# Patient Record
Sex: MALE | Race: WHITE | Employment: OTHER | URBAN - METROPOLITAN AREA
[De-identification: names, ages, dates, MRNs, and addresses within clinical notes are randomized per-mention and may not be internally consistent; named-entity substitution may affect disease eponyms.]

---

## 2017-01-04 ENCOUNTER — OFFICE VISIT (OUTPATIENT)
Dept: FAMILY MEDICINE CLINIC | Age: 67
End: 2017-01-04

## 2017-01-04 VITALS
HEIGHT: 69 IN | HEART RATE: 62 BPM | SYSTOLIC BLOOD PRESSURE: 112 MMHG | RESPIRATION RATE: 16 BRPM | DIASTOLIC BLOOD PRESSURE: 68 MMHG | BODY MASS INDEX: 40.14 KG/M2 | TEMPERATURE: 97.8 F | OXYGEN SATURATION: 97 % | WEIGHT: 271 LBS

## 2017-01-04 DIAGNOSIS — J06.9 UPPER RESPIRATORY TRACT INFECTION, UNSPECIFIED TYPE: ICD-10-CM

## 2017-01-04 DIAGNOSIS — J40 BRONCHITIS: Primary | ICD-10-CM

## 2017-01-04 PROCEDURE — 99213 OFFICE O/P EST LOW 20 MIN: CPT | Performed by: NURSE PRACTITIONER

## 2017-01-04 RX ORDER — BENZONATATE 100 MG/1
100 CAPSULE ORAL 3 TIMES DAILY PRN
Qty: 15 CAPSULE | Refills: 0 | Status: SHIPPED | OUTPATIENT
Start: 2017-01-04 | End: 2017-01-09

## 2017-01-04 RX ORDER — AMOXICILLIN 500 MG/1
500 CAPSULE ORAL 2 TIMES DAILY
Qty: 20 CAPSULE | Refills: 0 | Status: SHIPPED | OUTPATIENT
Start: 2017-01-04 | End: 2019-01-28 | Stop reason: SDUPTHER

## 2017-01-04 ASSESSMENT — ENCOUNTER SYMPTOMS
SHORTNESS OF BREATH: 0
SINUS PRESSURE: 1
SORE THROAT: 1
SWOLLEN GLANDS: 0
COUGH: 1
HOARSE VOICE: 0

## 2017-03-16 ENCOUNTER — HOSPITAL ENCOUNTER (OUTPATIENT)
Dept: SURGERY | Age: 67
Discharge: HOME | End: 2017-03-16
Attending: INTERNAL MEDICINE | Admitting: INTERNAL MEDICINE

## 2017-03-16 VITALS
SYSTOLIC BLOOD PRESSURE: 138 MMHG | BODY MASS INDEX: 37.22 KG/M2 | RESPIRATION RATE: 20 BRPM | HEART RATE: 50 BPM | OXYGEN SATURATION: 95 % | HEIGHT: 70 IN | TEMPERATURE: 97 F | WEIGHT: 260 LBS | DIASTOLIC BLOOD PRESSURE: 92 MMHG

## 2017-03-16 LAB
GLUCOSE BLD-MCNC: 150 MG/DL (ref 70–99)
GLUCOSE BLD-MCNC: 163 MG/DL (ref 70–99)
PERFORMED ON: ABNORMAL
PERFORMED ON: ABNORMAL

## 2017-03-16 RX ORDER — LIDOCAINE HYDROCHLORIDE 10 MG/ML
1 INJECTION, SOLUTION EPIDURAL; INFILTRATION; INTRACAUDAL; PERINEURAL
Status: ACTIVE | OUTPATIENT
Start: 2017-03-16 | End: 2017-03-16

## 2017-03-16 RX ORDER — LIDOCAINE HYDROCHLORIDE 10 MG/ML
0.1 INJECTION, SOLUTION EPIDURAL; INFILTRATION; INTRACAUDAL; PERINEURAL ONCE
Status: DISCONTINUED | OUTPATIENT
Start: 2017-03-16 | End: 2017-03-17 | Stop reason: HOSPADM

## 2017-03-16 RX ORDER — SODIUM CHLORIDE, SODIUM LACTATE, POTASSIUM CHLORIDE, CALCIUM CHLORIDE 600; 310; 30; 20 MG/100ML; MG/100ML; MG/100ML; MG/100ML
INJECTION, SOLUTION INTRAVENOUS CONTINUOUS
Status: DISCONTINUED | OUTPATIENT
Start: 2017-03-16 | End: 2017-03-17 | Stop reason: HOSPADM

## 2017-03-16 RX ADMIN — SODIUM CHLORIDE, SODIUM LACTATE, POTASSIUM CHLORIDE, CALCIUM CHLORIDE: 600; 310; 30; 20 INJECTION, SOLUTION INTRAVENOUS at 10:30

## 2017-03-16 ASSESSMENT — PAIN SCALES - GENERAL
PAINLEVEL_OUTOF10: 0

## 2017-03-16 ASSESSMENT — PAIN - FUNCTIONAL ASSESSMENT: PAIN_FUNCTIONAL_ASSESSMENT: 0-10

## 2017-06-26 ENCOUNTER — HOSPITAL ENCOUNTER (OUTPATIENT)
Dept: SURGERY | Age: 67
Discharge: HOME | End: 2017-06-26
Attending: INTERNAL MEDICINE | Admitting: INTERNAL MEDICINE

## 2017-06-26 VITALS
OXYGEN SATURATION: 97 % | BODY MASS INDEX: 37.22 KG/M2 | DIASTOLIC BLOOD PRESSURE: 74 MMHG | WEIGHT: 260 LBS | RESPIRATION RATE: 16 BRPM | TEMPERATURE: 97 F | SYSTOLIC BLOOD PRESSURE: 150 MMHG | HEIGHT: 70 IN | HEART RATE: 56 BPM

## 2017-06-26 LAB
GLUCOSE BLD-MCNC: 168 MG/DL (ref 70–99)
GLUCOSE BLD-MCNC: 175 MG/DL (ref 70–99)
PERFORMED ON: ABNORMAL
PERFORMED ON: ABNORMAL

## 2017-06-26 PROCEDURE — 93010 ELECTROCARDIOGRAM REPORT: CPT | Performed by: INTERNAL MEDICINE

## 2017-06-26 RX ORDER — LIDOCAINE HYDROCHLORIDE 10 MG/ML
0.1 INJECTION, SOLUTION EPIDURAL; INFILTRATION; INTRACAUDAL; PERINEURAL
Status: DISCONTINUED | OUTPATIENT
Start: 2017-06-26 | End: 2017-06-26 | Stop reason: SDUPTHER

## 2017-06-26 RX ORDER — OXYCODONE HCL 5 MG/5 ML
5 SOLUTION, ORAL ORAL EVERY 4 HOURS PRN
Qty: 120 ML | Refills: 0 | Status: SHIPPED | OUTPATIENT
Start: 2017-06-26 | End: 2017-07-06

## 2017-06-26 RX ORDER — SODIUM CHLORIDE, SODIUM LACTATE, POTASSIUM CHLORIDE, CALCIUM CHLORIDE 600; 310; 30; 20 MG/100ML; MG/100ML; MG/100ML; MG/100ML
INJECTION, SOLUTION INTRAVENOUS CONTINUOUS
Status: DISCONTINUED | OUTPATIENT
Start: 2017-06-26 | End: 2017-06-27 | Stop reason: HOSPADM

## 2017-06-26 RX ORDER — SODIUM CHLORIDE, SODIUM LACTATE, POTASSIUM CHLORIDE, CALCIUM CHLORIDE 600; 310; 30; 20 MG/100ML; MG/100ML; MG/100ML; MG/100ML
INJECTION, SOLUTION INTRAVENOUS ONCE
Status: DISCONTINUED | OUTPATIENT
Start: 2017-06-26 | End: 2017-06-26 | Stop reason: SDUPTHER

## 2017-06-26 RX ORDER — PROMETHAZINE HYDROCHLORIDE 25 MG/1
25 SUPPOSITORY RECTAL EVERY 6 HOURS PRN
Qty: 10 SUPPOSITORY | Refills: 0 | Status: SHIPPED | OUTPATIENT
Start: 2017-06-26 | End: 2017-07-03

## 2017-06-26 RX ORDER — SUCRALFATE ORAL 1 G/10ML
1 SUSPENSION ORAL 4 TIMES DAILY
Qty: 300 ML | Refills: 0 | Status: SHIPPED | OUTPATIENT
Start: 2017-06-26 | End: 2017-09-06

## 2017-06-26 RX ORDER — LIDOCAINE HYDROCHLORIDE 10 MG/ML
1 INJECTION, SOLUTION EPIDURAL; INFILTRATION; INTRACAUDAL; PERINEURAL
Status: ACTIVE | OUTPATIENT
Start: 2017-06-26 | End: 2017-06-26

## 2017-06-26 RX ADMIN — SODIUM CHLORIDE, SODIUM LACTATE, POTASSIUM CHLORIDE, CALCIUM CHLORIDE: 600; 310; 30; 20 INJECTION, SOLUTION INTRAVENOUS at 10:35

## 2017-06-26 ASSESSMENT — PAIN SCALES - GENERAL
PAINLEVEL_OUTOF10: 0

## 2017-06-26 ASSESSMENT — PAIN - FUNCTIONAL ASSESSMENT: PAIN_FUNCTIONAL_ASSESSMENT: 0-10

## 2017-06-27 LAB
EKG ATRIAL RATE: 58 BPM
EKG DIAGNOSIS: NORMAL
EKG Q-T INTERVAL: 416 MS
EKG QRS DURATION: 70 MS
EKG QTC CALCULATION (BAZETT): 408 MS
EKG R AXIS: 47 DEGREES
EKG T AXIS: 32 DEGREES
EKG VENTRICULAR RATE: 58 BPM

## 2017-08-21 ENCOUNTER — OFFICE VISIT (OUTPATIENT)
Dept: FAMILY MEDICINE CLINIC | Age: 67
End: 2017-08-21

## 2017-08-21 VITALS
DIASTOLIC BLOOD PRESSURE: 70 MMHG | WEIGHT: 272 LBS | HEIGHT: 70 IN | SYSTOLIC BLOOD PRESSURE: 118 MMHG | BODY MASS INDEX: 38.94 KG/M2 | OXYGEN SATURATION: 94 % | HEART RATE: 58 BPM

## 2017-08-21 DIAGNOSIS — I10 ESSENTIAL HYPERTENSION: Primary | ICD-10-CM

## 2017-08-21 DIAGNOSIS — E78.5 HYPERLIPIDEMIA, UNSPECIFIED HYPERLIPIDEMIA TYPE: ICD-10-CM

## 2017-08-21 DIAGNOSIS — R91.1 LUNG NODULE: ICD-10-CM

## 2017-08-21 DIAGNOSIS — E11.9 CONTROLLED TYPE 2 DIABETES MELLITUS WITHOUT COMPLICATION, WITH LONG-TERM CURRENT USE OF INSULIN (HCC): ICD-10-CM

## 2017-08-21 DIAGNOSIS — Z87.898 HISTORY OF SNORING: ICD-10-CM

## 2017-08-21 DIAGNOSIS — Z79.4 CONTROLLED TYPE 2 DIABETES MELLITUS WITHOUT COMPLICATION, WITH LONG-TERM CURRENT USE OF INSULIN (HCC): ICD-10-CM

## 2017-08-21 LAB
HBA1C MFR BLD: 6.9 %
PROSTATE SPECIFIC ANTIGEN: 0.65 NG/ML (ref 0–4)

## 2017-08-21 PROCEDURE — G8427 DOCREV CUR MEDS BY ELIG CLIN: HCPCS | Performed by: FAMILY MEDICINE

## 2017-08-21 PROCEDURE — 3046F HEMOGLOBIN A1C LEVEL >9.0%: CPT | Performed by: FAMILY MEDICINE

## 2017-08-21 PROCEDURE — G8417 CALC BMI ABV UP PARAM F/U: HCPCS | Performed by: FAMILY MEDICINE

## 2017-08-21 PROCEDURE — 99214 OFFICE O/P EST MOD 30 MIN: CPT | Performed by: FAMILY MEDICINE

## 2017-08-21 PROCEDURE — 1123F ACP DISCUSS/DSCN MKR DOCD: CPT | Performed by: FAMILY MEDICINE

## 2017-08-21 PROCEDURE — 83036 HEMOGLOBIN GLYCOSYLATED A1C: CPT | Performed by: FAMILY MEDICINE

## 2017-08-21 PROCEDURE — 4040F PNEUMOC VAC/ADMIN/RCVD: CPT | Performed by: FAMILY MEDICINE

## 2017-08-21 PROCEDURE — 3017F COLORECTAL CA SCREEN DOC REV: CPT | Performed by: FAMILY MEDICINE

## 2017-08-21 PROCEDURE — 1036F TOBACCO NON-USER: CPT | Performed by: FAMILY MEDICINE

## 2017-08-21 ASSESSMENT — ENCOUNTER SYMPTOMS
COUGH: 0
STRIDOR: 0
CHEST TIGHTNESS: 0
CHOKING: 0
SHORTNESS OF BREATH: 0
WHEEZING: 0

## 2017-09-06 ENCOUNTER — HOSPITAL ENCOUNTER (OUTPATIENT)
Dept: SURGERY | Age: 67
Discharge: HOME | End: 2017-09-06
Attending: INTERNAL MEDICINE | Admitting: INTERNAL MEDICINE

## 2017-09-06 VITALS
WEIGHT: 272 LBS | OXYGEN SATURATION: 96 % | SYSTOLIC BLOOD PRESSURE: 127 MMHG | RESPIRATION RATE: 20 BRPM | HEIGHT: 70 IN | TEMPERATURE: 97 F | DIASTOLIC BLOOD PRESSURE: 76 MMHG | BODY MASS INDEX: 38.94 KG/M2 | HEART RATE: 58 BPM

## 2017-09-06 LAB
GLUCOSE BLD-MCNC: 180 MG/DL (ref 70–99)
GLUCOSE BLD-MCNC: 186 MG/DL (ref 70–99)
PERFORMED ON: ABNORMAL
PERFORMED ON: ABNORMAL

## 2017-09-06 RX ORDER — METOCLOPRAMIDE HYDROCHLORIDE 5 MG/ML
10 INJECTION INTRAMUSCULAR; INTRAVENOUS
Status: ACTIVE | OUTPATIENT
Start: 2017-09-06 | End: 2017-09-06

## 2017-09-06 RX ORDER — SODIUM CHLORIDE, SODIUM LACTATE, POTASSIUM CHLORIDE, CALCIUM CHLORIDE 600; 310; 30; 20 MG/100ML; MG/100ML; MG/100ML; MG/100ML
1000 INJECTION, SOLUTION INTRAVENOUS ONCE
Status: DISCONTINUED | OUTPATIENT
Start: 2017-09-06 | End: 2017-09-07 | Stop reason: HOSPADM

## 2017-09-06 RX ORDER — LIDOCAINE HYDROCHLORIDE 10 MG/ML
1 INJECTION, SOLUTION EPIDURAL; INFILTRATION; INTRACAUDAL; PERINEURAL ONCE
Status: DISCONTINUED | OUTPATIENT
Start: 2017-09-06 | End: 2017-09-07 | Stop reason: HOSPADM

## 2017-09-06 RX ORDER — HYDRALAZINE HYDROCHLORIDE 20 MG/ML
5 INJECTION INTRAMUSCULAR; INTRAVENOUS EVERY 10 MIN PRN
Status: DISCONTINUED | OUTPATIENT
Start: 2017-09-06 | End: 2017-09-07 | Stop reason: HOSPADM

## 2017-09-06 RX ORDER — OXYCODONE HYDROCHLORIDE 5 MG/1
5 TABLET ORAL PRN
Status: ACTIVE | OUTPATIENT
Start: 2017-09-06 | End: 2017-09-06

## 2017-09-06 RX ORDER — SODIUM CHLORIDE, SODIUM LACTATE, POTASSIUM CHLORIDE, CALCIUM CHLORIDE 600; 310; 30; 20 MG/100ML; MG/100ML; MG/100ML; MG/100ML
INJECTION, SOLUTION INTRAVENOUS CONTINUOUS
Status: DISCONTINUED | OUTPATIENT
Start: 2017-09-06 | End: 2017-09-07 | Stop reason: HOSPADM

## 2017-09-06 RX ORDER — PROMETHAZINE HYDROCHLORIDE 25 MG/ML
6.25 INJECTION, SOLUTION INTRAMUSCULAR; INTRAVENOUS
Status: ACTIVE | OUTPATIENT
Start: 2017-09-06 | End: 2017-09-06

## 2017-09-06 RX ORDER — MORPHINE SULFATE 2 MG/ML
1 INJECTION, SOLUTION INTRAMUSCULAR; INTRAVENOUS EVERY 5 MIN PRN
Status: DISCONTINUED | OUTPATIENT
Start: 2017-09-06 | End: 2017-09-07 | Stop reason: HOSPADM

## 2017-09-06 RX ORDER — MEPERIDINE HYDROCHLORIDE 50 MG/ML
12.5 INJECTION INTRAMUSCULAR; INTRAVENOUS; SUBCUTANEOUS EVERY 5 MIN PRN
Status: DISCONTINUED | OUTPATIENT
Start: 2017-09-06 | End: 2017-09-07 | Stop reason: HOSPADM

## 2017-09-06 RX ORDER — SODIUM CHLORIDE 0.9 % (FLUSH) 0.9 %
10 SYRINGE (ML) INJECTION PRN
Status: DISCONTINUED | OUTPATIENT
Start: 2017-09-06 | End: 2017-09-07 | Stop reason: HOSPADM

## 2017-09-06 RX ORDER — OXYCODONE HYDROCHLORIDE 5 MG/1
10 TABLET ORAL PRN
Status: ACTIVE | OUTPATIENT
Start: 2017-09-06 | End: 2017-09-06

## 2017-09-06 RX ORDER — DIPHENHYDRAMINE HYDROCHLORIDE 50 MG/ML
12.5 INJECTION INTRAMUSCULAR; INTRAVENOUS
Status: ACTIVE | OUTPATIENT
Start: 2017-09-06 | End: 2017-09-06

## 2017-09-06 RX ORDER — SODIUM CHLORIDE 0.9 % (FLUSH) 0.9 %
10 SYRINGE (ML) INJECTION EVERY 12 HOURS SCHEDULED
Status: DISCONTINUED | OUTPATIENT
Start: 2017-09-06 | End: 2017-09-07 | Stop reason: HOSPADM

## 2017-09-06 RX ORDER — LABETALOL HYDROCHLORIDE 5 MG/ML
5 INJECTION, SOLUTION INTRAVENOUS EVERY 10 MIN PRN
Status: DISCONTINUED | OUTPATIENT
Start: 2017-09-06 | End: 2017-09-07 | Stop reason: HOSPADM

## 2017-09-06 RX ORDER — LIDOCAINE HYDROCHLORIDE 10 MG/ML
1 INJECTION, SOLUTION EPIDURAL; INFILTRATION; INTRACAUDAL; PERINEURAL
Status: ACTIVE | OUTPATIENT
Start: 2017-09-06 | End: 2017-09-06

## 2017-09-06 RX ADMIN — SODIUM CHLORIDE, SODIUM LACTATE, POTASSIUM CHLORIDE, CALCIUM CHLORIDE: 600; 310; 30; 20 INJECTION, SOLUTION INTRAVENOUS at 10:20

## 2017-09-06 ASSESSMENT — PAIN SCALES - GENERAL
PAINLEVEL_OUTOF10: 0

## 2017-09-06 ASSESSMENT — PAIN - FUNCTIONAL ASSESSMENT: PAIN_FUNCTIONAL_ASSESSMENT: 0-10

## 2017-09-08 ENCOUNTER — HOSPITAL ENCOUNTER (OUTPATIENT)
Dept: CT IMAGING | Age: 67
Discharge: HOME | End: 2017-09-08
Attending: FAMILY MEDICINE | Admitting: FAMILY MEDICINE

## 2017-09-08 ENCOUNTER — TELEPHONE (OUTPATIENT)
Dept: PULMONOLOGY | Age: 67
End: 2017-09-08

## 2017-09-08 DIAGNOSIS — R91.1 SOLITARY PULMONARY NODULE: ICD-10-CM

## 2017-09-08 DIAGNOSIS — R91.1 LUNG NODULE: ICD-10-CM

## 2017-09-11 ENCOUNTER — TELEPHONE (OUTPATIENT)
Dept: FAMILY MEDICINE CLINIC | Age: 67
End: 2017-09-11

## 2017-09-20 ENCOUNTER — OFFICE VISIT (OUTPATIENT)
Dept: PULMONOLOGY | Age: 67
End: 2017-09-20

## 2017-09-20 VITALS
WEIGHT: 272 LBS | RESPIRATION RATE: 18 BRPM | DIASTOLIC BLOOD PRESSURE: 72 MMHG | HEIGHT: 70 IN | SYSTOLIC BLOOD PRESSURE: 118 MMHG | BODY MASS INDEX: 38.94 KG/M2 | OXYGEN SATURATION: 95 % | HEART RATE: 64 BPM

## 2017-09-20 DIAGNOSIS — G47.19 EXCESSIVE DAYTIME SLEEPINESS: ICD-10-CM

## 2017-09-20 DIAGNOSIS — R91.1 LUNG NODULE: Primary | ICD-10-CM

## 2017-09-20 PROCEDURE — 1036F TOBACCO NON-USER: CPT | Performed by: INTERNAL MEDICINE

## 2017-09-20 PROCEDURE — 3017F COLORECTAL CA SCREEN DOC REV: CPT | Performed by: INTERNAL MEDICINE

## 2017-09-20 PROCEDURE — 99204 OFFICE O/P NEW MOD 45 MIN: CPT | Performed by: INTERNAL MEDICINE

## 2017-09-20 PROCEDURE — 1123F ACP DISCUSS/DSCN MKR DOCD: CPT | Performed by: INTERNAL MEDICINE

## 2017-09-20 PROCEDURE — 4040F PNEUMOC VAC/ADMIN/RCVD: CPT | Performed by: INTERNAL MEDICINE

## 2017-09-20 PROCEDURE — G8417 CALC BMI ABV UP PARAM F/U: HCPCS | Performed by: INTERNAL MEDICINE

## 2017-09-20 PROCEDURE — G8428 CUR MEDS NOT DOCUMENT: HCPCS | Performed by: INTERNAL MEDICINE

## 2017-09-20 ASSESSMENT — SLEEP AND FATIGUE QUESTIONNAIRES
ESS TOTAL SCORE: 12
HOW LIKELY ARE YOU TO NOD OFF OR FALL ASLEEP WHILE WATCHING TV: 2
NECK CIRCUMFERENCE (INCHES): 18.5
HOW LIKELY ARE YOU TO NOD OFF OR FALL ASLEEP WHILE SITTING INACTIVE IN A PUBLIC PLACE: 2
HOW LIKELY ARE YOU TO NOD OFF OR FALL ASLEEP WHEN YOU ARE A PASSENGER IN A CAR FOR AN HOUR WITHOUT A BREAK: 3
HOW LIKELY ARE YOU TO NOD OFF OR FALL ASLEEP WHILE SITTING AND TALKING TO SOMEONE: 0
HOW LIKELY ARE YOU TO NOD OFF OR FALL ASLEEP WHILE SITTING AND READING: 2
HOW LIKELY ARE YOU TO NOD OFF OR FALL ASLEEP IN A CAR, WHILE STOPPED FOR A FEW MINUTES IN TRAFFIC: 0
HOW LIKELY ARE YOU TO NOD OFF OR FALL ASLEEP WHILE SITTING QUIETLY AFTER LUNCH WITHOUT ALCOHOL: 0
HOW LIKELY ARE YOU TO NOD OFF OR FALL ASLEEP WHILE LYING DOWN TO REST IN THE AFTERNOON WHEN CIRCUMSTANCES PERMIT: 3

## 2017-09-24 ENCOUNTER — HOSPITAL ENCOUNTER (OUTPATIENT)
Dept: SLEEP MEDICINE | Age: 67
Discharge: HOME | End: 2017-09-26
Attending: INTERNAL MEDICINE | Admitting: INTERNAL MEDICINE

## 2017-09-24 DIAGNOSIS — G47.19 EXCESSIVE DAYTIME SLEEPINESS: ICD-10-CM

## 2017-09-26 ENCOUNTER — HOSPITAL ENCOUNTER (OUTPATIENT)
Dept: SLEEP MEDICINE | Age: 67
Discharge: HOME | End: 2017-09-28
Attending: INTERNAL MEDICINE | Admitting: INTERNAL MEDICINE

## 2017-09-26 ENCOUNTER — TELEPHONE (OUTPATIENT)
Dept: PULMONOLOGY | Age: 67
End: 2017-09-26

## 2017-09-26 DIAGNOSIS — G47.33 OSA (OBSTRUCTIVE SLEEP APNEA): Primary | ICD-10-CM

## 2017-09-26 DIAGNOSIS — G47.33 OSA (OBSTRUCTIVE SLEEP APNEA): ICD-10-CM

## 2017-09-29 DIAGNOSIS — G47.33 OSA (OBSTRUCTIVE SLEEP APNEA): Primary | ICD-10-CM

## 2017-10-03 ENCOUNTER — HOSPITAL ENCOUNTER (OUTPATIENT)
Dept: ULTRASOUND IMAGING | Age: 67
Discharge: HOME | End: 2017-10-03
Attending: UROLOGY | Admitting: UROLOGY

## 2017-10-03 DIAGNOSIS — N50.9 DISORDER OF MALE GENITAL ORGANS: ICD-10-CM

## 2017-10-03 DIAGNOSIS — N50.9 UNSPECIFIED DISORDER OF MALE GENITAL ORGANS: ICD-10-CM

## 2017-10-20 ENCOUNTER — NURSE ONLY (OUTPATIENT)
Dept: FAMILY MEDICINE CLINIC | Age: 67
End: 2017-10-20

## 2017-10-20 DIAGNOSIS — Z23 IMMUNIZATION DUE: Primary | ICD-10-CM

## 2017-10-20 PROCEDURE — 90662 IIV NO PRSV INCREASED AG IM: CPT | Performed by: FAMILY MEDICINE

## 2017-10-20 PROCEDURE — G0008 ADMIN INFLUENZA VIRUS VAC: HCPCS | Performed by: FAMILY MEDICINE

## 2017-10-23 ENCOUNTER — OFFICE VISIT (OUTPATIENT)
Dept: FAMILY MEDICINE CLINIC | Age: 67
End: 2017-10-23

## 2017-10-23 VITALS
WEIGHT: 260.2 LBS | OXYGEN SATURATION: 95 % | HEART RATE: 63 BPM | DIASTOLIC BLOOD PRESSURE: 70 MMHG | BODY MASS INDEX: 37.25 KG/M2 | HEIGHT: 70 IN | SYSTOLIC BLOOD PRESSURE: 127 MMHG | RESPIRATION RATE: 16 BRPM | TEMPERATURE: 97.8 F

## 2017-10-23 DIAGNOSIS — N44.2 TESTICULAR CYST: Primary | ICD-10-CM

## 2017-10-23 DIAGNOSIS — Z01.818 PREOP EXAMINATION: ICD-10-CM

## 2017-10-23 PROCEDURE — G8417 CALC BMI ABV UP PARAM F/U: HCPCS | Performed by: NURSE PRACTITIONER

## 2017-10-23 PROCEDURE — G8484 FLU IMMUNIZE NO ADMIN: HCPCS | Performed by: NURSE PRACTITIONER

## 2017-10-23 PROCEDURE — 99214 OFFICE O/P EST MOD 30 MIN: CPT | Performed by: NURSE PRACTITIONER

## 2017-10-23 PROCEDURE — G8427 DOCREV CUR MEDS BY ELIG CLIN: HCPCS | Performed by: NURSE PRACTITIONER

## 2017-10-23 PROCEDURE — 4040F PNEUMOC VAC/ADMIN/RCVD: CPT | Performed by: NURSE PRACTITIONER

## 2017-10-23 PROCEDURE — 3017F COLORECTAL CA SCREEN DOC REV: CPT | Performed by: NURSE PRACTITIONER

## 2017-10-23 ASSESSMENT — ENCOUNTER SYMPTOMS
NAUSEA: 0
SINUS PRESSURE: 0
COLOR CHANGE: 0
EYES NEGATIVE: 1
EYE REDNESS: 0
WHEEZING: 0
SORE THROAT: 0
TROUBLE SWALLOWING: 0
EYE ITCHING: 0
ALLERGIC/IMMUNOLOGIC NEGATIVE: 1
DIARRHEA: 0
RESPIRATORY NEGATIVE: 1
CONSTIPATION: 0
ABDOMINAL PAIN: 0
SHORTNESS OF BREATH: 0
CHEST TIGHTNESS: 0
GASTROINTESTINAL NEGATIVE: 1
COUGH: 0

## 2017-10-23 ASSESSMENT — PATIENT HEALTH QUESTIONNAIRE - PHQ9
SUM OF ALL RESPONSES TO PHQ9 QUESTIONS 1 & 2: 0
SUM OF ALL RESPONSES TO PHQ QUESTIONS 1-9: 0
2. FEELING DOWN, DEPRESSED OR HOPELESS: 0
1. LITTLE INTEREST OR PLEASURE IN DOING THINGS: 0

## 2017-10-23 NOTE — PROGRESS NOTES
The medications were discussed with the patient and the physician. Prescription and over the counter medicines reviewed. Pt is taking medication as prescribed  any side effects or barriers such as difficulty in taking the medication reveiwed. The purpose, side effects, dose of medication of new medications were explained to the patient and questions answered by the physician. The patients understands the information concerning medication. Individual treatment plan developed:Self-management plan and goals developed and discussed. Resources given. See patient instructions. Medication treatment plan developed by the physician. See orders. Healthy behavior discussed: Preventative behaviors discussed regarding healthy diet, exercise, and not smoking. Pt already has tool to record self care of BS or high blood pressure results, regular paper ok. Self management confidence of patient being able to put goal into action assessed: medium confidence. Barriers to treatment evaluated:none unless otherwise noted in the HPI.
- 5.1 K/uL 1.2   Monocytes # Latest Ref Range: 0.0 - 1.3 K/uL 0.4   Eosinophils # Latest Ref Range: 0.0 - 0.6 K/uL 0.3          Assessment:       79 y.o. patient with planned surgery as above. Known risk factors for perioperative complications: Diabetes mellitus, Hypertension, Obstructive sleep apnea  Current medications which may produce withdrawal symptoms if withheld perioperatively: none      Plan:     1. Preoperative workup as follows: none  2. Change in medication regimen before surgery: Hold all medications on morning of surgery  3. Prophylaxis for cardiac events with perioperative beta-blockers: Not indicated  ACC/AHA indications for pre-operative beta-blocker use:    · Vascular surgery with history of postitive stress test  · Intermediate or high risk surgery with history of CAD   · Intermediate or high risk surgery with multiple clinical predictors of CAD- 2 of the following: history of compensated or prior heart failure, history of cerebrovascular disease, DM, or renal insufficiency    Routine administration of higher-dose, long-acting metoprolol in beta-blockernaïve patients on the day of surgery, and in the absence of dose titration is associated with an overall increase in mortality. Beta-blockers should be started days to weeks prior to surgery and titrated to pulse < 70.  4. Deep vein thrombosis prophylaxis: regimen to be chosen by surgical team  5.  No contraindications to planned surgery    Mitchell Murillo NP

## 2017-10-31 ENCOUNTER — TELEPHONE (OUTPATIENT)
Dept: PULMONOLOGY | Age: 67
End: 2017-10-31

## 2017-11-28 PROBLEM — R00.1 BRADYCARDIA: Status: ACTIVE | Noted: 2017-11-28

## 2017-11-28 PROBLEM — M79.601 RIGHT ARM PAIN: Status: ACTIVE | Noted: 2017-11-28

## 2017-11-28 PROBLEM — R01.1 HEART MURMUR: Status: ACTIVE | Noted: 2017-11-28

## 2017-11-29 ENCOUNTER — TELEPHONE (OUTPATIENT)
Dept: FAMILY MEDICINE CLINIC | Age: 67
End: 2017-11-29

## 2017-11-30 ENCOUNTER — HOSPITAL ENCOUNTER (OUTPATIENT)
Dept: GENERAL RADIOLOGY | Age: 67
Discharge: HOME | End: 2017-11-30

## 2017-11-30 ENCOUNTER — OFFICE VISIT (OUTPATIENT)
Dept: FAMILY MEDICINE CLINIC | Age: 67
End: 2017-11-30

## 2017-11-30 VITALS
DIASTOLIC BLOOD PRESSURE: 86 MMHG | BODY MASS INDEX: 38.65 KG/M2 | HEART RATE: 57 BPM | SYSTOLIC BLOOD PRESSURE: 144 MMHG | HEIGHT: 70 IN | OXYGEN SATURATION: 95 % | WEIGHT: 270 LBS

## 2017-11-30 DIAGNOSIS — Z91.81 AT HIGH RISK FOR FALLS: ICD-10-CM

## 2017-11-30 DIAGNOSIS — M25.511 ACUTE PAIN OF RIGHT SHOULDER: ICD-10-CM

## 2017-11-30 DIAGNOSIS — M25.511 ACUTE PAIN OF RIGHT SHOULDER: Primary | ICD-10-CM

## 2017-11-30 PROCEDURE — 1123F ACP DISCUSS/DSCN MKR DOCD: CPT | Performed by: NURSE PRACTITIONER

## 2017-11-30 PROCEDURE — G8417 CALC BMI ABV UP PARAM F/U: HCPCS | Performed by: NURSE PRACTITIONER

## 2017-11-30 PROCEDURE — 99213 OFFICE O/P EST LOW 20 MIN: CPT | Performed by: NURSE PRACTITIONER

## 2017-11-30 PROCEDURE — G8484 FLU IMMUNIZE NO ADMIN: HCPCS | Performed by: NURSE PRACTITIONER

## 2017-11-30 PROCEDURE — 3017F COLORECTAL CA SCREEN DOC REV: CPT | Performed by: NURSE PRACTITIONER

## 2017-11-30 PROCEDURE — 4040F PNEUMOC VAC/ADMIN/RCVD: CPT | Performed by: NURSE PRACTITIONER

## 2017-11-30 PROCEDURE — 1036F TOBACCO NON-USER: CPT | Performed by: NURSE PRACTITIONER

## 2017-11-30 PROCEDURE — G8427 DOCREV CUR MEDS BY ELIG CLIN: HCPCS | Performed by: NURSE PRACTITIONER

## 2017-11-30 PROCEDURE — 96372 THER/PROPH/DIAG INJ SC/IM: CPT | Performed by: NURSE PRACTITIONER

## 2017-11-30 RX ORDER — KETOROLAC TROMETHAMINE 10 MG/1
10 TABLET, FILM COATED ORAL
Qty: 15 TABLET | Refills: 0 | Status: SHIPPED | OUTPATIENT
Start: 2017-11-30 | End: 2017-12-19

## 2017-11-30 RX ORDER — KETOROLAC TROMETHAMINE 30 MG/ML
30 INJECTION, SOLUTION INTRAMUSCULAR; INTRAVENOUS ONCE
Status: COMPLETED | OUTPATIENT
Start: 2017-11-30 | End: 2017-11-30

## 2017-11-30 RX ORDER — METHOCARBAMOL 750 MG/1
750 TABLET, FILM COATED ORAL 4 TIMES DAILY
Qty: 40 TABLET | Refills: 0 | Status: SHIPPED | OUTPATIENT
Start: 2017-11-30 | End: 2017-12-10

## 2017-11-30 RX ADMIN — KETOROLAC TROMETHAMINE 30 MG: 30 INJECTION, SOLUTION INTRAMUSCULAR; INTRAVENOUS at 10:44

## 2017-11-30 ASSESSMENT — ENCOUNTER SYMPTOMS
STRIDOR: 0
BACK PAIN: 0
COUGH: 0
SHORTNESS OF BREATH: 0
CHOKING: 0
CHEST TIGHTNESS: 0

## 2017-11-30 NOTE — PROGRESS NOTES
Subjective:      Patient ID: Justin Fox is a 79 y.o. male. Kevon Tolbert is here for ED follow up for chest pain that started 2 days ago. Interpreted as musculoskeletal by hospitalist. Cardiac work up was negative. Chest x ray done in ED \"showed no acute findings\". CT of chest showed \"no acute abnormality detected. Chronic scarring seen bilaterally\". He was prescribed Robaxin, Naprosyn, and Vicodin which have not helped his pain. Pain is now more localized to right shoulder and radiates to the right arm. Denies injury and neck pain. Denies shortness of breath, diaphoresis, numbness and tingling. Shoulder Pain    The pain is present in the right shoulder. This is a new problem. The problem occurs constantly. The problem has been gradually worsening. The quality of the pain is described as sharp (shooting down left arm). The pain is severe. Pertinent negatives include no fever, itching, joint locking, joint swelling, numbness or tingling. The symptoms are aggravated by activity. He has tried NSAIDS and oral narcotics (muscle relaxant) for the symptoms. The treatment provided no relief. His past medical history is significant for diabetes. Review of Systems   Constitutional: Negative for fever. Respiratory: Negative for cough, choking, chest tightness, shortness of breath and stridor. Cardiovascular: Negative for chest pain (right side chest pain resolved, now pain localized to right shoulder), palpitations and leg swelling. Musculoskeletal: Positive for arthralgias. Negative for back pain, gait problem, joint swelling, myalgias, neck pain and neck stiffness. Skin: Negative for itching. Neurological: Negative for tingling and numbness. Vitals:    11/30/17 0949   BP: (!) 144/86   Site: Left Arm   Position: Sitting   Pulse: 57   SpO2: 95%   Weight: 270 lb (122.5 kg)   Height: 5' 10\" (1.778 m)     Objective:   Physical Exam   Constitutional: He is oriented to person, place, and time.  He appears  ketorolac (TORADOL) 10 MG tablet Take 1 tablet by mouth 3 times daily (with meals) for 5 days 15 tablet 0    naproxen (NAPROSYN) 500 MG tablet Take 1 tablet by mouth 2 times daily 30 tablet 0    methocarbamol (ROBAXIN) 500 MG tablet Take 1 tablet by mouth 3 times daily 15 tablet 0    HYDROcodone-acetaminophen (NORCO) 5-325 MG per tablet Take 1 tablet by mouth every 8 hours as needed for Pain . 15 tablet 0    glucose blood VI test strips (ASCENSIA AUTODISC VI;ONE TOUCH ULTRA TEST VI) strip Check glucose 3 times daily      Insulin Syringe-Needle U-100 30G X 1/2\" 0.5 ML MISC Inject insulin 3 times daily      pantoprazole (PROTONIX) 40 MG tablet       insulin regular (HUMULIN R;NOVOLIN R) 100 UNIT/ML injection Inject 10 Units into the skin 3 times daily (before meals)       insulin NPH (HUMULIN N;NOVOLIN N) 100 UNIT/ML injection vial Inject 10 Units into the skin 2 times daily       lisinopril (PRINIVIL;ZESTRIL) 10 MG tablet Take 10 mg by mouth daily      atorvastatin (LIPITOR) 20 MG tablet Take 20 mg by mouth daily.  [] ketorolac (TORADOL) injection 30 mg        No facility-administered encounter medications on file as of 2017. On the basis of positive falls risk screening, assessment and plan is as follows: home safety tips provided.

## 2017-11-30 NOTE — PATIENT INSTRUCTIONS
Xray   Robaxin- Muscle relaxant  Stop taking Naprosyn  Toradol injection  Toradol tablets for 5 days  Referral to Orthopedic

## 2017-12-01 ENCOUNTER — TELEPHONE (OUTPATIENT)
Dept: FAMILY MEDICINE CLINIC | Age: 67
End: 2017-12-01

## 2017-12-01 NOTE — TELEPHONE ENCOUNTER
I am sorry he is in pain. He is already on a number of different medications for his pain. Im not sure what else I could give him to help at this point. If his pain is severe he may need to go to back ER.

## 2017-12-01 NOTE — TELEPHONE ENCOUNTER
Patients wife would like to know what she can do for the patient because he is in a lot of pain?  Please advise

## 2017-12-04 ENCOUNTER — OFFICE VISIT (OUTPATIENT)
Dept: ORTHOPEDIC SURGERY | Age: 67
End: 2017-12-04

## 2017-12-04 VITALS
SYSTOLIC BLOOD PRESSURE: 123 MMHG | WEIGHT: 270.06 LBS | DIASTOLIC BLOOD PRESSURE: 78 MMHG | HEART RATE: 67 BPM | BODY MASS INDEX: 38.66 KG/M2 | HEIGHT: 70 IN

## 2017-12-04 DIAGNOSIS — M75.41 IMPINGEMENT SYNDROME OF RIGHT SHOULDER: ICD-10-CM

## 2017-12-04 DIAGNOSIS — M25.511 ACUTE PAIN OF RIGHT SHOULDER: Primary | ICD-10-CM

## 2017-12-04 PROCEDURE — 1036F TOBACCO NON-USER: CPT | Performed by: ORTHOPAEDIC SURGERY

## 2017-12-04 PROCEDURE — 3017F COLORECTAL CA SCREEN DOC REV: CPT | Performed by: ORTHOPAEDIC SURGERY

## 2017-12-04 PROCEDURE — 73030 X-RAY EXAM OF SHOULDER: CPT | Performed by: ORTHOPAEDIC SURGERY

## 2017-12-04 PROCEDURE — 99202 OFFICE O/P NEW SF 15 MIN: CPT | Performed by: ORTHOPAEDIC SURGERY

## 2017-12-04 PROCEDURE — G8484 FLU IMMUNIZE NO ADMIN: HCPCS | Performed by: ORTHOPAEDIC SURGERY

## 2017-12-04 PROCEDURE — G8427 DOCREV CUR MEDS BY ELIG CLIN: HCPCS | Performed by: ORTHOPAEDIC SURGERY

## 2017-12-04 PROCEDURE — 20610 DRAIN/INJ JOINT/BURSA W/O US: CPT | Performed by: ORTHOPAEDIC SURGERY

## 2017-12-04 PROCEDURE — 4040F PNEUMOC VAC/ADMIN/RCVD: CPT | Performed by: ORTHOPAEDIC SURGERY

## 2017-12-04 PROCEDURE — G8417 CALC BMI ABV UP PARAM F/U: HCPCS | Performed by: ORTHOPAEDIC SURGERY

## 2017-12-04 PROCEDURE — 1123F ACP DISCUSS/DSCN MKR DOCD: CPT | Performed by: ORTHOPAEDIC SURGERY

## 2017-12-04 NOTE — PATIENT INSTRUCTIONS
avoid injury. Put a warm, wet towel on your shoulder before exercising. Stop any exercise that increases pain. ¨ Range-of-motion exercises. If it is not too painful, stretch your arm in four directions: across the body, up the back, to the side, and overhead. ¨ Pendulum exercise. Lean forward and hold onto a table or the back of a chair with your good arm. Bend at the waist, letting the arm with the sore shoulder hang straight down. Swing your arm back and forth like a pendulum, then in circles that start small and slowly grow larger. This exercise does not use the arm muscles. Instead, use your legs and your hips to create movement that makes your arm swing freely. Try this for about 5 minutes, several times a day. ¨ Wall climbing (to the side). Stand with your side to a wall so that your fingers can just touch it. Then turn so your body is turned slightly toward the wall. Walk the fingers of your injured arm up the wall as high as pain permits. Try not to shrug your shoulder up toward your ear as you move your arm up. Hold that position for a count of 15 to 30 seconds. Walk your fingers down to the starting position. Repeat 2 to 4 times, trying to reach higher each time. ¨ Wall climbing (to the front). Face a wall, standing so your fingers can just touch it. Walk the fingers of your affected arm up the wall as high as pain permits. Try not to shrug your shoulder up toward your ear as you move your arm up. Hold that position for a count of 15 to 30 seconds. Slowly walk your fingers to the starting position. Repeat 2 to 4 times, trying to reach higher each time. · Rest your shoulder when you are not doing stretches and other exercises. Your doctor may tell you to wait for the pain to go away before doing exercises. Do not lift heavy bags of groceries, play sports, or do anything else that makes you twist or stress your shoulder. Avoid activities where you move your affected arm above your head.   When should you skin.  · Take anti-inflammatory medicines to reduce pain, swelling, or inflammation. These include ibuprofen (Advil, Motrin) and naproxen (Aleve). Read and follow all instructions on the label. · Avoid strenuous activities for several days, especially those that put stress on the area where you got the shot. · If you have dressings over the area, keep them clean and dry. You may remove them when your doctor tells you to. When should you call for help? Call your doctor now or seek immediate medical care if:  · You have signs of infection, such as:  ¨ Increased pain, swelling, warmth, or redness. ¨ Red streaks leading from the site. ¨ Pus draining from the site. ¨ A fever. Watch closely for changes in your health, and be sure to contact your doctor if you have any problems. Where can you learn more? Go to https://[employer]. org and sign in to your [email or web address] account. Enter N616 in the [employer] box to learn more about \"Joint Injections: Care Instructions. \"     If you do not have an account, please click on the \"Sign Up Now\" link. Current as of: March 21, 2017  Content Version: 11.3  © 0034-9903 [employer]. Care instructions adapted under license by [facility] ([facility]). If you have questions about a medical condition or this instruction, always ask your healthcare professional. [place] [address] any warranty or liability for your use of this information.

## 2017-12-04 NOTE — PROGRESS NOTES
Name of Injection:  Celestone  Lot #: [id number]  Expiration date: 3/2019  Site: the right shoulder(s)   NDC: 6148-4539-34  Date given: 4:15 PM      Administered by: Dr Donny Peña

## 2017-12-18 ENCOUNTER — OFFICE VISIT (OUTPATIENT)
Dept: ORTHOPEDIC SURGERY | Age: 67
End: 2017-12-18

## 2017-12-18 VITALS
HEIGHT: 70 IN | SYSTOLIC BLOOD PRESSURE: 131 MMHG | DIASTOLIC BLOOD PRESSURE: 78 MMHG | HEART RATE: 67 BPM | BODY MASS INDEX: 38.66 KG/M2 | WEIGHT: 270.06 LBS

## 2017-12-18 DIAGNOSIS — M75.41 IMPINGEMENT SYNDROME OF RIGHT SHOULDER: Primary | ICD-10-CM

## 2017-12-18 DIAGNOSIS — M25.511 ACUTE PAIN OF RIGHT SHOULDER: ICD-10-CM

## 2017-12-18 PROCEDURE — 4040F PNEUMOC VAC/ADMIN/RCVD: CPT | Performed by: ORTHOPAEDIC SURGERY

## 2017-12-18 PROCEDURE — G8427 DOCREV CUR MEDS BY ELIG CLIN: HCPCS | Performed by: ORTHOPAEDIC SURGERY

## 2017-12-18 PROCEDURE — 99213 OFFICE O/P EST LOW 20 MIN: CPT | Performed by: ORTHOPAEDIC SURGERY

## 2017-12-18 PROCEDURE — G8417 CALC BMI ABV UP PARAM F/U: HCPCS | Performed by: ORTHOPAEDIC SURGERY

## 2017-12-18 PROCEDURE — 3017F COLORECTAL CA SCREEN DOC REV: CPT | Performed by: ORTHOPAEDIC SURGERY

## 2017-12-18 PROCEDURE — 1123F ACP DISCUSS/DSCN MKR DOCD: CPT | Performed by: ORTHOPAEDIC SURGERY

## 2017-12-18 PROCEDURE — G8484 FLU IMMUNIZE NO ADMIN: HCPCS | Performed by: ORTHOPAEDIC SURGERY

## 2017-12-18 PROCEDURE — 1036F TOBACCO NON-USER: CPT | Performed by: ORTHOPAEDIC SURGERY

## 2017-12-18 NOTE — PROGRESS NOTES
History  Nomi Rooney is a 79 y.o. male who returns for follow-up of a right shoulder impingement. Patient has a type III acromium which has compromised his ability to actively abduct his arm. He's been treated with a subacromial corticosteroid injection which she reports provided 2 weeks of relief and then his had basically recurrence of his pain and weakness. .   Symptoms are has worsened. Pain level today is 6/10. Treatment to date includes subacromial crepitus steroid injection with home exercises using Thera-Band. Patient's medications, allergies, past medical, surgical, social and family histories were reviewed and updated as appropriate. Review of Systems  Relevant review of systems reviewed and available in the patient's chart    Primary Area of CC: Patient demonstrates a very positive impingement sign with a painful arc of movement between 90 and 120°. There is weakness in abduction due to pain and weakness with external rotation but not with internal rotation. Examination proximal and distal to the injured area show good overall ROM, no bony prominence or soft tissue tenderness, no instability or excessive stiffness. Radiology:       Impression  1. Impingement syndrome of right shoulder  MRI Shoulder Right WO Contrast   2. Acute pain of right shoulder  MRI Shoulder Right WO Contrast            Plan  He Has  persistent symptomatology we'll plan to proceed with an MRI of the right shoulder and then have the patient follow-up with us once that diagnostic study has been completed to determine appropriate intervention. Finn Lopes Sensing

## 2017-12-20 ENCOUNTER — HOSPITAL ENCOUNTER (OUTPATIENT)
Dept: MRI IMAGING | Age: 67
Discharge: HOME | End: 2017-12-20
Attending: ORTHOPAEDIC SURGERY | Admitting: ORTHOPAEDIC SURGERY

## 2017-12-20 DIAGNOSIS — M25.511 ACUTE PAIN OF RIGHT SHOULDER: ICD-10-CM

## 2017-12-20 DIAGNOSIS — M75.41 IMPINGEMENT SYNDROME OF RIGHT SHOULDER: ICD-10-CM

## 2017-12-20 RX ORDER — ALPRAZOLAM 1 MG/1
TABLET ORAL
Qty: 2 TABLET | Refills: 0 | Status: SHIPPED | OUTPATIENT
Start: 2017-12-20 | End: 2018-11-28

## 2017-12-21 ENCOUNTER — HOSPITAL ENCOUNTER (OUTPATIENT)
Dept: MRI IMAGING | Age: 67
Discharge: HOME | End: 2017-12-21
Attending: ORTHOPAEDIC SURGERY | Admitting: ORTHOPAEDIC SURGERY

## 2017-12-21 DIAGNOSIS — M25.511 ACUTE PAIN OF RIGHT SHOULDER: ICD-10-CM

## 2017-12-21 DIAGNOSIS — M75.41 IMPINGEMENT SYNDROME OF RIGHT SHOULDER: ICD-10-CM

## 2017-12-22 ENCOUNTER — OFFICE VISIT (OUTPATIENT)
Dept: ORTHOPEDIC SURGERY | Age: 67
End: 2017-12-22

## 2017-12-22 ENCOUNTER — HOSPITAL ENCOUNTER (OUTPATIENT)
Dept: SURGERY | Age: 67
Discharge: HOME | End: 2017-12-22
Attending: INTERNAL MEDICINE | Admitting: INTERNAL MEDICINE

## 2017-12-22 VITALS
HEART RATE: 67 BPM | WEIGHT: 259.92 LBS | BODY MASS INDEX: 38.5 KG/M2 | DIASTOLIC BLOOD PRESSURE: 78 MMHG | SYSTOLIC BLOOD PRESSURE: 131 MMHG | HEIGHT: 69 IN

## 2017-12-22 VITALS
BODY MASS INDEX: 38.51 KG/M2 | HEIGHT: 69 IN | WEIGHT: 260 LBS | OXYGEN SATURATION: 92 % | HEART RATE: 60 BPM | RESPIRATION RATE: 16 BRPM | SYSTOLIC BLOOD PRESSURE: 146 MMHG | DIASTOLIC BLOOD PRESSURE: 97 MMHG | TEMPERATURE: 97.6 F

## 2017-12-22 DIAGNOSIS — M79.601 PAIN OF RIGHT UPPER EXTREMITY: Primary | ICD-10-CM

## 2017-12-22 LAB
GLUCOSE BLD-MCNC: 170 MG/DL (ref 70–99)
PERFORMED ON: ABNORMAL

## 2017-12-22 PROCEDURE — 1036F TOBACCO NON-USER: CPT | Performed by: ORTHOPAEDIC SURGERY

## 2017-12-22 PROCEDURE — G8417 CALC BMI ABV UP PARAM F/U: HCPCS | Performed by: ORTHOPAEDIC SURGERY

## 2017-12-22 PROCEDURE — G8427 DOCREV CUR MEDS BY ELIG CLIN: HCPCS | Performed by: ORTHOPAEDIC SURGERY

## 2017-12-22 PROCEDURE — G8484 FLU IMMUNIZE NO ADMIN: HCPCS | Performed by: ORTHOPAEDIC SURGERY

## 2017-12-22 PROCEDURE — 4040F PNEUMOC VAC/ADMIN/RCVD: CPT | Performed by: ORTHOPAEDIC SURGERY

## 2017-12-22 PROCEDURE — 3017F COLORECTAL CA SCREEN DOC REV: CPT | Performed by: ORTHOPAEDIC SURGERY

## 2017-12-22 PROCEDURE — 1123F ACP DISCUSS/DSCN MKR DOCD: CPT | Performed by: ORTHOPAEDIC SURGERY

## 2017-12-22 PROCEDURE — 99213 OFFICE O/P EST LOW 20 MIN: CPT | Performed by: ORTHOPAEDIC SURGERY

## 2017-12-22 RX ORDER — SODIUM CHLORIDE, SODIUM LACTATE, POTASSIUM CHLORIDE, CALCIUM CHLORIDE 600; 310; 30; 20 MG/100ML; MG/100ML; MG/100ML; MG/100ML
INJECTION, SOLUTION INTRAVENOUS CONTINUOUS
Status: DISCONTINUED | OUTPATIENT
Start: 2017-12-22 | End: 2017-12-23 | Stop reason: HOSPADM

## 2017-12-22 RX ORDER — LIDOCAINE HYDROCHLORIDE 10 MG/ML
1 INJECTION, SOLUTION EPIDURAL; INFILTRATION; INTRACAUDAL; PERINEURAL
Status: ACTIVE | OUTPATIENT
Start: 2017-12-22 | End: 2017-12-22

## 2017-12-22 RX ADMIN — SODIUM CHLORIDE, SODIUM LACTATE, POTASSIUM CHLORIDE, CALCIUM CHLORIDE: 600; 310; 30; 20 INJECTION, SOLUTION INTRAVENOUS at 09:27

## 2017-12-22 ASSESSMENT — PAIN SCALES - GENERAL
PAINLEVEL_OUTOF10: 0
PAINLEVEL_OUTOF10: 0

## 2017-12-22 ASSESSMENT — PAIN DESCRIPTION - DESCRIPTORS: DESCRIPTORS: SHARP;SHOOTING

## 2017-12-22 ASSESSMENT — PAIN - FUNCTIONAL ASSESSMENT: PAIN_FUNCTIONAL_ASSESSMENT: 0-10

## 2017-12-22 NOTE — ANESTHESIA PRE-OP
Department of Anesthesiology  Preprocedure Note       Name:  Jerry Flores   Age:  79 y.o.  :  1950                                          MRN:  [id number]         Date:  2017      Surgeon:    Procedure:    Medications prior to admission:   Prior to Admission medications    Medication Sig Start Date End Date Taking? Authorizing Provider   ALPRAZolam Valarie Doll) 1 MG tablet Take one tablet one hour before MRI and take 2nd tablet as MRI begins. . 17  Yes Rica Molina MD   pantoprazole (PROTONIX) 40 MG tablet Take 40 mg by mouth 2 times daily  16  Yes Historical Provider, MD   insulin regular (HUMULIN R;NOVOLIN R) 100 UNIT/ML injection Inject 10 Units into the skin 3 times daily (before meals)    Yes Historical Provider, MD   insulin NPH (HUMULIN N;NOVOLIN N) 100 UNIT/ML injection vial Inject 10 Units into the skin 2 times daily    Yes Historical Provider, MD   lisinopril (PRINIVIL;ZESTRIL) 10 MG tablet Take 10 mg by mouth daily   Yes Historical Provider, MD   atorvastatin (LIPITOR) 20 MG tablet Take 20 mg by mouth daily. Yes Historical Provider, MD   glucose blood VI test strips (ASCENSIA AUTODISC VI;ONE TOUCH ULTRA TEST VI) strip Check glucose 3 times daily 16   Historical Provider, MD   Insulin Syringe-Needle U-100 30G X 1/2\" 0.5 ML MISC Inject insulin 3 times daily 17   Historical Provider, MD       Current medications:    Current Outpatient Prescriptions   Medication Sig Dispense Refill    ALPRAZolam (XANAX) 1 MG tablet Take one tablet one hour before MRI and take 2nd tablet as MRI begins. . 2 tablet 0    pantoprazole (PROTONIX) 40 MG tablet Take 40 mg by mouth 2 times daily       insulin regular (HUMULIN R;NOVOLIN R) 100 UNIT/ML injection Inject 10 Units into the skin 3 times daily (before meals)       insulin NPH (HUMULIN N;NOVOLIN N) 100 UNIT/ML injection vial Inject 10 Units into the skin 2 times daily       lisinopril (PRINIVIL;ZESTRIL) 10 MG tablet Take 10 mg by ENDOSCOPY  10/04/2016    with biopsy    UPPER GASTROINTESTINAL ENDOSCOPY  03/16/2017    Halo ablation    UPPER GASTROINTESTINAL ENDOSCOPY  06/26/2017    Halo ablation    UPPER GASTROINTESTINAL ENDOSCOPY  09/06/2017    bx distal sophagus    WISDOM TOOTH EXTRACTION         Social History:    Social History   Substance Use Topics    Smoking status: Never Smoker    Smokeless tobacco: Never Used    Alcohol use No                                Counseling given: Not Answered      Vital Signs (Current):   Vitals:    12/22/17 0918   BP: 124/64   Pulse: 59   Resp: 16   Temp: 97.6 °F (36.4 °C)   TempSrc: Temporal   SpO2: 92%   Weight: 260 lb (117.9 kg)   Height: 5' 9\" (1.753 m)                                              BP Readings from Last 3 Encounters:   12/22/17 124/64   12/18/17 131/78   12/04/17 123/78       NPO Status: Time of last liquid consumption: 2330                        Time of last solid consumption: 1800                        Date of last liquid consumption: 12/21/17                        Date of last solid food consumption: 12/21/17    BMI:   Wt Readings from Last 3 Encounters:   12/22/17 260 lb (117.9 kg)   12/19/17 260 lb (117.9 kg)   12/18/17 270 lb 1 oz (122.5 kg)     Body mass index is 38.4 kg/m². Anesthesia Evaluation  Patient summary reviewed and Nursing notes reviewed no history of anesthetic complications:   Airway: Mallampati: III     Neck ROM: full   Dental:          Pulmonary:   (+) sleep apnea:                             Cardiovascular:    (+) hypertension:,                   Neuro/Psych:               GI/Hepatic/Renal:   (+) GERD:,          ROS comment: obesity. Endo/Other:    (+) Type II DM, , .                 Abdominal:           Vascular:                                        Anesthesia Plan      general     ASA 3     (Medications & allergies reviewed  All available lab & EKG data reviewed)  Induction: intravenous.       Anesthetic plan and risks discussed with patient. Plan discussed with CRNA.                   Nura Sims MD   12/22/2017

## 2017-12-22 NOTE — H&P
History and Physical / Pre-Sedation Assessment    Patient:  Ally Chambers   :   1950     Intended Procedure: EGD possible RFA     HPI: Yan's high grade dysplasia    Nurses notes reviewed and agreed. Medications reviewed  Allergies: Allergies   Allergen Reactions    Codeine Nausea And Vomiting       Physical Exam:  Vital Signs: There were no vitals taken for this visit. There is no height or weight on file to calculate BMI. Airway:Normal  Pulmonary:Normal  Cardiac:Normal  Abdomen:Normal    Pre-Procedure Assessment / Plan:  ASA 2 - Patient with mild systemic disease with no functional limitations  Level of Sedation Plan: Moderate  sedation  Post Procedure plan: Return to same level of care    I assessed the patient and find that the patient is in satisfactory condition to proceed with the planned procedure and sedation plan. I have explained the risk, benefits, and alternatives to the procedure. The patient understands and agrees to proceed.   Kelly Valiente  9:09 AM 2017

## 2017-12-22 NOTE — OP NOTE
[facility]                   Elayne Henrandez                                 OPERATIVE REPORT    PATIENT NAME: Loi Zuniga                    :        1950  MED REC NO:   [id number]                          ROOM:  ACCOUNT NO:   [de-identified]                          ADMIT DATE: 2017  PROVIDER:     Rica Cano MD    DATE OF PROCEDURE:  2017    PREPROCEDURE DIAGNOSES:  1. History of Yan's. 2.  High-grade dysplasia. POSTPROCEDURE DIAGNOSES:  Short-segment Yan's status post  radiofrequency ablation. INDICATIONS:  The patient is a 80-year-old male who had Yan's with  high-grade dysplasia. Followup last time, no RFA was done and biopsies did  not show any evidence of Yan's. Repeat is being performed. OPERATIVE PROCEDURE:  Consent was obtained. The patient was sedated per  Anesthesia. The Olympus video endoscope was passed into the esophagus. The esophagus was normal to 39 cm. Again, it appeared that he had too  little short less than a centimeter tongues of Yan's. Stomach was  visualized both on antegrade and retrograde views, was normal.  Pylorus was  patent. Duodenum was normal to the second portion. The scope was  withdrawn back to the level of the Yan's and radiofrequency ablation  was performed initially with 12 treatments at 12 joules. Eschar was then  scraped and six additional treatments were done with 12 joules. He  tolerated the procedure well. 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 We will do followup in approximately ten weeks. He will stay on proton  pump inhibitor.         Radha Adam MD    D: 2017 10:13:45       T: 2017 13:06:37     [email or web address]  Job#: [id number]     Doc#: [id number]    CC:  DO Rica Caldera MD

## 2017-12-22 NOTE — PROGRESS NOTES
History  Cayden Torres is a 79 y.o. male who returns for follow-up for a right shoulder MRI to evaluate for right shoulder and scapular pain. Patient reports that pain did get better with a subacromial corticosteroid injection and he also had relief of radicular type pain extending into the small finger  and ring finger of the right hand. After approximately week his symptoms recurred a reports that his most severe pain now is at the elbow following the course of the ulnar nerve. Symptoms are is unchanged. Pain level today is 6/10. Treatment to date includes diagnostic MRI of the shoulder. Patient's medications, allergies, past medical, surgical, social and family histories were reviewed and updated as appropriate. Review of Systems  Relevant review of systems reviewed and available in the patient's chart    Primary Area of CC: Patient reports that the majority of shoulder pain today's in the scapula with less in the humeral head and glenohumeral joint. His worst pain is along the medial aspect of the elbow in the area of the cubital tunnel with radiation into the ring and small fingers. Examination proximal and distal to the injured area show good overall ROM, no bony prominence or soft tissue tenderness, no instability or excessive stiffness. Radiology: An MRI of the right shoulder demonstrates mild supraspinatus infraspinatus and subscapularis tendinopathy with no evidence of partial or full-thickness rotator cuff tear. Mild diffuse labral degeneration is seen with a pair labral cyst at the 4:00 to 6:00 positions on the anterior inferior glenoid most likely representing adjacent labral tearing. Mild degenerative changes are seen in the right acromioclavicular joint as well as the glenohumeral joint. Impression  1.  Pain of right upper extremity  IN EMG, NEEDLE, ONE LIMB            Plan  Today it appears the patient's main pain as a result of cubital tunnel syndrome or possibly radicular symptoms. We'll proceed with an EMG to see if we can establish a diagnosis and appropriate treatment based upon the results of the EMG study. Patient will follow-up with us once his EMG has been completed.     Nuno Castellanos

## 2017-12-22 NOTE — PROGRESS NOTES
dangld   Tolerated oral fluidsDischarge instructions reviewed with patient/responsible adult and understanding verbalized. Discharge instructions signed and copies given. Patient discharged home with belongings.

## 2018-01-11 ENCOUNTER — OFFICE VISIT (OUTPATIENT)
Dept: ORTHOPEDIC SURGERY | Age: 68
End: 2018-01-11

## 2018-01-11 VITALS — HEIGHT: 69 IN | WEIGHT: 259.92 LBS | BODY MASS INDEX: 38.5 KG/M2

## 2018-01-11 DIAGNOSIS — G56.01 CARPAL TUNNEL SYNDROME OF RIGHT WRIST: Primary | ICD-10-CM

## 2018-01-11 PROCEDURE — 95886 MUSC TEST DONE W/N TEST COMP: CPT | Performed by: PHYSICAL MEDICINE & REHABILITATION

## 2018-01-11 PROCEDURE — 95908 NRV CNDJ TST 3-4 STUDIES: CPT | Performed by: PHYSICAL MEDICINE & REHABILITATION

## 2018-01-15 ENCOUNTER — OFFICE VISIT (OUTPATIENT)
Dept: ORTHOPEDIC SURGERY | Age: 68
End: 2018-01-15

## 2018-01-15 VITALS
HEART RATE: 70 BPM | BODY MASS INDEX: 38.5 KG/M2 | WEIGHT: 259.92 LBS | SYSTOLIC BLOOD PRESSURE: 124 MMHG | DIASTOLIC BLOOD PRESSURE: 76 MMHG | HEIGHT: 69 IN

## 2018-01-15 DIAGNOSIS — M54.50 ACUTE LOW BACK PAIN WITHOUT SCIATICA, UNSPECIFIED BACK PAIN LATERALITY: Primary | ICD-10-CM

## 2018-01-15 DIAGNOSIS — M54.16 LUMBAR RADICULOPATHY: ICD-10-CM

## 2018-01-15 PROCEDURE — 72100 X-RAY EXAM L-S SPINE 2/3 VWS: CPT | Performed by: ORTHOPAEDIC SURGERY

## 2018-01-15 PROCEDURE — G8417 CALC BMI ABV UP PARAM F/U: HCPCS | Performed by: ORTHOPAEDIC SURGERY

## 2018-01-15 PROCEDURE — 1036F TOBACCO NON-USER: CPT | Performed by: ORTHOPAEDIC SURGERY

## 2018-01-15 PROCEDURE — 3017F COLORECTAL CA SCREEN DOC REV: CPT | Performed by: ORTHOPAEDIC SURGERY

## 2018-01-15 PROCEDURE — G8427 DOCREV CUR MEDS BY ELIG CLIN: HCPCS | Performed by: ORTHOPAEDIC SURGERY

## 2018-01-15 PROCEDURE — 1123F ACP DISCUSS/DSCN MKR DOCD: CPT | Performed by: ORTHOPAEDIC SURGERY

## 2018-01-15 PROCEDURE — 4040F PNEUMOC VAC/ADMIN/RCVD: CPT | Performed by: ORTHOPAEDIC SURGERY

## 2018-01-15 PROCEDURE — 99213 OFFICE O/P EST LOW 20 MIN: CPT | Performed by: ORTHOPAEDIC SURGERY

## 2018-01-15 PROCEDURE — G8484 FLU IMMUNIZE NO ADMIN: HCPCS | Performed by: ORTHOPAEDIC SURGERY

## 2018-01-15 NOTE — PATIENT INSTRUCTIONS
hours. Put a thin cloth between the ice pack and your skin. · Take pain medicines exactly as directed. ¨ If the doctor gave you a prescription medicine for pain, take it as prescribed. ¨ If you are not taking a prescription pain medicine, ask your doctor if you can take an over-the-counter medicine. · Take short walks several times a day. You can start with 5 to 10 minutes, 3 or 4 times a day, and work up to longer walks. Walk on level surfaces and avoid hills and stairs until your back is better. · Return to work and other activities as soon as you can. Continued rest without activity is usually not good for your back. · To prevent future back pain, do exercises to stretch and strengthen your back and stomach. Learn how to use good posture, safe lifting techniques, and proper body mechanics. When should you call for help? Call your doctor now or seek immediate medical care if:  ? · You have new or worsening numbness in your legs. ? · You have new or worsening weakness in your legs. (This could make it hard to stand up.)   ? · You lose control of your bladder or bowels. ? Watch closely for changes in your health, and be sure to contact your doctor if:  ? · Your pain gets worse. ? · You are not getting better after 2 weeks. Where can you learn more? Go to https://[employer]. org and sign in to your [employer] account. Enter C768 in the [facility] box to learn more about \"Back Pain: Care Instructions. \"     If you do not have an account, please click on the \"Sign Up Now\" link. Current as of: March 21, 2017  Content Version: 11.5  © 9161-7511 [employer]. Care instructions adapted under license by [address] If you have questions about a medical condition or this instruction, always ask your healthcare professional. [place] [address] any warranty or liability for your use of this information.

## 2018-01-16 NOTE — PROGRESS NOTES
History  Ramin Sahni is a 79 y.o. male who returns for follow-up of an right upper extremity EMG. He has had paresthesias in the right upper extremity and EMG was obtained in order to look for evidence of neurologic pathology. Here to review the results of that EMG. He reports today that he also has experienced paresthesias initially that were present in the right lower extremity but now become symptomatic in the left lower extremity with pain in the lateral aspect of the posterior thigh radiating into the lateral aspect of the calf. .   Symptoms are has worsened in the left lower extremity. Pain level today is 6/10 in the left lower extremity. Treatment to date includes diagnostic EMGs of the right upper extremity. Patient's medications, allergies, past medical, surgical, social and family histories were reviewed and updated as appropriate. Review of Systems  Relevant review of systems reviewed and available in the patient's chart    Primary Area of CC: Patient reports that his left lower extremity now experiences pain with sciatic stretch testing localized to the lateral aspect of the posterior thigh and the lateral aspect of the left calf. He continues to have symptoms in his right upper extremity in the form of radiating pain into the wrist and hand. Straight leg raising test is positive on the left negative on the right. Patient has a history of previous right-sided radiculopathy with significant degenerative changes of the lumbar spine. Examination proximal and distal to the injured area show good overall ROM, no bony prominence or soft tissue tenderness, no instability or excessive stiffness. EMG:     EMG of the right upper extremity demonstrates moderate chronic right median mononeuropathy or carpal tunnel syndrome around the wrist.  Mild right sensory mononeuropathy without definitive localization in the wrist or the elbow.   When cannot completely exclude mild sensory

## 2018-01-26 ENCOUNTER — OFFICE VISIT (OUTPATIENT)
Dept: PULMONOLOGY | Age: 68
End: 2018-01-26

## 2018-01-26 VITALS
DIASTOLIC BLOOD PRESSURE: 82 MMHG | HEIGHT: 70 IN | OXYGEN SATURATION: 96 % | SYSTOLIC BLOOD PRESSURE: 118 MMHG | WEIGHT: 272 LBS | TEMPERATURE: 98.1 F | BODY MASS INDEX: 38.94 KG/M2 | RESPIRATION RATE: 20 BRPM | HEART RATE: 61 BPM

## 2018-01-26 DIAGNOSIS — G47.33 OSA TREATED WITH BIPAP: Primary | ICD-10-CM

## 2018-01-26 DIAGNOSIS — G25.81 RLS (RESTLESS LEGS SYNDROME): ICD-10-CM

## 2018-01-26 DIAGNOSIS — R09.81 NASAL CONGESTION: ICD-10-CM

## 2018-01-26 PROCEDURE — G8417 CALC BMI ABV UP PARAM F/U: HCPCS | Performed by: NURSE PRACTITIONER

## 2018-01-26 PROCEDURE — G8427 DOCREV CUR MEDS BY ELIG CLIN: HCPCS | Performed by: NURSE PRACTITIONER

## 2018-01-26 PROCEDURE — G8484 FLU IMMUNIZE NO ADMIN: HCPCS | Performed by: NURSE PRACTITIONER

## 2018-01-26 PROCEDURE — 4040F PNEUMOC VAC/ADMIN/RCVD: CPT | Performed by: NURSE PRACTITIONER

## 2018-01-26 PROCEDURE — 99214 OFFICE O/P EST MOD 30 MIN: CPT | Performed by: NURSE PRACTITIONER

## 2018-01-26 PROCEDURE — 1036F TOBACCO NON-USER: CPT | Performed by: NURSE PRACTITIONER

## 2018-01-26 PROCEDURE — 3017F COLORECTAL CA SCREEN DOC REV: CPT | Performed by: NURSE PRACTITIONER

## 2018-01-26 PROCEDURE — 1123F ACP DISCUSS/DSCN MKR DOCD: CPT | Performed by: NURSE PRACTITIONER

## 2018-01-26 RX ORDER — FLUTICASONE PROPIONATE 50 MCG
2 SPRAY, SUSPENSION (ML) NASAL DAILY
Qty: 1 BOTTLE | Refills: 5 | Status: SHIPPED | OUTPATIENT
Start: 2018-01-26 | End: 2021-09-16

## 2018-01-26 RX ORDER — ROPINIROLE 1 MG/1
1 TABLET, FILM COATED ORAL 3 TIMES DAILY
Qty: 60 TABLET | Refills: 0 | Status: SHIPPED | OUTPATIENT
Start: 2018-01-26 | End: 2018-03-26 | Stop reason: SDUPTHER

## 2018-01-26 ASSESSMENT — SLEEP AND FATIGUE QUESTIONNAIRES
ESS TOTAL SCORE: 11
HOW LIKELY ARE YOU TO NOD OFF OR FALL ASLEEP IN A CAR, WHILE STOPPED FOR A FEW MINUTES IN TRAFFIC: 1
HOW LIKELY ARE YOU TO NOD OFF OR FALL ASLEEP WHILE SITTING AND TALKING TO SOMEONE: 1
HOW LIKELY ARE YOU TO NOD OFF OR FALL ASLEEP WHILE SITTING QUIETLY AFTER LUNCH WITHOUT ALCOHOL: 1
HOW LIKELY ARE YOU TO NOD OFF OR FALL ASLEEP WHILE LYING DOWN TO REST IN THE AFTERNOON WHEN CIRCUMSTANCES PERMIT: 2
HOW LIKELY ARE YOU TO NOD OFF OR FALL ASLEEP WHILE SITTING AND READING: 1
HOW LIKELY ARE YOU TO NOD OFF OR FALL ASLEEP WHEN YOU ARE A PASSENGER IN A CAR FOR AN HOUR WITHOUT A BREAK: 2
HOW LIKELY ARE YOU TO NOD OFF OR FALL ASLEEP WHILE SITTING INACTIVE IN A PUBLIC PLACE: 2
NECK CIRCUMFERENCE (INCHES): 18.5
HOW LIKELY ARE YOU TO NOD OFF OR FALL ASLEEP WHILE WATCHING TV: 1

## 2018-01-26 NOTE — PROGRESS NOTES
 Pancreatitis 1996    Sleep apnea     uses CPAP       Past Surgical History:        Procedure Laterality Date    CATARACT REMOVAL Bilateral 2013    CHOLECYSTECTOMY      COLONOSCOPY  10/26/2011    ENDOSCOPY, COLON, DIAGNOSTIC      EGD halo    HYDROCELE EXCISION  11/15/2016    PANCREAS SURGERY      cyst opened up and drining into small bowel    TONSILLECTOMY      UPPER GASTROINTESTINAL ENDOSCOPY  10/04/2016    with biopsy    UPPER GASTROINTESTINAL ENDOSCOPY  03/16/2017    Halo ablation    UPPER GASTROINTESTINAL ENDOSCOPY  06/26/2017    Halo ablation    UPPER GASTROINTESTINAL ENDOSCOPY  09/06/2017    bx distal sophagus    UPPER GASTROINTESTINAL ENDOSCOPY  12/22/2017    with HALO  procedure    WISDOM TOOTH EXTRACTION         Allergies:  is allergic to codeine. Social History:    TOBACCO:   reports that he has never smoked. He has never used smokeless tobacco.  ETOH:   reports that he does not drink alcohol. Family History:       Problem Relation Age of Onset    Kidney Disease Mother     Cancer Father 76     pancreas    Cancer Brother     Cancer Paternal Grandfather        Current Medications:    Current Outpatient Prescriptions:     ALPRAZolam (XANAX) 1 MG tablet, Take one tablet one hour before MRI and take 2nd tablet as MRI begins. ., Disp: 2 tablet, Rfl: 0    glucose blood VI test strips (ASCENSIA AUTODISC VI;ONE TOUCH ULTRA TEST VI) strip, Check glucose 3 times daily, Disp: , Rfl:     Insulin Syringe-Needle U-100 30G X 1/2\" 0.5 ML MISC, Inject insulin 3 times daily, Disp: , Rfl:     pantoprazole (PROTONIX) 40 MG tablet, Take 40 mg by mouth 2 times daily , Disp: , Rfl:     insulin regular (HUMULIN R;NOVOLIN R) 100 UNIT/ML injection, Inject 10 Units into the skin 3 times daily (before meals) , Disp: , Rfl:     insulin NPH (HUMULIN N;NOVOLIN N) 100 UNIT/ML injection vial, Inject 10 Units into the skin 2 times daily , Disp: , Rfl:     lisinopril (PRINIVIL;ZESTRIL) 10 MG tablet, Take 10 mg level independently, he returned demonstration and we left the left set to 5 to assist with nasal congestion. · Patient instructed to contact DME company for any mask, tubing or machine trouble shooting if problems arise. · Sleep hygiene discussed along with written education in AVS  · Avoid sedatives, alcohol and caffeinated drinks at bed time. · Patient counseled to never drive or operate heavy machinery while fatigue, drowsy or sleepy. · Weight loss is also recommended as a long-term intervention. · Complications of ESTHER if not treated were discussed with the patient to including: systemic hypertension, pulmonary hypertension, cardiovascular morbidities, car accidents and all cause mortality. · Trial Flonase 1-2 sprays each nostril daily   · Trial Requip. Start with 0.5 mg (1/2 tablet) 30 minutes before bedtime for the first 3 nights; then increase to 1 mg (1 tablet). The side effects of ropinirole have been discussed with the patient and or family, including but not limited to compulsive behaviors, mental status changes, sleep attacks and cardiovascular side effects. As with all medications, patient was instructed to read package insert & to call with any concerning side effects.

## 2018-01-26 NOTE — PATIENT INSTRUCTIONS
Uncomfortable bedding can prevent good sleep. Ensure your bedroom is quiet and comfortable. A cooler room along with enough blankets to stay warm is recommended. If your room is too noisy, try a white noise machine. If too bright, try black out shades or an eye mask. Dont take worries to bed. Leave worries about work, school etc. behind you when you go to bed. Some people find it helpful to assign a worry period in the evening or late afternoon to write down your worries and get them out of your system. CPAP Equipment Cleaning and Disinfecting Schedule  Equipment Cleaning Frequency Instructions  Disinfecting Frequency   Non-Disposable Filters  Weekly Mild soapy water, Rinse, Air Dry Not Required   Disposable Filters Change as needed  2-4 weeks Do Not Wash Not Required   Hose/tubing Daily Mild soapy water, Rinse, Air Dry Once a week   Mask / Nasal Pillows Daily Mild soapy water, Rinse, Air Dry Once a week   Headgear Weekly Hand wash, Mild soapy water, Rinse, Dry  Not Required   Humidifier Daily Empty water daily  Mild soapy water, Rinse well, Air Dry  Once a week   CPAP Unit As Needed Dust with damp cloth,  No detergents or sprays Not Required         Disinfect (per schedule) with 1 part white vinegar and 3 parts water- soak mask and water chamber for 30 minutes every 1-2 weeks, more often if sick. Allow water/vinegar mixture to run through tubing. Allow all equipment to air dry. Drying Hints:   Always hang tubing away from direct sunlight, as this will cause the tubing to become yellow, brittle and crack over a period of time. DO NOT attach the wet tubing to your CPAP unit to blow-dry it. The moisture from the tubing can drain back into your machine. Moisture in your unit can cause sudden pressure increases or short circuits  DO's and DON'Ts:  - Don't use alcohol-based products to clean your mask, because it can cause the materials to become hard and brittle.    - Don't put headgear in the washer or

## 2018-01-30 ENCOUNTER — OFFICE VISIT (OUTPATIENT)
Dept: ORTHOPEDIC SURGERY | Age: 68
End: 2018-01-30

## 2018-01-30 DIAGNOSIS — M54.16 LUMBAR RADICULOPATHY: Primary | ICD-10-CM

## 2018-01-30 DIAGNOSIS — G60.9 IDIOPATHIC PERIPHERAL NEUROPATHY: ICD-10-CM

## 2018-01-30 PROCEDURE — 95908 NRV CNDJ TST 3-4 STUDIES: CPT | Performed by: PHYSICAL MEDICINE & REHABILITATION

## 2018-01-30 PROCEDURE — 95886 MUSC TEST DONE W/N TEST COMP: CPT | Performed by: PHYSICAL MEDICINE & REHABILITATION

## 2018-02-02 ENCOUNTER — OFFICE VISIT (OUTPATIENT)
Dept: ORTHOPEDIC SURGERY | Age: 68
End: 2018-02-02

## 2018-02-02 VITALS
DIASTOLIC BLOOD PRESSURE: 64 MMHG | WEIGHT: 272.05 LBS | HEART RATE: 82 BPM | HEIGHT: 69 IN | SYSTOLIC BLOOD PRESSURE: 118 MMHG | BODY MASS INDEX: 40.29 KG/M2

## 2018-02-02 DIAGNOSIS — G60.9 IDIOPATHIC PERIPHERAL NEUROPATHY: ICD-10-CM

## 2018-02-02 DIAGNOSIS — M54.16 LUMBAR RADICULOPATHY: Primary | ICD-10-CM

## 2018-02-02 PROCEDURE — G8427 DOCREV CUR MEDS BY ELIG CLIN: HCPCS | Performed by: ORTHOPAEDIC SURGERY

## 2018-02-02 PROCEDURE — 1036F TOBACCO NON-USER: CPT | Performed by: ORTHOPAEDIC SURGERY

## 2018-02-02 PROCEDURE — 4040F PNEUMOC VAC/ADMIN/RCVD: CPT | Performed by: ORTHOPAEDIC SURGERY

## 2018-02-02 PROCEDURE — G8417 CALC BMI ABV UP PARAM F/U: HCPCS | Performed by: ORTHOPAEDIC SURGERY

## 2018-02-02 PROCEDURE — 1123F ACP DISCUSS/DSCN MKR DOCD: CPT | Performed by: ORTHOPAEDIC SURGERY

## 2018-02-02 PROCEDURE — G8484 FLU IMMUNIZE NO ADMIN: HCPCS | Performed by: ORTHOPAEDIC SURGERY

## 2018-02-02 PROCEDURE — 3017F COLORECTAL CA SCREEN DOC REV: CPT | Performed by: ORTHOPAEDIC SURGERY

## 2018-02-02 PROCEDURE — 99213 OFFICE O/P EST LOW 20 MIN: CPT | Performed by: ORTHOPAEDIC SURGERY

## 2018-02-26 ENCOUNTER — TELEPHONE (OUTPATIENT)
Dept: PULMONOLOGY | Age: 68
End: 2018-02-26

## 2018-03-26 ENCOUNTER — OFFICE VISIT (OUTPATIENT)
Dept: PULMONOLOGY | Age: 68
End: 2018-03-26

## 2018-03-26 VITALS
WEIGHT: 271 LBS | OXYGEN SATURATION: 95 % | RESPIRATION RATE: 18 BRPM | HEART RATE: 53 BPM | SYSTOLIC BLOOD PRESSURE: 133 MMHG | DIASTOLIC BLOOD PRESSURE: 76 MMHG | BODY MASS INDEX: 40.14 KG/M2 | HEIGHT: 69 IN

## 2018-03-26 DIAGNOSIS — G47.33 OSA (OBSTRUCTIVE SLEEP APNEA): Primary | ICD-10-CM

## 2018-03-26 DIAGNOSIS — R53.83 FATIGUE, UNSPECIFIED TYPE: ICD-10-CM

## 2018-03-26 DIAGNOSIS — R06.83 SNORING: ICD-10-CM

## 2018-03-26 DIAGNOSIS — G47.31 CSA (CENTRAL SLEEP APNEA): ICD-10-CM

## 2018-03-26 DIAGNOSIS — R09.81 NASAL CONGESTION: ICD-10-CM

## 2018-03-26 DIAGNOSIS — G25.81 RLS (RESTLESS LEGS SYNDROME): ICD-10-CM

## 2018-03-26 PROCEDURE — 3017F COLORECTAL CA SCREEN DOC REV: CPT | Performed by: NURSE PRACTITIONER

## 2018-03-26 PROCEDURE — G8417 CALC BMI ABV UP PARAM F/U: HCPCS | Performed by: NURSE PRACTITIONER

## 2018-03-26 PROCEDURE — 1123F ACP DISCUSS/DSCN MKR DOCD: CPT | Performed by: NURSE PRACTITIONER

## 2018-03-26 PROCEDURE — G8427 DOCREV CUR MEDS BY ELIG CLIN: HCPCS | Performed by: NURSE PRACTITIONER

## 2018-03-26 PROCEDURE — 1036F TOBACCO NON-USER: CPT | Performed by: NURSE PRACTITIONER

## 2018-03-26 PROCEDURE — 99214 OFFICE O/P EST MOD 30 MIN: CPT | Performed by: NURSE PRACTITIONER

## 2018-03-26 PROCEDURE — G8482 FLU IMMUNIZE ORDER/ADMIN: HCPCS | Performed by: NURSE PRACTITIONER

## 2018-03-26 PROCEDURE — 4040F PNEUMOC VAC/ADMIN/RCVD: CPT | Performed by: NURSE PRACTITIONER

## 2018-03-26 RX ORDER — ROPINIROLE 1 MG/1
1 TABLET, FILM COATED ORAL NIGHTLY
Qty: 30 TABLET | Refills: 2 | Status: SHIPPED | OUTPATIENT
Start: 2018-03-26 | End: 2018-04-02

## 2018-03-26 ASSESSMENT — SLEEP AND FATIGUE QUESTIONNAIRES
HOW LIKELY ARE YOU TO NOD OFF OR FALL ASLEEP WHILE SITTING AND TALKING TO SOMEONE: 0
HOW LIKELY ARE YOU TO NOD OFF OR FALL ASLEEP WHILE WATCHING TV: 3
HOW LIKELY ARE YOU TO NOD OFF OR FALL ASLEEP WHILE SITTING QUIETLY AFTER LUNCH WITHOUT ALCOHOL: 0
HOW LIKELY ARE YOU TO NOD OFF OR FALL ASLEEP WHILE SITTING INACTIVE IN A PUBLIC PLACE: 3
NECK CIRCUMFERENCE (INCHES): 19.75
HOW LIKELY ARE YOU TO NOD OFF OR FALL ASLEEP WHEN YOU ARE A PASSENGER IN A CAR FOR AN HOUR WITHOUT A BREAK: 0
HOW LIKELY ARE YOU TO NOD OFF OR FALL ASLEEP WHILE LYING DOWN TO REST IN THE AFTERNOON WHEN CIRCUMSTANCES PERMIT: 3
HOW LIKELY ARE YOU TO NOD OFF OR FALL ASLEEP IN A CAR, WHILE STOPPED FOR A FEW MINUTES IN TRAFFIC: 0
ESS TOTAL SCORE: 12
HOW LIKELY ARE YOU TO NOD OFF OR FALL ASLEEP WHILE SITTING AND READING: 3

## 2018-03-26 NOTE — PROGRESS NOTES
pantoprazole (PROTONIX) 40 MG tablet, Take 40 mg by mouth 2 times daily , Disp: , Rfl:     insulin regular (HUMULIN R;NOVOLIN R) 100 UNIT/ML injection, Inject 10 Units into the skin 3 times daily (before meals) , Disp: , Rfl:     insulin NPH (HUMULIN N;NOVOLIN N) 100 UNIT/ML injection vial, Inject 10 Units into the skin 2 times daily , Disp: , Rfl:     lisinopril (PRINIVIL;ZESTRIL) 10 MG tablet, Take 10 mg by mouth daily, Disp: , Rfl:     atorvastatin (LIPITOR) 20 MG tablet, Take 20 mg by mouth daily. , Disp: , Rfl:         Objective:   PHYSICAL EXAM:    /76   Pulse 53   Resp 18   Ht 5' 9\" (1.753 m)   Wt 271 lb (122.9 kg)   SpO2 95% Comment: RA  BMI 40.02 kg/m²     Physical exam:  Gen: No distress. Obese. BMI of 39.59  Eyes: PERRL. No sclera icterus. No conjunctival injection. ENT: No discharge. Pharynx clear. Mallampati class IV. Neck: Trachea midline. No obvious mass. Neck circumference 18.5\"  Resp: No accessory muscle use. No crackles. No wheezes. No rhonchi. Good air entry. CV: Regular rate. Regular rhythm. No murmur or rub. No edema. GI: Round  Skin: Warm and dry. No nodule on exposed extremities. M/S: No cyanosis. No joint deformity. No clubbing. Neuro: Awake. Alert. Moves all four extremities. Psych: Oriented x 3. No anxiety. DATA reviewed by me:   PSG 9/24/17: AHI 95.6, low SaO2 69%, treatment emergent CSA    BiPAP titration 9/26/17: improved sleep related breathing with BiPAP, PLMS. Started on BiPAP 22/16 cm H2O    Compliance download 12/27/17-1/25/18 reviewed by me and showed average use 2hrs 22mins 29/30 nights with residual AHI of 4.7. Percent of days with usage > 4 hours is 10%. BiPAP 22/16. Assessment:      Severe ESTHER - BiPAP 22/16. Full fall mask - [facility]. Required to return BIPAP machine to [facility] as he was not using it enough hours as required.    CSA  Snoring, witnessed apnea, and fatigue worse off BiPAP  PLMS/RLS - improved with Requip  Nasal congestion

## 2018-03-27 ENCOUNTER — TELEPHONE (OUTPATIENT)
Dept: PULMONOLOGY | Age: 68
End: 2018-03-27

## 2018-03-28 ENCOUNTER — OFFICE VISIT (OUTPATIENT)
Dept: FAMILY MEDICINE CLINIC | Age: 68
End: 2018-03-28

## 2018-03-28 VITALS
HEIGHT: 69 IN | OXYGEN SATURATION: 95 % | HEART RATE: 69 BPM | DIASTOLIC BLOOD PRESSURE: 76 MMHG | BODY MASS INDEX: 39.84 KG/M2 | SYSTOLIC BLOOD PRESSURE: 134 MMHG | WEIGHT: 269 LBS

## 2018-03-28 DIAGNOSIS — Z79.4 CONTROLLED TYPE 2 DIABETES MELLITUS WITHOUT COMPLICATION, WITH LONG-TERM CURRENT USE OF INSULIN (HCC): ICD-10-CM

## 2018-03-28 DIAGNOSIS — E11.9 CONTROLLED TYPE 2 DIABETES MELLITUS WITHOUT COMPLICATION, WITH LONG-TERM CURRENT USE OF INSULIN (HCC): ICD-10-CM

## 2018-03-28 DIAGNOSIS — R09.81 NASAL CONGESTION: Primary | ICD-10-CM

## 2018-03-28 LAB — HBA1C MFR BLD: 7.9 %

## 2018-03-28 PROCEDURE — 4040F PNEUMOC VAC/ADMIN/RCVD: CPT | Performed by: NURSE PRACTITIONER

## 2018-03-28 PROCEDURE — 3017F COLORECTAL CA SCREEN DOC REV: CPT | Performed by: NURSE PRACTITIONER

## 2018-03-28 PROCEDURE — G8482 FLU IMMUNIZE ORDER/ADMIN: HCPCS | Performed by: NURSE PRACTITIONER

## 2018-03-28 PROCEDURE — 3045F PR MOST RECENT HEMOGLOBIN A1C LEVEL 7.0-9.0%: CPT | Performed by: NURSE PRACTITIONER

## 2018-03-28 PROCEDURE — 83036 HEMOGLOBIN GLYCOSYLATED A1C: CPT | Performed by: NURSE PRACTITIONER

## 2018-03-28 PROCEDURE — G8417 CALC BMI ABV UP PARAM F/U: HCPCS | Performed by: NURSE PRACTITIONER

## 2018-03-28 PROCEDURE — 1123F ACP DISCUSS/DSCN MKR DOCD: CPT | Performed by: NURSE PRACTITIONER

## 2018-03-28 PROCEDURE — G8427 DOCREV CUR MEDS BY ELIG CLIN: HCPCS | Performed by: NURSE PRACTITIONER

## 2018-03-28 PROCEDURE — 99213 OFFICE O/P EST LOW 20 MIN: CPT | Performed by: NURSE PRACTITIONER

## 2018-03-28 PROCEDURE — 1036F TOBACCO NON-USER: CPT | Performed by: NURSE PRACTITIONER

## 2018-03-28 ASSESSMENT — ENCOUNTER SYMPTOMS
WHEEZING: 0
RHINORRHEA: 0
SINUS PAIN: 0
ABDOMINAL PAIN: 0
COUGH: 0
SORE THROAT: 0
VOMITING: 0
NAUSEA: 0
DIARRHEA: 0

## 2018-03-28 NOTE — PATIENT INSTRUCTIONS
Ideally your blood pressure goal should be below 130/80. I usually add or adjust medication if the blood pressure is consistently elevated above 140/90. You can learn more about blood pressure and ways to incorporate a healthy lifestlye to help treat it from the American Heart Association website: www.heart. org under the  Getting Healthy link, and the [address] website: www.nhlbi.nih.gov/hbp    A for Activity  It helps your blood pressure when you exercise regularly. You should try to exercise at least 3 times a week for 20 minutes at a pace that you can comfortably carry on a conversation without being out of breath. B for BMI  The Body Mass Index should be below 30. This is your weight and height measurement. A BMI below 30 is preferred to help lower the risk of Type 2 diabetes, high cholesterol, heart disease, high blood pressure, sleep apnea, gallbladder disease and strokes. C for Control your Salt Intake  Avoid adding salt to your food. Avoid processed food, fast food, and junk food which all has high levels of sodium added. Read labels and get no more than 1500-2300mg of sodium a day. There are 5 goals to be considered under ideal   control as a diabetic:  1 Hemoglobin A1C under 7  2 LDL bad cholesterol under 70 or on a statin  3 Blood Pressure under 130/80  4 Take a baby aspirin a day  5 NOT smoking. Notes for you because of your diabetes: Your goal for your hemoglobin A1C should be below 7. Because you have diabetes you should have a yearly eye exam.  You should check your feet monthly for new sores. Your cholesterol LDL should be less than 100, ideally below 70. To achieve this level, make sure you take your medicine everyday. Try to cut down on fried foods by half before your next visit. Your blood pressure should be below 130/80, and at least below 140/90  To achieve this level, make sure you take your medicine everyday.   Cut down on using salt in your diet

## 2018-04-02 RX ORDER — ROPINIROLE 1 MG/1
1 TABLET, FILM COATED ORAL NIGHTLY
Qty: 30 TABLET | Refills: 5 | Status: SHIPPED | OUTPATIENT
Start: 2018-04-02 | End: 2018-05-23 | Stop reason: SDUPTHER

## 2018-04-03 ENCOUNTER — HOSPITAL ENCOUNTER (OUTPATIENT)
Dept: SLEEP MEDICINE | Age: 68
Discharge: HOME | End: 2018-04-05
Attending: NURSE PRACTITIONER | Admitting: NURSE PRACTITIONER

## 2018-04-03 ENCOUNTER — TELEPHONE (OUTPATIENT)
Dept: ENT CLINIC | Age: 68
End: 2018-04-03

## 2018-04-03 DIAGNOSIS — G47.33 OSA (OBSTRUCTIVE SLEEP APNEA): ICD-10-CM

## 2018-04-03 DIAGNOSIS — G47.31 CSA (CENTRAL SLEEP APNEA): ICD-10-CM

## 2018-04-06 DIAGNOSIS — G47.33 OSA (OBSTRUCTIVE SLEEP APNEA): Primary | ICD-10-CM

## 2018-05-23 ENCOUNTER — OFFICE VISIT (OUTPATIENT)
Dept: PULMONOLOGY | Age: 68
End: 2018-05-23

## 2018-05-23 VITALS
SYSTOLIC BLOOD PRESSURE: 131 MMHG | DIASTOLIC BLOOD PRESSURE: 73 MMHG | BODY MASS INDEX: 38.95 KG/M2 | TEMPERATURE: 97.9 F | OXYGEN SATURATION: 96 % | RESPIRATION RATE: 16 BRPM | HEIGHT: 69 IN | HEART RATE: 56 BPM | WEIGHT: 263 LBS

## 2018-05-23 DIAGNOSIS — G47.31 CSA (CENTRAL SLEEP APNEA): ICD-10-CM

## 2018-05-23 DIAGNOSIS — G47.33 OSA TREATED WITH BIPAP: ICD-10-CM

## 2018-05-23 DIAGNOSIS — G25.81 RLS (RESTLESS LEGS SYNDROME): Primary | ICD-10-CM

## 2018-05-23 PROCEDURE — G8417 CALC BMI ABV UP PARAM F/U: HCPCS | Performed by: NURSE PRACTITIONER

## 2018-05-23 PROCEDURE — G8427 DOCREV CUR MEDS BY ELIG CLIN: HCPCS | Performed by: NURSE PRACTITIONER

## 2018-05-23 PROCEDURE — 99214 OFFICE O/P EST MOD 30 MIN: CPT | Performed by: NURSE PRACTITIONER

## 2018-05-23 PROCEDURE — 3017F COLORECTAL CA SCREEN DOC REV: CPT | Performed by: NURSE PRACTITIONER

## 2018-05-23 PROCEDURE — 1036F TOBACCO NON-USER: CPT | Performed by: NURSE PRACTITIONER

## 2018-05-23 PROCEDURE — 4040F PNEUMOC VAC/ADMIN/RCVD: CPT | Performed by: NURSE PRACTITIONER

## 2018-05-23 PROCEDURE — 1123F ACP DISCUSS/DSCN MKR DOCD: CPT | Performed by: NURSE PRACTITIONER

## 2018-05-23 RX ORDER — ROPINIROLE 1 MG/1
1.5 TABLET, FILM COATED ORAL NIGHTLY
Qty: 60 TABLET | Refills: 3 | Status: SHIPPED | OUTPATIENT
Start: 2018-05-23 | End: 2018-10-17 | Stop reason: SDUPTHER

## 2018-05-23 ASSESSMENT — SLEEP AND FATIGUE QUESTIONNAIRES
NECK CIRCUMFERENCE (INCHES): 19.5
HOW LIKELY ARE YOU TO NOD OFF OR FALL ASLEEP WHILE SITTING INACTIVE IN A PUBLIC PLACE: 0
HOW LIKELY ARE YOU TO NOD OFF OR FALL ASLEEP WHILE WATCHING TV: 0
HOW LIKELY ARE YOU TO NOD OFF OR FALL ASLEEP WHILE SITTING QUIETLY AFTER LUNCH WITHOUT ALCOHOL: 0
HOW LIKELY ARE YOU TO NOD OFF OR FALL ASLEEP WHILE SITTING AND TALKING TO SOMEONE: 0
ESS TOTAL SCORE: 0
HOW LIKELY ARE YOU TO NOD OFF OR FALL ASLEEP WHILE LYING DOWN TO REST IN THE AFTERNOON WHEN CIRCUMSTANCES PERMIT: 0
HOW LIKELY ARE YOU TO NOD OFF OR FALL ASLEEP WHEN YOU ARE A PASSENGER IN A CAR FOR AN HOUR WITHOUT A BREAK: 0
HOW LIKELY ARE YOU TO NOD OFF OR FALL ASLEEP WHILE SITTING AND READING: 0
HOW LIKELY ARE YOU TO NOD OFF OR FALL ASLEEP IN A CAR, WHILE STOPPED FOR A FEW MINUTES IN TRAFFIC: 0

## 2018-10-17 ENCOUNTER — OFFICE VISIT (OUTPATIENT)
Dept: PULMONOLOGY | Age: 68
End: 2018-10-17
Payer: MEDICARE

## 2018-10-17 VITALS
SYSTOLIC BLOOD PRESSURE: 130 MMHG | RESPIRATION RATE: 18 BRPM | HEIGHT: 69 IN | OXYGEN SATURATION: 96 % | WEIGHT: 286 LBS | DIASTOLIC BLOOD PRESSURE: 70 MMHG | HEART RATE: 59 BPM | BODY MASS INDEX: 42.36 KG/M2

## 2018-10-17 DIAGNOSIS — G47.31 CSA (CENTRAL SLEEP APNEA): ICD-10-CM

## 2018-10-17 DIAGNOSIS — R09.81 NASAL CONGESTION: ICD-10-CM

## 2018-10-17 DIAGNOSIS — G47.33 OSA (OBSTRUCTIVE SLEEP APNEA): Primary | ICD-10-CM

## 2018-10-17 DIAGNOSIS — G25.81 RLS (RESTLESS LEGS SYNDROME): ICD-10-CM

## 2018-10-17 PROCEDURE — G8482 FLU IMMUNIZE ORDER/ADMIN: HCPCS | Performed by: NURSE PRACTITIONER

## 2018-10-17 PROCEDURE — 4040F PNEUMOC VAC/ADMIN/RCVD: CPT | Performed by: NURSE PRACTITIONER

## 2018-10-17 PROCEDURE — G8427 DOCREV CUR MEDS BY ELIG CLIN: HCPCS | Performed by: NURSE PRACTITIONER

## 2018-10-17 PROCEDURE — 3017F COLORECTAL CA SCREEN DOC REV: CPT | Performed by: NURSE PRACTITIONER

## 2018-10-17 PROCEDURE — 1036F TOBACCO NON-USER: CPT | Performed by: NURSE PRACTITIONER

## 2018-10-17 PROCEDURE — 1123F ACP DISCUSS/DSCN MKR DOCD: CPT | Performed by: NURSE PRACTITIONER

## 2018-10-17 PROCEDURE — 99214 OFFICE O/P EST MOD 30 MIN: CPT | Performed by: NURSE PRACTITIONER

## 2018-10-17 PROCEDURE — 1101F PT FALLS ASSESS-DOCD LE1/YR: CPT | Performed by: NURSE PRACTITIONER

## 2018-10-17 PROCEDURE — G8417 CALC BMI ABV UP PARAM F/U: HCPCS | Performed by: NURSE PRACTITIONER

## 2018-10-17 RX ORDER — ROPINIROLE 1 MG/1
2 TABLET, FILM COATED ORAL NIGHTLY
Qty: 30 TABLET | Refills: 2 | Status: SHIPPED | OUTPATIENT
Start: 2018-10-17 | End: 2019-05-30 | Stop reason: SDUPTHER

## 2018-10-17 ASSESSMENT — SLEEP AND FATIGUE QUESTIONNAIRES
HOW LIKELY ARE YOU TO NOD OFF OR FALL ASLEEP WHILE LYING DOWN TO REST IN THE AFTERNOON WHEN CIRCUMSTANCES PERMIT: 0
ESS TOTAL SCORE: 3
HOW LIKELY ARE YOU TO NOD OFF OR FALL ASLEEP WHEN YOU ARE A PASSENGER IN A CAR FOR AN HOUR WITHOUT A BREAK: 0
HOW LIKELY ARE YOU TO NOD OFF OR FALL ASLEEP WHILE SITTING INACTIVE IN A PUBLIC PLACE: 0
HOW LIKELY ARE YOU TO NOD OFF OR FALL ASLEEP WHILE SITTING AND TALKING TO SOMEONE: 0
HOW LIKELY ARE YOU TO NOD OFF OR FALL ASLEEP WHILE SITTING QUIETLY AFTER LUNCH WITHOUT ALCOHOL: 0
HOW LIKELY ARE YOU TO NOD OFF OR FALL ASLEEP WHILE SITTING AND READING: 1
NECK CIRCUMFERENCE (INCHES): 20.5
HOW LIKELY ARE YOU TO NOD OFF OR FALL ASLEEP IN A CAR, WHILE STOPPED FOR A FEW MINUTES IN TRAFFIC: 0
HOW LIKELY ARE YOU TO NOD OFF OR FALL ASLEEP WHILE WATCHING TV: 2

## 2018-10-17 NOTE — PROGRESS NOTES
Download compliance 9/17/18-10/16/18 reviewed by me and showed average use 2hrs 41mins 27/30 nights with residual AHI of 5.3. Percent of days with usage > 4 hours is 20%. BiPPA [id number]. Assessment:      Severe ESTHER - BiPAP 24/18. Full face mask. .  Suboptimal compliance with optimal efficacy upon review. Patient having difficulty staying asleep at night due to large mask leak waking him up, needs new mask but having difficulty getting in contact with DME to order. BiPAP mask leak   CSA  Snoring, witnessed apnea, and fatigue worse off BiPAP  PLMS/RLS - improved with Requip at 2 mg QHS  Nasal congestion - resolved when changing to new BiPAP mask   Pulmonary nodules -lung micronodules have been stable for greater than 2 years per radiology. 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 · Send order to DME for new equipment and mask   · Replacement of mask, tubing, head straps every 3-6 months or sooner if damaged. · Trial CPAP pillow to assist with mask leak in the middle of the night. · Patient instructed to contact DME company for any mask, tubing or machine trouble shooting if problems arise. · Sleep hygiene discussed along with written education in AVS  · Avoid sedatives, alcohol and caffeinated drinks at bed time. · Patient counseled to never drive or operate heavy machinery while fatigue, drowsy or sleepy. · Weight loss is also recommended as a long-term intervention. · Complications of ESTHER if not treated were discussed with the patient to including: systemic hypertension, pulmonary hypertension, cardiovascular morbidities, car accidents and all cause mortality. · Continue Flonase 1-2 sprays each nostril daily. · Continue Requip 2 mg QHS.  The side effects of ropinirole have been discussed with the patient and or family, including but not limited to compulsive behaviors,

## 2018-12-03 ENCOUNTER — ANESTHESIA EVENT (OUTPATIENT)
Dept: ENDOSCOPY | Age: 68
End: 2018-12-03
Payer: MEDICARE

## 2018-12-03 NOTE — ANESTHESIA PRE PROCEDURE
[facility]     Hyperlipidemia     Hypertension     Pancreatitis 1996    Sleep apnea     uses CPAP     Past Surgical History:     CATARACT REMOVAL Bilateral 2013    CHOLECYSTECTOMY      COLONOSCOPY  10/26/2011    ENDOSCOPY, COLON, DIAGNOSTIC      EGD halo    HYDROCELE EXCISION  11/15/2016    PANCREAS SURGERY      cyst opened up and drining into small bowel    TONSILLECTOMY      UPPER GASTROINTESTINAL ENDOSCOPY  10/04/2016    with biopsy    UPPER GASTROINTESTINAL ENDOSCOPY  03/16/2017    Halo ablation    UPPER GASTROINTESTINAL ENDOSCOPY  06/26/2017    Halo ablation    UPPER GASTROINTESTINAL ENDOSCOPY  09/06/2017    bx distal sophagus    UPPER GASTROINTESTINAL ENDOSCOPY  12/22/2017    with HALO  procedure    WISDOM TOOTH EXTRACTION       Social History:     Smoking status: Never Smoker    Smokeless tobacco: Never Used    Alcohol use No     Vital Signs (Current):         BP: 134/69 Pulse: 54   Resp: 16 SpO2: 93   Temp: 97.1 °F (36.2 °C)   Height: 5' 10\" (1.778 m)  (12/04/18) Weight: 270 lb (122.5 kg)  (12/04/18)   BMI: 38.8                BP Readings from Last 3 Encounters:   10/17/18 130/70   05/23/18 131/73   03/28/18 134/76     NPO Status: > 8 hrs    CBC:   Lab Results   Component Value Date    WBC 5.8 11/28/2017    RBC 4.46 11/28/2017    HGB 12.8 11/28/2017    HCT 38.7 11/28/2017    MCV 86.7 11/28/2017    RDW 14.0 11/28/2017     11/28/2017     CMP:   Lab Results   Component Value Date     11/28/2017    K 4.1 11/28/2017     11/28/2017    CO2 26 11/28/2017    BUN 19 11/28/2017    CREATININE 1.0 11/28/2017    GFRAA >60 11/28/2017    GFRAA >60 12/25/2010    AGRATIO 1.8 11/28/2017    LABGLOM >60 11/28/2017    GLUCOSE 227 11/28/2017    PROT 6.5 11/28/2017    PROT 6.6 12/25/2010    CALCIUM 9.1 11/28/2017    BILITOT 0.5 11/28/2017    ALKPHOS 95 11/28/2017    AST 16 11/28/2017    ALT 15 11/28/2017     Coags:   Lab Results   Component Value Date    PROTIME 12.0 11/28/2017    INR 1.06

## 2018-12-04 ENCOUNTER — ANESTHESIA (OUTPATIENT)
Dept: ENDOSCOPY | Age: 68
End: 2018-12-04
Payer: MEDICARE

## 2018-12-04 ENCOUNTER — HOSPITAL ENCOUNTER (OUTPATIENT)
Age: 68
Setting detail: OUTPATIENT SURGERY
Discharge: HOME | End: 2018-12-04
Attending: INTERNAL MEDICINE | Admitting: INTERNAL MEDICINE
Payer: MEDICARE

## 2018-12-04 VITALS — OXYGEN SATURATION: 99 % | DIASTOLIC BLOOD PRESSURE: 73 MMHG | SYSTOLIC BLOOD PRESSURE: 132 MMHG

## 2018-12-04 VITALS
WEIGHT: 270 LBS | OXYGEN SATURATION: 94 % | RESPIRATION RATE: 16 BRPM | DIASTOLIC BLOOD PRESSURE: 76 MMHG | HEIGHT: 70 IN | BODY MASS INDEX: 38.65 KG/M2 | TEMPERATURE: 97.1 F | HEART RATE: 60 BPM | SYSTOLIC BLOOD PRESSURE: 140 MMHG

## 2018-12-04 DIAGNOSIS — R07.2 PRECORDIAL PAIN: Primary | ICD-10-CM

## 2018-12-04 LAB
GLUCOSE BLD-MCNC: 175 MG/DL (ref 70–99)
GLUCOSE BLD-MCNC: 177 MG/DL (ref 70–99)
PERFORMED ON: ABNORMAL
PERFORMED ON: ABNORMAL

## 2018-12-04 PROCEDURE — 7100000010 HC PHASE II RECOVERY - FIRST 15 MIN: Performed by: INTERNAL MEDICINE

## 2018-12-04 PROCEDURE — 3609013200 HC EGD W/ ABLATION: Performed by: INTERNAL MEDICINE

## 2018-12-04 PROCEDURE — C1888 ENDOVAS NON-CARDIAC ABL CATH: HCPCS | Performed by: INTERNAL MEDICINE

## 2018-12-04 PROCEDURE — 6360000002 HC RX W HCPCS: Performed by: NURSE ANESTHETIST, CERTIFIED REGISTERED

## 2018-12-04 PROCEDURE — 3700000001 HC ADD 15 MINUTES (ANESTHESIA): Performed by: INTERNAL MEDICINE

## 2018-12-04 PROCEDURE — 2580000003 HC RX 258: Performed by: INTERNAL MEDICINE

## 2018-12-04 PROCEDURE — C1769 GUIDE WIRE: HCPCS | Performed by: INTERNAL MEDICINE

## 2018-12-04 PROCEDURE — 2500000003 HC RX 250 WO HCPCS: Performed by: NURSE ANESTHETIST, CERTIFIED REGISTERED

## 2018-12-04 PROCEDURE — 2709999900 HC NON-CHARGEABLE SUPPLY: Performed by: INTERNAL MEDICINE

## 2018-12-04 PROCEDURE — 3700000000 HC ANESTHESIA ATTENDED CARE: Performed by: INTERNAL MEDICINE

## 2018-12-04 PROCEDURE — 7100000011 HC PHASE II RECOVERY - ADDTL 15 MIN: Performed by: INTERNAL MEDICINE

## 2018-12-04 RX ORDER — FENTANYL CITRATE 50 UG/ML
INJECTION, SOLUTION INTRAMUSCULAR; INTRAVENOUS
Status: DISCONTINUED
Start: 2018-12-04 | End: 2018-12-04 | Stop reason: HOSPADM

## 2018-12-04 RX ORDER — MIDAZOLAM HYDROCHLORIDE 1 MG/ML
INJECTION INTRAMUSCULAR; INTRAVENOUS PRN
Status: DISCONTINUED | OUTPATIENT
Start: 2018-12-04 | End: 2018-12-04 | Stop reason: SDUPTHER

## 2018-12-04 RX ORDER — PROPOFOL 10 MG/ML
INJECTION, EMULSION INTRAVENOUS PRN
Status: DISCONTINUED | OUTPATIENT
Start: 2018-12-04 | End: 2018-12-04 | Stop reason: SDUPTHER

## 2018-12-04 RX ORDER — LIDOCAINE HYDROCHLORIDE 10 MG/ML
INJECTION, SOLUTION INFILTRATION; PERINEURAL PRN
Status: DISCONTINUED | OUTPATIENT
Start: 2018-12-04 | End: 2018-12-04 | Stop reason: SDUPTHER

## 2018-12-04 RX ORDER — PROMETHAZINE HYDROCHLORIDE 6.25 MG/5ML
12.5 SYRUP ORAL 4 TIMES DAILY PRN
Qty: 360 ML | Refills: 0 | Status: SHIPPED | OUTPATIENT
Start: 2018-12-04 | End: 2018-12-11

## 2018-12-04 RX ORDER — LIDOCAINE HYDROCHLORIDE 10 MG/ML
0.1 INJECTION, SOLUTION INFILTRATION; PERINEURAL ONCE
Status: DISCONTINUED | OUTPATIENT
Start: 2018-12-04 | End: 2018-12-04 | Stop reason: HOSPADM

## 2018-12-04 RX ORDER — OXYCODONE HYDROCHLORIDE AND ACETAMINOPHEN 5; 325 MG/1; MG/1
1 TABLET ORAL EVERY 6 HOURS PRN
Qty: 28 TABLET | Refills: 0 | Status: SHIPPED | OUTPATIENT
Start: 2018-12-04 | End: 2018-12-11

## 2018-12-04 RX ORDER — SODIUM CHLORIDE, SODIUM LACTATE, POTASSIUM CHLORIDE, CALCIUM CHLORIDE 600; 310; 30; 20 MG/100ML; MG/100ML; MG/100ML; MG/100ML
INJECTION, SOLUTION INTRAVENOUS CONTINUOUS
Status: DISCONTINUED | OUTPATIENT
Start: 2018-12-04 | End: 2018-12-04 | Stop reason: HOSPADM

## 2018-12-04 RX ORDER — MIDAZOLAM HYDROCHLORIDE 1 MG/ML
INJECTION INTRAMUSCULAR; INTRAVENOUS
Status: DISCONTINUED
Start: 2018-12-04 | End: 2018-12-04 | Stop reason: HOSPADM

## 2018-12-04 RX ADMIN — LIDOCAINE HYDROCHLORIDE 40 MG: 10 INJECTION, SOLUTION INFILTRATION; PERINEURAL at 13:43

## 2018-12-04 RX ADMIN — PROPOFOL 180 MG: 10 INJECTION, EMULSION INTRAVENOUS at 13:43

## 2018-12-04 RX ADMIN — SODIUM CHLORIDE, POTASSIUM CHLORIDE, SODIUM LACTATE AND CALCIUM CHLORIDE: 600; 310; 30; 20 INJECTION, SOLUTION INTRAVENOUS at 12:26

## 2018-12-04 RX ADMIN — MIDAZOLAM HYDROCHLORIDE 2 MG: 2 INJECTION, SOLUTION INTRAMUSCULAR; INTRAVENOUS at 13:40

## 2018-12-04 ASSESSMENT — PAIN SCALES - GENERAL
PAINLEVEL_OUTOF10: 0
PAINLEVEL_OUTOF10: 0

## 2018-12-04 ASSESSMENT — PAIN DESCRIPTION - PAIN TYPE: TYPE: ACUTE PAIN

## 2018-12-04 ASSESSMENT — PAIN - FUNCTIONAL ASSESSMENT: PAIN_FUNCTIONAL_ASSESSMENT: 0-10

## 2018-12-04 NOTE — H&P
History and Physical / Pre-Sedation Assessment    Patient:  Major Yuan   :   1950     Intended Procedure: EGD    HPI: Patient has documented Yan's Esophagus with dysplasia, with chronic pyrosis,   without dysphagia,  without unintentional weight loss,  and returns for surveillance endoscopy. Nurses notes reviewed and agreed. Medications reviewed  Allergies: Allergies   Allergen Reactions    Codeine Nausea And Vomiting       Physical Exam:  Vital Signs: /69   Pulse 54   Temp 97.1 °F (36.2 °C) (Temporal)   Resp 16   Ht 5' 10\" (1.778 m)   Wt 270 lb (122.5 kg)   SpO2 93%   BMI 38.74 kg/m²  Body mass index is 38.74 kg/m². Airway:Normal  Pulmonary:Normal  Cardiac:Normal  Abdomen:Normal    Pre-Procedure Assessment / Plan:  ASA 2 - Patient with mild systemic disease with no functional limitations   Mallampati 2  Level of Sedation Plan: Moderate  sedation  Post Procedure plan: Return to same level of care    I assessed the patient and find that the patient is in satisfactory condition to proceed with the planned procedure and sedation plan. I have explained the risk, benefits, and alternatives to the procedure. The patient understands and agrees to proceed.   Yes    Clarice Forte  1:34 PM 2018

## 2019-01-28 ENCOUNTER — OFFICE VISIT (OUTPATIENT)
Dept: FAMILY MEDICINE CLINIC | Age: 69
End: 2019-01-28
Payer: MEDICARE

## 2019-01-28 VITALS
DIASTOLIC BLOOD PRESSURE: 70 MMHG | WEIGHT: 276 LBS | OXYGEN SATURATION: 93 % | TEMPERATURE: 98.1 F | SYSTOLIC BLOOD PRESSURE: 130 MMHG | BODY MASS INDEX: 39.51 KG/M2 | HEART RATE: 63 BPM | HEIGHT: 70 IN

## 2019-01-28 DIAGNOSIS — E11.9 CONTROLLED TYPE 2 DIABETES MELLITUS WITHOUT COMPLICATION, WITH LONG-TERM CURRENT USE OF INSULIN (HCC): Primary | ICD-10-CM

## 2019-01-28 DIAGNOSIS — J01.10 ACUTE FRONTAL SINUSITIS, RECURRENCE NOT SPECIFIED: ICD-10-CM

## 2019-01-28 DIAGNOSIS — Z79.4 CONTROLLED TYPE 2 DIABETES MELLITUS WITHOUT COMPLICATION, WITH LONG-TERM CURRENT USE OF INSULIN (HCC): Primary | ICD-10-CM

## 2019-01-28 LAB — HBA1C MFR BLD: 8.1 %

## 2019-01-28 PROCEDURE — G8427 DOCREV CUR MEDS BY ELIG CLIN: HCPCS | Performed by: NURSE PRACTITIONER

## 2019-01-28 PROCEDURE — 3045F PR MOST RECENT HEMOGLOBIN A1C LEVEL 7.0-9.0%: CPT | Performed by: NURSE PRACTITIONER

## 2019-01-28 PROCEDURE — G8482 FLU IMMUNIZE ORDER/ADMIN: HCPCS | Performed by: NURSE PRACTITIONER

## 2019-01-28 PROCEDURE — 4040F PNEUMOC VAC/ADMIN/RCVD: CPT | Performed by: NURSE PRACTITIONER

## 2019-01-28 PROCEDURE — 3017F COLORECTAL CA SCREEN DOC REV: CPT | Performed by: NURSE PRACTITIONER

## 2019-01-28 PROCEDURE — 83036 HEMOGLOBIN GLYCOSYLATED A1C: CPT | Performed by: NURSE PRACTITIONER

## 2019-01-28 PROCEDURE — 1101F PT FALLS ASSESS-DOCD LE1/YR: CPT | Performed by: NURSE PRACTITIONER

## 2019-01-28 PROCEDURE — 99213 OFFICE O/P EST LOW 20 MIN: CPT | Performed by: NURSE PRACTITIONER

## 2019-01-28 PROCEDURE — 2022F DILAT RTA XM EVC RTNOPTHY: CPT | Performed by: NURSE PRACTITIONER

## 2019-01-28 PROCEDURE — G8417 CALC BMI ABV UP PARAM F/U: HCPCS | Performed by: NURSE PRACTITIONER

## 2019-01-28 PROCEDURE — 1036F TOBACCO NON-USER: CPT | Performed by: NURSE PRACTITIONER

## 2019-01-28 PROCEDURE — 1123F ACP DISCUSS/DSCN MKR DOCD: CPT | Performed by: NURSE PRACTITIONER

## 2019-01-28 RX ORDER — AMOXICILLIN 500 MG/1
500 CAPSULE ORAL 2 TIMES DAILY
Qty: 20 CAPSULE | Refills: 0 | Status: SHIPPED | OUTPATIENT
Start: 2019-01-28 | End: 2019-02-07

## 2019-01-28 ASSESSMENT — ENCOUNTER SYMPTOMS
SINUS PRESSURE: 1
RESPIRATORY NEGATIVE: 1
EYES NEGATIVE: 1
GASTROINTESTINAL NEGATIVE: 1
ALLERGIC/IMMUNOLOGIC NEGATIVE: 1
SORE THROAT: 1
RHINORRHEA: 1
SINUS PAIN: 1

## 2019-01-28 ASSESSMENT — PATIENT HEALTH QUESTIONNAIRE - PHQ9
1. LITTLE INTEREST OR PLEASURE IN DOING THINGS: 0
SUM OF ALL RESPONSES TO PHQ QUESTIONS 1-9: 0
SUM OF ALL RESPONSES TO PHQ QUESTIONS 1-9: 0
SUM OF ALL RESPONSES TO PHQ9 QUESTIONS 1 & 2: 0
2. FEELING DOWN, DEPRESSED OR HOPELESS: 0

## 2019-02-01 ENCOUNTER — OFFICE VISIT (OUTPATIENT)
Dept: PULMONOLOGY | Age: 69
End: 2019-02-01
Payer: MEDICARE

## 2019-02-01 VITALS
WEIGHT: 275 LBS | OXYGEN SATURATION: 97 % | RESPIRATION RATE: 16 BRPM | SYSTOLIC BLOOD PRESSURE: 132 MMHG | HEART RATE: 56 BPM | DIASTOLIC BLOOD PRESSURE: 78 MMHG | BODY MASS INDEX: 39.37 KG/M2 | HEIGHT: 70 IN

## 2019-02-01 DIAGNOSIS — G47.33 OSA TREATED WITH BIPAP: Primary | ICD-10-CM

## 2019-02-01 DIAGNOSIS — G47.31 CSA (CENTRAL SLEEP APNEA): ICD-10-CM

## 2019-02-01 DIAGNOSIS — G25.81 RLS (RESTLESS LEGS SYNDROME): ICD-10-CM

## 2019-02-01 PROCEDURE — 1123F ACP DISCUSS/DSCN MKR DOCD: CPT | Performed by: NURSE PRACTITIONER

## 2019-02-01 PROCEDURE — G8417 CALC BMI ABV UP PARAM F/U: HCPCS | Performed by: NURSE PRACTITIONER

## 2019-02-01 PROCEDURE — 3017F COLORECTAL CA SCREEN DOC REV: CPT | Performed by: NURSE PRACTITIONER

## 2019-02-01 PROCEDURE — 99214 OFFICE O/P EST MOD 30 MIN: CPT | Performed by: NURSE PRACTITIONER

## 2019-02-01 PROCEDURE — 4040F PNEUMOC VAC/ADMIN/RCVD: CPT | Performed by: NURSE PRACTITIONER

## 2019-02-01 PROCEDURE — 1036F TOBACCO NON-USER: CPT | Performed by: NURSE PRACTITIONER

## 2019-02-01 PROCEDURE — G8427 DOCREV CUR MEDS BY ELIG CLIN: HCPCS | Performed by: NURSE PRACTITIONER

## 2019-02-01 PROCEDURE — G8482 FLU IMMUNIZE ORDER/ADMIN: HCPCS | Performed by: NURSE PRACTITIONER

## 2019-02-01 PROCEDURE — 1101F PT FALLS ASSESS-DOCD LE1/YR: CPT | Performed by: NURSE PRACTITIONER

## 2019-02-01 ASSESSMENT — SLEEP AND FATIGUE QUESTIONNAIRES
NECK CIRCUMFERENCE (INCHES): 19.5
HOW LIKELY ARE YOU TO NOD OFF OR FALL ASLEEP WHILE SITTING INACTIVE IN A PUBLIC PLACE: 1
HOW LIKELY ARE YOU TO NOD OFF OR FALL ASLEEP WHILE LYING DOWN TO REST IN THE AFTERNOON WHEN CIRCUMSTANCES PERMIT: 1
HOW LIKELY ARE YOU TO NOD OFF OR FALL ASLEEP IN A CAR, WHILE STOPPED FOR A FEW MINUTES IN TRAFFIC: 0
HOW LIKELY ARE YOU TO NOD OFF OR FALL ASLEEP WHILE SITTING AND READING: 2
HOW LIKELY ARE YOU TO NOD OFF OR FALL ASLEEP WHILE SITTING QUIETLY AFTER LUNCH WITHOUT ALCOHOL: 2
ESS TOTAL SCORE: 14
HOW LIKELY ARE YOU TO NOD OFF OR FALL ASLEEP WHILE WATCHING TV: 3
HOW LIKELY ARE YOU TO NOD OFF OR FALL ASLEEP WHEN YOU ARE A PASSENGER IN A CAR FOR AN HOUR WITHOUT A BREAK: 2
HOW LIKELY ARE YOU TO NOD OFF OR FALL ASLEEP WHILE SITTING AND TALKING TO SOMEONE: 3

## 2019-02-19 ENCOUNTER — ANESTHESIA (OUTPATIENT)
Dept: ENDOSCOPY | Age: 69
End: 2019-02-19
Payer: MEDICARE

## 2019-02-19 ENCOUNTER — HOSPITAL ENCOUNTER (OUTPATIENT)
Age: 69
Setting detail: OUTPATIENT SURGERY
Discharge: HOME | End: 2019-02-19
Attending: INTERNAL MEDICINE | Admitting: INTERNAL MEDICINE
Payer: MEDICARE

## 2019-02-19 ENCOUNTER — ANESTHESIA EVENT (OUTPATIENT)
Dept: ENDOSCOPY | Age: 69
End: 2019-02-19
Payer: MEDICARE

## 2019-02-19 VITALS — DIASTOLIC BLOOD PRESSURE: 78 MMHG | SYSTOLIC BLOOD PRESSURE: 129 MMHG | OXYGEN SATURATION: 98 %

## 2019-02-19 VITALS
TEMPERATURE: 98.2 F | WEIGHT: 275 LBS | HEART RATE: 53 BPM | DIASTOLIC BLOOD PRESSURE: 79 MMHG | OXYGEN SATURATION: 95 % | HEIGHT: 70 IN | RESPIRATION RATE: 18 BRPM | SYSTOLIC BLOOD PRESSURE: 129 MMHG | BODY MASS INDEX: 39.37 KG/M2

## 2019-02-19 LAB
GLUCOSE BLD-MCNC: 152 MG/DL (ref 70–99)
PERFORMED ON: ABNORMAL

## 2019-02-19 PROCEDURE — 3700000001 HC ADD 15 MINUTES (ANESTHESIA): Performed by: INTERNAL MEDICINE

## 2019-02-19 PROCEDURE — 2500000003 HC RX 250 WO HCPCS: Performed by: NURSE ANESTHETIST, CERTIFIED REGISTERED

## 2019-02-19 PROCEDURE — 3609012400 HC EGD TRANSORAL BIOPSY SINGLE/MULTIPLE: Performed by: INTERNAL MEDICINE

## 2019-02-19 PROCEDURE — 3700000000 HC ANESTHESIA ATTENDED CARE: Performed by: INTERNAL MEDICINE

## 2019-02-19 PROCEDURE — 7100000011 HC PHASE II RECOVERY - ADDTL 15 MIN: Performed by: INTERNAL MEDICINE

## 2019-02-19 PROCEDURE — 7100000010 HC PHASE II RECOVERY - FIRST 15 MIN: Performed by: INTERNAL MEDICINE

## 2019-02-19 PROCEDURE — 2580000003 HC RX 258: Performed by: NURSE ANESTHETIST, CERTIFIED REGISTERED

## 2019-02-19 PROCEDURE — 2580000003 HC RX 258: Performed by: INTERNAL MEDICINE

## 2019-02-19 PROCEDURE — 6360000002 HC RX W HCPCS: Performed by: NURSE ANESTHETIST, CERTIFIED REGISTERED

## 2019-02-19 PROCEDURE — 2709999900 HC NON-CHARGEABLE SUPPLY: Performed by: INTERNAL MEDICINE

## 2019-02-19 RX ORDER — LIDOCAINE HYDROCHLORIDE 20 MG/ML
INJECTION, SOLUTION EPIDURAL; INFILTRATION; INTRACAUDAL; PERINEURAL PRN
Status: DISCONTINUED | OUTPATIENT
Start: 2019-02-19 | End: 2019-02-19 | Stop reason: SDUPTHER

## 2019-02-19 RX ORDER — PROPOFOL 10 MG/ML
INJECTION, EMULSION INTRAVENOUS PRN
Status: DISCONTINUED | OUTPATIENT
Start: 2019-02-19 | End: 2019-02-19 | Stop reason: SDUPTHER

## 2019-02-19 RX ORDER — SODIUM CHLORIDE, SODIUM LACTATE, POTASSIUM CHLORIDE, CALCIUM CHLORIDE 600; 310; 30; 20 MG/100ML; MG/100ML; MG/100ML; MG/100ML
INJECTION, SOLUTION INTRAVENOUS CONTINUOUS PRN
Status: DISCONTINUED | OUTPATIENT
Start: 2019-02-19 | End: 2019-02-19 | Stop reason: SDUPTHER

## 2019-02-19 RX ORDER — LIDOCAINE HYDROCHLORIDE 10 MG/ML
0.1 INJECTION, SOLUTION EPIDURAL; INFILTRATION; INTRACAUDAL; PERINEURAL
Status: DISCONTINUED | OUTPATIENT
Start: 2019-02-19 | End: 2019-02-19 | Stop reason: HOSPADM

## 2019-02-19 RX ORDER — SODIUM CHLORIDE, SODIUM LACTATE, POTASSIUM CHLORIDE, CALCIUM CHLORIDE 600; 310; 30; 20 MG/100ML; MG/100ML; MG/100ML; MG/100ML
INJECTION, SOLUTION INTRAVENOUS ONCE
Status: COMPLETED | OUTPATIENT
Start: 2019-02-19 | End: 2019-02-19

## 2019-02-19 RX ADMIN — SODIUM CHLORIDE, POTASSIUM CHLORIDE, SODIUM LACTATE AND CALCIUM CHLORIDE: 600; 310; 30; 20 INJECTION, SOLUTION INTRAVENOUS at 13:06

## 2019-02-19 RX ADMIN — PROPOFOL 50 MG: 10 INJECTION, EMULSION INTRAVENOUS at 14:06

## 2019-02-19 RX ADMIN — SODIUM CHLORIDE, POTASSIUM CHLORIDE, SODIUM LACTATE AND CALCIUM CHLORIDE: 600; 310; 30; 20 INJECTION, SOLUTION INTRAVENOUS at 13:59

## 2019-02-19 RX ADMIN — LIDOCAINE HYDROCHLORIDE 50 MG: 20 INJECTION, SOLUTION EPIDURAL; INFILTRATION; INTRACAUDAL; PERINEURAL at 14:01

## 2019-02-19 RX ADMIN — PROPOFOL 100 MG: 10 INJECTION, EMULSION INTRAVENOUS at 14:01

## 2019-02-19 ASSESSMENT — PAIN SCALES - GENERAL: PAINLEVEL_OUTOF10: 0

## 2019-02-19 ASSESSMENT — PAIN - FUNCTIONAL ASSESSMENT: PAIN_FUNCTIONAL_ASSESSMENT: 0-10

## 2019-02-20 PROCEDURE — 88305 TISSUE EXAM BY PATHOLOGIST: CPT

## 2019-03-21 ENCOUNTER — APPOINTMENT (OUTPATIENT)
Dept: CT IMAGING | Age: 69
DRG: 392 | End: 2019-03-21
Payer: MEDICARE

## 2019-03-21 ENCOUNTER — APPOINTMENT (OUTPATIENT)
Dept: GENERAL RADIOLOGY | Age: 69
DRG: 392 | End: 2019-03-21
Payer: MEDICARE

## 2019-03-21 ENCOUNTER — HOSPITAL ENCOUNTER (INPATIENT)
Age: 69
LOS: 4 days | Discharge: HOME | DRG: 392 | End: 2019-03-25
Attending: EMERGENCY MEDICINE | Admitting: INTERNAL MEDICINE
Payer: MEDICARE

## 2019-03-21 DIAGNOSIS — R07.9 CHEST PAIN, UNSPECIFIED TYPE: ICD-10-CM

## 2019-03-21 DIAGNOSIS — R06.00 DYSPNEA, UNSPECIFIED TYPE: Primary | ICD-10-CM

## 2019-03-21 PROBLEM — E66.9 OBESITY: Status: ACTIVE | Noted: 2019-03-21

## 2019-03-21 LAB
A/G RATIO: 1.5 (ref 1.1–2.2)
ALBUMIN SERPL-MCNC: 4 G/DL (ref 3.4–5)
ALP BLD-CCNC: 88 U/L (ref 40–129)
ALT SERPL-CCNC: 17 U/L (ref 10–40)
ANION GAP SERPL CALCULATED.3IONS-SCNC: 10 MMOL/L (ref 3–16)
AST SERPL-CCNC: 15 U/L (ref 15–37)
BASOPHILS ABSOLUTE: 0 K/UL (ref 0–0.2)
BASOPHILS RELATIVE PERCENT: 0.4 %
BILIRUB SERPL-MCNC: 0.7 MG/DL (ref 0–1)
BUN BLDV-MCNC: 16 MG/DL (ref 7–20)
CALCIUM SERPL-MCNC: 9.1 MG/DL (ref 8.3–10.6)
CHLORIDE BLD-SCNC: 103 MMOL/L (ref 99–110)
CO2: 28 MMOL/L (ref 21–32)
CREAT SERPL-MCNC: 0.9 MG/DL (ref 0.8–1.3)
D DIMER: 260 NG/ML DDU (ref 0–229)
EKG ATRIAL RATE: 61 BPM
EKG ATRIAL RATE: 64 BPM
EKG DIAGNOSIS: NORMAL
EKG DIAGNOSIS: NORMAL
EKG P AXIS: 34 DEGREES
EKG P AXIS: 60 DEGREES
EKG P-R INTERVAL: 184 MS
EKG P-R INTERVAL: 194 MS
EKG Q-T INTERVAL: 380 MS
EKG Q-T INTERVAL: 416 MS
EKG QRS DURATION: 70 MS
EKG QRS DURATION: 90 MS
EKG QTC CALCULATION (BAZETT): 392 MS
EKG QTC CALCULATION (BAZETT): 418 MS
EKG R AXIS: 63 DEGREES
EKG R AXIS: 66 DEGREES
EKG T AXIS: 36 DEGREES
EKG T AXIS: 38 DEGREES
EKG VENTRICULAR RATE: 61 BPM
EKG VENTRICULAR RATE: 64 BPM
EOSINOPHILS ABSOLUTE: 0.2 K/UL (ref 0–0.6)
EOSINOPHILS RELATIVE PERCENT: 2.5 %
GFR AFRICAN AMERICAN: >60
GFR NON-AFRICAN AMERICAN: >60
GLOBULIN: 2.7 G/DL
GLUCOSE BLD-MCNC: 136 MG/DL (ref 70–99)
GLUCOSE BLD-MCNC: 141 MG/DL (ref 70–99)
HCT VFR BLD CALC: 40 % (ref 40.5–52.5)
HEMOGLOBIN: 13.4 G/DL (ref 13.5–17.5)
LYMPHOCYTES ABSOLUTE: 1 K/UL (ref 1–5.1)
LYMPHOCYTES RELATIVE PERCENT: 12.8 %
MCH RBC QN AUTO: 29.4 PG (ref 26–34)
MCHC RBC AUTO-ENTMCNC: 33.6 G/DL (ref 31–36)
MCV RBC AUTO: 87.5 FL (ref 80–100)
MONOCYTES ABSOLUTE: 0.7 K/UL (ref 0–1.3)
MONOCYTES RELATIVE PERCENT: 8.5 %
NEUTROPHILS ABSOLUTE: 5.9 K/UL (ref 1.7–7.7)
NEUTROPHILS RELATIVE PERCENT: 75.8 %
PDW BLD-RTO: 13.8 % (ref 12.4–15.4)
PERFORMED ON: ABNORMAL
PLATELET # BLD: 142 K/UL (ref 135–450)
PMV BLD AUTO: 9.2 FL (ref 5–10.5)
POTASSIUM SERPL-SCNC: 4.1 MMOL/L (ref 3.5–5.1)
RBC # BLD: 4.57 M/UL (ref 4.2–5.9)
SODIUM BLD-SCNC: 141 MMOL/L (ref 136–145)
TOTAL PROTEIN: 6.7 G/DL (ref 6.4–8.2)
TROPONIN: <0.01 NG/ML
TROPONIN: <0.01 NG/ML
WBC # BLD: 7.8 K/UL (ref 4–11)

## 2019-03-21 PROCEDURE — 84484 ASSAY OF TROPONIN QUANT: CPT

## 2019-03-21 PROCEDURE — 6360000004 HC RX CONTRAST MEDICATION: Performed by: EMERGENCY MEDICINE

## 2019-03-21 PROCEDURE — 6370000000 HC RX 637 (ALT 250 FOR IP): Performed by: EMERGENCY MEDICINE

## 2019-03-21 PROCEDURE — 71260 CT THORAX DX C+: CPT

## 2019-03-21 PROCEDURE — 85025 COMPLETE CBC W/AUTO DIFF WBC: CPT

## 2019-03-21 PROCEDURE — 93010 ELECTROCARDIOGRAM REPORT: CPT | Performed by: INTERNAL MEDICINE

## 2019-03-21 PROCEDURE — 83036 HEMOGLOBIN GLYCOSYLATED A1C: CPT

## 2019-03-21 PROCEDURE — 99285 EMERGENCY DEPT VISIT HI MDM: CPT

## 2019-03-21 PROCEDURE — 1200000000 HC SEMI PRIVATE

## 2019-03-21 PROCEDURE — 6370000000 HC RX 637 (ALT 250 FOR IP): Performed by: INTERNAL MEDICINE

## 2019-03-21 PROCEDURE — 93005 ELECTROCARDIOGRAM TRACING: CPT | Performed by: EMERGENCY MEDICINE

## 2019-03-21 PROCEDURE — 36415 COLL VENOUS BLD VENIPUNCTURE: CPT

## 2019-03-21 PROCEDURE — 85379 FIBRIN DEGRADATION QUANT: CPT

## 2019-03-21 PROCEDURE — 2580000003 HC RX 258: Performed by: INTERNAL MEDICINE

## 2019-03-21 PROCEDURE — 71046 X-RAY EXAM CHEST 2 VIEWS: CPT

## 2019-03-21 PROCEDURE — 80053 COMPREHEN METABOLIC PANEL: CPT

## 2019-03-21 RX ORDER — CIPROFLOXACIN 500 MG/1
500 TABLET, FILM COATED ORAL 2 TIMES DAILY
COMMUNITY
End: 2019-05-30 | Stop reason: CLARIF

## 2019-03-21 RX ORDER — NITROGLYCERIN 0.4 MG/1
0.4 TABLET SUBLINGUAL EVERY 5 MIN PRN
Status: DISCONTINUED | OUTPATIENT
Start: 2019-03-21 | End: 2019-03-25 | Stop reason: HOSPADM

## 2019-03-21 RX ORDER — DEXTROSE MONOHYDRATE 25 G/50ML
12.5 INJECTION, SOLUTION INTRAVENOUS PRN
Status: DISCONTINUED | OUTPATIENT
Start: 2019-03-21 | End: 2019-03-25 | Stop reason: HOSPADM

## 2019-03-21 RX ORDER — PHENAZOPYRIDINE HYDROCHLORIDE 100 MG/1
100 TABLET, FILM COATED ORAL
Status: DISCONTINUED | OUTPATIENT
Start: 2019-03-22 | End: 2019-03-25 | Stop reason: HOSPADM

## 2019-03-21 RX ORDER — ATORVASTATIN CALCIUM 40 MG/1
40 TABLET, FILM COATED ORAL NIGHTLY
Status: DISCONTINUED | OUTPATIENT
Start: 2019-03-21 | End: 2019-03-21 | Stop reason: SDUPTHER

## 2019-03-21 RX ORDER — PHENAZOPYRIDINE HYDROCHLORIDE 100 MG/1
100 TABLET, FILM COATED ORAL 3 TIMES DAILY PRN
COMMUNITY
End: 2019-05-30 | Stop reason: CLARIF

## 2019-03-21 RX ORDER — ASPIRIN 81 MG/1
81 TABLET, CHEWABLE ORAL DAILY
Status: DISCONTINUED | OUTPATIENT
Start: 2019-03-22 | End: 2019-03-25 | Stop reason: HOSPADM

## 2019-03-21 RX ORDER — ASPIRIN 81 MG/1
324 TABLET, CHEWABLE ORAL ONCE
Status: COMPLETED | OUTPATIENT
Start: 2019-03-21 | End: 2019-03-21

## 2019-03-21 RX ORDER — FAMOTIDINE 20 MG/1
20 TABLET, FILM COATED ORAL 2 TIMES DAILY
Status: DISCONTINUED | OUTPATIENT
Start: 2019-03-21 | End: 2019-03-25 | Stop reason: HOSPADM

## 2019-03-21 RX ORDER — CIPROFLOXACIN 500 MG/1
500 TABLET, FILM COATED ORAL 2 TIMES DAILY
Status: DISCONTINUED | OUTPATIENT
Start: 2019-03-21 | End: 2019-03-25 | Stop reason: HOSPADM

## 2019-03-21 RX ORDER — SODIUM CHLORIDE 0.9 % (FLUSH) 0.9 %
10 SYRINGE (ML) INJECTION PRN
Status: DISCONTINUED | OUTPATIENT
Start: 2019-03-21 | End: 2019-03-25 | Stop reason: HOSPADM

## 2019-03-21 RX ORDER — DEXTROSE MONOHYDRATE 50 MG/ML
100 INJECTION, SOLUTION INTRAVENOUS PRN
Status: DISCONTINUED | OUTPATIENT
Start: 2019-03-21 | End: 2019-03-25 | Stop reason: HOSPADM

## 2019-03-21 RX ORDER — ATORVASTATIN CALCIUM 10 MG/1
20 TABLET, FILM COATED ORAL DAILY
Status: DISCONTINUED | OUTPATIENT
Start: 2019-03-22 | End: 2019-03-25 | Stop reason: HOSPADM

## 2019-03-21 RX ORDER — ROPINIROLE 2 MG/1
2 TABLET, FILM COATED ORAL NIGHTLY
Status: DISCONTINUED | OUTPATIENT
Start: 2019-03-21 | End: 2019-03-25 | Stop reason: HOSPADM

## 2019-03-21 RX ORDER — SODIUM CHLORIDE 0.9 % (FLUSH) 0.9 %
10 SYRINGE (ML) INJECTION EVERY 12 HOURS SCHEDULED
Status: DISCONTINUED | OUTPATIENT
Start: 2019-03-21 | End: 2019-03-25 | Stop reason: HOSPADM

## 2019-03-21 RX ORDER — FLUTICASONE PROPIONATE 50 MCG
2 SPRAY, SUSPENSION (ML) NASAL DAILY
Status: DISCONTINUED | OUTPATIENT
Start: 2019-03-21 | End: 2019-03-25 | Stop reason: HOSPADM

## 2019-03-21 RX ORDER — LISINOPRIL 10 MG/1
10 TABLET ORAL DAILY
Status: DISCONTINUED | OUTPATIENT
Start: 2019-03-22 | End: 2019-03-25 | Stop reason: HOSPADM

## 2019-03-21 RX ORDER — NICOTINE POLACRILEX 4 MG
15 LOZENGE BUCCAL PRN
Status: DISCONTINUED | OUTPATIENT
Start: 2019-03-21 | End: 2019-03-25 | Stop reason: HOSPADM

## 2019-03-21 RX ORDER — ONDANSETRON 2 MG/ML
4 INJECTION INTRAMUSCULAR; INTRAVENOUS EVERY 6 HOURS PRN
Status: DISCONTINUED | OUTPATIENT
Start: 2019-03-21 | End: 2019-03-25 | Stop reason: HOSPADM

## 2019-03-21 RX ORDER — PANTOPRAZOLE SODIUM 40 MG/1
40 TABLET, DELAYED RELEASE ORAL 2 TIMES DAILY
Status: DISCONTINUED | OUTPATIENT
Start: 2019-03-21 | End: 2019-03-25 | Stop reason: HOSPADM

## 2019-03-21 RX ADMIN — SODIUM CHLORIDE, PRESERVATIVE FREE 10 ML: 5 INJECTION INTRAVENOUS at 20:57

## 2019-03-21 RX ADMIN — IOPAMIDOL 75 ML: 755 INJECTION, SOLUTION INTRAVENOUS at 14:46

## 2019-03-21 RX ADMIN — PANTOPRAZOLE SODIUM 40 MG: 40 TABLET, DELAYED RELEASE ORAL at 20:57

## 2019-03-21 RX ADMIN — ASPIRIN 81 MG 324 MG: 81 TABLET ORAL at 17:47

## 2019-03-21 RX ADMIN — INSULIN LISPRO 1 UNITS: 100 INJECTION, SOLUTION INTRAVENOUS; SUBCUTANEOUS at 20:57

## 2019-03-21 RX ADMIN — NITROGLYCERIN 1 INCH: 20 OINTMENT TOPICAL at 17:47

## 2019-03-21 RX ADMIN — FAMOTIDINE 20 MG: 20 TABLET ORAL at 20:57

## 2019-03-21 RX ADMIN — CIPROFLOXACIN HYDROCHLORIDE 500 MG: 500 TABLET, FILM COATED ORAL at 20:57

## 2019-03-21 RX ADMIN — ROPINIROLE HYDROCHLORIDE 2 MG: 2 TABLET, FILM COATED ORAL at 20:57

## 2019-03-21 RX ADMIN — FLUTICASONE PROPIONATE 2 SPRAY: 50 SPRAY, METERED NASAL at 20:57

## 2019-03-21 ASSESSMENT — PAIN SCALES - GENERAL
PAINLEVEL_OUTOF10: 5
PAINLEVEL_OUTOF10: 0
PAINLEVEL_OUTOF10: 0

## 2019-03-21 ASSESSMENT — PAIN DESCRIPTION - LOCATION: LOCATION: CHEST

## 2019-03-21 ASSESSMENT — PAIN DESCRIPTION - PAIN TYPE: TYPE: ACUTE PAIN

## 2019-03-22 ENCOUNTER — APPOINTMENT (OUTPATIENT)
Dept: NUCLEAR MEDICINE | Age: 69
DRG: 392 | End: 2019-03-22
Payer: MEDICARE

## 2019-03-22 LAB
CHOLESTEROL, TOTAL: 148 MG/DL (ref 0–199)
EKG ATRIAL RATE: 56 BPM
EKG DIAGNOSIS: NORMAL
EKG P AXIS: 45 DEGREES
EKG P-R INTERVAL: 170 MS
EKG Q-T INTERVAL: 430 MS
EKG QRS DURATION: 92 MS
EKG QTC CALCULATION (BAZETT): 414 MS
EKG R AXIS: 66 DEGREES
EKG T AXIS: 47 DEGREES
EKG VENTRICULAR RATE: 56 BPM
ESTIMATED AVERAGE GLUCOSE: 185.8 MG/DL
GLUCOSE BLD-MCNC: 185 MG/DL (ref 70–99)
GLUCOSE BLD-MCNC: 190 MG/DL (ref 70–99)
GLUCOSE BLD-MCNC: 232 MG/DL (ref 70–99)
GLUCOSE BLD-MCNC: 253 MG/DL (ref 70–99)
HBA1C MFR BLD: 8.1 %
HCT VFR BLD CALC: 38.7 % (ref 40.5–52.5)
HDLC SERPL-MCNC: 56 MG/DL (ref 40–60)
HEMOGLOBIN: 12.9 G/DL (ref 13.5–17.5)
LDL CHOLESTEROL CALCULATED: 76 MG/DL
LV EF: 55 %
LVEF MODALITY: NORMAL
MCH RBC QN AUTO: 29.3 PG (ref 26–34)
MCHC RBC AUTO-ENTMCNC: 33.4 G/DL (ref 31–36)
MCV RBC AUTO: 87.6 FL (ref 80–100)
PDW BLD-RTO: 13.7 % (ref 12.4–15.4)
PERFORMED ON: ABNORMAL
PLATELET # BLD: 132 K/UL (ref 135–450)
PMV BLD AUTO: 9.6 FL (ref 5–10.5)
RBC # BLD: 4.42 M/UL (ref 4.2–5.9)
TRIGL SERPL-MCNC: 80 MG/DL (ref 0–150)
TROPONIN: <0.01 NG/ML
VLDLC SERPL CALC-MCNC: 16 MG/DL
WBC # BLD: 5.9 K/UL (ref 4–11)

## 2019-03-22 PROCEDURE — 6370000000 HC RX 637 (ALT 250 FOR IP): Performed by: INTERNAL MEDICINE

## 2019-03-22 PROCEDURE — 93005 ELECTROCARDIOGRAM TRACING: CPT | Performed by: INTERNAL MEDICINE

## 2019-03-22 PROCEDURE — 3430000000 HC RX DIAGNOSTIC RADIOPHARMACEUTICAL: Performed by: INTERNAL MEDICINE

## 2019-03-22 PROCEDURE — 1200000000 HC SEMI PRIVATE

## 2019-03-22 PROCEDURE — 6370000000 HC RX 637 (ALT 250 FOR IP)

## 2019-03-22 PROCEDURE — 80061 LIPID PANEL: CPT

## 2019-03-22 PROCEDURE — 6360000002 HC RX W HCPCS: Performed by: INTERNAL MEDICINE

## 2019-03-22 PROCEDURE — A9502 TC99M TETROFOSMIN: HCPCS | Performed by: INTERNAL MEDICINE

## 2019-03-22 PROCEDURE — 78452 HT MUSCLE IMAGE SPECT MULT: CPT

## 2019-03-22 PROCEDURE — 85027 COMPLETE CBC AUTOMATED: CPT

## 2019-03-22 PROCEDURE — 93010 ELECTROCARDIOGRAM REPORT: CPT | Performed by: INTERNAL MEDICINE

## 2019-03-22 PROCEDURE — 93017 CV STRESS TEST TRACING ONLY: CPT

## 2019-03-22 PROCEDURE — 2580000003 HC RX 258: Performed by: INTERNAL MEDICINE

## 2019-03-22 PROCEDURE — 84484 ASSAY OF TROPONIN QUANT: CPT

## 2019-03-22 PROCEDURE — 36415 COLL VENOUS BLD VENIPUNCTURE: CPT

## 2019-03-22 RX ORDER — ACETAMINOPHEN 325 MG/1
650 TABLET ORAL EVERY 4 HOURS PRN
Status: DISCONTINUED | OUTPATIENT
Start: 2019-03-22 | End: 2019-03-25 | Stop reason: HOSPADM

## 2019-03-22 RX ORDER — NITROGLYCERIN 0.4 MG/1
TABLET SUBLINGUAL
Status: COMPLETED
Start: 2019-03-22 | End: 2019-03-22

## 2019-03-22 RX ORDER — ACETAMINOPHEN 325 MG/1
TABLET ORAL
Status: COMPLETED
Start: 2019-03-22 | End: 2019-03-22

## 2019-03-22 RX ADMIN — ACETAMINOPHEN 650 MG: 325 TABLET ORAL at 21:13

## 2019-03-22 RX ADMIN — PANTOPRAZOLE SODIUM 40 MG: 40 TABLET, DELAYED RELEASE ORAL at 12:10

## 2019-03-22 RX ADMIN — SODIUM CHLORIDE, PRESERVATIVE FREE 10 ML: 5 INJECTION INTRAVENOUS at 20:59

## 2019-03-22 RX ADMIN — PANTOPRAZOLE SODIUM 40 MG: 40 TABLET, DELAYED RELEASE ORAL at 20:58

## 2019-03-22 RX ADMIN — CIPROFLOXACIN HYDROCHLORIDE 500 MG: 500 TABLET, FILM COATED ORAL at 20:58

## 2019-03-22 RX ADMIN — TETROFOSMIN 10.3 MILLICURIE: 0.23 INJECTION, POWDER, LYOPHILIZED, FOR SOLUTION INTRAVENOUS at 09:00

## 2019-03-22 RX ADMIN — CIPROFLOXACIN HYDROCHLORIDE 500 MG: 500 TABLET, FILM COATED ORAL at 12:10

## 2019-03-22 RX ADMIN — PHENAZOPYRIDINE HYDROCHLORIDE 100 MG: 100 TABLET ORAL at 16:53

## 2019-03-22 RX ADMIN — PHENAZOPYRIDINE HYDROCHLORIDE 100 MG: 100 TABLET ORAL at 12:11

## 2019-03-22 RX ADMIN — INSULIN HUMAN 10 UNITS: 100 INJECTION, SUSPENSION SUBCUTANEOUS at 17:48

## 2019-03-22 RX ADMIN — ASPIRIN 81 MG 81 MG: 81 TABLET ORAL at 07:52

## 2019-03-22 RX ADMIN — NITROGLYCERIN 0.4 MG: 0.4 TABLET, ORALLY DISINTEGRATING SUBLINGUAL at 21:00

## 2019-03-22 RX ADMIN — FAMOTIDINE 20 MG: 20 TABLET ORAL at 20:59

## 2019-03-22 RX ADMIN — ROPINIROLE HYDROCHLORIDE 2 MG: 2 TABLET, FILM COATED ORAL at 20:58

## 2019-03-22 RX ADMIN — NITROGLYCERIN 0.4 MG: 0.4 TABLET SUBLINGUAL at 21:00

## 2019-03-22 RX ADMIN — INSULIN LISPRO 5 UNITS: 100 INJECTION, SOLUTION INTRAVENOUS; SUBCUTANEOUS at 17:49

## 2019-03-22 RX ADMIN — FLUTICASONE PROPIONATE 2 SPRAY: 50 SPRAY, METERED NASAL at 12:12

## 2019-03-22 RX ADMIN — INSULIN LISPRO 3 UNITS: 100 INJECTION, SOLUTION INTRAVENOUS; SUBCUTANEOUS at 21:00

## 2019-03-22 RX ADMIN — LISINOPRIL 10 MG: 10 TABLET ORAL at 12:10

## 2019-03-22 RX ADMIN — TETROFOSMIN 30.5 MILLICURIE: 0.23 INJECTION, POWDER, LYOPHILIZED, FOR SOLUTION INTRAVENOUS at 10:33

## 2019-03-22 RX ADMIN — SODIUM CHLORIDE, PRESERVATIVE FREE 10 ML: 5 INJECTION INTRAVENOUS at 07:51

## 2019-03-22 RX ADMIN — ATORVASTATIN CALCIUM 20 MG: 10 TABLET, FILM COATED ORAL at 12:10

## 2019-03-22 RX ADMIN — INSULIN LISPRO 2 UNITS: 100 INJECTION, SOLUTION INTRAVENOUS; SUBCUTANEOUS at 13:44

## 2019-03-22 RX ADMIN — REGADENOSON 0.4 MG: 0.08 INJECTION, SOLUTION INTRAVENOUS at 10:33

## 2019-03-22 RX ADMIN — INSULIN LISPRO 4 UNITS: 100 INJECTION, SOLUTION INTRAVENOUS; SUBCUTANEOUS at 17:48

## 2019-03-22 ASSESSMENT — PAIN SCALES - GENERAL
PAINLEVEL_OUTOF10: 4
PAINLEVEL_OUTOF10: 4
PAINLEVEL_OUTOF10: 0
PAINLEVEL_OUTOF10: 0

## 2019-03-22 ASSESSMENT — PAIN DESCRIPTION - ONSET: ONSET: SUDDEN

## 2019-03-22 ASSESSMENT — PAIN DESCRIPTION - DESCRIPTORS: DESCRIPTORS: ACHING;SHARP

## 2019-03-22 ASSESSMENT — PAIN DESCRIPTION - PAIN TYPE: TYPE: ACUTE PAIN

## 2019-03-22 ASSESSMENT — PAIN DESCRIPTION - ORIENTATION: ORIENTATION: UPPER;MID

## 2019-03-22 ASSESSMENT — PAIN DESCRIPTION - LOCATION: LOCATION: CHEST

## 2019-03-23 LAB
EKG ATRIAL RATE: 59 BPM
EKG DIAGNOSIS: NORMAL
EKG P AXIS: 15 DEGREES
EKG P-R INTERVAL: 170 MS
EKG Q-T INTERVAL: 408 MS
EKG QRS DURATION: 88 MS
EKG QTC CALCULATION (BAZETT): 403 MS
EKG R AXIS: 65 DEGREES
EKG T AXIS: 40 DEGREES
EKG VENTRICULAR RATE: 59 BPM
GLUCOSE BLD-MCNC: 151 MG/DL (ref 70–99)
GLUCOSE BLD-MCNC: 184 MG/DL (ref 70–99)
GLUCOSE BLD-MCNC: 195 MG/DL (ref 70–99)
GLUCOSE BLD-MCNC: 243 MG/DL (ref 70–99)
LV EF: 55 %
LVEF MODALITY: NORMAL
PERFORMED ON: ABNORMAL

## 2019-03-23 PROCEDURE — 6370000000 HC RX 637 (ALT 250 FOR IP): Performed by: INTERNAL MEDICINE

## 2019-03-23 PROCEDURE — 1200000000 HC SEMI PRIVATE

## 2019-03-23 PROCEDURE — 93010 ELECTROCARDIOGRAM REPORT: CPT | Performed by: INTERNAL MEDICINE

## 2019-03-23 PROCEDURE — 6360000002 HC RX W HCPCS: Performed by: INTERNAL MEDICINE

## 2019-03-23 PROCEDURE — 93306 TTE W/DOPPLER COMPLETE: CPT

## 2019-03-23 PROCEDURE — 2580000003 HC RX 258: Performed by: INTERNAL MEDICINE

## 2019-03-23 RX ADMIN — INSULIN LISPRO 5 UNITS: 100 INJECTION, SOLUTION INTRAVENOUS; SUBCUTANEOUS at 09:05

## 2019-03-23 RX ADMIN — FAMOTIDINE 20 MG: 20 TABLET ORAL at 09:03

## 2019-03-23 RX ADMIN — INSULIN LISPRO 4 UNITS: 100 INJECTION, SOLUTION INTRAVENOUS; SUBCUTANEOUS at 12:30

## 2019-03-23 RX ADMIN — PHENAZOPYRIDINE HYDROCHLORIDE 100 MG: 100 TABLET ORAL at 12:49

## 2019-03-23 RX ADMIN — PANTOPRAZOLE SODIUM 40 MG: 40 TABLET, DELAYED RELEASE ORAL at 09:02

## 2019-03-23 RX ADMIN — ROPINIROLE HYDROCHLORIDE 2 MG: 2 TABLET, FILM COATED ORAL at 20:07

## 2019-03-23 RX ADMIN — ATORVASTATIN CALCIUM 20 MG: 10 TABLET, FILM COATED ORAL at 09:02

## 2019-03-23 RX ADMIN — INSULIN LISPRO 5 UNITS: 100 INJECTION, SOLUTION INTRAVENOUS; SUBCUTANEOUS at 12:30

## 2019-03-23 RX ADMIN — FAMOTIDINE 20 MG: 20 TABLET ORAL at 20:07

## 2019-03-23 RX ADMIN — FLUTICASONE PROPIONATE 2 SPRAY: 50 SPRAY, METERED NASAL at 09:04

## 2019-03-23 RX ADMIN — SODIUM CHLORIDE, PRESERVATIVE FREE 10 ML: 5 INJECTION INTRAVENOUS at 20:08

## 2019-03-23 RX ADMIN — LISINOPRIL 10 MG: 10 TABLET ORAL at 09:02

## 2019-03-23 RX ADMIN — INSULIN HUMAN 10 UNITS: 100 INJECTION, SUSPENSION SUBCUTANEOUS at 09:05

## 2019-03-23 RX ADMIN — INSULIN LISPRO 5 UNITS: 100 INJECTION, SOLUTION INTRAVENOUS; SUBCUTANEOUS at 17:54

## 2019-03-23 RX ADMIN — PHENAZOPYRIDINE HYDROCHLORIDE 100 MG: 100 TABLET ORAL at 09:02

## 2019-03-23 RX ADMIN — CIPROFLOXACIN HYDROCHLORIDE 500 MG: 500 TABLET, FILM COATED ORAL at 09:03

## 2019-03-23 RX ADMIN — CIPROFLOXACIN HYDROCHLORIDE 500 MG: 500 TABLET, FILM COATED ORAL at 20:07

## 2019-03-23 RX ADMIN — INSULIN LISPRO 2 UNITS: 100 INJECTION, SOLUTION INTRAVENOUS; SUBCUTANEOUS at 17:54

## 2019-03-23 RX ADMIN — INSULIN LISPRO 1 UNITS: 100 INJECTION, SOLUTION INTRAVENOUS; SUBCUTANEOUS at 21:37

## 2019-03-23 RX ADMIN — INSULIN HUMAN 10 UNITS: 100 INJECTION, SUSPENSION SUBCUTANEOUS at 17:53

## 2019-03-23 RX ADMIN — ENOXAPARIN SODIUM 40 MG: 40 INJECTION SUBCUTANEOUS at 09:03

## 2019-03-23 RX ADMIN — SODIUM CHLORIDE, PRESERVATIVE FREE 10 ML: 5 INJECTION INTRAVENOUS at 09:03

## 2019-03-23 RX ADMIN — PANTOPRAZOLE SODIUM 40 MG: 40 TABLET, DELAYED RELEASE ORAL at 20:07

## 2019-03-23 RX ADMIN — INSULIN LISPRO 2 UNITS: 100 INJECTION, SOLUTION INTRAVENOUS; SUBCUTANEOUS at 09:05

## 2019-03-23 RX ADMIN — ASPIRIN 81 MG 81 MG: 81 TABLET ORAL at 09:03

## 2019-03-23 RX ADMIN — PHENAZOPYRIDINE HYDROCHLORIDE 100 MG: 100 TABLET ORAL at 17:53

## 2019-03-23 ASSESSMENT — PAIN SCALES - GENERAL
PAINLEVEL_OUTOF10: 0

## 2019-03-24 LAB
GLUCOSE BLD-MCNC: 152 MG/DL (ref 70–99)
GLUCOSE BLD-MCNC: 182 MG/DL (ref 70–99)
GLUCOSE BLD-MCNC: 190 MG/DL (ref 70–99)
GLUCOSE BLD-MCNC: 285 MG/DL (ref 70–99)
PERFORMED ON: ABNORMAL

## 2019-03-24 PROCEDURE — 1200000000 HC SEMI PRIVATE

## 2019-03-24 PROCEDURE — 2580000003 HC RX 258: Performed by: INTERNAL MEDICINE

## 2019-03-24 PROCEDURE — 6370000000 HC RX 637 (ALT 250 FOR IP): Performed by: INTERNAL MEDICINE

## 2019-03-24 PROCEDURE — 6370000000 HC RX 637 (ALT 250 FOR IP): Performed by: NURSE PRACTITIONER

## 2019-03-24 PROCEDURE — 99223 1ST HOSP IP/OBS HIGH 75: CPT | Performed by: INTERNAL MEDICINE

## 2019-03-24 PROCEDURE — 6360000002 HC RX W HCPCS: Performed by: INTERNAL MEDICINE

## 2019-03-24 PROCEDURE — 94150 VITAL CAPACITY TEST: CPT

## 2019-03-24 RX ORDER — IPRATROPIUM BROMIDE AND ALBUTEROL SULFATE 2.5; .5 MG/3ML; MG/3ML
1 SOLUTION RESPIRATORY (INHALATION) EVERY 4 HOURS PRN
Status: DISCONTINUED | OUTPATIENT
Start: 2019-03-24 | End: 2019-03-25 | Stop reason: HOSPADM

## 2019-03-24 RX ADMIN — PHENAZOPYRIDINE HYDROCHLORIDE 100 MG: 100 TABLET ORAL at 17:43

## 2019-03-24 RX ADMIN — CIPROFLOXACIN HYDROCHLORIDE 500 MG: 500 TABLET, FILM COATED ORAL at 20:44

## 2019-03-24 RX ADMIN — FLUTICASONE PROPIONATE 2 SPRAY: 50 SPRAY, METERED NASAL at 09:02

## 2019-03-24 RX ADMIN — INSULIN HUMAN 10 UNITS: 100 INJECTION, SUSPENSION SUBCUTANEOUS at 09:03

## 2019-03-24 RX ADMIN — INSULIN LISPRO 6 UNITS: 100 INJECTION, SOLUTION INTRAVENOUS; SUBCUTANEOUS at 11:45

## 2019-03-24 RX ADMIN — FAMOTIDINE 20 MG: 20 TABLET ORAL at 20:44

## 2019-03-24 RX ADMIN — ACETAMINOPHEN 650 MG: 325 TABLET ORAL at 18:00

## 2019-03-24 RX ADMIN — ROPINIROLE HYDROCHLORIDE 2 MG: 2 TABLET, FILM COATED ORAL at 20:44

## 2019-03-24 RX ADMIN — PHENAZOPYRIDINE HYDROCHLORIDE 100 MG: 100 TABLET ORAL at 09:02

## 2019-03-24 RX ADMIN — PANTOPRAZOLE SODIUM 40 MG: 40 TABLET, DELAYED RELEASE ORAL at 09:02

## 2019-03-24 RX ADMIN — ASPIRIN 81 MG 81 MG: 81 TABLET ORAL at 09:02

## 2019-03-24 RX ADMIN — INSULIN LISPRO 5 UNITS: 100 INJECTION, SOLUTION INTRAVENOUS; SUBCUTANEOUS at 09:03

## 2019-03-24 RX ADMIN — PHENAZOPYRIDINE HYDROCHLORIDE 100 MG: 100 TABLET ORAL at 11:44

## 2019-03-24 RX ADMIN — INSULIN LISPRO 2 UNITS: 100 INJECTION, SOLUTION INTRAVENOUS; SUBCUTANEOUS at 09:03

## 2019-03-24 RX ADMIN — FAMOTIDINE 20 MG: 20 TABLET ORAL at 09:02

## 2019-03-24 RX ADMIN — SODIUM CHLORIDE, PRESERVATIVE FREE 10 ML: 5 INJECTION INTRAVENOUS at 20:44

## 2019-03-24 RX ADMIN — INSULIN LISPRO 5 UNITS: 100 INJECTION, SOLUTION INTRAVENOUS; SUBCUTANEOUS at 17:44

## 2019-03-24 RX ADMIN — PANTOPRAZOLE SODIUM 40 MG: 40 TABLET, DELAYED RELEASE ORAL at 20:44

## 2019-03-24 RX ADMIN — INSULIN LISPRO 2 UNITS: 100 INJECTION, SOLUTION INTRAVENOUS; SUBCUTANEOUS at 17:44

## 2019-03-24 RX ADMIN — SODIUM CHLORIDE, PRESERVATIVE FREE 10 ML: 5 INJECTION INTRAVENOUS at 09:01

## 2019-03-24 RX ADMIN — INSULIN HUMAN 10 UNITS: 100 INJECTION, SUSPENSION SUBCUTANEOUS at 17:44

## 2019-03-24 RX ADMIN — INSULIN LISPRO 1 UNITS: 100 INJECTION, SOLUTION INTRAVENOUS; SUBCUTANEOUS at 20:45

## 2019-03-24 RX ADMIN — ATORVASTATIN CALCIUM 20 MG: 10 TABLET, FILM COATED ORAL at 09:02

## 2019-03-24 RX ADMIN — INSULIN LISPRO 5 UNITS: 100 INJECTION, SOLUTION INTRAVENOUS; SUBCUTANEOUS at 11:45

## 2019-03-24 RX ADMIN — ENOXAPARIN SODIUM 40 MG: 40 INJECTION SUBCUTANEOUS at 09:02

## 2019-03-24 RX ADMIN — CIPROFLOXACIN HYDROCHLORIDE 500 MG: 500 TABLET, FILM COATED ORAL at 09:02

## 2019-03-24 RX ADMIN — LISINOPRIL 10 MG: 10 TABLET ORAL at 09:02

## 2019-03-24 ASSESSMENT — PAIN SCALES - GENERAL
PAINLEVEL_OUTOF10: 0
PAINLEVEL_OUTOF10: 3
PAINLEVEL_OUTOF10: 0

## 2019-03-24 ASSESSMENT — ENCOUNTER SYMPTOMS
VOICE CHANGE: 0
SHORTNESS OF BREATH: 1
ABDOMINAL PAIN: 0
COUGH: 0
EYE ITCHING: 0
CONSTIPATION: 0
CHOKING: 0
EYE PAIN: 0
DIARRHEA: 0
SORE THROAT: 0
EYE DISCHARGE: 0

## 2019-03-25 ENCOUNTER — APPOINTMENT (OUTPATIENT)
Dept: CARDIAC CATH/INVASIVE PROCEDURES | Age: 69
DRG: 392 | End: 2019-03-25
Payer: MEDICARE

## 2019-03-25 ENCOUNTER — APPOINTMENT (OUTPATIENT)
Dept: GENERAL RADIOLOGY | Age: 69
DRG: 392 | End: 2019-03-25
Payer: MEDICARE

## 2019-03-25 VITALS
RESPIRATION RATE: 18 BRPM | BODY MASS INDEX: 37.48 KG/M2 | OXYGEN SATURATION: 93 % | WEIGHT: 267.7 LBS | HEIGHT: 71 IN | SYSTOLIC BLOOD PRESSURE: 113 MMHG | DIASTOLIC BLOOD PRESSURE: 64 MMHG | TEMPERATURE: 98 F | HEART RATE: 59 BPM

## 2019-03-25 LAB
BASE EXCESS ARTERIAL: 3.1 MMOL/L (ref -3–3)
CARBOXYHEMOGLOBIN ARTERIAL: 1.2 % (ref 0–1.5)
GLUCOSE BLD-MCNC: 159 MG/DL (ref 70–99)
GLUCOSE BLD-MCNC: 205 MG/DL (ref 70–99)
GLUCOSE BLD-MCNC: 242 MG/DL (ref 70–99)
HCO3 ARTERIAL: 28.8 MMOL/L (ref 21–29)
HEMOGLOBIN, ART, EXTENDED: 13.4 G/DL (ref 13.5–17.5)
LV EF: 65 %
LVEF MODALITY: NORMAL
METHEMOGLOBIN ARTERIAL: 0.3 %
O2 CONTENT ARTERIAL: 17 ML/DL
O2 SAT, ARTERIAL: 92.6 %
O2 THERAPY: ABNORMAL
PCO2 ARTERIAL: 48.2 MMHG (ref 35–45)
PERFORMED ON: ABNORMAL
PH ARTERIAL: 7.39 (ref 7.35–7.45)
PO2 ARTERIAL: 68.4 MMHG (ref 75–108)
TCO2 ARTERIAL: 30.3 MMOL/L

## 2019-03-25 PROCEDURE — 99223 1ST HOSP IP/OBS HIGH 75: CPT | Performed by: INTERNAL MEDICINE

## 2019-03-25 PROCEDURE — 82803 BLOOD GASES ANY COMBINATION: CPT

## 2019-03-25 PROCEDURE — C1769 GUIDE WIRE: HCPCS

## 2019-03-25 PROCEDURE — 93458 L HRT ARTERY/VENTRICLE ANGIO: CPT

## 2019-03-25 PROCEDURE — C1887 CATHETER, GUIDING: HCPCS

## 2019-03-25 PROCEDURE — 2500000003 HC RX 250 WO HCPCS

## 2019-03-25 PROCEDURE — 99152 MOD SED SAME PHYS/QHP 5/>YRS: CPT | Performed by: INTERNAL MEDICINE

## 2019-03-25 PROCEDURE — 99152 MOD SED SAME PHYS/QHP 5/>YRS: CPT

## 2019-03-25 PROCEDURE — 6370000000 HC RX 637 (ALT 250 FOR IP): Performed by: INTERNAL MEDICINE

## 2019-03-25 PROCEDURE — 4A023N7 MEASUREMENT OF CARDIAC SAMPLING AND PRESSURE, LEFT HEART, PERCUTANEOUS APPROACH: ICD-10-PCS | Performed by: INTERNAL MEDICINE

## 2019-03-25 PROCEDURE — B2111ZZ FLUOROSCOPY OF MULTIPLE CORONARY ARTERIES USING LOW OSMOLAR CONTRAST: ICD-10-PCS | Performed by: INTERNAL MEDICINE

## 2019-03-25 PROCEDURE — 85347 COAGULATION TIME ACTIVATED: CPT

## 2019-03-25 PROCEDURE — 93458 L HRT ARTERY/VENTRICLE ANGIO: CPT | Performed by: INTERNAL MEDICINE

## 2019-03-25 PROCEDURE — 2580000003 HC RX 258

## 2019-03-25 PROCEDURE — 74220 X-RAY XM ESOPHAGUS 1CNTRST: CPT

## 2019-03-25 PROCEDURE — 99232 SBSQ HOSP IP/OBS MODERATE 35: CPT | Performed by: INTERNAL MEDICINE

## 2019-03-25 PROCEDURE — B2151ZZ FLUOROSCOPY OF LEFT HEART USING LOW OSMOLAR CONTRAST: ICD-10-PCS | Performed by: INTERNAL MEDICINE

## 2019-03-25 PROCEDURE — 2709999900 HC NON-CHARGEABLE SUPPLY

## 2019-03-25 PROCEDURE — 6360000002 HC RX W HCPCS

## 2019-03-25 PROCEDURE — C1894 INTRO/SHEATH, NON-LASER: HCPCS

## 2019-03-25 PROCEDURE — 94640 AIRWAY INHALATION TREATMENT: CPT

## 2019-03-25 PROCEDURE — 36600 WITHDRAWAL OF ARTERIAL BLOOD: CPT

## 2019-03-25 PROCEDURE — 2580000003 HC RX 258: Performed by: INTERNAL MEDICINE

## 2019-03-25 RX ORDER — SODIUM CHLORIDE 0.9 % (FLUSH) 0.9 %
10 SYRINGE (ML) INJECTION PRN
Status: CANCELLED | OUTPATIENT
Start: 2019-03-25

## 2019-03-25 RX ORDER — ISOSORBIDE MONONITRATE 30 MG/1
30 TABLET, EXTENDED RELEASE ORAL DAILY
Status: DISCONTINUED | OUTPATIENT
Start: 2019-03-25 | End: 2019-03-25 | Stop reason: HOSPADM

## 2019-03-25 RX ORDER — ACETAMINOPHEN 325 MG/1
650 TABLET ORAL EVERY 4 HOURS PRN
Status: CANCELLED | OUTPATIENT
Start: 2019-03-25

## 2019-03-25 RX ORDER — NITROGLYCERIN 0.4 MG/1
TABLET SUBLINGUAL
Qty: 25 TABLET | Refills: 3 | Status: SHIPPED | OUTPATIENT
Start: 2019-03-25 | End: 2022-01-26

## 2019-03-25 RX ORDER — SODIUM CHLORIDE 0.9 % (FLUSH) 0.9 %
10 SYRINGE (ML) INJECTION EVERY 12 HOURS SCHEDULED
Status: DISCONTINUED | OUTPATIENT
Start: 2019-03-25 | End: 2019-03-25 | Stop reason: HOSPADM

## 2019-03-25 RX ORDER — ISOSORBIDE MONONITRATE 30 MG/1
30 TABLET, EXTENDED RELEASE ORAL DAILY
Qty: 30 TABLET | Refills: 3 | Status: SHIPPED | OUTPATIENT
Start: 2019-03-26 | End: 2019-06-25 | Stop reason: SDUPTHER

## 2019-03-25 RX ORDER — ASPIRIN 81 MG/1
81 TABLET, CHEWABLE ORAL DAILY
Qty: 30 TABLET | Refills: 3 | Status: SHIPPED | OUTPATIENT
Start: 2019-03-26

## 2019-03-25 RX ORDER — SODIUM CHLORIDE 0.9 % (FLUSH) 0.9 %
10 SYRINGE (ML) INJECTION PRN
Status: DISCONTINUED | OUTPATIENT
Start: 2019-03-25 | End: 2019-03-25 | Stop reason: HOSPADM

## 2019-03-25 RX ORDER — FENTANYL CITRATE 50 UG/ML
INJECTION, SOLUTION INTRAMUSCULAR; INTRAVENOUS
Status: COMPLETED | OUTPATIENT
Start: 2019-03-25 | End: 2019-03-25

## 2019-03-25 RX ORDER — SODIUM CHLORIDE 9 MG/ML
INJECTION, SOLUTION INTRAVENOUS CONTINUOUS
Status: DISCONTINUED | OUTPATIENT
Start: 2019-03-25 | End: 2019-03-25 | Stop reason: HOSPADM

## 2019-03-25 RX ORDER — SODIUM CHLORIDE 0.9 % (FLUSH) 0.9 %
10 SYRINGE (ML) INJECTION EVERY 12 HOURS SCHEDULED
Status: CANCELLED | OUTPATIENT
Start: 2019-03-25

## 2019-03-25 RX ORDER — MIDAZOLAM HYDROCHLORIDE 5 MG/ML
INJECTION INTRAMUSCULAR; INTRAVENOUS
Status: COMPLETED | OUTPATIENT
Start: 2019-03-25 | End: 2019-03-25

## 2019-03-25 RX ADMIN — METOPROLOL TARTRATE 25 MG: 25 TABLET ORAL at 12:51

## 2019-03-25 RX ADMIN — ISOSORBIDE MONONITRATE 30 MG: 30 TABLET, EXTENDED RELEASE ORAL at 12:51

## 2019-03-25 RX ADMIN — GLYCOPYRROLATE AND FORMOTEROL FUMARATE 2 PUFF: 9; 4.8 AEROSOL, METERED RESPIRATORY (INHALATION) at 07:35

## 2019-03-25 RX ADMIN — INSULIN LISPRO 4 UNITS: 100 INJECTION, SOLUTION INTRAVENOUS; SUBCUTANEOUS at 16:45

## 2019-03-25 RX ADMIN — SODIUM CHLORIDE, PRESERVATIVE FREE 10 ML: 5 INJECTION INTRAVENOUS at 10:26

## 2019-03-25 RX ADMIN — MIDAZOLAM HYDROCHLORIDE 1 MG: 5 INJECTION INTRAMUSCULAR; INTRAVENOUS at 11:53

## 2019-03-25 RX ADMIN — INSULIN HUMAN 10 UNITS: 100 INJECTION, SUSPENSION SUBCUTANEOUS at 16:46

## 2019-03-25 RX ADMIN — PANTOPRAZOLE SODIUM 40 MG: 40 TABLET, DELAYED RELEASE ORAL at 12:51

## 2019-03-25 RX ADMIN — LISINOPRIL 10 MG: 10 TABLET ORAL at 12:51

## 2019-03-25 RX ADMIN — ATORVASTATIN CALCIUM 20 MG: 10 TABLET, FILM COATED ORAL at 12:51

## 2019-03-25 RX ADMIN — INSULIN LISPRO 5 UNITS: 100 INJECTION, SOLUTION INTRAVENOUS; SUBCUTANEOUS at 16:45

## 2019-03-25 RX ADMIN — FAMOTIDINE 20 MG: 20 TABLET ORAL at 12:50

## 2019-03-25 RX ADMIN — INSULIN LISPRO 4 UNITS: 100 INJECTION, SOLUTION INTRAVENOUS; SUBCUTANEOUS at 12:47

## 2019-03-25 RX ADMIN — INSULIN LISPRO 5 UNITS: 100 INJECTION, SOLUTION INTRAVENOUS; SUBCUTANEOUS at 12:48

## 2019-03-25 RX ADMIN — ASPIRIN 81 MG 81 MG: 81 TABLET ORAL at 11:04

## 2019-03-25 RX ADMIN — CIPROFLOXACIN HYDROCHLORIDE 500 MG: 500 TABLET, FILM COATED ORAL at 12:51

## 2019-03-25 RX ADMIN — PHENAZOPYRIDINE HYDROCHLORIDE 100 MG: 100 TABLET ORAL at 12:50

## 2019-03-25 RX ADMIN — PHENAZOPYRIDINE HYDROCHLORIDE 100 MG: 100 TABLET ORAL at 16:46

## 2019-03-25 RX ADMIN — FENTANYL CITRATE 50 MCG: 50 INJECTION, SOLUTION INTRAMUSCULAR; INTRAVENOUS at 11:52

## 2019-03-26 LAB — POC ACT LR: 206 SEC

## 2019-03-29 ENCOUNTER — TELEPHONE (OUTPATIENT)
Dept: FAMILY MEDICINE CLINIC | Age: 69
End: 2019-03-29

## 2019-04-04 ENCOUNTER — OFFICE VISIT (OUTPATIENT)
Dept: CARDIOLOGY CLINIC | Age: 69
End: 2019-04-04
Payer: MEDICARE

## 2019-04-04 VITALS
BODY MASS INDEX: 37.83 KG/M2 | OXYGEN SATURATION: 96 % | DIASTOLIC BLOOD PRESSURE: 68 MMHG | HEIGHT: 71 IN | HEART RATE: 55 BPM | WEIGHT: 270.2 LBS | SYSTOLIC BLOOD PRESSURE: 110 MMHG

## 2019-04-04 DIAGNOSIS — I25.10 CORONARY ARTERY DISEASE INVOLVING NATIVE CORONARY ARTERY OF NATIVE HEART WITHOUT ANGINA PECTORIS: Primary | ICD-10-CM

## 2019-04-04 DIAGNOSIS — I10 ESSENTIAL HYPERTENSION: ICD-10-CM

## 2019-04-04 DIAGNOSIS — E78.5 DYSLIPIDEMIA: ICD-10-CM

## 2019-04-04 PROCEDURE — G8598 ASA/ANTIPLAT THER USED: HCPCS | Performed by: NURSE PRACTITIONER

## 2019-04-04 PROCEDURE — 1123F ACP DISCUSS/DSCN MKR DOCD: CPT | Performed by: NURSE PRACTITIONER

## 2019-04-04 PROCEDURE — 1111F DSCHRG MED/CURRENT MED MERGE: CPT | Performed by: NURSE PRACTITIONER

## 2019-04-04 PROCEDURE — 3017F COLORECTAL CA SCREEN DOC REV: CPT | Performed by: NURSE PRACTITIONER

## 2019-04-04 PROCEDURE — G8427 DOCREV CUR MEDS BY ELIG CLIN: HCPCS | Performed by: NURSE PRACTITIONER

## 2019-04-04 PROCEDURE — 1036F TOBACCO NON-USER: CPT | Performed by: NURSE PRACTITIONER

## 2019-04-04 PROCEDURE — G8417 CALC BMI ABV UP PARAM F/U: HCPCS | Performed by: NURSE PRACTITIONER

## 2019-04-04 PROCEDURE — 4040F PNEUMOC VAC/ADMIN/RCVD: CPT | Performed by: NURSE PRACTITIONER

## 2019-04-04 PROCEDURE — 99214 OFFICE O/P EST MOD 30 MIN: CPT | Performed by: NURSE PRACTITIONER

## 2019-04-04 ASSESSMENT — ENCOUNTER SYMPTOMS
RESPIRATORY NEGATIVE: 1
GASTROINTESTINAL NEGATIVE: 1

## 2019-04-04 NOTE — PROGRESS NOTES
max of 3 total doses. If no relief after 1 dose, call 911. 3/25/19  Yes Wilfred Monreal MD   metoprolol tartrate (LOPRESSOR) 25 MG tablet Take 1 tablet by mouth 2 times daily 3/25/19  Yes Wilfred Monreal MD   ciprofloxacin (CIPRO) 500 MG tablet Take 500 mg by mouth 2 times daily   Yes Historical Provider, MD   rOPINIRole (REQUIP) 1 MG tablet Take 2 tablets by mouth nightly 10/17/18  Yes SANDY Mittal CNP   fluticasone (FLONASE) 50 MCG/ACT nasal spray 2 sprays by Nasal route daily 1/26/18  Yes SANDY Mittal CNP   pantoprazole (PROTONIX) 40 MG tablet Take 40 mg by mouth 2 times daily  12/29/16  Yes Historical Provider, MD   insulin regular (HUMULIN R;NOVOLIN R) 100 UNIT/ML injection Inject 10 Units into the skin 3 times daily (before meals)    Yes Historical Provider, MD   insulin NPH (HUMULIN N;NOVOLIN N) 100 UNIT/ML injection vial Inject 10 Units into the skin 2 times daily    Yes Historical Provider, MD   lisinopril (PRINIVIL;ZESTRIL) 10 MG tablet Take 10 mg by mouth daily   Yes Historical Provider, MD   atorvastatin (LIPITOR) 20 MG tablet Take 20 mg by mouth daily. Yes Historical Provider, MD   glycopyrrolate-formoterol (BEVESPI) 9-4.8 MCG/ACT AERO Inhale 2 puffs into the lungs 2 times daily 3/25/19   Wilfred Monreal MD   phenazopyridine (PYRIDIUM) 100 MG tablet Take 100 mg by mouth 3 times daily as needed for Pain    Historical Provider, MD   glucose blood VI test strips (ASCENSIA AUTODISC VI;ONE TOUCH ULTRA TEST VI) strip Check glucose 3 times daily 11/22/16   Historical Provider, MD   Insulin Syringe-Needle U-100 30G X 1/2\" 0.5 ML MISC Inject insulin 3 times daily 9/12/17   Historical Provider, MD     Allergies:  Codeine    Social History:   reports that he has never smoked. He has never used smokeless tobacco. He reports that he does not drink alcohol or use drugs.      Family History: family history includes Cancer in his brother and paternal grandfather; Cancer (age of onset: 76) in his father; Kidney Disease in his mother. Review of Systems   Review of Systems   Constitutional: Negative. Respiratory: Negative. Cardiovascular: Negative. Gastrointestinal: Negative. Neurological: Negative. OBJECTIVE:    Vital signs:    /68   Pulse 55   Ht 5' 11\" (1.803 m)   Wt 270 lb 3.2 oz (122.6 kg)   SpO2 96%   BMI 37.69 kg/m²      Physical Exam:  Constitutional:  Comfortable and alert, NAD, appears stated age  Eyes: PERRL, sclera nonicteric  Neck:  Supple, no masses, no thyroidmegaly, no JVD  Skin:  Warm and dry; no rash or lesions  Heart: Regular, normal apex, S1 and S2 normal, no M/G/R  Lungs:  Normal respiratory effort; clear; no wheezing/rhonchi/rales  Abdomen: soft, non tender, + bowel sounds  Extremities:  No edema or cyanosis; no clubbing  Neuro: alert and oriented, moves legs and arms equally, normal mood and affect  Right radial site soft, no hematoma, 2+ pulse    Data Reviewed:      Stress test 3/22/2019: There is normal isotope uptake at stress and rest. There is no evidence of    myocardial ischemia or scar. LV function is normal with uniform wall motion    and ejection fraction of 55%. Low risk study. Echo 3/22/2019:  Normal left ventricle size, wall thickness and systolic function with an   estimated ejection fraction of 55%.   No regional wall motion abnormalities are seen.   Normal diastolic filling pattern for age.  Abril Junior tricuspid regurgitation to estimate systolic pulmonary artery   pressure. Coronary angiogram 3/25/2019:  Unstable angina  PROCEDURES PERFORMED    Left heart catheterization  LVgram  Coronary angiogam  Coronary cath  PROCEDURE DESCRIPTION   Risks/benefits/alternatives/outcomes were discussed with patient and/or family and informed consent was obtained. Using the [facility] test, the patient's right radial artery was found to be acceptable for cannulation. Patient was prepped draped in the usual sterile fashion.   Local anaesthetic was applied over puncture site. Using a back wall technique, a 6 Romansh Terumo sheath was inserted into right radial artery. Verapamil, nitroglycerin were administered through the sheath. Heparin was administered. Diagnostic 5fr pigtail, TIG catheters were used for diagnostic angiograms. At the conclusion of the procedure, a TR band was placed over the puncture site and hemostasis was obtained. There were no immediate complications. I supervised sedation with versed 1mg/fentanyl 50mcg during the procedure. 70cc contrast was utilized. <20cc EBL. LVEDP 4   GRADIENT ACROSS AORTIC VALVE No gradient   LV FUNCTION EF 65%   WALL MOTION Normal   MITRAL REGURGITATION Mild      LM <10% stenosis.       LAD Prox <10% stenosis. Mid 30-40% stenosis. Distal 30-40% stenosis. LAD wraps around apex. D1 has ostial 80% stenosis.       LCX 10% prox-mid stenosis. OM1 bifurcates in prox segment and there is prox-mid OM1 40-50% stenosis.       RI Mid 60% stenosis.       RCA Dominant. Slight anterior takeoff, Prox-mid 20-30% stenoses. Distal 20% stenosis. Tortuous vessel. Moderate cad/ashd in major epicardial vessels  Would treat medically  Add imdur 30mg daily, and metoprolol tartrate 25mg bid  Ok for dc later today    Cardiology Labs Reviewed:   CBC: No results for input(s): WBC, HGB, HCT, PLT in the last 72 hours. BMP:No results for input(s): NA, K, CO2, BUN, CREATININE, LABGLOM, GLUCOSE in the last 72 hours. PT/INR: No results for input(s): PROTIME, INR in the last 72 hours. APTT:No results for input(s): APTT in the last 72 hours. FASTING LIPID PANEL:  Lab Results   Component Value Date    HDL 56 03/22/2019    LDLCALC 76 03/22/2019    TRIG 80 03/22/2019     LIVER PROFILE:No results for input(s): AST, ALT, ALB in the last 72 hours.   BNP:   Lab Results   Component Value Date    PROBNP 81 11/28/2017     Reviewed all labs and imaging today    Assessment:   Unstable angina: resolved  Shortness of breath: improved  CAD: stable, moderate on angiogram 3/25/19  HTN: well controlled  HLD: stable, on statin  DM: hgb a1c 8.1  ESTHER    Plan:   1. Continue aspirin, lipitor, lopressor, imdur  2. Diet, exercise and DM management discussed extensively  3.  Follow up with Dr. Moose Rodriguez in 6 months or sooner if symptoms develop    Mattie Roman, SANDY-CNP  [address]  [phone]

## 2019-04-04 NOTE — PATIENT INSTRUCTIONS
Everything looks great today, good job!   Continue aspirin, lipitor, metoprolol, imdur for heart health  Continue diet and exercise  Follow up with Dr. Princess Pope in 6 months

## 2019-04-04 NOTE — LETTER
nitroGLYCERIN (NITROSTAT) 0.4 MG SL tablet up to max of 3 total doses. If no relief after 1 dose, call 911. 3/25/19  Yes Aleksandra Lanier MD   metoprolol tartrate (LOPRESSOR) 25 MG tablet Take 1 tablet by mouth 2 times daily 3/25/19  Yes Aleksandra Lanier MD   ciprofloxacin (CIPRO) 500 MG tablet Take 500 mg by mouth 2 times daily   Yes Historical Provider, MD   rOPINIRole (REQUIP) 1 MG tablet Take 2 tablets by mouth nightly 10/17/18  Yes SANDY Stroud CNP   fluticasone (FLONASE) 50 MCG/ACT nasal spray 2 sprays by Nasal route daily 1/26/18  Yes SANDY Stroud CNP   pantoprazole (PROTONIX) 40 MG tablet Take 40 mg by mouth 2 times daily  12/29/16  Yes Historical Provider, MD   insulin regular (HUMULIN R;NOVOLIN R) 100 UNIT/ML injection Inject 10 Units into the skin 3 times daily (before meals)    Yes Historical Provider, MD   insulin NPH (HUMULIN N;NOVOLIN N) 100 UNIT/ML injection vial Inject 10 Units into the skin 2 times daily    Yes Historical Provider, MD   lisinopril (PRINIVIL;ZESTRIL) 10 MG tablet Take 10 mg by mouth daily   Yes Historical Provider, MD   atorvastatin (LIPITOR) 20 MG tablet Take 20 mg by mouth daily. Yes Historical Provider, MD   glycopyrrolate-formoterol (BEVESPI) 9-4.8 MCG/ACT AERO Inhale 2 puffs into the lungs 2 times daily 3/25/19   Aleksandra Lanier MD   phenazopyridine (PYRIDIUM) 100 MG tablet Take 100 mg by mouth 3 times daily as needed for Pain    Historical Provider, MD   glucose blood VI test strips (ASCENSIA AUTODISC VI;ONE TOUCH ULTRA TEST VI) strip Check glucose 3 times daily 11/22/16   Historical Provider, MD   Insulin Syringe-Needle U-100 30G X 1/2\" 0.5 ML MISC Inject insulin 3 times daily 9/12/17   Historical Provider, MD     Allergies:  Codeine    Social History:   reports that he has never smoked. He has never used smokeless tobacco. He reports that he does not drink alcohol or use drugs. Family History: family history includes Cancer in his brother and paternal grandfather; Cancer (age of onset: 76) in his father; Kidney Disease in his mother. Review of Systems   Review of Systems   Constitutional: Negative. Respiratory: Negative. Cardiovascular: Negative. Gastrointestinal: Negative. Neurological: Negative. OBJECTIVE:    Vital signs:    /68   Pulse 55   Ht 5' 11\" (1.803 m)   Wt 270 lb 3.2 oz (122.6 kg)   SpO2 96%   BMI 37.69 kg/m²       Physical Exam:  Constitutional:  Comfortable and alert, NAD, appears stated age  Eyes: PERRL, sclera nonicteric  Neck:  Supple, no masses, no thyroidmegaly, no JVD  Skin:  Warm and dry; no rash or lesions  Heart: Regular, normal apex, S1 and S2 normal, no M/G/R  Lungs:  Normal respiratory effort; clear; no wheezing/rhonchi/rales  Abdomen: soft, non tender, + bowel sounds  Extremities:  No edema or cyanosis; no clubbing  Neuro: alert and oriented, moves legs and arms equally, normal mood and affect  Right radial site soft, no hematoma, 2+ pulse    Data Reviewed:      Stress test 3/22/2019: There is normal isotope uptake at stress and rest. There is no evidence of    myocardial ischemia or scar. LV function is normal with uniform wall motion    and ejection fraction of 55%. Low risk study. Echo 3/22/2019:  Normal left ventricle size, wall thickness and systolic function with an   estimated ejection fraction of 55%.   No regional wall motion abnormalities are seen.   Normal diastolic filling pattern for age.  Pearley Melena tricuspid regurgitation to estimate systolic pulmonary artery   pressure. Coronary angiogram 3/25/2019:  Unstable angina  PROCEDURES PERFORMED    Left heart catheterization  LVgram  Coronary angiogam  Coronary cath  PROCEDURE DESCRIPTION   Risks/benefits/alternatives/outcomes were discussed with patient and/or family and informed consent was obtained.   Using the [facility] test, the patient's right radial artery was found to be acceptable for cannulation. Patient was prepped draped in the usual sterile fashion. Local anaesthetic was applied over puncture site. Using a back wall technique, a 6 Serbian Terumo sheath was inserted into right radial artery. Verapamil, nitroglycerin were administered through the sheath. Heparin was administered. Diagnostic 5fr pigtail, TIG catheters were used for diagnostic angiograms. At the conclusion of the procedure, a TR band was placed over the puncture site and hemostasis was obtained. There were no immediate complications. I supervised sedation with versed 1mg/fentanyl 50mcg during the procedure. 70cc contrast was utilized. <20cc EBL. LVEDP 4   GRADIENT ACROSS AORTIC VALVE No gradient   LV FUNCTION EF 65%   WALL MOTION Normal   MITRAL REGURGITATION Mild      LM <10% stenosis.       LAD Prox <10% stenosis. Mid 30-40% stenosis. Distal 30-40% stenosis. LAD wraps around apex. D1 has ostial 80% stenosis.       LCX 10% prox-mid stenosis. OM1 bifurcates in prox segment and there is prox-mid OM1 40-50% stenosis.       RI Mid 60% stenosis.       RCA Dominant. Slight anterior takeoff, Prox-mid 20-30% stenoses. Distal 20% stenosis. Tortuous vessel. Moderate cad/ashd in major epicardial vessels  Would treat medically  Add imdur 30mg daily, and metoprolol tartrate 25mg bid  Ok for dc later today    Cardiology Labs Reviewed:   CBC: No results for input(s): WBC, HGB, HCT, PLT in the last 72 hours. BMP:No results for input(s): NA, K, CO2, BUN, CREATININE, LABGLOM, GLUCOSE in the last 72 hours. PT/INR: No results for input(s): PROTIME, INR in the last 72 hours. APTT:No results for input(s): APTT in the last 72 hours. FASTING LIPID PANEL:  Lab Results   Component Value Date    HDL 56 03/22/2019    LDLCALC 76 03/22/2019    TRIG 80 03/22/2019     LIVER PROFILE:No results for input(s): AST, ALT, ALB in the last 72 hours.   BNP:   Lab Results

## 2019-05-30 ENCOUNTER — OFFICE VISIT (OUTPATIENT)
Dept: FAMILY MEDICINE CLINIC | Age: 69
End: 2019-05-30
Payer: MEDICARE

## 2019-05-30 VITALS
BODY MASS INDEX: 37.24 KG/M2 | OXYGEN SATURATION: 93 % | WEIGHT: 266 LBS | HEART RATE: 55 BPM | DIASTOLIC BLOOD PRESSURE: 80 MMHG | HEIGHT: 71 IN | SYSTOLIC BLOOD PRESSURE: 148 MMHG

## 2019-05-30 DIAGNOSIS — E66.3 OVERWEIGHT: ICD-10-CM

## 2019-05-30 DIAGNOSIS — I25.118 CORONARY ARTERY DISEASE OF NATIVE HEART WITH STABLE ANGINA PECTORIS, UNSPECIFIED VESSEL OR LESION TYPE (HCC): ICD-10-CM

## 2019-05-30 DIAGNOSIS — E11.9 TYPE 2 DIABETES MELLITUS WITHOUT COMPLICATION, WITH LONG-TERM CURRENT USE OF INSULIN (HCC): ICD-10-CM

## 2019-05-30 DIAGNOSIS — I20.8 STABLE ANGINA PECTORIS (HCC): Primary | ICD-10-CM

## 2019-05-30 DIAGNOSIS — Z79.4 TYPE 2 DIABETES MELLITUS WITHOUT COMPLICATION, WITH LONG-TERM CURRENT USE OF INSULIN (HCC): ICD-10-CM

## 2019-05-30 DIAGNOSIS — Z86.59: ICD-10-CM

## 2019-05-30 PROCEDURE — 2022F DILAT RTA XM EVC RTNOPTHY: CPT | Performed by: FAMILY MEDICINE

## 2019-05-30 PROCEDURE — 4040F PNEUMOC VAC/ADMIN/RCVD: CPT | Performed by: FAMILY MEDICINE

## 2019-05-30 PROCEDURE — 1036F TOBACCO NON-USER: CPT | Performed by: FAMILY MEDICINE

## 2019-05-30 PROCEDURE — 3045F PR MOST RECENT HEMOGLOBIN A1C LEVEL 7.0-9.0%: CPT | Performed by: FAMILY MEDICINE

## 2019-05-30 PROCEDURE — 1123F ACP DISCUSS/DSCN MKR DOCD: CPT | Performed by: FAMILY MEDICINE

## 2019-05-30 PROCEDURE — 3017F COLORECTAL CA SCREEN DOC REV: CPT | Performed by: FAMILY MEDICINE

## 2019-05-30 PROCEDURE — 1111F DSCHRG MED/CURRENT MED MERGE: CPT | Performed by: FAMILY MEDICINE

## 2019-05-30 PROCEDURE — 99214 OFFICE O/P EST MOD 30 MIN: CPT | Performed by: FAMILY MEDICINE

## 2019-05-30 PROCEDURE — G8417 CALC BMI ABV UP PARAM F/U: HCPCS | Performed by: FAMILY MEDICINE

## 2019-05-30 PROCEDURE — G8427 DOCREV CUR MEDS BY ELIG CLIN: HCPCS | Performed by: FAMILY MEDICINE

## 2019-05-30 PROCEDURE — G8598 ASA/ANTIPLAT THER USED: HCPCS | Performed by: FAMILY MEDICINE

## 2019-05-30 RX ORDER — ROPINIROLE 1 MG/1
2 TABLET, FILM COATED ORAL NIGHTLY
Qty: 30 TABLET | Refills: 2 | Status: SHIPPED | OUTPATIENT
Start: 2019-05-30 | End: 2019-08-12 | Stop reason: SDUPTHER

## 2019-05-30 ASSESSMENT — ENCOUNTER SYMPTOMS
SHORTNESS OF BREATH: 0
CHEST TIGHTNESS: 0
COUGH: 0
STRIDOR: 0
CHOKING: 0
WHEEZING: 0
ABDOMINAL PAIN: 0
BACK PAIN: 0

## 2019-05-30 NOTE — PROGRESS NOTES
distension and no mass. There is no tenderness. There is no rebound and no guarding. Musculoskeletal: Normal range of motion. He exhibits no edema or tenderness. Lymphadenopathy:     He has no cervical adenopathy. Neurological: He is alert and oriented to person, place, and time. He has normal reflexes. He displays normal reflexes. No cranial nerve deficit. He exhibits normal muscle tone. Coordination normal.   Skin: Skin is warm and dry. No rash noted. No erythema. No pallor. Psychiatric: He has a normal mood and affect. Nursing note and vitals reviewed. Assessment and plan      1. Stable angina pectoris (HCC)-continue current meds and follow up with cardiology    - PA DISCHARGE MEDS RECONCILED W/ CURRENT OUTPATIENT MED LIST    2. Overweight-crease exercise and decrease calories      3. Coronary artery disease of native heart with stable angina pectoris, unspecified vessel or lesion type ([facility])-continue with cardiology    4. Type 2 diabetes mellitus without complication, with long-term current use of insulin ([facility])-tinny with endocrinology-    5.  History of phobia-for patient to psychologyHunter DO

## 2019-06-03 ENCOUNTER — OFFICE VISIT (OUTPATIENT)
Dept: PULMONOLOGY | Age: 69
End: 2019-06-03
Payer: MEDICARE

## 2019-06-03 VITALS
BODY MASS INDEX: 37.24 KG/M2 | HEART RATE: 52 BPM | TEMPERATURE: 98.3 F | RESPIRATION RATE: 16 BRPM | HEIGHT: 71 IN | WEIGHT: 266 LBS | DIASTOLIC BLOOD PRESSURE: 62 MMHG | OXYGEN SATURATION: 96 % | SYSTOLIC BLOOD PRESSURE: 105 MMHG

## 2019-06-03 DIAGNOSIS — Z78.9 DIFFICULTY WITH BIPAP USE: ICD-10-CM

## 2019-06-03 DIAGNOSIS — G25.81 RLS (RESTLESS LEGS SYNDROME): ICD-10-CM

## 2019-06-03 DIAGNOSIS — G47.33 SEVERE OBSTRUCTIVE SLEEP APNEA: Primary | ICD-10-CM

## 2019-06-03 DIAGNOSIS — Z71.89 ENCOUNTER FOR BIPAP USE COUNSELING: ICD-10-CM

## 2019-06-03 PROCEDURE — 99214 OFFICE O/P EST MOD 30 MIN: CPT | Performed by: NURSE PRACTITIONER

## 2019-06-03 PROCEDURE — G8427 DOCREV CUR MEDS BY ELIG CLIN: HCPCS | Performed by: NURSE PRACTITIONER

## 2019-06-03 PROCEDURE — G8598 ASA/ANTIPLAT THER USED: HCPCS | Performed by: NURSE PRACTITIONER

## 2019-06-03 PROCEDURE — 1123F ACP DISCUSS/DSCN MKR DOCD: CPT | Performed by: NURSE PRACTITIONER

## 2019-06-03 PROCEDURE — 1036F TOBACCO NON-USER: CPT | Performed by: NURSE PRACTITIONER

## 2019-06-03 PROCEDURE — 4040F PNEUMOC VAC/ADMIN/RCVD: CPT | Performed by: NURSE PRACTITIONER

## 2019-06-03 PROCEDURE — 3017F COLORECTAL CA SCREEN DOC REV: CPT | Performed by: NURSE PRACTITIONER

## 2019-06-03 PROCEDURE — G8417 CALC BMI ABV UP PARAM F/U: HCPCS | Performed by: NURSE PRACTITIONER

## 2019-06-03 ASSESSMENT — SLEEP AND FATIGUE QUESTIONNAIRES
HOW LIKELY ARE YOU TO NOD OFF OR FALL ASLEEP WHILE WATCHING TV: 1
HOW LIKELY ARE YOU TO NOD OFF OR FALL ASLEEP WHILE SITTING INACTIVE IN A PUBLIC PLACE: 1
HOW LIKELY ARE YOU TO NOD OFF OR FALL ASLEEP WHILE SITTING QUIETLY AFTER LUNCH WITHOUT ALCOHOL: 0
HOW LIKELY ARE YOU TO NOD OFF OR FALL ASLEEP WHILE LYING DOWN TO REST IN THE AFTERNOON WHEN CIRCUMSTANCES PERMIT: 0
HOW LIKELY ARE YOU TO NOD OFF OR FALL ASLEEP WHILE SITTING AND READING: 0
ESS TOTAL SCORE: 2
HOW LIKELY ARE YOU TO NOD OFF OR FALL ASLEEP WHILE SITTING AND TALKING TO SOMEONE: 0
HOW LIKELY ARE YOU TO NOD OFF OR FALL ASLEEP IN A CAR, WHILE STOPPED FOR A FEW MINUTES IN TRAFFIC: 0
HOW LIKELY ARE YOU TO NOD OFF OR FALL ASLEEP WHEN YOU ARE A PASSENGER IN A CAR FOR AN HOUR WITHOUT A BREAK: 0
NECK CIRCUMFERENCE (INCHES): 19.5

## 2019-06-03 NOTE — PROGRESS NOTES
angina pectoris ([facility] 5/30/2019    Diabetes mellitus ([facility]     Hyperlipidemia     Hypertension     Pancreatitis 1996    Sleep apnea     uses CPAP       Past Surgical History:        Procedure Laterality Date    CATARACT REMOVAL Bilateral 2013    CHOLECYSTECTOMY      COLONOSCOPY  10/26/2011    ENDOSCOPY, COLON, DIAGNOSTIC      EGD halo    HYDROCELE EXCISION  11/15/2016    PANCREAS SURGERY      cyst opened up and drining into small bowel    TONSILLECTOMY      UPPER GASTROINTESTINAL ENDOSCOPY  10/04/2016    with biopsy    UPPER GASTROINTESTINAL ENDOSCOPY  03/16/2017    Halo ablation    UPPER GASTROINTESTINAL ENDOSCOPY  06/26/2017    Halo ablation    UPPER GASTROINTESTINAL ENDOSCOPY  09/06/2017    bx distal sophagus    UPPER GASTROINTESTINAL ENDOSCOPY  12/22/2017    with HALO  procedure    UPPER GASTROINTESTINAL ENDOSCOPY N/A 12/4/2018    EGD WITH ABLATION AND ANESTHESIA - HALO---SLEEP APNEA---  performed by Gamal Mcdowell MD at [address] [place] [address]  2/19/2019    EGD BIOPSY performed by Gamal Mcdowell MD at [address] [place] [address] Drive EXTRACTION         Allergies:  is allergic to codeine. Social History:    TOBACCO:   reports that he has never smoked. He has never used smokeless tobacco.  ETOH:   reports that he does not drink alcohol. Family History:       Problem Relation Age of Onset    Kidney Disease Mother     Cancer Father 76        pancreas    Cancer Brother     Cancer Paternal Grandfather        Current Medications:    Current Outpatient Medications:     rOPINIRole (REQUIP) 1 MG tablet, Take 2 tablets by mouth nightly, Disp: 30 tablet, Rfl: 2    aspirin 81 MG chewable tablet, Take 1 tablet by mouth daily, Disp: 30 tablet, Rfl: 3    isosorbide mononitrate (IMDUR) 30 MG extended release tablet, Take 1 tablet by mouth daily, Disp: 30 tablet, Rfl: 3    nitroGLYCERIN (NITROSTAT) 0.4 MG SL tablet, up to max of 3 total doses.  If no relief after 1 dose, call 911., Disp: 25 tablet, Rfl: 3    metoprolol tartrate (LOPRESSOR) 25 MG tablet, Take 1 tablet by mouth 2 times daily, Disp: 60 tablet, Rfl: 3    fluticasone (FLONASE) 50 MCG/ACT nasal spray, 2 sprays by Nasal route daily, Disp: 1 Bottle, Rfl: 5    glucose blood VI test strips (ASCENSIA AUTODISC VI;ONE TOUCH ULTRA TEST VI) strip, Check glucose 3 times daily, Disp: , Rfl:     Insulin Syringe-Needle U-100 30G X 1/2\" 0.5 ML MISC, Inject insulin 3 times daily, Disp: , Rfl:     pantoprazole (PROTONIX) 40 MG tablet, Take 40 mg by mouth 2 times daily , Disp: , Rfl:     insulin regular (HUMULIN R;NOVOLIN R) 100 UNIT/ML injection, Inject 10 Units into the skin 3 times daily (before meals) , Disp: , Rfl:     insulin NPH (HUMULIN N;NOVOLIN N) 100 UNIT/ML injection vial, Inject 10 Units into the skin 2 times daily , Disp: , Rfl:     lisinopril (PRINIVIL;ZESTRIL) 10 MG tablet, Take 10 mg by mouth daily, Disp: , Rfl:     atorvastatin (LIPITOR) 20 MG tablet, Take 20 mg by mouth daily. , Disp: , Rfl:       Objective:   PHYSICAL EXAM:    /62 (Site: Left Lower Arm, Position: Sitting)   Pulse 52   Temp 98.3 °F (36.8 °C) (Oral)   Resp 16   Ht 5' 11\" (1.803 m)   Wt 266 lb (120.7 kg)   SpO2 96%   BMI 37.10 kg/m²     Physical exam:  Gen: No acute distress. Obese. BMI of 37.10  Eyes: PERRL. No sclera icterus. No conjunctival injection. ENT: No discharge. Pharynx clear. Mallampati class IV. Neck: Trachea midline. No obvious mass. Neck circumference 19.5\". Large beard  Resp: No accessory muscle use. No crackles. No wheezes. No rhonchi. CV: Regular rate. Regular rhythm. No murmur or rub. Skin: Warm and dry. M/S: No cyanosis. No obvious joint deformity. Neuro: Awake. Alert. Moves all four extremities. Psych: Oriented x 3. No anxiety.        DATA:   9/25/17 PSG AHI 95.6, low SPO2 69%, inadequate titration time, ineffective CPAP, treatment emergent central sleep apnea  9/26/17 titration-improved sleep-related breathing with BiPAP, PLMS 76.5, recommendations BiPAP 22/16 cm H2O  4/3/18 split-night sleep study .9, low SPO2 76%, PLMS 98.5, recommendations trial BiPAP 24/18 cm H2O    BiPAP compliance data:  Compliance download 12/27/17-1/25/18 showed average use 2hrs 22mins 29/30 nights with residual AHI of 4.7. Percent of days with usage > 4 hours is 10%. BiPAP 22/16.     Download compliance 4/23/18-5/2218 showed average use 4hrs 52mins 30/30 nights with residual AHI of 6.1. Percent of days with usage > 4 hours is 80%. Mask leak 16 mins 24 sec. BiPAP 24/[id number].      Download compliance 9/17/18-10/16/1 showed average use 2hrs 41mins 27/30 nights with residual AHI of 5.3. Percent of days with usage > 4 hours is 20%. BiPPA 24/[id number]. Compliance download report from 4/291/9 to 5/28/19 reviewed today by me and showed patient is using machine 1:17 hrs/night with 100% compliance and AHI 4.1 within this time frame. 0/30days with greater than 4 hours of machine use. 14/30 days with any BIPAP use. BIPAP 24/18 cm H20        Assessment:      · Severe ESTHER. BIPAP 24/18 cm H2O. Poor compliance with BIPAP due to mask leak  ·  Snoring, witnessed apnea, fatigue-improved when using BiPAP  · PLMS/RLS-improved taking Requip 2 mg nightly (PCP is now refilling)  · Obesity  Plan:     - Continue BiPAP 24/18 cm H2O  -Discussed severity of sleep apnea and importance of BiPAP use  -Recommend shave beard-patient declines  -[place] [facility] [place]-ResMed air touch memory foam mask may be good option  -Can trial REMZZZS CPAP mask liners  -Can trial CPAP pillow  - Advised to use CPAP 6-8 hrs at night and during naps. - Replacement of mask, tubing, head straps every 3-6 months or sooner if damaged.    - Patient instructed to contact DME company for any mask, tubing or machine trouble shooting if problems arise.  - Sleep hygiene  -- D/W patient non pharmacological therapy for

## 2019-06-03 NOTE — PATIENT INSTRUCTIONS
bedtime: These can affect your ability to stay asleep. 4- Have a light snack before bed: Having an empty stomach can interfere with your sleep. Dairy products and turkey contain tryptophan, which acts as a natural sleep inducer. 5- Stay away from caffeine, nicotine and alcohol at least 4-6 hours before bed: Caffeine and nicotine are stimulants that interfere with your ability to fall asleep. While alcohol has an immediate sleep-inducing effect, a few hours later, as alcohol levels in your blood start to fall, there is a stimulant effect and you will experience fragmented sleep. 6- Take a hot bath 90 minutes before bedtime:  A hot bath will raise your body temperature, but it is the drop in body temperature that may leave you feeling sleepy  7- Develop sleep rituals: it is important to give your body cues that it is time to slow down and sleep. Listen to relaxing music, read something soothing for 15 minutes, have a cup of caffeine free tea, or do relaxation exercises such as yoga or deep breathing help relieve anxiety and reduce muscle tension. 8- Fix a bedtime and an awakening time: Even on weekends! When your sleep cycle has a regular rhythm, you will feel better. 9- Sleep only when sleepy: This reduces the time you are awake in bed. 10- Get into your favorite sleeping position: If you can't fall asleep within 15-30 minutes, get up and do something boring until you feel sleepy. Sit quietly in the dark or read the warranty on your refrigerator. Don't expose yourself to bright light while you are up, it gives cues to your brain that it is time to wake up. 11- Only use your bed for sleeping: Dont use the bed as an office, workroom or recreation room. Let your body \"know\" that the bed is associated with sleeping  12- Use comfortable bedding. Uncomfortable bedding can prevent good sleep. Evaluate whether or not this is a source of your problem, and make appropriate changes.   13- Make sure your bed and bedroom are quiet and comfortable: A hot room can be uncomfortable. A cooler room, along with enough blankets to stay warm is recommended. Get a blackout shade or wear a slumber mask and wear earplugs or get a \"white noise\" machine for light and noise distractions. 14- Use sunlight to set your biological clock: When you get up in the morning, go outside and turn your face to the sun for 15 minutes. 13- Dont take your worries to bed: Leave worries about job, school, daily life, etc., behind when you go to bed. Some people find it useful to assign a \"worry period\" during the evening or afternoon for these issues. CPAP Equipment Cleaning and Disinfecting Schedule  Equipment Cleaning Frequency Instructions  Disinfecting Frequency   Non-Disposable Filters  Weekly Mild soapy water, Rinse, Air Dry Not Required   Disposable Filters Change as needed  2-4 weeks Do Not Wash Not Required   Hose/tubing Daily Mild soapy water, Rinse, Air Dry Once a week   Mask / Nasal Pillows Daily Mild soapy water, Rinse, Air Dry Once a week   Headgear Weekly Hand wash, Mild soapy water, Rinse, Dry  Not Required   Humidifier Daily Empty water daily  Mild soapy water, Rinse well, Air Dry  Once a week   CPAP Unit As Needed Dust with damp cloth,  No detergents or sprays Not Required         Disinfect (per schedule) with 1 part white vinegar and 3 parts water- soak mask and water chamber for 30 minutes every 1-2 weeks, more often if sick. Allow water/vinegar mixture to run through tubing. Allow all equipment to air dry. Drying Hints:   Always hang tubing away from direct sunlight, as this will cause the tubing to become yellow, brittle and crack over a period of time. DO NOT attach the wet tubing to your CPAP unit to blow-dry it. The moisture from the tubing can drain back into your machine.  Moisture in your unit can cause sudden pressure increases or short circuits  DO's and DON'Ts:  - Don't use alcohol-based products to clean your mask, because it can cause the materials to become hard and brittle. - Don't put headgear in the washer or dryer  - Don't use any caustic or household cleaning solutions such as bleach on your CPAP   equipment.  - Do follow the recommended cleaning schedule. - Do change your disposable filter frequently. Adapted From: [employer]/cleaning. shtm.   These are general suggestions for all models please follow specific s recommendations and specific instructions

## 2019-06-11 ENCOUNTER — HOSPITAL ENCOUNTER (OUTPATIENT)
Age: 69
Setting detail: OUTPATIENT SURGERY
Discharge: HOME | End: 2019-06-11
Attending: INTERNAL MEDICINE | Admitting: INTERNAL MEDICINE
Payer: MEDICARE

## 2019-06-11 ENCOUNTER — ANESTHESIA EVENT (OUTPATIENT)
Dept: ENDOSCOPY | Age: 69
End: 2019-06-11
Payer: MEDICARE

## 2019-06-11 ENCOUNTER — ANESTHESIA (OUTPATIENT)
Dept: ENDOSCOPY | Age: 69
End: 2019-06-11
Payer: MEDICARE

## 2019-06-11 VITALS — DIASTOLIC BLOOD PRESSURE: 78 MMHG | SYSTOLIC BLOOD PRESSURE: 140 MMHG | OXYGEN SATURATION: 98 %

## 2019-06-11 VITALS
WEIGHT: 260 LBS | DIASTOLIC BLOOD PRESSURE: 71 MMHG | HEIGHT: 70 IN | TEMPERATURE: 97.5 F | RESPIRATION RATE: 16 BRPM | OXYGEN SATURATION: 98 % | HEART RATE: 53 BPM | BODY MASS INDEX: 37.22 KG/M2 | SYSTOLIC BLOOD PRESSURE: 136 MMHG

## 2019-06-11 LAB
GLUCOSE BLD-MCNC: 149 MG/DL (ref 70–99)
PERFORMED ON: ABNORMAL

## 2019-06-11 PROCEDURE — 2580000003 HC RX 258: Performed by: INTERNAL MEDICINE

## 2019-06-11 PROCEDURE — 2709999900 HC NON-CHARGEABLE SUPPLY: Performed by: INTERNAL MEDICINE

## 2019-06-11 PROCEDURE — C1769 GUIDE WIRE: HCPCS | Performed by: INTERNAL MEDICINE

## 2019-06-11 PROCEDURE — 3700000001 HC ADD 15 MINUTES (ANESTHESIA): Performed by: INTERNAL MEDICINE

## 2019-06-11 PROCEDURE — 6360000002 HC RX W HCPCS: Performed by: NURSE ANESTHETIST, CERTIFIED REGISTERED

## 2019-06-11 PROCEDURE — 7100000011 HC PHASE II RECOVERY - ADDTL 15 MIN: Performed by: INTERNAL MEDICINE

## 2019-06-11 PROCEDURE — 2500000003 HC RX 250 WO HCPCS: Performed by: NURSE ANESTHETIST, CERTIFIED REGISTERED

## 2019-06-11 PROCEDURE — 3609013200 HC EGD W/ ABLATION: Performed by: INTERNAL MEDICINE

## 2019-06-11 PROCEDURE — 6370000000 HC RX 637 (ALT 250 FOR IP): Performed by: ANESTHESIOLOGY

## 2019-06-11 PROCEDURE — 3700000000 HC ANESTHESIA ATTENDED CARE: Performed by: INTERNAL MEDICINE

## 2019-06-11 PROCEDURE — 7100000010 HC PHASE II RECOVERY - FIRST 15 MIN: Performed by: INTERNAL MEDICINE

## 2019-06-11 PROCEDURE — C1888 ENDOVAS NON-CARDIAC ABL CATH: HCPCS | Performed by: INTERNAL MEDICINE

## 2019-06-11 RX ORDER — MORPHINE SULFATE 2 MG/ML
2 INJECTION, SOLUTION INTRAMUSCULAR; INTRAVENOUS EVERY 5 MIN PRN
Status: DISCONTINUED | OUTPATIENT
Start: 2019-06-11 | End: 2019-06-11 | Stop reason: HOSPADM

## 2019-06-11 RX ORDER — OXYCODONE HYDROCHLORIDE AND ACETAMINOPHEN 5; 325 MG/1; MG/1
2 TABLET ORAL PRN
Status: DISCONTINUED | OUTPATIENT
Start: 2019-06-11 | End: 2019-06-11 | Stop reason: HOSPADM

## 2019-06-11 RX ORDER — LIDOCAINE HYDROCHLORIDE 10 MG/ML
0.1 INJECTION, SOLUTION EPIDURAL; INFILTRATION; INTRACAUDAL; PERINEURAL ONCE
Status: DISCONTINUED | OUTPATIENT
Start: 2019-06-11 | End: 2019-06-11 | Stop reason: HOSPADM

## 2019-06-11 RX ORDER — PROPOFOL 10 MG/ML
INJECTION, EMULSION INTRAVENOUS PRN
Status: DISCONTINUED | OUTPATIENT
Start: 2019-06-11 | End: 2019-06-11 | Stop reason: SDUPTHER

## 2019-06-11 RX ORDER — LIDOCAINE HYDROCHLORIDE 20 MG/ML
INJECTION, SOLUTION INFILTRATION; PERINEURAL PRN
Status: DISCONTINUED | OUTPATIENT
Start: 2019-06-11 | End: 2019-06-11 | Stop reason: SDUPTHER

## 2019-06-11 RX ORDER — OXYCODONE HYDROCHLORIDE AND ACETAMINOPHEN 5; 325 MG/1; MG/1
1 TABLET ORAL PRN
Status: DISCONTINUED | OUTPATIENT
Start: 2019-06-11 | End: 2019-06-11 | Stop reason: HOSPADM

## 2019-06-11 RX ORDER — METOPROLOL SUCCINATE 25 MG/1
25 TABLET, EXTENDED RELEASE ORAL ONCE
Status: COMPLETED | OUTPATIENT
Start: 2019-06-11 | End: 2019-06-11

## 2019-06-11 RX ORDER — MEPERIDINE HYDROCHLORIDE 50 MG/ML
12.5 INJECTION INTRAMUSCULAR; INTRAVENOUS; SUBCUTANEOUS EVERY 5 MIN PRN
Status: DISCONTINUED | OUTPATIENT
Start: 2019-06-11 | End: 2019-06-11 | Stop reason: HOSPADM

## 2019-06-11 RX ORDER — MORPHINE SULFATE 2 MG/ML
1 INJECTION, SOLUTION INTRAMUSCULAR; INTRAVENOUS EVERY 5 MIN PRN
Status: DISCONTINUED | OUTPATIENT
Start: 2019-06-11 | End: 2019-06-11 | Stop reason: HOSPADM

## 2019-06-11 RX ORDER — SODIUM CHLORIDE, SODIUM LACTATE, POTASSIUM CHLORIDE, CALCIUM CHLORIDE 600; 310; 30; 20 MG/100ML; MG/100ML; MG/100ML; MG/100ML
INJECTION, SOLUTION INTRAVENOUS CONTINUOUS
Status: DISCONTINUED | OUTPATIENT
Start: 2019-06-11 | End: 2019-06-11 | Stop reason: HOSPADM

## 2019-06-11 RX ORDER — METOPROLOL SUCCINATE 25 MG/1
TABLET, EXTENDED RELEASE ORAL
Status: DISCONTINUED
Start: 2019-06-11 | End: 2019-06-11 | Stop reason: HOSPADM

## 2019-06-11 RX ORDER — ONDANSETRON 2 MG/ML
4 INJECTION INTRAMUSCULAR; INTRAVENOUS
Status: DISCONTINUED | OUTPATIENT
Start: 2019-06-11 | End: 2019-06-11 | Stop reason: HOSPADM

## 2019-06-11 RX ORDER — FENTANYL CITRATE 50 UG/ML
25 INJECTION, SOLUTION INTRAMUSCULAR; INTRAVENOUS EVERY 5 MIN PRN
Status: DISCONTINUED | OUTPATIENT
Start: 2019-06-11 | End: 2019-06-11 | Stop reason: HOSPADM

## 2019-06-11 RX ORDER — FENTANYL CITRATE 50 UG/ML
50 INJECTION, SOLUTION INTRAMUSCULAR; INTRAVENOUS EVERY 5 MIN PRN
Status: DISCONTINUED | OUTPATIENT
Start: 2019-06-11 | End: 2019-06-11 | Stop reason: HOSPADM

## 2019-06-11 RX ADMIN — PROPOFOL 30 MG: 10 INJECTION, EMULSION INTRAVENOUS at 14:17

## 2019-06-11 RX ADMIN — PROPOFOL 50 MG: 10 INJECTION, EMULSION INTRAVENOUS at 14:09

## 2019-06-11 RX ADMIN — PROPOFOL 50 MG: 10 INJECTION, EMULSION INTRAVENOUS at 14:11

## 2019-06-11 RX ADMIN — METOPROLOL SUCCINATE 25 MG: 25 TABLET, EXTENDED RELEASE ORAL at 13:08

## 2019-06-11 RX ADMIN — PROPOFOL 30 MG: 10 INJECTION, EMULSION INTRAVENOUS at 14:13

## 2019-06-11 RX ADMIN — PROPOFOL 50 MG: 10 INJECTION, EMULSION INTRAVENOUS at 14:07

## 2019-06-11 RX ADMIN — LIDOCAINE HYDROCHLORIDE 40 MG: 20 INJECTION, SOLUTION INFILTRATION; PERINEURAL at 14:07

## 2019-06-11 RX ADMIN — PROPOFOL 20 MG: 10 INJECTION, EMULSION INTRAVENOUS at 14:19

## 2019-06-11 RX ADMIN — SODIUM CHLORIDE, POTASSIUM CHLORIDE, SODIUM LACTATE AND CALCIUM CHLORIDE: 600; 310; 30; 20 INJECTION, SOLUTION INTRAVENOUS at 13:10

## 2019-06-11 RX ADMIN — PROPOFOL 30 MG: 10 INJECTION, EMULSION INTRAVENOUS at 14:15

## 2019-06-11 ASSESSMENT — LIFESTYLE VARIABLES: SMOKING_STATUS: 0

## 2019-06-11 ASSESSMENT — PAIN SCALES - GENERAL
PAINLEVEL_OUTOF10: 0

## 2019-06-11 ASSESSMENT — PAIN - FUNCTIONAL ASSESSMENT: PAIN_FUNCTIONAL_ASSESSMENT: 0-10

## 2019-06-11 ASSESSMENT — ENCOUNTER SYMPTOMS: SHORTNESS OF BREATH: 1

## 2019-06-11 NOTE — H&P
History and Physical / Pre-Sedation Assessment    Patient:  Narciso Dill   :   1950     Intended Procedure: EGD RFA     HPI: Yan's high grade dysplasia    Nurses notes reviewed and agreed. Current Facility-Administered Medications   Medication Dose Route Frequency Provider Last Rate Last Dose    lactated ringers infusion   Intravenous Continuous Bill Thomas MD 30 mL/hr at 19 1310      lidocaine PF 1 % injection 0.1 mL  0.1 mL Intradermal Once Bill Thomas MD        fentaNYL (SUBLIMAZE) injection 25 mcg  25 mcg Intravenous Q5 Min PRN Lance Dickens MD        fentaNYL (SUBLIMAZE) injection 50 mcg  50 mcg Intravenous Q5 Min PRN Lance Dickens MD        morphine (PF) injection 1 mg  1 mg Intravenous Q5 Min PRN Lance Dickens MD        morphine (PF) injection 2 mg  2 mg Intravenous Q5 Min PRN Lance Dickens MD        oxyCODONE-acetaminophen (PERCOCET) 5-325 MG per tablet 1 tablet  1 tablet Oral PRN Lance Dickens MD        Or    oxyCODONE-acetaminophen (PERCOCET) 5-325 MG per tablet 2 tablet  2 tablet Oral PRN Lance Dickens MD        ondansetron [facility]) injection 4 mg  4 mg Intravenous Once PRN Lance Dickens MD        meperidine (DEMEROL) injection 12.5 mg  12.5 mg Intravenous Q5 Min PRN Lance Dickens MD        metoprolol succinate (TOPROL XL) 25 MG extended release tablet              No current facility-administered medications on file prior to encounter.       Current Outpatient Medications on File Prior to Encounter   Medication Sig Dispense Refill    rOPINIRole (REQUIP) 1 MG tablet Take 2 tablets by mouth nightly 30 tablet 2    aspirin 81 MG chewable tablet Take 1 tablet by mouth daily 30 tablet 3    isosorbide mononitrate (IMDUR) 30 MG extended release tablet Take 1 tablet by mouth daily 30 tablet 3    metoprolol tartrate (LOPRESSOR) 25 MG tablet Take 1 tablet by mouth 2 times daily 60 performed by Walter Wen MD at [address]  2/19/2019    EGD BIOPSY performed by Walter Wen MD at [address] EXTRACTION         Allergies: Allergies   Allergen Reactions    Codeine Nausea And Vomiting       Physical Exam:  Vital Signs: BP (!) 138/58   Pulse 66   Temp 97.5 °F (36.4 °C) (Temporal)   Resp 16   Ht 5' 10\" (1.778 m)   Wt 260 lb (117.9 kg)   SpO2 94%   BMI 37.31 kg/m²  Body mass index is 37.31 kg/m². Airway:Normal  Pulmonary:Normal  Cardiac:Normal  Abdomen:Normal    Pre-Procedure Assessment / Plan:  ASA 2 - Patient with mild systemic disease with no functional limitations  Level of Sedation Plan: Moderate  sedation   Mallamapti 2  Post Procedure plan: Return to same level of care    I assessed the patient and find that the patient is in satisfactory condition to proceed with the planned procedure and sedation plan. I have explained the risk, benefits, and alternatives to the procedure. The patient understands and agrees to proceed.   Yes    Walter Wen  1:58 PM 6/11/2019

## 2019-06-11 NOTE — ANESTHESIA PRE PROCEDURE
Department of Anesthesiology  Preprocedure Note       Name:  Gay Grigsby   Age:  76 y.o.  :  1950                                          MRN:  [id number]         Date:  2019      Surgeon: Waylon Amanda):  Ree West MD    Procedure: EGD WITH HALO AND ANESTHESIA (N/A )    Medications prior to admission:   Prior to Admission medications    Medication Sig Start Date End Date Taking? Authorizing Provider   rOPINIRole (REQUIP) 1 MG tablet Take 2 tablets by mouth nightly 19  Yes Aime Milo Fernandez, DO   aspirin 81 MG chewable tablet Take 1 tablet by mouth daily 3/26/19  Yes Keyana Jesus MD   isosorbide mononitrate (IMDUR) 30 MG extended release tablet Take 1 tablet by mouth daily 3/26/19  Yes Keyana Jesus MD   metoprolol tartrate (LOPRESSOR) 25 MG tablet Take 1 tablet by mouth 2 times daily 3/25/19  Yes Keyana Jesus MD   pantoprazole (PROTONIX) 40 MG tablet Take 40 mg by mouth 2 times daily  16  Yes Historical Provider, MD   insulin regular (HUMULIN R;NOVOLIN R) 100 UNIT/ML injection Inject 10 Units into the skin 3 times daily (before meals)    Yes Historical Provider, MD   insulin NPH (HUMULIN N;NOVOLIN N) 100 UNIT/ML injection vial Inject 10 Units into the skin 2 times daily    Yes Historical Provider, MD   lisinopril (PRINIVIL;ZESTRIL) 10 MG tablet Take 10 mg by mouth daily   Yes Historical Provider, MD   atorvastatin (LIPITOR) 20 MG tablet Take 20 mg by mouth daily. Yes Historical Provider, MD   nitroGLYCERIN (NITROSTAT) 0.4 MG SL tablet up to max of 3 total doses.  If no relief after 1 dose, call 911. 3/25/19   Keyana Jesus MD   fluticasone (FLONASE) 50 MCG/ACT nasal spray 2 sprays by Nasal route daily 18   SANDY Hernandez - CNP   glucose blood VI test strips (ASCENSIA AUTODISC VI;ONE TOUCH ULTRA TEST VI) strip Check glucose 3 times daily 16   Historical Provider, MD   Insulin Syringe-Needle U-100 30G X 1/2\" 0.5 ML MISC Inject insulin 3 times daily 17 Historical Provider, MD       Current medications:    Current Facility-Administered Medications   Medication Dose Route Frequency Provider Last Rate Last Dose    lactated ringers infusion   Intravenous Continuous Walter Wen MD        lidocaine PF 1 % injection 0.1 mL  0.1 mL Intradermal Once Walter Wen MD           Allergies:     Allergies   Allergen Reactions    Codeine Nausea And Vomiting       Problem List:    Patient Active Problem List   Diagnosis Code    Cataract H26.9    Mass of skin of hand R22.30    Diabetes mellitus ([facility] [address] E11.9    Pulmonary nodules R91.8    Chest pain R07.9    Epigastric pain R10.13    Need for vaccination Z23    Hydrocele in adult N43.3    Lung nodule R91.1    History of snoring Z87.898    Essential hypertension I10    Hyperlipidemia E78.5    Controlled type 2 diabetes mellitus without complication, with long-term current use of insulin (HCC) E11.9, Z79.4    Excessive daytime sleepiness G47.19    Right arm pain M79.601    Bradycardia R00.1    Heart murmur R01.1    Impingement syndrome of right shoulder M75.41    Lumbar radiculopathy M54.16    Idiopathic peripheral neuropathy G60.9    Severe obstructive sleep apnea G47.33    Obesity E66.9    Unstable angina ([facility]) I20.0    Dyspnea R06.00    Coronary artery disease of native heart with stable angina pectoris ([facility]) I25.118    Overweight E66.3    History of phobia Z86.59       Past Medical History:        Diagnosis Date    Acid reflux     Yan's esophagus     Coronary artery disease of native heart with stable angina pectoris ([facility] [address] 5/30/2019    Diabetes mellitus ([facility] [address]     Hyperlipidemia     Hypertension     Pancreatitis 1996    Sleep apnea     uses CPAP       Past Surgical History:        Procedure Laterality Date    CATARACT REMOVAL Bilateral 2013    CHOLECYSTECTOMY      COLONOSCOPY  10/26/2011    ENDOSCOPY, COLON, DIAGNOSTIC      EGD halo    HYDROCELE EXCISION  11/15/2016    PANCREAS SURGERY      cyst opened up and drining into small bowel    TONSILLECTOMY      UPPER GASTROINTESTINAL ENDOSCOPY  10/04/2016    with biopsy    UPPER GASTROINTESTINAL ENDOSCOPY  03/16/2017    Halo ablation    UPPER GASTROINTESTINAL ENDOSCOPY  06/26/2017    Halo ablation    UPPER GASTROINTESTINAL ENDOSCOPY  09/06/2017    bx distal sophagus    UPPER GASTROINTESTINAL ENDOSCOPY  12/22/2017    with HALO  procedure    UPPER GASTROINTESTINAL ENDOSCOPY N/A 12/4/2018    EGD WITH ABLATION AND ANESTHESIA - HALO---SLEEP APNEA---  performed by Renzo Patel MD at [address]  2/19/2019    EGD BIOPSY performed by Renzo Patel MD at [address] EXTRACTION         Social History:    Social History     Tobacco Use    Smoking status: Never Smoker    Smokeless tobacco: Never Used   Substance Use Topics    Alcohol use: No                                Counseling given: Not Answered      Vital Signs (Current):   Vitals:    06/06/19 0759   Weight: 260 lb (117.9 kg)   Height: 5' 10\" (1.778 m)                                              BP Readings from Last 3 Encounters:   06/03/19 105/62   05/30/19 (!) 148/80   04/04/19 110/68       NPO Status:  mn+, given metop here                                                                               BMI:   Wt Readings from Last 3 Encounters:   06/06/19 260 lb (117.9 kg)   06/03/19 266 lb (120.7 kg)   05/30/19 266 lb (120.7 kg)     Body mass index is 37.31 kg/m².     CBC:   Lab Results   Component Value Date    WBC 5.9 03/22/2019    RBC 4.42 03/22/2019    HGB 12.9 03/22/2019    HCT 38.7 03/22/2019    MCV 87.6 03/22/2019    RDW 13.7 03/22/2019     03/22/2019       CMP:   Lab Results   Component Value Date     03/21/2019    K 4.1 03/21/2019     03/21/2019    CO2 28 03/21/2019    BUN 16 03/21/2019    CREATININE 0.9 03/21/2019    GFRAA >60 03/21/2019    GFRAA >60 12/25/2010    AGRATIO 1.5 03/21/2019    LABGLOM >60 03/21/2019    GLUCOSE 136 03/21/2019    PROT 6.7 03/21/2019    PROT 6.6 12/25/2010    CALCIUM 9.1 03/21/2019    BILITOT 0.7 03/21/2019    ALKPHOS 88 03/21/2019    AST 15 03/21/2019    ALT 17 03/21/2019       POC Tests: No results for input(s): POCGLU, POCNA, POCK, POCCL, POCBUN, POCHEMO, POCHCT in the last 72 hours. Coags:   Lab Results   Component Value Date    PROTIME 12.0 11/28/2017    INR 1.06 11/28/2017       HCG (If Applicable): No results found for: PREGTESTUR, PREGSERUM, HCG, HCGQUANT     ABGs:   Lab Results   Component Value Date    PHART 7.394 03/25/2019    PO2ART 68.4 03/25/2019    DMB5JVV 48.2 03/25/2019    VQE1VOW 28.8 03/25/2019    BEART 3.1 03/25/2019    P3BIMEEI 92.6 03/25/2019        Type & Screen (If Applicable):  No results found for: LABABO, [address]    Anesthesia Evaluation  Patient summary reviewed no history of anesthetic complications:   Airway: Mallampati: II  TM distance: >3 FB   Neck ROM: full  Mouth opening: > = 3 FB Dental:          Pulmonary: breath sounds clear to auscultation  (+) shortness of breath:  sleep apnea: on CPAP,      (-) COPD, asthma and not a current smoker          Patient did not smoke on day of surgery. Cardiovascular:    (+) hypertension:, CAD: non-obstructive, FREEMAN:, hyperlipidemia    (-) pacemaker and past MI    ECG reviewed  Rhythm: regular  Rate: normal    Stress test reviewed       Beta Blocker:  Not on Beta Blocker         Neuro/Psych:   (+) neuromuscular disease (lbp / neuropathy):,    (-) seizures, TIA and CVA           GI/Hepatic/Renal:   (+) GERD (w/ barretts):,      (-) liver disease and no renal disease       Endo/Other:    (+) DiabetesType II DM, using insulin, no malignancy/cancer. (-) hypothyroidism, hyperthyroidism, blood dyscrasia, no malignancy/cancer               Abdominal:   (+) obese,     Abdomen: soft. Vascular: negative vascular ROS.                                        Anesthesia Plan      TIVA ASA 3       Induction: intravenous. Anesthetic plan and risks discussed with patient. Plan discussed with CRNA. This pre-anesthesia assessment may be used as a history and physical.    DOS STAFF ADDENDUM:    Pt seen and examined, chart reviewed (including anesthesia, drug and allergy history). No interval changes to history and physical examination. Anesthetic plan, risks, benefits, alternatives, and personnel involved discussed with patient. Patient verbalized an understanding and agrees to proceed.       Anika Martinez MD  June 11, 2019  12:57 PM        Anika Martinez MD   6/11/2019

## 2019-06-11 NOTE — PROGRESS NOTES
; tolerfating oral fluids  Discharge instructions reviewed with patient/responsible adult and understanding verbalized. Discharge instructions signed and copies given.

## 2019-06-11 NOTE — ANESTHESIA POSTPROCEDURE EVALUATION
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    Vitals:    06/06/19 0759 06/11/19 1252 06/11/19 1427   BP:  (!) 138/58 119/69   Pulse:  66 63   Resp:  16 16   Temp:  97.5 °F (36.4 °C)    TempSrc:  Temporal    SpO2:  94% 97%   Weight: 260 lb (117.9 kg) 260 lb (117.9 kg)    Height: 5' 10\" (1.778 m) 5' 10\" (1.778 m)        Anesthesia Post Evaluation    Patient location during evaluation: bedside  Patient participation: complete - patient participated  Level of consciousness: awake and alert  Airway patency: patent  Nausea & Vomiting: no nausea  Complications: no  Cardiovascular status: hemodynamically stable  Respiratory status: acceptable  Hydration status: euvolemic

## 2019-06-12 NOTE — OP NOTE
[facility]                 Elayne Hernandez                                OPERATIVE REPORT    PATIENT NAME: Cata Nicolas                    :        1950  MED REC NO:   [id number]                          ROOM:  ACCOUNT NO:   [de-identified]                           ADMIT DATE: 2019  PROVIDER:     Oliver Tyler MD    DATE OF PROCEDURE:  2019    PROCEDURE:  EGD with radiofrequency ablation. INDICATIONS:  The patient is a 70-year-old male who has a history of  Yan's high grade dysplasia. Underwent already radiofrequency  ablation. Followup biopsy showed persistent Yan's but no high grade  dysplasia. Followup EGD and RFA are being performed. Consent was  obtained. OPERATIVE PROCEDURE:  The patient was sedated per Anesthesia. The  Olympus video endoscope was passed under direct vision into the  esophagus. Esophagus was normal to again right up to 39 cm. He had a  very short segment of Yan's, not more than a centimeter. There was  no ulceration or stricture. Stomach was visualized both on antegrade  and retrograde views, showed a small-sliding hiatal hernia. Pylorus was  patent. Duodenum was normal to the second portion. Guidewire was then  threaded. The endoscope withdrawn. The ablation balloon was then  threaded over the guidewire and placed at 36 cm. The endoscope was then  re-introduced just proximal to the balloon. Balloon was inflated and 12  joules were delivered. The balloon deflated out spontaneously. It was  advanced to 39 cm. Scope advanced just proximal to this and balloon was  inflated and ablation again delivered. Balloon was then advanced into  the stomach and the eschar was scraped. Balloon was then retrieved back  to 36 cm and balloon inflated and radiofrequency ablation occurred. Again, balloon was advanced to 39 cm and ablation occurred here. All  equipment was removed. He tolerated the procedure well without any  immediate complications. He will follow up in 10 weeks.         Rebekah Garvey MD    D: 06/11/2019 14:34:39       T: 06/11/2019 20:02:46     [email or web address]  Job#: [id number]     Doc#: [id number]    CC:  DO Leslie Sahu MD

## 2019-06-25 RX ORDER — ISOSORBIDE MONONITRATE 30 MG/1
30 TABLET, EXTENDED RELEASE ORAL DAILY
Qty: 30 TABLET | Refills: 3 | Status: SHIPPED
Start: 2019-06-25 | End: 2020-03-02 | Stop reason: ALTCHOICE

## 2019-06-25 NOTE — TELEPHONE ENCOUNTER
LAST APPT - 5/30/2019    Future Appointments   Date Time Provider Magdalena Murguia   8/29/2019  8:30 AM DO MONSTER Jimenez [facility]   9/6/2019  2:00 PM [facility]   10/4/2019 12:00 PM Charles Smallwood MD [facility]

## 2019-07-28 ENCOUNTER — HOSPITAL ENCOUNTER (EMERGENCY)
Age: 69
Discharge: HOME | End: 2019-07-28
Payer: MEDICARE

## 2019-07-28 VITALS
HEART RATE: 70 BPM | WEIGHT: 270 LBS | OXYGEN SATURATION: 92 % | SYSTOLIC BLOOD PRESSURE: 102 MMHG | RESPIRATION RATE: 16 BRPM | DIASTOLIC BLOOD PRESSURE: 58 MMHG | HEIGHT: 70 IN | TEMPERATURE: 98.4 F | BODY MASS INDEX: 38.65 KG/M2

## 2019-07-28 DIAGNOSIS — L03.116 CELLULITIS OF BOTH LOWER EXTREMITIES: Primary | ICD-10-CM

## 2019-07-28 DIAGNOSIS — L03.115 CELLULITIS OF BOTH LOWER EXTREMITIES: Primary | ICD-10-CM

## 2019-07-28 PROCEDURE — 6370000000 HC RX 637 (ALT 250 FOR IP): Performed by: NURSE PRACTITIONER

## 2019-07-28 PROCEDURE — 99282 EMERGENCY DEPT VISIT SF MDM: CPT

## 2019-07-28 RX ORDER — CEPHALEXIN 250 MG/1
500 CAPSULE ORAL ONCE
Status: COMPLETED | OUTPATIENT
Start: 2019-07-28 | End: 2019-07-28

## 2019-07-28 RX ORDER — CEPHALEXIN 500 MG/1
500 CAPSULE ORAL 4 TIMES DAILY
Qty: 28 CAPSULE | Refills: 0 | Status: SHIPPED | OUTPATIENT
Start: 2019-07-28 | End: 2019-08-04

## 2019-07-28 RX ADMIN — CEPHALEXIN 500 MG: 250 CAPSULE ORAL at 17:19

## 2019-07-28 ASSESSMENT — ENCOUNTER SYMPTOMS
SHORTNESS OF BREATH: 0
VOMITING: 0
NAUSEA: 0
ABDOMINAL PAIN: 0
ABDOMINAL DISTENTION: 0
COUGH: 0
WHEEZING: 0
BACK PAIN: 0

## 2019-07-28 NOTE — ED PROVIDER NOTES
change, chills, diaphoresis, fatigue and fever. Respiratory: Negative for cough, shortness of breath and wheezing. Cardiovascular: Negative for chest pain, palpitations and leg swelling. Gastrointestinal: Negative for abdominal distention, abdominal pain, nausea and vomiting. Genitourinary: Negative. Musculoskeletal: Negative for back pain, myalgias, neck pain and neck stiffness. Skin: Positive for rash. Red, warm rash to bilateral lower extremities   Neurological: Negative for dizziness, weakness, numbness and headaches. Positives and Pertinent negatives as per HPI. Except as noted above in the ROS, problem specific ROS was completed and is negative. Physical Exam:  Physical Exam   Constitutional: He is oriented to person, place, and time. He appears well-developed and well-nourished. No distress. HENT:   Head: Normocephalic and atraumatic. Mouth/Throat: Oropharynx is clear and moist. No oropharyngeal exudate. Eyes: Pupils are equal, round, and reactive to light. Conjunctivae and EOM are normal.   Neck: Normal range of motion. Cardiovascular: Normal rate, regular rhythm, normal heart sounds and intact distal pulses. Exam reveals no gallop and no friction rub. No murmur heard. Pulmonary/Chest: Effort normal and breath sounds normal.   Abdominal: There is no tenderness. Musculoskeletal: Normal range of motion. He exhibits no edema, tenderness or deformity. Lymphadenopathy:     He has no cervical adenopathy. Neurological: He is alert and oriented to person, place, and time. No sensory deficit. Skin: Capillary refill takes less than 2 seconds. He is not diaphoretic. There is erythema. Psychiatric: He has a normal mood and affect.  His behavior is normal. Judgment and thought content normal.       MEDICAL DECISION MAKING    Vitals:    Vitals:    07/28/19 1628   BP: (!) 102/58   Pulse: 70   Resp: 16   Temp: 98.4 °F (36.9 °C)   TempSrc: Oral   SpO2: 92%   Weight: 270

## 2019-07-30 ENCOUNTER — OFFICE VISIT (OUTPATIENT)
Dept: FAMILY MEDICINE CLINIC | Age: 69
End: 2019-07-30
Payer: MEDICARE

## 2019-07-30 VITALS
BODY MASS INDEX: 40.37 KG/M2 | SYSTOLIC BLOOD PRESSURE: 138 MMHG | OXYGEN SATURATION: 90 % | HEART RATE: 67 BPM | DIASTOLIC BLOOD PRESSURE: 80 MMHG | WEIGHT: 282 LBS | HEIGHT: 70 IN

## 2019-07-30 DIAGNOSIS — E66.01 MORBID OBESITY WITH BMI OF 40.0-44.9, ADULT (HCC): ICD-10-CM

## 2019-07-30 DIAGNOSIS — E66.3 OVERWEIGHT: ICD-10-CM

## 2019-07-30 DIAGNOSIS — Z79.4 TYPE 2 DIABETES MELLITUS WITHOUT COMPLICATION, WITH LONG-TERM CURRENT USE OF INSULIN (HCC): ICD-10-CM

## 2019-07-30 DIAGNOSIS — L03.115 CELLULITIS OF RIGHT LOWER EXTREMITY: Primary | ICD-10-CM

## 2019-07-30 DIAGNOSIS — E11.9 TYPE 2 DIABETES MELLITUS WITHOUT COMPLICATION, WITH LONG-TERM CURRENT USE OF INSULIN (HCC): ICD-10-CM

## 2019-07-30 PROCEDURE — 3017F COLORECTAL CA SCREEN DOC REV: CPT | Performed by: FAMILY MEDICINE

## 2019-07-30 PROCEDURE — 1123F ACP DISCUSS/DSCN MKR DOCD: CPT | Performed by: FAMILY MEDICINE

## 2019-07-30 PROCEDURE — 99214 OFFICE O/P EST MOD 30 MIN: CPT | Performed by: FAMILY MEDICINE

## 2019-07-30 PROCEDURE — G8427 DOCREV CUR MEDS BY ELIG CLIN: HCPCS | Performed by: FAMILY MEDICINE

## 2019-07-30 PROCEDURE — 1036F TOBACCO NON-USER: CPT | Performed by: FAMILY MEDICINE

## 2019-07-30 PROCEDURE — 3045F PR MOST RECENT HEMOGLOBIN A1C LEVEL 7.0-9.0%: CPT | Performed by: FAMILY MEDICINE

## 2019-07-30 PROCEDURE — 4040F PNEUMOC VAC/ADMIN/RCVD: CPT | Performed by: FAMILY MEDICINE

## 2019-07-30 PROCEDURE — G8598 ASA/ANTIPLAT THER USED: HCPCS | Performed by: FAMILY MEDICINE

## 2019-07-30 PROCEDURE — 2022F DILAT RTA XM EVC RTNOPTHY: CPT | Performed by: FAMILY MEDICINE

## 2019-07-30 PROCEDURE — G8417 CALC BMI ABV UP PARAM F/U: HCPCS | Performed by: FAMILY MEDICINE

## 2019-07-30 ASSESSMENT — ENCOUNTER SYMPTOMS
CHEST TIGHTNESS: 0
BACK PAIN: 0
WHEEZING: 0
SHORTNESS OF BREATH: 0
CHOKING: 0
ABDOMINAL PAIN: 0
COUGH: 0
STRIDOR: 0

## 2019-08-12 RX ORDER — ROPINIROLE 1 MG/1
2 TABLET, FILM COATED ORAL NIGHTLY
Qty: 60 TABLET | Refills: 2 | Status: SHIPPED | OUTPATIENT
Start: 2019-08-12 | End: 2019-09-17

## 2019-08-13 ENCOUNTER — ANESTHESIA EVENT (OUTPATIENT)
Dept: ENDOSCOPY | Age: 69
End: 2019-08-13
Payer: MEDICARE

## 2019-08-13 ENCOUNTER — HOSPITAL ENCOUNTER (OUTPATIENT)
Age: 69
Setting detail: OUTPATIENT SURGERY
Discharge: HOME | End: 2019-08-13
Attending: INTERNAL MEDICINE | Admitting: INTERNAL MEDICINE
Payer: MEDICARE

## 2019-08-13 ENCOUNTER — ANESTHESIA (OUTPATIENT)
Dept: ENDOSCOPY | Age: 69
End: 2019-08-13
Payer: MEDICARE

## 2019-08-13 VITALS
HEIGHT: 70 IN | TEMPERATURE: 97.4 F | DIASTOLIC BLOOD PRESSURE: 79 MMHG | SYSTOLIC BLOOD PRESSURE: 137 MMHG | OXYGEN SATURATION: 95 % | HEART RATE: 68 BPM | BODY MASS INDEX: 38.65 KG/M2 | WEIGHT: 270 LBS | RESPIRATION RATE: 18 BRPM

## 2019-08-13 VITALS — SYSTOLIC BLOOD PRESSURE: 145 MMHG | OXYGEN SATURATION: 90 % | DIASTOLIC BLOOD PRESSURE: 76 MMHG

## 2019-08-13 LAB
GLUCOSE BLD-MCNC: 148 MG/DL (ref 70–99)
PERFORMED ON: ABNORMAL

## 2019-08-13 PROCEDURE — 2580000003 HC RX 258: Performed by: INTERNAL MEDICINE

## 2019-08-13 PROCEDURE — 7100000010 HC PHASE II RECOVERY - FIRST 15 MIN: Performed by: INTERNAL MEDICINE

## 2019-08-13 PROCEDURE — C1888 ENDOVAS NON-CARDIAC ABL CATH: HCPCS | Performed by: INTERNAL MEDICINE

## 2019-08-13 PROCEDURE — 7100000011 HC PHASE II RECOVERY - ADDTL 15 MIN: Performed by: INTERNAL MEDICINE

## 2019-08-13 PROCEDURE — 3700000001 HC ADD 15 MINUTES (ANESTHESIA): Performed by: INTERNAL MEDICINE

## 2019-08-13 PROCEDURE — C1769 GUIDE WIRE: HCPCS | Performed by: INTERNAL MEDICINE

## 2019-08-13 PROCEDURE — 3700000000 HC ANESTHESIA ATTENDED CARE: Performed by: INTERNAL MEDICINE

## 2019-08-13 PROCEDURE — 2500000003 HC RX 250 WO HCPCS: Performed by: NURSE ANESTHETIST, CERTIFIED REGISTERED

## 2019-08-13 PROCEDURE — 6360000002 HC RX W HCPCS: Performed by: NURSE ANESTHETIST, CERTIFIED REGISTERED

## 2019-08-13 PROCEDURE — 3609017100 HC EGD: Performed by: INTERNAL MEDICINE

## 2019-08-13 PROCEDURE — 2580000003 HC RX 258: Performed by: NURSE ANESTHETIST, CERTIFIED REGISTERED

## 2019-08-13 PROCEDURE — 2709999900 HC NON-CHARGEABLE SUPPLY: Performed by: INTERNAL MEDICINE

## 2019-08-13 RX ORDER — SODIUM CHLORIDE, SODIUM LACTATE, POTASSIUM CHLORIDE, CALCIUM CHLORIDE 600; 310; 30; 20 MG/100ML; MG/100ML; MG/100ML; MG/100ML
INJECTION, SOLUTION INTRAVENOUS CONTINUOUS PRN
Status: DISCONTINUED | OUTPATIENT
Start: 2019-08-13 | End: 2019-08-13 | Stop reason: SDUPTHER

## 2019-08-13 RX ORDER — PROMETHAZINE HYDROCHLORIDE 25 MG/ML
6.25 INJECTION, SOLUTION INTRAMUSCULAR; INTRAVENOUS
Status: DISCONTINUED | OUTPATIENT
Start: 2019-08-13 | End: 2019-08-13 | Stop reason: HOSPADM

## 2019-08-13 RX ORDER — OXYCODONE HYDROCHLORIDE AND ACETAMINOPHEN 5; 325 MG/1; MG/1
2 TABLET ORAL PRN
Status: DISCONTINUED | OUTPATIENT
Start: 2019-08-13 | End: 2019-08-13 | Stop reason: HOSPADM

## 2019-08-13 RX ORDER — SODIUM CHLORIDE, SODIUM LACTATE, POTASSIUM CHLORIDE, CALCIUM CHLORIDE 600; 310; 30; 20 MG/100ML; MG/100ML; MG/100ML; MG/100ML
INJECTION, SOLUTION INTRAVENOUS ONCE
Status: COMPLETED | OUTPATIENT
Start: 2019-08-13 | End: 2019-08-13

## 2019-08-13 RX ORDER — LIDOCAINE HYDROCHLORIDE 10 MG/ML
0.1 INJECTION, SOLUTION EPIDURAL; INFILTRATION; INTRACAUDAL; PERINEURAL
Status: DISCONTINUED | OUTPATIENT
Start: 2019-08-13 | End: 2019-08-13 | Stop reason: HOSPADM

## 2019-08-13 RX ORDER — ONDANSETRON 2 MG/ML
4 INJECTION INTRAMUSCULAR; INTRAVENOUS
Status: DISCONTINUED | OUTPATIENT
Start: 2019-08-13 | End: 2019-08-13 | Stop reason: HOSPADM

## 2019-08-13 RX ORDER — MEPERIDINE HYDROCHLORIDE 50 MG/ML
12.5 INJECTION INTRAMUSCULAR; INTRAVENOUS; SUBCUTANEOUS EVERY 5 MIN PRN
Status: DISCONTINUED | OUTPATIENT
Start: 2019-08-13 | End: 2019-08-13 | Stop reason: HOSPADM

## 2019-08-13 RX ORDER — MORPHINE SULFATE 2 MG/ML
2 INJECTION, SOLUTION INTRAMUSCULAR; INTRAVENOUS EVERY 5 MIN PRN
Status: DISCONTINUED | OUTPATIENT
Start: 2019-08-13 | End: 2019-08-13 | Stop reason: HOSPADM

## 2019-08-13 RX ORDER — LABETALOL HYDROCHLORIDE 5 MG/ML
5 INJECTION, SOLUTION INTRAVENOUS EVERY 10 MIN PRN
Status: DISCONTINUED | OUTPATIENT
Start: 2019-08-13 | End: 2019-08-13 | Stop reason: HOSPADM

## 2019-08-13 RX ORDER — PROPOFOL 10 MG/ML
INJECTION, EMULSION INTRAVENOUS PRN
Status: DISCONTINUED | OUTPATIENT
Start: 2019-08-13 | End: 2019-08-13 | Stop reason: SDUPTHER

## 2019-08-13 RX ORDER — MORPHINE SULFATE 2 MG/ML
1 INJECTION, SOLUTION INTRAMUSCULAR; INTRAVENOUS EVERY 5 MIN PRN
Status: DISCONTINUED | OUTPATIENT
Start: 2019-08-13 | End: 2019-08-13 | Stop reason: HOSPADM

## 2019-08-13 RX ORDER — DIPHENHYDRAMINE HYDROCHLORIDE 50 MG/ML
12.5 INJECTION INTRAMUSCULAR; INTRAVENOUS
Status: DISCONTINUED | OUTPATIENT
Start: 2019-08-13 | End: 2019-08-13 | Stop reason: HOSPADM

## 2019-08-13 RX ORDER — OXYCODONE HYDROCHLORIDE AND ACETAMINOPHEN 5; 325 MG/1; MG/1
1 TABLET ORAL PRN
Status: DISCONTINUED | OUTPATIENT
Start: 2019-08-13 | End: 2019-08-13 | Stop reason: HOSPADM

## 2019-08-13 RX ORDER — HYDRALAZINE HYDROCHLORIDE 20 MG/ML
5 INJECTION INTRAMUSCULAR; INTRAVENOUS EVERY 10 MIN PRN
Status: DISCONTINUED | OUTPATIENT
Start: 2019-08-13 | End: 2019-08-13 | Stop reason: HOSPADM

## 2019-08-13 RX ADMIN — PROPOFOL 50 MG: 10 INJECTION, EMULSION INTRAVENOUS at 14:43

## 2019-08-13 RX ADMIN — PROPOFOL 50 MG: 10 INJECTION, EMULSION INTRAVENOUS at 14:36

## 2019-08-13 RX ADMIN — PROPOFOL 50 MG: 10 INJECTION, EMULSION INTRAVENOUS at 14:28

## 2019-08-13 RX ADMIN — PROPOFOL 50 MG: 10 INJECTION, EMULSION INTRAVENOUS at 14:31

## 2019-08-13 RX ADMIN — SODIUM CHLORIDE, POTASSIUM CHLORIDE, SODIUM LACTATE AND CALCIUM CHLORIDE: 600; 310; 30; 20 INJECTION, SOLUTION INTRAVENOUS at 14:18

## 2019-08-13 RX ADMIN — PROPOFOL 50 MG: 10 INJECTION, EMULSION INTRAVENOUS at 14:34

## 2019-08-13 RX ADMIN — PROPOFOL 80 MG: 10 INJECTION, EMULSION INTRAVENOUS at 14:25

## 2019-08-13 RX ADMIN — LIDOCAINE HYDROCHLORIDE 40 MG: 10 INJECTION, SOLUTION INFILTRATION; PERINEURAL at 14:25

## 2019-08-13 RX ADMIN — SODIUM CHLORIDE, POTASSIUM CHLORIDE, SODIUM LACTATE AND CALCIUM CHLORIDE: 600; 310; 30; 20 INJECTION, SOLUTION INTRAVENOUS at 13:13

## 2019-08-13 RX ADMIN — PROPOFOL 30 MG: 10 INJECTION, EMULSION INTRAVENOUS at 14:39

## 2019-08-13 ASSESSMENT — PAIN SCALES - GENERAL
PAINLEVEL_OUTOF10: 0

## 2019-08-13 ASSESSMENT — PAIN - FUNCTIONAL ASSESSMENT: PAIN_FUNCTIONAL_ASSESSMENT: 0-10

## 2019-08-13 ASSESSMENT — ENCOUNTER SYMPTOMS: SHORTNESS OF BREATH: 1

## 2019-08-13 NOTE — ANESTHESIA PRE PROCEDURE
insulin 3 times daily 9/12/17   Historical Provider, MD       Current medications:    Current Facility-Administered Medications   Medication Dose Route Frequency Provider Last Rate Last Dose    lidocaine PF 1 % injection 0.1 mL  0.1 mL Intradermal Once PRN Thomas Moeller MD           Allergies:     Allergies   Allergen Reactions    Codeine Nausea And Vomiting       Problem List:    Patient Active Problem List   Diagnosis Code    Cataract H26.9    Mass of skin of hand R22.30    Diabetes mellitus ([facility] [address] E11.9    Pulmonary nodules R91.8    Chest pain R07.9    Epigastric pain R10.13    Need for vaccination Z23    Hydrocele in adult N43.3    Lung nodule R91.1    History of snoring Z87.898    Essential hypertension I10    Hyperlipidemia E78.5    Controlled type 2 diabetes mellitus without complication, with long-term current use of insulin (HCC) E11.9, Z79.4    Excessive daytime sleepiness G47.19    Right arm pain M79.601    Bradycardia R00.1    Heart murmur R01.1    Impingement syndrome of right shoulder M75.41    Lumbar radiculopathy M54.16    Idiopathic peripheral neuropathy G60.9    Severe obstructive sleep apnea G47.33    Obesity E66.9    Unstable angina ([facility]) I20.0    Dyspnea R06.00    Coronary artery disease of native heart with stable angina pectoris ([facility]) I25.118    Overweight E66.3    History of phobia Z86.59    Cellulitis of right lower extremity L03.115       Past Medical History:        Diagnosis Date    Acid reflux     Yan's esophagus     Coronary artery disease of native heart with stable angina pectoris ([facility] [address] 5/30/2019    Diabetes mellitus ([facility] [address]     Hyperlipidemia     Hypertension     Pancreatitis 1996    Sleep apnea     uses CPAP       Past Surgical History:        Procedure Laterality Date    CATARACT REMOVAL Bilateral 2013    CHOLECYSTECTOMY      COLONOSCOPY  10/26/2011    ENDOSCOPY, COLON, DIAGNOSTIC      EGD halo    HYDROCELE EXCISION  11/15/2016    5359 08/13/19 1259   BP:  (!) 145/78   Pulse:  53   Resp:  18   Temp:  97.4 °F (36.3 °C)   TempSrc:  Temporal   SpO2:  96%   Weight: 282 lb (127.9 kg) 270 lb (122.5 kg)   Height: 5' 10\" (1.778 m) 5' 10\" (1.778 m)       Allergies   Allergen Reactions    Codeine Nausea And Vomiting       Social History     Tobacco Use    Smoking status: Never Smoker    Smokeless tobacco: Never Used   Substance Use Topics    Alcohol use: No       LABS:    CBC  Lab Results   Component Value Date/Time    WBC 5.9 03/22/2019 07:40 AM    HGB 12.9 (L) 03/22/2019 07:40 AM    HCT 38.7 (L) 03/22/2019 07:40 AM     (L) 03/22/2019 07:40 AM     RENAL  Lab Results   Component Value Date/Time     03/21/2019 12:43 PM    K 4.1 03/21/2019 12:43 PM     03/21/2019 12:43 PM    CO2 28 03/21/2019 12:43 PM    BUN 16 03/21/2019 12:43 PM    CREATININE 0.9 03/21/2019 12:43 PM    GLUCOSE 136 (H) 03/21/2019 12:43 PM     COAGS  Lab Results   Component Value Date/Time    PROTIME 12.0 11/28/2017 07:08 PM    INR 1.06 11/28/2017 07:08 PM        Anesthesia Plan      general     ASA 3     (Risks, benefits and alternatives of GA discussed with pt. Questions answered. Willing to proceed.)  Induction: intravenous. Anesthetic plan and risks discussed with patient and spouse.                       Violet Pérez MD   8/13/2019

## 2019-08-13 NOTE — ANESTHESIA POSTPROCEDURE EVALUATION
Department of Anesthesiology  Postprocedure Note    Patient: Rancho Bowen  MRN: [id number]  YOB: 1950  Date of evaluation: 8/13/2019  Time:  3:21 PM     Procedure Summary     Date:  08/13/19 Room / Location:  [address] ENDOSCOPY    Anesthesia Start:  6306 Anesthesia Stop:  8385    Procedure:  ESOPHAGOGASTRODUODENOSCOPY WITH HALO AND ANESTHESIA -SLEEP APNEA- (N/A ) Diagnosis:       Yan's esophagus with high grade dysplasia      (Yan's esophagus with high grade dysplasia [K22.711])    Surgeon:  Briana Odell MD Responsible Provider:  Gerardo Snow MD    Anesthesia Type:  general ASA Status:  3          Anesthesia Type: general    Filiberto Phase I: Filiberto Score: 10    Filiberto Phase II: Filiberto Score: 10    Last vitals: Reviewed and per EMR flowsheets. Anesthesia Post Evaluation    Patient location during evaluation: PACU  Patient participation: complete - patient participated  Level of consciousness: awake and alert  Airway patency: patent  Nausea & Vomiting: no nausea and no vomiting  Complications: no  Cardiovascular status: blood pressure returned to baseline  Respiratory status: acceptable  Hydration status: euvolemic  Comments: VSS on transfer to phase 2 recovery. No anesthetic complications.

## 2019-08-13 NOTE — H&P
2 times daily 60 tablet 3    fluticasone (FLONASE) 50 MCG/ACT nasal spray 2 sprays by Nasal route daily 1 Bottle 5    pantoprazole (PROTONIX) 40 MG tablet Take 40 mg by mouth 2 times daily       insulin regular (HUMULIN R;NOVOLIN R) 100 UNIT/ML injection Inject 10 Units into the skin 3 times daily (before meals)       insulin NPH (HUMULIN N;NOVOLIN N) 100 UNIT/ML injection vial Inject 10 Units into the skin 2 times daily       lisinopril (PRINIVIL;ZESTRIL) 10 MG tablet Take 10 mg by mouth daily      atorvastatin (LIPITOR) 20 MG tablet Take 20 mg by mouth daily.  aspirin 81 MG chewable tablet Take 1 tablet by mouth daily 30 tablet 3    nitroGLYCERIN (NITROSTAT) 0.4 MG SL tablet up to max of 3 total doses.  If no relief after 1 dose, call 911. 25 tablet 3    glucose blood VI test strips (ASCENSIA AUTODISC VI;ONE TOUCH ULTRA TEST VI) strip Check glucose 3 times daily      Insulin Syringe-Needle U-100 30G X 1/2\" 0.5 ML MISC Inject insulin 3 times daily       Past Medical History:   Diagnosis Date    Acid reflux     Yan's esophagus     Coronary artery disease of native heart with stable angina pectoris ([facility] [address] 5/30/2019    Diabetes mellitus ([facility] [address]     Hyperlipidemia     Hypertension     Pancreatitis 1996    Sleep apnea     uses CPAP     Past Surgical History:   Procedure Laterality Date    CATARACT REMOVAL Bilateral 2013    CHOLECYSTECTOMY      COLONOSCOPY  10/26/2011    ENDOSCOPY, COLON, DIAGNOSTIC      EGD halo    HYDROCELE EXCISION  11/15/2016    PANCREAS SURGERY      cyst opened up and drining into small bowel    TONSILLECTOMY      UPPER GASTROINTESTINAL ENDOSCOPY  10/04/2016    with biopsy    UPPER GASTROINTESTINAL ENDOSCOPY  03/16/2017    Halo ablation    UPPER GASTROINTESTINAL ENDOSCOPY  06/26/2017    Halo ablation    UPPER GASTROINTESTINAL ENDOSCOPY  09/06/2017    bx distal sophagus    UPPER GASTROINTESTINAL ENDOSCOPY  12/22/2017    with HALO  procedure    UPPER GASTROINTESTINAL

## 2019-08-19 ENCOUNTER — OFFICE VISIT (OUTPATIENT)
Dept: PULMONOLOGY | Age: 69
End: 2019-08-19
Payer: MEDICARE

## 2019-08-19 VITALS
DIASTOLIC BLOOD PRESSURE: 60 MMHG | HEIGHT: 70 IN | RESPIRATION RATE: 16 BRPM | TEMPERATURE: 98 F | OXYGEN SATURATION: 95 % | HEART RATE: 58 BPM | WEIGHT: 284 LBS | BODY MASS INDEX: 40.66 KG/M2 | SYSTOLIC BLOOD PRESSURE: 112 MMHG

## 2019-08-19 DIAGNOSIS — E66.01 OBESITY, MORBID, BMI 40.0-49.9 (HCC): ICD-10-CM

## 2019-08-19 DIAGNOSIS — Z78.9 DIFFICULTY WITH BIPAP USE: ICD-10-CM

## 2019-08-19 DIAGNOSIS — G47.33 SEVERE OBSTRUCTIVE SLEEP APNEA: Primary | ICD-10-CM

## 2019-08-19 DIAGNOSIS — Z71.89 ENCOUNTER FOR BIPAP USE COUNSELING: ICD-10-CM

## 2019-08-19 PROCEDURE — 99214 OFFICE O/P EST MOD 30 MIN: CPT | Performed by: NURSE PRACTITIONER

## 2019-08-19 PROCEDURE — G8598 ASA/ANTIPLAT THER USED: HCPCS | Performed by: NURSE PRACTITIONER

## 2019-08-19 PROCEDURE — 1123F ACP DISCUSS/DSCN MKR DOCD: CPT | Performed by: NURSE PRACTITIONER

## 2019-08-19 PROCEDURE — 3017F COLORECTAL CA SCREEN DOC REV: CPT | Performed by: NURSE PRACTITIONER

## 2019-08-19 PROCEDURE — G8427 DOCREV CUR MEDS BY ELIG CLIN: HCPCS | Performed by: NURSE PRACTITIONER

## 2019-08-19 PROCEDURE — G8417 CALC BMI ABV UP PARAM F/U: HCPCS | Performed by: NURSE PRACTITIONER

## 2019-08-19 PROCEDURE — 1036F TOBACCO NON-USER: CPT | Performed by: NURSE PRACTITIONER

## 2019-08-19 PROCEDURE — 4040F PNEUMOC VAC/ADMIN/RCVD: CPT | Performed by: NURSE PRACTITIONER

## 2019-08-19 ASSESSMENT — SLEEP AND FATIGUE QUESTIONNAIRES
HOW LIKELY ARE YOU TO NOD OFF OR FALL ASLEEP WHILE SITTING AND READING: 3
HOW LIKELY ARE YOU TO NOD OFF OR FALL ASLEEP WHILE WATCHING TV: 3
ESS TOTAL SCORE: 14
HOW LIKELY ARE YOU TO NOD OFF OR FALL ASLEEP WHEN YOU ARE A PASSENGER IN A CAR FOR AN HOUR WITHOUT A BREAK: 0
NECK CIRCUMFERENCE (INCHES): 19.25
HOW LIKELY ARE YOU TO NOD OFF OR FALL ASLEEP WHILE SITTING AND TALKING TO SOMEONE: 3
HOW LIKELY ARE YOU TO NOD OFF OR FALL ASLEEP WHILE SITTING QUIETLY AFTER LUNCH WITHOUT ALCOHOL: 2
HOW LIKELY ARE YOU TO NOD OFF OR FALL ASLEEP WHILE LYING DOWN TO REST IN THE AFTERNOON WHEN CIRCUMSTANCES PERMIT: 0
HOW LIKELY ARE YOU TO NOD OFF OR FALL ASLEEP IN A CAR, WHILE STOPPED FOR A FEW MINUTES IN TRAFFIC: 0
HOW LIKELY ARE YOU TO NOD OFF OR FALL ASLEEP WHILE SITTING INACTIVE IN A PUBLIC PLACE: 3

## 2019-08-19 NOTE — PATIENT INSTRUCTIONS
Remember to bring all pulmonary medications to your next appointment with the office. Please keep all of your future appointments scheduled by [facility]monary office. Out of respect for other patients and providers, you may be asked to reschedule your appointment if you arrive later than your scheduled appointment time. Appointments cancelled less than 24hrs in advance will be considered a no show. Patients with three missed appointments within 1 year or four missed appointments within 2 years can be dismissed from the practice. Sleep Hygiene. .. Tips for better sleep. .. Avoid naps. This will ensure you are sleepy at bedtime. If you have to take a nap, sleep less than 1 hour, before 3 pm.  Sleep only when sleepy; this reduces the time you are awake in bed. Regular exercise is recommended to help you deepen your sleep, but not within 4-6 hours of your bedtime. Timing of exercise is important, aim to exercise early in the morning or early afternoon. A light snack may help you fall asleep. Warm milk and foods high in the amino acid tryptophan, such as bananas, may help you to sleep  Be sure to avoid heavy, spicy or sugary foods 4-6 hours before bedtime and avoid at snack time. Stay away from stimulants such as caffeine and nicotine for at least 4-6 hours before bed. Stimulants can interfere with your ability to fall asleep. Caffeine is found in tea, cola, coffee, cocoa and chocolate and is best avoided at bedtime. Nicotine is found in tobacco products. Avoid alcohol 4-6 hours before bedtime. Alcohol has an immediate sleep-inducing effect, after a few hours when alcohol levels fall there is a stimulant or wake-up effect and will cause fragmented sleep. Sleep rituals are important. Give your body clues it is time to slow down and sleep. Examples include; yoga, deep breathing, listen to relaxing music, a hot bath or a few minutes of reading.   Have a fixed bedtime and

## 2019-08-24 NOTE — OP NOTE
[address], [place] [address]                                OPERATIVE REPORT    PATIENT NAME: Caroline De Leon                    :        1950  MED REC NO:   [id number]                          ROOM:  ACCOUNT NO:   [de-identified]                           ADMIT DATE: 2019  PROVIDER:     Patel Mauro MD    DATE OF PROCEDURE:  2019    PREPROCEDURE DIAGNOSES:  Yan's with high grade dysplasia,  radiofrequency ablation. POSTOPERATIVE DIAGNOSES:  Yan's with high grade dysplasia,  radiofrequency ablation. INDICATIONS:  The patient is a 60-year-old male who has history of  Yan's with high grade dysplasia. He has undergone RFA in the past.   Most recent biopsies did not show high grade dysplasia, but had some  persistent short-segment Yan's. Repeat RFA is being performed. Consent was performed. OPERATIVE PROCEDURE:  The patient was sedated per Anesthesia. The  [employer] video endoscope was passed into the esophagus. Again right at  approximately 39 cm, it was just a half little centimeter segment of  what appears to be Yan's _____ some small tongues of Yan's. Stomach was visualized both on antegrade and retrograde views. It was  normal.  Pylorus was patent. Duodenal bulb was normal.  Guidewire was  then threaded. Scope was withdrawn. The balloon RFA was passed  initially to 37 cm, inflated with 100% efficacy. Balloon was then  passed to 40 cm and inflated again. The eschar was scrapped and balloon  insufflation and radiofrequency ablation once again took place at 37 cm  and 47 cm. Balloon was removed. It was re-endoscoped. There still  appeared to be a couple of areas that could represent some short segment  Yan's. Subsequently, I used the same _____ with radiofrequency  ablation, nine different areas x2. He tolerated the procedure well.     IMPRESSION:  Short segment

## 2019-09-17 ENCOUNTER — OFFICE VISIT (OUTPATIENT)
Dept: FAMILY MEDICINE CLINIC | Age: 69
End: 2019-09-17
Payer: MEDICARE

## 2019-09-17 VITALS
BODY MASS INDEX: 41.66 KG/M2 | DIASTOLIC BLOOD PRESSURE: 62 MMHG | HEART RATE: 56 BPM | OXYGEN SATURATION: 94 % | SYSTOLIC BLOOD PRESSURE: 118 MMHG | WEIGHT: 291 LBS | HEIGHT: 70 IN

## 2019-09-17 DIAGNOSIS — L98.9 SKIN LESION: Primary | ICD-10-CM

## 2019-09-17 DIAGNOSIS — G25.81 RESTLESS LEG SYNDROME: ICD-10-CM

## 2019-09-17 DIAGNOSIS — Z23 IMMUNIZATION DUE: ICD-10-CM

## 2019-09-17 PROCEDURE — G8598 ASA/ANTIPLAT THER USED: HCPCS | Performed by: NURSE PRACTITIONER

## 2019-09-17 PROCEDURE — G8417 CALC BMI ABV UP PARAM F/U: HCPCS | Performed by: NURSE PRACTITIONER

## 2019-09-17 PROCEDURE — 1036F TOBACCO NON-USER: CPT | Performed by: NURSE PRACTITIONER

## 2019-09-17 PROCEDURE — G8427 DOCREV CUR MEDS BY ELIG CLIN: HCPCS | Performed by: NURSE PRACTITIONER

## 2019-09-17 PROCEDURE — 99213 OFFICE O/P EST LOW 20 MIN: CPT | Performed by: NURSE PRACTITIONER

## 2019-09-17 PROCEDURE — 90670 PCV13 VACCINE IM: CPT | Performed by: NURSE PRACTITIONER

## 2019-09-17 PROCEDURE — G0009 ADMIN PNEUMOCOCCAL VACCINE: HCPCS | Performed by: NURSE PRACTITIONER

## 2019-09-17 PROCEDURE — 3017F COLORECTAL CA SCREEN DOC REV: CPT | Performed by: NURSE PRACTITIONER

## 2019-09-17 PROCEDURE — 90653 IIV ADJUVANT VACCINE IM: CPT | Performed by: NURSE PRACTITIONER

## 2019-09-17 PROCEDURE — 4040F PNEUMOC VAC/ADMIN/RCVD: CPT | Performed by: NURSE PRACTITIONER

## 2019-09-17 PROCEDURE — G0008 ADMIN INFLUENZA VIRUS VAC: HCPCS | Performed by: NURSE PRACTITIONER

## 2019-09-17 PROCEDURE — 1123F ACP DISCUSS/DSCN MKR DOCD: CPT | Performed by: NURSE PRACTITIONER

## 2019-09-17 RX ORDER — PRAMIPEXOLE DIHYDROCHLORIDE 0.12 MG/1
0.12 TABLET ORAL NIGHTLY
Qty: 90 TABLET | Refills: 3 | Status: SHIPPED | OUTPATIENT
Start: 2019-09-17 | End: 2022-01-27

## 2019-09-17 ASSESSMENT — ENCOUNTER SYMPTOMS
SHORTNESS OF BREATH: 0
COLOR CHANGE: 1
COUGH: 0

## 2019-09-17 NOTE — PATIENT INSTRUCTIONS
Stop requip  Start Mirapex 0.125 mg nightly  Call in one week with update on restless legs  Dermatology referral for skin lesion  Follow-up in 1 week to update on new medication

## 2019-10-04 ENCOUNTER — OFFICE VISIT (OUTPATIENT)
Dept: CARDIOLOGY CLINIC | Age: 69
End: 2019-10-04
Payer: MEDICARE

## 2019-10-04 VITALS
DIASTOLIC BLOOD PRESSURE: 54 MMHG | OXYGEN SATURATION: 93 % | WEIGHT: 292.4 LBS | BODY MASS INDEX: 41.86 KG/M2 | SYSTOLIC BLOOD PRESSURE: 98 MMHG | HEIGHT: 70 IN | HEART RATE: 60 BPM

## 2019-10-04 DIAGNOSIS — I25.118 CORONARY ARTERY DISEASE OF NATIVE HEART WITH STABLE ANGINA PECTORIS, UNSPECIFIED VESSEL OR LESION TYPE (HCC): Primary | ICD-10-CM

## 2019-10-04 DIAGNOSIS — E78.2 MIXED HYPERLIPIDEMIA: ICD-10-CM

## 2019-10-04 DIAGNOSIS — I10 ESSENTIAL HYPERTENSION: ICD-10-CM

## 2019-10-04 PROCEDURE — 4040F PNEUMOC VAC/ADMIN/RCVD: CPT | Performed by: INTERNAL MEDICINE

## 2019-10-04 PROCEDURE — 1123F ACP DISCUSS/DSCN MKR DOCD: CPT | Performed by: INTERNAL MEDICINE

## 2019-10-04 PROCEDURE — G8417 CALC BMI ABV UP PARAM F/U: HCPCS | Performed by: INTERNAL MEDICINE

## 2019-10-04 PROCEDURE — G8427 DOCREV CUR MEDS BY ELIG CLIN: HCPCS | Performed by: INTERNAL MEDICINE

## 2019-10-04 PROCEDURE — G8482 FLU IMMUNIZE ORDER/ADMIN: HCPCS | Performed by: INTERNAL MEDICINE

## 2019-10-04 PROCEDURE — 3017F COLORECTAL CA SCREEN DOC REV: CPT | Performed by: INTERNAL MEDICINE

## 2019-10-04 PROCEDURE — G8598 ASA/ANTIPLAT THER USED: HCPCS | Performed by: INTERNAL MEDICINE

## 2019-10-04 PROCEDURE — 99213 OFFICE O/P EST LOW 20 MIN: CPT | Performed by: INTERNAL MEDICINE

## 2019-10-04 PROCEDURE — 1036F TOBACCO NON-USER: CPT | Performed by: INTERNAL MEDICINE

## 2019-11-11 ENCOUNTER — OFFICE VISIT (OUTPATIENT)
Dept: PULMONOLOGY | Age: 69
End: 2019-11-11
Payer: MEDICARE

## 2019-11-11 ENCOUNTER — TELEPHONE (OUTPATIENT)
Dept: PULMONOLOGY | Age: 69
End: 2019-11-11

## 2019-11-11 VITALS
RESPIRATION RATE: 18 BRPM | HEIGHT: 70 IN | BODY MASS INDEX: 40.8 KG/M2 | OXYGEN SATURATION: 95 % | HEART RATE: 53 BPM | WEIGHT: 285 LBS | DIASTOLIC BLOOD PRESSURE: 60 MMHG | SYSTOLIC BLOOD PRESSURE: 110 MMHG

## 2019-11-11 DIAGNOSIS — G47.33 SEVERE OBSTRUCTIVE SLEEP APNEA: Primary | ICD-10-CM

## 2019-11-11 DIAGNOSIS — Z78.9 DIFFICULTY WITH BIPAP USE: ICD-10-CM

## 2019-11-11 DIAGNOSIS — E66.01 OBESITY, MORBID, BMI 40.0-49.9 (HCC): ICD-10-CM

## 2019-11-11 DIAGNOSIS — G25.81 RLS (RESTLESS LEGS SYNDROME): ICD-10-CM

## 2019-11-11 DIAGNOSIS — Z71.89 ENCOUNTER FOR BIPAP USE COUNSELING: ICD-10-CM

## 2019-11-11 PROCEDURE — 4040F PNEUMOC VAC/ADMIN/RCVD: CPT | Performed by: NURSE PRACTITIONER

## 2019-11-11 PROCEDURE — 99214 OFFICE O/P EST MOD 30 MIN: CPT | Performed by: NURSE PRACTITIONER

## 2019-11-11 PROCEDURE — G8482 FLU IMMUNIZE ORDER/ADMIN: HCPCS | Performed by: NURSE PRACTITIONER

## 2019-11-11 PROCEDURE — G8417 CALC BMI ABV UP PARAM F/U: HCPCS | Performed by: NURSE PRACTITIONER

## 2019-11-11 PROCEDURE — G8598 ASA/ANTIPLAT THER USED: HCPCS | Performed by: NURSE PRACTITIONER

## 2019-11-11 PROCEDURE — 1123F ACP DISCUSS/DSCN MKR DOCD: CPT | Performed by: NURSE PRACTITIONER

## 2019-11-11 PROCEDURE — G8427 DOCREV CUR MEDS BY ELIG CLIN: HCPCS | Performed by: NURSE PRACTITIONER

## 2019-11-11 PROCEDURE — 1036F TOBACCO NON-USER: CPT | Performed by: NURSE PRACTITIONER

## 2019-11-11 PROCEDURE — 3017F COLORECTAL CA SCREEN DOC REV: CPT | Performed by: NURSE PRACTITIONER

## 2019-11-11 ASSESSMENT — SLEEP AND FATIGUE QUESTIONNAIRES
HOW LIKELY ARE YOU TO NOD OFF OR FALL ASLEEP WHILE SITTING AND READING: 3
ESS TOTAL SCORE: 10
HOW LIKELY ARE YOU TO NOD OFF OR FALL ASLEEP WHILE SITTING INACTIVE IN A PUBLIC PLACE: 3
HOW LIKELY ARE YOU TO NOD OFF OR FALL ASLEEP WHILE SITTING AND TALKING TO SOMEONE: 1
NECK CIRCUMFERENCE (INCHES): 19.5
HOW LIKELY ARE YOU TO NOD OFF OR FALL ASLEEP WHILE WATCHING TV: 3
HOW LIKELY ARE YOU TO NOD OFF OR FALL ASLEEP WHEN YOU ARE A PASSENGER IN A CAR FOR AN HOUR WITHOUT A BREAK: 0
HOW LIKELY ARE YOU TO NOD OFF OR FALL ASLEEP WHILE LYING DOWN TO REST IN THE AFTERNOON WHEN CIRCUMSTANCES PERMIT: 0
HOW LIKELY ARE YOU TO NOD OFF OR FALL ASLEEP WHILE SITTING QUIETLY AFTER LUNCH WITHOUT ALCOHOL: 0
HOW LIKELY ARE YOU TO NOD OFF OR FALL ASLEEP IN A CAR, WHILE STOPPED FOR A FEW MINUTES IN TRAFFIC: 0

## 2019-11-13 ENCOUNTER — OFFICE VISIT (OUTPATIENT)
Dept: FAMILY MEDICINE CLINIC | Age: 69
End: 2019-11-13
Payer: MEDICARE

## 2019-11-13 VITALS
DIASTOLIC BLOOD PRESSURE: 66 MMHG | WEIGHT: 286 LBS | SYSTOLIC BLOOD PRESSURE: 122 MMHG | HEIGHT: 70 IN | HEART RATE: 60 BPM | BODY MASS INDEX: 40.94 KG/M2 | OXYGEN SATURATION: 96 %

## 2019-11-13 DIAGNOSIS — I10 ESSENTIAL HYPERTENSION: ICD-10-CM

## 2019-11-13 DIAGNOSIS — Z79.4 TYPE 2 DIABETES MELLITUS WITHOUT COMPLICATION, WITH LONG-TERM CURRENT USE OF INSULIN (HCC): Primary | ICD-10-CM

## 2019-11-13 DIAGNOSIS — E11.9 TYPE 2 DIABETES MELLITUS WITHOUT COMPLICATION, WITH LONG-TERM CURRENT USE OF INSULIN (HCC): Primary | ICD-10-CM

## 2019-11-13 DIAGNOSIS — G62.9 PERIPHERAL POLYNEUROPATHY: ICD-10-CM

## 2019-11-13 DIAGNOSIS — E66.01 OBESITY, MORBID, BMI 40.0-49.9 (HCC): ICD-10-CM

## 2019-11-13 LAB — HBA1C MFR BLD: 6.3 %

## 2019-11-13 PROCEDURE — 1036F TOBACCO NON-USER: CPT | Performed by: NURSE PRACTITIONER

## 2019-11-13 PROCEDURE — G8482 FLU IMMUNIZE ORDER/ADMIN: HCPCS | Performed by: NURSE PRACTITIONER

## 2019-11-13 PROCEDURE — 83036 HEMOGLOBIN GLYCOSYLATED A1C: CPT | Performed by: NURSE PRACTITIONER

## 2019-11-13 PROCEDURE — G8598 ASA/ANTIPLAT THER USED: HCPCS | Performed by: NURSE PRACTITIONER

## 2019-11-13 PROCEDURE — G8417 CALC BMI ABV UP PARAM F/U: HCPCS | Performed by: NURSE PRACTITIONER

## 2019-11-13 PROCEDURE — 99214 OFFICE O/P EST MOD 30 MIN: CPT | Performed by: NURSE PRACTITIONER

## 2019-11-13 PROCEDURE — 1123F ACP DISCUSS/DSCN MKR DOCD: CPT | Performed by: NURSE PRACTITIONER

## 2019-11-13 PROCEDURE — 3017F COLORECTAL CA SCREEN DOC REV: CPT | Performed by: NURSE PRACTITIONER

## 2019-11-13 PROCEDURE — 2022F DILAT RTA XM EVC RTNOPTHY: CPT | Performed by: NURSE PRACTITIONER

## 2019-11-13 PROCEDURE — G8427 DOCREV CUR MEDS BY ELIG CLIN: HCPCS | Performed by: NURSE PRACTITIONER

## 2019-11-13 PROCEDURE — 3044F HG A1C LEVEL LT 7.0%: CPT | Performed by: NURSE PRACTITIONER

## 2019-11-13 PROCEDURE — 4040F PNEUMOC VAC/ADMIN/RCVD: CPT | Performed by: NURSE PRACTITIONER

## 2019-11-13 RX ORDER — GABAPENTIN 100 MG/1
200 CAPSULE ORAL NIGHTLY
Qty: 60 CAPSULE | Refills: 1 | Status: SHIPPED | OUTPATIENT
Start: 2019-11-13 | End: 2021-09-16

## 2019-11-13 ASSESSMENT — ENCOUNTER SYMPTOMS
SHORTNESS OF BREATH: 0
BACK PAIN: 0
STRIDOR: 0
RESPIRATORY NEGATIVE: 1
WHEEZING: 0

## 2019-12-06 ENCOUNTER — HOSPITAL ENCOUNTER (EMERGENCY)
Age: 69
Discharge: HOME | End: 2019-12-06
Payer: COMMERCIAL

## 2019-12-06 ENCOUNTER — APPOINTMENT (OUTPATIENT)
Dept: GENERAL RADIOLOGY | Age: 69
End: 2019-12-06
Payer: COMMERCIAL

## 2019-12-06 VITALS
WEIGHT: 270 LBS | BODY MASS INDEX: 38.74 KG/M2 | HEART RATE: 61 BPM | OXYGEN SATURATION: 96 % | RESPIRATION RATE: 16 BRPM | SYSTOLIC BLOOD PRESSURE: 141 MMHG | TEMPERATURE: 98.5 F | DIASTOLIC BLOOD PRESSURE: 89 MMHG

## 2019-12-06 DIAGNOSIS — S41.112A ARM LACERATION, LEFT, INITIAL ENCOUNTER: Primary | ICD-10-CM

## 2019-12-06 PROCEDURE — 99283 EMERGENCY DEPT VISIT LOW MDM: CPT

## 2019-12-06 PROCEDURE — 73060 X-RAY EXAM OF HUMERUS: CPT

## 2019-12-06 PROCEDURE — 4500000023 HC ED LEVEL 3 PROCEDURE

## 2019-12-06 RX ORDER — CEPHALEXIN 500 MG/1
500 CAPSULE ORAL 4 TIMES DAILY
Qty: 28 CAPSULE | Refills: 0 | Status: SHIPPED | OUTPATIENT
Start: 2019-12-06 | End: 2019-12-17 | Stop reason: ALTCHOICE

## 2019-12-06 ASSESSMENT — ENCOUNTER SYMPTOMS
COLOR CHANGE: 0
ABDOMINAL PAIN: 0
NAUSEA: 0
COUGH: 0
VOMITING: 0
DIARRHEA: 0
BACK PAIN: 0
SHORTNESS OF BREATH: 0

## 2019-12-06 ASSESSMENT — PAIN SCALES - GENERAL: PAINLEVEL_OUTOF10: 7

## 2019-12-10 ENCOUNTER — TELEPHONE (OUTPATIENT)
Dept: FAMILY MEDICINE CLINIC | Age: 69
End: 2019-12-10

## 2019-12-10 ENCOUNTER — HOSPITAL ENCOUNTER (EMERGENCY)
Age: 69
Discharge: HOME | End: 2019-12-10
Payer: COMMERCIAL

## 2019-12-10 VITALS
DIASTOLIC BLOOD PRESSURE: 72 MMHG | RESPIRATION RATE: 16 BRPM | BODY MASS INDEX: 40.09 KG/M2 | TEMPERATURE: 98.2 F | WEIGHT: 280 LBS | OXYGEN SATURATION: 94 % | HEIGHT: 70 IN | SYSTOLIC BLOOD PRESSURE: 122 MMHG | HEART RATE: 58 BPM

## 2019-12-10 DIAGNOSIS — T81.30XA WOUND DEHISCENCE: Primary | ICD-10-CM

## 2019-12-10 PROCEDURE — 99282 EMERGENCY DEPT VISIT SF MDM: CPT

## 2019-12-10 PROCEDURE — 6370000000 HC RX 637 (ALT 250 FOR IP): Performed by: PHYSICIAN ASSISTANT

## 2019-12-10 RX ORDER — SULFAMETHOXAZOLE AND TRIMETHOPRIM 800; 160 MG/1; MG/1
1 TABLET ORAL 2 TIMES DAILY
Qty: 20 TABLET | Refills: 0 | Status: SHIPPED | OUTPATIENT
Start: 2019-12-10 | End: 2019-12-20

## 2019-12-10 RX ORDER — SULFAMETHOXAZOLE AND TRIMETHOPRIM 800; 160 MG/1; MG/1
1 TABLET ORAL ONCE
Status: COMPLETED | OUTPATIENT
Start: 2019-12-10 | End: 2019-12-10

## 2019-12-10 RX ADMIN — SULFAMETHOXAZOLE AND TRIMETHOPRIM 1 TABLET: 800; 160 TABLET ORAL at 08:37

## 2019-12-17 ENCOUNTER — HOSPITAL ENCOUNTER (OUTPATIENT)
Dept: WOUND CARE | Age: 69
Discharge: HOME | End: 2019-12-17
Payer: COMMERCIAL

## 2019-12-17 ENCOUNTER — OFFICE VISIT (OUTPATIENT)
Dept: ENDOCRINOLOGY | Age: 69
End: 2019-12-17
Payer: MEDICARE

## 2019-12-17 VITALS
RESPIRATION RATE: 18 BRPM | SYSTOLIC BLOOD PRESSURE: 134 MMHG | HEART RATE: 62 BPM | WEIGHT: 288.8 LBS | BODY MASS INDEX: 41.35 KG/M2 | DIASTOLIC BLOOD PRESSURE: 66 MMHG | OXYGEN SATURATION: 93 % | HEIGHT: 70 IN

## 2019-12-17 VITALS
SYSTOLIC BLOOD PRESSURE: 133 MMHG | TEMPERATURE: 98.3 F | HEIGHT: 70 IN | DIASTOLIC BLOOD PRESSURE: 69 MMHG | BODY MASS INDEX: 40.54 KG/M2 | WEIGHT: 283.2 LBS | RESPIRATION RATE: 16 BRPM | HEART RATE: 59 BPM

## 2019-12-17 DIAGNOSIS — Z79.4 CONTROLLED TYPE 2 DIABETES MELLITUS WITHOUT COMPLICATION, WITH LONG-TERM CURRENT USE OF INSULIN (HCC): Primary | ICD-10-CM

## 2019-12-17 DIAGNOSIS — E66.01 OBESITY, MORBID, BMI 40.0-49.9 (HCC): ICD-10-CM

## 2019-12-17 DIAGNOSIS — Z79.4 CONTROLLED TYPE 2 DIABETES MELLITUS WITHOUT COMPLICATION, WITH LONG-TERM CURRENT USE OF INSULIN (HCC): ICD-10-CM

## 2019-12-17 DIAGNOSIS — T14.8XXA TRAUMATIC INJURY TO SKIN OR SUBCUTANEOUS TISSUE: ICD-10-CM

## 2019-12-17 DIAGNOSIS — E11.9 CONTROLLED TYPE 2 DIABETES MELLITUS WITHOUT COMPLICATION, WITH LONG-TERM CURRENT USE OF INSULIN (HCC): ICD-10-CM

## 2019-12-17 DIAGNOSIS — E78.2 MIXED HYPERLIPIDEMIA: ICD-10-CM

## 2019-12-17 DIAGNOSIS — E11.9 CONTROLLED TYPE 2 DIABETES MELLITUS WITHOUT COMPLICATION, WITH LONG-TERM CURRENT USE OF INSULIN (HCC): Primary | ICD-10-CM

## 2019-12-17 DIAGNOSIS — I10 ESSENTIAL HYPERTENSION: ICD-10-CM

## 2019-12-17 DIAGNOSIS — E55.9 VITAMIN D DEFICIENCY: ICD-10-CM

## 2019-12-17 DIAGNOSIS — G62.9 PERIPHERAL POLYNEUROPATHY: ICD-10-CM

## 2019-12-17 LAB
AMYLASE: 30 U/L (ref 25–115)
LIPASE: 11 U/L (ref 13–60)
TSH REFLEX FT4: 1.21 UIU/ML (ref 0.27–4.2)

## 2019-12-17 PROCEDURE — 99203 OFFICE O/P NEW LOW 30 MIN: CPT

## 2019-12-17 PROCEDURE — 3044F HG A1C LEVEL LT 7.0%: CPT | Performed by: NURSE PRACTITIONER

## 2019-12-17 PROCEDURE — G8427 DOCREV CUR MEDS BY ELIG CLIN: HCPCS | Performed by: NURSE PRACTITIONER

## 2019-12-17 PROCEDURE — 99214 OFFICE O/P EST MOD 30 MIN: CPT | Performed by: NURSE PRACTITIONER

## 2019-12-17 PROCEDURE — G8417 CALC BMI ABV UP PARAM F/U: HCPCS | Performed by: NURSE PRACTITIONER

## 2019-12-17 PROCEDURE — 4040F PNEUMOC VAC/ADMIN/RCVD: CPT | Performed by: NURSE PRACTITIONER

## 2019-12-17 PROCEDURE — 1036F TOBACCO NON-USER: CPT | Performed by: NURSE PRACTITIONER

## 2019-12-17 PROCEDURE — G8482 FLU IMMUNIZE ORDER/ADMIN: HCPCS | Performed by: NURSE PRACTITIONER

## 2019-12-17 PROCEDURE — 3017F COLORECTAL CA SCREEN DOC REV: CPT | Performed by: NURSE PRACTITIONER

## 2019-12-17 PROCEDURE — 97597 DBRDMT OPN WND 1ST 20 CM/<: CPT | Performed by: NURSE PRACTITIONER

## 2019-12-17 PROCEDURE — 2022F DILAT RTA XM EVC RTNOPTHY: CPT | Performed by: NURSE PRACTITIONER

## 2019-12-17 PROCEDURE — 97597 DBRDMT OPN WND 1ST 20 CM/<: CPT

## 2019-12-17 PROCEDURE — 99212 OFFICE O/P EST SF 10 MIN: CPT | Performed by: NURSE PRACTITIONER

## 2019-12-17 PROCEDURE — 1123F ACP DISCUSS/DSCN MKR DOCD: CPT | Performed by: NURSE PRACTITIONER

## 2019-12-17 PROCEDURE — G8598 ASA/ANTIPLAT THER USED: HCPCS | Performed by: NURSE PRACTITIONER

## 2019-12-17 RX ORDER — LIDOCAINE 40 MG/G
CREAM TOPICAL ONCE
Status: DISCONTINUED | OUTPATIENT
Start: 2019-12-17 | End: 2019-12-18 | Stop reason: HOSPADM

## 2019-12-17 SDOH — ECONOMIC STABILITY: FOOD INSECURITY: WITHIN THE PAST 12 MONTHS, YOU WORRIED THAT YOUR FOOD WOULD RUN OUT BEFORE YOU GOT MONEY TO BUY MORE.: NEVER TRUE

## 2019-12-17 SDOH — ECONOMIC STABILITY: FOOD INSECURITY: WITHIN THE PAST 12 MONTHS, THE FOOD YOU BOUGHT JUST DIDN'T LAST AND YOU DIDN'T HAVE MONEY TO GET MORE.: NEVER TRUE

## 2019-12-17 SDOH — SOCIAL STABILITY: SOCIAL NETWORK
DO YOU BELONG TO ANY CLUBS OR ORGANIZATIONS SUCH AS CHURCH GROUPS UNIONS, FRATERNAL OR ATHLETIC GROUPS, OR SCHOOL GROUPS?: YES

## 2019-12-17 SDOH — HEALTH STABILITY: PHYSICAL HEALTH: ON AVERAGE, HOW MANY DAYS PER WEEK DO YOU ENGAGE IN MODERATE TO STRENUOUS EXERCISE (LIKE A BRISK WALK)?: 0 DAYS

## 2019-12-17 SDOH — SOCIAL STABILITY: SOCIAL INSECURITY
WITHIN THE LAST YEAR, HAVE YOU BEEN KICKED, HIT, SLAPPED, OR OTHERWISE PHYSICALLY HURT BY YOUR PARTNER OR EX-PARTNER?: NO

## 2019-12-17 SDOH — HEALTH STABILITY: PHYSICAL HEALTH: ON AVERAGE, HOW MANY MINUTES DO YOU ENGAGE IN EXERCISE AT THIS LEVEL?: 0 MIN

## 2019-12-17 SDOH — SOCIAL STABILITY: SOCIAL INSECURITY: WITHIN THE LAST YEAR, HAVE YOU BEEN AFRAID OF YOUR PARTNER OR EX-PARTNER?: NO

## 2019-12-17 SDOH — SOCIAL STABILITY: SOCIAL NETWORK: ARE YOU MARRIED, WIDOWED, DIVORCED, SEPARATED, NEVER MARRIED, OR LIVING WITH A PARTNER?: MARRIED

## 2019-12-17 SDOH — SOCIAL STABILITY: SOCIAL NETWORK: HOW OFTEN DO YOU ATTENT MEETINGS OF THE CLUB OR ORGANIZATION YOU BELONG TO?: MORE THAN 4 TIMES PER YEAR

## 2019-12-17 SDOH — SOCIAL STABILITY: SOCIAL INSECURITY: WITHIN THE LAST YEAR, HAVE YOU BEEN HUMILIATED OR EMOTIONALLY ABUSED IN OTHER WAYS BY YOUR PARTNER OR EX-PARTNER?: NO

## 2019-12-17 SDOH — ECONOMIC STABILITY: TRANSPORTATION INSECURITY
IN THE PAST 12 MONTHS, HAS THE LACK OF TRANSPORTATION KEPT YOU FROM MEDICAL APPOINTMENTS OR FROM GETTING MEDICATIONS?: NO

## 2019-12-17 SDOH — ECONOMIC STABILITY: TRANSPORTATION INSECURITY
IN THE PAST 12 MONTHS, HAS LACK OF TRANSPORTATION KEPT YOU FROM MEETINGS, WORK, OR FROM GETTING THINGS NEEDED FOR DAILY LIVING?: NO

## 2019-12-17 SDOH — SOCIAL STABILITY: SOCIAL NETWORK
IN A TYPICAL WEEK, HOW MANY TIMES DO YOU TALK ON THE PHONE WITH FAMILY, FRIENDS, OR NEIGHBORS?: MORE THAN THREE TIMES A WEEK

## 2019-12-17 SDOH — SOCIAL STABILITY: SOCIAL NETWORK: HOW OFTEN DO YOU GET TOGETHER WITH FRIENDS OR RELATIVES?: TWICE A WEEK

## 2019-12-17 SDOH — SOCIAL STABILITY: SOCIAL NETWORK: HOW OFTEN DO YOU ATTEND CHURCH OR RELIGIOUS SERVICES?: NEVER

## 2019-12-17 SDOH — HEALTH STABILITY: MENTAL HEALTH
STRESS IS WHEN SOMEONE FEELS TENSE, NERVOUS, ANXIOUS, OR CAN'T SLEEP AT NIGHT BECAUSE THEIR MIND IS TROUBLED. HOW STRESSED ARE YOU?: NOT AT ALL

## 2019-12-17 SDOH — SOCIAL STABILITY: SOCIAL INSECURITY
WITHIN THE LAST YEAR, HAVE TO BEEN RAPED OR FORCED TO HAVE ANY KIND OF SEXUAL ACTIVITY BY YOUR PARTNER OR EX-PARTNER?: NO

## 2019-12-17 SDOH — ECONOMIC STABILITY: INCOME INSECURITY: HOW HARD IS IT FOR YOU TO PAY FOR THE VERY BASICS LIKE FOOD, HOUSING, MEDICAL CARE, AND HEATING?: NOT HARD AT ALL

## 2019-12-17 ASSESSMENT — ENCOUNTER SYMPTOMS
DIARRHEA: 0
COLOR CHANGE: 0
CONSTIPATION: 0
EYE PAIN: 0
NAUSEA: 0
SHORTNESS OF BREATH: 0

## 2019-12-17 ASSESSMENT — PAIN SCALES - GENERAL: PAINLEVEL_OUTOF10: 0

## 2019-12-19 LAB
C-PEPTIDE: 2.9 NG/ML (ref 1.1–4.4)
GLUTAMIC ACID DECARB AB: <5 IU/ML (ref 0–5)

## 2019-12-20 LAB — INSULIN A: <0.4 U/ML (ref 0–0.4)

## 2019-12-31 ENCOUNTER — OFFICE VISIT (OUTPATIENT)
Dept: ENDOCRINOLOGY | Age: 69
End: 2019-12-31
Payer: MEDICARE

## 2019-12-31 ENCOUNTER — TELEPHONE (OUTPATIENT)
Dept: ENDOCRINOLOGY | Age: 69
End: 2019-12-31

## 2019-12-31 VITALS
SYSTOLIC BLOOD PRESSURE: 134 MMHG | DIASTOLIC BLOOD PRESSURE: 72 MMHG | WEIGHT: 288.6 LBS | HEART RATE: 52 BPM | RESPIRATION RATE: 18 BRPM | OXYGEN SATURATION: 94 % | HEIGHT: 70 IN | BODY MASS INDEX: 41.32 KG/M2

## 2019-12-31 DIAGNOSIS — E55.9 VITAMIN D DEFICIENCY: ICD-10-CM

## 2019-12-31 PROCEDURE — G8417 CALC BMI ABV UP PARAM F/U: HCPCS | Performed by: NURSE PRACTITIONER

## 2019-12-31 PROCEDURE — G8427 DOCREV CUR MEDS BY ELIG CLIN: HCPCS | Performed by: NURSE PRACTITIONER

## 2019-12-31 PROCEDURE — 3017F COLORECTAL CA SCREEN DOC REV: CPT | Performed by: NURSE PRACTITIONER

## 2019-12-31 PROCEDURE — 3044F HG A1C LEVEL LT 7.0%: CPT | Performed by: NURSE PRACTITIONER

## 2019-12-31 PROCEDURE — G8598 ASA/ANTIPLAT THER USED: HCPCS | Performed by: NURSE PRACTITIONER

## 2019-12-31 PROCEDURE — 2022F DILAT RTA XM EVC RTNOPTHY: CPT | Performed by: NURSE PRACTITIONER

## 2019-12-31 PROCEDURE — 1036F TOBACCO NON-USER: CPT | Performed by: NURSE PRACTITIONER

## 2019-12-31 PROCEDURE — 99213 OFFICE O/P EST LOW 20 MIN: CPT | Performed by: NURSE PRACTITIONER

## 2019-12-31 PROCEDURE — 95251 CONT GLUC MNTR ANALYSIS I&R: CPT | Performed by: NURSE PRACTITIONER

## 2019-12-31 PROCEDURE — 1123F ACP DISCUSS/DSCN MKR DOCD: CPT | Performed by: NURSE PRACTITIONER

## 2019-12-31 PROCEDURE — 4040F PNEUMOC VAC/ADMIN/RCVD: CPT | Performed by: NURSE PRACTITIONER

## 2019-12-31 PROCEDURE — G8482 FLU IMMUNIZE ORDER/ADMIN: HCPCS | Performed by: NURSE PRACTITIONER

## 2019-12-31 RX ORDER — FLASH GLUCOSE SENSOR
1 KIT MISCELLANEOUS
Qty: 2 EACH | Refills: 11 | Status: SHIPPED | OUTPATIENT
Start: 2019-12-31 | End: 2020-07-13 | Stop reason: SDUPTHER

## 2019-12-31 RX ORDER — FLASH GLUCOSE SCANNING READER
1 EACH MISCELLANEOUS PRN
Qty: 1 DEVICE | Refills: 0 | Status: SHIPPED | OUTPATIENT
Start: 2019-12-31 | End: 2022-06-23 | Stop reason: SDUPTHER

## 2019-12-31 NOTE — PATIENT INSTRUCTIONS
Last A1c  = 6.3%    Sliding scale applies to Insulin R  Fixed dose: 7 units before a meal    151- 200 Add  2 units  201- 250 Add 4 units  251- 300 Add 6 units   301- 350 Add 8 units  351- 400 Add 10 units    Call the office if your sugars are less than 60 or more than 400.     Insulin N will remain at 10 units twice a day    Sensors prescription has been sent    Start Victoza at 0.6 mg, daily shot

## 2019-12-31 NOTE — PROGRESS NOTES
80  Continue current regimen  Managed by PCP           Controlled type 2 diabetes mellitus without complication, with long-term current use of insulin ([facility]) - Primary     Last A1c  = 6.3%    Start Victoza at 0.6 mg,QD  Insulin R: 7 units before a meal with 2: 50 > 150 for BG correction. Insulin N will remain at 10 units twice a day  Sensors prescription has been sent  Reviewed Emily Tovar download: BG have sharply increased from previous due to diet non compliance             Relevant Medications    Continuous Blood Gluc  (FREESTYLE JUSTIN 14 DAY READER) OLI    Continuous Blood Gluc Sensor (FREESTYLE JUSTIN 14 DAY SENSOR) MISC    Liraglutide (VICTOZA) 18 MG/3ML SOPN SC injection    Other Relevant Orders    DE CONTINUOUS GLUCOSE MONITORING ANALYSIS I&R (Completed)    Obesity, morbid, BMI 40.0-49.9 ([facility])    Relevant Medications    Liraglutide (VICTOZA) 18 MG/3ML SOPN SC injection    Vitamin D deficiency     Check levels  Has significant fatigue           Greater than 40 minutes spent directly counseling patient about topics listed above (such as lifestyle modifications, preventative screenings and/or disease related processes. Return in about 3 months (around 3/31/2020).

## 2019-12-31 NOTE — TELEPHONE ENCOUNTER
Patient called and said the medication that was called into [facility] is to expensive and he can afford it.  He wants to know what his other options are

## 2020-01-01 LAB — VITAMIN D 25-HYDROXY: 21.5 NG/ML

## 2020-01-01 RX ORDER — ERGOCALCIFEROL (VITAMIN D2) 1250 MCG
50000 CAPSULE ORAL WEEKLY
Qty: 12 CAPSULE | Refills: 5 | Status: SHIPPED | OUTPATIENT
Start: 2020-01-01 | End: 2021-09-16

## 2020-01-01 NOTE — ASSESSMENT & PLAN NOTE
Last A1c  = 6.3%    Start Victoza at 0.6 mg,QD  Insulin R: 7 units before a meal with 2: 50 > 150 for BG correction.   Insulin N will remain at 10 units twice a day  Sensors prescription has been sent  Reviewed Sarah Griffith download: BG have sharply increased from previous due to diet non compliance

## 2020-01-02 ENCOUNTER — HOSPITAL ENCOUNTER (OUTPATIENT)
Dept: WOUND CARE | Age: 70
Discharge: HOME | End: 2020-01-02
Payer: MEDICARE

## 2020-01-02 VITALS
HEIGHT: 70 IN | RESPIRATION RATE: 16 BRPM | TEMPERATURE: 97.7 F | DIASTOLIC BLOOD PRESSURE: 63 MMHG | SYSTOLIC BLOOD PRESSURE: 129 MMHG | BODY MASS INDEX: 41.57 KG/M2 | HEART RATE: 66 BPM | WEIGHT: 290.4 LBS

## 2020-01-02 PROCEDURE — 97597 DBRDMT OPN WND 1ST 20 CM/<: CPT

## 2020-01-02 PROCEDURE — 97597 DBRDMT OPN WND 1ST 20 CM/<: CPT | Performed by: CLINICAL NURSE SPECIALIST

## 2020-01-02 PROCEDURE — 99202 OFFICE O/P NEW SF 15 MIN: CPT | Performed by: CLINICAL NURSE SPECIALIST

## 2020-01-02 RX ORDER — LIDOCAINE 40 MG/G
CREAM TOPICAL ONCE
Status: DISCONTINUED | OUTPATIENT
Start: 2020-01-02 | End: 2020-01-03 | Stop reason: HOSPADM

## 2020-01-02 ASSESSMENT — PAIN SCALES - GENERAL: PAINLEVEL_OUTOF10: 0

## 2020-01-02 NOTE — DISCHARGE INSTR - COC
Continuity of Care Form    Patient Name: Charley Goddard   :  1950  MRN:  [id number]    Admit date:  2020  Discharge date:  ***    Code Status Order: Prior   Advance Directives:   [facility] Documentation     Date/Time Healthcare Directive Type of Healthcare Directive Copy in [address] Agent's Name Healthcare Agent's Phone Number    20 1511  No, patient does not have an advance directive for healthcare treatment -- -- -- -- --          Admitting Physician:  No admitting provider for patient encounter. PCP: Sue Montoya DO    Discharging Nurse: [facility] Unit/Room#: No information available for this encounter.   Discharging Unit Phone Number: ***    Emergency Contact:   Extended Emergency Contact Information  Primary Emergency Contact: Aroldo Galdamez  Address: [address] [place] [address] Phone: [phone]  Work Phone: [phone]  Mobile Phone: [phone]  Relation: Spouse    Past Surgical History:  Past Surgical History:   Procedure Laterality Date    CATARACT REMOVAL Bilateral 2013    CHOLECYSTECTOMY      COLONOSCOPY  10/26/2011    ENDOSCOPY, COLON, DIAGNOSTIC      EGD halo    HYDROCELE EXCISION  11/15/2016    PANCREAS SURGERY      cyst opened up and drining into small bowel    TONSILLECTOMY      UPPER GASTROINTESTINAL ENDOSCOPY  10/04/2016    with biopsy    UPPER GASTROINTESTINAL ENDOSCOPY  2017    Halo ablation    UPPER GASTROINTESTINAL ENDOSCOPY  2017    Halo ablation    UPPER GASTROINTESTINAL ENDOSCOPY  2017    bx distal sophagus    UPPER GASTROINTESTINAL ENDOSCOPY  2017    with HALO  procedure    UPPER GASTROINTESTINAL ENDOSCOPY N/A 2018    EGD WITH ABLATION AND ANESTHESIA - HALO---SLEEP APNEA---  performed by Briana Araujo MD at [facility]  2019    EGD BIOPSY performed by Jesus Max Sherice Mckinney MD at [address] [place] [address] N/A 6/11/2019    EGD WITH HALO AND ANESTHESIA performed by Roberto Carlos Hollins MD at [address] [place] [address] N/A 8/13/2019    ESOPHAGOGASTRODUODENOSCOPY WITH HALO AND ANESTHESIA -SLEEP APNEA- performed by Roberto Carlos Hollins MD at [address] [place] [address] Drive EXTRACTION         Immunization History:   Immunization History   Administered Date(s) Administered    Influenza Vaccine, unspecified formulation 12/01/2010, 11/02/2011, 10/09/2012, 10/15/2014, 11/07/2016    Influenza Virus Vaccine 10/15/2015    Influenza, High Dose (Fluzone 65 yrs and older) 10/15/2015, 11/07/2016, 10/20/2017, 10/10/2018    Influenza, Triv, inactivated, subunit, adjuvanted, IM (Fluad 65 yrs and older) 09/17/2019    Pneumococcal Conjugate 13-valent (Ucwogkj15) 09/17/2019    Pneumococcal Polysaccharide (Eyxnorgrk04) 10/15/2015    Td, unspecified formulation 03/07/2008    Tdap (Boostrix, Adacel) 12/25/2010       Active Problems:  Patient Active Problem List   Diagnosis Code    Cataract H26.9    Mass of skin of hand R22.30    Diabetes mellitus ([facility] E11.9    Pulmonary nodules R91.8    Chest pain R07.9    Epigastric pain R10.13    Need for vaccination Z23    Hydrocele in adult N43.3    Lung nodule R91.1    History of snoring Z87.898    Essential hypertension I10    Hyperlipidemia E78.5    Controlled type 2 diabetes mellitus without complication, with long-term current use of insulin ([facility]) E11.9, Z79.4    Excessive daytime sleepiness G47.19    Right arm pain M79.601    Bradycardia R00.1    Heart murmur R01.1    Impingement syndrome of right shoulder M75.41    Lumbar radiculopathy M54.16    Idiopathic peripheral neuropathy G60.9    Severe obstructive sleep apnea G47.33    Obesity, morbid, BMI 40.0-49.9 ([facility]) E66.01    Unstable angina ([facility]) I20.0    Dyspnea R06.00    Coronary artery disease of native heart with stable angina pectoris ([facility] [address] I25.118    Overweight E66.3    History of phobia Z86.59    Cellulitis of right lower extremity L03.115    Peripheral polyneuropathy G62.9    Traumatic injury to skin or subcutaneous tissue T14. 8XXA    Vitamin D deficiency E55.9       Isolation/Infection:   Isolation          No Isolation        Patient Infection Status     None to display          Nurse Assessment:  Last Vital Signs: /63   Pulse 66   Temp 97.7 °F (36.5 °C) (Oral)   Resp 16   Ht 5' 10\" (1.778 m)   Wt 290 lb 6.4 oz (131.7 kg)   BMI 41.67 kg/m²     Last documented pain score (0-10 scale): Pain Level: 0  Last Weight:   Wt Readings from Last 1 Encounters:   01/02/20 290 lb 6.4 oz (131.7 kg)     Mental Status:  {IP PT MENTAL STATUS:20030}    IV Access:  { PORSCHE IV ACCESS:793212533}    Nursing Mobility/ADLs:  Walking   {CHP DME EUCH:476640078}  Transfer  {CHP DME ZZBI:417644500}  Bathing  {CHP DME SCPW:493755940}  Dressing  {CHP DME FKHV:614888995}  Toileting  {CHP DME GTFS:104539104}  Feeding  {CHP DME ARYX:580060478}  Med Admin  {CHP DME ZXNY:752619293}  Med Delivery   { PORSCHE MED Delivery:130320895}    Wound Care Documentation and Therapy:  Wound 12/17/19 #1, Left upper arm, dehisced wound, full thickness, onset 12/9/19 (Active)   Wound Image   12/17/2019  8:21 AM   Wound Other 1/2/2020  3:12 PM   Dressing Status Clean;Dry; Intact 1/2/2020  3:28 PM   Dressing Changed Changed/New 1/2/2020  3:28 PM   Dressing/Treatment Other (comment) 1/2/2020  3:28 PM   Wound Cleansed Rinsed/Irrigated with saline; Wound cleanser 1/2/2020  3:12 PM   Wound Length (cm) 1.5 cm 1/2/2020  3:12 PM   Wound Width (cm) 0.4 cm 1/2/2020  3:12 PM   Wound Depth (cm) 0.1 cm 1/2/2020  3:12 PM   Wound Surface Area (cm^2) 0.6 cm^2 1/2/2020  3:12 PM   Change in Wound Size % (l*w) 40 1/2/2020  3:12 PM   Wound Volume (cm^3) 0.06 cm^3 1/2/2020  3:12 PM   Wound Healing % 70 1/2/2020  3:12 PM   Post-Procedure Length (cm) 1.5 cm 1/2/2020  3:16 PM Post-Procedure Width (cm) 0.4 cm 2020  3:16 PM   Post-Procedure Depth (cm) 0.1 cm 2020  3:16 PM   Post-Procedure Surface Area (cm^2) 0.6 cm^2 2020  3:16 PM   Post-Procedure Volume (cm^3) 0.06 cm^3 2020  3:16 PM   Distance Tunneling (cm) 0 cm 2020  3:12 PM   Tunneling Position ___ O'Clock 0 2020  3:12 PM   Undermining Starts ___ O'Clock 0 2020  3:12 PM   Undermining Ends___ O'Clock 0 2020  3:12 PM   Undermining Maxium Distance (cm) 0 2020  3:12 PM   Wound Assessment Granulation tissue; Red 2020  3:12 PM   Drainage Amount Scant 2020  3:12 PM   Drainage Description Serosanguinous 2020  3:12 PM   Odor None 2020  3:12 PM   Margins Undefined edges 2019  8:21 AM   Robyn-wound Assessment Induration;Pink 2020  3:12 PM   Number of days: 16        Elimination:  Continence:   · Bowel: {YES / QI:96376}  · Bladder: {YES / FP:17039}  Urinary Catheter: {Urinary Catheter:897251421}   Colostomy/Ileostomy/Ileal Conduit: {YES / TV:49591}       Date of Last BM: ***  No intake or output data in the 24 hours ending 20 1742  No intake/output data recorded.     Safety Concerns:     [address] Safety Concerns:877360888}    Impairments/Disabilities:      [address] Impairments/Disabilities:928475719}    Nutrition Therapy:  Current Nutrition Therapy:   [address] Diet List:436387101}    Routes of Feeding: {CHP DME Other Feedings:121136519}  Liquids: {Slp liquid thickness:57403}  Daily Fluid Restriction: {CHP DME Yes amt example:133095311}  Last Modified Barium Swallow with Video (Video Swallowing Test): {Done Not Done SXVS:293433570}    Treatments at the Time of Hospital Discharge:   Respiratory Treatments: ***  Oxygen Therapy:  {Therapy; copd oxygen:02066}  Ventilator:    { JULIO Vent MBVD:005623259}    Rehab Therapies: {THERAPEUTIC INTERVENTION:0532525141}  Weight Bearing Status/Restrictions: [address] KIM Weight Bearin}  Other Medical Equipment (for information only, NOT a DME order):  {EQUIPMENT:748998269}  Other Treatments: ***    Patient's personal belongings (please select all that are sent with patient):  {CHP DME Belongings:629679151}    RN SIGNATURE:  {Esignature:192648871}    CASE MANAGEMENT/SOCIAL WORK SECTION    Inpatient Status Date: ***    Readmission Risk Assessment Score:  Readmission Risk              Risk of Unplanned Readmission:        0           Discharging to Facility/ Agency   · Name:   · Address:  · Phone:  · Fax:    Dialysis Facility (if applicable)   · Name:  · Address:  · Dialysis Schedule:  · Phone:  · Fax:    / signature: {Esignature:560724027}    PHYSICIAN SECTION    Prognosis: {Prognosis:6374764373}    Condition at Discharge: [address] [place] [address] Patient Condition:746467489}    Rehab Potential (if transferring to Rehab): {Prognosis:1467355302}    Recommended Labs or Other Treatments After Discharge: ***    Physician Certification: I certify the above information and transfer of Anmol Prado  is necessary for the continuing treatment of the diagnosis listed and that he requires {Admit to Appropriate Level of Care:28716} for {GREATER/LESS:789079532} 30 days.      Update Admission H&P: {CHP DME Changes in WALWB:719405673}    PHYSICIAN SIGNATURE:  {Esignature:532816246}

## 2020-01-02 NOTE — TELEPHONE ENCOUNTER
Left VM that it is ok for patient to not take the Victoza if not affordable. Continue with current insulin protocol and improve on low CHO diet.  Please follow up with his sensor and reader request per Medicare

## 2020-01-02 NOTE — PLAN OF CARE
No wound debridement today, continue treatment to left upper arm wound of Opticell Ag weekly, control blood sugar, f/u x 2 weeks, he may call to cancel this appointment if the wound is healed, MD orders/D/C instructions reviewed with patient, all questions answered; copy of instructions given to patient

## 2020-01-04 NOTE — PROGRESS NOTES
Procedure note:     Consent obtained. Time out performed per [facility] policy. Anesthetic  Anesthetic: 4% Lidocaine Cream     Using a curette, I sharply debrided the left arm ulcer(s) down through and including the removal of epidermis and dermis. The type(s) of tissue debrided included biofilm and exudate. Total Surface Area Debrided: 0.6 sq cm. The ulcers were then irrigated with normal saline solution. The procedure was completed with a small amount of bleeding, and hemostasis was with pressure. The patient tolerated the procedure well, with no significant complications. The patient's level of pain during and after the procedure was monitored. Post-debridement measurements, if different from pre-debridement, are in the flowsheet as well. Discharge plan: Follow DM diet    Treatment in the wound care center today, per RN documentation: Wound 12/17/19 #1, Left upper arm, dehisced wound, full thickness, onset 12/9/19-Dressing/Treatment: Other (comment)(Puracol Ag, mepilex border, single E spandagrip). Home treatment: good handwashing before and after any dressing changes. Cleanse ulcer with saline or soap & water before dressing change. May use Vaseline (petrolatum), Aquaphor, Aveeno, CeraVe, Cetaphil, Eucerin, Lubriderm, etc for dry skin. Dressing type for home: Collagen and foam dressing weekly with medium spandagrip. Written discharge instructions given to patient. Follow up in 2 weeks.     Electronically signed by SANDY Marshall CNP on 1/4/2020 at 10:36 AM.

## 2020-01-16 ENCOUNTER — HOSPITAL ENCOUNTER (OUTPATIENT)
Dept: WOUND CARE | Age: 70
Discharge: HOME | End: 2020-01-16
Payer: COMMERCIAL

## 2020-01-16 VITALS
RESPIRATION RATE: 20 BRPM | DIASTOLIC BLOOD PRESSURE: 76 MMHG | TEMPERATURE: 97.5 F | SYSTOLIC BLOOD PRESSURE: 142 MMHG | WEIGHT: 298.8 LBS | HEART RATE: 58 BPM | HEIGHT: 70 IN | BODY MASS INDEX: 42.78 KG/M2

## 2020-01-16 PROCEDURE — 99211 OFF/OP EST MAY X REQ PHY/QHP: CPT | Performed by: CLINICAL NURSE SPECIALIST

## 2020-01-16 PROCEDURE — 99211 OFF/OP EST MAY X REQ PHY/QHP: CPT

## 2020-01-16 ASSESSMENT — PAIN SCALES - GENERAL: PAINLEVEL_OUTOF10: 0

## 2020-01-16 NOTE — LETTER
[address] [facility] [address]  Phone: [phone]             January 16, 2020    Patient: Fabio Olivera   YOB: 1950   Date of Visit: 1/16/2020       To Whom It May Concern:    Sangeeta Mcdowell was seen and treated in our facility  beginning 12/17/19 until 1/16/2020. He may return to work on 1/17/2020 with no restrictions.       Sincerely,       SANDY Camara        Signature:__________________________________

## 2020-01-17 NOTE — PLAN OF CARE
Patient wound healed today. Patient instructed to use betadine on newly healed area until scab falls off and cover with bandaid. Patient instructed to follow up if needed in future.

## 2020-01-18 NOTE — PROGRESS NOTES
gabapentin, insulin NPH, insulin regular, isosorbide mononitrate, lisinopril, metoprolol tartrate, nitroGLYCERIN, pantoprazole, and pramipexole. Allergies: Codeine    Pertinent items from the review of systems are discussed in the HPI; the remainder of the ROS was reviewed and is negative. Objective:     Vitals:    01/16/20 1421   BP: (!) 142/76   Pulse: 58   Resp: 20   Temp: 97.5 °F (36.4 °C)   TempSrc: Oral   Weight: 298 lb 12.8 oz (135.5 kg)   Height: 5' 10\" (1.778 m)     General Appearance: alert and oriented to person, place and time, obese, well developed and well-nourished, in no acute distress  Psychiatric:  Mood and affect appropriate for situation  Skin: warm and dry, no rash  Head: normocephalic and atraumatic  Eyes: pupils equal, round, sclerae anicteric, conjunctivae normal  ENT: no thrush or oral ulcers  Neck:No complaints, normal appearance  Pulmonary/Chest: Respirations easy at rest, no cough or respiratory distress  Cardiovascular: No chest pain, normal rate, toes warm, cap refill normal, brachial and radial +3  Abdomen: No nausea or vomiting  Extremities: no cyanosis, edema or cellulitis  Musculoskeletal: Ambulatory, moves all extremities, no deformities  Neurologic: distal sensation to light touch intact, no allodynia. Robyn-ulcer skin: intact  Ulcer(s): Wound resolved with dry, fully adherent, intact scab covering. Surrounding tissue without signs and symptoms of infection. No purulence, malodor, erythema, increased temperature, or increased pain. Photos also saved in electronic chart.     Today's Wound Measurements, per RN documentation:  Wound 12/17/19 #1, Left upper arm, dehisced wound, full thickness, onset 12/9/19-Wound Length (cm): 0 cm    Wound 12/17/19 #1, Left upper arm, dehisced wound, full thickness, onset 12/9/19-Wound Width (cm): 0 cm    Wound 12/17/19 #1, Left upper arm, dehisced wound, full thickness, onset 12/9/19-Wound Depth (cm): 0

## 2020-01-21 ENCOUNTER — NURSE ONLY (OUTPATIENT)
Dept: ENDOCRINOLOGY | Age: 70
End: 2020-01-21

## 2020-01-23 ENCOUNTER — TELEPHONE (OUTPATIENT)
Dept: PULMONOLOGY | Age: 70
End: 2020-01-23

## 2020-02-04 ENCOUNTER — OFFICE VISIT (OUTPATIENT)
Dept: ENDOCRINOLOGY | Age: 70
End: 2020-02-04
Payer: MEDICARE

## 2020-02-04 VITALS
HEIGHT: 70 IN | SYSTOLIC BLOOD PRESSURE: 110 MMHG | RESPIRATION RATE: 18 BRPM | HEART RATE: 84 BPM | BODY MASS INDEX: 39.63 KG/M2 | DIASTOLIC BLOOD PRESSURE: 64 MMHG | WEIGHT: 276.8 LBS | OXYGEN SATURATION: 93 %

## 2020-02-04 PROCEDURE — G8427 DOCREV CUR MEDS BY ELIG CLIN: HCPCS | Performed by: NURSE PRACTITIONER

## 2020-02-04 PROCEDURE — 83036 HEMOGLOBIN GLYCOSYLATED A1C: CPT | Performed by: NURSE PRACTITIONER

## 2020-02-04 PROCEDURE — G8417 CALC BMI ABV UP PARAM F/U: HCPCS | Performed by: NURSE PRACTITIONER

## 2020-02-04 PROCEDURE — 1036F TOBACCO NON-USER: CPT | Performed by: NURSE PRACTITIONER

## 2020-02-04 PROCEDURE — 4040F PNEUMOC VAC/ADMIN/RCVD: CPT | Performed by: NURSE PRACTITIONER

## 2020-02-04 PROCEDURE — G8482 FLU IMMUNIZE ORDER/ADMIN: HCPCS | Performed by: NURSE PRACTITIONER

## 2020-02-04 PROCEDURE — 3046F HEMOGLOBIN A1C LEVEL >9.0%: CPT | Performed by: NURSE PRACTITIONER

## 2020-02-04 PROCEDURE — 99213 OFFICE O/P EST LOW 20 MIN: CPT | Performed by: NURSE PRACTITIONER

## 2020-02-04 PROCEDURE — 2022F DILAT RTA XM EVC RTNOPTHY: CPT | Performed by: NURSE PRACTITIONER

## 2020-02-04 PROCEDURE — 1123F ACP DISCUSS/DSCN MKR DOCD: CPT | Performed by: NURSE PRACTITIONER

## 2020-02-04 PROCEDURE — 3017F COLORECTAL CA SCREEN DOC REV: CPT | Performed by: NURSE PRACTITIONER

## 2020-02-04 RX ORDER — AZITHROMYCIN 250 MG/1
TABLET, FILM COATED ORAL
COMMUNITY
Start: 2020-02-03 | End: 2020-02-05

## 2020-02-04 ASSESSMENT — ENCOUNTER SYMPTOMS
CONSTIPATION: 0
EYE PAIN: 0
SHORTNESS OF BREATH: 0
DIARRHEA: 0
COLOR CHANGE: 0
NAUSEA: 0

## 2020-02-04 NOTE — PROGRESS NOTES
Endocrin[facility]  [facility]  Phone: [phone]   FAX: [phone]    Dakota Nair is a 71 y.o. male who is following up for management of Diabetes Mellitus Type 2. Last A1C: 6.9 at visit on 2/4/2020  Lab Results   Component Value Date    LABA1C 6.3 11/13/2019    LABA1C 8.1 03/21/2019    LABA1C 8.1 01/28/2019     Last BP Readings:  BP Readings from Last 3 Encounters:   02/04/20 110/64   01/16/20 (!) 142/76   01/02/20 129/63     Last LDL:   Lab Results   Component Value Date    LDLCALC 76 03/22/2019     Aspirin Use: 81 mg    Tobacco/Alcohol History:    Smoking status: Never Smoker    Smokeless tobacco: Never Used    Alcohol use: No     Diabetes:  Daina Arce  was diagnosed with diabetes type 2  For 25 years   On insulin: 10 years   Patient reports that diabetes is generally not well controlled.  Disease course has been variable    Current microvascular complications include peripheral neuropathy, retinopathy   Current Macrovascular complications include CAD, PVD   Patient reports compliance for about 100% of the time and adheres to medication, but usually not to diet and exercise instructions.  There have been no recent hospital or ED admissions for hypoglycemia, hyperglycemia or DKA.   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 [address] 5/30/2019    Diabetes mellitus ([address]     Hyperlipidemia     Hypertension     Pancreatitis 1996    Restless legs     Sleep apnea     uses CPAP    Tremor     of bilateral hands     Blood glucose trends:  Patient brought log glucometer for download  Test 4 times a day  BG Range 117 fasting AM; and in [id number] pre meal.  Hypoglycemia awareness and symptoms: frequent night time hypoglycemia as low as 35. Typically about 3 times a week. On apersonal sensor    Current Medication regimen:   Injects 5 times a day  Humulin N 10 U BID  Humulin R 10U AC TID    Other meds tried in past:   Biguanides: metformin  ROUSE: glipizide  GLP1: none  GFR> 74    Current Dietary regimen:   BF: oatmeal with milk with fruit  Lunch: sw with fruit  Dinner: meatloaf, sphagetti etc  Snacks: candies, cookies, doughnuts, ice cream  Beverages: water, no soda. 1/2 n 1/2 iced tea, potato chips and fries  Average carbs per day: approx > 250 per day    Microvascular Complications:  · Neuropathy: tingling and pain above knees  · Retinopathy: retinopathy BE  · Nephropathy: no microalbuminuria    Diabetic Health Maintenance   · Last Eye Exam: 11/21  · Last Foot exam: with Dr Mike Camp  · Has patient seen a dietitian? Yes  · Current Exercise: No structured exercise  · On ACEI or ARB: lisinopril 10 mg  · On statin: Lipitor 20 mg    Hyperlipidemia: Current complaints include occasional myalgias but otherwise tolerates well. Lab Results   Component Value Date    CHOL 148 03/22/2019     Lab Results   Component Value Date    TRIG 80 03/22/2019     Lab Results   Component Value Date    HDL 56 03/22/2019     Lab Results   Component Value Date    LDLCALC 76 03/22/2019     No results found for: LDLDIRECT  No results found for: CHOLHDLRATIO    Vitamin D deficiency: Currently is on no supplementation. Current complaints include fatigue on daily basis. Hypertension  Controlled , denies symptoms of dizziness, light headedness. Occasional dependent edema. Tries to follow a salt restricted diet.    Lab Results   Component Value Date     03/21/2019    K 4.1 03/21/2019     03/21/2019    CO2 28 03/21/2019    BUN 16 03/21/2019    CREATININE 0.9 03/21/2019    GLUCOSE 136 (H) 03/21/2019    CALCIUM 9.1 03/21/2019    PROT 6.7 03/21/2019    LABALBU 4.0 03/21/2019    BILITOT 0.7 03/21/2019    ALKPHOS 88 03/21/2019    AST 15 03/21/2019    ALT 17 03/21/2019    LABGLOM >60 03/21/2019    GFRAA >60 03/21/2019    AGRATIO 1.5 03/21/2019    GLOB 2.7 03/21/2019     The 10-year ASCVD risk score (Mary Reich, et al., 2013) is: 22.4%    Values used to calculate the score:      Age: 71 years      Sex: Male      Is Non- : No      Diabetic: Yes      Tobacco smoker: No      Systolic Blood Pressure: 626 mmHg      Is BP treated: Yes      HDL Cholesterol: 56 mg/dL      Total Cholesterol: 148 mg/dL    Family History   Problem Relation Age of Onset    Kidney Disease Mother     Cancer Father 76        pancreas    Cancer Brother         lung    Cancer Paternal Grandfather         colon     Review of Systems   Constitutional: Negative for activity change, appetite change, diaphoresis, fever and unexpected weight change. HENT: Negative for dental problem. Eyes: Negative for pain and visual disturbance. Respiratory: Negative for shortness of breath. Cardiovascular: Negative for chest pain, palpitations and leg swelling. Gastrointestinal: Negative for constipation, diarrhea and nausea. Endocrine: Negative for cold intolerance, heat intolerance, polydipsia, polyphagia and polyuria. Genitourinary: Negative for frequency and urgency. Musculoskeletal: Negative for arthralgias, joint swelling and myalgias. Skin: Negative for color change and pallor. Neurological: Negative for weakness, numbness and headaches. Psychiatric/Behavioral: Negative for dysphoric mood and sleep disturbance. The patient is not nervous/anxious. Vitals:    02/04/20 1011   BP: 110/64   Site: Right Upper Arm   Position: Sitting   Cuff Size: Medium Adult   Pulse: 84   Resp: 18   SpO2: 93%   Weight: 276 lb 12.8 oz (125.6 kg)   Height: 5' 10\" (1.778 m)     Physical Exam  Vitals signs reviewed. Constitutional:       Appearance: He is well-developed. Eyes:      Pupils: Pupils are equal, round, and reactive to light.    Neck: Musculoskeletal: Normal range of motion. Thyroid: No thyromegaly. Cardiovascular:      Rate and Rhythm: Normal rate and regular rhythm. Heart sounds: Normal heart sounds. Pulmonary:      Effort: Pulmonary effort is normal.      Breath sounds: Normal breath sounds. Abdominal:      General: There is no distension. Musculoskeletal: Normal range of motion. Skin:     General: Skin is warm and dry. Comments: No skin changes or evidence of trauma. Neurological:      Mental Status: He is alert and oriented to person, place, and time. Sensory: No sensory deficit. Psychiatric:         Behavior: Behavior normal.         Thought Content: Thought content normal.     Skeletal foot exam: deferred        Diabetes Continuous Glucose Monitoring Report   Freestyle Avelino PRO     Reason for Study:   - Poorly controlled DM without success with standard interventions   - Glucose variability      Current Therapy:   Injects 5 times a day  Humulin N 10 U BID  Humulin R 10U AC TID     CGMS Report   CGMS Device: Freestyle Avelino Pro  Data collection from 12/11/19 to 12/24/19  Range set @   Average reading 351 mg/dL   Above target range 100%  In target range 0%  Below target range 0%      Impression:   Postprandial hyperglycemia   Persistent overnight hyperglycemia  Reports significant dietary non compliance in this period due to holiday season    Recommendation:   Current A1c   Lab Results   Component Value Date    LABA1C 6.3 11/13/2019     Goal A1c < 6.5%  Medication Change: Victoza as below  Insulin Change: see plan  Dietary Recommendations : see plan    Assessment  Boyd Pop is a 71 y.o. male with uncontrolled Diabetes Mellitus type 2 complicated by peripheral neuropathy, retinopathy and associated with HTN, HLD, morbid obesity.     Plan  Problem List Items Addressed This Visit     Controlled type 2 diabetes mellitus without complication, with long-term current use of insulin ([address] - Primary

## 2020-02-04 NOTE — ASSESSMENT & PLAN NOTE
A1c today was 6.9%  Goal is 6.5%    Switch to [place] in place of Novolin N  Start a 15 units once at bedtime  Check morning fasting sugars  Increase by 1 unit for every three days in a row that fasting blood sugars are > 150  Decrease by 1-2 unit for any given day that fasting blood sugars are < 90    Continue with Novolin R but use the following sliding scale with meal times  Sliding scale:  < 150 : take 5 units  151- 200 Add  2 units  201- 250 Add 4 units  251- 300 Add 6 units

## 2020-02-05 ENCOUNTER — APPOINTMENT (OUTPATIENT)
Dept: CT IMAGING | Age: 70
DRG: 193 | End: 2020-02-05
Payer: MEDICARE

## 2020-02-05 ENCOUNTER — APPOINTMENT (OUTPATIENT)
Dept: GENERAL RADIOLOGY | Age: 70
DRG: 193 | End: 2020-02-05
Payer: MEDICARE

## 2020-02-05 ENCOUNTER — HOSPITAL ENCOUNTER (INPATIENT)
Age: 70
LOS: 3 days | Discharge: HOME HEALTH | DRG: 193 | End: 2020-02-08
Attending: EMERGENCY MEDICINE | Admitting: INTERNAL MEDICINE
Payer: MEDICARE

## 2020-02-05 PROBLEM — J11.1 INFLUENZA: Status: ACTIVE | Noted: 2020-02-05

## 2020-02-05 LAB
A/G RATIO: 1.6 (ref 1.1–2.2)
ALBUMIN SERPL-MCNC: 4.3 G/DL (ref 3.4–5)
ALP BLD-CCNC: 86 U/L (ref 40–129)
ALT SERPL-CCNC: 17 U/L (ref 10–40)
ANION GAP SERPL CALCULATED.3IONS-SCNC: 13 MMOL/L (ref 3–16)
AST SERPL-CCNC: 21 U/L (ref 15–37)
BASOPHILS ABSOLUTE: 0 K/UL (ref 0–0.2)
BASOPHILS RELATIVE PERCENT: 0.5 %
BILIRUB SERPL-MCNC: 0.7 MG/DL (ref 0–1)
BUN BLDV-MCNC: 22 MG/DL (ref 7–20)
C DIFF TOXIN/ANTIGEN: NORMAL
CALCIUM SERPL-MCNC: 9 MG/DL (ref 8.3–10.6)
CHLORIDE BLD-SCNC: 97 MMOL/L (ref 99–110)
CO2: 26 MMOL/L (ref 21–32)
CREAT SERPL-MCNC: 1.2 MG/DL (ref 0.8–1.3)
EKG ATRIAL RATE: 39 BPM
EKG DIAGNOSIS: NORMAL
EKG P AXIS: 66 DEGREES
EKG P-R INTERVAL: 206 MS
EKG Q-T INTERVAL: 500 MS
EKG QRS DURATION: 94 MS
EKG QTC CALCULATION (BAZETT): 402 MS
EKG R AXIS: 38 DEGREES
EKG T AXIS: 31 DEGREES
EKG VENTRICULAR RATE: 39 BPM
EOSINOPHILS ABSOLUTE: 0.2 K/UL (ref 0–0.6)
EOSINOPHILS RELATIVE PERCENT: 2.4 %
GFR AFRICAN AMERICAN: >60
GFR NON-AFRICAN AMERICAN: 60
GLOBULIN: 2.7 G/DL
GLUCOSE BLD-MCNC: 127 MG/DL (ref 70–99)
GLUCOSE BLD-MCNC: 144 MG/DL (ref 70–99)
GLUCOSE BLD-MCNC: 201 MG/DL (ref 70–99)
HBA1C MFR BLD: 6.9 %
HCT VFR BLD CALC: 42.6 % (ref 40.5–52.5)
HEMOGLOBIN: 14.1 G/DL (ref 13.5–17.5)
LACTIC ACID: 1.4 MMOL/L (ref 0.4–2)
LIPASE: 12 U/L (ref 13–60)
LYMPHOCYTES ABSOLUTE: 1.8 K/UL (ref 1–5.1)
LYMPHOCYTES RELATIVE PERCENT: 23.3 %
MAGNESIUM: 1.6 MG/DL (ref 1.8–2.4)
MCH RBC QN AUTO: 29.2 PG (ref 26–34)
MCHC RBC AUTO-ENTMCNC: 33.1 G/DL (ref 31–36)
MCV RBC AUTO: 88.1 FL (ref 80–100)
MONOCYTES ABSOLUTE: 1.1 K/UL (ref 0–1.3)
MONOCYTES RELATIVE PERCENT: 14.3 %
NEUTROPHILS ABSOLUTE: 4.6 K/UL (ref 1.7–7.7)
NEUTROPHILS RELATIVE PERCENT: 59.5 %
PDW BLD-RTO: 13.7 % (ref 12.4–15.4)
PERFORMED ON: ABNORMAL
PERFORMED ON: ABNORMAL
PLATELET # BLD: 122 K/UL (ref 135–450)
PMV BLD AUTO: 10.2 FL (ref 5–10.5)
POTASSIUM SERPL-SCNC: 3.7 MMOL/L (ref 3.5–5.1)
PROCALCITONIN: 0.28 NG/ML (ref 0–0.15)
RAPID INFLUENZA  B AGN: NEGATIVE
RAPID INFLUENZA A AGN: POSITIVE
RBC # BLD: 4.84 M/UL (ref 4.2–5.9)
SODIUM BLD-SCNC: 136 MMOL/L (ref 136–145)
SPECIMEN STATUS: NORMAL
TOTAL PROTEIN: 7 G/DL (ref 6.4–8.2)
TROPONIN: <0.01 NG/ML
TROPONIN: <0.01 NG/ML
WBC # BLD: 7.7 K/UL (ref 4–11)

## 2020-02-05 PROCEDURE — 87449 NOS EACH ORGANISM AG IA: CPT

## 2020-02-05 PROCEDURE — 83735 ASSAY OF MAGNESIUM: CPT

## 2020-02-05 PROCEDURE — 74177 CT ABD & PELVIS W/CONTRAST: CPT

## 2020-02-05 PROCEDURE — 2700000000 HC OXYGEN THERAPY PER DAY

## 2020-02-05 PROCEDURE — 80053 COMPREHEN METABOLIC PANEL: CPT

## 2020-02-05 PROCEDURE — 96365 THER/PROPH/DIAG IV INF INIT: CPT

## 2020-02-05 PROCEDURE — 71045 X-RAY EXAM CHEST 1 VIEW: CPT

## 2020-02-05 PROCEDURE — 87324 CLOSTRIDIUM AG IA: CPT

## 2020-02-05 PROCEDURE — 94150 VITAL CAPACITY TEST: CPT

## 2020-02-05 PROCEDURE — 71260 CT THORAX DX C+: CPT

## 2020-02-05 PROCEDURE — 6360000002 HC RX W HCPCS: Performed by: INTERNAL MEDICINE

## 2020-02-05 PROCEDURE — 87329 GIARDIA AG IA: CPT

## 2020-02-05 PROCEDURE — 99223 1ST HOSP IP/OBS HIGH 75: CPT | Performed by: INTERNAL MEDICINE

## 2020-02-05 PROCEDURE — 6360000002 HC RX W HCPCS: Performed by: EMERGENCY MEDICINE

## 2020-02-05 PROCEDURE — 6360000002 HC RX W HCPCS: Performed by: NURSE PRACTITIONER

## 2020-02-05 PROCEDURE — 2580000003 HC RX 258: Performed by: INTERNAL MEDICINE

## 2020-02-05 PROCEDURE — 96375 TX/PRO/DX INJ NEW DRUG ADDON: CPT

## 2020-02-05 PROCEDURE — 6370000000 HC RX 637 (ALT 250 FOR IP): Performed by: EMERGENCY MEDICINE

## 2020-02-05 PROCEDURE — 6370000000 HC RX 637 (ALT 250 FOR IP): Performed by: INTERNAL MEDICINE

## 2020-02-05 PROCEDURE — 87336 ENTAMOEB HIST DISPR AG IA: CPT

## 2020-02-05 PROCEDURE — 87804 INFLUENZA ASSAY W/OPTIC: CPT

## 2020-02-05 PROCEDURE — 2580000003 HC RX 258: Performed by: NURSE PRACTITIONER

## 2020-02-05 PROCEDURE — 93005 ELECTROCARDIOGRAM TRACING: CPT | Performed by: EMERGENCY MEDICINE

## 2020-02-05 PROCEDURE — 6360000004 HC RX CONTRAST MEDICATION: Performed by: EMERGENCY MEDICINE

## 2020-02-05 PROCEDURE — 83605 ASSAY OF LACTIC ACID: CPT

## 2020-02-05 PROCEDURE — 36415 COLL VENOUS BLD VENIPUNCTURE: CPT

## 2020-02-05 PROCEDURE — 85025 COMPLETE CBC W/AUTO DIFF WBC: CPT

## 2020-02-05 PROCEDURE — 87425 ROTAVIRUS AG IA: CPT

## 2020-02-05 PROCEDURE — 94660 CPAP INITIATION&MGMT: CPT

## 2020-02-05 PROCEDURE — 99285 EMERGENCY DEPT VISIT HI MDM: CPT

## 2020-02-05 PROCEDURE — 87040 BLOOD CULTURE FOR BACTERIA: CPT

## 2020-02-05 PROCEDURE — 84484 ASSAY OF TROPONIN QUANT: CPT

## 2020-02-05 PROCEDURE — 2000000000 HC ICU R&B

## 2020-02-05 PROCEDURE — 94761 N-INVAS EAR/PLS OXIMETRY MLT: CPT

## 2020-02-05 PROCEDURE — 87328 CRYPTOSPORIDIUM AG IA: CPT

## 2020-02-05 PROCEDURE — 83690 ASSAY OF LIPASE: CPT

## 2020-02-05 PROCEDURE — 84145 PROCALCITONIN (PCT): CPT

## 2020-02-05 RX ORDER — POTASSIUM CHLORIDE 20 MEQ/1
40 TABLET, EXTENDED RELEASE ORAL PRN
Status: DISCONTINUED | OUTPATIENT
Start: 2020-02-05 | End: 2020-02-08 | Stop reason: HOSPADM

## 2020-02-05 RX ORDER — ATROPINE SULFATE 0.1 MG/ML
0.5 INJECTION INTRAVENOUS ONCE
Status: COMPLETED | OUTPATIENT
Start: 2020-02-05 | End: 2020-02-05

## 2020-02-05 RX ORDER — ALBUTEROL SULFATE 2.5 MG/3ML
2.5 SOLUTION RESPIRATORY (INHALATION) EVERY 6 HOURS PRN
Status: DISCONTINUED | OUTPATIENT
Start: 2020-02-05 | End: 2020-02-08 | Stop reason: HOSPADM

## 2020-02-05 RX ORDER — MAGNESIUM SULFATE 1 G/100ML
1 INJECTION INTRAVENOUS PRN
Status: DISCONTINUED | OUTPATIENT
Start: 2020-02-05 | End: 2020-02-08 | Stop reason: HOSPADM

## 2020-02-05 RX ORDER — FLUTICASONE PROPIONATE 50 MCG
2 SPRAY, SUSPENSION (ML) NASAL DAILY
Status: DISCONTINUED | OUTPATIENT
Start: 2020-02-05 | End: 2020-02-08 | Stop reason: HOSPADM

## 2020-02-05 RX ORDER — ATORVASTATIN CALCIUM 10 MG/1
20 TABLET, FILM COATED ORAL NIGHTLY
Status: DISCONTINUED | OUTPATIENT
Start: 2020-02-05 | End: 2020-02-08 | Stop reason: HOSPADM

## 2020-02-05 RX ORDER — DEXTROSE MONOHYDRATE 25 G/50ML
12.5 INJECTION, SOLUTION INTRAVENOUS PRN
Status: DISCONTINUED | OUTPATIENT
Start: 2020-02-05 | End: 2020-02-08 | Stop reason: HOSPADM

## 2020-02-05 RX ORDER — 0.9 % SODIUM CHLORIDE 0.9 %
1000 INTRAVENOUS SOLUTION INTRAVENOUS ONCE
Status: COMPLETED | OUTPATIENT
Start: 2020-02-05 | End: 2020-02-05

## 2020-02-05 RX ORDER — OSELTAMIVIR PHOSPHATE 75 MG/1
75 CAPSULE ORAL 2 TIMES DAILY
Status: DISCONTINUED | OUTPATIENT
Start: 2020-02-05 | End: 2020-02-08 | Stop reason: HOSPADM

## 2020-02-05 RX ORDER — PRAMIPEXOLE DIHYDROCHLORIDE 0.25 MG/1
0.12 TABLET ORAL NIGHTLY
Status: DISCONTINUED | OUTPATIENT
Start: 2020-02-05 | End: 2020-02-08 | Stop reason: HOSPADM

## 2020-02-05 RX ORDER — SODIUM CHLORIDE, SODIUM LACTATE, POTASSIUM CHLORIDE, CALCIUM CHLORIDE 600; 310; 30; 20 MG/100ML; MG/100ML; MG/100ML; MG/100ML
INJECTION, SOLUTION INTRAVENOUS CONTINUOUS
Status: DISCONTINUED | OUTPATIENT
Start: 2020-02-05 | End: 2020-02-06

## 2020-02-05 RX ORDER — DEXTROSE MONOHYDRATE 50 MG/ML
100 INJECTION, SOLUTION INTRAVENOUS PRN
Status: DISCONTINUED | OUTPATIENT
Start: 2020-02-05 | End: 2020-02-08 | Stop reason: HOSPADM

## 2020-02-05 RX ORDER — SODIUM CHLORIDE 0.9 % (FLUSH) 0.9 %
10 SYRINGE (ML) INJECTION PRN
Status: DISCONTINUED | OUTPATIENT
Start: 2020-02-05 | End: 2020-02-08 | Stop reason: HOSPADM

## 2020-02-05 RX ORDER — OSELTAMIVIR PHOSPHATE 75 MG/1
75 CAPSULE ORAL ONCE
Status: COMPLETED | OUTPATIENT
Start: 2020-02-05 | End: 2020-02-05

## 2020-02-05 RX ORDER — ACETAMINOPHEN 325 MG/1
650 TABLET ORAL EVERY 4 HOURS PRN
Status: DISCONTINUED | OUTPATIENT
Start: 2020-02-05 | End: 2020-02-06

## 2020-02-05 RX ORDER — ONDANSETRON 2 MG/ML
4 INJECTION INTRAMUSCULAR; INTRAVENOUS ONCE
Status: COMPLETED | OUTPATIENT
Start: 2020-02-05 | End: 2020-02-05

## 2020-02-05 RX ORDER — INSULIN GLARGINE 100 [IU]/ML
15 INJECTION, SOLUTION SUBCUTANEOUS NIGHTLY
Status: DISCONTINUED | OUTPATIENT
Start: 2020-02-05 | End: 2020-02-08 | Stop reason: HOSPADM

## 2020-02-05 RX ORDER — PANTOPRAZOLE SODIUM 40 MG/1
40 TABLET, DELAYED RELEASE ORAL 2 TIMES DAILY
Status: DISCONTINUED | OUTPATIENT
Start: 2020-02-05 | End: 2020-02-08 | Stop reason: HOSPADM

## 2020-02-05 RX ORDER — GABAPENTIN 100 MG/1
200 CAPSULE ORAL NIGHTLY
Status: DISCONTINUED | OUTPATIENT
Start: 2020-02-05 | End: 2020-02-08 | Stop reason: HOSPADM

## 2020-02-05 RX ORDER — SODIUM CHLORIDE 0.9 % (FLUSH) 0.9 %
10 SYRINGE (ML) INJECTION EVERY 12 HOURS SCHEDULED
Status: DISCONTINUED | OUTPATIENT
Start: 2020-02-05 | End: 2020-02-08 | Stop reason: HOSPADM

## 2020-02-05 RX ORDER — PROCHLORPERAZINE EDISYLATE 5 MG/ML
10 INJECTION INTRAMUSCULAR; INTRAVENOUS EVERY 6 HOURS PRN
Status: DISCONTINUED | OUTPATIENT
Start: 2020-02-05 | End: 2020-02-08 | Stop reason: HOSPADM

## 2020-02-05 RX ORDER — ALBUTEROL SULFATE 2.5 MG/3ML
2.5 SOLUTION RESPIRATORY (INHALATION) ONCE
Status: DISCONTINUED | OUTPATIENT
Start: 2020-02-05 | End: 2020-02-06

## 2020-02-05 RX ORDER — POTASSIUM CHLORIDE 7.45 MG/ML
10 INJECTION INTRAVENOUS PRN
Status: DISCONTINUED | OUTPATIENT
Start: 2020-02-05 | End: 2020-02-08 | Stop reason: HOSPADM

## 2020-02-05 RX ORDER — ASPIRIN 81 MG/1
81 TABLET, CHEWABLE ORAL DAILY
Status: DISCONTINUED | OUTPATIENT
Start: 2020-02-05 | End: 2020-02-08 | Stop reason: HOSPADM

## 2020-02-05 RX ORDER — NICOTINE POLACRILEX 4 MG
15 LOZENGE BUCCAL PRN
Status: DISCONTINUED | OUTPATIENT
Start: 2020-02-05 | End: 2020-02-08 | Stop reason: HOSPADM

## 2020-02-05 RX ADMIN — PRAMIPEXOLE DIHYDROCHLORIDE 0.12 MG: 0.25 TABLET ORAL at 21:00

## 2020-02-05 RX ADMIN — ATORVASTATIN CALCIUM 20 MG: 10 TABLET, FILM COATED ORAL at 20:29

## 2020-02-05 RX ADMIN — GABAPENTIN 200 MG: 100 CAPSULE ORAL at 20:29

## 2020-02-05 RX ADMIN — SODIUM CHLORIDE, POTASSIUM CHLORIDE, SODIUM LACTATE AND CALCIUM CHLORIDE: 600; 310; 30; 20 INJECTION, SOLUTION INTRAVENOUS at 23:48

## 2020-02-05 RX ADMIN — SODIUM CHLORIDE, POTASSIUM CHLORIDE, SODIUM LACTATE AND CALCIUM CHLORIDE: 600; 310; 30; 20 INJECTION, SOLUTION INTRAVENOUS at 12:53

## 2020-02-05 RX ADMIN — PANTOPRAZOLE SODIUM 40 MG: 40 TABLET, DELAYED RELEASE ORAL at 20:29

## 2020-02-05 RX ADMIN — MUPIROCIN: 20 OINTMENT TOPICAL at 15:49

## 2020-02-05 RX ADMIN — PANTOPRAZOLE SODIUM 40 MG: 40 TABLET, DELAYED RELEASE ORAL at 15:49

## 2020-02-05 RX ADMIN — INSULIN LISPRO 2 UNITS: 100 INJECTION, SOLUTION INTRAVENOUS; SUBCUTANEOUS at 17:16

## 2020-02-05 RX ADMIN — OSELTAMIVIR PHOSPHATE 75 MG: 75 CAPSULE ORAL at 10:12

## 2020-02-05 RX ADMIN — MAGNESIUM SULFATE HEPTAHYDRATE 1 G: 1 INJECTION, SOLUTION INTRAVENOUS at 17:17

## 2020-02-05 RX ADMIN — INSULIN GLARGINE 15 UNITS: 100 INJECTION, SOLUTION SUBCUTANEOUS at 20:40

## 2020-02-05 RX ADMIN — INSULIN LISPRO 2 UNITS: 100 INJECTION, SOLUTION INTRAVENOUS; SUBCUTANEOUS at 20:37

## 2020-02-05 RX ADMIN — FLUTICASONE PROPIONATE 2 SPRAY: 50 SPRAY, METERED NASAL at 15:49

## 2020-02-05 RX ADMIN — IOPAMIDOL 75 ML: 755 INJECTION, SOLUTION INTRAVENOUS at 10:04

## 2020-02-05 RX ADMIN — MUPIROCIN: 20 OINTMENT TOPICAL at 20:43

## 2020-02-05 RX ADMIN — ONDANSETRON 4 MG: 2 INJECTION INTRAMUSCULAR; INTRAVENOUS at 09:28

## 2020-02-05 RX ADMIN — Medication 10 ML: at 20:52

## 2020-02-05 RX ADMIN — CEFTRIAXONE SODIUM 1 G: 1 INJECTION, POWDER, FOR SOLUTION INTRAMUSCULAR; INTRAVENOUS at 11:18

## 2020-02-05 RX ADMIN — OSELTAMIVIR PHOSPHATE 75 MG: 75 CAPSULE ORAL at 21:02

## 2020-02-05 RX ADMIN — ATROPINE SULFATE 0.5 MG: 0.1 INJECTION PARENTERAL at 09:28

## 2020-02-05 RX ADMIN — MAGNESIUM SULFATE HEPTAHYDRATE 1 G: 1 INJECTION, SOLUTION INTRAVENOUS at 15:50

## 2020-02-05 RX ADMIN — SODIUM CHLORIDE 1000 ML: 9 INJECTION, SOLUTION INTRAVENOUS at 09:15

## 2020-02-05 ASSESSMENT — PAIN SCALES - GENERAL
PAINLEVEL_OUTOF10: 0

## 2020-02-05 NOTE — ED PROVIDER NOTES
I independently performed a history and physical on Yong Cha. All diagnostic, treatment, and disposition decisions were made by myself in conjunction with the mid-level provider. Briefly the patient's history and exam was the following: For further details of [facility] emergency department encounter, please see Tosha Akhtar NP's documentation. Patient presents to the ED with complaints of not feeling well for last few days. Went to urgent care yesterday with flu like symptoms and test was negative. Today started having vomiting and diarrhea and became very lightheaded and weak. No chest pain. No shortness of breath. No melena or hematochezia. Found to be bradycardic on arrival with borderline blood pressures. EXAM:  Diaphoretic and pale. Ill appearing  Dry mucus membranes. Bradycardic regular rhythm. No murmurs. Lungs clear. No rales. No wheezing  Abdomen nontender. No guarding or rebound. No edema  ED Triage Vitals   Enc Vitals Group      BP 02/05/20 0846 (!) 98/48      Pulse 02/05/20 0849 55      Resp 02/05/20 0846 24      Temp 02/05/20 0846 98.8 °F (37.1 °C)      Temp Source 02/05/20 0846 Oral      SpO2 02/05/20 0849 93 %      Weight 02/05/20 0846 276 lb (125.2 kg)      Height 02/05/20 0846 5' 10\" (1.778 m)      Head Circumference --       Peak Flow --       Pain Score --       Pain Loc --       Pain Edu? --       Excl. in [address]? --        EKG as interpreted by myself:  sinus bradycardia, rate=39   Axis is   Normal  QTc is  normal  Intervals and Durations are unremarkable. No specific ST-T wave changes appreciated. No evidence of acute ischemia. Compared to prior EKG dated 3/22/19, rate decreased by 20bpm    XR CHEST PORTABLE   Final Result   No acute disease. CT CHEST PULMONARY EMBOLISM W CONTRAST   Final Result   1. Partial collapse of the lingula. CT ABDOMEN PELVIS W IV CONTRAST Additional Contrast? None   Final Result   1.  Partial collapse of the Albumin/Globulin Ratio 1.6 1.1 - 2.2    Total Bilirubin 0.7 0.0 - 1.0 mg/dL    Alkaline Phosphatase 86 40 - 129 U/L    ALT 17 10 - 40 U/L    AST 21 15 - 37 U/L    Globulin 2.7 g/dL   Lactic acid, plasma   Result Value Ref Range    Lactic Acid 1.4 0.4 - 2.0 mmol/L   Rotavirus antigen, stool   Result Value Ref Range    Rotavirus Negative Negative   Sample possible blood bank testing   Result Value Ref Range    Specimen Status LAST    Procalcitonin   Result Value Ref Range    Procalcitonin 0.28 (H) 0.00 - 0.15 ng/mL   Troponin   Result Value Ref Range    Troponin <0.01 <0.01 ng/mL     Medical decision making:  Patient preseents with flu like illness now with bradycardia and borderline hypotension and hypoxia. Sinus carlos on EKG, no block. Given assocated hypotension with bradycardia, given atropine 0.5mg IV and zofran in case having vagal induced bradycardia. No ischemic changes on EKG and not having chest pain. Troponin negative. CXR with no pneumonia but CT with lingular collapse, no PE. No leukocytosis. No WESLEY. Influenza positive. sgtsarted on tamiflu and IVF hydration. Plan for admission given bradycardia and hypoxia. 1. Diarrhea, unspecified type    2. Hypoxia    3. Influenza    4.  Sinus bradycardia            Nael Sheldon MD  02/07/20 [id number]

## 2020-02-05 NOTE — H&P
Hospital Medicine History & Physical      PCP: Deny Martinez DO    Date of Admission: 2/5/2020    Date of Service: Pt seen/examined on 02/05/20 and Admitted to Inpatient with expected LOS greater than two midnights due to medical therapy. Chief Complaint:  Malaise, dyspnea, syncope     History Of Present Illness: The patient is a 71 Y M with a h/o HTN, HLD, DM2, and CAD. He began to feel ill about two days ago. Cough, dyspnea, congestion, malaise, fevers and chills. He went to an urgent care, rapid flu swab was negative, and he was discharged with azithromycin. He continued to feel badly. Also has significant diarrhea, which was unusual for him. He was at his wife's oncology appointment today when he suddenly felt faint and diaphoretic, decided he needed to get some fresh air. He stood up from his chair, took a few steps, and felt like he was going to pass out. People came to his assistance and lowered him into a chair, where he slumped over and lost consciousness for a few seconds. Clinic staff took his blood pressure and heart rate and they were reportedly normal.  They sent him to the ED were he was found to be hypoxic in the 60's, slightly hypotensive at 98/48, and sinus bradycardic in the mid 40's. This time rapid influenza swab was positive. Patient is stoic and currently says he feels OK. Denies any chest pain, though it does feel a little tight when he is coughing. Unrelated to exertion. Denies ongoing dyspnea. Isn't in respiratory distress. BP has improved. HR still mid 40's. No ongoing lightheadedness. Wide awake.         Past Medical History:          Diagnosis Date    Acid reflux     Yan's esophagus     Coronary artery disease of native heart with stable angina pectoris ([facility] [address] 5/30/2019    Diabetes mellitus ([facility] [address]     Hyperlipidemia     Hypertension     Influenza A 02/05/2020    Pancreatitis 1996    Restless legs     Sleep apnea     uses CPAP    Tremor route every 14 days 12/31/19   La AlphaSANDY CNP   metoprolol tartrate (LOPRESSOR) 25 MG tablet Take 1 tablet by mouth 2 times daily 11/27/19   [facility], DO   gabapentin (NEURONTIN) 100 MG capsule Take 2 capsules by mouth nightly for 60 days. 11/13/19 1/16/20  SANDY Cisneros CNP   pramipexole (MIRAPEX) 0.125 MG tablet Take 1 tablet by mouth nightly 9/17/19   SANDY Cisneros CNP   isosorbide mononitrate (IMDUR) 30 MG extended release tablet Take 1 tablet by mouth daily 6/25/19   Hafsa [facility], DO   aspirin 81 MG chewable tablet Take 1 tablet by mouth daily 3/26/19   Flex Varner MD   nitroGLYCERIN (NITROSTAT) 0.4 MG SL tablet up to max of 3 total doses. If no relief after 1 dose, call 911. 3/25/19   Flex Varner MD   fluticasone (FLONASE) 50 MCG/ACT nasal spray 2 sprays by Nasal route daily 1/26/18   SANDY Wiley CNP   glucose blood VI test strips (ASCENSIA AUTODISC VI;ONE TOUCH ULTRA TEST VI) strip Check glucose 3 times daily 11/22/16   Historical Provider, MD   Insulin Syringe-Needle U-100 30G X 1/2\" 0.5 ML MISC Inject insulin 3 times daily 9/12/17   Historical Provider, MD   pantoprazole (PROTONIX) 40 MG tablet Take 40 mg by mouth 2 times daily  12/29/16   Historical Provider, MD   insulin regular (HUMULIN R;NOVOLIN R) 100 UNIT/ML injection Inject 10 Units into the skin 3 times daily (before meals)     Historical Provider, MD   insulin NPH (HUMULIN N;NOVOLIN N) 100 UNIT/ML injection vial Inject 10 Units into the skin 2 times daily     Historical Provider, MD   lisinopril (PRINIVIL;ZESTRIL) 10 MG tablet Take 10 mg by mouth daily    Historical Provider, MD   atorvastatin (LIPITOR) 20 MG tablet Take 20 mg by mouth daily. Historical Provider, MD       Allergies:  Codeine    Social History:      The patient currently lives at home    TOBACCO:   reports that he has never smoked.  He has never used smokeless tobacco.  ETOH:   reports no history of alcohol 42.6   *     Recent Labs     02/05/20  0859      K 3.7   CL 97*   CO2 26   BUN 22*   CREATININE 1.2   CALCIUM 9.0     Recent Labs     02/05/20  0859   AST 21   ALT 17   BILITOT 0.7   ALKPHOS 86     No results for input(s): INR in the last 72 hours. Recent Labs     02/05/20  0859   TROPONINI <0.01       Urinalysis:      Lab Results   Component Value Date    NITRU POSITIVE 04/02/2015    WBCUA >100 04/02/2015    BACTERIA 2+ 04/02/2015    RBCUA >100 04/02/2015    BLOODU TRACE-LYSED 04/02/2015    SPECGRAV 1.015 04/02/2015    GLUCOSEU Negative 04/02/2015       Radiology:     CXR: I have reviewed the CXR with the following interpretation: clear lungs  EKG:  I have reviewed the EKG with the following interpretation: sinus carlos    CT CHEST PULMONARY EMBOLISM W CONTRAST   Final Result   1. Partial collapse of the lingula. CT ABDOMEN PELVIS W IV CONTRAST Additional Contrast? None   Final Result   1. Partial collapse of the lingula. XR CHEST PORTABLE   Final Result   No acute process. ASSESSMENT:    Active Hospital Problems    Diagnosis Date Noted    Influenza [J11.1] 02/05/2020         PLAN:    The patient is a 71 Y M with a h/o HTN, HLD, DM2, and CAD. He began to feel ill about two days ago. Cough, dyspnea, congestion, malaise, fevers and chills. He went to an urgent care, rapid flu swab was negative, and he was discharged with azithromycin. He continued to feel badly. Also has significant diarrhea, which was unusual for him. He was at his wife's oncology appointment today when he suddenly felt faint and diaphoretic, decided he needed to get some fresh air. He stood up from his chair, took a few steps, and felt like he was going to pass out. People came to his assistance and lowered him into a chair, where he slumped over and lost consciousness for a few seconds.   Clinic staff took his blood pressure and heart rate and they were reportedly normal.  They sent him to the ED were he was found to be hypoxic in the 60's, slightly hypotensive at 98/48, and sinus bradycardic in the mid 40's. This time rapid influenza swab was positive. Influenza, with acute hypoxic respiratory failure  - oseltamivir through 2/10  - of note, his procalcitonin was mildly elevated. Continue to reassess for evidence of bacterial infection. - wean to RA as tolerated. The patient has no supplemental O2 requirement at baseline. Continue CPAP for ESTHER. CTPA negative for PE. There was some evidence of lingular collapse on CT, defer further management to pulmonary. Hypotension, with h/o HTN  - more likely due to diarrhea-related hypovolemia than either septic or cardiogenic shock. Responded to IVFs. - stopped metoprolol. Initially also held lisinopril and ISMN. His BP is usually normal as outpatient, sometimes even a little high. Bradycardia  - probably related to acute illness and metoprolol. No heart block on telemetry or EKG. HR is often in the mid 50's as outpatient, DC'd metoprolol. It looks like his HR never really sustained lower than 40 in the ED, he was given atropine.    - TSH recently normal  - f/u TTE    Diarrhea  - perhaps due to flu. He had been on a couple days of azithromycin, so f/u c.diff. Syncope  - due to the above issues, treat accordingly    Chest tightness, which was mild and only present when coughing  - likely related to influenza. Trop and EKG not suggestive of ischemia. TTE as above. - continue aspirin and statin. - I do not suspect PE or acute aortic pathology     DM2  - recent A1c 6.9. He follows with endocrinology. Adjusted insulin regimen while here. DVT Prophylaxis: enoxaparin  Diet: No diet orders on file  Code Status: Prior    PT/OT Eval Status: order when able to safely participate    Dispo - when vitals stable, ideally when on RA. Perhaps 2/8. He lives at home with his wife.         Kelly Gomez MD    Thank you Bridger Larson, DO for the opportunity to be involved in this patient's care. If you have any questions or concerns please feel free to contact me at [phone].

## 2020-02-05 NOTE — PROGRESS NOTES
RESPIRATORY THERAPY ASSESSMENT    Name:  Carolina Avila 89 Record Number:  [id number]  Age: 71 y.o. Gender: male  : 1950  Today's Date:  2020  Room:  0229/0229-01    Assessment     Is the patient being admitted for a COPD or Asthma exacerbation? No   (If yes the patient will be seen every 4 hours for the first 24 hours and then reassessed)    Patient Admission Diagnosis      Allergies  Allergies   Allergen Reactions    Codeine Nausea And Vomiting       Minimum Predicted Vital Capacity:    1095       Actual Vital Capacity:      3000              Pulmonary History:ESTHER  Home Oxygen Therapy:  room air  Home Respiratory Therapy:None   Current Respiratory Therapy:  Albuterol q6prn  Treatment Type: IS       Respiratory Severity Index(RSI)   Patients with orders for inhalation medications, oxygen, or any therapeutic treatment modality will be placed on Respiratory Protocol. They will be assessed with the first treatment and at least every 72 hours thereafter. The following severity scale will be used to determine frequency of treatment intervention.     Smoking History: No Smoking History = 0    Social History  Social History     Tobacco Use    Smoking status: Never Smoker    Smokeless tobacco: Never Used   Substance Use Topics    Alcohol use: No    Drug use: No       Recent Surgical History: None = 0  Past Surgical History  Past Surgical History:   Procedure Laterality Date    CATARACT REMOVAL Bilateral 2013    CHOLECYSTECTOMY      COLONOSCOPY  10/26/2011    ENDOSCOPY, COLON, DIAGNOSTIC      EGD halo    HYDROCELE EXCISION  11/15/2016    PANCREAS SURGERY      cyst opened up and drining into small bowel    TONSILLECTOMY      UPPER GASTROINTESTINAL ENDOSCOPY  10/04/2016    with biopsy    UPPER GASTROINTESTINAL ENDOSCOPY  2017    Halo ablation    UPPER GASTROINTESTINAL ENDOSCOPY  2017    Halo ablation    UPPER GASTROINTESTINAL ENDOSCOPY  2017    bx distal roderickagus    UPPER GASTROINTESTINAL ENDOSCOPY  12/22/2017    with HALO  procedure    UPPER GASTROINTESTINAL ENDOSCOPY N/A 12/4/2018    EGD WITH ABLATION AND ANESTHESIA - HALO---SLEEP APNEA---  performed by Dov Blancas MD at [facility] 5  2/19/2019    EGD BIOPSY performed by Dov Blancas MD at [place] [address] 6/11/2019    EGD WITH HALO AND ANESTHESIA performed by Dov Blancas MD at [place] [address] N/A 8/13/2019    ESOPHAGOGASTRODUODENOSCOPY WITH HALO AND ANESTHESIA -SLEEP APNEA- performed by Dov Blancas MD at [facility] EXTRACTION         Level of Consciousness: Alert, Oriented, and Cooperative = 0    Level of Activity: Walking unassisted = 0    Respiratory Pattern: Regular Pattern; RR 8-20 = 0    Breath Sounds: Clear = 0    Sputum   ,  ,    Cough: Strong, spontaneous, non-productive = 0    Vital Signs   /64   Pulse 56   Temp 97.9 °F (36.6 °C) (Oral)   Resp 14   Ht 5' 10\" (1.778 m)   Wt 256 lb 6.3 oz (116.3 kg)   SpO2 99%   BMI 36.79 kg/m²   SPO2 (COPD values may differ): 88-89% on room air or greater than 92% on FiO2 28- 35% = 2    Peak Flow (asthma only): not applicable = 0    RSI: 0-4 = See once and convert to home regimen or discontinue        Plan       Goals: medication delivery    Patient/caregiver was educated on the proper method of use for Respiratory Care Devices:  Yes      Level of patient/caregiver understanding able to:   ? Verbalize understanding   ? Demonstrate understanding       ? Teach back        ? Needs reinforcement       ? No available caregiver               ? Other:     Response to education:  Very Good     Is patient being placed on Home Treatment Regimen? Yes     Does the patient have everything they need prior to discharge?   NA     Comments: Patient and chart assessed    Plan of Care: Home regimen    Electronically signed by Vladimir Goodwin RCP on 2/5/2020 at 6:40 PM    Respiratory Protocol Guidelines     1. Assessment and treatment by Respiratory Therapy will be initiated for medication and therapeutic interventions upon initiation of aerosolized medication. 2. Physician will be contacted for respiratory rate (RR) greater than 35 breaths per minute. Therapy will be held for heart rate (HR) greater than 140 beats per minute, pending direction from physician. 3. Bronchodilators will be administered via Metered Dose Inhaler (MDI) with spacer when the following criteria are met:  a. Alert and cooperative     b. HR < 140 bpm  c. RR < 30 bpm                d. Can demonstrate a 2-3 second inspiratory hold  4. Bronchodilators will be administered via Hand Held Nebulizer [facility]) to patients when ANY of the following criteria are met  a. Incognizant or uncooperative          b. Patients treated with HHN at Home        c. Unable to demonstrate proper use of MDI with spacer     d. RR > 30 bpm   5. Bronchodilators will be delivered via Metered Dose Inhaler (MDI), HHN, Aerogen to intubated patients on mechanical ventilation. 6. Inhalation medication orders will be delivered and/or substituted as outlined below. Aerosolized Medications Ordering and Administration Guidelines:    1. All Medications will be ordered by a physician, and their frequency and/or modality will be adjusted as defined by the patients Respiratory Severity Index (RSI) score. 2. If the patient does not have documented COPD, consider discontinuing anticholinergics when RSI is less than 9.  3. If the bronchospasm worsens (increased RSI), then the bronchodilator frequency can be increased to a maximum of every 4 hours. If greater than every 4 hours is required, the physician will be contacted.   4. If the bronchospasm improves, the frequency of the bronchodilator can be decreased, based on the patient's RSI, but not less than home treatment regimen

## 2020-02-05 NOTE — ED NOTES
Ps hosp @ 1131  Re: hypoxia, flu  Per: David Jimenez CNP  Dr. Susy Bah returned call @ [address] [facility]  02/05/20 [id number]

## 2020-02-05 NOTE — CONSULTS
PULMONARY AND CRITICAL CARE INPATIENT CONSULTATION      2/5/2020    Patient Name:  Jose M Bush       1950       Evaluation was requested by Jerrell Olmos. AYDEE Dent regarding influenza A with hypotension and severe bradycardia. HPI: Inna Spence. Jesus Ricks is a 71 y.o. male who was admitted with influenza A, acute hypoxic respiratory failure, hypotension, bradycardia, syncope. Manuelito Oliver is a very pleasant obese 70-year-old male who lives with his wife. He works outdoors in construction. The patient is a non-smoker, drank a little bit of alcohol in the distant past.  He developed pancreatitis followed by diabetes mellitus, quit smoking at that point. It appears that he had a pancreatic pseudocyst for which he had gastric drainage. He mentions this was done at [facility].  The patient carries a history of obesity, ESTHER, hypertension, hyperlipidemia, restless legs, tremor, GERD and acid reflux. The patient was in his usual state of health, but recently started having some diarrhea. He was seen by Dr. Codie Bunch, says there was an abnormality in the stool test, presumably a stool occult blood. He just completed colonoscopy a few days ago, was not told about a significant abnormality. The patient said he began to feel poorly on Monday with more of upper respiratory complaints. He went to the urgent care center, was told he did not have the flu. He began to feel poorly, had been discharged on azithromycin. He had a large diarrheal movement this morning, had 3 episodes of vomiting of clear fluid. Unfortunately, his wife was diagnosed with lymphoma, went in for her first chemotherapy at [facility], under the guidance of Dr. Caleb De La Fuente, her oncologist.  The patient was at the oncologist office, his wife was due to get chemotherapy. The patient stood up as he felt faint and diaphoretic. He took a few steps, felt he was going to pass out, and then possibly had a syncopal episode.   He slumped over and lost consciousness for a few seconds, does not recollect the episode. His heart rate and blood pressure were normal.  He was hypoxemic and bradycardic to the mid 40s. The patient was brought to the ER, as per ER staff his heart rate was in the 30s, saturation in the 60s. The patient was awake and oriented at that time. His blood pressure was low normal, he was placed on IV fluids. Flu swab was positive for influenza A.  CT chest was obtained and showed groundglass opacities and patchy lingular \"collapse\". I was called due to the above findings, recommended the patient be observed overnight in the ICU due to his severe bradycardia, hypotension, hypoxemia, early opacities on CT chest.  Patient says he that he is feeling better since he came in, has received IV fluids. Based on our discussion, ER staff did send a procalcitonin level and placed him on ceftriaxone.     Invasive Lines: None        IV:   dextrose      lactated ringers 100 mL/hr at 02/05/20 1253       Patient Active Problem List    Diagnosis Date Noted    Influenza 02/05/2020    Traumatic injury to skin or subcutaneous tissue 12/17/2019    Vitamin D deficiency 12/17/2019    Peripheral polyneuropathy 11/13/2019    Cellulitis of right lower extremity 07/30/2019    Coronary artery disease of native heart with stable angina pectoris ([address] 05/30/2019    Overweight 05/30/2019    History of phobia 05/30/2019    Unstable angina (HCC)     Dyspnea     Obesity, morbid, BMI 40.0-49.9 ([facility] 03/21/2019    Severe obstructive sleep apnea 05/23/2018    Idiopathic peripheral neuropathy 02/02/2018    Lumbar radiculopathy 01/15/2018    Impingement syndrome of right shoulder 12/04/2017    Right arm pain 11/28/2017    Bradycardia 11/28/2017    Heart murmur 11/28/2017    Excessive daytime sleepiness 09/20/2017    Lung nodule 08/21/2017    History of snoring 08/21/2017    Essential hypertension 08/21/2017    Hyperlipidemia 08/21/2017    Controlled type 2 diabetes mellitus without complication, with long-term current use of insulin ([address] 08/21/2017    Need for vaccination 11/07/2016    Hydrocele in adult 11/07/2016    Pulmonary nodules 04/09/2015    Chest pain 04/09/2015    Epigastric pain 04/09/2015    Cataract 02/13/2013    Mass of skin of hand 02/13/2013    Diabetes mellitus ([address] 02/13/2013       Past Medical History:   Diagnosis Date    Acid reflux     Yan's esophagus     Coronary artery disease of native heart with stable angina pectoris ([address] 5/30/2019    Diabetes mellitus ([address]     Hyperlipidemia     Hypertension     Influenza A 02/05/2020    Pancreatitis 1996    Restless legs     Sleep apnea     uses CPAP    Tremor     of bilateral hands        Past Surgical History:   Procedure Laterality Date    CATARACT REMOVAL Bilateral 2013    CHOLECYSTECTOMY      COLONOSCOPY  10/26/2011    ENDOSCOPY, COLON, DIAGNOSTIC      EGD halo    HYDROCELE EXCISION  11/15/2016    PANCREAS SURGERY      cyst opened up and drining into small bowel    TONSILLECTOMY      UPPER GASTROINTESTINAL ENDOSCOPY  10/04/2016    with biopsy    UPPER GASTROINTESTINAL ENDOSCOPY  03/16/2017    Halo ablation    UPPER GASTROINTESTINAL ENDOSCOPY  06/26/2017    Halo ablation    UPPER GASTROINTESTINAL ENDOSCOPY  09/06/2017    bx distal sophagus    UPPER GASTROINTESTINAL ENDOSCOPY  12/22/2017    with HALO  procedure    UPPER GASTROINTESTINAL ENDOSCOPY N/A 12/4/2018    EGD WITH ABLATION AND ANESTHESIA - HALO---SLEEP APNEA---  performed by Jeremy Christianson MD at [place] [address]  2/19/2019    EGD BIOPSY performed by Jeremy Christianson MD at [address] 6/11/2019    EGD WITH HALO AND ANESTHESIA performed by Jeremy Christianson MD at [address] [facility] ENDOSCOPY N/A 8/13/2019    ESOPHAGOGASTRODUODENOSCOPY WITH HALO AND ANESTHESIA -SLEEP APNEA- performed by Camille Almodovar MD at [address] EXTRACTION          Family History   Problem Relation Age of Onset    Kidney Disease Mother     Cancer Father 76        pancreas    Cancer Brother         lung    Cancer Paternal Grandfather         colon        Social History     Tobacco Use    Smoking status: Never Smoker    Smokeless tobacco: Never Used   Substance Use Topics    Alcohol use: No        Allergies   Allergen Reactions    Codeine Nausea And Vomiting         Vital Signs:  Blood pressure 112/64, pulse 56, temperature 97.9 °F (36.6 °C), temperature source Oral, resp. rate 14, height 5' 10\" (1.778 m), weight 256 lb 6.3 oz (116.3 kg), SpO2 99 %.' on RA  Body mass index is 36.79 kg/m². CVP:      Intake/Output Summary (Last 24 hours) at 2/5/2020 1709  Last data filed at 2/5/2020 1625  Gross per 24 hour   Intake --   Output 175 ml   Net -175 ml         PHYSICAL EXAM:  General: Pleasant, mildly ill-appearing with sniffles and nasal congestion. Obese, well developed, in no respiratory distress. Eyes:  No scleral icterus or periorbital edema. PERRL, arcus senilis  Head: Atraumatic, normocephalic. ENMT: Missing teeth, class III airway. No bleeding of lips or gums. Mucosa pink and moist. No ulceration. No oral candidiasis. Neck: Short and large neck, no JVD. No palpable goiter, no lymphadenopathy, no JVD. No tracheal deviation. No S/Q emphysema. No stiff neck. Lymphadenopathy: No cervical, supraclavicular adenopathy. CV: Heart sounds distant. S1, S2, no murmurs, gallops, clicks or rubs. Pulses palpable and symmetrical, strong. Capillary refills in nail beds are brisk. Respiratory: Clear to auscultation  Chest: Equal rise and fall of chest.   GI: Abdomen soft, protuberant with fatty abdominal wall. No organomegaly or tenderness t. : Deferred  Skin: No rashes, lesions, bruises, induration, discharge.  Color, texture, turgor normal except for distal pigmentation of legs due to chronic venous changes. Musculoskeletal: Supple, no contractures or muscle atrophy. No clubbing or cyanosis of nailbeds. No joint swelling, redness. Edema: 1+ distal legs bilaterally  Neuro: Detailed exam not performed, moves all extremities. No obvious focal deficit, detailed exam not performed. Results:  CBC:   Recent Labs     02/05/20  0859   WBC 7.7   HGB 14.1   HCT 42.6   MCV 88.1   *     BMP:   Recent Labs     02/05/20  0859      K 3.7   CL 97*   CO2 26   BUN 22*   CREATININE 1.2       Imaging:  I have reviewed radiology images personally. Ct Abdomen Pelvis W Iv Contrast Additional Contrast? None    Result Date: 2/5/2020  1. Partial collapse of the lingula. Xr Chest Portable    Result Date: 2/5/2020  No acute process. Ct Chest Pulmonary Embolism W Contrast    Result Date: 2/5/2020  1. Partial collapse of the lingula. ASSESSMENT AND PLAN:  Presyncope, hypotension. Possibly dehydration in the face of influenza A and beta-blocker usage. The patient did take his medications this morning. Continue hydration. Observe for bradycardia. Echocardiogram ordered. Severe bradycardia. Possibly due to beta-blocker, possibility of vagal episode due to nausea. Observe carefully for further severe bradycardic episodes, cardiology consultation if needed. Influenza A. On Tamiflu  Abnormal CT chest.  Has minimal lingular atelectasis, minimal patchy bilateral groundglass opacities. Observe for development of further respiratory problems due to influenza A or bacteria. Thrombocytopenia. In July last year, he had mildly low platelet count at 015, he has had platelet count of 545 on 8/25/2011. Acute kidney injury. Baseline creatinine was less than 1 mg/dL on prior labs. Likely due to hypovolemia. Diarrhea. Possibly part of influenza symptom complex, further investigations if symptoms much worse  DM 2. Per IM. ESTHER. Reportedly is compliant with CPAP.  RLS. On Mirapex. HTN, HLD.

## 2020-02-05 NOTE — ED PROVIDER NOTES
Needs    Financial resource strain: Not hard at all   [employer] insecurity:     Worry: Never true     Inability: Never true    Transportation needs:     Medical: No     Non-medical: No   Tobacco Use    Smoking status: Never Smoker    Smokeless tobacco: Never Used   Substance and Sexual Activity    Alcohol use: No    Drug use: No    Sexual activity: Not Currently   Lifestyle    Physical activity:     Days per week: 0 days     Minutes per session: 0 min    Stress: Not at all   Relationships    Social connections:     Talks on phone: More than three times a week     Gets together: Twice a week     Attends [employer] service: Never     Active member of club or organization: Yes     Attends meetings of clubs or organizations: More than 4 times per year     Relationship status:     Intimate partner violence:     Fear of current or ex partner: No     Emotionally abused: No     Physically abused: No     Forced sexual activity: No   Other Topics Concern    None   Social History Narrative    None       SCREENINGS:            PHYSICAL EXAM:       ED Triage Vitals   BP Temp Temp Source Pulse Resp SpO2 Height Weight   02/05/20 0846 02/05/20 0846 02/05/20 0846 02/05/20 0849 02/05/20 0846 02/05/20 0849 02/05/20 0846 02/05/20 0846   (!) 98/48 98.8 °F (37.1 °C) Oral 55 24 93 % 5' 10\" (1.778 m) 276 lb (125.2 kg)       Physical Exam    CONSTITUTIONAL: Awake and alert. Ill-appearing diaphoretic male resting in the bed comfortably. Vitals:    02/05/20 0846 02/05/20 0849   BP: (!) 98/48    Pulse:  55   Resp: 24    Temp: 98.8 °F (37.1 °C)    TempSrc: Oral    SpO2:  93%   Weight: 276 lb (125.2 kg)    Height: 5' 10\" (1.778 m)      HENT: Normocephalic. Atraumatic. External ears normal, without discharge. TMs clear bilaterally. No nasal discharge. Oropharynx clear, no erythema. Mucous membranes moist.  EYES: Conjunctiva non-injected, no lid abnormalities noted. No scleral icterus. PERRL. EOM's grossly intact.  Anterior chambers clear. NECK: Supple. Normal ROM. No meningismus. No thyroid tenderness or swelling noted. CARDIOVASCULAR: Bradycardic. No Murmer. PULMONARY/CHEST WALL: Effort normal.  Slight tachypnea. Lung sounds diminished bilaterally  ABDOMEN: Normal BS. Soft. Nondistended. No tenderness to palpate. No guarding. No hernias noted. No splenomegaly. Back: Spine is midline. No ecchymosis. No crepitus on palpation. No obvious subluxation of vertebral column. No saddle anesthesia or evidence of cauda equina. /ANORECTAL: Not assessed  MUSKULOSKELETAL: Normal ROM. No acute deformities. No edema. No tenderness to palpate. SKIN: Warm and diaphoretic. NEUROLOGICAL:  GCS 15. CN II-XII grossly intact. Strength is 5/5 in allextremities and sensation is intact. PSYCHIATRIC: Normal affect, normal insight and judgement. Alert andoriented x 3. DIAGNOSTIC RESULTS:     LABS:    Results for orders placed or performed during the hospital encounter of 02/05/20   EKG 12 Lead   Result Value Ref Range    Ventricular Rate 39 BPM    Atrial Rate 39 BPM    P-R Interval 206 ms    QRS Duration 94 ms    Q-T Interval 500 ms    QTc Calculation (Bazett) 402 ms    P Axis 66 degrees    R Axis 38 degrees    T Axis 31 degrees    Diagnosis       Marked sinus bradycardiaAbnormal ECGWhen compared with ECG of 22-MAR-2019 21:03,Premature supraventricular complexes are no longer PresentVent. rate has decreased BY  20 BPM         RADIOLOGY:  All x-ray studies are viewed/reviewedby me. Formal interpretations per the radiologist are as follows:      CT CHEST PULMONARY EMBOLISM W CONTRAST   Final Result   1. Partial collapse of the lingula. CT ABDOMEN PELVIS W IV CONTRAST Additional Contrast? None   Final Result   1. Partial collapse of the lingula. XR CHEST PORTABLE   Final Result   No acute process.          XR CHEST PORTABLE    (Results Pending)           EKG:  See EKG interpretation by an attending phsyician      PROCEDURES: N/A    CRITICAL CARE TIME:   Total critical care time today provided was at least 38 minutes. This excludes seperately billable procedure. Critical care time provided for severe bradycardia requiring atropine and hypoxia that required close evaluation and/or intervention with concern for patient decompensation. CONSULTS:  IP CONSULT TO HOSPITALIST  IP CONSULT TO SPIRITUAL SERVICES  IP CONSULT TO PULMONOLOGY      EMERGENCYDEPARTMENT COURSE and DIFFERENTIAL DIAGNOSIS/MDM:   Vitals:    Vitals:    02/05/20 1433 02/05/20 1502 02/05/20 1603 02/05/20 1842   BP:  119/60 112/64    Pulse:  (!) 47 56    Resp:   14    Temp:   97.9 °F (36.6 °C)    TempSrc:   Oral    SpO2:  95% 99%    Weight: 256 lb 6.3 oz (116.3 kg)      Height:    5' 10\" (1.778 m)       Patient was given the following medications:  Medications   0.9 % sodium chloride bolus (has no administration in time range)         Patient was evaluated by both myself and Haley Causey MD.    Presented to the emergency room today with complaints of diarrhea, he had upper respiratory type infectious process over the last 4 to 5 days. When patient presented he appeared significantly ill, he was diaphoretic, bradycardic, short of breath. Patient work-up today showed a positive influenza A test.  Patient had no leukocytosis, no significant electrolyte abnormality, lactate was negative. I did CT patient's chest given the hypoxia which did show a partial collapse of the lingula. Patient was bradycardic, required a dose of atropine. Patient heart rate did improve although he did remain somewhat bradycardic. I did consult the intensivist and discussed CT findings as well as the patient presentation, he felt that patient required admission to the ICU due to potential for rapid deterioration.   Patient was started on antibiotics per the intensivist.  Patient was evaluated by the attending physician who agrees with this plan    Patient laboratory studies, radiographic imaging, andassessment were all discussed with the patient and/or patient family. There was shared decision-making between myself, the attending physician, as well as the patient and/or their surrogate and we are all in agreement with admission. There was an opportunity for questions and all questions were answered to the best of my ability and to the satisfaction of the patient and/or patient family. FINAL IMPRESSION:      1. Diarrhea, unspecified type    2. Hypoxia    3. Influenza          DISPOSITION/PLAN:   DISPOSITION      PATIENT REFERRED TO:  No follow-up provider specified.     DISCHARGE MEDICATIONS:  New Prescriptions    No medications on file                  (Please note that portions of this note were completed with a voice recognition program.  Efforts were made to edit the dictations, but occasionally words are mis-transcribed.)    SANDY Rg CNP-C (electronically signed)        SANDY Rg CNP  02/05/20 0242

## 2020-02-06 ENCOUNTER — APPOINTMENT (OUTPATIENT)
Dept: GENERAL RADIOLOGY | Age: 70
DRG: 193 | End: 2020-02-06
Payer: MEDICARE

## 2020-02-06 LAB
ALBUMIN SERPL-MCNC: 3.3 G/DL (ref 3.4–5)
ALP BLD-CCNC: 66 U/L (ref 40–129)
ALT SERPL-CCNC: 11 U/L (ref 10–40)
ANION GAP SERPL CALCULATED.3IONS-SCNC: 10 MMOL/L (ref 3–16)
AST SERPL-CCNC: 15 U/L (ref 15–37)
BILIRUB SERPL-MCNC: 0.4 MG/DL (ref 0–1)
BILIRUBIN DIRECT: <0.2 MG/DL (ref 0–0.3)
BILIRUBIN, INDIRECT: ABNORMAL MG/DL (ref 0–1)
BUN BLDV-MCNC: 19 MG/DL (ref 7–20)
CALCIUM SERPL-MCNC: 8.2 MG/DL (ref 8.3–10.6)
CHLORIDE BLD-SCNC: 102 MMOL/L (ref 99–110)
CO2: 27 MMOL/L (ref 21–32)
CREAT SERPL-MCNC: 0.9 MG/DL (ref 0.8–1.3)
CRYPTOSPORIDIUM ANTIGEN STOOL: NORMAL
E HISTOLYTICA ANTIGEN STOOL: NORMAL
EKG ATRIAL RATE: 56 BPM
EKG DIAGNOSIS: NORMAL
EKG P AXIS: 37 DEGREES
EKG P-R INTERVAL: 184 MS
EKG Q-T INTERVAL: 434 MS
EKG QRS DURATION: 94 MS
EKG QTC CALCULATION (BAZETT): 418 MS
EKG R AXIS: 47 DEGREES
EKG T AXIS: 29 DEGREES
EKG VENTRICULAR RATE: 56 BPM
GFR AFRICAN AMERICAN: >60
GFR NON-AFRICAN AMERICAN: >60
GIARDIA ANTIGEN STOOL: NORMAL
GLUCOSE BLD-MCNC: 162 MG/DL (ref 70–99)
GLUCOSE BLD-MCNC: 163 MG/DL (ref 70–99)
GLUCOSE BLD-MCNC: 190 MG/DL (ref 70–99)
GLUCOSE BLD-MCNC: 198 MG/DL (ref 70–99)
GLUCOSE BLD-MCNC: 214 MG/DL (ref 70–99)
HCT VFR BLD CALC: 36.2 % (ref 40.5–52.5)
HEMOGLOBIN: 11.9 G/DL (ref 13.5–17.5)
LV EF: 55 %
LVEF MODALITY: NORMAL
MAGNESIUM: 1.8 MG/DL (ref 1.8–2.4)
MCH RBC QN AUTO: 29.1 PG (ref 26–34)
MCHC RBC AUTO-ENTMCNC: 32.9 G/DL (ref 31–36)
MCV RBC AUTO: 88.6 FL (ref 80–100)
PDW BLD-RTO: 14 % (ref 12.4–15.4)
PERFORMED ON: ABNORMAL
PLATELET # BLD: 91 K/UL (ref 135–450)
PLATELET SLIDE REVIEW: ABNORMAL
PMV BLD AUTO: 10.2 FL (ref 5–10.5)
POTASSIUM SERPL-SCNC: 4.3 MMOL/L (ref 3.5–5.1)
RBC # BLD: 4.09 M/UL (ref 4.2–5.9)
ROTAVIRUS ANTIGEN: NEGATIVE
SLIDE REVIEW: ABNORMAL
SODIUM BLD-SCNC: 139 MMOL/L (ref 136–145)
TOTAL PROTEIN: 5.6 G/DL (ref 6.4–8.2)
TROPONIN: <0.01 NG/ML
WBC # BLD: 2.6 K/UL (ref 4–11)

## 2020-02-06 PROCEDURE — 2580000003 HC RX 258: Performed by: INTERNAL MEDICINE

## 2020-02-06 PROCEDURE — 97165 OT EVAL LOW COMPLEX 30 MIN: CPT

## 2020-02-06 PROCEDURE — 94660 CPAP INITIATION&MGMT: CPT

## 2020-02-06 PROCEDURE — 84484 ASSAY OF TROPONIN QUANT: CPT

## 2020-02-06 PROCEDURE — 2700000000 HC OXYGEN THERAPY PER DAY

## 2020-02-06 PROCEDURE — 6360000002 HC RX W HCPCS: Performed by: INTERNAL MEDICINE

## 2020-02-06 PROCEDURE — 1200000000 HC SEMI PRIVATE

## 2020-02-06 PROCEDURE — 93306 TTE W/DOPPLER COMPLETE: CPT

## 2020-02-06 PROCEDURE — 94761 N-INVAS EAR/PLS OXIMETRY MLT: CPT

## 2020-02-06 PROCEDURE — 6370000000 HC RX 637 (ALT 250 FOR IP): Performed by: INTERNAL MEDICINE

## 2020-02-06 PROCEDURE — 71045 X-RAY EXAM CHEST 1 VIEW: CPT

## 2020-02-06 PROCEDURE — 80076 HEPATIC FUNCTION PANEL: CPT

## 2020-02-06 PROCEDURE — 36415 COLL VENOUS BLD VENIPUNCTURE: CPT

## 2020-02-06 PROCEDURE — 93005 ELECTROCARDIOGRAM TRACING: CPT | Performed by: INTERNAL MEDICINE

## 2020-02-06 PROCEDURE — 83735 ASSAY OF MAGNESIUM: CPT

## 2020-02-06 PROCEDURE — 99232 SBSQ HOSP IP/OBS MODERATE 35: CPT | Performed by: INTERNAL MEDICINE

## 2020-02-06 PROCEDURE — 85027 COMPLETE CBC AUTOMATED: CPT

## 2020-02-06 PROCEDURE — 80048 BASIC METABOLIC PNL TOTAL CA: CPT

## 2020-02-06 PROCEDURE — 97535 SELF CARE MNGMENT TRAINING: CPT

## 2020-02-06 PROCEDURE — 97161 PT EVAL LOW COMPLEX 20 MIN: CPT

## 2020-02-06 RX ORDER — ACETAMINOPHEN 325 MG/1
650 TABLET ORAL EVERY 4 HOURS PRN
Status: DISCONTINUED | OUTPATIENT
Start: 2020-02-06 | End: 2020-02-08 | Stop reason: HOSPADM

## 2020-02-06 RX ORDER — MAGNESIUM SULFATE 1 G/100ML
1 INJECTION INTRAVENOUS ONCE
Status: DISCONTINUED | OUTPATIENT
Start: 2020-02-06 | End: 2020-02-06

## 2020-02-06 RX ORDER — MAGNESIUM SULFATE IN WATER 40 MG/ML
2 INJECTION, SOLUTION INTRAVENOUS ONCE
Status: COMPLETED | OUTPATIENT
Start: 2020-02-06 | End: 2020-02-06

## 2020-02-06 RX ADMIN — GABAPENTIN 200 MG: 100 CAPSULE ORAL at 20:49

## 2020-02-06 RX ADMIN — PANTOPRAZOLE SODIUM 40 MG: 40 TABLET, DELAYED RELEASE ORAL at 20:49

## 2020-02-06 RX ADMIN — INSULIN LISPRO 2 UNITS: 100 INJECTION, SOLUTION INTRAVENOUS; SUBCUTANEOUS at 21:45

## 2020-02-06 RX ADMIN — PANTOPRAZOLE SODIUM 40 MG: 40 TABLET, DELAYED RELEASE ORAL at 08:43

## 2020-02-06 RX ADMIN — OSELTAMIVIR PHOSPHATE 75 MG: 75 CAPSULE ORAL at 08:43

## 2020-02-06 RX ADMIN — MUPIROCIN: 20 OINTMENT TOPICAL at 08:43

## 2020-02-06 RX ADMIN — Medication 10 ML: at 08:43

## 2020-02-06 RX ADMIN — ASPIRIN 81 MG 81 MG: 81 TABLET ORAL at 08:43

## 2020-02-06 RX ADMIN — Medication 10 ML: at 20:49

## 2020-02-06 RX ADMIN — ATORVASTATIN CALCIUM 20 MG: 10 TABLET, FILM COATED ORAL at 20:49

## 2020-02-06 RX ADMIN — FLUTICASONE PROPIONATE 2 SPRAY: 50 SPRAY, METERED NASAL at 08:43

## 2020-02-06 RX ADMIN — INSULIN LISPRO 2 UNITS: 100 INJECTION, SOLUTION INTRAVENOUS; SUBCUTANEOUS at 08:10

## 2020-02-06 RX ADMIN — MAGNESIUM SULFATE HEPTAHYDRATE 2 G: 40 INJECTION, SOLUTION INTRAVENOUS at 10:34

## 2020-02-06 RX ADMIN — INSULIN LISPRO 2 UNITS: 100 INJECTION, SOLUTION INTRAVENOUS; SUBCUTANEOUS at 17:18

## 2020-02-06 RX ADMIN — OSELTAMIVIR PHOSPHATE 75 MG: 75 CAPSULE ORAL at 20:49

## 2020-02-06 RX ADMIN — PRAMIPEXOLE DIHYDROCHLORIDE 0.12 MG: 0.25 TABLET ORAL at 20:49

## 2020-02-06 RX ADMIN — INSULIN LISPRO 2 UNITS: 100 INJECTION, SOLUTION INTRAVENOUS; SUBCUTANEOUS at 12:12

## 2020-02-06 RX ADMIN — ENOXAPARIN SODIUM 40 MG: 40 INJECTION SUBCUTANEOUS at 08:43

## 2020-02-06 RX ADMIN — INSULIN GLARGINE 15 UNITS: 100 INJECTION, SOLUTION SUBCUTANEOUS at 21:45

## 2020-02-06 ASSESSMENT — PAIN SCALES - GENERAL
PAINLEVEL_OUTOF10: 0

## 2020-02-06 NOTE — PROGRESS NOTES
Occupational Therapy   Occupational Therapy Initial Assessment and Treatment Note 1x  Date: 2020   Patient Name: Vera Zepeda  MRN: [id number]     : 1950    Date of Service: 2020    Discharge Recommendations:  Home with assist PRN     Assessment   Assessment: Pt admitted for influenza and syncopal episode. During OT eval, pt demonstrated independent level of function for ADLs and mobility. O2 sats did decrease when pt is on RA to low 80's but rebounded when 1LO2 was reapplied. RN notified. No further OT. Decision Making: Low Complexity  OT Education: OT Role;Energy Conservation  REQUIRES OT FOLLOW UP: No  Activity Tolerance  Activity Tolerance: Treatment limited secondary to medical complications (free text)  Activity Tolerance: O2 sats decreased from 95% to 81% on RA; O2 sats rebounded with 1LO2 to 91%  Safety Devices  Safety Devices in place: Yes  Type of devices: Nurse notified;Gait belt;Call light within reach; Left in bed       Patient Diagnosis(es): The primary encounter diagnosis was Diarrhea, unspecified type. Diagnoses of Hypoxia and Influenza were also pertinent to this visit. 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 has a past surgical history that includes Pancreas surgery; Cholecystectomy; Tonsillectomy; Premium tooth extraction; Colonoscopy (10/26/2011); Cataract removal (Bilateral, ); Upper gastrointestinal endoscopy (10/04/2016); Hydrocele surgery (11/15/2016); Endoscopy, colon, diagnostic; Upper gastrointestinal endoscopy (2017); Upper gastrointestinal endoscopy (2017); Upper gastrointestinal endoscopy (2017); Upper gastrointestinal endoscopy (2017); Upper gastrointestinal endoscopy (N/A, 2018);  Upper gastrointestinal endoscopy (2/19/2019); Upper gastrointestinal endoscopy (N/A, 6/11/2019); and Upper gastrointestinal endoscopy (N/A, 8/13/2019). Restrictions  Restrictions/Precautions  Restrictions/Precautions: General Precautions, Fall Risk, Isolation  Position Activity Restriction  Other position/activity restrictions: Up with assistance. Droplet precautions    Subjective   General  Chart Reviewed: Yes  Patient assessed for rehabilitation services?: Yes  Additional Pertinent Hx: episode of syncope prior to admission  Family / Caregiver Present: No  Referring Practitioner: DENEEN Garnett  Diagnosis: influenza  Subjective  Subjective: Pt agreeable to OT  General Comment  Comments: RN approved therapy  Patient Currently in Pain: Denies  Vital Signs  Patient Currently in Pain: Denies  Social/Functional History  Social/Functional History  Lives With: Spouse(retired. can assist if needed)  Type of Home: House  Home Layout: One level  Home Access: Level entry  Bathroom Shower/Tub: Walk-in shower  Bathroom Toilet: Standard  Home Equipment: (No DME)  ADL Assistance: Independent  Homemaking Assistance: Independent  Homemaking Responsibilities: Yes  Ambulation Assistance: Independent  Transfer Assistance: Independent  Active : Yes  Occupation: Part time employment  Type of occupation: [employer]. Objective   Vision: Impaired  Vision Exceptions: Wears glasses for reading  Hearing: Within functional limits    Orientation  Overall Orientation Status: Within Normal Limits  Observation/Palpation  Posture: Good  Balance  Sitting Balance: Independent  Standing Balance: Independent  Functional Mobility  Functional - Mobility Device: No device  Activity: To/from bathroom  Assist Level:  Independent  Toilet Transfers  Toilet - Technique: Ambulating  Equipment Used: Standard toilet  Toilet Transfer: Independent  ADL  Feeding: Independent  Grooming: Independent  LE Dressing: Independent  Toileting: Independent  Tone RUE  RUE Tone: Normotonic  Tone LUE  LUE Tone: Normotonic  Coordination  Movements Are Fluid And Coordinated: Yes     Bed mobility  Supine to Sit: Independent  Sit to Supine: Independent  Transfers  Stand Pivot Transfers: Independent  Sit to stand: Independent  Stand to sit: Independent     Cognition  Overall Cognitive Status: WNL     Sensation  Overall Sensation Status: WFL      LUE AROM (degrees)  LUE AROM : WNL  RUE AROM (degrees)  RUE AROM : WNL  LUE Strength  Gross LUE Strength: WNL  RUE Strength  Gross RUE Strength: WNL     AM-PAC Score   AM-[facility] Inpatient Daily Activity Raw Score: 24 (02/06/20 1604)  AM-PAC Inpatient ADL T-Scale Score : 57.54 (02/06/20 1604)  ADL Inpatient CMS 0-100% Score: 0 (02/06/20 1604)  ADL Inpatient CMS G-Code Modifier : [address] (02/06/20 1604)  Goals  Short term goals  Time Frame for Short term goals: 1x  Short term goal 1: Perform basic self care tasks independently. Goal met  Short term goal 2: Perform functional mobility and bathroom mobility independently.  Goal met      Therapy Time   Individual Concurrent Group Co-treatment   Time In 1500         Time Out 1522         Minutes 22         Timed Code Treatment Minutes: 12 Minutes(10 min eval )     Karrie CARO/L

## 2020-02-06 NOTE — PROGRESS NOTES
02/06/20 0030   NIV Type   $NIV $Daily Charge   NIV Started/Stopped On   Equipment Type Vision   Mode Bilevel   Mask Type Full face mask   Mask Size Large   Settings/Measurements   IPAP 14 cmH20   CPAP/EPAP 8 cmH2O   Rate Ordered 16   Resp 17   FiO2  50 %   Vt Exhaled 567 mL   Comfort Level Good   Using Accessory Muscles No   SpO2 94   Breath Sounds   Right Upper Lobe Diminished   Right Middle Lobe Diminished   Right Lower Lobe Diminished   Left Upper Lobe Diminished   Left Lower Lobe Diminished   Alarm Settings   Alarms On Y   Press Low Alarm 6 cmH2O   High Pressure Alarm 25 cmH2O   Delay Alarm 20 sec(s)   Apnea (secs) 40 secs   Resp Rate Low Alarm 6   High Respiratory Rate 50 br/min DISPLAY PLAN FREE TEXT

## 2020-02-06 NOTE — PROGRESS NOTES
This note also relates to the following rows which could not be included:  Resp - Cannot attach notes to unvalidated device data       02/05/20 1500   Settings/Measurements   IPAP 14 cmH20   CPAP/EPAP 8 cmH2O   FiO2  50 %

## 2020-02-06 NOTE — CARE COORDINATION
[address] [facility],[address]     2020    Patient: Mabel Ramos Patient : 1950   MRN: [id number]  Reason for Admission: Influenza  RARS: Readmission Risk Score: 21    Met with patient to discuss care transition. Role of CTN and care transition program explained to patient. Patient lives in a 1 story home with his wife Yamilka Sapp and her son, Beth [place] and Matt's wife April. Patient reports that he is totally independent with ADL's/IADL's and is a self . He does use a CPAP at night but does not utilize any other DME. His wife was recently dx with Lymphoma and just had her first chemo tx but  states so far she is still doing well and able to care for self. VM left for , Antonio Kulkarni, RN at [phone] relaying above information and encouraged her to call CTN with any questions. If meets criteria at discharge, patient agreeable to participate in care transition program post discharge. Encouraged patient to ask to speak with  on the unit should any needs arise. Patient informed CTN that he only wants CTN's to speak with him or his wife, Yamilka Sapp during post discharge f/u calls.      Readmission Risk  Patient Active Problem List   Diagnosis    Cataract    Mass of skin of hand    Diabetes mellitus ([address]    Pulmonary nodules    Chest pain    Epigastric pain    Need for vaccination    Hydrocele in adult    Lung nodule    History of snoring    Essential hypertension    Hyperlipidemia    Controlled type 2 diabetes mellitus without complication, with long-term current use of insulin (HCC)    Excessive daytime sleepiness    Right arm pain    Bradycardia    Heart murmur    Impingement syndrome of right shoulder    Lumbar radiculopathy    Idiopathic peripheral neuropathy    Severe obstructive sleep apnea    Obesity, morbid, BMI 40.0-49.9 (HCC)    Unstable angina (HCC)    Dyspnea    Coronary artery disease of native heart with stable

## 2020-02-06 NOTE — FLOWSHEET NOTE
02/06/20 1139   Encounter Summary   Services provided to: Patient   Referral/Consult From: Other ;Rounding   Support System Spouse   Continue Visiting   (Patient does not want to complete the AD & LW at this time)   Complexity of Encounter Moderate   Length of Encounter 15 minutes   Advance Directives (For Healthcare)   Advance Directives Unable to complete

## 2020-02-06 NOTE — PROGRESS NOTES
includes Pancreas surgery; Cholecystectomy; Tonsillectomy; Stella tooth extraction; Colonoscopy (10/26/2011); Cataract removal (Bilateral, 2013); Upper gastrointestinal endoscopy (10/04/2016); Hydrocele surgery (11/15/2016); Endoscopy, colon, diagnostic; Upper gastrointestinal endoscopy (03/16/2017); Upper gastrointestinal endoscopy (06/26/2017); Upper gastrointestinal endoscopy (09/06/2017); Upper gastrointestinal endoscopy (12/22/2017); Upper gastrointestinal endoscopy (N/A, 12/4/2018); Upper gastrointestinal endoscopy (2/19/2019); Upper gastrointestinal endoscopy (N/A, 6/11/2019); and Upper gastrointestinal endoscopy (N/A, 8/13/2019). Restrictions  Restrictions/Precautions  Restrictions/Precautions: Cardiac, General Precautions, Fall Risk  Position Activity Restriction  Other position/activity restrictions: Up with assistance. Vision/Hearing  Vision: Impaired  Vision Exceptions: Wears glasses for reading  Hearing: Within functional limits     Subjective  General  Chart Reviewed: Yes  Patient assessed for rehabilitation services?: Yes  Response To Previous Treatment: Not applicable  Family / Caregiver Present: No  Referring Practitioner: Delanna Brunner, MD  Referral Date : 02/06/20  Diagnosis: Influenza, syncope. Follows Commands: Within Functional Limits  General Comment  Comments: Patient in supine position in the bed upon entry of therapy staff. Subjective  Subjective: Patient agreed to participate. Pain Screening  Patient Currently in Pain: No  Vital Signs  Patient Currently in Pain: No  Pre Treatment Pain Screening  Intervention List: Patient able to continue with treatment    Orientation  Orientation  Overall Orientation Status: Within Functional Limits  Social/Functional History  Social/Functional History  Lives With: Spouse(retired.  can assist if needed)  Type of Home: House  Home Layout: One level  Home Access: Level entry  Bathroom Shower/Tub: Walk-in shower  Bathroom Toilet: [place] [id number]

## 2020-02-06 NOTE — CARE COORDINATION
CASE MANAGEMENT INITIAL ASSESSMENT      Reviewed chart and met with patient today, re: 71year old male. Explained Case Management role/services. Family present: none  Primary contact information: Dennie Cables [phone]    Admit date/status: 2/5/20  Diagnosis: influenza  Is this a Readmission?:  No    Insurance: medicare primary and health plan secondary   Precert required for SNF -no  3 night stay required -yes    Living arrangements, Adls, care needs, prior to admission: patient lives in a single story house with his wife. Independent in all ADL's     Transportation: private    [facility] [employer] at home: Walker__Cane__RTS__ BSC__Shower Chair__  02__ HHN__ CPAP_X_  BiPap__  Hospital Bed__ W/C___ Other__________    Services in the home and/or outpatient, prior to admission: none    PT/OT recs: none    Hospital Exemption Notification (HEN): not intiated    Barriers to discharge: none    Plan/comments:   Met with patient at bedside. Patient states he is normally independent prior to admission working part time for [employer] Denies any current needs.  Randy Pantoja RN

## 2020-02-06 NOTE — PROGRESS NOTES
Received pt. From ICU. Pt. Sitting up in bed awake. A&Ox4. Lungs diminished throughout. Denies pain. SR on the monitor. No distress noted.

## 2020-02-06 NOTE — PROGRESS NOTES
Hospitalist Progress Note      PCP: Ridge Nair DO    Date of Admission: 2/5/2020    Chief Complaint:  Malaise, dyspnea, syncope      History Of Present Illness: The patient is a 71 Y M with a h/o HTN, HLD, DM2, and CAD. He began to feel ill about two days ago. Cough, dyspnea, congestion, malaise, fevers and chills. He went to an urgent care, rapid flu swab was negative, and he was discharged with azithromycin. He continued to feel badly. Also has significant diarrhea, which was unusual for him. He was at his wife's oncology appointment today when he suddenly felt faint and diaphoretic, decided he needed to get some fresh air. He stood up from his chair, took a few steps, and felt like he was going to pass out. People came to his assistance and lowered him into a chair, where he slumped over and lost consciousness for a few seconds. Clinic staff took his blood pressure and heart rate and they were reportedly normal.  They sent him to the ED were he was found to be hypoxic in the 60's, slightly hypotensive at 98/48, and sinus bradycardic in the mid 40's. This time rapid influenza swab was positive. Subjective:  Doing well. He feels better in general.  Less cough and dyspnea. Vital improved.         Medications:  Reviewed    Infusion Medications    dextrose       Scheduled Medications    magnesium sulfate  2 g Intravenous Once    oseltamivir  75 mg Oral BID    sodium chloride flush  10 mL Intravenous 2 times per day    aspirin  81 mg Oral Daily    atorvastatin  20 mg Oral Nightly    fluticasone  2 spray Nasal Daily    gabapentin  200 mg Oral Nightly    insulin glargine  15 Units Subcutaneous Nightly    insulin lispro  0-12 Units Subcutaneous TID     insulin lispro  0-6 Units Subcutaneous Nightly    pramipexole  0.125 mg Oral Nightly    pantoprazole  40 mg Oral BID    mupirocin   Nasal BID     PRN Meds: acetaminophen, perflutren lipid microspheres, sodium phosphate IVPB

## 2020-02-06 NOTE — PROGRESS NOTES
This note also relates to the following rows which could not be included:  Resp - Cannot attach notes to unvalidated device data       02/05/20 2401   NIV Type   $NIV $Daily Charge   Skin Protection for O2 Device Yes   NIV Started/Stopped On   Equipment Type vision   Mode Bilevel   Mask Type Full face mask   Mask Size Large   Settings/Measurements   IPAP 12 cmH20   CPAP/EPAP 6 cmH2O   FiO2  30 %   I Time/ I Time % 0.9 s   Vt Exhaled 610 mL   Minute Volume 11 Liters   Comfort Level Good   Using Accessory Muscles No   SpO2 97   Patient on Bipap.  Tolerating well

## 2020-02-07 LAB
ANION GAP SERPL CALCULATED.3IONS-SCNC: 8 MMOL/L (ref 3–16)
BASOPHILS ABSOLUTE: 0 K/UL (ref 0–0.2)
BASOPHILS RELATIVE PERCENT: 0.8 %
BUN BLDV-MCNC: 16 MG/DL (ref 7–20)
CALCIUM SERPL-MCNC: 8.5 MG/DL (ref 8.3–10.6)
CHLORIDE BLD-SCNC: 102 MMOL/L (ref 99–110)
CO2: 30 MMOL/L (ref 21–32)
CREAT SERPL-MCNC: 0.8 MG/DL (ref 0.8–1.3)
EOSINOPHILS ABSOLUTE: 0.4 K/UL (ref 0–0.6)
EOSINOPHILS RELATIVE PERCENT: 12 %
GFR AFRICAN AMERICAN: >60
GFR NON-AFRICAN AMERICAN: >60
GLUCOSE BLD-MCNC: 163 MG/DL (ref 70–99)
GLUCOSE BLD-MCNC: 168 MG/DL (ref 70–99)
GLUCOSE BLD-MCNC: 189 MG/DL (ref 70–99)
GLUCOSE BLD-MCNC: 213 MG/DL (ref 70–99)
GLUCOSE BLD-MCNC: 279 MG/DL (ref 70–99)
HCT VFR BLD CALC: 37.3 % (ref 40.5–52.5)
HEMOGLOBIN: 12.5 G/DL (ref 13.5–17.5)
LYMPHOCYTES ABSOLUTE: 0.7 K/UL (ref 1–5.1)
LYMPHOCYTES RELATIVE PERCENT: 24.5 %
MCH RBC QN AUTO: 29.4 PG (ref 26–34)
MCHC RBC AUTO-ENTMCNC: 33.5 G/DL (ref 31–36)
MCV RBC AUTO: 87.8 FL (ref 80–100)
MONOCYTES ABSOLUTE: 0.3 K/UL (ref 0–1.3)
MONOCYTES RELATIVE PERCENT: 9.5 %
NEUTROPHILS ABSOLUTE: 1.6 K/UL (ref 1.7–7.7)
NEUTROPHILS RELATIVE PERCENT: 53.2 %
PDW BLD-RTO: 13.6 % (ref 12.4–15.4)
PERFORMED ON: ABNORMAL
PLATELET # BLD: 102 K/UL (ref 135–450)
PMV BLD AUTO: 9.9 FL (ref 5–10.5)
POTASSIUM REFLEX MAGNESIUM: 4.5 MMOL/L (ref 3.5–5.1)
RBC # BLD: 4.25 M/UL (ref 4.2–5.9)
SODIUM BLD-SCNC: 140 MMOL/L (ref 136–145)
WBC # BLD: 3 K/UL (ref 4–11)

## 2020-02-07 PROCEDURE — 94660 CPAP INITIATION&MGMT: CPT

## 2020-02-07 PROCEDURE — 2580000003 HC RX 258: Performed by: INTERNAL MEDICINE

## 2020-02-07 PROCEDURE — 36415 COLL VENOUS BLD VENIPUNCTURE: CPT

## 2020-02-07 PROCEDURE — 94761 N-INVAS EAR/PLS OXIMETRY MLT: CPT

## 2020-02-07 PROCEDURE — 80048 BASIC METABOLIC PNL TOTAL CA: CPT

## 2020-02-07 PROCEDURE — 2700000000 HC OXYGEN THERAPY PER DAY

## 2020-02-07 PROCEDURE — 6370000000 HC RX 637 (ALT 250 FOR IP): Performed by: INTERNAL MEDICINE

## 2020-02-07 PROCEDURE — 85025 COMPLETE CBC W/AUTO DIFF WBC: CPT

## 2020-02-07 PROCEDURE — 1200000000 HC SEMI PRIVATE

## 2020-02-07 RX ADMIN — PRAMIPEXOLE DIHYDROCHLORIDE 0.12 MG: 0.25 TABLET ORAL at 21:22

## 2020-02-07 RX ADMIN — ASPIRIN 81 MG 81 MG: 81 TABLET ORAL at 09:12

## 2020-02-07 RX ADMIN — OSELTAMIVIR PHOSPHATE 75 MG: 75 CAPSULE ORAL at 09:12

## 2020-02-07 RX ADMIN — PANTOPRAZOLE SODIUM 40 MG: 40 TABLET, DELAYED RELEASE ORAL at 21:22

## 2020-02-07 RX ADMIN — Medication 10 ML: at 21:23

## 2020-02-07 RX ADMIN — Medication 10 ML: at 09:12

## 2020-02-07 RX ADMIN — FLUTICASONE PROPIONATE 2 SPRAY: 50 SPRAY, METERED NASAL at 09:13

## 2020-02-07 RX ADMIN — INSULIN LISPRO 4 UNITS: 100 INJECTION, SOLUTION INTRAVENOUS; SUBCUTANEOUS at 08:11

## 2020-02-07 RX ADMIN — INSULIN GLARGINE 15 UNITS: 100 INJECTION, SOLUTION SUBCUTANEOUS at 21:22

## 2020-02-07 RX ADMIN — PANTOPRAZOLE SODIUM 40 MG: 40 TABLET, DELAYED RELEASE ORAL at 09:12

## 2020-02-07 RX ADMIN — INSULIN LISPRO 2 UNITS: 100 INJECTION, SOLUTION INTRAVENOUS; SUBCUTANEOUS at 21:22

## 2020-02-07 RX ADMIN — OSELTAMIVIR PHOSPHATE 75 MG: 75 CAPSULE ORAL at 21:22

## 2020-02-07 RX ADMIN — ATORVASTATIN CALCIUM 20 MG: 10 TABLET, FILM COATED ORAL at 21:22

## 2020-02-07 RX ADMIN — INSULIN LISPRO 2 UNITS: 100 INJECTION, SOLUTION INTRAVENOUS; SUBCUTANEOUS at 12:14

## 2020-02-07 RX ADMIN — INSULIN LISPRO 6 UNITS: 100 INJECTION, SOLUTION INTRAVENOUS; SUBCUTANEOUS at 17:12

## 2020-02-07 RX ADMIN — GABAPENTIN 200 MG: 100 CAPSULE ORAL at 21:22

## 2020-02-07 NOTE — PROGRESS NOTES
02/06/20 2232   NIV Type   Skin Assessment Clean, dry, & intact   Skin Protection for O2 Device Yes   Equipment Type vision   Mode Bilevel   Mask Type Full face mask   Mask Size Large   Settings/Measurements   IPAP 14 cmH20   CPAP/EPAP 8 cmH2O   Rate Ordered 16   FiO2  50 %   I Time/ I Time % 0.9 s   Vt Exhaled 584 mL   Minute Volume 10 Liters   Comfort Level Good   Using Accessory Muscles No   SpO2 95   Breath Sounds   Right Upper Lobe Diminished   Right Middle Lobe Diminished   Right Lower Lobe Diminished   Left Upper Lobe Diminished   Left Lower Lobe Diminished   Alarm Settings   Alarms On Y   Press Low Alarm 3 cmH2O   High Pressure Alarm 25 cmH2O   Delay Alarm 20 sec(s)   Apnea (secs) 40 secs   Resp Rate Low Alarm 6   High Respiratory Rate 50 br/min        02/06/20 2232   NIV Type   Skin Assessment Clean, dry, & intact   Skin Protection for O2 Device Yes   Equipment Type vision   Mode Bilevel   Mask Type Full face mask   Mask Size Large   Settings/Measurements   IPAP 14 cmH20   CPAP/EPAP 8 cmH2O   Rate Ordered 16   FiO2  50 %   I Time/ I Time % 0.9 s   Vt Exhaled 584 mL   Minute Volume 10 Liters   Comfort Level Good   Using Accessory Muscles No   SpO2 95   Breath Sounds   Right Upper Lobe Diminished   Right Middle Lobe Diminished   Right Lower Lobe Diminished   Left Upper Lobe Diminished   Left Lower Lobe Diminished   Alarm Settings   Alarms On Y   Press Low Alarm 3 cmH2O   High Pressure Alarm 25 cmH2O   Delay Alarm 20 sec(s)   Apnea (secs) 40 secs   Resp Rate Low Alarm 6   High Respiratory Rate 50 br/min

## 2020-02-07 NOTE — PROGRESS NOTES
Hospitalist Progress Note      PCP: Yue Turner DO    Date of Admission: 2/5/2020    Chief Complaint:  Malaise, dyspnea, syncope      History Of Present Illness: The patient is a 71 Y M with a h/o HTN, HLD, DM2, and CAD. He began to feel ill about two days ago. Cough, dyspnea, congestion, malaise, fevers and chills. He went to an urgent care, rapid flu swab was negative, and he was discharged with azithromycin. He continued to feel badly. Also has significant diarrhea, which was unusual for him. He was at his wife's oncology appointment today when he suddenly felt faint and diaphoretic, decided he needed to get some fresh air. He stood up from his chair, took a few steps, and felt like he was going to pass out. People came to his assistance and lowered him into a chair, where he slumped over and lost consciousness for a few seconds. Clinic staff took his blood pressure and heart rate and they were reportedly normal.  They sent him to the ED were he was found to be hypoxic in the 60's, slightly hypotensive at 98/48, and sinus bradycardic in the mid 40's. This time rapid influenza swab was positive. Subjective:  Doing well. He feels better in general.  Less cough and dyspnea. No further chest pain. No diarrhea. Lingering oxygen requirement.        Medications:  Reviewed    Infusion Medications    dextrose       Scheduled Medications    oseltamivir  75 mg Oral BID    sodium chloride flush  10 mL Intravenous 2 times per day    aspirin  81 mg Oral Daily    atorvastatin  20 mg Oral Nightly    fluticasone  2 spray Nasal Daily    gabapentin  200 mg Oral Nightly    insulin glargine  15 Units Subcutaneous Nightly    insulin lispro  0-12 Units Subcutaneous TID     insulin lispro  0-6 Units Subcutaneous Nightly    pramipexole  0.125 mg Oral Nightly    pantoprazole  40 mg Oral BID    mupirocin   Nasal BID     PRN Meds: acetaminophen, perflutren lipid microspheres, sodium phosphate CONTROL;  Code Status: Full Code    PT/OT Eval Status: rec'd assistance PRN    Dispo - perhaps 2/8, ideally when weaned to RA. He lives at home.        Tona Lu MD

## 2020-02-07 NOTE — FLOWSHEET NOTE
02/07/20 0854   Assessment   Charting Type Shift assessment   Neurological   Neuro (WDL) WDL   Level of Consciousness 0   Orientation Level Oriented X4   Cognition Appropriate judgement; Appropriate safety awareness; Appropriate attention/concentration; Appropriate for developmental age   Language Clear   Demetrius Coma Scale   Eye Opening 4   Best Verbal Response 5   Best Motor Response 6   Demetrius Coma Scale Score 15   Sensory   Sensation Generalized Normal   HEENT   HEENT (WDL) X   Teeth Missing teeth   Respiratory   Respiratory (WDL) X   Respiratory Pattern Regular   Respiratory Depth Normal   Respiratory Quality/Effort Unlabored   Chest Assessment Chest expansion symmetrical;Trachea midline   L Breath Sounds Diminished   R Breath Sounds Diminished   Breath Sounds   Right Upper Lobe Diminished   Right Middle Lobe Diminished   Right Lower Lobe Diminished   Left Upper Lobe Diminished   Left Lower Lobe Diminished   Cardiac   Cardiac (WDL) X   Cardiac Regularity Regular   Cardiac Rhythm SB   Cardiac Monitor   Telemetry Monitor On Yes   Telemetry Audible Yes   Telemetry Alarms Set Yes   Gastrointestinal   Abdominal (WDL) WDL   Tenderness Nontender   Passing Flatus Y   Abdomen Inspection Rotund   RUQ Bowel Sounds Active   LUQ Bowel Sounds Active   RLQ Bowel Sounds Active   LLQ Bowel Sounds Active   Peripheral Vascular   Peripheral Vascular (WDL) WDL   Edema None   LUE Neurovascular Assessment   Capillary Refill Less than/equal to 3 seconds   Color Appropriate for ethnicity   Temperature Warm   L Brachial Pulse +2   L Radial Pulse +2   Skin Color/Condition   Skin Color/Condition (WDL) WDL   Skin Integrity   Skin Integrity (WDL) X   Musculoskeletal   Musculoskeletal (WDL) WDL   Genitourinary   Genitourinary (WDL) WDL   Flank Tenderness No   Suprapubic Tenderness No   Dysuria No   Urine Assessment   Urine Color Yellow/straw   Urine Appearance Clear   Urine Odor No odor   Anus/Rectum   Anus/Rectum (WDL) WDL   Psychosocial Psychosocial (WDL) WDL

## 2020-02-07 NOTE — PROGRESS NOTES
INPATIENT PULMONARY CRITICAL CARE PROGRESS NOTE      SUBJECTIVE: Afebrile and hemodynamically stable, on oxygen at 3 LPM.  He has minimal cough, bringing out small amounts of phlegm. He denies any chest pain. He has had no further episodes of lightheadedness. He has had no diarrhea or vomiting, no nausea. He is standing up at the side of the bed with no discomfort. Physical Exam:  Blood pressure 114/70, pulse 65, temperature 98.1 °F (36.7 °C), temperature source Oral, resp. rate 16, height 5' 10\" (1.778 m), weight 277 lb 12.5 oz (126 kg), SpO2 94 %.'   General: Pleasant, obese, well developed, in no respiratory distress. Eyes:  No scleral icterus or periorbital edema. Head: Atraumatic, normocephalic. ENMT: Missing teeth, class III airway. No bleeding of lips or gums. Mucosa pink and moist. No ulceration. No oral candidiasis. Neck: Short and large neck, no JVD. Lymphadenopathy:  Deferred  CV: Heart sounds distant. S1, S2, no murmurs, gallops, clicks or rubs. Respiratory: Clear to auscultation  Chest: Equal rise and fall of chest.   GI: Deferred   : Deferred  Skin: No rashes, lesions, bruises, induration, discharge. Color, texture, turgor normal except for distal pigmentation of legs due to chronic venous changes. Musculoskeletal: Supple, no contractures or muscle atrophy. No clubbing or cyanosis of nailbeds. No joint swelling, redness. Edema: 1+ distal legs bilaterally  Neuro: Detailed exam not performed, moves all extremities. No obvious focal deficit, detailed exam not performed.       Results:  CBC:   Recent Labs     02/05/20  0859 02/06/20 0427 02/07/20 0522   WBC 7.7 2.6* 3.0*   HGB 14.1 11.9* 12.5*   HCT 42.6 36.2* 37.3*   MCV 88.1 88.6 87.8   * 91* 102*     BMP:   Recent Labs     02/05/20  0859 02/06/20 0427 02/07/20 0522    139 140   K 3.7 4.3 4.5   CL 97* 102 102   CO2 26 27 30   BUN 22* 19 16   CREATININE 1.2 0.9 0.8     LIVER PROFILE:   Recent Labs Medical/Surgical History: gb, pancreas cyst sx FINDINGS: Chest: Pulmonary Arteries: There is no acute pulmonary thromboembolus. Mediastinum: Coronary artery calcifications are a marker of atherosclerosis. There are no enlarged thoracic lymph nodes. Lungs/pleura: The airway is patent. There is no pneumothorax or pleural effusion. There is bibasilar scarring and/or atelectasis with partial collapse of the superior segment of the lingula. Soft Tissues/Bones: Degenerative changes involve the thoracic spine and bilateral glenohumeral joints. Abdomen/Pelvis: Organs: The liver, spleen, pancreas, adrenal glands and kidneys are unchanged. Status post cholecystectomy with mild intrahepatic ductal dilatation. No change in the Bosniak type 1 and type 2 bilateral renal cysts including the large Bosniak type 2 right renal cyst causing mass effect on the ascending colon. No change in the atrophy of the pancreatic tail, partially calcified low-attenuation area with minimal adjacent spiculation likely sequela of pancreatitis. GI/Bowel: Small hiatal hernia is noted. There is no bowel obstruction. The appendix is within normal limits. Pelvis: The pelvic viscera are within normal limits. Peritoneum/Retroperitoneum: There is no free fluid or adenopathy. Bones/Soft Tissues: Degenerative changes involve the lumbar spine and bilateral hips. 1. Partial collapse of the lingula. Assessment and plan:  Presyncope, hypotension. Possibly dehydration in the face of influenza A and beta-blocker usage. The patient did take his medications on the morning of his symptoms including beta-blockers. Continue gentle hydration. Observe for bradycardia. Severe bradycardia. No recurrence. Influenza A. On Tamiflu  Abnormal CT chest.  Has minimal lingular atelectasis, minimal patchy bilateral groundglass opacities. Observe for development of further respiratory problems due to influenza A or bacteria.   Presently doing well, oxygen can be weaned. Thrombocytopenia. In July last year, he had mildly low platelet count at 142, he has had platelet count of 463 on 8/25/2011. Continue to monitor. Acute kidney injury. Baseline creatinine was less than 1 mg/dL on prior labs. Likely due to hypovolemia. Improved. Diarrhea. Possibly part of influenza symptom complex, further investigations if symptoms much worse. Presently no diarrhea. DM 2. Per IM. ESTHER. Reportedly is compliant with CPAP.  RLS. On Mirapex. HTN, HLD. Per IM. GERD, Yan's esophagus. PPI  DVT prophylaxis. Lovenox    Multidisciplinary rounds were completed at the bedside with participation from the intensivist, pharmacy, nursing, nutrition. All labs and imaging studies reviewed, discussed with patient. No family at bedside. The patient is stable from the cardiopulmonary standpoint, still requiring oxygen. He can be transferred out to the floor. We will sign off, please call with questions and thank you for the consultation.       Electronically signed by:  Anurag Palacios MD    2/7/2020    6:39 PM.

## 2020-02-07 NOTE — FLOWSHEET NOTE
02/06/20 2110   Assessment   Charting Type Shift assessment   Neurological   Neuro (WDL) WDL   Level of Consciousness 0   Orientation Level Oriented X4   Cognition Appropriate judgement; Appropriate safety awareness; Appropriate attention/concentration; Appropriate for developmental age   Language Clear   Size R Pupil (mm) 3   R Pupil Shape Round   R Pupil Reaction Brisk   Size L Pupil (mm) 3   L Pupil Shape Round   L Pupil Reaction Brisk   R Hand  Moderate   L Hand  Moderate   R Foot Dorsiflexion Moderate   L Foot Dorsiflexion Moderate   R Foot Plantar Flexion Moderate   L Foot Plantar Flexion Moderate   RUE Motor Response Responds to command   Sensation RUE Full sensation   LUE Motor Response Responds to command   Sensation LUE Full sensation   RLE Motor Response Responds to command   Sensation RLE Full sensation   LLE Motor Response Responds to command   Sensation LLE Full sensation   Gag Present   Demetrius Coma Scale   Eye Opening 4   Best Verbal Response 5   Best Motor Response 6   Robert Lee Coma Scale Score 15   Sensory   Sensation Generalized Normal   NIH/MNHISS Stroke Scale   Interval Baseline   Level of Consciousness (1a. ) 0   LOC Questions (1b. ) 0   LOC Commands (1c. ) 0   Best Gaze (2. ) 0   Visual (3. ) 0   Facial Palsy (4. ) 0   Motor Arm, Left (5a. ) 0   Motor Arm, Right (5b. ) 0   Motor Leg, Left (6a. ) 0   Motor Leg, Right (6b. ) 0   Limb Ataxia (7. ) 0   Sensory (8. ) 0   Best Language (9. ) 0   Dysarthria (10. ) 0   Extinction and Inattention (11) 0   Modified Total 0   Total 0   HEENT   HEENT (WDL) X   Teeth Missing teeth   Respiratory   Respiratory (WDL) X   Respiratory Pattern Regular   Respiratory Depth Normal   Respiratory Quality/Effort Unlabored   Chest Assessment Chest expansion symmetrical;Trachea midline   L Breath Sounds Diminished   R Breath Sounds Diminished   Breath Sounds   Right Upper Lobe Diminished   Right Middle Lobe Diminished   Right Lower Lobe Diminished   Left Upper Lobe Diminished   Left Lower Lobe Diminished   Cardiac   Cardiac (WDL) X   Cardiac Regularity Regular   Cardiac Rhythm SB   Cardiac Monitor   Telemetry Monitor On Yes   Telemetry Audible Yes   Telemetry Alarms Set Yes   Gastrointestinal   Abdominal (WDL) WDL   Tenderness Nontender   Passing Flatus Y   Abdomen Inspection Rotund   RUQ Bowel Sounds Active   LUQ Bowel Sounds Active   RLQ Bowel Sounds Active   LLQ Bowel Sounds Active   Peripheral Vascular   Peripheral Vascular (WDL) WDL   Edema None   LUE Neurovascular Assessment   Capillary Refill Less than/equal to 3 seconds   Color Appropriate for ethnicity   Temperature Warm   L Brachial Pulse +2   L Radial Pulse +2   Skin Color/Condition   Skin Color/Condition (WDL) WDL   Skin Integrity   Skin Integrity (WDL) X   Musculoskeletal   Musculoskeletal (WDL) WDL   Genitourinary   Genitourinary (WDL) WDL   Flank Tenderness No   Suprapubic Tenderness No   Dysuria No   Urine Assessment   Urine Color Yellow/straw   Urine Appearance Clear   Urine Odor No odor   Anus/Rectum   Anus/Rectum (WDL) WDL   Psychosocial   Psychosocial (WDL) WDL

## 2020-02-07 NOTE — DISCHARGE INSTR - COC
Continuity of Care Form    Patient Name: Boyd Pop   :  1950  MRN:  [id number]    Admit date:  2020  Discharge date:  ***    Code Status Order: Full Code   Advance Directives:   Advance Care Flowsheet Documentation     Date/Time Healthcare Directive Type of Healthcare Directive Copy in [address] Agent's Name Healthcare Agent's Phone Number    20 1430  No, patient does not have an advance directive for healthcare treatment -- -- -- -- --          Admitting Physician:  Brenda Chance MD  PCP: Michelle Murphy DO    Discharging Nurse: [facility] Unit/Room#: 2300/3166-87  Discharging Unit Phone Number: ***    Emergency Contact:   Extended Emergency Contact Information  Primary Emergency Contact: Rosy Swenson  Address: [address] [place] [address] Phone: [phone]  Work Phone: [phone]  Mobile Phone: [phone]  Relation: Spouse    Past Surgical History:  Past Surgical History:   Procedure Laterality Date    CATARACT REMOVAL Bilateral 2013    CHOLECYSTECTOMY      COLONOSCOPY  10/26/2011    ENDOSCOPY, COLON, DIAGNOSTIC      EGD halo    HYDROCELE EXCISION  11/15/2016    PANCREAS SURGERY      cyst opened up and drining into small bowel    TONSILLECTOMY      UPPER GASTROINTESTINAL ENDOSCOPY  10/04/2016    with biopsy    UPPER GASTROINTESTINAL ENDOSCOPY  2017    Halo ablation    UPPER GASTROINTESTINAL ENDOSCOPY  2017    Halo ablation    UPPER GASTROINTESTINAL ENDOSCOPY  2017    bx distal sophagus    UPPER GASTROINTESTINAL ENDOSCOPY  2017    with HALO  procedure    UPPER GASTROINTESTINAL ENDOSCOPY N/A 2018    EGD WITH ABLATION AND ANESTHESIA - HALO---SLEEP APNEA---  performed by Snehal Bueno MD at [facility]  2019    EGD BIOPSY performed by Snehal Bueno MD at [facility] ml   Net 100 ml     I/O last 3 completed shifts: In: 440 [P.O.:240; I.V.:200]  Out: 1500 [Urine:1500]    Safety Concerns:     [address] [facility] Safety Concerns:629915368}    Impairments/Disabilities:      {[facility] Impairments/Disabilities:097929517}    Nutrition Therapy:  Current Nutrition Therapy:   [facility] Diet List:028391744}    Routes of Feeding: {[facility] DME Other Feedings:552481589}  Liquids: {Slp liquid thickness:57969}  Daily Fluid Restriction: {[facility] DME Yes amt example:365338116}  Last Modified Barium Swallow with Video (Video Swallowing Test): {Done Not Done XVAX:019111361}    Treatments at the Time of Hospital Discharge:   Respiratory Treatments: ***  Oxygen Therapy:  {Therapy; copd oxygen:91310}  Ventilator:    { CC Vent LUPB:732356870}    Rehab Therapies: {THERAPEUTIC INTERVENTION:0850539942}  Weight Bearing Status/Restrictions: [address] [place] [address] Weight Bearin}  Other Medical Equipment (for information only, NOT a DME order):  {EQUIPMENT:204558947}  Other Treatments: ***    Patient's personal belongings (please select all that are sent with patient):  {[facility] DME Belongings:498272486}    RN SIGNATURE:  {Esignature:582749392}    CASE MANAGEMENT/SOCIAL WORK SECTION    Inpatient Status Date: 2020  Readmission Risk Assessment Score:  Readmission Risk              Risk of Unplanned Readmission:        17           Discharging to Facility/ Agency   · Name: [address] [place] [address]  · Address:  · Phone:[phone]  · Fax:[phone]    Dialysis Facility (if applicable)   · Name:  · Address:  · Dialysis Schedule:  · Phone:  · Fax:    / signature: Electronically signed by Tashi Gonzalez RN on 20 at 9:30 AM    PHYSICIAN SECTION    Prognosis: Good    Condition at Discharge: Stable    Rehab Potential (if transferring to Rehab): Good    Recommended Labs or Other Treatments After Discharge: Follow up with PCP within 1-2 weeks.      Recommended Follow-up, Labs or Other Treatments After Discharge: Follow up with PCP within 1-2 weeks. Physician Certification: I certify the above information and transfer of Marilee Dean  is necessary for the continuing treatment of the diagnosis listed and that he requires [address] for less 30 days.      Update Admission H&P: No change in H&P    PHYSICIAN SIGNATURE:  Electronically signed by Alejo Elizalde MD on 2/8/20 at 9:12 AM

## 2020-02-07 NOTE — PLAN OF CARE
Problem:  Activity:  Goal: Fatigue will decrease  Description  Fatigue will decrease  Outcome: Ongoing     Problem: Cardiac:  Goal: Hemodynamic stability will improve  Description  Hemodynamic stability will improve  Outcome: Ongoing     Problem: Respiratory:  Goal: Ability to maintain a clear airway will improve  Description  Ability to maintain a clear airway will improve  Outcome: Ongoing

## 2020-02-07 NOTE — PLAN OF CARE
Problem:  Activity:  Goal: Fatigue will decrease  Description  Fatigue will decrease  Outcome: Ongoing

## 2020-02-07 NOTE — CARE COORDINATION
Received call from [facility] with Care Transitions and she states her NHPredict tool is recommending home care and to please inquire as to whether or not patient would like home care. Spoke with patient at bedside. Discussed the above and he agrees that he would benefit from some nursing care at discharge. He has no preference in home care agencies or any experience with any. Placed call to [facility] with [facility] home care and notified of referral. He states they will follow.

## 2020-02-07 NOTE — CARE COORDINATION
nHpredict outcome report received and reviewed with , Butch Zuniga RN. Report forwarded to Manager of Case Management, TIANNA Garrett RN and Case Management , FLORI Taylor Se. Report uploaded into patient's record and can be viewed under Media Tab.        Supriya GUERINN, RN, [facility]  Care Transition Nurse   [phone]

## 2020-02-08 VITALS
SYSTOLIC BLOOD PRESSURE: 128 MMHG | HEART RATE: 54 BPM | WEIGHT: 273.9 LBS | HEIGHT: 70 IN | BODY MASS INDEX: 39.21 KG/M2 | RESPIRATION RATE: 16 BRPM | TEMPERATURE: 97.7 F | OXYGEN SATURATION: 93 % | DIASTOLIC BLOOD PRESSURE: 69 MMHG

## 2020-02-08 LAB
GLUCOSE BLD-MCNC: 151 MG/DL (ref 70–99)
PERFORMED ON: ABNORMAL

## 2020-02-08 PROCEDURE — 2580000003 HC RX 258: Performed by: INTERNAL MEDICINE

## 2020-02-08 PROCEDURE — 6370000000 HC RX 637 (ALT 250 FOR IP): Performed by: INTERNAL MEDICINE

## 2020-02-08 RX ORDER — OSELTAMIVIR PHOSPHATE 75 MG/1
75 CAPSULE ORAL 2 TIMES DAILY
Qty: 4 CAPSULE | Refills: 0 | Status: SHIPPED | OUTPATIENT
Start: 2020-02-08 | End: 2020-02-10

## 2020-02-08 RX ADMIN — FLUTICASONE PROPIONATE 2 SPRAY: 50 SPRAY, METERED NASAL at 09:41

## 2020-02-08 RX ADMIN — OSELTAMIVIR PHOSPHATE 75 MG: 75 CAPSULE ORAL at 09:39

## 2020-02-08 RX ADMIN — Medication 10 ML: at 09:39

## 2020-02-08 RX ADMIN — INSULIN LISPRO 3 UNITS: 100 INJECTION, SOLUTION INTRAVENOUS; SUBCUTANEOUS at 09:40

## 2020-02-08 RX ADMIN — ASPIRIN 81 MG 81 MG: 81 TABLET ORAL at 09:39

## 2020-02-08 RX ADMIN — INSULIN LISPRO 2 UNITS: 100 INJECTION, SOLUTION INTRAVENOUS; SUBCUTANEOUS at 09:40

## 2020-02-08 RX ADMIN — PANTOPRAZOLE SODIUM 40 MG: 40 TABLET, DELAYED RELEASE ORAL at 09:39

## 2020-02-08 NOTE — PLAN OF CARE
Problem: Respiratory:  Goal: Ability to maintain a clear airway will improve  Description  Ability to maintain a clear airway will improve  Outcome: Ongoing    Problem: Falls - Risk of:  Goal: Will remain free from falls  Description  Will remain free from falls  Outcome: Ongoing     Problem: Cardiac:  Goal: Hemodynamic stability will improve  Description  Hemodynamic stability will improve  Outcome: Ongoing

## 2020-02-08 NOTE — PROGRESS NOTES
02/07/20 2344   NIV Type   Skin Protection for O2 Device Yes   Equipment Type Vision   Mode Bilevel   Mask Type Full face mask   Mask Size Large   Settings/Measurements   IPAP 12 cmH20   CPAP/EPAP 6 cmH2O   Resp 16   FiO2  50 %  (Decreased to 40%)   Vt Exhaled 467 mL   Minute Volume 42 Liters   Comfort Level Good   Using Accessory Muscles No   SpO2 99   Oxygen Therapy/Pulse Ox   SpO2 100 %

## 2020-02-08 NOTE — PLAN OF CARE
Problem:  Activity:  Goal: Fatigue will decrease  Description  Fatigue will decrease  Outcome: Ongoing     Problem: Cardiac:  Goal: Hemodynamic stability will improve  Description  Hemodynamic stability will improve  Outcome: Ongoing     Problem: Respiratory:  Goal: Ability to maintain a clear airway will improve  Description  Ability to maintain a clear airway will improve  2/8/2020 0731 by Pratibha Willis RN  Outcome: Ongoing

## 2020-02-08 NOTE — DISCHARGE SUMMARY
Called pt and spoke with his wife and informed her that per TABATHA Naik, Jeremiah should see an ENT surgeon. Gave pt's wife a list of options and they wanted to go somewhere closer to [place] so I also informed them of Dr. Marc Poe in [place] and gave the number to the clinic so they can call and schedule. Pt's wife understood and stated she will call and schedule appt.    for 2 days  Qty: 4 capsule, Refills: 0              Details   Insulin Degludec (TRESIBA) 100 UNIT/ML SOLN Inject 20 Units into the skin nightly  Qty: 2 vial, Refills: 1    Associated Diagnoses: Controlled type 2 diabetes mellitus without complication, with long-term current use of insulin ([facility])      ergocalciferol (DRISDOL) 1.25 MG (91021 UT) capsule Take 1 capsule by mouth once a week  Qty: 12 capsule, Refills: 5      Continuous Blood Gluc  (FREESTYLE JUSTIN 14 DAY READER) OLI 1 Units by Does not apply route as needed (as needed)  Qty: 1 Device, Refills: 0    Associated Diagnoses: Controlled type 2 diabetes mellitus without complication, with long-term current use of insulin ([facility])      Continuous Blood Gluc Sensor (FREESTYLE JUSTIN 14 DAY SENSOR) MISC 1 Units by Does not apply route every 14 days  Qty: 2 each, Refills: 11    Associated Diagnoses: Controlled type 2 diabetes mellitus without complication, with long-term current use of insulin ([facility])      gabapentin (NEURONTIN) 100 MG capsule Take 2 capsules by mouth nightly for 60 days. Qty: 60 capsule, Refills: 1    Associated Diagnoses: Type 2 diabetes mellitus without complication, with long-term current use of insulin ([facility] [address] Peripheral polyneuropathy      pramipexole (MIRAPEX) 0.125 MG tablet Take 1 tablet by mouth nightly  Qty: 90 tablet, Refills: 3    Associated Diagnoses: Restless leg syndrome      isosorbide mononitrate (IMDUR) 30 MG extended release tablet Take 1 tablet by mouth daily  Qty: 30 tablet, Refills: 3      aspirin 81 MG chewable tablet Take 1 tablet by mouth daily  Qty: 30 tablet, Refills: 3      nitroGLYCERIN (NITROSTAT) 0.4 MG SL tablet up to max of 3 total doses. If no relief after 1 dose, call 911.   Qty: 25 tablet, Refills: 3      fluticasone (FLONASE) 50 MCG/ACT nasal spray 2 sprays by Nasal route daily  Qty: 1 Bottle, Refills: 5      glucose blood VI test strips (ASCENSIA AUTODISC VI;ONE TOUCH ULTRA TEST VI) strip Check glucose 3 times daily      Insulin Syringe-Needle U-100 30G X 1/2\" 0.5 ML MISC Inject insulin 3 times daily      pantoprazole (PROTONIX) 40 MG tablet Take 40 mg by mouth 2 times daily       insulin regular (HUMULIN R;NOVOLIN R) 100 UNIT/ML injection Inject 10 Units into the skin 3 times daily (before meals)       insulin NPH (HUMULIN N;NOVOLIN N) 100 UNIT/ML injection vial Inject 10 Units into the skin 2 times daily       lisinopril (PRINIVIL;ZESTRIL) 10 MG tablet Take 10 mg by mouth daily      atorvastatin (LIPITOR) 20 MG tablet Take 20 mg by mouth daily. Time Spent on discharge is more than 30 minutes in the examination, evaluation, counseling and review of medications and discharge plan. Signed:    Ludmila Bryant MD   2/8/2020      Thank you Pooja Zhou DO for the opportunity to be involved in this patient's care. If you have any questions or concerns please feel free to contact me at [phone].

## 2020-02-08 NOTE — CARE COORDINATION
CASE MANAGEMENT DISCHARGE SUMMARY      Discharge to: home with Sarah Marie Durable Medical Equipment ordered/agency: na    Transportation:    Family/car: private    Notified:    Facility/Agency: [facility] [id number]    Note: Discharging nurse to complete [facility], reconcile [facility]S, and place final copy with patient's discharge packet. RN to ensure that written prescriptions for  Level II medications are sent with patient to the facility as per protocol.

## 2020-02-09 LAB
BLOOD CULTURE, ROUTINE: NORMAL
CULTURE, BLOOD 2: NORMAL

## 2020-02-10 ENCOUNTER — CARE COORDINATION (OUTPATIENT)
Dept: CASE MANAGEMENT | Age: 70
End: 2020-02-10

## 2020-02-10 NOTE — CARE COORDINATION
Jordi 45 Transitions Initial Follow Up Call    Call within 2 business days of discharge: Yes    Patient: Mabel Ramos Patient : 1950   MRN: [id number]  Reason for Admission: Influenza  Discharge Date: 20 RARS: Readmission Risk Score: 18      Last Discharge [facility]       Complaint Diagnosis Description Type Department Provider    20 Emesis; Diarrhea Diarrhea, unspecified type . .. ED to Hosp-Admission (Discharged) (ADMITTED) Nyla Fierro MD; [facility]. Spoke with: patient Jesús Cagle 46: [employer] provided:  Obtained and reviewed discharge summary and/or continuity of care documents  Communication with home health agencies or other community services the patient is currently [address] with Briseyda Smith at [facility], she stated patient was seen yesterday by nurse for [facility]. Patient reports that he's feeling ok. States breathing is good. Denies fever, chills, body aches. Says appetite is good, no n/v/d. No chest congestion or sore throat. Has new script for Tamiflu and has been taking. Stopped taking Zithromax and Metoprolol as ordered. He declined full med review stating the home care nurse reviewed everything yesterday when she was there. He has scheduled f/u appt with NP at PCP office on . Denies needs at present time. Agreeable to f/u calls. Educated on the use of urgent care or physicians 24 hr access line if assistance is needed after hours & that they can always contact their home care provider to request a nurse visit even if it isn't their regularly scheduled day for their nurse to visit.      Care Transitions 24 Hour Call    Do you have any ongoing symptoms?:  No  Do you have a copy of your discharge instructions?:  Yes  Do you have all of your prescriptions and are they filled?:  Yes  Have you been contacted by a [address]?:  No  Have you scheduled your follow up appointment?:  Yes  Were you discharged with any Home Care or Post Acute Services:  Yes  Post Acute Services:  Home Health (Comment: [facility])  Patient Home Equipment:  CPAP  Do you have support at home?:  Partner/Spouse/SO  Do you feel like you have everything you need to keep you well at home?:  Yes  Are you an active caregiver in your home?:  No  Care Transitions Interventions                                 Follow Up  Future Appointments   Date Time Provider Magdalena Murguia   2/17/2020 11:00 AM SANDY Nesbitt - CNP [facility]   4/30/2020 11:30 AM MD MOSES Dozier [address]   5/5/2020  9:00 AM SANDY Laird CNP [facility]   6/1/2020  9:00 AM SANDY Coombs - [address] [place] [address]       Manjinder Luke RN

## 2020-02-13 ENCOUNTER — CARE COORDINATION (OUTPATIENT)
Dept: CASE MANAGEMENT | Age: 70
End: 2020-02-13

## 2020-02-13 NOTE — CARE COORDINATION
Jordi 45 Transitions Follow Up Call    2020    Patient: Dakota Nair  Patient : 1950   MRN: [id number]  Reason for Admission: Influenza  Discharge Date: 20 RARS: Readmission Risk Score: 18    Spoke with: patient Irasema Ruiz    Patient very short with CTN-answered all questions with 1-2 word responses. States breathing is good, no congestion, sore throat or fever. Denies n/v/d and says appetite has been good. Confirmed appt with Ely Bravo for / at [address] any needs and any further CTN calls. Reminded patient that if they have any questions/concerns at anytime to call PCP/specialist as they have an MD on call . No further CTN outreach will be made per patient request.        Care Transitions Subsequent and Final Call    Subsequent and Final Calls  Do you have any ongoing symptoms?:  No  Have your medications changed?:  No  Do you have any questions related to your medications?:  No  Do you currently have any active services?:  Yes  Are you currently active with any services?:  Home Health  Do you have any needs or concerns that I can assist you with?:  No  Identified Barriers:  None  Care Transitions Interventions                          Other Interventions:             Follow Up  Future Appointments   Date Time Provider Magdalena Murguia   2020 11:00 AM SANDY Lara CNP [facility]   2020 11:30 AM MD MALIKA Jackson [facility]   2020  9:00 AM SANDY José CNP [facility] END[facility]   2020  9:00 AM SANDY Blake CNP [facility]       Jann Egan RN

## 2020-02-14 ENCOUNTER — TELEPHONE (OUTPATIENT)
Dept: FAMILY MEDICINE CLINIC | Age: 70
End: 2020-02-14

## 2020-02-14 NOTE — TELEPHONE ENCOUNTER
The number that was left for the nurse was a fax number so I called the number that she called from which was [phone] and they put me through to her voicemail and I left Dr. Linda Patel approval on the voicemail.

## 2020-02-14 NOTE — TELEPHONE ENCOUNTER
Mayra with [address] stated that Catie Jefferson contacted them about this pt and doing home [address] would like to know if you will over see and follow this pt for any orders needed

## 2020-02-17 ENCOUNTER — OFFICE VISIT (OUTPATIENT)
Dept: FAMILY MEDICINE CLINIC | Age: 70
End: 2020-02-17
Payer: MEDICARE

## 2020-02-17 VITALS
DIASTOLIC BLOOD PRESSURE: 60 MMHG | HEART RATE: 63 BPM | HEIGHT: 70 IN | WEIGHT: 272 LBS | BODY MASS INDEX: 38.94 KG/M2 | OXYGEN SATURATION: 93 % | SYSTOLIC BLOOD PRESSURE: 112 MMHG

## 2020-02-17 PROCEDURE — 1123F ACP DISCUSS/DSCN MKR DOCD: CPT | Performed by: NURSE PRACTITIONER

## 2020-02-17 PROCEDURE — G8417 CALC BMI ABV UP PARAM F/U: HCPCS | Performed by: NURSE PRACTITIONER

## 2020-02-17 PROCEDURE — 4040F PNEUMOC VAC/ADMIN/RCVD: CPT | Performed by: NURSE PRACTITIONER

## 2020-02-17 PROCEDURE — 1036F TOBACCO NON-USER: CPT | Performed by: NURSE PRACTITIONER

## 2020-02-17 PROCEDURE — G8482 FLU IMMUNIZE ORDER/ADMIN: HCPCS | Performed by: NURSE PRACTITIONER

## 2020-02-17 PROCEDURE — G8427 DOCREV CUR MEDS BY ELIG CLIN: HCPCS | Performed by: NURSE PRACTITIONER

## 2020-02-17 PROCEDURE — 3017F COLORECTAL CA SCREEN DOC REV: CPT | Performed by: NURSE PRACTITIONER

## 2020-02-17 PROCEDURE — 1111F DSCHRG MED/CURRENT MED MERGE: CPT | Performed by: NURSE PRACTITIONER

## 2020-02-17 PROCEDURE — 99213 OFFICE O/P EST LOW 20 MIN: CPT | Performed by: NURSE PRACTITIONER

## 2020-02-17 ASSESSMENT — ENCOUNTER SYMPTOMS
NAUSEA: 0
SHORTNESS OF BREATH: 0
COUGH: 0
VOMITING: 0
DIARRHEA: 1

## 2020-02-17 ASSESSMENT — PATIENT HEALTH QUESTIONNAIRE - PHQ9
1. LITTLE INTEREST OR PLEASURE IN DOING THINGS: 0
SUM OF ALL RESPONSES TO PHQ QUESTIONS 1-9: 0
SUM OF ALL RESPONSES TO PHQ9 QUESTIONS 1 & 2: 0
2. FEELING DOWN, DEPRESSED OR HOPELESS: 0
SUM OF ALL RESPONSES TO PHQ QUESTIONS 1-9: 0

## 2020-02-17 NOTE — PROGRESS NOTES
Post-Discharge Transitional Care Management Services or Hospital Follow Up      Qing Becerril   YOB: 1950    Date of Office Visit:  2/17/2020  Date of Hospital Admission: 2/5/20  Date of Hospital Discharge: 2/8/20  Readmission Risk Score(high >=14%.  Medium >=10%):Readmission Risk Score: 18      Care management risk score Rising risk (score 2-5) and Complex Care (Scores >=6): 10     Non face to face  following discharge, date last encounter closed (first attempt may have been earlier): 2/10/2020 11:46 AM 2/10/2020 11:46 AM    Call initiated 2 business days of discharge: Yes     Patient Active Problem List   Diagnosis    Cataract    Mass of skin of hand    Diabetes mellitus ([address]    Pulmonary nodules    Chest pain    Epigastric pain    Need for vaccination    Hydrocele in adult    Lung nodule    History of snoring    Essential hypertension    Hyperlipidemia    Controlled type 2 diabetes mellitus without complication, with long-term current use of insulin (HCC)    Excessive daytime sleepiness    Right arm pain    Bradycardia    Heart murmur    Impingement syndrome of right shoulder    Lumbar radiculopathy    Idiopathic peripheral neuropathy    Severe obstructive sleep apnea    Obesity, morbid, BMI 40.0-49.9 (HCC)    Unstable angina (HCC)    Dyspnea    Coronary artery disease of native heart with stable angina pectoris ([address]    Overweight    History of phobia    Cellulitis of right lower extremity    Peripheral polyneuropathy    Traumatic injury to skin or subcutaneous tissue    Vitamin D deficiency    Influenza       Allergies   Allergen Reactions    Codeine Nausea And Vomiting       Medications listed as ordered at the time of discharge from hospital   [facility]    Printed on:02/17/20 1205   Medication Information                      aspirin 81 MG chewable tablet  Take 1 tablet by mouth daily             atorvastatin (LIPITOR) 20 MG tablet  Take 20 mg by mouth daily. Continuous Blood Gluc  (FREESTYLE JUSTIN 14 DAY READER) OLI  1 Units by Does not apply route as needed (as needed)             Continuous Blood Gluc Sensor (FREESTYLE JUSTIN 14 DAY SENSOR) MISC  1 Units by Does not apply route every 14 days             ergocalciferol (DRISDOL) 1.25 MG (27062 UT) capsule  Take 1 capsule by mouth once a week             fluticasone (FLONASE) 50 MCG/ACT nasal spray  2 sprays by Nasal route daily             gabapentin (NEURONTIN) 100 MG capsule  Take 2 capsules by mouth nightly for 60 days. glucose blood VI test strips (ASCENSIA AUTODISC VI;ONE TOUCH ULTRA TEST VI) strip  Check glucose 3 times daily             Insulin Degludec (TRESIBA) 100 UNIT/ML SOLN  Inject 20 Units into the skin nightly             insulin NPH (HUMULIN N;NOVOLIN N) 100 UNIT/ML injection vial  Inject 10 Units into the skin 2 times daily              insulin regular (HUMULIN R;NOVOLIN R) 100 UNIT/ML injection  Inject 10 Units into the skin 3 times daily (before meals)              Insulin Syringe-Needle U-100 30G X 1/2\" 0.5 ML MISC  Inject insulin 3 times daily             isosorbide mononitrate (IMDUR) 30 MG extended release tablet  Take 1 tablet by mouth daily             lisinopril (PRINIVIL;ZESTRIL) 10 MG tablet  Take 10 mg by mouth daily             nitroGLYCERIN (NITROSTAT) 0.4 MG SL tablet  up to max of 3 total doses. If no relief after 1 dose, call 911.              pantoprazole (PROTONIX) 40 MG tablet  Take 40 mg by mouth 2 times daily              pramipexole (MIRAPEX) 0.125 MG tablet  Take 1 tablet by mouth nightly                   Medications marked \"taking\" at this time  Outpatient Medications Marked as Taking for the 2/17/20 encounter (Office Visit) with Richard Delgado, APRN - CNP   Medication Sig Dispense Refill    Insulin Degludec (TRESIBA) 100 UNIT/ML SOLN Inject 20 Units into the skin nightly 2 vial 1    Continuous Blood Gluc  (FREESTYLE JUSTIN 14 DAY READER) OLI 1 Units by Does not apply route as needed (as needed) 1 Device 0    Continuous Blood Gluc Sensor (FREESTYLE JUSTIN 14 DAY SENSOR) MISC 1 Units by Does not apply route every 14 days 2 each 11    pramipexole (MIRAPEX) 0.125 MG tablet Take 1 tablet by mouth nightly 90 tablet 3    isosorbide mononitrate (IMDUR) 30 MG extended release tablet Take 1 tablet by mouth daily 30 tablet 3    aspirin 81 MG chewable tablet Take 1 tablet by mouth daily 30 tablet 3    nitroGLYCERIN (NITROSTAT) 0.4 MG SL tablet up to max of 3 total doses. If no relief after 1 dose, call 911. 25 tablet 3    fluticasone (FLONASE) 50 MCG/ACT nasal spray 2 sprays by Nasal route daily 1 Bottle 5    glucose blood VI test strips (ASCENSIA AUTODISC VI;ONE TOUCH ULTRA TEST VI) strip Check glucose 3 times daily      Insulin Syringe-Needle U-100 30G X 1/2\" 0.5 ML MISC Inject insulin 3 times daily      pantoprazole (PROTONIX) 40 MG tablet Take 40 mg by mouth 2 times daily       insulin regular (HUMULIN R;NOVOLIN R) 100 UNIT/ML injection Inject 10 Units into the skin 3 times daily (before meals)       insulin NPH (HUMULIN N;NOVOLIN N) 100 UNIT/ML injection vial Inject 10 Units into the skin 2 times daily       lisinopril (PRINIVIL;ZESTRIL) 10 MG tablet Take 10 mg by mouth daily      atorvastatin (LIPITOR) 20 MG tablet Take 20 mg by mouth daily. Medications patient taking as of now reconciled against medications ordered at time of hospital discharge: Yes    Chief Complaint   Patient presents with    Follow-Up from [facility] on 02/08/2020 had the Flu and passed out       HPI    Inpatient course: Discharge summary reviewed- see chart. Interval history/Current status: Patient reports he is doing well, denies cough/dyspnea/vomiting or diarrhea. Blood pressure is well controlled with metoprolol being stopped. He is eating and drinking well.  Reports blood glucose levels have been well controlled at home. He has a complaint of a tremor in both hands, occurs with the use of his hands. Does not interfere with activities such as eating, drinking, fine motor movement. This has been on going for years but seems to be worsening. He states his father has similar issue and wants to see neurology. Review of Systems   Constitutional: Negative for fatigue and fever. Respiratory: Negative for cough and shortness of breath. Cardiovascular: Negative for chest pain and leg swelling. Gastrointestinal: Positive for diarrhea (resolved). Negative for nausea and vomiting. Neurological: Positive for tremors. Negative for dizziness and headaches. Vitals:    02/17/20 0950   BP: 112/60   Site: Left Upper Arm   Position: Sitting   Pulse: 63   SpO2: 93%   Weight: 272 lb (123.4 kg)   Height: 5' 10\" (1.778 m)     Body mass index is 39.03 kg/m². Wt Readings from Last 3 Encounters:   02/17/20 272 lb (123.4 kg)   02/08/20 273 lb 14.4 oz (124.2 kg)   02/04/20 276 lb 12.8 oz (125.6 kg)     BP Readings from Last 3 Encounters:   02/17/20 112/60   02/08/20 128/69   02/04/20 110/64       Physical Exam  Vitals signs and nursing note reviewed. Constitutional:       General: He is not in acute distress. Appearance: Normal appearance. He is well-developed. He is obese. He is not ill-appearing, toxic-appearing or diaphoretic. HENT:      Head: Normocephalic and atraumatic. Right Ear: Tympanic membrane, ear canal and external ear normal. There is no impacted cerumen. Left Ear: Tympanic membrane, ear canal and external ear normal. There is no impacted cerumen. Nose: No rhinorrhea. Mouth/Throat:      Mouth: Mucous membranes are moist.      Pharynx: Oropharynx is clear. No oropharyngeal exudate or posterior oropharyngeal erythema. Eyes:      General: No scleral icterus. Right eye: No discharge. Left eye: No discharge.       Conjunctiva/sclera: Conjunctivae

## 2020-03-02 ENCOUNTER — OFFICE VISIT (OUTPATIENT)
Dept: FAMILY MEDICINE CLINIC | Age: 70
End: 2020-03-02
Payer: MEDICARE

## 2020-03-02 ENCOUNTER — TELEPHONE (OUTPATIENT)
Dept: FAMILY MEDICINE CLINIC | Age: 70
End: 2020-03-02

## 2020-03-02 VITALS
WEIGHT: 281 LBS | SYSTOLIC BLOOD PRESSURE: 128 MMHG | TEMPERATURE: 98.1 F | OXYGEN SATURATION: 94 % | BODY MASS INDEX: 40.32 KG/M2 | DIASTOLIC BLOOD PRESSURE: 64 MMHG | HEART RATE: 63 BPM | RESPIRATION RATE: 16 BRPM

## 2020-03-02 PROCEDURE — G8417 CALC BMI ABV UP PARAM F/U: HCPCS | Performed by: NURSE PRACTITIONER

## 2020-03-02 PROCEDURE — 1111F DSCHRG MED/CURRENT MED MERGE: CPT | Performed by: NURSE PRACTITIONER

## 2020-03-02 PROCEDURE — 1036F TOBACCO NON-USER: CPT | Performed by: NURSE PRACTITIONER

## 2020-03-02 PROCEDURE — 99214 OFFICE O/P EST MOD 30 MIN: CPT | Performed by: NURSE PRACTITIONER

## 2020-03-02 PROCEDURE — 1123F ACP DISCUSS/DSCN MKR DOCD: CPT | Performed by: NURSE PRACTITIONER

## 2020-03-02 PROCEDURE — G8482 FLU IMMUNIZE ORDER/ADMIN: HCPCS | Performed by: NURSE PRACTITIONER

## 2020-03-02 PROCEDURE — G8427 DOCREV CUR MEDS BY ELIG CLIN: HCPCS | Performed by: NURSE PRACTITIONER

## 2020-03-02 PROCEDURE — 3017F COLORECTAL CA SCREEN DOC REV: CPT | Performed by: NURSE PRACTITIONER

## 2020-03-02 PROCEDURE — 4040F PNEUMOC VAC/ADMIN/RCVD: CPT | Performed by: NURSE PRACTITIONER

## 2020-03-02 RX ORDER — FLUTICASONE PROPIONATE 50 MCG
2 SPRAY, SUSPENSION (ML) NASAL DAILY
Qty: 1 BOTTLE | Refills: 1 | Status: SHIPPED | OUTPATIENT
Start: 2020-03-02 | End: 2020-08-18 | Stop reason: ALTCHOICE

## 2020-03-02 RX ORDER — AZITHROMYCIN 250 MG/1
250 TABLET, FILM COATED ORAL SEE ADMIN INSTRUCTIONS
Qty: 6 TABLET | Refills: 0 | Status: SHIPPED | OUTPATIENT
Start: 2020-03-02 | End: 2020-03-07

## 2020-03-02 ASSESSMENT — ENCOUNTER SYMPTOMS
WHEEZING: 0
SWOLLEN GLANDS: 1
VOMITING: 0
SINUS PAIN: 1
DIARRHEA: 0
NAUSEA: 0
COUGH: 1
RHINORRHEA: 1
SINUS PRESSURE: 1
SORE THROAT: 1
CHEST TIGHTNESS: 0
SHORTNESS OF BREATH: 0

## 2020-03-02 NOTE — PROGRESS NOTES
Subjective:      Chief Complaint   Patient presents with    Pharyngitis    Sinusitis    Cough    Other     all started yesterday       Patient ID: Jordan Steen is a 71 y.o. male who presents for sore throat, runny nose, nasal congestion, headache and head congestion, cough but unsure what color sputum was. Onset yesterday. Was hospitalized 2 weeks ago with the flu. Pt concerned he does not have the flu again. Tried theraflu with no relief. Sinusitis   This is a new problem. The current episode started yesterday. The problem has been gradually worsening since onset. There has been no fever. Associated symptoms include congestion, coughing, headaches, sinus pressure, a sore throat and swollen glands. Pertinent negatives include no chills or shortness of breath. Treatments tried: theraflu. The treatment provided no relief.        Family History   Problem Relation Age of Onset    Kidney Disease Mother     Cancer Father 76        pancreas    Cancer Brother         lung    Cancer Paternal Grandfather         colon       Social History     Socioeconomic History    Marital status:      Spouse name: Not on file    Number of children: Not on file    Years of education: Not on file    Highest education level: Not on file   Occupational History    Occupation:      Comment: maintenance   Social Needs    Financial resource strain: Not hard at all   Miranda-Willi insecurity:     Worry: Never true     Inability: Never true    Transportation needs:     Medical: No     Non-medical: No   Tobacco Use    Smoking status: Never Smoker    Smokeless tobacco: Never Used   Substance and Sexual Activity    Alcohol use: No    Drug use: No    Sexual activity: Not Currently   Lifestyle    Physical activity:     Days per week: 0 days     Minutes per session: 0 min    Stress: Not at all   Relationships    Social connections:     Talks on phone: More than three times a week     Gets together: Twice a week Attends Shinto service: Never     Active member of club or organization: Yes     Attends meetings of clubs or organizations: More than 4 times per year     Relationship status:     Intimate partner violence:     Fear of current or ex partner: No     Emotionally abused: No     Physically abused: No     Forced sexual activity: No   Other Topics Concern    Not on file   Social History Narrative    Not on file       Current Outpatient Medications on File Prior to Visit   Medication Sig Dispense Refill    ergocalciferol (DRISDOL) 1.25 MG (48666 UT) capsule Take 1 capsule by mouth once a week 12 capsule 5    Continuous Blood Gluc  (FREESTYLE JUSTIN 14 DAY READER) OLI 1 Units by Does not apply route as needed (as needed) 1 Device 0    Continuous Blood Gluc Sensor (FREESTYLE JUSTIN 14 DAY SENSOR) MISC 1 Units by Does not apply route every 14 days 2 each 11    gabapentin (NEURONTIN) 100 MG capsule Take 2 capsules by mouth nightly for 60 days. 60 capsule 1    aspirin 81 MG chewable tablet Take 1 tablet by mouth daily 30 tablet 3    nitroGLYCERIN (NITROSTAT) 0.4 MG SL tablet up to max of 3 total doses. If no relief after 1 dose, call 911. 25 tablet 3    fluticasone (FLONASE) 50 MCG/ACT nasal spray 2 sprays by Nasal route daily 1 Bottle 5    glucose blood VI test strips (ASCENSIA AUTODISC VI;ONE TOUCH ULTRA TEST VI) strip Check glucose 3 times daily      Insulin Syringe-Needle U-100 30G X 1/2\" 0.5 ML MISC Inject insulin 3 times daily      pantoprazole (PROTONIX) 40 MG tablet Take 40 mg by mouth 2 times daily       insulin regular (HUMULIN R;NOVOLIN R) 100 UNIT/ML injection Inject 10 Units into the skin 3 times daily (before meals)       insulin NPH (HUMULIN N;NOVOLIN N) 100 UNIT/ML injection vial Inject 10 Units into the skin 2 times daily       lisinopril (PRINIVIL;ZESTRIL) 10 MG tablet Take 10 mg by mouth daily      atorvastatin (LIPITOR) 20 MG tablet Take 20 mg by mouth daily.       Pharynx: Posterior oropharyngeal erythema present. No oropharyngeal exudate. Eyes:      General:         Right eye: No discharge. Left eye: No discharge. Conjunctiva/sclera: Conjunctivae normal.   Neck:      Musculoskeletal: Neck supple. Cardiovascular:      Rate and Rhythm: Normal rate and regular rhythm. Heart sounds: Normal heart sounds. No murmur. No friction rub. No gallop. Pulmonary:      Effort: Pulmonary effort is normal. No respiratory distress. Breath sounds: Normal breath sounds. No stridor. No wheezing, rhonchi or rales. Musculoskeletal: Normal range of motion. Skin:     General: Skin is warm and dry. Capillary Refill: Capillary refill takes less than 2 seconds. Coloration: Skin is not jaundiced or pale. Findings: No bruising, erythema, lesion or rash. Neurological:      General: No focal deficit present. Mental Status: He is alert and oriented to person, place, and time. Mental status is at baseline. Motor: No weakness. Gait: Gait normal.   Psychiatric:         Mood and Affect: Mood normal.         Behavior: Behavior normal.         Thought Content: Thought content normal.         Judgment: Judgment normal.         Assessment:       ICD-10-CM    1. Acute bacterial sinusitis J01.90     B96.89    2. Nasal congestion R09.81          Plan:     1. Acute bacterial sinusitis  Zithromax  Warm salt water gargles  Humidifier    2. Nasal congestion  Flonase    Call the office if not better by Friday.   May Follow-up with patient's primary care Dr. Chele Soriano as needed

## 2020-03-03 PROBLEM — E11.43 DIABETIC AUTONOMIC NEUROPATHY ASSOCIATED WITH TYPE 2 DIABETES MELLITUS (HCC): Status: ACTIVE | Noted: 2020-03-03

## 2020-03-03 PROBLEM — E66.3 OVERWEIGHT: Status: RESOLVED | Noted: 2019-05-30 | Resolved: 2020-03-03

## 2020-03-11 ENCOUNTER — HOSPITAL ENCOUNTER (OUTPATIENT)
Dept: CT IMAGING | Age: 70
Discharge: HOME | End: 2020-03-11
Payer: MEDICARE

## 2020-03-11 PROCEDURE — 70450 CT HEAD/BRAIN W/O DYE: CPT

## 2020-04-23 ENCOUNTER — TELEPHONE (OUTPATIENT)
Dept: PULMONOLOGY | Age: 70
End: 2020-04-23

## 2020-04-29 ENCOUNTER — TELEPHONE (OUTPATIENT)
Dept: PULMONOLOGY | Age: 70
End: 2020-04-29

## 2020-04-29 ENCOUNTER — VIRTUAL VISIT (OUTPATIENT)
Dept: PULMONOLOGY | Age: 70
End: 2020-04-29
Payer: MEDICARE

## 2020-04-29 VITALS — WEIGHT: 270 LBS | BODY MASS INDEX: 38.65 KG/M2 | HEIGHT: 70 IN

## 2020-04-29 PROCEDURE — 99443 PR PHYS/QHP TELEPHONE EVALUATION 21-30 MIN: CPT | Performed by: INTERNAL MEDICINE

## 2020-04-29 ASSESSMENT — SLEEP AND FATIGUE QUESTIONNAIRES
ESS TOTAL SCORE: 0
HOW LIKELY ARE YOU TO NOD OFF OR FALL ASLEEP WHILE LYING DOWN TO REST IN THE AFTERNOON WHEN CIRCUMSTANCES PERMIT: 0
HOW LIKELY ARE YOU TO NOD OFF OR FALL ASLEEP WHILE WATCHING TV: 0
HOW LIKELY ARE YOU TO NOD OFF OR FALL ASLEEP WHILE SITTING AND TALKING TO SOMEONE: 0
HOW LIKELY ARE YOU TO NOD OFF OR FALL ASLEEP IN A CAR, WHILE STOPPED FOR A FEW MINUTES IN TRAFFIC: 0
HOW LIKELY ARE YOU TO NOD OFF OR FALL ASLEEP WHILE SITTING INACTIVE IN A PUBLIC PLACE: 0
HOW LIKELY ARE YOU TO NOD OFF OR FALL ASLEEP WHEN YOU ARE A PASSENGER IN A CAR FOR AN HOUR WITHOUT A BREAK: 0
HOW LIKELY ARE YOU TO NOD OFF OR FALL ASLEEP WHILE SITTING AND READING: 0
HOW LIKELY ARE YOU TO NOD OFF OR FALL ASLEEP WHILE SITTING QUIETLY AFTER LUNCH WITHOUT ALCOHOL: 0

## 2020-04-29 NOTE — PROGRESS NOTES
hygiene  · Avoid sedatives, alcohol and caffeinated drinks at bed time. · No driving motorized vehicles or operating heavy machinery while fatigue, drowsy or sleepy. · Weight loss is also recommended as a long-term intervention. · Complications of ESTHER if not treated were discussed with patient patient to include systemic hypertension, pulmonary hypertension, cardiovascular morbidities, car accidents and all cause mortality. Karla Hsu is a 71 y.o. male evaluated via telephone on 4/29/2020. Consent:  He and/or health care decision maker is aware that that he may receive a bill for this telephone service, depending on his insurance coverage, and has provided verbal consent to proceed: Yes       Documentation:  I communicated with the patient and/or health care decision maker about: See above   Details of this discussion including any medical advice provided: See above       I Affirm this is a Patient Initiated Episode with an Established Patient who has not had a related appointment within my department in the past 7 days or scheduled within the next 24 hours.     Total Time: 21-30 minutes     Note: not billable if this call serves to triage the patient into an appointment for the relevant concern      Fritz Hines

## 2020-05-05 ENCOUNTER — VIRTUAL VISIT (OUTPATIENT)
Dept: ENDOCRINOLOGY | Age: 70
End: 2020-05-05
Payer: MEDICARE

## 2020-05-05 PROCEDURE — 99442 PR PHYS/QHP TELEPHONE EVALUATION 11-20 MIN: CPT | Performed by: NURSE PRACTITIONER

## 2020-05-05 ASSESSMENT — ENCOUNTER SYMPTOMS
EYE PAIN: 0
SHORTNESS OF BREATH: 0
CONSTIPATION: 0
NAUSEA: 0
COLOR CHANGE: 0
DIARRHEA: 0

## 2020-05-05 NOTE — PROGRESS NOTES
microalbuminuria    Diabetic Health Maintenance   · Last Eye Exam: 11/21  · Last Foot exam: with Dr Ellan Nageotte  · Has patient seen a dietitian? Yes  · Current Exercise: No structured exercise  · On ACEI or ARB: lisinopril 10 mg  · On statin: Lipitor 20 mg    Hyperlipidemia: Current complaints include occasional myalgias but otherwise tolerates well. Lab Results   Component Value Date    CHOL 148 03/22/2019     Lab Results   Component Value Date    TRIG 80 03/22/2019     Lab Results   Component Value Date    HDL 56 03/22/2019     Lab Results   Component Value Date    LDLCALC 76 03/22/2019     No results found for: LDLDIRECT  No results found for: CHOLHDLRATIO    Vitamin D deficiency: Currently is on no supplementation. Current complaints include fatigue on daily basis. Hypertension  Controlled , denies symptoms of dizziness, light headedness. Occasional dependent edema. Tries to follow a salt restricted diet.    Lab Results   Component Value Date     02/07/2020    K 4.5 02/07/2020     02/07/2020    CO2 30 02/07/2020    BUN 16 02/07/2020    CREATININE 0.8 02/07/2020    GLUCOSE 163 (H) 02/07/2020    CALCIUM 8.5 02/07/2020    PROT 5.6 (L) 02/06/2020    LABALBU 3.3 (L) 02/06/2020    BILITOT 0.4 02/06/2020    ALKPHOS 66 02/06/2020    AST 15 02/06/2020    ALT 11 02/06/2020    LABGLOM >60 02/07/2020    GFRAA >60 02/07/2020    AGRATIO 1.6 02/05/2020    GLOB 2.7 02/05/2020     The 10-year ASCVD risk score (Becky Tee et al., 2013) is: 28.4%    Values used to calculate the score:      Age: 71 years      Sex: Male      Is Non- : No      Diabetic: Yes      Tobacco smoker: No      Systolic Blood Pressure: 523 mmHg      Is BP treated: Yes      HDL Cholesterol: 56 mg/dL      Total Cholesterol: 148 mg/dL    Family History   Problem Relation Age of Onset    Kidney Disease Mother     Cancer Father 76        pancreas    Cancer Brother         lung    Cancer Paternal Grandfather         colon Review of Systems   Constitutional: Negative for activity change, appetite change, diaphoresis, fever and unexpected weight change. HENT: Negative for dental problem. Eyes: Negative for pain and visual disturbance. Respiratory: Negative for shortness of breath. Cardiovascular: Negative for chest pain, palpitations and leg swelling. Gastrointestinal: Negative for constipation, diarrhea and nausea. Endocrine: Negative for cold intolerance, heat intolerance, polydipsia, polyphagia and polyuria. Genitourinary: Negative for frequency and urgency. Musculoskeletal: Negative for arthralgias, joint swelling and myalgias. Skin: Negative for color change and pallor. Neurological: Negative for weakness, numbness and headaches. Psychiatric/Behavioral: Negative for dysphoric mood and sleep disturbance. The patient is not nervous/anxious. There were no vitals filed for this visit.  televisit    Physical Exam televisit    Skeletal foot exam: deferred        Diabetes Continuous Glucose Monitoring Report   Freestyle Avelino PRO     Reason for Study:   - Poorly controlled DM without success with standard interventions   - Glucose variability      Current Therapy:   Injects 5 times a day  Humulin N 10 U BID  Humulin R 10U AC TID     CGMS Report   CGMS Device: Freestyle Avelino Pro  Data collection from 12/11/19 to 12/24/19  Range set @   Average reading 351 mg/dL   Above target range 100%  In target range 0%  Below target range 0%      Impression:   Postprandial hyperglycemia   Persistent overnight hyperglycemia  Reports significant dietary non compliance in this period due to holiday season    Recommendation:   Current A1c   Lab Results   Component Value Date    LABA1C 6.9 02/05/2020     Goal A1c < 6.5%  Medication Change: Victoza as below  Insulin Change: see plan  Dietary Recommendations : see plan    Assessment  Maryam Orozco is a 71 y.o. male with uncontrolled Diabetes Mellitus type 2 25 Hydroxy      Greater than 40 minutes spent directly counseling patient about topics listed above (such as lifestyle modifications, preventative screenings and/or disease related processes. Return in about 6 months (around 11/5/2020).

## 2020-05-20 DIAGNOSIS — Z79.4 CONTROLLED TYPE 2 DIABETES MELLITUS WITHOUT COMPLICATION, WITH LONG-TERM CURRENT USE OF INSULIN (HCC): ICD-10-CM

## 2020-05-20 DIAGNOSIS — E11.9 CONTROLLED TYPE 2 DIABETES MELLITUS WITHOUT COMPLICATION, WITH LONG-TERM CURRENT USE OF INSULIN (HCC): ICD-10-CM

## 2020-05-20 DIAGNOSIS — E55.9 VITAMIN D DEFICIENCY: ICD-10-CM

## 2020-05-20 LAB
A/G RATIO: 2.2 (ref 1.1–2.2)
ALBUMIN SERPL-MCNC: 4.3 G/DL (ref 3.4–5)
ALP BLD-CCNC: 89 U/L (ref 40–129)
ALT SERPL-CCNC: 20 U/L (ref 10–40)
ANION GAP SERPL CALCULATED.3IONS-SCNC: 9 MMOL/L (ref 3–16)
AST SERPL-CCNC: 19 U/L (ref 15–37)
BILIRUB SERPL-MCNC: 0.5 MG/DL (ref 0–1)
BUN BLDV-MCNC: 20 MG/DL (ref 7–20)
CALCIUM SERPL-MCNC: 8.2 MG/DL (ref 8.3–10.6)
CHLORIDE BLD-SCNC: 105 MMOL/L (ref 99–110)
CHOLESTEROL, TOTAL: 147 MG/DL (ref 0–199)
CO2: 30 MMOL/L (ref 21–32)
CREAT SERPL-MCNC: 0.9 MG/DL (ref 0.8–1.3)
GFR AFRICAN AMERICAN: >60
GFR NON-AFRICAN AMERICAN: >60
GLOBULIN: 2 G/DL
GLUCOSE BLD-MCNC: 94 MG/DL (ref 70–99)
HDLC SERPL-MCNC: 51 MG/DL (ref 40–60)
LDL CHOLESTEROL CALCULATED: 73 MG/DL
POTASSIUM SERPL-SCNC: 4.5 MMOL/L (ref 3.5–5.1)
SODIUM BLD-SCNC: 144 MMOL/L (ref 136–145)
TOTAL PROTEIN: 6.3 G/DL (ref 6.4–8.2)
TRIGL SERPL-MCNC: 114 MG/DL (ref 0–150)
VITAMIN D 25-HYDROXY: 33.7 NG/ML
VLDLC SERPL CALC-MCNC: 23 MG/DL

## 2020-05-21 LAB
ESTIMATED AVERAGE GLUCOSE: 145.6 MG/DL
HBA1C MFR BLD: 6.7 %

## 2020-07-13 ENCOUNTER — TELEPHONE (OUTPATIENT)
Dept: ENDOCRINOLOGY | Age: 70
End: 2020-07-13

## 2020-07-13 NOTE — TELEPHONE ENCOUNTER
Patient needs a refill on his 14 day sensors     [facility] [address] - F [phone]

## 2020-07-13 NOTE — TELEPHONE ENCOUNTER
Medication:   Requested Prescriptions     Pending Prescriptions Disp Refills    Continuous Blood Gluc Sensor (FREESTYLE JUSTIN 14 DAY SENSOR) MISC 2 each 11     Si Units by Does not apply route every 14 days         Last appt: 2020  Next appt: 2020    Last OARRS: No flowsheet data found.

## 2020-07-14 RX ORDER — FLASH GLUCOSE SENSOR
1 KIT MISCELLANEOUS
Qty: 2 EACH | Refills: 11 | Status: SHIPPED | OUTPATIENT
Start: 2020-07-14 | End: 2022-01-10 | Stop reason: SDUPTHER

## 2020-08-18 ENCOUNTER — HOSPITAL ENCOUNTER (OUTPATIENT)
Age: 70
Setting detail: OBSERVATION
Discharge: HOME | End: 2020-08-18
Attending: EMERGENCY MEDICINE | Admitting: INTERNAL MEDICINE
Payer: MEDICARE

## 2020-08-18 ENCOUNTER — APPOINTMENT (OUTPATIENT)
Dept: CT IMAGING | Age: 70
End: 2020-08-18
Payer: MEDICARE

## 2020-08-18 ENCOUNTER — APPOINTMENT (OUTPATIENT)
Dept: GENERAL RADIOLOGY | Age: 70
End: 2020-08-18
Payer: MEDICARE

## 2020-08-18 ENCOUNTER — APPOINTMENT (OUTPATIENT)
Dept: NUCLEAR MEDICINE | Age: 70
End: 2020-08-18
Payer: MEDICARE

## 2020-08-18 VITALS
WEIGHT: 253.5 LBS | HEART RATE: 71 BPM | OXYGEN SATURATION: 93 % | RESPIRATION RATE: 18 BRPM | SYSTOLIC BLOOD PRESSURE: 129 MMHG | DIASTOLIC BLOOD PRESSURE: 78 MMHG | TEMPERATURE: 97.6 F | BODY MASS INDEX: 36.29 KG/M2 | HEIGHT: 70 IN

## 2020-08-18 PROBLEM — E11.9 CONTROLLED TYPE 2 DIABETES MELLITUS WITHOUT COMPLICATION, WITH LONG-TERM CURRENT USE OF INSULIN (HCC): Chronic | Status: ACTIVE | Noted: 2017-08-21

## 2020-08-18 PROBLEM — I25.110 CORONARY ARTERY DISEASE INVOLVING NATIVE CORONARY ARTERY OF NATIVE HEART WITH UNSTABLE ANGINA PECTORIS (HCC): Status: ACTIVE | Noted: 2019-05-30

## 2020-08-18 PROBLEM — R07.9 CHEST PAIN IN ADULT: Status: ACTIVE | Noted: 2020-08-18

## 2020-08-18 PROBLEM — Z79.4 CONTROLLED TYPE 2 DIABETES MELLITUS WITHOUT COMPLICATION, WITH LONG-TERM CURRENT USE OF INSULIN (HCC): Chronic | Status: ACTIVE | Noted: 2017-08-21

## 2020-08-18 PROBLEM — I25.119 CORONARY ARTERY DISEASE INVOLVING NATIVE CORONARY ARTERY OF NATIVE HEART WITH ANGINA PECTORIS (HCC): Status: ACTIVE | Noted: 2019-05-30

## 2020-08-18 LAB
A/G RATIO: 1.8 (ref 1.1–2.2)
ALBUMIN SERPL-MCNC: 4.2 G/DL (ref 3.4–5)
ALP BLD-CCNC: 90 U/L (ref 40–129)
ALT SERPL-CCNC: 17 U/L (ref 10–40)
ANION GAP SERPL CALCULATED.3IONS-SCNC: 13 MMOL/L (ref 3–16)
AST SERPL-CCNC: 19 U/L (ref 15–37)
BASOPHILS ABSOLUTE: 0 K/UL (ref 0–0.2)
BASOPHILS RELATIVE PERCENT: 0.7 %
BILIRUB SERPL-MCNC: 0.6 MG/DL (ref 0–1)
BILIRUBIN URINE: NEGATIVE
BLOOD, URINE: NEGATIVE
BUN BLDV-MCNC: 23 MG/DL (ref 7–20)
CALCIUM SERPL-MCNC: 9.3 MG/DL (ref 8.3–10.6)
CHLORIDE BLD-SCNC: 101 MMOL/L (ref 99–110)
CLARITY: CLEAR
CO2: 25 MMOL/L (ref 21–32)
COLOR: YELLOW
CREAT SERPL-MCNC: 0.9 MG/DL (ref 0.8–1.3)
EKG ATRIAL RATE: 56 BPM
EKG DIAGNOSIS: NORMAL
EKG P AXIS: -4 DEGREES
EKG P-R INTERVAL: 184 MS
EKG Q-T INTERVAL: 434 MS
EKG QRS DURATION: 88 MS
EKG QTC CALCULATION (BAZETT): 418 MS
EKG R AXIS: 60 DEGREES
EKG T AXIS: 40 DEGREES
EKG VENTRICULAR RATE: 56 BPM
EOSINOPHILS ABSOLUTE: 0.3 K/UL (ref 0–0.6)
EOSINOPHILS RELATIVE PERCENT: 4.3 %
GFR AFRICAN AMERICAN: >60
GFR NON-AFRICAN AMERICAN: >60
GLOBULIN: 2.3 G/DL
GLUCOSE BLD-MCNC: 152 MG/DL (ref 70–99)
GLUCOSE BLD-MCNC: 155 MG/DL (ref 70–99)
GLUCOSE BLD-MCNC: 164 MG/DL (ref 70–99)
GLUCOSE BLD-MCNC: 205 MG/DL (ref 70–99)
GLUCOSE URINE: NEGATIVE MG/DL
HCT VFR BLD CALC: 41.1 % (ref 40.5–52.5)
HEMOGLOBIN: 13.7 G/DL (ref 13.5–17.5)
KETONES, URINE: ABNORMAL MG/DL
LACTIC ACID: 0.8 MMOL/L (ref 0.4–2)
LEUKOCYTE ESTERASE, URINE: NEGATIVE
LIPASE: 11 U/L (ref 13–60)
LV EF: 64 %
LVEF MODALITY: NORMAL
LYMPHOCYTES ABSOLUTE: 1.9 K/UL (ref 1–5.1)
LYMPHOCYTES RELATIVE PERCENT: 31.6 %
MAGNESIUM: 1.8 MG/DL (ref 1.8–2.4)
MCH RBC QN AUTO: 29.4 PG (ref 26–34)
MCHC RBC AUTO-ENTMCNC: 33.3 G/DL (ref 31–36)
MCV RBC AUTO: 88.3 FL (ref 80–100)
MICROSCOPIC EXAMINATION: ABNORMAL
MONOCYTES ABSOLUTE: 0.5 K/UL (ref 0–1.3)
MONOCYTES RELATIVE PERCENT: 7.7 %
NEUTROPHILS ABSOLUTE: 3.3 K/UL (ref 1.7–7.7)
NEUTROPHILS RELATIVE PERCENT: 55.7 %
NITRITE, URINE: NEGATIVE
PDW BLD-RTO: 13.5 % (ref 12.4–15.4)
PERFORMED ON: ABNORMAL
PH UA: 5.5 (ref 5–8)
PLATELET # BLD: 140 K/UL (ref 135–450)
PMV BLD AUTO: 9.8 FL (ref 5–10.5)
POTASSIUM SERPL-SCNC: 5 MMOL/L (ref 3.5–5.1)
PRO-BNP: 44 PG/ML (ref 0–124)
PROTEIN UA: NEGATIVE MG/DL
RBC # BLD: 4.65 M/UL (ref 4.2–5.9)
SODIUM BLD-SCNC: 139 MMOL/L (ref 136–145)
SPECIFIC GRAVITY UA: 1.01 (ref 1–1.03)
TOTAL PROTEIN: 6.5 G/DL (ref 6.4–8.2)
TROPONIN: <0.01 NG/ML
URINE TYPE: ABNORMAL
UROBILINOGEN, URINE: 0.2 E.U./DL
WBC # BLD: 6 K/UL (ref 4–11)

## 2020-08-18 PROCEDURE — 6370000000 HC RX 637 (ALT 250 FOR IP): Performed by: INTERNAL MEDICINE

## 2020-08-18 PROCEDURE — 93010 ELECTROCARDIOGRAM REPORT: CPT | Performed by: INTERNAL MEDICINE

## 2020-08-18 PROCEDURE — 71045 X-RAY EXAM CHEST 1 VIEW: CPT

## 2020-08-18 PROCEDURE — 80053 COMPREHEN METABOLIC PANEL: CPT

## 2020-08-18 PROCEDURE — 93005 ELECTROCARDIOGRAM TRACING: CPT | Performed by: EMERGENCY MEDICINE

## 2020-08-18 PROCEDURE — 36415 COLL VENOUS BLD VENIPUNCTURE: CPT

## 2020-08-18 PROCEDURE — 83605 ASSAY OF LACTIC ACID: CPT

## 2020-08-18 PROCEDURE — G0378 HOSPITAL OBSERVATION PER HR: HCPCS

## 2020-08-18 PROCEDURE — 2580000003 HC RX 258: Performed by: EMERGENCY MEDICINE

## 2020-08-18 PROCEDURE — 83690 ASSAY OF LIPASE: CPT

## 2020-08-18 PROCEDURE — 96374 THER/PROPH/DIAG INJ IV PUSH: CPT

## 2020-08-18 PROCEDURE — 93017 CV STRESS TEST TRACING ONLY: CPT

## 2020-08-18 PROCEDURE — A9502 TC99M TETROFOSMIN: HCPCS | Performed by: INTERNAL MEDICINE

## 2020-08-18 PROCEDURE — 2580000003 HC RX 258: Performed by: INTERNAL MEDICINE

## 2020-08-18 PROCEDURE — 74176 CT ABD & PELVIS W/O CONTRAST: CPT

## 2020-08-18 PROCEDURE — 96375 TX/PRO/DX INJ NEW DRUG ADDON: CPT

## 2020-08-18 PROCEDURE — 85025 COMPLETE CBC W/AUTO DIFF WBC: CPT

## 2020-08-18 PROCEDURE — 94761 N-INVAS EAR/PLS OXIMETRY MLT: CPT

## 2020-08-18 PROCEDURE — 6360000002 HC RX W HCPCS: Performed by: INTERNAL MEDICINE

## 2020-08-18 PROCEDURE — 96361 HYDRATE IV INFUSION ADD-ON: CPT

## 2020-08-18 PROCEDURE — 83735 ASSAY OF MAGNESIUM: CPT

## 2020-08-18 PROCEDURE — 83880 ASSAY OF NATRIURETIC PEPTIDE: CPT

## 2020-08-18 PROCEDURE — 6360000002 HC RX W HCPCS: Performed by: EMERGENCY MEDICINE

## 2020-08-18 PROCEDURE — 99285 EMERGENCY DEPT VISIT HI MDM: CPT

## 2020-08-18 PROCEDURE — 78452 HT MUSCLE IMAGE SPECT MULT: CPT

## 2020-08-18 PROCEDURE — 2700000000 HC OXYGEN THERAPY PER DAY

## 2020-08-18 PROCEDURE — 81003 URINALYSIS AUTO W/O SCOPE: CPT

## 2020-08-18 PROCEDURE — 3430000000 HC RX DIAGNOSTIC RADIOPHARMACEUTICAL: Performed by: INTERNAL MEDICINE

## 2020-08-18 PROCEDURE — 99215 OFFICE O/P EST HI 40 MIN: CPT | Performed by: INTERNAL MEDICINE

## 2020-08-18 PROCEDURE — 84484 ASSAY OF TROPONIN QUANT: CPT

## 2020-08-18 RX ORDER — NICOTINE POLACRILEX 4 MG
15 LOZENGE BUCCAL PRN
Status: DISCONTINUED | OUTPATIENT
Start: 2020-08-18 | End: 2020-08-18 | Stop reason: HOSPADM

## 2020-08-18 RX ORDER — POLYETHYLENE GLYCOL 3350 17 G/17G
17 POWDER, FOR SOLUTION ORAL DAILY PRN
Status: DISCONTINUED | OUTPATIENT
Start: 2020-08-18 | End: 2020-08-18 | Stop reason: HOSPADM

## 2020-08-18 RX ORDER — PROMETHAZINE HYDROCHLORIDE 25 MG/1
12.5 TABLET ORAL EVERY 6 HOURS PRN
Status: DISCONTINUED | OUTPATIENT
Start: 2020-08-18 | End: 2020-08-18 | Stop reason: HOSPADM

## 2020-08-18 RX ORDER — GABAPENTIN 100 MG/1
200 CAPSULE ORAL NIGHTLY
Status: DISCONTINUED | OUTPATIENT
Start: 2020-08-18 | End: 2020-08-18 | Stop reason: HOSPADM

## 2020-08-18 RX ORDER — ACETAMINOPHEN 325 MG/1
650 TABLET ORAL EVERY 6 HOURS PRN
Status: DISCONTINUED | OUTPATIENT
Start: 2020-08-18 | End: 2020-08-18 | Stop reason: HOSPADM

## 2020-08-18 RX ORDER — ISOSORBIDE MONONITRATE 30 MG/1
30 TABLET, EXTENDED RELEASE ORAL ONCE
Qty: 30 TABLET | Refills: 3 | Status: SHIPPED | OUTPATIENT
Start: 2020-08-18 | End: 2020-09-01 | Stop reason: SINTOL

## 2020-08-18 RX ORDER — NITROGLYCERIN 0.4 MG/1
0.4 TABLET SUBLINGUAL EVERY 5 MIN PRN
Status: DISCONTINUED | OUTPATIENT
Start: 2020-08-18 | End: 2020-08-18 | Stop reason: HOSPADM

## 2020-08-18 RX ORDER — ONDANSETRON 2 MG/ML
4 INJECTION INTRAMUSCULAR; INTRAVENOUS EVERY 6 HOURS PRN
Status: DISCONTINUED | OUTPATIENT
Start: 2020-08-18 | End: 2020-08-18 | Stop reason: HOSPADM

## 2020-08-18 RX ORDER — PANTOPRAZOLE SODIUM 40 MG/1
40 TABLET, DELAYED RELEASE ORAL 2 TIMES DAILY
Status: DISCONTINUED | OUTPATIENT
Start: 2020-08-18 | End: 2020-08-18 | Stop reason: HOSPADM

## 2020-08-18 RX ORDER — ISOSORBIDE MONONITRATE 30 MG/1
30 TABLET, EXTENDED RELEASE ORAL DAILY
Status: DISCONTINUED | OUTPATIENT
Start: 2020-08-18 | End: 2020-08-18 | Stop reason: HOSPADM

## 2020-08-18 RX ORDER — 0.9 % SODIUM CHLORIDE 0.9 %
500 INTRAVENOUS SOLUTION INTRAVENOUS ONCE
Status: COMPLETED | OUTPATIENT
Start: 2020-08-18 | End: 2020-08-18

## 2020-08-18 RX ORDER — SODIUM CHLORIDE 0.9 % (FLUSH) 0.9 %
10 SYRINGE (ML) INJECTION EVERY 12 HOURS SCHEDULED
Status: DISCONTINUED | OUTPATIENT
Start: 2020-08-18 | End: 2020-08-18 | Stop reason: HOSPADM

## 2020-08-18 RX ORDER — INSULIN GLARGINE 100 [IU]/ML
0.25 INJECTION, SOLUTION SUBCUTANEOUS NIGHTLY
Status: DISCONTINUED | OUTPATIENT
Start: 2020-08-18 | End: 2020-08-18 | Stop reason: HOSPADM

## 2020-08-18 RX ORDER — DEXTROSE MONOHYDRATE 25 G/50ML
12.5 INJECTION, SOLUTION INTRAVENOUS PRN
Status: DISCONTINUED | OUTPATIENT
Start: 2020-08-18 | End: 2020-08-18 | Stop reason: HOSPADM

## 2020-08-18 RX ORDER — ASPIRIN 81 MG/1
324 TABLET, CHEWABLE ORAL ONCE
Status: DISCONTINUED | OUTPATIENT
Start: 2020-08-18 | End: 2020-08-18

## 2020-08-18 RX ORDER — ACETAMINOPHEN 650 MG/1
650 SUPPOSITORY RECTAL EVERY 6 HOURS PRN
Status: DISCONTINUED | OUTPATIENT
Start: 2020-08-18 | End: 2020-08-18 | Stop reason: HOSPADM

## 2020-08-18 RX ORDER — ATORVASTATIN CALCIUM 10 MG/1
20 TABLET, FILM COATED ORAL NIGHTLY
Status: DISCONTINUED | OUTPATIENT
Start: 2020-08-18 | End: 2020-08-18 | Stop reason: HOSPADM

## 2020-08-18 RX ORDER — DEXTROSE MONOHYDRATE 50 MG/ML
100 INJECTION, SOLUTION INTRAVENOUS PRN
Status: DISCONTINUED | OUTPATIENT
Start: 2020-08-18 | End: 2020-08-18 | Stop reason: HOSPADM

## 2020-08-18 RX ORDER — ASPIRIN 81 MG/1
81 TABLET, CHEWABLE ORAL DAILY
Status: DISCONTINUED | OUTPATIENT
Start: 2020-08-18 | End: 2020-08-18 | Stop reason: HOSPADM

## 2020-08-18 RX ORDER — ISOSORBIDE MONONITRATE 30 MG/1
30 TABLET, EXTENDED RELEASE ORAL ONCE
Status: DISCONTINUED | OUTPATIENT
Start: 2020-08-18 | End: 2020-08-18 | Stop reason: HOSPADM

## 2020-08-18 RX ORDER — SODIUM CHLORIDE 0.9 % (FLUSH) 0.9 %
10 SYRINGE (ML) INJECTION PRN
Status: DISCONTINUED | OUTPATIENT
Start: 2020-08-18 | End: 2020-08-18 | Stop reason: HOSPADM

## 2020-08-18 RX ORDER — ONDANSETRON 2 MG/ML
4 INJECTION INTRAMUSCULAR; INTRAVENOUS ONCE
Status: COMPLETED | OUTPATIENT
Start: 2020-08-18 | End: 2020-08-18

## 2020-08-18 RX ORDER — PRAMIPEXOLE DIHYDROCHLORIDE 0.25 MG/1
0.12 TABLET ORAL NIGHTLY
Status: DISCONTINUED | OUTPATIENT
Start: 2020-08-18 | End: 2020-08-18 | Stop reason: HOSPADM

## 2020-08-18 RX ORDER — MORPHINE SULFATE 2 MG/ML
2 INJECTION, SOLUTION INTRAMUSCULAR; INTRAVENOUS ONCE
Status: COMPLETED | OUTPATIENT
Start: 2020-08-18 | End: 2020-08-18

## 2020-08-18 RX ADMIN — ASPIRIN 81 MG: 81 TABLET, CHEWABLE ORAL at 08:21

## 2020-08-18 RX ADMIN — PANTOPRAZOLE SODIUM 40 MG: 40 TABLET, DELAYED RELEASE ORAL at 08:21

## 2020-08-18 RX ADMIN — REGADENOSON 0.4 MG: 0.08 INJECTION, SOLUTION INTRAVENOUS at 10:35

## 2020-08-18 RX ADMIN — MORPHINE SULFATE 2 MG: 2 INJECTION, SOLUTION INTRAMUSCULAR; INTRAVENOUS at 05:08

## 2020-08-18 RX ADMIN — ISOSORBIDE MONONITRATE 30 MG: 30 TABLET, EXTENDED RELEASE ORAL at 14:04

## 2020-08-18 RX ADMIN — TETROFOSMIN 11.9 MILLICURIE: 1.38 INJECTION, POWDER, LYOPHILIZED, FOR SOLUTION INTRAVENOUS at 09:22

## 2020-08-18 RX ADMIN — TETROFOSMIN 32.8 MILLICURIE: 1.38 INJECTION, POWDER, LYOPHILIZED, FOR SOLUTION INTRAVENOUS at 10:36

## 2020-08-18 RX ADMIN — SODIUM CHLORIDE 500 ML: 9 INJECTION, SOLUTION INTRAVENOUS at 05:08

## 2020-08-18 RX ADMIN — ONDANSETRON 4 MG: 2 INJECTION INTRAMUSCULAR; INTRAVENOUS at 05:08

## 2020-08-18 RX ADMIN — Medication 10 ML: at 08:22

## 2020-08-18 ASSESSMENT — ENCOUNTER SYMPTOMS
SHORTNESS OF BREATH: 1
ABDOMINAL PAIN: 1
COLOR CHANGE: 0
NAUSEA: 1
CHEST TIGHTNESS: 1
BACK PAIN: 0
DIARRHEA: 0
VOMITING: 0

## 2020-08-18 ASSESSMENT — PAIN DESCRIPTION - DESCRIPTORS
DESCRIPTORS: PRESSURE

## 2020-08-18 ASSESSMENT — PAIN SCALES - GENERAL
PAINLEVEL_OUTOF10: 8
PAINLEVEL_OUTOF10: 8
PAINLEVEL_OUTOF10: 5
PAINLEVEL_OUTOF10: 5
PAINLEVEL_OUTOF10: 0
PAINLEVEL_OUTOF10: 5

## 2020-08-18 ASSESSMENT — PAIN DESCRIPTION - ORIENTATION
ORIENTATION: LEFT

## 2020-08-18 ASSESSMENT — PAIN DESCRIPTION - LOCATION
LOCATION: CHEST

## 2020-08-18 ASSESSMENT — PAIN DESCRIPTION - PAIN TYPE
TYPE: ACUTE PAIN

## 2020-08-18 ASSESSMENT — HEART SCORE: ECG: 1

## 2020-08-18 NOTE — PLAN OF CARE
Problem: Falls - Risk of:  Goal: Will remain free from falls  Description: Will remain free from falls  Outcome: Ongoing  Note: Pt will remain free from falls throughout hospital stay. Fall precautions in place, bed alarm on, bed in lowest position with wheels locked and side rails 2/4 up. Room door open and hourly rounding completed. Will continue to monitor throughout shift. Problem: Pain:  Goal: Pain level will decrease  Description: Pain level will decrease  Outcome: Ongoing  Note: Pt will be satisfied with pain control. Pt uses numeric pain rating scale with reassessments after pain med administration. Will continue to monitor progression throughout shift.

## 2020-08-18 NOTE — ED NOTES
[facility] @ [phone]  RE: chest pain per Dr. Chaudhari Aid  Dr. Lon London called back @ [phone]       [facility] Dario  08/18/20 [id number]

## 2020-08-18 NOTE — ED PROVIDER NOTES
[facility]  ED  EMERGENCY DEPARTMENT ENCOUNTER        Pt Name: Magi Kelly  MRN: [id number]  [place] 1950  Date of evaluation: 8/18/2020  Provider: Ada Payne MD  PCP: Esperanza Oneal DO      CHIEF COMPLAINT       Chief Complaint   Patient presents with    Chest Pain     PT states he developed chest pain yesterday and it woke him up this AM.        HISTORY OFPRESENT ILLNESS   (Location/Symptom, Timing/Onset, Context/Setting, Quality, Duration, Modifying Factors,Severity)  Note limiting factors. Magi Kelly is a 79 y.o. male presenting today due to concern for having chest pressure since yesterday morning but worsened this morning around 3 AM when it awoke him associated with pain into the left side of his neck and down his left arm. He is short of breath and does complain of being very nauseated right now. He does have some upper abdominal discomfort. He does feel lightheaded but no syncope. He denies any lower abdominal pain. No radiation to the back. He did have a cardiac catheterization in March 2019 that showed moderate CAD but at that time he did not receive any stents. He is on aspirin but no other blood thinners. He denies any smoking or alcohol use. Due to worsening chest discomfort, he came by EMS for further evaluation. Nothing was helping at home. He states he occasionally gets numbness and weakness in the arms or legs although denies any unilateral symptoms currently. No fever. He denies any leg swelling or history of blood clots. REVIEW OF SYSTEMS    (2-9 systems for level 4, 10 or more for level 5)     Review of Systems   Constitutional: Positive for chills, diaphoresis (feels clammy) and fatigue. Negative for fever. HENT: Negative for congestion. Respiratory: Positive for chest tightness and shortness of breath. Cardiovascular: Positive for chest pain. Negative for leg swelling.    Gastrointestinal: Positive for abdominal pain and nausea. Negative for diarrhea and vomiting. Genitourinary: Negative for flank pain. Musculoskeletal: Positive for arthralgias (left shoulder) and neck pain. Negative for back pain. Skin: Negative for color change. Neurological: Positive for weakness (generalized) and light-headedness. Negative for syncope, numbness and headaches. Psychiatric/Behavioral: Negative for confusion. The patient is nervous/anxious. Positives and Pertinent negatives as per HPI.       PASTMEDICAL HISTORY     Past Medical History:   Diagnosis Date    Acid reflux     Yan's esophagus     Coronary artery disease of native heart with stable angina pectoris ([facility] [address] 5/30/2019    Diabetes mellitus ([facility] [address]     Diabetic autonomic neuropathy associated with type 2 diabetes mellitus ([facility] [address] 3/3/2020    Hyperlipidemia     Hypertension     Influenza A 02/05/2020    Pancreatitis 1996    Restless legs     Sleep apnea     uses CPAP    Tremor     of bilateral hands         SURGICAL HISTORY       Past Surgical History:   Procedure Laterality Date    CATARACT REMOVAL Bilateral 2013    CHOLECYSTECTOMY      COLONOSCOPY  10/26/2011    ENDOSCOPY, COLON, DIAGNOSTIC      EGD halo    HYDROCELE EXCISION  11/15/2016    PANCREAS SURGERY      cyst opened up and drining into small bowel    TONSILLECTOMY      UPPER GASTROINTESTINAL ENDOSCOPY  10/04/2016    with biopsy    UPPER GASTROINTESTINAL ENDOSCOPY  03/16/2017    Halo ablation    UPPER GASTROINTESTINAL ENDOSCOPY  06/26/2017    Halo ablation    UPPER GASTROINTESTINAL ENDOSCOPY  09/06/2017    bx distal sophagus    UPPER GASTROINTESTINAL ENDOSCOPY  12/22/2017    with HALO  procedure    UPPER GASTROINTESTINAL ENDOSCOPY N/A 12/4/2018    EGD WITH ABLATION AND ANESTHESIA - HALO---SLEEP APNEA---  performed by Justina Viveros MD at [facility]  2/19/2019    EGD BIOPSY performed by Justina Viveros MD at [facility] GASTROINTESTINAL ENDOSCOPY N/A 6/11/2019    EGD WITH HALO AND ANESTHESIA performed by Radha Sanz MD at [facility] N/A 8/13/2019    ESOPHAGOGASTRODUODENOSCOPY WITH HALO AND ANESTHESIA -SLEEP APNEA- performed by Radha Sanz MD at [address]       Previous Medications    ASPIRIN 81 MG CHEWABLE TABLET    Take 1 tablet by mouth daily    ATORVASTATIN (LIPITOR) 20 MG TABLET    Take 20 mg by mouth daily. CONTINUOUS BLOOD GLUC  (FREESTYLE JUSTIN 14 DAY READER) OLI    1 Units by Does not apply route as needed (as needed)    CONTINUOUS BLOOD GLUC SENSOR (FREESTYLE JUSTIN 14 DAY SENSOR) MISC    1 Units by Does not apply route every 14 days    ERGOCALCIFEROL (DRISDOL) 1.25 MG (58295 UT) CAPSULE    Take 1 capsule by mouth once a week    FLUTICASONE (FLONASE) 50 MCG/ACT NASAL SPRAY    2 sprays by Nasal route daily    GABAPENTIN (NEURONTIN) 100 MG CAPSULE    Take 2 capsules by mouth nightly for 60 days. GLUCOSE BLOOD VI TEST STRIPS (ASCENSIA AUTODISC VI;ONE TOUCH ULTRA TEST VI) STRIP    Check glucose 3 times daily    INSULIN REGULAR (HUMULIN R;NOVOLIN R) 100 UNIT/ML INJECTION    Inject 10 Units into the skin 3 times daily (before meals)     INSULIN SYRINGE-NEEDLE U-100 30G X 1/2\" 0.5 ML MISC    Inject insulin 3 times daily    LISINOPRIL (PRINIVIL;ZESTRIL) 10 MG TABLET    Take 10 mg by mouth daily    NITROGLYCERIN (NITROSTAT) 0.4 MG SL TABLET    up to max of 3 total doses. If no relief after 1 dose, call 911.     PANTOPRAZOLE (PROTONIX) 40 MG TABLET    Take 40 mg by mouth 2 times daily     PRAMIPEXOLE (MIRAPEX) 0.125 MG TABLET    Take 1 tablet by mouth nightly       ALLERGIES     Codeine    FAMILY HISTORY       Family History   Problem Relation Age of Onset    Kidney Disease Mother     Cancer Father 76        pancreas    Cancer Brother         lung    Cancer Paternal Grandfather         colon SOCIAL HISTORY       Social History     Socioeconomic History    Marital status:      Spouse name: None    Number of children: None    Years of education: None    Highest education level: None   Occupational History    Occupation:      Comment: maintenance   Social Needs    Financial resource strain: Not hard at all   Miranda-Willi insecurity     Worry: Never true     Inability: Never true    Transportation needs     Medical: No     Non-medical: No   Tobacco Use    Smoking status: Never Smoker    Smokeless tobacco: Never Used   Substance and Sexual Activity    Alcohol use: No    Drug use: No    Sexual activity: Not Currently   Lifestyle    Physical activity     Days per week: 0 days     Minutes per session: 0 min    Stress: Not at all   Relationships    Social connections     Talks on phone: More than three times a week     Gets together: Twice a week     Attends [employer] service: Never     Active member of club or organization: Yes     Attends meetings of clubs or organizations: More than 4 times per year     Relationship status:     Intimate partner violence     Fear of current or ex partner: No     Emotionally abused: No     Physically abused: No     Forced sexual activity: No   Other Topics Concern    None   Social History Narrative    None       SCREENINGS      Heart Score for chest pain patients  History: Moderately Suspicious  ECG: Non-Specifc repolarization disturbance/LBTB/PM  Patient Age: > 65 years  Risk Factors: > 3 Risk factors or history of atherosclerotic disease*  Troponin: < 1X normal limit  Heart Score Total: 6    History: 1  EC  Patient Age: 2  Risk Factors: 2  Troponin: 0  Heart Score Total: 6      PHYSICAL EXAM    (up to 7 for level 4, 8 or more for level 5)     ED Triage Vitals   BP Temp Temp src Pulse Resp SpO2 Height Weight   -- -- -- -- -- -- -- --       Physical Exam  Vitals signs and nursing note reviewed.    Constitutional:       General: He is in acute distress (mild). Appearance: He is well-developed. He is obese. He is ill-appearing. He is not toxic-appearing or diaphoretic. Interventions: He is not intubated. HENT:      Head: Normocephalic and atraumatic. Right Ear: External ear normal.      Left Ear: External ear normal.   Eyes:      General:         Right eye: No discharge. Left eye: No discharge. Neck:      Musculoskeletal: Full passive range of motion without pain, normal range of motion and neck supple. Normal range of motion. No edema, erythema or neck rigidity. Trachea: Trachea and phonation normal. No tracheal deviation. Cardiovascular:      Rate and Rhythm: Normal rate and regular rhythm. Pulses: Normal pulses. Radial pulses are 2+ on the right side and 2+ on the left side. Heart sounds: No friction rub. No gallop. Pulmonary:      Effort: Pulmonary effort is normal. No tachypnea, bradypnea, accessory muscle usage, prolonged expiration, respiratory distress or retractions. He is not intubated. Breath sounds: Normal breath sounds and air entry. No stridor, decreased air movement or transmitted upper airway sounds. No decreased breath sounds, wheezing, rhonchi or rales. Chest:      Chest wall: No tenderness. Abdominal:      General: Bowel sounds are normal. There is distension (mild). Palpations: Abdomen is soft. Tenderness: There is abdominal tenderness (moderate) in the epigastric area. There is no guarding or rebound. Negative signs include Chandra's sign and McBurney's sign. Musculoskeletal: Normal range of motion. General: No swelling, tenderness, deformity or signs of injury. Right lower leg: No edema. Left lower leg: No edema. Skin:     General: Skin is warm and dry. Coloration: Skin is not jaundiced or pale. Findings: No bruising, erythema, lesion or rash. Neurological:      General: No focal deficit present.       Mental Status: He is alert and oriented to person, place, and time. Mental status is at baseline. GCS: GCS eye subscore is 4. GCS verbal subscore is 5. GCS motor subscore is 6. Sensory: Sensation is intact. No sensory deficit. Motor: Motor function is intact. No weakness, tremor, atrophy, abnormal muscle tone or seizure activity. Psychiatric:         Attention and Perception: Attention normal.         Mood and Affect: Affect normal. Mood is anxious. Speech: Speech normal.         Behavior: Behavior normal. Behavior is cooperative.              DIAGNOSTIC RESULTS   :    Labs Reviewed   COMPREHENSIVE METABOLIC PANEL - Abnormal; Notable for the following components:       Result Value    Glucose 164 (*)     BUN 23 (*)     All other components within normal limits    Narrative:     Performed at:  [address],  [address] [place] [address], [address] [employer]   Phone [phone]   LIPASE - Abnormal; Notable for the following components:    Lipase 11.0 (*)     All other components within normal limits    Narrative:     Performed at:  [address],  [address] [place] [address], [address] [employer]   Phone [phone]   POCT GLUCOSE - Abnormal; Notable for the following components:    POC Glucose 152 (*)     All other components within normal limits    Narrative:     Performed at:  [address],  [address], [address] [employer]   Phone [phone]   CBC WITH AUTO DIFFERENTIAL    Narrative:     Performed at:  [address],  [address] [place] [address] Drive, [address] [employer]   Phone [phone]   TROPONIN    Narrative:     Performed at:  [address],  [facility] [address], [address] [employer]   Phone [phone] PEPTIDE    Narrative:     Performed at:  [address],  [address], [address] [employer]   Phone [phone] MAGNESIUM    Narrative:     Performed at:  [facility]) - [address], [facility]   Phone [phone]   LACTIC ACID, PLASMA    Narrative:     Performed at:  [facility]) [address], [facility]   Phone [phone]   URINALYSIS   POCT GLUCOSE       All other labs were within normal range or not returned asof this dictation. EKG: All EKG's are interpreted by the Emergency Department Physician who either signs or Co-signs this chart in the absence of a cardiologist.    The Ekg interpreted by me shows  sinus bradycardia, rate=56   Axis is   Normal  QTc is  normal  Intervals and Durations are unremarkable. ST Segments: no acute change and nonspecific changes  No significant change from prior EKG dated - 2/6/20  No STEMI           RADIOLOGY:   Non-plain film images such as CT, Ultrasound and MRI are read by the radiologist. Carmelo Able images are visualized and preliminarily interpreted by the  ED Provider with the belowfindings:        Interpretation per the Radiologist below, if available at the time of this note:    XR CHEST PORTABLE   Final Result   Stable mild cardiomegaly without evidence of heart failure. CT ABDOMEN PELVIS WO CONTRAST    (Results Pending)         PROCEDURES   Unless otherwise noted below, none     Procedures    CRITICAL CARE TIME   N/A    CONSULTS: Spoke with Dr. Niyah Desouza for admission at 51 [id number] 18 31.     IP CONSULT TO HOSPITALIST    EMERGENCY DEPARTMENT COURSE and DIFFERENTIAL DIAGNOSIS/MDM:   Vitals:    Vitals:    08/18/20 0456 08/18/20 0527   BP: (!) 102/53 121/68   Pulse: 58 52   Resp: 15 12   Temp: 97.9 °F (36.6 °C)    TempSrc: Oral    SpO2: 97% 95%   Weight: 260 lb (117.9 kg)    Height: 5' 10\" (1.778 m)        Patient was given the following medications:  Medications   0.9 % sodium chloride bolus (500 mLs Intravenous New Bag 8/18/20 0508)   aspirin chewable tablet 324 mg (324 mg Oral Not Given 8/18/20 0512)   ondansetron [facility]) injection 4 mg (4 mg Intravenous Given 8/18/20 0508)   morphine (PF) injection 2 mg (2 mg Intravenous Given 8/18/20 0508)     Patient was evaluated for worsening chest pressure since yesterday with shortness of breath and feeling clammy. He had a concerning coronary catheterization done 1 year ago but at that time did not require any stents. At this time, I am concerned with acute coronary syndrome, pancreatitis, bowel obstruction, pneumonia, amongst other pathology. He denies any fever. He was mildly hypotensive and therefore did receive IV fluids. He will need further evaluation in the hospital with cardiology consultation. I have low suspicion for COVID at this time. Story is not suggestive of pulmonary embolism. Troponin was negative. He does a large right renal cyst that appears stable compared to old imaging per radiologist.  No concern for bowel obstruction. Upon repeat evaluation, he was feeling better and no longer appeared in acute distress. He will need further evaluation in the hospital with cardiology consultation. He is stable for the floor with telemetry. The patient tolerated their visit well. The patient and / or the family were informed of the results of any tests, a time was given to answer questions. FINAL IMPRESSION      1. Coronary artery disease involving native coronary artery of native heart with unstable angina pectoris ([facility] [place] [facility] [address]    2. Acquired renal cyst of right kidney          DISPOSITION/PLAN   DISPOSITION  -decision to admit      PATIENT REFERRED TO:  No follow-up provider specified.     DISCHARGEMEDICATIONS:  New Prescriptions    No medications on file       DISCONTINUED MEDICATIONS:  Discontinued Medications    FLUTICASONE (FLONASE) 50 MCG/ACT NASAL SPRAY    2 sprays by Nasal route daily    INSULIN NPH (HUMULIN N;NOVOLIN N) 100 UNIT/ML INJECTION VIAL    Inject 10 Units into the skin 2 times daily

## 2020-08-18 NOTE — ED NOTES
Report given to [facility], floor nurse. Patient left unit with all belongings including but not limited to; clothing and shoes.        Steven Nelson RN  08/18/20 7224

## 2020-08-18 NOTE — DISCHARGE SUMMARY
Hospital Medicine Discharge Summary    Sadaf Nance  :  1950  MRN:  [id number]    Admit date:  2020  Discharge date:  2020    Admitting Physician:  Daysi Willis MD  Primary Care Physician:  Esperanza Oneal, DO      Discharge Diagnoses: Active Problems:    Essential hypertension    Controlled type 2 diabetes mellitus without complication, with long-term current use of insulin (HCC)    Severe obstructive sleep apnea    Coronary artery disease involving native coronary artery of native heart with unstable angina pectoris ([address]    Chest pain in adult  Resolved Problems:    * No resolved hospital problems. *      Hospital Course:   Sadaf Nance is a 79 y.o. male that was admitted and treated at [facility] for the following medical issues  : Patient with history of morbid obesity, hypertension, high cholesterol, diabetes CAD admitted with chest pain had a nuclear stress test done which was negative for ischemia patient seen in consultation by cardiology and recommend medical treatment with Imdur and outpatient cardiology follow-up    Active Problems:    Essential hypertension    Controlled type 2 diabetes mellitus without complication, with long-term current use of insulin ([address]    Severe obstructive sleep apnea    Coronary artery disease involving native coronary artery of native heart with unstable angina pectoris ([address]    Chest pain in adult  Resolved Problems:    * No resolved hospital problems.  *      Patient was seen by the following consultants while admitted to [facility]:   Consults:  IP CONSULT TO HOSPITALIST  IP CONSULT TO SPIRITUAL SERVICES  IP CONSULT TO CARDIOLOGY    Significant Diagnostic Studies:    Ct Abdomen Pelvis Wo Contrast    Result Date: 2020  EXAMINATION: CT OF THE ABDOMEN AND PELVIS WITHOUT CONTRAST 2020 5:32 am TECHNIQUE: CT of the abdomen and pelvis was performed without the administration of intravenous contrast. acute abdominopelvic findings. No bowel obstruction. Unchanged 23 cm right renal cyst with mass-effect on the intra-abdominal contents. Xr Chest Portable    Result Date: 8/18/2020  EXAMINATION: ONE XRAY VIEW OF THE CHEST 8/18/2020 4:58 am COMPARISON: Chest radiograph 02/06/2020; CT chest 02/05/2020 HISTORY: ORDERING SYSTEM PROVIDED HISTORY: cp TECHNOLOGIST PROVIDED HISTORY: Reason for exam:->cp Reason for Exam: woke up with chest pain FINDINGS: No pneumothorax, pleural effusion or consolidative airspace disease. Stable mild cardiomegaly. No pulmonary edema. Normal bones. Stable mild cardiomegaly without evidence of heart failure. Nm Cardiac Stress Test Nuclear Imaging    Result Date: 8/18/2020  Cardiac Perfusion Imaging  Demographics   Patient Name        Flako Jasso   Date of Study       08/18/2020     Gender               Male   Patient Number      [id number]     Date of Birth        1950   Visit Number        [id number]      Age                  79 year(s)   Accession Number    [id number]     Room Number          0688   Corporate ID        [id number]         NM Technician        Shaw Elizalde, [facility]   Nurse               [address] [place] [address], [address] [place] [address]            Willy Kayser, MD, [facility]   Ordering Physician   The procedure was explained in detail to the patient. Risks,  complications and alternative treatments were reviewed. Written consent  was obtained. Procedure Procedure Type:   Nuclear Stress Test:Pharmacological, NM MYOCARDIAL SPECT REST EXERCISE OR  RX   Study location: [facility] - Nuclear Medicine   Indications: Chest pain. Hospital Status: Outpatient.   Height: 70 inches Weight: 253 pounds  Risk Factors   The patient risk factors include:obesity, physical activity, treated  hypercholesterolemia, treated hypertension and insulin treated diabetes  mellitus. Conclusions   Summary  There is no definite evidence of stress induced ischemia. LV function is  normal with uniform wall motion and ejection fraction of 64%. Low risk  study. Stress Protocols   Resting ECG  Sinus bradycardia; low voltage; 1st degree AV block   Resting HR:51 bpm       Resting BP:126/62 mmHg  Stress Protocol:Pharmacologic - Lexiscan's  Peak HR:83 bpm                   HR/BP product:9711  Peak BP:117/47 mmHg  Predicted HR: 150 bpm  % of predicted HR: 55  Test duration: 4 min  Reason for termination:Completed   ECG Findings  <0.5mm ST segment depression. Arrhythmias  No significant arrhythmia noted during study. Symptoms  No symptoms with lexiscan. Complications  Procedure complication was none. Stress Interpretation  Nondiagnostic secondary to inadequate THR. Procedure Medications   - Lexiscan I.V. 0.4 mg.  Imaging Protocols   - One Day   Rest                          Stress   Isotope:Myoview/Tetrofosmin   Isotope: Myoview/Tetrofosmin  Isotope dose:11.9 mCi         Isotope dose:32.8 mCi  Administration Route:I.V. Administration Route:I.V.  Date:08/18/2020 09:20         Date:08/18/2020 10:30                                 Technique:      Gated  Imaging Results    Stress ejection    Ejection fraction:64 %    EDV :89 ml    ESV :32 ml    Stroke volume :57 ml    LV mass :114 gr  Medical History  Signatures   ------------------------------------------------------------------  Electronically signed by Sylvia Pulido MD, [facility]  (Interpreting physician) on 08/18/2020 at 12:12  ------------------------------------------------------------------        Discharge Medications:       Betsy Deleon   Home Medication Instructions SKZ:[id number]    Printed on:08/18/20 3401   Medication Information                      aspirin 81 MG chewable tablet  Take 1 tablet by mouth daily             atorvastatin (LIPITOR) 20 MG tablet  Take 20 mg by mouth daily. Continuous Blood Gluc  (FREESTYLE JUSTIN 14 DAY READER) OLI  1 Units by Does not apply route as needed (as needed)             Continuous Blood Gluc Sensor (FREESTYLE JUSTIN 14 DAY SENSOR) MISC  1 Units by Does not apply route every 14 days             ergocalciferol (DRISDOL) 1.25 MG (74007 UT) capsule  Take 1 capsule by mouth once a week             fluticasone (FLONASE) 50 MCG/ACT nasal spray  2 sprays by Nasal route daily             gabapentin (NEURONTIN) 100 MG capsule  Take 2 capsules by mouth nightly for 60 days. glucose blood VI test strips (ASCENSIA AUTODISC VI;ONE TOUCH ULTRA TEST VI) strip  Check glucose 3 times daily             insulin regular (HUMULIN R;NOVOLIN R) 100 UNIT/ML injection  Inject 10 Units into the skin 3 times daily (before meals)              Insulin Syringe-Needle U-100 30G X 1/2\" 0.5 ML MISC  Inject insulin 3 times daily             isosorbide mononitrate (IMDUR) 30 MG extended release tablet  Take 1 tablet by mouth once for 1 dose             lisinopril (PRINIVIL;ZESTRIL) 10 MG tablet  Take 10 mg by mouth daily             nitroGLYCERIN (NITROSTAT) 0.4 MG SL tablet  up to max of 3 total doses. If no relief after 1 dose, call 911. pantoprazole (PROTONIX) 40 MG tablet  Take 40 mg by mouth 2 times daily              pramipexole (MIRAPEX) 0.125 MG tablet  Take 1 tablet by mouth nightly                 Disposition:   Discharged to Home. Any [facility] needs that were indicated and/or required as been addressed and set up by Social Work. Condition at discharge: Pt was medically stable at the time of discharge. Activity: activity as tolerated    Total time taken for discharging this patient: 40 minutes. Greater than 70% of time was spent focused exclusively on this patient. Time was taken to review chart, discuss plans with consultants, reconciling medications, discussing plan answering questions with patient.      Signed:  Dalia Whitmore  8/18/2020, 2:03 PM  ----------------------------------------------------------------------------------------------------------------------    Jean Henry,     Please return to ER or call 911 if you develop any significant signs or symptoms.     I may not have addressed all of your medical illnesses or the abnormal blood work or imaging therefore please ask your PCP, Nan Palomino DO ,  to obtain [facility] record to follow up on all of the abnormal labs, imaging and findings that I have and have not addressed during your hospitalization.      Discharging you from the hospital does not mean that your medical care ends here and now. You may still need additional work up, investigation, monitoring, and treatment to be handled from this point on by outside providers including your PCP, Nan Palomino DO , Specialists and other healthcare providers.      Please review your list of discharge medications prior to resuming medications you might still have at home, as the medications you need to be taking, dosages or how often you must take them may have changed. For medication questions, contact your retail pharmacy and your PCP, Nan Palomino DO .     ** I STRONGLY RECOMMEND that you follow up with Nan Palomino DO within 3 to 5 days for a post hospitalization evaluation. This specific office visit is covered by your insurance, and is not the same as your annual doctor visit/ check up. This office visit is important, as it may prevent need for repeat and/or future hospitalizations. **    Your medical team at [facility] appreciates the opportunity to work with you to get well! Sincerely,  [facility] Medicine Discharge Summary    Jean Henry  :  1950  MRN:  [id number]    Admit date:  2020  Discharge date:  2020    Admitting Physician:  Lolis Brandt MD  Primary Care Physician:  Nan Palomino DO      Discharge Diagnoses:     Active Problems:    Essential hypertension    Controlled type 2 diabetes mellitus without complication, with long-term current use of insulin (HCC)    Severe obstructive sleep apnea    Coronary artery disease involving native coronary artery of native heart with unstable angina pectoris ([address]    Chest pain in adult  Resolved Problems:    * No resolved hospital problems. *      Hospital Course:   Emil Aguila is a 79 y.o. male that was admitted and treated at [facility] for the following medical issues: Active Problems:    Essential hypertension    Controlled type 2 diabetes mellitus without complication, with long-term current use of insulin (HCC)    Severe obstructive sleep apnea    Coronary artery disease involving native coronary artery of native heart with unstable angina pectoris ([facility]    Chest pain in adult  Resolved Problems:    * No resolved hospital problems. *      Patient was seen by the following consultants while admitted to [facility]:   Consults:  Jodie Morales HOSPITALIST  IP CONSULT TO SPIRITUAL SERVICES  IP CONSULT TO CARDIOLOGY    Significant Diagnostic Studies:    Ct Abdomen Pelvis Wo Contrast    Result Date: 8/18/2020  EXAMINATION: CT OF THE ABDOMEN AND PELVIS WITHOUT CONTRAST 8/18/2020 5:32 am TECHNIQUE: CT of the abdomen and pelvis was performed without the administration of intravenous contrast. Multiplanar reformatted images are provided for review. Dose modulation, iterative reconstruction, and/or weight based adjustment of the mA/kV was utilized to reduce the radiation dose to as low as reasonably achievable. COMPARISON: CT abdomen pelvis 02/05/2020 HISTORY: ORDERING SYSTEM PROVIDED HISTORY: diffuse abdominal pain, concern for obstruction TECHNOLOGIST PROVIDED HISTORY: If patient is on cardiac monitor and/or pulse ox, they may be taken off cardiac monitor and pulse ox, left on O2 if currently on. All monitors reattached when patient returns to room.  Reason for exam:->diffuse abdominal pain, concern for evidence of heart failure. Nm Cardiac Stress Test Nuclear Imaging    Result Date: 8/18/2020  Cardiac Perfusion Imaging  Demographics   Patient Name        Bronwyn Joseph   Date of Study       08/18/2020     Gender               Male   Patient Number      [id number]     Date of Birth        1950   Visit Number        [id number]      Age                  79 year(s)   Accession Number    [id number]     Room Number          8124   Corporate ID        [id number]         NM Technician        Braulio Mendoza, [address] [facility]   Nurse               [address] [facility], [address] Sydnie Garcia MD, [facility]   Ordering Physician   The procedure was explained in detail to the patient. Risks,  complications and alternative treatments were reviewed. Written consent  was obtained. Procedure Procedure Type:   Nuclear Stress Test:Pharmacological, NM MYOCARDIAL SPECT REST EXERCISE OR  RX   Study location: [facility] - Nuclear Medicine   Indications: Chest pain. Hospital Status: Outpatient. Height: 70 inches Weight: 253 pounds  Risk Factors   The patient risk factors include:obesity, physical activity, treated  hypercholesterolemia, treated hypertension and insulin treated diabetes  mellitus. Conclusions   Summary  There is no definite evidence of stress induced ischemia. LV function is  normal with uniform wall motion and ejection fraction of 64%. Low risk  study. Stress Protocols   Resting ECG  Sinus bradycardia; low voltage; 1st degree AV block   Resting HR:51 bpm       Resting BP:126/62 mmHg  Stress Protocol:Pharmacologic - Lexiscan's  Peak HR:83 bpm                   HR/BP product:9711  Peak BP:117/47 mmHg  Predicted HR: 150 bpm  % of predicted HR: 55  Test duration: 4 min  Reason for termination:Completed   ECG Findings  <0.5mm ST segment depression.    Arrhythmias  No significant arrhythmia noted during study. Symptoms  No symptoms with lexiscan. Complications  Procedure complication was none. Stress Interpretation  Nondiagnostic secondary to inadequate THR. Procedure Medications   - Lexiscan I.V. 0.4 mg.  Imaging Protocols   - One Day   Rest                          Stress   Isotope:Myoview/Tetrofosmin   Isotope: Myoview/Tetrofosmin  Isotope dose:11.9 mCi         Isotope dose:32.8 mCi  Administration Route:I.V. Administration Route:I.V.  Date:2020 09:20         Date:2020 10:30                                 Technique:      Gated  Imaging Results    Stress ejection    Ejection fraction:64 %    EDV :89 ml    ESV :32 ml    Stroke volume :57 ml    LV mass :114 gr  Medical History  Signatures   ------------------------------------------------------------------  Electronically signed by Willy Kayser, MD, [facility]  (Interpreting physician) on 2020 at 12:12  ------------------------------------------------------------------        Discharge Medications:       Emily Money   Home Medication Instructions MS    Printed on:20 7405   Medication Information                      aspirin 81 MG chewable tablet  Take 1 tablet by mouth daily             atorvastatin (LIPITOR) 20 MG tablet  Take 20 mg by mouth daily. Continuous Blood Gluc  (FREESTYLE JUSTIN 14 DAY READER) OLI  1 Units by Does not apply route as needed (as needed)             Continuous Blood Gluc Sensor (FREESTYLE JUSTIN 14 DAY SENSOR) MISC  1 Units by Does not apply route every 14 days             ergocalciferol (DRISDOL) 1.25 MG (23076 UT) capsule  Take 1 capsule by mouth once a week             fluticasone (FLONASE) 50 MCG/ACT nasal spray  2 sprays by Nasal route daily             gabapentin (NEURONTIN) 100 MG capsule  Take 2 capsules by mouth nightly for 60 days.              glucose blood VI test strips (ASCENSIA AUTODISC VI;ONE TOUCH ULTRA TEST VI) strip  Check glucose 3 times daily             insulin regular (HUMULIN R;NOVOLIN R) 100 UNIT/ML injection  Inject 10 Units into the skin 3 times daily (before meals)              Insulin Syringe-Needle U-100 30G X 1/2\" 0.5 ML MISC  Inject insulin 3 times daily             isosorbide mononitrate (IMDUR) 30 MG extended release tablet  Take 1 tablet by mouth once for 1 dose             lisinopril (PRINIVIL;ZESTRIL) 10 MG tablet  Take 10 mg by mouth daily             nitroGLYCERIN (NITROSTAT) 0.4 MG SL tablet  up to max of 3 total doses. If no relief after 1 dose, call 911. pantoprazole (PROTONIX) 40 MG tablet  Take 40 mg by mouth 2 times daily              pramipexole (MIRAPEX) 0.125 MG tablet  Take 1 tablet by mouth nightly                 Disposition:   Discharged to Home. Any [facility] needs that were indicated and/or required as been addressed and set up by Social Work. Condition at discharge: Pt was medically stable at the time of discharge. Activity: activity as tolerated    Total time taken for discharging this patient: 40 minutes. Greater than 70% of time was spent focused exclusively on this patient. Time was taken to review chart, discuss plans with consultants, reconciling medications, discussing plan answering questions with patient. Signed:  Gary Rollins  8/18/2020, 2:03 PM  ----------------------------------------------------------------------------------------------------------------------    Gris Mcdonald,     Please return to ER or call 911 if you develop any significant signs or symptoms.     I may not have addressed all of your medical illnesses or the abnormal blood work or imaging therefore please ask your PCP, Khalida Reyes DO ,  to obtain [facility] record to follow up on all of the abnormal labs, imaging and findings that I have and have not addressed during your hospitalization.      Discharging you from the hospital does not mean that your medical care ends here and now. You may still need additional work up, investigation, monitoring, and treatment to be handled from this point on by outside providers including your PCP, Carola Child DO , Specialists and other healthcare providers.      Please review your list of discharge medications prior to resuming medications you might still have at home, as the medications you need to be taking, dosages or how often you must take them may have changed. For medication questions, contact your retail pharmacy and your PCP, Carola Child DO .     ** I STRONGLY RECOMMEND that you follow up with Carola Child DO within 3 to 5 days for a post hospitalization evaluation. This specific office visit is covered by your insurance, and is not the same as your annual doctor visit/ check up. This office visit is important, as it may prevent need for repeat and/or future hospitalizations. **    Your medical team at [facility] appreciates the opportunity to work with you to get well!     Sincerely,  Britta Galdamez

## 2020-08-18 NOTE — CONSULTS
[address] [facility]  [phone]      Reason for Consultation/Chief Complaint: \"I have been having chest pain . \"    Consulted for CP    History of Present Illness:  Margarita Lindsey is a 79 y.o. patient who presented to [facility] 8/17/20 with c/o CP. He has PMH of moderate CAD (no PCI prior), DM, HLD, HTN, ESTHER on CPAP, GERD/Yan's esophagus, and hand tremor. Note lexiscan myoview March 2019 negative for ischemia. Most recent ECHO 2/6/20 showed EF=55%; mild LVH; grade II DD with elevated LV filling pressure; RV mild enlarged; LENCHO. Most recent [facility] 3/25/19 showed moderate CAD (30-40% stenoses mid and distal LAD; ostial D1 80% stnenosis; OM1 40-50% stenoses; mid-60% RCA stenosis. Med mgt recommended with imdur. Note he did NOT take imdur. Now presents with c/o CP left-sided; occurred while in bed and awakened from sleep; lasted 30 mins; nothing made better or worse; assoc with nausea; sharp in character. No recent issues with CP and normally active without exertional CP. Admit EKG SB 56bpm; 1st degree AV block (no change from 2/20 EKG). CXR stable CM nothing acute; CT imaging abd/pelvis nothing acute. Note 3 negative Rodolfo enzymes; BNP=44. Most recent lexiscan myoview 8/17/20 negative for ischemia; EF=64%. Patient with no complaints of chest pain, SOB, palpitations, dizziness, edema, or orthopnea/PND. I have been asked to provide consultation regarding further management and testing. Past Medical History:   has a past medical history of Acid reflux, Yan's esophagus, Coronary artery disease of native heart with stable angina pectoris ([address] Diabetes mellitus ([address] Diabetic autonomic neuropathy associated with type 2 diabetes mellitus ([address] Hyperlipidemia, Hypertension, Influenza A, Pancreatitis, Restless legs, Sleep apnea, and Tremor. Surgical History:   has a past surgical history that includes Pancreas surgery; Cholecystectomy;  Tonsillectomy; [place] tooth extraction; Colonoscopy (10/26/2011); Cataract removal (Bilateral, 2013); Upper gastrointestinal endoscopy (10/04/2016); Hydrocele surgery (11/15/2016); Endoscopy, colon, diagnostic; Upper gastrointestinal endoscopy (03/16/2017); Upper gastrointestinal endoscopy (06/26/2017); Upper gastrointestinal endoscopy (09/06/2017); Upper gastrointestinal endoscopy (12/22/2017); Upper gastrointestinal endoscopy (N/A, 12/4/2018); Upper gastrointestinal endoscopy (2/19/2019); Upper gastrointestinal endoscopy (N/A, 6/11/2019); and Upper gastrointestinal endoscopy (N/A, 8/13/2019). Social History:   reports that he has never smoked. He has never used smokeless tobacco. He reports that he does not drink alcohol or use drugs. Family History:  family history includes Cancer in his brother and paternal grandfather; Cancer (age of onset: 76) in his father; Kidney Disease in his mother. Home Medications:  Were reviewed and are listed in nursing record. and/or listed below  Prior to Admission medications    Medication Sig Start Date End Date Taking? Authorizing Provider   Continuous Blood Gluc Sensor (FREESTYLE JUSTIN 14 DAY SENSOR) MISC 1 Units by Does not apply route every 14 days 7/14/20  Yes SANDY Pedroza CNP   ergocalciferol (DRISDOL) 1.25 MG (63230 UT) capsule Take 1 capsule by mouth once a week 1/1/20 8/18/20 Yes SANDY Pedroza CNP   Continuous Blood Gluc  (FREESTYLE JUSTIN 14 DAY READER) OLI 1 Units by Does not apply route as needed (as needed) 12/31/19  Yes SANDY Pedroza CNP   gabapentin (NEURONTIN) 100 MG capsule Take 2 capsules by mouth nightly for 60 days.  11/13/19 8/18/20 Yes SANDY Burkett CNP   pramipexole (MIRAPEX) 0.125 MG tablet Take 1 tablet by mouth nightly 9/17/19  Yes SANDY Burkett CNP   aspirin 81 MG chewable tablet Take 1 tablet by mouth daily 3/26/19  Yes Zachariah Tsang MD   fluticasone (FLONASE) 50 MCG/ACT nasal spray 2 sprays by Nasal route daily 1/26/18  Yes Roberto Carlos Peralta non-tender, bowel sounds active all four quadrants,  no masses, no organomegaly           Extremities: Extremities normal, atraumatic, no cyanosis or edema   Pulses: 2+ and symmetric   Skin: Skin color, texture, turgor normal, no rashes or lesions   Pysch: Normal mood and affect   Neurologic: Normal gross motor and sensory exam.         Labs  CBC:   Lab Results   Component Value Date    WBC 6.0 08/18/2020    RBC 4.65 08/18/2020    HGB 13.7 08/18/2020    HCT 41.1 08/18/2020    MCV 88.3 08/18/2020    RDW 13.5 08/18/2020     08/18/2020     CMP:    Lab Results   Component Value Date     08/18/2020    K 5.0 08/18/2020    K 4.5 02/07/2020     08/18/2020    CO2 25 08/18/2020    BUN 23 08/18/2020    CREATININE 0.9 08/18/2020    GFRAA >60 08/18/2020    GFRAA >60 12/25/2010    AGRATIO 1.8 08/18/2020    LABGLOM >60 08/18/2020    GLUCOSE 164 08/18/2020    PROT 6.5 08/18/2020    PROT 6.6 12/25/2010    CALCIUM 9.3 08/18/2020    BILITOT 0.6 08/18/2020    ALKPHOS 90 08/18/2020    AST 19 08/18/2020    ALT 17 08/18/2020     PT/INR:  No results found for: PTINR  Lab Results   Component Value Date    TROPONINI <0.01 08/18/2020       EKG:  I have reviewed EKG with the following interpretation:  Impression:  See HPI      Coronary angiogram 3/25/2019:  Unstable angina  PROCEDURES PERFORMED    Left heart catheterization  LVgram  Coronary angiogam  Coronary cath    PROCEDURE DESCRIPTION   Risks/benefits/alternatives/outcomes were discussed with patient and/or family and informed consent was obtained.  Using the yara test, the patient's right radial artery was found to be acceptable for cannulation.  Patient was prepped draped in the usual sterile fashion.  Local anaesthetic was applied over puncture site.  Using a back wall technique, a 6 Vietnamese Terumo sheath was inserted into right radial artery.  Verapamil, nitroglycerin were administered through the sheath.  Heparin was administered.  Diagnostic 5fr pigtail, TIG catheters were used for diagnostic angiograms.  At the conclusion of the procedure, a TR band was placed over the puncture site and hemostasis was obtained. Lovina Kia were no immediate complications. I supervised sedation with versed 1mg/fentanyl 50mcg during the procedure. 70cc contrast was utilized. <20cc EBL. LVEDP 4   GRADIENT ACROSS AORTIC VALVE No gradient   LV FUNCTION EF 65%   WALL MOTION Normal   MITRAL REGURGITATION Mild      LM <10% stenosis.            LAD Prox <10% stenosis.  Mid 30-40% stenosis.  Distal 30-40% stenosis.  LAD wraps around apex.  D1 has ostial 80% stenosis.       LCX 10% prox-mid stenosis. OM1 bifurcates in prox segment and there is prox-mid OM1 40-50% stenosis.           RI Mid 60% stenosis.           RCA Dominant.  Slight anterior takeoff, Prox-mid 20-30% stenoses. Distal 20% stenosis. Tortuous vessel. Moderate cad/ashd in major epicardial vessels  Would treat medically  Add imdur 30mg daily, and metoprolol tartrate 25mg bid      ECHO 2/6/20 Summary   Technically difficult examination. Left ventricular systolic function is normal with EF estimated at 55%. No regional wall motion abnormalities. There is mild concentric left ventricular hypertrophy. Grade II diastolic dysfunction with elevated left ventricular filling pressure. The right ventricle is mildly enlarged. Mild bi-atrial enlargement. Bladimir Lyons 8/18/20         Summary     There is no definite evidence of stress induced ischemia. LV function is     normal with uniform wall motion and ejection fraction of 64%. Low risk     study. Assessment:  Yony Bryson is a 79 y.o. patient who presented to [facility] 8/17/20 with c/o CP. He has PMH of moderate CAD (no PCI prior), DM, HLD, HTN, ESTHER on CPAP, GERD/Yan's esophagus, and hand tremor. Note lexiscan myoview March 2019 negative for ischemia.  Most recent ECHO 2/6/20 showed EF=55%; mild LVH; grade II DD with elevated LV filling pressure; RV mild enlarged; LENCHO. Most recent [facility] 3/25/19 showed moderate CAD (30-40% stenoses mid and distal LAD; ostial D1 80% stnenosis; OM1 40-50% stenoses; mid-60% RCA stenosis. Med mgt recommended with imdur. Note he did NOT take imdur. Now presents with c/o CP left-sided; occurred while in bed and awakened from sleep; lasted 30 mins; nothing made better or worse; assoc with nausea; sharp in character. No recent issues with CP and normally active without exertional CP. Admit EKG SB 56bpm; 1st degree AV block (no change from 2/20 EKG). CXR stable CM nothing acute; CT imaging abd/pelvis nothing acute. Note 3 negative Rodolfo enzymes; BNP=44. Most recent lexiscan myoview 8/17/20 negative for ischemia; EF=64%. Diagnosis of unspecified CP in older male with known moderate CAD on cath 3/19. He ruled out for MI, EKG unremarkable, and yari nuc study today negative for ischemia. Recs:  1. I would try to more aggressively treat with anti-anginal meds and see how he does. 2. Add imdur 30mg daily and use SL PRN NTG. He was NOT taking at home and not certain why? 3. Continue baby asa and lipitor 20mg daily. 4. Needs regular f/u with cardiology and will arrange. OK for d/c from cardiac standpoint today. Signing off.      Patient Active Problem List   Diagnosis    Cataract    Mass of skin of hand    Pulmonary nodules    Chest pain    Epigastric pain    Need for vaccination    Hydrocele in adult    Lung nodule    History of snoring    Essential hypertension    Hyperlipidemia    Controlled type 2 diabetes mellitus without complication, with long-term current use of insulin (HCC)    Excessive daytime sleepiness    Right arm pain    Bradycardia    Heart murmur    Impingement syndrome of right shoulder    Lumbar radiculopathy    Idiopathic peripheral neuropathy    Severe obstructive sleep apnea    Obesity, morbid, BMI 40.0-49.9 ([address]    Unstable angina (HCC)    Dyspnea    Coronary artery disease involving native

## 2020-08-18 NOTE — FLOWSHEET NOTE
Met with Pt and wife for Advance Directives consult. Pt wanting to read over on his own first. Left copy of AD forms with Pt and informed him of 's availability should he wish to complete them at a later time. No other needs at this time. [address]       08/18/20 9134   Encounter Summary   Services provided to: Patient and family together   Referral/Consult From: Patient   Support System Spouse   Continue Visiting   (8/18 initial, left ADs)   Complexity of Encounter Low   Length of Encounter 15 minutes   Advance Care Planning Yes   Routine   Type Initial   Assessment Calm; Approachable   Intervention Active listening   Outcome Expressed gratitude   Advance Directives (For Healthcare)   Healthcare Directive No, patient does not have an advance directive for healthcare treatment   Advance Directives Documents given

## 2020-08-18 NOTE — ED NOTES
Bed: 15  Expected date:   Expected time:   Means of arrival:   Comments:  Shonda, [address] [facility]  08/18/20 6159

## 2020-08-18 NOTE — PROGRESS NOTES
Pt d/c'd home with family in personal car with all belongings. Removed peripheral IV and stopped bleeding. Catheter intact. Pt tolerated well. No redness noted at site. Notified [facility] and removed tele box. Reviewed d/c instructions, home meds, and  f/u information utilizing teach-back method. Scripts for Imdur sent to pharmacy. Patient verbalized understanding. Patient refused a wheelchair and ambulated to main entrance with family.

## 2020-08-18 NOTE — ED TRIAGE NOTES
Pt presents to ED with complaints of left sided chest pressure going down left arm. PT states the pain woke him up this morning.

## 2020-08-18 NOTE — PROGRESS NOTES
4 Eyes Skin Assessment     The patient is being assess for  Admission    I agree that 2 RN's have performed a thorough Head to Toe Skin Assessment on the patient. ALL assessment sites listed below have been assessed. Areas assessed by both nurses: Hope and  [x]   Head, Face, and Ears   [x]   Shoulders, Back, and Chest  [x]   Arms, Elbows, and Hands   [x]   Coccyx, Sacrum, and Ischum  [x]   Legs, Feet, and Heels        Does the Patient have Skin Breakdown?   No         Dustin Prevention initiated:  NA   Wound Care Orders initiated:  NA      WO nurse consulted for Pressure Injury (Stage 3,4, Unstageable, DTI, NWPT, and Complex wounds):  NA      Nurse 1 eSignature: Electronically signed by Chichi Brown RN on 8/18/20 at 6:38 AM EDT    **SHARE this note so that the co-signing nurse is able to place an eSignature**    Nurse 2 eSignature: Electronically signed by Ching Avila RN on 8/18/20 at 2:32 PM EDT

## 2020-08-18 NOTE — H&P
Hospitalist History and Physical       Artur Mitchell is a 79 y.o. male   1950         Family Physician:    Chief Complaint:  Chest pain    History of Present Illness:   Patient with history of morbid obesity, hypertension, high cholesterol, diabetes, obstructive sleep apnea moderate CAD patient admitted with chest pain radiating to the neck and left arm with some shortness of breath chest pain is now resolved troponin is negative proBNP 44 just came back from nuclear stress test and awaiting results and cardiology follow-up evaluation    Past Medical History:   Diagnosis Date    Acid reflux     Yan's esophagus     Coronary artery disease of native heart with stable angina pectoris ([facility] 5/30/2019    [facility]     Diabetic autonomic neuropathy associated with type 2 diabetes mellitus ([facility] 3/3/2020    Hyperlipidemia     Hypertension     Influenza A 02/05/2020    Pancreatitis 1996    Restless legs     Sleep apnea     uses CPAP    Tremor     of bilateral hands        Family History   Problem Relation Age of Onset    Kidney Disease Mother     Cancer Father 76        pancreas    Cancer Brother         lung    Cancer Paternal Grandfather         colon        Social History     Socioeconomic History    Marital status:      Spouse name: Not on file    Number of children: Not on file    Years of education: Not on file    Highest education level: Not on file   Occupational History    Occupation:      Comment: maintenance   Social Needs    Financial resource strain: Not hard at all   Miranda-Willi insecurity     Worry: Never true     Inability: Never true    Transportation needs     Medical: No     Non-medical: No   Tobacco Use    Smoking status: Never Smoker    Smokeless tobacco: Never Used   Substance and Sexual Activity    Alcohol use: No    Drug use: No    Sexual activity: Not Currently   Lifestyle    Physical activity     Days per week: 0 days     Minutes per session: 0 min    Stress: Not at all   Relationships    Social connections     Talks on phone: More than three times a week     Gets together: Twice a week     Attends Taoist service: Never     Active member of club or organization: Yes     Attends meetings of clubs or organizations: More than 4 times per year     Relationship status:     Intimate partner violence     Fear of current or ex partner: No     Emotionally abused: No     Physically abused: No     Forced sexual activity: No   Other Topics Concern    Not on file   Social History Narrative    Not on file        Allergies   Allergen Reactions    Codeine Nausea And Vomiting        Current Facility-Administered Medications   Medication Dose Route Frequency Provider Last Rate Last Dose    aspirin chewable tablet 324 mg  324 mg Oral Once Luis Ellsworth MD        aspirin chewable tablet 81 mg  81 mg Oral Daily Luis Ellsworth MD   81 mg at 08/18/20 5957    atorvastatin (LIPITOR) tablet 20 mg  20 mg Oral Nightly Luis Ellsworth MD        gabapentin (NEURONTIN) capsule 200 mg  200 mg Oral Nightly Luis Ellsworth MD        pramipexole (MIRAPEX) tablet 0.125 mg  0.125 mg Oral Nightly Luis Ellsworth MD        pantoprazole (PROTONIX) tablet 40 mg  40 mg Oral BID Luis Ellsworth MD   40 mg at 08/18/20 7796    sodium chloride flush 0.9 % injection 10 mL  10 mL Intravenous 2 times per day Luis Ellsworth MD        sodium chloride flush 0.9 % injection 10 mL  10 mL Intravenous PRN Luis Ellsworth MD   10 mL at 08/18/20 4911    acetaminophen (TYLENOL) tablet 650 mg  650 mg Oral Q6H PRN Luis Ellsworth MD        Or    acetaminophen (TYLENOL) suppository 650 mg  650 mg Rectal Q6H PRN Luis Ellsworth MD        polyethylene glycol (GLYCOLAX) packet 17 g  17 g Oral Daily PRN Luis Ellsworth MD        promethazine (PHENERGAN) tablet 12.5 mg  12.5 mg Oral Q6H PRN Luis Ellsworth MD        Or    ondansetron [facility]) injection 4 mg  4 mg Intravenous Q6H PRN Sean Angelucci, MD        glucose (GLUTOSE) 40 % oral gel 15 g  15 g Oral PRN Sean Angelucci, MD        dextrose 50 % IV solution  12.5 g Intravenous PRN Sean Angelucci, MD        glucagon (rDNA) injection 1 mg  1 mg Intramuscular PRN Sean Angelucci, MD        dextrose 5 % solution  100 mL/hr Intravenous PRN Sean Angelucci, MD        enoxaparin (LOVENOX) injection 40 mg  40 mg Subcutaneous Daily Sean Angelucci, MD   Stopped at 08/18/20 0815    nitroGLYCERIN (NITROSTAT) SL tablet 0.4 mg  0.4 mg Sublingual Q5 Min PRN Sean Angelucci, MD        insulin glargine (LANTUS) injection vial 29 Units  0.25 Units/kg Subcutaneous Nightly Sean Angelucci, MD        insulin lispro (HUMALOG) injection vial 9 Units  0.08 Units/kg Subcutaneous TID  Sean Angelucci, MD        insulin lispro (HUMALOG) injection vial 0-6 Units  0-6 Units Subcutaneous TID  Sean Angelucci, MD        insulin lispro (HUMALOG) injection vial 0-3 Units  0-3 Units Subcutaneous Nightly Sean Angelucci, MD        insulin lispro (HUMALOG) injection vial 0-6 Units  0-6 Units Subcutaneous Q4H Sean Angelucci, MD            Review of Systems:  General appearance: alert, appears stated age and cooperative  Skin: Skin color, texture, normal. No rashes or lesions  HEENT: No nose bleed, headache, vision problems  CV: C/O chest pain, tightness, pressure,   Respiratory: C/o no SOB, FREEMAN, Orthopnea, PND  GI: No abdominal pain, black stool, bloating  Limbs: No c/o edema, pain, swelling, intermittent claudication, joint pains  Neuro: No dizziness, lightheadedness, syncope, gait problems, memory problems  Psych: grossly normal. No SI/depression. Vitals:   Blood pressure 115/67, pulse 53, temperature 97.5 °F (36.4 °C), temperature source Oral, resp. rate 16, height 5' 10\" (1.778 m), weight 253 lb 8 oz (115 kg), SpO2 94 %.     Physical Exam:    HEENT: AT, NC, PERRLA  Neck: No JVD, supple, Thyromegaly , Corotid Bruit  Heart: S1 S2 audible, no murmur   Lungs: CTA   Abdomen: Soft,  Nontender , Bowel Sound: positive  Limbs: No edema   CNS: no focal deficit      Labs:  CBC with Differential:    Lab Results   Component Value Date    WBC 6.0 08/18/2020    RBC 4.65 08/18/2020    HGB 13.7 08/18/2020    HCT 41.1 08/18/2020     08/18/2020    MCV 88.3 08/18/2020    MCH 29.4 08/18/2020    MCHC 33.3 08/18/2020    RDW 13.5 08/18/2020    SEGSPCT 83.0 12/25/2010    LYMPHOPCT 31.6 08/18/2020    MONOPCT 7.7 08/18/2020    EOSPCT 2.2 12/25/2010    BASOPCT 0.7 08/18/2020    MONOSABS 0.5 08/18/2020    LYMPHSABS 1.9 08/18/2020    EOSABS 0.3 08/18/2020    BASOSABS 0.0 08/18/2020    DIFFTYPE Auto 12/25/2010     CMP:    Lab Results   Component Value Date     08/18/2020    K 5.0 08/18/2020    K 4.5 02/07/2020     08/18/2020    CO2 25 08/18/2020    BUN 23 08/18/2020    CREATININE 0.9 08/18/2020    GFRAA >60 08/18/2020    GFRAA >60 12/25/2010    AGRATIO 1.8 08/18/2020    LABGLOM >60 08/18/2020    GLUCOSE 164 08/18/2020    PROT 6.5 08/18/2020    PROT 6.6 12/25/2010    LABALBU 4.2 08/18/2020    CALCIUM 9.3 08/18/2020    BILITOT 0.6 08/18/2020    ALKPHOS 90 08/18/2020    AST 19 08/18/2020    ALT 17 08/18/2020     Hepatic Function Panel:    Lab Results   Component Value Date    ALKPHOS 90 08/18/2020    ALT 17 08/18/2020    AST 19 08/18/2020    PROT 6.5 08/18/2020    PROT 6.6 12/25/2010    BILITOT 0.6 08/18/2020    BILIDIR <0.2 02/06/2020    IBILI see below 02/06/2020    LABALBU 4.2 08/18/2020     Magnesium:    Lab Results   Component Value Date    MG 1.80 08/18/2020     PT/INR:    Lab Results   Component Value Date    PROTIME 12.0 11/28/2017    INR 1.06 11/28/2017     Last 3 Troponin:    Lab Results   Component Value Date    TROPONINI <0.01 08/18/2020    TROPONINI <0.01 08/18/2020    TROPONINI <0.01 02/06/2020     U/A:    Lab Results   Component Value Date    COLORU Yellow 04/02/2015    PHUR 5.5 04/02/2015    WBCUA >100 04/02/2015 RBCUA >100 04/02/2015    MUCUS 2+ 04/02/2015    BACTERIA 2+ 04/02/2015    CLARITYU Clear 04/02/2015    SPECGRAV 1.015 04/02/2015    LEUKOCYTESUR TRACE 04/02/2015    UROBILINOGEN 2.0 04/02/2015    BILIRUBINUR Negative 04/02/2015    BLOODU TRACE-LYSED 04/02/2015    GLUCOSEU Negative 04/02/2015     ABG:    Lab Results   Component Value Date    PHART 7.394 03/25/2019    ATK5PEL 48.2 03/25/2019    PO2ART 68.4 03/25/2019    HSM2CHY 28.8 03/25/2019    BEART 3.1 03/25/2019    QZH7LLT 30.3 03/25/2019    G0MPOFUZ 92.6 03/25/2019     FLP:    Lab Results   Component Value Date    TRIG 114 05/20/2020    HDL 51 05/20/2020    LDLCALC 73 05/20/2020    LABVLDL 23 05/20/2020     TSH:  No results found for: TSH       DATA:   ECG: Sinus Rhythm       ASSESSMENT:   1 chest pain patient ruled out for myocardial infarction  Just returned from nuclear stress test awaiting results and cardiology follow-up evaluation  2 hypertension  Currently well controlled  3 diabetes continue on sliding scale  4 morbid obesity  Chronic due to excess caloric intake  5 high cholesterol  Plan:  Await nuclear stress test result cardiology follow-up evaluation    Britta Hurley  8/18/2020  12:09 PM

## 2020-08-18 NOTE — ED NOTES

## 2020-09-01 ENCOUNTER — OFFICE VISIT (OUTPATIENT)
Dept: CARDIOLOGY CLINIC | Age: 70
End: 2020-09-01
Payer: MEDICARE

## 2020-09-01 ENCOUNTER — TELEPHONE (OUTPATIENT)
Dept: CARDIOLOGY CLINIC | Age: 70
End: 2020-09-01

## 2020-09-01 VITALS
HEIGHT: 70 IN | HEART RATE: 59 BPM | WEIGHT: 256.5 LBS | DIASTOLIC BLOOD PRESSURE: 74 MMHG | OXYGEN SATURATION: 95 % | BODY MASS INDEX: 36.72 KG/M2 | SYSTOLIC BLOOD PRESSURE: 112 MMHG

## 2020-09-01 PROCEDURE — 1036F TOBACCO NON-USER: CPT | Performed by: NURSE PRACTITIONER

## 2020-09-01 PROCEDURE — G8417 CALC BMI ABV UP PARAM F/U: HCPCS | Performed by: NURSE PRACTITIONER

## 2020-09-01 PROCEDURE — 0296T PR EXT ECG > 48HR TO 21 DAY RCRD W/CONECT INTL RCRD: CPT | Performed by: INTERNAL MEDICINE

## 2020-09-01 PROCEDURE — G8427 DOCREV CUR MEDS BY ELIG CLIN: HCPCS | Performed by: NURSE PRACTITIONER

## 2020-09-01 PROCEDURE — 4040F PNEUMOC VAC/ADMIN/RCVD: CPT | Performed by: NURSE PRACTITIONER

## 2020-09-01 PROCEDURE — 99214 OFFICE O/P EST MOD 30 MIN: CPT | Performed by: NURSE PRACTITIONER

## 2020-09-01 PROCEDURE — 1123F ACP DISCUSS/DSCN MKR DOCD: CPT | Performed by: NURSE PRACTITIONER

## 2020-09-01 PROCEDURE — 3017F COLORECTAL CA SCREEN DOC REV: CPT | Performed by: NURSE PRACTITIONER

## 2020-09-01 RX ORDER — METOPROLOL SUCCINATE 25 MG/1
12.5 TABLET, EXTENDED RELEASE ORAL DAILY
Qty: 30 TABLET | Refills: 3 | Status: ON HOLD | OUTPATIENT
Start: 2020-09-01 | End: 2021-08-17 | Stop reason: HOSPADM

## 2020-09-01 RX ORDER — RANOLAZINE 500 MG/1
500 TABLET, EXTENDED RELEASE ORAL 2 TIMES DAILY
Qty: 60 TABLET | Refills: 3 | Status: SHIPPED | OUTPATIENT
Start: 2020-09-01 | End: 2020-09-03

## 2020-09-01 ASSESSMENT — ENCOUNTER SYMPTOMS
RESPIRATORY NEGATIVE: 1
GASTROINTESTINAL NEGATIVE: 1

## 2020-09-01 NOTE — PROGRESS NOTES
[facility]   Cardiology Note              Date:  September 1, 2020  Patientname: Jean Henry  YOB: 1950    Primary Care physician: Nan Palomino DO    HISTORY OF PRESENT ILLNESS: Jean Henry is a 79 y.o. male with a history of CAD, HTN, HLD, DM, ESTHER. He was admitted 3/21/2019 for chest pain. Troponin and stress test negative but continued to have chest pain relieved with nitro. Coronary angiography on 3/25/2019 showed moderate CAD, medical management. Echo showed EF 55%. He was admitted 8/2020 for chest pain. Stress test negative for ischemia. Today he presents for hospital follow up for CAD, HTN, HLD. He is a difficult historian and has limited insight into medical care. He states he is lightheaded, dizzy, and short of breath often. He thinks this has been going on awhile. He has intermittent chest pain, with or without exertion, unsure how long it lasts. He does not check BP or HR at home. He has ESTHER, noncompliant with CPAP. Weight today 9/1/2020: 256 lbs  Office weight 10/4/2019: 292 lbs    Past Medical History:   has a past medical history of Acid reflux, Yan's esophagus, Coronary artery disease of native heart with stable angina pectoris ([address] Diabetes mellitus ([address] Diabetic autonomic neuropathy associated with type 2 diabetes mellitus ([address] Hyperlipidemia, Hypertension, Influenza A, Pancreatitis, Restless legs, Sleep apnea, and Tremor. Past Surgical History:   has a past surgical history that includes Pancreas surgery; Cholecystectomy; Tonsillectomy; [place] tooth extraction; Colonoscopy (10/26/2011); Cataract removal (Bilateral, 2013); Upper gastrointestinal endoscopy (10/04/2016); Hydrocele surgery (11/15/2016); Endoscopy, colon, diagnostic; Upper gastrointestinal endoscopy (03/16/2017); Upper gastrointestinal endoscopy (06/26/2017); Upper gastrointestinal endoscopy (09/06/2017); Upper gastrointestinal endoscopy (12/22/2017);  Upper gastrointestinal endoscopy (N/A, 12/4/2018); Upper gastrointestinal endoscopy (2/19/2019); Upper gastrointestinal endoscopy (N/A, 6/11/2019); and Upper gastrointestinal endoscopy (N/A, 8/13/2019). Home Medications:    Prior to Admission medications    Medication Sig Start Date End Date Taking? Authorizing Provider   isosorbide mononitrate (IMDUR) 30 MG extended release tablet Take 1 tablet by mouth once for 1 dose 8/18/20 8/18/20  Britta Lombardi MD   Continuous Blood Gluc Sensor (FREESTYLE JUSTIN 14 DAY SENSOR) MISC 1 Units by Does not apply route every 14 days 7/14/20   SANDY Malin CNP   ergocalciferol (DRISDOL) 1.25 MG (36951 UT) capsule Take 1 capsule by mouth once a week 1/1/20 8/18/20  SANDY Malin CNP   Continuous Blood Gluc  (FREESTYLE JUSTIN 14 DAY READER) OLI 1 Units by Does not apply route as needed (as needed) 12/31/19   SANDY Malin CNP   gabapentin (NEURONTIN) 100 MG capsule Take 2 capsules by mouth nightly for 60 days. 11/13/19 8/18/20  SANDY Phoenix CNP   pramipexole (MIRAPEX) 0.125 MG tablet Take 1 tablet by mouth nightly 9/17/19   SANDY Phoenix CNP   aspirin 81 MG chewable tablet Take 1 tablet by mouth daily 3/26/19   Rj Tapia MD   nitroGLYCERIN (NITROSTAT) 0.4 MG SL tablet up to max of 3 total doses.  If no relief after 1 dose, call 911. 3/25/19   Rj Tapia MD   fluticasone (FLONASE) 50 MCG/ACT nasal spray 2 sprays by Nasal route daily 1/26/18   SANDY Pollock CNP   glucose blood VI test strips (ASCENSIA AUTODISC VI;ONE TOUCH ULTRA TEST VI) strip Check glucose 3 times daily 11/22/16   Historical Provider, MD   Insulin Syringe-Needle U-100 30G X 1/2\" 0.5 ML MISC Inject insulin 3 times daily 9/12/17   Historical Provider, MD   pantoprazole (PROTONIX) 40 MG tablet Take 40 mg by mouth 2 times daily  12/29/16   Historical Provider, MD   insulin regular (HUMULIN R;NOVOLIN R) 100 UNIT/ML injection Inject 10 Units into the skin 3 times daily (before meals)     Historical Provider, MD   lisinopril (PRINIVIL;ZESTRIL) 10 MG tablet Take 10 mg by mouth daily    Historical Provider, MD   atorvastatin (LIPITOR) 20 MG tablet Take 20 mg by mouth daily. Historical Provider, MD     Allergies:  Codeine    Social History:   reports that he has never smoked. He has never used smokeless tobacco. He reports that he does not drink alcohol or use drugs. Family History: family history includes Cancer in his brother and paternal grandfather; Cancer (age of onset: 76) in his father; Kidney Disease in his mother. Review of Systems   Review of Systems   Constitutional: Negative. Respiratory: Negative. Cardiovascular: Negative. Gastrointestinal: Negative. Neurological: Negative. OBJECTIVE:    Vital signs:    /74   Pulse 59   Ht 5' 10\" (1.778 m)   Wt 256 lb 8 oz (116.3 kg)   SpO2 95%   BMI 36.80 kg/m²      Physical Exam:  Constitutional:  Comfortable and alert, NAD, appears older than stated age, obese  Eyes: PERRL, sclera nonicteric  Neck:  Supple, no masses, no thyroidmegaly, no JVD  Skin:  Warm and dry; no rash or lesions  Heart: Regular, normal apex, S1 and S2 normal, no M/G/R  Lungs:  Normal respiratory effort; clear; no wheezing/rhonchi/rales  Abdomen: soft, non tender, + bowel sounds  Extremities:  No edema or cyanosis; no clubbing  Neuro: alert and oriented, moves legs and arms equally, flat mood and affect    Data Reviewed:      Stress test 8/18/2020: There is no definite evidence of stress induced ischemia. LV function is     normal with uniform wall motion and ejection fraction of 64%. Low risk     study. Echo 2/6/2020:   Technically difficult examination. Left ventricular systolic function is normal with ejection fraction   estimated at 55%. No regional wall motion abnormalities. There is mild concentric left ventricular hypertrophy.    Grade II diastolic dysfunction with elevated left ventricular filling   pressure. The right ventricle is mildly enlarged. Mild bi-atrial enlargement. Coronary angiogram 3/25/2019:  Unstable angina  PROCEDURES PERFORMED    Left heart catheterization  LVgram  Coronary angiogam  Coronary cath  PROCEDURE DESCRIPTION   Risks/benefits/alternatives/outcomes were discussed with patient and/or family and informed consent was obtained. Using the [facility] test, the patient's right radial artery was found to be acceptable for cannulation. Patient was prepped draped in the usual sterile fashion. Local anaesthetic was applied over puncture site. Using a back wall technique, a 6 [place] Terumo sheath was inserted into right radial artery. Verapamil, nitroglycerin were administered through the sheath. Heparin was administered. Diagnostic 5fr pigtail, TIG catheters were used for diagnostic angiograms. At the conclusion of the procedure, a TR band was placed over the puncture site and hemostasis was obtained. There were no immediate complications. I supervised sedation with versed 1mg/fentanyl 50mcg during the procedure. 70cc contrast was utilized. <20cc EBL. LVEDP 4   GRADIENT ACROSS AORTIC VALVE No gradient   LV FUNCTION EF 65%   WALL MOTION Normal   MITRAL REGURGITATION Mild      LM <10% stenosis.       LAD Prox <10% stenosis. Mid 30-40% stenosis. Distal 30-40% stenosis. LAD wraps around apex. D1 has ostial 80% stenosis.       LCX 10% prox-mid stenosis. OM1 bifurcates in prox segment and there is prox-mid OM1 40-50% stenosis.       RI Mid 60% stenosis.       RCA Dominant. Slight anterior takeoff, Prox-mid 20-30% stenoses. Distal 20% stenosis. Tortuous vessel. Moderate cad/ashd in major epicardial vessels  Would treat medically  Add imdur 30mg daily, and metoprolol tartrate 25mg bid  Ok for dc later today    Cardiology Labs Reviewed:   CBC: No results for input(s): WBC, HGB, HCT, PLT in the last 72 hours.   BMP:No results for input(s): NA, K, CO2, BUN, CREATININE, LABGLOM, GLUCOSE in the last 72 hours. PT/INR: No results for input(s): PROTIME, INR in the last 72 hours. APTT:No results for input(s): APTT in the last 72 hours. FASTING LIPID PANEL:  Lab Results   Component Value Date    HDL 51 05/20/2020    LDLCALC 73 05/20/2020    TRIG 114 05/20/2020     LIVER PROFILE:No results for input(s): AST, ALT, ALB in the last 72 hours. BNP:   Lab Results   Component Value Date    PROBNP 44 08/18/2020    PROBNP 81 11/28/2017     Reviewed all labs and imaging today    Assessment:   Dizziness: ongoing  Headache: worse since starting imdur  Shortness of breath: ongoing, no CHF on exam and has lost weight  Chest pain: ongoing, atypical features, possible angina  CAD: negative stress test 8/18/2020; moderate on angiogram 3/25/19  Bradycardia: noted 2/2020 while hospitalized and metoprolol stopped  HTN: controlled  HLD: stable, LDL 73, continue statin  DM: hgb a1c 8.1  ESTHER: noncompliant with CPAP    Plan:   1. Stop imdur due to headache  2. Start ranexa   3. Start toprol 12.5 mg daily  4. Cardiac monitor due to dizziness  5. Continue aspirin, statin, lisinopril  6. Check BP/HR at home and call the office if consistently out of goal range  7.  Follow up in 1 month    SANDY Marroquin-CNP  [facility]  [phone]

## 2020-09-01 NOTE — PATIENT INSTRUCTIONS
Stop imdur  Start ranexa and toprol for symptoms  Check BP at home  Continue other medications  Follow up in 1 month

## 2020-09-01 NOTE — LETTER
[address]   Cardiology Note              Date:  September 1, 2020  Patientname: Rod Verdugo  YOB: 1950    Primary Care physician: Lidya Pak DO    HISTORY OF PRESENT ILLNESS: Rod Verdugo is a 79 y.o. male with a history of CAD, HTN, HLD, DM, ESTHER. He was admitted 3/21/2019 for chest pain. Troponin and stress test negative but continued to have chest pain relieved with nitro. Coronary angiography on 3/25/2019 showed moderate CAD, medical management. Echo showed EF 55%. He was admitted 8/2020 for chest pain. Stress test negative for ischemia. Today he presents for hospital follow up for CAD, HTN, HLD. He is a difficult historian and has limited insight into medical care. He states he is lightheaded, dizzy, and short of breath often. He thinks this has been going on awhile. He has intermittent chest pain, with or without exertion, unsure how long it lasts. He does not check BP or HR at home. He has ESTHER, noncompliant with CPAP. Weight today 9/1/2020: 256 lbs  Office weight 10/4/2019: 292 lbs    Past Medical History:   has a past medical history of Acid reflux, Ayn's esophagus, Coronary artery disease of native heart with stable angina pectoris ([address] Diabetes mellitus ([facility] [address] Diabetic autonomic neuropathy associated with type 2 diabetes mellitus ([address] Hyperlipidemia, Hypertension, Influenza A, Pancreatitis, Restless legs, Sleep apnea, and Tremor. Past Surgical History:   has a past surgical history that includes Pancreas surgery; Cholecystectomy; Tonsillectomy; [place] tooth extraction; Colonoscopy (10/26/2011); Cataract removal (Bilateral, 2013); Upper gastrointestinal endoscopy (10/04/2016); Hydrocele surgery (11/15/2016); Endoscopy, colon, diagnostic; Upper gastrointestinal endoscopy (03/16/2017); Upper gastrointestinal endoscopy (06/26/2017); Upper gastrointestinal endoscopy (09/06/2017);  Upper gastrointestinal endoscopy (12/22/2017); Upper gastrointestinal endoscopy (N/A, 12/4/2018); Upper gastrointestinal endoscopy (2/19/2019); Upper gastrointestinal endoscopy (N/A, 6/11/2019); and Upper gastrointestinal endoscopy (N/A, 8/13/2019). Home Medications:    Prior to Admission medications    Medication Sig Start Date End Date Taking? Authorizing Provider   isosorbide mononitrate (IMDUR) 30 MG extended release tablet Take 1 tablet by mouth once for 1 dose 8/18/20 8/18/20  Britta Lombardi MD   Continuous Blood Gluc Sensor (FREESTYLE JUSTIN 14 DAY SENSOR) MISC 1 Units by Does not apply route every 14 days 7/14/20   SANDY Malin CNP   ergocalciferol (DRISDOL) 1.25 MG (33714 UT) capsule Take 1 capsule by mouth once a week 1/1/20 8/18/20  SANDY Malin CNP   Continuous Blood Gluc  (FREESTYLE JUSTIN 14 DAY READER) OLI 1 Units by Does not apply route as needed (as needed) 12/31/19   SANDY Malin CNP   gabapentin (NEURONTIN) 100 MG capsule Take 2 capsules by mouth nightly for 60 days. 11/13/19 8/18/20  SANDY Phoenix CNP   pramipexole (MIRAPEX) 0.125 MG tablet Take 1 tablet by mouth nightly 9/17/19   SANDY Phoenix CNP   aspirin 81 MG chewable tablet Take 1 tablet by mouth daily 3/26/19   Rj Tapia MD   nitroGLYCERIN (NITROSTAT) 0.4 MG SL tablet up to max of 3 total doses.  If no relief after 1 dose, call 911. 3/25/19   Rj Tapia MD   fluticasone (FLONASE) 50 MCG/ACT nasal spray 2 sprays by Nasal route daily 1/26/18   SANDY Pollock CNP   glucose blood VI test strips (ASCENSIA AUTODISC VI;ONE TOUCH ULTRA TEST VI) strip Check glucose 3 times daily 11/22/16   Historical Provider, MD   Insulin Syringe-Needle U-100 30G X 1/2\" 0.5 ML MISC Inject insulin 3 times daily 9/12/17   Historical Provider, MD   pantoprazole (PROTONIX) 40 MG tablet Take 40 mg by mouth 2 times daily  12/29/16   Historical Provider, MD insulin regular (HUMULIN R;NOVOLIN R) 100 UNIT/ML injection Inject 10 Units into the skin 3 times daily (before meals)     Historical Provider, MD   lisinopril (PRINIVIL;ZESTRIL) 10 MG tablet Take 10 mg by mouth daily    Historical Provider, MD   atorvastatin (LIPITOR) 20 MG tablet Take 20 mg by mouth daily. Historical Provider, MD     Allergies:  Codeine    Social History:   reports that he has never smoked. He has never used smokeless tobacco. He reports that he does not drink alcohol or use drugs. Family History: family history includes Cancer in his brother and paternal grandfather; Cancer (age of onset: 76) in his father; Kidney Disease in his mother. Review of Systems   Review of Systems   Constitutional: Negative. Respiratory: Negative. Cardiovascular: Negative. Gastrointestinal: Negative. Neurological: Negative. OBJECTIVE:    Vital signs:    /74   Pulse 59   Ht 5' 10\" (1.778 m)   Wt 256 lb 8 oz (116.3 kg)   SpO2 95%   BMI 36.80 kg/m²      Physical Exam:  Constitutional:  Comfortable and alert, NAD, appears older than stated age, obese  Eyes: PERRL, sclera nonicteric  Neck:  Supple, no masses, no thyroidmegaly, no JVD  Skin:  Warm and dry; no rash or lesions  Heart: Regular, normal apex, S1 and S2 normal, no M/G/R  Lungs:  Normal respiratory effort; clear; no wheezing/rhonchi/rales  Abdomen: soft, non tender, + bowel sounds  Extremities:  No edema or cyanosis; no clubbing  Neuro: alert and oriented, moves legs and arms equally, flat mood and affect    Data Reviewed:      Stress test 8/18/2020: There is no definite evidence of stress induced ischemia. LV function is     normal with uniform wall motion and ejection fraction of 64%. Low risk     study. Echo 2/6/2020:   Technically difficult examination. Left ventricular systolic function is normal with ejection fraction   estimated at 55%. No regional wall motion abnormalities. There is mild concentric left ventricular hypertrophy. Grade II diastolic dysfunction with elevated left ventricular filling   pressure. The right ventricle is mildly enlarged. Mild bi-atrial enlargement. Coronary angiogram 3/25/2019:  Unstable angina  PROCEDURES PERFORMED    Left heart catheterization  LVgram  Coronary angiogam  Coronary cath  PROCEDURE DESCRIPTION   Risks/benefits/alternatives/outcomes were discussed with patient and/or family and informed consent was obtained. Using the [facility] test, the patient's right radial artery was found to be acceptable for cannulation. Patient was prepped draped in the usual sterile fashion. Local anaesthetic was applied over puncture site. Using a back wall technique, a 6 [place] Terumo sheath was inserted into right radial artery. Verapamil, nitroglycerin were administered through the sheath. Heparin was administered. Diagnostic 5fr pigtail, TIG catheters were used for diagnostic angiograms. At the conclusion of the procedure, a TR band was placed over the puncture site and hemostasis was obtained. There were no immediate complications. I supervised sedation with versed 1mg/fentanyl 50mcg during the procedure. 70cc contrast was utilized. <20cc EBL. LVEDP 4   GRADIENT ACROSS AORTIC VALVE No gradient   LV FUNCTION EF 65%   WALL MOTION Normal   MITRAL REGURGITATION Mild      LM <10% stenosis.       LAD Prox <10% stenosis. Mid 30-40% stenosis. Distal 30-40% stenosis. LAD wraps around apex. D1 has ostial 80% stenosis.       LCX 10% prox-mid stenosis. OM1 bifurcates in prox segment and there is prox-mid OM1 40-50% stenosis.       RI Mid 60% stenosis.       RCA Dominant. Slight anterior takeoff, Prox-mid 20-30% stenoses. Distal 20% stenosis. Tortuous vessel.     Moderate cad/ashd in major epicardial vessels  Would treat medically  Add imdur 30mg daily, and metoprolol tartrate 25mg bid  Ok for [employer] later today    Cardiology Labs Reviewed: CBC: No results for input(s): WBC, HGB, HCT, PLT in the last 72 hours. BMP:No results for input(s): NA, K, CO2, BUN, CREATININE, LABGLOM, GLUCOSE in the last 72 hours. PT/INR: No results for input(s): PROTIME, INR in the last 72 hours. APTT:No results for input(s): APTT in the last 72 hours. FASTING LIPID PANEL:  Lab Results   Component Value Date    HDL 51 05/20/2020    LDLCALC 73 05/20/2020    TRIG 114 05/20/2020     LIVER PROFILE:No results for input(s): AST, ALT, ALB in the last 72 hours. BNP:   Lab Results   Component Value Date    PROBNP 44 08/18/2020    PROBNP 81 11/28/2017     Reviewed all labs and imaging today    Assessment:   Dizziness: ongoing  Headache: worse since starting imdur  Shortness of breath: ongoing, no CHF on exam and has lost weight  Chest pain: ongoing, atypical features, possible angina  CAD: negative stress test 8/18/2020; moderate on angiogram 3/25/19  Bradycardia: noted 2/2020 while hospitalized and metoprolol stopped  HTN: controlled  HLD: stable, LDL 73, continue statin  DM: hgb a1c 8.1  ESTHER: noncompliant with CPAP    Plan:   1. Stop imdur due to headache  2. Start ranexa   3. Start toprol 12.5 mg daily  4. Cardiac monitor due to dizziness  5. Continue aspirin, statin, lisinopril  6. Check BP/HR at home and call the office if consistently out of goal range  7.  Follow up in 1 month    SANDY Ho-KHALIF BROTHERS[facility] [address]  [phone]

## 2020-09-02 ENCOUNTER — TELEPHONE (OUTPATIENT)
Dept: CARDIOLOGY CLINIC | Age: 70
End: 2020-09-02

## 2020-09-02 NOTE — TELEPHONE ENCOUNTER
There is no other medicine in that class. Can try different anti-anginal.  Amlodipine 2.5 mg once daily. Keep upcoming appointment with SANDY Valdez - CNP

## 2020-09-03 RX ORDER — AMLODIPINE BESYLATE 2.5 MG/1
2.5 TABLET ORAL DAILY
Qty: 30 TABLET | Refills: 1 | Status: SHIPPED | OUTPATIENT
Start: 2020-09-03 | End: 2021-09-17 | Stop reason: SINTOL

## 2020-09-21 ENCOUNTER — TELEPHONE (OUTPATIENT)
Dept: PULMONOLOGY | Age: 70
End: 2020-09-21

## 2020-09-21 ENCOUNTER — VIRTUAL VISIT (OUTPATIENT)
Dept: PULMONOLOGY | Age: 70
End: 2020-09-21
Payer: MEDICARE

## 2020-09-21 PROCEDURE — 0298T PR EXT ECG > 48HR TO 21 DAY REVIEW AND INTERPRETATN: CPT | Performed by: INTERNAL MEDICINE

## 2020-09-21 PROCEDURE — 99441 PR PHYS/QHP TELEPHONE EVALUATION 5-10 MIN: CPT | Performed by: INTERNAL MEDICINE

## 2020-09-21 ASSESSMENT — SLEEP AND FATIGUE QUESTIONNAIRES
HOW LIKELY ARE YOU TO NOD OFF OR FALL ASLEEP WHILE LYING DOWN TO REST IN THE AFTERNOON WHEN CIRCUMSTANCES PERMIT: 1
HOW LIKELY ARE YOU TO NOD OFF OR FALL ASLEEP WHILE SITTING AND TALKING TO SOMEONE: 0
ESS TOTAL SCORE: 2
HOW LIKELY ARE YOU TO NOD OFF OR FALL ASLEEP WHILE SITTING QUIETLY AFTER LUNCH WITHOUT ALCOHOL: 0
HOW LIKELY ARE YOU TO NOD OFF OR FALL ASLEEP IN A CAR, WHILE STOPPED FOR A FEW MINUTES IN TRAFFIC: 0
HOW LIKELY ARE YOU TO NOD OFF OR FALL ASLEEP WHILE SITTING AND READING: 0
HOW LIKELY ARE YOU TO NOD OFF OR FALL ASLEEP WHEN YOU ARE A PASSENGER IN A CAR FOR AN HOUR WITHOUT A BREAK: 0
HOW LIKELY ARE YOU TO NOD OFF OR FALL ASLEEP WHILE WATCHING TV: 1
HOW LIKELY ARE YOU TO NOD OFF OR FALL ASLEEP WHILE SITTING INACTIVE IN A PUBLIC PLACE: 0

## 2020-09-21 NOTE — PROGRESS NOTES
[facility]                                                            TELEHEALTH EVALUATION: Service performed was Audio (During [facility] public health emergency) and not a face-to-face visit         CHIEF COMPLAINT: Follow-up ESTHER        HPI:   Doing good with his machine. Just got a new mask, used it for 3-4 nights. Feels better when he uses it.  + mask leak well. Has not trimmed his beard yet. Pressure is fine. Uses it 4-5 hrs. Uses humidification. Goes to bed 10 pm and wakes up 8 am. Sleep onset is few min. ESS 2.          Past Medical History:   Diagnosis Date    Acid reflux     Yan's esophagus     Coronary artery disease of native heart with stable angina pectoris ([facility] [address] 5/30/2019    Diabetes mellitus ([facility] [address]     Diabetic autonomic neuropathy associated with type 2 diabetes mellitus ([facility] [address] 3/3/2020    Hyperlipidemia     Hypertension     Influenza A 02/05/2020    Pancreatitis 1996    Restless legs     Sleep apnea     uses CPAP    Tremor     of bilateral hands       Past Surgical History:        Procedure Laterality Date    CATARACT REMOVAL Bilateral 2013    CHOLECYSTECTOMY      COLONOSCOPY  10/26/2011    ENDOSCOPY, COLON, DIAGNOSTIC      EGD halo    HYDROCELE EXCISION  11/15/2016    PANCREAS SURGERY      cyst opened up and drining into small bowel    TONSILLECTOMY      UPPER GASTROINTESTINAL ENDOSCOPY  10/04/2016    with biopsy    UPPER GASTROINTESTINAL ENDOSCOPY  03/16/2017    Halo ablation    UPPER GASTROINTESTINAL ENDOSCOPY  06/26/2017    Halo ablation    UPPER GASTROINTESTINAL ENDOSCOPY  09/06/2017    bx distal sophagus    UPPER GASTROINTESTINAL ENDOSCOPY  12/22/2017    with HALO  procedure    UPPER GASTROINTESTINAL ENDOSCOPY N/A 12/4/2018    EGD WITH ABLATION AND ANESTHESIA - HALO---SLEEP APNEA---  performed by Chico Bergeron MD at [address] [place] [address]  2/19/2019    EGD BIOPSY performed by Liliana Merino MD at [address] 6/11/2019    EGD WITH HALO AND ANESTHESIA performed by Liliana Merino MD at [address] [facility] ENDOSCOPY N/A 8/13/2019    ESOPHAGOGASTRODUODENOSCOPY WITH HALO AND ANESTHESIA -SLEEP APNEA- performed [facility] EXTRACTION         Allergies:  is allergic to codeine. Social History:    TOBACCO:   reports that he has never smoked. He has never used smokeless tobacco.  ETOH:   reports no history of alcohol use. Family History:       Problem Relation Age of Onset    Kidney Disease Mother     Cancer Father 76        pancreas    Cancer Brother         lung    Cancer Paternal Grandfather         colon       Current Medications:    Current Outpatient Medications:     amLODIPine (NORVASC) 2.5 MG tablet, Take 1 tablet by mouth daily, Disp: 30 tablet, Rfl: 1    metoprolol succinate (TOPROL XL) 25 MG extended release tablet, Take 0.5 tablets by mouth daily, Disp: 30 tablet, Rfl: 3    Continuous Blood Gluc Sensor (FREESTYLE JUSTIN 14 DAY SENSOR) MISC, 1 Units by Does not apply route every 14 days, Disp: 2 each, Rfl: 11    ergocalciferol (DRISDOL) 1.25 MG (50960 UT) capsule, Take 1 capsule by mouth once a week, Disp: 12 capsule, Rfl: 5    Continuous Blood Gluc  (FREESTYLE JUSTIN 14 DAY READER) OLI, 1 Units by Does not apply route as needed (as needed), Disp: 1 Device, Rfl: 0    gabapentin (NEURONTIN) 100 MG capsule, Take 2 capsules by mouth nightly for 60 days. , Disp: 60 capsule, Rfl: 1    pramipexole (MIRAPEX) 0.125 MG tablet, Take 1 tablet by mouth nightly, Disp: 90 tablet, Rfl: 3    aspirin 81 MG chewable tablet, Take 1 tablet by mouth daily, Disp: 30 tablet, Rfl: 3    nitroGLYCERIN (NITROSTAT) 0.4 MG SL tablet, up to max of 3 total doses.  If no relief after 1 dose, call 911., Disp: 25 tablet, Rfl: 3    fluticasone (FLONASE) 50 MCG/ACT nasal spray, 2 sprays by Nasal route daily, Disp: 1 Bottle, Rfl: 5    glucose blood VI test strips (ASCENSIA AUTODISC VI;ONE TOUCH ULTRA TEST VI) strip, Check glucose 3 times daily, Disp: , Rfl:     Insulin Syringe-Needle U-100 30G X 1/2\" 0.5 ML MISC, Inject insulin 3 times daily, Disp: , Rfl:     pantoprazole (PROTONIX) 40 MG tablet, Take 40 mg by mouth 2 times daily , Disp: , Rfl:     insulin regular (HUMULIN R;NOVOLIN R) 100 UNIT/ML injection, Inject 10 Units into the skin 3 times daily (before meals) , Disp: , Rfl:     lisinopril (PRINIVIL;ZESTRIL) 10 MG tablet, Take 10 mg by mouth daily, Disp: , Rfl:     atorvastatin (LIPITOR) 20 MG tablet, Take 20 mg by mouth daily. , Disp: , Rfl:           Objective:   PHYSICAL EXAM:    Telephone visit not able to obtain physical exam           DATA reviewed by me:   9/25/17 PSG AHI 95.6, low SPO2 69%, inadequate titration time, ineffective CPAP, treatment emergent central sleep apnea  9/26/17 titration-improved sleep-related breathing with BiPAP, PLMS 76.5, recommendations BiPAP 22/16 cm H2O  4/3/18 split-night sleep study .9, low SPO2 76%, PLMS 98.5, recommendations trial BiPAP 24/18 cm H2O      BiPAP data 03/26-04/24 2020 reviewed by me. Uses  1-2 hrs/night with 0% compliance and AHI of  3.4. BiPAP 24/19     Assessment:       · Severe ESTHER. Full face mask. BiPAP 24/19 cmH2O. improving compliance per his report. · Mask leak - declined to trim his beard   · RLS/PLMS on Mirapex per PCP   · Morbid obesity      Plan:    · Advised to trim his beard - willing now   · Obtain BiPAP compliance  · Advised to use BiPAP 6-8 hrs at night and during naps. · Replacement of mask, tubing, head straps every 3-6 months or sooner if damaged. · Follow up BIPAP compliance and pressure adjustment if needed  · Sleep hygiene  · Avoid sedatives, alcohol and caffeinated drinks at bed time.   · No driving motorized vehicles or operating heavy machinery while fatigue, drowsy or sleepy. · Weight loss is also recommended as a long-term intervention. · Complications of ESTHER if not treated were discussed with patient patient to include systemic hypertension, pulmonary hypertension, cardiovascular morbidities, car accidents and all cause mortality. Tao Chaparro is a 79 y.o. male evaluated via telephone on 9/21/2020. Consent:  He and/or health care decision maker is aware that that he may receive a bill for this telephone service, depending on his insurance coverage, and has provided verbal consent to proceed: Yes       Documentation:  I communicated with the patient and/or health care decision maker about: See above   Details of this discussion including any medical advice provided: See above       I Affirm this is a Patient Initiated Episode with an Established Patient who has not had a related appointment within my department in the past 7 days or scheduled within the next 24 hours.     Total Time: 5-10 minutes     Note: not billable if this call serves to triage the patient into an appointment for the relevant concern      Jerry Seymour

## 2020-09-21 NOTE — TELEPHONE ENCOUNTER
Pt needs 1 year appt if he wants [facility] schedule with Sadie Guerrero. Patient called with message left for patient to call back to office. Pt will also need notified to take BIPAP to [facility] if willing to get download. Patient called with message left for patient to call back to office.

## 2020-09-22 ENCOUNTER — TELEPHONE (OUTPATIENT)
Dept: CARDIOLOGY | Age: 70
End: 2020-09-22

## 2020-09-25 NOTE — TELEPHONE ENCOUNTER
Called pt, male answered but would not respond to me announcing myself. Will try back at another time.

## 2020-10-27 ENCOUNTER — OFFICE VISIT (OUTPATIENT)
Dept: CARDIOLOGY CLINIC | Age: 70
End: 2020-10-27
Payer: MEDICARE

## 2020-10-27 VITALS
OXYGEN SATURATION: 97 % | BODY MASS INDEX: 37.01 KG/M2 | HEART RATE: 58 BPM | WEIGHT: 258.5 LBS | SYSTOLIC BLOOD PRESSURE: 120 MMHG | DIASTOLIC BLOOD PRESSURE: 60 MMHG | HEIGHT: 70 IN

## 2020-10-27 PROBLEM — I47.19 ATRIAL TACHYCARDIA: Status: ACTIVE | Noted: 2020-10-27

## 2020-10-27 PROBLEM — I47.1 ATRIAL TACHYCARDIA (HCC): Status: ACTIVE | Noted: 2020-10-27

## 2020-10-27 PROCEDURE — G8417 CALC BMI ABV UP PARAM F/U: HCPCS | Performed by: INTERNAL MEDICINE

## 2020-10-27 PROCEDURE — G8427 DOCREV CUR MEDS BY ELIG CLIN: HCPCS | Performed by: INTERNAL MEDICINE

## 2020-10-27 PROCEDURE — G8482 FLU IMMUNIZE ORDER/ADMIN: HCPCS | Performed by: INTERNAL MEDICINE

## 2020-10-27 PROCEDURE — 99204 OFFICE O/P NEW MOD 45 MIN: CPT | Performed by: INTERNAL MEDICINE

## 2020-10-27 PROCEDURE — 93000 ELECTROCARDIOGRAM COMPLETE: CPT | Performed by: INTERNAL MEDICINE

## 2020-10-27 NOTE — PATIENT INSTRUCTIONS
RECOMMENDATIONS:  1. Continue current medications as prescribed  2. Continue to see Interventional Cardiology  3.  Follow up with me as needed if dizziness or palpitations reoccur

## 2020-10-27 NOTE — LETTER
[facility]   Electrophysiology Consult Note              Date:  October 27, 2020  Patient name: Corrine Lomeli  YOB: 1950    Chief Complaint:  New Patient and Tachycardia (atrial tachycardia )    Primary Care Physician:Jad Fernandez DO    HISTORY OF PRESENT ILLNESS: Corrine Lomeli is a 79 y.o. male who presents for evaluation as a new patient for abnormal cardiac event monitor and atrial tachycardia. He was admitted on 3/21/2019 for chest pain. He underwent a stress test on 3/22/2019 that was negative. He underwent a left heart cath on 3/25/2019 that demonstrated moderate CAD and medical management was recommended. He underwent an echocardiogram on 2/6/2020 that demonstrated an EF of 55% with no regional wall motion abnormalities with grade II DD, mild bi-atrial enlargement. He was admitted on 8/18/2020 for chest pain. He underwent a stress test on 8/18/2020 that demonstrated no definite evidence of stress induced ischemia, LV function normal, uniform wall motion and EF of 64%. At his office visit on 9/1/2020 he reported he was lightheaded and dizzy and was experiencing SOB. He has ESTHER and is non-compliant with his CPAP. He was started on Ranexa and Toprol 12.5 mg QD on 9/1/2020. He wore a cardiac event monitor from 9/1-9/13/2020 that demonstrated avg HR 65 () with 10 episodes of AT. His EKG today 10/27/20 demonstrates SB with HR 56 BPM. Today 10/27/20 he reports he is unsure why he is here to be evaluated today. He reports he is feeling well from a cardiac standpoint. He reports while wearing the cardiac event monitor he did not experience any dizziness. He reports he has not had any recurrent dizziness since wearing the monitor. He reports he is taking his medications as prescribed and tolerating them well. Patient denies current edema, chest pain, sob, palpitations, dizziness or syncope.       Past Medical History:   has a past medical history of Acid reflux, Yan's esophagus, Coronary artery disease of native heart with stable angina pectoris ([facility] [address] Diabetes mellitus ([facility] [address] Diabetic autonomic neuropathy associated with type 2 diabetes mellitus ([facility] [address] Hyperlipidemia, Hypertension, Influenza A, Pancreatitis, Restless legs, Sleep apnea, and Tremor. Past Surgical History:   has a past surgical history that includes Pancreas surgery; Cholecystectomy; Tonsillectomy; Gilman tooth extraction; Colonoscopy (10/26/2011); Cataract removal (Bilateral, 2013); Upper gastrointestinal endoscopy (10/04/2016); Hydrocele surgery (11/15/2016); Endoscopy, colon, diagnostic; Upper gastrointestinal endoscopy (03/16/2017); Upper gastrointestinal endoscopy (06/26/2017); Upper gastrointestinal endoscopy (09/06/2017); Upper gastrointestinal endoscopy (12/22/2017); Upper gastrointestinal endoscopy (N/A, 12/4/2018); Upper gastrointestinal endoscopy (2/19/2019); Upper gastrointestinal endoscopy (N/A, 6/11/2019); and Upper gastrointestinal endoscopy (N/A, 8/13/2019). Allergies:  Codeine    Social History:   reports that he has never smoked. He has never used smokeless tobacco. He reports that he does not drink alcohol or use drugs. Family History: family history includes Cancer in his brother and paternal grandfather; Cancer (age of onset: 76) in his father; Kidney Disease in his mother. Home Medications:    Prior to Admission medications    Medication Sig Start Date End Date Taking?  Authorizing Provider   amLODIPine (NORVASC) 2.5 MG tablet Take 1 tablet by mouth daily 9/3/20  Yes SANDY Muhammad CNP   metoprolol succinate (TOPROL XL) 25 MG extended release tablet Take 0.5 tablets by mouth daily 9/1/20  Yes SANDY Muhammad CNP   Continuous Blood Gluc Sensor (FREESTYLE JUSTIN 14 DAY SENSOR) MISC 1 Units by Does not apply route every 14 days 7/14/20  Yes SANDY Rivera CNP   Continuous Blood Gluc  (FREESTYLE JUSTIN 14 DAY READER) OLI 1 Units by Does not apply route as needed (as needed) 12/31/19  Yes SANDY Bell CNP   pramipexole (MIRAPEX) 0.125 MG tablet Take 1 tablet by mouth nightly 9/17/19  Yes SANDY Maxwell CNP   aspirin 81 MG chewable tablet Take 1 tablet by mouth daily 3/26/19  Yes Rula Cisse MD   nitroGLYCERIN (NITROSTAT) 0.4 MG SL tablet up to max of 3 total doses. If no relief after 1 dose, call 911. 3/25/19  Yes Rula Cisse MD   fluticasone (FLONASE) 50 MCG/ACT nasal spray 2 sprays by Nasal route daily 1/26/18  Yes SANDY Draper Ma, CNP   glucose blood VI test strips (ASCENSIA AUTODISC VI;ONE TOUCH ULTRA TEST VI) strip Check glucose 3 times daily 11/22/16  Yes Historical Provider, MD   Insulin Syringe-Needle U-100 30G X 1/2\" 0.5 ML MISC Inject insulin 3 times daily 9/12/17  Yes Historical Provider, MD   pantoprazole (PROTONIX) 40 MG tablet Take 40 mg by mouth 2 times daily  12/29/16  Yes Historical Provider, MD   insulin regular (HUMULIN R;NOVOLIN R) 100 UNIT/ML injection Inject 10 Units into the skin 3 times daily (before meals)    Yes Historical Provider, MD   lisinopril (PRINIVIL;ZESTRIL) 10 MG tablet Take 10 mg by mouth daily   Yes Historical Provider, MD   atorvastatin (LIPITOR) 20 MG tablet Take 20 mg by mouth daily. Yes Historical Provider, MD   ergocalciferol (DRISDOL) 1.25 MG (17067 UT) capsule Take 1 capsule by mouth once a week 1/1/20 8/18/20  SANDY Bell CNP   gabapentin (NEURONTIN) 100 MG capsule Take 2 capsules by mouth nightly for 60 days. 11/13/19 8/18/20  SANDY Maxwell CNP       REVIEW OF SYSTEMS:    All 14-point review of systems are completed and  pertinent positives are mentioned in the history of present illness. Other  systems are reviewed and are negative.     Physical Examination:    /60   Pulse 58   Ht 5' 10\" (1.778 m)   Wt 258 lb 8 oz (117.3 kg)   SpO2 97%   BMI 37.09 kg/m²      Constitutional and General Appearance: alert, cooperative, no distress and appears stated age  [de-identified]:    PERRLA, no cervical lymphadenopathy. No masses palpable. Normal oral  mucosa  Respiratory:  · Normal excursion and expansion without use of accessory muscles  · Resp Auscultation: Normal breath sounds without dullness or wheezing  Cardiovascular:  · The apical impulse is not displaced  · RRR S1S2 w/o M/G/R  Abdomen:  · No masses or tenderness  · Bowel sounds present  Extremities:  ·  No Cyanosis or Clubbing  ·  Lower extremity edema: No  · Skin: Warm and dry  Neurological:  · Alert and oriented. · Moves all extremities well  · No abnormalities of mood, affect, memory, mentation, or behavior are noted    DATA:    ECG: 10/27/20:  SB with HR 56 BPM    EC20: SB with HR 56 BPM    STRESS TEST: 20:  Summary    There is no definite evidence of stress induced ischemia. LV function is normal with uniform wall motion and ejection fraction of 64%. Low risk  study. ECHO: 20: Summary   Technically difficult examination. Left ventricular systolic function is normal with ejection fraction   estimated at 55%. No regional wall motion abnormalities. There is mild concentric left ventricular hypertrophy. Grade II diastolic dysfunction with elevated left ventricular filling   pressure. The right ventricle is mildly enlarged. Mild bi-atrial enlargement. IMPRESSION:    1. PSVT (likely AT 2020). 66-year-old male with a medical history significant for ischemic cardiomyopathy, hypertension, diabetes mellitus type 2, obesity, obstructive sleep apnea, and hyperlipidemia who presents from home for evaluation for dizziness and PSVT. According to patient he no longer has any dizziness. He was asymptomatic during his salvos of PSVT. His PSVT will appear to be short durations of atrial tachycardia. As patient is asymptomatic I see no reason for intervention at this point.   We discussed

## 2020-10-27 NOTE — PROGRESS NOTES
[facility]   Electrophysiology Consult Note              Date:  October 27, 2020  Patient name: Glory Fountain  YOB: 1950    Chief Complaint:  New Patient and Tachycardia (atrial tachycardia )    Primary Care Physician:Jad Fernandez DO    HISTORY OF PRESENT ILLNESS: Glory Fountain is a 79 y.o. male who presents for evaluation as a new patient for abnormal cardiac event monitor and atrial tachycardia. He was admitted on 3/21/2019 for chest pain. He underwent a stress test on 3/22/2019 that was negative. He underwent a left heart cath on 3/25/2019 that demonstrated moderate CAD and medical management was recommended. He underwent an echocardiogram on 2/6/2020 that demonstrated an EF of 55% with no regional wall motion abnormalities with grade II DD, mild bi-atrial enlargement. He was admitted on 8/18/2020 for chest pain. He underwent a stress test on 8/18/2020 that demonstrated no definite evidence of stress induced ischemia, LV function normal, uniform wall motion and EF of 64%. At his office visit on 9/1/2020 he reported he was lightheaded and dizzy and was experiencing SOB. He has ESTHER and is non-compliant with his CPAP. He was started on Ranexa and Toprol 12.5 mg QD on 9/1/2020. He wore a cardiac event monitor from 9/1-9/13/2020 that demonstrated avg HR 65 () with 10 episodes of AT. His EKG today 10/27/20 demonstrates SB with HR 56 BPM. Today 10/27/20 he reports he is unsure why he is here to be evaluated today. He reports he is feeling well from a cardiac standpoint. He reports while wearing the cardiac event monitor he did not experience any dizziness. He reports he has not had any recurrent dizziness since wearing the monitor. He reports he is taking his medications as prescribed and tolerating them well. Patient denies current edema, chest pain, sob, palpitations, dizziness or syncope.       Past Medical History:   has a past medical history of Acid reflux, Yan's esophagus, Coronary artery disease of native heart with stable angina pectoris ([facility] [address] Diabetes mellitus ([facility] [address] Diabetic autonomic neuropathy associated with type 2 diabetes mellitus ([facility] [address] Hyperlipidemia, Hypertension, Influenza A, Pancreatitis, Restless legs, Sleep apnea, and Tremor. Past Surgical History:   has a past surgical history that includes Pancreas surgery; Cholecystectomy; Tonsillectomy; [place] tooth extraction; Colonoscopy (10/26/2011); Cataract removal (Bilateral, 2013); Upper gastrointestinal endoscopy (10/04/2016); Hydrocele surgery (11/15/2016); Endoscopy, colon, diagnostic; Upper gastrointestinal endoscopy (03/16/2017); Upper gastrointestinal endoscopy (06/26/2017); Upper gastrointestinal endoscopy (09/06/2017); Upper gastrointestinal endoscopy (12/22/2017); Upper gastrointestinal endoscopy (N/A, 12/4/2018); Upper gastrointestinal endoscopy (2/19/2019); Upper gastrointestinal endoscopy (N/A, 6/11/2019); and Upper gastrointestinal endoscopy (N/A, 8/13/2019). Allergies:  Codeine    Social History:   reports that he has never smoked. He has never used smokeless tobacco. He reports that he does not drink alcohol or use drugs. Family History: family history includes Cancer in his brother and paternal grandfather; Cancer (age of onset: 76) in his father; Kidney Disease in his mother. Home Medications:    Prior to Admission medications    Medication Sig Start Date End Date Taking?  Authorizing Provider   amLODIPine (NORVASC) 2.5 MG tablet Take 1 tablet by mouth daily 9/3/20  Yes SANDY Caballero CNP   metoprolol succinate (TOPROL XL) 25 MG extended release tablet Take 0.5 tablets by mouth daily 9/1/20  Yes SANDY Caballero CNP   Continuous Blood Gluc Sensor (FREESTYLE JUSTIN 14 DAY SENSOR) MISC 1 Units by Does not apply route every 14 days 7/14/20  Yes ASNDY Ramirez CNP   Continuous Blood Gluc  (FREESTYLE JUSTIN 14 DAY READER) OLI 1 Units by Does not apply route as needed (as needed) 12/31/19  Yes SANDY Luis CNP   pramipexole (MIRAPEX) 0.125 MG tablet Take 1 tablet by mouth nightly 9/17/19  Yes SANDY Walsh CNP   aspirin 81 MG chewable tablet Take 1 tablet by mouth daily 3/26/19  Yes Tanya Michelle MD   nitroGLYCERIN (NITROSTAT) 0.4 MG SL tablet up to max of 3 total doses. If no relief after 1 dose, call 911. 3/25/19  Yes Tanya Michelle MD   fluticasone (FLONASE) 50 MCG/ACT nasal spray 2 sprays by Nasal route daily 1/26/18  Yes SANDY Elise CNP   glucose blood VI test strips (ASCENSIA AUTODISC VI;ONE TOUCH ULTRA TEST VI) strip Check glucose 3 times daily 11/22/16  Yes Historical Provider, MD   Insulin Syringe-Needle U-100 30G X 1/2\" 0.5 ML MISC Inject insulin 3 times daily 9/12/17  Yes Historical Provider, MD   pantoprazole (PROTONIX) 40 MG tablet Take 40 mg by mouth 2 times daily  12/29/16  Yes Historical Provider, MD   insulin regular (HUMULIN R;NOVOLIN R) 100 UNIT/ML injection Inject 10 Units into the skin 3 times daily (before meals)    Yes Historical Provider, MD   lisinopril (PRINIVIL;ZESTRIL) 10 MG tablet Take 10 mg by mouth daily   Yes Historical Provider, MD   atorvastatin (LIPITOR) 20 MG tablet Take 20 mg by mouth daily. Yes Historical Provider, MD   ergocalciferol (DRISDOL) 1.25 MG (12712 UT) capsule Take 1 capsule by mouth once a week 1/1/20 8/18/20  SANDY Luis CNP   gabapentin (NEURONTIN) 100 MG capsule Take 2 capsules by mouth nightly for 60 days. 11/13/19 8/18/20  SANDY Walsh CNP       REVIEW OF SYSTEMS:    All 14-point review of systems are completed and  pertinent positives are mentioned in the history of present illness. Other  systems are reviewed and are negative.     Physical Examination:    /60   Pulse 58   Ht 5' 10\" (1.778 m)   Wt 258 lb 8 oz (117.3 kg)   SpO2 97%   BMI 37.09 kg/m²      Constitutional and General Appearance:    alert, cooperative, no distress and appears stated age  [de-identified]:    PERRLA, no cervical lymphadenopathy. No masses palpable. Normal oral  mucosa  Respiratory:  · Normal excursion and expansion without use of accessory muscles  · Resp Auscultation: Normal breath sounds without dullness or wheezing  Cardiovascular:  · The apical impulse is not displaced  · RRR S1S2 w/o M/G/R  Abdomen:  · No masses or tenderness  · Bowel sounds present  Extremities:  ·  No Cyanosis or Clubbing  ·  Lower extremity edema: No  · Skin: Warm and dry  Neurological:  · Alert and oriented. · Moves all extremities well  · No abnormalities of mood, affect, memory, mentation, or behavior are noted    DATA:    ECG: 10/27/20:  SB with HR 56 BPM    EC20: SB with HR 56 BPM    STRESS TEST: 20:  Summary    There is no definite evidence of stress induced ischemia. LV function is normal with uniform wall motion and ejection fraction of 64%. Low risk  study. ECHO: 20: Summary   Technically difficult examination. Left ventricular systolic function is normal with ejection fraction   estimated at 55%. No regional wall motion abnormalities. There is mild concentric left ventricular hypertrophy. Grade II diastolic dysfunction with elevated left ventricular filling   pressure. The right ventricle is mildly enlarged. Mild bi-atrial enlargement. IMPRESSION:    1. PSVT (likely AT 2020). 71-year-old male with a medical history significant for ischemic cardiomyopathy, hypertension, diabetes mellitus type 2, obesity, obstructive sleep apnea, and hyperlipidemia who presents from home for evaluation for dizziness and PSVT. According to patient he no longer has any dizziness. He was asymptomatic during his salvos of PSVT. His PSVT will appear to be short durations of atrial tachycardia. As patient is asymptomatic I see no reason for intervention at this point.   We discussed that atrial tachycardia runs in the same circles atrial fibrillation however does not have the same consequences such as stroke or heart failure. From an electrophysiology standpoint patient can follow-up as needed. No need for scheduled/routine evaluation.  - Continue GDMT. - Continue to follow up with IC.    RECOMMENDATIONS:  1. Continue current medications as prescribed  2. Continue to see Interventional Cardiology  3. Follow up with me as needed if dizziness or palpitations reoccur       QUALITY MEASURES  1. Tobacco Cessation Counseling: NA  2. Retake of BP if >140/90:   NA  3. Documentation to PCP/referring for new patient:  Sent to PCP at close of office visit  4. CAD patient on anti-platelet: Yes  5. CAD patient on STATIN therapy:  Yes  6. Patient with CHF and aFib on anticoagulation:  NA     All questions and concerns were addressed to the patient/family. Alternatives to my treatment were discussed. This note was scribed in the presence of Sara Bonner MD by Sebastian Beckwith. Luis Ledesma RN. Dr. Stephanie Swift MD  Electrophysiology  [facility]. [address] [place] [address]. [address]. [facility] [address]  Phone: [phone]  Fax: [phone]     NOTE: This report was transcribed using voice recognition software. Every effort was made to ensure accuracy, however, inadvertent computerized transcription errors may be present. The scribe's documentation has been prepared under my direction and personally reviewed by me in its entirety. I confirm that the note above accurately reflects all work, physical examination, the discussion of treatments and procedures, and medical decision making performed by me. April Ac MD personally performed the services described in this documentation as scribed by nurse in my presence, and is both accurate and complete.     Electronically signed by Rosalee Rutledge MD on 10/28/2020 at 7:49 AM

## 2021-03-11 ENCOUNTER — IMMUNIZATION (OUTPATIENT)
Dept: PRIMARY CARE CLINIC | Age: 71
End: 2021-03-11
Payer: MEDICARE

## 2021-03-11 PROCEDURE — 0001A COVID-19, PFIZER VACCINE 30MCG/0.3ML DOSE: CPT | Performed by: FAMILY MEDICINE

## 2021-03-11 PROCEDURE — 91300 COVID-19, PFIZER VACCINE 30MCG/0.3ML DOSE: CPT | Performed by: FAMILY MEDICINE

## 2021-04-01 ENCOUNTER — IMMUNIZATION (OUTPATIENT)
Dept: PRIMARY CARE CLINIC | Age: 71
End: 2021-04-01
Payer: MEDICARE

## 2021-04-01 PROCEDURE — 91300 COVID-19, PFIZER VACCINE 30MCG/0.3ML DOSE: CPT | Performed by: FAMILY MEDICINE

## 2021-04-01 PROCEDURE — 0002A COVID-19, PFIZER VACCINE 30MCG/0.3ML DOSE: CPT | Performed by: FAMILY MEDICINE

## 2021-08-16 ENCOUNTER — APPOINTMENT (OUTPATIENT)
Dept: GENERAL RADIOLOGY | Age: 71
DRG: 313 | End: 2021-08-16
Payer: MEDICARE

## 2021-08-16 ENCOUNTER — HOSPITAL ENCOUNTER (INPATIENT)
Age: 71
LOS: 1 days | Discharge: HOME | DRG: 313 | End: 2021-08-17
Attending: EMERGENCY MEDICINE | Admitting: HOSPITALIST
Payer: MEDICARE

## 2021-08-16 DIAGNOSIS — Z86.79 HISTORY OF CORONARY ARTERY DISEASE: ICD-10-CM

## 2021-08-16 DIAGNOSIS — R07.9 ACUTE CHEST PAIN: Primary | ICD-10-CM

## 2021-08-16 DIAGNOSIS — Z86.79 HISTORY OF ESSENTIAL HYPERTENSION: ICD-10-CM

## 2021-08-16 DIAGNOSIS — Z86.39 HISTORY OF HIGH CHOLESTEROL: ICD-10-CM

## 2021-08-16 PROBLEM — E66.812 CLASS 2 SEVERE OBESITY WITH SERIOUS COMORBIDITY IN ADULT: Status: ACTIVE | Noted: 2019-03-21

## 2021-08-16 PROBLEM — E11.65 TYPE 2 DIABETES MELLITUS WITH HYPERGLYCEMIA (HCC): Status: ACTIVE | Noted: 2021-08-16

## 2021-08-16 LAB
A/G RATIO: 1.5 (ref 1.1–2.2)
ALBUMIN SERPL-MCNC: 3.9 G/DL (ref 3.4–5)
ALP BLD-CCNC: 80 U/L (ref 40–129)
ALT SERPL-CCNC: 19 U/L (ref 10–40)
ANION GAP SERPL CALCULATED.3IONS-SCNC: 9 MMOL/L (ref 3–16)
APTT: 28.9 SEC (ref 26.2–38.6)
APTT: 34.2 SEC (ref 26.2–38.6)
AST SERPL-CCNC: 17 U/L (ref 15–37)
BASOPHILS ABSOLUTE: 0 K/UL (ref 0–0.2)
BASOPHILS RELATIVE PERCENT: 0.7 %
BILIRUB SERPL-MCNC: 0.4 MG/DL (ref 0–1)
BUN BLDV-MCNC: 16 MG/DL (ref 7–20)
CALCIUM SERPL-MCNC: 8.6 MG/DL (ref 8.3–10.6)
CHLORIDE BLD-SCNC: 102 MMOL/L (ref 99–110)
CO2: 27 MMOL/L (ref 21–32)
CREAT SERPL-MCNC: 0.8 MG/DL (ref 0.8–1.3)
D DIMER: <200 NG/ML DDU (ref 0–229)
EKG ATRIAL RATE: 62 BPM
EKG DIAGNOSIS: NORMAL
EKG P AXIS: 65 DEGREES
EKG P-R INTERVAL: 184 MS
EKG Q-T INTERVAL: 392 MS
EKG QRS DURATION: 84 MS
EKG QTC CALCULATION (BAZETT): 397 MS
EKG R AXIS: 48 DEGREES
EKG T AXIS: 31 DEGREES
EKG VENTRICULAR RATE: 62 BPM
EOSINOPHILS ABSOLUTE: 0.2 K/UL (ref 0–0.6)
EOSINOPHILS RELATIVE PERCENT: 3.6 %
GFR AFRICAN AMERICAN: >60
GFR NON-AFRICAN AMERICAN: >60
GLOBULIN: 2.6 G/DL
GLUCOSE BLD-MCNC: 142 MG/DL (ref 70–99)
GLUCOSE BLD-MCNC: 180 MG/DL (ref 70–99)
HCT VFR BLD CALC: 40.5 % (ref 40.5–52.5)
HCT VFR BLD CALC: 42.7 % (ref 40.5–52.5)
HEMOGLOBIN: 13.7 G/DL (ref 13.5–17.5)
HEMOGLOBIN: 14.3 G/DL (ref 13.5–17.5)
LYMPHOCYTES ABSOLUTE: 0.9 K/UL (ref 1–5.1)
LYMPHOCYTES RELATIVE PERCENT: 15.1 %
MCH RBC QN AUTO: 29.4 PG (ref 26–34)
MCH RBC QN AUTO: 30.1 PG (ref 26–34)
MCHC RBC AUTO-ENTMCNC: 33.4 G/DL (ref 31–36)
MCHC RBC AUTO-ENTMCNC: 34 G/DL (ref 31–36)
MCV RBC AUTO: 88.1 FL (ref 80–100)
MCV RBC AUTO: 88.7 FL (ref 80–100)
MONOCYTES ABSOLUTE: 0.4 K/UL (ref 0–1.3)
MONOCYTES RELATIVE PERCENT: 6.9 %
NEUTROPHILS ABSOLUTE: 4.5 K/UL (ref 1.7–7.7)
NEUTROPHILS RELATIVE PERCENT: 73.7 %
PDW BLD-RTO: 13.6 % (ref 12.4–15.4)
PDW BLD-RTO: 13.8 % (ref 12.4–15.4)
PERFORMED ON: ABNORMAL
PLATELET # BLD: 129 K/UL (ref 135–450)
PLATELET # BLD: 135 K/UL (ref 135–450)
PMV BLD AUTO: 9 FL (ref 5–10.5)
PMV BLD AUTO: 9.5 FL (ref 5–10.5)
POTASSIUM SERPL-SCNC: 4.3 MMOL/L (ref 3.5–5.1)
PRO-BNP: 56 PG/ML (ref 0–124)
RBC # BLD: 4.56 M/UL (ref 4.2–5.9)
RBC # BLD: 4.85 M/UL (ref 4.2–5.9)
SODIUM BLD-SCNC: 138 MMOL/L (ref 136–145)
TOTAL PROTEIN: 6.5 G/DL (ref 6.4–8.2)
TROPONIN: <0.01 NG/ML
TROPONIN: <0.01 NG/ML
WBC # BLD: 5.5 K/UL (ref 4–11)
WBC # BLD: 6.1 K/UL (ref 4–11)

## 2021-08-16 PROCEDURE — 85025 COMPLETE CBC W/AUTO DIFF WBC: CPT

## 2021-08-16 PROCEDURE — 1200000000 HC SEMI PRIVATE

## 2021-08-16 PROCEDURE — 6370000000 HC RX 637 (ALT 250 FOR IP): Performed by: NURSE PRACTITIONER

## 2021-08-16 PROCEDURE — 6370000000 HC RX 637 (ALT 250 FOR IP): Performed by: HOSPITALIST

## 2021-08-16 PROCEDURE — 85730 THROMBOPLASTIN TIME PARTIAL: CPT

## 2021-08-16 PROCEDURE — 83880 ASSAY OF NATRIURETIC PEPTIDE: CPT

## 2021-08-16 PROCEDURE — 96376 TX/PRO/DX INJ SAME DRUG ADON: CPT

## 2021-08-16 PROCEDURE — 93010 ELECTROCARDIOGRAM REPORT: CPT | Performed by: INTERNAL MEDICINE

## 2021-08-16 PROCEDURE — 85027 COMPLETE CBC AUTOMATED: CPT

## 2021-08-16 PROCEDURE — 6360000002 HC RX W HCPCS: Performed by: NURSE PRACTITIONER

## 2021-08-16 PROCEDURE — 6360000002 HC RX W HCPCS: Performed by: HOSPITALIST

## 2021-08-16 PROCEDURE — 85379 FIBRIN DEGRADATION QUANT: CPT

## 2021-08-16 PROCEDURE — 36415 COLL VENOUS BLD VENIPUNCTURE: CPT

## 2021-08-16 PROCEDURE — 71046 X-RAY EXAM CHEST 2 VIEWS: CPT

## 2021-08-16 PROCEDURE — 96374 THER/PROPH/DIAG INJ IV PUSH: CPT

## 2021-08-16 PROCEDURE — 96375 TX/PRO/DX INJ NEW DRUG ADDON: CPT

## 2021-08-16 PROCEDURE — 80053 COMPREHEN METABOLIC PANEL: CPT

## 2021-08-16 PROCEDURE — 6370000000 HC RX 637 (ALT 250 FOR IP): Performed by: EMERGENCY MEDICINE

## 2021-08-16 PROCEDURE — 93005 ELECTROCARDIOGRAM TRACING: CPT | Performed by: EMERGENCY MEDICINE

## 2021-08-16 PROCEDURE — 84484 ASSAY OF TROPONIN QUANT: CPT

## 2021-08-16 PROCEDURE — 99285 EMERGENCY DEPT VISIT HI MDM: CPT

## 2021-08-16 RX ORDER — ONDANSETRON 4 MG/1
4 TABLET, ORALLY DISINTEGRATING ORAL EVERY 8 HOURS PRN
Status: DISCONTINUED | OUTPATIENT
Start: 2021-08-16 | End: 2021-08-17 | Stop reason: HOSPADM

## 2021-08-16 RX ORDER — ASPIRIN 81 MG/1
81 TABLET ORAL DAILY
Status: DISCONTINUED | OUTPATIENT
Start: 2021-08-17 | End: 2021-08-17 | Stop reason: HOSPADM

## 2021-08-16 RX ORDER — POTASSIUM CHLORIDE 20 MEQ/1
40 TABLET, EXTENDED RELEASE ORAL PRN
Status: DISCONTINUED | OUTPATIENT
Start: 2021-08-16 | End: 2021-08-17 | Stop reason: HOSPADM

## 2021-08-16 RX ORDER — DEXTROSE MONOHYDRATE 25 G/50ML
12.5 INJECTION, SOLUTION INTRAVENOUS PRN
Status: DISCONTINUED | OUTPATIENT
Start: 2021-08-16 | End: 2021-08-17 | Stop reason: HOSPADM

## 2021-08-16 RX ORDER — ACETAMINOPHEN 325 MG/1
650 TABLET ORAL EVERY 6 HOURS PRN
Status: DISCONTINUED | OUTPATIENT
Start: 2021-08-16 | End: 2021-08-17 | Stop reason: HOSPADM

## 2021-08-16 RX ORDER — GABAPENTIN 100 MG/1
200 CAPSULE ORAL NIGHTLY
Status: DISCONTINUED | OUTPATIENT
Start: 2021-08-16 | End: 2021-08-17 | Stop reason: HOSPADM

## 2021-08-16 RX ORDER — ONDANSETRON 2 MG/ML
4 INJECTION INTRAMUSCULAR; INTRAVENOUS EVERY 6 HOURS PRN
Status: DISCONTINUED | OUTPATIENT
Start: 2021-08-16 | End: 2021-08-17 | Stop reason: HOSPADM

## 2021-08-16 RX ORDER — SODIUM CHLORIDE 9 MG/ML
INJECTION, SOLUTION INTRAVENOUS CONTINUOUS
Status: DISCONTINUED | OUTPATIENT
Start: 2021-08-17 | End: 2021-08-17

## 2021-08-16 RX ORDER — ONDANSETRON 2 MG/ML
4 INJECTION INTRAMUSCULAR; INTRAVENOUS ONCE
Status: COMPLETED | OUTPATIENT
Start: 2021-08-16 | End: 2021-08-16

## 2021-08-16 RX ORDER — HEPARIN SODIUM 10000 [USP'U]/100ML
1000 INJECTION, SOLUTION INTRAVENOUS CONTINUOUS
Status: DISCONTINUED | OUTPATIENT
Start: 2021-08-16 | End: 2021-08-17

## 2021-08-16 RX ORDER — LISINOPRIL 10 MG/1
10 TABLET ORAL DAILY
Status: DISCONTINUED | OUTPATIENT
Start: 2021-08-17 | End: 2021-08-17 | Stop reason: HOSPADM

## 2021-08-16 RX ORDER — ASPIRIN 81 MG/1
324 TABLET, CHEWABLE ORAL ONCE
Status: COMPLETED | OUTPATIENT
Start: 2021-08-16 | End: 2021-08-16

## 2021-08-16 RX ORDER — HEPARIN SODIUM 1000 [USP'U]/ML
4000 INJECTION, SOLUTION INTRAVENOUS; SUBCUTANEOUS PRN
Status: DISCONTINUED | OUTPATIENT
Start: 2021-08-16 | End: 2021-08-17

## 2021-08-16 RX ORDER — METOPROLOL SUCCINATE 25 MG/1
12.5 TABLET, EXTENDED RELEASE ORAL DAILY
Status: DISCONTINUED | OUTPATIENT
Start: 2021-08-17 | End: 2021-08-17

## 2021-08-16 RX ORDER — ATORVASTATIN CALCIUM 40 MG/1
40 TABLET, FILM COATED ORAL NIGHTLY
Status: DISCONTINUED | OUTPATIENT
Start: 2021-08-16 | End: 2021-08-17 | Stop reason: HOSPADM

## 2021-08-16 RX ORDER — SODIUM CHLORIDE 0.9 % (FLUSH) 0.9 %
10 SYRINGE (ML) INJECTION EVERY 12 HOURS SCHEDULED
Status: DISCONTINUED | OUTPATIENT
Start: 2021-08-16 | End: 2021-08-17 | Stop reason: HOSPADM

## 2021-08-16 RX ORDER — MAGNESIUM SULFATE IN WATER 40 MG/ML
2000 INJECTION, SOLUTION INTRAVENOUS PRN
Status: DISCONTINUED | OUTPATIENT
Start: 2021-08-16 | End: 2021-08-17 | Stop reason: HOSPADM

## 2021-08-16 RX ORDER — POLYETHYLENE GLYCOL 3350 17 G/17G
17 POWDER, FOR SOLUTION ORAL DAILY PRN
Status: DISCONTINUED | OUTPATIENT
Start: 2021-08-16 | End: 2021-08-17 | Stop reason: HOSPADM

## 2021-08-16 RX ORDER — NITROGLYCERIN 0.4 MG/1
0.4 TABLET SUBLINGUAL EVERY 5 MIN PRN
Status: DISCONTINUED | OUTPATIENT
Start: 2021-08-16 | End: 2021-08-17 | Stop reason: HOSPADM

## 2021-08-16 RX ORDER — INSULIN GLARGINE 100 [IU]/ML
0.15 INJECTION, SOLUTION SUBCUTANEOUS NIGHTLY
Status: DISCONTINUED | OUTPATIENT
Start: 2021-08-16 | End: 2021-08-17 | Stop reason: HOSPADM

## 2021-08-16 RX ORDER — PANTOPRAZOLE SODIUM 40 MG/1
40 TABLET, DELAYED RELEASE ORAL 2 TIMES DAILY
Status: DISCONTINUED | OUTPATIENT
Start: 2021-08-16 | End: 2021-08-17 | Stop reason: HOSPADM

## 2021-08-16 RX ORDER — SENNA PLUS 8.6 MG/1
1 TABLET ORAL DAILY PRN
Status: DISCONTINUED | OUTPATIENT
Start: 2021-08-16 | End: 2021-08-17 | Stop reason: HOSPADM

## 2021-08-16 RX ORDER — POTASSIUM CHLORIDE 7.45 MG/ML
10 INJECTION INTRAVENOUS PRN
Status: DISCONTINUED | OUTPATIENT
Start: 2021-08-16 | End: 2021-08-17 | Stop reason: HOSPADM

## 2021-08-16 RX ORDER — SODIUM CHLORIDE 0.9 % (FLUSH) 0.9 %
10 SYRINGE (ML) INJECTION PRN
Status: DISCONTINUED | OUTPATIENT
Start: 2021-08-16 | End: 2021-08-17 | Stop reason: HOSPADM

## 2021-08-16 RX ORDER — HEPARIN SODIUM 1000 [USP'U]/ML
4000 INJECTION, SOLUTION INTRAVENOUS; SUBCUTANEOUS ONCE
Status: COMPLETED | OUTPATIENT
Start: 2021-08-16 | End: 2021-08-16

## 2021-08-16 RX ORDER — SODIUM CHLORIDE 9 MG/ML
25 INJECTION, SOLUTION INTRAVENOUS PRN
Status: DISCONTINUED | OUTPATIENT
Start: 2021-08-16 | End: 2021-08-17 | Stop reason: HOSPADM

## 2021-08-16 RX ORDER — DEXTROSE MONOHYDRATE 50 MG/ML
100 INJECTION, SOLUTION INTRAVENOUS PRN
Status: DISCONTINUED | OUTPATIENT
Start: 2021-08-16 | End: 2021-08-17 | Stop reason: HOSPADM

## 2021-08-16 RX ORDER — ACETAMINOPHEN 650 MG/1
650 SUPPOSITORY RECTAL EVERY 6 HOURS PRN
Status: DISCONTINUED | OUTPATIENT
Start: 2021-08-16 | End: 2021-08-17 | Stop reason: HOSPADM

## 2021-08-16 RX ORDER — PRAMIPEXOLE DIHYDROCHLORIDE 0.25 MG/1
0.12 TABLET ORAL NIGHTLY
Status: DISCONTINUED | OUTPATIENT
Start: 2021-08-16 | End: 2021-08-17 | Stop reason: HOSPADM

## 2021-08-16 RX ORDER — NICOTINE POLACRILEX 4 MG
15 LOZENGE BUCCAL PRN
Status: DISCONTINUED | OUTPATIENT
Start: 2021-08-16 | End: 2021-08-17 | Stop reason: HOSPADM

## 2021-08-16 RX ORDER — MORPHINE SULFATE 4 MG/ML
4 INJECTION, SOLUTION INTRAMUSCULAR; INTRAVENOUS ONCE
Status: COMPLETED | OUTPATIENT
Start: 2021-08-16 | End: 2021-08-16

## 2021-08-16 RX ORDER — AMLODIPINE BESYLATE 2.5 MG/1
2.5 TABLET ORAL DAILY
Status: DISCONTINUED | OUTPATIENT
Start: 2021-08-17 | End: 2021-08-17 | Stop reason: HOSPADM

## 2021-08-16 RX ORDER — HEPARIN SODIUM 1000 [USP'U]/ML
2000 INJECTION, SOLUTION INTRAVENOUS; SUBCUTANEOUS PRN
Status: DISCONTINUED | OUTPATIENT
Start: 2021-08-16 | End: 2021-08-17

## 2021-08-16 RX ORDER — ASPIRIN 81 MG/1
81 TABLET ORAL DAILY
Status: DISCONTINUED | OUTPATIENT
Start: 2021-08-17 | End: 2021-08-17

## 2021-08-16 RX ORDER — FLUTICASONE PROPIONATE 50 MCG
2 SPRAY, SUSPENSION (ML) NASAL DAILY
Status: DISCONTINUED | OUTPATIENT
Start: 2021-08-16 | End: 2021-08-17 | Stop reason: HOSPADM

## 2021-08-16 RX ADMIN — INSULIN GLARGINE 18 UNITS: 100 INJECTION, SOLUTION SUBCUTANEOUS at 22:18

## 2021-08-16 RX ADMIN — ONDANSETRON 4 MG: 2 INJECTION INTRAMUSCULAR; INTRAVENOUS at 17:07

## 2021-08-16 RX ADMIN — MORPHINE SULFATE 2 MG: 4 INJECTION, SOLUTION INTRAMUSCULAR; INTRAVENOUS at 17:07

## 2021-08-16 RX ADMIN — NITROGLYCERIN 1 INCH: 20 OINTMENT TOPICAL at 22:18

## 2021-08-16 RX ADMIN — HEPARIN SODIUM 1000 UNITS/HR: 10000 INJECTION, SOLUTION INTRAVENOUS at 22:03

## 2021-08-16 RX ADMIN — ASPIRIN 324 MG: 81 TABLET, CHEWABLE ORAL at 15:25

## 2021-08-16 RX ADMIN — PANTOPRAZOLE SODIUM 40 MG: 40 TABLET, DELAYED RELEASE ORAL at 22:19

## 2021-08-16 RX ADMIN — ATORVASTATIN CALCIUM 40 MG: 40 TABLET, FILM COATED ORAL at 22:00

## 2021-08-16 RX ADMIN — HEPARIN SODIUM 4000 UNITS: 1000 INJECTION INTRAVENOUS; SUBCUTANEOUS at 22:15

## 2021-08-16 RX ADMIN — INSULIN LISPRO 1 UNITS: 100 INJECTION, SOLUTION INTRAVENOUS; SUBCUTANEOUS at 22:16

## 2021-08-16 RX ADMIN — PRAMIPEXOLE DIHYDROCHLORIDE 0.12 MG: 0.25 TABLET ORAL at 22:00

## 2021-08-16 RX ADMIN — ONDANSETRON 4 MG: 2 INJECTION INTRAMUSCULAR; INTRAVENOUS at 15:26

## 2021-08-16 RX ADMIN — MORPHINE SULFATE 4 MG: 4 INJECTION, SOLUTION INTRAMUSCULAR; INTRAVENOUS at 15:26

## 2021-08-16 RX ADMIN — GABAPENTIN 200 MG: 100 CAPSULE ORAL at 22:00

## 2021-08-16 ASSESSMENT — ENCOUNTER SYMPTOMS
COLOR CHANGE: 0
COUGH: 0
BACK PAIN: 0
VOMITING: 0
WHEEZING: 0
SHORTNESS OF BREATH: 0
NAUSEA: 0
DIARRHEA: 0
ABDOMINAL PAIN: 0

## 2021-08-16 ASSESSMENT — HEART SCORE: ECG: 0

## 2021-08-16 ASSESSMENT — PAIN SCALES - GENERAL
PAINLEVEL_OUTOF10: 6
PAINLEVEL_OUTOF10: 4
PAINLEVEL_OUTOF10: 4

## 2021-08-16 NOTE — H&P
HOSPITALISTS HISTORY AND PHYSICAL    8/16/2021 5:43 PM    Patient Information:  Yissel Mckenzie is a 70 y.o. male [id number]  PCP:  Hector Goddard DO (Tel: [phone] )    Chief complaint:    Chief Complaint   Patient presents with    Chest Pain     Pt arrived via EMS c/o chest pain, pt reports pain improving at this time following EMS asa/nitro admin. Pt reports 4/10 at this time. Pt denies additional symptoms at triage at this time. History of Present Illness:  Ramin Adams is a 70 y.o. male who presented to the ED to be evaluated for sudden onset substernal chest pain awakening him from sleep earlier this morning. He denies N/V, diaphoresis, and radiation. Patient reports history of HTN, HLD, IDDM, and tobacco abuse. Although he denies underlying history of CAD, results from cardiac catheterization obtained in 2019 was notable for multivessel disease; recommendations were for medical management. Upon arrival to the ED, EKG was obtained notable for NSR with poor R wave progression; nonspecific ST abnormalities. Labs were obtained and notable for hyperglycemia 205. CXR obtained with no active cardiopulmonary disease noted. Patient received EC ASA in ED. He also received 8 mg of IV morphine due to continuous chest pain despite SL NTG. Patient was noted to become profoundly bradycardic and slightly hypotensive following narcotic administration. Due to concerns for unstable angina, admitting hospitalist initiated low-dose weight-based heparin GTT for suspected ACS. History obtained from patient and review of [facility] chart    Old medical records show patient's most recent echo was obtained 2/6/2020 with the following results:  Summary   Technically difficult examination. Left ventricular systolic function is normal with ejection fraction   estimated at 55%.    No regional wall motion abnormalities. There is mild concentric left ventricular hypertrophy. Grade II diastolic dysfunction with elevated left ventricular filling   pressure. The right ventricle is mildly enlarged. Mild bi-atrial enlargement. Patient's most recent cardiac cath was performed 3/25/2019 with the following results:  CORONARY ARTERIES     LM <10% stenosis.       LAD Prox <10% stenosis. Mid 30-40% stenosis. Distal 30-40% stenosis. LAD wraps around apex. D1 has ostial 80% stenosis.       LCX 10% prox-mid stenosis. OM1 bifurcates in prox segment and there is prox-mid OM1 40-50% stenosis.       RI Mid 60% stenosis.       RCA Dominant. Slight anterior takeoff, Prox-mid 20-30% stenoses. Distal 20% stenosis. Tortuous vessel.         REVIEW OF SYSTEMS:   Constitutional: Negative for fever,chills, and generalized weakness  ENT: Negative for headache, rhinorrhea, and sore throat.   Respiratory: Negative for shortness of breath, wheezing, and cough  Cardiovascular: Negative for chest pain, palpitations, peripheral edema, orthopnea or PND  Gastrointestinal: Negative for N/V/D and abdominal pain; no hematemesis, hematochezia, or melena; no anorexia  Genitourinary: Negative for dysuria, frequency, retention; no incontinence  Hematologic/Lymphatic: Negative for bleeding tendency/excessive bruising  Musculoskeletal: Negative for myalgias and arthalgias; able to ambulate without difficulty  Neurologic: Negative for LOC, seizure activity, paresthesias, dysarthria, vertigo, and gait disturbance  Skin: Negative for itching,rash, decubitus  Psychiatric: Negative for depression,anxiety, and agitation; no hallucinations;   denies SI/HI  Endocrine: Negative for polyuria/polydipsia/polyphagia; no heat/cold intolerance    Past Medical History:   has a past medical history of Acid reflux, Yan's esophagus, Coronary artery disease of native heart with stable angina pectoris ([facility] [address] Diabetes mellitus ([facility] [address] Diabetic doses. If no relief after 1 dose, call 911. 25 tablet 3    fluticasone (FLONASE) 50 MCG/ACT nasal spray 2 sprays by Nasal route daily 1 Bottle 5    glucose blood VI test strips (ASCENSIA AUTODISC VI;ONE TOUCH ULTRA TEST VI) strip Check glucose 3 times daily      Insulin Syringe-Needle U-100 30G X 1/2\" 0.5 ML MISC Inject insulin 3 times daily      pantoprazole (PROTONIX) 40 MG tablet Take 40 mg by mouth 2 times daily       insulin regular (HUMULIN R;NOVOLIN R) 100 UNIT/ML injection Inject 10 Units into the skin 3 times daily (before meals)       lisinopril (PRINIVIL;ZESTRIL) 10 MG tablet Take 10 mg by mouth daily      atorvastatin (LIPITOR) 20 MG tablet Take 20 mg by mouth daily. Allergies: Allergies   Allergen Reactions    Clonidine     Codeine Nausea And Vomiting        Social History:   reports that he has never smoked. He has never used smokeless tobacco. He reports that he does not drink alcohol and does not use drugs. Family History:  family history includes Cancer in his brother and paternal grandfather; Cancer (age of onset: 76) in his father; Kidney Disease in his mother.      Physical Exam:  /81   Pulse (!) 48   Temp 98.1 °F (36.7 °C) (Oral)   Resp 16   Ht 5' 10\" (1.778 m)   Wt 270 lb (122.5 kg)   SpO2 97%   BMI 38.74 kg/m²     General appearance: Obese male resting in bed, NAD; continues to complain of 4/10 chest pain  Eyes: Sclera clear without conjunctival injection; PERRLA; EOMI  ENT: Mucous membranes moist without thrush; normal dentition  Neck: Supple without meningismus; no goiter; no carotid bruit bilaterally  Cardiovascular: Regular rhythm without ectopy; normal S1-S2 with no murmurs; no peripheral edema; no JVD  Respiratory: No tachypnea; CTAB with diminished air exchange, faint expiratory wheeze; no rhonchi or rales; I:E intact  Gastrointestinal: Abdomen obese, soft, non-tender, not distended; bowel sounds normal; no masses/organomegaly appreciated  Musculoskeletal: FROM spine and extremities x4; no gross deformity  Neurology: A&O x3; cranial nerves 2-12 grossly intact; motor 5/5  BUE/BLE; no seizure activity; finger-to-nose/heel-to-shin intact; no pronator drift  Psychiatry: Well-groomed with good eye contact; appropriate affect; no visual/auditory hallucination  Skin: Warm, dry, normal turgor, no rash  PV: 1/4 radial and dorsalis pedis bilaterally; brisk capillary refill    Labs:  CBC:   Lab Results   Component Value Date    WBC 6.1 08/16/2021    RBC 4.56 08/16/2021    HGB 13.7 08/16/2021    HCT 40.5 08/16/2021    MCV 88.7 08/16/2021    MCH 30.1 08/16/2021    MCHC 34.0 08/16/2021    RDW 13.6 08/16/2021     08/16/2021    MPV 9.0 08/16/2021     BMP:    Lab Results   Component Value Date     08/16/2021    K 4.3 08/16/2021    K 4.5 02/07/2020     08/16/2021    CO2 27 08/16/2021    BUN 16 08/16/2021    CREATININE 0.8 08/16/2021    CALCIUM 8.6 08/16/2021    GFRAA >60 08/16/2021    GFRAA >60 12/25/2010    LABGLOM >60 08/16/2021    GLUCOSE 180 08/16/2021     XR CHEST (2 VW)   Final Result   No active cardiopulmonary disease               EKG:     Ventricular Rate 62 BPM QTc Calculation (Bazett) 397 ms   Atrial Rate 62 BPM P Axis 65 degrees   P-R Interval 184 ms R Axis 48 degrees   QRS Duration 84 ms T Axis 31 degrees   Q-T Interval 392 ms Diagnosis Normal sinus rhythmLow voltage QRSNonspecific ST abnormalityWhen        I visualized CXR images and EKG strips personally and agree with documented interpretation    Discussed case  with ED provider    Problem List  Principal Problem:    Unstable angina (HCC)  Active Problems:    Coronary artery disease involving native coronary artery of native heart with angina pectoris (HCC)    Essential hypertension    Hyperlipidemia    Severe obstructive sleep apnea    Class 2 severe obesity with serious comorbidity in [facility])    Type 2 diabetes mellitus with hyperglycemia (HCC)    Bradycardia with 41-50 beats per minute  Resolved Problems:    * No resolved hospital problems. *        Assessment/Plan:     Unstable angina with CAD  -Admit to telemetry floor with serial cardiac enzymes to be obtained overnight  -ASA to be continued at 81 mg PO QAM  -High dose statin therapy initiated overnight; obtain FLP in a.m.  -SL NTG as needed CP  -ECHO scheduled for a.m.  -Nuclear stress test NOT ordered due to concerns for ACS/unstable angina; anticipate possible LHC  -Patient initiated on low-dose weight-based heparin GTT overnight  -Consultation placed to cardiologist for further recommendations; n.p.o. after midnight for possible Cath Lab tomorrow    Type II IDDM with acute hyperglycemia  -A1c ordered with results pending at time of dictation  -Home hypoglycemic medications placed on temporary hold  -Weight-based basal insulin initiated QHS  -Humalog scheduled AC/HS in addition to PRN SSI coverage  -Carbohydrate restriction placed on diet    Tobacco abuse with suspected underlying COPD  -Continuous pulse oximetry monitoring initiated with PRN supplemental O2   -Encourage aggressive pulmonary toilet including incentive spirometry every 4H while awake  -DuoNebs scheduled 4 times daily  -Patient counseled on necessity of smoking cessation; nicotine replacement patch provided    Severe ESTHER with BMI 38.74  -Continuous pulse oximetry monitoring initiated with PRN supplemental O2   -Encourage aggressive pulmonary toilet including incentive spirometry every 4H while awake  -Consult placed to RT to arrange for CPAP per home measurements    DVT prophylaxis-patient initiated on low-dose weight-based heparin GTT overnight  Code status-full code  Diet-cardiac with carb restrictions now then n.p.o. after midnight  IV access-PIV established in ED    Admit as inpatient. I anticipate hospitalization spanning more than two midnights for investigation and treatment of the above medically necessary diagnoses.     Please note that some part of this chart was generated using Dragon dictation software. Although every effort was made to ensure the accuracy of this automated transcription, some errors in transcription may have occurred inadvertently. If you may need any clarification, please do not hesitate to contact me through [facility].        Tyler Gunn MD    8/16/2021 5:43 PM

## 2021-08-16 NOTE — ED TRIAGE NOTES
Rockney Meigs is a 70 y.o. male who presents to the ED via EMS for 6 out of 10 intermittent left sided chest pain that radiates down the left arm. Pt reports symptoms began at approximately 0800 today. Pt also reports sharp pain. Pt has Hx of CAD, DM, HTN, high cholesterol. Pt denies shortness of breath, other cardiac Hx, recent medication changes, recent falls/injuries. Pt resting in bed with call light within reach and updated on plan of care; pending provider to assess. Pt denies any needs at this time.

## 2021-08-16 NOTE — CONSULTS
Low dose heparin SLSC  per Dr. Riojas Born     - Dx: CP  - Therapeutic aPTT range : 60-90 sec  - PLT= 135  - Wt: 122.5kg  will use adjusted BW of 92.8 kg for dosing  -  4000 unit initial bolus, start infusion at  1,000 units/hr = 10ml/hr per protocl  - aPTT at 6 hrs ~ 1am.  Juliann Andrews/Hellen. 8/16/21 6:31 PM EDT    8/17/2021  Recent Labs     08/17/21  0200   APTT 81.2*     Continue heparin gtt at 1000 units/hr. Recheck aPTT in 6 hours.   Elen Rich, PharmD  8/17/2021 2:50 AM

## 2021-08-16 NOTE — ED PROVIDER NOTES
I independently performed a history and physical on Khanh Mcleod. All diagnostic, treatment, and disposition decisions were made by myself in conjunction with the advanced practice provider. For further details of [facility] emergency department encounter, please see Greg Randall NP's documentation. Patient is a 70-year-old male presenting today due to concern for chest pain that did improve with aspirin along with nitro but currently is at 4 out of 10. He does have history of diabetes along with coronary disease. He last had a stress test 1 year ago that showed low risk based on cardiology review. The pain did go into his left arm and it was associate with some shortness of breath. He denies any syncope. No headache. No numbness or weakness in the arms or legs. Last cardiac catheterization from 2019 did show moderate atherosclerosis. Denies any cough or fever and no known exposure to Covid. He is vaccinated. Physical:   Gen: No acute distress. AOx3. Psych: Normal mood and affect  HEENT: NCAT  Neck: supple, normal range of motion  Cardiac: RRR, pulses 2+ in all 4 extremities, no calf tenderness bilaterally  Lungs: C2AB, no R/R/W  Abdomen: soft and nontender with no R/D/G  Neuro: no focal neuro deficits with strength and sensation 5/5 in all 4 extremities    The Ekg interpreted by me shows  normal sinus rhythm with a rate of 62  Axis is   Normal  QTc is  normal  Intervals and Durations are unremarkable. ST Segments: no acute change and nonspecific changes  No significant change from prior EKG dated - 10/27/20  No STEMI       MDM: Patient was evaluated a concern for significant chest pain associate with some shortness of breath rating towards her left arm. He does have a significant history of coronary disease and last stress test was 1 year ago. He is still having persistent chest pain although improved from earlier today.   He will need further evaluation in the hospital to evaluate for acute coronary syndrome. He was stable for the floor with telemetry. Initial troponin was negative. He denies any pain with breathing and story not suggestive of pulmonary embolism. No concern for Covid at this time. D-dimer was negative.      Bianka Villafana MD  08/17/21 [id number]

## 2021-08-16 NOTE — ED PROVIDER NOTES
**ADVANCED PRACTICE PROVIDER, I HAVE EVALUATED THIS [facility]  ED  EMERGENCY DEPARTMENT ENCOUNTER      Pt Name: Ariel Wooten  AWJ:[id number]  [place] 1950  Date of evaluation: 8/16/2021  Provider: SANDY Reid CNP      Chief Complaint:    Chief Complaint   Patient presents with    Chest Pain     Pt arrived via EMS c/o chest pain, pt reports pain improving at this time following EMS asa/nitro admin. Pt reports 4/10 at this time. Pt denies additional symptoms at triage at this time. Nursing Notes, Past Medical Hx, Past Surgical Hx, Social Hx, Allergies, and Family Hx were all reviewed and agreed with or any disagreements were addressed in the HPI.    HPI: (Location, Duration, Timing, Severity, Quality, Assoc Sx, Context, Modifying factors)    Chief Complaint of chest pain that started this morning    This is a  70 y.o. male who presents with chest pain in the left side of his chest that goes into his arm, he states the pain woke him up from sleep that started around 8am. He states that he is still having pain. He has history of CAD, high blood pressure, high cholesterol. On exam he states the pressure sensation, rates the pain a 5 out of 10. He denies any recent cough, congestion, fever or chills. No abdominal pain, no nausea vomiting or diarrhea. He said it has been a while since he seen a cardiologist. Last stress test was in 3/2019 and cardiac cath in 3/2019. He denies any additional symptoms or complaints. No additional aggravating relieving factors. Patient presents awake, alert and in no acute respiratory stress or toxic appearance.     PastMedical/Surgical History:      Diagnosis Date    Acid reflux     Yan's esophagus     Coronary artery disease of native heart with stable angina pectoris ([address] 5/30/2019    Diabetes mellitus ([address]     Diabetic autonomic neuropathy associated with type 2 diabetes mellitus ([address] 3/3/2020    Hyperlipidemia     Hypertension     Influenza A 02/05/2020    Pancreatitis 1996    Restless legs     Sleep apnea     uses CPAP    Tremor     of bilateral hands         Procedure Laterality Date    CATARACT REMOVAL Bilateral 2013    CHOLECYSTECTOMY      COLONOSCOPY  10/26/2011    ENDOSCOPY, COLON, DIAGNOSTIC      EGD halo    HYDROCELE EXCISION  11/15/2016    PANCREAS SURGERY      cyst opened up and drining into small bowel    TONSILLECTOMY      UPPER GASTROINTESTINAL ENDOSCOPY  10/04/2016    with biopsy    UPPER GASTROINTESTINAL ENDOSCOPY  03/16/2017    Halo ablation    UPPER GASTROINTESTINAL ENDOSCOPY  06/26/2017    Halo ablation    UPPER GASTROINTESTINAL ENDOSCOPY  09/06/2017    bx distal sophagus    UPPER GASTROINTESTINAL ENDOSCOPY  12/22/2017    with HALO  procedure    UPPER GASTROINTESTINAL ENDOSCOPY N/A 12/4/2018    EGD WITH ABLATION AND ANESTHESIA - HALO---SLEEP APNEA---  performed by Michael Peralta MD at [address] [place] [address]  2/19/2019    EGD BIOPSY performed by Michael Peralta MD at [address] [place] [address] 6/11/2019    EGD WITH HALO AND ANESTHESIA performed by Michael Peralta MD at [address] [place] [address] N/A 8/13/2019    ESOPHAGOGASTRODUODENOSCOPY WITH HALO AND ANESTHESIA -SLEEP APNEA- performed by Michael Peralta MD at [address] [place] [address] Drive EXTRACTION         Medications:  Previous Medications    AMLODIPINE (NORVASC) 2.5 MG TABLET    Take 1 tablet by mouth daily    ASPIRIN 81 MG CHEWABLE TABLET    Take 1 tablet by mouth daily    ATORVASTATIN (LIPITOR) 20 MG TABLET    Take 20 mg by mouth daily.     CONTINUOUS BLOOD GLUC  (FREESTYLE JUSTIN 14 DAY READER) OLI    1 Units by Does not apply route as needed (as needed)    CONTINUOUS BLOOD GLUC SENSOR (FREESTYLE JUSTIN 14 DAY SENSOR) MISC    1 Units by Does not apply route every 14 days    ERGOCALCIFEROL (DRISDOL) 1.25 MG (60111 UT) CAPSULE    Take 1 capsule by mouth once a week    FLUTICASONE (FLONASE) 50 MCG/ACT NASAL SPRAY    2 sprays by Nasal route daily    GABAPENTIN (NEURONTIN) 100 MG CAPSULE    Take 2 capsules by mouth nightly for 60 days. GLUCOSE BLOOD VI TEST STRIPS (ASCENSIA AUTODISC VI;ONE TOUCH ULTRA TEST VI) STRIP    Check glucose 3 times daily    INSULIN REGULAR (HUMULIN R;NOVOLIN R) 100 UNIT/ML INJECTION    Inject 10 Units into the skin 3 times daily (before meals)     INSULIN SYRINGE-NEEDLE U-100 30G X 1/2\" 0.5 ML MISC    Inject insulin 3 times daily    LISINOPRIL (PRINIVIL;ZESTRIL) 10 MG TABLET    Take 10 mg by mouth daily    METOPROLOL SUCCINATE (TOPROL XL) 25 MG EXTENDED RELEASE TABLET    Take 0.5 tablets by mouth daily    NITROGLYCERIN (NITROSTAT) 0.4 MG SL TABLET    up to max of 3 total doses. If no relief after 1 dose, call 911. PANTOPRAZOLE (PROTONIX) 40 MG TABLET    Take 40 mg by mouth 2 times daily     PRAMIPEXOLE (MIRAPEX) 0.125 MG TABLET    Take 1 tablet by mouth nightly         Review of Systems:  (2-9 systems needed)  Review of Systems   Constitutional: Negative for chills and fever. HENT: Negative for congestion. Respiratory: Negative for cough, shortness of breath and wheezing. Cardiovascular: Positive for chest pain. Patient complains of chest pain in the left side of his chest that goes into his arm, he states the pain woke him up from sleep that started around 8am. He states that he is still having pain. He has history of CAD, high blood pressure, high cholesterol. On exam he states the pressure sensation, rates the pain a 5 out of 10. Gastrointestinal: Negative for abdominal pain, diarrhea, nausea and vomiting. Genitourinary: Negative for dysuria, frequency, hematuria and urgency. Musculoskeletal: Negative for back pain. Skin: Negative for color change. Neurological: Negative for weakness, numbness and headaches.        \"Positives and Pertinent negatives as per HPI\"    Physical Exam:  Physical Exam  Vitals and nursing note reviewed. Constitutional:       Appearance: He is well-developed. He is not diaphoretic. HENT:      Head: Normocephalic. Right Ear: External ear normal.      Left Ear: External ear normal.   Eyes:      General: No scleral icterus. Right eye: No discharge. Left eye: No discharge. Cardiovascular:      Rate and Rhythm: Normal rate. Comments: Normal S1 and 2, sinus on the monitor at 61 bpm during bedside exam.  Peripheral pulses are 2+, nonpitting swelling of bilateral feet and ankles. Pulmonary:      Effort: Pulmonary effort is normal. No respiratory distress. Breath sounds: Normal breath sounds. Comments: Lungs are clear anteriorly and posteriorly, patient is not tachypneic or dyspneic, saturations 97% on room air  Abdominal:      Palpations: Abdomen is soft. Comments: Abdomen is grossly protuberant. Bowel sounds are hypoactive however he has no tenderness guarding rebound tenderness. No ascites or rigidity. Musculoskeletal:         General: Normal range of motion. Cervical back: Normal range of motion and neck supple. Skin:     General: Skin is warm. Capillary Refill: Capillary refill takes less than 2 seconds. Coloration: Skin is not pale. Neurological:      General: No focal deficit present. Mental Status: He is alert and oriented to person, place, and time. GCS: GCS eye subscore is 4. GCS verbal subscore is 5. GCS motor subscore is 6.    Psychiatric:         Behavior: Behavior normal.         MEDICAL DECISION MAKING    Vitals:    Vitals:    08/16/21 1451 08/16/21 1520 08/16/21 1550 08/16/21 1620   BP:  (!) 142/93 132/78 111/66   Pulse: (S) 54 (!) 49 (!) 47 (!) 45   Resp:  11 13 8   Temp:       TempSrc:       SpO2:  95% 97% 96%   Weight:       Height:           LABS:  Labs Reviewed   CBC WITH AUTO DIFFERENTIAL - Abnormal; Notable for the following components:       Result Value    Lymphocytes Absolute 0.9 (*)     All other components within normal limits    Narrative:     Performed at:  [address], [place] [place]   Phone [phone]   COMPREHENSIVE METABOLIC PANEL - Abnormal; Notable for the following components:    Glucose 180 (*)     All other components within normal limits    Narrative:     Performed at:  [address], [facility]   Phone [phone]   TROPONIN    Narrative:     Performed at:  [address], [facility]   Phone [phone]   D-DIMER, QUANTITATIVE    Narrative:     Performed at:  [address], [facility]   Phone [phone] PEPTIDE    Narrative:     Performed at:  [address], [facility]   Phone  of labs reviewed and were negative at this time or not returned at the time of this note. RADIOLOGY:   Non-plain film images such as CT, Ultrasound and MRI are read by the radiologist. Kiah GERONIMO APRN - CNP have directly visualized the radiologic plain film image(s) with the below findings:      Interpretation per the Radiologist below, if available at the time of this note:    XR CHEST (2 VW)   Final Result   No active cardiopulmonary disease                MEDICAL DECISION MAKING / ED COURSE:    Because of high probability of sudden clinical deterioration of the patient's condition and risk of further deterioration, critical care time required my full attention to the patient's condition; which included chart data review, documentation, medication ordering, reviewing the patient's old records, reevaluation patient's cardiac, pulmonary and neurological status. Reevaluation of vital signs.  Consultations with ED attending and admitting physician. Ordering, interpreting reviewing diagnostic testing. Therefore a critical care time was 22 minutes of direct attention to the patient's condition did not include time spent on procedures. PROCEDURES:   Procedures    None    Patient was given:  Medications   morphine (PF) injection 4 mg (has no administration in time range)   ondansetron (ZOFRAN) injection 4 mg (has no administration in time range)   aspirin chewable tablet 324 mg (324 mg Oral Given 8/16/21 1525)   morphine (PF) injection 4 mg (4 mg Intravenous Given 8/16/21 1526)   ondansetron (ZOFRAN) injection 4 mg (4 mg Intravenous Given 8/16/21 1526)     Patient complains of chest pain in the left side of his chest that goes into his arm, he states the pain woke him up from sleep that started around 8am. He states that he is still having pain. He has history of CAD, high blood pressure, high cholesterol. On exam he states the pressure sensation, rates the pain a 5 out of 10. After evaluation and examination the patient I did evaluate the patient's chart it appears that he did have evaluation back in 2019.  3/21/2019 CATH:   LVEDP 4   GRADIENT ACROSS AORTIC VALVE No gradient   LV FUNCTION EF 65%   WALL MOTION Normal   MITRAL REGURGITATION Mild     CORONARY ARTERIES     LM <10% stenosis.       LAD Prox <10% stenosis. Mid 30-40% stenosis. Distal 30-40% stenosis. LAD wraps around apex. D1 has ostial 80% stenosis.       LCX 10% prox-mid stenosis. OM1 bifurcates in prox segment and there is prox-mid OM1 40-50% stenosis.       RI Mid 60% stenosis.       RCA Dominant. Slight anterior takeoff, Prox-mid 20-30% stenoses. Distal 20% stenosis. Tortuous vessel.       I then ordered IV access, blood work, chest x-ray and EKG. Patient was given morphine and antiemetics. CBC shows no sepsis or anemia. Metabolic panel shows no electrolyte disturbances or renal insufficiency.   Liver functions are normal.  Troponin is negative. BNP is negative. Chest x-ray shows no acute cardiopulmonary findings. EKG sinus rhythm rate of 62 bpm, no acute ST elevation, please see Dr. Fernandez Huntley EKG interpretation note. However, patient was given morphine and does improve some of his pain however, because of his risk factors, he is a heart score of 6. I then spoke with the patient at length about being admitted to the hospital. Due to his risk factors. Patient agrees with this plan. Hospitalist paged for admission. The patient tolerated their visit well. I evaluated the patient. The physician was available for consultation as needed. The patient and / or the family were informed of the results of any tests, a time was given to answer questions, a plan was proposed and they agreed with plan. Patient will be admitted to the hospital via hospital services, hospitalist was paged via [employer]. Patient will be admitted for further outpatient management of care. CLINICAL IMPRESSION:  1. Acute chest pain    2. History of essential hypertension    3. History of coronary artery disease    4. History of high cholesterol        DISPOSITION Decision To Admit 08/16/2021 03:07:50 PM      PATIENT REFERRED TO:  No follow-up provider specified.     DISCHARGE MEDICATIONS:  New Prescriptions    No medications on file       DISCONTINUED MEDICATIONS:  Discontinued Medications    No medications on file              (Please note the MDM and HPI sections of this note were completed with a voice recognition program.  Efforts were made to edit the dictations but occasionally words are mis-transcribed.)    Electronically signed, SANDY Conde CNP,           SANDY Conde CNP  08/16/21 [facility], APRN - CNP  08/16/21 [id number]

## 2021-08-17 ENCOUNTER — APPOINTMENT (OUTPATIENT)
Dept: NUCLEAR MEDICINE | Age: 71
DRG: 313 | End: 2021-08-17
Payer: MEDICARE

## 2021-08-17 VITALS
HEART RATE: 50 BPM | BODY MASS INDEX: 38.01 KG/M2 | WEIGHT: 265.5 LBS | DIASTOLIC BLOOD PRESSURE: 61 MMHG | OXYGEN SATURATION: 91 % | RESPIRATION RATE: 18 BRPM | TEMPERATURE: 97.9 F | SYSTOLIC BLOOD PRESSURE: 101 MMHG | HEIGHT: 70 IN

## 2021-08-17 LAB
A/G RATIO: 1.3 (ref 1.1–2.2)
ALBUMIN SERPL-MCNC: 3.5 G/DL (ref 3.4–5)
ALP BLD-CCNC: 74 U/L (ref 40–129)
ALT SERPL-CCNC: 20 U/L (ref 10–40)
ANION GAP SERPL CALCULATED.3IONS-SCNC: 5 MMOL/L (ref 3–16)
APTT: 61.4 SEC (ref 26.2–38.6)
APTT: 81.2 SEC (ref 26.2–38.6)
AST SERPL-CCNC: 22 U/L (ref 15–37)
BILIRUB SERPL-MCNC: 0.5 MG/DL (ref 0–1)
BUN BLDV-MCNC: 18 MG/DL (ref 7–20)
CALCIUM SERPL-MCNC: 8.4 MG/DL (ref 8.3–10.6)
CHLORIDE BLD-SCNC: 102 MMOL/L (ref 99–110)
CHOLESTEROL, TOTAL: 208 MG/DL (ref 0–199)
CO2: 31 MMOL/L (ref 21–32)
CREAT SERPL-MCNC: 0.9 MG/DL (ref 0.8–1.3)
EKG ATRIAL RATE: 44 BPM
EKG DIAGNOSIS: NORMAL
EKG P AXIS: 54 DEGREES
EKG P-R INTERVAL: 194 MS
EKG Q-T INTERVAL: 458 MS
EKG QRS DURATION: 88 MS
EKG QTC CALCULATION (BAZETT): 391 MS
EKG R AXIS: 66 DEGREES
EKG T AXIS: 49 DEGREES
EKG VENTRICULAR RATE: 44 BPM
ESTIMATED AVERAGE GLUCOSE: 182.9 MG/DL
GFR AFRICAN AMERICAN: >60
GFR NON-AFRICAN AMERICAN: >60
GLOBULIN: 2.7 G/DL
GLUCOSE BLD-MCNC: 133 MG/DL (ref 70–99)
GLUCOSE BLD-MCNC: 140 MG/DL (ref 70–99)
GLUCOSE BLD-MCNC: 164 MG/DL (ref 70–99)
GLUCOSE BLD-MCNC: 170 MG/DL (ref 70–99)
GLUCOSE BLD-MCNC: 213 MG/DL (ref 70–99)
HBA1C MFR BLD: 8 %
HCT VFR BLD CALC: 39.7 % (ref 40.5–52.5)
HDLC SERPL-MCNC: 47 MG/DL (ref 40–60)
HEMOGLOBIN: 13.4 G/DL (ref 13.5–17.5)
INR BLD: 1 (ref 0.88–1.12)
LDL CHOLESTEROL CALCULATED: 127 MG/DL
LV EF: 58 %
LV EF: 58 %
LVEF MODALITY: NORMAL
LVEF MODALITY: NORMAL
MCH RBC QN AUTO: 30 PG (ref 26–34)
MCHC RBC AUTO-ENTMCNC: 33.7 G/DL (ref 31–36)
MCV RBC AUTO: 89.1 FL (ref 80–100)
PDW BLD-RTO: 13.9 % (ref 12.4–15.4)
PERFORMED ON: ABNORMAL
PLATELET # BLD: 127 K/UL (ref 135–450)
PMV BLD AUTO: 9.2 FL (ref 5–10.5)
POTASSIUM REFLEX MAGNESIUM: 4.7 MMOL/L (ref 3.5–5.1)
PROTHROMBIN TIME: 11.3 SEC (ref 9.9–12.7)
RBC # BLD: 4.46 M/UL (ref 4.2–5.9)
SODIUM BLD-SCNC: 138 MMOL/L (ref 136–145)
TOTAL PROTEIN: 6.2 G/DL (ref 6.4–8.2)
TRIGL SERPL-MCNC: 169 MG/DL (ref 0–150)
TROPONIN: <0.01 NG/ML
VLDLC SERPL CALC-MCNC: 34 MG/DL
WBC # BLD: 4.9 K/UL (ref 4–11)

## 2021-08-17 PROCEDURE — 85027 COMPLETE CBC AUTOMATED: CPT

## 2021-08-17 PROCEDURE — A9502 TC99M TETROFOSMIN: HCPCS | Performed by: INTERNAL MEDICINE

## 2021-08-17 PROCEDURE — 2580000003 HC RX 258: Performed by: HOSPITALIST

## 2021-08-17 PROCEDURE — 93010 ELECTROCARDIOGRAM REPORT: CPT | Performed by: INTERNAL MEDICINE

## 2021-08-17 PROCEDURE — 3430000000 HC RX DIAGNOSTIC RADIOPHARMACEUTICAL: Performed by: INTERNAL MEDICINE

## 2021-08-17 PROCEDURE — 78452 HT MUSCLE IMAGE SPECT MULT: CPT

## 2021-08-17 PROCEDURE — 6360000002 HC RX W HCPCS: Performed by: INTERNAL MEDICINE

## 2021-08-17 PROCEDURE — C8929 TTE W OR WO FOL WCON,DOPPLER: HCPCS

## 2021-08-17 PROCEDURE — 93017 CV STRESS TEST TRACING ONLY: CPT

## 2021-08-17 PROCEDURE — 80053 COMPREHEN METABOLIC PANEL: CPT

## 2021-08-17 PROCEDURE — 93005 ELECTROCARDIOGRAM TRACING: CPT | Performed by: HOSPITALIST

## 2021-08-17 PROCEDURE — 80061 LIPID PANEL: CPT

## 2021-08-17 PROCEDURE — 36415 COLL VENOUS BLD VENIPUNCTURE: CPT

## 2021-08-17 PROCEDURE — 99222 1ST HOSP IP/OBS MODERATE 55: CPT | Performed by: INTERNAL MEDICINE

## 2021-08-17 PROCEDURE — 84484 ASSAY OF TROPONIN QUANT: CPT

## 2021-08-17 PROCEDURE — 6370000000 HC RX 637 (ALT 250 FOR IP): Performed by: HOSPITALIST

## 2021-08-17 PROCEDURE — 85610 PROTHROMBIN TIME: CPT

## 2021-08-17 PROCEDURE — 83036 HEMOGLOBIN GLYCOSYLATED A1C: CPT

## 2021-08-17 PROCEDURE — 85730 THROMBOPLASTIN TIME PARTIAL: CPT

## 2021-08-17 RX ORDER — IPRATROPIUM BROMIDE AND ALBUTEROL SULFATE 2.5; .5 MG/3ML; MG/3ML
1 SOLUTION RESPIRATORY (INHALATION) EVERY 4 HOURS PRN
Status: DISCONTINUED | OUTPATIENT
Start: 2021-08-17 | End: 2021-08-17 | Stop reason: HOSPADM

## 2021-08-17 RX ORDER — IPRATROPIUM BROMIDE AND ALBUTEROL SULFATE 2.5; .5 MG/3ML; MG/3ML
1 SOLUTION RESPIRATORY (INHALATION) 4 TIMES DAILY
Status: DISCONTINUED | OUTPATIENT
Start: 2021-08-17 | End: 2021-08-17

## 2021-08-17 RX ADMIN — FLUTICASONE PROPIONATE 2 SPRAY: 50 SPRAY, METERED NASAL at 09:34

## 2021-08-17 RX ADMIN — LISINOPRIL 10 MG: 10 TABLET ORAL at 09:30

## 2021-08-17 RX ADMIN — TETROFOSMIN 10.5 MILLICURIE: 1.38 INJECTION, POWDER, LYOPHILIZED, FOR SOLUTION INTRAVENOUS at 11:55

## 2021-08-17 RX ADMIN — AMLODIPINE BESYLATE 2.5 MG: 2.5 TABLET ORAL at 09:30

## 2021-08-17 RX ADMIN — TETROFOSMIN 31.6 MILLICURIE: 1.38 INJECTION, POWDER, LYOPHILIZED, FOR SOLUTION INTRAVENOUS at 13:20

## 2021-08-17 RX ADMIN — INSULIN LISPRO 2 UNITS: 100 INJECTION, SOLUTION INTRAVENOUS; SUBCUTANEOUS at 17:40

## 2021-08-17 RX ADMIN — SODIUM CHLORIDE: 9 INJECTION, SOLUTION INTRAVENOUS at 00:44

## 2021-08-17 RX ADMIN — INSULIN LISPRO 6 UNITS: 100 INJECTION, SOLUTION INTRAVENOUS; SUBCUTANEOUS at 18:16

## 2021-08-17 RX ADMIN — ASPIRIN 81 MG: 81 TABLET, COATED ORAL at 09:30

## 2021-08-17 RX ADMIN — INSULIN LISPRO 4 UNITS: 100 INJECTION, SOLUTION INTRAVENOUS; SUBCUTANEOUS at 14:57

## 2021-08-17 RX ADMIN — REGADENOSON 0.4 MG: 0.08 INJECTION, SOLUTION INTRAVENOUS at 13:20

## 2021-08-17 RX ADMIN — PANTOPRAZOLE SODIUM 40 MG: 40 TABLET, DELAYED RELEASE ORAL at 09:30

## 2021-08-17 ASSESSMENT — ENCOUNTER SYMPTOMS
PHOTOPHOBIA: 0
EYE PAIN: 0
ABDOMINAL DISTENTION: 0
RHINORRHEA: 0
SHORTNESS OF BREATH: 0
COUGH: 0
CONSTIPATION: 0
VOMITING: 0
ABDOMINAL PAIN: 0
BLOOD IN STOOL: 0
DIARRHEA: 0
NAUSEA: 0
SORE THROAT: 0

## 2021-08-17 ASSESSMENT — PAIN SCALES - GENERAL
PAINLEVEL_OUTOF10: 0
PAINLEVEL_OUTOF10: 3
PAINLEVEL_OUTOF10: 0

## 2021-08-17 NOTE — RT PROTOCOL NOTE

## 2021-08-17 NOTE — PROGRESS NOTES
4 Eyes Skin Assessment     The patient is being assess for  Admission    I agree that 2 RN's have performed a thorough Head to Toe Skin Assessment on the patient. ALL assessment sites listed below have been assessed. Areas assessed by both nurses: Joseph Ku RN  [x]   Head, Face, and Ears   [x]   Shoulders, Back, and Chest  [x]   Arms, Elbows, and Hands   [x]   Coccyx, Sacrum, and Ischum  [x]   Legs, Feet, and Heels        Does the Patient have Skin Breakdown?   No         Dustin Prevention initiated:  No   Wound Care Orders initiated:  No      [facility] nurse consulted for Pressure Injury (Stage 3,4, Unstageable, DTI, NWPT, and Complex wounds):  No      Nurse 1 eSignature: Electronically signed by Archana Penny RN on 8/17/21 at 1:47 AM EDT    **SHARE this note so that the co-signing nurse is able to place an eSignature**    Nurse 2 eSignature: Electronically signed by Wenceslao Valles RN on 8/17/21 at 2:28 AM EDT

## 2021-08-17 NOTE — DISCHARGE SUMMARY
Pt d/c'd to home with family member. Removed peripheral IV and stopped bleeding. Catheter intact. Pt tolerated well. No redness noted at site. Notified [facility] and removed tele box. Reviewed d/c instructions, home meds, and  f/u information utilizing teach-back method. Instructions for home medications given to patient. Patient verbalized understanding.    Electronically signed by Alexis Preston RN on 8/17/2021 at 6:07 PM

## 2021-08-17 NOTE — PROGRESS NOTES
Patient admitted to room 214 from   ED. Patient oriented to room, call light, bed rails, phone, lights and bathroom. Patient instructed about the schedule of the day including: vital sign frequency, lab draws, possible tests, frequency of MD and staff rounds, including RN/MD rounding together at bedside, daily weights, and I &O's. Patient instructed about prescribed diet, how to use 8MENU, and television. Bed alarm in place, patient aware of placement and reason. Telemetry box in place, patient aware of placement and reason. Bed locked, in lowest position, side rails up 2/4, call light within reach. Will continue to monitor.

## 2021-08-17 NOTE — PROGRESS NOTES
08/17/21 0856   RT Protocol   Smoking Status 0   Xray 0   Respiratory pattern 0   Mental Status 0   Breath sounds 0   Cough 0   Activity level 0   Oxygen Requirement 0   Bronchodilator Assessment Score 0   Bronchial Hygiene Assessment Score 0

## 2021-08-17 NOTE — CONSULTS
[facility]  [phone]      Attending Physician: Pio Grayson MD  Reason for Consultation/Chief Complaint: Chest pain    Subjective   History of Present Illness:  Ramin Adams is a 70 y.o. male with a history of non-obstructive coronary artery disease, diastolic dysfunction, paroxysmal atrial tachycardia, essential hypertension, hyperlipidemia, type II DM, ESTHER, GERD, Yan's esophagus, and morbid obesity who presented with chest pain. The patient reports that yesterday shortly after he woke up, he developed sharp, left-sided chest pain that radiated to his left shoulder. The pain started while he was at rest and does not seem to get worse with exertion. He says the pain has essentially been constant since it started, bu waxes and wanes in intensity. He cannot identify anything that seems to make the pain better or worse. He denies any associated shortness of breath, diaphoresis, or nausea. He further denies any lightheadedness, dizziness, syncope, palpitations, lower extremity edema, orthopnea, or paroxysmal nocturnal dyspnea. Since admission, his troponins have been negative x2. ECG showed normal sinus rhythm with no significant ischemic changes. Per review of the patient's chart, it appears that he underwent invasive coronary angiography in 3/2019 which showed <10% left main stenosis, 30-40% mid and 30-40% distal LAD stenoses, 40-50% proximal to mid-LCx stenosis, and 20-30% proximal to mid and 20% distal RCA stenoses. There was an 80% ostial stenosis in a relatively small diagonal artery that was not a good target for percutaneous coronary intervention. He then had a pharmacologic nuclear SPECT stress test on 8/18/20 which did not show any definitive evidence of ischemia.   His most recent TTE from 2/5/20 showed an LVEF of 55% with normal wall motion, mild concentric LVH, grade 2 diastolic dysfunction, mildly enlarged RV with normal systolic function, and mild biatrial enlargement. Past Medical History:   has a past medical history of Acid reflux, Yan's esophagus, Coronary artery disease of native heart with stable angina pectoris ([facility] [address] Diabetes mellitus ([facility] [address] Diabetic autonomic neuropathy associated with type 2 diabetes mellitus ([facility] [address] Hyperlipidemia, Hypertension, Influenza A, Pancreatitis, Restless legs, Sleep apnea, and Tremor. Surgical History:   has a past surgical history that includes Pancreas surgery; Cholecystectomy; Tonsillectomy; [place] tooth extraction; Colonoscopy (10/26/2011); Cataract removal (Bilateral, 2013); Upper gastrointestinal endoscopy (10/04/2016); Hydrocele surgery (11/15/2016); Endoscopy, colon, diagnostic; Upper gastrointestinal endoscopy (03/16/2017); Upper gastrointestinal endoscopy (06/26/2017); Upper gastrointestinal endoscopy (09/06/2017); Upper gastrointestinal endoscopy (12/22/2017); Upper gastrointestinal endoscopy (N/A, 12/4/2018); Upper gastrointestinal endoscopy (2/19/2019); Upper gastrointestinal endoscopy (N/A, 6/11/2019); Upper gastrointestinal endoscopy (N/A, 8/13/2019); and Abdomen surgery. Social History:   reports that he has never smoked. He has never used smokeless tobacco. He reports that he does not drink alcohol and does not use drugs. Family History:  family history includes Cancer in his brother and paternal grandfather; Cancer (age of onset: 76) in his father; Kidney Disease in his mother. Home Medications:  Were reviewed and are listed in nursing record and/or below  Prior to Admission medications    Medication Sig Start Date End Date Taking? Authorizing Provider   metoprolol succinate (TOPROL XL) 25 MG extended release tablet Take 0.5 tablets by mouth daily 9/1/20  Yes SANDY Mohamud - CNP   aspirin 81 MG chewable tablet Take 1 tablet by mouth daily 3/26/19  Yes Zena Hinojosa MD   nitroGLYCERIN (NITROSTAT) 0.4 MG SL tablet up to max of 3 total doses.  If no relief after 1 (PROTONIX) tablet 40 mg, BID  pramipexole (MIRAPEX) tablet 0.125 mg, Nightly  amLODIPine (NORVASC) tablet 2.5 mg, Daily  insulin glargine (LANTUS) injection vial 18 Units, Nightly  insulin lispro (HUMALOG) injection vial 0-12 Units, TID WC  insulin lispro (HUMALOG) injection vial 0-6 Units, Nightly  glucose (GLUTOSE) 40 % oral gel 15 g, PRN  dextrose 50 % IV solution, PRN  glucagon (rDNA) injection 1 mg, PRN  dextrose 5 % solution, PRN  perflutren lipid microspheres (DEFINITY) injection 1.65 mg, ONCE PRN  0.9 % sodium chloride infusion, Continuous  sodium chloride flush 0.9 % injection 10 mL, 2 times per day  sodium chloride flush 0.9 % injection 10 mL, PRN  0.9 % sodium chloride infusion, PRN  potassium chloride (KLOR-CON M) extended release tablet 40 mEq, PRN   Or  potassium bicarb-citric acid (EFFER-K) effervescent tablet 40 mEq, PRN   Or  potassium chloride 10 mEq/100 mL IVPB (Peripheral Line), PRN  magnesium sulfate 2000 mg in 50 mL IVPB premix, PRN  acetaminophen (TYLENOL) tablet 650 mg, Q6H PRN   Or  acetaminophen (TYLENOL) suppository 650 mg, Q6H PRN  senna (SENOKOT) tablet 8.6 mg, Daily PRN  polyethylene glycol (GLYCOLAX) packet 17 g, Daily PRN  ondansetron (ZOFRAN-ODT) disintegrating tablet 4 mg, Q8H PRN   Or  ondansetron (ZOFRAN) injection 4 mg, Q6H PRN  atorvastatin (LIPITOR) tablet 40 mg, Nightly  insulin lispro (HUMALOG) injection vial 6 Units, TID WC  nitroGLYCERIN (NITROSTAT) SL tablet 0.4 mg, Q5 Min PRN  aspirin EC tablet 81 mg, Daily        Allergies:  Clonidine and Codeine     Review of Systems:   Review of Systems   Constitutional: Negative for chills and fever. HENT: Negative for congestion, rhinorrhea and sore throat. Eyes: Negative for photophobia, pain and visual disturbance. Respiratory: Negative for cough and shortness of breath. Cardiovascular: Positive for chest pain. Negative for palpitations and leg swelling.    Gastrointestinal: Negative for abdominal distention, abdominal pain, blood in stool, constipation, diarrhea, nausea and vomiting. Endocrine: Negative for cold intolerance and heat intolerance. Genitourinary: Negative for difficulty urinating, dysuria and hematuria. Musculoskeletal: Negative for arthralgias, joint swelling and myalgias. Skin: Negative for rash and wound. Allergic/Immunologic: Negative for environmental allergies and food allergies. Neurological: Negative for dizziness, syncope and light-headedness. Hematological: Negative for adenopathy. Does not bruise/bleed easily. Psychiatric/Behavioral: Negative for dysphoric mood. The patient is not nervous/anxious. Objective   PHYSICAL EXAM:    Vitals:    08/17/21 0824   BP: 122/66   Pulse: 50   Resp: 20   Temp: 97.6 °F (36.4 °C)   SpO2: 96%    Weight: 265 lb 8 oz (120.4 kg)       General: Morbidly obese adult male lying in bed in no acute distress. Interactive on exam.  HEENT: Normocephalic, atraumatic, non-icteric, hearing intact, nares normal, mucous membranes moist.  Neck: Supple, trachea midline. No adenopathy. No thyromegaly. No JVD. Heart: Bradycardic with regular rhythm. Normal S1 and S2. No murmurs, gallops or rubs. Lungs: Normal respiratory effort. Clear to auscultation bilaterally. No wheezes, rales, or rhonchi. Abdomen: Soft, non-tender. Normoactive bowel sounds. No masses or organomegaly. Skin: No rashes, wounds, or lesions. Pulses: 2+ and symmetric. Extremities: No clubbing, cyanosis, or edema. Musculoskeletal: 5/5 strength in upper and lower extremities. Psych: Normal mood and affect. Neuro: Alert and oriented to person, place, and time. No focal deficits noted.       Labs   CBC:   Lab Results   Component Value Date    WBC 4.9 08/17/2021    RBC 4.46 08/17/2021    HGB 13.4 08/17/2021    HCT 39.7 08/17/2021    MCV 89.1 08/17/2021    RDW 13.9 08/17/2021     08/17/2021     CMP:  Lab Results   Component Value Date     08/16/2021    K 4.3 08/16/2021    K 4.5 02/07/2020 40-50s since admission. He seems to be asymptomatic during these periods and there have been no significant pauses or evidence of AV block on telemetry.  -Will discontinue beta-blocker and continue to monitor for now  -Encourage compliance with CPAP    4. Paroxysmal atrial tachycardia - Per review of chart, arrhythmia burden appears to be low and he has not had any atrial tachycardia or SVT on observed telemetry since admission.  -Discontinuing low-dose beta-blocker in setting of bradycardia as above    5. Diastolic dysfunction - Currently appears clinically euvolemic.  -No indication for diuretic at this time    6. Essential hypertension - BP currently controlled. -Continue lisinopril 10 mg daily and amlodipine 2.5 mg daily    7. Hyperlipidemia  -On atorvastatin 40 mg daily    8. Type II DM  -Last HbA1c 6.7% on 5/20/20  -Further management per primary team    9. GERD/Yan's esophagus  -Continue pantoprazole 40 mg BID    10. ESTHER  -Encourage compliance with CPAP    11. Morbid obesity  -Will benefit from weight loss through dietary modifications and regular physical exercise      Thank you for allowing us to participate in the care of Jordan Dockery. Please call me with any questions [phone]. Brenna Garcia DO  [facility]  [phone] [facility]  [phone] [address] [place] [address]  8/17/2021 8:31 AM      I will address the patient's cardiac risk factors and adjusted pharmacologic treatment as needed. In addition, I have reinforced the need for patient directed risk factor modification. All questions and concerns were addressed to the patient/family. Alternatives to my treatment were discussed. The note was completed using EMR. Every effort was made to ensure accuracy; however, inadvertent computerized transcription errors may be present.

## 2021-08-17 NOTE — DISCHARGE SUMMARY
Hospital Medicine Discharge Summary    Patient ID: Tiana Love      Patient's PCP: London Olivas DO    Admit Date: 8/16/2021     Discharge Date:   08/17/21     Admitting Physician: Cristy Turner MD     Discharge Physician: Vicente Whitlock MD     Discharge Diagnoses: Active Hospital Problems    Diagnosis     Type 2 diabetes mellitus with hyperglycemia (HCC) [E11.65]     Coronary artery disease involving native coronary artery of native heart with angina pectoris ([facility] [address] [I25.119]     Unstable angina (HCC) [I20.0]     Class 2 severe obesity with serious comorbidity in adult ([facility] [address] [E66.01]     Severe obstructive sleep apnea [G47.33]     Bradycardia with 41-50 beats per minute [R00.1]     Hyperlipidemia [E78.5]     Essential hypertension [I10]        The patient was seen and examined on day of discharge and this discharge summary is in conjunction with any daily progress note from day of discharge. Hospital Course:  Yue Hanson is a 70 y.o. male who presented to the ED to be evaluated for sudden onset substernal chest pain awakening him from sleep earlier this morning. He denies N/V, diaphoresis, and radiation. Patient reports history of HTN, HLD, IDDM, and tobacco abuse. Although he denies underlying history of CAD, results from cardiac catheterization obtained in 2019 was notable for multivessel disease; recommendations were for medical management. \"      Chest pain. Troponin, EKG, TTE, and nuclear stress test reassuring. I do not suspect PE or acute aortic pathology. Patient feels well, discharging home. Continue same medications for HTN, HLD, and DM2. Discouraged smoking. Physical Exam Performed:     /66   Pulse 50   Temp 97.6 °F (36.4 °C) (Oral)   Resp 20   Ht 5' 10\" (1.778 m)   Wt 265 lb 8 oz (120.4 kg)   SpO2 96%   BMI 38.10 kg/m²       General appearance:  No apparent distress, appears stated age and cooperative.   HEENT:  Normal cephalic, atraumatic without obvious deformity. Pupils equal, round, and reactive to light. Extra ocular muscles intact. Conjunctivae/corneas clear. Neck: Supple, with full range of motion. No jugular venous distention. Trachea midline. Respiratory:  Normal respiratory effort. Clear to auscultation, bilaterally without Rales/Wheezes/Rhonchi. Cardiovascular:  Regular rate and rhythm with normal S1/S2 without murmurs, rubs or gallops. Abdomen: Soft, non-tender, non-distended with normal bowel sounds. Musculoskeletal:  No clubbing, cyanosis or edema bilaterally. Full range of motion without deformity. Skin: Skin color, texture, turgor normal.  No rashes or lesions. Neurologic:  Neurovascularly intact without any focal sensory/motor deficits. Cranial nerves: II-XII intact, grossly non-focal.  Psychiatric:  Alert and oriented, thought content appropriate, normal insight  Capillary Refill: Brisk,< 3 seconds   Peripheral Pulses: +2 palpable, equal bilaterally       Labs: For convenience and continuity at follow-up the following most recent labs are provided:      CBC:    Lab Results   Component Value Date    WBC 4.9 08/17/2021    HGB 13.4 08/17/2021    HCT 39.7 08/17/2021     08/17/2021       Renal:    Lab Results   Component Value Date     08/17/2021    K 4.7 08/17/2021     08/17/2021    CO2 31 08/17/2021    BUN 18 08/17/2021    CREATININE 0.9 08/17/2021    CALCIUM 8.4 08/17/2021         Significant Diagnostic Studies    Radiology:   NM Cardiac Stress Test Nuclear Imaging   Final Result      XR CHEST (2 VW)   Final Result   No active cardiopulmonary disease                Consults:     IP CONSULT TO CARDIOLOGY    Disposition:  home     Condition at Discharge: Stable    Discharge Instructions/Follow-up:  Follow up with PCP within 1-2 weeks.        Code Status:  Full Code     Activity: activity as tolerated    Diet: diabetic diet      Discharge Medications:     Current Discharge Medication List Details   aspirin 81 MG chewable tablet Take 1 tablet by mouth daily  Qty: 30 tablet, Refills: 3      nitroGLYCERIN (NITROSTAT) 0.4 MG SL tablet up to max of 3 total doses. If no relief after 1 dose, call 911. Qty: 25 tablet, Refills: 3      fluticasone (FLONASE) 50 MCG/ACT nasal spray 2 sprays by Nasal route daily  Qty: 1 Bottle, Refills: 5      pantoprazole (PROTONIX) 40 MG tablet Take 40 mg by mouth 2 times daily       insulin regular (HUMULIN R;NOVOLIN R) 100 UNIT/ML injection Inject 10 Units into the skin 3 times daily (before meals)       lisinopril (PRINIVIL;ZESTRIL) 10 MG tablet Take 10 mg by mouth daily      atorvastatin (LIPITOR) 20 MG tablet Take 20 mg by mouth daily. amLODIPine (NORVASC) 2.5 MG tablet Take 1 tablet by mouth daily  Qty: 30 tablet, Refills: 1      Continuous Blood Gluc Sensor (FREESTYLE JUSTIN 14 DAY SENSOR) MISC 1 Units by Does not apply route every 14 days  Qty: 2 each, Refills: 11    Associated Diagnoses: Controlled type 2 diabetes mellitus without complication, with long-term current use of insulin ([facility])      ergocalciferol (DRISDOL) 1.25 MG (02667 UT) capsule Take 1 capsule by mouth once a week  Qty: 12 capsule, Refills: 5      Continuous Blood Gluc  (FREESTYLE JUSTIN 14 DAY READER) OLI 1 Units by Does not apply route as needed (as needed)  Qty: 1 Device, Refills: 0    Associated Diagnoses: Controlled type 2 diabetes mellitus without complication, with long-term current use of insulin ([facility])      gabapentin (NEURONTIN) 100 MG capsule Take 2 capsules by mouth nightly for 60 days. Qty: 60 capsule, Refills: 1    Associated Diagnoses: Type 2 diabetes mellitus without complication, with long-term current use of insulin ([facility] [address]  Peripheral polyneuropathy      pramipexole (MIRAPEX) 0.125 MG tablet Take 1 tablet by mouth nightly  Qty: 90 tablet, Refills: 3    Associated Diagnoses: Restless leg syndrome      glucose blood VI test strips (ASCENSIA AUTODISC VI;ONE TOUCH ULTRA TEST VI) strip Check glucose 3 times daily      Insulin Syringe-Needle U-100 30G X 1/2\" 0.5 ML MISC Inject insulin 3 times daily               Time Spent on discharge is more than 30 minutes in the examination, evaluation, counseling and review of medications and discharge plan. Signed:    Kala Keenan MD   8/17/2021      Thank you Roberto Carlos Cardenas DO for the opportunity to be involved in this patient's care. If you have any questions or concerns please feel free to contact me at [phone].

## 2021-08-17 NOTE — DISCHARGE INSTR - COC
Continuity of Care Form    Patient Name: Deb Harrell   :  1950  MRN:  [id number]    Admit date:  2021  Discharge date:  ***    Code Status Order: Full Code   Advance Directives:     Admitting Physician:  Tyler Gunn MD  PCP: Esperanza Oneal DO    Discharging Nurse: [facility] Unit/Room#: 0294/8996-65  Discharging Unit Phone Number: ***    Emergency Contact:   Extended Emergency Contact Information  Primary Emergency Contact: Elaine Amaral  Address: [address] [place] [address] Phone: [phone]  Work Phone: [phone]  Mobile Phone: [phone]  Relation: Spouse    Past Surgical History:  Past Surgical History:   Procedure Laterality Date    ABDOMEN SURGERY      CATARACT REMOVAL Bilateral     CHOLECYSTECTOMY      COLONOSCOPY  10/26/2011    ENDOSCOPY, COLON, DIAGNOSTIC      EGD halo    HYDROCELE EXCISION  11/15/2016    PANCREAS SURGERY      cyst opened up and drining into small bowel    TONSILLECTOMY      UPPER GASTROINTESTINAL ENDOSCOPY  10/04/2016    with biopsy    UPPER GASTROINTESTINAL ENDOSCOPY  2017    Halo ablation    UPPER GASTROINTESTINAL ENDOSCOPY  2017    Halo ablation    UPPER GASTROINTESTINAL ENDOSCOPY  2017    bx distal sophagus    UPPER GASTROINTESTINAL ENDOSCOPY  2017    with HALO  procedure    UPPER GASTROINTESTINAL ENDOSCOPY N/A 2018    EGD WITH ABLATION AND ANESTHESIA - HALO---SLEEP APNEA---  performed by Ozzy Nation MD at [address] [place] [address]  2019    EGD BIOPSY performed by Ozzy Nation MD at [address] [place] [address] 2019    EGD WITH HALO AND ANESTHESIA performed by Ozzy Nation MD at [address] [place] [address] N/A 2019    ESOPHAGOGASTRODUODENOSCOPY WITH HALO AND ANESTHESIA -SLEEP APNEA- performed by Ozzy Nation MD at [address] [place] [address] ENDOSCOPY    WISDOM TOOTH EXTRACTION         Immunization History:   Immunization History   Administered Date(s) Administered    COVID-19, Coreas Peter, PF, 30mcg/0.3mL 03/11/2021, 04/01/2021    Influenza Vaccine, unspecified formulation 12/01/2010, 11/02/2011, 10/09/2012, 10/15/2014, 11/07/2016    Influenza Virus Vaccine 10/07/2014, 10/15/2015    Influenza, High Dose (Fluzone 65 yrs and older) 10/15/2015, 11/07/2016, 10/20/2017, 10/10/2018, 09/01/2020    Influenza, Triv, inactivated, subunit, adjuvanted, IM (Fluad 65 yrs and older) 09/17/2019    Pneumococcal Conjugate 13-valent ([id number]) 09/17/2019    Pneumococcal Polysaccharide (Luetjrwjl77) 10/15/2015    Td, unspecified formulation 03/07/2008    Tdap (Boostrix, Adacel) 12/25/2010       Active Problems:  Patient Active Problem List   Diagnosis Code    Cataract H26.9    Mass of skin of hand R22.30    Pulmonary nodules R91.8    Epigastric pain R10.13    Need for vaccination Z23    Hydrocele in adult N43.3    Lung nodule R91.1    History of snoring Z87.898    Essential hypertension I10    Hyperlipidemia E78.5    Controlled type 2 diabetes mellitus without complication, with long-term current use of insulin (HCC) E11.9, Z79.4    Excessive daytime sleepiness G47.19    Right arm pain M79.601    Bradycardia with 41-50 beats per minute R00.1    Heart murmur R01.1    Impingement syndrome of right shoulder M75.41    Lumbar radiculopathy M54.16    Idiopathic peripheral neuropathy G60.9    Severe obstructive sleep apnea G47.33    Class 2 severe obesity with serious comorbidity in adult ([facility] [address] E66.01    Unstable angina (HCC) I20.0    Dyspnea R06.00    Coronary artery disease involving native coronary artery of native heart with angina pectoris (HCC) I25.119    History of phobia Z86.59    Cellulitis of right lower extremity L03.115    Peripheral polyneuropathy G62.9    Traumatic injury to skin or subcutaneous tissue T14. 8XXA    Vitamin D deficiency E55.9    Influenza J11.1    Diabetic autonomic neuropathy associated with type 2 diabetes mellitus (HCC) E11.43    Chest pain in adult R07.9    Atrial tachycardia (HCC) I47.1    Type 2 diabetes mellitus with hyperglycemia (HCC) E11.65       Isolation/Infection:   Isolation          No Isolation        Patient Infection Status     Infection Onset Added Last Indicated Last Indicated By Review Planned Expiration Resolved Resolved By    None active    Resolved    INFLUENZA 20 Rapid influenza A/B antigens   20           Nurse Assessment:  Last Vital Signs: /61   Pulse 50   Temp 97.9 °F (36.6 °C) (Oral)   Resp 18   Ht 5' 10\" (1.778 m)   Wt 265 lb 8 oz (120.4 kg)   SpO2 91%   BMI 38.10 kg/m²     Last documented pain score (0-10 scale): Pain Level: 0  Last Weight:   Wt Readings from Last 1 Encounters:   21 265 lb 8 oz (120.4 kg)     Mental Status:  {IP PT MENTAL STATUS:43111}    IV Access:  { PORSCHE IV ACCESS:945681830}    Nursing Mobility/ADLs:  Walking   {CHP DME SRZN:066729039}  Transfer  {P DME YDAE:980607689}  Bathing  {CHP DME EPRY:751513639}  Dressing  {CHP DME EHHL:266629590}  Toileting  {CHP DME QDDB:043812285}  Feeding  {P DME EWBV:666181300}  Med Admin  {P DME AJYR:645878249}  Med Delivery   {[facility] MED Delivery:548890635}    Wound Care Documentation and Therapy:        Elimination:  Continence:   · Bowel: {YES / NP:12388}  · Bladder: {YES / QD:40119}  Urinary Catheter: {Urinary Catheter:345292881}   Colostomy/Ileostomy/Ileal Conduit: {YES / GN:44375}       Date of Last BM: ***    Intake/Output Summary (Last 24 hours) at 2021 1736  Last data filed at 2021 0656  Gross per 24 hour   Intake 815.59 ml   Output 0 ml   Net 815.59 ml     I/O last 3 completed shifts: In: 815.6 [P.O.:277;  I.V.:538.6]  Out: 0     Safety Concerns:     [address] Safety Concerns:521578898}    Impairments/Disabilities:      [address] Impairments/Disabilities:932335869}    Nutrition Therapy:  Current Nutrition Therapy:   [address] PORSCHE Diet List:577373806}    Routes of Feeding: {CHP DME Other Feedings:787327329}  Liquids: {Slp liquid thickness:32147}  Daily Fluid Restriction: {CHP DME Yes amt example:501111970}  Last Modified Barium Swallow with Video (Video Swallowing Test): {Done Not Done SWIN:783717064}    Treatments at the Time of Hospital Discharge:   Respiratory Treatments: ***  Oxygen Therapy:  {Therapy; copd oxygen:17836}  Ventilator:    {[facility] Vent IHGE:964314223}    Rehab Therapies: {THERAPEUTIC INTERVENTION:7674516773}  Weight Bearing Status/Restrictions: {[facility] Weight Bearin}  Other Medical Equipment (for information only, NOT a DME order):  {EQUIPMENT:316265289}  Other Treatments: ***    Patient's personal belongings (please select all that are sent with patient):  {[facility] DME Belongings:581036053}    RN SIGNATURE:  {Esignature:856720820}    CASE MANAGEMENT/SOCIAL WORK SECTION    Inpatient Status Date: ***    Readmission Risk Assessment Score:  Readmission Risk              Risk of Unplanned Readmission:  11           Discharging to Facility/ Agency   · Name:   · Address:  · Phone:  · Fax:    Dialysis Facility (if applicable)   · Name:  · Address:  · Dialysis Schedule:  · Phone:  · Fax:    / signature: {Esignature:315012682}    PHYSICIAN SECTION    Prognosis: {Prognosis:9701949310}    Condition at Discharge: [address] Patient Condition:887108484}    Rehab Potential (if transferring to Rehab): {Prognosis:9967425448}    Recommended Labs or Other Treatments After Discharge: ***    Physician Certification: I certify the above information and transfer of Sam Recio  is necessary for the continuing treatment of the diagnosis listed and that he requires {Admit to Appropriate Level of Care:94724} for {GREATER/LESS:911360395} 30 days.      Update Admission H&P: {CHP DME Changes in FVPKA:632233109}    PHYSICIAN

## 2021-08-17 NOTE — PLAN OF CARE
Managed patients pain level is being monitored per patient report. He reports he is not experiencing any pain and that it is controlled. The patient is in bed with the bed alarm on, call light within reach and bed in the lowest position with two side rails up. He is a standby assist x1 and can turn self.  Electronically signed by Dayami Hollis RN on 8/17/2021 at 11:34 AM

## 2021-08-17 NOTE — CARE COORDINATION
CASE MANAGEMENT INITIAL ASSESSMENT      Reviewed chart and completed assessment bedside with patient  Explained Case Management role/services. yes    Primary contact information:wife, [address] :           Can this person be reached and be able to respond quickly, such as within a few minutes or hours? Yes      Admit date/status:8/16/21, inpatient  Diagnosis:chest pain   Is this a Readmission?:  No      Insurance:medicare   Precert required for SNF: No       3 night stay required: Yes    Living arrangements, Adls, care needs, prior to admission:with wife,     Transportation:wife     Durable Medical Equipment at home:  Walker__Cane__RTS__ BSC__Shower Chair__  02__ HHN__ CPAP__  BiPap__  Hospital Bed__ W/C___ Other__________    Services in the home and/or outpatient, prior to admission:none      Barriers to discharge:medical complications    Plan/comments:referred to patient for d/c planning. Spoke to patient. Patient is a 70year old male admitted for chest pain. Patient usually lives at home with wife. Patient reports he is independent in ADLs. Patient denies d/c needs at this time.        ECOC on chart for MD signature    Electronically signed by TOI Lamb, MAL on 8/17/2021 at 11:43 AM

## 2021-08-18 ENCOUNTER — CARE COORDINATION (OUTPATIENT)
Dept: CASE MANAGEMENT | Age: 71
End: 2021-08-18

## 2021-08-18 NOTE — CARE COORDINATION
[facility] Transitions Initial Follow Up Call    Call within 2 business days of discharge: Yes    Patient: Bushra Thompson Patient : 1950   MRN: [id number]  Reason for Admission: CP   Discharge Date: 21 RARS: Readmission Risk Score: 11      Last Discharge [facility]       Complaint Diagnosis Description Type Department Provider    21 Chest Pain Acute chest pain . .. ED to Hosp-Admission (Discharged) (ADMITTED) Gianfranco Trejo MD; Deborah Turner Pr. .. Attempted to reach patient via phone for initial post hospital transition call. VM left stating purpose of call along with my contact information requesting a return call.           Care Transitions 24 Hour Call    Do you have all of your prescriptions and are they filled?: Yes  Post Acute Services: Home Health (Comment: [facility])  Patient Home Equipment: CPAP  Do you have support at home?: Partner/Spouse/SO  Are you an active caregiver in your home?: No  Care Transitions Interventions         Follow Up  Future Appointments   Date Time Provider Magdalena Murguia   10/4/2021 11:15 AM Jeremy Lyman MD [facility]       Linda ATKINS, RN, [facility]  Care Transition Nurse  [phone] mobile

## 2021-08-19 ENCOUNTER — CARE COORDINATION (OUTPATIENT)
Dept: CASE MANAGEMENT | Age: 71
End: 2021-08-19

## 2021-08-19 NOTE — CARE COORDINATION
[address] Transitions Initial Follow Up Call    Call within 2 business days of discharge: Yes    Patient: Celestina Hargrove Patient : 1950   MRN: [id number]  Reason for Admission: CP  Discharge Date: 21 RARS: Readmission Risk Score: 11      Last Discharge [facility]       Complaint Diagnosis Description Type Department Provider    21 Chest Pain Acute chest pain . .. ED to Hosp-Admission (Discharged) (ADMITTED) Los Chavis MD; Chiki Pathak Pr. .. Spoke with: na    Second and final attempt made to reach patient for initial post hospital transition call. VM left stating purpose of call along with my contact information requesting a return call.  Episode closed due to unsuccessful contact  Saleem Sheppard RN   Care Transition Nurse  [phone]    Care Transitions 24 Hour Call    Do you have all of your prescriptions and are they filled?: Yes  Patient Home Equipment: CPAP  Do you have support at home?: Partner/Spouse/SO  Are you an active caregiver in your home?: No  Care Transitions Interventions         Follow Up  Future Appointments   Date Time Provider Magdalena Murguia   10/4/2021 11:15 AM MD MALIKA Del Rosario, RN

## 2021-09-16 ENCOUNTER — OFFICE VISIT (OUTPATIENT)
Dept: FAMILY MEDICINE CLINIC | Age: 71
End: 2021-09-16
Payer: MEDICARE

## 2021-09-16 ENCOUNTER — TELEPHONE (OUTPATIENT)
Dept: FAMILY MEDICINE CLINIC | Age: 71
End: 2021-09-16

## 2021-09-16 VITALS
SYSTOLIC BLOOD PRESSURE: 110 MMHG | WEIGHT: 255 LBS | OXYGEN SATURATION: 98 % | DIASTOLIC BLOOD PRESSURE: 62 MMHG | BODY MASS INDEX: 36.51 KG/M2 | HEIGHT: 70 IN | HEART RATE: 78 BPM

## 2021-09-16 DIAGNOSIS — G47.33 SEVERE OBSTRUCTIVE SLEEP APNEA: ICD-10-CM

## 2021-09-16 DIAGNOSIS — Z79.4 TYPE 2 DIABETES MELLITUS WITH HYPERGLYCEMIA, WITH LONG-TERM CURRENT USE OF INSULIN (HCC): ICD-10-CM

## 2021-09-16 DIAGNOSIS — I51.89 GRADE II DIASTOLIC DYSFUNCTION: ICD-10-CM

## 2021-09-16 DIAGNOSIS — I10 ESSENTIAL HYPERTENSION: ICD-10-CM

## 2021-09-16 DIAGNOSIS — E66.01 MORBID OBESITY (HCC): ICD-10-CM

## 2021-09-16 DIAGNOSIS — R25.1 TREMOR: ICD-10-CM

## 2021-09-16 DIAGNOSIS — E11.43 DIABETIC AUTONOMIC NEUROPATHY ASSOCIATED WITH TYPE 2 DIABETES MELLITUS (HCC): ICD-10-CM

## 2021-09-16 DIAGNOSIS — F03.90 DEMENTIA WITHOUT BEHAVIORAL DISTURBANCE, UNSPECIFIED DEMENTIA TYPE: ICD-10-CM

## 2021-09-16 DIAGNOSIS — E78.2 MIXED HYPERLIPIDEMIA: ICD-10-CM

## 2021-09-16 DIAGNOSIS — Z76.89 ENCOUNTER TO ESTABLISH CARE: Primary | ICD-10-CM

## 2021-09-16 DIAGNOSIS — E11.65 TYPE 2 DIABETES MELLITUS WITH HYPERGLYCEMIA, WITH LONG-TERM CURRENT USE OF INSULIN (HCC): ICD-10-CM

## 2021-09-16 PROBLEM — L98.492 NON-PRESSURE CHRONIC ULCER OF SKIN OF OTHER SITES WITH FAT LAYER EXPOSED (HCC): Status: RESOLVED | Noted: 2021-09-16 | Resolved: 2021-09-16

## 2021-09-16 PROBLEM — R91.1 LUNG NODULE: Status: RESOLVED | Noted: 2017-08-21 | Resolved: 2021-09-16

## 2021-09-16 PROBLEM — L03.115 CELLULITIS OF RIGHT LOWER EXTREMITY: Status: RESOLVED | Noted: 2019-07-30 | Resolved: 2021-09-16

## 2021-09-16 PROBLEM — J11.1 INFLUENZA: Status: RESOLVED | Noted: 2020-02-05 | Resolved: 2021-09-16

## 2021-09-16 PROBLEM — L98.492 NON-PRESSURE CHRONIC ULCER OF SKIN OF OTHER SITES WITH FAT LAYER EXPOSED (HCC): Status: ACTIVE | Noted: 2021-09-16

## 2021-09-16 PROBLEM — M75.41 IMPINGEMENT SYNDROME OF RIGHT SHOULDER: Status: RESOLVED | Noted: 2017-12-04 | Resolved: 2021-09-16

## 2021-09-16 PROBLEM — R07.9 CHEST PAIN IN ADULT: Status: RESOLVED | Noted: 2020-08-18 | Resolved: 2021-09-16

## 2021-09-16 PROBLEM — G60.9 IDIOPATHIC PERIPHERAL NEUROPATHY: Status: RESOLVED | Noted: 2018-02-02 | Resolved: 2021-09-16

## 2021-09-16 PROBLEM — E66.812 CLASS 2 SEVERE OBESITY WITH SERIOUS COMORBIDITY IN ADULT: Status: RESOLVED | Noted: 2019-03-21 | Resolved: 2021-09-16

## 2021-09-16 PROBLEM — E11.9 CONTROLLED TYPE 2 DIABETES MELLITUS WITHOUT COMPLICATION, WITH LONG-TERM CURRENT USE OF INSULIN (HCC): Chronic | Status: RESOLVED | Noted: 2017-08-21 | Resolved: 2021-09-16

## 2021-09-16 PROBLEM — G47.19 EXCESSIVE DAYTIME SLEEPINESS: Status: RESOLVED | Noted: 2017-09-20 | Resolved: 2021-09-16

## 2021-09-16 PROBLEM — Z87.898 HISTORY OF SNORING: Status: RESOLVED | Noted: 2017-08-21 | Resolved: 2021-09-16

## 2021-09-16 PROBLEM — M79.601 RIGHT ARM PAIN: Status: RESOLVED | Noted: 2017-11-28 | Resolved: 2021-09-16

## 2021-09-16 PROBLEM — Z86.59 HISTORY OF PHOBIA: Status: RESOLVED | Noted: 2019-05-30 | Resolved: 2021-09-16

## 2021-09-16 PROBLEM — T14.8XXA TRAUMATIC INJURY TO SKIN OR SUBCUTANEOUS TISSUE: Status: RESOLVED | Noted: 2019-12-17 | Resolved: 2021-09-16

## 2021-09-16 PROBLEM — M54.16 LUMBAR RADICULOPATHY: Status: RESOLVED | Noted: 2018-01-15 | Resolved: 2021-09-16

## 2021-09-16 PROBLEM — R00.1 BRADYCARDIA WITH 41-50 BEATS PER MINUTE: Status: RESOLVED | Noted: 2017-11-28 | Resolved: 2021-09-16

## 2021-09-16 PROCEDURE — 2022F DILAT RTA XM EVC RTNOPTHY: CPT | Performed by: PHYSICIAN ASSISTANT

## 2021-09-16 PROCEDURE — 99204 OFFICE O/P NEW MOD 45 MIN: CPT | Performed by: PHYSICIAN ASSISTANT

## 2021-09-16 PROCEDURE — G8427 DOCREV CUR MEDS BY ELIG CLIN: HCPCS | Performed by: PHYSICIAN ASSISTANT

## 2021-09-16 PROCEDURE — 1123F ACP DISCUSS/DSCN MKR DOCD: CPT | Performed by: PHYSICIAN ASSISTANT

## 2021-09-16 PROCEDURE — 1111F DSCHRG MED/CURRENT MED MERGE: CPT | Performed by: PHYSICIAN ASSISTANT

## 2021-09-16 PROCEDURE — 1036F TOBACCO NON-USER: CPT | Performed by: PHYSICIAN ASSISTANT

## 2021-09-16 PROCEDURE — 3017F COLORECTAL CA SCREEN DOC REV: CPT | Performed by: PHYSICIAN ASSISTANT

## 2021-09-16 PROCEDURE — 3052F HG A1C>EQUAL 8.0%<EQUAL 9.0%: CPT | Performed by: PHYSICIAN ASSISTANT

## 2021-09-16 PROCEDURE — 4040F PNEUMOC VAC/ADMIN/RCVD: CPT | Performed by: PHYSICIAN ASSISTANT

## 2021-09-16 PROCEDURE — G8417 CALC BMI ABV UP PARAM F/U: HCPCS | Performed by: PHYSICIAN ASSISTANT

## 2021-09-16 RX ORDER — GLIPIZIDE 2.5 MG/1
2.5 TABLET, EXTENDED RELEASE ORAL DAILY
Qty: 90 TABLET | Refills: 0 | Status: SHIPPED | OUTPATIENT
Start: 2021-09-16 | End: 2021-11-17 | Stop reason: SDUPTHER

## 2021-09-16 RX ORDER — EMPAGLIFLOZIN 10 MG/1
10 TABLET, FILM COATED ORAL DAILY
Qty: 30 TABLET | Refills: 3 | Status: SHIPPED | OUTPATIENT
Start: 2021-09-16 | End: 2021-09-16

## 2021-09-16 RX ORDER — DONEPEZIL HYDROCHLORIDE 5 MG/1
5 TABLET, FILM COATED ORAL NIGHTLY
Qty: 30 TABLET | Refills: 3 | Status: SHIPPED | OUTPATIENT
Start: 2021-09-16 | End: 2021-11-17 | Stop reason: SDUPTHER

## 2021-09-16 SDOH — ECONOMIC STABILITY: FOOD INSECURITY: WITHIN THE PAST 12 MONTHS, YOU WORRIED THAT YOUR FOOD WOULD RUN OUT BEFORE YOU GOT MONEY TO BUY MORE.: NEVER TRUE

## 2021-09-16 SDOH — ECONOMIC STABILITY: FOOD INSECURITY: WITHIN THE PAST 12 MONTHS, THE FOOD YOU BOUGHT JUST DIDN'T LAST AND YOU DIDN'T HAVE MONEY TO GET MORE.: NEVER TRUE

## 2021-09-16 ASSESSMENT — ENCOUNTER SYMPTOMS
ABDOMINAL PAIN: 0
SHORTNESS OF BREATH: 0
COUGH: 0
VOICE CHANGE: 0
TROUBLE SWALLOWING: 0
BLOOD IN STOOL: 0
WHEEZING: 0

## 2021-09-16 ASSESSMENT — PATIENT HEALTH QUESTIONNAIRE - PHQ9
1. LITTLE INTEREST OR PLEASURE IN DOING THINGS: 0
SUM OF ALL RESPONSES TO PHQ QUESTIONS 1-9: 0
2. FEELING DOWN, DEPRESSED OR HOPELESS: 0
SUM OF ALL RESPONSES TO PHQ9 QUESTIONS 1 & 2: 0

## 2021-09-16 ASSESSMENT — SOCIAL DETERMINANTS OF HEALTH (SDOH): HOW HARD IS IT FOR YOU TO PAY FOR THE VERY BASICS LIKE FOOD, HOUSING, MEDICAL CARE, AND HEATING?: NOT HARD AT ALL

## 2021-09-16 NOTE — PROGRESS NOTES
Tiana Love (:  1950) is a 70 y.o. male,New patient, here for evaluation of the following chief complaint(s):  New Patient, Diabetes, and Other (possible parkinson's, would like to discuss )         ASSESSMENT/PLAN:  1. Encounter to establish care  2. Tremor  -     AFL - Sana Romero MD, Neurology, [facility]  -     I suspect that this is an essential tremor and not parkinson's, will send to neurology for further characterization  3. Dementia without behavioral disturbance, unspecified dementia type (HCC)  -     Cherrie Wills MD, Neurology, [facility]  -     donepezil (ARICEPT) 5 MG tablet; Take 1 tablet by mouth nightly, Disp-30 tablet, R-3Normal  -      Medication risks, benefits and side effects discussed. Abnormal test for neurology, will send to neuro  4. Type 2 diabetes mellitus with hyperglycemia, with long-term current use of insulin ([facility])        -    Start glipizide due to financial issues. Lower insulin to 7 Units twice per day. Work on eating more regular and uniform meals using the plate method of eating. 5. Diabetic autonomic neuropathy associated with type 2 diabetes mellitus ([facility] [address]        -    Stable, diabetic foot exam at next appoitnment  6. Severe obstructive sleep apnea        -    Pt has appointment scheduled for next month  7. Essential hypertension       -     Well controlled, pt is due for follow up with cardiology and will schedule  8. Mixed hyperlipidemia       -     Lab work at next appointment, adjust medication as needed  9. Morbid obesity ([address]       -     Increase physical activity as tolerated  10. Grade II diastolic dysfunction       -    Follow up with cardiology    Return in about 2 months (around 2021) for DM.          Subjective   SUBJECTIVE/OBJECTIVE:  HPI  DM:  Fasting glucose range:   Diet: eats three meals a day, no snacking  Exercise: nothing lately  Current medication: novolin 10 U BID    HLD/CAD:  Current medication: atorvastatin  Denies any side effects  Stress Test 08/16/21:  Conclusions        Summary    There is normal isotope uptake at stress and rest. There is no evidence of    myocardial ischemia or scar. LV function is normal with ejection fraction of    58%. Low risk study.           ESTHER: Severe. unable to tolerate mask. Pt see's Dr. Johanne Hughes and has upcoming appointment with him in October. HTN:  Seeing Dr. Lowe and Dr. Maria Elena Richardson. Has been a year since he has seen either. Pt did not follow up with appointments at that time. Parkinson's symptoms:  Current symptoms: tremor with activity only, typically notices just in his hands when writing; Having slow walking  Memory: forgetfulness, short term memory, gets lost driving  Denies changes to gait, mood disturbance  + family hx of parkinson's in his father  Has never seen a neurologits, currently taking mirapex as needed for RLS not parkinson's    Renal cyst:  Noted on 08/20/21, 23 cm with mass effect  Has been there since 02/21    Review of Systems   Constitutional: Negative for activity change, appetite change, fatigue and unexpected weight change. HENT: Negative for trouble swallowing and voice change. Eyes: Negative for visual disturbance. Respiratory: Negative for cough, shortness of breath and wheezing. Cardiovascular: Negative for chest pain, palpitations and leg swelling. Gastrointestinal: Negative for abdominal pain and blood in stool. Endocrine: Negative for polydipsia, polyphagia and polyuria. Musculoskeletal: Negative for gait problem. Skin: Negative for pallor. Neurological: Positive for tremors. Negative for dizziness, seizures, facial asymmetry, speech difficulty, light-headedness and numbness. Psychiatric/Behavioral: Positive for confusion. Negative for agitation, behavioral problems, dysphoric mood and sleep disturbance. The patient is not nervous/anxious. Objective   Physical Exam  Vitals reviewed.    Constitutional:       Appearance: Normal appearance. HENT:      Head: Normocephalic and atraumatic. Eyes:      Conjunctiva/sclera: Conjunctivae normal.   Cardiovascular:      Rate and Rhythm: Normal rate and regular rhythm. Heart sounds: Normal heart sounds. Pulmonary:      Effort: Pulmonary effort is normal.      Breath sounds: Normal breath sounds. Neurological:      Mental Status: He is alert and oriented to person, place, and time. Cranial Nerves: No cranial nerve deficit. Motor: Tremor present. Comments: Very mild tremor with writing   Psychiatric:         Attention and Perception: Perception normal. He is inattentive. Mood and Affect: Mood and affect normal.         Speech: Speech normal.         Behavior: Behavior normal. Behavior is cooperative. Thought Content: Thought content normal.         Cognition and Memory: Cognition normal. He exhibits impaired recent memory. Judgment: Judgment normal.                  An electronic signature was used to authenticate this note.     --JENNIFER Mckeon

## 2021-09-16 NOTE — TELEPHONE ENCOUNTER
----- Message from Sylvester Bernal sent at 9/16/2021  2:12 PM EDT -----  Subject: Medication Problem    QUESTIONS  Name of Medication? empagliflozin (JARDIANCE) 10 MG tablet  Patient-reported dosage and instructions? did not   What question or problem do you have with the medication? Pt's spouse   states the pharmacy asked for $400 for this one prescription. Can the   physician call in another more affordable option as a substitute for this   med. Thank you  Preferred Pharmacy? [facility] [address] [phone]  Pharmacy phone number (if available)? [phone]  Additional Information for Provider?   ---------------------------------------------------------------------------  --------------  CALL BACK INFO  What is the best way for the office to contact you? OK to leave message on   voicemail  Preferred Call Back Phone Number? [phone]  ---------------------------------------------------------------------------  --------------  SCRIPT ANSWERS  Relationship to Patient?  Self

## 2021-09-16 NOTE — TELEPHONE ENCOUNTER
Was it covered at all? It looks like once weekly Trulicity is covered. He will pick a day and inject one time per week. Side effects include decreased appetite, constipation and nausea. Please let me know if this is not covered.  Thank you

## 2021-09-16 NOTE — TELEPHONE ENCOUNTER
----- Message from Porter Marion sent at 9/16/2021  2:14 PM EDT -----  Subject: Medication Problem    QUESTIONS  Name of Medication? Dulaglutide 0.75 MG/0.5ML SOPN  Patient-reported dosage and instructions? did not  -too expensive   What question or problem do you have with the medication? Pt's spouse   states the pharmacy asked for $490 for this one prescription. Can the   physician call in another more affordable option as a substitute for this   med. Thank you  Preferred Pharmacy? [facility] [address] [phone]  Pharmacy phone number (if available)? [phone]  Additional Information for Provider?   ---------------------------------------------------------------------------  --------------  CALL BACK INFO  What is the best way for the office to contact you? OK to leave message on   voicemail  Preferred Call Back Phone Number? [phone]  ---------------------------------------------------------------------------  --------------  SCRIPT ANSWERS  Relationship to Patient? Other  Representative Name? spouse  Is the Representative on the appropriate HIPAA document in Epic?  Yes

## 2021-09-16 NOTE — PROGRESS NOTES
[address]   Electrophysiology Outpatient Note              Date:  September 17, 2021  Patient name: Khanh Mcleod  YOB: 1950    Primary Care physician: JENNIFER Mckeon    HISTORY OF PRESENT ILLNESS: The patient is a 70 y.o.  male with a history of atrial tachycardia, sinus bradycardia, HTN, nonobstructive CAD, HLD, ESTHER, Yan's esophagus, possible dementia, and DM. He is a poor historian. LHC in 3/2019 showed moderate CAD to be treated medically. Event monitor 9/2020 showed episodes of AT and continuing Toprol was recommended. He was admitted in 8/2021 with chest pain and workup was negative. Beta blocker was stopped due to sinus bradycardia. Stress test in 8/2021 was negative. Most recent echo in 8/2021 showed an EF of 55-60%. Today he is being seen for atrial tachycardia and bradycardia. EKG shows sinus carlos with a HR of 51. Patient states:    'alright I guess' when asked how he is feeling    'not that I know of' when asked if having chest pain and SOB    'sometimes I guess' dizziness   -he reports fatigue and LE swelling    Denies syncope and palpitations. Past Medical History:   has a past medical history of Acid reflux, Yan's esophagus, Coronary artery disease of native heart with stable angina pectoris ([address] Diabetes mellitus ([facility] [address] Diabetic autonomic neuropathy associated with type 2 diabetes mellitus ([facility] [address] Hyperlipidemia, Hypertension, Influenza A, Pancreatitis, Restless legs, Sleep apnea, and Tremor. Past Surgical History:   has a past surgical history that includes Pancreas surgery; Cholecystectomy; Tonsillectomy; [place] tooth extraction; Colonoscopy (10/26/2011); Cataract removal (Bilateral, 2013); Upper gastrointestinal endoscopy (10/04/2016); Hydrocele surgery (11/15/2016); Endoscopy, colon, diagnostic; Upper gastrointestinal endoscopy (03/16/2017); Upper gastrointestinal endoscopy (06/26/2017);  Upper gastrointestinal endoscopy (09/06/2017); Upper gastrointestinal endoscopy (12/22/2017); Upper gastrointestinal endoscopy (N/A, 12/4/2018); Upper gastrointestinal endoscopy (2/19/2019); Upper gastrointestinal endoscopy (N/A, 6/11/2019); Upper gastrointestinal endoscopy (N/A, 8/13/2019); and Abdomen surgery. Home Medications:    Prior to Admission medications    Medication Sig Start Date End Date Taking? Authorizing Provider   donepezil (ARICEPT) 5 MG tablet Take 1 tablet by mouth nightly 9/16/21  Yes JENNIFER Oneal   glipiZIDE (GLUCOTROL XL) 2.5 MG extended release tablet Take 1 tablet by mouth daily 9/16/21  Yes JENNIFER Oneal   amLODIPine (NORVASC) 2.5 MG tablet Take 1 tablet by mouth daily 9/3/20  Yes SANDY Ferrer CNP   Continuous Blood Gluc Sensor (FREESTYLE JUSTIN 14 DAY SENSOR) MISC 1 Units by Does not apply route every 14 days 7/14/20  Yes SANDY Montalvo CNP   Continuous Blood Gluc  (FREESTYLE JUSTIN 14 DAY READER) OLI 1 Units by Does not apply route as needed (as needed) 12/31/19  Yes SANDY Montalvo CNP   pramipexole (MIRAPEX) 0.125 MG tablet Take 1 tablet by mouth nightly 9/17/19  Yes SANDY Lee CNP   aspirin 81 MG chewable tablet Take 1 tablet by mouth daily 3/26/19  Yes Sultana Diaz MD   nitroGLYCERIN (NITROSTAT) 0.4 MG SL tablet up to max of 3 total doses. If no relief after 1 dose, call 911. 3/25/19  Yes Sultana Diaz MD   Insulin Syringe-Needle U-100 30G X 1/2\" 0.5 ML MISC Inject insulin 3 times daily 9/12/17  Yes Historical Provider, MD   insulin regular (HUMULIN R;NOVOLIN R) 100 UNIT/ML injection Inject 10 Units into the skin 3 times daily (before meals)    Yes Historical Provider, MD   lisinopril (PRINIVIL;ZESTRIL) 10 MG tablet Take 10 mg by mouth daily   Yes Historical Provider, MD   atorvastatin (LIPITOR) 20 MG tablet Take 20 mg by mouth daily.    Yes Historical Provider, MD       Allergies:  Clonidine and Codeine    Social History:   reports that he has never smoked. He has never used smokeless tobacco. He reports that he does not drink alcohol and does not use drugs. Family History: family history includes Cancer in his brother and paternal grandfather; Cancer (age of onset: 76) in his father; Kidney Disease in his mother. Review of Systems   All 14-point review of systems are completed and pertinent positives are mentioned in the history of present illness. Other systems are reviewed and are negative. PHYSICAL EXAM:    Vital signs:    /60   Pulse 53   Ht 5' 10\" (1.778 m)   Wt 259 lb 8 oz (117.7 kg)   SpO2 98%   BMI 37.23 kg/m²      Constitutional and general appearance: alert, cooperative, no distress, slowed mentation and appears stated age  HEENT: PERRL, no cervical lymphadenopathy. No masses palpable. Normal oral mucosa  Respiratory:  · Normal excursion and expansion without use of accessory muscles  · Resp auscultation: Normal breath sounds without wheezing, rhonchi, and rales  Cardiovascular:  · The apical impulse is not displaced  · Heart tones are crisp and normal. regular S1 and S2.  · Jugular venous pulsation Normal  · The carotid upstroke is normal in amplitude and contour without delay or bruit  · Peripheral pulses are symmetrical and full   Abdomen:  · No masses or tenderness  · Bowel sounds present  Extremities:  ·  No cyanosis or clubbing  ·  Trace lower extremity edema  ·  Skin: warm and dry  Neurological:  · Alert   · Moves all extremities well  · + slowed mentation and flat affect     DATA:    ECG 9/17/2021:  SB HR 51    Echo 8/17/2021:  Normal left ventricular systolic function with ejection fraction of 55-60%. No regional wall motion abnormalites are seen. Left ventricular diastolic filling pressure is normal.   Normal RV size and systolic function. Mild left atrial enlargement. Trivial tricuspid regurgitation.      Stress test 8/17/2021:  There is normal isotope uptake at stress and rest. There is no evidence of  myocardial ischemia or scar. LV function is normal with ejection fraction of    58%. Low risk study. [facility] 3/2019 Vira Newer):  LVGRAM     LVEDP 4   GRADIENT ACROSS AORTIC VALVE No gradient   LV FUNCTION EF 65%   WALL MOTION Normal   MITRAL REGURGITATION Mild         CORONARY ARTERIES     LM <10% stenosis.       LAD Prox <10% stenosis. Mid 30-40% stenosis. Distal 30-40% stenosis. LAD wraps around apex. D1 has ostial 80% stenosis.       LCX 10% prox-mid stenosis. OM1 bifurcates in prox segment and there is prox-mid OM1 40-50% stenosis.       RI Mid 60% stenosis.       RCA Dominant. Slight anterior takeoff, Prox-mid 20-30% stenoses. Distal 20% stenosis. Tortuous vessel.       CONCLUSIONS:      Moderate cad/ashd in major epicardial vessels  Would treat medically  Add imdur 30mg daily, and metoprolol tartrate 25mg bid  Ok for dc later today    All labs and testing reviewed. CARDIOLOGY LABS:   CBC: No results for input(s): WBC, HGB, HCT, PLT in the last 72 hours. BMP: No results for input(s): NA, K, CO2, BUN, CREATININE, LABGLOM, GLUCOSE in the last 72 hours. PT/INR: No results for input(s): PROTIME, INR in the last 72 hours. APTT:No results for input(s): APTT in the last 72 hours. FASTING LIPID PANEL:  Lab Results   Component Value Date    HDL 47 08/17/2021    LDLCALC 127 08/17/2021    TRIG 169 08/17/2021     LIVER PROFILE:No results for input(s): AST, ALT, ALB in the last 72 hours. Assessment:   Atrial tachycardia: stable   -noted on monitor 9/2020 (beta blocker started)  Sinus bradycardia: stable    -beta blocker stopped 8/2021  HTN: controlled    CAD, moderate   -s/p [facility] 3/2019   -stress test negative 8/2021  HLD  ESTHER: uses BiPAP   -followed by Dr. Kelsey Jacobs  DM  Possible dementia     Plan:   1. Continue ASA, statin, and lisinopril  2. Stop amlodipine to see if swelling improves   3. Monitor BP at home and call if consistently out of goal ranges   4.  Two week event monitor to evaluate bradycardia   5.  Follow up in January unless indicated sooner      MDM: moderate     Alvin Miller, [address]  [phone]

## 2021-09-16 NOTE — TELEPHONE ENCOUNTER
Patient went to [facility] to  the Jardiance and states it is to expensive.  He said it was discussed at his appointment it could be changed to something cheaper

## 2021-09-17 ENCOUNTER — OFFICE VISIT (OUTPATIENT)
Dept: CARDIOLOGY CLINIC | Age: 71
End: 2021-09-17
Payer: MEDICARE

## 2021-09-17 ENCOUNTER — TELEPHONE (OUTPATIENT)
Dept: CARDIOLOGY CLINIC | Age: 71
End: 2021-09-17

## 2021-09-17 VITALS
HEIGHT: 70 IN | BODY MASS INDEX: 37.15 KG/M2 | HEART RATE: 53 BPM | DIASTOLIC BLOOD PRESSURE: 60 MMHG | WEIGHT: 259.5 LBS | SYSTOLIC BLOOD PRESSURE: 130 MMHG | OXYGEN SATURATION: 98 %

## 2021-09-17 DIAGNOSIS — I10 ESSENTIAL HYPERTENSION: ICD-10-CM

## 2021-09-17 DIAGNOSIS — I47.1 ATRIAL TACHYCARDIA (HCC): Primary | ICD-10-CM

## 2021-09-17 PROCEDURE — 3017F COLORECTAL CA SCREEN DOC REV: CPT | Performed by: NURSE PRACTITIONER

## 2021-09-17 PROCEDURE — 99214 OFFICE O/P EST MOD 30 MIN: CPT | Performed by: NURSE PRACTITIONER

## 2021-09-17 PROCEDURE — 1036F TOBACCO NON-USER: CPT | Performed by: NURSE PRACTITIONER

## 2021-09-17 PROCEDURE — 93246 EXT ECG>7D<15D RECORDING: CPT | Performed by: INTERNAL MEDICINE

## 2021-09-17 PROCEDURE — G8417 CALC BMI ABV UP PARAM F/U: HCPCS | Performed by: NURSE PRACTITIONER

## 2021-09-17 PROCEDURE — 4040F PNEUMOC VAC/ADMIN/RCVD: CPT | Performed by: NURSE PRACTITIONER

## 2021-09-17 PROCEDURE — 93270 REMOTE 30 DAY ECG REV/REPORT: CPT | Performed by: INTERNAL MEDICINE

## 2021-09-17 PROCEDURE — G8427 DOCREV CUR MEDS BY ELIG CLIN: HCPCS | Performed by: NURSE PRACTITIONER

## 2021-09-17 PROCEDURE — 1123F ACP DISCUSS/DSCN MKR DOCD: CPT | Performed by: NURSE PRACTITIONER

## 2021-09-17 PROCEDURE — 93000 ELECTROCARDIOGRAM COMPLETE: CPT | Performed by: NURSE PRACTITIONER

## 2021-09-17 NOTE — TELEPHONE ENCOUNTER
Monitor placed by CLARA, [address] [place] [address] CAM  Length of monitor 14 DAYS  Monitor ordered by EVARISTO  Serial number [id number]  Kit ID [id number]  Activation successful prior to pt leaving office?  Yes

## 2021-09-17 NOTE — PATIENT INSTRUCTIONS
Stop amlodipine (Norvasc) to see if swelling improves.    Monitor BP at home and call if consistently out of goal ranges   2 week monitor to check for slow heart rate   Follow up in January

## 2021-09-17 NOTE — LETTER
[facility]   Electrophysiology Outpatient Note              Date:  September 17, 2021  Patient name: Rod Ahuja  YOB: 1950    Primary Care physician: JENNIFER Pratt    HISTORY OF PRESENT ILLNESS: The patient is a 70 y.o.  male with a history of atrial tachycardia, sinus bradycardia, HTN, nonobstructive CAD, HLD, ESTHER, Yan's esophagus, possible dementia, and DM. He is a poor historian. LHC in 3/2019 showed moderate CAD to be treated medically. Event monitor 9/2020 showed episodes of AT and continuing Toprol was recommended. He was admitted in 8/2021 with chest pain and workup was negative. Beta blocker was stopped due to sinus bradycardia. Stress test in 8/2021 was negative. Most recent echo in 8/2021 showed an EF of 55-60%. Today he is being seen for atrial tachycardia and bradycardia. EKG shows sinus carlos with a HR of 51. Patient states:    'alright I guess' when asked how he is feeling    'not that I know of' when asked if having chest pain and SOB    'sometimes I guess' dizziness   -he reports fatigue and LE swelling    Denies syncope and palpitations. Past Medical History:   has a past medical history of Acid reflux, Yan's esophagus, Coronary artery disease of native heart with stable angina pectoris ([address] Diabetes mellitus ([facility] [address] [facility] Diabetic autonomic neuropathy associated with type 2 diabetes mellitus ([facility] [address] Hyperlipidemia, Hypertension, Influenza A, Pancreatitis, Restless legs, Sleep apnea, and Tremor. Past Surgical History:   has a past surgical history that includes Pancreas surgery; Cholecystectomy; Tonsillectomy; [place] tooth extraction; Colonoscopy (10/26/2011); Cataract removal (Bilateral, 2013); Upper gastrointestinal endoscopy (10/04/2016); Hydrocele surgery (11/15/2016); Endoscopy, colon, diagnostic; Upper gastrointestinal endoscopy (03/16/2017); Upper gastrointestinal endoscopy (06/26/2017);  Upper gastrointestinal endoscopy (09/06/2017); Upper gastrointestinal endoscopy (12/22/2017); Upper gastrointestinal endoscopy (N/A, 12/4/2018); Upper gastrointestinal endoscopy (2/19/2019); Upper gastrointestinal endoscopy (N/A, 6/11/2019); Upper gastrointestinal endoscopy (N/A, 8/13/2019); and Abdomen surgery. Home Medications:    Prior to Admission medications    Medication Sig Start Date End Date Taking? Authorizing Provider   donepezil (ARICEPT) 5 MG tablet Take 1 tablet by mouth nightly 9/16/21  Yes JENNIFER Snell   glipiZIDE (GLUCOTROL XL) 2.5 MG extended release tablet Take 1 tablet by mouth daily 9/16/21  Yes JENNIFER Snell   amLODIPine (NORVASC) 2.5 MG tablet Take 1 tablet by mouth daily 9/3/20  Yes SANDY Bryant CNP   Continuous Blood Gluc Sensor (FREESTYLE JUSTIN 14 DAY SENSOR) MISC 1 Units by Does not apply route every 14 days 7/14/20  Yes SANDY Cain CNP   Continuous Blood Gluc  (FREESTYLE JUSTIN 14 DAY READER) OLI 1 Units by Does not apply route as needed (as needed) 12/31/19  Yes SANDY Cain CNP   pramipexole (MIRAPEX) 0.125 MG tablet Take 1 tablet by mouth nightly 9/17/19  Yes SANDY Horner CNP   aspirin 81 MG chewable tablet Take 1 tablet by mouth daily 3/26/19  Yes Lawrence Mott MD   nitroGLYCERIN (NITROSTAT) 0.4 MG SL tablet up to max of 3 total doses. If no relief after 1 dose, call 911. 3/25/19  Yes Lawrence Mott MD   Insulin Syringe-Needle U-100 30G X 1/2\" 0.5 ML MISC Inject insulin 3 times daily 9/12/17  Yes Historical Provider, MD   insulin regular (HUMULIN R;NOVOLIN R) 100 UNIT/ML injection Inject 10 Units into the skin 3 times daily (before meals)    Yes Historical Provider, MD   lisinopril (PRINIVIL;ZESTRIL) 10 MG tablet Take 10 mg by mouth daily   Yes Historical Provider, MD   atorvastatin (LIPITOR) 20 MG tablet Take 20 mg by mouth daily.    Yes Historical Provider, MD       Allergies:  Clonidine and Codeine    Social History:   reports that he has never smoked. He has never used smokeless tobacco. He reports that he does not drink alcohol and does not use drugs. Family History: family history includes Cancer in his brother and paternal grandfather; Cancer (age of onset: 76) in his father; Kidney Disease in his mother. Review of Systems   All 14-point review of systems are completed and pertinent positives are mentioned in the history of present illness. Other systems are reviewed and are negative. PHYSICAL EXAM:    Vital signs:    /60   Pulse 53   Ht 5' 10\" (1.778 m)   Wt 259 lb 8 oz (117.7 kg)   SpO2 98%   BMI 37.23 kg/m²      Constitutional and general appearance: alert, cooperative, no distress, slowed mentation and appears stated age  HEENT: PERRL, no cervical lymphadenopathy. No masses palpable. Normal oral mucosa  Respiratory:  · Normal excursion and expansion without use of accessory muscles  · Resp auscultation: Normal breath sounds without wheezing, rhonchi, and rales  Cardiovascular:  · The apical impulse is not displaced  · Heart tones are crisp and normal. regular S1 and S2.  · Jugular venous pulsation Normal  · The carotid upstroke is normal in amplitude and contour without delay or bruit  · Peripheral pulses are symmetrical and full   Abdomen:  · No masses or tenderness  · Bowel sounds present  Extremities:  ·  No cyanosis or clubbing  ·  Trace lower extremity edema  ·  Skin: warm and dry  Neurological:  · Alert   · Moves all extremities well  · + slowed mentation and flat affect     DATA:    ECG 9/17/2021:  SB HR 51    Echo 8/17/2021:  Normal left ventricular systolic function with ejection fraction of 55-60%. No regional wall motion abnormalites are seen. Left ventricular diastolic filling pressure is normal.   Normal RV size and systolic function. Mild left atrial enlargement. Trivial tricuspid regurgitation.      Stress test 8/17/2021:  There is normal isotope uptake at stress and rest. There is no evidence of  myocardial ischemia or scar. LV function is normal with ejection fraction of    58%. Low risk study. [facility] 3/2019 [place]):  LVGRAM     LVEDP 4   GRADIENT ACROSS AORTIC VALVE No gradient   LV FUNCTION EF 65%   WALL MOTION Normal   MITRAL REGURGITATION Mild         CORONARY ARTERIES     LM <10% stenosis.       LAD Prox <10% stenosis. Mid 30-40% stenosis. Distal 30-40% stenosis. LAD wraps around apex. D1 has ostial 80% stenosis.       LCX 10% prox-mid stenosis. OM1 bifurcates in prox segment and there is prox-mid OM1 40-50% stenosis.       RI Mid 60% stenosis.       RCA Dominant. Slight anterior takeoff, Prox-mid 20-30% stenoses. Distal 20% stenosis. Tortuous vessel.       CONCLUSIONS:      Moderate cad/ashd in major epicardial vessels  Would treat medically  Add imdur 30mg daily, and metoprolol tartrate 25mg bid  Ok for dc later today    All labs and testing reviewed. CARDIOLOGY LABS:   CBC: No results for input(s): WBC, HGB, HCT, PLT in the last 72 hours. BMP: No results for input(s): NA, K, CO2, BUN, CREATININE, LABGLOM, GLUCOSE in the last 72 hours. PT/INR: No results for input(s): PROTIME, INR in the last 72 hours. APTT:No results for input(s): APTT in the last 72 hours. FASTING LIPID PANEL:  Lab Results   Component Value Date    HDL 47 08/17/2021    LDLCALC 127 08/17/2021    TRIG 169 08/17/2021     LIVER PROFILE:No results for input(s): AST, ALT, ALB in the last 72 hours. Assessment:   Atrial tachycardia: stable   -noted on monitor 9/2020 (beta blocker started)  Sinus bradycardia: stable    -beta blocker stopped 8/2021  HTN: controlled    CAD, moderate   -s/p [facility] 3/2019   -stress test negative 8/2021  HLD  ESTHER: uses BiPAP   -followed by Dr. Dale Murillo  DM  Possible dementia     Plan:   1. Continue ASA, statin, and lisinopril  2. Stop amlodipine to see if swelling improves   3. Monitor BP at home and call if consistently out of goal ranges   4.  Two week event monitor to evaluate bradycardia   5.  Follow up in January unless indicated sooner      MDM: tonia Rowland [address] [facility] [address]  [phone]

## 2021-09-20 ENCOUNTER — NURSE TRIAGE (OUTPATIENT)
Dept: OTHER | Facility: CLINIC | Age: 71
End: 2021-09-20

## 2021-09-20 ENCOUNTER — TELEPHONE (OUTPATIENT)
Dept: FAMILY MEDICINE CLINIC | Age: 71
End: 2021-09-20

## 2021-09-20 NOTE — TELEPHONE ENCOUNTER
If he's sick, could this be a red clinic visit?  If not sick, please let him know that we have nothing and he needs vital signs checked today so he needs to be seen by his cardiologist or an Urgent Care

## 2021-09-20 NOTE — TELEPHONE ENCOUNTER
Please see nurse triage note: Pt. Complaining of fatigue and shortness of breath. No chest pain. No appt.  available

## 2021-09-20 NOTE — TELEPHONE ENCOUNTER
Received call from [address] at [facility]) Zara with Red Flag Complaint. Brief description of triage: weakness and short of breath     Triage indicates for patient to go to office now if no appointments go to [facility] for evaluation     Care advice provided, patient verbalizes understanding; denies any other questions or concerns; instructed to call back for any new or worsening symptoms. Writer provided warm transfer to [facility] at [facility] for appointment scheduling. Attention Provider: Thank you for allowing me to participate in the care of your patient. The patient was connected to triage in response to information provided to the [facility]. Please do not respond through this encounter as the response is not directed to a shared pool. Reason for Disposition   MODERATE weakness (i.e., interferes with work, school, normal activities) and cause unknown (Exceptions: weakness with acute minor illness, or weakness from poor fluid intake)    Answer Assessment - Initial Assessment Questions  1. DESCRIPTION: \"Describe how you are feeling. \"      Friday got a heart monitor holster he was experiencing same symptoms, he is tired all the time and sleeping all night long and weak and makes it to couch and that is it and short of breath     2. SEVERITY: \"How bad is it? \"  \"Can you stand and walk? \"    - MILD - Feels weak or tired, but does not interfere with work, school or normal activities    - [facility] to stand and walk; weakness interferes with work, school, or normal activities    - SEVERE - Unable to stand or walk      Moderate     3. ONSET:  \"When did the weakness begin? \"      Couple days ago     4. CAUSE: \"What do you think is causing the weakness? \"      Not sure     5. MEDICINES: Gwen Hardwick you recently started a new medicine or had a change in the amount of a medicine? \"      Yes put on new medication for night and day     6. OTHER SYMPTOMS: \"Do you have any other symptoms? \" (e.g., chest pain, fever, cough, SOB, vomiting, diarrhea, bleeding, other areas of pain)      Short of breath, cough, tight to chest no pain no to all others     7. PREGNANCY: \"Is there any chance you are pregnant? \" \"When was your last menstrual period? \"      Na    Protocols used: WEAKNESS (GENERALIZED) AND FATIGUE-ADULT-OH

## 2021-09-21 ENCOUNTER — TELEPHONE (OUTPATIENT)
Dept: CARDIOLOGY CLINIC | Age: 71
End: 2021-09-21

## 2021-09-21 ENCOUNTER — APPOINTMENT (OUTPATIENT)
Dept: GENERAL RADIOLOGY | Age: 71
End: 2021-09-21
Payer: MEDICARE

## 2021-09-21 ENCOUNTER — HOSPITAL ENCOUNTER (OUTPATIENT)
Age: 71
Setting detail: OBSERVATION
Discharge: HOME | End: 2021-09-22
Attending: EMERGENCY MEDICINE | Admitting: INTERNAL MEDICINE
Payer: MEDICARE

## 2021-09-21 DIAGNOSIS — R07.9 CHEST PAIN, UNSPECIFIED TYPE: Primary | ICD-10-CM

## 2021-09-21 LAB
A/G RATIO: 1.5 (ref 1.1–2.2)
ALBUMIN SERPL-MCNC: 4.1 G/DL (ref 3.4–5)
ALP BLD-CCNC: 83 U/L (ref 40–129)
ALT SERPL-CCNC: 15 U/L (ref 10–40)
ANION GAP SERPL CALCULATED.3IONS-SCNC: 11 MMOL/L (ref 3–16)
AST SERPL-CCNC: 15 U/L (ref 15–37)
BASOPHILS ABSOLUTE: 0 K/UL (ref 0–0.2)
BASOPHILS RELATIVE PERCENT: 1 %
BILIRUB SERPL-MCNC: 0.3 MG/DL (ref 0–1)
BUN BLDV-MCNC: 17 MG/DL (ref 7–20)
CALCIUM SERPL-MCNC: 9.3 MG/DL (ref 8.3–10.6)
CHLORIDE BLD-SCNC: 101 MMOL/L (ref 99–110)
CO2: 23 MMOL/L (ref 21–32)
CREAT SERPL-MCNC: 0.9 MG/DL (ref 0.8–1.3)
EKG ATRIAL RATE: 60 BPM
EKG DIAGNOSIS: NORMAL
EKG P AXIS: 57 DEGREES
EKG P-R INTERVAL: 188 MS
EKG Q-T INTERVAL: 400 MS
EKG QRS DURATION: 84 MS
EKG QTC CALCULATION (BAZETT): 400 MS
EKG R AXIS: 41 DEGREES
EKG T AXIS: 20 DEGREES
EKG VENTRICULAR RATE: 60 BPM
EOSINOPHILS ABSOLUTE: 0.1 K/UL (ref 0–0.6)
EOSINOPHILS RELATIVE PERCENT: 2.1 %
GFR AFRICAN AMERICAN: >60
GFR NON-AFRICAN AMERICAN: >60
GLOBULIN: 2.7 G/DL
GLUCOSE BLD-MCNC: 113 MG/DL (ref 70–99)
GLUCOSE BLD-MCNC: 167 MG/DL (ref 70–99)
GLUCOSE BLD-MCNC: 189 MG/DL (ref 70–99)
GLUCOSE BLD-MCNC: 221 MG/DL (ref 70–99)
HCT VFR BLD CALC: 42.6 % (ref 40.5–52.5)
HEMOGLOBIN: 14.5 G/DL (ref 13.5–17.5)
LYMPHOCYTES ABSOLUTE: 0.9 K/UL (ref 1–5.1)
LYMPHOCYTES RELATIVE PERCENT: 21.7 %
MCH RBC QN AUTO: 29.3 PG (ref 26–34)
MCHC RBC AUTO-ENTMCNC: 34 G/DL (ref 31–36)
MCV RBC AUTO: 86.3 FL (ref 80–100)
MONOCYTES ABSOLUTE: 0.7 K/UL (ref 0–1.3)
MONOCYTES RELATIVE PERCENT: 15.1 %
NEUTROPHILS ABSOLUTE: 2.6 K/UL (ref 1.7–7.7)
NEUTROPHILS RELATIVE PERCENT: 60.1 %
PDW BLD-RTO: 13.5 % (ref 12.4–15.4)
PERFORMED ON: ABNORMAL
PLATELET # BLD: 126 K/UL (ref 135–450)
PMV BLD AUTO: 9.8 FL (ref 5–10.5)
POTASSIUM REFLEX MAGNESIUM: 4.2 MMOL/L (ref 3.5–5.1)
PRO-BNP: 44 PG/ML (ref 0–124)
RBC # BLD: 4.94 M/UL (ref 4.2–5.9)
SODIUM BLD-SCNC: 135 MMOL/L (ref 136–145)
TOTAL PROTEIN: 6.8 G/DL (ref 6.4–8.2)
TROPONIN: <0.01 NG/ML
WBC # BLD: 4.3 K/UL (ref 4–11)

## 2021-09-21 PROCEDURE — 6370000000 HC RX 637 (ALT 250 FOR IP): Performed by: EMERGENCY MEDICINE

## 2021-09-21 PROCEDURE — G0378 HOSPITAL OBSERVATION PER HR: HCPCS

## 2021-09-21 PROCEDURE — 2580000003 HC RX 258: Performed by: INTERNAL MEDICINE

## 2021-09-21 PROCEDURE — 80053 COMPREHEN METABOLIC PANEL: CPT

## 2021-09-21 PROCEDURE — 84484 ASSAY OF TROPONIN QUANT: CPT

## 2021-09-21 PROCEDURE — 85025 COMPLETE CBC W/AUTO DIFF WBC: CPT

## 2021-09-21 PROCEDURE — 93010 ELECTROCARDIOGRAM REPORT: CPT | Performed by: INTERNAL MEDICINE

## 2021-09-21 PROCEDURE — 99285 EMERGENCY DEPT VISIT HI MDM: CPT

## 2021-09-21 PROCEDURE — 6370000000 HC RX 637 (ALT 250 FOR IP): Performed by: INTERNAL MEDICINE

## 2021-09-21 PROCEDURE — 36415 COLL VENOUS BLD VENIPUNCTURE: CPT

## 2021-09-21 PROCEDURE — 83880 ASSAY OF NATRIURETIC PEPTIDE: CPT

## 2021-09-21 PROCEDURE — 71046 X-RAY EXAM CHEST 2 VIEWS: CPT

## 2021-09-21 PROCEDURE — 93005 ELECTROCARDIOGRAM TRACING: CPT | Performed by: EMERGENCY MEDICINE

## 2021-09-21 RX ORDER — ONDANSETRON 4 MG/1
4 TABLET, ORALLY DISINTEGRATING ORAL EVERY 8 HOURS PRN
Status: DISCONTINUED | OUTPATIENT
Start: 2021-09-21 | End: 2021-09-22 | Stop reason: HOSPADM

## 2021-09-21 RX ORDER — NITROGLYCERIN 0.4 MG/1
0.4 TABLET SUBLINGUAL EVERY 5 MIN PRN
Status: DISCONTINUED | OUTPATIENT
Start: 2021-09-21 | End: 2021-09-22 | Stop reason: HOSPADM

## 2021-09-21 RX ORDER — GLIPIZIDE 5 MG/1
2.5 TABLET ORAL
Status: DISCONTINUED | OUTPATIENT
Start: 2021-09-22 | End: 2021-09-22 | Stop reason: HOSPADM

## 2021-09-21 RX ORDER — NICOTINE POLACRILEX 4 MG
15 LOZENGE BUCCAL PRN
Status: DISCONTINUED | OUTPATIENT
Start: 2021-09-21 | End: 2021-09-22 | Stop reason: HOSPADM

## 2021-09-21 RX ORDER — ASPIRIN 81 MG/1
324 TABLET, CHEWABLE ORAL ONCE
Status: COMPLETED | OUTPATIENT
Start: 2021-09-21 | End: 2021-09-21

## 2021-09-21 RX ORDER — ASPIRIN 81 MG/1
81 TABLET, CHEWABLE ORAL DAILY
Status: DISCONTINUED | OUTPATIENT
Start: 2021-09-22 | End: 2021-09-22 | Stop reason: HOSPADM

## 2021-09-21 RX ORDER — PRAMIPEXOLE DIHYDROCHLORIDE 0.25 MG/1
0.12 TABLET ORAL NIGHTLY
Status: DISCONTINUED | OUTPATIENT
Start: 2021-09-21 | End: 2021-09-22 | Stop reason: HOSPADM

## 2021-09-21 RX ORDER — ACETAMINOPHEN 650 MG/1
650 SUPPOSITORY RECTAL EVERY 6 HOURS PRN
Status: DISCONTINUED | OUTPATIENT
Start: 2021-09-21 | End: 2021-09-22 | Stop reason: HOSPADM

## 2021-09-21 RX ORDER — ACETAMINOPHEN 325 MG/1
650 TABLET ORAL EVERY 6 HOURS PRN
Status: DISCONTINUED | OUTPATIENT
Start: 2021-09-21 | End: 2021-09-22 | Stop reason: HOSPADM

## 2021-09-21 RX ORDER — SODIUM CHLORIDE 0.9 % (FLUSH) 0.9 %
5-40 SYRINGE (ML) INJECTION PRN
Status: DISCONTINUED | OUTPATIENT
Start: 2021-09-21 | End: 2021-09-22 | Stop reason: HOSPADM

## 2021-09-21 RX ORDER — SODIUM CHLORIDE 9 MG/ML
25 INJECTION, SOLUTION INTRAVENOUS PRN
Status: DISCONTINUED | OUTPATIENT
Start: 2021-09-21 | End: 2021-09-22 | Stop reason: HOSPADM

## 2021-09-21 RX ORDER — SODIUM CHLORIDE 0.9 % (FLUSH) 0.9 %
5-40 SYRINGE (ML) INJECTION EVERY 12 HOURS SCHEDULED
Status: DISCONTINUED | OUTPATIENT
Start: 2021-09-21 | End: 2021-09-22 | Stop reason: HOSPADM

## 2021-09-21 RX ORDER — POLYETHYLENE GLYCOL 3350 17 G/17G
17 POWDER, FOR SOLUTION ORAL DAILY PRN
Status: DISCONTINUED | OUTPATIENT
Start: 2021-09-21 | End: 2021-09-22 | Stop reason: HOSPADM

## 2021-09-21 RX ORDER — ONDANSETRON 2 MG/ML
4 INJECTION INTRAMUSCULAR; INTRAVENOUS EVERY 6 HOURS PRN
Status: DISCONTINUED | OUTPATIENT
Start: 2021-09-21 | End: 2021-09-22 | Stop reason: HOSPADM

## 2021-09-21 RX ORDER — MORPHINE SULFATE 2 MG/ML
2 INJECTION, SOLUTION INTRAMUSCULAR; INTRAVENOUS EVERY 4 HOURS PRN
Status: DISCONTINUED | OUTPATIENT
Start: 2021-09-21 | End: 2021-09-22 | Stop reason: HOSPADM

## 2021-09-21 RX ORDER — DONEPEZIL HYDROCHLORIDE 5 MG/1
5 TABLET, FILM COATED ORAL NIGHTLY
Status: DISCONTINUED | OUTPATIENT
Start: 2021-09-21 | End: 2021-09-22 | Stop reason: HOSPADM

## 2021-09-21 RX ORDER — LISINOPRIL 10 MG/1
10 TABLET ORAL DAILY
Status: DISCONTINUED | OUTPATIENT
Start: 2021-09-22 | End: 2021-09-22 | Stop reason: HOSPADM

## 2021-09-21 RX ORDER — DEXTROSE MONOHYDRATE 25 G/50ML
12.5 INJECTION, SOLUTION INTRAVENOUS PRN
Status: DISCONTINUED | OUTPATIENT
Start: 2021-09-21 | End: 2021-09-22 | Stop reason: HOSPADM

## 2021-09-21 RX ORDER — ATORVASTATIN CALCIUM 10 MG/1
20 TABLET, FILM COATED ORAL DAILY
Status: DISCONTINUED | OUTPATIENT
Start: 2021-09-22 | End: 2021-09-22 | Stop reason: HOSPADM

## 2021-09-21 RX ORDER — DEXTROSE MONOHYDRATE 50 MG/ML
100 INJECTION, SOLUTION INTRAVENOUS PRN
Status: DISCONTINUED | OUTPATIENT
Start: 2021-09-21 | End: 2021-09-22 | Stop reason: HOSPADM

## 2021-09-21 RX ADMIN — NITROGLYCERIN 0.4 MG: 0.4 TABLET, ORALLY DISINTEGRATING SUBLINGUAL at 13:44

## 2021-09-21 RX ADMIN — ASPIRIN 324 MG: 81 TABLET, CHEWABLE ORAL at 13:43

## 2021-09-21 RX ADMIN — ACETAMINOPHEN 650 MG: 325 TABLET ORAL at 22:54

## 2021-09-21 RX ADMIN — PRAMIPEXOLE DIHYDROCHLORIDE 0.12 MG: 0.25 TABLET ORAL at 19:48

## 2021-09-21 RX ADMIN — Medication 10 ML: at 22:55

## 2021-09-21 RX ADMIN — INSULIN HUMAN 10 UNITS: 100 INJECTION, SOLUTION PARENTERAL at 19:46

## 2021-09-21 RX ADMIN — INSULIN LISPRO 1 UNITS: 100 INJECTION, SOLUTION INTRAVENOUS; SUBCUTANEOUS at 22:59

## 2021-09-21 RX ADMIN — DONEPEZIL HYDROCHLORIDE 5 MG: 5 TABLET, FILM COATED ORAL at 19:48

## 2021-09-21 ASSESSMENT — PAIN DESCRIPTION - PAIN TYPE
TYPE: ACUTE PAIN
TYPE: ACUTE PAIN

## 2021-09-21 ASSESSMENT — PAIN SCALES - GENERAL
PAINLEVEL_OUTOF10: 0
PAINLEVEL_OUTOF10: 0
PAINLEVEL_OUTOF10: 2
PAINLEVEL_OUTOF10: 8
PAINLEVEL_OUTOF10: 0
PAINLEVEL_OUTOF10: 0
PAINLEVEL_OUTOF10: 5

## 2021-09-21 ASSESSMENT — PAIN DESCRIPTION - ONSET: ONSET: ON-GOING

## 2021-09-21 ASSESSMENT — PAIN DESCRIPTION - FREQUENCY: FREQUENCY: CONTINUOUS

## 2021-09-21 ASSESSMENT — PAIN DESCRIPTION - ORIENTATION
ORIENTATION: MID
ORIENTATION: MID

## 2021-09-21 ASSESSMENT — PAIN DESCRIPTION - LOCATION
LOCATION: CHEST
LOCATION: CHEST

## 2021-09-21 ASSESSMENT — PAIN DESCRIPTION - PROGRESSION: CLINICAL_PROGRESSION: NOT CHANGED

## 2021-09-21 ASSESSMENT — PAIN DESCRIPTION - DESCRIPTORS: DESCRIPTORS: DULL;DISCOMFORT

## 2021-09-21 NOTE — TELEPHONE ENCOUNTER
Pt called back, denies feeling sick, no sore throat,muscle aches,chills, runny nose, fever or sneezing.  Informed needs vitals checked today and to see his cardiologist or an Urgent Care, understanding voiced

## 2021-09-21 NOTE — H&P
Hospital Medicine History & Physical      PCP: JENNIFER Whittington    Date of Admission: 9/21/2021    Date of Service: Pt seen/examined on 9/21/2021 and Placed in Observation. Chief Complaint: Chest pain      History Of Present Illness:    70 y.o. male [facility]  Patient presented to the ED today with 3-day history of intermittent chest pain. Location retrosternal and right chest.  Intensity 8/10 and is worst.  Pain is intermittent waxing and waning. Worse with walking and exertion. Relieved with rest.  Associated with shortness of breath. Pressure-like sensation. No associated nausea or vomiting. No lightheadedness or dizziness. No cough. No subjective fevers or chills. Patient reports he has had this kind of pain before. [facility]  He had a 2D echo which was essentially normal, and nuclear stress test was normal as well. He was discharged home. Currently being monitored by EP for atrial tachycardia and sinus bradycardia. Has an ambulatory heart monitor.     Past Medical History:          Diagnosis Date    Acid reflux     Yan's esophagus     Coronary artery disease of native heart with stable angina pectoris ([address] 5/30/2019    Diabetes mellitus ([address]     Diabetic autonomic neuropathy associated with type 2 diabetes mellitus ([address] 3/3/2020    Hyperlipidemia     Hypertension     Influenza A 02/05/2020    Pancreatitis 1996    Restless legs     Sleep apnea     uses CPAP    Tremor     of bilateral hands       Past Surgical History:          Procedure Laterality Date    ABDOMEN SURGERY      CATARACT REMOVAL Bilateral 2013    CHOLECYSTECTOMY      COLONOSCOPY  10/26/2011    ENDOSCOPY, COLON, DIAGNOSTIC      EGD halo    HYDROCELE EXCISION  11/15/2016    PANCREAS SURGERY      cyst opened up and drining into small bowel    TONSILLECTOMY      UPPER GASTROINTESTINAL ENDOSCOPY  10/04/2016    with biopsy    UPPER GASTROINTESTINAL ENDOSCOPY  03/16/2017    Halo ablation    UPPER GASTROINTESTINAL ENDOSCOPY  06/26/2017    Halo ablation    UPPER GASTROINTESTINAL ENDOSCOPY  09/06/2017    bx distal sophagus    UPPER GASTROINTESTINAL ENDOSCOPY  12/22/2017    with HALO  procedure    UPPER GASTROINTESTINAL ENDOSCOPY N/A 12/4/2018    EGD WITH ABLATION AND ANESTHESIA - HALO---SLEEP APNEA---  performed by John Kaye MD at [address] [place] [address]  2/19/2019    EGD BIOPSY performed by John Kaye MD at [address] [place] [address] 6/11/2019    EGD WITH HALO AND ANESTHESIA performed by John Kaye MD at [address] [place] [address] N/A 8/13/2019    ESOPHAGOGASTRODUODENOSCOPY WITH HALO AND ANESTHESIA -SLEEP APNEA- performed by John Kaye MD at [address] [place] [address]         Medications Prior to Admission:      Prior to Admission medications    Medication Sig Start Date End Date Taking? Authorizing Provider   donepezil (ARICEPT) 5 MG tablet Take 1 tablet by mouth nightly 9/16/21   JENNIFER Donato   glipiZIDE (GLUCOTROL XL) 2.5 MG extended release tablet Take 1 tablet by mouth daily 9/16/21   JENNIFER Donato   Continuous Blood Gluc Sensor (FREESTYLE JUSTIN 14 DAY SENSOR) MISC 1 Units by Does not apply route every 14 days 7/14/20   Brennon DutySANDY - KHALIF   Continuous Blood Gluc  (FREESTYLE JUSTIN 14 DAY READER) OLI 1 Units by Does not apply route as needed (as needed) 12/31/19   Brennon DutySANDY - CNP   pramipexole (MIRAPEX) 0.125 MG tablet Take 1 tablet by mouth nightly 9/17/19   Efrain RainSANDY CNP   aspirin 81 MG chewable tablet Take 1 tablet by mouth daily 3/26/19   Zena Hinojosa MD   nitroGLYCERIN (NITROSTAT) 0.4 MG SL tablet up to max of 3 total doses.  If no relief after 1 dose, call 911. 3/25/19   Zena Hinojosa MD   Insulin Syringe-Needle U-100 30G X 1/2\" 0.5 ML MISC Inject insulin 3 times daily 9/12/17   Historical Provider, MD   insulin regular (HUMULIN R;NOVOLIN R) 100 UNIT/ML injection Inject 10 Units into the skin 3 times daily (before meals)     Historical Provider, MD   lisinopril (PRINIVIL;ZESTRIL) 10 MG tablet Take 10 mg by mouth daily    Historical Provider, MD   atorvastatin (LIPITOR) 20 MG tablet Take 20 mg by mouth daily. Historical Provider, MD       Allergies:  Clonidine and Codeine    Social History:      The patient currently lives at home    TOBACCO:   reports that he has never smoked. He has never used smokeless tobacco.  ETOH:   reports no history of alcohol use. E-Cigarettes/Vaping Use     Questions Responses    E-Cigarette/Vaping Use Never User    Start Date     Passive Exposure     Quit Date     Counseling Given     Comments             Family History:    Positive as follows:        Problem Relation Age of Onset    Kidney Disease Mother     Cancer Father 76        pancreas    Cancer Brother         lung    Cancer Paternal Grandfather         colon       REVIEW OF SYSTEMS COMPLETED:   Pertinent positives as noted in the HPI. All other systems reviewed and negative. PHYSICAL EXAM PERFORMED:    BP (!) 160/93   Pulse 61   Temp 98.4 °F (36.9 °C) (Oral)   Resp 18   Ht 5' 10\" (1.778 m)   Wt 254 lb 9.6 oz (115.5 kg)   SpO2 94%   BMI 36.53 kg/m²     General appearance:  No apparent distress, appears stated age and cooperative. HEENT:  Normal cephalic, atraumatic without obvious deformity. Pupils equal, round, and reactive to light. Extra ocular muscles intact. Conjunctivae/corneas clear. Neck: Supple, with full range of motion. No jugular venous distention. Trachea midline. Respiratory:  Normal respiratory effort. Clear to auscultation, bilaterally without Rales/Wheezes/Rhonchi. Cardiovascular:  Regular rate and rhythm with normal S1/S2 without murmurs, rubs or gallops.   Abdomen: Soft, non-tender, non-distended with normal bowel sounds. Musculoskeletal:  No clubbing, cyanosis or edema bilaterally. Full range of motion without deformity. Skin: Skin color, texture, turgor normal.  No rashes or lesions. Neurologic:  Neurovascularly intact without any focal sensory/motor deficits. Cranial nerves: II-XII intact, grossly non-focal.  Psychiatric:  Alert and oriented, thought content appropriate, normal insight  Capillary Refill: Brisk,3 seconds, normal  Peripheral Pulses: +2 palpable, equal bilaterally       Labs:     Recent Labs     09/21/21  1245   WBC 4.3   HGB 14.5   HCT 42.6   *     Recent Labs     09/21/21  1245   *   K 4.2      CO2 23   BUN 17   CREATININE 0.9   CALCIUM 9.3     Recent Labs     09/21/21  1245   AST 15   ALT 15   BILITOT 0.3   ALKPHOS 83     No results for input(s): INR in the last 72 hours. Recent Labs     09/21/21  1245   TROPONINI <0.01       Urinalysis:      Lab Results   Component Value Date    NITRU Negative 08/18/2020    WBCUA >100 04/02/2015    BACTERIA 2+ 04/02/2015    RBCUA >100 04/02/2015    BLOODU Negative 08/18/2020    SPECGRAV 1.015 08/18/2020    GLUCOSEU Negative 08/18/2020       Radiology:     CXR: I have reviewed the CXR with the following interpretation: No acute process      XR CHEST (2 VW)   Final Result   No acute cardiopulmonary disease. EKG:  I have reviewed the EKG with the following interpretation: NSR, normal intervals, no acute ischemic changes    Cath 2019  CORONARY ARTERIES     LM <10% stenosis.       LAD Prox <10% stenosis. Mid 30-40% stenosis. Distal 30-40% stenosis. LAD wraps around apex. D1 has ostial 80% stenosis.       LCX 10% prox-mid stenosis. OM1 bifurcates in prox segment and there is prox-mid OM1 40-50% stenosis.       RI Mid 60% stenosis.       RCA Dominant. Slight anterior takeoff, Prox-mid 20-30% stenoses. Distal 20% stenosis.  Tortuous vessel.        CONCLUSIONS:      Moderate cad/ashd in major epicardial vessels  Would treat medically  Add imdur 30mg daily, and metoprolol tartrate 25mg bid  Ok for dc later today          ASSESSMENT:PLAN:    Recurrent chest pain  -Had moderate CAD on left heart cath in 2019, plan was medical management. Recent stress test 8/2021 -normal  Patient having recorded angina like chest pain, worse with exertion, relieved with rest and NTG. We will consult cardiology to see if patient needs another angiogram for evaluation of his chest pain  R/O ACS with serial cardiac biomarkers and as needed EKG  Low suspicion for GI, aortic pathology or acute PE as a cause for his pain  PRN NTG    Hx of atrial tachycardia/sinus bradycardia -followed by EP    DM type II-resume home insulin regimen    HTN-controlled, resume home medication regimen    HLD-statin resumed    Severe obstructive sleep apnea-CPAP resumed at night    DVT Prophylaxis: lmwh  Diet: No diet orders on file  Code Status: full  PT/OT Eval Status: Ezequiel Quarles MD    Thank you JENNIFER Gomes for the opportunity to be involved in this patient's care. If you have any questions or concerns please feel free to contact me at [phone].

## 2021-09-21 NOTE — ED PROVIDER NOTES
GASTROINTESTINAL ENDOSCOPY  06/26/2017    Halo ablation    UPPER GASTROINTESTINAL ENDOSCOPY  09/06/2017    bx distal sophagus    UPPER GASTROINTESTINAL ENDOSCOPY  12/22/2017    with HALO  procedure    UPPER GASTROINTESTINAL ENDOSCOPY N/A 12/4/2018    EGD WITH ABLATION AND ANESTHESIA - HALO---SLEEP APNEA---  performed by Samson Crawford MD at  [address]  2/19/2019    EGD BIOPSY performed by Samson Crawford MD at [address] [place] [address] 6/11/2019    EGD WITH HALO AND ANESTHESIA performed by Samson Crawford MD at [address] [place] [address] N/A 8/13/2019    ESOPHAGOGASTRODUODENOSCOPY WITH HALO AND ANESTHESIA -SLEEP APNEA- performed by Samson Crawford MD at [facility] 21 medications:   Previous Medications    ASPIRIN 81 MG CHEWABLE TABLET    Take 1 tablet by mouth daily    ATORVASTATIN (LIPITOR) 20 MG TABLET    Take 20 mg by mouth daily. CONTINUOUS BLOOD GLUC  (FREESTYLE JUSTIN 14 DAY READER) OLI    1 Units by Does not apply route as needed (as needed)    CONTINUOUS BLOOD GLUC SENSOR (FREESTYLE JUSTIN 14 DAY SENSOR) MISC    1 Units by Does not apply route every 14 days    DONEPEZIL (ARICEPT) 5 MG TABLET    Take 1 tablet by mouth nightly    GLIPIZIDE (GLUCOTROL XL) 2.5 MG EXTENDED RELEASE TABLET    Take 1 tablet by mouth daily    INSULIN REGULAR (HUMULIN R;NOVOLIN R) 100 UNIT/ML INJECTION    Inject 10 Units into the skin 3 times daily (before meals)     INSULIN SYRINGE-NEEDLE U-100 30G X 1/2\" 0.5 ML MISC    Inject insulin 3 times daily    LISINOPRIL (PRINIVIL;ZESTRIL) 10 MG TABLET    Take 10 mg by mouth daily    NITROGLYCERIN (NITROSTAT) 0.4 MG SL TABLET    up to max of 3 total doses. If no relief after 1 dose, call 911. PRAMIPEXOLE (MIRAPEX) 0.125 MG TABLET    Take 1 tablet by mouth nightly       Social history:  reports that he has never smoked. He has never used smokeless tobacco. He reports that he does not drink alcohol and does not use drugs. Family history:    Family History   Problem Relation Age of Onset    Kidney Disease Mother     Cancer Father 76        pancreas    Cancer Brother         lung    Cancer Paternal Grandfather         colon         Exam  ED Triage Vitals [09/21/21 1238]   BP Temp Temp Source Pulse Resp SpO2 Height Weight   -- 98.5 °F (36.9 °C) Oral 60 16 99 % 5' 10\" (1.778 m) 260 lb (117.9 kg)     Nursing note and vitals reviewed. Constitutional: In no acute distress  HENT:      Head: Normocephalic      Ears: External ears normal.      Nose: Nose normal.     Mouth: Membrane mucosa moist   Eyes: No discharge. Neck: Supple. Trachea midline. Cardiovascular: Regular rate. Warm extremities  Pulmonary/Chest: Effort normal. No respiratory distress. Abdominal: Soft. No distension. Nontender  : Deferred  Rectal: Deferred   Musculoskeletal: Moves all extremities. No gross deformity. Neurological: Alert and oriented. Face symmetric. Speech is clear. Skin: Warm and dry. Psychiatric: Normal mood and affect. Behavior is normal.    Procedures      EKG  The Ekg interpreted by me in the absence of a cardiologist shows. Normal sinus rhythm. No specific ST changes appreciated. HR 60  Qtc 400  No significant change from prior EKG dated 9/17        Radiology  XR CHEST (2 VW)    (Results Pending)       Labs  No results found for this visit on 09/21/21. Screenings           MDM and ED Course  This is a 70 y.o. male who presents to the ED for chest pain and shortness of breath. May be secondary to ACS. Last stress test in August performed was nondiagnostic. Does have history of coronary artery disease. Pulmonary embolism in differential but lower since no tachycardia, tachypnea, hypoxemia, unilateral DVT symptoms.   Not worst at onset, no radiation towards the back, no significant elevation in blood pressure in the emergency room to indicate dissection. Will give aspirin. Anticipate admission to hospitalist service for chest pain work-up. [unfilled]    Final Impression  1. Chest pain, unspecified type        Pulse 60, temperature 98.5 °F (36.9 °C), temperature source Oral, resp. rate 16, height 5' 10\" (1.778 m), weight 260 lb (117.9 kg), SpO2 99 %. Disposition:  DISPOSITION        Patient Referrals:  No follow-up provider specified. Discharge Medications:  New Prescriptions    No medications on file       Discontinued Medications:  Discontinued Medications    No medications on file       This chart was generated using the [employer] dictation system. I created this record but it may contain dictation errors given the limitations of this technology.        Yefri Lowe MD  09/21/21 0504

## 2021-09-21 NOTE — ED NOTES
Bed: 24  Expected date:   Expected time:   Means of arrival:   Comments:  UT 48     Darrius Miller RN  09/21/21 2948

## 2021-09-21 NOTE — TELEPHONE ENCOUNTER
Pt's wife sts pt's SOB is worse than it was on 9/17 at [facility]. He has no energy. Pt is wearing a monitor that was placed 9/17/2021. Please advise.

## 2021-09-21 NOTE — TELEPHONE ENCOUNTER
I spoke with patient wife. Patient is having increased SOB, weakness, and is currently having chest tightness. He is not able to walk across the room due to SOB.  Advised that he go to the ER

## 2021-09-22 ENCOUNTER — APPOINTMENT (OUTPATIENT)
Dept: CARDIAC CATH/INVASIVE PROCEDURES | Age: 71
End: 2021-09-22
Payer: MEDICARE

## 2021-09-22 VITALS
RESPIRATION RATE: 16 BRPM | WEIGHT: 253 LBS | BODY MASS INDEX: 36.22 KG/M2 | SYSTOLIC BLOOD PRESSURE: 135 MMHG | HEIGHT: 70 IN | TEMPERATURE: 98.7 F | DIASTOLIC BLOOD PRESSURE: 71 MMHG | HEART RATE: 69 BPM | OXYGEN SATURATION: 95 %

## 2021-09-22 LAB
D DIMER: <200 NG/ML DDU (ref 0–229)
GLUCOSE BLD-MCNC: 181 MG/DL (ref 70–99)
GLUCOSE BLD-MCNC: 189 MG/DL (ref 70–99)
GLUCOSE BLD-MCNC: 193 MG/DL (ref 70–99)
LV EF: 60 %
LVEF MODALITY: NORMAL
PERFORMED ON: ABNORMAL
POC ACT LR: 304 SEC

## 2021-09-22 PROCEDURE — C1887 CATHETER, GUIDING: HCPCS

## 2021-09-22 PROCEDURE — 6370000000 HC RX 637 (ALT 250 FOR IP): Performed by: NURSE PRACTITIONER

## 2021-09-22 PROCEDURE — 6360000004 HC RX CONTRAST MEDICATION

## 2021-09-22 PROCEDURE — 99215 OFFICE O/P EST HI 40 MIN: CPT | Performed by: INTERNAL MEDICINE

## 2021-09-22 PROCEDURE — 93460 R&L HRT ART/VENTRICLE ANGIO: CPT

## 2021-09-22 PROCEDURE — 85347 COAGULATION TIME ACTIVATED: CPT

## 2021-09-22 PROCEDURE — 2500000003 HC RX 250 WO HCPCS

## 2021-09-22 PROCEDURE — C1894 INTRO/SHEATH, NON-LASER: HCPCS

## 2021-09-22 PROCEDURE — 2709999900 HC NON-CHARGEABLE SUPPLY

## 2021-09-22 PROCEDURE — 2580000003 HC RX 258: Performed by: INTERNAL MEDICINE

## 2021-09-22 PROCEDURE — 93460 R&L HRT ART/VENTRICLE ANGIO: CPT | Performed by: INTERNAL MEDICINE

## 2021-09-22 PROCEDURE — 6370000000 HC RX 637 (ALT 250 FOR IP): Performed by: INTERNAL MEDICINE

## 2021-09-22 PROCEDURE — 85379 FIBRIN DEGRADATION QUANT: CPT

## 2021-09-22 PROCEDURE — G0378 HOSPITAL OBSERVATION PER HR: HCPCS

## 2021-09-22 PROCEDURE — 99152 MOD SED SAME PHYS/QHP 5/>YRS: CPT

## 2021-09-22 PROCEDURE — 36415 COLL VENOUS BLD VENIPUNCTURE: CPT

## 2021-09-22 PROCEDURE — 99153 MOD SED SAME PHYS/QHP EA: CPT

## 2021-09-22 PROCEDURE — C1769 GUIDE WIRE: HCPCS

## 2021-09-22 PROCEDURE — 6360000002 HC RX W HCPCS

## 2021-09-22 RX ORDER — HEPARIN SODIUM 1000 [USP'U]/ML
INJECTION, SOLUTION INTRAVENOUS; SUBCUTANEOUS
Status: COMPLETED | OUTPATIENT
Start: 2021-09-22 | End: 2021-09-22

## 2021-09-22 RX ORDER — SODIUM CHLORIDE 0.9 % (FLUSH) 0.9 %
5-40 SYRINGE (ML) INJECTION EVERY 12 HOURS SCHEDULED
Status: CANCELLED | OUTPATIENT
Start: 2021-09-22

## 2021-09-22 RX ORDER — SODIUM CHLORIDE 9 MG/ML
INJECTION, SOLUTION INTRAVENOUS CONTINUOUS
Status: CANCELLED | OUTPATIENT
Start: 2021-09-22

## 2021-09-22 RX ORDER — SODIUM CHLORIDE 0.9 % (FLUSH) 0.9 %
5-40 SYRINGE (ML) INJECTION PRN
Status: CANCELLED | OUTPATIENT
Start: 2021-09-22

## 2021-09-22 RX ORDER — SODIUM CHLORIDE 9 MG/ML
25 INJECTION, SOLUTION INTRAVENOUS PRN
Status: CANCELLED | OUTPATIENT
Start: 2021-09-22

## 2021-09-22 RX ORDER — GUAIFENESIN/DEXTROMETHORPHAN 100-10MG/5
5 SYRUP ORAL EVERY 4 HOURS PRN
Status: DISCONTINUED | OUTPATIENT
Start: 2021-09-22 | End: 2021-09-22 | Stop reason: HOSPADM

## 2021-09-22 RX ORDER — FENTANYL CITRATE 50 UG/ML
INJECTION, SOLUTION INTRAMUSCULAR; INTRAVENOUS
Status: COMPLETED | OUTPATIENT
Start: 2021-09-22 | End: 2021-09-22

## 2021-09-22 RX ORDER — MIDAZOLAM HYDROCHLORIDE 5 MG/ML
INJECTION INTRAMUSCULAR; INTRAVENOUS
Status: COMPLETED | OUTPATIENT
Start: 2021-09-22 | End: 2021-09-22

## 2021-09-22 RX ADMIN — INSULIN HUMAN 10 UNITS: 100 INJECTION, SOLUTION PARENTERAL at 12:02

## 2021-09-22 RX ADMIN — Medication 10 ML: at 09:04

## 2021-09-22 RX ADMIN — GUAIFENESIN AND DEXTROMETHORPHAN 5 ML: 100; 10 SYRUP ORAL at 03:23

## 2021-09-22 RX ADMIN — HEPARIN SODIUM 5000 UNITS: 1000 INJECTION, SOLUTION INTRAVENOUS; SUBCUTANEOUS at 10:50

## 2021-09-22 RX ADMIN — ATORVASTATIN CALCIUM 20 MG: 10 TABLET, FILM COATED ORAL at 09:03

## 2021-09-22 RX ADMIN — INSULIN LISPRO 1 UNITS: 100 INJECTION, SOLUTION INTRAVENOUS; SUBCUTANEOUS at 12:03

## 2021-09-22 RX ADMIN — FENTANYL CITRATE 25 MCG: 50 INJECTION, SOLUTION INTRAMUSCULAR; INTRAVENOUS at 10:30

## 2021-09-22 RX ADMIN — MIDAZOLAM HYDROCHLORIDE 1 MG: 5 INJECTION INTRAMUSCULAR; INTRAVENOUS at 10:30

## 2021-09-22 RX ADMIN — LISINOPRIL 10 MG: 10 TABLET ORAL at 09:04

## 2021-09-22 RX ADMIN — ASPIRIN 81 MG: 81 TABLET, CHEWABLE ORAL at 09:04

## 2021-09-22 ASSESSMENT — PAIN SCALES - GENERAL
PAINLEVEL_OUTOF10: 6
PAINLEVEL_OUTOF10: 0

## 2021-09-22 ASSESSMENT — PAIN DESCRIPTION - LOCATION: LOCATION: EAR;HEAD

## 2021-09-22 ASSESSMENT — PAIN DESCRIPTION - PAIN TYPE: TYPE: ACUTE PAIN

## 2021-09-22 NOTE — CONSULTS
[facility]  [phone]      Attending Physician: Murali Good MD  Reason for Consultation/Chief Complaint: Chest pain shortness of breath    Subjective   History of Present Illness:  Tiana Love is a 70 y.o. patient who presented to the hospital with complaints of chest pain shortness of breath in the last 4 days. Patient states that he has had pain that he describes as a pressure over the middle to left side of his chest. He also notes associated shortness of breath but denies sweating nausea, denies swelling in his legs or weight gain. He says he is been compliant with his medications. He presented to the emergency room yesterday, was admitted to the hospital due to concerns for progressive angina. Serial troponin levels been found to be negative. proBNP level was 44. Patient is status post recent hospital admissions including in August 2021 for chest pain and underwent stress testing at that time which was normal/negative. Patient is status post recent office visit at [facility] with our nurse practitioners from the EP service as he has had a history of atrial tachycardia and they were concerned for bradycardia for which a cardiac event monitor has been placed and is currently wearing that. Patient has a cardiac history which dates back to original evaluation in 2019 for chest pain. At that time he had a cardiac catheterization which showed moderate CAD and major epicardial vessels. Has been managed medically since then. Chronically, patient also does have hypertension, hypercholesterolemia and diabetes and he states these conditions have been stable on prescribed medical therapy. History also obtained by speaking to patient's wife by phone as well as per patient and chart review.     Past Medical History:   has a past medical history of Acid reflux, Yan's esophagus, Coronary artery disease of native heart with stable angina pectoris ([address] Diabetes mellitus ([facility] [address] Diabetic autonomic neuropathy associated with type 2 diabetes mellitus ([facility] [address] Hyperlipidemia, Hypertension, Influenza A, Pancreatitis, Restless legs, Sleep apnea, and Tremor. Surgical History:   has a past surgical history that includes Pancreas surgery; Cholecystectomy; Tonsillectomy; [place] tooth extraction; Colonoscopy (10/26/2011); Cataract removal (Bilateral, 2013); Upper gastrointestinal endoscopy (10/04/2016); Hydrocele surgery (11/15/2016); Endoscopy, colon, diagnostic; Upper gastrointestinal endoscopy (03/16/2017); Upper gastrointestinal endoscopy (06/26/2017); Upper gastrointestinal endoscopy (09/06/2017); Upper gastrointestinal endoscopy (12/22/2017); Upper gastrointestinal endoscopy (N/A, 12/4/2018); Upper gastrointestinal endoscopy (2/19/2019); Upper gastrointestinal endoscopy (N/A, 6/11/2019); Upper gastrointestinal endoscopy (N/A, 8/13/2019); and Abdomen surgery. Social History:   reports that he has never smoked. He has never used smokeless tobacco. He reports that he does not drink alcohol and does not use drugs. Family History:  family history includes Cancer in his brother and paternal grandfather; Cancer (age of onset: 76) in his father; Kidney Disease in his mother. Home Medications:  Were reviewed and are listed in nursing record and/or below  Prior to Admission medications    Medication Sig Start Date End Date Taking?  Authorizing Provider   donepezil (ARICEPT) 5 MG tablet Take 1 tablet by mouth nightly 9/16/21   JENNIFER Vigil   glipiZIDE (GLUCOTROL XL) 2.5 MG extended release tablet Take 1 tablet by mouth daily 9/16/21   JENNIFER Vigil   Continuous Blood Gluc Sensor (FREESTYLE JUSTIN 14 DAY SENSOR) MISC 1 Units by Does not apply route every 14 days 7/14/20   SANDY Arroyo CNP   Continuous Blood Gluc  (FREESTYLE JUSTIN 14 DAY READER) OLI 1 Units by Does not apply route as needed (as needed) 12/31/19   SANDY Arroyo CNP pramipexole (MIRAPEX) 0.125 MG tablet Take 1 tablet by mouth nightly 9/17/19   SANDY De Luna - CNP   aspirin 81 MG chewable tablet Take 1 tablet by mouth daily 3/26/19   Neela Kelly MD   nitroGLYCERIN (NITROSTAT) 0.4 MG SL tablet up to max of 3 total doses. If no relief after 1 dose, call 911. 3/25/19   Neela Kelly MD   Insulin Syringe-Needle U-100 30G X 1/2\" 0.5 ML MISC Inject insulin 3 times daily 9/12/17   Historical Provider, MD   insulin regular (HUMULIN R;NOVOLIN R) 100 UNIT/ML injection Inject 10 Units into the skin 3 times daily (before meals)     Historical Provider, MD   lisinopril (PRINIVIL;ZESTRIL) 10 MG tablet Take 10 mg by mouth daily    Historical Provider, MD   atorvastatin (LIPITOR) 20 MG tablet Take 20 mg by mouth daily.     Historical Provider, MD        CURRENT Medications:  guaiFENesin-dextromethorphan (ROBITUSSIN DM) 100-10 MG/5ML syrup 5 mL, Q4H PRN  nitroGLYCERIN (NITROSTAT) SL tablet 0.4 mg, Q5 Min PRN  aspirin chewable tablet 81 mg, Daily  atorvastatin (LIPITOR) tablet 20 mg, Daily  donepezil (ARICEPT) tablet 5 mg, Nightly  glipiZIDE (GLUCOTROL) tablet 2.5 mg, QAM AC  insulin regular (HUMULIN R;NOVOLIN R) injection 10 Units, TID AC  lisinopril (PRINIVIL;ZESTRIL) tablet 10 mg, Daily  pramipexole (MIRAPEX) tablet 0.125 mg, Nightly  glucose (GLUTOSE) 40 % oral gel 15 g, PRN  dextrose 50 % IV solution, PRN  glucagon (rDNA) injection 1 mg, PRN  dextrose 5 % solution, PRN  insulin lispro (HUMALOG) injection vial 0-6 Units, TID WC  insulin lispro (HUMALOG) injection vial 0-3 Units, Nightly  nitroGLYCERIN (NITROSTAT) SL tablet 0.4 mg, Q5 Min PRN  morphine (PF) injection 2 mg, Q4H PRN  sodium chloride flush 0.9 % injection 5-40 mL, 2 times per day  sodium chloride flush 0.9 % injection 5-40 mL, PRN  0.9 % sodium chloride infusion, PRN  enoxaparin (LOVENOX) injection 40 mg, Daily  ondansetron (ZOFRAN-ODT) disintegrating tablet 4 mg, Q8H PRN   Or  ondansetron (ZOFRAN) injection 4 mg, Q6H PRN  polyethylene glycol (GLYCOLAX) packet 17 g, Daily PRN  acetaminophen (TYLENOL) tablet 650 mg, Q6H PRN   Or  acetaminophen (TYLENOL) suppository 650 mg, Q6H PRN        Allergies:  Clonidine and Codeine     Review of Systems:   A 14 point review of symptoms completed. Pertinent positives identified in the HPI, all other review of symptoms negative as below.       Objective   PHYSICAL EXAM:    Vitals:    09/22/21 0824   BP: 132/82   Pulse: 62   Resp: 18   Temp: 98 °F (36.7 °C)   SpO2: 94%    Weight: 253 lb (114.8 kg)         General Appearance:  Alert, cooperative, no distress, appears stated age   Head:  Normocephalic, without obvious abnormality, atraumatic   Eyes:  PERRL, conjunctiva/corneas clear   Nose: Nares normal, no drainage or sinus tenderness   Throat: Lips, mucosa, and tongue normal   Neck: Supple, symmetrical, trachea midline, no adenopathy, thyroid: not enlarged, symmetric, no tenderness/mass/nodules, no carotid bruit or JVD   Lungs:   Clear to auscultation bilaterally, respirations unlabored   Chest Wall:  No deformity however, there is pain with palpation over the middle of the left side of his chest   Heart:  Regular rate and rhythm, S1, S2 normal, no murmur, rub or gallop   Abdomen:   Soft, non-tender, bowel sounds active all four quadrants,  no masses, no organomegaly   Extremities: Extremities normal, atraumatic, no cyanosis or edema   Pulses: 2+ and symmetric   Skin: Skin color, texture, turgor normal, no rashes or lesions   Pysch: Normal mood and affect   Neurologic: Normal gross motor and sensory exam.         Labs   CBC:   Lab Results   Component Value Date    WBC 4.3 09/21/2021    RBC 4.94 09/21/2021    HGB 14.5 09/21/2021    HCT 42.6 09/21/2021    MCV 86.3 09/21/2021    RDW 13.5 09/21/2021     09/21/2021     CMP:  Lab Results   Component Value Date     09/21/2021    K 4.2 09/21/2021     09/21/2021    CO2 23 09/21/2021    BUN 17 09/21/2021 CREATININE 0.9 09/21/2021    GFRAA >60 09/21/2021    GFRAA >60 12/25/2010    AGRATIO 1.5 09/21/2021    LABGLOM >60 09/21/2021    GLUCOSE 167 09/21/2021    PROT 6.8 09/21/2021    PROT 6.6 12/25/2010    CALCIUM 9.3 09/21/2021    BILITOT 0.3 09/21/2021    ALKPHOS 83 09/21/2021    AST 15 09/21/2021    ALT 15 09/21/2021     PT/INR:  No results found for: PTINR  HgBA1c:  Lab Results   Component Value Date    LABA1C 8.0 08/17/2021     Lab Results   Component Value Date    TROPONINI <0.01 09/21/2021         Cardiac Data     Last EKG: Normal sinus rhythm, borderline low voltage    Echo:    8/2021    Summary   Normal left ventricular systolic function with ejection fraction of 55-60%. No regional wall motion abnormalites are seen. Left ventricular diastolic filling pressure is normal.   Normal RV size and systolic function. Mild left atrial enlargement. Trivial tricuspid regurgitation. Stress Test:    8/2021    Summary   [place] is normal isotope uptake at stress and rest. There is no evidence of    myocardial ischemia or scar. LV function is normal with ejection fraction of    58%. Low risk study. Cath:    Studies:     cxr     Impression   No acute cardiopulmonary disease.             I have reviewed labs and imaging/xray/diagnostic testing in this note.     Assessment and Plan          Patient Active Problem List   Diagnosis    Pulmonary nodules    Essential hypertension    Hyperlipidemia    Sinus bradycardia    Heart murmur    Severe obstructive sleep apnea    Morbid obesity ([address]    Coronary artery disease involving native coronary artery of native heart with angina pectoris ([address]    Peripheral polyneuropathy    Vitamin D deficiency    Diabetic autonomic neuropathy associated with type 2 diabetes mellitus (HCC)    Atrial tachycardia (HCC)    Type 2 diabetes mellitus with hyperglycemia (HCC)    Grade II diastolic dysfunction    Chest pain         Chest pain shortness of breath, progressive symptoms, raising concern for unstable angina, options discussed with patient and his wife was able to speak to by phone. Options discussed including conservative measures and continue medical therapy versus cardiac catheterization, they favor an invasive approach and evaluation and will perform right and left heart catheterization. They understand risk/benefit/alternatives/outcomes and wished to proceed in this manner Discussed with him that if cardiac evaluation does not show significant/severe cardiac disease, would recommend that patient follow-up with primary care physician as well as pulmonary for further evaluation of symptoms which may be noncardiac in origin. Patient does have a history of sleep apnea. CAD/ASHD, continue aspirin    Hypercholesteremia, continue statin    Hypertension, continue lisinopril    PAT/bradycardia, continue to wear a cardiac event monitor    Diabetes, as per primary service    DISCUSSION OF CARDIAC CATHETERIZATION PROCEDURES: The procedures, indications, risks and alternatives have been discussed with the patient and, as appropriate, with the patient's guardian . Risks discussed included, but are not limited to, bleeding, development of blood clots/emboli, damage to blood vessels, renal failure, malignant cardiac arrhythmias, stroke, heart attack, emergent coronary bypass surgery, death, dye allergy. The patient (and guardian as appropriate) expressed understanding of the aforementioned and wished to proceed. Thank you for allowing us to participate in the care of Marla Andre. Please call me with any questions [phone].     Denise Mccord MD, [facility]   Interventional Cardiologist  [facility]  [phone] [facility]  [phone] [address] [place] [address]  9/22/2021 8:34 AM

## 2021-09-22 NOTE — PROGRESS NOTES
Pt denies chest pain at present time. c/o ha and cough last night. tylenol and robitussin given. pt able to ambulate per self to bathroom. uses call light appropriately. will cristiano to monitor.

## 2021-09-22 NOTE — DISCHARGE SUMMARY
Hospital Medicine Discharge Summary    Patient ID: Boyce Gosselin      Patient's PCP: JENNIFER Gaytan    Admit Date: 9/21/2021     Discharge Date:   9/22/2021    Admitting Physician: Elizabeth Chan MD     Discharge Physician: [address] [place] [address], APRN - [facility]     Discharge Diagnoses: Active Hospital Problems    Diagnosis     Chest pain [R07.9]     Atrial tachycardia ([address] [I47.1]     Diabetic autonomic neuropathy associated with type 2 diabetes mellitus ([address] [E11.43]     Severe obstructive sleep apnea [G47.33]     Sinus bradycardia [R00.1]     Essential hypertension [I10]     Hyperlipidemia [E78.5]        The patient was seen and examined on day of discharge and this discharge summary is in conjunction with any daily progress note from day of discharge. Hospital Course:   70 y.o. male who presented to [facility] with a 3-day history of intermittent chest pain. Location retrosternal and right chest. Intensity 8/10 and is worst. Pain is intermittent waxing and waning. Worse with walking and exertion. Relieved with rest. Associated with shortness of breath. Pressure-like sensation. No associated nausea or vomiting. No lightheadedness or dizziness. No cough. No subjective fevers or chills. Patient reports he has had this kind of pain before. He was admitted to [facility] back in August. He had a 2D echo which was essentially normal, and nuclear stress test which was normal as well. He was discharged home. Currently being monitored by EP for atrial tachycardia and sinus bradycardia. Has an ambulatory heart monitor. Recurrent chest pain  - EKG non-acute in the ED. Serial troponin trend was negative. ACS ruled out. Ddimer was negative. CXR showed clear lungs. - Had moderate CAD on left heart cath in 2019, plan was medical management. Recent stress test 8/2021 was normal. Cardiology consulted.  S/p LHC which showed moderate CAD, overall findings are similar to prior angiography insight  Capillary Refill: Brisk,< 3 seconds   Peripheral Pulses: +2 palpable, equal bilaterally       Labs: For convenience and continuity at follow-up the following most recent labs are provided:      CBC:    Lab Results   Component Value Date    WBC 4.3 09/21/2021    HGB 14.5 09/21/2021    HCT 42.6 09/21/2021     09/21/2021       Renal:    Lab Results   Component Value Date     09/21/2021    K 4.2 09/21/2021     09/21/2021    CO2 23 09/21/2021    BUN 17 09/21/2021    CREATININE 0.9 09/21/2021    CALCIUM 9.3 09/21/2021         Significant Diagnostic Studies    Radiology:   XR CHEST (2 VW)   Final Result   No acute cardiopulmonary disease.                 Consults:     IP CONSULT TO HOSPITALIST  IP CONSULT TO CARDIOLOGY    Disposition:  Home     Condition at Discharge: Stable    Discharge Instructions/Follow-up:  Follow-up with PCP     Code Status:  Full Code     Activity: activity as tolerated    Diet: diabetic diet      Discharge Medications:     Current Discharge Medication List           Details   donepezil (ARICEPT) 5 MG tablet Take 1 tablet by mouth nightly  Qty: 30 tablet, Refills: 3    Associated Diagnoses: Dementia without behavioral disturbance, unspecified dementia type ([facility])      glipiZIDE (GLUCOTROL XL) 2.5 MG extended release tablet Take 1 tablet by mouth daily  Qty: 90 tablet, Refills: 0      Continuous Blood Gluc Sensor (FREESTYLE JUSTIN 14 DAY SENSOR) MISC 1 Units by Does not apply route every 14 days  Qty: 2 each, Refills: 11    Associated Diagnoses: Controlled type 2 diabetes mellitus without complication, with long-term current use of insulin ([facility])      Continuous Blood Gluc  (FREESTYLE JUSTIN 14 DAY READER) OLI 1 Units by Does not apply route as needed (as needed)  Qty: 1 Device, Refills: 0    Associated Diagnoses: Controlled type 2 diabetes mellitus without complication, with long-term current use of insulin ([facility])      pramipexole (MIRAPEX) 0.125 MG tablet Take 1 tablet by mouth nightly  Qty: 90 tablet, Refills: 3    Associated Diagnoses: Restless leg syndrome      aspirin 81 MG chewable tablet Take 1 tablet by mouth daily  Qty: 30 tablet, Refills: 3      nitroGLYCERIN (NITROSTAT) 0.4 MG SL tablet up to max of 3 total doses. If no relief after 1 dose, call 911. Qty: 25 tablet, Refills: 3      Insulin Syringe-Needle U-100 30G X 1/2\" 0.5 ML MISC Inject insulin 3 times daily      insulin regular (HUMULIN R;NOVOLIN R) 100 UNIT/ML injection Inject 10 Units into the skin 3 times daily (before meals)       lisinopril (PRINIVIL;ZESTRIL) 10 MG tablet Take 10 mg by mouth daily      atorvastatin (LIPITOR) 20 MG tablet Take 20 mg by mouth daily. Time Spent on discharge is more than 45 minutes in the examination, evaluation, counseling and review of medications and discharge plan. Signed:    SANDY Gusman - CNP   9/22/2021      Thank you JENNIFER Lanier for the opportunity to be involved in this patient's care. If you have any questions or concerns please feel free to contact me at [phone].

## 2021-09-22 NOTE — PROGRESS NOTES
Paged Janki Chapmaner CNP: \"Pt states every time he swallows it feels like pins are stabbing in both ears. Pt requesting pain medication for this, states Tylenol did not help. Thanks. \"

## 2021-09-22 NOTE — PROGRESS NOTES
Phone consent obtained by this RN from pt's Spouse. Blaine Leach RN second witness to phone consent.

## 2021-09-22 NOTE — PROGRESS NOTES
[facility] notified of pt return. Sister at bedside- notified wife of pt return and pt's status. VSS. TR band in place on RUE. Cath site clean dry and intact.

## 2021-09-22 NOTE — PROGRESS NOTES
Vitals:    09/22/21 1658   BP: 135/71   Pulse: 69   Resp: 16   Temp:    SpO2: 95%       PIV removed. Tip intact. Dressing in place. Tele box removed. [facility] notified of pt discharge. Discharge paperwork went over with and given to pt and pt's sister. Post cath instructions also went over with pt & family. Verbalizes understanding. Pt lock box emptied. Pt wheeled via wheelchair to private car with all belongings including outpatient heart monitor. Cath site clean dry and intact. Pt discharge home in stable condition.

## 2021-09-22 NOTE — PROGRESS NOTES
Brief Pre-Op Note/Sedation Assessment      Esme Armas  1950  [id number]/[id number]      [id number]  8:40 AM    Planned Procedure: Cardiac Catheterization Procedure    Post Procedure Plan: Return to same level of care    Consent: I have discussed with the patient and/or the patient representative the indication, alternatives, and the possible risks and/or complications of the planned procedure and the anesthesia methods. The patient and/or patient representative appear to understand and agree to proceed. DISCUSSION OF CARDIAC CATHETERIZATION PROCEDURES: The procedures, indications, risks and alternatives have been discussed with the patient and, as appropriate, with the patient's guardian . Risks discussed included, but are not limited to, bleeding, development of blood clots/emboli, damage to blood vessels, renal failure, malignant cardiac arrhythmias, stroke, heart attack, emergent coronary bypass surgery, death, dye allergy. The patient (and guardian as appropriate) expressed understanding of the aforementioned and wished to proceed. Chief Complaint: Chest Pain/Pressure  Dyspnea      Indications for Cath Procedure: Worsening Angina  Anginal Classification within 2 weeks:  CCS IV - Inability to perform any activity without angina or angina at rest, i.e., severe limitation  NYHA Heart Failure Class within 2 weeks: No symptoms  Is Cath Lab Visit Valve-related?: No  Surgical Risk: N/A  Functional Type: N/A    Anti- Anginal Meds within 2 weeks:   Yes: Aspirin, Non-Statin (Any) and Statin (Any)    Stress or Imaging Studies Performed (within 6 months):  Stress Test with SPECT Result: Negative Risk/Extent of Ischemia:  Low      Vital Signs:  /82   Pulse 62   Temp 98 °F (36.7 °C) (Oral)   Resp 18   Ht 5' 10\" (1.778 m)   Wt 253 lb (114.8 kg)   SpO2 94%   BMI 36.30 kg/m²     Allergies:   Allergies   Allergen Reactions    Clonidine     Codeine Nausea And Vomiting       Past Medical History:  Past Medical History:   Diagnosis Date    Acid reflux     Yan's esophagus     Coronary artery disease of native heart with stable angina pectoris ([facility] [address] 5/30/2019    Diabetes mellitus ([facility] [address] [facility]     Diabetic autonomic neuropathy associated with type 2 diabetes mellitus ([facility] [address] 3/3/2020    Hyperlipidemia     Hypertension     Influenza A 02/05/2020    Pancreatitis 1996    Restless legs     Sleep apnea     uses CPAP    Tremor     of bilateral hands         Surgical History:  Past Surgical History:   Procedure Laterality Date    ABDOMEN SURGERY      CATARACT REMOVAL Bilateral 2013    CHOLECYSTECTOMY      COLONOSCOPY  10/26/2011    ENDOSCOPY, COLON, DIAGNOSTIC      EGD halo    HYDROCELE EXCISION  11/15/2016    PANCREAS SURGERY      cyst opened up and drining into small bowel    TONSILLECTOMY      UPPER GASTROINTESTINAL ENDOSCOPY  10/04/2016    with biopsy    UPPER GASTROINTESTINAL ENDOSCOPY  03/16/2017    Halo ablation    UPPER GASTROINTESTINAL ENDOSCOPY  06/26/2017    Halo ablation    UPPER GASTROINTESTINAL ENDOSCOPY  09/06/2017    bx distal sophagus    UPPER GASTROINTESTINAL ENDOSCOPY  12/22/2017    with HALO  procedure    UPPER GASTROINTESTINAL ENDOSCOPY N/A 12/4/2018    EGD WITH ABLATION AND ANESTHESIA - HALO---SLEEP APNEA---  performed by Gr[facility] Umaña MD at [address]  2/19/2019    EGD BIOPSY performed by [facility] Umaña MD at [address] 6/11/2019    EGD WITH HALO AND ANESTHESIA performed by [facility] Umaña MD at [address] N/A 8/13/2019    ESOPHAGOGASTRODUODENOSCOPY WITH HALO AND ANESTHESIA -SLEEP APNEA- performed by [employer] Umaña MD at [address] [place] [address] Drive EXTRACTION           Medications:  Current Facility-Administered Medications   Medication Dose Route Frequency Provider Last Rate Last Admin    guaiFENesin-dextromethorphan (ROBITUSSIN DM) 100-10 MG/5ML syrup 5 mL  5 mL Oral Q4H PRN Cheri Rene APRN - NP   5 mL at 09/22/21 0323    nitroGLYCERIN (NITROSTAT) SL tablet 0.4 mg  0.4 mg SubLINGual Q5 Min PRN Faith Foster MD   0.4 mg at 09/21/21 1344    aspirin chewable tablet 81 mg  81 mg Oral Daily Hunter Damon MD        atorvastatin (LIPITOR) tablet 20 mg  20 mg Oral Daily Hunter Damon MD        donepezil (ARICEPT) tablet 5 mg  5 mg Oral Nightly Hunter Damon MD   5 mg at 09/21/21 1948    glipiZIDE (GLUCOTROL) tablet 2.5 mg  2.5 mg Oral QAM AC Hunter Damon MD        insulin regular (HUMULIN R;NOVOLIN R) injection 10 Units  10 Units SubCUTAneous TID AC Hunter Damon MD   10 Units at 09/21/21 1946    lisinopril (PRINIVIL;ZESTRIL) tablet 10 mg  10 mg Oral Daily Hunter Damon MD        pramipexole (MIRAPEX) tablet 0.125 mg  0.125 mg Oral Nightly Hunter Damon MD   0.125 mg at 09/21/21 1948    glucose (GLUTOSE) 40 % oral gel 15 g  15 g Oral PRN Hunter Damon MD        dextrose 50 % IV solution  12.5 g IntraVENous PRN Hunter Damon MD        glucagon (rDNA) injection 1 mg  1 mg IntraMUSCular PRN Hunter Damon MD        dextrose 5 % solution  100 mL/hr IntraVENous PRN Hunter Damon MD        insulin lispro (HUMALOG) injection vial 0-6 Units  0-6 Units SubCUTAneous TID  Hunter Damon MD        insulin lispro (HUMALOG) injection vial 0-3 Units  0-3 Units SubCUTAneous Nightly Hunter Damon MD   1 Units at 09/21/21 2259    nitroGLYCERIN (NITROSTAT) SL tablet 0.4 mg  0.4 mg SubLINGual Q5 Min PRN Hunter Damon MD        morphine (PF) injection 2 mg  2 mg IntraVENous Q4H PRN Hunter Damon MD        sodium chloride flush 0.9 % injection 5-40 mL  5-40 mL IntraVENous 2 times per day Hunter Damon MD   10 mL at 09/21/21 2146    sodium chloride flush 0.9 % injection 5-40 mL  5-40 mL IntraVENous PRN Ed Cruz MD        0.9 % sodium chloride infusion  25 mL IntraVENous PRN Ed Cruz MD        enoxaparin (LOVENOX) injection 40 mg  40 mg SubCUTAneous Daily Ed Cruz MD        ondansetron (ZOFRAN-ODT) disintegrating tablet 4 mg  4 mg Oral Q8H PRN Ed Cruz MD        Or    ondansetron (ZOFRAN) injection 4 mg  4 mg IntraVENous Q6H PRN Ed Cruz MD        polyethylene glycol (GLYCOLAX) packet 17 g  17 g Oral Daily PRN Ed Cruz MD        acetaminophen (TYLENOL) tablet 650 mg  650 mg Oral Q6H PRN Ed Cruz MD   650 mg at 09/21/21 9054    Or    acetaminophen (TYLENOL) suppository 650 mg  650 mg Rectal Q6H PRN Ed Cruz MD               Pre-Sedation:    Pre-Sedation Documentation and Exam:  I have personally completed a history, physical exam & review of systems for this patient (see notes). Prior History of Anesthesia Complications:   none    Modified Mallampati:  III (soft palate, base of uvula visible)    ASA Classification:  Class 3 - A patient with severe systemic disease that limits activity but is not incapacitating      Filiberto Scale: Activity:  2 - Able to move 4 extremities voluntarily on command  Respiration:  2 - Able to breathe deeply and cough freely  Circulation:  2 - BP+/- 20mmHg of normal  Consciousness:  2 - Fully awake  Oxygen Saturation (color):  2 - Able to maintain oxygen saturation >92% on room air    Sedation/Anesthesia Plan:  Guard the patient's safety and welfare. Minimize physical discomfort and pain. Minimize negative psychological responses to treatment by providing sedation and analgesia and maximize the potential amnesia. Patient to meet pre-procedure discharge plan.     Medication Planned:  midazolam intravenously and fentanyl intravenously    Patient is an appropriate candidate for plan of sedation: yes      Electronically signed by Eduardo White MD on 9/22/2021 at 8:40 AM

## 2021-09-22 NOTE — PROCEDURES
CARDIAC CATHETERIZATION REPORT     Procedure Date:  2021  Patient Name: Lakia Fallon  MRN: [id number] : 1950      INDICATION     Dyspnea, possible anginal equivalent  Unstable angina    PROCEDURES PERFORMED     Rare catheterization  Left heart catheterization  LVgram  Coronary angiogam  Coronary cath  Monitoring of moderate conscious sedation          PROCEDURE DESCRIPTION   Risks/benefits/alternatives/outcomes were discussed with patient and/or family and informed consent was obtained. Using the [facility] scale, the patient's right radial artery was found to be a level B. Patient was prepped draped in the usual sterile fashion. Local anaesthetic was applied over puncture sites. Prior to the procedure, right peripheral and acute IV was placed and this was exchanged the micropuncture kit for a 5 Western Nani sheath. 5 Western Nani PA catheter was used for right heart catheterization (0.025 inch Semmes wire was used to facilitate passage of this catheter) this was then removed at the end of the procedure. Venous sheath was secured in place for later removal.  Using a back wall technique, a 6 Setswana Terumo sheath was inserted into right radial artery. Verapamil, nitroglycerin, nicardipine were administered through the sheath. Heparin was administered. Diagnostic 5 Russian pigtail, ultra catheters were used for diagnostic angiograms. At the conclusion of the procedure, a TR band was placed over the puncture site and hemostasis was obtained. There were no immediate complications. I supervised sedation from 10:29 AM to 10:56 AM with versed 1 mg/fentanyl 25 mcg during the procedure. An independent trained observer pushed meds at my direction. We monitored the patient's level of consciousness and vital signs/physiologic status throughout the procedure duration (see times listed previously). 105 cc contrast was utilized. <20cc EBL.   During procedure, patient did have hypotension and bradycardia, likely felt to be related to a vagal response, this responded favorably to fluid resuscitation. At end of procedure, hemodynamics returned to baseline. FINDINGS     HEMODYNAMICS    CHAMBER PRESSURE SATURATION   RA  1  59%   RV  23/1    PA  22/6  52%   PW  7    AORTA  96/45  85%     KARY CARDIAC OUTPUT  5.9 L/min   SVR  829    PVR  67          LVGRAM    LVEDP  8   GRADIENT ACROSS AORTIC VALVE  none   LV FUNCTION EF 60%   WALL MOTION  normal   MITRAL REGURGITATION  mild       CORONARY ARTERIES     CORONARY ARTERIES     LM <10% [id number] stenosis.       LAD Prox <10% stenosis. Mid 40-50% stenosis. Distal 30-40% stenosis. LAD wraps around apex. D2 has ostial 90% stenosis and mid 40 to 50% stenosis.       LCX 10% prox-mid stenosis. OM1 bifurcates in prox segment and there is prox-mid OM1 40-50% stenosis.       RI  Mid 60% stenosis. (could also be described as a high D1)         RCA Dominant. Slight anterior takeoff, Prox-mid 20-30% stenoses. Distal 20% stenosis. Tortuous vessel.               CONCLUSIONS:     Moderate CAD/ASHD and major epicardial vessels  Overall findings are similar to prior angiography from 2019  Continue medical management  Discussed with patient family, would recommend follow-up with PCP and possibly pulmonary in the outpatient setting to investigate for noncardiac causes for his symptoms of chest pain shortness of breath. No beta-blocker due to bradycardia. No further inpatient cardiac work-up or treatment is planned, will sign off, please call with questions.

## 2021-09-22 NOTE — CARE COORDINATION
Addendum:  CM spoke to RN who stated that pt is a/o, ambulatory, has no DCP needs. CM aware of d/c order. Cornel Hollins RN        Chart review completed as pt is in the cath lab at this time. Per previous notes, pt lives w/spouse, Melvyn Dakins, has PCP and insurance. CM anticipates no DCP needs, but will attempt to assess pt at a later time and will assist with needs should they arise.   Cornel Hollins RN

## 2021-09-23 ENCOUNTER — TELEPHONE (OUTPATIENT)
Dept: FAMILY MEDICINE CLINIC | Age: 71
End: 2021-09-23

## 2021-09-23 ENCOUNTER — CARE COORDINATION (OUTPATIENT)
Dept: CASE MANAGEMENT | Age: 71
End: 2021-09-23

## 2021-09-23 NOTE — TELEPHONE ENCOUNTER
[facility] 45 Transitions Initial Follow Up Call    Outreach made within 2 business days of discharge: Yes    Patient: Rica Colbert Patient : 1950   MRN: [id number]  Reason for Admission: There are no discharge diagnoses documented for the most recent discharge. Discharge Date: 21       Spoke with: Patient    Discharge department/facility: [facility] [place]     TCM Interactive Patient Contact:  Was patient able to fill all prescriptions: Yes  Was patient instructed to bring all medications to the follow-up visit: Yes  Is patient taking all medications as directed in the discharge summary?  Yes  Does patient understand their discharge instructions: Yes  Does patient have questions or concerns that need addressed prior to 7-14 day follow up office visit: no    Scheduled appointment with PCP within 7-14 days    Follow Up  Future Appointments   Date Time Provider Magdalena Murguia   2021  1:30 PM JENNIFER Siddiqi [place] - DYD   1/10/2022 11:00 AM MD MALIKA Langford       [place] Score

## 2021-09-23 NOTE — CARE COORDINATION
[facility] Initial Follow Up Call    Call within 2 business days of discharge: Yes    Patient: Lakia Fallon Patient : 1950   MRN: [id number]  Reason for Admission: cp s/p cardiac cath  Discharge Date: 21 RARS: Readmission Risk Score: 11      Last Discharge [facility]       Complaint Diagnosis Description Type Department Provider    21 Chest Pain; Shortness of Breath Chest pain, unspecified type ED to Hosp-Admission (Discharged) (ADMITTED) Ismael Rae MD; Carlos Millan... Spoke with: [address]: [facility]    Transitions of Care Initial Call    Challenges to be reviewed by the provider   Additional needs identified to be addressed with provider: Yes- patient d/c from [facility] 21 after cardiac cath with Dr Tony Ramirez. Patient declined assistance from CTN to schedule post hospitalization visit. none             Method of communication with provider : phone      Advance Care Planning:   Does patient have an Advance Directive: reviewed and current, reviewed and needs to be updated, not on file; education provided, not on file, patient declined education, decision maker updated and referral to internal ACP facilitator. Was this a readmission? No  Patient stated reason for admission: chest pain  Patients top risk factors for readmission: medical condition-s/p cardiac cath    Care Transition Nurse (CTN) contacted the patient by telephone to perform post hospital discharge assessment. Verified name and  with patient as identifiers. Provided introduction to self, and explanation of the CTN role. CTN reviewed discharge instructions, medical action plan and red flags with patient who verbalized understanding. Patient given an opportunity to ask questions and does not have any further questions or concerns at this time. Were discharge instructions available to patient? Yes.  Reviewed appropriate site of care based on symptoms and resources present time. Agreeable to f/u calls. Educated on the use of urgent care or physicians 24 hr access line if assistance is needed after hours. CTN provided contact information. Plan for follow-up call in 5-7 days based on severity of symptoms and risk factors.       Care Transitions 24 Hour Call    Do you have all of your prescriptions and are they filled?: Yes  Patient Home Equipment: CPAP  Do you have support at home?: Partner/Spouse/SO  Are you an active caregiver in your home?: No  Care Transitions Interventions         Follow Up  Future Appointments   Date Time Provider Magdalena Murguia   11/16/2021  1:30 PM JENNIFER Whittington [place] - DYD   1/10/2022 11:00 AM MD MALIKA Manzano RN

## 2021-09-29 ENCOUNTER — OFFICE VISIT (OUTPATIENT)
Dept: FAMILY MEDICINE CLINIC | Age: 71
End: 2021-09-29
Payer: MEDICARE

## 2021-09-29 VITALS
HEART RATE: 64 BPM | WEIGHT: 253 LBS | DIASTOLIC BLOOD PRESSURE: 78 MMHG | BODY MASS INDEX: 36.3 KG/M2 | OXYGEN SATURATION: 96 % | SYSTOLIC BLOOD PRESSURE: 124 MMHG

## 2021-09-29 DIAGNOSIS — E66.01 MORBID OBESITY (HCC): ICD-10-CM

## 2021-09-29 DIAGNOSIS — R07.89 OTHER CHEST PAIN: Primary | ICD-10-CM

## 2021-09-29 DIAGNOSIS — G47.33 SEVERE OBSTRUCTIVE SLEEP APNEA: ICD-10-CM

## 2021-09-29 DIAGNOSIS — R06.02 SHORTNESS OF BREATH: ICD-10-CM

## 2021-09-29 PROBLEM — R01.1 HEART MURMUR: Status: RESOLVED | Noted: 2017-11-28 | Resolved: 2021-09-29

## 2021-09-29 PROBLEM — J84.10 LUNG GRANULOMA (HCC): Status: ACTIVE | Noted: 2017-08-21

## 2021-09-29 PROBLEM — R07.9 CHEST PAIN: Status: RESOLVED | Noted: 2021-09-21 | Resolved: 2021-09-29

## 2021-09-29 PROCEDURE — 99495 TRANSJ CARE MGMT MOD F2F 14D: CPT | Performed by: PHYSICIAN ASSISTANT

## 2021-09-29 PROCEDURE — 1111F DSCHRG MED/CURRENT MED MERGE: CPT | Performed by: PHYSICIAN ASSISTANT

## 2021-09-29 ASSESSMENT — ENCOUNTER SYMPTOMS
WHEEZING: 0
COUGH: 0
SHORTNESS OF BREATH: 1

## 2021-09-29 NOTE — PROGRESS NOTES
Post-Discharge Transitional Care Management Services or Hospital Follow Up      Corrine Lomeli   YOB: 1950    Date of Office Visit:  9/29/2021  Date of Hospital Admission: 09/21/21   Date of Hospital Discharge: 09/22/21    Care management risk score Rising risk (score 2-5) and Complex Care (Scores >=6): 2     Non face to face  following discharge, date last encounter closed (first attempt may have been earlier): 9/23/2021  1:50 PM     Call initiated 2 business days of discharge: Yes    Patient Active Problem List   Diagnosis    Pulmonary nodules    Lung granuloma ([facility] [address]    Essential hypertension    Hyperlipidemia    Sinus bradycardia    Severe obstructive sleep apnea    Morbid obesity ([facility] [address]    Coronary artery disease involving native coronary artery of native heart with angina pectoris ([facility] [address]    Peripheral polyneuropathy    Vitamin D deficiency    Diabetic autonomic neuropathy associated with type 2 diabetes mellitus ([facility] [address]    Atrial tachycardia (HCC)    Type 2 diabetes mellitus with hyperglycemia (HCC)    Grade II diastolic dysfunction       Allergies   Allergen Reactions    Clonidine     Codeine Nausea And Vomiting       Medications listed as ordered at the time of discharge from hospital  Reviewed with the pt     Medications marked \"taking\" at this time  Outpatient Medications Marked as Taking for the 9/29/21 encounter (Office Visit) with JENNIFER Butler   Medication Sig Dispense Refill    donepezil (ARICEPT) 5 MG tablet Take 1 tablet by mouth nightly 30 tablet 3    glipiZIDE (GLUCOTROL XL) 2.5 MG extended release tablet Take 1 tablet by mouth daily 90 tablet 0    Continuous Blood Gluc Sensor (FREESTYLE JUSTIN 14 DAY SENSOR) MISC 1 Units by Does not apply route every 14 days 2 each 11    Continuous Blood Gluc  (FREESTYLE JUSTIN 14 DAY READER) OLI 1 Units by Does not apply route as needed (as needed) 1 Device 0    pramipexole (MIRAPEX) 0.125 MG tablet Take 1 tablet by mouth nightly 90 tablet 3    aspirin 81 MG chewable tablet Take 1 tablet by mouth daily 30 tablet 3    nitroGLYCERIN (NITROSTAT) 0.4 MG SL tablet up to max of 3 total doses. If no relief after 1 dose, call 911. 25 tablet 3    Insulin Syringe-Needle U-100 30G X 1/2\" 0.5 ML MISC Inject insulin 3 times daily      insulin regular (HUMULIN R;NOVOLIN R) 100 UNIT/ML injection Inject 10 Units into the skin 3 times daily (before meals)       lisinopril (PRINIVIL;ZESTRIL) 10 MG tablet Take 10 mg by mouth daily      atorvastatin (LIPITOR) 20 MG tablet Take 20 mg by mouth daily. Medications patient taking as of now reconciled against medications ordered at time of hospital discharge: Yes    Chief Complaint   Patient presents with    Follow-Up from Hospital     [facility]/Discharged 9/22/21/Chest pain        HPI    Inpatient course: Discharge summary reviewed- see chart. Interval history/Current status: The pt was seen in the hospital for chest pain and discharged the next day. Cardiac evaluation was unchanged from one year ago. Troponin was negative. Lab work was grossly within normal range. Chest xray shows borderline normal sized heart with granulomatous chest disease. The pt reports that his chest pain is improved and resolved today however he continues to struggle with fatigue and shortness of breath. Shortness of breath occurs with movement and rest. Risk factors include severe abdominal obesity and severe ESTHER for which he is seeing Dr. Maycol Tadeo. The pt does not currently wear a CPAP because he can't tolerate wearning a mask at night. This is likely contributory to his daytime fatigue and lethargy. There is no wheezing or chest tightness observed or felt. He has no history of tobacco use. Vitals:    09/29/21 0917   BP: 124/78   Pulse: 64   SpO2: 96%   Weight: 253 lb (114.8 kg)     Body mass index is 36.3 kg/m².    Wt Readings from Last 3 Encounters:   09/29/21 253 lb (114.8 kg)   09/22/21 253 lb (114.8 kg) 09/17/21 259 lb 8 oz (117.7 kg)     BP Readings from Last 3 Encounters:   09/29/21 124/78   09/22/21 135/71   09/17/21 130/60       Review of Systems   Constitutional: Positive for fatigue. Negative for diaphoresis and unexpected weight change. Respiratory: Positive for shortness of breath. Negative for cough and wheezing. Cardiovascular: Negative for chest pain, palpitations and leg swelling. Musculoskeletal: Negative for myalgias. Hematological: Negative for adenopathy. Does not bruise/bleed easily. Physical Exam  Vitals reviewed. Constitutional:       Appearance: Normal appearance. HENT:      Head: Normocephalic and atraumatic. Cardiovascular:      Rate and Rhythm: Normal rate and regular rhythm. Heart sounds: Normal heart sounds. Pulmonary:      Effort: Pulmonary effort is normal.      Breath sounds: Normal breath sounds. Abdominal:      General: Bowel sounds are normal.      Palpations: Abdomen is soft. Musculoskeletal:      Right lower leg: No edema. Left lower leg: No edema. Neurological:      Mental Status: He is alert and oriented to person, place, and time. Assessment/Plan:  1. Other chest pain  -  Resolved. Follows with cardiology regularly  - [facility] MED LIST    2. Shortness of breath  -  Likely secondary to abdominal obesity but we will do a PFT to be sure there is no underlying asthma or COPD  - Full PFT Study With Bronchodilator; Future    3. Morbid obesity ([address]  -  We discussed ways for the patient to safely increase physical activity to help decrease abdominal weight.      4. Severe obstructive sleep apnea  -  Pt will move up appt with Dr. Pippa Giron to discuss treatment options        Medical Decision Making: moderate complexity

## 2021-09-30 ENCOUNTER — CARE COORDINATION (OUTPATIENT)
Dept: CASE MANAGEMENT | Age: 71
End: 2021-09-30

## 2021-09-30 NOTE — CARE COORDINATION
[facility] Transitions Follow Up Call    2021    Patient: Bushra Thompson  Patient : 1950   MRN: [id number]  Reason for Admission: CP  Discharge Date: 21 RARS: Readmission Risk Score: 11         Spoke with: Bushra Thompson    Patient answered call and verified . Patient pleasant, but brief with conversation. Patient taking all medications as directed and followed up with PCP since last contact. Denies any chest pain and \"feeling good\". Denies any acute needs at present time. Agreeable to f/u calls. Educated on the use of urgent care or physicians 24 hr access line if assistance is needed after hours. Care Transitions Subsequent and Final Call    Subsequent and Final Calls  Do you have any ongoing symptoms?: No  Have your medications changed?: No  Do you have any questions related to your medications?: No  Do you currently have any active services?: No  Are you currently active with any services?: Home Health  Do you have any needs or concerns that I can assist you with?: No  Identified Barriers: None  Care Transitions Interventions  Other Interventions:            Follow Up  Future Appointments   Date Time Provider Magdalena Murguia   10/11/2021  9:30 AM SCHEDULE, PATT KIRK   2021  1:30 PM JENNIFER Whitting[place] - DYSHON   1/10/2022 11:00 AM MD MALIKA Manzano RN

## 2021-10-01 NOTE — TELEPHONE ENCOUNTER
10/01-Pt came into office dropped off monitor. Monitor has been placed in basket in [address] office.

## 2021-10-07 ENCOUNTER — CARE COORDINATION (OUTPATIENT)
Dept: CASE MANAGEMENT | Age: 71
End: 2021-10-07

## 2021-10-07 NOTE — CARE COORDINATION
[facility] Transitions Follow Up Call    10/7/2021    Patient: Chris Franklin  Patient : 1950   MRN: [id number]  Reason for Admission: cp s/p cardiac cath  Discharge Date: 21 RARS: Readmission Risk Score: 11         Spoke with: Chris Franklin    Patient answered call and verified . Patient is doing well and agreeable to transition call. Patient has completed cardiac monitor rotation and returned device to MD office. Patient denied any CP or SOB. Patient is feeling good and denied any acute needs at present time. Agreeable to f/u calls. Educated on the use of urgent care or physicians 24 hr access line if assistance is needed after hours. Care Transitions Subsequent and Final Call    Subsequent and Final Calls  Do you have any ongoing symptoms?: No  Have your medications changed?: No  Do you have any questions related to your medications?: No  Do you currently have any active services?: No  Are you currently active with any services?: Home Health  Do you have any needs or concerns that I can assist you with?: No  Identified Barriers: None  Care Transitions Interventions  Other Interventions:            Follow Up  Future Appointments   Date Time Provider Magdalena Murguia   2021  1:30 PM JENNIFER Albert [place] - DYD   1/10/2022 11:00 AM MD MALIKA Green, FABIÁN

## 2021-10-11 ENCOUNTER — TELEPHONE (OUTPATIENT)
Dept: CARDIOLOGY CLINIC | Age: 71
End: 2021-10-11

## 2021-10-11 DIAGNOSIS — I47.1 ATRIAL TACHYCARDIA (HCC): ICD-10-CM

## 2021-10-11 PROCEDURE — 93248 EXT ECG>7D<15D REV&INTERPJ: CPT | Performed by: INTERNAL MEDICINE

## 2021-10-11 NOTE — TELEPHONE ENCOUNTER
Please inform the patient that his monitor showed his heart slows down at night and compliance with BiPAP is highly recommended. He had some brief episodes of a fast heartbeat. Will continue present management unless experiencing palpitations.

## 2021-10-11 NOTE — LETTER
[address] [place] [address] Cardiology - [address] [place] [address] [facility]  Phone: [phone]  Fax: [phone] [facility], APRN - CNP        October 21, 2021    Shannon Draft  [address] [place] [address]      Dear Loly Curtis: Our office has attempted to contact you regarding results. Once letter is received    Please call our office for you results. If you have any questions or concerns, please don't hesitate to call.     Sincerely,        SANDY Cannon CNP

## 2021-10-13 ENCOUNTER — TELEPHONE (OUTPATIENT)
Dept: CARDIOLOGY CLINIC | Age: 71
End: 2021-10-13

## 2021-10-13 DIAGNOSIS — I47.1 SUPRAVENTRICULAR TACHYCARDIA (HCC): ICD-10-CM

## 2021-10-14 ENCOUNTER — CARE COORDINATION (OUTPATIENT)
Dept: CASE MANAGEMENT | Age: 71
End: 2021-10-14

## 2021-10-14 NOTE — CARE COORDINATION
Jordi [facility] Transitions Follow Up Call    10/14/2021    Patient: Sarah Christiansen  Patient : 1950   MRN: [id number]  Reason for Admission: CP  Discharge Date: 21 RARS: Readmission Risk Score: 11         Spoke with: na    Attempted to reach patient via phone for transition call. VM left stating purpose of call along with my contact information requesting a return call. Care Transitions Subsequent and Final Call    Subsequent and Final Calls  Care Transitions Interventions  Other Interventions:            Follow Up  Future Appointments   Date Time Provider Magdalena Murguia   2021  1:30 PM JENNIFER Rebolledo [place] - [facility]   1/10/2022 11:00 AM MD MALIKA Braden RN

## 2021-10-21 ENCOUNTER — CARE COORDINATION (OUTPATIENT)
Dept: CASE MANAGEMENT | Age: 71
End: 2021-10-21

## 2021-10-21 NOTE — CARE COORDINATION
[facility] Transitions Follow Up Call    10/21/2021    Patient: Yefri Del Cid  Patient : 1950   MRN: [id number]  Reason for Admission: CP  Discharge Date: 21 RARS: Readmission Risk Score: 11         Spoke with: na    Second and final attempt made to reach patient for transition call. VM left stating purpose of call along with my contact information requesting a return call. Episode ended due to unsuccessful contact      Care Transitions Subsequent and Final Call    Subsequent and Final Calls  Care Transitions Interventions  Other Interventions:            Follow Up  Future Appointments   Date Time Provider Magdalena Murguia   2021  1:30 PM JENNIFER Espinosa [place] - [facility]   1/10/2022 11:00 AM MD MALIKA De La O RN

## 2021-11-16 ENCOUNTER — OFFICE VISIT (OUTPATIENT)
Dept: FAMILY MEDICINE CLINIC | Age: 71
End: 2021-11-16
Payer: MEDICARE

## 2021-11-16 VITALS
HEIGHT: 68 IN | WEIGHT: 253 LBS | TEMPERATURE: 98.3 F | BODY MASS INDEX: 38.34 KG/M2 | OXYGEN SATURATION: 97 % | DIASTOLIC BLOOD PRESSURE: 60 MMHG | SYSTOLIC BLOOD PRESSURE: 116 MMHG | HEART RATE: 82 BPM

## 2021-11-16 DIAGNOSIS — I10 ESSENTIAL HYPERTENSION: ICD-10-CM

## 2021-11-16 DIAGNOSIS — E66.01 MORBID OBESITY (HCC): ICD-10-CM

## 2021-11-16 DIAGNOSIS — Z79.4 TYPE 2 DIABETES MELLITUS WITH HYPERGLYCEMIA, WITH LONG-TERM CURRENT USE OF INSULIN (HCC): Primary | ICD-10-CM

## 2021-11-16 DIAGNOSIS — E11.42 DIABETIC POLYNEUROPATHY ASSOCIATED WITH TYPE 2 DIABETES MELLITUS (HCC): ICD-10-CM

## 2021-11-16 DIAGNOSIS — F03.90 DEMENTIA WITHOUT BEHAVIORAL DISTURBANCE, UNSPECIFIED DEMENTIA TYPE: ICD-10-CM

## 2021-11-16 DIAGNOSIS — J84.10 LUNG GRANULOMA (HCC): ICD-10-CM

## 2021-11-16 DIAGNOSIS — E11.65 TYPE 2 DIABETES MELLITUS WITH HYPERGLYCEMIA, WITH LONG-TERM CURRENT USE OF INSULIN (HCC): Primary | ICD-10-CM

## 2021-11-16 LAB
CREATININE URINE POCT: ABNORMAL
MICROALBUMIN/CREAT 24H UR: ABNORMAL MG/G{CREAT}
MICROALBUMIN/CREAT UR-RTO: ABNORMAL

## 2021-11-16 PROCEDURE — G8484 FLU IMMUNIZE NO ADMIN: HCPCS | Performed by: PHYSICIAN ASSISTANT

## 2021-11-16 PROCEDURE — 1036F TOBACCO NON-USER: CPT | Performed by: PHYSICIAN ASSISTANT

## 2021-11-16 PROCEDURE — G8417 CALC BMI ABV UP PARAM F/U: HCPCS | Performed by: PHYSICIAN ASSISTANT

## 2021-11-16 PROCEDURE — 2022F DILAT RTA XM EVC RTNOPTHY: CPT | Performed by: PHYSICIAN ASSISTANT

## 2021-11-16 PROCEDURE — G8427 DOCREV CUR MEDS BY ELIG CLIN: HCPCS | Performed by: PHYSICIAN ASSISTANT

## 2021-11-16 PROCEDURE — 99214 OFFICE O/P EST MOD 30 MIN: CPT | Performed by: PHYSICIAN ASSISTANT

## 2021-11-16 PROCEDURE — 3017F COLORECTAL CA SCREEN DOC REV: CPT | Performed by: PHYSICIAN ASSISTANT

## 2021-11-16 PROCEDURE — 4040F PNEUMOC VAC/ADMIN/RCVD: CPT | Performed by: PHYSICIAN ASSISTANT

## 2021-11-16 PROCEDURE — 82044 UR ALBUMIN SEMIQUANTITATIVE: CPT | Performed by: PHYSICIAN ASSISTANT

## 2021-11-16 PROCEDURE — 1123F ACP DISCUSS/DSCN MKR DOCD: CPT | Performed by: PHYSICIAN ASSISTANT

## 2021-11-16 PROCEDURE — 3052F HG A1C>EQUAL 8.0%<EQUAL 9.0%: CPT | Performed by: PHYSICIAN ASSISTANT

## 2021-11-16 RX ORDER — INSULIN GLARGINE 100 [IU]/ML
0.1 INJECTION, SOLUTION SUBCUTANEOUS NIGHTLY
Qty: 9.9 ML | Refills: 0 | Status: SHIPPED | OUTPATIENT
Start: 2021-11-16 | End: 2022-01-26 | Stop reason: SDUPTHER

## 2021-11-16 NOTE — PROGRESS NOTES
Paola Humphries (:  1950) is a 70 y.o. male,Established patient, here for evaluation of the following chief complaint(s):  Diabetes         ASSESSMENT/PLAN:  1. Type 2 diabetes mellitus with hyperglycemia, with long-term current use of insulin ([facility])  -     POCT microalbuminL abnormal  -      DIABETES FOOT EXAML abnromal  -     Pt too early to get his A1c, declined to pay for this. Will add on lantus and try to get rid of novolin R  2. Diabetic polyneuropathy associated with type 2 diabetes mellitus ([address]        -     Discussed important foot care with the pt  3. Lung granuloma ([facility])         -     Stable, follow up in 6 months  4. Morbid obesity ([facility]          -     Work on reducing carbs at each meal, discussed the plate method of eating  5. Essential hypertension         -     Well controlled, continue with current dose of medication, follow up in 6 months  6. Dementia without behavioral disturbance, unspecified dementia type ([facility])  -     donepezil (ARICEPT) 5 MG tablet; Take 1 tablet by mouth nightly, Disp-90 tablet, R-1Normal      Return in about 3 months (around 2022) for DM. Subjective   SUBJECTIVE/OBJECTIVE:  HPI  DM:  Current medication: glipizide, humulin R 7 Units BID  Side effects: none  Home glucose readings: 53- 812  Complications: none  Signs or symptoms of abnormal glucose: none  Diabetic retinal exam: due  Diabetic foot exam: due  Diabetic microalbuminuria test: due      HTN:  Current medication: lisinopril 10 mg  Side effects: none  Denies: chest pain, shortness of breath, headache or lower extremity edema  Home blood pressure readings:  Seeing Dr. Dayron Whaley and Moreno Quinones, needs follow up with both    Review of Systems   Eyes: Negative for visual disturbance. Respiratory: Negative for shortness of breath and wheezing. Cardiovascular: Negative for chest pain, palpitations and leg swelling. Endocrine: Negative for polydipsia, polyphagia and polyuria.    Skin: Negative for pallor. Neurological: Negative for dizziness, light-headedness and headaches. Hematological: Negative for adenopathy. Objective   Physical Exam  Vitals reviewed. Constitutional:       Appearance: He is obese. HENT:      Head: Normocephalic and atraumatic. Cardiovascular:      Rate and Rhythm: Normal rate and regular rhythm. Heart sounds: Normal heart sounds. Pulmonary:      Effort: Pulmonary effort is normal.      Breath sounds: Normal breath sounds. Musculoskeletal:      Right lower leg: No edema. Left lower leg: No edema. Neurological:      Mental Status: He is alert. Visual inspection:  Deformity/amputation: absent  Skin lesions/pre-ulcerative calluses: absent  Edema: right- negative, left- negative    Sensory exam:  Monofilament sensation: abnormal - no feeling in either foot  (minimum of 5 random plantar locations tested, avoiding callused areas - > 1 area with absence of sensation is + for neuropathy)    Plus at least one of the following:  Pulses: normal               An electronic signature was used to authenticate this note.     --JENNIFER Wiseman

## 2021-11-17 PROBLEM — E11.29 TYPE 2 DIABETES MELLITUS WITH MICROALBUMINURIA (HCC): Status: ACTIVE | Noted: 2021-08-16

## 2021-11-17 PROBLEM — R80.9 TYPE 2 DIABETES MELLITUS WITH MICROALBUMINURIA (HCC): Status: ACTIVE | Noted: 2021-08-16

## 2021-11-17 PROBLEM — E11.43 DIABETIC AUTONOMIC NEUROPATHY ASSOCIATED WITH TYPE 2 DIABETES MELLITUS (HCC): Status: RESOLVED | Noted: 2020-03-03 | Resolved: 2021-11-17

## 2021-11-17 RX ORDER — GLIPIZIDE 2.5 MG/1
2.5 TABLET, EXTENDED RELEASE ORAL DAILY
Qty: 90 TABLET | Refills: 0 | Status: SHIPPED | OUTPATIENT
Start: 2021-11-17 | End: 2022-01-26 | Stop reason: SDUPTHER

## 2021-11-17 RX ORDER — DONEPEZIL HYDROCHLORIDE 5 MG/1
5 TABLET, FILM COATED ORAL NIGHTLY
Qty: 90 TABLET | Refills: 1 | Status: SHIPPED | OUTPATIENT
Start: 2021-11-17 | End: 2022-07-19 | Stop reason: SDUPTHER

## 2021-11-17 ASSESSMENT — ENCOUNTER SYMPTOMS
SHORTNESS OF BREATH: 0
WHEEZING: 0

## 2021-11-30 ENCOUNTER — TELEPHONE (OUTPATIENT)
Dept: FAMILY MEDICINE CLINIC | Age: 71
End: 2021-11-30

## 2021-11-30 DIAGNOSIS — E11.65 TYPE 2 DIABETES MELLITUS WITH HYPERGLYCEMIA, WITH LONG-TERM CURRENT USE OF INSULIN (HCC): Primary | ICD-10-CM

## 2021-11-30 DIAGNOSIS — Z79.4 TYPE 2 DIABETES MELLITUS WITH HYPERGLYCEMIA, WITH LONG-TERM CURRENT USE OF INSULIN (HCC): Primary | ICD-10-CM

## 2021-11-30 NOTE — TELEPHONE ENCOUNTER
Pt. Asking for a referral to a \"sugar doctor\"  States he is wanting someone that specializes in blood sugars.

## 2021-11-30 NOTE — TELEPHONE ENCOUNTER
[address] [facility],Third [facility], [place]  [address] [facility]  THE [facility] [place], [address] [facility]  Phone: [phone]

## 2021-12-02 ENCOUNTER — TELEPHONE (OUTPATIENT)
Dept: FAMILY MEDICINE CLINIC | Age: 71
End: 2021-12-02

## 2021-12-02 NOTE — TELEPHONE ENCOUNTER
LM for pt to call back   I can fill out the applilcation to get his medication free but it Is asking for a few things. And stuff he needs to fill out. We need Pay check stubs? Or Disability pay checks, or whatever income he gets.    Last w2   Is this something he can provide

## 2021-12-02 NOTE — TELEPHONE ENCOUNTER
Patient calling back due to a lab letter he received in regards to his urine. JENNIFER Lamar   11/17/2021  7:47 AM EST         Please call the pt with the following results: Please let the pt know that his urine shows some microalbuminuria. He is already on a medication which is protective to the kidneys.      Can we see if he has picked up his lantus yet and what the cost was for this? Results were relayed to patient and understanding voiced  He did not  the Lantus due to cost. He states his fasting blood sugars in the morning range in the 160's. The afternoon and evening readings range 276-290.

## 2021-12-27 ENCOUNTER — TELEPHONE (OUTPATIENT)
Dept: FAMILY MEDICINE CLINIC | Age: 71
End: 2021-12-27

## 2022-01-10 ENCOUNTER — TELEPHONE (OUTPATIENT)
Dept: FAMILY MEDICINE CLINIC | Age: 72
End: 2022-01-10

## 2022-01-10 ENCOUNTER — TELEPHONE (OUTPATIENT)
Dept: PULMONOLOGY | Age: 72
End: 2022-01-10

## 2022-01-10 DIAGNOSIS — Z79.4 CONTROLLED TYPE 2 DIABETES MELLITUS WITHOUT COMPLICATION, WITH LONG-TERM CURRENT USE OF INSULIN (HCC): ICD-10-CM

## 2022-01-10 DIAGNOSIS — E11.9 CONTROLLED TYPE 2 DIABETES MELLITUS WITHOUT COMPLICATION, WITH LONG-TERM CURRENT USE OF INSULIN (HCC): ICD-10-CM

## 2022-01-10 RX ORDER — FLASH GLUCOSE SENSOR
1 KIT MISCELLANEOUS
Qty: 2 EACH | Refills: 11 | Status: SHIPPED | OUTPATIENT
Start: 2022-01-10 | End: 2022-01-17 | Stop reason: SDUPTHER

## 2022-01-10 NOTE — TELEPHONE ENCOUNTER
Patient did not show for 1 yr   ESTHER  R/s 9/27appointment  with Dr Selena Louise on 1/10/22    Same Day Cancellation: No    Patient rescheduled:  No    New appointment:     Patient was also no show on:     LOV     Assessment: 9/21/20      · Severe ESTHER. Full face mask. BiPAP 24/19 cmH2O. improving compliance per his report. · Mask leak - declined to trim his beard   · RLS/PLMS on Mirapex per PCP   · Morbid obesity      Plan:    · Advised to trim his beard - willing now   · Obtain BiPAP compliance  · Advised to use BiPAP 6-8 hrs at night and during naps. · Replacement of mask, tubing, head straps every 3-6 months or sooner if damaged. · Follow up BIPAP compliance and pressure adjustment if needed  · Sleep hygiene  · Avoid sedatives, alcohol and caffeinated drinks at bed time. · No driving motorized vehicles or operating heavy machinery while fatigue, drowsy or sleepy. · Weight loss is also recommended as a long-term intervention.     · Complications of ESTHER if not treated were discussed with patient patient to include systemic hypertension, pulmonary hypertension, cardiovascular morbidities, car accidents and all cause mortality.

## 2022-01-12 NOTE — TELEPHONE ENCOUNTER
Called patient to jesika appt states he no longer has BiPAP gave back to DME. Does not wish to jesika appt.

## 2022-01-17 ENCOUNTER — TELEPHONE (OUTPATIENT)
Dept: ENDOCRINOLOGY | Age: 72
End: 2022-01-17

## 2022-01-17 DIAGNOSIS — E11.9 CONTROLLED TYPE 2 DIABETES MELLITUS WITHOUT COMPLICATION, WITH LONG-TERM CURRENT USE OF INSULIN (HCC): ICD-10-CM

## 2022-01-17 DIAGNOSIS — Z79.4 CONTROLLED TYPE 2 DIABETES MELLITUS WITHOUT COMPLICATION, WITH LONG-TERM CURRENT USE OF INSULIN (HCC): ICD-10-CM

## 2022-01-17 RX ORDER — FLASH GLUCOSE SENSOR
1 KIT MISCELLANEOUS
Qty: 2 EACH | Refills: 11 | Status: SHIPPED | OUTPATIENT
Start: 2022-01-17 | End: 2022-01-26 | Stop reason: SDUPTHER

## 2022-01-26 ENCOUNTER — OFFICE VISIT (OUTPATIENT)
Dept: FAMILY MEDICINE CLINIC | Age: 72
End: 2022-01-26
Payer: MEDICARE

## 2022-01-26 VITALS
WEIGHT: 259 LBS | OXYGEN SATURATION: 99 % | DIASTOLIC BLOOD PRESSURE: 80 MMHG | HEART RATE: 59 BPM | SYSTOLIC BLOOD PRESSURE: 134 MMHG | BODY MASS INDEX: 39.38 KG/M2

## 2022-01-26 DIAGNOSIS — G25.0 ESSENTIAL TREMOR: ICD-10-CM

## 2022-01-26 DIAGNOSIS — Z79.4 TYPE 2 DIABETES MELLITUS WITH MICROALBUMINURIA, WITH LONG-TERM CURRENT USE OF INSULIN (HCC): Primary | ICD-10-CM

## 2022-01-26 DIAGNOSIS — F03.90 DEMENTIA WITHOUT BEHAVIORAL DISTURBANCE, UNSPECIFIED DEMENTIA TYPE: ICD-10-CM

## 2022-01-26 DIAGNOSIS — I10 ESSENTIAL HYPERTENSION: ICD-10-CM

## 2022-01-26 DIAGNOSIS — G62.9 PERIPHERAL POLYNEUROPATHY: ICD-10-CM

## 2022-01-26 DIAGNOSIS — I25.119 CORONARY ARTERY DISEASE INVOLVING NATIVE CORONARY ARTERY OF NATIVE HEART WITH ANGINA PECTORIS (HCC): ICD-10-CM

## 2022-01-26 DIAGNOSIS — E78.2 MIXED HYPERLIPIDEMIA: ICD-10-CM

## 2022-01-26 DIAGNOSIS — R80.9 TYPE 2 DIABETES MELLITUS WITH MICROALBUMINURIA, WITH LONG-TERM CURRENT USE OF INSULIN (HCC): Primary | ICD-10-CM

## 2022-01-26 DIAGNOSIS — E66.01 MORBID OBESITY (HCC): ICD-10-CM

## 2022-01-26 DIAGNOSIS — E11.29 TYPE 2 DIABETES MELLITUS WITH MICROALBUMINURIA, WITH LONG-TERM CURRENT USE OF INSULIN (HCC): Primary | ICD-10-CM

## 2022-01-26 PROBLEM — E55.9 VITAMIN D DEFICIENCY: Status: RESOLVED | Noted: 2019-12-17 | Resolved: 2022-01-26

## 2022-01-26 LAB — HBA1C MFR BLD: 8.4 %

## 2022-01-26 PROCEDURE — 2022F DILAT RTA XM EVC RTNOPTHY: CPT | Performed by: PHYSICIAN ASSISTANT

## 2022-01-26 PROCEDURE — 99214 OFFICE O/P EST MOD 30 MIN: CPT | Performed by: PHYSICIAN ASSISTANT

## 2022-01-26 PROCEDURE — 3052F HG A1C>EQUAL 8.0%<EQUAL 9.0%: CPT | Performed by: PHYSICIAN ASSISTANT

## 2022-01-26 PROCEDURE — 1123F ACP DISCUSS/DSCN MKR DOCD: CPT | Performed by: PHYSICIAN ASSISTANT

## 2022-01-26 PROCEDURE — 3017F COLORECTAL CA SCREEN DOC REV: CPT | Performed by: PHYSICIAN ASSISTANT

## 2022-01-26 PROCEDURE — G8482 FLU IMMUNIZE ORDER/ADMIN: HCPCS | Performed by: PHYSICIAN ASSISTANT

## 2022-01-26 PROCEDURE — G8417 CALC BMI ABV UP PARAM F/U: HCPCS | Performed by: PHYSICIAN ASSISTANT

## 2022-01-26 PROCEDURE — 83036 HEMOGLOBIN GLYCOSYLATED A1C: CPT | Performed by: PHYSICIAN ASSISTANT

## 2022-01-26 PROCEDURE — 4040F PNEUMOC VAC/ADMIN/RCVD: CPT | Performed by: PHYSICIAN ASSISTANT

## 2022-01-26 PROCEDURE — G8427 DOCREV CUR MEDS BY ELIG CLIN: HCPCS | Performed by: PHYSICIAN ASSISTANT

## 2022-01-26 PROCEDURE — 1036F TOBACCO NON-USER: CPT | Performed by: PHYSICIAN ASSISTANT

## 2022-01-26 RX ORDER — INSULIN GLARGINE 100 [IU]/ML
15 INJECTION, SOLUTION SUBCUTANEOUS NIGHTLY
Qty: 13.5 ML | Refills: 1 | Status: SHIPPED | OUTPATIENT
Start: 2022-01-26 | End: 2022-07-19 | Stop reason: SDUPTHER

## 2022-01-26 RX ORDER — LISINOPRIL 10 MG/1
10 TABLET ORAL DAILY
Qty: 90 TABLET | Refills: 1 | Status: SHIPPED | OUTPATIENT
Start: 2022-01-26 | End: 2022-07-19 | Stop reason: SDUPTHER

## 2022-01-26 RX ORDER — PRIMIDONE 50 MG/1
50 TABLET ORAL 2 TIMES DAILY
Status: ON HOLD | COMMUNITY
End: 2022-09-09 | Stop reason: ALTCHOICE

## 2022-01-26 RX ORDER — PRIMIDONE 50 MG/1
TABLET ORAL
Qty: 180 TABLET | Refills: 1 | Status: CANCELLED | OUTPATIENT
Start: 2022-01-26

## 2022-01-26 RX ORDER — ATORVASTATIN CALCIUM 20 MG/1
20 TABLET, FILM COATED ORAL DAILY
Qty: 90 TABLET | Refills: 1 | Status: SHIPPED | OUTPATIENT
Start: 2022-01-26 | End: 2022-07-19 | Stop reason: SDUPTHER

## 2022-01-26 RX ORDER — FLASH GLUCOSE SENSOR
1 KIT MISCELLANEOUS
Qty: 2 EACH | Refills: 11 | Status: SHIPPED | OUTPATIENT
Start: 2022-01-26 | End: 2022-07-05 | Stop reason: SDUPTHER

## 2022-01-26 RX ORDER — GLIPIZIDE 5 MG/1
5 TABLET, FILM COATED, EXTENDED RELEASE ORAL DAILY
Qty: 90 TABLET | Refills: 1 | Status: SHIPPED | OUTPATIENT
Start: 2022-01-26 | End: 2022-07-19 | Stop reason: SDUPTHER

## 2022-01-26 ASSESSMENT — ENCOUNTER SYMPTOMS
WHEEZING: 0
SHORTNESS OF BREATH: 0
COUGH: 0
BLOOD IN STOOL: 0

## 2022-01-26 NOTE — PROGRESS NOTES
Unique Sanders (:  1950) is a 70 y.o. male,Established patient, here for evaluation of the following chief complaint(s):  Diabetes (follow up)         ASSESSMENT/PLAN:  1. Type 2 diabetes mellitus with microalbuminuria, with long-term current use of insulin ([facility])  -     POCT glycosylated hemoglobin (Hb A1C)  -     Continuous Blood Gluc Sensor (FREESTYLE JUSTIN 14 DAY SENSOR) MISC; 1 Units by Does not apply route every 14 days, Disp-2 each, R-11Normal  -     insulin glargine (LANTUS SOLOSTAR) 100 UNIT/ML injection pen; Inject 15 Units into the skin nightly, Disp-13.5 mL, R-1Normal  -     glipiZIDE (GLUCOTROL XL) 5 MG extended release tablet; Take 1 tablet by mouth daily, Disp-90 tablet, R-1Normal  -    Uncontrolled, pt to call back to notify me of the type of insulin that he has been using at home. Increased lantus and glipizide, close follow up in 3 months  2. Peripheral polyneuropathy        -    Not on any medication, no current pain  3. Dementia without behavioral disturbance, unspecified dementia type ([facility])  -     Mari Navarro MD, Neurology, [facility]  -     Stable, continue with aricept. Get established with neurology  4. Essential hypertension  -     lisinopril (PRINIVIL;ZESTRIL) 10 MG tablet; Take 1 tablet by mouth daily, Disp-90 tablet, R-1Normal  5. Mixed hyperlipidemia  -     atorvastatin (LIPITOR) 20 MG tablet; Take 1 tablet by mouth daily, Disp-90 tablet, R-1Normal  6. Morbid obesity ([address]        -    Discussed the plate method of eating with the patient. encoruaged more phsyical activity  7. Coronary artery disease involving native coronary artery of native heart with angina pectoris ([address]  8. Essential tremor        -   Primidone, stable and well controlled with medication    Return in about 3 months (around 2022) for DM. Subjective   SUBJECTIVE/OBJECTIVE:  HPI  DM:  Current medication: lantus, glipizide, humalog?  Pt unsure which form of insulin he is taking but he is taking it twice per day  Side effects: none  Home fasting glucose: doesn't recall, out of sensors  S/s of abnormal glucose:  Complications: neuropathy    HTN:  Current medication: lisinopril  Side effects: none  Home blood pressure readings: does not check at home  Denies: chest pain, shortness of breath, lower extremited edema and headache  Taking a daily aspirin    HLD/CAD:  Current medication: lipitor  Side effects: none  Diet: Standard American Diet  Exercise: sedentary lifestyle    Dementia:   Current medication: aricept  Side effects: none  Recent changes to memory: none  Does not currently have a neurologist but would like to establish with one    ESTHER:  Seeing Dr. Nura Bean    Review of Systems   Constitutional: Negative for activity change, appetite change, diaphoresis, fatigue and unexpected weight change. Eyes: Negative for visual disturbance. Respiratory: Negative for cough, shortness of breath and wheezing. Cardiovascular: Negative for chest pain, palpitations and leg swelling. Gastrointestinal: Negative for blood in stool. Endocrine: Negative for polydipsia, polyphagia and polyuria. Genitourinary: Negative for difficulty urinating and hematuria. Neurological: Negative for dizziness, light-headedness and headaches. Hematological: Negative for adenopathy. Psychiatric/Behavioral: Positive for confusion. Negative for agitation, behavioral problems and dysphoric mood. Memory change          Objective   Physical Exam  Vitals reviewed. Constitutional:       Appearance: Normal appearance. He is obese. Cardiovascular:      Rate and Rhythm: Normal rate and regular rhythm. Heart sounds: Normal heart sounds. Pulmonary:      Effort: Pulmonary effort is normal.      Breath sounds: Normal breath sounds. Musculoskeletal:      Right lower leg: No edema. Left lower leg: No edema. Neurological:      Mental Status: He is alert and oriented to person, place, and time.       Cranial Nerves: No cranial nerve deficit. An electronic signature was used to authenticate this note.     --JENNIFER Wyatt

## 2022-01-27 ENCOUNTER — TELEPHONE (OUTPATIENT)
Dept: FAMILY MEDICINE CLINIC | Age: 72
End: 2022-01-27

## 2022-01-27 RX ORDER — PRAMIPEXOLE DIHYDROCHLORIDE 0.12 MG/1
0.12 TABLET ORAL NIGHTLY
Qty: 90 TABLET | Refills: 3 | Status: SHIPPED | OUTPATIENT
Start: 2022-01-27 | End: 2022-06-06 | Stop reason: SDUPTHER

## 2022-02-08 ENCOUNTER — TELEPHONE (OUTPATIENT)
Dept: FAMILY MEDICINE CLINIC | Age: 72
End: 2022-02-08

## 2022-02-08 NOTE — TELEPHONE ENCOUNTER
Called patient to inform of [facility] patient assistance approval. No answer. Left message for patient to return call.

## 2022-02-15 NOTE — TELEPHONE ENCOUNTER
Called patient. Patient denies any questions/concerns with medications. States he is using Lantus, 15U nightly, and Humulin and Novolin 10U BID before meals. Also taking his glipizide. BS was 191 this morning fasting. Last night before Lantus it was 197. Patient denied needing any assistance regarding medications or diet. Informed to call us back if he would like further education/help with diet and DM management.

## 2022-05-31 ENCOUNTER — HOSPITAL ENCOUNTER (EMERGENCY)
Age: 72
Discharge: HOME | End: 2022-06-01
Payer: MEDICARE

## 2022-05-31 ENCOUNTER — APPOINTMENT (OUTPATIENT)
Dept: ULTRASOUND IMAGING | Age: 72
End: 2022-05-31
Payer: MEDICARE

## 2022-05-31 ENCOUNTER — APPOINTMENT (OUTPATIENT)
Dept: CT IMAGING | Age: 72
End: 2022-05-31
Payer: MEDICARE

## 2022-05-31 DIAGNOSIS — R11.0 NAUSEA WITHOUT VOMITING: ICD-10-CM

## 2022-05-31 DIAGNOSIS — N28.1 BILATERAL RENAL CYSTS: ICD-10-CM

## 2022-05-31 DIAGNOSIS — R14.0 ABDOMINAL DISTENSION: Primary | ICD-10-CM

## 2022-05-31 LAB
A/G RATIO: 2.2 (ref 1.1–2.2)
ALBUMIN SERPL-MCNC: 4.9 G/DL (ref 3.4–5)
ALP BLD-CCNC: 108 U/L (ref 40–129)
ALT SERPL-CCNC: 16 U/L (ref 10–40)
ANION GAP SERPL CALCULATED.3IONS-SCNC: 10 MMOL/L (ref 3–16)
AST SERPL-CCNC: 12 U/L (ref 15–37)
BASOPHILS ABSOLUTE: 0 K/UL (ref 0–0.2)
BASOPHILS RELATIVE PERCENT: 0.7 %
BILIRUB SERPL-MCNC: 0.5 MG/DL (ref 0–1)
BILIRUBIN URINE: NEGATIVE
BLOOD, URINE: NEGATIVE
BUN BLDV-MCNC: 18 MG/DL (ref 7–20)
CALCIUM SERPL-MCNC: 9.3 MG/DL (ref 8.3–10.6)
CHLORIDE BLD-SCNC: 101 MMOL/L (ref 99–110)
CLARITY: CLEAR
CO2: 28 MMOL/L (ref 21–32)
COLOR: YELLOW
CREAT SERPL-MCNC: 1 MG/DL (ref 0.8–1.3)
EOSINOPHILS ABSOLUTE: 0.2 K/UL (ref 0–0.6)
EOSINOPHILS RELATIVE PERCENT: 3.2 %
GFR AFRICAN AMERICAN: >60
GFR NON-AFRICAN AMERICAN: >60
GLUCOSE BLD-MCNC: 233 MG/DL (ref 70–99)
GLUCOSE URINE: 500 MG/DL
HCT VFR BLD CALC: 43.3 % (ref 40.5–52.5)
HEMOGLOBIN: 14.2 G/DL (ref 13.5–17.5)
KETONES, URINE: ABNORMAL MG/DL
LEUKOCYTE ESTERASE, URINE: NEGATIVE
LIPASE: 15 U/L (ref 13–60)
LYMPHOCYTES ABSOLUTE: 1.3 K/UL (ref 1–5.1)
LYMPHOCYTES RELATIVE PERCENT: 18.8 %
MCH RBC QN AUTO: 28.9 PG (ref 26–34)
MCHC RBC AUTO-ENTMCNC: 32.8 G/DL (ref 31–36)
MCV RBC AUTO: 88.1 FL (ref 80–100)
MICROSCOPIC EXAMINATION: ABNORMAL
MONOCYTES ABSOLUTE: 0.5 K/UL (ref 0–1.3)
MONOCYTES RELATIVE PERCENT: 7.1 %
NEUTROPHILS ABSOLUTE: 4.9 K/UL (ref 1.7–7.7)
NEUTROPHILS RELATIVE PERCENT: 70.2 %
NITRITE, URINE: NEGATIVE
PDW BLD-RTO: 13.2 % (ref 12.4–15.4)
PH UA: 6 (ref 5–8)
PLATELET # BLD: 146 K/UL (ref 135–450)
PMV BLD AUTO: 9.5 FL (ref 5–10.5)
POTASSIUM REFLEX MAGNESIUM: 4.3 MMOL/L (ref 3.5–5.1)
PROTEIN UA: NEGATIVE MG/DL
RBC # BLD: 4.91 M/UL (ref 4.2–5.9)
SODIUM BLD-SCNC: 139 MMOL/L (ref 136–145)
SPECIFIC GRAVITY UA: 1.02 (ref 1–1.03)
TOTAL PROTEIN: 7.1 G/DL (ref 6.4–8.2)
URINE REFLEX TO CULTURE: ABNORMAL
URINE TYPE: ABNORMAL
UROBILINOGEN, URINE: 0.2 E.U./DL
WBC # BLD: 7 K/UL (ref 4–11)

## 2022-05-31 PROCEDURE — 81003 URINALYSIS AUTO W/O SCOPE: CPT

## 2022-05-31 PROCEDURE — 2580000003 HC RX 258: Performed by: NURSE PRACTITIONER

## 2022-05-31 PROCEDURE — 6360000004 HC RX CONTRAST MEDICATION: Performed by: NURSE PRACTITIONER

## 2022-05-31 PROCEDURE — 76705 ECHO EXAM OF ABDOMEN: CPT

## 2022-05-31 PROCEDURE — 96374 THER/PROPH/DIAG INJ IV PUSH: CPT

## 2022-05-31 PROCEDURE — 74177 CT ABD & PELVIS W/CONTRAST: CPT

## 2022-05-31 PROCEDURE — 80053 COMPREHEN METABOLIC PANEL: CPT

## 2022-05-31 PROCEDURE — 96375 TX/PRO/DX INJ NEW DRUG ADDON: CPT

## 2022-05-31 PROCEDURE — 99285 EMERGENCY DEPT VISIT HI MDM: CPT

## 2022-05-31 PROCEDURE — 6360000002 HC RX W HCPCS: Performed by: NURSE PRACTITIONER

## 2022-05-31 PROCEDURE — 85025 COMPLETE CBC W/AUTO DIFF WBC: CPT

## 2022-05-31 PROCEDURE — 83690 ASSAY OF LIPASE: CPT

## 2022-05-31 RX ORDER — ONDANSETRON 2 MG/ML
4 INJECTION INTRAMUSCULAR; INTRAVENOUS ONCE
Status: COMPLETED | OUTPATIENT
Start: 2022-05-31 | End: 2022-05-31

## 2022-05-31 RX ORDER — MORPHINE SULFATE 4 MG/ML
4 INJECTION, SOLUTION INTRAMUSCULAR; INTRAVENOUS ONCE
Status: COMPLETED | OUTPATIENT
Start: 2022-05-31 | End: 2022-05-31

## 2022-05-31 RX ORDER — 0.9 % SODIUM CHLORIDE 0.9 %
1000 INTRAVENOUS SOLUTION INTRAVENOUS ONCE
Status: COMPLETED | OUTPATIENT
Start: 2022-05-31 | End: 2022-05-31

## 2022-05-31 RX ADMIN — ONDANSETRON 4 MG: 2 INJECTION INTRAMUSCULAR; INTRAVENOUS at 21:32

## 2022-05-31 RX ADMIN — SODIUM CHLORIDE 1000 ML: 9 INJECTION, SOLUTION INTRAVENOUS at 21:31

## 2022-05-31 RX ADMIN — IOPAMIDOL 75 ML: 755 INJECTION, SOLUTION INTRAVENOUS at 21:40

## 2022-05-31 RX ADMIN — MORPHINE SULFATE 4 MG: 4 INJECTION, SOLUTION INTRAMUSCULAR; INTRAVENOUS at 21:32

## 2022-05-31 ASSESSMENT — PAIN DESCRIPTION - LOCATION
LOCATION: ABDOMEN
LOCATION: ABDOMEN

## 2022-05-31 ASSESSMENT — ENCOUNTER SYMPTOMS
COUGH: 0
VOMITING: 0
ABDOMINAL PAIN: 1
NAUSEA: 1
COLOR CHANGE: 0
BACK PAIN: 0
SHORTNESS OF BREATH: 0
DIARRHEA: 0
WHEEZING: 0

## 2022-05-31 ASSESSMENT — PAIN SCALES - GENERAL
PAINLEVEL_OUTOF10: 8
PAINLEVEL_OUTOF10: 9
PAINLEVEL_OUTOF10: 8
PAINLEVEL_OUTOF10: 8

## 2022-05-31 ASSESSMENT — PAIN DESCRIPTION - DESCRIPTORS: DESCRIPTORS: SHARP

## 2022-05-31 ASSESSMENT — PAIN - FUNCTIONAL ASSESSMENT
PAIN_FUNCTIONAL_ASSESSMENT: 0-10

## 2022-05-31 ASSESSMENT — PAIN DESCRIPTION - ORIENTATION: ORIENTATION: LEFT

## 2022-06-01 VITALS
TEMPERATURE: 98.8 F | RESPIRATION RATE: 18 BRPM | WEIGHT: 260 LBS | HEART RATE: 64 BPM | DIASTOLIC BLOOD PRESSURE: 73 MMHG | OXYGEN SATURATION: 93 % | SYSTOLIC BLOOD PRESSURE: 146 MMHG | BODY MASS INDEX: 37.22 KG/M2 | HEIGHT: 70 IN

## 2022-06-01 RX ORDER — ONDANSETRON 4 MG/1
4 TABLET, ORALLY DISINTEGRATING ORAL EVERY 8 HOURS PRN
Qty: 20 TABLET | Refills: 0 | Status: SHIPPED | OUTPATIENT
Start: 2022-06-01 | End: 2022-07-19

## 2022-06-01 RX ORDER — DICYCLOMINE HYDROCHLORIDE 10 MG/1
10 CAPSULE ORAL EVERY 6 HOURS PRN
Qty: 20 CAPSULE | Refills: 0 | Status: SHIPPED | OUTPATIENT
Start: 2022-06-01

## 2022-06-01 NOTE — ED NOTES
All discharge paperwork and follow-up instructions reviewed with pt and pts Sister. Pt verbalized understanding. Pt ambulatory upon discharge in stable condition to private vehicle with Family.        June Jon RN  06/01/22 0240 none

## 2022-06-01 NOTE — ED PROVIDER NOTES
**ADVANCED PRACTICE PROVIDER, I HAVE EVALUATED THIS [facility]  ED  EMERGENCY DEPARTMENT ENCOUNTER      Pt Name: Marla Andre  JKF:[id number]  [place] 1950  Date of evaluation: 5/31/2022  Provider: SANDY Quinn CNP      Chief Complaint:    Chief Complaint   Patient presents with    Abdominal Pain     left side abdominal pain since yesterday. pt hx of pancreatitis          Nursing Notes, Past Medical Hx, Past Surgical Hx, Social Hx, Allergies, and Family Hx were all reviewed and agreed with or any disagreements were addressed in the HPI.    HPI: (Location, Duration, Timing, Severity, Quality, Assoc Sx, Context, Modifying factors)    Chief Complaint of abdominal pain for 2 days    This is a  70 y.o. male who presents to the emergency department with epigastric and left-sided abdominal pain, patient states pain started 2 days ago, he believes he has pancreatitis again. He denies any vomiting or diarrhea but the pain has made him nauseous. He denies any cough, congestion, fever or chills, has not take any medicine for the pain. He has had abdominal surgery in the past for this. He rates the pain a 9 out of 10. He denies any chest pain pleuritic chest pain or shortness of breath. No cough, congestion, fever or chills. Patient presents awake, alert and in no acute respiratory distress or toxic appearance however, he does appear uncomfortable with pain. He denies any additional complaints. No additional aggravating relieving factors.   Patient presents awake, alert and in no acute distress however he does appear uncomfortable with pain     PastMedical/Surgical History:      Diagnosis Date    Acid reflux     Yan's esophagus     Coronary artery disease of native heart with stable angina pectoris ([address] 5/30/2019    Diabetes mellitus ([address]     Diabetic autonomic neuropathy associated with type 2 diabetes mellitus ([address] 3/3/2020    Hyperlipidemia     Hypertension     Influenza A 02/05/2020    Pancreatitis 1996    Restless legs     Sleep apnea     uses CPAP    Tremor     of bilateral hands         Procedure Laterality Date    ABDOMEN SURGERY      CATARACT REMOVAL Bilateral 2013    CHOLECYSTECTOMY      COLONOSCOPY  10/26/2011    ENDOSCOPY, COLON, DIAGNOSTIC      EGD halo    HYDROCELE EXCISION  11/15/2016    PANCREAS SURGERY      cyst opened up and drining into small bowel    TONSILLECTOMY      UPPER GASTROINTESTINAL ENDOSCOPY  10/04/2016    with biopsy    UPPER GASTROINTESTINAL ENDOSCOPY  03/16/2017    Halo ablation    UPPER GASTROINTESTINAL ENDOSCOPY  06/26/2017    Halo ablation    UPPER GASTROINTESTINAL ENDOSCOPY  09/06/2017    bx distal sophagus    UPPER GASTROINTESTINAL ENDOSCOPY  12/22/2017    with HALO  procedure    UPPER GASTROINTESTINAL ENDOSCOPY N/A 12/4/2018    EGD WITH ABLATION AND ANESTHESIA - HALO---SLEEP APNEA---  performed by Samson Crawford MD at [address]  2/19/2019    EGD BIOPSY performed by Samson Crawford MD at [address] N/A 6/11/2019    EGD WITH HALO AND ANESTHESIA performed by Samson Crawford MD at [facility] 8/13/2019    ESOPHAGOGASTRODUODENOSCOPY WITH HALO AND ANESTHESIA -SLEEP APNEA- performed by Samson Crawford MD at [address] [place] [address] Drive EXTRACTION         Medications:  Discharge Medication List as of 6/1/2022 12:38 AM      CONTINUE these medications which have NOT CHANGED    Details   pramipexole (MIRAPEX) 0.125 MG tablet Take 1 tablet by mouth nightly, Disp-90 tablet, R-3Normal      primidone (MYSOLINE) 50 MG tablet Take 50 mg by mouth 2 times dailyHistorical Med      Continuous Blood Gluc Sensor (FREESTYLE JUSTIN 14 DAY SENSOR) MISC 1 Units by Does not apply route every 14 days, Disp-2 each, R-11Normal      insulin glargine (LANTUS SOLOSTAR) 100 UNIT/ML injection pen Inject 15 Units into the skin nightly, Disp-13.5 mL, R-1Normal      lisinopril (PRINIVIL;ZESTRIL) 10 MG tablet Take 1 tablet by mouth daily, Disp-90 tablet, R-1Normal      atorvastatin (LIPITOR) 20 MG tablet Take 1 tablet by mouth daily, Disp-90 tablet, R-1Normal      glipiZIDE (GLUCOTROL XL) 5 MG extended release tablet Take 1 tablet by mouth daily, Disp-90 tablet, R-1Normal      insulin regular (HUMULIN R;NOVOLIN R) 100 UNIT/ML injection Inject 10 Units into the skin 2 times daily (before meals), Disp-18 mL, R-1Normal      donepezil (ARICEPT) 5 MG tablet Take 1 tablet by mouth nightly, Disp-90 tablet, R-1Normal      Continuous Blood Gluc  (FREESTYLE JUSTIN 14 DAY READER) OLI 1 Units by Does not apply route as needed (as needed), Disp-1 Device, R-0Print      aspirin 81 MG chewable tablet Take 1 tablet by mouth daily, Disp-30 tablet, R-3Normal      Insulin Syringe-Needle U-100 30G X 1/2\" 0.5 ML MISC Historical Med               Review of Systems:  (2-9 systems needed)  Review of Systems   Constitutional: Negative for chills and fever. HENT: Negative for congestion. Respiratory: Negative for cough, shortness of breath and wheezing. Cardiovascular: Negative for chest pain. Gastrointestinal: Positive for abdominal pain and nausea. Negative for diarrhea and vomiting. Patient complains of epigastric and left-sided abdominal pain, patient states pain started 2 days ago, he believes he has pancreatitis again. He denies any vomiting or diarrhea but the pain has made him nauseous. Genitourinary: Negative for difficulty urinating, dysuria, frequency and hematuria. Musculoskeletal: Negative for back pain. Skin: Negative for color change. Neurological: Negative for weakness, numbness and headaches. \"Positives and Pertinent negatives as per HPI\"    Physical Exam:  Physical Exam  Vitals and nursing note reviewed. Constitutional:       Appearance: He is well-developed.  He is not diaphoretic. HENT:      Head: Normocephalic. Right Ear: External ear normal.      Left Ear: External ear normal.   Eyes:      General: No scleral icterus. Right eye: No discharge. Left eye: No discharge. Cardiovascular:      Rate and Rhythm: Normal rate. Comments: Normal S1 and 2, peripheral pulses 2+, no edema observed. Pulmonary:      Effort: Pulmonary effort is normal. No respiratory distress. Breath sounds: Normal breath sounds. Abdominal:      Palpations: Abdomen is soft. Tenderness: There is abdominal tenderness. Comments: Abdomen is soft and feels distended, bowel sounds are hypoactive, he has tenderness in the epigastric and left upper quadrant of the abdomen with guarding on exam, no ascites or rigidity. No rebound tenderness at McBurney's point. Musculoskeletal:         General: Normal range of motion. Cervical back: Normal range of motion and neck supple. Skin:     General: Skin is warm. Capillary Refill: Capillary refill takes less than 2 seconds. Coloration: Skin is not pale. Neurological:      General: No focal deficit present. Mental Status: He is alert and oriented to person, place, and time. GCS: GCS eye subscore is 4. GCS verbal subscore is 5. GCS motor subscore is 6. Cranial Nerves: Cranial nerves are intact. Comments: Patient is awake, alert following commands correctly, neurologically intact no focal deficits.    Psychiatric:         Behavior: Behavior normal.         MEDICAL DECISION MAKING    Vitals:    Vitals:    05/31/22 2230 05/31/22 2301 05/31/22 2338 06/01/22 0020   BP: 128/60 127/69 131/75 (!) 146/73   Pulse: 55 58 52 64   Resp: 16 14 14 18   Temp:       TempSrc:       SpO2: 96% 97% 92% 93%   Weight:       Height:           LABS:  Labs Reviewed   COMPREHENSIVE METABOLIC PANEL W/ REFLEX TO MG FOR LOW K - Abnormal; Notable for the following components:       Result Value    Glucose 233 (*)     AST 12 (*)     All other components within normal limits   URINALYSIS WITH REFLEX TO CULTURE - Abnormal; Notable for the following components:    Glucose, Ur 500 (*)     Ketones, Urine TRACE (*)     All other components within normal limits   CBC WITH AUTO DIFFERENTIAL   LIPASE        Remainder of labs reviewed and were negative at this time or not returned at the time of this note. RADIOLOGY:   Non-plain film images such as CT, Ultrasound and MRI are read by the radiologist. Kiah GERONIMO, SANDY - CNP have directly visualized the radiologic plain film image(s) with the below findings:      Interpretation per the Radiologist below, if available at the time of this note:    [address]   Final Result   Status post cholecystectomy with a normal common bile duct and liver. Partial obscuration of the pancreas due to overlying bowel gas. Large 22 cm benign-appearing cyst right kidney. Recommend correlating with   the prior CT abdomen reports. CT ABDOMEN PELVIS W IV CONTRAST Additional Contrast? None   Final Result   1. No acute abdominopelvic abnormality. 2. Stable benign bilateral renal cysts including a 22 cm diameter cyst   arising from the right kidney. 3. Small hiatal hernia. MEDICAL DECISION MAKING / ED COURSE:    Because of high probability of sudden clinical deterioration of the patient's condition and risk of further deterioration, critical care time required my full attention to the patient's condition; which included chart data review, documentation, medication ordering, reviewing the patient's old records, reevaluation patient's cardiac, pulmonary and neurological status. Reevaluation of vital signs. Consultations with ED attending and admitting physician. Ordering, interpreting reviewing diagnostic testing. Therefore a critical care time was 35 minutes of direct attention to the patient's condition did not include time spent on procedures.     PROCEDURES: Procedures    None    Patient was given:  Medications   0.9 % sodium chloride bolus (0 mLs IntraVENous Stopped 5/31/22 2257)   morphine sulfate (PF) injection 4 mg (4 mg IntraVENous Given 5/31/22 2132)   ondansetron (ZOFRAN) injection 4 mg (4 mg IntraVENous Given 5/31/22 2132)   iopamidol (ISOVUE-370) 76 % injection 75 mL (75 mLs IntraVENous Given 5/31/22 2140)       Patient complains of epigastric and left-sided abdominal pain, patient states pain started 2 days ago, he believes he has pancreatitis again. He denies any vomiting or diarrhea but the pain has made him nauseous. After evaluation and examination the patient IV access, blood work, urinalysis, medications, CT scan of the abdomen and fluids were ordered. CBC shows no sepsis or anemia. Metabolic panel shows no electrolyte disturbances or renal failure, liver functions are normal.  Lipase is normal.  Urine shows glucose urea but no acute infection. He does have glucose in his urine, educated him about monitoring his blood sugar more closely. CT the abdomen shows no acute abdominal pelvic abnormality. Stable benign bilateral renal cyst that patient was educated about, he states that he was aware of this. He does have a small hiatal hernia but there is no obstruction to. He is already had a cholecystectomy however, I was concerned maybe had retained stone even though his liver functions are normal, gallbladder ultrasound was obtained, he is status postcholecystectomy within normal common bile duct and liver. Appears to be a partial obscuration of the pancreas related to bowel gas and the right renal cyst as mentioned again. Upon reevaluation vital signs are stable, he does report improvement in his discomfort after medications. I did educate him about following up with his family doctor more importantly he does have a GI specialist that he has seen in the past, educated him he needs to follow-up with them as well.   However, we discussed about diet and fluid intake. At this time of no concerns for acute surgical abdomen, no concerns for appendicitis, pancreatitis, diverticulitis, bowel obstruction, intussusception or other emergent etiology. Therefore, shared medical decision was made between the patient and myself and we agreed the patient could be discharged home with outpatient follow-up. Patient was discharged home with referral back to PCP and GI, educated to call first thing in the morning and make an appointment for follow-up, we discussed maybe he needs an upper or lower GI series done. He was discharged home with Bentyl and Zofran to take as prescribed. Return to the ER for any worsening or concerning symptoms. I also spoke with his sister on the phone that helps with his care and educated her over the follow-up appointments that was well the patient tolerated their visit well. I evaluated the patient. The physician was available for consultation as needed. The patient and / or the family were informed of the results of any tests, a time was given to answer questions, a plan was proposed and they agreed with plan. Both the patient, his family verbalized understanding of discharge instructions and the patient was discharged from the department in stable condition    CLINICAL IMPRESSION:  1. Abdominal distension    2. Bilateral renal cysts    3. Nausea without vomiting        DISPOSITION Decision To Discharge 06/01/2022 12:33:42 AM      PATIENT REFERRED TO:  JENNIFER Lopez  [address]  [address] [facility]    Schedule an appointment as soon as possible for a visit   Follow-up with your family doctor by making appointment for [facility] tomorrow morning to be seen tomorrow or Thursday    Lianna Ibarra MD  [address] [place] [address] [address] [facility]  [phone]      Follow-up with your GI doctor or this GI doctor in the next 2 days for reevaluation.     [facility]  ED  [address]

## 2022-06-06 RX ORDER — PRAMIPEXOLE DIHYDROCHLORIDE 0.25 MG/1
0.25 TABLET ORAL NIGHTLY
Qty: 90 TABLET | Refills: 1 | Status: SHIPPED | OUTPATIENT
Start: 2022-06-06 | End: 2022-09-01 | Stop reason: SDUPTHER

## 2022-06-06 NOTE — TELEPHONE ENCOUNTER
I didn't change the dose at his last appointment since I had referred him to neurology however, I can titrate up.  Pt has missed his last two appointments and needs diabetes follow up scheduled asap

## 2022-06-06 NOTE — TELEPHONE ENCOUNTER
Refill Request     CONFIRM preferrred pharmacy with the patient. If Mail Order Rx - Pend for 90 day refill. Last Seen: Last Seen Department: 1/26/2022  Last Seen by PCP: 1/26/2022    Last Written: 01/27/22 90 with 3   Patient's wife states that PCP increased this medication to 2X daily instead of Nightly and needs a refill, please change script if its supposed to be two times daily. Thanks     Next Appointment:   No future appointments.         Requested Prescriptions     Pending Prescriptions Disp Refills    pramipexole (MIRAPEX) 0.125 MG tablet 90 tablet 3     Sig: Take 1 tablet by mouth nightly

## 2022-06-06 NOTE — TELEPHONE ENCOUNTER
Patient notified, DM follow up scheduled for 7/19/22. Patient and wife reminded of getting scheduled with the neurologist.  His wife said they have not made appointment yet because lara has so many other Dr's to see.

## 2022-06-09 DIAGNOSIS — F41.9 ANXIETY DUE TO INVASIVE PROCEDURE: Primary | ICD-10-CM

## 2022-06-09 RX ORDER — DIAZEPAM 5 MG/1
5 TABLET ORAL ONCE
Qty: 1 TABLET | Refills: 0 | Status: SHIPPED | OUTPATIENT
Start: 2022-06-09 | End: 2022-06-09

## 2022-06-13 ENCOUNTER — TELEPHONE (OUTPATIENT)
Dept: FAMILY MEDICINE CLINIC | Age: 72
End: 2022-06-13

## 2022-06-16 NOTE — PROGRESS NOTES
Spoke to patient   Patient states \"this may be a waste of time as I am getting ready to sign up for Hospice\"  Explained reasoning behind 6MW   Patient is unsure if he wants to proceed with this   Patient is in agreement with trying prednisone -script sent to preferred pharmacy     Writer will contact patient 06/17/2022 to see how he would like to proceed regarding Spiriva and 6MW    EGD BIOPSY performed by Ralph Merrill MD at [facility] N/A 6/11/2019    EGD WITH HALO AND ANESTHESIA performed by Ralph Merrill MD at [facility] N/A 8/13/2019    ESOPHAGOGASTRODUODENOSCOPY WITH HALO AND ANESTHESIA -SLEEP APNEA- performed by Ralph Merrill MD at [address] [place] [address] Drive EXTRACTION         Social History     Tobacco Use    Smoking status: Never Smoker    Smokeless tobacco: Never Used   Substance Use Topics    Alcohol use: No    Drug use: No       Family History   Problem Relation Age of Onset    Kidney Disease Mother     Cancer Father 76        pancreas    Cancer Brother     Cancer Paternal Grandfather        Review of Systems   Constitutional: Negative for activity change, chills, fatigue and fever. Respiratory: Negative for cough and shortness of breath. Cardiovascular: Negative for chest pain. Skin: Positive for color change. Small painful lesion to top of head. Present >6months. Denies bleeding or itching. Physical Exam   Constitutional: He is oriented to person, place, and time. He appears well-developed and well-nourished. No distress. HENT:   Head: Normocephalic and atraumatic. Neurological: He is alert and oriented to person, place, and time. No cranial nerve deficit. He exhibits normal muscle tone. Coordination normal.   Skin: Skin is warm and dry. No rash noted. He is not diaphoretic. No erythema. No pallor. Psychiatric: He has a normal mood and affect. His behavior is normal. Judgment and thought content normal.   Nursing note and vitals reviewed. Vitals:    09/17/19 0937   BP: 118/62   Site: Left Upper Arm   Position: Sitting   Pulse: 56   SpO2: 94%   Weight: 291 lb (132 kg)   Height: 5' 10\" (1.778 m)       Assessment/Plan:  1. Skin lesion  - External Referral To Dermatology    2.  Restless leg syndrome  - pramipexole (MIRAPEX) 0.125 MG tablet; Take 1 tablet by mouth nightly  Dispense: 90 tablet; Refill: 3  -Stop current prescribed Requip   -Call within 1 week after starting Mirapex to discuss symptoms    3. Immunization due  - INFLUENZA, QUADV, 0.5ML, 6 MO AND OLDER, IM, PF, PREFILL SYR OR SDV (FLUZONE QUADV, PF)  - PREVNAR 13 IM (Pneumococcal conjugate vaccine 13-valent)    Stop requip  Start Mirapex 0.125 mg nightly  Call in one week with update on restless legs  Dermatology referral for skin lesion  Follow-up in 1 week to update on new medication    Patient Instructions   Stop requip  Start Mirapex 0.125 mg nightly  Call in one week with update on restless legs  Dermatology referral for skin lesion        Outpatient Encounter Medications as of 9/17/2019   Medication Sig Dispense Refill    pramipexole (MIRAPEX) 0.125 MG tablet Take 1 tablet by mouth nightly 90 tablet 3    metoprolol tartrate (LOPRESSOR) 25 MG tablet Take 1 tablet by mouth 2 times daily 60 tablet 3    isosorbide mononitrate (IMDUR) 30 MG extended release tablet Take 1 tablet by mouth daily 30 tablet 3    aspirin 81 MG chewable tablet Take 1 tablet by mouth daily 30 tablet 3    nitroGLYCERIN (NITROSTAT) 0.4 MG SL tablet up to max of 3 total doses.  If no relief after 1 dose, call 911. 25 tablet 3    fluticasone (FLONASE) 50 MCG/ACT nasal spray 2 sprays by Nasal route daily 1 Bottle 5    glucose blood VI test strips (ASCENSIA AUTODISC VI;ONE TOUCH ULTRA TEST VI) strip Check glucose 3 times daily      Insulin Syringe-Needle U-100 30G X 1/2\" 0.5 ML MISC Inject insulin 3 times daily      pantoprazole (PROTONIX) 40 MG tablet Take 40 mg by mouth 2 times daily       insulin regular (HUMULIN R;NOVOLIN R) 100 UNIT/ML injection Inject 10 Units into the skin 3 times daily (before meals)       insulin NPH (HUMULIN N;NOVOLIN N) 100 UNIT/ML injection vial Inject 10 Units into the skin 2 times daily       lisinopril (PRINIVIL;ZESTRIL) 10 MG tablet Take 10 mg by mouth daily     

## 2022-06-20 NOTE — TELEPHONE ENCOUNTER
Spoke to patients wife she said his sister sees Cirilo Badder so they want him too. Nothing against you.

## 2022-06-20 NOTE — TELEPHONE ENCOUNTER
LM to inform patient ok to switch to Reza Burnett; advised she will be on maternity leave in July and does he want to keep the July appt with Garrett Agent and follow up with Reza Burnett afterwards?

## 2022-06-23 ENCOUNTER — TELEPHONE (OUTPATIENT)
Dept: ADMINISTRATIVE | Age: 72
End: 2022-06-23

## 2022-06-23 DIAGNOSIS — Z79.4 CONTROLLED TYPE 2 DIABETES MELLITUS WITHOUT COMPLICATION, WITH LONG-TERM CURRENT USE OF INSULIN (HCC): ICD-10-CM

## 2022-06-23 DIAGNOSIS — E11.9 CONTROLLED TYPE 2 DIABETES MELLITUS WITHOUT COMPLICATION, WITH LONG-TERM CURRENT USE OF INSULIN (HCC): ICD-10-CM

## 2022-06-23 RX ORDER — FLASH GLUCOSE SCANNING READER
1 EACH MISCELLANEOUS PRN
Qty: 1 EACH | Refills: 0 | Status: SHIPPED | OUTPATIENT
Start: 2022-06-23 | End: 2022-07-05 | Stop reason: SDUPTHER

## 2022-06-23 NOTE — TELEPHONE ENCOUNTER
Refill Request     CONFIRM preferrred pharmacy with the patient. If Mail Order Rx - Pend for 90 day refill.       Last Seen: Last Seen Department: 2022  Last Seen by PCP: 2022    Last Written: 2019 1 with 0     Next Appointment:   Future Appointments   Date Time Provider Magdalena Murguia   2022 10:30 AM JENNIFER [place] - DYD       Future appointment scheduled      Requested Prescriptions     Pending Prescriptions Disp Refills    Continuous Blood Gluc  (FREESTYLE JUSTIN 14 DAY READER) OLI       Si Units by Does not apply route as needed (as needed)

## 2022-06-23 NOTE — TELEPHONE ENCOUNTER
Submitted PA for [facility] 14 Day [place] device, Key: [id number]. This request was denied under your Medicare Part D benefit; however, it may be covered under Medicare Part B. For more information, talk to your prescriber or call 1-800-MEDICARE. You only have Part D coverage with [employer]. You asked for a continuous glucose monitor for your diabetes. [employer] follows Medicare rules. The rules say for coverage under Medicare Part D, the item must be a drug that has been approved by the U.S. Food and Drug Administration (FDA). The FDA considers a glucose monitor a device - not a drug. Your request is not a covered benefit under Medicare Part D or your [facility] [employer] [facility] plan. The rule is in the Medicare Prescription Drug Benefit Manual (Chapter 6, Section 10.1). Please contact your Part B carrier to check for coverage. If you think Medicare Part D should cover this drug for you, you may appeal.    Please notify patient. Thank you.

## 2022-06-27 NOTE — TELEPHONE ENCOUNTER
Patient needs to be seen for DM follow before order can be placed with DME supplier. Technically its been over 6 months now. Once we have updated notes we can submit the order.

## 2022-06-27 NOTE — TELEPHONE ENCOUNTER
Please notify the patient that once we see him in July we can submit order for CGM. Most recent office note needs to be within 6 months.

## 2022-07-05 DIAGNOSIS — Z79.4 TYPE 2 DIABETES MELLITUS WITH MICROALBUMINURIA, WITH LONG-TERM CURRENT USE OF INSULIN (HCC): ICD-10-CM

## 2022-07-05 DIAGNOSIS — E11.29 TYPE 2 DIABETES MELLITUS WITH MICROALBUMINURIA, WITH LONG-TERM CURRENT USE OF INSULIN (HCC): ICD-10-CM

## 2022-07-05 DIAGNOSIS — R80.9 TYPE 2 DIABETES MELLITUS WITH MICROALBUMINURIA, WITH LONG-TERM CURRENT USE OF INSULIN (HCC): ICD-10-CM

## 2022-07-05 DIAGNOSIS — Z79.4 CONTROLLED TYPE 2 DIABETES MELLITUS WITHOUT COMPLICATION, WITH LONG-TERM CURRENT USE OF INSULIN (HCC): ICD-10-CM

## 2022-07-05 DIAGNOSIS — E11.9 CONTROLLED TYPE 2 DIABETES MELLITUS WITHOUT COMPLICATION, WITH LONG-TERM CURRENT USE OF INSULIN (HCC): ICD-10-CM

## 2022-07-05 RX ORDER — FLASH GLUCOSE SCANNING READER
1 EACH MISCELLANEOUS PRN
Qty: 1 EACH | Refills: 0 | Status: SHIPPED | OUTPATIENT
Start: 2022-07-05

## 2022-07-05 RX ORDER — FLASH GLUCOSE SENSOR
1 KIT MISCELLANEOUS
Qty: 2 EACH | Refills: 11 | Status: SHIPPED | OUTPATIENT
Start: 2022-07-05

## 2022-07-05 NOTE — TELEPHONE ENCOUNTER
Refill Request     CONFIRM preferrred pharmacy with the patient. If Mail Order Rx - Pend for 90 day refill.       Last Seen: Last Seen Department: 2022  Last Seen by PCP: 2022    Last Written: 2022 1 with 0     Next Appointment:   Future Appointments   Date Time Provider Magdalena Murguia   2022 10:30 AM JENNIFER [place] - DYD       Future appointment scheduled      Requested Prescriptions     Pending Prescriptions Disp Refills    Continuous Blood Gluc  (Monitor110YLE JUSTIN 14 DAY READER) OLI 1 each 0     Si Units by Does not apply route as needed (as needed)

## 2022-07-05 NOTE — TELEPHONE ENCOUNTER
Refill Request     CONFIRM preferrred pharmacy with the patient. If Mail Order Rx - Pend for 90 day refill.       Last Seen: Last Seen Department: 2022  Last Seen by PCP: 2022    Last Written: 2022    Next Appointment:   Future Appointments   Date Time Provider Magdalena Murguia   2022 10:30 AM JENNIFER Hamit[place] - DYD       Future appointment scheduled      Requested Prescriptions     Pending Prescriptions Disp Refills    Continuous Blood Gluc Sensor (FREESTYLE JUSTIN 14 DAY SENSOR) MISC 2 each 11     Si Units by Does not apply route every 14 days

## 2022-07-18 NOTE — PROGRESS NOTES
Michelle Talley (:  1950) is a 70 y.o. male,Established patient, here for evaluation of the following chief complaint(s):  6 Month Follow-Up (DM check up) and Medication Refill         ASSESSMENT/PLAN:  1. Type 2 diabetes mellitus with microalbuminuria, with long-term current use of insulin (HCC)  -     POCT glycosylated hemoglobin (Hb A1C): uncontrolled. Adjust lantus to 12 U nightly to avoid night time low. Switch humulin to novolog TID. Will have RN call in a few weeks to see how he is adjusting to these medication changes. Discussed cutting down on portions sizes of carbohydrates  -     insulin glargine (LANTUS SOLOSTAR) 100 UNIT/ML injection pen; Inject 12 Units into the skin nightly, Disp-10.8 mL, R-0Normal  -     insulin aspart (NOVOLOG FLEXPEN) 100 UNIT/ML injection pen; Inject 8 Units into the skin in the morning and 8 Units at noon and 8 Units in the evening. Inject before meals. , Disp-21.6 mL, R-0Normal  -     Insulin Pen Needle 31G X 8 MM MISC; 4 TIMES DAILY Starting 2022, Disp-200 each, R-3, Normal  -     glipiZIDE (GLUCOTROL XL) 5 MG extended release tablet; Take 1 tablet by mouth in the morning., Disp-90 tablet, R-1Normal  2. Mixed hyperlipidemia  -     LIPID PANEL; Future  -     atorvastatin (LIPITOR) 20 MG tablet; Take 1 tablet by mouth in the morning., Disp-90 tablet, R-1Normal  -     Adjust lab work after lab work returns  3. Atrial tachycardia (HCC)       -    Resolved, stable  4. Lung granuloma ([facility] [address]       -     Calcified, stable, no change  5. Essential hypertension  -     lisinopril (PRINIVIL;ZESTRIL) 10 MG tablet; Take 1 tablet by mouth in the morning., Disp-90 tablet, R-1Normal  -    Well controlled, continue with lisinopril and follow up in 6 months  6. Coronary artery disease involving native coronary artery of native heart with angina pectoris ([facility] [address]       -    Taking statin and aspirin. controlled  7. Severe obstructive sleep apnea       -     Unable to tolerate a mask. 8. Morbid obesity ([address]       -     Increase physical activity to a total of 150 minutes per week  9. Dementia without behavioral disturbance, unspecified dementia type (HCC)  -     donepezil (ARICEPT) 5 MG tablet; Take 1 tablet by mouth nightly, Disp-90 tablet, R-1Normal  -      uncontrolled, need recent lab note from [facility]    Return in about 3 months (around 10/19/2022) for DM. Subjective   SUBJECTIVE/OBJECTIVE:  HPI  DM:  Current medication: lantus, glipizide, humalog? Pt unsure which form of insulin he is taking but he is taking it twice per day  Side effects: none  Home fasting glucose: doesn't recall, out of sensors  S/s of abnormal glucose: per meter, having lows consistently in the middle of the night  Complications: neuropathy  Increased glipizide and lantus at the last appointment, pt noncompliant with three month follow up     HTN:  Current medication: lisinopril  Side effects: none  Home blood pressure readings: does not check at home  Report: none  Denies: chest pain, shortness of breath, lower extremited edema and headache  Taking a daily aspirin     HLD/CAD:  Current medication: lipitor  Side effects: none  Diet: Smaller portions, eating more healthy foods  Exercise: moving around more in the summer time     Dementia:  Current medication: aricept  Side effects: none  Recent changes to memory: none  Does not currently have a neurologist but would like to establish with one- was provided with a referral to see Dr. Colleen Babinski at [facility]  Follow up with neuro: has appt in October to go to [facility], doing a memory test     ESTHER:  Couldn't tolerate the mask    Lung granuloma:  Calcified  Seen on xray 09/21    Review of Systems   Constitutional:  Negative for diaphoresis, fatigue and unexpected weight change. Eyes:  Negative for visual disturbance. Respiratory:  Negative for cough, shortness of breath and wheezing. Cardiovascular:  Negative for chest pain, palpitations and leg swelling. Gastrointestinal:  Negative for abdominal pain, blood in stool, constipation, diarrhea, nausea and vomiting. Endocrine: Negative for polydipsia, polyphagia and polyuria. Neurological:  Negative for dizziness, light-headedness and headaches. Psychiatric/Behavioral:          Change in memory        Objective   Physical Exam  Vitals reviewed. Constitutional:       Appearance: Normal appearance. He is obese. Cardiovascular:      Rate and Rhythm: Normal rate and regular rhythm. Heart sounds: Normal heart sounds. Pulmonary:      Effort: Pulmonary effort is normal.      Breath sounds: Normal breath sounds. No wheezing. Abdominal:      General: Bowel sounds are normal.      Palpations: Abdomen is soft. There is no mass. Tenderness: There is no abdominal tenderness. Musculoskeletal:      Right lower leg: No edema. Left lower leg: No edema. Neurological:      Mental Status: He is alert and oriented to person, place, and time. Cranial Nerves: No cranial nerve deficit. Psychiatric:         Mood and Affect: Mood normal.         Behavior: Behavior normal.         Thought Content: Thought content normal.         Judgment: Judgment normal.                An electronic signature was used to authenticate this note.     --JENNIFER Wiseman

## 2022-07-19 ENCOUNTER — OFFICE VISIT (OUTPATIENT)
Dept: FAMILY MEDICINE CLINIC | Age: 72
End: 2022-07-19
Payer: MEDICARE

## 2022-07-19 ENCOUNTER — TELEPHONE (OUTPATIENT)
Dept: FAMILY MEDICINE CLINIC | Age: 72
End: 2022-07-19

## 2022-07-19 VITALS
BODY MASS INDEX: 37.62 KG/M2 | OXYGEN SATURATION: 91 % | SYSTOLIC BLOOD PRESSURE: 130 MMHG | WEIGHT: 262.8 LBS | HEART RATE: 65 BPM | HEIGHT: 70 IN | DIASTOLIC BLOOD PRESSURE: 68 MMHG

## 2022-07-19 DIAGNOSIS — E66.01 MORBID OBESITY (HCC): ICD-10-CM

## 2022-07-19 DIAGNOSIS — F03.90 DEMENTIA WITHOUT BEHAVIORAL DISTURBANCE, UNSPECIFIED DEMENTIA TYPE: ICD-10-CM

## 2022-07-19 DIAGNOSIS — E11.29 TYPE 2 DIABETES MELLITUS WITH MICROALBUMINURIA, WITH LONG-TERM CURRENT USE OF INSULIN (HCC): Primary | ICD-10-CM

## 2022-07-19 DIAGNOSIS — J84.10 LUNG GRANULOMA (HCC): ICD-10-CM

## 2022-07-19 DIAGNOSIS — Z79.4 TYPE 2 DIABETES MELLITUS WITH MICROALBUMINURIA, WITH LONG-TERM CURRENT USE OF INSULIN (HCC): Primary | ICD-10-CM

## 2022-07-19 DIAGNOSIS — I10 ESSENTIAL HYPERTENSION: ICD-10-CM

## 2022-07-19 DIAGNOSIS — G47.33 SEVERE OBSTRUCTIVE SLEEP APNEA: ICD-10-CM

## 2022-07-19 DIAGNOSIS — I25.119 CORONARY ARTERY DISEASE INVOLVING NATIVE CORONARY ARTERY OF NATIVE HEART WITH ANGINA PECTORIS (HCC): ICD-10-CM

## 2022-07-19 DIAGNOSIS — I47.1 ATRIAL TACHYCARDIA (HCC): ICD-10-CM

## 2022-07-19 DIAGNOSIS — R80.9 TYPE 2 DIABETES MELLITUS WITH MICROALBUMINURIA, WITH LONG-TERM CURRENT USE OF INSULIN (HCC): Primary | ICD-10-CM

## 2022-07-19 DIAGNOSIS — E78.2 MIXED HYPERLIPIDEMIA: ICD-10-CM

## 2022-07-19 PROBLEM — R00.1 SINUS BRADYCARDIA: Status: RESOLVED | Noted: 2017-11-28 | Resolved: 2022-07-19

## 2022-07-19 LAB — HBA1C MFR BLD: 9.1 %

## 2022-07-19 PROCEDURE — 83036 HEMOGLOBIN GLYCOSYLATED A1C: CPT | Performed by: PHYSICIAN ASSISTANT

## 2022-07-19 PROCEDURE — 1123F ACP DISCUSS/DSCN MKR DOCD: CPT | Performed by: PHYSICIAN ASSISTANT

## 2022-07-19 PROCEDURE — G8427 DOCREV CUR MEDS BY ELIG CLIN: HCPCS | Performed by: PHYSICIAN ASSISTANT

## 2022-07-19 PROCEDURE — 3017F COLORECTAL CA SCREEN DOC REV: CPT | Performed by: PHYSICIAN ASSISTANT

## 2022-07-19 PROCEDURE — G8417 CALC BMI ABV UP PARAM F/U: HCPCS | Performed by: PHYSICIAN ASSISTANT

## 2022-07-19 PROCEDURE — 99214 OFFICE O/P EST MOD 30 MIN: CPT | Performed by: PHYSICIAN ASSISTANT

## 2022-07-19 PROCEDURE — 1036F TOBACCO NON-USER: CPT | Performed by: PHYSICIAN ASSISTANT

## 2022-07-19 PROCEDURE — 3046F HEMOGLOBIN A1C LEVEL >9.0%: CPT | Performed by: PHYSICIAN ASSISTANT

## 2022-07-19 PROCEDURE — 2022F DILAT RTA XM EVC RTNOPTHY: CPT | Performed by: PHYSICIAN ASSISTANT

## 2022-07-19 RX ORDER — DONEPEZIL HYDROCHLORIDE 5 MG/1
5 TABLET, FILM COATED ORAL NIGHTLY
Qty: 90 TABLET | Refills: 1 | Status: SHIPPED | OUTPATIENT
Start: 2022-07-19 | End: 2022-09-01 | Stop reason: SINTOL

## 2022-07-19 RX ORDER — GLIPIZIDE 5 MG/1
5 TABLET, FILM COATED, EXTENDED RELEASE ORAL DAILY
Qty: 90 TABLET | Refills: 1 | Status: SHIPPED | OUTPATIENT
Start: 2022-07-19 | End: 2022-10-26 | Stop reason: SDUPTHER

## 2022-07-19 RX ORDER — ATORVASTATIN CALCIUM 20 MG/1
20 TABLET, FILM COATED ORAL DAILY
Qty: 90 TABLET | Refills: 1 | Status: SHIPPED | OUTPATIENT
Start: 2022-07-19

## 2022-07-19 RX ORDER — INSULIN ASPART 100 [IU]/ML
8 INJECTION, SOLUTION INTRAVENOUS; SUBCUTANEOUS
Qty: 21.6 ML | Refills: 0 | Status: SHIPPED | OUTPATIENT
Start: 2022-07-19 | End: 2022-10-26 | Stop reason: SDUPTHER

## 2022-07-19 RX ORDER — LISINOPRIL 10 MG/1
10 TABLET ORAL DAILY
Qty: 90 TABLET | Refills: 1 | Status: SHIPPED | OUTPATIENT
Start: 2022-07-19

## 2022-07-19 RX ORDER — INSULIN GLARGINE 100 [IU]/ML
0.1 INJECTION, SOLUTION SUBCUTANEOUS NIGHTLY
Qty: 10.8 ML | Refills: 0 | Status: SHIPPED | OUTPATIENT
Start: 2022-07-19 | End: 2022-10-26 | Stop reason: SDUPTHER

## 2022-07-19 ASSESSMENT — ENCOUNTER SYMPTOMS
VOMITING: 0
SHORTNESS OF BREATH: 0
BLOOD IN STOOL: 0
WHEEZING: 0
ABDOMINAL PAIN: 0
DIARRHEA: 0
NAUSEA: 0
COUGH: 0
CONSTIPATION: 0

## 2022-07-19 ASSESSMENT — PATIENT HEALTH QUESTIONNAIRE - PHQ9
1. LITTLE INTEREST OR PLEASURE IN DOING THINGS: 1
SUM OF ALL RESPONSES TO PHQ QUESTIONS 1-9: 1
SUM OF ALL RESPONSES TO PHQ QUESTIONS 1-9: 1
SUM OF ALL RESPONSES TO PHQ9 QUESTIONS 1 & 2: 1
SUM OF ALL RESPONSES TO PHQ QUESTIONS 1-9: 1
2. FEELING DOWN, DEPRESSED OR HOPELESS: 0
SUM OF ALL RESPONSES TO PHQ QUESTIONS 1-9: 1

## 2022-07-19 NOTE — TELEPHONE ENCOUNTER
Pts wife called in and said  went to  the medication Evita Gaona sent in and they are too expensive and wants to know if we can send in something cheaper or have anything to help the pt please advise.

## 2022-07-19 NOTE — TELEPHONE ENCOUNTER
That is the only thing his insurance will cover for the fast acting. If he would like, we can do patient assistance with him. Sadaf, can you help with this or does someone else?  Thank you

## 2022-07-19 NOTE — TELEPHONE ENCOUNTER
Called and spoke with patient's wife. She states both Lantus and Novolog were $90. It looks like patient has received approval from [employer] for Lantus. We just need to submit a reorder form and they will ship patients medication to us for him to . Reorder form has been filled out and given to PCP for approval.     Patient can apply for [employer] to get coverage on Novolog. I have filled out the form and gave to PCP for signature. Patient will be by the office to sign forms by the end of this week.

## 2022-07-22 ENCOUNTER — TELEPHONE (OUTPATIENT)
Dept: ADMINISTRATIVE | Age: 72
End: 2022-07-22

## 2022-07-22 NOTE — TELEPHONE ENCOUNTER
PA COVER MY MEDS  Medication:FreeStyle Avelino 14 Day Sensor  [id number] - PA Case ID: [id number] - Rx #: [id number]  Status: There is an EOC that was clinically denied within the last 60 days.

## 2022-07-25 NOTE — TELEPHONE ENCOUNTER
Patient receives CGM via [employer] as DME due to patient insurance. Last I spoke with patient and wife, to my knowledge, he has received them and has been using it. Order sent 7/5/22.  PA not needed

## 2022-07-27 ENCOUNTER — TELEPHONE (OUTPATIENT)
Dept: FAMILY MEDICINE CLINIC | Age: 72
End: 2022-07-27

## 2022-07-27 NOTE — TELEPHONE ENCOUNTER
I would recommend that he follow up with [facility] for recommendations on medication changes. There are some newer medications available that he might tolerate better. If he would like he can stop the aricept. I will reach out to our nurse care manager to see if we can help with the depends. Where is he getting them from now and how many is he going through in a month? Thank you    Garrick Kim- is there a way that we can get this covered through his insurance?

## 2022-07-27 NOTE — TELEPHONE ENCOUNTER
Patient informed   He states he is just currently getting them from [facility] and hes not sure how many he is going through a month

## 2022-07-27 NOTE — TELEPHONE ENCOUNTER
----- Message from Brenda Duarte sent at 7/26/2022  3:32 PM EDT -----  Subject: Medication Problem    Medication: donepezil (ARICEPT) 5 MG tablet  Dosage: 5mg   Ordering Provider: Janki Pérez     Question/Problem: pt. states this medication is giving him an upset   stomach and an overall feeling of \"feeling funny\", pt. has stopped taking   this medication and would like an alternative for this medication.  also   pt. states the order for his depends is too much cost wise    Pharmacy: [address] [place], [address] [facility] [phone]    ---------------------------------------------------------------------------  --------------  [email or web address]  [phone]; OK to leave message on voicemail  ---------------------------------------------------------------------------  --------------    SCRIPT ANSWERS  Relationship to Patient: Self

## 2022-07-28 ENCOUNTER — CARE COORDINATION (OUTPATIENT)
Dept: CARE COORDINATION | Age: 72
End: 2022-07-28

## 2022-07-28 NOTE — CARE COORDINATION
Ambulatory Care Coordination Note  7/28/2022    ACC: Denise Cardona, RN    Summary Note: ACM completed outreach as asked by provider. Patient said he does not use depends or pull ups. ACM did discuss with patient Diabetes and how he was managing. Patient said he would be willing to have follow up calls to discuss Diabetes management with ACM to improve blood sugar numbers. Plan:    F/U call 1 week  BS readings  Complete enrollment into     Ambulatory Care Coordination Assessment    Care Coordination Protocol  Referral from Primary Care Provider: No  Week 1 - Initial Assessment     Do you have all of your prescriptions and are they filled?: Yes  Barriers to medication adherence: None  Are you able to afford your medications?: Yes  How often do you have trouble taking your medications the way you have been told to take them?: I always take them as prescribed. Ability to seek help/take action for Emergent Urgent situations i.e. fire, crime, inclement weather or health crisis. : Independent  Ability to ambulate to restroom: Independent  Ability handle personal hygeine needs (bathing/dressing/grooming): Independent  Ability to manage Medications: Independent  Ability to prepare Food Preparation: Independent  Ability to maintain home (clean home, laundry): Independent  Ability to drive and/or has transportation: Independent  Ability to do shopping: Independent  Ability to manage finances: Independent  Is patient able to live independently?: Yes     Current Housing: Private Residence                 Suggested Interventions and Community Resources         Set up/Review an Education Plan, Set up/Review Goals                Prior to Admission medications    Medication Sig Start Date End Date Taking?  Authorizing Provider   insulin glargine (LANTUS SOLOSTAR) 100 UNIT/ML injection pen Inject 12 Units into the skin nightly 7/19/22 10/17/22  Lowry Barthel, PA   insulin aspart (NOVOLOG FLEXPEN) 100 UNIT/ML injection pen Inject 8 Units into the skin in the morning and 8 Units at noon and 8 Units in the evening. Inject before meals. 7/19/22 10/17/22  JENNIFER Hamilton   Insulin Pen Needle 31G X 8 MM MISC 1 each by Does not apply route 4 times daily 7/19/22   JENNIFER Hamilton   lisinopril (PRINIVIL;ZESTRIL) 10 MG tablet Take 1 tablet by mouth in the morning. 7/19/22   JENNIFER Hamilton   atorvastatin (LIPITOR) 20 MG tablet Take 1 tablet by mouth in the morning. 7/19/22   JENNIFER Hamilton   glipiZIDE (GLUCOTROL XL) 5 MG extended release tablet Take 1 tablet by mouth in the morning.  7/19/22   JENNIFER Hamilton   donepezil (ARICEPT) 5 MG tablet Take 1 tablet by mouth nightly 7/19/22   JENNIFER Hamilton   Continuous Blood Gluc  (FREESTYLE JUSTIN 14 DAY READER) OLI 1 Units by Does not apply route as needed (as needed) 7/5/22   JENNIFER Hamilton   Continuous Blood Gluc Sensor (FREESTYLE JUSTIN 14 DAY SENSOR) MISC 1 Units by Does not apply route every 14 days 7/5/22   JENNIFER Hamilton   pramipexole (MIRAPEX) 0.25 MG tablet Take 1 tablet by mouth nightly 6/6/22 9/4/22  JENNIFER Hamilton   dicyclomine (BENTYL) 10 MG capsule Take 1 capsule by mouth every 6 hours as needed (cramps) 6/1/22   Kiah Perez, APRN - CNP   primidone (MYSOLINE) 50 MG tablet Take 50 mg by mouth 2 times daily    Historical Provider, MD   aspirin 81 MG chewable tablet Take 1 tablet by mouth daily 3/26/19   Reynaldo Ram MD   Insulin Syringe-Needle U-100 30G X 1/2\" 0.5 ML MISC Inject insulin 3 times daily 9/12/17   Historical Provider, MD       Future Appointments   Date Time Provider Magdalena Murguia   10/26/2022 10:15 AM JENNIFER Hamilton [place] - [facility]     ,   Diabetes Assessment               , and   General Assessment

## 2022-07-28 NOTE — TELEPHONE ENCOUNTER
Thank you, he and I haven't discussed issues with incontinence in the past so if they need documentation for it to be covered, he'll need an appointment. It could easily be a VV if needed. Thanks for your help!

## 2022-07-29 NOTE — CARE COORDINATION
Was there something other than the depends that he needed? I'm not sure how that confused during the original call. We did make some recent changes to his medication to get better control. I appreciate your help!     I agree with the Pablito Davidson

## 2022-08-04 ENCOUNTER — CARE COORDINATION (OUTPATIENT)
Dept: CARE COORDINATION | Age: 72
End: 2022-08-04

## 2022-08-04 NOTE — CARE COORDINATION
Ambulatory Care Coordination Note  8/4/2022    ACC: Pia Corona, RN    Summary Note: AC completed follow up call  to patient, who reports being well. [facility] was able to obtain BS numbers. Patient did report one low blood sugar at 41 in the early morning on 8/2. Patient denies eating a complex carb/ protein rich snack before bed. ACM educated patient that this could help prevent drops in blood sugar at night when Lantus peaks. Patient verbalized understanding. Day Before    Breakfast   7/30 160   8/1 133   8/2 242   8/3 113     AVG BG  162       Plan:    F/U call 2 weeks  SS to obtain Blood sugars     Lab Results       None            Care Coordination Interventions    Referral from Primary Care Provider: No  Suggested Interventions and Community Resources          Goals Addressed                   This Visit's Progress     Conditions and Symptoms   Improving     I will schedule office visits, as directed by my provider. I will keep my appointment or reschedule if I have to cancel. I will notify my provider of any barriers to my plan of care. I will follow my Zone Management tool to seek urgent or emergent care. I will notify my provider of any symptoms that indicate a worsening of my condition. Barriers: lack of support and overwhelmed by complexity of regimen  Plan for overcoming my barriers: [facility] will provide supportive phone calls  Confidence: 8/10  Anticipated Goal Completion Date: 9/28/22                Prior to Admission medications    Medication Sig Start Date End Date Taking? Authorizing Provider   insulin glargine (LANTUS SOLOSTAR) 100 UNIT/ML injection pen Inject 12 Units into the skin nightly 7/19/22 10/17/22  JENNIFER Shell   insulin aspart (NOVOLOG FLEXPEN) 100 UNIT/ML injection pen Inject 8 Units into the skin in the morning and 8 Units at noon and 8 Units in the evening. Inject before meals.  7/19/22 10/17/22  JENNIFER Shell   Insulin Pen Needle 31G X 8 MM MISC 1 each by Does not apply route 4 times daily 7/19/22   JENNIFER Ward   lisinopril (PRINIVIL;ZESTRIL) 10 MG tablet Take 1 tablet by mouth in the morning. 7/19/22   JENNIFER Ward   atorvastatin (LIPITOR) 20 MG tablet Take 1 tablet by mouth in the morning. 7/19/22   JENNIFER Ward   glipiZIDE (GLUCOTROL XL) 5 MG extended release tablet Take 1 tablet by mouth in the morning. 7/19/22   JENNIFER Ward   donepezil (ARICEPT) 5 MG tablet Take 1 tablet by mouth nightly 7/19/22   JENNIFER Ward   Continuous Blood Gluc  (FREESTYLE JUSTIN 14 DAY READER) OLI 1 Units by Does not apply route as needed (as needed) 7/5/22   JENNIFER Ward   Continuous Blood Gluc Sensor (FREESTYLE JUSTIN 14 DAY SENSOR) MISC 1 Units by Does not apply route every 14 days 7/5/22   JENNIFER Ward   pramipexole (MIRAPEX) 0.25 MG tablet Take 1 tablet by mouth nightly 6/6/22 9/4/22  JENNIFER Ward   dicyclomine (BENTYL) 10 MG capsule Take 1 capsule by mouth every 6 hours as needed (cramps) 6/1/22   Kiah Perez APRN - CNP   primidone (MYSOLINE) 50 MG tablet Take 50 mg by mouth 2 times daily    Historical Provider, MD   aspirin 81 MG chewable tablet Take 1 tablet by mouth daily 3/26/19   Wolfgang Hogan MD   Insulin Syringe-Needle U-100 30G X 1/2\" 0.5 ML MISC Inject insulin 3 times daily 9/12/17   Historical Provider, MD       Future Appointments   Date Time Provider Magdalena Murguia   10/26/2022 10:15 AM JENNIFER Ward - [place]   ,   Diabetes Assessment      Meal Planning: Avoidance of concentrated sweets, Calorie counting   How often do you test your blood sugar?: Daily, Meals, Bedtime   Do you have barriers with adherence to non-pharmacologic self-management interventions?  (Nutrition/Exercise/Self-Monitoring): Yes   Have you ever had to go to the ED for symptoms of low blood sugar?: No       Increase or Decrease trend in Blood Sugars   Do you have hyperglycemia symptoms?: No   Do you have hypoglycemia symptoms?: Yes   Frequency of Episodes: 1 Per: Week   Blood Sugar Monitoring Regimen: Once a Day, Before Meals, At Bedtime   Blood Sugar Trends: Fluctuating        , and   General Assessment    Do you have any symptoms that are causing concern?: No

## 2022-08-11 ENCOUNTER — CARE COORDINATION (OUTPATIENT)
Dept: CARE COORDINATION | Age: 72
End: 2022-08-11

## 2022-08-11 NOTE — TELEPHONE ENCOUNTER
Patient informed   He said he would be good to wear the continuous glucometer.  Please send to [facility]

## 2022-08-11 NOTE — CARE COORDINATION
Pt reported multiple BG readings over the last week but unable to tell SS if they were before or after he had ate. Pt said he checks BG whenever he feels like it, or walks by machine. Pt reported he has a hard time looking through monitor to read back readings. SS encouraged pt to start writing down in a notebook date, time, before or after meals and number to make it easier on him.  Readings reported are:    8/4 - 64, 169, 167, 215, 308  8/5 - 183, 146, 177, 116  8/6 - 160, 137, 163, 102  8/7 - 214, 158, 113, 180  8/8 - 83, 90, 70, 141, 126, 204, 205  8/9 - 126, 142, 299, 289, 316, 203  8/10 - 223, 259, 247, 235, 202  8/11 - 129, 160

## 2022-08-11 NOTE — CARE COORDINATION
Staff, can we please have him increase his lantus to 14 Units nightly and call us with blood pressure readings in 2 weeks? Also, can we check and see if he would be willing to wear a continuous glucometer? If so, we can review his blood sugar readings from the office to better understand what's going on with him.  Please let me know

## 2022-08-15 NOTE — TELEPHONE ENCOUNTER
Called patient and spoke with his wife regarding CGM. Patient does use the FreeStyle system and is not currently sticking his fingers. However, he is using the reader and not the marilou and is unable to upload data to the computer. She confirmed patient has started 14U nightly and that we will follow up with him in 2 weeks for numbers.

## 2022-08-18 ENCOUNTER — CARE COORDINATION (OUTPATIENT)
Dept: CARE COORDINATION | Age: 72
End: 2022-08-18

## 2022-08-18 NOTE — CARE COORDINATION
Call to pt for weekly BG monitoring, pt reported he only checked 2-3 times daily this past week, at random times. Pt also reported he used a notebook and it was easier for him to keep track. SS verified readings back with pt and pt confirmed.  Readings are:    8/12- 12:17A 71  1:46A 82    8/13- 6:37A 68  10:13P 180    8/14- 12A 83  6:00P 154  11:57P 178    8/15- 9:98C249  5:15P 242  9:00P 295    5/16- 8:50A 96  12:00P 130    8/17 7:45P 286  8:45P 320  9:30P 73    8/18- 11:39A 106

## 2022-08-18 NOTE — CARE COORDINATION
Please confirm - is patient using 12 units Lantus as night and 8 units of Novolog with each meal?    If so, would recommend using 11 units Lantus at night and 9 units of Novolog with each meal. Send glucose readings again in 1 week.

## 2022-08-25 ENCOUNTER — CARE COORDINATION (OUTPATIENT)
Dept: CARE COORDINATION | Age: 72
End: 2022-08-25

## 2022-08-25 NOTE — CARE COORDINATION
ACM called but patient did not answer. ACM left message for patient to call back and left call back number. Will pend for follow up at a later date by primary ACM.

## 2022-08-25 NOTE — CARE COORDINATION
Call to pt for weekly BG readings.  Pt reported the followinA 83  12:30P 182  9:30P 188    - pt did not have a time but said it was in the morning 139    - 10A 100  8P 131    - 11:30A 154  3:30P 67  6P 83     134  1P 104  8P 102    - 10A 171  6P 200  10P 84     163

## 2022-09-01 ENCOUNTER — CARE COORDINATION (OUTPATIENT)
Dept: CARE COORDINATION | Age: 72
End: 2022-09-01

## 2022-09-01 ENCOUNTER — OFFICE VISIT (OUTPATIENT)
Dept: FAMILY MEDICINE CLINIC | Age: 72
End: 2022-09-01
Payer: MEDICARE

## 2022-09-01 VITALS
OXYGEN SATURATION: 94 % | BODY MASS INDEX: 37.45 KG/M2 | SYSTOLIC BLOOD PRESSURE: 110 MMHG | WEIGHT: 261 LBS | DIASTOLIC BLOOD PRESSURE: 70 MMHG | HEART RATE: 67 BPM

## 2022-09-01 DIAGNOSIS — G25.81 RESTLESS LEG: Primary | ICD-10-CM

## 2022-09-01 PROCEDURE — 1036F TOBACCO NON-USER: CPT | Performed by: PHYSICIAN ASSISTANT

## 2022-09-01 PROCEDURE — 1123F ACP DISCUSS/DSCN MKR DOCD: CPT | Performed by: PHYSICIAN ASSISTANT

## 2022-09-01 PROCEDURE — 3017F COLORECTAL CA SCREEN DOC REV: CPT | Performed by: PHYSICIAN ASSISTANT

## 2022-09-01 PROCEDURE — G8427 DOCREV CUR MEDS BY ELIG CLIN: HCPCS | Performed by: PHYSICIAN ASSISTANT

## 2022-09-01 PROCEDURE — 99213 OFFICE O/P EST LOW 20 MIN: CPT | Performed by: PHYSICIAN ASSISTANT

## 2022-09-01 PROCEDURE — G8417 CALC BMI ABV UP PARAM F/U: HCPCS | Performed by: PHYSICIAN ASSISTANT

## 2022-09-01 RX ORDER — PRAMIPEXOLE DIHYDROCHLORIDE 0.5 MG/1
0.5 TABLET ORAL NIGHTLY
Qty: 30 TABLET | Refills: 0 | Status: SHIPPED | OUTPATIENT
Start: 2022-09-01 | End: 2022-10-10

## 2022-09-01 ASSESSMENT — PATIENT HEALTH QUESTIONNAIRE - PHQ9
5. POOR APPETITE OR OVEREATING: 0
6. FEELING BAD ABOUT YOURSELF - OR THAT YOU ARE A FAILURE OR HAVE LET YOURSELF OR YOUR FAMILY DOWN: 0
SUM OF ALL RESPONSES TO PHQ9 QUESTIONS 1 & 2: 3
9. THOUGHTS THAT YOU WOULD BE BETTER OFF DEAD, OR OF HURTING YOURSELF: 0
SUM OF ALL RESPONSES TO PHQ QUESTIONS 1-9: 3
2. FEELING DOWN, DEPRESSED OR HOPELESS: 0
10. IF YOU CHECKED OFF ANY PROBLEMS, HOW DIFFICULT HAVE THESE PROBLEMS MADE IT FOR YOU TO DO YOUR WORK, TAKE CARE OF THINGS AT HOME, OR GET ALONG WITH OTHER PEOPLE: 0
DEPRESSION UNABLE TO ASSESS: FUNCTIONAL CAPACITY MOTIVATION LIMITS ACCURACY
4. FEELING TIRED OR HAVING LITTLE ENERGY: 0
3. TROUBLE FALLING OR STAYING ASLEEP: 0
SUM OF ALL RESPONSES TO PHQ QUESTIONS 1-9: 3
7. TROUBLE CONCENTRATING ON THINGS, SUCH AS READING THE NEWSPAPER OR WATCHING TELEVISION: 0
8. MOVING OR SPEAKING SO SLOWLY THAT OTHER PEOPLE COULD HAVE NOTICED. OR THE OPPOSITE, BEING SO FIGETY OR RESTLESS THAT YOU HAVE BEEN MOVING AROUND A LOT MORE THAN USUAL: 0
1. LITTLE INTEREST OR PLEASURE IN DOING THINGS: 3

## 2022-09-01 ASSESSMENT — ENCOUNTER SYMPTOMS: COLOR CHANGE: 0

## 2022-09-07 ENCOUNTER — TELEPHONE (OUTPATIENT)
Dept: FAMILY MEDICINE CLINIC | Age: 72
End: 2022-09-07

## 2022-09-07 NOTE — TELEPHONE ENCOUNTER
Can we please call [facility] and see if this patient every met with neuropsychology as prescribed by his neurologist? They seem to think that he did but I have not received those notes. The patient is waiting to hear back from [facility] on what the results were.  Thank you

## 2022-09-08 ENCOUNTER — CARE COORDINATION (OUTPATIENT)
Dept: CARE COORDINATION | Age: 72
End: 2022-09-08

## 2022-09-08 NOTE — TELEPHONE ENCOUNTER
Noted- Thank you I will make outreach call next week on 9/16/22. Are you able to change the next outreach date? Thanks.

## 2022-09-08 NOTE — CARE COORDINATION
Call to patient for weekly BG monitoring, numbers are below. 9/1- 10P 309    9/2- 11A 178  3P 233  8P 139    9/3- 9A 135  4P 152  11P 295    9/4- 8A 132  5P 160  10P 136    9/5- 11A 104  2P 167  10P 176    9/6- 10A 109  3:49P 182  5P 237  10P 187    9/7- pt reports he did not write any down.     9/8- 7:48A 118  12:30P 119

## 2022-09-09 ENCOUNTER — HOSPITAL ENCOUNTER (INPATIENT)
Age: 72
LOS: 3 days | Discharge: HOME | DRG: 310 | End: 2022-09-12
Attending: EMERGENCY MEDICINE | Admitting: INTERNAL MEDICINE
Payer: MEDICARE

## 2022-09-09 ENCOUNTER — APPOINTMENT (OUTPATIENT)
Dept: GENERAL RADIOLOGY | Age: 72
DRG: 310 | End: 2022-09-09
Payer: MEDICARE

## 2022-09-09 DIAGNOSIS — I48.91 NEW ONSET A-FIB (HCC): ICD-10-CM

## 2022-09-09 DIAGNOSIS — R07.9 CHEST PAIN, UNSPECIFIED TYPE: ICD-10-CM

## 2022-09-09 DIAGNOSIS — R06.09 EXERTIONAL DYSPNEA: ICD-10-CM

## 2022-09-09 DIAGNOSIS — R00.0 TACHYCARDIA: ICD-10-CM

## 2022-09-09 DIAGNOSIS — I48.91 NEW ONSET ATRIAL FIBRILLATION (HCC): Primary | ICD-10-CM

## 2022-09-09 LAB
A/G RATIO: 1.9 (ref 1.1–2.2)
ALBUMIN SERPL-MCNC: 4.6 G/DL (ref 3.4–5)
ALP BLD-CCNC: 91 U/L (ref 40–129)
ALT SERPL-CCNC: 17 U/L (ref 10–40)
ANION GAP SERPL CALCULATED.3IONS-SCNC: 15 MMOL/L (ref 3–16)
APTT: 27.7 SEC (ref 23–34.3)
AST SERPL-CCNC: 16 U/L (ref 15–37)
BASE EXCESS VENOUS: 1.8 MMOL/L (ref -3–3)
BASOPHILS ABSOLUTE: 0.1 K/UL (ref 0–0.2)
BASOPHILS RELATIVE PERCENT: 0.7 %
BILIRUB SERPL-MCNC: 0.4 MG/DL (ref 0–1)
BUN BLDV-MCNC: 28 MG/DL (ref 7–20)
CALCIUM SERPL-MCNC: 10.6 MG/DL (ref 8.3–10.6)
CARBOXYHEMOGLOBIN: 1.8 % (ref 0–1.5)
CHLORIDE BLD-SCNC: 102 MMOL/L (ref 99–110)
CO2: 24 MMOL/L (ref 21–32)
CREAT SERPL-MCNC: 1.3 MG/DL (ref 0.8–1.3)
D DIMER: 0.42 UG/ML FEU (ref 0–0.6)
EOSINOPHILS ABSOLUTE: 0.2 K/UL (ref 0–0.6)
EOSINOPHILS RELATIVE PERCENT: 2.6 %
GFR AFRICAN AMERICAN: >60
GFR NON-AFRICAN AMERICAN: 54
GLUCOSE BLD-MCNC: 212 MG/DL (ref 70–99)
GLUCOSE BLD-MCNC: 95 MG/DL (ref 70–99)
HCO3 VENOUS: 26.8 MMOL/L (ref 23–29)
HCT VFR BLD CALC: 42.7 % (ref 40.5–52.5)
HEMOGLOBIN: 14.1 G/DL (ref 13.5–17.5)
LACTIC ACID, SEPSIS: 1.4 MMOL/L (ref 0.4–1.9)
LYMPHOCYTES ABSOLUTE: 1.3 K/UL (ref 1–5.1)
LYMPHOCYTES RELATIVE PERCENT: 18.9 %
MCH RBC QN AUTO: 29.2 PG (ref 26–34)
MCHC RBC AUTO-ENTMCNC: 33 G/DL (ref 31–36)
MCV RBC AUTO: 88.6 FL (ref 80–100)
METHEMOGLOBIN VENOUS: 0.2 %
MONOCYTES ABSOLUTE: 0.5 K/UL (ref 0–1.3)
MONOCYTES RELATIVE PERCENT: 7.4 %
NEUTROPHILS ABSOLUTE: 5 K/UL (ref 1.7–7.7)
NEUTROPHILS RELATIVE PERCENT: 70.4 %
O2 SAT, VEN: 88 %
O2 THERAPY: ABNORMAL
PCO2, VEN: 43.5 MMHG (ref 40–50)
PDW BLD-RTO: 14.2 % (ref 12.4–15.4)
PERFORMED ON: NORMAL
PH VENOUS: 7.41 (ref 7.35–7.45)
PLATELET # BLD: 142 K/UL (ref 135–450)
PMV BLD AUTO: 9.9 FL (ref 5–10.5)
PO2, VEN: 56.3 MMHG (ref 25–40)
POTASSIUM REFLEX MAGNESIUM: 4.2 MMOL/L (ref 3.5–5.1)
RBC # BLD: 4.82 M/UL (ref 4.2–5.9)
SARS-COV-2, NAAT: NOT DETECTED
SODIUM BLD-SCNC: 141 MMOL/L (ref 136–145)
SPECIMEN STATUS: NORMAL
TCO2 CALC VENOUS: 28 MMOL/L
TOTAL PROTEIN: 7 G/DL (ref 6.4–8.2)
TROPONIN: <0.01 NG/ML
TROPONIN: <0.01 NG/ML
WBC # BLD: 7.1 K/UL (ref 4–11)

## 2022-09-09 PROCEDURE — 85730 THROMBOPLASTIN TIME PARTIAL: CPT

## 2022-09-09 PROCEDURE — 85025 COMPLETE CBC W/AUTO DIFF WBC: CPT

## 2022-09-09 PROCEDURE — 99285 EMERGENCY DEPT VISIT HI MDM: CPT

## 2022-09-09 PROCEDURE — 2500000003 HC RX 250 WO HCPCS: Performed by: EMERGENCY MEDICINE

## 2022-09-09 PROCEDURE — 84484 ASSAY OF TROPONIN QUANT: CPT

## 2022-09-09 PROCEDURE — 87635 SARS-COV-2 COVID-19 AMP PRB: CPT

## 2022-09-09 PROCEDURE — 96375 TX/PRO/DX INJ NEW DRUG ADDON: CPT

## 2022-09-09 PROCEDURE — 6370000000 HC RX 637 (ALT 250 FOR IP): Performed by: INTERNAL MEDICINE

## 2022-09-09 PROCEDURE — 36415 COLL VENOUS BLD VENIPUNCTURE: CPT

## 2022-09-09 PROCEDURE — 1200000000 HC SEMI PRIVATE

## 2022-09-09 PROCEDURE — 85379 FIBRIN DEGRADATION QUANT: CPT

## 2022-09-09 PROCEDURE — 96361 HYDRATE IV INFUSION ADD-ON: CPT

## 2022-09-09 PROCEDURE — 6360000002 HC RX W HCPCS: Performed by: EMERGENCY MEDICINE

## 2022-09-09 PROCEDURE — 2580000003 HC RX 258: Performed by: EMERGENCY MEDICINE

## 2022-09-09 PROCEDURE — 84443 ASSAY THYROID STIM HORMONE: CPT

## 2022-09-09 PROCEDURE — 71046 X-RAY EXAM CHEST 2 VIEWS: CPT

## 2022-09-09 PROCEDURE — 83605 ASSAY OF LACTIC ACID: CPT

## 2022-09-09 PROCEDURE — 93005 ELECTROCARDIOGRAM TRACING: CPT | Performed by: EMERGENCY MEDICINE

## 2022-09-09 PROCEDURE — 96374 THER/PROPH/DIAG INJ IV PUSH: CPT

## 2022-09-09 PROCEDURE — 82803 BLOOD GASES ANY COMBINATION: CPT

## 2022-09-09 PROCEDURE — 80053 COMPREHEN METABOLIC PANEL: CPT

## 2022-09-09 RX ORDER — HEPARIN SODIUM 10000 [USP'U]/100ML
1000 INJECTION, SOLUTION INTRAVENOUS CONTINUOUS
Status: DISCONTINUED | OUTPATIENT
Start: 2022-09-09 | End: 2022-09-09

## 2022-09-09 RX ORDER — INSULIN LISPRO 100 [IU]/ML
0-4 INJECTION, SOLUTION INTRAVENOUS; SUBCUTANEOUS NIGHTLY
Status: DISCONTINUED | OUTPATIENT
Start: 2022-09-09 | End: 2022-09-12 | Stop reason: HOSPADM

## 2022-09-09 RX ORDER — LISINOPRIL 10 MG/1
10 TABLET ORAL DAILY
Status: DISCONTINUED | OUTPATIENT
Start: 2022-09-10 | End: 2022-09-10

## 2022-09-09 RX ORDER — SODIUM CHLORIDE 0.9 % (FLUSH) 0.9 %
5-40 SYRINGE (ML) INJECTION EVERY 12 HOURS SCHEDULED
Status: DISCONTINUED | OUTPATIENT
Start: 2022-09-09 | End: 2022-09-12 | Stop reason: HOSPADM

## 2022-09-09 RX ORDER — GLIPIZIDE 5 MG/1
5 TABLET ORAL
Status: DISCONTINUED | OUTPATIENT
Start: 2022-09-10 | End: 2022-09-12 | Stop reason: HOSPADM

## 2022-09-09 RX ORDER — SODIUM CHLORIDE 0.9 % (FLUSH) 0.9 %
5-40 SYRINGE (ML) INJECTION PRN
Status: DISCONTINUED | OUTPATIENT
Start: 2022-09-09 | End: 2022-09-12 | Stop reason: HOSPADM

## 2022-09-09 RX ORDER — ONDANSETRON 2 MG/ML
4 INJECTION INTRAMUSCULAR; INTRAVENOUS EVERY 6 HOURS PRN
Status: DISCONTINUED | OUTPATIENT
Start: 2022-09-09 | End: 2022-09-12 | Stop reason: HOSPADM

## 2022-09-09 RX ORDER — DILTIAZEM HYDROCHLORIDE 60 MG/1
30 TABLET, FILM COATED ORAL EVERY 6 HOURS SCHEDULED
Status: DISCONTINUED | OUTPATIENT
Start: 2022-09-10 | End: 2022-09-10

## 2022-09-09 RX ORDER — DICYCLOMINE HYDROCHLORIDE 10 MG/1
10 CAPSULE ORAL EVERY 6 HOURS PRN
Status: DISCONTINUED | OUTPATIENT
Start: 2022-09-09 | End: 2022-09-12 | Stop reason: HOSPADM

## 2022-09-09 RX ORDER — HEPARIN SODIUM 1000 [USP'U]/ML
4000 INJECTION, SOLUTION INTRAVENOUS; SUBCUTANEOUS ONCE
Status: COMPLETED | OUTPATIENT
Start: 2022-09-09 | End: 2022-09-09

## 2022-09-09 RX ORDER — ONDANSETRON 4 MG/1
4 TABLET, ORALLY DISINTEGRATING ORAL EVERY 8 HOURS PRN
Status: DISCONTINUED | OUTPATIENT
Start: 2022-09-09 | End: 2022-09-12 | Stop reason: HOSPADM

## 2022-09-09 RX ORDER — PRAMIPEXOLE DIHYDROCHLORIDE 0.25 MG/1
0.5 TABLET ORAL NIGHTLY
Status: DISCONTINUED | OUTPATIENT
Start: 2022-09-09 | End: 2022-09-12 | Stop reason: HOSPADM

## 2022-09-09 RX ORDER — ACETAMINOPHEN 650 MG/1
650 SUPPOSITORY RECTAL EVERY 6 HOURS PRN
Status: DISCONTINUED | OUTPATIENT
Start: 2022-09-09 | End: 2022-09-12 | Stop reason: HOSPADM

## 2022-09-09 RX ORDER — INSULIN GLARGINE 100 [IU]/ML
0.1 INJECTION, SOLUTION SUBCUTANEOUS NIGHTLY
Status: DISCONTINUED | OUTPATIENT
Start: 2022-09-09 | End: 2022-09-12 | Stop reason: HOSPADM

## 2022-09-09 RX ORDER — PRIMIDONE 50 MG/1
50 TABLET ORAL 2 TIMES DAILY
Status: DISCONTINUED | OUTPATIENT
Start: 2022-09-09 | End: 2022-09-09

## 2022-09-09 RX ORDER — MORPHINE SULFATE 4 MG/ML
4 INJECTION, SOLUTION INTRAMUSCULAR; INTRAVENOUS ONCE
Status: COMPLETED | OUTPATIENT
Start: 2022-09-09 | End: 2022-09-09

## 2022-09-09 RX ORDER — ASPIRIN 81 MG/1
81 TABLET, CHEWABLE ORAL DAILY
Status: DISCONTINUED | OUTPATIENT
Start: 2022-09-10 | End: 2022-09-12 | Stop reason: HOSPADM

## 2022-09-09 RX ORDER — HEPARIN SODIUM 1000 [USP'U]/ML
2000 INJECTION, SOLUTION INTRAVENOUS; SUBCUTANEOUS PRN
Status: DISCONTINUED | OUTPATIENT
Start: 2022-09-09 | End: 2022-09-09

## 2022-09-09 RX ORDER — ACETAMINOPHEN 325 MG/1
650 TABLET ORAL EVERY 6 HOURS PRN
Status: DISCONTINUED | OUTPATIENT
Start: 2022-09-09 | End: 2022-09-12 | Stop reason: HOSPADM

## 2022-09-09 RX ORDER — SODIUM CHLORIDE 9 MG/ML
INJECTION, SOLUTION INTRAVENOUS PRN
Status: DISCONTINUED | OUTPATIENT
Start: 2022-09-09 | End: 2022-09-12 | Stop reason: HOSPADM

## 2022-09-09 RX ORDER — POLYETHYLENE GLYCOL 3350 17 G/17G
17 POWDER, FOR SOLUTION ORAL DAILY PRN
Status: DISCONTINUED | OUTPATIENT
Start: 2022-09-09 | End: 2022-09-12 | Stop reason: HOSPADM

## 2022-09-09 RX ORDER — DEXTROSE MONOHYDRATE 100 MG/ML
INJECTION, SOLUTION INTRAVENOUS CONTINUOUS PRN
Status: DISCONTINUED | OUTPATIENT
Start: 2022-09-09 | End: 2022-09-12 | Stop reason: HOSPADM

## 2022-09-09 RX ORDER — HEPARIN SODIUM 1000 [USP'U]/ML
4000 INJECTION, SOLUTION INTRAVENOUS; SUBCUTANEOUS PRN
Status: DISCONTINUED | OUTPATIENT
Start: 2022-09-09 | End: 2022-09-09

## 2022-09-09 RX ORDER — 0.9 % SODIUM CHLORIDE 0.9 %
500 INTRAVENOUS SOLUTION INTRAVENOUS ONCE
Status: DISCONTINUED | OUTPATIENT
Start: 2022-09-09 | End: 2022-09-12 | Stop reason: HOSPADM

## 2022-09-09 RX ORDER — METOPROLOL TARTRATE 5 MG/5ML
5 INJECTION INTRAVENOUS ONCE
Status: COMPLETED | OUTPATIENT
Start: 2022-09-09 | End: 2022-09-09

## 2022-09-09 RX ORDER — INSULIN LISPRO 100 [IU]/ML
0-4 INJECTION, SOLUTION INTRAVENOUS; SUBCUTANEOUS
Status: DISCONTINUED | OUTPATIENT
Start: 2022-09-10 | End: 2022-09-12 | Stop reason: HOSPADM

## 2022-09-09 RX ORDER — 0.9 % SODIUM CHLORIDE 0.9 %
1000 INTRAVENOUS SOLUTION INTRAVENOUS ONCE
Status: COMPLETED | OUTPATIENT
Start: 2022-09-09 | End: 2022-09-09

## 2022-09-09 RX ORDER — ATORVASTATIN CALCIUM 10 MG/1
20 TABLET, FILM COATED ORAL DAILY
Status: DISCONTINUED | OUTPATIENT
Start: 2022-09-10 | End: 2022-09-12 | Stop reason: HOSPADM

## 2022-09-09 RX ADMIN — HEPARIN SODIUM 4000 UNITS: 1000 INJECTION INTRAVENOUS; SUBCUTANEOUS at 22:07

## 2022-09-09 RX ADMIN — INSULIN GLARGINE 12 UNITS: 100 INJECTION, SOLUTION SUBCUTANEOUS at 23:51

## 2022-09-09 RX ADMIN — DILTIAZEM HYDROCHLORIDE 30 MG: 60 TABLET, FILM COATED ORAL at 23:51

## 2022-09-09 RX ADMIN — APIXABAN 5 MG: 5 TABLET, FILM COATED ORAL at 23:51

## 2022-09-09 RX ADMIN — METOPROLOL TARTRATE 5 MG: 5 INJECTION, SOLUTION INTRAVENOUS at 22:05

## 2022-09-09 RX ADMIN — HEPARIN SODIUM 1000 UNITS/HR: 10000 INJECTION, SOLUTION INTRAVENOUS at 22:08

## 2022-09-09 RX ADMIN — PRAMIPEXOLE DIHYDROCHLORIDE 0.5 MG: 0.25 TABLET ORAL at 23:51

## 2022-09-09 RX ADMIN — SODIUM CHLORIDE 1000 ML: 9 INJECTION, SOLUTION INTRAVENOUS at 19:28

## 2022-09-09 RX ADMIN — MORPHINE SULFATE 4 MG: 4 INJECTION, SOLUTION INTRAMUSCULAR; INTRAVENOUS at 21:24

## 2022-09-09 ASSESSMENT — ENCOUNTER SYMPTOMS
COUGH: 0
RHINORRHEA: 0
EYE DISCHARGE: 0
SHORTNESS OF BREATH: 1
VOMITING: 0
BACK PAIN: 0
DIARRHEA: 0
ABDOMINAL PAIN: 0
EYE REDNESS: 0

## 2022-09-09 ASSESSMENT — PAIN SCALES - GENERAL
PAINLEVEL_OUTOF10: 7
PAINLEVEL_OUTOF10: 0
PAINLEVEL_OUTOF10: 3

## 2022-09-09 ASSESSMENT — LIFESTYLE VARIABLES
HOW MANY STANDARD DRINKS CONTAINING ALCOHOL DO YOU HAVE ON A TYPICAL DAY: PATIENT DOES NOT DRINK
HOW OFTEN DO YOU HAVE A DRINK CONTAINING ALCOHOL: NEVER

## 2022-09-09 ASSESSMENT — PAIN - FUNCTIONAL ASSESSMENT: PAIN_FUNCTIONAL_ASSESSMENT: NONE - DENIES PAIN

## 2022-09-10 LAB
EKG ATRIAL RATE: 135 BPM
EKG ATRIAL RATE: 55 BPM
EKG ATRIAL RATE: 57 BPM
EKG ATRIAL RATE: 83 BPM
EKG DIAGNOSIS: NORMAL
EKG P AXIS: 39 DEGREES
EKG P AXIS: 42 DEGREES
EKG P-R INTERVAL: 184 MS
EKG P-R INTERVAL: 188 MS
EKG Q-T INTERVAL: 292 MS
EKG Q-T INTERVAL: 298 MS
EKG Q-T INTERVAL: 444 MS
EKG Q-T INTERVAL: 446 MS
EKG QRS DURATION: 74 MS
EKG QRS DURATION: 74 MS
EKG QRS DURATION: 90 MS
EKG QRS DURATION: 92 MS
EKG QTC CALCULATION (BAZETT): 403 MS
EKG QTC CALCULATION (BAZETT): 426 MS
EKG QTC CALCULATION (BAZETT): 432 MS
EKG QTC CALCULATION (BAZETT): 438 MS
EKG R AXIS: 55 DEGREES
EKG R AXIS: 56 DEGREES
EKG R AXIS: 61 DEGREES
EKG R AXIS: 71 DEGREES
EKG T AXIS: -15 DEGREES
EKG T AXIS: 54 DEGREES
EKG T AXIS: 59 DEGREES
EKG T AXIS: 65 DEGREES
EKG VENTRICULAR RATE: 110 BPM
EKG VENTRICULAR RATE: 135 BPM
EKG VENTRICULAR RATE: 55 BPM
EKG VENTRICULAR RATE: 57 BPM
GLUCOSE BLD-MCNC: 145 MG/DL (ref 70–99)
GLUCOSE BLD-MCNC: 212 MG/DL (ref 70–99)
GLUCOSE BLD-MCNC: 224 MG/DL (ref 70–99)
GLUCOSE BLD-MCNC: 248 MG/DL (ref 70–99)
INR BLD: 1.15 (ref 0.87–1.14)
PERFORMED ON: ABNORMAL
PROTHROMBIN TIME: 14.6 SEC (ref 11.7–14.5)
TROPONIN: <0.01 NG/ML
TSH REFLEX: 1.78 UIU/ML (ref 0.27–4.2)

## 2022-09-10 PROCEDURE — 36415 COLL VENOUS BLD VENIPUNCTURE: CPT

## 2022-09-10 PROCEDURE — 2580000003 HC RX 258: Performed by: INTERNAL MEDICINE

## 2022-09-10 PROCEDURE — 93010 ELECTROCARDIOGRAM REPORT: CPT | Performed by: INTERNAL MEDICINE

## 2022-09-10 PROCEDURE — 6370000000 HC RX 637 (ALT 250 FOR IP): Performed by: INTERNAL MEDICINE

## 2022-09-10 PROCEDURE — 93005 ELECTROCARDIOGRAM TRACING: CPT | Performed by: INTERNAL MEDICINE

## 2022-09-10 PROCEDURE — 6370000000 HC RX 637 (ALT 250 FOR IP)

## 2022-09-10 PROCEDURE — 84484 ASSAY OF TROPONIN QUANT: CPT

## 2022-09-10 PROCEDURE — 99222 1ST HOSP IP/OBS MODERATE 55: CPT | Performed by: INTERNAL MEDICINE

## 2022-09-10 PROCEDURE — 85610 PROTHROMBIN TIME: CPT

## 2022-09-10 PROCEDURE — 1200000000 HC SEMI PRIVATE

## 2022-09-10 RX ORDER — MORPHINE SULFATE 2 MG/ML
2 INJECTION, SOLUTION INTRAMUSCULAR; INTRAVENOUS EVERY 4 HOURS PRN
Status: DISCONTINUED | OUTPATIENT
Start: 2022-09-10 | End: 2022-09-12 | Stop reason: HOSPADM

## 2022-09-10 RX ORDER — DILTIAZEM HYDROCHLORIDE 120 MG/1
120 CAPSULE, COATED, EXTENDED RELEASE ORAL DAILY
Status: DISCONTINUED | OUTPATIENT
Start: 2022-09-10 | End: 2022-09-12 | Stop reason: HOSPADM

## 2022-09-10 RX ORDER — NITROGLYCERIN 0.4 MG/1
TABLET SUBLINGUAL
Status: COMPLETED
Start: 2022-09-10 | End: 2022-09-10

## 2022-09-10 RX ORDER — NITROGLYCERIN 0.4 MG/1
0.4 TABLET SUBLINGUAL EVERY 5 MIN PRN
Status: DISCONTINUED | OUTPATIENT
Start: 2022-09-10 | End: 2022-09-12 | Stop reason: HOSPADM

## 2022-09-10 RX ADMIN — INSULIN LISPRO 1 UNITS: 100 INJECTION, SOLUTION INTRAVENOUS; SUBCUTANEOUS at 13:26

## 2022-09-10 RX ADMIN — PRAMIPEXOLE DIHYDROCHLORIDE 0.5 MG: 0.25 TABLET ORAL at 21:00

## 2022-09-10 RX ADMIN — NITROGLYCERIN 0.4 MG: 0.4 TABLET, ORALLY DISINTEGRATING SUBLINGUAL at 12:30

## 2022-09-10 RX ADMIN — INSULIN GLARGINE 12 UNITS: 100 INJECTION, SOLUTION SUBCUTANEOUS at 21:00

## 2022-09-10 RX ADMIN — APIXABAN 5 MG: 5 TABLET, FILM COATED ORAL at 21:00

## 2022-09-10 RX ADMIN — ATORVASTATIN CALCIUM 20 MG: 10 TABLET, FILM COATED ORAL at 10:07

## 2022-09-10 RX ADMIN — SODIUM CHLORIDE, PRESERVATIVE FREE 10 ML: 5 INJECTION INTRAVENOUS at 21:00

## 2022-09-10 RX ADMIN — INSULIN LISPRO 1 UNITS: 100 INJECTION, SOLUTION INTRAVENOUS; SUBCUTANEOUS at 17:14

## 2022-09-10 RX ADMIN — NITROGLYCERIN 0.4 MG: 0.4 TABLET, ORALLY DISINTEGRATING SUBLINGUAL at 12:21

## 2022-09-10 RX ADMIN — APIXABAN 5 MG: 5 TABLET, FILM COATED ORAL at 10:07

## 2022-09-10 RX ADMIN — LISINOPRIL 10 MG: 10 TABLET ORAL at 10:07

## 2022-09-10 RX ADMIN — ASPIRIN 81 MG 81 MG: 81 TABLET ORAL at 10:07

## 2022-09-10 RX ADMIN — SODIUM CHLORIDE, PRESERVATIVE FREE 10 ML: 5 INJECTION INTRAVENOUS at 10:07

## 2022-09-10 RX ADMIN — GLIPIZIDE 5 MG: 5 TABLET ORAL at 10:07

## 2022-09-10 ASSESSMENT — PAIN SCALES - GENERAL
PAINLEVEL_OUTOF10: 0
PAINLEVEL_OUTOF10: 0
PAINLEVEL_OUTOF10: 3
PAINLEVEL_OUTOF10: 2
PAINLEVEL_OUTOF10: 5

## 2022-09-10 ASSESSMENT — PAIN DESCRIPTION - LOCATION
LOCATION: CHEST

## 2022-09-10 ASSESSMENT — PAIN DESCRIPTION - ORIENTATION
ORIENTATION: MID
ORIENTATION: MID

## 2022-09-10 ASSESSMENT — PAIN DESCRIPTION - DESCRIPTORS: DESCRIPTORS: ACHING

## 2022-09-10 NOTE — H&P
Hospital Medicine History & Physical      PCP: JENNIFER Bledsoe    Date of Admission: 9/9/2022    Date of Service: Pt seen/examined on 9/10/2022 and Admitted to Inpatient with expected LOS greater than two midnights due to medical therapy. Chief Complaint: Chest pain, shortness of breath      History Of Present Illness:    67 y.o. male who presented to [facility] with above complaints  Patient with PMH of CAD, DM2, HTN, HLD, restless leg syndrome, sleep apnea noncompliant with CPAP presented to the ED with complaints of chest pain and shortness of breath. Patient reports symptoms have been going on intermittently for the last couple of days. He reports dull aching retrosternal chest discomfort with exertional shortness of breath, symptoms got worse earlier today. He reports he has been drinking plenty of water. Because of his symptoms he called EMS, they gave him 324 Mg of aspirin in route, no nitro was given. Patient denies any palpitations, lightheadedness or syncope. Denies any orthopnea or PND. He does have history of atrial tachycardia. In the ED patient was found to be in A. fib with RVR with rates in the 130s.      Past Medical History:          Diagnosis Date    Acid reflux     Yan's esophagus     Coronary artery disease of native heart with stable angina pectoris ([address] 5/30/2019    Diabetes mellitus ([address]     Diabetic autonomic neuropathy associated with type 2 diabetes mellitus ([facility] 3/3/2020    Hyperlipidemia     Hypertension     Influenza A 02/05/2020    Pancreatitis 1996    Restless legs     Sleep apnea     uses CPAP    Tremor     of bilateral hands       Past Surgical History:          Procedure Laterality Date    ABDOMEN SURGERY      CATARACT REMOVAL Bilateral 2013    CHOLECYSTECTOMY      COLONOSCOPY  10/26/2011    ENDOSCOPY, COLON, DIAGNOSTIC      EGD halo    HYDROCELE EXCISION  11/15/2016    PANCREAS SURGERY      cyst opened up and drining into small bowel TONSILLECTOMY      UPPER GASTROINTESTINAL ENDOSCOPY  10/04/2016    with biopsy    UPPER GASTROINTESTINAL ENDOSCOPY  03/16/2017    Halo ablation    UPPER GASTROINTESTINAL ENDOSCOPY  06/26/2017    Halo ablation    UPPER GASTROINTESTINAL ENDOSCOPY  09/06/2017    bx distal sophagus    UPPER GASTROINTESTINAL ENDOSCOPY  12/22/2017    with HALO  procedure    UPPER GASTROINTESTINAL ENDOSCOPY N/A 12/4/2018    EGD WITH ABLATION AND ANESTHESIA - HALO---SLEEP APNEA---  performed by Veronica Kim MD at [address] [place] [address]  2/19/2019    EGD BIOPSY performed by Veronica Kim MD at [address] [place] [address] 6/11/2019    EGD WITH HALO AND ANESTHESIA performed by Veronica Kim MD at [address] [place] [address] 8/13/2019    ESOPHAGOGASTRODUODENOSCOPY WITH HALO AND ANESTHESIA -SLEEP APNEA- performed by Veronica Kim MD at [address] [place] [address]         Medications Prior to Admission:      Prior to Admission medications    Medication Sig Start Date End Date Taking? Authorizing Provider   pramipexole (MIRAPEX) 0.5 MG tablet Take 1 tablet by mouth nightly 9/1/22 11/30/22  JENNIFER Aguila   insulin glargine (LANTUS SOLOSTAR) 100 UNIT/ML injection pen Inject 12 Units into the skin nightly 7/19/22 10/17/22  JENNIFER Aguila   insulin aspart (NOVOLOG FLEXPEN) 100 UNIT/ML injection pen Inject 8 Units into the skin in the morning and 8 Units at noon and 8 Units in the evening. Inject before meals. 7/19/22 10/17/22  JENNIFER Aguila   Insulin Pen Needle 31G X 8 MM MISC 1 each by Does not apply route 4 times daily 7/19/22   JENNIFER Aguila   lisinopril (PRINIVIL;ZESTRIL) 10 MG tablet Take 1 tablet by mouth in the morning. 7/19/22   JENNIFER Aguila   atorvastatin (LIPITOR) 20 MG tablet Take 1 tablet by mouth in the morning.  7/19/22   JENNIFER Aguila   glipiZIDE (GLUCOTROL XL) 5 MG extended release tablet Take 1 tablet by mouth in the morning. 7/19/22   JENNIFER Mo   Continuous Blood Gluc  (FREESTYLE JUSTIN 14 DAY READER) OLI 1 Units by Does not apply route as needed (as needed) 7/5/22   JENNIFER Mo   Continuous Blood Gluc Sensor (FREESTYLE JUSTIN 14 DAY SENSOR) MISC 1 Units by Does not apply route every 14 days 7/5/22   JENNIFER Mo   dicyclomine (BENTYL) 10 MG capsule Take 1 capsule by mouth every 6 hours as needed (cramps) 6/1/22   Kiah Perez, APRN - CNP   aspirin 81 MG chewable tablet Take 1 tablet by mouth daily 3/26/19   Peter Ying MD   Insulin Syringe-Needle U-100 30G X 1/2\" 0.5 ML MISC Inject insulin 3 times daily 9/12/17   Historical Provider, MD       Allergies:  Clonidine and Codeine    Social History:      The patient currently lives at home    TOBACCO:   reports that he has never smoked. He has never used smokeless tobacco.  ETOH:   reports no history of alcohol use. E-cigarette/Vaping       Questions Responses    E-cigarette/Vaping Use Never User    Start Date     Passive Exposure     Quit Date     Counseling Given     Comments               Family History:      Reviewed and negative in regards to presenting illness/complaint. Problem Relation Age of Onset    Kidney Disease Mother     Cancer Father 76        pancreas    Cancer Brother         lung    Cancer Paternal Grandfather         colon       REVIEW OF SYSTEMS COMPLETED:   Pertinent positives as noted in the HPI. All other systems reviewed and negative. PHYSICAL EXAM PERFORMED:    /82   Pulse 75   Temp 98.1 °F (36.7 °C) (Oral)   Resp 18   Ht 5' 10\" (1.778 m)   Wt 271 lb 12.8 oz (123.3 kg)   SpO2 100%   BMI 39.00 kg/m²     General appearance:  No apparent distress, appears stated age and cooperative. HEENT:  Normal cephalic, atraumatic without obvious deformity. Pupils equal, round, and reactive to light. Extra ocular muscles intact. Conjunctivae/corneas clear.   Neck: Supple, with full range of motion. No jugular venous distention. Trachea midline. Respiratory:  Normal respiratory effort. Clear to auscultation, bilaterally without Rales/Wheezes/Rhonchi. Cardiovascular: Tachycardic, irregularly irregular  rhythm with normal S1/S2 without murmurs, rubs or gallops. Abdomen: Soft, non-tender, non-distended with normal bowel sounds. Musculoskeletal:  No clubbing, cyanosis or edema bilaterally. Full range of motion without deformity. Skin: Skin color, texture, turgor normal.  No rashes or lesions. Neurologic:  Neurovascularly intact without any focal sensory/motor deficits. Cranial nerves: II-XII intact, grossly non-focal.  Psychiatric:  Alert and oriented, thought content appropriate, normal insight  Capillary Refill: Brisk,3 seconds, normal  Peripheral Pulses: +2 palpable, equal bilaterally       Labs:     Recent Labs     09/09/22  1639   WBC 7.1   HGB 14.1   HCT 42.7        Recent Labs     09/09/22  1639      K 4.2      CO2 24   BUN 28*   CREATININE 1.3   CALCIUM 10.6     Recent Labs     09/09/22  1639   AST 16   ALT 17   BILITOT 0.4   ALKPHOS 91     No results for input(s): INR in the last 72 hours. Recent Labs     09/09/22  1639 09/09/22  2123   TROPONINI <0.01 <0.01       Urinalysis:      Lab Results   Component Value Date/Time    NITRU Negative 05/31/2022 05:00 PM    WBCUA >100 04/02/2015 03:10 AM    BACTERIA 2+ 04/02/2015 03:10 AM    RBCUA >100 04/02/2015 03:10 AM    BLOODU Negative 05/31/2022 05:00 PM    SPECGRAV 1.025 05/31/2022 05:00 PM    GLUCOSEU 500 05/31/2022 05:00 PM       Radiology:     CXR: I have reviewed the CXR with the following interpretation: No acute process  EKG:  I have reviewed the EKG with the following interpretation:   EKG 9/9/2022 4:35 PM-possible SVT with rate 135, no discernible P waves, rhythm narrow complex and regular  EKG 9/9/2022 9:19 PM-likely A. fib with RVR rate 110, rhythm irregular.   See below EKG image    Media Information  Document Information    Clinical Unknown   Ekg   09/10/2022 00:51   Attached To: Hospital Encounter on 9/9/22     Source Information    Zander Oneill MD  [facility] A[facility] Card Telemetry       XR CHEST (2 VW)   Final Result   No acute process. Consults:    IP CONSULT TO HOSPITALIST  IP CONSULT TO CARDIOLOGY    ASSESSMENT:  PLAN:      New onset a-fib with RVR  Symptomatic. EKG confirming A. Fib. Patient has history of atrial tachycardia/bradycardia. Has been off of beta-blockers due to bradycardia in the past.  Likely due to longstanding HTN and untreated ESTHER  -Rate slowed down after IV metoprolol push in ER, now currently back in sinus rhythm  -Due to bradycardia history we will hold off on rate controlling meds at this time  -We will consult cardiology  -Check TSH with reflex  -2D echocardiogram  -Ischemia [id number] 9/22/2021 showed moderate CAD  -May need Holter monitor outpatient to monitor A. fib burden  -[id number] Score for Atrial Fibrillation Stroke Risk = 3. Discussed R/B of therapeutic anticoagulation to reduce stroke risk from A. Fib. Patient agreeable to proceed with anticoagulation, will start p.o. Eliquis twice daily   Risk   Factors  Component Value   C CHF No 0   H HTN Yes 1   A2 Age >= 76 No,  (73 y.o.) 0   D DM Yes 1   S2 Prior Stroke/TIA No 0   V Vascular Disease No 0   A Age 74-69 Yes,  (73 y.o.) 1   Sc Sex male 0    [id number]  Score  3   Score last updated 8/63/21 6:10 AM EDT  Click here for a link to the UpToDate guideline \"Atrial Fibrillation: Anticoagulation therapy to prevent embolization  Disclaimer: Risk Score calculation is dependent on accuracy of patient problem list and past encounter diagnosis. Hx of atrial tachycardia/bradycardia -hold off on AV maximus blockers. Severe ESTHER-noncompliant with CPAP. Reinforced adherence to PAP therapy at night.     RLS-resume Mirapex    DM2-controlled, resume home insulin regimen, resume glipizide, started SSI, Accu-Cheks, hypoglycemia protocol    HTN-controlled, resume lisinopril    HLD-statin resumed    Obesity BMI 39  Complicating assessment and treatment, placing patient at high risk for multiple co-morbidities as well as early death and contributing to the patient's presentation. Counseled on weight loss. DVT Prophylaxis: eliquis  Diet: ADULT DIET; Regular  Code Status: Full Code    PT/OT Eval Status: ambulatory    Dispo - IP stay       Fritz Jules MD    Thank you JENNIFER Cruz for the opportunity to be involved in this patient's care. If you have any questions or concerns please feel free to contact me at [phone].

## 2022-09-10 NOTE — CONSULTS
Pharmacy to Manage Heparin Infusion per [facility]    Dx: A-fib  Pt wt = 91 kg (adjusted body weight). Baseline aPTT = 27.7 sec at 1932. Oral factor Xa-inhibitors may alter and elevate anti-Xa levels used for unfractionated heparin monitoring. As a result, anti-Xa monitoring is not accurate while Xa-inhibitor activity is detectable. Utilize aPTT monitoring when patient received an oral factor Xa-inhibitor (apixaban, betrixaban, edoxaban or rivaroxaban) within 72 hours prior to admission (please document last administration time). The goal is to allow a washout of oral factor Xa-inhibitors by using aPTT for 72 hours, then change to ant-Xa levels for UFH. Heparin (weight-based) Infusion: CAD/STEMI/NSTEMI/UA/AFib)   Heparin 60 units/kg IVP bolus (max 4,000 units) followed by Heparin infusion at 12 units/kg/hr (recommended max initial rate: 1000 units/hr). Recheck anti-Xa (unless aPTT being used) in 6 hours. Goal anti-Xa 0.3-0.7 IU/mL  Goal aPTT =  seconds. Pharmacy to manage Heparin - contact for questions. Heparin 60 units/kg IV x 1 (max 4,000 units), then 12 units/kg/hr (recommended max initial rate 1,000 units/hr). Adjust infusion rate based off anti-Xa results below. anti-Xa < 0.1    Heparin 60 units/kg bolus  Increase infusion by 4 units/kg/hr  anti-Xa 0.1-0.29 Heparin 30 units/kg bolus Increase infusion by 2 units/kg/hr  anti-Xa 0.3-0.7    No bolus No change   anti-Xa 0.71-0.8   No bolus Decrease infusion by 1 units/kg/hr  anti-Xa 0.81-0.99    No bolus Decrease infusion by 2 units/kg/hr  anti-Xa 1 or more     Hold heparin for 1 hour Decrease infusion by 3 units/kg/hr    Obtain anti-Xa hours after bolus and 6 hours after any dose change until two consecutive therapeutic anti-Xa are achieved- then daily.     Shira Garcia, PharmD 9/9/2022  9:44 PM

## 2022-09-10 NOTE — ED PROVIDER NOTES
surgical history that includes Pancreas surgery; Cholecystectomy; Tonsillectomy; [place] tooth extraction; Colonoscopy (10/26/2011); Cataract removal (Bilateral, 2013); Upper gastrointestinal endoscopy (10/04/2016); Hydrocele surgery (11/15/2016); Endoscopy, colon, diagnostic; Upper gastrointestinal endoscopy (03/16/2017); Upper gastrointestinal endoscopy (06/26/2017); Upper gastrointestinal endoscopy (09/06/2017); Upper gastrointestinal endoscopy (12/22/2017); Upper gastrointestinal endoscopy (N/A, 12/4/2018); Upper gastrointestinal endoscopy (2/19/2019); Upper gastrointestinal endoscopy (N/A, 6/11/2019); Upper gastrointestinal endoscopy (N/A, 8/13/2019); and Abdomen surgery. Home medications:   Prior to Admission medications    Medication Sig Start Date End Date Taking? Authorizing Provider   pramipexole (MIRAPEX) 0.5 MG tablet Take 1 tablet by mouth nightly 9/1/22 11/30/22  JENNIFER Cortez   insulin glargine (LANTUS SOLOSTAR) 100 UNIT/ML injection pen Inject 12 Units into the skin nightly 7/19/22 10/17/22  JENNIFER Cortez   insulin aspart (NOVOLOG FLEXPEN) 100 UNIT/ML injection pen Inject 8 Units into the skin in the morning and 8 Units at noon and 8 Units in the evening. Inject before meals. 7/19/22 10/17/22  JENNIFER Cortez   Insulin Pen Needle 31G X 8 MM MISC 1 each by Does not apply route 4 times daily 7/19/22   JENNIFER Cortez   lisinopril (PRINIVIL;ZESTRIL) 10 MG tablet Take 1 tablet by mouth in the morning. 7/19/22   JENNIFER Cortez   atorvastatin (LIPITOR) 20 MG tablet Take 1 tablet by mouth in the morning. 7/19/22   JENNIFER Cortez   glipiZIDE (GLUCOTROL XL) 5 MG extended release tablet Take 1 tablet by mouth in the morning.  7/19/22   JENNIFER Cortez   Continuous Blood Gluc  (FREESTYLE JUSTIN 14 DAY READER) OLI 1 Units by Does not apply route as needed (as needed) 7/5/22   JENNIFER Cortez   Continuous Blood Gluc Sensor (FREESTYLE JUSTIN 14 DAY SENSOR) MISC 1 Units by Does not apply route every 14 days 7/5/22   JENNIFER Ward   dicyclomine (BENTYL) 10 MG capsule Take 1 capsule by mouth every 6 hours as needed (cramps) 6/1/22   SANDY Estrada - CNP   primidone (MYSOLINE) 50 MG tablet Take 50 mg by mouth 2 times daily    Historical Provider, MD   aspirin 81 MG chewable tablet Take 1 tablet by mouth daily 3/26/19   Wolfgang Hogan MD   Insulin Syringe-Needle U-100 30G X 1/2\" 0.5 ML MISC Inject insulin 3 times daily 9/12/17   Historical Provider, MD       Social history:  reports that he has never smoked. He has never used smokeless tobacco. He reports that he does not drink alcohol and does not use drugs. Family history:    Family History   Problem Relation Age of Onset    Kidney Disease Mother     Cancer Father 76        pancreas    Cancer Brother         lung    Cancer Paternal Grandfather         colon         ROS  Review of Systems   Constitutional:  Negative for activity change, appetite change, fatigue and fever. HENT:  Negative for congestion and rhinorrhea. Eyes:  Negative for discharge and redness. Respiratory:  Positive for shortness of breath. Negative for cough. Cardiovascular:  Positive for chest pain and palpitations. Negative for leg swelling. Gastrointestinal:  Negative for abdominal pain, diarrhea and vomiting. Genitourinary:  Negative for difficulty urinating and dysuria. Musculoskeletal:  Negative for back pain and neck pain. Skin:  Negative for rash and wound. Neurological:  Negative for dizziness, weakness, light-headedness and numbness. Exam  Vitals:    09/09/22 1940 09/09/22 2010 09/09/22 2019 09/09/22 2034   BP: 102/66 104/62 117/69 (!) 131/90   Pulse: (!) 140 (!) 116 98 (!) 105   Resp: 13 14 12 13   Temp:       TempSrc:       SpO2: 94% 94% 94% 95%   Weight:       Height:             Physical Exam  Constitutional:       Appearance: Normal appearance. He is obese. He is not ill-appearing or diaphoretic. small P waves. No ST segment elevation, ST segment depression, hyperacute T waves. QTc is normal.  I do not believe at a rate of 135 this is SVT. Radiology  XR CHEST (2 VW)    Result Date: 9/9/2022  EXAMINATION: TWO XRAY VIEWS OF THE CHEST 9/9/2022 2:36 pm COMPARISON: 09/21/2021 HISTORY: ORDERING SYSTEM PROVIDED HISTORY: CP, SOB, tachy TECHNOLOGIST PROVIDED HISTORY: Reason for exam:->CP, SOB, tachy Reason for Exam: cp, sob FINDINGS: The lungs are without acute focal process. There is no effusion or pneumothorax. The cardiomediastinal silhouette is stable. The osseous structures are stable. No acute process.       Labs  Results for orders placed or performed during the hospital encounter of 09/09/22   COVID-19, Rapid    Specimen: Nasopharyngeal Swab   Result Value Ref Range    SARS-CoV-2, NAAT Not Detected Not Detected   Troponin   Result Value Ref Range    Troponin <0.01 <0.01 ng/mL   CBC with Auto Differential   Result Value Ref Range    WBC 7.1 4.0 - 11.0 K/uL    RBC 4.82 4.20 - 5.90 M/uL    Hemoglobin 14.1 13.5 - 17.5 g/dL    Hematocrit 42.7 40.5 - 52.5 %    MCV 88.6 80.0 - 100.0 fL    MCH 29.2 26.0 - 34.0 pg    MCHC 33.0 31.0 - 36.0 g/dL    RDW 14.2 12.4 - 15.4 %    Platelets 257 677 - 573 K/uL    MPV 9.9 5.0 - 10.5 fL    Neutrophils % 70.4 %    Lymphocytes % 18.9 %    Monocytes % 7.4 %    Eosinophils % 2.6 %    Basophils % 0.7 %    Neutrophils Absolute 5.0 1.7 - 7.7 K/uL    Lymphocytes Absolute 1.3 1.0 - 5.1 K/uL    Monocytes Absolute 0.5 0.0 - 1.3 K/uL    Eosinophils Absolute 0.2 0.0 - 0.6 K/uL    Basophils Absolute 0.1 0.0 - 0.2 K/uL   CMP w/ Reflex to MG   Result Value Ref Range    Sodium 141 136 - 145 mmol/L    Potassium reflex Magnesium 4.2 3.5 - 5.1 mmol/L    Chloride 102 99 - 110 mmol/L    CO2 24 21 - 32 mmol/L    Anion Gap 15 3 - 16    Glucose 212 (H) 70 - 99 mg/dL    BUN 28 (H) 7 - 20 mg/dL    Creatinine 1.3 0.8 - 1.3 mg/dL    GFR Non-African American 54 (A) >60    GFR  >60 >60    Calcium 10.6 8.3 - 10.6 mg/dL    Total Protein 7.0 6.4 - 8.2 g/dL    Albumin 4.6 3.4 - 5.0 g/dL    Albumin/Globulin Ratio 1.9 1.1 - 2.2    Total Bilirubin 0.4 0.0 - 1.0 mg/dL    Alkaline Phosphatase 91 40 - 129 U/L    ALT 17 10 - 40 U/L    AST 16 15 - 37 U/L   D-Dimer, Quantitative   Result Value Ref Range    D-Dimer, Quant 0.42 0.00 - 0.60 ug/mL FEU   Lactate, Sepsis   Result Value Ref Range    Lactic Acid, Sepsis 1.4 0.4 - 1.9 mmol/L   Blood gas, venous   Result Value Ref Range    pH, Jerzy 7.408 7.350 - 7.450    pCO2, Jerzy 43.5 40.0 - 50.0 mmHg    pO2, Jerzy 56.3 (H) 25.0 - 40.0 mmHg    HCO3, Venous 26.8 23.0 - 29.0 mmol/L    Base Excess, Jerzy 1.8 -3.0 - 3.0 mmol/L    O2 Sat, Jerzy 88 Not Established %    Carboxyhemoglobin 1.8 (H) 0.0 - 1.5 %    MetHgb, Jerzy 0.2 <1.5 %    TC02 (Calc), Jerzy 28 Not Established mmol/L    O2 Therapy Unknown    Sample possible blood bank testing   Result Value Ref Range    Specimen Status LAST    EKG 12 Lead   Result Value Ref Range    Ventricular Rate 135 BPM    Atrial Rate 135 BPM    QRS Duration 74 ms    Q-T Interval 292 ms    QTc Calculation (Bazett) 438 ms    R Axis 71 degrees    T Axis 65 degrees    Diagnosis       Supraventricular tachycardiaOtherwise normal ECGWhen compared with ECG of 21-SEP-2021 12:41,Vent. rate has increased BY  75 BPMNonspecific T wave abnormality, worse in Inferior leads       Procedures  Procedures        MDM  Patient seen and evaluated. Relevant records reviewed. - Patient is a 67 y.o. male with CAD, coming in the emergency department today with chest pain and tachycardic to 130s on cardiac monitoring. She EKG read SVT, however I question small P waves. We will repeat EKG. Provide IV fluids. Patient got aspirin in route. Is comfortable in the emergency department and in no acute pulmonary distress. Laboratory evaluation today reveals negative troponin, and repeat is also negative. Negative D-dimer. VBG largely unremarkable.   No lactic acidosis, no leukocytosis. CMP with hyperglycemia, largely unremarkable electrolytes. X-ray without acute process on the chest.    Repeat EKG after a few hours of observation, slowed heart rate with fluids, shows atrial fibrillation with RVR at a rate of 110. It is clearly irregular on this EKG. No P waves are discernible. We will initiate heparinization for an elevated [id number] 2 score, trial 1 dose Lopressor for rate control as he is 98 on cardiac monitoring. Additionally, this is concerning as patient has a history of bradycardia on multiple previous EKGs. As this patient requires further evaluation and management as above, this patient will be admitted to the hospital for subsequent care. I spoke with hospitalist who accepts patient for admission. They are available to place admission orders. Medications   0.9 % sodium chloride bolus (1,000 mLs IntraVENous New Bag 9/9/22 1928)   0.9 % sodium chloride bolus (has no administration in time range)   morphine sulfate (PF) injection 4 mg (has no administration in time range)       - I have a low concern for  other emergent process, and do not see indication for further work-up in the ER, as it is unlikely  and poses more risk than benefit. - I discussed the results, including any incidental findings, with patient. Questions answered. Patient/family agreeable to plan and express understanding of plan. Disposition:  Admit to telemetry in stable condition. Is this patient to be included in the SEP-1 Core Measure due to severe sepsis or septic shock? No   Exclusion criteria - the patient is NOT to be included for SEP-1 Core Measure due to: Infection is not suspected    Consult this encounter: hos[pitalist      Clinical Impression:  1. Tachycardia        Blood pressure (!) 131/90, pulse (!) 105, temperature 98.8 °F (37.1 °C), temperature source Oral, resp.  rate 13, height 5' 10\" (1.778 m), weight 260 lb (117.9 kg), SpO2 95 %.    I, Mayra Chadwick, , am the primary attending of record and contributed the majority of evaluation and treatment of emergent care for this encounter. Total critical care time is 35 minutes, which excludes separately billable procedures and updating family. Time spent is specifically for management of the presenting complaint and symptoms initially, direct bedside care, reevaluation, review of records, and consultation. There was a high probability of clinically significant life-threatening deterioration in the patient's condition, which required my urgent intervention. This chart was generated in part by using Dragon Dictation system and may contain errors related to that system including errors in grammar, punctuation, and spelling, as well as words and phrases that may be inappropriate. If there are any questions or concerns please feel free to contact the dictating provider for clarification.      MAYRA CHADWICK DO  [address] Hare DO  09/09/22 [id number]

## 2022-09-10 NOTE — PLAN OF CARE
Problem: Pain  Goal: Verbalizes/displays adequate comfort level or baseline comfort level  Outcome: Progressing  Note: Pt will be satisfied with pain control. Pt uses numeric pain rating scale with reassessments after pain med administration. Will continue to monitor progression throughout shift. Problem: Safety - Adult  Goal: Free from fall injury  Outcome: Progressing  Note: Pt high fall risk. Instructed to use call light before getting out of bed. Call light within reach. Bed in low position. Will continue to monitor. Problem: Chronic Conditions and Co-morbidities  Goal: Patient's chronic conditions and co-morbidity symptoms are monitored and maintained or improved  Outcome: Progressing  Note: Pt will have accuchecks before meals and at bedtime with sliding scale insulin in place for coverage. Will continue to monitor for signs and symptoms of hypoglycemia and hyperglycemia throughout shift.

## 2022-09-10 NOTE — PLAN OF CARE
Problem: Pain  Goal: Verbalizes/displays adequate comfort level or baseline comfort level  Outcome: Progressing  Pt will be satisfied with pain control. Pt uses numeric pain rating scale with reassessments after pain med administration. Will continue to monitor progression throughout shift. Problem: Safety - Adult  Goal: Free from fall injury  Outcome: Progressing  Pt will remain free from falls throughout hospital stay. Fall precautions in place, bed alarm on, bed in lowest position with wheels locked and side rails 2/4 up. Room door open and hourly rounding completed. Will continue to monitor throughout shift.

## 2022-09-11 LAB
GLUCOSE BLD-MCNC: 166 MG/DL (ref 70–99)
GLUCOSE BLD-MCNC: 171 MG/DL (ref 70–99)
GLUCOSE BLD-MCNC: 224 MG/DL (ref 70–99)
GLUCOSE BLD-MCNC: 300 MG/DL (ref 70–99)
PERFORMED ON: ABNORMAL

## 2022-09-11 PROCEDURE — 6370000000 HC RX 637 (ALT 250 FOR IP): Performed by: INTERNAL MEDICINE

## 2022-09-11 PROCEDURE — 1200000000 HC SEMI PRIVATE

## 2022-09-11 PROCEDURE — 2580000003 HC RX 258: Performed by: INTERNAL MEDICINE

## 2022-09-11 RX ADMIN — DILTIAZEM HYDROCHLORIDE 120 MG: 120 CAPSULE, COATED, EXTENDED RELEASE ORAL at 08:45

## 2022-09-11 RX ADMIN — SODIUM CHLORIDE, PRESERVATIVE FREE 10 ML: 5 INJECTION INTRAVENOUS at 20:44

## 2022-09-11 RX ADMIN — SODIUM CHLORIDE, PRESERVATIVE FREE 10 ML: 5 INJECTION INTRAVENOUS at 08:46

## 2022-09-11 RX ADMIN — PRAMIPEXOLE DIHYDROCHLORIDE 0.5 MG: 0.25 TABLET ORAL at 20:44

## 2022-09-11 RX ADMIN — ACETAMINOPHEN 650 MG: 325 TABLET ORAL at 05:51

## 2022-09-11 RX ADMIN — APIXABAN 5 MG: 5 TABLET, FILM COATED ORAL at 08:45

## 2022-09-11 RX ADMIN — ATORVASTATIN CALCIUM 20 MG: 10 TABLET, FILM COATED ORAL at 08:45

## 2022-09-11 RX ADMIN — APIXABAN 5 MG: 5 TABLET, FILM COATED ORAL at 20:44

## 2022-09-11 RX ADMIN — ASPIRIN 81 MG 81 MG: 81 TABLET ORAL at 08:45

## 2022-09-11 RX ADMIN — INSULIN LISPRO 3 UNITS: 100 INJECTION, SOLUTION INTRAVENOUS; SUBCUTANEOUS at 17:36

## 2022-09-11 RX ADMIN — GLIPIZIDE 5 MG: 5 TABLET ORAL at 05:51

## 2022-09-11 RX ADMIN — INSULIN GLARGINE 12 UNITS: 100 INJECTION, SOLUTION SUBCUTANEOUS at 20:44

## 2022-09-11 ASSESSMENT — PAIN SCALES - GENERAL
PAINLEVEL_OUTOF10: 0
PAINLEVEL_OUTOF10: 6
PAINLEVEL_OUTOF10: 0

## 2022-09-11 ASSESSMENT — PAIN DESCRIPTION - DESCRIPTORS: DESCRIPTORS: JABBING

## 2022-09-11 ASSESSMENT — PAIN DESCRIPTION - LOCATION: LOCATION: HEAD

## 2022-09-11 NOTE — PLAN OF CARE
Problem: Pain  Goal: Verbalizes/displays adequate comfort level or baseline comfort level  9/11/2022 0523 by Alex Taylor RN  Outcome: Progressing  Note: Pt reporting  2 /10 pain during pain assessment. Treating pain prn. Pt reports current pain plan is working well. Instructed pt to report any new onsets of pain. Will continue to monitor.

## 2022-09-11 NOTE — PROGRESS NOTES
Hospitalist Progress Note      PCP: JENNIFER Farooq    Date of Admission: 9/9/2022    Chief Complaint: Chest pain, shortness of breath    Hospital Course: reviewed     Subjective:  no complaints, no overnight events      Medications:  Reviewed    Infusion Medications    dextrose      sodium chloride       Scheduled Medications    dilTIAZem  120 mg Oral Daily    sodium chloride  500 mL IntraVENous Once    aspirin  81 mg Oral Daily    atorvastatin  20 mg Oral Daily    glipiZIDE  5 mg Oral QAM AC    insulin glargine  0.1 Units/kg SubCUTAneous Nightly    pramipexole  0.5 mg Oral Nightly    insulin lispro  0-4 Units SubCUTAneous TID WC    insulin lispro  0-4 Units SubCUTAneous Nightly    sodium chloride flush  5-40 mL IntraVENous 2 times per day    apixaban  5 mg Oral BID     PRN Meds: regadenoson, nitroGLYCERIN, morphine, dicyclomine, glucose, dextrose bolus **OR** dextrose bolus, glucagon (rDNA), dextrose, sodium chloride flush, sodium chloride, ondansetron **OR** ondansetron, polyethylene glycol, acetaminophen **OR** acetaminophen, perflutren lipid microspheres      Intake/Output Summary (Last 24 hours) at 9/11/2022 0904  Last data filed at 9/11/2022 0848  Gross per 24 hour   Intake 720 ml   Output 700 ml   Net 20 ml       Physical Exam Performed:    /70   Pulse 56   Temp 97.5 °F (36.4 °C) (Oral)   Resp 18   Ht 5' 10\" (1.778 m)   Wt 271 lb 12.8 oz (123.3 kg)   SpO2 93%   BMI 39.00 kg/m²     General appearance:  No apparent distress, appears stated age and cooperative. HEENT:  Normal cephalic, atraumatic without obvious deformity. Pupils equal, round, and reactive to light. Extra ocular muscles intact. Conjunctivae/corneas clear. Neck: Supple, with full range of motion. No jugular venous distention. Trachea midline. Respiratory:  Normal respiratory effort. Clear to auscultation, bilaterally without Rales/Wheezes/Rhonchi.   Cardiovascular: no longer Tachycardic, irregularly irregular  rhythm with normal S1/S2 without murmurs, rubs or gallops. Abdomen: Soft, non-tender, non-distended with normal bowel sounds. Musculoskeletal:  No clubbing, cyanosis or edema bilaterally. Full range of motion without deformity. Skin: Skin color, texture, turgor normal.  No rashes or lesions. Neurologic:  Neurovascularly intact without any focal sensory/motor deficits. Cranial nerves: II-XII intact, grossly non-focal.  Psychiatric:  Alert and oriented, thought content appropriate, normal insight  Capillary Refill: Brisk,3 seconds, normal  Peripheral Pulses: +2 palpable, equal bilaterally       Labs:   Recent Labs     09/09/22  1639   WBC 7.1   HGB 14.1   HCT 42.7        Recent Labs     09/09/22  1639      K 4.2      CO2 24   BUN 28*   CREATININE 1.3   CALCIUM 10.6     Recent Labs     09/09/22  1639   AST 16   ALT 17   BILITOT 0.4   ALKPHOS 91     Recent Labs     09/10/22  0658   INR 1.15*     Recent Labs     09/10/22  0215 09/10/22  0658 09/10/22  1226   TROPONINI <0.01 <0.01 <0.01       Urinalysis:      Lab Results   Component Value Date/Time    NITRU Negative 05/31/2022 05:00 PM    WBCUA >100 04/02/2015 03:10 AM    BACTERIA 2+ 04/02/2015 03:10 AM    RBCUA >100 04/02/2015 03:10 AM    BLOODU Negative 05/31/2022 05:00 PM    SPECGRAV 1.025 05/31/2022 05:00 PM    GLUCOSEU 500 05/31/2022 05:00 PM       Radiology:  XR CHEST (2 VW)   Final Result   No acute process. NM Cardiac Stress Test Nuclear Imaging    (Results Pending)           Assessment/Plan:    Active Hospital Problems    Diagnosis     New onset a-fib [facility]) [I48.91]      Priority: Medium    Type 2 diabetes mellitus with microalbuminuria ([facility] [address] [E11.29, R80.9]     Atrial tachycardia ([facility] [address] [I47.1]     Morbid obesity ([facility] [address] [E66.01]     Severe obstructive sleep apnea [G47.33]     Essential hypertension [I10]     Hyperlipidemia [E78.5]      New onset a-fib with RVR  Symptomatic. EKG confirming A. Fib.   Patient has history of atrial tachycardia/bradycardia. Has been off of beta-blockers due to bradycardia in the past.  Likely due to longstanding HTN and untreated ESTHER  -Rate slowed down after IV metoprolol push in ER, now currently back in sinus rhythm  -Due to bradycardia history we will hold off on rate controlling meds at this time  -consulted cardiology  -Checked TSH with reflex  -2D echocardiogram  -Ischemia [id number] 9/22/2021 showed moderate CAD  -May need Holter monitor outpatient to monitor A. fib burden  -[id number] Score for Atrial Fibrillation Stroke Risk = 3. Discussed R/B of therapeutic anticoagulation to reduce stroke risk from A. Fib. Patient agreeable to proceed with anticoagulation, started p.o. Eliquis twice daily  -diltiazem added    Hx of atrial tachycardia/bradycardia -held off on AV maximus blockers. Severe ESTHER-noncompliant with CPAP. Reinforced adherence to PAP therapy at night. RLS-resume Mirapex     DM2-controlled, resume home insulin regimen, resume glipizide, started SSI, Accu-Cheks, hypoglycemia protocol     HTN-controlled, resume lisinopril     HLD-statin resumed     Obesity BMI 39  Complicating assessment and treatment, placing patient at high risk for multiple co-morbidities as well as early death and contributing to the patient's presentation. Counseled on weight loss. DVT Prophylaxis: eliquis  Diet: ADULT DIET;  Regular  Diet NPO  Code Status: Full Code  PT/OT Eval Status: ambulattory    Dispo - pending cards workup, echo, stress testing    Appropriate for A1 Discharge Unit: Janet Hernandez MD

## 2022-09-12 ENCOUNTER — APPOINTMENT (OUTPATIENT)
Dept: NUCLEAR MEDICINE | Age: 72
DRG: 310 | End: 2022-09-12
Payer: MEDICARE

## 2022-09-12 ENCOUNTER — TELEPHONE (OUTPATIENT)
Dept: CARDIOLOGY CLINIC | Age: 72
End: 2022-09-12

## 2022-09-12 VITALS
BODY MASS INDEX: 38.91 KG/M2 | RESPIRATION RATE: 18 BRPM | SYSTOLIC BLOOD PRESSURE: 142 MMHG | WEIGHT: 271.8 LBS | OXYGEN SATURATION: 99 % | DIASTOLIC BLOOD PRESSURE: 81 MMHG | HEIGHT: 70 IN | HEART RATE: 54 BPM | TEMPERATURE: 97.7 F

## 2022-09-12 LAB
GLUCOSE BLD-MCNC: 184 MG/DL (ref 70–99)
GLUCOSE BLD-MCNC: 189 MG/DL (ref 70–99)
LV EF: 48 %
LV EF: 50 %
LVEF MODALITY: NORMAL
LVEF MODALITY: NORMAL
PERFORMED ON: ABNORMAL
PERFORMED ON: ABNORMAL

## 2022-09-12 PROCEDURE — 6360000002 HC RX W HCPCS: Performed by: INTERNAL MEDICINE

## 2022-09-12 PROCEDURE — 6360000004 HC RX CONTRAST MEDICATION: Performed by: INTERNAL MEDICINE

## 2022-09-12 PROCEDURE — A9502 TC99M TETROFOSMIN: HCPCS | Performed by: INTERNAL MEDICINE

## 2022-09-12 PROCEDURE — 99232 SBSQ HOSP IP/OBS MODERATE 35: CPT | Performed by: INTERNAL MEDICINE

## 2022-09-12 PROCEDURE — 99222 1ST HOSP IP/OBS MODERATE 55: CPT | Performed by: INTERNAL MEDICINE

## 2022-09-12 PROCEDURE — 93246 EXT ECG>7D<15D RECORDING: CPT | Performed by: INTERNAL MEDICINE

## 2022-09-12 PROCEDURE — 6370000000 HC RX 637 (ALT 250 FOR IP): Performed by: INTERNAL MEDICINE

## 2022-09-12 PROCEDURE — 2580000003 HC RX 258: Performed by: INTERNAL MEDICINE

## 2022-09-12 PROCEDURE — 3430000000 HC RX DIAGNOSTIC RADIOPHARMACEUTICAL: Performed by: INTERNAL MEDICINE

## 2022-09-12 PROCEDURE — C8929 TTE W OR WO FOL WCON,DOPPLER: HCPCS

## 2022-09-12 PROCEDURE — 78452 HT MUSCLE IMAGE SPECT MULT: CPT

## 2022-09-12 PROCEDURE — 93017 CV STRESS TEST TRACING ONLY: CPT

## 2022-09-12 RX ORDER — DILTIAZEM HYDROCHLORIDE 120 MG/1
120 CAPSULE, COATED, EXTENDED RELEASE ORAL NIGHTLY
Qty: 30 CAPSULE | Refills: 1 | Status: SHIPPED | OUTPATIENT
Start: 2022-09-12 | End: 2022-10-04 | Stop reason: SDUPTHER

## 2022-09-12 RX ADMIN — SODIUM CHLORIDE, PRESERVATIVE FREE 10 ML: 5 INJECTION INTRAVENOUS at 12:00

## 2022-09-12 RX ADMIN — ASPIRIN 81 MG 81 MG: 81 TABLET ORAL at 12:00

## 2022-09-12 RX ADMIN — PERFLUTREN 1.65 MG: 6.52 INJECTION, SUSPENSION INTRAVENOUS at 12:01

## 2022-09-12 RX ADMIN — TETROFOSMIN 10.9 MILLICURIE: 1.38 INJECTION, POWDER, LYOPHILIZED, FOR SOLUTION INTRAVENOUS at 08:08

## 2022-09-12 RX ADMIN — TETROFOSMIN 31.9 MILLICURIE: 1.38 INJECTION, POWDER, LYOPHILIZED, FOR SOLUTION INTRAVENOUS at 09:12

## 2022-09-12 RX ADMIN — ATORVASTATIN CALCIUM 20 MG: 10 TABLET, FILM COATED ORAL at 12:00

## 2022-09-12 RX ADMIN — APIXABAN 5 MG: 5 TABLET, FILM COATED ORAL at 12:13

## 2022-09-12 RX ADMIN — REGADENOSON 0.4 MG: 0.08 INJECTION, SOLUTION INTRAVENOUS at 09:12

## 2022-09-12 ASSESSMENT — PAIN SCALES - GENERAL
PAINLEVEL_OUTOF10: 0
PAINLEVEL_OUTOF10: 0

## 2022-09-12 NOTE — PROGRESS NOTES
[facility]   Daily Cardiovascular Progress Note    Admit Date: 9/9/2022    Chief complaint: Chest pain  HPI:     Pt denies CP, SOB, dizziness or syncope.         Medications/Labs all Reviewed:  Patient Active Problem List   Diagnosis    Pulmonary nodules    Lung granuloma (HCC)    Essential hypertension    Hyperlipidemia    Severe obstructive sleep apnea    Morbid obesity ([facility] [address]    Coronary artery disease involving native coronary artery of native heart with angina pectoris (HCC)    Peripheral polyneuropathy    Atrial tachycardia (HCC)    Type 2 diabetes mellitus with microalbuminuria ([facility])    Grade II diastolic dysfunction    New onset a-fib (HCC)       Medications:  dilTIAZem (CARDIZEM CD) extended release capsule 120 mg, Daily  nitroGLYCERIN (NITROSTAT) SL tablet 0.4 mg, Q5 Min PRN  morphine (PF) injection 2 mg, Q4H PRN  0.9 % sodium chloride bolus, Once  aspirin chewable tablet 81 mg, Daily  atorvastatin (LIPITOR) tablet 20 mg, Daily  dicyclomine (BENTYL) capsule 10 mg, Q6H PRN  glipiZIDE (GLUCOTROL) tablet 5 mg, QAM AC  insulin glargine (LANTUS) injection vial 12 Units, Nightly  pramipexole (MIRAPEX) tablet 0.5 mg, Nightly  insulin lispro (HUMALOG) injection vial 0-4 Units, TID WC  insulin lispro (HUMALOG) injection vial 0-4 Units, Nightly  glucose chewable tablet 16 g, PRN  dextrose bolus 10% 125 mL, PRN   Or  dextrose bolus 10% 250 mL, PRN  glucagon (rDNA) injection 1 mg, PRN  dextrose 10 % infusion, Continuous PRN  sodium chloride flush 0.9 % injection 5-40 mL, 2 times per day  sodium chloride flush 0.9 % injection 5-40 mL, PRN  0.9 % sodium chloride infusion, PRN  ondansetron (ZOFRAN-ODT) disintegrating tablet 4 mg, Q8H PRN   Or  ondansetron (ZOFRAN) injection 4 mg, Q6H PRN  polyethylene glycol (GLYCOLAX) packet 17 g, Daily PRN  acetaminophen (TYLENOL) tablet 650 mg, Q6H PRN   Or  acetaminophen (TYLENOL) suppository 650 mg, Q6H PRN  perflutren lipid microspheres (DEFINITY) injection 1.65 mg, ONCE PRN  apixaban (ELIQUIS) tablet 5 mg, BID           PHYSICAL EXAM   /76   Pulse 55   Temp 97.2 °F (36.2 °C)   Resp 18   Ht 5' 10\" (1.778 m)   Wt 271 lb 12.8 oz (123.3 kg)   SpO2 95%   BMI 39.00 kg/m²    Vitals:    09/11/22 1959 09/12/22 0013 09/12/22 0609 09/12/22 0737   BP: 133/76 132/82 126/79 133/76   Pulse: 58 54 (!) 49 55   Resp: 16 20 16 18   Temp: 98.1 °F (36.7 °C) 98.8 °F (37.1 °C) 97.5 °F (36.4 °C) 97.2 °F (36.2 °C)   TempSrc: Oral Oral Oral    SpO2: 94% 95% 96% 95%   Weight:       Height:             Intake/Output Summary (Last 24 hours) at 9/12/2022 0919  Last data filed at 9/12/2022 9433  Gross per 24 hour   Intake 1320 ml   Output 2450 ml   Net -1130 ml     Wt Readings from Last 3 Encounters:   09/09/22 271 lb 12.8 oz (123.3 kg)   09/01/22 261 lb (118.4 kg)   07/19/22 262 lb 12.8 oz (119.2 kg)         Gen: Patient in NAD, resting comfortably  Neck: No JVD or bruits  Respiratory: CTAB no WRR  Chest: normal without deformity  Cardiovascular:RRR, S1S2, 2/6 sm   Abdomen: Soft, NTND, Normal BS  Extremities: No clubbing, cyanosis, or edema  Neurological/Psychiatric: AxO x4, No gross motors/sensory deficits  Skin:  Warm and dry      Labs:  CBC:   Recent Labs     09/09/22  1639   WBC 7.1   HGB 14.1   HCT 42.7   MCV 88.6        BMP:   Recent Labs     09/09/22  1639      K 4.2      CO2 24   BUN 28*   CREATININE 1.3     MG:  No results for input(s): MG in the last 72 hours. PT/INR:   Recent Labs     09/10/22  0658   PROTIME 14.6*   INR 1.15*     APTT:   Recent Labs     09/09/22  1932   APTT 27.7     Cardiac Enzymes:   Recent Labs     09/10/22  0215 09/10/22  0658 09/10/22  1226   TROPONINI <0.01 <0.01 <0.01       Cardiac Studies:    Pending     I have reviewed labs and imaging/xray/diagnostic testing in this note.     Assessment and Plan       Patient Active Problem List   Diagnosis    Pulmonary nodules    Lung granuloma ([facility] [address]    Essential hypertension    Hyperlipidemia Severe obstructive sleep apnea    Morbid obesity ([facility] [address]    Coronary artery disease involving native coronary artery of native heart with angina pectoris ([facility] [address]    Peripheral polyneuropathy    Atrial tachycardia (HCC)    Type 2 diabetes mellitus with microalbuminuria (HCC)    Grade II diastolic dysfunction    New onset a-fib (HCC)     Chest pain, likely related to atrial fibrillation, evaluate further with echocardiogram and stress test.  Continue aspirin    Atrial fibrillation, continue Eliquis, follow-up EKG shows normal sinus rhythm. Echocardiogram as above, continue diltiazem. We will consult with EP service. Hypertension, no lisinopril as diltiazem is being given     Hypercholesterolemia, continue statin    CAD/ASHD, aspirin as above. Diabetes, as per primary service           Thank you for allowing me to participate in the care of your patient. Please call me with any questions [phone].       Austin Chase MD, [facility]   Interventional Cardiologist  [facility]  [phone] [facility]  [phone] [address] [place] [address]  9/12/2022 9:19 AM

## 2022-09-12 NOTE — PLAN OF CARE
Problem: Safety - Adult  Goal: Free from fall injury  Outcome: Progressing  Note: Pt at risk for accidental injury. Pt will remain free from injury during admission. Pt educated on the use of the call light and the need to have an active bed alarm. Pt will call out prior to ambulation. Pt's pathway will remain free from tripping hazards. Pt's bed is in lowest position, call light in reach, and active and audible bed alarm. Pt has no further needs at this time. Will continue to monitor. None

## 2022-09-12 NOTE — PROGRESS NOTES
A Parvin Myoview stress test was completed on this patient as ordered. The patient tolerated the procedure well. Awaiting stress imaging at this time.

## 2022-09-12 NOTE — TELEPHONE ENCOUNTER
Monitor placed by [address] [facility]  Length of monitor 2 weeks  Monitor ordered by Dr Citlaly Mcnulty  Serial number [id number]  Kit ID [id number]  Activation successful prior to pt leaving office?  Yes        RN to place at discharge

## 2022-09-12 NOTE — CONSULTS
Electrophysiology Consultation   Date: 9/12/2022  Admit Date:  9/9/2022  Reason for Consultation: New onset symptomatic atrial fibrillation  Consult Requesting Physician: Kaushik Carrera MD     Chief Complaint   Patient presents with    Chest Pain     Chest discomfort for a couple of days. Shortness of Breath     For a couple of days been outside today working and got overwhelmed     HPI:   Mr. Efra Vo is a pleasant 67year old male with a medical history significant for hypertension, diabetes mellitus type II, obstructive sleep apnea, and hyperlipidemia, who presented from home with chest pain that started on Friday. Patient states that he first began to suffer from chest pain approximately 1 year ago. His work up then was not revealing. He states that on Friday he began to suffer from left sided substernal chest pressure with radiation to his left jaw. Pressure was constant. He denies nausea, vomiting, and diaphoresis wit his chest discomfort. He states that the pain was severe enough that he asked his wife to bring him in to [address] [place] [address] for further evaluation. Patient denies history of tobacco use. He denies EtOH and illicit drug use. He is  with no biological children. He has some memory issues and however is high functioning at home. Patient denies fevers, orthopnea, PND, lower extremity edema, abdominal swelling, shortness of breath, dyspnea on exertion, chills, visual changes, headaches, sore throat, cough, abdominal pain, nausea, vomiting, bleeding, bruising, dysuria, muscle/joint pain, confusion, depression, anxiety, skin lesions, etc.    Emergency Room/Hospital Course:  Patient was evaluated in the ER on 09/09/2022. His CMP was reassuring outside of hyperglycemia. His troponin enzymes were negative x 3. His CBC was reassuring. His chest radiograph was reassuring. He was seen by Dr. Everardo Castro and underwent echocardiogram that was reassuring.   His stress test was reassuring outside of mildly depressed LVEF. He spontaneously converted back to NSR and was bradycardic overnight. Electrophysiology was consulted. Current rhythm: Normal sinus rhythm.   Known history of atrial fibrillation: no  Valvular disease: No  Associated symptoms: chest pain  Heart rate is  controlled  Previous cardioversion and/or ablation: no  History of CAD: No  History of sleep apnea: Yes  History of ETOH abuse/drug abuse: No  History of thyroid disease: No  Elevated BNP: No  Left atrial size is normal    Past Medical History:   Diagnosis Date    Acid reflux     Yan's esophagus     Coronary artery disease of native heart with stable angina pectoris ([facility] 5/30/2019    Diabetes mellitus ([facility]     Diabetic autonomic neuropathy associated with type 2 diabetes mellitus ([facility] 3/3/2020    Hyperlipidemia     Hypertension     Influenza A 02/05/2020    Pancreatitis 1996    Restless legs     Sleep apnea     uses CPAP    Tremor     of bilateral hands        Past Surgical History:   Procedure Laterality Date    ABDOMEN SURGERY      CATARACT REMOVAL Bilateral 2013    CHOLECYSTECTOMY      COLONOSCOPY  10/26/2011    ENDOSCOPY, COLON, DIAGNOSTIC      EGD halo    HYDROCELE EXCISION  11/15/2016    PANCREAS SURGERY      cyst opened up and drining into small bowel    TONSILLECTOMY      UPPER GASTROINTESTINAL ENDOSCOPY  10/04/2016    with biopsy    UPPER GASTROINTESTINAL ENDOSCOPY  03/16/2017    Halo ablation    UPPER GASTROINTESTINAL ENDOSCOPY  06/26/2017    Halo ablation    UPPER GASTROINTESTINAL ENDOSCOPY  09/06/2017    bx distal sophagus    UPPER GASTROINTESTINAL ENDOSCOPY  12/22/2017    with HALO  procedure    UPPER GASTROINTESTINAL ENDOSCOPY N/A 12/4/2018    EGD WITH ABLATION AND ANESTHESIA - HALO---SLEEP APNEA---  performed by William Haddad MD at [facility]  2/19/2019    EGD BIOPSY performed by William Haddad MD at [facility] 6/11/2019    EGD WITH HALO AND ANESTHESIA performed by Holland Holliday MD at [address] N/A 8/13/2019    ESOPHAGOGASTRODUODENOSCOPY WITH HALO AND ANESTHESIA -SLEEP APNEA- performed by Holland Holliday MD at [facility] EXTRACTION         Allergies   Allergen Reactions    Clonidine     Codeine Nausea And Vomiting       Social History:  Reviewed. reports that he has never smoked. He has never used smokeless tobacco. He reports that he does not drink alcohol and does not use drugs. Family History:  Reviewed. family history includes Cancer in his brother and paternal grandfather; Cancer (age of onset: 76) in his father; Kidney Disease in his mother. No premature CAD. Review of System:  All other systems reviewed except for that noted above. Pertinent negatives and positives are:     General: negative for fever, chills   Ophthalmic ROS: negative for - eye pain or loss of vision  ENT ROS: negative for - headaches, sore throat   Respiratory: negative for - cough, sputum  Cardiovascular: Reviewed in HPI  Gastrointestinal: negative for - abdominal pain, diarrhea, N/V  Hematology: negative for - bleeding, blood clots, bruising or jaundice  Genito-Urinary:  negative for - Dysuria or incontinence  Musculoskeletal: negative for - Joint swelling, muscle pain  Neurological: negative for - confusion, dizziness, headaches   Psychiatric: No anxiety, no depression.   Dermatological: negative for - rash    Physical Examination:  Vitals:    09/12/22 1107   BP: (!) 142/81   Pulse: 54   Resp: 18   Temp: 97.7 °F (36.5 °C)   SpO2: 99%        Intake/Output Summary (Last 24 hours) at 9/12/2022 1640  Last data filed at 9/12/2022 0746  Gross per 24 hour   Intake 960 ml   Output 1800 ml   Net -840 ml     In: 1560 [P.O.:1560]  Out: 2450    Wt Readings from Last 3 Encounters:   09/09/22 271 lb 12.8 oz (123.3 kg)   09/01/22 261 lb (118.4 kg)   07/19/22 262 lb 12.8 oz (119.2 kg)     Temp  Av.9 °F (36.6 °C)  Min: 97.2 °F (36.2 °C)  Max: 98.8 °F (37.1 °C)  Pulse  Av  Min: 49  Max: 58  BP  Min: 126/79  Max: 142/81  SpO2  Av.8 %  Min: 94 %  Max: 99 %    Telemetry: Sinus rhythm and sinus bradycardia   Constitutional: Alert. Oriented to person, place, and time. No distress. Head: Normocephalic and atraumatic. Mouth/Throat: Lips appear moist. Oropharynx is clear and moist.  Eyes: Conjunctivae normal. EOM are normal.   Neck: Neck supple. No lymphadenopathy. No rigidity. No JVD present. Cardiovascular: Normal rate, regular rhythm. Normal S1&S2. Pulmonary/Chest: Bilateral respiratory sounds present. No respiratory accessory muscle use. Abdominal: Soft. Normal bowel sounds present. No distension, No tenderness. Musculoskeletal: No tenderness. No edema    Neurological: Alert and oriented. Cranial nerve II-XII grossly intact. Skin: Skin is warm and dry. No rash, lesions, ulcerations noted. Psychiatric: No anxiety nor agitation. Labs:  Reviewed. No results for input(s): NA, K, CL, CO2, PHOS, BUN, CREATININE, CA in the last 72 hours. No results for input(s): WBC, HGB, HCT, MCV, PLT in the last 72 hours. Lab Results   Component Value Date/Time    TROPONINI <0.01 09/10/2022 12:26 PM     No results found for: BNP  Lab Results   Component Value Date/Time    PROTIME 14.6 09/10/2022 06:58 AM    PROTIME 11.3 2021 07:59 AM    PROTIME 12.0 2017 07:08 PM    INR 1.15 09/10/2022 06:58 AM    INR 1.00 2021 07:59 AM    INR 1.06 2017 07:08 PM     Lab Results   Component Value Date/Time    CHOL 208 2021 07:59 AM    HDL 47 2021 07:59 AM    TRIG 169 2021 07:59 AM       Diagnostic and imaging results reviewed. ECG: Atrial fibrillation with RVR and atrial flutter with 2:1 conduction (likely). Echo: 2022   Summary   Technically difficult examination.    Normal left ventricle size, wall thickness and low normal systolic function with an estimated ejection fraction of 50%. Definity contrast administered with no evidence of left ventricular mass or   thrombus noted. Normal left ventricular diastolic filling pressure. The aortic root is mildly dilated at 3.8 cm. The ascending aorta is mildly dilated at 4.1 cm. The right atrium is mildly dilated. Stress Test: 09/12/2022   Summary    Mildly depressed to low normal LVEF at 48%    No regional wall motion abnormalities    No ischemia    Findings are suggestive of non-ischemic CM     I independently reviewed the ECG and telemetry.     Scheduled Meds:   dilTIAZem  120 mg Oral Daily    sodium chloride  500 mL IntraVENous Once    aspirin  81 mg Oral Daily    atorvastatin  20 mg Oral Daily    glipiZIDE  5 mg Oral QAM AC    insulin glargine  0.1 Units/kg SubCUTAneous Nightly    pramipexole  0.5 mg Oral Nightly    insulin lispro  0-4 Units SubCUTAneous TID WC    insulin lispro  0-4 Units SubCUTAneous Nightly    sodium chloride flush  5-40 mL IntraVENous 2 times per day    apixaban  5 mg Oral BID     Continuous Infusions:   dextrose      sodium chloride       PRN Meds:.nitroGLYCERIN, morphine, dicyclomine, glucose, dextrose bolus **OR** dextrose bolus, glucagon (rDNA), dextrose, sodium chloride flush, sodium chloride, ondansetron **OR** ondansetron, polyethylene glycol, acetaminophen **OR** acetaminophen     Assessment:   Patient Active Problem List    Diagnosis Date Noted    New onset a-fib ([facility] 09/09/2022    Grade II diastolic dysfunction 40/14/7907    Type 2 diabetes mellitus with microalbuminuria ([facility] 08/16/2021    Atrial tachycardia ([facility] 10/27/2020    Peripheral polyneuropathy 11/13/2019    Coronary artery disease involving native coronary artery of native heart with angina pectoris ([facility] 05/30/2019    Morbid obesity ([address] 03/21/2019    Severe obstructive sleep apnea 05/23/2018    Lung granuloma ([facility] 08/21/2017    Essential hypertension 08/21/2017    Hyperlipidemia 08/21/2017 Pulmonary nodules 04/09/2015      Active Hospital Problems    Diagnosis Date Noted    New onset a-fib [facility]) [I48.91] 09/09/2022     Priority: Medium    Type 2 diabetes mellitus with microalbuminuria ([facility] [E11.29, R80.9] 08/16/2021    Atrial tachycardia ([facility] [I47.1] 10/27/2020    Morbid obesity ([facility] [E66.01] 03/21/2019    Severe obstructive sleep apnea [G47.33] 05/23/2018    Essential hypertension [I10] 08/21/2017    Hyperlipidemia [E78.5] 08/21/2017     Mr. Endy Joy is a pleasant 67year old male with a medical history significant for hypertension, diabetes mellitus type II, obstructive sleep apnea, and hyperlipidemia, who presented from home with chest pain that started on Friday. Problem List:  1. New onset paroxysmal atrial fibrillation (ZANTL8QMIm score 4). 2. Tachy-carlos syndrome. Assessment and Plan:  1. New onset paroxysmal atrial fibrillation (XMPDV9GLTw score 4). Patient is a pleasant 67year old male with a medical history significant for hypertension, diabetes mellitus type II, and severe obstructive sleep apnea who presents from home with new onset symptomatic paroxysmal atrial fibrillation. I called patient's wife and brought by the following information. Afib risk factors including age, HTN, obesity, inactivity and ESTHER were discussed with patient. Risk factor modification recommended. Patient's GBXNM9AZFp score is 4 with a stroke risk of 4.8%. We discussed oral anticoagulation to decrease the risk of thromboembolic events including stroke. Benefits and alternatives were discussed with patient. Risk of bleeding was discussed. Patient verbalized understanding. Different forms of anticoagulants including warfarin (Coumadin), Dabigatran (Pradaxa), Rivaroxaban (Xarelto), and Apixaban (Eliquis) were discussed. Patient opted to continue with apixaban.       Rate control strategy options including cardioversion, atrial fibrillation ablation, pacemaker with AVN ablation, anti-arrhythmic strategy, and rate control strategy were discussed with patient. Risks, benefits and alternative of each treatment options were explained. All questions were answered. Patient has opted for rate control. The main concern here is some bradycardia since he converted back to NSR. Reccommended starting on low dose diltiazem at night time. - Diltiazem 120 mg QPM/QHS. - Apixaban 5 mg BID. - Two week cardiac monitor.  - Follow up with me as outpatient to review atrial fibrillation and options again. 2. Tachy-carlos syndrome.  - Plan as per above. Thank you for allowing me to participate in the care of Stevie Coello . If you have any questions/comments, please do not hesitate to contact us.     Kirti Hernandez MD  Cardiac Electrophysiology  [address] [facility]  [phone] [facility]

## 2022-09-12 NOTE — PLAN OF CARE
Problem: Pain  Goal: Verbalizes/displays adequate comfort level or baseline comfort level  Outcome: Progressing  Note: Pt will be satisfied with pain control. Pt uses numeric pain rating scale with reassessments after pain med administration. Patient denies pain at this time. Will continue to monitor progression throughout shift. Problem: Safety - Adult  Goal: Free from fall injury  9/12/2022 1207 by Lore Ruiz RN  Outcome: Progressing  Note: Pt will remain free from falls throughout hospital stay. Bed in lowest position with wheels locked and side rails 2/4 up. Hourly rounding completed. Patient ambulates safely independently, call light is within reach. Will continue to monitor throughout shift. Problem: Chronic Conditions and Co-morbidities  Goal: Patient's chronic conditions and co-morbidity symptoms are monitored and maintained or improved  Outcome: Progressing  Note: Pt will have accuchecks before meals and at bedtime with sliding scale insulin in place for coverage. Will continue to monitor for signs and symptoms of hypoglycemia and hyperglycemia throughout shift.

## 2022-09-12 NOTE — CARE COORDINATION
Case Management Assessment  Initial Evaluation    Date/Time of Evaluation: 9/12/2022 2:35 PM  Assessment Completed by: FLORI Hilton    If patient is discharged prior to next notation, then this note serves as note for discharge by case management. Patient Name: Adolfo Peñaloza                   YOB: 1950  Diagnosis: Tachycardia [R00.0]  Exertional dyspnea [R06.00]  New onset atrial fibrillation ([facility] [I48.91]  New onset a-fib [facility]) [I48.91]  Chest pain, unspecified type [R07.9]                   Date / Time: 9/9/2022  4:29 PM    Patient Admission Status: Inpatient     Current PCP: JENNIFER Bledsoe  PCP verified by CM? Yes     Chart Reviewed: Yes      Patient Interviewed: Yes   Family Interviewed:  Yes - Ward Molina spouse and grandtr    Patient Orientation:Alert and Ort x4      Patient Cognition:  intact   History Provided by:  Patient and family     Hospitalization in the last 30 days (Readmission):  No    If yes, Readmission Assessment in CM Navigator will be completed. Advance Directives:     Code Status: Full Code     Primary Decision Maker: Bartolo Henson - [phone]    Discharge Planning  Patient lives with: Spouse/Significant Other Type of Home: House Patient Support Systems include: Spouse/Significant Other, Children       ADLS  Prior functional level: Home IPTA   Current functional level: Indepedant     PT AM-PAC:   /24  OT AM-PAC:   /24    Family can provide assistance at DC:   Yes     Plans to Return to Present Housing:  Home with spouse   Other Identified Issues/Barriers to RETURNING to current housing: pending cards and stress test results   Potential Assistance needed at discharge: N/A  Patient expects to discharge to: [address] [place] [address] for transportation at discharge: [facility] bedside      Financial  Payor: MEDICARE / Plan: MEDICARE PART A AND B / Product Type: *No Product type* /     Does insurance require precert for SNF: No    Potential assistance Purchasing

## 2022-09-12 NOTE — PROGRESS NOTES
Pt d/c'd home with family and all belongings and 2 week event monitor. Removed peripheral IV and stopped bleeding. Catheter intact. Pt tolerated well. No redness noted at site. Notified CMU and removed tele box. Reviewed d/c instructions, home meds, and  f/u information utilizing teach-back method. Meds to beds picked up by patient. Patient verbalized understanding. Patient taken to main entrance in wheelchair.

## 2022-09-13 ENCOUNTER — TELEPHONE (OUTPATIENT)
Dept: CARDIOLOGY CLINIC | Age: 72
End: 2022-09-13

## 2022-09-13 ENCOUNTER — CARE COORDINATION (OUTPATIENT)
Dept: CASE MANAGEMENT | Age: 72
End: 2022-09-13

## 2022-09-13 ENCOUNTER — TELEPHONE (OUTPATIENT)
Dept: FAMILY MEDICINE CLINIC | Age: 72
End: 2022-09-13

## 2022-09-13 DIAGNOSIS — I48.91 NEW ONSET ATRIAL FIBRILLATION (HCC): Primary | ICD-10-CM

## 2022-09-13 PROCEDURE — 1111F DSCHRG MED/CURRENT MED MERGE: CPT | Performed by: PHYSICIAN ASSISTANT

## 2022-09-13 NOTE — TELEPHONE ENCOUNTER
Pts wife called [facility] to schedule hsfu, agk had no availability until November, pts wife stated agk told pt he needs to be seen w/in one week to go over meds and monitor results. Please advise.

## 2022-09-13 NOTE — CARE COORDINATION
[facility] Transitions Initial Follow Up Call    Call within 2 business days of discharge: Yes    Patient: Chin Eaton Patient : 1950   MRN: [id number]  Reason for Admission: CP   Discharge Date: 22 RARS: Readmission Risk Score: 9.7      Last Discharge  [place] [address]       Date Complaint Diagnosis Description Type Department Provider    22 Chest Pain; Shortness of Breath New onset atrial fibrillation ([address] . .. ED to Hosp-Admission (Discharged) (ADMITTED) Meño Johnson MD; Ayush Chambers Spoke with: [address]: [facility]     Non-face-to-face services provided:  Obtained and reviewed discharge summary and/or continuity of care documents  Education of patient/family/caregiver/guardian to support self-management-.    Transitions of Care Initial Call    Was this an external facility discharge? No Discharge Facility: n.a    Challenges to be reviewed by the provider   Additional needs identified to be addressed with provider: No  none             Method of communication with provider : none    Advance Care Planning:   Does patient have an Advance Directive: reviewed and current and referral to internal ACP facilitator. Advance Care Planning   Healthcare Decision Maker:    Primary Decision Maker: Amy inch - [phone]    Click here to complete Healthcare Decision Makers including selection of the Healthcare Decision Maker Relationship (ie \"Primary\"). Today we referred to ACP Clinical Specialist for assistance. Care Transition Nurse contacted the patient by telephone to perform post hospital discharge assessment. Verified name and  with patient as identifiers. Provided introduction to self, and explanation of the CTN role. CTN reviewed discharge instructions, medical action plan and red flags with patient who verbalized understanding.  Patient given an opportunity to ask questions and does not have any further questions or concerns at this time. Were discharge instructions available to patient? Yes. Reviewed appropriate site of care based on symptoms and resources available to patient including: PCP  Specialist  When to call 911. The patient agrees to contact the PCP office for questions related to their healthcare. Medication reconciliation was performed with patient, who verbalizes understanding of administration of home medications. Was patient discharged with a pulse oximeter? no    CTN provided contact information. Plan for follow-up call in 5-7 days based on severity of symptoms and risk factors. Plan for next call: symptom management-.  self management-.  follow up appointment-.  medication management-. CTN spoke with patient who reported who reported he is doing alright and denied any cp or sob. CTN reviewed all medications with patient who reported he is taking all as prescribed. Patient reported he will call cardiologist today to setup apt. CTN also encouraged patient to reach out to PCP. Patient denied any other concerns at this time. CTN advised patient of use of urgent care or physicians 24 hr access line if assistance is needed after hours.           Care Transitions 24 Hour Call    Do you have a copy of your discharge instructions?: Yes  Do you have all of your prescriptions and are they filled?: Yes  Have you been contacted by a [address]?: No  Have you scheduled your follow up appointment?: No  Post Acute Services: Home Health (Comment: [facility])  Patient Home Equipment: CPAP  Do you have support at home?: Partner/Spouse/SO, Child, Grandchild  Do you feel like you have everything you need to keep you well at home?: Yes  Are you an active caregiver in your home?: No  Care Transitions Interventions         Follow Up  Future Appointments   Date Time Provider Magdalena Murguia   10/26/2022 10:15 AM JENNIFER WardN, RN, CCM  Care Transition

## 2022-09-13 NOTE — TELEPHONE ENCOUNTER
[facility] Transitions Initial Follow Up Call    Outreach made within 2 business days of discharge: Yes    Patient: Daina Rendon Patient : 1950   MRN: [id number]  Reason for Admission: There are no discharge diagnoses documented for the most recent discharge. Discharge Date: 22      Attempted to contact PT, LVM to schedule HFU/TCM visit. Discharge department/facility: [facility]    Non-face-to-face services provided:  Obtained and reviewed discharge summary and/or continuity of care documents    TCM Interactive Patient Contact:  Was patient able to fill all prescriptions: Yes  Was patient instructed to bring all medications to the follow-up visit: Yes  Is patient taking all medications as directed in the discharge summary?  Yes  Does patient understand their discharge instructions: Yes  Does patient have questions or concerns that need addressed prior to 7-14 day follow up office visit: no    Scheduled appointment with PCP within 7-14 days    Follow Up  Future Appointments   Date Time Provider Magdalena Murguia   10/26/2022 10:15 AM Carney Halsted, PA [facility] Bonnita Beat, LPN

## 2022-09-13 NOTE — PROGRESS NOTES
Physician Progress Note      PATIENT:               German Bhatti  CSN #:                  [id number]  :                       1950  ADMIT DATE:       2022 4:29 PM  [address] [place] DATE:        2022 4:43 PM  RESPONDING  PROVIDER #:        Gary Melendez MD          QUERY TEXT:    Patient admitted with afib, noted to have atrial fibrillation and is   maintained on Eliquis. If possible, please document in progress notes and   discharge summary if you are evaluating and/or treating any of the following: The medical record reflects the following:  Risk Factors: diabetes, obesity, HTN  Clinical Indicators: [id number] Score for Atrial Fibrillation Stroke Risk =   3. Per progress note \"Patient agreeable to proceed with anticoagulation,   started p.o. Eliquis twice daily\"  Treatment:  Cardiology consult, Eliquis, serial labs, supportive care. Thank you,  [email or web address]  Options provided:  -- Secondary hypercoagulable state in a patient with atrial fibrillation  -- Other - I will add my own diagnosis  -- Disagree - Not applicable / Not valid  -- Disagree - Clinically unable to determine / Unknown  -- Refer to Clinical Documentation Reviewer    PROVIDER RESPONSE TEXT:    Provider disagreed with this query.     Query created by: Ciaran Carnes on 2022 3:26 PM      Electronically signed by:  Gary Melendez MD 2022 7:18 PM

## 2022-09-15 ENCOUNTER — CARE COORDINATION (OUTPATIENT)
Dept: CARE COORDINATION | Age: 72
End: 2022-09-15

## 2022-09-15 NOTE — CARE COORDINATION
Call to pt for weekly BG check, pt reports he has not been writing anything down since he was discharged from the hospital.

## 2022-09-16 ENCOUNTER — CARE COORDINATION (OUTPATIENT)
Dept: CARE COORDINATION | Age: 72
End: 2022-09-16

## 2022-09-16 NOTE — TELEPHONE ENCOUNTER
Jord[facility] Transitions Initial Follow Up Call    Outreach made within 2 business days of discharge: Yes    Patient: Hiral Blanco Patient : 1950   MRN: [id number]  Reason for Admission: There are no discharge diagnoses documented for the most recent discharge. Discharge Date: 22       Spoke with PT scheduled HFU visit with Lesa TAYLOR on 2022    Discharge department/facility: [facility]    Non-face-to-face services provided:  Scheduled appointment with PCP-2022  Obtained and reviewed discharge summary and/or continuity of care documents    TCM Interactive Patient Contact:  Was patient able to fill all prescriptions: Yes  Was patient instructed to bring all medications to the follow-up visit: Yes  Is patient taking all medications as directed in the discharge summary?  Yes  Does patient understand their discharge instructions: Yes  Does patient have questions or concerns that need addressed prior to 7-14 day follow up office visit: no    Scheduled appointment with PCP within 7-14 days    Follow Up  Future Appointments   Date Time Provider Magdalena Murguia   2022 11:00 AM SANDY David CNP   10/4/2022  9:15 AM MD Sammi Martin   10/26/2022 10:15 AM JENNIFER Mckeon Caro, LPN

## 2022-09-20 ENCOUNTER — CARE COORDINATION (OUTPATIENT)
Dept: CASE MANAGEMENT | Age: 72
End: 2022-09-20

## 2022-09-23 ENCOUNTER — OFFICE VISIT (OUTPATIENT)
Dept: FAMILY MEDICINE CLINIC | Age: 72
End: 2022-09-23
Payer: MEDICARE

## 2022-09-23 VITALS
SYSTOLIC BLOOD PRESSURE: 114 MMHG | OXYGEN SATURATION: 98 % | WEIGHT: 271 LBS | DIASTOLIC BLOOD PRESSURE: 60 MMHG | HEART RATE: 60 BPM | BODY MASS INDEX: 38.88 KG/M2

## 2022-09-23 DIAGNOSIS — I10 ESSENTIAL HYPERTENSION: ICD-10-CM

## 2022-09-23 DIAGNOSIS — G47.33 SEVERE OBSTRUCTIVE SLEEP APNEA: ICD-10-CM

## 2022-09-23 DIAGNOSIS — Z59.89 INSURANCE COVERAGE PROBLEMS: ICD-10-CM

## 2022-09-23 DIAGNOSIS — G47.33 SEVERE OBSTRUCTIVE SLEEP APNEA: Primary | ICD-10-CM

## 2022-09-23 DIAGNOSIS — I48.91 NEW ONSET ATRIAL FIBRILLATION (HCC): ICD-10-CM

## 2022-09-23 DIAGNOSIS — E11.29 TYPE 2 DIABETES MELLITUS WITH MICROALBUMINURIA, WITH LONG-TERM CURRENT USE OF INSULIN (HCC): ICD-10-CM

## 2022-09-23 DIAGNOSIS — R80.9 TYPE 2 DIABETES MELLITUS WITH MICROALBUMINURIA, WITH LONG-TERM CURRENT USE OF INSULIN (HCC): ICD-10-CM

## 2022-09-23 DIAGNOSIS — Z79.4 TYPE 2 DIABETES MELLITUS WITH MICROALBUMINURIA, WITH LONG-TERM CURRENT USE OF INSULIN (HCC): ICD-10-CM

## 2022-09-23 PROBLEM — D69.6 THROMBOCYTOPENIA, UNSPECIFIED (HCC): Status: ACTIVE | Noted: 2022-09-23

## 2022-09-23 LAB
ALBUMIN SERPL-MCNC: 4.5 G/DL (ref 3.4–5)
ANION GAP SERPL CALCULATED.3IONS-SCNC: 10 MMOL/L (ref 3–16)
BUN BLDV-MCNC: 26 MG/DL (ref 7–20)
CALCIUM SERPL-MCNC: 9.6 MG/DL (ref 8.3–10.6)
CHLORIDE BLD-SCNC: 103 MMOL/L (ref 99–110)
CO2: 29 MMOL/L (ref 21–32)
CREAT SERPL-MCNC: 1 MG/DL (ref 0.8–1.3)
GFR AFRICAN AMERICAN: >60
GFR NON-AFRICAN AMERICAN: >60
GLUCOSE BLD-MCNC: 166 MG/DL (ref 70–99)
HCT VFR BLD CALC: 40.9 % (ref 40.5–52.5)
HEMOGLOBIN: 13.7 G/DL (ref 13.5–17.5)
MCH RBC QN AUTO: 29.6 PG (ref 26–34)
MCHC RBC AUTO-ENTMCNC: 33.4 G/DL (ref 31–36)
MCV RBC AUTO: 88.6 FL (ref 80–100)
PDW BLD-RTO: 14.1 % (ref 12.4–15.4)
PHOSPHORUS: 3 MG/DL (ref 2.5–4.9)
PLATELET # BLD: 152 K/UL (ref 135–450)
PMV BLD AUTO: 9.8 FL (ref 5–10.5)
POTASSIUM SERPL-SCNC: 5 MMOL/L (ref 3.5–5.1)
RBC # BLD: 4.61 M/UL (ref 4.2–5.9)
SODIUM BLD-SCNC: 142 MMOL/L (ref 136–145)
WBC # BLD: 4.3 K/UL (ref 4–11)

## 2022-09-23 PROCEDURE — 99213 OFFICE O/P EST LOW 20 MIN: CPT | Performed by: NURSE PRACTITIONER

## 2022-09-23 PROCEDURE — 3046F HEMOGLOBIN A1C LEVEL >9.0%: CPT | Performed by: NURSE PRACTITIONER

## 2022-09-23 PROCEDURE — G8417 CALC BMI ABV UP PARAM F/U: HCPCS | Performed by: NURSE PRACTITIONER

## 2022-09-23 PROCEDURE — 3017F COLORECTAL CA SCREEN DOC REV: CPT | Performed by: NURSE PRACTITIONER

## 2022-09-23 PROCEDURE — G8427 DOCREV CUR MEDS BY ELIG CLIN: HCPCS | Performed by: NURSE PRACTITIONER

## 2022-09-23 PROCEDURE — 1123F ACP DISCUSS/DSCN MKR DOCD: CPT | Performed by: NURSE PRACTITIONER

## 2022-09-23 PROCEDURE — 1036F TOBACCO NON-USER: CPT | Performed by: NURSE PRACTITIONER

## 2022-09-23 PROCEDURE — 2022F DILAT RTA XM EVC RTNOPTHY: CPT | Performed by: NURSE PRACTITIONER

## 2022-09-23 PROCEDURE — 1111F DSCHRG MED/CURRENT MED MERGE: CPT | Performed by: NURSE PRACTITIONER

## 2022-09-23 RX ORDER — PANTOPRAZOLE SODIUM 40 MG/1
TABLET, DELAYED RELEASE ORAL
COMMUNITY
Start: 2022-09-03

## 2022-09-23 SDOH — ECONOMIC STABILITY - INCOME SECURITY: OTHER PROBLEMS RELATED TO HOUSING AND ECONOMIC CIRCUMSTANCES: Z59.89

## 2022-09-23 NOTE — PROGRESS NOTES
Post-Discharge Transitional Care  Follow Up      Yefri Del Cid   YOB: 1950    Date of Office Visit:  9/23/2022  Date of Hospital Admission: 9/9/22  Date of Hospital Discharge: 9/12/22  Risk of hospital readmission (high >=14%. Medium >=10%) :Readmission Risk Score: 9.7      Care management risk score Rising risk (score 2-5) and Complex Care (Scores >=6): No Risk Score On File     Non face to face  following discharge, date last encounter closed (first attempt may have been earlier): 09/13/2022    Call initiated 2 business days of discharge: Yes    ASSESSMENT/PLAN:   Severe obstructive sleep apnea  -     Margaret Melendrez MD, Pulmonary, [facility]  -     Renal Function Panel; Future  -     CBC; Future  -recommend evaluation for new mask. Had sleep study previously, but due to claustrophobia never wore mask. Reviewed importance of having this treated. Pt open to seeing sleep medicine for potential options for different masks. Insurance coverage problems  -     [address] [employer]  -family requested, recently applied for medicaid    New onset atrial fibrillation ([facility] [address]  -     Renal Function Panel; Future  -     CBC; Future  -reviewed eliquis and bleeding risk considerations.   -rate controlled, tolerating diltiazem well.   -has cardiology f/u on 10/4.   -asymptomatic.   -The current medical regimen is effective;  continue present plan and medications. -recommend checking insurance coverage on eliquis prior to refill. Essential hypertension  -     Renal Function Panel; Future  -     CBC; Future  -The current medical regimen is effective;  continue present plan and medications. Type 2 diabetes mellitus with microalbuminuria, with long-term current use of insulin (HCC)       -The current medical regimen is effective;  continue present plan and medications. -needs f/u on a1c in one month.      Medical Decision Making: moderate complexity  Return in about 1 month (around 10/23/2022), or if symptoms worsen or fail to improve. Subjective:   HPI:  Follow up of Hospital problems/diagnosis(es): see epic    Inpatient course: Discharge summary reviewed- see chart. Interval history/Current status: stable since discharge. Has been taking eliquis and diltizem as prescribed. Has been tolerating well. Denies cp, sob, palpitations, dizziness, lightheadedness. Appetite is normal. Has f/u with cardiology on 10/4. Hx of ESTHER, Had sleep study previously, but due to claustrophobia never wore mask. Denies any s/sx of bleeding. Overall feeling well, no acute complaints today. Patient Active Problem List   Diagnosis    Pulmonary nodules    Lung granuloma ([address]    Essential hypertension    Hyperlipidemia    Severe obstructive sleep apnea    Morbid obesity ([address]    Coronary artery disease involving native coronary artery of native heart with angina pectoris ([address]    Peripheral polyneuropathy    Atrial tachycardia ([address]    Type 2 diabetes mellitus with microalbuminuria (HCC)    Grade II diastolic dysfunction    New onset atrial fibrillation ([address]       Medications listed as ordered at the time of discharge from hospital     Medication List            Accurate as of September 23, 2022 11:13 AM. If you have any questions, ask your nurse or doctor. CONTINUE taking these medications      apixaban 5 MG Tabs tablet  Commonly known as: ELIQUIS  Take 1 tablet by mouth 2 times daily     aspirin 81 MG chewable tablet  Take 1 tablet by mouth daily     atorvastatin 20 MG tablet  Commonly known as: LIPITOR  Take 1 tablet by mouth in the morning.      dicyclomine 10 MG capsule  Commonly known as: Bentyl  Take 1 capsule by mouth every 6 hours as needed (cramps)     dilTIAZem 120 MG extended release capsule  Commonly known as: CARDIZEM CD  Take 1 capsule by mouth nightly     FreeStyle Avelino 14 Day [place] Trevino  1 Units by Does not apply route as needed (as needed)     FreeStyle [employer] 14 Day Sensor Misc  1 Units by Does not apply route every 14 days     glipiZIDE 5 MG extended release tablet  Commonly known as: Glucotrol XL  Take 1 tablet by mouth in the morning. Insulin Pen Needle 31G X 8 MM Misc  1 each by Does not apply route 4 times daily     Insulin Syringe-Needle U-100 30G X 1/2\" 0.5 ML Misc     Lantus SoloStar 100 UNIT/ML injection pen  Generic drug: insulin glargine  Inject 12 Units into the skin nightly     lisinopril 10 MG tablet  Commonly known as: PRINIVIL;ZESTRIL  Take 1 tablet by mouth in the morning. NovoLOG FlexPen 100 UNIT/ML injection pen  Generic drug: insulin aspart  Inject 8 Units into the skin in the morning and 8 Units at noon and 8 Units in the evening. Inject before meals. pantoprazole 40 MG tablet  Commonly known as: PROTONIX     pramipexole 0.5 MG tablet  Commonly known as: MIRAPEX  Take 1 tablet by mouth nightly                Medications marked \"taking\" at this time  Outpatient Medications Marked as Taking for the 9/23/22 encounter (Office Visit) with SANDY See CNP   Medication Sig Dispense Refill    pantoprazole (PROTONIX) 40 MG tablet TAKE 1 TABLET BY MOUTH ONCE DAILY      apixaban (ELIQUIS) 5 MG TABS tablet Take 1 tablet by mouth 2 times daily 60 tablet 1    dilTIAZem (CARDIZEM CD) 120 MG extended release capsule Take 1 capsule by mouth nightly 30 capsule 1    pramipexole (MIRAPEX) 0.5 MG tablet Take 1 tablet by mouth nightly 30 tablet 0    insulin glargine (LANTUS SOLOSTAR) 100 UNIT/ML injection pen Inject 12 Units into the skin nightly 10.8 mL 0    insulin aspart (NOVOLOG FLEXPEN) 100 UNIT/ML injection pen Inject 8 Units into the skin in the morning and 8 Units at noon and 8 Units in the evening. Inject before meals. 21.6 mL 0    Insulin Pen Needle 31G X 8 MM MISC 1 each by Does not apply route 4 times daily 200 each 3    lisinopril (PRINIVIL;ZESTRIL) 10 MG tablet Take 1 tablet by mouth in the morning.  90 tablet 1    atorvastatin (LIPITOR) 20 MG tablet Take 1 tablet by mouth in the morning. 90 tablet 1    glipiZIDE (GLUCOTROL XL) 5 MG extended release tablet Take 1 tablet by mouth in the morning. 90 tablet 1    Continuous Blood Gluc  (FREESTYLE JUSTIN 14 DAY READER) OLI 1 Units by Does not apply route as needed (as needed) 1 each 0    Continuous Blood Gluc Sensor (FREESTYLE JUSTIN 14 DAY SENSOR) MISC 1 Units by Does not apply route every 14 days 2 each 11    dicyclomine (BENTYL) 10 MG capsule Take 1 capsule by mouth every 6 hours as needed (cramps) 20 capsule 0    aspirin 81 MG chewable tablet Take 1 tablet by mouth daily 30 tablet 3    Insulin Syringe-Needle U-100 30G X 1/2\" 0.5 ML MISC Inject insulin 3 times daily          Medications patient taking as of now reconciled against medications ordered at time of hospital discharge: Yes    A comprehensive review of systems was negative except for what was noted in the HPI. Objective:    /60 (Site: Right Upper Arm, Position: Sitting, Cuff Size: Large Adult)   Pulse 60   Wt 271 lb (122.9 kg)   SpO2 98%   BMI 38.88 kg/m²   General Appearance: alert and oriented to person, place and time, well developed and well- nourished, in no acute distress  Skin: warm and dry, no rash or erythema  Head: normocephalic and atraumatic  Pulmonary/Chest: clear to auscultation bilaterally- no wheezes, rales or rhonchi, normal air movement, no respiratory distress  Cardiovascular: normal rate, regular rhythm, normal S1 and S2;  Abdomen: soft, non-tender, non-distended, normal bowel sounds, no masses or organomegaly  Extremities: no cyanosis, clubbing; trace edema  Musculoskeletal: normal range of motion, no joint swelling, deformity or tenderness  Neurologic: reflexes normal and symmetric, no cranial nerve deficit, gait, coordination and speech normal    An electronic signature was used to authenticate this note. --SANDY Sherman - CNP

## 2022-09-24 PROCEDURE — 93248 EXT ECG>7D<15D REV&INTERPJ: CPT | Performed by: INTERNAL MEDICINE

## 2022-09-26 ENCOUNTER — TELEPHONE (OUTPATIENT)
Dept: FAMILY MEDICINE CLINIC | Age: 72
End: 2022-09-26

## 2022-09-26 NOTE — TELEPHONE ENCOUNTER
SW contacted patient to follow-up on Primary Care First referral from PCP. SW was unable to reach pt and explained reason for call per PCP referral.  SW left message along with contact information asking for a return call. SW will continue to follow and assist in collaboration with RN Care Manager.

## 2022-09-27 ENCOUNTER — CARE COORDINATION (OUTPATIENT)
Dept: CASE MANAGEMENT | Age: 72
End: 2022-09-27

## 2022-09-27 NOTE — CARE COORDINATION
Jordi [facility] Transitions Follow Up Call    2022    Patient: Vianca Julian  Patient : 1950   MRN: [id number]  Reason for Admission: A-fib   Discharge Date: 22 RARS: Readmission Risk Score: 9.7       Attempted to reach patient via phone for care transition. VM left stating purpose of call along with my contact information requesting a return call. Care Transitions Subsequent and Final Call    Subsequent and Final Calls  Are you currently active with any services?: Home Health  Care Transitions Interventions  Other Interventions:              Follow Up  Future Appointments   Date Time Provider Magdalena Murguia   10/4/2022  9:15 AM MD Amanda West   10/26/2022 10:15 AM JENNIFER ZambranoN, RN, [facility]  Care Transition Nurse  [phone] mobile

## 2022-10-03 ENCOUNTER — TELEPHONE (OUTPATIENT)
Dept: FAMILY MEDICINE CLINIC | Age: 72
End: 2022-10-03

## 2022-10-03 NOTE — TELEPHONE ENCOUNTER
SW contacted patient to follow-up on Primary Care First referral from PCP regarding pt needing assistance with medication and Medicaid. After exploring pt's income with pt, it was determined pt would not be eligible for Medicaid. Pt was informed he would not be eligible for Medicaid based on his total household income. SW did speak with pt and his wife and made conference call to [facility] PAP. In speaking with representative, pt stated he has [employer] Part D prescription coverage. Pt, Wife and SW was told pt had not been receiving his Brenden Flexpen through this PAP because his application was incomplete. Representative expressed how pt needs to compete the application on pg 2 and pg 3, sign it and fax application to [phone] with ID# [id number] on the application. GHANSHYAM spoke with pt's wife and meeting at [facility] on 10/4 to assist in completing application so it can be determined if pt will be eligible for PAP. Wife plans to call SW about arranging a time to meet since pt has Dr appointment in the morning.   SW will continue to follow and assist.

## 2022-10-03 NOTE — TELEPHONE ENCOUNTER
SW contacted patient to follow-up on Primary Care First referral from PCP. SW spoke with pt and his wife regarding PCP referral to assist pt with Medicaid and in obtaining Eliquis. Pt stated he has application for Medicaid but has not applied. SW informed pt that would make referral to [facility] and Curtis Oconnell would be in contact with him to assist with Medicaid application. Pt also related he was able to obtain the Eliquis but not the Novolog Flexpen. Pt related the Candida Pupa was too much for him to pay. SW expressed how would look into assisting pt with a PAP to see if can qualify for PAP and complete application. Sw plans to follow-up with pt about PAP through [facility] [employer].

## 2022-10-04 ENCOUNTER — CARE COORDINATION (OUTPATIENT)
Dept: CASE MANAGEMENT | Age: 72
End: 2022-10-04

## 2022-10-04 ENCOUNTER — OFFICE VISIT (OUTPATIENT)
Dept: CARDIOLOGY CLINIC | Age: 72
End: 2022-10-04
Payer: MEDICARE

## 2022-10-04 VITALS
BODY MASS INDEX: 39.48 KG/M2 | WEIGHT: 275.8 LBS | HEART RATE: 60 BPM | OXYGEN SATURATION: 91 % | SYSTOLIC BLOOD PRESSURE: 124 MMHG | HEIGHT: 70 IN | DIASTOLIC BLOOD PRESSURE: 80 MMHG

## 2022-10-04 DIAGNOSIS — Z09 HOSPITAL DISCHARGE FOLLOW-UP: ICD-10-CM

## 2022-10-04 DIAGNOSIS — I47.1 ATRIAL TACHYCARDIA (HCC): ICD-10-CM

## 2022-10-04 DIAGNOSIS — I48.91 NEW ONSET ATRIAL FIBRILLATION (HCC): Primary | ICD-10-CM

## 2022-10-04 PROCEDURE — G8484 FLU IMMUNIZE NO ADMIN: HCPCS | Performed by: INTERNAL MEDICINE

## 2022-10-04 PROCEDURE — G8427 DOCREV CUR MEDS BY ELIG CLIN: HCPCS | Performed by: INTERNAL MEDICINE

## 2022-10-04 PROCEDURE — 1036F TOBACCO NON-USER: CPT | Performed by: INTERNAL MEDICINE

## 2022-10-04 PROCEDURE — 99214 OFFICE O/P EST MOD 30 MIN: CPT | Performed by: INTERNAL MEDICINE

## 2022-10-04 PROCEDURE — 1111F DSCHRG MED/CURRENT MED MERGE: CPT | Performed by: INTERNAL MEDICINE

## 2022-10-04 PROCEDURE — G8417 CALC BMI ABV UP PARAM F/U: HCPCS | Performed by: INTERNAL MEDICINE

## 2022-10-04 PROCEDURE — 93000 ELECTROCARDIOGRAM COMPLETE: CPT | Performed by: INTERNAL MEDICINE

## 2022-10-04 PROCEDURE — 1123F ACP DISCUSS/DSCN MKR DOCD: CPT | Performed by: INTERNAL MEDICINE

## 2022-10-04 PROCEDURE — 3017F COLORECTAL CA SCREEN DOC REV: CPT | Performed by: INTERNAL MEDICINE

## 2022-10-04 RX ORDER — DILTIAZEM HYDROCHLORIDE 120 MG/1
120 CAPSULE, COATED, EXTENDED RELEASE ORAL NIGHTLY
Qty: 90 CAPSULE | Refills: 3 | Status: SHIPPED | OUTPATIENT
Start: 2022-10-04

## 2022-10-04 NOTE — PATIENT INSTRUCTIONS
RECOMMENDATIONS:  Continue eliquis and diltiazem. Discussed [address] to monitor HR and rhythm at home. Discussed implantable loop monitor for long term monitoring. Referral to Dr Pablo Loo for sleep apnea. Follow up in 6 months with EP NP.

## 2022-10-04 NOTE — PROGRESS NOTES
[facility]   Electrophysiology Consult Note            Date:  October 4, 2022  Patient name: Nick Parra  YOB: 1950    Chief Complaint:  Follow-Up from Hospital, Atrial Fibrillation, and Tachycardia    Primary Care Physician:Aspen Moses    HISTORY OF PRESENT ILLNESS: Nick Parra is a 67 y.o. male who presents for evaluation as a new patient for abnormal cardiac event monitor and atrial tachycardia. He was admitted on 3/21/2019 for chest pain. He underwent a stress test on 3/22/2019 that was negative. He underwent a left heart cath on 3/25/2019 that demonstrated moderate CAD and medical management was recommended. He underwent an echocardiogram on 2/6/2020 that demonstrated an EF of 55% with no regional wall motion abnormalities with grade II DD, mild bi-atrial enlargement. He was admitted on 8/18/2020 for chest pain. He underwent a stress test on 8/18/2020 that demonstrated no definite evidence of stress induced ischemia, LV function normal, uniform wall motion and EF of 64%. At his office visit on 9/1/2020 he reported he was lightheaded and dizzy and was experiencing SOB. He has ESTHER and is non-compliant with his CPAP. He was started on Ranexa and Toprol 12.5 mg QD on 9/1/2020. He wore a cardiac event monitor from 9/1-9/13/2020 that demonstrated avg HR 65 () with 10 episodes of AT. His EKG on 10/27/20 demonstrates SB with HR 56 BPM. On 10/27/20 he reports he is feeling well from a cardiac standpoint. He reports while wearing the cardiac event monitor he did not experience any dizziness. Interval History since last office visit: Patient was hospitalized in 9/2022 with new onset AF. He was started on diltiazem and eliquis. Patient wore a cardiac event monitor from 9/12/2022 to 9/24/2022 which demonstrated predominately SR with an average HR of 65 (), NO AF. PAC burden 0.21%, PVC burden 0.01%. Today, 10/4/2022, ECG demonstrates SR (60).  He reports that he is doing well since discharge from hospital. He has fatigue and SOB and attributes this to his non compliance with CPAP. He is taking his medications as prescribed. Denies recent issues with bleeding or bruising. Patient denies current edema, chest pain, sob, palpitations, dizziness or syncope. Past Medical History:   has a past medical history of Acid reflux, Ayn's esophagus, Coronary artery disease of native heart with stable angina pectoris ([facility] [address] Diabetes mellitus ([facility] [address] Diabetic autonomic neuropathy associated with type 2 diabetes mellitus ([facility] [address] Hyperlipidemia, Hypertension, Influenza A, Pancreatitis, Restless legs, Sleep apnea, and Tremor. Past Surgical History:   has a past surgical history that includes Pancreas surgery; Cholecystectomy; Tonsillectomy; Zeigler tooth extraction; Colonoscopy (10/26/2011); Cataract removal (Bilateral, 2013); Upper gastrointestinal endoscopy (10/04/2016); Hydrocele surgery (11/15/2016); Endoscopy, colon, diagnostic; Upper gastrointestinal endoscopy (03/16/2017); Upper gastrointestinal endoscopy (06/26/2017); Upper gastrointestinal endoscopy (09/06/2017); Upper gastrointestinal endoscopy (12/22/2017); Upper gastrointestinal endoscopy (N/A, 12/4/2018); Upper gastrointestinal endoscopy (2/19/2019); Upper gastrointestinal endoscopy (N/A, 6/11/2019); Upper gastrointestinal endoscopy (N/A, 8/13/2019); and Abdomen surgery. Allergies:  Clonidine and Codeine    Social History:   reports that he has never smoked. He has never used smokeless tobacco. He reports that he does not drink alcohol and does not use drugs. Family History: family history includes Cancer in his brother and paternal grandfather; Cancer (age of onset: 76) in his father; Kidney Disease in his mother. Home Medications:    Prior to Admission medications    Medication Sig Start Date End Date Taking?  Authorizing Provider   pantoprazole (PROTONIX) 40 MG tablet TAKE 1 TABLET BY MOUTH ONCE DAILY 9/3/22 Yes Historical Provider, MD   apixaban (ELIQUIS) 5 MG TABS tablet Take 1 tablet by mouth 2 times daily 9/12/22  Yes Mimi Wood MD   dilTIAZem (CARDIZEM CD) 120 MG extended release capsule Take 1 capsule by mouth nightly 9/12/22  Yes Mimi Wood MD   pramipexole (MIRAPEX) 0.5 MG tablet Take 1 tablet by mouth nightly 9/1/22 11/30/22 Yes JENNIFER Maier   insulin glargine (LANTUS SOLOSTAR) 100 UNIT/ML injection pen Inject 12 Units into the skin nightly 7/19/22 10/17/22 Yes JENNIFER Ortiz   insulin aspart (NOVOLOG FLEXPEN) 100 UNIT/ML injection pen Inject 8 Units into the skin in the morning and 8 Units at noon and 8 Units in the evening. Inject before meals. 7/19/22 10/17/22 Yes JENNIFER Maier   Insulin Pen Needle 31G X 8 MM MISC 1 each by Does not apply route 4 times daily 7/19/22  Yes JENNIFER Maier   lisinopril (PRINIVIL;ZESTRIL) 10 MG tablet Take 1 tablet by mouth in the morning. 7/19/22  Yes JENNIFER Maier   atorvastatin (LIPITOR) 20 MG tablet Take 1 tablet by mouth in the morning. 7/19/22  Yes JENNIFER Maier   glipiZIDE (GLUCOTROL XL) 5 MG extended release tablet Take 1 tablet by mouth in the morning.  7/19/22  Yes JENNIFER Maier   Continuous Blood Gluc  (FREESTYLE JUSTIN 14 DAY READER) OLI 1 Units by Does not apply route as needed (as needed) 7/5/22  Yes JENNIFER Maier   Continuous Blood Gluc Sensor (FREESTYLE JUSTIN 14 DAY SENSOR) MISC 1 Units by Does not apply route every 14 days 7/5/22  Yes JENNIFER Maier   dicyclomine (BENTYL) 10 MG capsule Take 1 capsule by mouth every 6 hours as needed (cramps) 6/1/22  Yes SANDY Estrada - CNP   aspirin 81 MG chewable tablet Take 1 tablet by mouth daily 3/26/19  Yes Susana Calixto MD   Insulin Syringe-Needle U-100 30G X 1/2\" 0.5 ML MISC Inject insulin 3 times daily 9/12/17  Yes Historical Provider, MD       REVIEW OF SYSTEMS:    All 14-point review of systems are completed and  pertinent positives are mentioned in the history of present illness. Other  systems are reviewed and are negative. Physical Examination:    /80   Pulse 60   Ht 5' 10\" (1.778 m)   Wt 275 lb 12.8 oz (125.1 kg)   SpO2 91%   BMI 39.57 kg/m²      Constitutional and General Appearance:    alert, cooperative, no distress and appears stated age  [de-identified]:    PERRLA, no cervical lymphadenopathy. No masses palpable. Normal oral  mucosa  Respiratory:  Normal excursion and expansion without use of accessory muscles  Resp Auscultation: Normal breath sounds without dullness or wheezing  Cardiovascular: The apical impulse is not displaced  RRR S1S2 w/o M/G/R  Abdomen:  No masses or tenderness  Bowel sounds present  Extremities:   No Cyanosis or Clubbing   Lower extremity edema: No  Skin: Warm and dry  Neurological:  Alert and oriented. Moves all extremities well  No abnormalities of mood, affect, memory, mentation, or behavior are noted    DATA:    ECG 10/4/22: Personally reviewed. Echo 9/12/2022   Summary   Technically difficult examination. Normal left ventricle size, wall thickness and low normal systolic function   with an estimated ejection fraction of 50%. Definity contrast administered with no evidence of left ventricular mass or   thrombus noted. Normal left ventricular diastolic filling pressure. The aortic root is mildly dilated at 3.8 cm. The ascending aorta is mildly dilated at 4.1 cm. The right atrium is mildly dilated. Stress test 9/12/2022  Summary Mildly depressed to low normal LVEF at 48%  No regional wall motion abnormalities  No ischemia  Findings are suggestive of non-ischemic CM      IMPRESSION:    1. PSVT (likely AT 09/22/2020). 40-year-old male with a medical history significant for ischemic cardiomyopathy, hypertension, diabetes mellitus type 2, obesity, obstructive sleep apnea, and hyperlipidemia who presents from home for evaluation for dizziness and PSVT.   According to patient he no longer has any dizziness. He was asymptomatic during his salvos of PSVT. His PSVT will appear to be short durations of atrial tachycardia. As patient is asymptomatic I see no reason for intervention at this point. We discussed that atrial tachycardia runs in the same circles atrial fibrillation however does not have the same consequences such as stroke or heart failure. From an electrophysiology standpoint patient can follow-up as needed. No need for scheduled/routine evaluation.  - Continue GDMT. - Continue to follow up with IC.    10/4/2022  Continues to have PSVT on cardiac monitor.  - Continue diltiazem. 2. Paroxysmal atrial fibrillation. 10/04/2022  Patient presented to [facility] on 09/09/2022 with atrial fibrillation. He spontaneously converted back to normal sinus rhythm and was started on apixaban and diltiazem. He has had no clinical recurrence of his atrial fibrillation. He is tolerating [address]. He has no questions about atrial fibrillation (including from family). His cardiac monitor showed no atrial fibrillation. We discussed long term monitoring including clinic, [employer] mobile and ILR. For now he would like to continue to monitor clinically. As far as his ESTHER, we will refer to pulmonology for more advanced ESTHER therapy given CPAP intolerance. If not candidate or doesn't want to move forward with then we will refer to oral appliance to try and decrease severity of his ESTHER. - Continue apixaban. - Continue diltiazem. - Pulmonology referral for advanced ESTHER management. Will consider oral appliance if all else fails.  - Follow up with us in 6 months then yearly. RECOMMENDATIONS:  Continue eliquis and diltiazem. Discussed [address] to monitor HR and rhythm at home. Discussed implantable loop monitor for long term monitoring. Referral to Dr Silvia Starks for sleep apnea. Follow up in 6 months with DYLAN NP.    QUALITY MEASURES  1. Tobacco Cessation Counseling: NA  2.  Retake of BP if >140/90:   NA  3. Documentation to PCP/referring for new patient:  Sent to PCP at close of office visit  4. CAD patient on anti-platelet: Yes  5. CAD patient on STATIN therapy:  Yes  6. Patient with CHF and aFib on anticoagulation:  NA     All questions and concerns were addressed to the patient/family. Alternatives to my treatment were discussed. Benjy Bonilla RN, am scribing for and in the presence of Dr. Kale Bernal. 10/04/22 9:15 AM   Rivas Torrez RN    The scribe's documentation has been prepared under my direction and personally reviewed by me in its entirety. I confirm that the note above accurately reflects all work, physical examination, the discussion of treatments and procedures, and medical decision making performed by me. Rachael Quach MD personally performed the services described in this documentation as scribed by nurse in my presence, and is both accurate and complete. Electronically signed by Luke Carrion MD on 10/4/2022 at 9:42 AM     Dr. Jayy Messer MD  [place] [id number]. [address] [place] [address]. [address]. [place] [id number]  Phone: [phone]  Fax: [phone]     NOTE: This report was transcribed using voice recognition software. Every effort was made to ensure accuracy, however, inadvertent computerized transcription errors may be present.

## 2022-10-04 NOTE — CARE COORDINATION
[facility] Care Transitions Follow Up Call    Care Transition Nurse contacted the patient by telephone. Patient: Nila Sena  Patient : 1950   MRN: [id number]  Reason for Admission: CP   Discharge Date: 22 RARS: Readmission Risk Score: 9.7        Attempted to reach patient via phone for care transition. VM left stating purpose of call along with my contact information requesting a return call.           Follow Up  Future Appointments   Date Time Provider Magdalena Murguia   10/26/2022 10:15 AM JENNIFER Georges [place] - [place]YSHON   2023 11:30 AM SANDY Fairchild - KHALIF CON[place]            Care Transitions Subsequent and Final Call    Subsequent and Final Calls  Are you currently active with any services?: Home Health  Care Transitions Interventions  Other Interventions:           Yoselyn GUERINN, RN, [facility]  Care Transition Nurse  [phone] mobile

## 2022-10-04 NOTE — TELEPHONE ENCOUNTER
GHANSHYAM met with patient and wife in the office to follow-up on Primary Care First referral from PCP to assist with PAP through [employer]. Pt brought in his [employer] Part D prescription drug coverage card under Medicare. A copy of this and needed information for the PAP was added onto the application and faxed over. GHANSHYAM explained to pt that fax was sent successfully and will contact the PAP tomorrow to ensure received information so a determination can be made for pt to receive Novolog Flexpen through this PAP.

## 2022-10-05 DIAGNOSIS — I48.91 NEW ONSET A-FIB (HCC): ICD-10-CM

## 2022-10-05 NOTE — TELEPHONE ENCOUNTER
SW contacted patient to follow-up on Primary Care First referral from Danny nelson assisted in placing conference call with [address] [place] [address] to see if PAP had received paperwork so a determination could be made for pt to be found eligible for PAP. Cristel Palacios Pt and Sw were informed paperwork had been received that was faxed over and pt has been deemed eligible for assistance through PAP. Pt was also told medication of Novolog Flex pen will be sent to PCP's office in 10-14 business days. Pt was very pleased about this but stated he cannot afford medication prescribed yesterday of Eliquis. Sw stated how would look into PAP fr pt with this medication. SW will continue to assist pt with PAP's for prescribed medication.

## 2022-10-05 NOTE — TELEPHONE ENCOUNTER
If patient is out of Eliquis and needs samples to get him through while PAP is completed, let me know.

## 2022-10-06 NOTE — TELEPHONE ENCOUNTER
Please advise. Once you have completed CTN episode I will take over patient for ongoing care coordination needs.

## 2022-10-06 NOTE — TELEPHONE ENCOUNTER
Sammie met with pt and wife at [facility] and assisted in competing Patient assistance program forms to pt can found eligible to receive Eliquis 5mg bid. [address] PAP forms were completed and signed. SAMMIE faxed forms over to [phone] and received confirmation. Forms were uploaded into media section in pt's chart. SS plans to  follow up with PAP in a few days to see if has determination about pt's eligibility to obtain Eliquis.

## 2022-10-06 NOTE — TELEPHONE ENCOUNTER
Can you please sign off on prescription on Eliquis 5mg for pt before 3pm for patient assistance program.  Thank you!

## 2022-10-07 NOTE — TELEPHONE ENCOUNTER
SW contacted patient to follow-up on Primary Care First referral from PCP and had to leave a message. GHANSHYAMxplained  how PAP still needs documents of pt's 2021 tax returns and a statement from his pharmacy with total of his out-of-pocket expenses to make a determination to see if pt would be eligible to receive Eliquis. GHANSHYAM will continue to assist pt so he can obtain Eliquis through PAP so they can make determination.

## 2022-10-07 NOTE — TELEPHONE ENCOUNTER
Sammie contacted [facility] PAP to see if had received application and forms faxed on 10/6/22. SAMMIE was informed documents were received but needed pt's 2021 tax returns and a statement from his pharmacy with total of his out-of-pocket expenses to make a determination to see if pt would be eligible to receive Eliquis. SAMMIE will contact pt to obtain needed information so PAP can make determination.

## 2022-10-10 DIAGNOSIS — G25.81 RESTLESS LEG: ICD-10-CM

## 2022-10-10 RX ORDER — PRAMIPEXOLE DIHYDROCHLORIDE 0.5 MG/1
0.5 TABLET ORAL NIGHTLY
Qty: 90 TABLET | Refills: 1 | Status: SHIPPED | OUTPATIENT
Start: 2022-10-10 | End: 2023-01-08

## 2022-10-10 NOTE — TELEPHONE ENCOUNTER
.Refill Request     CONFIRM preferrred pharmacy with the patient. If Mail Order Rx - Pend for 90 day refill. Last Seen: Last Seen Department: 9/23/2022  Last Seen by PCP: 9/1/2022    Last Written: 9-1-22 30 with 0     If no future appointment scheduled, route STAFF MESSAGE with patient name to the [facility] for scheduling. Next Appointment:   Future Appointments   Date Time Provider Magdalena Murguia   10/26/2022 10:15 AM JENNIFER Wisdom [place] - DYD   11/17/2022  1:15 PM Vianca Perales MD AND MARY CONNRO   4/7/2023 11:30 AM SANDY Albarran - KHALIF CONNOR       Message sent to [address] [place] [address] to schedule appt with patient?   N/A      Requested Prescriptions     Pending Prescriptions Disp Refills    pramipexole (MIRAPEX) 0.5 MG tablet [Pharmacy Med Name: Pramipexole Dihydrochloride 0.5 MG Oral Tablet] 30 tablet 0     Sig: Take 1 tablet by mouth nightly

## 2022-10-12 ENCOUNTER — CARE COORDINATION (OUTPATIENT)
Dept: CASE MANAGEMENT | Age: 72
End: 2022-10-12

## 2022-10-12 NOTE — CARE COORDINATION
[facility] Care Transitions Follow Up Call    Care Transition Nurse contacted the patient by telephone. Patient: Mercedes Medrano  Patient : 1950   MRN: [id number]  Reason for Admission: CP   Discharge Date: 22 RARS: Readmission Risk Score: 9.7      Final attempt made to reach patient for post hospital follow up call. Left a voice message for patient with my contact information and informed of final outreach attempt. Follow Up  Future Appointments   Date Time Provider Magdalena Murguia   10/26/2022 10:15 AM JENNIFER Degroot [place] - [place]   2022  1:15 PM Aiyana Napier MD AND MARY CONNOR   2023 11:30 AM SANDY Laboy - KHALIF CONNOR              Care Transitions Subsequent and Final Call    Subsequent and Final Calls  Are you currently active with any services?: Home Health  Care Transitions Interventions  Other Interventions:           No further follow-up call indicated based on severity of symptoms and risk factors. Plan for next call: referral to ambulatory care manager-TIANNA ATKINS, RN, [facility]  Care Transition Nurse  [phone] mobile

## 2022-10-13 ENCOUNTER — CARE COORDINATION (OUTPATIENT)
Dept: CARE COORDINATION | Age: 72
End: 2022-10-13

## 2022-10-13 ENCOUNTER — TELEPHONE (OUTPATIENT)
Dept: FAMILY MEDICINE CLINIC | Age: 72
End: 2022-10-13

## 2022-10-13 NOTE — TELEPHONE ENCOUNTER
SW contacted patient to follow-up on Primary Care First referral from PCP. SAMMIE spoke with pt's wife and contacted Everardo HOANG at  [phone] to determine if pt is eligible for the program.  Sammie was informed the PAP needs pt's Federal tax returns, a list of his out of pocket expenses for his medication, and a financial hardship letter faxed to [phone]. Pt's wife plans to gather their [employer] tax returns and the out of pocket medication expenses from the pharmacy and contact SAMMIE to schedule a time to meet so can fax over documents to Everardo HOANG.   SAMMIE will continue to follow and assist.

## 2022-10-14 NOTE — TELEPHONE ENCOUNTER
SW contacted patient to follow-up on Primary Care First referral from PCP to assist pt with PAP through [address] so pt can obtain Eliquis through PAP. Pt and wife provided pt's Federal Tax returns and list of out of pocket expenses for medication. Pt shared how their granddaughter also lives in their home at present, has no income and is in college along with a disabled son. SW also wrote a financial hardship letter since was requested by Valentina HOANG and faxed over these documents to Valentina HOANG. SW had documents placed in media tab. SW will continue to follow-up with PAP to see if pt qualifies to receive the Eliquis.

## 2022-10-14 NOTE — TELEPHONE ENCOUNTER
SW contacted patient to follow-up on Primary Care First referral from PCP to assist with PAP through [facility] so pt can obtain Eliquis. Wife expressed she can meet GHANSHYAM at [facility] at 11am on 10/15/22. GHANSHYAM plans to meet with pt and wife at that time in the office to obtain needed documents for PAP.

## 2022-10-18 ENCOUNTER — CARE COORDINATION (OUTPATIENT)
Dept: CARE COORDINATION | Age: 72
End: 2022-10-18

## 2022-10-18 NOTE — CARE COORDINATION
Ambulatory Care Coordination Note  10/18/2022    ACC: FABIÁN Schmidt completed follow up call with patient. Patient said he is doing well after being d/c form hospital back in September. ACM reviewed upcoming appt with Stephanie Hill. Patient said he has been monitoring blood sugars and reported the last few days:    Day Before Before    Breakfast Dinner   10/14 92 155   10/15 148 225   10/16 197 153   10/17 157 166   10/18 114      AVG BG  142 175       Patient reports no issue with being able to afford medications at this time. Patient has no other concerns and did agree to future follow up calls. Plan:    Mail out DM Zone management tools  Blood sugar logs        Offered patient enrollment in the Remote Patient Monitoring (RPM) program for in-home monitoring: NA. Lab Results       None            Care Coordination Interventions    Referral from Primary Care Provider: No  Suggested Interventions and Community Resources  Diabetes Education: In Process (Comment: Diabetes know your numbers)  Zone Management Tools: In Process (Comment: Will send out DM zone managment tool)          Goals Addressed                   This Visit's Progress     Conditions and Symptoms   Improving     I will schedule office visits, as directed by my provider. I will keep my appointment or reschedule if I have to cancel. I will notify my provider of any barriers to my plan of care. I will follow my Zone Management tool to seek urgent or emergent care. I will notify my provider of any symptoms that indicate a worsening of my condition. Barriers: lack of support and overwhelmed by complexity of regimen  Plan for overcoming my barriers: ACM will provide supportive phone calls  Confidence: 8/10  Anticipated Goal Completion Date: 9/28/22                Prior to Admission medications    Medication Sig Start Date End Date Taking?  Authorizing Provider   pramipexole (MIRAPEX) 0.5 MG tablet Take 1 tablet by mouth nightly 10/10/22 1/8/23  JENNIFER Lozano   apixaban (ELIQUIS) 5 MG TABS tablet Take 1 tablet by mouth 2 times daily 10/6/22   JENNIFER Lozano   dilTIAZem (CARDIZEM CD) 120 MG extended release capsule Take 1 capsule by mouth nightly 10/4/22   Cleo Patel MD   apixaban (ELIQUIS) 5 MG TABS tablet Take 1 tablet by mouth 2 times daily 10/4/22   KEITH Aguirre MD   pantoprazole (PROTONIX) 40 MG tablet TAKE 1 TABLET BY MOUTH ONCE DAILY 9/3/22   Historical Provider, MD   insulin glargine (LANTUS SOLOSTAR) 100 UNIT/ML injection pen Inject 12 Units into the skin nightly 7/19/22 10/17/22  JENNIFER Lozano   insulin aspart (NOVOLOG FLEXPEN) 100 UNIT/ML injection pen Inject 8 Units into the skin in the morning and 8 Units at noon and 8 Units in the evening. Inject before meals. 7/19/22 10/17/22  JENNIFER Lozano   Insulin Pen Needle 31G X 8 MM MISC 1 each by Does not apply route 4 times daily 7/19/22   JENNIFER Lozano   lisinopril (PRINIVIL;ZESTRIL) 10 MG tablet Take 1 tablet by mouth in the morning. 7/19/22   JENNIFER Lozano   atorvastatin (LIPITOR) 20 MG tablet Take 1 tablet by mouth in the morning. 7/19/22   JENNIFER Lozano   glipiZIDE (GLUCOTROL XL) 5 MG extended release tablet Take 1 tablet by mouth in the morning.  7/19/22   JENNIFER Lozano   Continuous Blood Gluc  (FREESTYLE JUSTIN 14 DAY READER) OLI 1 Units by Does not apply route as needed (as needed) 7/5/22   JENNIFER Lozano   Continuous Blood Gluc Sensor (FREESTYLE JUSTIN 14 DAY SENSOR) MISC 1 Units by Does not apply route every 14 days 7/5/22   JENNIFER Lozano   dicyclomine (BENTYL) 10 MG capsule Take 1 capsule by mouth every 6 hours as needed (cramps) 6/1/22   Kiah Perez, APRN - CNP   aspirin 81 MG chewable tablet Take 1 tablet by mouth daily 3/26/19   Yung Ramirez MD   Insulin Syringe-Needle U-100 30G X 1/2\" 0.5 ML MISC Inject insulin 3 times daily 9/12/17   Historical Provider, MD       Future Appointments   Date Time Provider Magdalena Murguia   10/26/2022 10:15 AM JENNIFER Castillo [place] - DYD   11/17/2022  1:15 PM Mateo Thacker MD AND MARY CONNOR   4/7/2023 11:30 AM SANDY Carrera - CNP [facility]   ,   Diabetes Assessment      Meal Planning: Avoidance of concentrated sweets, Calorie counting   How often do you test your blood sugar?: Daily, Meals, Bedtime   Do you have barriers with adherence to non-pharmacologic self-management interventions?  (Nutrition/Exercise/Self-Monitoring): Yes   Have you ever had to go to the ED for symptoms of low blood sugar?: No       No patient-reported symptoms        , and   General Assessment    Do you have any symptoms that are causing concern?: No

## 2022-10-20 ENCOUNTER — TELEPHONE (OUTPATIENT)
Dept: FAMILY MEDICINE CLINIC | Age: 72
End: 2022-10-20

## 2022-10-21 ENCOUNTER — TELEPHONE (OUTPATIENT)
Dept: FAMILY MEDICINE CLINIC | Age: 72
End: 2022-10-21

## 2022-10-21 NOTE — TELEPHONE ENCOUNTER
GHANSHYAM followed-up with [facility] PAP after had sent remaining documents needed on pt to make a decision on whether pt can receive help in receiving Eliquis through PAP. SW was informed PAP did receive the documents and routed them appropriately so can make a decision on pt qualifying for PAP. GHANSHYAM was informed to call again on 10/25/22 to see if a determination has been made.

## 2022-10-26 ENCOUNTER — OFFICE VISIT (OUTPATIENT)
Dept: FAMILY MEDICINE CLINIC | Age: 72
End: 2022-10-26
Payer: MEDICARE

## 2022-10-26 ENCOUNTER — TELEPHONE (OUTPATIENT)
Dept: FAMILY MEDICINE CLINIC | Age: 72
End: 2022-10-26

## 2022-10-26 VITALS
BODY MASS INDEX: 39.46 KG/M2 | WEIGHT: 275 LBS | OXYGEN SATURATION: 94 % | SYSTOLIC BLOOD PRESSURE: 110 MMHG | HEART RATE: 52 BPM | DIASTOLIC BLOOD PRESSURE: 60 MMHG

## 2022-10-26 DIAGNOSIS — E78.2 MIXED HYPERLIPIDEMIA: ICD-10-CM

## 2022-10-26 DIAGNOSIS — R80.9 TYPE 2 DIABETES MELLITUS WITH MICROALBUMINURIA, WITH LONG-TERM CURRENT USE OF INSULIN (HCC): Primary | ICD-10-CM

## 2022-10-26 DIAGNOSIS — E11.29 TYPE 2 DIABETES MELLITUS WITH MICROALBUMINURIA, WITH LONG-TERM CURRENT USE OF INSULIN (HCC): Primary | ICD-10-CM

## 2022-10-26 DIAGNOSIS — E66.01 MORBID OBESITY (HCC): ICD-10-CM

## 2022-10-26 DIAGNOSIS — Z79.4 CONTROLLED TYPE 2 DIABETES MELLITUS WITH MILD NONPROLIFERATIVE RETINOPATHY OF BOTH EYES, WITH LONG-TERM CURRENT USE OF INSULIN, MACULAR EDEMA PRESENCE UNSPECIFIED (HCC): ICD-10-CM

## 2022-10-26 DIAGNOSIS — Z79.4 TYPE 2 DIABETES MELLITUS WITH MICROALBUMINURIA, WITH LONG-TERM CURRENT USE OF INSULIN (HCC): Primary | ICD-10-CM

## 2022-10-26 DIAGNOSIS — E11.3293 CONTROLLED TYPE 2 DIABETES MELLITUS WITH MILD NONPROLIFERATIVE RETINOPATHY OF BOTH EYES, WITH LONG-TERM CURRENT USE OF INSULIN, MACULAR EDEMA PRESENCE UNSPECIFIED (HCC): ICD-10-CM

## 2022-10-26 DIAGNOSIS — Z23 NEED FOR INFLUENZA VACCINATION: ICD-10-CM

## 2022-10-26 PROBLEM — D69.6 THROMBOCYTOPENIA, UNSPECIFIED (HCC): Status: RESOLVED | Noted: 2022-09-23 | Resolved: 2022-10-26

## 2022-10-26 LAB
CREATININE URINE POCT: 200
HBA1C MFR BLD: 7.7 %
MICROALBUMIN/CREAT 24H UR: 30 MG/G{CREAT}
MICROALBUMIN/CREAT UR-RTO: <30

## 2022-10-26 PROCEDURE — 83036 HEMOGLOBIN GLYCOSYLATED A1C: CPT | Performed by: PHYSICIAN ASSISTANT

## 2022-10-26 PROCEDURE — G0008 ADMIN INFLUENZA VIRUS VAC: HCPCS | Performed by: PHYSICIAN ASSISTANT

## 2022-10-26 PROCEDURE — G8417 CALC BMI ABV UP PARAM F/U: HCPCS | Performed by: PHYSICIAN ASSISTANT

## 2022-10-26 PROCEDURE — 90694 VACC AIIV4 NO PRSRV 0.5ML IM: CPT | Performed by: PHYSICIAN ASSISTANT

## 2022-10-26 PROCEDURE — 2022F DILAT RTA XM EVC RTNOPTHY: CPT | Performed by: PHYSICIAN ASSISTANT

## 2022-10-26 PROCEDURE — 82044 UR ALBUMIN SEMIQUANTITATIVE: CPT | Performed by: PHYSICIAN ASSISTANT

## 2022-10-26 PROCEDURE — 3078F DIAST BP <80 MM HG: CPT | Performed by: PHYSICIAN ASSISTANT

## 2022-10-26 PROCEDURE — 3017F COLORECTAL CA SCREEN DOC REV: CPT | Performed by: PHYSICIAN ASSISTANT

## 2022-10-26 PROCEDURE — 3051F HG A1C>EQUAL 7.0%<8.0%: CPT | Performed by: PHYSICIAN ASSISTANT

## 2022-10-26 PROCEDURE — 99214 OFFICE O/P EST MOD 30 MIN: CPT | Performed by: PHYSICIAN ASSISTANT

## 2022-10-26 PROCEDURE — G8427 DOCREV CUR MEDS BY ELIG CLIN: HCPCS | Performed by: PHYSICIAN ASSISTANT

## 2022-10-26 PROCEDURE — G8484 FLU IMMUNIZE NO ADMIN: HCPCS | Performed by: PHYSICIAN ASSISTANT

## 2022-10-26 PROCEDURE — 3074F SYST BP LT 130 MM HG: CPT | Performed by: PHYSICIAN ASSISTANT

## 2022-10-26 PROCEDURE — 1036F TOBACCO NON-USER: CPT | Performed by: PHYSICIAN ASSISTANT

## 2022-10-26 PROCEDURE — 1123F ACP DISCUSS/DSCN MKR DOCD: CPT | Performed by: PHYSICIAN ASSISTANT

## 2022-10-26 RX ORDER — GLIPIZIDE 2.5 MG/1
2.5 TABLET, EXTENDED RELEASE ORAL DAILY
Qty: 90 TABLET | Refills: 1 | Status: SHIPPED | OUTPATIENT
Start: 2022-10-26

## 2022-10-26 RX ORDER — INSULIN ASPART 100 [IU]/ML
8 INJECTION, SOLUTION INTRAVENOUS; SUBCUTANEOUS
Qty: 21.6 ML | Refills: 0 | Status: SHIPPED | OUTPATIENT
Start: 2022-10-26 | End: 2023-01-24

## 2022-10-26 RX ORDER — INSULIN GLARGINE 100 [IU]/ML
0.2 INJECTION, SOLUTION SUBCUTANEOUS NIGHTLY
Qty: 21.6 ML | Refills: 0 | Status: SHIPPED | OUTPATIENT
Start: 2022-10-26 | End: 2023-01-24

## 2022-10-26 SDOH — ECONOMIC STABILITY: HOUSING INSECURITY: IN THE LAST 12 MONTHS, HOW MANY PLACES HAVE YOU LIVED?: 0

## 2022-10-26 SDOH — ECONOMIC STABILITY: INCOME INSECURITY: IN THE LAST 12 MONTHS, WAS THERE A TIME WHEN YOU WERE NOT ABLE TO PAY THE MORTGAGE OR RENT ON TIME?: NO

## 2022-10-26 SDOH — ECONOMIC STABILITY: FOOD INSECURITY: WITHIN THE PAST 12 MONTHS, THE FOOD YOU BOUGHT JUST DIDN'T LAST AND YOU DIDN'T HAVE MONEY TO GET MORE.: NEVER TRUE

## 2022-10-26 SDOH — ECONOMIC STABILITY: HOUSING INSECURITY
IN THE LAST 12 MONTHS, WAS THERE A TIME WHEN YOU DID NOT HAVE A STEADY PLACE TO SLEEP OR SLEPT IN A SHELTER (INCLUDING NOW)?: NO

## 2022-10-26 SDOH — ECONOMIC STABILITY: FOOD INSECURITY: WITHIN THE PAST 12 MONTHS, YOU WORRIED THAT YOUR FOOD WOULD RUN OUT BEFORE YOU GOT MONEY TO BUY MORE.: NEVER TRUE

## 2022-10-26 ASSESSMENT — PATIENT HEALTH QUESTIONNAIRE - PHQ9
5. POOR APPETITE OR OVEREATING: 1
10. IF YOU CHECKED OFF ANY PROBLEMS, HOW DIFFICULT HAVE THESE PROBLEMS MADE IT FOR YOU TO DO YOUR WORK, TAKE CARE OF THINGS AT HOME, OR GET ALONG WITH OTHER PEOPLE: 0
SUM OF ALL RESPONSES TO PHQ QUESTIONS 1-9: 9
6. FEELING BAD ABOUT YOURSELF - OR THAT YOU ARE A FAILURE OR HAVE LET YOURSELF OR YOUR FAMILY DOWN: 1
9. THOUGHTS THAT YOU WOULD BE BETTER OFF DEAD, OR OF HURTING YOURSELF: 0
3. TROUBLE FALLING OR STAYING ASLEEP: 3
2. FEELING DOWN, DEPRESSED OR HOPELESS: 0
SUM OF ALL RESPONSES TO PHQ9 QUESTIONS 1 & 2: 1
4. FEELING TIRED OR HAVING LITTLE ENERGY: 3
SUM OF ALL RESPONSES TO PHQ QUESTIONS 1-9: 9
1. LITTLE INTEREST OR PLEASURE IN DOING THINGS: 1
8. MOVING OR SPEAKING SO SLOWLY THAT OTHER PEOPLE COULD HAVE NOTICED. OR THE OPPOSITE, BEING SO FIGETY OR RESTLESS THAT YOU HAVE BEEN MOVING AROUND A LOT MORE THAN USUAL: 0
DEPRESSION UNABLE TO ASSESS: FUNCTIONAL CAPACITY MOTIVATION LIMITS ACCURACY
SUM OF ALL RESPONSES TO PHQ QUESTIONS 1-9: 9
7. TROUBLE CONCENTRATING ON THINGS, SUCH AS READING THE NEWSPAPER OR WATCHING TELEVISION: 0
SUM OF ALL RESPONSES TO PHQ QUESTIONS 1-9: 9

## 2022-10-26 ASSESSMENT — SOCIAL DETERMINANTS OF HEALTH (SDOH): HOW HARD IS IT FOR YOU TO PAY FOR THE VERY BASICS LIKE FOOD, HOUSING, MEDICAL CARE, AND HEATING?: NOT HARD AT ALL

## 2022-10-26 ASSESSMENT — ENCOUNTER SYMPTOMS
WHEEZING: 0
SHORTNESS OF BREATH: 0

## 2022-10-26 NOTE — TELEPHONE ENCOUNTER
SW contacted [facility] PAP to inquire about pt's application to obtain Eliquis through PAP. SW was informed pt does not qualify because out-of-pocket medication expenses do not exceed 3% of his income. SW plans to contract pt/wife and inform the pt's PCP about this information.

## 2022-10-26 NOTE — TELEPHONE ENCOUNTER
GHANSHYAM contacted patient to follow-up on Primary Care First referral from PCP to assist pt with PAP for Eliquis. GHANSHYAM spoke with pt's wife to explain how spoke with [facility] PAP to inquire about pt's application to obtain Eliquis through PAP. GHANSHYAM informed pt's wife about how PAP informed SW pt does not qualify because his out-of-pocket medication expenses do not exceed 3% of his income. SW explained to wife how pt's PCP was made aware and will follow-up with pt. GHANSHYAM plans to continue to assist pt as needed if there are any other PAP's needs.

## 2022-10-26 NOTE — PROGRESS NOTES
Othella Merlin (:  1950) is a 67 y.o. male,Established patient, here for evaluation of the following chief complaint(s):  Diabetes         ASSESSMENT/PLAN:  1. Type 2 diabetes mellitus with microalbuminuria, with long-term current use of insulin (HCC)  -     POCT glycosylated hemoglobin (Hb A1C): improved but still above goal, will increase lantus, weight based to 24 U. Keep novolog the same and decrease glipizide to decrease any hypoglycemic episodes. Ideally, I would like to take him off of glipizde due to age if possible. We have tried better medications in the past but he had side effects or could not afford medication at the time  -     POCT microalbumin: negative  -     Diabetic foot exam: normal range  -     insulin glargine (LANTUS SOLOSTAR) 100 UNIT/ML injection pen; Inject 24 Units into the skin nightly, Disp-21.6 mL, R-0Normal  -     insulin aspart (NOVOLOG FLEXPEN) 100 UNIT/ML injection pen; Inject 8 Units into the skin 3 times daily (before meals), Disp-21.6 mL, R-0Normal  -     glipiZIDE (GLUCOTROL XL) 2.5 MG extended release tablet; Take 1 tablet by mouth daily, Disp-90 tablet, R-1Normal  -     HI NUTRITIONAL COUNSELING, DIET  2. 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 Continue with lantus, novolog and glipizide  -     POCT microalbumin  -     insulin glargine (LANTUS SOLOSTAR) 100 UNIT/ML injection pen; Inject 24 Units into the skin nightly, Disp-21.6 mL, R-0Normal  -     insulin aspart (NOVOLOG FLEXPEN) 100 UNIT/ML injection pen; Inject 8 Units into the skin 3 times daily (before meals), Disp-21.6 mL, R-0Normal  -     glipiZIDE (GLUCOTROL XL) 2.5 MG extended release tablet; Take 1 tablet by mouth daily, Disp-90 tablet, R-1Normal  -     HI NUTRITIONAL COUNSELING, DIET  3.  Morbid obesity ([facility]       -   I spent greater than 10 minutes discussing diet and lifestyle modification to improve weight. He is aware that weight is a factor in his diabetes and a risk factor for complications  4. Need for influenza vaccination  -     Influenza, FLUAD, (age 72 y+), IM, Preservative Free, 0.5 mL    Return in about 3 months (around 1/26/2023) for DM. Subjective   SUBJECTIVE/OBJECTIVE:  HPI  DM:  Current medication: glipizide, novolog and lantus  Last A1C was 9.1%  Side effects: none  Home fasting glucose: 200 this morning, wearing the free style max  S/S of abnormal glucose: occasional low blood sugar readings  Known complications: hyperglycemia and neuropathy, retinopathy  Due for microalbuminuria  Due for diabetic foot exam  Diet:high carb diet, standard American Diet  Exercise: likes to stay busy  Weight: gaining weight      Due for lipid panel- in the system    Review of Systems   Constitutional:  Negative for activity change, appetite change, fatigue and unexpected weight change. Eyes:  Negative for visual disturbance. Respiratory:  Negative for shortness of breath and wheezing. Cardiovascular:  Negative for chest pain, palpitations and leg swelling. Endocrine: Negative for polydipsia, polyphagia and polyuria. Musculoskeletal:  Positive for arthralgias and myalgias. Skin:  Negative for pallor. Neurological:  Negative for dizziness, weakness and numbness. Objective   Physical Exam  Vitals reviewed. Constitutional:       Appearance: Normal appearance. Neck:      Vascular: No carotid bruit. Cardiovascular:      Rate and Rhythm: Normal rate and regular rhythm. Heart sounds: Normal heart sounds. Pulmonary:      Effort: Pulmonary effort is normal.      Breath sounds: Normal breath sounds. Musculoskeletal:      Right lower leg: No edema. Left lower leg: No edema. Neurological:      Mental Status: He is alert and oriented to person, place, and time.       Cranial Nerves: No cranial nerve deficit. Visual inspection:  Deformity/amputation: absent  Skin lesions/pre-ulcerative calluses: absent  Edema: right- negative, left- negative    Sensory exam:  Monofilament sensation: normal  (minimum of 5 random plantar locations tested, avoiding callused areas - > 1 area with absence of sensation is + for neuropathy)    Plus at least one of the following:  Pulses: normal          An electronic signature was used to authenticate this note.     --JENNIFER Jesus

## 2022-10-26 NOTE — TELEPHONE ENCOUNTER
We can try to switch to Xarelto but there is no guarantee that this would be covered better.  If it is not we could start him on coumadin but he would need to see the pharmacist for this every few weeks as discussed at his appointment, please see what they would like to do

## 2022-10-27 LAB
CHOLESTEROL, TOTAL: 174 MG/DL (ref 0–199)
HDLC SERPL-MCNC: 56 MG/DL (ref 40–60)
LDL CHOLESTEROL CALCULATED: 99 MG/DL
TRIGL SERPL-MCNC: 96 MG/DL (ref 0–150)
VLDLC SERPL CALC-MCNC: 19 MG/DL

## 2022-10-28 ENCOUNTER — APPOINTMENT (OUTPATIENT)
Dept: GENERAL RADIOLOGY | Age: 72
End: 2022-10-28
Payer: MEDICARE

## 2022-10-28 ENCOUNTER — APPOINTMENT (OUTPATIENT)
Dept: CT IMAGING | Age: 72
End: 2022-10-28
Payer: MEDICARE

## 2022-10-28 ENCOUNTER — HOSPITAL ENCOUNTER (EMERGENCY)
Age: 72
Discharge: HOME | End: 2022-10-29
Attending: EMERGENCY MEDICINE
Payer: MEDICARE

## 2022-10-28 VITALS
TEMPERATURE: 98.3 F | HEART RATE: 75 BPM | SYSTOLIC BLOOD PRESSURE: 125 MMHG | OXYGEN SATURATION: 93 % | RESPIRATION RATE: 18 BRPM | DIASTOLIC BLOOD PRESSURE: 55 MMHG

## 2022-10-28 DIAGNOSIS — S83.92XA SPRAIN OF LEFT KNEE, UNSPECIFIED LIGAMENT, INITIAL ENCOUNTER: Primary | ICD-10-CM

## 2022-10-28 PROCEDURE — 99284 EMERGENCY DEPT VISIT MOD MDM: CPT

## 2022-10-28 PROCEDURE — 73700 CT LOWER EXTREMITY W/O DYE: CPT

## 2022-10-28 PROCEDURE — 6370000000 HC RX 637 (ALT 250 FOR IP): Performed by: EMERGENCY MEDICINE

## 2022-10-28 PROCEDURE — 73560 X-RAY EXAM OF KNEE 1 OR 2: CPT

## 2022-10-28 PROCEDURE — 73502 X-RAY EXAM HIP UNI 2-3 VIEWS: CPT

## 2022-10-28 RX ORDER — OXYCODONE HYDROCHLORIDE AND ACETAMINOPHEN 5; 325 MG/1; MG/1
1 TABLET ORAL ONCE
Status: COMPLETED | OUTPATIENT
Start: 2022-10-28 | End: 2022-10-28

## 2022-10-28 RX ADMIN — OXYCODONE AND ACETAMINOPHEN 1 TABLET: 5; 325 TABLET ORAL at 22:03

## 2022-10-28 ASSESSMENT — PAIN DESCRIPTION - DESCRIPTORS
DESCRIPTORS: ACHING
DESCRIPTORS: ACHING

## 2022-10-28 ASSESSMENT — PAIN SCALES - GENERAL
PAINLEVEL_OUTOF10: 7
PAINLEVEL_OUTOF10: 10
PAINLEVEL_OUTOF10: 10

## 2022-10-28 ASSESSMENT — PAIN DESCRIPTION - LOCATION
LOCATION: KNEE
LOCATION: KNEE

## 2022-10-28 ASSESSMENT — PAIN DESCRIPTION - ORIENTATION
ORIENTATION: LEFT
ORIENTATION: LEFT

## 2022-10-28 ASSESSMENT — PAIN - FUNCTIONAL ASSESSMENT: PAIN_FUNCTIONAL_ASSESSMENT: 0-10

## 2022-10-28 ASSESSMENT — PAIN DESCRIPTION - PAIN TYPE: TYPE: ACUTE PAIN

## 2022-10-29 RX ORDER — HYDROCODONE BITARTRATE AND ACETAMINOPHEN 5; 325 MG/1; MG/1
1 TABLET ORAL EVERY 6 HOURS PRN
Qty: 12 TABLET | Refills: 0 | Status: SHIPPED | OUTPATIENT
Start: 2022-10-29 | End: 2022-11-01

## 2022-10-29 NOTE — ED PROVIDER NOTES
Emergency Department Provider Note  Location: [address] [place] [address]  ED  10/28/2022     Patient Identification  Nila Sena is a 67 y.o. male    Chief Complaint  Fall (Per EMS trip and fall, landing on left knee. States unable to ambulate. +CMS. Denies hitting head or LOC) and Knee Injury      Mode of Arrival  EMS    HPI  (History provided by patient)  This is a 67 y.o. male with a PMH significant for DM, CAD, HTN  presented today for left knee pain. Patient states there is a step up between his living room into his dining room. He tripped over that this evening. He landed on his left knee. He was not able to get up on his own or put weight on his knee since. Attempt to move the leg makes the pain worse. He denies hitting his head. No LOC. He denies injury anywhere else. Patient rates his pain 10/10 intensity. He cannot think of anything that makes the pain better. He denies numbness or tingling distally. ROS  Review of Systems   Musculoskeletal:  Positive for arthralgias. Skin:  Negative for wound. Neurological:  Negative for syncope, weakness, numbness and headaches. I have reviewed the following nursing documentation:  Allergies: Allergies   Allergen Reactions    Clonidine     Trulicity [Dulaglutide]      nausea    Codeine Nausea And Vomiting       Past medical history:  has a past medical history of Acid reflux, Yan's esophagus, Coronary artery disease of native heart with stable angina pectoris ([facility] (5/30/2019), Diabetes mellitus ([facility] [address] Diabetic autonomic neuropathy associated with type 2 diabetes mellitus ([facility] [address] (3/3/2020), Hyperlipidemia, Hypertension, Influenza A (02/05/2020), Pancreatitis (1996), Restless legs, Sleep apnea, and Tremor. Past surgical history:  has a past surgical history that includes Pancreas surgery; Cholecystectomy; Tonsillectomy; Worth tooth extraction; Colonoscopy (10/26/2011); Cataract removal (Bilateral, 2013);  Upper gastrointestinal endoscopy (10/04/2016); Hydrocele surgery (11/15/2016); Endoscopy, colon, diagnostic; Upper gastrointestinal endoscopy (03/16/2017); Upper gastrointestinal endoscopy (06/26/2017); Upper gastrointestinal endoscopy (09/06/2017); Upper gastrointestinal endoscopy (12/22/2017); Upper gastrointestinal endoscopy (N/A, 12/4/2018); Upper gastrointestinal endoscopy (2/19/2019); Upper gastrointestinal endoscopy (N/A, 6/11/2019); Upper gastrointestinal endoscopy (N/A, 8/13/2019); and Abdomen surgery. Home medications:   Prior to Admission medications    Medication Sig Start Date End Date Taking? Authorizing Provider   insulin glargine (LANTUS SOLOSTAR) 100 UNIT/ML injection pen Inject 24 Units into the skin nightly 10/26/22 1/24/23  JENNIFER Ferguson   insulin aspart (NOVOLOG FLEXPEN) 100 UNIT/ML injection pen Inject 8 Units into the skin 3 times daily (before meals) 10/26/22 1/24/23  JENNIFER Ferguson   glipiZIDE (GLUCOTROL XL) 2.5 MG extended release tablet Take 1 tablet by mouth daily 10/26/22   JENNIFER Ferguson   pramipexole (MIRAPEX) 0.5 MG tablet Take 1 tablet by mouth nightly 10/10/22 1/8/23  JENNIFER Ferguson   apixaban (ELIQUIS) 5 MG TABS tablet Take 1 tablet by mouth 2 times daily  Patient not taking: Reported on 10/26/2022 10/6/22   JENNIFER Ferguson   dilTIAZem (CARDIZEM CD) 120 MG extended release capsule Take 1 capsule by mouth nightly 10/4/22   KEITH Hernandez MD   pantoprazole (PROTONIX) 40 MG tablet TAKE 1 TABLET BY MOUTH ONCE DAILY 9/3/22   Historical Provider, MD   Insulin Pen Needle 31G X 8 MM MISC 1 each by Does not apply route 4 times daily 7/19/22   Arsalan Khanna PA   lisinopril (PRINIVIL;ZESTRIL) 10 MG tablet Take 1 tablet by mouth in the morning. 7/19/22   JENNIFER Fergsuon   atorvastatin (LIPITOR) 20 MG tablet Take 1 tablet by mouth in the morning.  7/19/22   JENNIFER Ferguson   Continuous Blood Gluc  (FREESTYLE JUSTIN 14 DAY READER) OLI 1 Units by Does not apply route as needed (as needed) 7/5/22   JENNIFER Maier   Continuous Blood Gluc Sensor (FREESTYLE JUSTIN 14 DAY SENSOR) MISC 1 Units by Does not apply route every 14 days 7/5/22   JENNIFER Maier   dicyclomine (BENTYL) 10 MG capsule Take 1 capsule by mouth every 6 hours as needed (cramps) 6/1/22   Kiah Perez APRN - CNP   aspirin 81 MG chewable tablet Take 1 tablet by mouth daily 3/26/19   Susana Calixto MD   Insulin Syringe-Needle U-100 30G X 1/2\" 0.5 ML MISC Inject insulin 3 times daily 9/12/17   Historical Provider, MD       Social history:  reports that he has never smoked. He has never used smokeless tobacco. He reports that he does not drink alcohol and does not use drugs. Family history:    Family History   Problem Relation Age of Onset    Kidney Disease Mother     Cancer Father 76        pancreas    Cancer Brother         lung    Cancer Paternal Grandfather         colon       Exam  ED Triage Vitals [10/28/22 2108]   BP Temp Temp Source Heart Rate Resp SpO2 Height Weight   (!) 149/70 98.3 °F (36.8 °C) Oral 73 18 98 % -- --   Physical Exam  Vitals and nursing note reviewed. Constitutional:       General: He is not in acute distress. Appearance: Normal appearance. He is well-developed. He is not diaphoretic. HENT:      Head: Normocephalic and atraumatic. Eyes:      General: No scleral icterus. Right eye: No discharge. Left eye: No discharge. Neck:      Trachea: No tracheal deviation. Cardiovascular:      Pulses:           Dorsalis pedis pulses are 2+ on the left side. Posterior tibial pulses are 2+ on the left side. Pulmonary:      Effort: Pulmonary effort is normal. No respiratory distress. Breath sounds: No stridor. Musculoskeletal:      Right hip: No tenderness. Left hip: No tenderness. Right knee: No tenderness. Left knee: No deformity. Decreased range of motion.  Tenderness (diffusely tender but posterior aspect of the knee is the most tender area) Reason for exam:->unable to bear weight. significant pain s/p fall. Decision Support Exception - unselect if not a suspected or confirmed emergency medical condition->Emergency Medical Condition (MA) Reason for Exam: fell tonight. severe knee pain FINDINGS: Bones: No evidence of acute fracture or dislocation. No aggressive appearing osseous abnormality or periostitis. Soft Tissue: Soft tissue edema anterior to the knee likely correlating with a contusion from recent fall. Joint: Trace joint effusion. Chondrocalcinosis in the lateral compartment with mild joint space narrowing medially and mild spurring at the anterior patella. 1. No evidence of fracture. 2. Mild degenerative changes appear chronic within the knee. 3. Soft tissue contusion anterior to the knee likely from recent fall. XR HIP 2-3 VW W PELVIS LEFT    Result Date: 10/29/2022  EXAMINATION: ONE XRAY VIEW OF THE PELVIS AND TWO XRAY VIEWS LEFT HIP 10/28/2022 11:33 pm COMPARISON: None. HISTORY: ORDERING SYSTEM PROVIDED HISTORY: patient fell, now hip hurts too. unable to bear weight TECHNOLOGIST PROVIDED HISTORY: Reason for exam:->patient fell, now hip hurts too. unable to bear weight Reason for Exam: fall with left sided leg and hip pain, unable to bear weight FINDINGS: Frontal pelvis view demonstrates no acute hip fractures or malalignment. Acetabuli and pubic rami appear unremarkable. Specific view of the left hip demonstrates no evidence of acute fractures. No evidence of left hip fracture or dislocation. Single pelvic view appears unremarkable. - Patient seen and evaluated in room 5.  67 y.o. male presented for left knee pain s/p mechanical fall. Patient has severe pain in the left knee and difficulty bearing weight. Here, he is neurovascularly intact on exam.  While in the ED, he started to complain of some pain in the hip as well so I added a x-ray of the hip which was also negative for acute fracture.   Due to how tender the knee was and that patient had concern about us doing a ambulation trial, we agreed to a CT of the left knee for better evaluation of a possible occult fracture missed on x-ray. CT of the knee also showed no evidence of fracture. I explained to the patient that ligamentous or meniscus injury can be very painful and does not show up on x-ray or CT.  -After CT, patient was reassured. Knee brace applied and patient was observed walking well with the knee brace. A walker was provided out of precaution but patient could walk without it. - I discussed the results with patient and spouse/SO. We agreed to discharge home with f/u with ortho. - Return precautions also discussed. patient verbalized understanding of care plan and agreed to follow-up with ortho as advised. I estimate there is LOW risk for ACUTE FRACTURE OR DISLOCATION, COMPARTMENT SYNDROME, DEEP VENOUS THROMBOSIS, SEPTIC ARTHRITIS, TENDON OR NEUROVASCULAR INJURY, thus I consider the discharge disposition reasonable. Fan Garrett and I have discussed the diagnosis and risks, and we agree with discharging home to follow-up with ortho. We also discussed returning to the Emergency Department immediately if new or worsening symptoms occur. We have discussed the symptoms which are most concerning (e.g., changing or worsening pain, numbness, weakness) that necessitate immediate return. - Is this patient to be included in the SEP-1 Core Measure due to severe sepsis or septic shock? No   Exclusion criteria - the patient is NOT to be included for SEP-1 Core Measure due to: Infection is not suspected    Clinical Impression:  1. Sprain of left knee, unspecified ligament, initial encounter          Disposition:  Discharge to home in good condition. Blood pressure (!) 125/55, pulse 75, temperature 98.3 °F (36.8 °C), temperature source Oral, resp. rate 18, SpO2 93 %. Patient was given scripts for the following medications.  I counseled patient how to take these medications. New Prescriptions    HYDROCODONE-ACETAMINOPHEN (NORCO) 5-325 MG PER TABLET    Take 1 tablet by mouth every 6 hours as needed for Pain for up to 3 days. Intended supply: 3 days. Take lowest dose possible to manage pain       Disposition referral (if applicable):  Shala Merino MD  [facility] Jaylen Mckeon [address]  [phone]    Schedule an appointment as soon as possible for a visit         Total critical care time is 0 minutes, which excludes separately billable procedures and updating family. Time spent is specifically for management of the presenting complaint and symptoms initially, direct bedside care, reevaluation, review of records, and consultation. There was a high probability of clinically significant life-threatening deterioration in the patient's condition, which required my urgent intervention. This chart was generated in part by using Dragon Dictation system and may contain errors related to that system including errors in grammar, punctuation, and spelling, as well as words and phrases that may be inappropriate. If there are any questions or concerns please feel free to contact the dictating provider for clarification.      Kenyatta Suresh MD  [facility] Layla Gee MD  10/30/22 8544

## 2022-10-31 ENCOUNTER — TELEPHONE (OUTPATIENT)
Dept: ORTHOPEDIC SURGERY | Age: 72
End: 2022-10-31

## 2022-10-31 ENCOUNTER — CARE COORDINATION (OUTPATIENT)
Dept: CARE COORDINATION | Age: 72
End: 2022-10-31

## 2022-11-01 ENCOUNTER — CARE COORDINATION (OUTPATIENT)
Dept: CARE COORDINATION | Age: 72
End: 2022-11-01

## 2022-11-01 ENCOUNTER — OFFICE VISIT (OUTPATIENT)
Dept: ORTHOPEDIC SURGERY | Age: 72
End: 2022-11-01
Payer: MEDICARE

## 2022-11-01 VITALS — WEIGHT: 275 LBS | BODY MASS INDEX: 39.37 KG/M2 | HEIGHT: 70 IN

## 2022-11-01 DIAGNOSIS — S80.02XA CONTUSION OF LEFT KNEE, INITIAL ENCOUNTER: Primary | ICD-10-CM

## 2022-11-01 DIAGNOSIS — M25.462 EFFUSION, LEFT KNEE: ICD-10-CM

## 2022-11-01 PROCEDURE — 99203 OFFICE O/P NEW LOW 30 MIN: CPT | Performed by: ORTHOPAEDIC SURGERY

## 2022-11-01 PROCEDURE — 20610 DRAIN/INJ JOINT/BURSA W/O US: CPT | Performed by: ORTHOPAEDIC SURGERY

## 2022-11-01 PROCEDURE — G8427 DOCREV CUR MEDS BY ELIG CLIN: HCPCS | Performed by: ORTHOPAEDIC SURGERY

## 2022-11-01 PROCEDURE — G8484 FLU IMMUNIZE NO ADMIN: HCPCS | Performed by: ORTHOPAEDIC SURGERY

## 2022-11-01 PROCEDURE — G8417 CALC BMI ABV UP PARAM F/U: HCPCS | Performed by: ORTHOPAEDIC SURGERY

## 2022-11-01 RX ORDER — MELOXICAM 15 MG/1
15 TABLET ORAL DAILY
Qty: 30 TABLET | Refills: 0 | Status: SHIPPED | OUTPATIENT
Start: 2022-11-01

## 2022-11-01 RX ORDER — TRIAMCINOLONE ACETONIDE 40 MG/ML
40 INJECTION, SUSPENSION INTRA-ARTICULAR; INTRAMUSCULAR ONCE
Status: COMPLETED | OUTPATIENT
Start: 2022-11-01 | End: 2022-11-01

## 2022-11-01 RX ORDER — LIDOCAINE HYDROCHLORIDE 10 MG/ML
5 INJECTION, SOLUTION INFILTRATION; PERINEURAL ONCE
Status: COMPLETED | OUTPATIENT
Start: 2022-11-01 | End: 2022-11-01

## 2022-11-01 RX ADMIN — LIDOCAINE HYDROCHLORIDE 5 ML: 10 INJECTION, SOLUTION INFILTRATION; PERINEURAL at 09:56

## 2022-11-01 RX ADMIN — TRIAMCINOLONE ACETONIDE 40 MG: 40 INJECTION, SUSPENSION INTRA-ARTICULAR; INTRAMUSCULAR at 09:57

## 2022-11-01 NOTE — PROGRESS NOTES
[address] [facility]  Outpatient Progress Note  Nish Ordonez MD    Patient Name: Zulma Olivares MRN: [id number]   Age: 67 y.o. YOB: 1950   Sex: male      [address] Complaint   Patient presents with    Knee Pain         HISTORY OF PRESENT ILLNESS   Zulma Olivares is a 67 y.o. male Patient states there is a step up between his living room into his dining room. He tripped over that the evening of October 28, 2022. He landed on his left knee. He was not able to get up on his own or put weight on his knee since. He was seen in the emergency department. X-rays and a CT scan of the knee were negative for fracture. He was discharged with a knee immobilizer and crutches. He presents the office today for further evaluation and treatment recommendations. He denies previous difficulty with the knee. He is referred by Dr. Carlos Miller for orthopedic consultation.       PAST MEDICAL HISTORY      Past Medical History:   Diagnosis Date    Acid reflux     Yan's esophagus     Coronary artery disease of native heart with stable angina pectoris ([facility] [address] 5/30/2019    Diabetes mellitus ([facility] [address]     Diabetic autonomic neuropathy associated with type 2 diabetes mellitus ([facility] [address] 3/3/2020    Hyperlipidemia     Hypertension     Influenza A 02/05/2020    Pancreatitis 1996    Restless legs     Sleep apnea     uses CPAP    Tremor     of bilateral hands       PAST SURGICAL HISTORY     Past Surgical History:   Procedure Laterality Date    ABDOMEN SURGERY      CATARACT REMOVAL Bilateral 2013    CHOLECYSTECTOMY      COLONOSCOPY  10/26/2011    ENDOSCOPY, COLON, DIAGNOSTIC      EGD halo    HYDROCELE EXCISION  11/15/2016    PANCREAS SURGERY      cyst opened up and drining into small bowel    TONSILLECTOMY      UPPER GASTROINTESTINAL ENDOSCOPY  10/04/2016    with biopsy    UPPER GASTROINTESTINAL ENDOSCOPY  03/16/2017    Halo ablation    UPPER GASTROINTESTINAL ENDOSCOPY  06/26/2017    Halo ablation UPPER GASTROINTESTINAL ENDOSCOPY  09/06/2017    bx distal johnus    UPPER GASTROINTESTINAL ENDOSCOPY  12/22/2017    with HALO  procedure    UPPER GASTROINTESTINAL ENDOSCOPY N/A 12/4/2018    EGD WITH ABLATION AND ANESTHESIA - HALO---SLEEP APNEA---  performed by Romaine Watkins MD at [place] [address]  2/19/2019    EGD BIOPSY performed by Romaine Watkins MD at [place] [address] 6/11/2019    EGD WITH HALO AND ANESTHESIA performed by Romaine Watkins MD at [place] [address] N/A 8/13/2019    ESOPHAGOGASTRODUODENOSCOPY WITH HALO AND ANESTHESIA -SLEEP APNEA- performed by Romaine Watkins MD at [address] [place] [address]     Current Outpatient Medications   Medication Sig Dispense Refill    meloxicam (MOBIC) 15 MG tablet Take 1 tablet by mouth daily 30 tablet 0    HYDROcodone-acetaminophen (NORCO) 5-325 MG per tablet Take 1 tablet by mouth every 6 hours as needed for Pain for up to 3 days. Intended supply: 3 days. Take lowest dose possible to manage pain 12 tablet 0    insulin glargine (LANTUS SOLOSTAR) 100 UNIT/ML injection pen Inject 24 Units into the skin nightly 21.6 mL 0    insulin aspart (NOVOLOG FLEXPEN) 100 UNIT/ML injection pen Inject 8 Units into the skin 3 times daily (before meals) 21.6 mL 0    glipiZIDE (GLUCOTROL XL) 2.5 MG extended release tablet Take 1 tablet by mouth daily 90 tablet 1    pramipexole (MIRAPEX) 0.5 MG tablet Take 1 tablet by mouth nightly 90 tablet 1    dilTIAZem (CARDIZEM CD) 120 MG extended release capsule Take 1 capsule by mouth nightly 90 capsule 3    pantoprazole (PROTONIX) 40 MG tablet TAKE 1 TABLET BY MOUTH ONCE DAILY      Insulin Pen Needle 31G X 8 MM MISC 1 each by Does not apply route 4 times daily 200 each 3    lisinopril (PRINIVIL;ZESTRIL) 10 MG tablet Take 1 tablet by mouth in the morning.  90 tablet 1    Continuous Blood Gluc  (FREESTYLE JUSTIN 14 DAY READER) OLI 1 Units by Does not apply route as needed (as needed) 1 each 0    Continuous Blood Gluc Sensor (FREESTYLE JUSTIN 14 DAY SENSOR) MISC 1 Units by Does not apply route every 14 days 2 each 11    dicyclomine (BENTYL) 10 MG capsule Take 1 capsule by mouth every 6 hours as needed (cramps) 20 capsule 0    aspirin 81 MG chewable tablet Take 1 tablet by mouth daily 30 tablet 3    Insulin Syringe-Needle U-100 30G X 1/2\" 0.5 ML MISC Inject insulin 3 times daily      apixaban (ELIQUIS) 5 MG TABS tablet Take 1 tablet by mouth 2 times daily (Patient not taking: No sig reported) 180 tablet 1    atorvastatin (LIPITOR) 20 MG tablet Take 1 tablet by mouth in the morning. 90 tablet 1     No current facility-administered medications for this visit.        ALLERGIES     Allergies   Allergen Reactions    Clonidine     Trulicity [Dulaglutide]      nausea    Codeine Nausea And Vomiting       FAMILY HISTORY     Family History   Problem Relation Age of Onset    Kidney Disease Mother     Cancer Father 76        pancreas    Cancer Brother         lung    Cancer Paternal Grandfather         colon       SOCIAL HISTORY     Social History     Socioeconomic History    Marital status:      Spouse name: None    Number of children: None    Years of education: None    Highest education level: None   Occupational History    Occupation:      Comment: maintenance   Tobacco Use    Smoking status: Never    Smokeless tobacco: Never   Vaping Use    Vaping Use: Never used   Substance and Sexual Activity    Alcohol use: No    Drug use: No    Sexual activity: Yes     Social Determinants of Health     Financial Resource Strain: Low Risk     Difficulty of Paying Living Expenses: Not hard at all   Food Insecurity: No Food Insecurity    Worried About Running Out of Food in the Last Year: Never true    [address] in the Last Year: Never true   Transportation Needs: No Transportation Needs    Lack of Transportation (Medical): No    Lack of Transportation (Non-Medical): No   Housing Stability: Low Risk     Unable to Pay for Housing in the Last Year: No    Number of Places Lived in the Last Year: 0    Unstable Housing in the Last Year: No       REVIEW OF SYSTEMS   General: no fever, chills, night sweats, anorexia, malaise, fatigue, or weight change  Hematologic:  no unexplained bleeding or bruising  HEENT:   no nasal congestion, rhinorrhea, sore throat, or facial pain  Respiratory:  no cough, dyspnea, or chest pain  Cardiovascular:  no angina, FREEMAN, PND, orthopnea, dependent edema, or palpitations  Gastrointestinal:  no nausea, vomiting, diarrhea, constipation, or abdominal pain  Genitourinary:  no urinary urgency, frequency, dysuria, or hematuria  Musculoskeletal: see HPI  Endocrine:  no heat or cold intolerance and no polyphagia, polydipsia, or polyuria  Skin:  no skin eruptions or changing lesions  Neurologic:  no focal weakness, numbness/tingling, tremor, or severe headache. See HPI. See HPI for pertinent positives. PHYSICAL EXAM   Vital Signs: Ht 5' 10\" (1.778 m)   Wt 275 lb (124.7 kg)   BMI 39.46 kg/m²     General appearance: healthy, alert, no distress  Skin: Skin color, texture, turgor normal. No rashes or lesions  HEENT: atraumatic, normocephalic. PERRL  Respiratory: Unlabored breathing  Lymphatic: No adenopathy   Neuro: Alert and oriented, normal distal sensation, normal bilateral DTRs  Vascular: Normal distal capillary and distal pulses  Muskuloskeletal Exam:     Left Knee Examination    Inspection: Inspection of the knee reveals no swelling, ecchymosis or deformity. Palpation: No evidence of crepitus, effusion, or tenderness. and There is a moderate knee effusion. Range of Motion: 0-90    Strength: Hamstrings rated: 5/5. Quadriceps rated: 5/5.     Special Tests: Drawer, Lachman, Mata, Patellar Apprehension, Patellar Compression, Pivot shift, Valgus & Varus tests are normal.     Gait: Gait is antalgic favoring the affected side. Additional Comments:     RADIOLOGY   X-rays obtained and reviewed in office:  Views view radiograph of the left knee taken October 28, 2022 revealed minimal degenerative changes. No acute bony abnormality. CT scan of the left knee taken October 28, 2022 was negative for acute bony abnormality. There is some mild anterior soft tissue edema consistent with fusion from the recent fall    IMPRESSION     1. Contusion of left knee, initial encounter    2. Effusion, left knee         PLAN   I had a lengthy discussion with patient today regarding diagnosis and treatment options and recommendations. Recommend we proceed with left knee aspiration and injection of corticosteroid today for symptom control. PROCEDURE     Knee aspiration and injection:   The patient consented to the procedure and a time out was performed before proceeding. The skin was prepped in normal sterile fashion. 3 mL of 1% xylocaine was used for superficial anesthesia. A 2 inch, 18 gauge needle was advanced into the left knee. 30 ml of clear-yellow viscous fluid was aspirated from the joint. After sterile syringe transfer, 1 mL of 40mg Depomedrol was injected, with the injection flowing freely. The patient tolerated the procedure well and post-injection expectations were reviewed. A compressive bandage was placed. Post injection expectations were reviewed with the patient. Following the aspiration and the patient reported his knee pain was resolved. He is able to stand and ambulate comfortably. FOLLOWUP     Return if symptoms worsen or fail to improve.     Orders Placed This Encounter   Procedures    69056 - UT DRAIN/INJECT LARGE JOINT/BURSA     ASPIRATION INJECTION LT KNEE        Orders Placed This Encounter   Medications    lidocaine 1 % injection 5 mL    triamcinolone acetonide (KENALOG-40) injection 40 mg    meloxicam (MOBIC) 15 MG tablet     Sig: Take 1 tablet by mouth daily Dispense:  30 tablet     Refill:  0         Patient was instructed on appropriate use of braces, participation in home exercise programs, healthy lifestyle choices and weight loss as appropriate     Margarita Vela MD

## 2022-11-08 ENCOUNTER — CARE COORDINATION (OUTPATIENT)
Dept: CARE COORDINATION | Age: 72
End: 2022-11-08

## 2022-11-08 NOTE — CARE COORDINATION
ACM attempted to contact patient but not available. Patient voicemail was full. ACM will make one final outreach attempt at a later date.

## 2022-11-17 ENCOUNTER — OFFICE VISIT (OUTPATIENT)
Dept: PULMONOLOGY | Age: 72
End: 2022-11-17
Payer: MEDICARE

## 2022-11-17 VITALS
WEIGHT: 271 LBS | TEMPERATURE: 98.3 F | HEIGHT: 70 IN | SYSTOLIC BLOOD PRESSURE: 124 MMHG | OXYGEN SATURATION: 96 % | RESPIRATION RATE: 16 BRPM | DIASTOLIC BLOOD PRESSURE: 70 MMHG | HEART RATE: 56 BPM | BODY MASS INDEX: 38.8 KG/M2

## 2022-11-17 DIAGNOSIS — G47.10 HYPERSOMNOLENCE: ICD-10-CM

## 2022-11-17 DIAGNOSIS — E66.2 CLASS 2 OBESITY WITH ALVEOLAR HYPOVENTILATION, SERIOUS COMORBIDITY, AND BODY MASS INDEX (BMI) OF 38.0 TO 38.9 IN ADULT (HCC): ICD-10-CM

## 2022-11-17 DIAGNOSIS — G47.33 OSA (OBSTRUCTIVE SLEEP APNEA): Primary | ICD-10-CM

## 2022-11-17 PROCEDURE — G8417 CALC BMI ABV UP PARAM F/U: HCPCS | Performed by: INTERNAL MEDICINE

## 2022-11-17 PROCEDURE — 1123F ACP DISCUSS/DSCN MKR DOCD: CPT | Performed by: INTERNAL MEDICINE

## 2022-11-17 PROCEDURE — G8484 FLU IMMUNIZE NO ADMIN: HCPCS | Performed by: INTERNAL MEDICINE

## 2022-11-17 PROCEDURE — 3017F COLORECTAL CA SCREEN DOC REV: CPT | Performed by: INTERNAL MEDICINE

## 2022-11-17 PROCEDURE — 3078F DIAST BP <80 MM HG: CPT | Performed by: INTERNAL MEDICINE

## 2022-11-17 PROCEDURE — 99204 OFFICE O/P NEW MOD 45 MIN: CPT | Performed by: INTERNAL MEDICINE

## 2022-11-17 PROCEDURE — G8427 DOCREV CUR MEDS BY ELIG CLIN: HCPCS | Performed by: INTERNAL MEDICINE

## 2022-11-17 PROCEDURE — 1036F TOBACCO NON-USER: CPT | Performed by: INTERNAL MEDICINE

## 2022-11-17 PROCEDURE — 3074F SYST BP LT 130 MM HG: CPT | Performed by: INTERNAL MEDICINE

## 2022-11-17 ASSESSMENT — SLEEP AND FATIGUE QUESTIONNAIRES
HOW LIKELY ARE YOU TO NOD OFF OR FALL ASLEEP WHILE WATCHING TV: 3
HOW LIKELY ARE YOU TO NOD OFF OR FALL ASLEEP WHILE SITTING QUIETLY AFTER LUNCH WITHOUT ALCOHOL: 2
ESS TOTAL SCORE: 10
NECK CIRCUMFERENCE (INCHES): 20.25
HOW LIKELY ARE YOU TO NOD OFF OR FALL ASLEEP WHILE LYING DOWN TO REST IN THE AFTERNOON WHEN CIRCUMSTANCES PERMIT: 3
HOW LIKELY ARE YOU TO NOD OFF OR FALL ASLEEP WHILE SITTING AND TALKING TO SOMEONE: 0
HOW LIKELY ARE YOU TO NOD OFF OR FALL ASLEEP IN A CAR, WHILE STOPPED FOR A FEW MINUTES IN TRAFFIC: 0
HOW LIKELY ARE YOU TO NOD OFF OR FALL ASLEEP WHILE SITTING AND READING: 1
HOW LIKELY ARE YOU TO NOD OFF OR FALL ASLEEP WHEN YOU ARE A PASSENGER IN A CAR FOR AN HOUR WITHOUT A BREAK: 0
HOW LIKELY ARE YOU TO NOD OFF OR FALL ASLEEP WHILE SITTING INACTIVE IN A PUBLIC PLACE: 1

## 2022-11-17 ASSESSMENT — ENCOUNTER SYMPTOMS
GASTROINTESTINAL NEGATIVE: 1
ALLERGIC/IMMUNOLOGIC NEGATIVE: 1
RESPIRATORY NEGATIVE: 1
EYES NEGATIVE: 1

## 2022-11-17 NOTE — PATIENT INSTRUCTIONS
Remember to bring a list of pulmonary medications and any CPAP or BiPAP machines to your next appointment with the office. Please keep all of your future appointments scheduled by [address], [facility] office. Out of respect for other patients and providers, you may be asked to reschedule your appointment if you arrive later than your scheduled appointment time. Appointments cancelled less than 24hrs in advance will be considered a no show. Patients with three missed appointments within 1 year or four missed appointments within 2 years can be dismissed from the practice. Please be aware that our physicians are required to work in the Intensive Care Unit at [facility].  Your appointment may need to be rescheduled if they are designated to work during your appointment time. You may receive a survey regarding the care you received during your visit. Your input is valuable to us. We encourage you to complete and return your survey. We hope you will choose us in the future for your healthcare needs. Pt instructed of all future appointment dates & times, including radiology, labs, procedures & referrals. If procedures were scheduled preparation instructions provided. Instructions on future appointments with [facility] were given.

## 2022-11-17 NOTE — LETTER
[facility] Critical Care and Sleep  [address] [place] [id number]  Phone: [phone]  Fax: [phone]    Emily Oliver MD        November 17, 2022     Patient: David Richter   YOB: 1950   Date of Visit: 11/17/2022 11/17/22        David Richter      I have seen this patient in the office today and wanted to communicate my findings and recommendations. There are no Patient Instructions on file for this visit.                  Thank you for allowing me to assist in the care of the MD Emily Allred MD

## 2022-11-17 NOTE — PROGRESS NOTES
MA Communication: The following orders are received by verbal communication from Reina Dobbs MD    Orders include:  PSG (sleep center to call pt)       FU dependent on results.
normal.      Nose: Nose normal.      Mouth/Throat:      Mouth: Mucous membranes are moist.      Pharynx: Oropharynx is clear. Comments: Mallampati 3  Eyes:      General: No scleral icterus. Extraocular Movements: Extraocular movements intact. Conjunctiva/sclera: Conjunctivae normal.      Pupils: Pupils are equal, round, and reactive to light. Cardiovascular:      Rate and Rhythm: Normal rate and regular rhythm. Pulses: Normal pulses. Heart sounds: Normal heart sounds. No murmur heard. No friction rub. Pulmonary:      Effort: No respiratory distress. Breath sounds: No stridor. No wheezing, rhonchi or rales. Chest:      Chest wall: No tenderness. Abdominal:      General: Abdomen is flat. Bowel sounds are normal. There is no distension. Tenderness: There is no abdominal tenderness. There is no guarding. Musculoskeletal:         General: No swelling or tenderness. Normal range of motion. Cervical back: Normal range of motion and neck supple. No rigidity. Skin:     General: Skin is warm and dry. Coloration: Skin is not jaundiced. Neurological:      General: No focal deficit present. Mental Status: He is alert and oriented to person, place, and time. Mental status is at baseline. Cranial Nerves: No cranial nerve deficit. Sensory: No sensory deficit. Motor: No weakness. Gait: Gait normal.   Psychiatric:         Mood and Affect: Mood normal.         Thought Content:  Thought content normal.         Judgment: Judgment normal.            Bernardo Mckeon MD

## 2022-11-22 ENCOUNTER — CARE COORDINATION (OUTPATIENT)
Dept: CARE COORDINATION | Age: 72
End: 2022-11-22

## 2022-11-22 NOTE — CARE COORDINATION
ACM called but patient did not answer. ACM left voice message with call back number provided.  ACM will not make any further follow up calls and resolve care episode

## 2022-11-29 ENCOUNTER — OFFICE VISIT (OUTPATIENT)
Dept: ORTHOPEDIC SURGERY | Age: 72
End: 2022-11-29
Payer: MEDICARE

## 2022-11-29 DIAGNOSIS — M25.462 EFFUSION, LEFT KNEE: Primary | ICD-10-CM

## 2022-11-29 PROCEDURE — 99213 OFFICE O/P EST LOW 20 MIN: CPT | Performed by: ORTHOPAEDIC SURGERY

## 2022-11-29 PROCEDURE — G8417 CALC BMI ABV UP PARAM F/U: HCPCS | Performed by: ORTHOPAEDIC SURGERY

## 2022-11-29 PROCEDURE — G8484 FLU IMMUNIZE NO ADMIN: HCPCS | Performed by: ORTHOPAEDIC SURGERY

## 2022-11-29 PROCEDURE — 1036F TOBACCO NON-USER: CPT | Performed by: ORTHOPAEDIC SURGERY

## 2022-11-29 PROCEDURE — G8427 DOCREV CUR MEDS BY ELIG CLIN: HCPCS | Performed by: ORTHOPAEDIC SURGERY

## 2022-11-29 PROCEDURE — 3017F COLORECTAL CA SCREEN DOC REV: CPT | Performed by: ORTHOPAEDIC SURGERY

## 2022-11-29 PROCEDURE — 1123F ACP DISCUSS/DSCN MKR DOCD: CPT | Performed by: ORTHOPAEDIC SURGERY

## 2022-11-29 NOTE — PROGRESS NOTES
[facility]  Outpatient Progress Note  Leanne Sutherland MD    Patient Name: Saman Hoang MRN: [id number]   Age: 67 y.o. YOB: 1950   Sex: male      [address] Complaint   Patient presents with    Follow-up     Lt knee-states not much relief from aspiration injection 11/1/2022  Limping, medial knee pain  Rodriguez Dung 10/28/2022       HISTORY OF PRESENT ILLNESS   Saman Hoang is a 67 y.o. male   The patient presents for a follow up visit:  He reports he is still having medial sided left knee pain. Underwent aspiration of the effusion in the left knee with injection of corticosteroid about 4 weeks ago and had only minimal temporary relief of pain symptoms.     PAST MEDICAL HISTORY      Past Medical History:   Diagnosis Date    Acid reflux     Yan's esophagus     Coronary artery disease of native heart with stable angina pectoris ([address] 5/30/2019    Diabetes mellitus ([facility]     Diabetic autonomic neuropathy associated with type 2 diabetes mellitus ([facility] 3/3/2020    Hyperlipidemia     Hypertension     Influenza A 02/05/2020    Pancreatitis 1996    Restless legs     Sleep apnea     uses CPAP    Tremor     of bilateral hands       PAST SURGICAL HISTORY     Past Surgical History:   Procedure Laterality Date    ABDOMEN SURGERY      CATARACT REMOVAL Bilateral 2013    CHOLECYSTECTOMY      COLONOSCOPY  10/26/2011    ENDOSCOPY, COLON, DIAGNOSTIC      EGD halo    HYDROCELE EXCISION  11/15/2016    PANCREAS SURGERY      cyst opened up and drining into small bowel    TONSILLECTOMY      UPPER GASTROINTESTINAL ENDOSCOPY  10/04/2016    with biopsy    UPPER GASTROINTESTINAL ENDOSCOPY  03/16/2017    Halo ablation    UPPER GASTROINTESTINAL ENDOSCOPY  06/26/2017    Halo ablation    UPPER GASTROINTESTINAL ENDOSCOPY  09/06/2017    bx distal sophagus    UPPER GASTROINTESTINAL ENDOSCOPY  12/22/2017    with HALO  procedure    UPPER GASTROINTESTINAL ENDOSCOPY N/A 12/4/2018    EGD WITH ABLATION AND ANESTHESIA - HALO---SLEEP APNEA---  performed by Daniel Jiménez MD at [address] [place] [address]  2/19/2019    EGD BIOPSY performed by Daniel Jiménez MD at [address] [place] [address] 6/11/2019    EGD WITH HALO AND ANESTHESIA performed by Daniel Jiménez MD at [address] [place] [address] 8/13/2019    ESOPHAGOGASTRODUODENOSCOPY WITH HALO AND ANESTHESIA -SLEEP APNEA- performed by Daniel Jiménez MD at [address] [place] [address]     Current Outpatient Medications   Medication Sig Dispense Refill    insulin glargine (LANTUS SOLOSTAR) 100 UNIT/ML injection pen Inject 24 Units into the skin nightly 21.6 mL 0    insulin aspart (NOVOLOG FLEXPEN) 100 UNIT/ML injection pen Inject 8 Units into the skin 3 times daily (before meals) 21.6 mL 0    glipiZIDE (GLUCOTROL XL) 2.5 MG extended release tablet Take 1 tablet by mouth daily 90 tablet 1    pramipexole (MIRAPEX) 0.5 MG tablet Take 1 tablet by mouth nightly 90 tablet 1    dilTIAZem (CARDIZEM CD) 120 MG extended release capsule Take 1 capsule by mouth nightly 90 capsule 3    pantoprazole (PROTONIX) 40 MG tablet TAKE 1 TABLET BY MOUTH ONCE DAILY      Insulin Pen Needle 31G X 8 MM MISC 1 each by Does not apply route 4 times daily 200 each 3    lisinopril (PRINIVIL;ZESTRIL) 10 MG tablet Take 1 tablet by mouth in the morning. 90 tablet 1    atorvastatin (LIPITOR) 20 MG tablet Take 1 tablet by mouth in the morning.  90 tablet 1    Continuous Blood Gluc  (FREESTYLE JUSTIN 14 DAY READER) OLI 1 Units by Does not apply route as needed (as needed) 1 each 0    Continuous Blood Gluc Sensor (FREESTYLE JUSTIN 14 DAY SENSOR) MISC 1 Units by Does not apply route every 14 days 2 each 11    dicyclomine (BENTYL) 10 MG capsule Take 1 capsule by mouth every 6 hours as needed (cramps) 20 capsule 0    aspirin 81 MG chewable tablet Take 1 tablet by mouth daily 30 tablet 3    Insulin Syringe-Needle U-100 30G X 1/2\" 0.5 ML MISC Inject insulin 3 times daily      meloxicam (MOBIC) 15 MG tablet Take 1 tablet by mouth daily (Patient not taking: Reported on 11/29/2022) 30 tablet 0     No current facility-administered medications for this visit.        ALLERGIES     Allergies   Allergen Reactions    Clonidine     Trulicity [Dulaglutide]      nausea    Codeine Nausea And Vomiting       FAMILY HISTORY     Family History   Problem Relation Age of Onset    Kidney Disease Mother     Cancer Father 76        pancreas    Cancer Brother         lung    Cancer Paternal Grandfather         colon       SOCIAL HISTORY     Social History     Socioeconomic History    Marital status:    Occupational History    Occupation:      Comment: maintenance   Tobacco Use    Smoking status: Never     Passive exposure: Past    Smokeless tobacco: Never   Vaping Use    Vaping Use: Never used   Substance and Sexual Activity    Alcohol use: No    Drug use: No    Sexual activity: Yes     Social Determinants of Health     Financial Resource Strain: Low Risk     Difficulty of Paying Living Expenses: Not hard at all   Food Insecurity: No Food Insecurity    Worried About [address] in the Last Year: Never true    [address] [place] in the Last Year: Never true   Transportation Needs: No Transportation Needs    Lack of Transportation (Medical): No    Lack of Transportation (Non-Medical): No   Housing Stability: Low Risk     Unable to Pay for Housing in the Last Year: No    Number of [place] in the Last Year: 0    Unstable Housing in the Last Year: No       REVIEW OF SYSTEMS   General: no fever, chills, night sweats, anorexia, malaise, fatigue, or weight change  Hematologic:  no unexplained bleeding or bruising  HEENT:   no nasal congestion, rhinorrhea, sore throat, or facial pain  Respiratory:  no cough, dyspnea, or chest pain  Cardiovascular:  no angina, FREEMAN, PND, orthopnea, dependent edema, or palpitations  Gastrointestinal:  no nausea, vomiting, diarrhea, constipation, or abdominal pain  Genitourinary:  no urinary urgency, frequency, dysuria, or hematuria  Musculoskeletal: see HPI  Endocrine:  no heat or cold intolerance and no polyphagia, polydipsia, or polyuria  Skin:  no skin eruptions or changing lesions  Neurologic:  no focal weakness, numbness/tingling, tremor, or severe headache. See HPI. See HPI for pertinent positives. PHYSICAL EXAM   Vital Signs: There were no vitals taken for this visit. General appearance: healthy, alert, no distress  Skin: Skin color, texture, turgor normal. No rashes or lesions  HEENT: atraumatic, normocephalic. PERRL  Respiratory: Unlabored breathing  Lymphatic: No adenopathy   Neuro: Alert and oriented, normal distal sensation, normal bilateral DTRs  Vascular: Normal distal capillary and distal pulses  Muskuloskeletal Exam:   Left knee with trace effusion. Tenderness noted at the medial joint line and pain with Mata's testing. No ligamentous instability. Range of motion full extension to 125 degrees of flexion. IMPRESSION     1. Effusion, left knee           PLAN   Rule out internal derangement with an MRI scan of the left knee. FOLLOWUP     Return for test results. No orders of the defined types were placed in this encounter. No orders of the defined types were placed in this encounter.       Patient was instructed on appropriate use of braces, participation in home exercise programs, healthy lifestyle choices and weight loss as appropriate     Flaquito Wood MD

## 2022-12-05 ENCOUNTER — TELEPHONE (OUTPATIENT)
Dept: ORTHOPEDIC SURGERY | Age: 72
End: 2022-12-05

## 2022-12-05 NOTE — TELEPHONE ENCOUNTER
General Question     Subject: MRI L KNEE  Patient and /or Facility Request: PATIENT CALLING RE MRI AUTHORIZATION L KNEE.  [facility] Number:  [phone]

## 2022-12-06 NOTE — TELEPHONE ENCOUNTER
Called patient back, left message. MRI doesn't require auth. MRI order faxed.  KB Pt here today for OB follow up  Pt states no complaints   Reports +FM  Good # [phone]   Pharmacy Confirmed.  Chaperone offered and not indicated.   Pt has u/s today.  Pt states will do Labs today after OB appt.

## 2022-12-22 ENCOUNTER — SCHEDULED TELEPHONE ENCOUNTER (OUTPATIENT)
Dept: ORTHOPEDIC SURGERY | Age: 72
End: 2022-12-22

## 2022-12-22 DIAGNOSIS — S83.242D ACUTE MEDIAL MENISCUS TEAR, LEFT, SUBSEQUENT ENCOUNTER: ICD-10-CM

## 2022-12-22 DIAGNOSIS — M25.462 EFFUSION, LEFT KNEE: Primary | ICD-10-CM

## 2022-12-22 NOTE — PROGRESS NOTES
Called patient to discuss results of the MRI scan of his left knee. He reports he continues to have pain and swelling in the knee limiting his daily activities. MRI left knee       FINDINGS:  MENISCI:   Medial Meniscus: Complete radial tear of the posterior horn at the horn body junction with    detachment. Moderate meniscal body extrusion. Lateral Meniscus: Intact. LIGAMENTS:   Anterior Cruciate Ligament: Intact. Posterior Cruciate Ligament: Intact. Medial Collateral Ligament: Grade 1 sprain. Lateral Collateral Ligament: Intact. Posterolateral Corner Structures: Intact. Posteromedial Corner Structures: Intact. EXTENSOR MECHANISM:   Patellar Tendon: Intact. Distal Quadriceps Tendon: Intact. Medial Patellofemoral Ligament: Intact. Medial and Lateral Patellar Retinacula: Intact. Hoffa's Fat Pad: Mild induration. ARTICULATIONS:   Patellofemoral Compartment: High-grade chondral thinning with focal class 4 penetrating    erosions in the upper pole patellar cartilage. Class 4 erosion of the lateral trochlea. Medial Compartment: Full-thickness 1.0 x 1.1 cm chondral defect along the central    weight-bearing medial femoral condyle. Class 4 chondromalacia with multiple penetrating    weight-bearing erosions. Lateral Compartment: Moderate chondral thinning. Central Compartment: Unremarkable. Tibiofibular Articulation:  Normal.       GENERAL:   Bones: No fractures. Effusion: Small 1  joint effusion. Baker's Cyst: None. Loose Bodies: None. Soft Tissues/Other: Mild anterior prepatellar edema. CONCLUSION:   1. Complete radial tear of the medial meniscus at the posterior horn body junction with    detachment and moderate body extrusion. 2. Grade 1 MCL sprain.    3. Tricompartmental chondromalacia with full-thickness chondral defect along the central    weight-bearing medial femoral condyle. Given continued pain symptoms and failure to respond to conservative measures I believe patient may be a candidate for arthroscopy with meniscus repair versus meniscectomy and chondroplasty. I will make referral for surgical consultation with Dr. Yolanda Larsen.

## 2023-01-04 ENCOUNTER — TELEPHONE (OUTPATIENT)
Dept: FAMILY MEDICINE CLINIC | Age: 73
End: 2023-01-04

## 2023-01-04 NOTE — TELEPHONE ENCOUNTER
Patient's spouse saw Cirilo Badder today and states she was told it is ok for him to switch from Barnetta Code to Cirilo Badder. Please advise. Will call back to schedule.

## 2023-01-04 NOTE — TELEPHONE ENCOUNTER
It was his sister (she is a patient of mine), she is not on his [employer] form. If he or his wife does call to schedule him an appointment, I am fine to see him although Monique and I practice very similarly.

## 2023-01-19 ENCOUNTER — OFFICE VISIT (OUTPATIENT)
Dept: ORTHOPEDIC SURGERY | Age: 73
End: 2023-01-19

## 2023-01-19 DIAGNOSIS — S83.242D ACUTE MEDIAL MENISCUS TEAR, LEFT, SUBSEQUENT ENCOUNTER: ICD-10-CM

## 2023-01-19 DIAGNOSIS — M17.12 OSTEOARTHRITIS OF LEFT KNEE, UNSPECIFIED OSTEOARTHRITIS TYPE: Primary | ICD-10-CM

## 2023-01-19 RX ORDER — HYALURONATE SODIUM 10 MG/ML
20 SYRINGE (ML) INTRAARTICULAR ONCE
Status: COMPLETED | OUTPATIENT
Start: 2023-01-19 | End: 2023-01-19

## 2023-01-19 RX ADMIN — Medication 20 MG: at 10:34

## 2023-01-19 NOTE — PROGRESS NOTES
Chief Complaint  Knee Pain (NP Lt knee)      History of Present Illness:  Ariel Wooten is a pleasant 67 y.o. male here today for new patient evaluation regarding his left knee. The patient was seeing my partner Dr. Juan Abarca who obtained an MRI of his left knee which demonstrated a complex tear of the medial meniscus and he was referred to my office. The patient reports that he had a fall back in October directly onto his knee and his knee started to bother him. A steroid injection was performed by Dr. Juan Abarca back in October but the patient did not demonstrate much relief. He denies any locking or instability but he feels like the knee wants to give out on him occasionally. Pain Assessment  Location of Pain: Knee  Location Modifiers: Left  Severity of Pain: 6  Quality of Pain: Throbbing, Aching, Sharp, Buckling  Duration of Pain: Persistent  Frequency of Pain: Constant  Aggravating Factors: Exercise, Kneeling, Squatting, Stairs  Limiting Behavior: Yes  Relieving Factors: Rest  Result of Injury: No  Work-Related Injury: No  Are there other pain locations you wish to document?: No    Medical History:  Patient's medications, allergies, past medical, surgical, social and family histories were reviewed and updated as appropriate. Pertinent items are noted in HPI  Review of systems reviewed from Patient History Form dated on 1/19/23 and available in the patient's chart under the Media tab. Vital Signs: There were no vitals filed for this visit. Constitutional: In no apparent distress. Normal affect. Alert and oriented X3 and is cooperative. Left knee exam    Gait: No use of assistive devices. No antalgic gait. Alignment: normal alignment. Inspection/skin: Skin is intact without erythema or ecchymosis. No gross deformity. Palpation: Moderate patellofemoral crepitation. Tender along the medial joint line and the posterior medial joint line.     Range of Motion: There is full range of motion. Strength: Normal quadriceps development. Effusion: Mild effusion noted. Ligamentous stability: No cruciate or collateral ligament instability. Neurologic and vascular: Skin is warm and well-perfused. Sensation is intact to light-touch. Special tests: Positive Mata's sign. Positive deep flexion grinding medial joint line. Radiology:       2 views of the left knee taken in the office today demonstrate no acute osseous abnormalities. Overall well-maintained joint spaces to the medial lateral compartments with some mild arthritic change noted in the notch. Prior x-rays demonstrate slight joint narrowing medially but overall well-maintained joint spaces to the medial lateral joint lines as well as the patellofemoral joint. Prior MRI of the left knee demonstrates a complete radial tear of the posterior horn of the medial meniscus with mild extrusion as well as a kissing lesion with fully exposed grade IV chondromalacia to the femoral condyle and the anterior tibia. Assessment : 63-year-old male with left knee medial meniscus tear, grade IV chondromalacia medial compartment    Impression:  Encounter Diagnosis   Name Primary? Acute medial meniscus tear, left, subsequent encounter Yes       Office Procedures:  Orders Placed This Encounter   Procedures    XR KNEE LEFT (1-2 VIEWS)     Standing Status:   Future     Number of Occurrences:   1     Standing Expiration Date:   1/19/2024         Plan:     I had a very long discussion today with the patient and his family. Although the patient does have a meniscus tear on MRI that could be amenable to a root repair, I do not believe that he will be able to heal it. His age is a little too advanced to allow for healing environment and more concerning is that of kissing lesion and grade IV chondromalacia defect in the medial compartment that will likely make a repair unsuccessful.   Given these findings I believe it is best to treat the patient like a arthritis patient as opposed to a meniscal tear patient. Therefore we will do an injection of Euflexxa today. I will see him back next week for second injection. I did have a long discussion today about other treatment options including limiting weightbearing and eventually a total knee replacement and we even discussed PRP if he would like to avoid a total knee replacement. L knee Euflexxa Injection:     The risks benefits and alternatives to Euflexxa injection were discussed with the patient. The patient has undergone treatment with physical therapy anti-inflammatory medication and steroid injection in the past and has been unresponsive. X-rays confirm that there is significant osteoarthritis. After verbal consent was obtained, the injection site was prepped with chlorhexidine following which 2cc (20 mg) of Euflexxa was placed into the L knee without complication. The patient was able to flex the knee to 90° immediately after the injection. The patient was advised to take it easy the next few days and ice and if there is any soreness. The patient was advised to contact us if any swelling, redness or increasing pain develops. Mónica Escalera is in agreement with this plan. All questions were answered to patient's satisfaction and was encouraged to call with any further questions. 30 minutes spent with the patient and his family today counseling on treatment options for his knee. Shayla Lima,   Orthopedic Surgery and Sports Medicine  1/19/2023      This dictation was performed with a verbal recognition program [facility]) and it was checked for errors. It is possible that there are still dictated errors within this office note. If so, please bring any errors to my attention for an addendum. All efforts were made to ensure that this office note is accurate.

## 2023-01-23 ENCOUNTER — TELEPHONE (OUTPATIENT)
Dept: FAMILY MEDICINE CLINIC | Age: 73
End: 2023-01-23

## 2023-01-23 DIAGNOSIS — Z79.4 CONTROLLED TYPE 2 DIABETES MELLITUS WITHOUT COMPLICATION, WITH LONG-TERM CURRENT USE OF INSULIN (HCC): ICD-10-CM

## 2023-01-23 DIAGNOSIS — E11.29 TYPE 2 DIABETES MELLITUS WITH MICROALBUMINURIA, WITH LONG-TERM CURRENT USE OF INSULIN (HCC): ICD-10-CM

## 2023-01-23 DIAGNOSIS — E11.9 CONTROLLED TYPE 2 DIABETES MELLITUS WITHOUT COMPLICATION, WITH LONG-TERM CURRENT USE OF INSULIN (HCC): ICD-10-CM

## 2023-01-23 DIAGNOSIS — Z79.4 TYPE 2 DIABETES MELLITUS WITH MICROALBUMINURIA, WITH LONG-TERM CURRENT USE OF INSULIN (HCC): ICD-10-CM

## 2023-01-23 DIAGNOSIS — R80.9 TYPE 2 DIABETES MELLITUS WITH MICROALBUMINURIA, WITH LONG-TERM CURRENT USE OF INSULIN (HCC): ICD-10-CM

## 2023-01-23 RX ORDER — FLASH GLUCOSE SCANNING READER
1 EACH MISCELLANEOUS PRN
Qty: 1 EACH | Refills: 5 | Status: SHIPPED | OUTPATIENT
Start: 2023-01-23

## 2023-01-23 RX ORDER — FLASH GLUCOSE SENSOR
1 KIT MISCELLANEOUS
Qty: 2 EACH | Refills: 11 | Status: SHIPPED | OUTPATIENT
Start: 2023-01-23

## 2023-01-23 NOTE — TELEPHONE ENCOUNTER
----- Message from Rebecca Mancini sent at 1/23/2023  9:05 AM EST -----  Subject: Message to Provider    QUESTIONS  Information for Provider? device that measures his blood sugar level is   out (device that attaches to your arm) Please call in another device   ---------------------------------------------------------------------------  --------------  [address] [employer]  [phone]; OK to leave message on voicemail  ---------------------------------------------------------------------------  --------------  SCRIPT ANSWERS  Relationship to Patient? Other  Representative Name? Cody Madden  Is the Representative on the appropriate HIPAA document in Epic?  Yes

## 2023-01-23 NOTE — TELEPHONE ENCOUNTER
I attempted to contact Brittney Gordillo to get his New Patient appointment with Alex Mccoy for today rescheduled due to provider being sick. If he calls back please reschedule.

## 2023-01-25 ENCOUNTER — TELEPHONE (OUTPATIENT)
Dept: ADMINISTRATIVE | Age: 73
End: 2023-01-25

## 2023-01-25 ENCOUNTER — TELEPHONE (OUTPATIENT)
Dept: ORTHOPEDIC SURGERY | Age: 73
End: 2023-01-25

## 2023-01-25 NOTE — TELEPHONE ENCOUNTER
Patient's wife was called back. Patient's wife reports she noticed that patient's knee has swelled up and it tight accompanied with pain. Contact reports that swelling goes down the leg. Contact asked if there was redness around the knee, contact said she might of noticed a little bit yesterday but currently it is not. Contact asked if patient is having illness symptoms or fever. Contact reported that patient was warm yesterday but is better today. Contact asked if patient can wb and bend knee. Contact reported that patient can walk but is in pain and can bend knee however knee is tight. Patient's wife told that Dr. Martha Pathak recommends that patient use ice and antinflammatories. Wife was told that this is most likely either a reaction to the euflexxa causing a flare or possibly a rupture of a bakers cyst. Patients wife was told that if patient starts to have signs of illness, cannot weightbear/walk or if swelling continues to get  worse with redness go to the ER. Patient's wife told that I would check in tomorrow and if no improvement then a medrol dosepak would be sent in. Patient's wife reported understanding.

## 2023-01-25 NOTE — TELEPHONE ENCOUNTER
Submitted PA for [employer] 14 Day Sensor, Key: Leslie. This request was denied under your Medicare Part D benefit; however, it may be covered under Medicare Part B. For more information, talk to your prescriber or call 1-800-MEDICARE. You only have Part D coverage with [employer]. You asked for a continuous glucose monitor for your diabetes. [employer] follows Medicare rules. The rules say for coverage under Medicare Part D, the item must be a drug that has been approved by the U.S. Food and Drug Administration (FDA). The FDA considers a glucose monitor a device - not a drug. Your request is not a covered benefit under Medicare Part D or your [facility] [employer] plan. The rule is in the Medicare Prescription Drug Benefit Manual (Chapter 6, Section 10.1). Please contact your Part B carrier to check for coverage. If you think Medicare Part D should cover this drug for you, you may appeal.    Please notify patient. Thank you.

## 2023-01-25 NOTE — TELEPHONE ENCOUNTER
General Question     Subject: Patient's wife advised that the knee is swollen and tight after the injection. Requesting a call for advice.   Patient's wife: Norma Feliciano Number: [phone]

## 2023-01-26 ENCOUNTER — TELEPHONE (OUTPATIENT)
Dept: ORTHOPEDIC SURGERY | Age: 73
End: 2023-01-26

## 2023-01-26 RX ORDER — METHYLPREDNISOLONE 4 MG/1
TABLET ORAL
Qty: 21 KIT | Refills: 0 | Status: SHIPPED | OUTPATIENT
Start: 2023-01-26

## 2023-01-26 NOTE — TELEPHONE ENCOUNTER
Patient called back today and wife was spoken to. Wife reported that symptoms have not changed since yesterday. Contact told that medrol would be sent over to pharmacy and pharmacy location was confirmed.

## 2023-01-26 NOTE — TELEPHONE ENCOUNTER
Patient wife called back due to left VM on my phone. Patient's wife told we can cancel appt for tomorrow and let the medrol pack calm knee down and talk about different treatment options later. Patient wife reported understanding.

## 2023-02-03 ENCOUNTER — TELEPHONE (OUTPATIENT)
Dept: ORTHOPEDIC SURGERY | Age: 73
End: 2023-02-03

## 2023-02-03 NOTE — TELEPHONE ENCOUNTER
Patient wife called in order to see how patient's knee was doing and also see if they would like to schedule another appt to talk about other treatment options. VM was not set up. They will try to be called again later.

## 2023-02-06 ENCOUNTER — OFFICE VISIT (OUTPATIENT)
Dept: FAMILY MEDICINE CLINIC | Age: 73
End: 2023-02-06
Payer: MEDICARE

## 2023-02-06 ENCOUNTER — TELEPHONE (OUTPATIENT)
Dept: FAMILY MEDICINE CLINIC | Age: 73
End: 2023-02-06

## 2023-02-06 VITALS
HEART RATE: 75 BPM | BODY MASS INDEX: 37.14 KG/M2 | TEMPERATURE: 98.1 F | DIASTOLIC BLOOD PRESSURE: 72 MMHG | WEIGHT: 259.4 LBS | OXYGEN SATURATION: 97 % | SYSTOLIC BLOOD PRESSURE: 118 MMHG | HEIGHT: 70 IN

## 2023-02-06 DIAGNOSIS — R06.09 DOE (DYSPNEA ON EXERTION): ICD-10-CM

## 2023-02-06 DIAGNOSIS — I50.812 CHRONIC RIGHT-SIDED CONGESTIVE HEART FAILURE (HCC): ICD-10-CM

## 2023-02-06 DIAGNOSIS — F03.90 DEMENTIA WITHOUT BEHAVIORAL DISTURBANCE (HCC): ICD-10-CM

## 2023-02-06 DIAGNOSIS — I47.1 ATRIAL TACHYCARDIA (HCC): ICD-10-CM

## 2023-02-06 DIAGNOSIS — N28.1 RENAL CYST, RIGHT: ICD-10-CM

## 2023-02-06 DIAGNOSIS — E11.29 TYPE 2 DIABETES MELLITUS WITH MICROALBUMINURIA, WITH LONG-TERM CURRENT USE OF INSULIN (HCC): ICD-10-CM

## 2023-02-06 DIAGNOSIS — J84.10 LUNG GRANULOMA (HCC): ICD-10-CM

## 2023-02-06 DIAGNOSIS — R80.9 TYPE 2 DIABETES MELLITUS WITH MICROALBUMINURIA, WITH LONG-TERM CURRENT USE OF INSULIN (HCC): Primary | ICD-10-CM

## 2023-02-06 DIAGNOSIS — I25.119 CORONARY ARTERY DISEASE INVOLVING NATIVE CORONARY ARTERY OF NATIVE HEART WITH ANGINA PECTORIS (HCC): ICD-10-CM

## 2023-02-06 DIAGNOSIS — Z79.4 TYPE 2 DIABETES MELLITUS WITH MICROALBUMINURIA, WITH LONG-TERM CURRENT USE OF INSULIN (HCC): Primary | ICD-10-CM

## 2023-02-06 DIAGNOSIS — I10 ESSENTIAL HYPERTENSION: ICD-10-CM

## 2023-02-06 DIAGNOSIS — R80.9 TYPE 2 DIABETES MELLITUS WITH MICROALBUMINURIA, WITH LONG-TERM CURRENT USE OF INSULIN (HCC): ICD-10-CM

## 2023-02-06 DIAGNOSIS — E11.29 TYPE 2 DIABETES MELLITUS WITH MICROALBUMINURIA, WITH LONG-TERM CURRENT USE OF INSULIN (HCC): Primary | ICD-10-CM

## 2023-02-06 DIAGNOSIS — E11.3291 MILD NONPROLIFERATIVE DIABETIC RETINOPATHY OF RIGHT EYE ASSOCIATED WITH TYPE 2 DIABETES MELLITUS, MACULAR EDEMA PRESENCE UNSPECIFIED (HCC): ICD-10-CM

## 2023-02-06 DIAGNOSIS — E66.01 SEVERE OBESITY (BMI 35.0-39.9) WITH COMORBIDITY (HCC): ICD-10-CM

## 2023-02-06 DIAGNOSIS — Z79.4 TYPE 2 DIABETES MELLITUS WITH MICROALBUMINURIA, WITH LONG-TERM CURRENT USE OF INSULIN (HCC): ICD-10-CM

## 2023-02-06 PROBLEM — E11.3293: Status: RESOLVED | Noted: 2022-10-26 | Resolved: 2023-02-06

## 2023-02-06 LAB
A/G RATIO: 1.9 (ref 1.1–2.2)
ALBUMIN SERPL-MCNC: 4.2 G/DL (ref 3.4–5)
ALP BLD-CCNC: 115 U/L (ref 40–129)
ALT SERPL-CCNC: 18 U/L (ref 10–40)
ANION GAP SERPL CALCULATED.3IONS-SCNC: 12 MMOL/L (ref 3–16)
AST SERPL-CCNC: 12 U/L (ref 15–37)
BILIRUB SERPL-MCNC: 0.8 MG/DL (ref 0–1)
BUN BLDV-MCNC: 18 MG/DL (ref 7–20)
CALCIUM SERPL-MCNC: 9.5 MG/DL (ref 8.3–10.6)
CHLORIDE BLD-SCNC: 98 MMOL/L (ref 99–110)
CHOLESTEROL, TOTAL: 195 MG/DL (ref 0–199)
CO2: 27 MMOL/L (ref 21–32)
CREAT SERPL-MCNC: 0.9 MG/DL (ref 0.8–1.3)
GFR SERPL CREATININE-BSD FRML MDRD: >60 ML/MIN/{1.73_M2}
GLUCOSE BLD-MCNC: 321 MG/DL (ref 70–99)
HBA1C MFR BLD: 11.4 %
HCT VFR BLD CALC: 44 % (ref 40.5–52.5)
HDLC SERPL-MCNC: 44 MG/DL (ref 40–60)
HEMOGLOBIN: 14.2 G/DL (ref 13.5–17.5)
LDL CHOLESTEROL CALCULATED: 101 MG/DL
MCH RBC QN AUTO: 28.8 PG (ref 26–34)
MCHC RBC AUTO-ENTMCNC: 32.3 G/DL (ref 31–36)
MCV RBC AUTO: 89.2 FL (ref 80–100)
PDW BLD-RTO: 13.8 % (ref 12.4–15.4)
PLATELET # BLD: 132 K/UL (ref 135–450)
PMV BLD AUTO: 10.5 FL (ref 5–10.5)
POTASSIUM SERPL-SCNC: 4.5 MMOL/L (ref 3.5–5.1)
PRO-BNP: 190 PG/ML (ref 0–124)
RBC # BLD: 4.93 M/UL (ref 4.2–5.9)
SODIUM BLD-SCNC: 137 MMOL/L (ref 136–145)
TOTAL PROTEIN: 6.4 G/DL (ref 6.4–8.2)
TRIGL SERPL-MCNC: 249 MG/DL (ref 0–150)
VLDLC SERPL CALC-MCNC: 50 MG/DL
WBC # BLD: 5.5 K/UL (ref 4–11)

## 2023-02-06 PROCEDURE — G8427 DOCREV CUR MEDS BY ELIG CLIN: HCPCS | Performed by: PHYSICIAN ASSISTANT

## 2023-02-06 PROCEDURE — 3078F DIAST BP <80 MM HG: CPT | Performed by: PHYSICIAN ASSISTANT

## 2023-02-06 PROCEDURE — 3017F COLORECTAL CA SCREEN DOC REV: CPT | Performed by: PHYSICIAN ASSISTANT

## 2023-02-06 PROCEDURE — G8484 FLU IMMUNIZE NO ADMIN: HCPCS | Performed by: PHYSICIAN ASSISTANT

## 2023-02-06 PROCEDURE — 1036F TOBACCO NON-USER: CPT | Performed by: PHYSICIAN ASSISTANT

## 2023-02-06 PROCEDURE — 83036 HEMOGLOBIN GLYCOSYLATED A1C: CPT | Performed by: PHYSICIAN ASSISTANT

## 2023-02-06 PROCEDURE — 99214 OFFICE O/P EST MOD 30 MIN: CPT | Performed by: PHYSICIAN ASSISTANT

## 2023-02-06 PROCEDURE — 3046F HEMOGLOBIN A1C LEVEL >9.0%: CPT | Performed by: PHYSICIAN ASSISTANT

## 2023-02-06 PROCEDURE — 2022F DILAT RTA XM EVC RTNOPTHY: CPT | Performed by: PHYSICIAN ASSISTANT

## 2023-02-06 PROCEDURE — 1123F ACP DISCUSS/DSCN MKR DOCD: CPT | Performed by: PHYSICIAN ASSISTANT

## 2023-02-06 PROCEDURE — 3074F SYST BP LT 130 MM HG: CPT | Performed by: PHYSICIAN ASSISTANT

## 2023-02-06 PROCEDURE — G8417 CALC BMI ABV UP PARAM F/U: HCPCS | Performed by: PHYSICIAN ASSISTANT

## 2023-02-06 RX ORDER — FLASH GLUCOSE SCANNING READER
1 EACH MISCELLANEOUS PRN
Qty: 1 EACH | Refills: 5 | Status: CANCELLED | OUTPATIENT
Start: 2023-02-06

## 2023-02-06 RX ORDER — FLASH GLUCOSE SENSOR
1 KIT MISCELLANEOUS
Qty: 2 EACH | Refills: 11 | Status: SHIPPED | OUTPATIENT
Start: 2023-02-06

## 2023-02-06 ASSESSMENT — ENCOUNTER SYMPTOMS
DIARRHEA: 0
NAUSEA: 0
VOMITING: 0
ABDOMINAL PAIN: 0
CONSTIPATION: 0
SHORTNESS OF BREATH: 1
RHINORRHEA: 0
COUGH: 0
SORE THROAT: 0

## 2023-02-06 NOTE — PROGRESS NOTES
2023  Rachael Marley (: 1950)  67 y.o. HPI     Presents to establish care. DM: complications include microalbuminuria and retinopathy. current regimen includes lantus 24 units nightly with novolog 8 units TID with meals,  in addition to glipizide 2.5 mg daily. Patient denies any hypoglycemic events. Does not routinely monitor BG. Has been trying to get CGM but has not been approved by insurance. Has difficulty checking frequently enough to ensure novolog is at the correct dose. Non adherent to low carb diet. On ace and statin    Afib/CAD: cardiac cath 3/2019 that demonstrated moderate CAD, medical management recommended. Echo 20 showed EF of 55% with NRWMA, Grade II DD, mild biatrial enlargement. Admitted 2020 with chest pain- no new cardiac origin. 2020 cardiac follow up was dizzy, light headed with sob- ranexa added to meds. Diagnosed with afib 2022 with new onset atrial fibrillation. Currently on diltiazem and eliquis. Progressively worsening [place] over the last few months. Denies orthopnea. Baseline BLE. Does not weigh himself daily- not currently on diuretic. Uncontrolled sleep apnea: saw pulmonology end of . Scheduled for polysomnography at the sleep center 3/2023. Left knee pain: known meniscal tear, being treated as arthritis as he is not a great surgical candidate. Recently had euflexxa injection. Per chart review, stable 22 cm right renal cyst. Considered benign based on stability since 2019 however now causing mass shift in abdominal contents. Microalbuminuria 2/2 to diabetes. Last urinalysis 2022  without hematuria or proteinuria. Review of Systems   Constitutional:  Negative for activity change, chills and fever. HENT:  Negative for congestion, ear pain, rhinorrhea and sore throat. Eyes:  Negative for visual disturbance. Respiratory:  Positive for shortness of breath. Negative for cough. Cardiovascular:  Positive for leg swelling.  Negative for chest pain and palpitations. Gastrointestinal:  Negative for abdominal pain, constipation, diarrhea, nausea and vomiting. Genitourinary:  Negative for difficulty urinating and dysuria. Musculoskeletal:  Negative for arthralgias and myalgias. Skin:  Negative for rash. Neurological:  Negative for dizziness, weakness and numbness. +forgetfulness   Psychiatric/Behavioral:  Negative for sleep disturbance.       Past Medical History:   Diagnosis Date    Acid reflux     Yan's esophagus     Coronary artery disease of native heart with stable angina pectoris ([facility] 5/30/2019    Diabetes mellitus ([facility]     Diabetic autonomic neuropathy associated with type 2 diabetes mellitus ([facility] 3/3/2020    Hyperlipidemia     Hypertension     Influenza A 02/05/2020    Pancreatitis 1996    Restless legs     Sleep apnea     uses CPAP    Tremor     of bilateral hands     Past Surgical History:   Procedure Laterality Date    ABDOMEN SURGERY      CATARACT REMOVAL Bilateral 2013    CHOLECYSTECTOMY      COLONOSCOPY  10/26/2011    ENDOSCOPY, COLON, DIAGNOSTIC      EGD halo    HYDROCELE EXCISION  11/15/2016    PANCREAS SURGERY      cyst opened up and drining into small bowel    TONSILLECTOMY      UPPER GASTROINTESTINAL ENDOSCOPY  10/04/2016    with biopsy    UPPER GASTROINTESTINAL ENDOSCOPY  03/16/2017    Halo ablation    UPPER GASTROINTESTINAL ENDOSCOPY  06/26/2017    Halo ablation    UPPER GASTROINTESTINAL ENDOSCOPY  09/06/2017    bx distal sophagus    UPPER GASTROINTESTINAL ENDOSCOPY  12/22/2017    with HALO  procedure    UPPER GASTROINTESTINAL ENDOSCOPY N/A 12/4/2018    EGD WITH ABLATION AND ANESTHESIA - HALO---SLEEP APNEA---  performed by Fabiano Thomson MD at [address] [place] [address]  2/19/2019    EGD BIOPSY performed by Fabiano Thomson MD at [address] [place] [address] 6/11/2019    EGD WITH HALO AND ANESTHESIA performed by Fabiano Thomson MD at [address] [place] [address] ENDOSCOPY    UPPER GASTROINTESTINAL ENDOSCOPY N/A 8/13/2019    ESOPHAGOGASTRODUODENOSCOPY WITH HALO AND ANESTHESIA -SLEEP APNEA- performed by Calvin Gomez MD at [facility] EXTRACTION       Family History   Problem Relation Age of Onset    Kidney Disease Mother     Cancer Father 76        pancreas    Cancer Brother         lung    Cancer Paternal Grandfather         colon     Social History     Socioeconomic History    Marital status:      Spouse name: Not on file    Number of children: Not on file    Years of education: Not on file    Highest education level: Not on file   Occupational History    Occupation:      Comment: maintenance   Tobacco Use    Smoking status: Never     Passive exposure: Past    Smokeless tobacco: Never   Vaping Use    Vaping Use: Never used   Substance and Sexual Activity    Alcohol use: No    Drug use: No    Sexual activity: Yes   Other Topics Concern    Not on file   Social History Narrative    Not on file     Social Determinants of Health     Financial Resource Strain: Low Risk     Difficulty of Paying Living Expenses: Not hard at all   Food Insecurity: No Food Insecurity    Worried About Running Out of Food in the Last Year: Never true    [address] in the Last Year: Never true   Transportation Needs: No Transportation Needs    Lack of Transportation (Medical): No    Lack of Transportation (Non-Medical):  No   Physical Activity: Not on file   Stress: Not on file   Social Connections: Not on file   Intimate Partner Violence: Not on file   Housing Stability: Low Risk     Unable to Pay for Housing in the Last Year: No    Number of Places Lived in the Last Year: 0    Unstable Housing in the Last Year: No     Allergies   Allergen Reactions    Clonidine     Trulicity [Dulaglutide]      nausea    Codeine Nausea And Vomiting       Current Outpatient Medications   Medication Sig Dispense Refill    Continuous Blood Gluc Sensor (FREESTYLE JUSTIN 14 DAY SENSOR) MISC 1 Units by Does not apply route every 14 days 2 each 11    Continuous Blood Gluc  (FREESTYLE JUSTIN 14 DAY READER) OLI 1 Units by Does not apply route as needed (as needed) 1 each 5    meloxicam (MOBIC) 15 MG tablet Take 1 tablet by mouth daily 30 tablet 0    glipiZIDE (GLUCOTROL XL) 2.5 MG extended release tablet Take 1 tablet by mouth daily 90 tablet 1    dilTIAZem (CARDIZEM CD) 120 MG extended release capsule Take 1 capsule by mouth nightly 90 capsule 3    pantoprazole (PROTONIX) 40 MG tablet TAKE 1 TABLET BY MOUTH ONCE DAILY      Insulin Pen Needle 31G X 8 MM MISC 1 each by Does not apply route 4 times daily 200 each 3    lisinopril (PRINIVIL;ZESTRIL) 10 MG tablet Take 1 tablet by mouth in the morning. 90 tablet 1    atorvastatin (LIPITOR) 20 MG tablet Take 1 tablet by mouth in the morning. 90 tablet 1    dicyclomine (BENTYL) 10 MG capsule Take 1 capsule by mouth every 6 hours as needed (cramps) 20 capsule 0    aspirin 81 MG chewable tablet Take 1 tablet by mouth daily 30 tablet 3    Insulin Syringe-Needle U-100 30G X 1/2\" 0.5 ML MISC Inject insulin 3 times daily      insulin glargine (LANTUS SOLOSTAR) 100 UNIT/ML injection pen Inject 24 Units into the skin nightly 21.6 mL 0    insulin aspart (NOVOLOG FLEXPEN) 100 UNIT/ML injection pen Inject 8 Units into the skin 3 times daily (before meals) 21.6 mL 0    pramipexole (MIRAPEX) 0.5 MG tablet Take 1 tablet by mouth nightly 90 tablet 1     No current facility-administered medications for this visit. Vitals:    02/06/23 1055   BP: 118/72   Site: Left Upper Arm   Position: Sitting   Cuff Size: Large Adult   Pulse: 75   Temp: 98.1 °F (36.7 °C)   TempSrc: Oral   SpO2: 97%   Weight: 259 lb 6.4 oz (117.7 kg)   Height: 5' 10\" (1.778 m)     Estimated body mass index is 37.22 kg/m² as calculated from the following:    Height as of this encounter: 5' 10\" (1.778 m). Weight as of this encounter: 259 lb 6.4 oz (117.7 kg).     Physical Exam  Constitutional:       General: He is not in acute distress. Appearance: He is well-developed. HENT:      Head: Normocephalic and atraumatic. Eyes:      Extraocular Movements: Extraocular movements intact. Conjunctiva/sclera: Conjunctivae normal.      Pupils: Pupils are equal, round, and reactive to light. Cardiovascular:      Rate and Rhythm: Normal rate and regular rhythm. Heart sounds: Normal heart sounds. No murmur heard. Pulmonary:      Effort: Pulmonary effort is normal.      Breath sounds: Normal breath sounds. No wheezing. Abdominal:      General: Bowel sounds are normal.      Palpations: Abdomen is soft. Musculoskeletal:      Cervical back: Neck supple. Lymphadenopathy:      Cervical: No cervical adenopathy. Skin:     General: Skin is warm and dry. Findings: No rash. Neurological:      Mental Status: He is alert and oriented to person, place, and time. Psychiatric:         Mood and Affect: Mood normal.         Behavior: Behavior normal.       ASSESSMENT and PLAN:  Lito Vidal was seen today for new patient. Diagnoses and all orders for this visit:    Type 2 diabetes mellitus with microalbuminuria, with long-term current use of insulin (HCC)  -     Continuous Blood Gluc Sensor (FREESTYLE JUSTIN 14 DAY SENSOR) MISC; 1 Units by Does not apply route every 14 days  -     POCT glycosylated hemoglobin (Hb A1C) 11.4  -     Comprehensive Metabolic Panel; Future  - uncontrolled, currently on 4 injections daily. Instructed to increase lantus to 50 units nightly over the course of the next few nights. Encouraged to continue with 8 units novolog, however I suspect this will need to be increased as well. Has had CGM in the past and did well with it. He is needing to check BG greater than 4 times daily to appropriately check insulin regimen effectiveness- remains uncontrolled. Script sent for CGM today- pt to use home glucometer and strips in the interim.    - lengthy conversation with patient today regarding what a carbohydrate is. Still eating a fairly carb heavy diet- cereal/oatmeal for breakfast, sandwich and chips for lunch, lots of sweets. Discussed alternatives and easy swaps. Will work on insulin dosing and monitoring with CGM over the next few weeks, at next appointment will evaluate dietary changes, will see if he needs nutrition referral.     Essential hypertension  -     CBC; Future  -     Comprehensive Metabolic Panel; Future  - well controlled on current regimen. Dementia without behavioral disturbance (HCC)  - stable, noted by patient and family. - saw neurology without any recommendations    Severe obesity (BMI 35.0-39. 9) with comorbidity ([address]  - dietary changes as discussed above. 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   - he understands the importance of control of DM    Coronary artery disease involving native coronary artery of native heart with angina pectoris ([address]  Chronic right-sided congestive heart failure ([address]    -     Comprehensive Metabolic Panel; Future  -     LIPID PANEL; Future  Brain Natriuretic Peptide; Future  - suspect worsening, check bnp, can consider echo if positive. Otherwise recommend follow up with cardiology for further eval.     FREEMAN (dyspnea on exertion)  -     Brain Natriuretic Peptide; Future    Return in 6 weeks (on 3/20/2023) for Diabetes Mellitus.

## 2023-02-06 NOTE — TELEPHONE ENCOUNTER
Patients wife called in and stated her  has an appt in 10 mins and will discuss in the appt.  Please advise

## 2023-02-06 NOTE — Clinical Note
Mathieu Salamanca,  Can I get some assistance with this patient and his diabetes. I think there is some dementia compounded with lack of education. I spent a great deal of time with him today educating on low carbohydrate diet. He had a CGM in the past but hasn't had for about the last year. I sent the order electronically to [address] [place] [address]. Lobo Ortiz will fax the note now that I'm finished.    Thanks,  Andres Reyes

## 2023-02-06 NOTE — TELEPHONE ENCOUNTER
----- Message from Mateo Anderson sent at 2/6/2023 10:13 AM EST -----  Subject: Message to Provider    QUESTIONS  Information for Provider? Archana Valles, from [address] is wanting   to know if a fax has been received regarding a [employer]. Archana Valles is   also wanting the office visit notes for the patients appointments (the   last 6 months) and a list of her prescriptions that he is taking. Please   call Archana Valles back to discuss. ---------------------------------------------------------------------------  --------------  Keyonna DESHPANDE  [phone]; Do not leave any message, patient will call back for answer  ---------------------------------------------------------------------------  --------------  SCRIPT ANSWERS  Relationship to Patient? Third Party  Third Party Type? Durable Medical Equipment? Representative Name?  Archana Valles

## 2023-02-06 NOTE — PATIENT INSTRUCTIONS
Start lantus (long acting) insulin at 20 units nightly. Fasting blood sugar in the mornings should be around 120. You can increase the lantus a few units nightly on your own until your fasting blood sugar is around 120. Continue 8 units novolog with meals. I have ordered your sensors. Please  the glucometer, strips, and lancets so you can check your blood sugars until your sensors are delivered. Please try to make some easy substitutions for your diet. No more cold cereal. Eggs and turkey roberson is a good option  Oatmeal is ok periodically with sugarfree syrup/honey  Swap out bread in sandwiches for low carb tortillas   Stop chips/crackers- try cheese crisps- easy snacking but lower carb. Please write down what your sugars are- fasting first thing in the morning, before lunch and before dinner. Our care coordinator, Jeremias Dawson or Leia Nuno, will give you a call next week to review your blood sugars it would be helpful to have numbers written down to discuss. If you are still having issues with your diet, I can put in a nutritionist referral, just let me know. I will see you back in the office in 6 weeks.

## 2023-02-06 NOTE — TELEPHONE ENCOUNTER
I tried to call patient to see if he using this company, I know he has to go through his medicare part B has the medicare D did not cover this.  If patient calls back please check with him about this company

## 2023-02-07 ENCOUNTER — CARE COORDINATION (OUTPATIENT)
Dept: CARE COORDINATION | Age: 73
End: 2023-02-07

## 2023-02-07 DIAGNOSIS — E11.29 TYPE 2 DIABETES MELLITUS WITH MICROALBUMINURIA, WITH LONG-TERM CURRENT USE OF INSULIN (HCC): Primary | ICD-10-CM

## 2023-02-07 DIAGNOSIS — R80.9 TYPE 2 DIABETES MELLITUS WITH MICROALBUMINURIA, WITH LONG-TERM CURRENT USE OF INSULIN (HCC): Primary | ICD-10-CM

## 2023-02-07 DIAGNOSIS — Z79.4 TYPE 2 DIABETES MELLITUS WITH MICROALBUMINURIA, WITH LONG-TERM CURRENT USE OF INSULIN (HCC): Primary | ICD-10-CM

## 2023-02-07 RX ORDER — LANCETS 30 GAUGE
1 EACH MISCELLANEOUS DAILY
Qty: 300 EACH | Refills: 5 | Status: SHIPPED | OUTPATIENT
Start: 2023-02-07

## 2023-02-07 RX ORDER — GLUCOSAMINE HCL/CHONDROITIN SU 500-400 MG
CAPSULE ORAL
Qty: 300 STRIP | Refills: 0 | Status: SHIPPED | OUTPATIENT
Start: 2023-02-07

## 2023-02-07 RX ORDER — BLOOD-GLUCOSE METER
1 KIT MISCELLANEOUS DAILY
Qty: 1 KIT | Refills: 0 | Status: SHIPPED | OUTPATIENT
Start: 2023-02-07

## 2023-02-07 NOTE — CARE COORDINATION
Ambulatory Care Coordination Note  2/7/2023    ACC: Jennifer Bishop RN    ACM completed PCP outreach call with Mateus Fragoso, who is verified on the HIPAA form. Mateus Fragoso said that patient does not have a glucometer at home and has not been able to monitor blood sugars. Mateus Fragoso said that patient is taking insulin as directed. ACM reviewed with Mateus Fragoso the importance of taking insulin and monitoring blood sugars at the same time. Mateus Fragoso is wanting to obtain a glucometer to monitor patient blood sugars while awaiting for CGM approval. ACM will provide diabetes education handouts in the mail and review at a later date. Mateus Fragoso had no other questions at this time. Plan:    Route to PCP  BS/ BP log  Glucometer order  Education assessment  F/U call 1 week     Offered patient enrollment in the Remote Patient Monitoring (RPM) program for in-home monitoring: Patient declined. Ambulatory Care Coordination Assessment    Care Coordination Protocol  Referral from Primary Care Provider: Yes  Week 1 - Initial Assessment     Do you have all of your prescriptions and are they filled?: Yes (Comment: Reviewed all CJT 2/7/23)  Barriers to medication adherence: None  Are you able to afford your medications?: Yes  How often do you have trouble taking your medications the way you have been told to take them?: I always take them as prescribed. Do you have Home O2 Therapy?: No      Ability to seek help/take action for Emergent Urgent situations i.e. fire, crime, inclement weather or health crisis. : Independent  Ability to ambulate to restroom: Independent  Ability handle personal hygeine needs (bathing/dressing/grooming): Independent  Ability to manage Medications: Independent  Ability to prepare Food Preparation: Independent  Ability to maintain home (clean home, laundry): Independent  Ability to drive and/or has transportation: Independent  Ability to do shopping: Independent  Ability to manage finances:  Independent  Is patient able to live independently?: Yes     Current Housing: Private Residence        Per the Fall Risk Screening, did the patient have 2 or more falls or 1 fall with injury in the past year?: Yes  How often do you think you are about to fall and you do NOT fall? For example, you grab something to stabilize yourself or hold onto a wall/furniture?: Never  Use of a Mobility Aid: No  Difficulty walking/impaired gait: No  Issues with feet or shoes like numbness, edema, shoes not fitting: No  Changes in vision, poor vision or poor lighting in environment: No  Dizziness: No        Are you experiencing loss of meaning?: No  Are you experiencing loss of hope and peace?: No     Suggested Interventions and Community Resources  Diabetes Education: In Process    Zone Management Tools: In Process         Set up/Review an Education Plan, Set up/Review Goals              Prior to Admission medications    Medication Sig Start Date End Date Taking?  Authorizing Provider   Continuous Blood Gluc Sensor (FREESTYLE JUSTIN 14 DAY SENSOR) MISC 1 Units by Does not apply route every 14 days 2/6/23   Michae Pallas, PA   Continuous Blood Gluc  (FREESTYLE JUSTIN 14 DAY READER) OLI 1 Units by Does not apply route as needed (as needed) 1/23/23   JENNIFER Booker   meloxicam (MOBIC) 15 MG tablet Take 1 tablet by mouth daily 11/1/22   Nataliia Monk MD   insulin glargine (LANTUS SOLOSTAR) 100 UNIT/ML injection pen Inject 24 Units into the skin nightly 10/26/22 1/24/23  JENNIFER Booker   insulin aspart (NOVOLOG FLEXPEN) 100 UNIT/ML injection pen Inject 8 Units into the skin 3 times daily (before meals) 10/26/22 1/24/23  JENNIFER Booker   glipiZIDE (GLUCOTROL XL) 2.5 MG extended release tablet Take 1 tablet by mouth daily 10/26/22   JENNIFER Booker   pramipexole (MIRAPEX) 0.5 MG tablet Take 1 tablet by mouth nightly 10/10/22 1/8/23  JENNIFER Booker   dilTIAZem (CARDIZEM CD) 120 MG extended release capsule Take 1 capsule by mouth nightly 10/4/22   Christi Officer., MD   pantoprazole (PROTONIX) 40 MG tablet TAKE 1 TABLET BY MOUTH ONCE DAILY 9/3/22   Historical Provider, MD   Insulin Pen Needle 31G X 8 MM MISC 1 each by Does not apply route 4 times daily 7/19/22   JENNIFER Victor   lisinopril (PRINIVIL;ZESTRIL) 10 MG tablet Take 1 tablet by mouth in the morning. 7/19/22   JENNIFER Victor   atorvastatin (LIPITOR) 20 MG tablet Take 1 tablet by mouth in the morning. 7/19/22   JENNIFER Victor   dicyclomine (BENTYL) 10 MG capsule Take 1 capsule by mouth every 6 hours as needed (cramps) 6/1/22   SANDY Estrada - CNP   aspirin 81 MG chewable tablet Take 1 tablet by mouth daily 3/26/19   Andreina Shields MD   Insulin Syringe-Needle U-100 30G X 1/2\" 0.5 ML MISC Inject insulin 3 times daily 9/12/17   Historical Provider, MD       Future Appointments   Date Time Provider Magdalena Murguia   3/7/2023  8:30 PM Prateek [facility] SLEEP ROOM [address] [place] [address]   3/20/2023 10:30 AM JENNIFER Boyd [facility] [place] - DYD   4/7/2023 11:30 AM SANDY Sanchez - CNP [facility]     ,   Diabetes Assessment      Meal Planning: Avoidance of concentrated sweets, Calorie counting   How often do you test your blood sugar?: No Testing (Comment: Does not have a glucometer to test 2/7/23)   Do you have barriers with adherence to non-pharmacologic self-management interventions?  (Nutrition/Exercise/Self-Monitoring): Yes   Have you ever had to go to the ED for symptoms of low blood sugar?: No            , and   General Assessment    Do you have any symptoms that are causing concern?: No

## 2023-02-14 ENCOUNTER — HOSPITAL ENCOUNTER (OUTPATIENT)
Dept: SLEEP CENTER | Age: 73
Discharge: HOME | End: 2023-02-16
Payer: MEDICARE

## 2023-02-14 ENCOUNTER — CARE COORDINATION (OUTPATIENT)
Dept: CARE COORDINATION | Age: 73
End: 2023-02-14

## 2023-02-14 DIAGNOSIS — E66.2 CLASS 2 OBESITY WITH ALVEOLAR HYPOVENTILATION, SERIOUS COMORBIDITY, AND BODY MASS INDEX (BMI) OF 38.0 TO 38.9 IN ADULT (HCC): ICD-10-CM

## 2023-02-14 DIAGNOSIS — G47.33 OSA (OBSTRUCTIVE SLEEP APNEA): ICD-10-CM

## 2023-02-14 DIAGNOSIS — G47.10 HYPERSOMNOLENCE: ICD-10-CM

## 2023-02-14 PROCEDURE — 95810 POLYSOM 6/> YRS 4/> PARAM: CPT

## 2023-02-14 NOTE — CARE COORDINATION
ACM attempted follow up call but did not answer and unable to leave voicemail at this time. ACM will outreach at a later date.

## 2023-02-16 ENCOUNTER — TELEPHONE (OUTPATIENT)
Dept: CARDIOLOGY CLINIC | Age: 73
End: 2023-02-16

## 2023-02-16 DIAGNOSIS — I48.91 NEW ONSET ATRIAL FIBRILLATION (HCC): ICD-10-CM

## 2023-02-16 DIAGNOSIS — D69.6 THROMBOCYTOPENIA (HCC): Primary | ICD-10-CM

## 2023-02-16 DIAGNOSIS — I25.119 CORONARY ARTERY DISEASE INVOLVING NATIVE CORONARY ARTERY OF NATIVE HEART WITH ANGINA PECTORIS (HCC): Primary | ICD-10-CM

## 2023-02-16 DIAGNOSIS — I51.89 GRADE II DIASTOLIC DYSFUNCTION: ICD-10-CM

## 2023-02-16 DIAGNOSIS — R60.9 FLUID RETENTION: ICD-10-CM

## 2023-02-16 DIAGNOSIS — R60.9 EDEMA, UNSPECIFIED TYPE: ICD-10-CM

## 2023-02-16 DIAGNOSIS — R06.09 DOE (DYSPNEA ON EXERTION): ICD-10-CM

## 2023-02-16 NOTE — TELEPHONE ENCOUNTER
[facility] OOT     [facility] - Pt PCP would like pt to been seen ASAP for fluid retention. Pt last in hospital consult with [facility] 9/9/22. Pt last OV [facility] 9/1/2020. [facility] is OOT and cannot provide overbook instruction. Please advise if you can see pt soon or if you'd like to wait on [facility] to return on 2/20.

## 2023-02-16 NOTE — TELEPHONE ENCOUNTER
Pt PCP requesting pt be seen in office ASAP due to fluid retention. Pt has f/u OV scheduled with NPNR on 4/7/23. Pt has seen SRJ in the past, however his last OV with SRJ was 10/4/2019. NPNR - Do you want to see pt sooner than scheduled 4/7/23 OV for fluid retention?

## 2023-02-16 NOTE — TELEPHONE ENCOUNTER
Upon chart review, Dr. Colin Snellen saw the patient in the hospital in 9/2022, and the patient saw Elysia Castro CNP in 2020. Ultimately would like to get patient in with general cardiology if the concern is fluid related. Does [facility] have any available appointments?

## 2023-02-16 NOTE — TELEPHONE ENCOUNTER
Pt wife called into office and stated she received a call from pts PCP and they want pt to be seen ASAP, sooner than 04/07/23 appt. Wife stated she didn't have all of the information gathered but that the PCP mainly wants him to be seen sooner because he is having a lot of fluid retention. Informed pt that I would send a message to [facility] and see if she feels as though pt needs to be seen sooner. Please advise.

## 2023-02-16 NOTE — TELEPHONE ENCOUNTER
Would have pt put into appt as next available with either of us and in interim, rec: bmp, bnp, cbc, lipids/lfts, tsh, echo. Thank you.

## 2023-02-17 NOTE — TELEPHONE ENCOUNTER
Nelda Amanda, can  utilize the 2/21/23 nplr hold at [place] for pt to be seen asap? First available with [facility] is 3/2/23 at [facility].

## 2023-02-17 NOTE — TELEPHONE ENCOUNTER
Orders placed per [facility].      Please call patient and schedule per [facility] message-with [facility] or [facility] next available

## 2023-02-20 ENCOUNTER — TELEPHONE (OUTPATIENT)
Dept: PULMONOLOGY | Age: 73
End: 2023-02-20

## 2023-02-20 NOTE — TELEPHONE ENCOUNTER
Talked with the wife about the sleep study, the patient was not at the house  We will need to call back and talk to the patient as the wife states that he was not really interested in using CPAP therapy.

## 2023-02-21 ENCOUNTER — CARE COORDINATION (OUTPATIENT)
Dept: CARE COORDINATION | Age: 73
End: 2023-02-21

## 2023-02-21 NOTE — TELEPHONE ENCOUNTER
Tried to call the patient however voicemail box not been set up  Please get in to the office for evaluation  May need to consider inspire for the patient  Please get office appointment with me

## 2023-02-21 NOTE — CARE COORDINATION
Ambulatory Care Coordination Note  2023    Patient Current Location:  Home: [address] [place] [address],[address] [address]     ACM contacted the family by telephone. Verified name and  with family as identifiers. Provided introduction to self, and explanation of the ACM role. Challenges to be reviewed by the provider   Additional needs identified to be addressed with provider: No  none               Method of communication with provider: chart routing. ACM: Belle Cherry RN    ACM completed follow up call with Renold Lefort patient wife who is verified on HIPAA form. Renold Lefort said that patient got sensors but has not placed the new sensors and has not been monitoring blood sugars. Renold Lefort said patient is holding fluid but patient has not been monitoring daily weights. ACM explained the importance of monitoring daily weights. ACM did inform Renold Lefort to make a follow up appt with Cardio and Pulmonology. Renold Lefort verbalized understanding at this time and had no other questions at this time. Plan:    F/u all 2 weeks    Offered patient enrollment in the Remote Patient Monitoring (RPM) program for in-home monitoring: Patient declined. Lab Results       None            Care Coordination Interventions    Referral from Primary Care Provider: Yes  Suggested Interventions and Community Resources  Diabetes Education: In Process  Zone Management Tools: In Process (Comment: DM Zone mangement tool)          Goals Addressed                   This Visit's Progress     Conditions and Symptoms        I will schedule office visits, as directed by my provider. I will keep my appointment or reschedule if I have to cancel. I will notify my provider of any barriers to my plan of care. I will follow my Zone Management tool to seek urgent or emergent care. I will notify my provider of any symptoms that indicate a worsening of my condition.     Barriers: overwhelmed by complexity of regimen  Plan for overcoming my barriers: ACM will informed when follow ups need to be scheduled  Confidence: 5/10  Anticipated Goal Completion Date: 3/21/23                Future Appointments   Date Time Provider Magdalena Murguia   3/20/2023 10:30 AM JENNIFER Ayala - [facility]   4/7/2023 11:30 AM SANDY Fairchild CNP [facility]   ,   Diabetes Assessment      Meal Planning: Avoidance of concentrated sweets, Calorie counting   How often do you test your blood sugar?: No Testing (Comment: Patient has new Sensors but has not placed them yet)   Do you have barriers with adherence to non-pharmacologic self-management interventions?  (Nutrition/Exercise/Self-Monitoring): Yes   Have you ever had to go to the ED for symptoms of low blood sugar?: No            , and   General Assessment    Do you have any symptoms that are causing concern?: Yes

## 2023-02-22 NOTE — TELEPHONE ENCOUNTER
I can see patient today in open slot or I have availability 3/2 as well. Agree with completing blood work prior to appointment and scheduling echo as directed by Dr. Kenya Rivera.

## 2023-02-22 NOTE — TELEPHONE ENCOUNTER
OV made for 3/6. He is not at home-getting car worked on  3/2 has [address]  Wife made 3/9 OV  Patient and wife informed of fasting labs and echo.

## 2023-02-23 DIAGNOSIS — R60.9 FLUID RETENTION: ICD-10-CM

## 2023-02-23 DIAGNOSIS — I25.119 CORONARY ARTERY DISEASE INVOLVING NATIVE CORONARY ARTERY OF NATIVE HEART WITH ANGINA PECTORIS (HCC): ICD-10-CM

## 2023-02-23 DIAGNOSIS — D69.6 THROMBOCYTOPENIA (HCC): ICD-10-CM

## 2023-02-23 DIAGNOSIS — R60.9 EDEMA, UNSPECIFIED TYPE: ICD-10-CM

## 2023-02-23 DIAGNOSIS — R06.09 DOE (DYSPNEA ON EXERTION): ICD-10-CM

## 2023-02-23 DIAGNOSIS — I48.91 NEW ONSET ATRIAL FIBRILLATION (HCC): ICD-10-CM

## 2023-02-23 DIAGNOSIS — I51.89 GRADE II DIASTOLIC DYSFUNCTION: ICD-10-CM

## 2023-02-23 LAB
ALBUMIN SERPL-MCNC: 4.2 G/DL (ref 3.4–5)
ALP BLD-CCNC: 96 U/L (ref 40–129)
ALT SERPL-CCNC: 22 U/L (ref 10–40)
ANION GAP SERPL CALCULATED.3IONS-SCNC: 12 MMOL/L (ref 3–16)
AST SERPL-CCNC: 18 U/L (ref 15–37)
BILIRUB SERPL-MCNC: 0.4 MG/DL (ref 0–1)
BILIRUBIN DIRECT: <0.2 MG/DL (ref 0–0.3)
BILIRUBIN, INDIRECT: ABNORMAL MG/DL (ref 0–1)
BUN BLDV-MCNC: 12 MG/DL (ref 7–20)
CALCIUM SERPL-MCNC: 9.1 MG/DL (ref 8.3–10.6)
CHLORIDE BLD-SCNC: 103 MMOL/L (ref 99–110)
CHOLESTEROL, FASTING: 205 MG/DL (ref 0–199)
CO2: 27 MMOL/L (ref 21–32)
CREAT SERPL-MCNC: 0.8 MG/DL (ref 0.8–1.3)
GFR SERPL CREATININE-BSD FRML MDRD: >60 ML/MIN/{1.73_M2}
GLUCOSE BLD-MCNC: 157 MG/DL (ref 70–99)
HDLC SERPL-MCNC: 55 MG/DL (ref 40–60)
LDL CHOLESTEROL CALCULATED: 124 MG/DL
POTASSIUM SERPL-SCNC: 4.3 MMOL/L (ref 3.5–5.1)
PRO-BNP: 62 PG/ML (ref 0–124)
REASON FOR REJECTION: NORMAL
REJECTED TEST: NORMAL
SODIUM BLD-SCNC: 142 MMOL/L (ref 136–145)
TOTAL PROTEIN: 6.2 G/DL (ref 6.4–8.2)
TRIGLYCERIDE, FASTING: 130 MG/DL (ref 0–150)
TSH REFLEX FT4: 0.93 UIU/ML (ref 0.27–4.2)
VLDLC SERPL CALC-MCNC: 26 MG/DL

## 2023-02-24 ENCOUNTER — TELEPHONE (OUTPATIENT)
Dept: CARDIOLOGY CLINIC | Age: 73
End: 2023-02-24

## 2023-02-24 DIAGNOSIS — E78.2 MIXED HYPERLIPIDEMIA: ICD-10-CM

## 2023-02-24 DIAGNOSIS — Z79.899 MEDICATION MANAGEMENT: Primary | ICD-10-CM

## 2023-02-24 LAB
BASOPHILS ABSOLUTE: 0 K/UL (ref 0–0.2)
BASOPHILS RELATIVE PERCENT: 0.4 %
EOSINOPHILS ABSOLUTE: 0.3 K/UL (ref 0–0.6)
EOSINOPHILS RELATIVE PERCENT: 6.1 %
HCT VFR BLD CALC: 39.2 % (ref 40.5–52.5)
HEMOGLOBIN: 12.9 G/DL (ref 13.5–17.5)
LYMPHOCYTES ABSOLUTE: 1 K/UL (ref 1–5.1)
LYMPHOCYTES RELATIVE PERCENT: 22.7 %
MCH RBC QN AUTO: 29.2 PG (ref 26–34)
MCHC RBC AUTO-ENTMCNC: 33.1 G/DL (ref 31–36)
MCV RBC AUTO: 88.4 FL (ref 80–100)
MONOCYTES ABSOLUTE: 0.4 K/UL (ref 0–1.3)
MONOCYTES RELATIVE PERCENT: 8.5 %
NEUTROPHILS ABSOLUTE: 2.8 K/UL (ref 1.7–7.7)
NEUTROPHILS RELATIVE PERCENT: 62.3 %
PDW BLD-RTO: 13.9 % (ref 12.4–15.4)
PLATELET # BLD: 158 K/UL (ref 135–450)
PLATELET SLIDE REVIEW: ABNORMAL
PMV BLD AUTO: 10.4 FL (ref 5–10.5)
RBC # BLD: 4.43 M/UL (ref 4.2–5.9)
SLIDE REVIEW: ABNORMAL
WBC # BLD: 4.6 K/UL (ref 4–11)

## 2023-02-24 RX ORDER — ATORVASTATIN CALCIUM 40 MG/1
40 TABLET, FILM COATED ORAL NIGHTLY
Qty: 90 TABLET | Refills: 1 | Status: SHIPPED | OUTPATIENT
Start: 2023-02-24

## 2023-02-24 NOTE — TELEPHONE ENCOUNTER
----- Message from Kadi Ram MD sent at 2/24/2023  9:45 AM EST -----  Let patient know their chol test is slightly high, rec: incrs lipitor to 40mg po qhs and get f/u lipids/lfts in 1-2mo. Let pt know labs also show slightly low protein levels, pt should f/u w/ pcp for this. Thanks.

## 2023-02-24 NOTE — TELEPHONE ENCOUNTER
Called and spoke with patient. Relayed SRJ results and instructions. He is agreeable to increase in lipitor 40 mg nightly. Pharmacy verified. Order fasting lipid/lft placed for 1-2 months, patient YUDI. VU to reach out to PCP for protein level.

## 2023-02-28 ENCOUNTER — TELEPHONE (OUTPATIENT)
Dept: FAMILY MEDICINE CLINIC | Age: 73
End: 2023-02-28

## 2023-02-28 ENCOUNTER — OFFICE VISIT (OUTPATIENT)
Dept: PULMONOLOGY | Age: 73
End: 2023-02-28
Payer: MEDICARE

## 2023-02-28 VITALS
DIASTOLIC BLOOD PRESSURE: 78 MMHG | TEMPERATURE: 98.2 F | OXYGEN SATURATION: 89 % | RESPIRATION RATE: 16 BRPM | BODY MASS INDEX: 39.94 KG/M2 | WEIGHT: 279 LBS | HEART RATE: 57 BPM | HEIGHT: 70 IN | SYSTOLIC BLOOD PRESSURE: 135 MMHG

## 2023-02-28 DIAGNOSIS — G47.33 OSA (OBSTRUCTIVE SLEEP APNEA): Primary | ICD-10-CM

## 2023-02-28 DIAGNOSIS — G47.10 HYPERSOMNOLENCE: ICD-10-CM

## 2023-02-28 DIAGNOSIS — E66.2 CLASS 2 OBESITY WITH ALVEOLAR HYPOVENTILATION, SERIOUS COMORBIDITY, AND BODY MASS INDEX (BMI) OF 38.0 TO 38.9 IN ADULT (HCC): ICD-10-CM

## 2023-02-28 PROCEDURE — G8484 FLU IMMUNIZE NO ADMIN: HCPCS | Performed by: INTERNAL MEDICINE

## 2023-02-28 PROCEDURE — G8417 CALC BMI ABV UP PARAM F/U: HCPCS | Performed by: INTERNAL MEDICINE

## 2023-02-28 PROCEDURE — 99214 OFFICE O/P EST MOD 30 MIN: CPT | Performed by: INTERNAL MEDICINE

## 2023-02-28 PROCEDURE — G8427 DOCREV CUR MEDS BY ELIG CLIN: HCPCS | Performed by: INTERNAL MEDICINE

## 2023-02-28 PROCEDURE — 3078F DIAST BP <80 MM HG: CPT | Performed by: INTERNAL MEDICINE

## 2023-02-28 PROCEDURE — 1036F TOBACCO NON-USER: CPT | Performed by: INTERNAL MEDICINE

## 2023-02-28 PROCEDURE — 1123F ACP DISCUSS/DSCN MKR DOCD: CPT | Performed by: INTERNAL MEDICINE

## 2023-02-28 PROCEDURE — 3075F SYST BP GE 130 - 139MM HG: CPT | Performed by: INTERNAL MEDICINE

## 2023-02-28 PROCEDURE — 3017F COLORECTAL CA SCREEN DOC REV: CPT | Performed by: INTERNAL MEDICINE

## 2023-02-28 ASSESSMENT — ENCOUNTER SYMPTOMS
GASTROINTESTINAL NEGATIVE: 1
EYES NEGATIVE: 1
RESPIRATORY NEGATIVE: 1
ALLERGIC/IMMUNOLOGIC NEGATIVE: 1

## 2023-02-28 NOTE — PROGRESS NOTES
MA Communication:   The following orders are received by verbal communication from Irineo Pathak MD    Orders include:  Referral to Dr. Maria Elena Alejandro and Dr. Samantha Mena given to pt       FU after INSPIRE sx

## 2023-02-28 NOTE — PATIENT INSTRUCTIONS
ASSESSMENT/PLAN:   Diagnosis Orders   1. ESTHER (obstructive sleep apnea)        2. Class 2 obesity with alveolar hypoventilation, serious comorbidity, and body mass index (BMI) of 38.0 to 38.9 in adult ([facility] [address]        3. Hypersomnolence            Obstructive sleep apnea  I  RECOMMENDATIONS:         Advised to avoid driving when too sleepy to function safely and given a discussion of the risks of untreated apnea such as accidents, cognitive impairment, mood impairment, high blood pressure, various cardiac diseases and stroke. Weight loss was encouraged. Patient has tried masks and CPAP machines in the past and was not able to tolerate it, had issues with getting sufficient sleep and would end up having the mask ripped off of himself over the night.   Patient does not want to do CPAP therapy anymore and was looking for an alternative      Because of the history of atrial fibrillation and hypertension the patient needs to have his sleep apnea treated    We will refer for inspire          Obesity  BMI of 40  We will refer to weight loss program for weight loss control          Return to clinic after inspire implantation

## 2023-02-28 NOTE — TELEPHONE ENCOUNTER
Spoke with patient and let him know we got his Novolog in through the patient assistance. Joanna Garcia is also aware, I gave her the pass through medication form to complete. She will let patient know when he can .

## 2023-02-28 NOTE — PROGRESS NOTES
Le Mauricio (: 1950 ) is a 67 y.o. male here for an evaluation of   Chief Complaint   Patient presents with    Sleep Apnea     INSPIRE Consultation         ASSESSMENT/PLAN:   Diagnosis Orders   1. ESTHER (obstructive sleep apnea)        2. Class 2 obesity with alveolar hypoventilation, serious comorbidity, and body mass index (BMI) of 38.0 to 38.9 in adult ([address]        3. Hypersomnolence            Obstructive sleep apnea  I  RECOMMENDATIONS:         Advised to avoid driving when too sleepy to function safely and given a discussion of the risks of untreated apnea such as accidents, cognitive impairment, mood impairment, high blood pressure, various cardiac diseases and stroke. Weight loss was encouraged. Patient has tried masks and CPAP machines in the past and was not able to tolerate it, had issues with getting sufficient sleep and would end up having the mask ripped off of himself over the night. Patient does not want to do CPAP therapy anymore and was looking for an alternative      Because of the history of atrial fibrillation and hypertension the patient needs to have his sleep apnea treated    We will refer for inspire          Obesity  BMI of 40  We will refer to weight loss program for weight loss control          Return to clinic after inspire implantation    No follow-ups on file. SUBJECTIVE/OBJECTIVE:    Consult from Dr. Charity Babinski  For issues of atrial fibrillation, questionable sleep apnea  Initially seen 2022      Subjective:              67old year old,male, with PMH significant for cad, afib,  that presents today for initial evaluation. Pt reports snore for years  and that it is getting same. Pt reports does  snore severe for years. Patient's partner does complain of pt snoring. Pt's partner does notice that he stops breathing during sleep. Pt's  does wake   himself with dry mouth, sweating, nocturia x 2, toss and turning, chest pain.      Pt does report fatigue or tiredness frequently. Pt sleeps more than 7 hours, and at many times overwhelming sleepiness attacks. Pt  does dozes unintentionally while watching TV. While driving  does not feel drowsy / nod off / fall sleep at stop lights. Pt does not have h/o sleepiness associated wrecks/near wrecks. Pt does nod off while  unattended. Pt does report having restless legs 1 times a week. This is often accompanied by leg jerks during sleep, numbness in legs or feet, aches/burning/cramps in legs, feet. Does not report having nasal congestion. negative for use of nasal sprays, nose or sinus surgery. negative for broken nose, tonsillectomy, sleeping w/ chest raised. does sleep with oxygen. Pt does not have a dental appliance or braces on teeth. Does  teeth grinding. Does not report nightmares, sleep walking, dreaming during naps. When angry or laughing Mary Huff does not report cataplexy. he does not report hallucinations when dozing off or immediately upon awakening. Does not report sleep paralysis. Does not have parasomnia. Patient's [place] Sleepiness score  is required. Patient  is CONSISTENT with moderate daytime sleepiness. Review of Systems   Constitutional: Negative. HENT: Negative. Eyes: Negative. Respiratory: Negative. Cardiovascular: Negative. Gastrointestinal: Negative. Endocrine: Negative. Genitourinary: Negative. Musculoskeletal: Negative. Skin: Negative. Allergic/Immunologic: Negative. Neurological: Negative. Hematological: Negative. Psychiatric/Behavioral: Negative. Vitals:    02/28/23 1532   Height: 5' 10\" (1.778 m)        Physical Exam  Vitals and nursing note reviewed. Constitutional:       General: He is not in acute distress. Appearance: Normal appearance. He is not ill-appearing. HENT:      Head: Normocephalic and atraumatic.       Right Ear: External ear normal.      Left Ear: External ear normal. Nose: Nose normal.      Mouth/Throat:      Mouth: Mucous membranes are moist.      Pharynx: Oropharynx is clear. Comments: Mallampati 3  Eyes:      General: No scleral icterus. Extraocular Movements: Extraocular movements intact. Conjunctiva/sclera: Conjunctivae normal.      Pupils: Pupils are equal, round, and reactive to light. Cardiovascular:      Rate and Rhythm: Normal rate and regular rhythm. Pulses: Normal pulses. Heart sounds: Normal heart sounds. No murmur heard. No friction rub. Pulmonary:      Effort: No respiratory distress. Breath sounds: No stridor. No wheezing, rhonchi or rales. Chest:      Chest wall: No tenderness. Abdominal:      General: Abdomen is flat. Bowel sounds are normal. There is no distension. Tenderness: There is no abdominal tenderness. There is no guarding. Musculoskeletal:         General: No swelling or tenderness. Normal range of motion. Cervical back: Normal range of motion and neck supple. No rigidity. Skin:     General: Skin is warm and dry. Coloration: Skin is not jaundiced. Neurological:      General: No focal deficit present. Mental Status: He is alert and oriented to person, place, and time. Mental status is at baseline. Cranial Nerves: No cranial nerve deficit. Sensory: No sensory deficit. Motor: No weakness. Gait: Gait normal.   Psychiatric:         Mood and Affect: Mood normal.         Thought Content:  Thought content normal.         Judgment: Judgment normal.            Yue Godinez MD

## 2023-02-28 NOTE — LETTER
February 28, 2023       Mayra File YOB: 1950   Joel Gonzalez Date of Visit:  2/28/2023 2/28/23        Mayra File      I have seen this patient in the office today and wanted to communicate my findings and recommendations. Patient Instructions         ASSESSMENT/PLAN:   Diagnosis Orders   1. ESTHER (obstructive sleep apnea)        2. Class 2 obesity with alveolar hypoventilation, serious comorbidity, and body mass index (BMI) of 38.0 to 38.9 in adult ([address]        3. Hypersomnolence            Obstructive sleep apnea  I  RECOMMENDATIONS:         Advised to avoid driving when too sleepy to function safely and given a discussion of the risks of untreated apnea such as accidents, cognitive impairment, mood impairment, high blood pressure, various cardiac diseases and stroke. Weight loss was encouraged. Patient has tried masks and CPAP machines in the past and was not able to tolerate it, had issues with getting sufficient sleep and would end up having the mask ripped off of himself over the night.   Patient does not want to do CPAP therapy anymore and was looking for an alternative      Because of the history of atrial fibrillation and hypertension the patient needs to have his sleep apnea treated    We will refer for inspire          Obesity  BMI of 40  We will refer to weight loss program for weight loss control          Return to clinic after inspire implantation                   Thank you for allowing me to assist in the care of the MD Trino Stroud MD

## 2023-03-01 ENCOUNTER — TELEPHONE (OUTPATIENT)
Dept: BARIATRICS/WEIGHT MGMT | Age: 73
End: 2023-03-01

## 2023-03-01 NOTE — TELEPHONE ENCOUNTER
Per Dr. Augusto Schwarz: [facility] only options.      No VM available to leave message

## 2023-03-01 NOTE — TELEPHONE ENCOUNTER
Patient was mailed Dr. Ludmila Marroquin weight loss seminar. Advised to contact the office once information has been received & reviewed.     Thanks

## 2023-03-02 ENCOUNTER — HOSPITAL ENCOUNTER (OUTPATIENT)
Dept: CARDIOLOGY | Age: 73
Discharge: HOME | End: 2023-03-02
Payer: MEDICARE

## 2023-03-02 ENCOUNTER — TELEPHONE (OUTPATIENT)
Dept: CARDIOLOGY CLINIC | Age: 73
End: 2023-03-02

## 2023-03-02 DIAGNOSIS — I25.119 CORONARY ARTERY DISEASE INVOLVING NATIVE CORONARY ARTERY OF NATIVE HEART WITH ANGINA PECTORIS (HCC): ICD-10-CM

## 2023-03-02 DIAGNOSIS — E11.29 TYPE 2 DIABETES MELLITUS WITH MICROALBUMINURIA, WITH LONG-TERM CURRENT USE OF INSULIN (HCC): ICD-10-CM

## 2023-03-02 DIAGNOSIS — E11.3293 CONTROLLED TYPE 2 DIABETES MELLITUS WITH MILD NONPROLIFERATIVE RETINOPATHY OF BOTH EYES, WITH LONG-TERM CURRENT USE OF INSULIN, MACULAR EDEMA PRESENCE UNSPECIFIED (HCC): ICD-10-CM

## 2023-03-02 DIAGNOSIS — I51.89 GRADE II DIASTOLIC DYSFUNCTION: ICD-10-CM

## 2023-03-02 DIAGNOSIS — R80.9 TYPE 2 DIABETES MELLITUS WITH MICROALBUMINURIA, WITH LONG-TERM CURRENT USE OF INSULIN (HCC): ICD-10-CM

## 2023-03-02 DIAGNOSIS — Z79.4 CONTROLLED TYPE 2 DIABETES MELLITUS WITH MILD NONPROLIFERATIVE RETINOPATHY OF BOTH EYES, WITH LONG-TERM CURRENT USE OF INSULIN, MACULAR EDEMA PRESENCE UNSPECIFIED (HCC): ICD-10-CM

## 2023-03-02 DIAGNOSIS — Z79.4 TYPE 2 DIABETES MELLITUS WITH MICROALBUMINURIA, WITH LONG-TERM CURRENT USE OF INSULIN (HCC): ICD-10-CM

## 2023-03-02 DIAGNOSIS — I48.91 NEW ONSET ATRIAL FIBRILLATION (HCC): ICD-10-CM

## 2023-03-02 DIAGNOSIS — D64.9 ANEMIA, UNSPECIFIED TYPE: Primary | ICD-10-CM

## 2023-03-02 DIAGNOSIS — R60.9 FLUID RETENTION: ICD-10-CM

## 2023-03-02 LAB
LV EF: 58 %
LVEF MODALITY: NORMAL

## 2023-03-02 PROCEDURE — C8929 TTE W OR WO FOL WCON,DOPPLER: HCPCS

## 2023-03-02 PROCEDURE — 6360000004 HC RX CONTRAST MEDICATION: Performed by: INTERNAL MEDICINE

## 2023-03-02 RX ADMIN — PERFLUTREN 1.5 ML: 6.52 INJECTION, SUSPENSION INTRAVENOUS at 15:13

## 2023-03-02 NOTE — TELEPHONE ENCOUNTER
Refill Request     CONFIRM preferrred pharmacy with the patient. If Mail Order Rx - Pend for 90 day refill. Last Seen: Last Seen Department: 2/6/2023  Last Seen by PCP: 2/6/2023    Last Written: 10/26/2022 21.6 mL 0 refills     If no future appointment scheduled, route STAFF MESSAGE with patient name to the [facility] for scheduling. Next Appointment:   Future Appointments   Date Time Provider Magdalena Murguia   3/2/2023  3:00 PM [facility] ECHO ROOM 2 [facility] AND CAR Elaine Victoria   3/3/2023 11:45 AM Jaime Victoria [facility] ENT [facility]   3/6/2023 11:15 AM Jayant Pepper APRN - CNP [facility] CLER CAR [facility]   3/20/2023 10:30 AM Earline Councilman, PA [facility] - [facility]   4/7/2023 11:30 AM Yoli Brito APRN - KHALIF [facility]       Message sent to [employer] to schedule appt with patient?   NO      Requested Prescriptions     Pending Prescriptions Disp Refills    insulin aspart (NOVOLOG FLEXPEN) 100 UNIT/ML injection pen 21.6 mL 0     Sig: Inject 8 Units into the skin 3 times daily (before meals)

## 2023-03-02 NOTE — TELEPHONE ENCOUNTER
----- Message from Trent Moore MD sent at 2/24/2023  6:12 PM EST -----  Let patient know their cbc test shows anemia, rec: repeat cbc next week and rec: referral to hematology and GI for this and he should also f/u w/ pcp for this. Thanks.

## 2023-03-02 NOTE — TELEPHONE ENCOUNTER
Attempted to contact pt to relay SRJ message. No  and VM does not allow messages. Will try at another time.

## 2023-03-02 NOTE — TELEPHONE ENCOUNTER
----- Message from Wu Garza sent at 3/2/2023 10:53 AM EST -----  Subject: Message to Provider    QUESTIONS  Information for Provider? pt called regarding medication novolog request.   ---------------------------------------------------------------------------  --------------  Cornel Smtih INFO  [phone]; OK to leave message on voicemail  ---------------------------------------------------------------------------  --------------  SCRIPT ANSWERS  Relationship to Patient? Third Party  Third Party Type?  Other  Other Third Party Type? wife  Representative Name? Mena Roman

## 2023-03-02 NOTE — TELEPHONE ENCOUNTER
Pts wife called in requesting a follow up for this pts medication. Requesting to speak with someone.

## 2023-03-03 ENCOUNTER — TELEPHONE (OUTPATIENT)
Dept: CARDIOLOGY CLINIC | Age: 73
End: 2023-03-03

## 2023-03-03 RX ORDER — INSULIN ASPART 100 [IU]/ML
8 INJECTION, SOLUTION INTRAVENOUS; SUBCUTANEOUS
Qty: 21.6 ML | Refills: 0 | Status: SHIPPED | OUTPATIENT
Start: 2023-03-03 | End: 2023-06-01

## 2023-03-03 NOTE — TELEPHONE ENCOUNTER
----- Message from Julee Canales MD sent at 3/2/2023  5:46 PM EST -----  Let patient know echo test shows normal heart function, no new orders or changes at this time. Thanks.

## 2023-03-06 ENCOUNTER — OFFICE VISIT (OUTPATIENT)
Dept: CARDIOLOGY CLINIC | Age: 73
End: 2023-03-06
Payer: MEDICARE

## 2023-03-06 VITALS
BODY MASS INDEX: 41.77 KG/M2 | TEMPERATURE: 98.6 F | WEIGHT: 282 LBS | HEIGHT: 69 IN | SYSTOLIC BLOOD PRESSURE: 139 MMHG | HEART RATE: 62 BPM | DIASTOLIC BLOOD PRESSURE: 78 MMHG | OXYGEN SATURATION: 92 %

## 2023-03-06 DIAGNOSIS — I10 PRIMARY HYPERTENSION: ICD-10-CM

## 2023-03-06 DIAGNOSIS — I48.0 PAF (PAROXYSMAL ATRIAL FIBRILLATION) (HCC): ICD-10-CM

## 2023-03-06 DIAGNOSIS — R60.0 LOCALIZED EDEMA: Primary | ICD-10-CM

## 2023-03-06 PROCEDURE — G8427 DOCREV CUR MEDS BY ELIG CLIN: HCPCS | Performed by: NURSE PRACTITIONER

## 2023-03-06 PROCEDURE — G8484 FLU IMMUNIZE NO ADMIN: HCPCS | Performed by: NURSE PRACTITIONER

## 2023-03-06 PROCEDURE — 3078F DIAST BP <80 MM HG: CPT | Performed by: NURSE PRACTITIONER

## 2023-03-06 PROCEDURE — 1036F TOBACCO NON-USER: CPT | Performed by: NURSE PRACTITIONER

## 2023-03-06 PROCEDURE — G8417 CALC BMI ABV UP PARAM F/U: HCPCS | Performed by: NURSE PRACTITIONER

## 2023-03-06 PROCEDURE — 1123F ACP DISCUSS/DSCN MKR DOCD: CPT | Performed by: NURSE PRACTITIONER

## 2023-03-06 PROCEDURE — 3075F SYST BP GE 130 - 139MM HG: CPT | Performed by: NURSE PRACTITIONER

## 2023-03-06 PROCEDURE — 99214 OFFICE O/P EST MOD 30 MIN: CPT | Performed by: NURSE PRACTITIONER

## 2023-03-06 PROCEDURE — 3017F COLORECTAL CA SCREEN DOC REV: CPT | Performed by: NURSE PRACTITIONER

## 2023-03-06 ASSESSMENT — ENCOUNTER SYMPTOMS
GASTROINTESTINAL NEGATIVE: 1
RESPIRATORY NEGATIVE: 1

## 2023-03-06 NOTE — PROGRESS NOTES
[facility]   Cardiology Note              Date:  March 6, 2023  Patientname: Jann Cohen  YOB: 1950    Primary Care physician: JENNIFER Kline    HISTORY OF PRESENT ILLNESS: Jann Cohen is a 67 y.o. male with a history of CAD, afib and AT, HTN, HLD, DM, ESTHER, possible dementia. He was admitted 3/21/2019 for chest pain. Troponin and stress test negative but continued to have chest pain relieved with nitro. Coronary angiography on 3/25/2019 showed moderate CAD, medical management. He was admitted 9/2022 for chest pain and found to have afib. Stress test negative for ischemia. Echo showed EF 55%. Most recent echo 3/2/2023 showed EF 96-23%, normal diastolic function, normal RV size and systolic function. Today he presents for urgent follow up from PCP for fluid retention. He states he feels fine. He denies chest pain, shortness of breath, palpitations, orthopnea and dizziness. He has gained weight over the last year. Edema has worsened. He eats foods high in salt and drinks a lot of water/tea. He does not weigh himself at home. He stopped eliquis due to cost.     Office weight today 3/6/2023: 282 lbs  Office weight 10/2022: 275 lbs    Cardiologist: Dr. Frank Blount 10/2022 and Dr. 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 [address] Diabetic autonomic neuropathy associated with type 2 diabetes mellitus ([facility] [address] Hyperlipidemia, Hypertension, Influenza A, Pancreatitis, Restless legs, Sleep apnea, and Tremor. Past Surgical History:   has a past surgical history that includes Pancreas surgery; Cholecystectomy; Tonsillectomy; [place] tooth extraction; Colonoscopy (10/26/2011); Cataract removal (Bilateral, 2013); Upper gastrointestinal endoscopy (10/04/2016); Hydrocele surgery (11/15/2016);  Endoscopy, colon, diagnostic; Upper gastrointestinal endoscopy (03/16/2017); Upper gastrointestinal endoscopy (06/26/2017); Upper gastrointestinal endoscopy (09/06/2017); Upper gastrointestinal endoscopy (12/22/2017); Upper gastrointestinal endoscopy (N/A, 12/4/2018); Upper gastrointestinal endoscopy (2/19/2019); Upper gastrointestinal endoscopy (N/A, 6/11/2019); Upper gastrointestinal endoscopy (N/A, 8/13/2019); and Abdomen surgery. Home Medications:    Prior to Admission medications    Medication Sig Start Date End Date Taking? Authorizing Provider   insulin aspart (NOVOLOG FLEXPEN) 100 UNIT/ML injection pen Inject 8 Units into the skin 3 times daily (before meals) 3/3/23 6/1/23  JENNIFER Penn   atorvastatin (LIPITOR) 40 MG tablet Take 1 tablet by mouth at bedtime 2/24/23   Harris Hruley MD   glucose monitoring (FREESTYLE FREEDOM) kit 1 kit by Does not apply route daily Check blood sugar 3x daily. E11.9 2/7/23   JENNIFER Penn   blood glucose monitor strips Test 3 times a day & as needed for symptoms of irregular blood glucose. E11.9 Dispense sufficient amount for indicated testing frequency plus additional to accommodate PRN testing needs.  2/7/23   JENNIFER Penn   Lancets MISC 1 each by Does not apply route daily Check blood sugars 3x daily E11.9 2/7/23   JENNIFER Penn   Continuous Blood Gluc Sensor (FREESTYLE JUSTIN 14 DAY SENSOR) MISC 1 Units by Does not apply route every 14 days 2/6/23   JENNIFER Penn   Continuous Blood Gluc  (FREESTYLE JUSTIN 14 DAY READER) OLI 1 Units by Does not apply route as needed (as needed) 1/23/23   JENNIFER Jean-Baptiste   meloxicam (MOBIC) 15 MG tablet Take 1 tablet by mouth daily 11/1/22   Geovani Linares MD   insulin glargine (LANTUS SOLOSTAR) 100 UNIT/ML injection pen Inject 24 Units into the skin nightly 10/26/22 2/28/23  JENNIFER Jean-Baptiste   glipiZIDE (GLUCOTROL XL) 2.5 MG extended release tablet Take 1 tablet by mouth daily 10/26/22   JENNIFER Jean-Baptiste   pramipexole (MIRAPEX) 0.5 MG tablet Take 1 tablet by mouth nightly 10/10/22 2/28/23  JENNIFER Martin   dilTIAZem (CARDIZEM CD) 120 MG extended release capsule Take 1 capsule by mouth nightly 10/4/22   Chandler Echols MD   pantoprazole (PROTONIX) 40 MG tablet TAKE 1 TABLET BY MOUTH ONCE DAILY 9/3/22   Historical Provider, MD   Insulin Pen Needle 31G X 8 MM MISC 1 each by Does not apply route 4 times daily 7/19/22   JENNIFER Martin   lisinopril (PRINIVIL;ZESTRIL) 10 MG tablet Take 1 tablet by mouth in the morning. 7/19/22   JENNIFER Martin   dicyclomine (BENTYL) 10 MG capsule Take 1 capsule by mouth every 6 hours as needed (cramps) 6/1/22   SANDY Estrada - CNP   aspirin 81 MG chewable tablet Take 1 tablet by mouth daily 3/26/19   Jacklyn Mazariegos MD   Insulin Syringe-Needle U-100 30G X 1/2\" 0.5 ML MISC Inject insulin 3 times daily 9/12/17   Historical Provider, MD     Allergies:  Clonidine, Trulicity [dulaglutide], and Codeine    Social History:   reports that he has never smoked. He has been exposed to tobacco smoke. He has never used smokeless tobacco. He reports that he does not drink alcohol and does not use drugs. Family History: family history includes Cancer in his brother and paternal grandfather; Cancer (age of onset: 76) in his father; Kidney Disease in his mother. Review of Systems   Review of Systems   Constitutional: Negative. Respiratory: Negative. Cardiovascular:  Positive for leg swelling. Negative for chest pain and palpitations. Gastrointestinal: Negative. Neurological: Negative.     OBJECTIVE:    Vital signs:    /78   Pulse 62   Temp 98.6 °F (37 °C)   Ht 5' 9\" (1.753 m)   Wt 282 lb (127.9 kg)   SpO2 92%   BMI 41.64 kg/m²      Physical Exam:  Constitutional:  Comfortable and alert, NAD, appears older than stated age, obese  Eyes: PERRL, sclera nonicteric  Neck:  Supple, no masses, no thyroidmegaly, no JVD  Skin:  Warm and dry; no rash or lesions  Heart: Regular, normal apex, S1 and S2 normal, no M/G/R  Lungs:  Normal respiratory effort; clear; no wheezing/rhonchi/rales  Abdomen: soft, non tender, + bowel sounds  Extremities:  No edema or cyanosis; no clubbing  Neuro: alert and oriented, moves legs and arms equally, flat mood and affect    Data Reviewed:      Echo 3/2023:   Technically difficult examination. Normal left ventriculcar size, wall thickness, and systolic function with an   estimated ejection fraction of 55-60%. No regional wall motion abnormalities are seen. Definity contrast administered with no evidence of left ventricular mass or   thrombus noted. Normal diastolic function. Normal RV size and systolic function. The right atrium is mildly dilated. Trace pulmonic and tricuspid regurgitation. The aortic root is at the upper limit of normal in size. The ascending aorta is mildly dilated at 4.0 cm. Stress test 9/2022:   Mildly depressed to low normal LVEF at 48%    No regional wall motion abnormalities    No ischemia    Findings are suggestive of non-ischemic CM     Echo 2/6/2020:   Technically difficult examination. Left ventricular systolic function is normal with ejection fraction   estimated at 55%. No regional wall motion abnormalities. There is mild concentric left ventricular hypertrophy. Grade II diastolic dysfunction with elevated left ventricular filling   pressure. The right ventricle is mildly enlarged. Mild bi-atrial enlargement. Coronary angiogram 3/25/2019:  Unstable angina  PROCEDURES PERFORMED    Left heart catheterization  LVgram  Coronary angiogam  Coronary cath  PROCEDURE DESCRIPTION   Risks/benefits/alternatives/outcomes were discussed with patient and/or family and informed consent was obtained. Using the [facility] test, the patient's right radial artery was found to be acceptable for cannulation. Patient was prepped draped in the usual sterile fashion. Local anaesthetic was applied over puncture site. Using a back wall technique, a 6 Swedish Terumo sheath was inserted into right radial artery. Verapamil, nitroglycerin were administered through the sheath. Heparin was administered. Diagnostic 5fr pigtail, TIG catheters were used for diagnostic angiograms. At the conclusion of the procedure, a TR band was placed over the puncture site and hemostasis was obtained. There were no immediate complications. I supervised sedation with versed 1mg/fentanyl 50mcg during the procedure. 70cc contrast was utilized. <20cc EBL. LVEDP 4   GRADIENT ACROSS AORTIC VALVE No gradient   LV FUNCTION EF 65%   WALL MOTION Normal   MITRAL REGURGITATION Mild      LM <10% stenosis. LAD Prox <10% stenosis. Mid 30-40% stenosis. Distal 30-40% stenosis. LAD wraps around apex. D1 has ostial 80% stenosis. LCX 10% prox-mid stenosis. OM1 bifurcates in prox segment and there is prox-mid OM1 40-50% stenosis. RI Mid 60% stenosis. RCA Dominant. Slight anterior takeoff, Prox-mid 20-30% stenoses. Distal 20% stenosis. Tortuous vessel. Moderate cad/ashd in major epicardial vessels  Would treat medically  Add imdur 30mg daily, and metoprolol tartrate 25mg bid  Ok for dc later today    Cardiology Labs Reviewed:   CBC: No results for input(s): WBC, HGB, HCT, PLT in the last 72 hours. BMP:No results for input(s): NA, K, CO2, BUN, CREATININE, LABGLOM, GLUCOSE in the last 72 hours. PT/INR: No results for input(s): PROTIME, INR in the last 72 hours. APTT:No results for input(s): APTT in the last 72 hours. FASTING LIPID PANEL:  Lab Results   Component Value Date/Time    HDL 55 02/23/2023 11:06 AM    LDLCALC 124 02/23/2023 11:06 AM    TRIG 249 02/06/2023 11:57 AM     LIVER PROFILE:No results for input(s): AST, ALT, ALB in the last 72 hours.   BNP:   Lab Results   Component Value Date/Time    PROBNP 62 02/23/2023 11:06 AM    PROBNP 190 02/06/2023 11:57 AM    PROBNP 44 09/21/2021 12:45 PM    PROBNP 56 08/16/2021 12:41 PM    PROBNP 44 08/18/2020 04:56 AM     Reviewed all labs and imaging today    Assessment:   Edema: worse; recent echo/BNP do not suggest primarily cardiac etiology  Paroxysmal atrial fibrillation: stable   - [id number] score 3   - stopped eliquis due to cost  Atrial tachycardia: noted on monitor 9/2020  CAD: negative stress test 2022, 2021, and 2020; moderate on angiogram 3/25/19  Bradycardia: noted 2/2020 while hospitalized and metoprolol stopped  HTN: controlled  HLD: uncontrolled, , continue statin and recheck  DM: hgb a1c 11.4  ESTHER  Obesity: weight loss recommended    Plan:   1. Echo showed normal RV, LV, diastolic and valvular function. BNP low. Discussed conservative management of edema including low sodium diet, compression stockings, leg elevation, monitoring fluid intake (especially since he drinks a significant amount of fluids and eats a diet high in salt). He will try these interventions first and follow up with PCP. Consider low lose diuretic if edema persists. Call if worsening, weeping or signs of infection. 2. He stopped eliquis due to cost; PA for xarelto, may need to consider coumadin  3. Continue aspirin, statin, lisinopril, diltiazem  4. Aggressive diabetes management  5. Continue to follow with PCP  6.  Follow up as planned with EP 4/2023    SANDY Lebron-CNP  [facility]  [phone]

## 2023-03-06 NOTE — PATIENT INSTRUCTIONS
For swelling; elevate legs, low salt diet <2000 mg, fluids 60-70 ounces, compression stockings  Will try to have xarelto authorized for afib stroke prevention  Follow up as planned with Hang Lovelace

## 2023-03-07 ENCOUNTER — CARE COORDINATION (OUTPATIENT)
Dept: CARE COORDINATION | Age: 73
End: 2023-03-07

## 2023-03-07 ENCOUNTER — OFFICE VISIT (OUTPATIENT)
Dept: ENT CLINIC | Age: 73
End: 2023-03-07
Payer: MEDICARE

## 2023-03-07 VITALS — TEMPERATURE: 97.2 F | HEIGHT: 69 IN | WEIGHT: 282 LBS | RESPIRATION RATE: 16 BRPM | BODY MASS INDEX: 41.77 KG/M2

## 2023-03-07 DIAGNOSIS — G47.33 OBSTRUCTIVE SLEEP APNEA: Primary | ICD-10-CM

## 2023-03-07 PROCEDURE — 3017F COLORECTAL CA SCREEN DOC REV: CPT | Performed by: STUDENT IN AN ORGANIZED HEALTH CARE EDUCATION/TRAINING PROGRAM

## 2023-03-07 PROCEDURE — G8427 DOCREV CUR MEDS BY ELIG CLIN: HCPCS | Performed by: STUDENT IN AN ORGANIZED HEALTH CARE EDUCATION/TRAINING PROGRAM

## 2023-03-07 PROCEDURE — G8484 FLU IMMUNIZE NO ADMIN: HCPCS | Performed by: STUDENT IN AN ORGANIZED HEALTH CARE EDUCATION/TRAINING PROGRAM

## 2023-03-07 PROCEDURE — 1036F TOBACCO NON-USER: CPT | Performed by: STUDENT IN AN ORGANIZED HEALTH CARE EDUCATION/TRAINING PROGRAM

## 2023-03-07 PROCEDURE — 1123F ACP DISCUSS/DSCN MKR DOCD: CPT | Performed by: STUDENT IN AN ORGANIZED HEALTH CARE EDUCATION/TRAINING PROGRAM

## 2023-03-07 PROCEDURE — G8417 CALC BMI ABV UP PARAM F/U: HCPCS | Performed by: STUDENT IN AN ORGANIZED HEALTH CARE EDUCATION/TRAINING PROGRAM

## 2023-03-07 PROCEDURE — 99203 OFFICE O/P NEW LOW 30 MIN: CPT | Performed by: STUDENT IN AN ORGANIZED HEALTH CARE EDUCATION/TRAINING PROGRAM

## 2023-03-07 ASSESSMENT — ENCOUNTER SYMPTOMS
SHORTNESS OF BREATH: 0
COUGH: 0
VOMITING: 0
EYE PAIN: 0
NAUSEA: 0
RHINORRHEA: 0

## 2023-03-07 NOTE — PROGRESS NOTES
[facility]      Patient Name: Carolina Aparicio [address] Record Number:  [id number]  Primary Care Physician:  JENNIFER Neal  Date of Consultation: 3/7/2023    Chief Complaint:   Chief Complaint   Patient presents with    New Patient     [id number] [facility]  Paige Whiting is a(n) 67 y.o. male who presents for evaluation of inspire. He has sleep apnea and has been unable to tolerate his CPAP as he throws it off in the middle of the night routinely.     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 ([address]     Past Surgical History:   Procedure Laterality Date    ABDOMEN SURGERY      CATARACT REMOVAL Bilateral 2013    CHOLECYSTECTOMY      COLONOSCOPY  10/26/2011    ENDOSCOPY, COLON, DIAGNOSTIC      EGD halo    HYDROCELE EXCISION  11/15/2016    PANCREAS SURGERY      cyst opened up and drining into small bowel    TONSILLECTOMY      UPPER GASTROINTESTINAL ENDOSCOPY  10/04/2016    with biopsy    UPPER GASTROINTESTINAL ENDOSCOPY  03/16/2017    Halo ablation    UPPER GASTROINTESTINAL ENDOSCOPY  06/26/2017    Halo ablation    UPPER GASTROINTESTINAL ENDOSCOPY  09/06/2017    bx distal sophagus    UPPER GASTROINTESTINAL ENDOSCOPY  12/22/2017    with HALO  procedure    UPPER GASTROINTESTINAL ENDOSCOPY N/A 12/4/2018    EGD WITH ABLATION AND ANESTHESIA - HALO---SLEEP APNEA---  performed by Liliam Meeks MD at [facility]  2/19/2019    EGD BIOPSY performed by Liliam Meeks MD at [facility] N/A 6/11/2019    EGD WITH HALO AND ANESTHESIA performed by Liliam Meeks MD at [facility] N/A 8/13/2019    ESOPHAGOGASTRODUODENOSCOPY WITH HALO AND ANESTHESIA -SLEEP APNEA- performed by Liliam Meeks MD at [facility] 132 EXTRACTION       Family History   Problem Relation Age of Onset    Kidney Disease Mother     Cancer Father 76        pancreas    Cancer Brother         lung    Cancer Paternal Grandfather         colon     Social History     Socioeconomic History    Marital status:      Spouse name: Not on file    Number of children: Not on file    Years of education: Not on file    Highest education level: Not on file   Occupational History    Occupation:      Comment: maintenance   Tobacco Use    Smoking status: Never     Passive exposure: Past    Smokeless tobacco: Never   Vaping Use    Vaping Use: Never used   Substance and Sexual Activity    Alcohol use: No    Drug use: No    Sexual activity: Yes   Other Topics Concern    Not on file   Social History Narrative    Not on file     Social Determinants of Health     Financial Resource Strain: Low Risk     Difficulty of Paying Living Expenses: Not hard at all   Food Insecurity: No Food Insecurity    Worried About Running Out of Food in the Last Year: Never true    [address] in the Last Year: Never true   Transportation Needs: No Transportation Needs    Lack of Transportation (Medical): No    Lack of Transportation (Non-Medical):  No   Physical Activity: Not on file   Stress: Not on file   Social Connections: Not on file   Intimate Partner Violence: Not on file   Housing Stability: Low Risk     Unable to Pay for Housing in the Last Year: No    Number of Places Lived in the Last Year: 0 Unstable Housing in the Last Year: No     DRUG/FOOD ALLERGIES: Clonidine, Trulicity [dulaglutide], and Codeine    CURRENT MEDICATIONS  Prior to Admission medications    Medication Sig Start Date End Date Taking? Authorizing Provider   rivaroxaban (XARELTO) 20 MG TABS tablet Take 1 tablet by mouth Daily with supper 3/6/23  Yes Efraín Public, APRN - CNP   insulin aspart (NOVOLOG FLEXPEN) 100 UNIT/ML injection pen Inject 8 Units into the skin 3 times daily (before meals) 3/3/23 6/1/23 Yes JENNIFER Carver   atorvastatin (LIPITOR) 40 MG tablet Take 1 tablet by mouth at bedtime 2/24/23  Yes Claudia Kirkpatrick MD   glucose monitoring (FREESTYLE FREEDOM) kit 1 kit by Does not apply route daily Check blood sugar 3x daily. E11.9 2/7/23  Yes JENNIFER Carver   blood glucose monitor strips Test 3 times a day & as needed for symptoms of irregular blood glucose. E11.9 Dispense sufficient amount for indicated testing frequency plus additional to accommodate PRN testing needs.  2/7/23  Yes JENNIFER Carver   Lancets MISC 1 each by Does not apply route daily Check blood sugars 3x daily E11.9 2/7/23  Yes Con Magic, PA   Continuous Blood Gluc Sensor (FREESTYLE JUSTIN 14 DAY SENSOR) MISC 1 Units by Does not apply route every 14 days 2/6/23  Yes JENNIFER Carver   Continuous Blood Gluc  (FREESTYLE JUSTIN 14 DAY READER) OLI 1 Units by Does not apply route as needed (as needed) 1/23/23  Yes JENNIFER Cortez   meloxicam (MOBIC) 15 MG tablet Take 1 tablet by mouth daily 11/1/22  Yes Pam Vazquez MD   glipiZIDE (GLUCOTROL XL) 2.5 MG extended release tablet Take 1 tablet by mouth daily 10/26/22  Yes JENNIFER Cortez   dilTIAZem (CARDIZEM CD) 120 MG extended release capsule Take 1 capsule by mouth nightly 10/4/22  Yes Gloria Chowdhury MD   pantoprazole (PROTONIX) 40 MG tablet TAKE 1 TABLET BY MOUTH ONCE DAILY 9/3/22  Yes Historical Provider, MD   Insulin Pen Needle 31G X 8 MM MISC 1 each by Does not apply route 4 times daily 7/19/22  Yes JENNIFER Farooq   lisinopril (PRINIVIL;ZESTRIL) 10 MG tablet Take 1 tablet by mouth in the morning. 7/19/22  Yes JENNIFER Farooq   dicyclomine (BENTYL) 10 MG capsule Take 1 capsule by mouth every 6 hours as needed (cramps) 6/1/22  Yes Kiah Perez, APRN - CNP   aspirin 81 MG chewable tablet Take 1 tablet by mouth daily 3/26/19  Yes Jaden Wei MD   Insulin Syringe-Needle U-100 30G X 1/2\" 0.5 ML MISC Inject insulin 3 times daily 9/12/17  Yes Historical Provider, MD   insulin glargine (LANTUS SOLOSTAR) 100 UNIT/ML injection pen Inject 24 Units into the skin nightly 10/26/22 2/28/23  JENNIFER Farooq   pramipexole (MIRAPEX) 0.5 MG tablet Take 1 tablet by mouth nightly 10/10/22 2/28/23  JENNIFER Farooq     REVIEW OF SYSTEMS  The following systems were reviewed and revealed the following in addition to any already discussed in the HPI:    Review of Systems   Constitutional:  Negative for fatigue and fever. HENT:  Negative for congestion, ear pain, postnasal drip, rhinorrhea and sneezing. Eyes:  Negative for pain and visual disturbance. Respiratory:  Negative for cough and shortness of breath. Cardiovascular:  Negative for chest pain. Gastrointestinal:  Negative for nausea and vomiting. Endocrine: Negative. Genitourinary: Negative. Musculoskeletal:  Negative for neck pain and neck stiffness. Skin:  Negative for rash. Neurological:  Negative for dizziness and headaches.       PHYSICAL EXAM  Temp 97.2 °F (36.2 °C) (Infrared)   Resp 16   Ht 5' 9\" (1.753 m)   Wt 282 lb (127.9 kg)   BMI 41.64 kg/m²     GENERAL: No Acute Distress, Alert and Oriented, no hoarseness  EYES: EOMI, Anti-icteric  NOSE: No epistaxis, nasal mucosa within normal limits, no purulent drainage  EARS: Normal external canal appearance, EAC patent bilaterally  FACE: 1/6 House-Brackmann Scale, symmetric, sensation equal bilaterally  ORAL CAVITY: No masses or lesions palpated, uvula is midline, moist mucous membranes,   NECK: Normal range of motion, no thyromegaly, trachea is midline, no lymphadenopathy, no neck masses, no crepitus  CHEST: Normal respiratory effort, no retractions, breathing comfortably  SKIN: No rashes, normal appearing skin, no evidence of skin lesions/tumors    RADIOLOGY  Summary of findings:    PROCEDURE    ASSESSMENT/PLAN  1. Obstructive sleep apnea  We discussed that he is not currently a candidate for inspire given that his BMI is 41.6. He will need to lose approximately 40 pounds before he will be a candidate for Medicare. We did discuss that he could move forward with the DISE however he would like to begin having weight loss prior to moving forth any procedures. Inspire process and requirements discussed in detail with patient. male has an AHI of 69.6 on sleep study performed 2/14/2023. He/She has tried and failed CPAP secondary to inability to tolerate the machine. His/Her BMI is 41.6. His/Her understands that a sleep endoscopy is required to evaluate the type of airway collapse causing the obstructive sleep apnea. Risks of procedure discussed include epistaxis and nasal pain. He/She understands that if concentric collapse is present on sleep endoscopy then he/she is not a candidate for inspire. [employer], DO  3/7/23    Portions of this note were dictated using Dragon. There may be linguistic errors secondary to the use of this program.    Medical Decision Making:   The following items were considered in medical decision making:  Independent review of images  Review / order clinical lab tests  Review / order radiology tests  Decision to obtain old records

## 2023-03-07 NOTE — CARE COORDINATION
ACM called patient who said he was at the doctors office and was not able to converse. ACM will outreach at a later date to complete follow up call.

## 2023-03-14 ENCOUNTER — CARE COORDINATION (OUTPATIENT)
Dept: CARE COORDINATION | Age: 73
End: 2023-03-14

## 2023-03-14 NOTE — CARE COORDINATION
Ambulatory Care Coordination Note  3/14/2023    Patient Current Location:  Home: [address] [place] [address],[address] [address]     ACM contacted the family by telephone. Verified name and  with family as identifiers. Provided introduction to self, and explanation of the ACM role. Challenges to be reviewed by the provider   Additional needs identified to be addressed with provider: No  none               Method of communication with provider: chart routing. ACM: Sujey Hansen RN     ACM completed follow up call with patient wife Catherine Eng, who is verified on HIPAA form. Catherine Eng said that patient has not been checking blood sugars but does have his sensor placed. ACM encouraged Catherine Eng to talk to PCP about LibreView and if patient would qualify for readings to be submitted to the office. Catherine Eng said she would ask during next follow up appt with PCP . ACM encouraged Catherine Eng to monitor patients BP and ACM would get log during next call. Catherine Eng said she would try to get this. ACM thanked Catherine Eng at this time who has no further questions. Plan:    F/U call 2 weeks  BP logs  BS logs  Libreview (?)      Offered patient enrollment in the Remote Patient Monitoring (RPM) program for in-home monitoring: Patient declined. Lab Results       None            Care Coordination Interventions    Referral from Primary Care Provider: Yes  Suggested Interventions and Community Resources  Diabetes Education: In Process  Zone Management Tools: In Process (Comment: DM Zone mangement tool)          Goals Addressed                   This Visit's Progress     Conditions and Symptoms   Improving     I will schedule office visits, as directed by my provider. I will keep my appointment or reschedule if I have to cancel. I will notify my provider of any barriers to my plan of care. I will follow my Zone Management tool to seek urgent or emergent care.   I will notify my provider of any symptoms that indicate a worsening of my condition. Barriers: overwhelmed by complexity of regimen  Plan for overcoming my barriers: ACM will informed when follow ups need to be scheduled  Confidence: 5/10  Anticipated Goal Completion Date: 3/21/23         Self Monitoring   No change     Self-Monitored Blood Glucose - I will check my blood sugar blood sugars ac & HS  Blood Pressure - I will take my blood pressure as directed - Daily    None Recently Recorded    Barriers: overwhelmed by complexity of regimen  Plan for overcoming my barriers: ACM will complete support outreach calls to obtain data and report back to PCP for POC changes   Confidence: 5/10  Anticipated Goal Completion Date: 4/7/23                  Future Appointments   Date Time Provider Magdalena Murguia   3/20/2023 10:30 AM JENNIFER Alberts [place] - DYD   3/24/2023  2:15 PM DO SHADI De La Cruz [facility]   4/7/2023 11:30 AM SANDY Henderson CNP   ,   Diabetes Assessment      Meal Planning: Avoidance of concentrated sweets, Calorie counting   How often do you test your blood sugar?: No Testing (Comment: Patient has new Sensors but has not placed them yet)   Do you have barriers with adherence to non-pharmacologic self-management interventions?  (Nutrition/Exercise/Self-Monitoring): Yes   Have you ever had to go to the ED for symptoms of low blood sugar?: No            , and   General Assessment    Do you have any symptoms that are causing concern?: No

## 2023-03-15 NOTE — TELEPHONE ENCOUNTER
Spoke with patient's wife, advised to bring reader with patient to next appointment so we will be able to download the data from the reader to get the report. I will discuss how to download at home when patient is in the office.

## 2023-03-20 ENCOUNTER — OFFICE VISIT (OUTPATIENT)
Dept: FAMILY MEDICINE CLINIC | Age: 73
End: 2023-03-20

## 2023-03-20 ENCOUNTER — TELEPHONE (OUTPATIENT)
Dept: FAMILY MEDICINE CLINIC | Age: 73
End: 2023-03-20

## 2023-03-20 VITALS
SYSTOLIC BLOOD PRESSURE: 128 MMHG | BODY MASS INDEX: 41.59 KG/M2 | HEART RATE: 66 BPM | TEMPERATURE: 98.1 F | DIASTOLIC BLOOD PRESSURE: 72 MMHG | OXYGEN SATURATION: 90 % | WEIGHT: 280.8 LBS | HEIGHT: 69 IN

## 2023-03-20 DIAGNOSIS — D64.9 ANEMIA, UNSPECIFIED TYPE: ICD-10-CM

## 2023-03-20 DIAGNOSIS — E11.65 TYPE 2 DIABETES MELLITUS WITH HYPERGLYCEMIA, WITH LONG-TERM CURRENT USE OF INSULIN (HCC): ICD-10-CM

## 2023-03-20 DIAGNOSIS — M79.662 PAIN AND SWELLING OF LEFT LOWER LEG: ICD-10-CM

## 2023-03-20 DIAGNOSIS — E11.29 TYPE 2 DIABETES MELLITUS WITH MICROALBUMINURIA, WITH LONG-TERM CURRENT USE OF INSULIN (HCC): ICD-10-CM

## 2023-03-20 DIAGNOSIS — Z79.4 TYPE 2 DIABETES MELLITUS WITH HYPERGLYCEMIA, WITH LONG-TERM CURRENT USE OF INSULIN (HCC): ICD-10-CM

## 2023-03-20 DIAGNOSIS — M79.89 PAIN AND SWELLING OF LEFT LOWER LEG: Primary | ICD-10-CM

## 2023-03-20 DIAGNOSIS — R80.9 TYPE 2 DIABETES MELLITUS WITH MICROALBUMINURIA, WITH LONG-TERM CURRENT USE OF INSULIN (HCC): ICD-10-CM

## 2023-03-20 DIAGNOSIS — Z00.00 INITIAL MEDICARE ANNUAL WELLNESS VISIT: ICD-10-CM

## 2023-03-20 DIAGNOSIS — Z79.4 TYPE 2 DIABETES MELLITUS WITH MICROALBUMINURIA, WITH LONG-TERM CURRENT USE OF INSULIN (HCC): ICD-10-CM

## 2023-03-20 DIAGNOSIS — I47.1 ATRIAL TACHYCARDIA (HCC): ICD-10-CM

## 2023-03-20 DIAGNOSIS — M79.662 PAIN AND SWELLING OF LEFT LOWER LEG: Primary | ICD-10-CM

## 2023-03-20 DIAGNOSIS — E11.41 DIABETIC MONONEUROPATHY ASSOCIATED WITH TYPE 2 DIABETES MELLITUS (HCC): ICD-10-CM

## 2023-03-20 DIAGNOSIS — M79.89 PAIN AND SWELLING OF LEFT LOWER LEG: ICD-10-CM

## 2023-03-20 DIAGNOSIS — I48.0 PAROXYSMAL ATRIAL FIBRILLATION (HCC): ICD-10-CM

## 2023-03-20 LAB
D DIMER: 0.39 UG/ML FEU (ref 0–0.6)
FERRITIN SERPL IA-MCNC: 157.8 NG/ML (ref 30–400)
IRON SATN MFR SERPL: 28 % (ref 20–50)
IRON SERPL-MCNC: 74 UG/DL (ref 59–158)
TIBC SERPL-MCNC: 260 UG/DL (ref 260–445)

## 2023-03-20 RX ORDER — WARFARIN SODIUM 5 MG/1
5 TABLET ORAL DAILY
Qty: 30 TABLET | Refills: 3 | Status: SHIPPED | OUTPATIENT
Start: 2023-03-20

## 2023-03-20 ASSESSMENT — PATIENT HEALTH QUESTIONNAIRE - PHQ9
SUM OF ALL RESPONSES TO PHQ QUESTIONS 1-9: 6
SUM OF ALL RESPONSES TO PHQ9 QUESTIONS 1 & 2: 3
2. FEELING DOWN, DEPRESSED OR HOPELESS: 0
5. POOR APPETITE OR OVEREATING: 0
9. THOUGHTS THAT YOU WOULD BE BETTER OFF DEAD, OR OF HURTING YOURSELF: 0
3. TROUBLE FALLING OR STAYING ASLEEP: 0
8. MOVING OR SPEAKING SO SLOWLY THAT OTHER PEOPLE COULD HAVE NOTICED. OR THE OPPOSITE, BEING SO FIGETY OR RESTLESS THAT YOU HAVE BEEN MOVING AROUND A LOT MORE THAN USUAL: 0
7. TROUBLE CONCENTRATING ON THINGS, SUCH AS READING THE NEWSPAPER OR WATCHING TELEVISION: 2
SUM OF ALL RESPONSES TO PHQ QUESTIONS 1-9: 6
1. LITTLE INTEREST OR PLEASURE IN DOING THINGS: 3
4. FEELING TIRED OR HAVING LITTLE ENERGY: 1

## 2023-03-20 ASSESSMENT — ENCOUNTER SYMPTOMS
DIARRHEA: 0
ABDOMINAL PAIN: 0
NAUSEA: 0
RHINORRHEA: 0
SHORTNESS OF BREATH: 0
CONSTIPATION: 0
VOMITING: 0
COUGH: 0
SORE THROAT: 0

## 2023-03-20 ASSESSMENT — LIFESTYLE VARIABLES
HOW OFTEN DO YOU HAVE A DRINK CONTAINING ALCOHOL: NEVER
HOW MANY STANDARD DRINKS CONTAINING ALCOHOL DO YOU HAVE ON A TYPICAL DAY: PATIENT DOES NOT DRINK

## 2023-03-20 NOTE — TELEPHONE ENCOUNTER
Attempted to call Mateus Fragoso, no answer and unable to leave a voicemail at this time. If calls back please inform that I have clarified with Dayami Hernandez, once we receive the results from blood work done today we will review, if abnormal may refer to scheduling at [facility] for iron infusion. We will reach out with results and plan of care.

## 2023-03-20 NOTE — PROGRESS NOTES
(cramps) Yes SANDY Estrada CNP   aspirin 81 MG chewable tablet Take 1 tablet by mouth daily Yes Greg Donnelly MD   Insulin Syringe-Needle U-100 30G X 1/2\" 0.5 ML MISC Inject insulin 3 times daily Yes Historical Provider, MD   insulin glargine (LANTUS SOLOSTAR) 100 UNIT/ML injection pen Inject 24 Units into the skin nightly  JENNIFER Mariano   pramipexole (MIRAPEX) 0.5 MG tablet Take 1 tablet by mouth nightly  JENNIFER Mariano       CareTeam (Including outside providers/suppliers regularly involved in providing care):   Patient Care Team:  JENNIFER Begum as PCP - General (Physician Assistant)  JENNIFER Begum as PCP - Empaneled Provider  Gal Luna MD as Consulting Physician (Pulmonology)  Dk Farr MD (Orthopedic Surgery)  Marine Olivier MD as Consulting Physician (Endocrinology)  Lonny Nicolas MD (Urology)  SANDY Lozada CNP as Nurse Practitioner (Family Nurse Practitioner)  Kang Mullins as   Niles Runner, LSW as  (Licensed Clinical )  Espinoza Carranza RN as Ambulatory Care Manager     Reviewed and updated this visit:  Tobacco  Allergies  Meds  Problems  Med Hx  Surg Hx  Soc Hx  Fam Hx               JENNIFER Begum

## 2023-03-20 NOTE — TELEPHONE ENCOUNTER
----- Message from Ole Weber sent at 3/20/2023  1:28 PM EDT -----  Subject: Message to Provider    QUESTIONS  Information for Provider? Ms. Marlen Benedict patient wife wants to know if she   needs to take her  to the [facility] iron please contact her about this   thanks  ---------------------------------------------------------------------------  --------------  [address]  [phone]; OK to leave message on voicemail  ---------------------------------------------------------------------------  --------------  SCRIPT ANSWERS  Relationship to Patient? Spouse/Partner  Representative Name? Rosy(Wife)  Is the representative on the Communication Release of Information (YAMILE)   form in Epic?  Yes

## 2023-03-20 NOTE — PATIENT INSTRUCTIONS
Preventing Falls: Care Instructions  Overview     Getting around your home safely can be a challenge if you have injuries or health problems that make it easy for you to fall. Loose rugs and furniture in walkways are among the dangers for many older people who have problems walking or who have poor eyesight. People who have conditions such as arthritis, osteoporosis, or dementia also have to be careful not to fall. You can make your home safer with a few simple measures. Follow-up care is a key part of your treatment and safety. Be sure to make and go to all appointments, and call your doctor if you are having problems. It's also a good idea to know your test results and keep a list of the medicines you take. How can you care for yourself at home? Taking care of yourself  Exercise regularly to improve your strength, muscle tone, and balance. Walk if you can. Swimming may be a good choice if you cannot walk easily. Have your vision and hearing checked each year or any time you notice a change. If you have trouble seeing and hearing, you might not be able to avoid objects and could lose your balance. Know the side effects of the medicines you take. Ask your doctor or pharmacist whether the medicines you take can affect your balance. Sleeping pills or sedatives can affect your balance. Limit the amount of alcohol you drink. Alcohol can impair your balance and other senses. Ask your doctor whether calluses or corns on your feet need to be removed. If you wear loose-fitting shoes because of calluses or corns, you can lose your balance and fall. Talk to your doctor if you have numbness in your feet. You may get dizzy if you do not drink enough water. To prevent dehydration, drink plenty of fluids. Choose water and other clear liquids. If you have kidney, heart, or liver disease and have to limit fluids, talk with your doctor before you increase the amount of fluids you drink.   Preventing falls at

## 2023-03-24 ENCOUNTER — OFFICE VISIT (OUTPATIENT)
Dept: ORTHOPEDIC SURGERY | Age: 73
End: 2023-03-24
Payer: MEDICARE

## 2023-03-24 VITALS — HEIGHT: 69 IN | BODY MASS INDEX: 41.47 KG/M2 | WEIGHT: 280 LBS

## 2023-03-24 DIAGNOSIS — M17.12 OSTEOARTHRITIS OF LEFT KNEE, UNSPECIFIED OSTEOARTHRITIS TYPE: Primary | ICD-10-CM

## 2023-03-24 PROCEDURE — 1123F ACP DISCUSS/DSCN MKR DOCD: CPT | Performed by: STUDENT IN AN ORGANIZED HEALTH CARE EDUCATION/TRAINING PROGRAM

## 2023-03-24 PROCEDURE — 3017F COLORECTAL CA SCREEN DOC REV: CPT | Performed by: STUDENT IN AN ORGANIZED HEALTH CARE EDUCATION/TRAINING PROGRAM

## 2023-03-24 PROCEDURE — 99214 OFFICE O/P EST MOD 30 MIN: CPT | Performed by: STUDENT IN AN ORGANIZED HEALTH CARE EDUCATION/TRAINING PROGRAM

## 2023-03-24 PROCEDURE — G8417 CALC BMI ABV UP PARAM F/U: HCPCS | Performed by: STUDENT IN AN ORGANIZED HEALTH CARE EDUCATION/TRAINING PROGRAM

## 2023-03-24 PROCEDURE — G8484 FLU IMMUNIZE NO ADMIN: HCPCS | Performed by: STUDENT IN AN ORGANIZED HEALTH CARE EDUCATION/TRAINING PROGRAM

## 2023-03-24 PROCEDURE — 1036F TOBACCO NON-USER: CPT | Performed by: STUDENT IN AN ORGANIZED HEALTH CARE EDUCATION/TRAINING PROGRAM

## 2023-03-24 PROCEDURE — G8427 DOCREV CUR MEDS BY ELIG CLIN: HCPCS | Performed by: STUDENT IN AN ORGANIZED HEALTH CARE EDUCATION/TRAINING PROGRAM

## 2023-03-24 NOTE — LETTER
The patient needs a left total knee replacement for worsening pain, however his last A1c was concernedly high at 11.4 and I cannot do any replacement unless his A1c is below 8. We will try to shoot for sometime in the early summer, so we will need some assistance getting his A1c under better control. We will also need to plan to manage his Coumadin due to his A-fib. Thank you for your help.

## 2023-03-28 ENCOUNTER — ANTI-COAG VISIT (OUTPATIENT)
Dept: PHARMACY | Age: 73
End: 2023-03-28
Payer: MEDICARE

## 2023-03-28 DIAGNOSIS — I48.91 NEW ONSET ATRIAL FIBRILLATION (HCC): Primary | ICD-10-CM

## 2023-03-28 LAB — INTERNATIONAL NORMALIZATION RATIO, POC: 1.2

## 2023-03-28 PROCEDURE — 99211 OFF/OP EST MAY X REQ PHY/QHP: CPT | Performed by: PHARMACIST

## 2023-03-28 PROCEDURE — 85610 PROTHROMBIN TIME: CPT | Performed by: PHARMACIST

## 2023-03-28 NOTE — PROGRESS NOTES
Mr Cami Bustamante is here for management of anticoagulation for AFib. PMH also significant for CAD, DM2, HLD, HTN   He presents today w/out complaint. Pt verifies dosing regimen as listed above. Pt denies s/s bleeding/bruising/swelling/SOB. No BRBPR. No melena. Address missed doses  Reviewed pt medication list  No changes in RX/OTCs/Herbal medications. Reviewed dietary concerns  Address EToH and tobacco use. As initial clinic visit, provided pt with counseling for medication use/compliance, adverse events, and nutrition; clinic overview & orientation; and educational materials. Started warfarin last Thurs at 5 mg daily. INR 1.2 is below therapeutic range of 2-3  Recommend to increase dose to 5 mg daily except 7.5 mg Tue/Thu/Sat  Patient has 5 mg tablets. Will continue to monitor and check INR in 7 days  Dosing reminder card given with phone number, appointment date and time.    Return to clinic:  4/4/23 @ [facility], PharmD 9:40 AM EDT 3/28/23

## 2023-04-03 ENCOUNTER — CARE COORDINATION (OUTPATIENT)
Dept: CARE COORDINATION | Age: 73
End: 2023-04-03

## 2023-04-03 ENCOUNTER — TELEPHONE (OUTPATIENT)
Dept: ORTHOPEDIC SURGERY | Age: 73
End: 2023-04-03

## 2023-04-03 NOTE — TELEPHONE ENCOUNTER
Auth: NPR  Date: 05/03/23  Reference # None  Spoke with: None  Type of SX: Inpatient  Location: [facility]  CPT: [id number]   DX: M17.12  SX area: LT knee  Insurance: Medicare A&B

## 2023-04-03 NOTE — CARE COORDINATION
Ambulatory Care Coordination Note  4/3/2023    Patient Current Location:  Home: [address] [place] [address],[address] [address]     ACM contacted the family by telephone. Verified name and  with family as identifiers. Provided introduction to self, and explanation of the ACM role. Challenges to be reviewed by the provider   Additional needs identified to be addressed with provider: No  none               Method of communication with provider: none. ACM: Randy Guillen RN    ACM completed follow up call with patient wife Jaguar Barnes, who is verified on HIPAA form. Jaguar Barnes said that patient is doing well with monitoring his DM and doing well with CGM. Jaguar Barnes said that patient will bring in monitor where CIRILO Castle will upload data into [employer]. Jaguar Barnes said there is no more additional needs at this time. Plan:    [facility]  [employer] [facility] patient enrollment in the Remote Patient Monitoring (RPM) program for in-home monitoring: Patient declined. Lab Results       None            Care Coordination Interventions    Referral from Primary Care Provider: Yes  Suggested Interventions and Community Resources  Diabetes Education: Completed  Zone Management Tools: Completed (Comment: DM Zone mangement tool)          Goals Addressed                   This Visit's Progress     COMPLETED: Conditions and Symptoms   Improving     I will schedule office visits, as directed by my provider. I will keep my appointment or reschedule if I have to cancel. I will notify my provider of any barriers to my plan of care. I will follow my Zone Management tool to seek urgent or emergent care. I will notify my provider of any symptoms that indicate a worsening of my condition.     Barriers: overwhelmed by complexity of regimen  Plan for overcoming my barriers: ACM will informed when follow ups need to be scheduled  Confidence: 5/10  Anticipated Goal Completion Date: 3/21/23         COMPLETED: Self Monitoring        Self-Monitored Blood Glucose

## 2023-04-04 ENCOUNTER — ANTI-COAG VISIT (OUTPATIENT)
Dept: PHARMACY | Age: 73
End: 2023-04-04
Payer: MEDICARE

## 2023-04-04 ENCOUNTER — TELEPHONE (OUTPATIENT)
Dept: FAMILY MEDICINE CLINIC | Age: 73
End: 2023-04-04

## 2023-04-04 DIAGNOSIS — I48.91 NEW ONSET ATRIAL FIBRILLATION (HCC): Primary | ICD-10-CM

## 2023-04-04 LAB — INTERNATIONAL NORMALIZATION RATIO, POC: 1.3

## 2023-04-04 PROCEDURE — 85610 PROTHROMBIN TIME: CPT | Performed by: PHARMACIST

## 2023-04-04 PROCEDURE — 99211 OFF/OP EST MAY X REQ PHY/QHP: CPT | Performed by: PHARMACIST

## 2023-04-04 RX ORDER — MELOXICAM 15 MG/1
15 TABLET ORAL DAILY PRN
Qty: 30 TABLET | Refills: 3 | OUTPATIENT
Start: 2023-04-04

## 2023-04-04 NOTE — TELEPHONE ENCOUNTER
Patient wife called and spoken to. Spouse told that due to the coumadin that the patient is taken Dr. Mccurdy Kin needs clearance from PCP that meloxicam is safe to take. Report she is seeing PCP soon and will discuss.

## 2023-04-04 NOTE — TELEPHONE ENCOUNTER
Unfortunately the patient is taking coumadin for his atrial fibrillation, so I am not comfortable prescribing meloxicam or other anti-inflammatories for him, he will need to run it by his PCP. The other option is 500mg tylenol every 6 hours. I usually do not prescribe narcotic medicine prior to a joint replacement due to the difficulty in pain control and dependence risk post op.

## 2023-04-04 NOTE — PROGRESS NOTES
Mr Nargis Spence is here for management of anticoagulation for AFib. PMH also significant for CAD, DM2, HLD, HTN   He presents today w/out complaint. Pt verifies dosing regimen as listed above. Pt denies s/s bleeding/bruising/swelling/SOB. No BRBPR. No melena. Address missed doses  Reviewed pt medication list  No changes in RX/OTCs/Herbal medications. Reviewed dietary concerns  Address EToH and tobacco use. INR remains low today despite dose increase last week    INR 1.3 is below therapeutic range of 2-3  Recommend to increase dose to 7.5 mg daily  Patient has 5 mg tablets. Will continue to monitor and check INR in 7 days  Dosing reminder card given with phone number, appointment date and time.    Return to clinic:  4/11/23 @ [address] [place] [address] Road, PharmSHON 10:05 AM EDT 4/4/23

## 2023-04-12 ENCOUNTER — HOSPITAL ENCOUNTER (EMERGENCY)
Age: 73
Discharge: HOME | End: 2023-04-13
Attending: EMERGENCY MEDICINE
Payer: MEDICARE

## 2023-04-12 DIAGNOSIS — M25.562 CHRONIC PAIN OF LEFT KNEE: Primary | ICD-10-CM

## 2023-04-12 DIAGNOSIS — G89.29 CHRONIC PAIN OF LEFT KNEE: Primary | ICD-10-CM

## 2023-04-12 PROCEDURE — 99283 EMERGENCY DEPT VISIT LOW MDM: CPT

## 2023-04-12 PROCEDURE — 93005 ELECTROCARDIOGRAM TRACING: CPT | Performed by: EMERGENCY MEDICINE

## 2023-04-12 ASSESSMENT — PAIN SCALES - GENERAL: PAINLEVEL_OUTOF10: 8

## 2023-04-12 ASSESSMENT — PAIN DESCRIPTION - ORIENTATION: ORIENTATION: LEFT

## 2023-04-12 ASSESSMENT — PAIN - FUNCTIONAL ASSESSMENT: PAIN_FUNCTIONAL_ASSESSMENT: 0-10

## 2023-04-12 ASSESSMENT — PAIN DESCRIPTION - LOCATION: LOCATION: LEG

## 2023-04-13 VITALS
SYSTOLIC BLOOD PRESSURE: 117 MMHG | HEART RATE: 63 BPM | TEMPERATURE: 98.3 F | BODY MASS INDEX: 39.99 KG/M2 | RESPIRATION RATE: 16 BRPM | DIASTOLIC BLOOD PRESSURE: 67 MMHG | OXYGEN SATURATION: 93 % | HEIGHT: 69 IN | WEIGHT: 270 LBS

## 2023-04-13 LAB
EKG ATRIAL RATE: 71 BPM
EKG DIAGNOSIS: NORMAL
EKG P AXIS: 71 DEGREES
EKG P-R INTERVAL: 192 MS
EKG Q-T INTERVAL: 388 MS
EKG QRS DURATION: 74 MS
EKG QTC CALCULATION (BAZETT): 421 MS
EKG R AXIS: 53 DEGREES
EKG T AXIS: 45 DEGREES
EKG VENTRICULAR RATE: 71 BPM

## 2023-04-13 PROCEDURE — 6370000000 HC RX 637 (ALT 250 FOR IP): Performed by: EMERGENCY MEDICINE

## 2023-04-13 PROCEDURE — 93010 ELECTROCARDIOGRAM REPORT: CPT | Performed by: INTERNAL MEDICINE

## 2023-04-13 RX ORDER — HYDROCODONE BITARTRATE AND ACETAMINOPHEN 5; 325 MG/1; MG/1
1 TABLET ORAL ONCE
Status: COMPLETED | OUTPATIENT
Start: 2023-04-13 | End: 2023-04-13

## 2023-04-13 RX ADMIN — HYDROCODONE BITARTRATE AND ACETAMINOPHEN 1 TABLET: 5; 325 TABLET ORAL at 01:09

## 2023-04-13 ASSESSMENT — PAIN DESCRIPTION - ORIENTATION: ORIENTATION: LEFT

## 2023-04-13 ASSESSMENT — PAIN DESCRIPTION - LOCATION: LOCATION: KNEE

## 2023-04-13 ASSESSMENT — PAIN DESCRIPTION - DESCRIPTORS: DESCRIPTORS: ACHING

## 2023-04-13 ASSESSMENT — PAIN SCALES - GENERAL: PAINLEVEL_OUTOF10: 6

## 2023-04-13 NOTE — ED PROVIDER NOTES
ENDOSCOPY N/A 6/11/2019    EGD WITH HALO AND ANESTHESIA performed by Janessa Lozada MD at [address] 8/13/2019    ESOPHAGOGASTRODUODENOSCOPY WITH HALO AND ANESTHESIA -SLEEP APNEA- performed by Janessa Lozada MD at [facility]       Discharge Medication List as of 4/13/2023  1:13 AM        CONTINUE these medications which have NOT CHANGED    Details   warfarin (COUMADIN) 5 MG tablet Take 1.5-2 tablets by mouth daily as directed by coumadin clinic, Disp-60 tablet, R-3Normal      insulin aspart (NOVOLOG FLEXPEN) 100 UNIT/ML injection pen Inject 8 Units into the skin 3 times daily (before meals), Disp-21.6 mL, R-0Normal      atorvastatin (LIPITOR) 40 MG tablet Take 1 tablet by mouth at bedtime, Disp-90 tablet, R-1Normal      glucose monitoring (FREESTYLE FREEDOM) kit DAILY Starting Tue 2/7/2023, Disp-1 kit, R-0, NormalCheck blood sugar 3x daily. E11.9      blood glucose monitor strips Test 3 times a day & as needed for symptoms of irregular blood glucose. E11.9 Dispense sufficient amount for indicated testing frequency plus additional to accommodate PRN testing needs. , Disp-300 strip, R-0, Normal      Lancets MISC DAILY Starting Tue 2/7/2023, Disp-300 each, R-5, NormalCheck blood sugars 3x daily E11.9      Continuous Blood Gluc Sensor (FREESTYLE JUSTIN 14 DAY SENSOR) MISC 1 Units by Does not apply route every 14 days, Disp-2 each, R-11Normal      Continuous Blood Gluc  (FREESTYLE JUSTIN 14 DAY READER) OLI 1 Units by Does not apply route as needed (as needed), Disp-1 each, R-5Normal      insulin glargine (LANTUS SOLOSTAR) 100 UNIT/ML injection pen Inject 24 Units into the skin nightly, Disp-21.6 mL, R-0Normal      glipiZIDE (GLUCOTROL XL) 2.5 MG extended release tablet Take 1 tablet by mouth daily, Disp-90 tablet, R-1Normal      pramipexole (MIRAPEX) 0.5 MG tablet Take 1 tablet by mouth nightly, Disp-90

## 2023-04-13 NOTE — ED NOTES
Pt O2 at 85-90% on RA. Pt states he does not wear O2 at home. Denies shortness of breath or chest pain. Pt placed on 2L NC. O2 went up to 96%. Will continue to monitor.       Nikky Chavis RN  04/13/23 [id number]

## 2023-04-19 DIAGNOSIS — M17.12 OSTEOARTHRITIS OF LEFT KNEE, UNSPECIFIED OSTEOARTHRITIS TYPE: Primary | ICD-10-CM

## 2023-04-25 ENCOUNTER — HOSPITAL ENCOUNTER (OUTPATIENT)
Age: 73
Discharge: HOME | End: 2023-04-25
Payer: MEDICARE

## 2023-04-25 ENCOUNTER — TELEPHONE (OUTPATIENT)
Dept: CARDIOLOGY CLINIC | Age: 73
End: 2023-04-25

## 2023-04-25 ENCOUNTER — TELEPHONE (OUTPATIENT)
Dept: ORTHOPEDIC SURGERY | Age: 73
End: 2023-04-25

## 2023-04-25 ENCOUNTER — APPOINTMENT (OUTPATIENT)
Dept: PHARMACY | Age: 73
End: 2023-04-25
Payer: MEDICARE

## 2023-04-25 DIAGNOSIS — I48.91 NEW ONSET ATRIAL FIBRILLATION (HCC): Primary | ICD-10-CM

## 2023-04-25 LAB
25(OH)D3 SERPL-MCNC: 22.8 NG/ML
ALBUMIN SERPL-MCNC: 4.4 G/DL (ref 3.4–5)
ANION GAP SERPL CALCULATED.3IONS-SCNC: 9 MMOL/L (ref 3–16)
APTT BLD: 30 SEC (ref 22.7–35.9)
BASOPHILS # BLD: 0 K/UL (ref 0–0.2)
BASOPHILS NFR BLD: 0.7 %
BILIRUB UR QL STRIP.AUTO: NEGATIVE
BUN SERPL-MCNC: 17 MG/DL (ref 7–20)
CALCIUM SERPL-MCNC: 9.5 MG/DL (ref 8.3–10.6)
CHLORIDE SERPL-SCNC: 103 MMOL/L (ref 99–110)
CLARITY UR: CLEAR
CO2 SERPL-SCNC: 30 MMOL/L (ref 21–32)
COLOR UR: YELLOW
CREAT SERPL-MCNC: 0.8 MG/DL (ref 0.8–1.3)
DEPRECATED RDW RBC AUTO: 13.7 % (ref 12.4–15.4)
EOSINOPHIL # BLD: 0.3 K/UL (ref 0–0.6)
EOSINOPHIL NFR BLD: 4.7 %
GFR SERPLBLD CREATININE-BSD FMLA CKD-EPI: >60 ML/MIN/{1.73_M2}
GLUCOSE SERPL-MCNC: 154 MG/DL (ref 70–99)
GLUCOSE UR STRIP.AUTO-MCNC: NEGATIVE MG/DL
HCT VFR BLD AUTO: 41.5 % (ref 40.5–52.5)
HGB BLD-MCNC: 13.8 G/DL (ref 13.5–17.5)
HGB UR QL STRIP.AUTO: NEGATIVE
INR PPP: 1.29 (ref 0.84–1.16)
INTERNATIONAL NORMALIZATION RATIO, POC: 1.5
KETONES UR STRIP.AUTO-MCNC: ABNORMAL MG/DL
LEUKOCYTE ESTERASE UR QL STRIP.AUTO: NEGATIVE
LYMPHOCYTES # BLD: 1.1 K/UL (ref 1–5.1)
LYMPHOCYTES NFR BLD: 18.2 %
MCH RBC QN AUTO: 29.7 PG (ref 26–34)
MCHC RBC AUTO-ENTMCNC: 33.2 G/DL (ref 31–36)
MCV RBC AUTO: 89.4 FL (ref 80–100)
MONOCYTES # BLD: 0.4 K/UL (ref 0–1.3)
MONOCYTES NFR BLD: 7.4 %
NEUTROPHILS # BLD: 4.1 K/UL (ref 1.7–7.7)
NEUTROPHILS NFR BLD: 69 %
NITRITE UR QL STRIP.AUTO: NEGATIVE
PH UR STRIP.AUTO: 7 [PH] (ref 5–8)
PLATELET # BLD AUTO: 144 K/UL (ref 135–450)
PMV BLD AUTO: 10.1 FL (ref 5–10.5)
POTASSIUM SERPL-SCNC: 4.4 MMOL/L (ref 3.5–5.1)
PROT UR STRIP.AUTO-MCNC: NEGATIVE MG/DL
PROTHROMBIN TIME: 16.1 SEC (ref 11.5–14.8)
RBC # BLD AUTO: 4.64 M/UL (ref 4.2–5.9)
SODIUM SERPL-SCNC: 142 MMOL/L (ref 136–145)
SP GR UR STRIP.AUTO: 1.01 (ref 1–1.03)
UA DIPSTICK W REFLEX MICRO PNL UR: ABNORMAL
URN SPEC COLLECT METH UR: ABNORMAL
UROBILINOGEN UR STRIP-ACNC: 0.2 E.U./DL
WBC # BLD AUTO: 5.9 K/UL (ref 4–11)

## 2023-04-25 PROCEDURE — 83036 HEMOGLOBIN GLYCOSYLATED A1C: CPT

## 2023-04-25 PROCEDURE — 85025 COMPLETE CBC W/AUTO DIFF WBC: CPT

## 2023-04-25 PROCEDURE — 80048 BASIC METABOLIC PNL TOTAL CA: CPT

## 2023-04-25 PROCEDURE — 81003 URINALYSIS AUTO W/O SCOPE: CPT

## 2023-04-25 PROCEDURE — 85610 PROTHROMBIN TIME: CPT | Performed by: PHARMACIST

## 2023-04-25 PROCEDURE — 87186 SC STD MICRODIL/AGAR DIL: CPT

## 2023-04-25 PROCEDURE — 82040 ASSAY OF SERUM ALBUMIN: CPT

## 2023-04-25 PROCEDURE — 85730 THROMBOPLASTIN TIME PARTIAL: CPT

## 2023-04-25 PROCEDURE — 99211 OFF/OP EST MAY X REQ PHY/QHP: CPT | Performed by: PHARMACIST

## 2023-04-25 PROCEDURE — 82306 VITAMIN D 25 HYDROXY: CPT

## 2023-04-25 PROCEDURE — 87086 URINE CULTURE/COLONY COUNT: CPT

## 2023-04-25 PROCEDURE — 87088 URINE BACTERIA CULTURE: CPT

## 2023-04-25 PROCEDURE — 85610 PROTHROMBIN TIME: CPT

## 2023-04-25 NOTE — PROGRESS NOTES
Mr Brodie Chavis is here for management of anticoagulation for AFib. PMH also significant for CAD, DM2, HLD, HTN   He presents today w/out complaint. Pt verifies dosing regimen as listed above. Pt denies s/s bleeding/bruising/swelling/SOB. No BRBPR. No melena. Address missed doses  Reviewed pt medication list  No changes in RX/OTCs/Herbal medications. Reviewed dietary concerns  Address EToH and tobacco use. INR lower today despite dose increase last visit. Morales Hidden may have missed dose yesterday. Will increase dose. Scheduled for knee surgery next Wed. They were not sure about holding warfarin. I explained that 5 day hold is typical, they will confirm with surgeon. Will plan to recheck one week after surgery if able to come in, [facility]. INR 1.5 is below therapeutic range of 2-3  Recommend to increase dose to 10 mg daily except 7.5 mg Q MWF  Patient has 5 mg tablets. Will continue to monitor and check INR in 2 weeks (1 week after surgery)  Dosing reminder card given with phone number, appointment date and time.    Return to clinic: 5/9/23 @ [address] [place] [address], PharmD 10:05 AM EDT 4/25/23

## 2023-04-25 NOTE — TELEPHONE ENCOUNTER
Patient is at acceptable (intermediate) cv risk for planned procedure/surgery. Ok to hold aspirin and coumadin 5 days before surgery and should resume them 1-2 days after surgery. Thank you.

## 2023-04-26 LAB
EST. AVERAGE GLUCOSE BLD GHB EST-MCNC: 185.8 MG/DL
HBA1C MFR BLD: 8.1 %

## 2023-04-27 ENCOUNTER — TELEPHONE (OUTPATIENT)
Dept: ORTHOPEDIC SURGERY | Age: 73
End: 2023-04-27

## 2023-04-27 LAB
BACTERIA UR CULT: ABNORMAL
BACTERIA UR CULT: ABNORMAL
ORGANISM: ABNORMAL

## 2023-04-28 ENCOUNTER — TELEPHONE (OUTPATIENT)
Dept: ORTHOPEDIC SURGERY | Age: 73
End: 2023-04-28

## 2023-05-01 ENCOUNTER — OFFICE VISIT (OUTPATIENT)
Dept: FAMILY MEDICINE CLINIC | Age: 73
End: 2023-05-01
Payer: MEDICARE

## 2023-05-01 VITALS
TEMPERATURE: 97.9 F | WEIGHT: 282 LBS | SYSTOLIC BLOOD PRESSURE: 112 MMHG | BODY MASS INDEX: 41.64 KG/M2 | DIASTOLIC BLOOD PRESSURE: 72 MMHG | OXYGEN SATURATION: 91 % | HEART RATE: 56 BPM

## 2023-05-01 DIAGNOSIS — D68.69 SECONDARY HYPERCOAGULABLE STATE (HCC): ICD-10-CM

## 2023-05-01 DIAGNOSIS — I10 ESSENTIAL HYPERTENSION: ICD-10-CM

## 2023-05-01 DIAGNOSIS — Z79.4 TYPE 2 DIABETES MELLITUS WITH MICROALBUMINURIA, WITH LONG-TERM CURRENT USE OF INSULIN (HCC): ICD-10-CM

## 2023-05-01 DIAGNOSIS — Z79.4 CONTROLLED TYPE 2 DIABETES MELLITUS WITH MILD NONPROLIFERATIVE RETINOPATHY OF BOTH EYES, WITH LONG-TERM CURRENT USE OF INSULIN, MACULAR EDEMA PRESENCE UNSPECIFIED (HCC): ICD-10-CM

## 2023-05-01 DIAGNOSIS — E11.3293 CONTROLLED TYPE 2 DIABETES MELLITUS WITH MILD NONPROLIFERATIVE RETINOPATHY OF BOTH EYES, WITH LONG-TERM CURRENT USE OF INSULIN, MACULAR EDEMA PRESENCE UNSPECIFIED (HCC): ICD-10-CM

## 2023-05-01 DIAGNOSIS — I48.91 NEW ONSET ATRIAL FIBRILLATION (HCC): ICD-10-CM

## 2023-05-01 DIAGNOSIS — E11.29 TYPE 2 DIABETES MELLITUS WITH MICROALBUMINURIA, WITH LONG-TERM CURRENT USE OF INSULIN (HCC): ICD-10-CM

## 2023-05-01 DIAGNOSIS — Z01.818 PRE-OP EXAM: Primary | ICD-10-CM

## 2023-05-01 DIAGNOSIS — R80.9 TYPE 2 DIABETES MELLITUS WITH MICROALBUMINURIA, WITH LONG-TERM CURRENT USE OF INSULIN (HCC): ICD-10-CM

## 2023-05-01 DIAGNOSIS — M17.12 PRIMARY OSTEOARTHRITIS OF LEFT KNEE: ICD-10-CM

## 2023-05-01 LAB — INTERNATIONAL NORMALIZATION RATIO, POC: 1.7

## 2023-05-01 PROCEDURE — 3074F SYST BP LT 130 MM HG: CPT | Performed by: PHYSICIAN ASSISTANT

## 2023-05-01 PROCEDURE — 99214 OFFICE O/P EST MOD 30 MIN: CPT | Performed by: PHYSICIAN ASSISTANT

## 2023-05-01 PROCEDURE — 1036F TOBACCO NON-USER: CPT | Performed by: PHYSICIAN ASSISTANT

## 2023-05-01 PROCEDURE — 2022F DILAT RTA XM EVC RTNOPTHY: CPT | Performed by: PHYSICIAN ASSISTANT

## 2023-05-01 PROCEDURE — G8427 DOCREV CUR MEDS BY ELIG CLIN: HCPCS | Performed by: PHYSICIAN ASSISTANT

## 2023-05-01 PROCEDURE — 85610 PROTHROMBIN TIME: CPT | Performed by: PHYSICIAN ASSISTANT

## 2023-05-01 PROCEDURE — 3078F DIAST BP <80 MM HG: CPT | Performed by: PHYSICIAN ASSISTANT

## 2023-05-01 PROCEDURE — G8417 CALC BMI ABV UP PARAM F/U: HCPCS | Performed by: PHYSICIAN ASSISTANT

## 2023-05-01 PROCEDURE — 3017F COLORECTAL CA SCREEN DOC REV: CPT | Performed by: PHYSICIAN ASSISTANT

## 2023-05-01 PROCEDURE — 1123F ACP DISCUSS/DSCN MKR DOCD: CPT | Performed by: PHYSICIAN ASSISTANT

## 2023-05-01 PROCEDURE — 3052F HG A1C>EQUAL 8.0%<EQUAL 9.0%: CPT | Performed by: PHYSICIAN ASSISTANT

## 2023-05-01 RX ORDER — LISINOPRIL 10 MG/1
10 TABLET ORAL DAILY
Qty: 90 TABLET | Refills: 1 | Status: SHIPPED | OUTPATIENT
Start: 2023-05-01

## 2023-05-01 RX ORDER — WARFARIN SODIUM 5 MG/1
TABLET ORAL
Qty: 60 TABLET | Refills: 3 | Status: SHIPPED | OUTPATIENT
Start: 2023-05-01

## 2023-05-01 RX ORDER — GLIPIZIDE 2.5 MG/1
2.5 TABLET, EXTENDED RELEASE ORAL DAILY
Qty: 90 TABLET | Refills: 1 | Status: SHIPPED | OUTPATIENT
Start: 2023-05-01

## 2023-05-01 NOTE — PROGRESS NOTES
Preoperative Consultation      Benjy Phelan  YOB: 1950    Date of Service:  5/1/2023    Vitals:    05/01/23 1109   BP: 112/72   Site: Right Upper Arm   Position: Sitting   Cuff Size: Medium Adult   Pulse: 56   Temp: 97.9 °F (36.6 °C)   TempSrc: Oral   SpO2: 91%   Weight: 282 lb (127.9 kg)      Wt Readings from Last 2 Encounters:   05/01/23 282 lb (127.9 kg)   04/12/23 270 lb (122.5 kg)     BP Readings from Last 3 Encounters:   05/01/23 112/72   04/13/23 117/67   03/20/23 128/72        Chief Complaint   Patient presents with    Pre-op Exam     05/03/2023 [facility] knee replacement, Dr. Gayle Montez   Allergen Reactions    Clonidine     Trulicity [Dulaglutide]      nausea    Codeine Nausea And Vomiting     Outpatient Medications Marked as Taking for the 5/1/23 encounter (Office Visit) with Author JENNIFER Simms   Medication Sig Dispense Refill    warfarin (COUMADIN) 5 MG tablet Take 1.5-2 tablets by mouth daily as directed by coumadin clinic 60 tablet 3    lisinopril (PRINIVIL;ZESTRIL) 10 MG tablet Take 1 tablet by mouth daily 90 tablet 1    glipiZIDE (GLUCOTROL XL) 2.5 MG extended release tablet Take 1 tablet by mouth daily 90 tablet 1    diclofenac sodium (VOLTAREN) 1 % GEL Apply 4 g topically 4 times daily Apply to left knee 2 g 0    insulin aspart (NOVOLOG FLEXPEN) 100 UNIT/ML injection pen Inject 8 Units into the skin 3 times daily (before meals) 21.6 mL 0    atorvastatin (LIPITOR) 40 MG tablet Take 1 tablet by mouth at bedtime 90 tablet 1    glucose monitoring (FREESTYLE FREEDOM) kit 1 kit by Does not apply route daily Check blood sugar 3x daily. E11.9 1 kit 0    blood glucose monitor strips Test 3 times a day & as needed for symptoms of irregular blood glucose. E11.9 Dispense sufficient amount for indicated testing frequency plus additional to accommodate PRN testing needs.  300 strip 0    Lancets MISC 1 each by Does not apply route daily Check blood sugars 3x daily E11.9 300 each 5    Continuous

## 2023-05-01 NOTE — PATIENT INSTRUCTIONS
Take your last dose of coumadin AND aspirin on 5/25- hold it 5/26 until 2 days after your surgery. Then restart . Take 1/2 of your lantus the night before (you normally take 20 units, so take 10 units the night before)  Do not take any short acting insulin (aspart ) the day of. Take your diltiazem the morning of your surgery, hold everything else.

## 2023-05-02 ENCOUNTER — TELEPHONE (OUTPATIENT)
Dept: FAMILY MEDICINE CLINIC | Age: 73
End: 2023-05-02

## 2023-05-02 DIAGNOSIS — F03.90 DEMENTIA WITHOUT BEHAVIORAL DISTURBANCE (HCC): ICD-10-CM

## 2023-05-02 DIAGNOSIS — I25.119 CORONARY ARTERY DISEASE INVOLVING NATIVE CORONARY ARTERY OF NATIVE HEART WITH ANGINA PECTORIS (HCC): Primary | ICD-10-CM

## 2023-05-02 NOTE — TELEPHONE ENCOUNTER
Spoke with Mary Rutledge, she stated that the surgery has been cancelled and they will get this rescheduled at a later date.

## 2023-05-02 NOTE — TELEPHONE ENCOUNTER
Diana Tyler called with [facility] surgery department  stating she can see the pre op paperwork on her end but it isnt signed and patient Is due for surgery tomorrow at 6am. Please advise

## 2023-05-02 NOTE — TELEPHONE ENCOUNTER
Patient had not stopped his coumadin, we had call out to surgeons office regarding whether or not he would want to proceed as pt was subtherapeutic (minimally) on his INR. Please call surgeon office regarding whether or not he is ok to proceed .  Should've stopped coumadin 5/28 and he had taken 4/30 INR at appointment was 1.7

## 2023-05-03 RX ORDER — MELOXICAM 15 MG/1
15 TABLET ORAL DAILY PRN
Qty: 30 TABLET | Refills: 3 | Status: SHIPPED | OUTPATIENT
Start: 2023-05-03

## 2023-05-03 NOTE — TELEPHONE ENCOUNTER
Patient called and spouse was spoken to. New sx time was established. Arrival and surgery time was given. Informed spouse patient would need to stop taking the warfarin 5 days prior to sx and that to discuss with PCP about stopping and resuming medication, informing spouse that there is a process to restarting medication. Also asked to try to get A1C down to below 8.0 if possible. Patient spouse also asked if patient could get a medication for knee pain to take until he is able to have sx. Medication will be sent/discussed with Dr. Jacob Pritchett.

## 2023-05-04 PROBLEM — D68.69 SECONDARY HYPERCOAGULABLE STATE (HCC): Status: ACTIVE | Noted: 2023-05-04

## 2023-05-07 DIAGNOSIS — G25.81 RESTLESS LEG: ICD-10-CM

## 2023-05-08 RX ORDER — PRAMIPEXOLE DIHYDROCHLORIDE 0.5 MG/1
0.5 TABLET ORAL NIGHTLY
Qty: 90 TABLET | Refills: 0 | Status: SHIPPED | OUTPATIENT
Start: 2023-05-08 | End: 2023-08-06

## 2023-05-09 ENCOUNTER — ANTI-COAG VISIT (OUTPATIENT)
Dept: PHARMACY | Age: 73
End: 2023-05-09
Payer: MEDICARE

## 2023-05-09 DIAGNOSIS — I48.91 NEW ONSET ATRIAL FIBRILLATION (HCC): Primary | ICD-10-CM

## 2023-05-09 LAB — INTERNATIONAL NORMALIZATION RATIO, POC: 1.1

## 2023-05-09 PROCEDURE — 99211 OFF/OP EST MAY X REQ PHY/QHP: CPT | Performed by: PHARMACIST

## 2023-05-09 PROCEDURE — 85610 PROTHROMBIN TIME: CPT | Performed by: PHARMACIST

## 2023-05-09 NOTE — PROGRESS NOTES
Mr Valentine Magallon is here for management of anticoagulation for AFib. PMH also significant for CAD, DM2, HLD, HTN   He presents today w/out complaint. Pt verifies dosing regimen as listed above. Pt denies s/s bleeding/bruising/swelling/SOB. No BRBPR. No melena. Address missed doses  Reviewed pt medication list  No changes in RX/OTCs/Herbal medications. Reviewed dietary concerns  Address EToH and tobacco use. Scheduled for knee surgery 5/3 but it was pushed back to 5/31. He had been holding warfarin last week in anticipation of surgery. May have missed dose this week since restarting as well? They were not at home at time he usually takes it. Stressed importance of getting dose and that timing is not too important as long as he is getting the correct dose. Will increase dose slightly this week. INR 1.1 is below therapeutic range of 2-3  Recommend to increase dose to 10 mg daily except 7.5 mg Q M/F  Patient has 5 mg tablets. Will continue to monitor and check INR in 1 weeks  Dosing reminder card given with phone number, appointment date and time.    Return to clinic: 5/16/23 @ [address] [place] [address], PharmD 9:45 AM EDT 5/9/23

## 2023-05-16 ENCOUNTER — ANTI-COAG VISIT (OUTPATIENT)
Dept: PHARMACY | Age: 73
End: 2023-05-16
Payer: MEDICARE

## 2023-05-16 DIAGNOSIS — I48.91 NEW ONSET ATRIAL FIBRILLATION (HCC): Primary | ICD-10-CM

## 2023-05-16 LAB — INTERNATIONAL NORMALIZATION RATIO, POC: 3.6

## 2023-05-16 PROCEDURE — 85610 PROTHROMBIN TIME: CPT | Performed by: PHARMACIST

## 2023-05-16 PROCEDURE — 99211 OFF/OP EST MAY X REQ PHY/QHP: CPT | Performed by: PHARMACIST

## 2023-05-16 NOTE — PROGRESS NOTES
Mr Elma Montejo is here for management of anticoagulation for AFib. PMH also significant for CAD, DM2, HLD, HTN   He presents today w/out complaint. Pt verifies dosing regimen as listed above. Pt denies s/s bleeding/bruising/swelling/SOB. No BRBPR. No melena. Address missed doses  Reviewed pt medication list  No changes in RX/OTCs/Herbal medications. Reviewed dietary concerns  Address EToH and tobacco use. Scheduled for knee surgery 5/3 but it was pushed back to 5/31. INR now elevated today after recent dose increases for low INR. Will reduce dose this week and recheck next week so we can get one more check in prior to surgery. INR 3.6 is above therapeutic range of 2-3  Recommend to take 5 mg today, then decrease dose to 10 mg daily except 7.5 mg Q M/W/F  Patient has 5 mg tablets. Will continue to monitor and check INR in 1 weeks  Dosing reminder card given with phone number, appointment date and time.    Return to clinic: 5/23/23 @ [address], PharmD 9:41 AM EDT 5/16/23

## 2023-05-17 PROBLEM — I70.0 ATHEROSCLEROSIS OF AORTA (HCC): Status: ACTIVE | Noted: 2023-05-17

## 2023-05-23 ENCOUNTER — ANTI-COAG VISIT (OUTPATIENT)
Dept: PHARMACY | Age: 73
End: 2023-05-23
Payer: MEDICARE

## 2023-05-23 ENCOUNTER — TELEPHONE (OUTPATIENT)
Dept: ORTHOPEDIC SURGERY | Age: 73
End: 2023-05-23

## 2023-05-23 DIAGNOSIS — Z01.812 PRE-OPERATIVE LABORATORY EXAMINATION: ICD-10-CM

## 2023-05-23 DIAGNOSIS — I48.91 NEW ONSET ATRIAL FIBRILLATION (HCC): Primary | ICD-10-CM

## 2023-05-23 DIAGNOSIS — Z01.812 PRE-OPERATIVE LABORATORY EXAMINATION: Primary | ICD-10-CM

## 2023-05-23 DIAGNOSIS — M17.12 OSTEOARTHRITIS OF LEFT KNEE, UNSPECIFIED OSTEOARTHRITIS TYPE: ICD-10-CM

## 2023-05-23 LAB — INTERNATIONAL NORMALIZATION RATIO, POC: 2.3

## 2023-05-23 PROCEDURE — 99211 OFF/OP EST MAY X REQ PHY/QHP: CPT | Performed by: PHARMACIST

## 2023-05-23 PROCEDURE — 85610 PROTHROMBIN TIME: CPT | Performed by: PHARMACIST

## 2023-05-23 NOTE — TELEPHONE ENCOUNTER
Patient's spouse called and reinformed that per PCP pre op note, he is to stop taking the coumadin 5 days before sx and can restart it 1-2 days after. Patient spouse reported understanding and also asked if the patient could have an A1C test done before surgery as well. They reported that they could have that done.

## 2023-05-23 NOTE — PROGRESS NOTES
Mr Bill Mello is here for management of anticoagulation for AFib. PMH also significant for CAD, DM2, HLD, HTN   He presents today w/out complaint. Pt verifies dosing regimen as listed above. Pt denies s/s bleeding/bruising/swelling/SOB. No BRBPR. No melena. Address missed doses  Reviewed pt medication list  No changes in RX/OTCs/Herbal medications. Reviewed dietary concerns  Address EToH and tobacco use. Scheduled for knee surgery 5/3 but it was pushed back to 5/31. INR in range today after dose decrease last week. Will hold warfarin for 5 days prior to surgery. Expect to only be in the hospital overnight after surgery, but not clear if he will need rehab, [facility], etc.  Will plan to recheck ~2 weeks after surgery if able, or can otherwise manage via [facility] if needed. INR 2.3 is within therapeutic range of 2-3  Recommend to continue dose of 10 mg daily except 7.5 mg Q M/W/F  Patient has 5 mg tablets. Will continue to monitor and check INR in 3 weeks  Dosing reminder card given with phone number, appointment date and time.    Return to clinic: 6/13/23 @ [address] Les Gonzalez, PharmD 9:35 AM EDT 5/23/23

## 2023-05-24 ENCOUNTER — TELEPHONE (OUTPATIENT)
Dept: FAMILY MEDICINE CLINIC | Age: 73
End: 2023-05-24

## 2023-05-24 ENCOUNTER — TELEPHONE (OUTPATIENT)
Dept: ORTHOPEDIC SURGERY | Age: 73
End: 2023-05-24

## 2023-05-24 LAB
EST. AVERAGE GLUCOSE BLD GHB EST-MCNC: 191.5 MG/DL
HBA1C MFR BLD: 8.3 %

## 2023-05-24 NOTE — TELEPHONE ENCOUNTER
Patient called and spouse was talked to. Spouse informed that patient A1C was again too high for surgery and that it would nee to come down before surgery would be rescheduled. Informed ideally under 7.0. Also informed that I would contact patient's PCP to let them know but recommended that they reach out to PCP as well.

## 2023-05-24 NOTE — TELEPHONE ENCOUNTER
Dr. Noreen Callejas office called in to let us know that the A1C is high again over 8.3, but they need this ideally under 7 in order to get procedure done.

## 2023-05-24 NOTE — TELEPHONE ENCOUNTER
Patient's PCP office called and message left with staff member asking that they reach out to patient to come up with plan to lower A1C.

## 2023-05-31 ENCOUNTER — TELEPHONE (OUTPATIENT)
Dept: ORTHOPEDIC SURGERY | Age: 73
End: 2023-05-31

## 2023-05-31 NOTE — TELEPHONE ENCOUNTER
Patient wife called back and informed that the only thing holding up the surgery was A1C. Patient spouse informed that I had reached out to [place] office last week to inform them of the situation that we are trying to get A1C under 7. Once A1C is under control we can reschedule sx.

## 2023-05-31 NOTE — TELEPHONE ENCOUNTER
Other PATIENT IS REQUESTING A CALL BACK REGARDING NEXT STEPS FOR SURGERY.  [facility] 30 [phone]

## 2023-06-01 ENCOUNTER — TELEPHONE (OUTPATIENT)
Dept: FAMILY MEDICINE CLINIC | Age: 73
End: 2023-06-01

## 2023-06-01 NOTE — TELEPHONE ENCOUNTER
----- Message from Terriridge Gutierrez sent at 6/1/2023 10:45 AM EDT -----  Subject: Message to Provider    QUESTIONS  Information for Provider? Patient wife called want to know what are you   going to do regarding Patient A1C? His A1C 8.3. Please call back. ---------------------------------------------------------------------------  --------------  Greg HSIEH  [phone]; OK to leave message on voicemail  ---------------------------------------------------------------------------  --------------  SCRIPT ANSWERS  Relationship to Patient? Spouse/Partner  Representative Name? Rose Chandra Wife  Is the representative on the Communication Release of Information (YAMILE)   form in Epic?  Yes

## 2023-06-01 NOTE — TELEPHONE ENCOUNTER
Please call patient to see if he has his CGM so that I can review blood sugars. I can't instruct insulin changes until I can see what his bg are doing.

## 2023-06-01 NOTE — TELEPHONE ENCOUNTER
Patient does not have any recent data uploaded to [address] [place] [address] [email or web address]. Last readings are from February and March.

## 2023-06-02 NOTE — TELEPHONE ENCOUNTER
I spoke with [facility] and he stated that he will bring his freestyle max reader into the office today so his blood sugars can be uploaded.

## 2023-06-07 ENCOUNTER — HOSPITAL ENCOUNTER (INPATIENT)
Age: 73
LOS: 1 days | Discharge: HOME | End: 2023-06-09
Attending: STUDENT IN AN ORGANIZED HEALTH CARE EDUCATION/TRAINING PROGRAM | Admitting: INTERNAL MEDICINE
Payer: MEDICARE

## 2023-06-07 ENCOUNTER — APPOINTMENT (OUTPATIENT)
Dept: GENERAL RADIOLOGY | Age: 73
End: 2023-06-07
Payer: MEDICARE

## 2023-06-07 DIAGNOSIS — R07.89 ATYPICAL CHEST PAIN: Primary | ICD-10-CM

## 2023-06-07 DIAGNOSIS — J96.01 ACUTE HYPOXEMIC RESPIRATORY FAILURE (HCC): ICD-10-CM

## 2023-06-07 LAB
ALBUMIN SERPL-MCNC: 3.8 G/DL (ref 3.4–5)
ALBUMIN/GLOB SERPL: 1.5 {RATIO} (ref 1.1–2.2)
ALP SERPL-CCNC: 101 U/L (ref 40–129)
ALT SERPL-CCNC: 16 U/L (ref 10–40)
ANION GAP SERPL CALCULATED.3IONS-SCNC: 9 MMOL/L (ref 3–16)
AST SERPL-CCNC: 14 U/L (ref 15–37)
BASOPHILS # BLD: 0 K/UL (ref 0–0.2)
BASOPHILS NFR BLD: 0.3 %
BILIRUB SERPL-MCNC: 0.5 MG/DL (ref 0–1)
BUN SERPL-MCNC: 24 MG/DL (ref 7–20)
CALCIUM SERPL-MCNC: 8.8 MG/DL (ref 8.3–10.6)
CHLORIDE SERPL-SCNC: 102 MMOL/L (ref 99–110)
CO2 SERPL-SCNC: 27 MMOL/L (ref 21–32)
CREAT SERPL-MCNC: 0.9 MG/DL (ref 0.8–1.3)
DEPRECATED RDW RBC AUTO: 14.1 % (ref 12.4–15.4)
EOSINOPHIL # BLD: 0.3 K/UL (ref 0–0.6)
EOSINOPHIL NFR BLD: 3.2 %
GFR SERPLBLD CREATININE-BSD FMLA CKD-EPI: >60 ML/MIN/{1.73_M2}
GLUCOSE SERPL-MCNC: 197 MG/DL (ref 70–99)
HCT VFR BLD AUTO: 37.7 % (ref 40.5–52.5)
HGB BLD-MCNC: 12.5 G/DL (ref 13.5–17.5)
LYMPHOCYTES # BLD: 1.1 K/UL (ref 1–5.1)
LYMPHOCYTES NFR BLD: 12.8 %
MCH RBC QN AUTO: 29.5 PG (ref 26–34)
MCHC RBC AUTO-ENTMCNC: 33.2 G/DL (ref 31–36)
MCV RBC AUTO: 88.9 FL (ref 80–100)
MONOCYTES # BLD: 0.5 K/UL (ref 0–1.3)
MONOCYTES NFR BLD: 6.5 %
NEUTROPHILS # BLD: 6.4 K/UL (ref 1.7–7.7)
NEUTROPHILS NFR BLD: 77.2 %
PLATELET # BLD AUTO: 129 K/UL (ref 135–450)
PMV BLD AUTO: 9.4 FL (ref 5–10.5)
POTASSIUM SERPL-SCNC: 4.5 MMOL/L (ref 3.5–5.1)
PROT SERPL-MCNC: 6.4 G/DL (ref 6.4–8.2)
RBC # BLD AUTO: 4.23 M/UL (ref 4.2–5.9)
SODIUM SERPL-SCNC: 138 MMOL/L (ref 136–145)
TROPONIN, HIGH SENSITIVITY: 13 NG/L (ref 0–22)
WBC # BLD AUTO: 8.3 K/UL (ref 4–11)

## 2023-06-07 PROCEDURE — 96374 THER/PROPH/DIAG INJ IV PUSH: CPT

## 2023-06-07 PROCEDURE — 93005 ELECTROCARDIOGRAM TRACING: CPT | Performed by: STUDENT IN AN ORGANIZED HEALTH CARE EDUCATION/TRAINING PROGRAM

## 2023-06-07 PROCEDURE — 85025 COMPLETE CBC W/AUTO DIFF WBC: CPT

## 2023-06-07 PROCEDURE — 80053 COMPREHEN METABOLIC PANEL: CPT

## 2023-06-07 PROCEDURE — 84484 ASSAY OF TROPONIN QUANT: CPT

## 2023-06-07 PROCEDURE — 71046 X-RAY EXAM CHEST 2 VIEWS: CPT

## 2023-06-07 PROCEDURE — 99285 EMERGENCY DEPT VISIT HI MDM: CPT

## 2023-06-07 RX ORDER — ASPIRIN 81 MG/1
324 TABLET, CHEWABLE ORAL ONCE
Status: COMPLETED | OUTPATIENT
Start: 2023-06-08 | End: 2023-06-08

## 2023-06-07 RX ORDER — FUROSEMIDE 10 MG/ML
20 INJECTION INTRAMUSCULAR; INTRAVENOUS ONCE
Status: COMPLETED | OUTPATIENT
Start: 2023-06-08 | End: 2023-06-08

## 2023-06-07 ASSESSMENT — PAIN - FUNCTIONAL ASSESSMENT: PAIN_FUNCTIONAL_ASSESSMENT: 0-10

## 2023-06-07 ASSESSMENT — PAIN SCALES - GENERAL: PAINLEVEL_OUTOF10: 7

## 2023-06-08 PROBLEM — R07.9 CHEST PAIN: Status: ACTIVE | Noted: 2023-06-08

## 2023-06-08 LAB
BILIRUB UR QL STRIP.AUTO: NEGATIVE
CLARITY UR: ABNORMAL
COLOR UR: YELLOW
EKG ATRIAL RATE: 78 BPM
EKG DIAGNOSIS: NORMAL
EKG P AXIS: 73 DEGREES
EKG P-R INTERVAL: 200 MS
EKG Q-T INTERVAL: 360 MS
EKG QRS DURATION: 68 MS
EKG QTC CALCULATION (BAZETT): 410 MS
EKG R AXIS: 59 DEGREES
EKG T AXIS: 39 DEGREES
EKG VENTRICULAR RATE: 78 BPM
GLUCOSE BLD-MCNC: 194 MG/DL (ref 70–99)
GLUCOSE BLD-MCNC: 240 MG/DL (ref 70–99)
GLUCOSE BLD-MCNC: 318 MG/DL (ref 70–99)
GLUCOSE UR STRIP.AUTO-MCNC: NEGATIVE MG/DL
HGB UR QL STRIP.AUTO: NEGATIVE
INR PPP: 2.69 (ref 0.84–1.16)
KETONES UR STRIP.AUTO-MCNC: NEGATIVE MG/DL
LEUKOCYTE ESTERASE UR QL STRIP.AUTO: NEGATIVE
NITRITE UR QL STRIP.AUTO: NEGATIVE
NT-PROBNP SERPL-MCNC: 55 PG/ML (ref 0–124)
PERFORMED ON: ABNORMAL
PH UR STRIP.AUTO: 6 [PH] (ref 5–8)
PROT UR STRIP.AUTO-MCNC: NEGATIVE MG/DL
PROTHROMBIN TIME: 28.4 SEC (ref 11.5–14.8)
SP GR UR STRIP.AUTO: 1.01 (ref 1–1.03)
TROPONIN, HIGH SENSITIVITY: 11 NG/L (ref 0–22)
TROPONIN, HIGH SENSITIVITY: 13 NG/L (ref 0–22)
UA COMPLETE W REFLEX CULTURE PNL UR: ABNORMAL
UA DIPSTICK W REFLEX MICRO PNL UR: ABNORMAL
URN SPEC COLLECT METH UR: ABNORMAL
UROBILINOGEN UR STRIP-ACNC: 0.2 E.U./DL

## 2023-06-08 PROCEDURE — 6370000000 HC RX 637 (ALT 250 FOR IP): Performed by: STUDENT IN AN ORGANIZED HEALTH CARE EDUCATION/TRAINING PROGRAM

## 2023-06-08 PROCEDURE — 1200000000 HC SEMI PRIVATE

## 2023-06-08 PROCEDURE — 84484 ASSAY OF TROPONIN QUANT: CPT

## 2023-06-08 PROCEDURE — 94669 MECHANICAL CHEST WALL OSCILL: CPT

## 2023-06-08 PROCEDURE — 6370000000 HC RX 637 (ALT 250 FOR IP): Performed by: INTERNAL MEDICINE

## 2023-06-08 PROCEDURE — 94761 N-INVAS EAR/PLS OXIMETRY MLT: CPT

## 2023-06-08 PROCEDURE — 2580000003 HC RX 258: Performed by: INTERNAL MEDICINE

## 2023-06-08 PROCEDURE — 81003 URINALYSIS AUTO W/O SCOPE: CPT

## 2023-06-08 PROCEDURE — 94640 AIRWAY INHALATION TREATMENT: CPT

## 2023-06-08 PROCEDURE — 83880 ASSAY OF NATRIURETIC PEPTIDE: CPT

## 2023-06-08 PROCEDURE — 2700000000 HC OXYGEN THERAPY PER DAY

## 2023-06-08 PROCEDURE — 85610 PROTHROMBIN TIME: CPT

## 2023-06-08 PROCEDURE — 93010 ELECTROCARDIOGRAM REPORT: CPT | Performed by: INTERNAL MEDICINE

## 2023-06-08 PROCEDURE — 6360000002 HC RX W HCPCS: Performed by: STUDENT IN AN ORGANIZED HEALTH CARE EDUCATION/TRAINING PROGRAM

## 2023-06-08 PROCEDURE — 36415 COLL VENOUS BLD VENIPUNCTURE: CPT

## 2023-06-08 RX ORDER — ONDANSETRON 4 MG/1
4 TABLET, ORALLY DISINTEGRATING ORAL EVERY 8 HOURS PRN
Status: DISCONTINUED | OUTPATIENT
Start: 2023-06-08 | End: 2023-06-09 | Stop reason: HOSPADM

## 2023-06-08 RX ORDER — PANTOPRAZOLE SODIUM 40 MG/1
40 TABLET, DELAYED RELEASE ORAL DAILY
Status: DISCONTINUED | OUTPATIENT
Start: 2023-06-08 | End: 2023-06-09 | Stop reason: HOSPADM

## 2023-06-08 RX ORDER — ATORVASTATIN CALCIUM 40 MG/1
40 TABLET, FILM COATED ORAL NIGHTLY
Status: DISCONTINUED | OUTPATIENT
Start: 2023-06-08 | End: 2023-06-09 | Stop reason: HOSPADM

## 2023-06-08 RX ORDER — POLYETHYLENE GLYCOL 3350 17 G/17G
17 POWDER, FOR SOLUTION ORAL DAILY PRN
Status: DISCONTINUED | OUTPATIENT
Start: 2023-06-08 | End: 2023-06-09 | Stop reason: HOSPADM

## 2023-06-08 RX ORDER — LISINOPRIL 10 MG/1
10 TABLET ORAL DAILY
Status: DISCONTINUED | OUTPATIENT
Start: 2023-06-08 | End: 2023-06-09 | Stop reason: HOSPADM

## 2023-06-08 RX ORDER — ACETAMINOPHEN 650 MG/1
650 SUPPOSITORY RECTAL EVERY 6 HOURS PRN
Status: DISCONTINUED | OUTPATIENT
Start: 2023-06-08 | End: 2023-06-09 | Stop reason: HOSPADM

## 2023-06-08 RX ORDER — DILTIAZEM HYDROCHLORIDE 120 MG/1
120 CAPSULE, COATED, EXTENDED RELEASE ORAL NIGHTLY
Status: DISCONTINUED | OUTPATIENT
Start: 2023-06-08 | End: 2023-06-09 | Stop reason: HOSPADM

## 2023-06-08 RX ORDER — ACETAMINOPHEN 325 MG/1
650 TABLET ORAL EVERY 6 HOURS PRN
Status: DISCONTINUED | OUTPATIENT
Start: 2023-06-08 | End: 2023-06-09 | Stop reason: HOSPADM

## 2023-06-08 RX ORDER — ASPIRIN 81 MG/1
81 TABLET, CHEWABLE ORAL DAILY
Status: DISCONTINUED | OUTPATIENT
Start: 2023-06-08 | End: 2023-06-09 | Stop reason: HOSPADM

## 2023-06-08 RX ORDER — ONDANSETRON 2 MG/ML
4 INJECTION INTRAMUSCULAR; INTRAVENOUS EVERY 6 HOURS PRN
Status: DISCONTINUED | OUTPATIENT
Start: 2023-06-08 | End: 2023-06-09 | Stop reason: HOSPADM

## 2023-06-08 RX ORDER — DICYCLOMINE HYDROCHLORIDE 10 MG/1
10 CAPSULE ORAL EVERY 6 HOURS PRN
Status: DISCONTINUED | OUTPATIENT
Start: 2023-06-08 | End: 2023-06-09 | Stop reason: HOSPADM

## 2023-06-08 RX ORDER — SODIUM CHLORIDE 0.9 % (FLUSH) 0.9 %
5-40 SYRINGE (ML) INJECTION PRN
Status: DISCONTINUED | OUTPATIENT
Start: 2023-06-08 | End: 2023-06-09 | Stop reason: HOSPADM

## 2023-06-08 RX ORDER — INSULIN LISPRO 100 [IU]/ML
0-4 INJECTION, SOLUTION INTRAVENOUS; SUBCUTANEOUS
Status: DISCONTINUED | OUTPATIENT
Start: 2023-06-08 | End: 2023-06-09 | Stop reason: HOSPADM

## 2023-06-08 RX ORDER — WARFARIN SODIUM 5 MG/1
10 TABLET ORAL
Status: COMPLETED | OUTPATIENT
Start: 2023-06-08 | End: 2023-06-08

## 2023-06-08 RX ORDER — WARFARIN SODIUM 5 MG/1
5 TABLET ORAL DAILY
Status: DISCONTINUED | OUTPATIENT
Start: 2023-06-08 | End: 2023-06-08

## 2023-06-08 RX ORDER — REGADENOSON 0.08 MG/ML
0.4 INJECTION, SOLUTION INTRAVENOUS
Status: DISCONTINUED | OUTPATIENT
Start: 2023-06-08 | End: 2023-06-09 | Stop reason: HOSPADM

## 2023-06-08 RX ORDER — INSULIN GLARGINE 100 [IU]/ML
15 INJECTION, SOLUTION SUBCUTANEOUS NIGHTLY
Status: DISCONTINUED | OUTPATIENT
Start: 2023-06-08 | End: 2023-06-09 | Stop reason: HOSPADM

## 2023-06-08 RX ORDER — GLIPIZIDE 5 MG/1
2.5 TABLET ORAL DAILY
Status: DISCONTINUED | OUTPATIENT
Start: 2023-06-08 | End: 2023-06-09 | Stop reason: HOSPADM

## 2023-06-08 RX ORDER — DEXTROSE MONOHYDRATE 100 MG/ML
INJECTION, SOLUTION INTRAVENOUS CONTINUOUS PRN
Status: DISCONTINUED | OUTPATIENT
Start: 2023-06-08 | End: 2023-06-09 | Stop reason: HOSPADM

## 2023-06-08 RX ORDER — INSULIN LISPRO 100 [IU]/ML
0-4 INJECTION, SOLUTION INTRAVENOUS; SUBCUTANEOUS NIGHTLY
Status: DISCONTINUED | OUTPATIENT
Start: 2023-06-08 | End: 2023-06-09 | Stop reason: HOSPADM

## 2023-06-08 RX ORDER — SODIUM CHLORIDE 0.9 % (FLUSH) 0.9 %
5-40 SYRINGE (ML) INJECTION EVERY 12 HOURS SCHEDULED
Status: DISCONTINUED | OUTPATIENT
Start: 2023-06-08 | End: 2023-06-09 | Stop reason: HOSPADM

## 2023-06-08 RX ORDER — PRAMIPEXOLE DIHYDROCHLORIDE 0.25 MG/1
0.5 TABLET ORAL NIGHTLY
Status: DISCONTINUED | OUTPATIENT
Start: 2023-06-08 | End: 2023-06-09 | Stop reason: HOSPADM

## 2023-06-08 RX ORDER — SODIUM CHLORIDE 9 MG/ML
INJECTION, SOLUTION INTRAVENOUS PRN
Status: DISCONTINUED | OUTPATIENT
Start: 2023-06-08 | End: 2023-06-09 | Stop reason: HOSPADM

## 2023-06-08 RX ADMIN — ATORVASTATIN CALCIUM 40 MG: 40 TABLET, FILM COATED ORAL at 19:58

## 2023-06-08 RX ADMIN — INSULIN GLARGINE 15 UNITS: 100 INJECTION, SOLUTION SUBCUTANEOUS at 19:58

## 2023-06-08 RX ADMIN — LISINOPRIL 10 MG: 10 TABLET ORAL at 10:58

## 2023-06-08 RX ADMIN — GLIPIZIDE 2.5 MG: 5 TABLET ORAL at 10:58

## 2023-06-08 RX ADMIN — WARFARIN SODIUM 10 MG: 5 TABLET ORAL at 17:25

## 2023-06-08 RX ADMIN — SODIUM CHLORIDE, PRESERVATIVE FREE 10 ML: 5 INJECTION INTRAVENOUS at 20:02

## 2023-06-08 RX ADMIN — DILTIAZEM HYDROCHLORIDE 120 MG: 120 CAPSULE, COATED, EXTENDED RELEASE ORAL at 19:58

## 2023-06-08 RX ADMIN — INSULIN LISPRO 4 UNITS: 100 INJECTION, SOLUTION INTRAVENOUS; SUBCUTANEOUS at 19:58

## 2023-06-08 RX ADMIN — DICLOFENAC SODIUM 4 G: 10 GEL TOPICAL at 12:45

## 2023-06-08 RX ADMIN — SODIUM CHLORIDE, PRESERVATIVE FREE 10 ML: 5 INJECTION INTRAVENOUS at 10:59

## 2023-06-08 RX ADMIN — DICLOFENAC SODIUM 4 G: 10 GEL TOPICAL at 20:02

## 2023-06-08 RX ADMIN — FUROSEMIDE 20 MG: 10 INJECTION, SOLUTION INTRAMUSCULAR; INTRAVENOUS at 00:18

## 2023-06-08 RX ADMIN — ASPIRIN 324 MG: 81 TABLET, CHEWABLE ORAL at 00:18

## 2023-06-08 RX ADMIN — PANTOPRAZOLE SODIUM 40 MG: 40 TABLET, DELAYED RELEASE ORAL at 10:58

## 2023-06-08 RX ADMIN — ASPIRIN 81 MG: 81 TABLET, CHEWABLE ORAL at 10:58

## 2023-06-08 RX ADMIN — DICLOFENAC SODIUM 4 G: 10 GEL TOPICAL at 17:43

## 2023-06-08 RX ADMIN — PRAMIPEXOLE DIHYDROCHLORIDE 0.5 MG: 0.25 TABLET ORAL at 19:58

## 2023-06-08 ASSESSMENT — PAIN DESCRIPTION - DESCRIPTORS: DESCRIPTORS: ACHING

## 2023-06-08 ASSESSMENT — PAIN DESCRIPTION - ORIENTATION: ORIENTATION: LEFT

## 2023-06-08 ASSESSMENT — PAIN DESCRIPTION - LOCATION: LOCATION: KNEE

## 2023-06-08 ASSESSMENT — PAIN - FUNCTIONAL ASSESSMENT: PAIN_FUNCTIONAL_ASSESSMENT: ACTIVITIES ARE NOT PREVENTED

## 2023-06-08 ASSESSMENT — PAIN SCALES - GENERAL
PAINLEVEL_OUTOF10: 0
PAINLEVEL_OUTOF10: 0
PAINLEVEL_OUTOF10: 3

## 2023-06-08 NOTE — PLAN OF CARE
Problem: Discharge Planning  Goal: Discharge to home or other facility with appropriate resources  Outcome: Progressing  Flowsheets (Taken 6/8/2023 1041)  Discharge to home or other facility with appropriate resources:   Identify barriers to discharge with patient and caregiver   Arrange for needed discharge resources and transportation as appropriate   Identify discharge learning needs (meds, wound care, etc)     Problem: Pain  Goal: Verbalizes/displays adequate comfort level or baseline comfort level  Outcome: Progressing  Flowsheets (Taken 6/8/2023 1045)  Verbalizes/displays adequate comfort level or baseline comfort level:   Encourage patient to monitor pain and request assistance   Administer analgesics based on type and severity of pain and evaluate response   Assess pain using appropriate pain scale   Implement non-pharmacological measures as appropriate and evaluate response

## 2023-06-08 NOTE — ED NOTES
Verified pt on tele. Pt transported to floor via wheelchair.  All personal belongings sent with patient including his shorts and shoes (wife has pt shirt)     Checo Marcum RN  06/08/23 9720

## 2023-06-08 NOTE — ED NOTES
Report given to QING CLINE, RN A1. Questions answered, care transferred.  Pt awaiting transport     Radhames Farr, [address] [facility]  06/08/23 1421

## 2023-06-08 NOTE — PROGRESS NOTES
Pharmacy Note  Warfarin Consult  Dx: AFib  Goal INR range 2-3   Home Warfarin dose: 10 mg daily except 7.5 mg Q M/W/F    Date  INR  Warfarin  6/8                  2.69                  10 mg    Recommend Warfarin 10 mg tonight x1. Daily INR ordered. Rx will continue to manage therapy per consult order.     Willian Street, PharmD 6/8/2023 10:16 AM

## 2023-06-08 NOTE — ED NOTES
Patient and wife updated on plan on of care. Both upset they were unaware pt was being admitted. Wife left and took patient's shirt with her. Patient in bed with his shorts and crocks placed in a belongings bag. Pt adjusted in bed for comfort. Will release orders once they are placed.      Helena Spear, [address] [place] [address]  06/08/23 4620

## 2023-06-08 NOTE — ED PROVIDER NOTES
[facility]  ED  EMERGENCY DEPARTMENT ENCOUNTER        Pt Name: Veronique Reyez  MRN: [id number]  [place] 1950  Date of evaluation: 6/7/2023  Provider: Genesis Garcia MD  PCP: JENNIFER Nieves  Note Started: 7:24 AM EDT 6/8/23    CHIEF COMPLAINT       Chief Complaint   Patient presents with    Chest Pain     Chest pains all day; EMS gave 324mg of ASA and Nitro x 1   Chest pain    HISTORY OF PRESENT ILLNESS: 1 or more Elements     History from : Patient    Limitations to history : None    Veronique Reyez is a 67 y.o. male who presents with chest pain for the past day, states that it been left-sided, radiating to the left shoulder, sharp, nothing alleviated or exacerbated, EMS gave 324 aspirin, nitroglycerin in route, patient has resolution of chest pain on arrival to the emergency department. Denies shortness of breath but does admit that he gets more short of breath when he walks. Bilateral lower extremity edema present. Symptoms not otherwise alleviated or exacerbated by other factors. Nursing Notes were all reviewed and agreed with or any disagreements were addressed in the HPI. REVIEW OF SYSTEMS :      Positives and Pertinent negatives as per HPI. ROS otherwise unremarkable.     SURGICAL HISTORY     Past Surgical History:   Procedure Laterality Date    ABDOMEN SURGERY      CATARACT REMOVAL Bilateral 2013    CHOLECYSTECTOMY      COLONOSCOPY  10/26/2011    ENDOSCOPY, COLON, DIAGNOSTIC      EGD halo    HYDROCELE EXCISION  11/15/2016    PANCREAS SURGERY      cyst opened up and drining into small bowel    TONSILLECTOMY      UPPER GASTROINTESTINAL ENDOSCOPY  10/04/2016    with biopsy    UPPER GASTROINTESTINAL ENDOSCOPY  03/16/2017    Halo ablation    UPPER GASTROINTESTINAL ENDOSCOPY  06/26/2017    Halo ablation    UPPER GASTROINTESTINAL ENDOSCOPY  09/06/2017    bx distal sophagus    UPPER GASTROINTESTINAL ENDOSCOPY  12/22/2017    with HALO  procedure    UPPER GASTROINTESTINAL

## 2023-06-08 NOTE — ED NOTES
Ambulated pt approx 100 feet on room air while monitoring pulse oximetry. Prior to ambulation, pt was resting comfortably with HR and SpO2 of 93% and 75 bpm. During ambulation, pt's SpO2 and HR were 89% and 100 bpm. Pt stated that he did not feel SOB, CP, dizziness or lightheadedness. After ambulation, pt was returned to bed SpO2 and HR were 87% and 82 bpm. Dr. Herbert Claudio made aware.           Lalo Hancock  06/08/23 1455

## 2023-06-08 NOTE — PROGRESS NOTES
Patient was admitted to A1 room 105. Vital signs and assessment are stable at the time of arrival.  Patient was oriented to room and call light. Telemetry monitor in place. Skin check performed by two nurses.   Patient has no complaints at the time of arrival.

## 2023-06-08 NOTE — H&P
[x]No    --------------------------------------------------------------------------------------------------------------------------------------------------------------------    Imaging:     XR CHEST (2 VW)    Result Date: 6/7/2023  EXAMINATION: TWO XRAY VIEWS OF THE CHEST 6/7/2023 10:48 pm COMPARISON: 09/09/2022 HISTORY: ORDERING SYSTEM PROVIDED HISTORY: cp TECHNOLOGIST PROVIDED HISTORY: Reason for exam:->cp Reason for Exam: cp FINDINGS: The heart is mildly enlarged and more prominent. The pulmonary vessels are engorged centrally and more prominent. There are hazy perihilar and bibasilar interstitial and linear densities which is increased. No effusion or consolidation is seen. The bones are intact. Mild cardiomegaly and mild central pulmonary congestion or pulmonary artery hypertension which is more prominent.  Mild chronic obstructive lung changes with mild bibasilar atelectasis or scarring which is more prominent       PCP: JENNIFER Ayala    Past Medical History:        Diagnosis Date    Acid reflux     Yan's esophagus     Coronary artery disease of native heart with stable angina pectoris ([facility] [address] 5/30/2019    Diabetes mellitus ([facility] [address]     Diabetic autonomic neuropathy associated with type 2 diabetes mellitus ([facility] [address] 3/3/2020    Hyperlipidemia     Hypertension     Influenza A 02/05/2020    Pancreatitis 1996    Restless legs     Sleep apnea     uses CPAP    Tremor     of bilateral hands       Past Surgical History:        Procedure Laterality Date    ABDOMEN SURGERY      CATARACT REMOVAL Bilateral 2013    CHOLECYSTECTOMY      COLONOSCOPY  10/26/2011    ENDOSCOPY, COLON, DIAGNOSTIC      EGD halo    HYDROCELE EXCISION  11/15/2016    PANCREAS SURGERY      cyst opened up and drining into small bowel    TONSILLECTOMY      UPPER GASTROINTESTINAL ENDOSCOPY  10/04/2016    with biopsy    UPPER GASTROINTESTINAL ENDOSCOPY  03/16/2017    Halo ablation    UPPER GASTROINTESTINAL ENDOSCOPY  06/26/2017    Halo ablation

## 2023-06-08 NOTE — PROGRESS NOTES
4 Eyes Skin Assessment     The patient is being assess for   Admission    I agree that 2 RN's have performed a thorough Head to Toe Skin Assessment on the patient. ALL assessment sites listed below have been assessed. Areas assessed for pressure by both nurses:   [x]   Head, Face, and Ears   [x]   Shoulders, Back, and Chest, Abdomen  [x]   Arms, Elbows, and Hands   [x]   Coccyx, Sacrum, and Ischium  [x]   Legs, Feet, and Heels        Skin Assessed Under all Medical Devices by both nurses:  O2 device tubing              All Mepilex Borders were peeled back and area peeked at by both nurses:  Yes  Please list where Mepilex Borders are located:  none             **SHARE this note so that the co-signing nurse is able to place an eSignature**    Co-signer eSignature: Electronically signed by Merline Diamond, RN on 6/8/23 at 5:46 PM EDT    Does the Patient have Skin Breakdown related to pressure?   No              Dustin Prevention initiated:  No   Wound Care Orders initiated:  No      WOC nurse consulted for Pressure Injury (Stage 3,4, Unstageable, DTI, NWPT, Complex wounds)and New or Established Ostomies:  No      Primary Nurse eSignature: Electronically signed by Mackenzie Shrestha RN on 6/8/23 at 1:43 PM EDT

## 2023-06-09 ENCOUNTER — APPOINTMENT (OUTPATIENT)
Dept: NUCLEAR MEDICINE | Age: 73
End: 2023-06-09
Payer: MEDICARE

## 2023-06-09 VITALS
WEIGHT: 270 LBS | TEMPERATURE: 98 F | RESPIRATION RATE: 17 BRPM | DIASTOLIC BLOOD PRESSURE: 62 MMHG | BODY MASS INDEX: 38.65 KG/M2 | SYSTOLIC BLOOD PRESSURE: 103 MMHG | OXYGEN SATURATION: 93 % | HEIGHT: 70 IN | HEART RATE: 74 BPM

## 2023-06-09 PROBLEM — D69.6 THROMBOCYTOPENIA (HCC): Status: ACTIVE | Noted: 2023-06-09

## 2023-06-09 PROBLEM — G47.34 NOCTURNAL HYPOXEMIA: Status: ACTIVE | Noted: 2023-06-09

## 2023-06-09 PROBLEM — I48.0 PAROXYSMAL ATRIAL FIBRILLATION (HCC): Status: ACTIVE | Noted: 2023-06-09

## 2023-06-09 PROBLEM — R09.89 PULMONARY VENOUS CONGESTION: Status: ACTIVE | Noted: 2023-06-09

## 2023-06-09 PROBLEM — E11.65 UNCONTROLLED TYPE 2 DIABETES MELLITUS WITH HYPERGLYCEMIA (HCC): Status: ACTIVE | Noted: 2023-06-09

## 2023-06-09 PROBLEM — R06.02 SOB (SHORTNESS OF BREATH): Status: ACTIVE | Noted: 2023-06-09

## 2023-06-09 LAB
GLUCOSE BLD-MCNC: 198 MG/DL (ref 70–99)
GLUCOSE BLD-MCNC: 240 MG/DL (ref 70–99)
GLUCOSE BLD-MCNC: 318 MG/DL (ref 70–99)
INR PPP: 3.15 (ref 0.84–1.16)
PERFORMED ON: ABNORMAL
PROTHROMBIN TIME: 32.1 SEC (ref 11.5–14.8)

## 2023-06-09 PROCEDURE — 2700000000 HC OXYGEN THERAPY PER DAY

## 2023-06-09 PROCEDURE — 3430000000 HC RX DIAGNOSTIC RADIOPHARMACEUTICAL: Performed by: INTERNAL MEDICINE

## 2023-06-09 PROCEDURE — 85610 PROTHROMBIN TIME: CPT

## 2023-06-09 PROCEDURE — 99222 1ST HOSP IP/OBS MODERATE 55: CPT | Performed by: INTERNAL MEDICINE

## 2023-06-09 PROCEDURE — A9502 TC99M TETROFOSMIN: HCPCS | Performed by: INTERNAL MEDICINE

## 2023-06-09 PROCEDURE — 94669 MECHANICAL CHEST WALL OSCILL: CPT

## 2023-06-09 PROCEDURE — 36415 COLL VENOUS BLD VENIPUNCTURE: CPT

## 2023-06-09 PROCEDURE — 94761 N-INVAS EAR/PLS OXIMETRY MLT: CPT

## 2023-06-09 PROCEDURE — 2580000003 HC RX 258: Performed by: INTERNAL MEDICINE

## 2023-06-09 PROCEDURE — 6370000000 HC RX 637 (ALT 250 FOR IP): Performed by: INTERNAL MEDICINE

## 2023-06-09 RX ADMIN — LISINOPRIL 10 MG: 10 TABLET ORAL at 08:12

## 2023-06-09 RX ADMIN — DICLOFENAC SODIUM 4 G: 10 GEL TOPICAL at 08:13

## 2023-06-09 RX ADMIN — TETROFOSMIN 11 MILLICURIE: 1.38 INJECTION, POWDER, LYOPHILIZED, FOR SOLUTION INTRAVENOUS at 08:30

## 2023-06-09 RX ADMIN — ASPIRIN 81 MG: 81 TABLET, CHEWABLE ORAL at 08:12

## 2023-06-09 RX ADMIN — INSULIN LISPRO 3 UNITS: 100 INJECTION, SOLUTION INTRAVENOUS; SUBCUTANEOUS at 16:56

## 2023-06-09 RX ADMIN — GLIPIZIDE 2.5 MG: 5 TABLET ORAL at 08:12

## 2023-06-09 RX ADMIN — SODIUM CHLORIDE, PRESERVATIVE FREE 10 ML: 5 INJECTION INTRAVENOUS at 08:24

## 2023-06-09 RX ADMIN — PANTOPRAZOLE SODIUM 40 MG: 40 TABLET, DELAYED RELEASE ORAL at 08:12

## 2023-06-09 RX ADMIN — INSULIN LISPRO 1 UNITS: 100 INJECTION, SOLUTION INTRAVENOUS; SUBCUTANEOUS at 12:34

## 2023-06-09 ASSESSMENT — PAIN SCALES - GENERAL: PAINLEVEL_OUTOF10: 0

## 2023-06-09 NOTE — PROGRESS NOTES
Patient to [facility]. [facility] notified.

## 2023-06-09 NOTE — PLAN OF CARE
Problem: Discharge Planning  Goal: Discharge to home or other facility with appropriate resources  Outcome: Progressing     Problem: Pain  Goal: Verbalizes/displays adequate comfort level or baseline comfort level  Outcome: Progressing     Problem: ABCDS Injury Assessment  Goal: Absence of physical injury  Outcome: Progressing     Problem: Safety - Adult  Goal: Free from fall injury  6/9/2023 0921 by Cherylene Morrow, RN  Outcome: Progressing

## 2023-06-09 NOTE — PROGRESS NOTES
Patient remained 97% on room air during walk,   Once patient returned to bed his sats were 93% at rest on room air

## 2023-06-09 NOTE — DISCHARGE SUMMARY
-asked pulm input, agree with outpt workup    Cad- per emr  -on asa/statin     Afib-rate controlled, on tele  -on cardizem  -On coumadin, pharm assisted dosing     DM2- appears not well controlled, a1c 8.3  -on glipizide  -On lantus, held mealtime insulin  -low SSI  -Ac/hs bs     HTN- stable  -Acei     HLD- Statin     RLS- on mirapex     GERD- on ppi    Physical Exam Performed:      /62   Pulse 74   Temp 98 °F (36.7 °C) (Oral)   Resp 17   Ht 5' 10\" (1.778 m)   Wt 270 lb (122.5 kg)   SpO2 93%   BMI 38.74 kg/m²       General appearance:  No apparent distress, appears stated age and cooperative. obese  Respiratory:  Normal respiratory effort. Cardiovascular:  Regular rate and rhythm. Abdomen:  Soft, non-tender, non-distended. Musculoskelatal:  No edema  Neurologic:  Non-focal  Psychiatric:  Alert and oriented    Patient Discharge Instructions: Follow up:    1. Primary Care Provider JENNIFER Borja in the next 1-2 weeks. 2. Pulmonologist as outpt    The patient was seen and examined on day of discharge and this discharge summary is in conjunction with any daily progress note from day of discharge.  Time spent on discharge: 33 minutes in the examination, evaluation, counseling and review of medications and discharge plan.    ------------------------------------------------------------------------------------------------------------------------------------------------------    Discharge Medications:   Discharge Medication List as of 6/9/2023  4:54 PM        Discharge Medication List as of 6/9/2023  4:54 PM        Discharge Medication List as of 6/9/2023  4:54 PM        CONTINUE these medications which have NOT CHANGED    Details   pramipexole (MIRAPEX) 0.5 MG tablet Take 1 tablet by mouth nightly, Disp-90 tablet, R-0Normal      Handicap Placard [facility] Starting Thu 5/4/2023, Disp-1 each, R-0, PrintExp: 5/1/2026      meloxicam (MOBIC) 15 MG tablet Take 1 tablet by mouth daily as needed for Pain,

## 2023-06-09 NOTE — PLAN OF CARE
Pt A&Ox4 lying in bed. Pt orientated to room and call light. Bed is in lowest position with wheels locked. 2/4 siderails raised. Non-slip socks are off per pt request. Room is well lit. Call light within reach, will continue to monitor.

## 2023-06-09 NOTE — DISCHARGE INSTRUCTIONS
FOLLOW-UP APPOINTMENTS    Follow-up appointment on 6/30/2023 at [address] [place] [address] with Leonila Coreas CNP. [address]: [phone]. If you are unable to make this appointment, please call to reschedule [phone]. Please arrive 15 minutes early to complete necessary paperwork. Directions to [place] [address] towards [place]. [address] [facility] exit. Right off exit. Cross over [employer]. Right on [address]. Left into hospital. Follow the signs to the emergency room ( turn left toward the Emergency room). Go right at the first stop sign. Just past the Emergency room at the second stop sign turn right and go up the ramp and park on the top level if possible. Go in the glass doors of the [facility] we on the top level of the garage [address]. As soon as you get in the door turn left and our office is the one with the glass doors.

## 2023-06-09 NOTE — CONSULTS
risk due to acute illness, evaluation of drug-drug interactions, medication management and diagnostic interventions. ASSESSMENT & PLAN:    Atypical chest pain - likely noncardiac. Is chronic and recurrent and has been worked up on several occasions over the past few years. Most recent ischemia evaluation with stress test last year and angiogram in 2021 without obstructive CAD  Nonobstructive CAD  Paroxysmal A-fib - currently in sinus rhythm and rate controlled    No further cardiac ischemic work-up is needed at this time  Continue warfarin for paroxysmal A-fib  Continue aspirin, statin for nonobstructive CAD  Resume follow-up with Dr. Aldo Son in outpatient    All questions and concerns were addressed to the patient/family. Alternatives to my treatment as well as risks and benefits of proposed treatment were discussed. Tobacco use was discussed with the patient and educated on the negative effects. Thank you for allowing to us to participate in the care or David Richter. Please call with questions.          Chriss Severs, MD, [facility], [address] [place] [address]  6/8/2023  [phone]      Inadvertent computerized transcription errors may be present
venous congestion    Uncontrolled type 2 diabetes mellitus with hyperglycemia (HCC)    Thrombocytopenia (HCC)    Nocturnal hypoxemia  Resolved Problems:    * No resolved hospital problems.  *          Plan:   Oxygen supplementation, if required, to keep saturation between 90 and 94% only  Patient was on room air oxygen and maintaining oxygen saturation when seen at rest  Patient need to be evaluated for home oxygenation  Patient also had nocturnal hypoxemia on the sleep ready done earlier along with severe ESTHER with AHI of 106/h  Patient cannot tolerate the CPAP/BiPAP  Patient will also have an overnight pulse oximetry to assess requirement for oxygen at nighttime  Patient is being evaluated with Dr. Vinicio Jane for inspire device  Patient does have high BMI which was 38.74 today and patient was told about the stipulations for inspire device and he can discuss further with Dr. Vinicio Jane about the eligibility moving forward  Patient does not need any work-up for cardiac issues as per the consult done by Dr. Halie Jackson and patient to continue with medications including that for A-fib  Patient also has significant thrombocytopenia which at some point and needs to be evaluated by patient's PCP as an outpatient  Patient's ultrasound in the past did not show any hepatic steatosis or portal hypertension  Patient also has significant leg edema and there was some pulmonary venous congestion on admission-intermittently/PCP decide upon diuretics  Patient also needs to cut down on salt intake and fluid intake  Patient also has uncontrolled diabetes mellitus and management as per IM  Patient is on Glucotrol at this time and critically speaking it can cause patient to have weight gain-IM/PCP to decide about discontinuation  Insulins as per BGM as per IM  Blood sugars are not optimally controlled at this time  Coumadin as per prothrombin time as per IM/pharmacy  Weight loss will help  Monitor for any bleeding  PUD prophylaxis     Case d/w

## 2023-06-09 NOTE — CARE COORDINATION
Representative Name:       The Patient and/or Patient Representative Agree with the Discharge Plan?       TOI Gunter, SORAYA-S   Case Management Department  Ph: [phone] Fax: [phone]

## 2023-06-09 NOTE — PROGRESS NOTES
Pharmacy Note  Warfarin Consult  Dx: AFib  Goal INR range 2-3   Home Warfarin dose: 10 mg daily except 7.5 mg Q M/W/F    Date  INR  Warfarin  6/8                  2.69                  10 mg  6/9                  3.15                   Hold    Recommend holding Warfarin tonight x1 due to supratherapeutic INR. Daily INR ordered. Rx will continue to manage therapy per consult order.     Judit Lagunas, PharmD 6/9/2023  8:25 AM

## 2023-06-16 NOTE — PROGRESS NOTES
Physician Progress Note      PATIENT:               Brandy Bland  CSN #:                  [id number]  :                       1950  ADMIT DATE:       2023 10:32 PM  [address] DATE:        2023 5:38 PM  RESPONDING  PROVIDER #:        Michele Swift MD          QUERY TEXT:    Pt admitted with exertional CP and SOB. Pt noted to have CAD. If possible,   please document in progress notes and discharge summary if you are evaluating   and/or treating any of the following: The medical record reflects the following:  Risk Factors: CAD, obesity, AFIB, HTN  Clinical Indicators:  ED -  ?pulm edema vs undiagnosed COPD. IV Lasix given clinical exam findings   suggestive of pulm edema etiology. Requires admission for further workup. \"  \"Pulmonology -  \"  Patient also has significant leg edema and there was some pulmonary venous   congestion on admission-intermittently/PCP decide upon diuretics\"  CXR -  \"Mild cardiomegaly and mild central pulmonary congestion or pulmonary artery  hypertension which is more prominent. \"  H&P -  \"Chest pain- exertional but currently cp free, pt had recent stress test last   year that was negative in   Hypoxia- transient, unclear if element of obesity hypoventilation\"  Cardiology -  \"Atypical chest pain - likely noncardiac. Is chronic and recurrent and has   been worked up on several occasions over the past few years.   Most recent   ischemia evaluation with stress test last year and angiogram in  without   obstructive CAD  Nonobstructive CAD  Paroxysmal A-fib - currently in sinus rhythm and rate controlled  No further cardiac ischemic work-up is needed at this time\"    Treatment: Pulmonary consult, cardiology consult, IV lasix, oxygen, serial   labs, and supportive care    Thank you,  Senait Adams, RN, BSN, CRCR  Options provided:  -- Chest pain due non-cardiac pulmonary edema and pulmonary hypertension  -- Chest pain due to CAD with angina  --

## 2023-06-22 ENCOUNTER — CARE COORDINATION (OUTPATIENT)
Dept: CASE MANAGEMENT | Age: 73
End: 2023-06-22

## 2023-06-22 NOTE — CARE COORDINATION
[facility] Care Transitions Follow Up Call    Patient Current Location: [address] Transition Nurse contacted the patient by telephone. Verified name and  with patient as identifiers. Patient: Jose Loja  Patient : 1950   MRN: [id number]  Reason for Admission: CP   Discharge Date: 23 RARS: Readmission Risk Score: 13.8      Needs to be reviewed by the provider   Additional needs identified to be addressed with provider: No  none             Method of communication with provider: none. CTN spoke with patient who reported he is doing fine. Patient denied any cp, sob, or swelling. Patient reported his vitals are normal but did not report any results. Patient denied any other issues or concerns. Addressed changes since last contact:  none  Discussed follow-up appointments. If no appointment was previously scheduled, appointment scheduling offered: Yes. Is follow up appointment scheduled within 7 days of discharge? Yes. Follow Up  Future Appointments   Date Time Provider Magdalena Murguia   2023  9:45 AM [facility]   2023  3:00 PM SANDY Gomez - KHALIF [facility]   7/10/2023 11:00 AM JENNIFER Pedro  [place] - DYD   2023  3:00 PM Lj Cadena MD AND PULM [facility]     Non-[facility] follow up appointment(s):     Care Transition Nurse reviewed medical action plan with patient and discussed any barriers to care and/or understanding of plan of care after discharge. Discussed appropriate site of care based on symptoms and resources available to patient including: PCP  Specialist  When to call 911. The patient agrees to contact the PCP office for questions related to their healthcare. Advance Care Planning:   referral to internal ACP facilitator. Patients top risk factors for readmission: medical condition-.   Interventions to address risk factors: Education of patient/family/caregiver/guardian to support

## 2023-06-27 ENCOUNTER — ANTI-COAG VISIT (OUTPATIENT)
Dept: PHARMACY | Age: 73
End: 2023-06-27
Payer: MEDICARE

## 2023-06-27 DIAGNOSIS — I48.91 NEW ONSET ATRIAL FIBRILLATION (HCC): Primary | ICD-10-CM

## 2023-06-27 LAB — INR BLD: 2.4

## 2023-06-27 PROCEDURE — 85610 PROTHROMBIN TIME: CPT | Performed by: PHARMACIST

## 2023-06-27 PROCEDURE — 99211 OFF/OP EST MAY X REQ PHY/QHP: CPT | Performed by: PHARMACIST

## 2023-06-29 ENCOUNTER — CARE COORDINATION (OUTPATIENT)
Dept: CASE MANAGEMENT | Age: 73
End: 2023-06-29

## 2023-06-30 ENCOUNTER — TELEPHONE (OUTPATIENT)
Dept: FAMILY MEDICINE CLINIC | Age: 73
End: 2023-06-30

## 2023-07-06 ENCOUNTER — CARE COORDINATION (OUTPATIENT)
Dept: CASE MANAGEMENT | Age: 73
End: 2023-07-06

## 2023-07-06 NOTE — CARE COORDINATION
Care Transitions Outreach Attempt      Attempted to reach patient for transitions of care follow up. Unable to reach patient. Patient: Gino Paige Patient : 1950 MRN: [id number]    Last Discharge [address]       Date Complaint Diagnosis Description Type Department Provider    23 Chest Pain Atypical chest pain . .. ED to Hosp-Admission (Discharged) (ADMITTED) Ada Lezama MD; [address] [place] [address] .. Was this an external facility discharge?  No Discharge Facility: n.a    Noted following upcoming appointments from discharge chart review:   [facility] follow up appointment(s):   Future Appointments   Date Time Provider [address]   7/10/2023 11:00 AM JENNIFER Jiang  [place] - DYSHON   2023  9:45 AM [address] [place] [address]   2023  3:00 PM Savi Sotomayor MD AND PULM [facility]     Non-[facility] follow up appointment(s): masood ATKINS, RN, [facility]  Care Transition Nurse  [phone] [place]

## 2023-07-10 ENCOUNTER — OFFICE VISIT (OUTPATIENT)
Dept: FAMILY MEDICINE CLINIC | Age: 73
End: 2023-07-10
Payer: MEDICARE

## 2023-07-10 VITALS
HEIGHT: 70 IN | TEMPERATURE: 97.8 F | OXYGEN SATURATION: 96 % | HEART RATE: 60 BPM | WEIGHT: 276 LBS | BODY MASS INDEX: 39.51 KG/M2 | DIASTOLIC BLOOD PRESSURE: 60 MMHG | SYSTOLIC BLOOD PRESSURE: 120 MMHG

## 2023-07-10 DIAGNOSIS — Z79.4 TYPE 2 DIABETES MELLITUS WITH MICROALBUMINURIA, WITH LONG-TERM CURRENT USE OF INSULIN (HCC): ICD-10-CM

## 2023-07-10 DIAGNOSIS — Z79.4 TYPE 2 DIABETES MELLITUS WITH HYPERGLYCEMIA, WITH LONG-TERM CURRENT USE OF INSULIN (HCC): Primary | ICD-10-CM

## 2023-07-10 DIAGNOSIS — E11.65 TYPE 2 DIABETES MELLITUS WITH HYPERGLYCEMIA, WITH LONG-TERM CURRENT USE OF INSULIN (HCC): Primary | ICD-10-CM

## 2023-07-10 DIAGNOSIS — E11.29 TYPE 2 DIABETES MELLITUS WITH MICROALBUMINURIA, WITH LONG-TERM CURRENT USE OF INSULIN (HCC): ICD-10-CM

## 2023-07-10 DIAGNOSIS — I25.119 CORONARY ARTERY DISEASE INVOLVING NATIVE CORONARY ARTERY OF NATIVE HEART WITH ANGINA PECTORIS (HCC): ICD-10-CM

## 2023-07-10 DIAGNOSIS — R80.9 TYPE 2 DIABETES MELLITUS WITH MICROALBUMINURIA, WITH LONG-TERM CURRENT USE OF INSULIN (HCC): ICD-10-CM

## 2023-07-10 DIAGNOSIS — I51.89 GRADE II DIASTOLIC DYSFUNCTION: ICD-10-CM

## 2023-07-10 PROCEDURE — 3078F DIAST BP <80 MM HG: CPT | Performed by: PHYSICIAN ASSISTANT

## 2023-07-10 PROCEDURE — 99213 OFFICE O/P EST LOW 20 MIN: CPT | Performed by: PHYSICIAN ASSISTANT

## 2023-07-10 PROCEDURE — G8417 CALC BMI ABV UP PARAM F/U: HCPCS | Performed by: PHYSICIAN ASSISTANT

## 2023-07-10 PROCEDURE — 3017F COLORECTAL CA SCREEN DOC REV: CPT | Performed by: PHYSICIAN ASSISTANT

## 2023-07-10 PROCEDURE — 1123F ACP DISCUSS/DSCN MKR DOCD: CPT | Performed by: PHYSICIAN ASSISTANT

## 2023-07-10 PROCEDURE — 3052F HG A1C>EQUAL 8.0%<EQUAL 9.0%: CPT | Performed by: PHYSICIAN ASSISTANT

## 2023-07-10 PROCEDURE — 3074F SYST BP LT 130 MM HG: CPT | Performed by: PHYSICIAN ASSISTANT

## 2023-07-10 PROCEDURE — G8427 DOCREV CUR MEDS BY ELIG CLIN: HCPCS | Performed by: PHYSICIAN ASSISTANT

## 2023-07-10 PROCEDURE — 2022F DILAT RTA XM EVC RTNOPTHY: CPT | Performed by: PHYSICIAN ASSISTANT

## 2023-07-10 PROCEDURE — 1036F TOBACCO NON-USER: CPT | Performed by: PHYSICIAN ASSISTANT

## 2023-07-10 RX ORDER — INSULIN ASPART 100 [IU]/ML
15 INJECTION, SOLUTION INTRAVENOUS; SUBCUTANEOUS
Qty: 40.5 ML | Refills: 5 | Status: SHIPPED | OUTPATIENT
Start: 2023-07-10

## 2023-07-10 RX ORDER — FERROUS SULFATE 325(65) MG
1 TABLET ORAL DAILY
COMMUNITY
Start: 2023-06-12

## 2023-07-10 ASSESSMENT — PATIENT HEALTH QUESTIONNAIRE - PHQ9
5. POOR APPETITE OR OVEREATING: 0
10. IF YOU CHECKED OFF ANY PROBLEMS, HOW DIFFICULT HAVE THESE PROBLEMS MADE IT FOR YOU TO DO YOUR WORK, TAKE CARE OF THINGS AT HOME, OR GET ALONG WITH OTHER PEOPLE: 0
6. FEELING BAD ABOUT YOURSELF - OR THAT YOU ARE A FAILURE OR HAVE LET YOURSELF OR YOUR FAMILY DOWN: 0
8. MOVING OR SPEAKING SO SLOWLY THAT OTHER PEOPLE COULD HAVE NOTICED. OR THE OPPOSITE, BEING SO FIGETY OR RESTLESS THAT YOU HAVE BEEN MOVING AROUND A LOT MORE THAN USUAL: 0
2. FEELING DOWN, DEPRESSED OR HOPELESS: 0
9. THOUGHTS THAT YOU WOULD BE BETTER OFF DEAD, OR OF HURTING YOURSELF: 0
SUM OF ALL RESPONSES TO PHQ QUESTIONS 1-9: 0
7. TROUBLE CONCENTRATING ON THINGS, SUCH AS READING THE NEWSPAPER OR WATCHING TELEVISION: 0
SUM OF ALL RESPONSES TO PHQ9 QUESTIONS 1 & 2: 0
1. LITTLE INTEREST OR PLEASURE IN DOING THINGS: 0
4. FEELING TIRED OR HAVING LITTLE ENERGY: 0
3. TROUBLE FALLING OR STAYING ASLEEP: 0

## 2023-07-10 ASSESSMENT — ENCOUNTER SYMPTOMS
VOMITING: 0
NAUSEA: 0
SHORTNESS OF BREATH: 0
RHINORRHEA: 0
COUGH: 0
CONSTIPATION: 0
DIARRHEA: 0
SORE THROAT: 0
ABDOMINAL PAIN: 0

## 2023-07-10 ASSESSMENT — ANXIETY QUESTIONNAIRES
6. BECOMING EASILY ANNOYED OR IRRITABLE: 0
5. BEING SO RESTLESS THAT IT IS HARD TO SIT STILL: 0
7. FEELING AFRAID AS IF SOMETHING AWFUL MIGHT HAPPEN: 0
2. NOT BEING ABLE TO STOP OR CONTROL WORRYING: 0
1. FEELING NERVOUS, ANXIOUS, OR ON EDGE: 0
3. WORRYING TOO MUCH ABOUT DIFFERENT THINGS: 0
GAD7 TOTAL SCORE: 0
IF YOU CHECKED OFF ANY PROBLEMS ON THIS QUESTIONNAIRE, HOW DIFFICULT HAVE THESE PROBLEMS MADE IT FOR YOU TO DO YOUR WORK, TAKE CARE OF THINGS AT HOME, OR GET ALONG WITH OTHER PEOPLE: NOT DIFFICULT AT ALL
4. TROUBLE RELAXING: 0

## 2023-07-10 NOTE — PATIENT INSTRUCTIONS
Switch lantus to the mornings instead of at night. Stay at 25 units lantus nightly. Continue with 15 units novolog with meals. Please make sure to bring glucometer to all of your appointments.

## 2023-07-10 NOTE — PROGRESS NOTES
Skin:     General: Skin is warm and dry. Findings: No rash. Neurological:      Mental Status: He is alert and oriented to person, place, and time.    Psychiatric:         Mood and Affect: Mood normal.         Behavior: Behavior normal.

## 2023-07-11 ENCOUNTER — APPOINTMENT (OUTPATIENT)
Dept: GENERAL RADIOLOGY | Age: 73
End: 2023-07-11
Payer: MEDICARE

## 2023-07-11 ENCOUNTER — HOSPITAL ENCOUNTER (EMERGENCY)
Age: 73
Discharge: HOME | End: 2023-07-11
Payer: MEDICARE

## 2023-07-11 VITALS
RESPIRATION RATE: 16 BRPM | DIASTOLIC BLOOD PRESSURE: 80 MMHG | OXYGEN SATURATION: 93 % | TEMPERATURE: 97.6 F | BODY MASS INDEX: 38.73 KG/M2 | SYSTOLIC BLOOD PRESSURE: 147 MMHG | HEART RATE: 60 BPM | WEIGHT: 270.5 LBS | HEIGHT: 70 IN

## 2023-07-11 DIAGNOSIS — S46.911A STRAIN OF RIGHT SHOULDER, INITIAL ENCOUNTER: Primary | ICD-10-CM

## 2023-07-11 PROCEDURE — 73030 X-RAY EXAM OF SHOULDER: CPT

## 2023-07-11 PROCEDURE — 6370000000 HC RX 637 (ALT 250 FOR IP): Performed by: PHYSICIAN ASSISTANT

## 2023-07-11 PROCEDURE — 99283 EMERGENCY DEPT VISIT LOW MDM: CPT

## 2023-07-11 RX ORDER — ACETAMINOPHEN 500 MG
1000 TABLET ORAL ONCE
Status: COMPLETED | OUTPATIENT
Start: 2023-07-11 | End: 2023-07-11

## 2023-07-11 RX ADMIN — ACETAMINOPHEN 1000 MG: 500 TABLET ORAL at 14:27

## 2023-07-11 ASSESSMENT — PAIN - FUNCTIONAL ASSESSMENT: PAIN_FUNCTIONAL_ASSESSMENT: 0-10

## 2023-07-11 ASSESSMENT — PAIN SCALES - GENERAL: PAINLEVEL_OUTOF10: 9

## 2023-07-11 ASSESSMENT — ENCOUNTER SYMPTOMS: RESPIRATORY NEGATIVE: 1

## 2023-07-11 NOTE — ED PROVIDER NOTES
Record. FINAL IMPRESSION      1.  Strain of right shoulder, initial encounter          DISPOSITION/PLAN     DISPOSITION Discharge - Pending Orders Complete 07/11/2023 02:40:55 PM      PATIENT REFERRED TO:  JENNIFER Ibarra  [address] [place] [address] Drive  [phone]    Schedule an appointment as soon as possible for a visit   As needed, If symptoms worsen    Earl Recio MD  [address]  [phone]    Schedule an appointment as soon as possible for a visit   As needed, If symptoms worsen    [facility] ED  [address] [facility]  Schedule an appointment as soon as possible for a visit   As needed, If symptoms worsen      DISCHARGE MEDICATIONS:  New Prescriptions    No medications on file       DISCONTINUED MEDICATIONS:  Discontinued Medications    No medications on file              (Please note that portions of this note were completed with a voice recognition program.  Efforts were made to edit the dictations but occasionally words are mis-transcribed.)    Herminia Elizalde PA-C (electronically signed)        Herminia Elizalde PA-C  07/11/23 [id number]

## 2023-07-11 NOTE — DISCHARGE INSTRUCTIONS
Follow-up with orthopedics in the next 1 week. You will need to call their office to schedule close follow-up. Continue with Tylenol for pain management at home. Return to the ER if you develop any new or worsening symptoms.

## 2023-07-12 ENCOUNTER — TELEPHONE (OUTPATIENT)
Dept: ORTHOPEDIC SURGERY | Age: 73
End: 2023-07-12

## 2023-07-12 ENCOUNTER — CARE COORDINATION (OUTPATIENT)
Dept: CASE MANAGEMENT | Age: 73
End: 2023-07-12

## 2023-07-12 ENCOUNTER — TELEPHONE (OUTPATIENT)
Dept: FAMILY MEDICINE CLINIC | Age: 73
End: 2023-07-12

## 2023-07-12 NOTE — TELEPHONE ENCOUNTER
Patient spouse called back and informed that we will still need A1C to come down a little more closer to 7. Spouse reported understanding and follow up/preop appt was made for about a month away.

## 2023-07-12 NOTE — CARE COORDINATION
[facility] ED Follow Up Call    2023    Patient: Fanny Rincon Patient : 1950   MRN: [id number]  Reason for Admission: muscle strain   Discharge Date: 23      CTN spoke with patient who reported he returned to the ER for muscle strain pain in right shoulder. Patient was instructed to use heat/ice and tylenol. Patient reported the tylenol has helped. CTN to route message to PCP to schedule ED f/u. Patient encouraged to continue heat/ice and tylenol.         Care Transitions ED Follow Up    Care Transitions Interventions  Do you have any ongoing symptoms?: Yes   Onset of Patient-reported symptoms: Today   Patient-reported symptoms: Other   Did you call your PCP prior to going to the ED?: No - Did not call PCP   Do you have a copy of your discharge instructions?: Yes   Do you understand what to report and when to return?: Yes   Are you following your discharge instructions?: Yes   Do you have all of your prescriptions and are they filled?: Yes   Have you scheduled your follow up appointment?: No   Were you discharged with any Home Care or Post Acute Services or do you currently have any active services?: No   Post Acute Services: Home Health (Comment: [facility])         Patient DME: Shivani xavier   Do you have any needs or concerns that I can assist you with?: No   Identified Barriers: None          Rakesh ATKINS, RN, [facility]  Care Transition Nurse  [phone] mobile

## 2023-07-12 NOTE — TELEPHONE ENCOUNTER
General Question     Subject: A1 is 7.9  Patient and /or Facility Request: Forrest Warnertiff Number: [phone]      The pt's wife Cheng Yoonmarisela called to let Dr. Halley Erickson know that the pt's A1 if at 7.9 now, so they can schedule his sx.   Please call Cheng Morejon back at [phone]

## 2023-07-17 ENCOUNTER — CARE COORDINATION (OUTPATIENT)
Dept: CASE MANAGEMENT | Age: 73
End: 2023-07-17

## 2023-07-17 NOTE — CARE COORDINATION
enrollment in the Remote Patient Monitoring (RPM) program for in-home monitoring: Patient declined. Care Transitions Subsequent and Final Call    Subsequent and Final Calls  Do you have any ongoing symptoms?: No  Have your medications changed?: No  Do you have any questions related to your medications?: No  Do you currently have any active services?: Yes  Are you currently active with any services?: Home Health  Do you have any needs or concerns that I can assist you with?: No  Identified Barriers: None  Care Transitions Interventions  Other Interventions:             Care Transition Nurse provided contact information for future needs. Plan for next call: referral to ambulatory care manager-TIANNA GUERINN, RN, [facility]  Care Transition Nurse  [phone] mobile

## 2023-07-18 ENCOUNTER — ANTI-COAG VISIT (OUTPATIENT)
Dept: PHARMACY | Age: 73
End: 2023-07-18
Payer: MEDICARE

## 2023-07-18 DIAGNOSIS — I48.91 NEW ONSET ATRIAL FIBRILLATION (HCC): Primary | ICD-10-CM

## 2023-07-18 LAB — INR BLD: 2.7

## 2023-07-18 PROCEDURE — 99211 OFF/OP EST MAY X REQ PHY/QHP: CPT | Performed by: PHARMACIST

## 2023-07-18 PROCEDURE — 85610 PROTHROMBIN TIME: CPT | Performed by: PHARMACIST

## 2023-07-18 NOTE — PROGRESS NOTES
Mr Naima Soto is here for management of anticoagulation for AFib. PMH also significant for CAD, DM2, HLD, HTN   He presents today w/out complaint. Pt verifies dosing regimen as listed above. Pt denies s/s bleeding/bruising/swelling/SOB. No BRBPR. No melena. Address missed doses  Reviewed pt medication list  No changes in RX/OTCs/Herbal medications. Reviewed dietary concerns  Address EToH and tobacco use. No changes per patient. INR 2.7 is within therapeutic range of 2-3  Recommend to continue dose of 7.5 mg daily except 10 mg Q Tue/Sat  Patient has 5 mg tablets. Will continue to monitor and check INR in 4 weeks. Dosing reminder card given with phone number, appointment date and time. Return to clinic: 8/15/23 @ [address] [place] [address] PharmD candidate. I have seen the patient and reviewed the progress note written by the PharmD Candidate. I agree with this assessment and plan.    Dontrell Shipley PharmD 7/18/2023 9:50 AM

## 2023-07-19 ENCOUNTER — CARE COORDINATION (OUTPATIENT)
Dept: CARE COORDINATION | Age: 73
End: 2023-07-19

## 2023-07-19 ENCOUNTER — OFFICE VISIT (OUTPATIENT)
Dept: FAMILY MEDICINE CLINIC | Age: 73
End: 2023-07-19
Payer: MEDICARE

## 2023-07-19 VITALS
BODY MASS INDEX: 38.42 KG/M2 | SYSTOLIC BLOOD PRESSURE: 118 MMHG | WEIGHT: 268.4 LBS | DIASTOLIC BLOOD PRESSURE: 60 MMHG | TEMPERATURE: 98.1 F | OXYGEN SATURATION: 94 % | HEART RATE: 59 BPM | HEIGHT: 70 IN

## 2023-07-19 DIAGNOSIS — M25.511 ACUTE PAIN OF RIGHT SHOULDER: Primary | ICD-10-CM

## 2023-07-19 DIAGNOSIS — E11.65 TYPE 2 DIABETES MELLITUS WITH HYPERGLYCEMIA, WITH LONG-TERM CURRENT USE OF INSULIN (HCC): ICD-10-CM

## 2023-07-19 DIAGNOSIS — E11.29 TYPE 2 DIABETES MELLITUS WITH MICROALBUMINURIA, WITH LONG-TERM CURRENT USE OF INSULIN (HCC): ICD-10-CM

## 2023-07-19 DIAGNOSIS — R80.9 TYPE 2 DIABETES MELLITUS WITH MICROALBUMINURIA, WITH LONG-TERM CURRENT USE OF INSULIN (HCC): ICD-10-CM

## 2023-07-19 DIAGNOSIS — Z79.4 TYPE 2 DIABETES MELLITUS WITH MICROALBUMINURIA, WITH LONG-TERM CURRENT USE OF INSULIN (HCC): ICD-10-CM

## 2023-07-19 DIAGNOSIS — Z79.4 TYPE 2 DIABETES MELLITUS WITH HYPERGLYCEMIA, WITH LONG-TERM CURRENT USE OF INSULIN (HCC): ICD-10-CM

## 2023-07-19 PROCEDURE — G8417 CALC BMI ABV UP PARAM F/U: HCPCS | Performed by: PHYSICIAN ASSISTANT

## 2023-07-19 PROCEDURE — 3078F DIAST BP <80 MM HG: CPT | Performed by: PHYSICIAN ASSISTANT

## 2023-07-19 PROCEDURE — 1123F ACP DISCUSS/DSCN MKR DOCD: CPT | Performed by: PHYSICIAN ASSISTANT

## 2023-07-19 PROCEDURE — 3052F HG A1C>EQUAL 8.0%<EQUAL 9.0%: CPT | Performed by: PHYSICIAN ASSISTANT

## 2023-07-19 PROCEDURE — 3074F SYST BP LT 130 MM HG: CPT | Performed by: PHYSICIAN ASSISTANT

## 2023-07-19 PROCEDURE — 1036F TOBACCO NON-USER: CPT | Performed by: PHYSICIAN ASSISTANT

## 2023-07-19 PROCEDURE — G8427 DOCREV CUR MEDS BY ELIG CLIN: HCPCS | Performed by: PHYSICIAN ASSISTANT

## 2023-07-19 PROCEDURE — 3017F COLORECTAL CA SCREEN DOC REV: CPT | Performed by: PHYSICIAN ASSISTANT

## 2023-07-19 PROCEDURE — 2022F DILAT RTA XM EVC RTNOPTHY: CPT | Performed by: PHYSICIAN ASSISTANT

## 2023-07-19 PROCEDURE — 99213 OFFICE O/P EST LOW 20 MIN: CPT | Performed by: PHYSICIAN ASSISTANT

## 2023-07-19 RX ORDER — INSULIN ASPART 100 [IU]/ML
15 INJECTION, SOLUTION INTRAVENOUS; SUBCUTANEOUS
Qty: 40.5 ML | Refills: 5 | Status: SHIPPED | OUTPATIENT
Start: 2023-07-19

## 2023-07-19 ASSESSMENT — PATIENT HEALTH QUESTIONNAIRE - PHQ9
3. TROUBLE FALLING OR STAYING ASLEEP: 0
SUM OF ALL RESPONSES TO PHQ9 QUESTIONS 1 & 2: 0
6. FEELING BAD ABOUT YOURSELF - OR THAT YOU ARE A FAILURE OR HAVE LET YOURSELF OR YOUR FAMILY DOWN: 0
9. THOUGHTS THAT YOU WOULD BE BETTER OFF DEAD, OR OF HURTING YOURSELF: 0
7. TROUBLE CONCENTRATING ON THINGS, SUCH AS READING THE NEWSPAPER OR WATCHING TELEVISION: 0
1. LITTLE INTEREST OR PLEASURE IN DOING THINGS: 0
SUM OF ALL RESPONSES TO PHQ QUESTIONS 1-9: 0
SUM OF ALL RESPONSES TO PHQ QUESTIONS 1-9: 0
5. POOR APPETITE OR OVEREATING: 0
SUM OF ALL RESPONSES TO PHQ QUESTIONS 1-9: 0
2. FEELING DOWN, DEPRESSED OR HOPELESS: 0
SUM OF ALL RESPONSES TO PHQ QUESTIONS 1-9: 0
8. MOVING OR SPEAKING SO SLOWLY THAT OTHER PEOPLE COULD HAVE NOTICED. OR THE OPPOSITE, BEING SO FIGETY OR RESTLESS THAT YOU HAVE BEEN MOVING AROUND A LOT MORE THAN USUAL: 0
10. IF YOU CHECKED OFF ANY PROBLEMS, HOW DIFFICULT HAVE THESE PROBLEMS MADE IT FOR YOU TO DO YOUR WORK, TAKE CARE OF THINGS AT HOME, OR GET ALONG WITH OTHER PEOPLE: 0
4. FEELING TIRED OR HAVING LITTLE ENERGY: 0

## 2023-07-19 ASSESSMENT — ENCOUNTER SYMPTOMS
RHINORRHEA: 0
DIARRHEA: 0
SHORTNESS OF BREATH: 0
COUGH: 0
CONSTIPATION: 0
VOMITING: 0
NAUSEA: 0
ABDOMINAL PAIN: 0
SORE THROAT: 0

## 2023-07-19 ASSESSMENT — ANXIETY QUESTIONNAIRES
6. BECOMING EASILY ANNOYED OR IRRITABLE: 0
2. NOT BEING ABLE TO STOP OR CONTROL WORRYING: 0
5. BEING SO RESTLESS THAT IT IS HARD TO SIT STILL: 0
7. FEELING AFRAID AS IF SOMETHING AWFUL MIGHT HAPPEN: 0
3. WORRYING TOO MUCH ABOUT DIFFERENT THINGS: 0
IF YOU CHECKED OFF ANY PROBLEMS ON THIS QUESTIONNAIRE, HOW DIFFICULT HAVE THESE PROBLEMS MADE IT FOR YOU TO DO YOUR WORK, TAKE CARE OF THINGS AT HOME, OR GET ALONG WITH OTHER PEOPLE: NOT DIFFICULT AT ALL
4. TROUBLE RELAXING: 0
1. FEELING NERVOUS, ANXIOUS, OR ON EDGE: 0
GAD7 TOTAL SCORE: 0

## 2023-07-19 NOTE — PROGRESS NOTES
2023  Yung Rutledge (: 1950)  67 y.o.    ASSESSMENT and PLAN:  Colette Curry was seen today for follow-up. Diagnoses and all orders for this visit:    Acute pain of right shoulder  - ed notes and imaging reviewed  - pain has since resolved. Type 2 diabetes mellitus with microalbuminuria, with long-term current use of insulin (HCC)  Type 2 diabetes mellitus with hyperglycemia, with long-term current use of insulin ([facility])  -     insulin aspart (NOVOLOG FLEXPEN) 100 UNIT/ML injection pen; Inject 15 Units into the skin 3 times daily (before meals)  - concern for patient managing his own insulin given today's report. Wife is agreeable to manage his basal and meal time insulin  - information printed regarding diabetic diet as well   - re emphasized that in order for him to be a candidate for knee surgery, will need his a1c down with medication compliance. They both voiced understanding. Return in about 6 weeks (around 2023) for Diabetes Mellitus. HPI    Patient presents for ED follow up. Seen in ED on  for right shoulder pain  Reports he tried to move his lawnmwer and \"pulled something\" in the right shoulder. Xray showed no radiographic evidence of acute osseous abnormality. He was given tylenol in th ER and d/c home with ortho referral.   Patient reports that pain has resolved since being seen. Has cgm reading device today   BG report printed  High after lunch usually to 300s, sometimes after dinner as well. Per patient has not been using any meal time insulin, has also not yet switched his lantus to qam.   Instructions had reviewed at length at appointment on 7/10         Review of Systems   Constitutional:  Negative for activity change, chills and fever. HENT:  Negative for congestion, ear pain, rhinorrhea and sore throat. Eyes:  Negative for visual disturbance. Respiratory:  Negative for cough and shortness of breath.     Cardiovascular:  Negative for chest pain and

## 2023-07-19 NOTE — CARE COORDINATION
Covering ACM received CTN referral for CC enrollment while covering for primary ACM, Maura HEARD   ACM attempted to reach pt on both contact number and received a recording on both lines stating, \"I'm sorry, the person you are trying to reach does not have a voice system set up at this time. \"     ACM will make another outreach attempt at a later date/time.

## 2023-07-21 ENCOUNTER — TELEPHONE (OUTPATIENT)
Dept: ADMINISTRATIVE | Age: 73
End: 2023-07-21

## 2023-07-21 NOTE — TELEPHONE ENCOUNTER
Submitted PA for NovoLOG FlexPen ReliOn 100UNIT/ML pen-injectors  [facility]ia [facility]: [id number] STATUS: PENDING. Follow up done daily; if no response in three days we will refax for status check. If another three days goes by with no response we will call the insurance for status.

## 2023-07-24 ENCOUNTER — OFFICE VISIT (OUTPATIENT)
Dept: FAMILY MEDICINE CLINIC | Age: 73
End: 2023-07-24
Payer: MEDICARE

## 2023-07-24 VITALS
DIASTOLIC BLOOD PRESSURE: 64 MMHG | HEIGHT: 70 IN | TEMPERATURE: 98 F | SYSTOLIC BLOOD PRESSURE: 130 MMHG | BODY MASS INDEX: 39.31 KG/M2 | WEIGHT: 274.6 LBS | OXYGEN SATURATION: 94 % | HEART RATE: 53 BPM

## 2023-07-24 DIAGNOSIS — M25.511 ACUTE PAIN OF RIGHT SHOULDER: Primary | ICD-10-CM

## 2023-07-24 PROCEDURE — G8427 DOCREV CUR MEDS BY ELIG CLIN: HCPCS | Performed by: PHYSICIAN ASSISTANT

## 2023-07-24 PROCEDURE — 1036F TOBACCO NON-USER: CPT | Performed by: PHYSICIAN ASSISTANT

## 2023-07-24 PROCEDURE — 3017F COLORECTAL CA SCREEN DOC REV: CPT | Performed by: PHYSICIAN ASSISTANT

## 2023-07-24 PROCEDURE — 3074F SYST BP LT 130 MM HG: CPT | Performed by: PHYSICIAN ASSISTANT

## 2023-07-24 PROCEDURE — 3078F DIAST BP <80 MM HG: CPT | Performed by: PHYSICIAN ASSISTANT

## 2023-07-24 PROCEDURE — G8417 CALC BMI ABV UP PARAM F/U: HCPCS | Performed by: PHYSICIAN ASSISTANT

## 2023-07-24 PROCEDURE — 1123F ACP DISCUSS/DSCN MKR DOCD: CPT | Performed by: PHYSICIAN ASSISTANT

## 2023-07-24 PROCEDURE — 99213 OFFICE O/P EST LOW 20 MIN: CPT | Performed by: PHYSICIAN ASSISTANT

## 2023-07-24 RX ORDER — TIZANIDINE 4 MG/1
4 TABLET ORAL 3 TIMES DAILY PRN
Qty: 30 TABLET | Refills: 0 | Status: SHIPPED | OUTPATIENT
Start: 2023-07-24

## 2023-07-24 RX ORDER — TRAMADOL HYDROCHLORIDE 50 MG/1
50 TABLET ORAL EVERY 6 HOURS PRN
Qty: 12 TABLET | Refills: 0 | Status: SHIPPED | OUTPATIENT
Start: 2023-07-24 | End: 2023-07-27

## 2023-07-24 ASSESSMENT — ANXIETY QUESTIONNAIRES
2. NOT BEING ABLE TO STOP OR CONTROL WORRYING: 0
5. BEING SO RESTLESS THAT IT IS HARD TO SIT STILL: 0
1. FEELING NERVOUS, ANXIOUS, OR ON EDGE: 0
IF YOU CHECKED OFF ANY PROBLEMS ON THIS QUESTIONNAIRE, HOW DIFFICULT HAVE THESE PROBLEMS MADE IT FOR YOU TO DO YOUR WORK, TAKE CARE OF THINGS AT HOME, OR GET ALONG WITH OTHER PEOPLE: NOT DIFFICULT AT ALL
GAD7 TOTAL SCORE: 0
3. WORRYING TOO MUCH ABOUT DIFFERENT THINGS: 0
7. FEELING AFRAID AS IF SOMETHING AWFUL MIGHT HAPPEN: 0
4. TROUBLE RELAXING: 0
6. BECOMING EASILY ANNOYED OR IRRITABLE: 0

## 2023-07-24 ASSESSMENT — PATIENT HEALTH QUESTIONNAIRE - PHQ9
4. FEELING TIRED OR HAVING LITTLE ENERGY: 0
10. IF YOU CHECKED OFF ANY PROBLEMS, HOW DIFFICULT HAVE THESE PROBLEMS MADE IT FOR YOU TO DO YOUR WORK, TAKE CARE OF THINGS AT HOME, OR GET ALONG WITH OTHER PEOPLE: 0
6. FEELING BAD ABOUT YOURSELF - OR THAT YOU ARE A FAILURE OR HAVE LET YOURSELF OR YOUR FAMILY DOWN: 0
3. TROUBLE FALLING OR STAYING ASLEEP: 0
SUM OF ALL RESPONSES TO PHQ9 QUESTIONS 1 & 2: 0
8. MOVING OR SPEAKING SO SLOWLY THAT OTHER PEOPLE COULD HAVE NOTICED. OR THE OPPOSITE, BEING SO FIGETY OR RESTLESS THAT YOU HAVE BEEN MOVING AROUND A LOT MORE THAN USUAL: 0
SUM OF ALL RESPONSES TO PHQ QUESTIONS 1-9: 0
9. THOUGHTS THAT YOU WOULD BE BETTER OFF DEAD, OR OF HURTING YOURSELF: 0
2. FEELING DOWN, DEPRESSED OR HOPELESS: 0
5. POOR APPETITE OR OVEREATING: 0
7. TROUBLE CONCENTRATING ON THINGS, SUCH AS READING THE NEWSPAPER OR WATCHING TELEVISION: 0
SUM OF ALL RESPONSES TO PHQ QUESTIONS 1-9: 0
1. LITTLE INTEREST OR PLEASURE IN DOING THINGS: 0

## 2023-07-24 NOTE — PROGRESS NOTES
2023  Gurmeet Waddell (: 1950)  68 y.o.    ASSESSMENT and PLAN:  Laureen Gill was seen today for shoulder pain. Diagnoses and all orders for this visit:    Acute pain of right shoulder  -     tiZANidine (ZANAFLEX) 4 MG tablet; Take 1 tablet by mouth 3 times daily as needed (pain)  -     traMADol (ULTRAM) 50 MG tablet; Take 1 tablet by mouth every 6 hours as needed for Pain for up to 3 days. Intended supply: 3 days. Take lowest dose possible to manage pain Max Daily Amount: 200 mg  -     [address], Fritz Bowen DO, Orthopedics and Sports Medicine (Hip; Knee; Shoulder), [facility]  - unable to tolerate steroid 2/2 to uncontrolled dm  - trial muscle relaxer and tramadol ( unable to take NSAIDS). - follow up with ortho for consideration of joint injections. No follow-ups on file. HPI  Follows up for right shoulder pain. Was seen in ed on  for similar complaint. Xray: no radiographic evidence of acute osseous abnormality. Patient was seen for hospital follow up on  but pain at that time had resolved. Reports return of pain. Can't abduct past 30 degrees. Also with difficulty extending and external rotation. Has been taking tylenol prn. Unable to take NSAIDS 2/2 to ac. Still has not been taking insulin appropriately. Review of Systems   Constitutional:  Negative for activity change, chills and fever. HENT:  Negative for congestion, ear pain, rhinorrhea and sore throat. Eyes:  Negative for visual disturbance. Respiratory:  Negative for cough and shortness of breath. Cardiovascular:  Negative for chest pain and palpitations. Gastrointestinal:  Negative for abdominal pain, constipation, diarrhea, nausea and vomiting. Genitourinary:  Negative for difficulty urinating and dysuria. Musculoskeletal:  Positive for arthralgias. Negative for myalgias. Skin:  Negative for rash. Neurological:  Negative for dizziness, weakness and numbness.

## 2023-07-24 NOTE — TELEPHONE ENCOUNTER
NovoLOG FlexPen ReliOn 100UNIT/ML pen-injectors  -  Approved, Coverage Starts on: 7/21/2023 6:57:05 PM, Coverage Ends on: 7/21/2023 6:57:05 PM.    Please notify patient. Thank you.

## 2023-07-25 NOTE — CARE COORDINATION
ACM made second outreach attempt to patient. He answered at the second contact number attempted. ACM introduced self and explained reason for calling. Pt stated he is doing \"fine\" and said he would call the office or ACM if he had any questions or concerns. ACM thanked him for taking the call and the call ended.

## 2023-07-26 NOTE — TELEPHONE ENCOUNTER
Attempted to reach the patient with both numbers sherif. Left voicemail on cell number to call the office back.

## 2023-07-28 NOTE — TELEPHONE ENCOUNTER
Called patient at [phone] (home) no answer-voicemail box not set up unable to leave a message  Called patient at [phone]-RT answer-voicemail box not set up

## 2023-07-29 ASSESSMENT — ENCOUNTER SYMPTOMS
COUGH: 0
SORE THROAT: 0
RHINORRHEA: 0
CONSTIPATION: 0
SHORTNESS OF BREATH: 0
ABDOMINAL PAIN: 0
NAUSEA: 0
VOMITING: 0
DIARRHEA: 0

## 2023-07-31 ENCOUNTER — OFFICE VISIT (OUTPATIENT)
Dept: ORTHOPEDIC SURGERY | Age: 73
End: 2023-07-31
Payer: MEDICARE

## 2023-07-31 VITALS — HEIGHT: 70 IN | WEIGHT: 274 LBS | BODY MASS INDEX: 39.22 KG/M2

## 2023-07-31 DIAGNOSIS — M25.511 RIGHT SHOULDER PAIN, UNSPECIFIED CHRONICITY: Primary | ICD-10-CM

## 2023-07-31 PROCEDURE — 20610 DRAIN/INJ JOINT/BURSA W/O US: CPT | Performed by: PHYSICIAN ASSISTANT

## 2023-07-31 PROCEDURE — 3017F COLORECTAL CA SCREEN DOC REV: CPT | Performed by: PHYSICIAN ASSISTANT

## 2023-07-31 PROCEDURE — 1036F TOBACCO NON-USER: CPT | Performed by: PHYSICIAN ASSISTANT

## 2023-07-31 PROCEDURE — G8427 DOCREV CUR MEDS BY ELIG CLIN: HCPCS | Performed by: PHYSICIAN ASSISTANT

## 2023-07-31 PROCEDURE — 1123F ACP DISCUSS/DSCN MKR DOCD: CPT | Performed by: PHYSICIAN ASSISTANT

## 2023-07-31 PROCEDURE — 99214 OFFICE O/P EST MOD 30 MIN: CPT | Performed by: PHYSICIAN ASSISTANT

## 2023-07-31 PROCEDURE — G8417 CALC BMI ABV UP PARAM F/U: HCPCS | Performed by: PHYSICIAN ASSISTANT

## 2023-07-31 RX ORDER — METHYLPREDNISOLONE ACETATE 40 MG/ML
40 INJECTION, SUSPENSION INTRA-ARTICULAR; INTRALESIONAL; INTRAMUSCULAR; SOFT TISSUE ONCE
Status: COMPLETED | OUTPATIENT
Start: 2023-07-31 | End: 2023-07-31

## 2023-07-31 RX ORDER — LIDOCAINE HYDROCHLORIDE 10 MG/ML
20 INJECTION, SOLUTION INFILTRATION; PERINEURAL ONCE
Status: COMPLETED | OUTPATIENT
Start: 2023-07-31 | End: 2023-07-31

## 2023-07-31 RX ORDER — ROPIVACAINE HYDROCHLORIDE 5 MG/ML
4 INJECTION, SOLUTION EPIDURAL; INFILTRATION; PERINEURAL ONCE
Status: COMPLETED | OUTPATIENT
Start: 2023-07-31 | End: 2023-07-31

## 2023-07-31 RX ADMIN — ROPIVACAINE HYDROCHLORIDE 4 ML: 5 INJECTION, SOLUTION EPIDURAL; INFILTRATION; PERINEURAL at 10:39

## 2023-07-31 RX ADMIN — METHYLPREDNISOLONE ACETATE 40 MG: 40 INJECTION, SUSPENSION INTRA-ARTICULAR; INTRALESIONAL; INTRAMUSCULAR; SOFT TISSUE at 10:36

## 2023-07-31 RX ADMIN — LIDOCAINE HYDROCHLORIDE 20 ML: 10 INJECTION, SOLUTION INFILTRATION; PERINEURAL at 10:35

## 2023-07-31 NOTE — PROGRESS NOTES
Date:  2023    Name:  Daryle Duverney  Address:  [address] [place] [address]    :  1950      Age:   68 y.o.    SSN:  [id number]      Medical Record Number:  [id number]    Reason for Visit:    Chief Complaint    Shoulder Pain (Right shoulder)      DOS:2023     HPI: Urbano Mabry is a 68 y.o. male here today for evaluation of right shoulder pain. Patient states that he hurt his shoulder a few weeks ago while lifting up a lawnmower onto his truck. He denies hearing or feeling any popping. He immediately had pain following the incident and was seen by his PCP who referred him here. His PCP prescribed him tramadol and a muscle relaxant that he has been taking but is not sure if it has been helping. He has not tried anything else to try to alleviate the pain at this point. He rates his pain as a 5 out of 10. It does not radiate. He denies numbness or tingling or pain into his hand and fingers. He states that the injury has prevented him from doing ADLs especially when lifting heavier objects. He has full range of motion but has pain. Patient takes warfarin and is a diabetic. Patient is not sure if he has had any prior injuries to the shoulder, however he did have an MRI of the shoulder in 2017. Patient is retired, he used to work in construction. He and his wife on almost 2 acres of land which he takes care of. Pain Assessment  Location of Pain: Shoulder  Location Modifiers: Right  Severity of Pain: 5  Quality of Pain: Aching, Throbbing  Duration of Pain: Persistent  Frequency of Pain: Constant  ROS: All systems reviewed on patient intake form. Pertinent items are noted in HPI.         Past Medical History:   Diagnosis Date    Acid reflux     Yan's esophagus     Coronary artery disease of native heart with stable angina pectoris ([address] ) 2019    Diabetes mellitus ([facility])     Diabetic autonomic neuropathy associated with type 2 diabetes mellitus ([facility]) 3/3/2020

## 2023-08-01 ENCOUNTER — HOSPITAL ENCOUNTER (OUTPATIENT)
Dept: PHYSICAL THERAPY | Age: 73
Setting detail: THERAPIES SERIES
Discharge: HOME | End: 2023-08-01
Payer: MEDICARE

## 2023-08-01 PROCEDURE — 97016 VASOPNEUMATIC DEVICE THERAPY: CPT | Performed by: SPECIALIST

## 2023-08-01 PROCEDURE — 97110 THERAPEUTIC EXERCISES: CPT | Performed by: SPECIALIST

## 2023-08-01 PROCEDURE — 97140 MANUAL THERAPY 1/> REGIONS: CPT | Performed by: SPECIALIST

## 2023-08-01 PROCEDURE — 97161 PT EVAL LOW COMPLEX 20 MIN: CPT | Performed by: SPECIALIST

## 2023-08-01 NOTE — FLOWSHEET NOTE
[facility], [place] [address] [facility] [address]  Phone: [phone]   Fax:     [phone]      Physical Therapy Treatment Note/ Progress Report:     Date:  2023    Patient Name:  Yung Rutledge    :  1950  MRN: [id number]  Restrictions/Precautions:    Medical/Treatment Diagnosis Information:   P43.907 (ICD-10-CM) - Right shoulder pain, unspecified chronicity     Insurance/Certification information:    [facility]  Physician Information:   Arno Score, PA  Has the plan of care been signed (Y/N):        []  Yes  [x]  No     Date of Patient follow up with Physician: NS    Is this a Progress Report:     []  Yes  [x]  No     If Yes:  Date Range for reporting period:  Beginnin23 ------------ Endin23    Progress report will be due (10 Rx or 30 days whichever is less): 68     Recertification will be due (POC Duration  / 90 days whichever is less): 23      Visit # Insurance Allowable Auth Required   In Person 1 BMN []  Yes     []  No    Tele Health 0  []  Yes     []  No    Total 1       FOTO Score: Quick Dash 7%     Date assessed:  23      Latex Allergy:  [x]NO      []YES  Preferred Language for Healthcare:   [x]English       []other:    Pain level:  5/10     SUBJECTIVE:  See eval    OBJECTIVE: See eval  Observation:   Test measurements:      RESTRICTIONS/PRECAUTIONS: none    Exercises/Interventions:   Therapeutic Ex (43725) Sets/sec Reps Notes/CUES HEP   pulleys  NV     PS  10x  x   SBS  10x  x   Wall walk  5x  x   Cane ER  10x  x          Supine cane flex  10x  x                                      Manual Intervention (47840)       JM/ ROM  8 min                                        NMR re-education (45127)   CUES NEEDED    Wall push up  10x  x          SL ER/ abd  10x  x                                             Therapeutic Activity (21600)

## 2023-08-01 NOTE — PLAN OF CARE
will demonstrate an increase in Strength to 5/5 to allow for proper functional mobility as indicated by patients Functional Deficits. [x] Progressing: [] Met: [] Not Met: [] Adjusted     4. Patient will return to [facility] for  functional activities without increased symptoms or restriction. [x] Progressing: [] Met: [] Not Met: [] Adjusted     5.  Reach/ lift with min to no limitations (patient specific functional goal)    [x] Progressing: [] Met: [] Not Met: [] Adjusted      Electronically signed by:  Angel Smith PT

## 2023-08-03 ENCOUNTER — HOSPITAL ENCOUNTER (OUTPATIENT)
Dept: PHYSICAL THERAPY | Age: 73
Setting detail: THERAPIES SERIES
Discharge: HOME | End: 2023-08-03
Payer: MEDICARE

## 2023-08-03 PROCEDURE — 97112 NEUROMUSCULAR REEDUCATION: CPT | Performed by: SPECIALIST/TECHNOLOGIST

## 2023-08-03 PROCEDURE — 97110 THERAPEUTIC EXERCISES: CPT | Performed by: SPECIALIST/TECHNOLOGIST

## 2023-08-03 PROCEDURE — 97140 MANUAL THERAPY 1/> REGIONS: CPT | Performed by: SPECIALIST/TECHNOLOGIST

## 2023-08-03 PROCEDURE — 97016 VASOPNEUMATIC DEVICE THERAPY: CPT | Performed by: SPECIALIST/TECHNOLOGIST

## 2023-08-03 NOTE — FLOWSHEET NOTE
Deficits. [x] Progressing: [] Met: [] Not Met: [] Adjusted     4. Patient will return to [facility] for  functional activities without increased symptoms or restriction. [x] Progressing: [] Met: [] Not Met: [] Adjusted     5. Reach/ lift with min to no limitations (patient specific functional goal)    [x] Progressing: [] Met: [] Not Met: [] Adjusted          Overall Progression Towards Functional goals/ Treatment Progress Update:  [] Patient is progressing as expected towards functional goals listed. [] Progression is slowed due to complexities/Impairments listed. [] Progression has been slowed due to co-morbidities. [x] Plan just implemented, too soon to assess goals progression <30days   [] Goals require adjustment due to lack of progress  [] Patient is not progressing as expected and requires additional follow up with physician  [] Other    Prognosis for POC: [x] Good [] Fair  [] Poor      Patient requires continued skilled intervention: [x] Yes  [] No    Treatment/Activity Tolerance:  [x] Patient able to complete treatment  [] Patient limited by fatigue  [] Patient limited by pain    [] Patient limited by other medical complications  [] Other:       PLAN:  [x] Continue per plan of care [] Alter current plan (see comments above)  [] Plan of care initiated [] Hold pending MD visit [] Discharge    Electronically signed by:  Teresa Calixto PTA, MHI, ATC    Note: If patient does not return for scheduled/ recommended follow up visits, this note will serve as a discharge from care along with most recent update on progress.

## 2023-08-07 ENCOUNTER — OFFICE VISIT (OUTPATIENT)
Dept: ORTHOPEDIC SURGERY | Age: 73
End: 2023-08-07
Payer: MEDICARE

## 2023-08-07 VITALS — WEIGHT: 274 LBS | BODY MASS INDEX: 39.22 KG/M2 | HEIGHT: 70 IN

## 2023-08-07 DIAGNOSIS — M17.12 OSTEOARTHRITIS OF LEFT KNEE, UNSPECIFIED OSTEOARTHRITIS TYPE: ICD-10-CM

## 2023-08-07 DIAGNOSIS — S83.242D ACUTE MEDIAL MENISCUS TEAR, LEFT, SUBSEQUENT ENCOUNTER: Primary | ICD-10-CM

## 2023-08-07 PROCEDURE — 3017F COLORECTAL CA SCREEN DOC REV: CPT | Performed by: STUDENT IN AN ORGANIZED HEALTH CARE EDUCATION/TRAINING PROGRAM

## 2023-08-07 PROCEDURE — 99213 OFFICE O/P EST LOW 20 MIN: CPT | Performed by: STUDENT IN AN ORGANIZED HEALTH CARE EDUCATION/TRAINING PROGRAM

## 2023-08-07 PROCEDURE — 1036F TOBACCO NON-USER: CPT | Performed by: STUDENT IN AN ORGANIZED HEALTH CARE EDUCATION/TRAINING PROGRAM

## 2023-08-07 PROCEDURE — G8427 DOCREV CUR MEDS BY ELIG CLIN: HCPCS | Performed by: STUDENT IN AN ORGANIZED HEALTH CARE EDUCATION/TRAINING PROGRAM

## 2023-08-07 PROCEDURE — G8417 CALC BMI ABV UP PARAM F/U: HCPCS | Performed by: STUDENT IN AN ORGANIZED HEALTH CARE EDUCATION/TRAINING PROGRAM

## 2023-08-07 PROCEDURE — 1123F ACP DISCUSS/DSCN MKR DOCD: CPT | Performed by: STUDENT IN AN ORGANIZED HEALTH CARE EDUCATION/TRAINING PROGRAM

## 2023-08-07 NOTE — PROGRESS NOTES
Chief Complaint  Knee Pain (CK Lt knee)        History of Present Illness: The patient is here for repeat evaluation of his left knee. He has been working and getting his A1c down for surgery. He has had a couple falls secondary to his knee pain. BMI today of 39. He reports that his A1c has lowered into the sevens. He got his lab results from his PCP. We will work on getting this today. Prior HPI 3/24/2023:  The patient is here for repeat evaluation of his left knee. Unfortunately the patient continues to have worsening pain to his left knee that is affecting his day-to-day life. He had a severe reaction to the Euflexxa injection and so has failed those treatment options. The patient does have a history of atrial fibrillation and was recently placed on Coumadin for this. The patient also is a diabetic, his last A1c was 11.4 but he did have a history of an A1c in the sixes to mid to upper sevens and low eights before that last results in February. Prior HPI 1/19/2023:  Tiffani Buenrostro is a pleasant 68 y.o. male here today for new patient evaluation regarding his left knee. The patient was seeing my partner Dr. Radha Freed who obtained an MRI of his left knee which demonstrated a complex tear of the medial meniscus and he was referred to my office. The patient reports that he had a fall back in October directly onto his knee and his knee started to bother him. A steroid injection was performed by Dr. Radha Freed back in October but the patient did not demonstrate much relief. He denies any locking or instability but he feels like the knee wants to give out on him occasionally. Medical History:  Patient's medications, allergies, past medical, surgical, social and family histories were reviewed and updated as appropriate. Pertinent items are noted in HPI  Review of systems reviewed from Patient History Form dated on 8/7/23 and available in the patient's chart under the Media tab.        Vital

## 2023-08-08 ENCOUNTER — HOSPITAL ENCOUNTER (OUTPATIENT)
Dept: PHYSICAL THERAPY | Age: 73
Setting detail: THERAPIES SERIES
Discharge: HOME | End: 2023-08-08
Payer: MEDICARE

## 2023-08-08 PROCEDURE — 97016 VASOPNEUMATIC DEVICE THERAPY: CPT | Performed by: SPECIALIST

## 2023-08-08 PROCEDURE — 97140 MANUAL THERAPY 1/> REGIONS: CPT | Performed by: SPECIALIST

## 2023-08-08 PROCEDURE — 97112 NEUROMUSCULAR REEDUCATION: CPT | Performed by: SPECIALIST

## 2023-08-08 PROCEDURE — 97110 THERAPEUTIC EXERCISES: CPT | Performed by: SPECIALIST

## 2023-08-08 NOTE — FLOWSHEET NOTE
[address] [facility], [facility] [address]  Phone: [phone]   Fax:     [phone]      Physical Therapy Treatment Note/ Progress Report:     Date:  2023    Patient Name:  Carli Segura    :  1950  MRN: [id number]  Restrictions/Precautions:    Medical/Treatment Diagnosis Information:   Y01.822 (ICD-10-CM) - Right shoulder pain, unspecified chronicity     Insurance/Certification information:    [facility]  Physician Information:   JENNIFER Sim  Has the plan of care been signed (Y/N):        []  Yes  [x]  No     Date of Patient follow up with Physician: NS    Is this a Progress Report:     []  Yes  [x]  No     If Yes:  Date Range for reporting period:  Beginnin23 ------------ Endin23    Progress report will be due (10 Rx or 30 days whichever is less):      Recertification will be due (POC Duration  / 90 days whichever is less): 23      Visit # Insurance Allowable Auth Required   In Person 3 BMN []  Yes     []  No    Tele Health 0  []  Yes     []  No    Total 3       FOTO Score: Quick Dash 7%     Date assessed:  23      Latex Allergy:  [x]NO      []YES  Preferred Language for Healthcare:   [x]English       []other:    Pain level:  5/10     SUBJECTIVE:  Pt presents today with bruise right biceps, reports he does not recall injury or popping.       OBJECTIVE: See eval  Observation:   Test measurements:      RESTRICTIONS/PRECAUTIONS: none    Exercises/Interventions:   Therapeutic Ex (98749) Sets/sec Reps Notes/CUES HEP   pulleys  5'     PS  10x  x    x   Wall walk 10\" 5x  x   Cane ER  10x  x          Supine cane flex  10x  x   Tband rows             ext  X20  x20 Green  Green                                 Manual Intervention (06330)       JM/ ROM  8 min Pt struggled to relax                                       NMR re-education (65345)   CUES NEEDED

## 2023-08-10 ENCOUNTER — HOSPITAL ENCOUNTER (OUTPATIENT)
Dept: PHYSICAL THERAPY | Age: 73
Setting detail: THERAPIES SERIES
Discharge: HOME | End: 2023-08-10
Payer: MEDICARE

## 2023-08-10 PROCEDURE — 97110 THERAPEUTIC EXERCISES: CPT | Performed by: SPECIALIST

## 2023-08-10 PROCEDURE — 97016 VASOPNEUMATIC DEVICE THERAPY: CPT | Performed by: SPECIALIST

## 2023-08-10 PROCEDURE — 97140 MANUAL THERAPY 1/> REGIONS: CPT | Performed by: SPECIALIST

## 2023-08-10 PROCEDURE — 97112 NEUROMUSCULAR REEDUCATION: CPT | Performed by: SPECIALIST

## 2023-08-10 NOTE — FLOWSHEET NOTE
[] Met: [] Not Met: [] Adjusted     3. Patient will demonstrate an increase in Strength to 5/5 to allow for proper functional mobility as indicated by patients Functional Deficits. [x] Progressing: [] Met: [] Not Met: [] Adjusted     4. Patient will return to [facility] for  functional activities without increased symptoms or restriction. [x] Progressing: [] Met: [] Not Met: [] Adjusted     5. Reach/ lift with min to no limitations (patient specific functional goal)    [x] Progressing: [] Met: [] Not Met: [] Adjusted          Overall Progression Towards Functional goals/ Treatment Progress Update:  [] Patient is progressing as expected towards functional goals listed. [x] Progression is slowed due to complexities/Impairments listed. [] Progression has been slowed due to co-morbidities. [] Plan just implemented, too soon to assess goals progression <30days   [] Goals require adjustment due to lack of progress  [] Patient is not progressing as expected and requires additional follow up with physician  [] Other    Prognosis for POC: [x] Good [] Fair  [] Poor      Patient requires continued skilled intervention: [x] Yes  [] No    Treatment/Activity Tolerance:  [x] Patient able to complete treatment  [] Patient limited by fatigue  [] Patient limited by pain    [] Patient limited by other medical complications  [] Other:       PLAN:  [x] Continue per plan of care [] Alter current plan (see comments above)  [] Plan of care initiated [] Hold pending MD visit [] Discharge    Electronically signed by:  Meg Sexton PT,     Note: If patient does not return for scheduled/ recommended follow up visits, this note will serve as a discharge from care along with most recent update on progress.

## 2023-08-12 DIAGNOSIS — E78.2 MIXED HYPERLIPIDEMIA: ICD-10-CM

## 2023-08-12 DIAGNOSIS — G25.81 RESTLESS LEG: ICD-10-CM

## 2023-08-12 NOTE — TELEPHONE ENCOUNTER
Refill Request     CONFIRM preferred pharmacy with the patient. If Mail Order Rx - Pend for 90 day refill. Last Seen: Last Seen Department: 7/24/2023  Last Seen by PCP: Visit date not found    Last Written: 5/8/2023    If no future appointment scheduled:  Review the last OV with PCP and review information for follow-up visit,  Route STAFF MESSAGE with patient name to the [facility] for scheduling with the following information:            -  Timing of next visit           -  Visit type ie Physical, OV, etc           -  Diagnoses/Reason ie. COPD, HTN - Do not use MEDICATION, Follow-up or CHECK UP - Give reason for visit      Next Appointment:   Future Appointments   Date Time Provider [address]   8/15/2023 10:00 AM Cornelio Babinski, PTA [facility] PT [place] HOD   8/15/2023  1:15 PM [address] [place] [address] HOD   8/17/2023  1:00 PM David , PT [facility] [place] HOD   8/21/2023 10:30 AM JENNIFER Casillas  [place] - DYD   8/22/2023 10:00 AM Cornelio Babinski, PTA [facility] [place] HOD   8/24/2023  1:00 PM David , PT [facility] [place] HOD   8/29/2023  1:00 PM David , PT [facility] [place] HOD   8/31/2023  1:20 PM JENNIFER Ron MMA   8/31/2023  2:00 PM Cornelio Babinski, PTA [facility] PT [place] HOD   9/6/2023  3:00 PM Peg Goff MD AND PULM [facility]       Message sent to [employer] to schedule appt with patient?   NO      Requested Prescriptions     Pending Prescriptions Disp Refills    pramipexole (MIRAPEX) 0.5 MG tablet [Pharmacy Med Name: Pramipexole Dihydrochloride 0.5 MG Oral Tablet] 90 tablet 0     Sig: Take 1 tablet by mouth nightly

## 2023-08-13 RX ORDER — PRAMIPEXOLE DIHYDROCHLORIDE 0.5 MG/1
0.5 TABLET ORAL NIGHTLY
Qty: 90 TABLET | Refills: 0 | Status: SHIPPED | OUTPATIENT
Start: 2023-08-13 | End: 2023-11-11

## 2023-08-14 RX ORDER — ATORVASTATIN CALCIUM 40 MG/1
40 TABLET, FILM COATED ORAL NIGHTLY
Qty: 90 TABLET | Refills: 3 | Status: SHIPPED | OUTPATIENT
Start: 2023-08-14

## 2023-08-14 NOTE — TELEPHONE ENCOUNTER
LOV 36/2023 with [facility]  No follow up    Plan:   1. Echo showed normal RV, LV, diastolic and valvular function. BNP low. Discussed conservative management of edema including low sodium diet, compression stockings, leg elevation, monitoring fluid intake (especially since he drinks a significant amount of fluids and eats a diet high in salt). He will try these interventions first and follow up with PCP. Consider low lose diuretic if edema persists. Call if worsening, weeping or signs of infection. 2. He stopped eliquis due to cost; PA for xarelto, may need to consider coumadin  3. Continue aspirin, statin, lisinopril, diltiazem  4. Aggressive diabetes management  5. Continue to follow with PCP  6.  Follow up as planned with EP 4/2023    Brice 2/23/2023    Pended for Sign off

## 2023-08-15 ENCOUNTER — ANTI-COAG VISIT (OUTPATIENT)
Dept: PHARMACY | Age: 73
End: 2023-08-15
Payer: MEDICARE

## 2023-08-15 ENCOUNTER — HOSPITAL ENCOUNTER (OUTPATIENT)
Dept: PHYSICAL THERAPY | Age: 73
Setting detail: THERAPIES SERIES
Discharge: HOME | End: 2023-08-15
Payer: MEDICARE

## 2023-08-15 DIAGNOSIS — I48.91 NEW ONSET ATRIAL FIBRILLATION (HCC): Primary | ICD-10-CM

## 2023-08-15 LAB — INR BLD: 3.8

## 2023-08-15 PROCEDURE — 97140 MANUAL THERAPY 1/> REGIONS: CPT | Performed by: SPECIALIST/TECHNOLOGIST

## 2023-08-15 PROCEDURE — 97016 VASOPNEUMATIC DEVICE THERAPY: CPT | Performed by: SPECIALIST/TECHNOLOGIST

## 2023-08-15 PROCEDURE — 97110 THERAPEUTIC EXERCISES: CPT | Performed by: SPECIALIST/TECHNOLOGIST

## 2023-08-15 PROCEDURE — 85610 PROTHROMBIN TIME: CPT | Performed by: HOME HEALTH

## 2023-08-15 PROCEDURE — 99211 OFF/OP EST MAY X REQ PHY/QHP: CPT | Performed by: HOME HEALTH

## 2023-08-15 PROCEDURE — 97112 NEUROMUSCULAR REEDUCATION: CPT | Performed by: SPECIALIST/TECHNOLOGIST

## 2023-08-15 NOTE — FLOWSHEET NOTE
706 [facility], [address] [place] [address] Drive  Phone: [phone]   Fax:     [phone]      Physical Therapy Treatment Note/ Progress Report:     Date:  8/15/2023    Patient Name:  Anastasiia Angulo    :  1950  MRN: [id number]  Restrictions/Precautions:    Medical/Treatment Diagnosis Information:   I54.123 (ICD-10-CM) - Right shoulder pain, unspecified chronicity     Insurance/Certification information:    34 [facility]  Physician Information:   JENNIFER Skelton  Has the plan of care been signed (Y/N):        []  Yes  [x]  No     Date of Patient follow up with Physician: NS    Is this a Progress Report:     []  Yes  [x]  No     If Yes:  Date Range for reporting period:  Beginnin23 ------------ Endin23    Progress report will be due (10 Rx or 30 days whichever is less):      Recertification will be due (POC Duration  / 90 days whichever is less): 23      Visit # Insurance Allowable Auth Required   In Person 5 BMN []  Yes     []  No    Tele Health 0  []  Yes     []  No    Total 5       FOTO Score: Quick Dash 7%     Date assessed:  23      Latex Allergy:  [x]NO      []YES  Preferred Language for Healthcare:   [x]English       []other:    Pain level:  5/10     SUBJECTIVE:  Pt notes that his arm is feeling ok. Notes the bruising is breaking up some.       OBJECTIVE: See eval  Observation:   Test measurements:      RESTRICTIONS/PRECAUTIONS: none    Exercises/Interventions:   Therapeutic Ex (98374) Sets/sec Reps Notes/CUES HEP   Pulleys  UBE F/B  3 min  3 min     PS  10x  x    x   Wall walk 10\" 5x  x   Cane ER  10x  x           x   Tband rows             ext  X20  x20 Green  Green                                 Manual Intervention (47789)       JM/ ROM  8 min Pt struggled to relax                                       NMR re-education (99287)   CUES NEEDED    Wall push up

## 2023-08-15 NOTE — PROGRESS NOTES
Mr Eddie Palma is here for management of anticoagulation for AFib. PMH also significant for CAD, DM2, HLD, HTN   He presents today w/out complaint. Pt verifies dosing regimen as listed above. Pt denies sx/s bleeding/bruising/swelling/SOB. No missed doses  No changes in RX/OTCs/Herbal medications. No dietary concerns  No ETOH and tobacco use. No changes per patient. INR 3.8 is above therapeutic range of 2-3  Recommend to hold todays dose then continue dose of 7.5 mg daily except 10 mg Q Tue/Sat. It appears pt has a  pattern of occasionally popping up in INR but then returns to range again. Patient has 5 mg tablets. Will continue to monitor and check INR in 2 weeks. Dosing reminder card given with phone number, appointment date and time. Return to clinic: 8/29/23 @ [address] [place] [address] PharmD candidate.      For Pharmacy Admin Tracking Only    Intervention Detail:   Total # of Interventions Recommended: 0  Total # of Interventions Accepted: 0  Time Spent (min): 15 Admission Reconciliation is Completed  Discharge Reconciliation is Completed

## 2023-08-17 ENCOUNTER — HOSPITAL ENCOUNTER (OUTPATIENT)
Dept: PHYSICAL THERAPY | Age: 73
Setting detail: THERAPIES SERIES
Discharge: HOME | End: 2023-08-17
Payer: MEDICARE

## 2023-08-17 NOTE — FLOWSHEET NOTE
456 [facility] and [address], [address] [place] [address], [address] [place] [address] Drive  Phone: [phone]   Fax:     [phone]    Physical Therapy  Cancellation/No-show Note  Patient Name:  Mc Murphy  :  1950   Date:  2023    Cancelled visits to date: 0  No-shows to date: 0    For today's appointment patient:  [x]  Cancelled  []  Rescheduled appointment  []  No-show     Reason given by patient:  []  Patient ill  []  Conflicting appointment  []  No transportation    []  Conflict with work  []  No reason given  [x]  Other:     Comments:  Self D/C says he doesn't need PT anymore    Phone call information:   []  Phone call made today to patient at am/pm at the number provided:      []  Patient answered, conversation as follows:    []  Patient did not answer, message left as follows:  [x]  Phone call not needed - pt contacted us to cancel and provided reason for cancellation.      Electronically signed by:  Oz Aguirre, PT, PT

## 2023-08-21 ENCOUNTER — OFFICE VISIT (OUTPATIENT)
Dept: FAMILY MEDICINE CLINIC | Age: 73
End: 2023-08-21
Payer: MEDICARE

## 2023-08-21 VITALS
SYSTOLIC BLOOD PRESSURE: 124 MMHG | DIASTOLIC BLOOD PRESSURE: 60 MMHG | HEIGHT: 70 IN | HEART RATE: 61 BPM | OXYGEN SATURATION: 98 % | BODY MASS INDEX: 37.68 KG/M2 | TEMPERATURE: 98 F | WEIGHT: 263.2 LBS

## 2023-08-21 DIAGNOSIS — E11.65 TYPE 2 DIABETES MELLITUS WITH HYPERGLYCEMIA, WITH LONG-TERM CURRENT USE OF INSULIN (HCC): ICD-10-CM

## 2023-08-21 DIAGNOSIS — Z79.4 TYPE 2 DIABETES MELLITUS WITH MICROALBUMINURIA, WITH LONG-TERM CURRENT USE OF INSULIN (HCC): Primary | ICD-10-CM

## 2023-08-21 DIAGNOSIS — E11.29 TYPE 2 DIABETES MELLITUS WITH MICROALBUMINURIA, WITH LONG-TERM CURRENT USE OF INSULIN (HCC): Primary | ICD-10-CM

## 2023-08-21 DIAGNOSIS — Z79.4 TYPE 2 DIABETES MELLITUS WITH HYPERGLYCEMIA, WITH LONG-TERM CURRENT USE OF INSULIN (HCC): ICD-10-CM

## 2023-08-21 DIAGNOSIS — R80.9 TYPE 2 DIABETES MELLITUS WITH MICROALBUMINURIA, WITH LONG-TERM CURRENT USE OF INSULIN (HCC): Primary | ICD-10-CM

## 2023-08-21 PROCEDURE — 3078F DIAST BP <80 MM HG: CPT | Performed by: PHYSICIAN ASSISTANT

## 2023-08-21 PROCEDURE — 3052F HG A1C>EQUAL 8.0%<EQUAL 9.0%: CPT | Performed by: PHYSICIAN ASSISTANT

## 2023-08-21 PROCEDURE — 2022F DILAT RTA XM EVC RTNOPTHY: CPT | Performed by: PHYSICIAN ASSISTANT

## 2023-08-21 PROCEDURE — 3074F SYST BP LT 130 MM HG: CPT | Performed by: PHYSICIAN ASSISTANT

## 2023-08-21 PROCEDURE — 1123F ACP DISCUSS/DSCN MKR DOCD: CPT | Performed by: PHYSICIAN ASSISTANT

## 2023-08-21 PROCEDURE — 3017F COLORECTAL CA SCREEN DOC REV: CPT | Performed by: PHYSICIAN ASSISTANT

## 2023-08-21 PROCEDURE — 99213 OFFICE O/P EST LOW 20 MIN: CPT | Performed by: PHYSICIAN ASSISTANT

## 2023-08-21 PROCEDURE — G8427 DOCREV CUR MEDS BY ELIG CLIN: HCPCS | Performed by: PHYSICIAN ASSISTANT

## 2023-08-21 PROCEDURE — 1036F TOBACCO NON-USER: CPT | Performed by: PHYSICIAN ASSISTANT

## 2023-08-21 PROCEDURE — G8417 CALC BMI ABV UP PARAM F/U: HCPCS | Performed by: PHYSICIAN ASSISTANT

## 2023-08-21 NOTE — PROGRESS NOTES
2023  Amarjit Scott (: 1950)  68 y.o.    ASSESSMENT and PLAN:  [facility] was seen today for diabetes. Diagnoses and all orders for this visit:    Type 2 diabetes mellitus with microalbuminuria, with long-term current use of insulin ([address])  Type 2 diabetes mellitus with hyperglycemia, with long-term current use of insulin (HCC)  - POCA1C 7.7  - discussed the importance of compliance again with patient and his wife. Wife is agreeable to taking over injections to ensure compliance. - reviewed CGM report during OV. - reduce lantus to 12 units qam Continue with novolog 10 TID with meals. - will enroll in care coordination to see if this will assist with compliance. Return in about 3 months (around 2023) for Diabetes Mellitus. HPI    CGM data reviewed today   High after lunch and dinner. Per patient- unsure if he is taking his meal time shots. Occasional hypoglycemia, today in office   Currently taking 15 units lantus daily  If low, usually overnight. Unable to go for TKA until a1c <7.0. Diabetic foot exam: due 10/2023  Eye exam: due 10/2023  Microalbumin: due 10/2023        Review of Systems   Constitutional:  Negative for activity change, chills and fever. HENT:  Negative for congestion, ear pain, rhinorrhea and sore throat. Eyes:  Negative for visual disturbance. Respiratory:  Negative for cough and shortness of breath. Cardiovascular:  Negative for chest pain and palpitations. Gastrointestinal:  Negative for abdominal pain, constipation, diarrhea, nausea and vomiting. Genitourinary:  Negative for difficulty urinating and dysuria. Musculoskeletal:  Negative for arthralgias and myalgias. Skin:  Negative for rash. Neurological:  Negative for dizziness, weakness and numbness. Psychiatric/Behavioral:  Positive for confusion and decreased concentration. Negative for sleep disturbance.       Allergies, past medical history, family history, and social history

## 2023-08-21 NOTE — PATIENT INSTRUCTIONS
Reduce night time latnus to 12 units. If overnight lows persist, please call the office. 10 units novolog with meals.

## 2023-08-22 ENCOUNTER — APPOINTMENT (OUTPATIENT)
Dept: PHYSICAL THERAPY | Age: 73
End: 2023-08-22
Payer: MEDICARE

## 2023-08-24 ENCOUNTER — APPOINTMENT (OUTPATIENT)
Dept: PHYSICAL THERAPY | Age: 73
End: 2023-08-24
Payer: MEDICARE

## 2023-08-28 ASSESSMENT — ENCOUNTER SYMPTOMS
RHINORRHEA: 0
SHORTNESS OF BREATH: 0
COUGH: 0
NAUSEA: 0
SORE THROAT: 0
CONSTIPATION: 0
DIARRHEA: 0
ABDOMINAL PAIN: 0
VOMITING: 0

## 2023-08-29 ENCOUNTER — CARE COORDINATION (OUTPATIENT)
Dept: CARE COORDINATION | Age: 73
End: 2023-08-29

## 2023-08-29 ENCOUNTER — ENROLLMENT (OUTPATIENT)
Dept: CARE COORDINATION | Age: 73
End: 2023-08-29

## 2023-08-29 ENCOUNTER — APPOINTMENT (OUTPATIENT)
Dept: PHYSICAL THERAPY | Age: 73
End: 2023-08-29
Payer: MEDICARE

## 2023-08-29 ENCOUNTER — ANTI-COAG VISIT (OUTPATIENT)
Dept: PHARMACY | Age: 73
End: 2023-08-29
Payer: MEDICARE

## 2023-08-29 DIAGNOSIS — I48.91 NEW ONSET ATRIAL FIBRILLATION (HCC): Primary | ICD-10-CM

## 2023-08-29 LAB — INTERNATIONAL NORMALIZATION RATIO, POC: 2.5

## 2023-08-29 PROCEDURE — 85610 PROTHROMBIN TIME: CPT | Performed by: PHARMACIST

## 2023-08-29 PROCEDURE — 99211 OFF/OP EST MAY X REQ PHY/QHP: CPT | Performed by: PHARMACIST

## 2023-08-29 NOTE — PROGRESS NOTES
Mr Anna Nash is here for management of anticoagulation for AFib. PMH also significant for CAD, DM2, HLD, HTN   He presents today w/out complaint. Pt verifies dosing regimen as listed above. Pt denies sx/s bleeding/bruising/swelling/SOB. No missed doses  No changes in RX/OTCs/Herbal medications. No dietary concerns  No ETOH and tobacco use. No changes per patient. INR 2.5 is within therapeutic range of 2-3  Recommend to continue dose of 7.5 mg daily except 10 mg Q Tue/Sat. Patient has 5 mg tablets. Will continue to monitor and check INR in 4 weeks. Dosing reminder card given with phone number, appointment date and time.    Return to clinic: 9/26/23 @ [facility], PharmD 11:08 AM EDT 8/29/23

## 2023-08-29 NOTE — CARE COORDINATION
Ambulatory Care Coordination Note  2023    Patient Current Location:  Home: [place] [address]     ACM contacted the patient by telephone. Verified name and  with patient as identifiers. Provided introduction to self, and explanation of the ACM role. Challenges to be reviewed by the provider   Additional needs identified to be addressed with provider: No  none               Method of communication with provider: chart routing. ACM: Neida Napier RN     ACM completed outreach to patient and wife who agreed to enroll into weekly calls for DM management. Patient wife Tea Rider who is verified on HIPAA said that patient is administering injections but Tea Rider is watching patient. Tea Rider and patient both confirmed of the adjustments to Lantus that was made during last appt. Plan:    F/U call 1 week  Med/ Rec  Goals/ Education  Falls Risk      Offered patient enrollment in the Remote Patient Monitoring (RPM) program for in-home monitoring:  Did not discuss .     Lab Results       None                 Goals Addressed    None         Future Appointments   Date Time Provider [address] [place] [address]   2023  1:20 PM JENNIFER Mandujano MMA   2023  3:00 PM Emerson Matos MD AND PULM [facility]   2023 11:30 AM [facility] [address] HOD   2023 10:30 AM JENNIFER Reynoso [place] - DYD    and   General Assessment

## 2023-08-31 ENCOUNTER — APPOINTMENT (OUTPATIENT)
Dept: PHYSICAL THERAPY | Age: 73
End: 2023-08-31
Payer: MEDICARE

## 2023-09-05 ENCOUNTER — HOSPITAL ENCOUNTER (EMERGENCY)
Age: 73
Discharge: HOME | End: 2023-09-05
Payer: MEDICARE

## 2023-09-05 ENCOUNTER — APPOINTMENT (OUTPATIENT)
Dept: GENERAL RADIOLOGY | Age: 73
End: 2023-09-05
Payer: MEDICARE

## 2023-09-05 ENCOUNTER — CARE COORDINATION (OUTPATIENT)
Dept: CARE COORDINATION | Age: 73
End: 2023-09-05

## 2023-09-05 VITALS
SYSTOLIC BLOOD PRESSURE: 127 MMHG | HEIGHT: 69 IN | TEMPERATURE: 97.9 F | WEIGHT: 270 LBS | OXYGEN SATURATION: 94 % | DIASTOLIC BLOOD PRESSURE: 94 MMHG | RESPIRATION RATE: 16 BRPM | BODY MASS INDEX: 39.99 KG/M2 | HEART RATE: 57 BPM

## 2023-09-05 DIAGNOSIS — R07.9 CHEST PAIN, UNSPECIFIED TYPE: Primary | ICD-10-CM

## 2023-09-05 LAB
ALBUMIN SERPL-MCNC: 4 G/DL (ref 3.4–5)
ALBUMIN/GLOB SERPL: 1.4 {RATIO} (ref 1.1–2.2)
ALP SERPL-CCNC: 88 U/L (ref 40–129)
ALT SERPL-CCNC: 17 U/L (ref 10–40)
ANION GAP SERPL CALCULATED.3IONS-SCNC: 6 MMOL/L (ref 3–16)
AST SERPL-CCNC: 15 U/L (ref 15–37)
BASOPHILS # BLD: 0 K/UL (ref 0–0.2)
BASOPHILS NFR BLD: 0.7 %
BILIRUB SERPL-MCNC: 0.4 MG/DL (ref 0–1)
BUN SERPL-MCNC: 26 MG/DL (ref 7–20)
CALCIUM SERPL-MCNC: 8.9 MG/DL (ref 8.3–10.6)
CHLORIDE SERPL-SCNC: 107 MMOL/L (ref 99–110)
CO2 SERPL-SCNC: 29 MMOL/L (ref 21–32)
CREAT SERPL-MCNC: 0.9 MG/DL (ref 0.8–1.3)
DEPRECATED RDW RBC AUTO: 14.4 % (ref 12.4–15.4)
EKG ATRIAL RATE: 59 BPM
EKG DIAGNOSIS: NORMAL
EKG P AXIS: 44 DEGREES
EKG P-R INTERVAL: 188 MS
EKG Q-T INTERVAL: 410 MS
EKG QRS DURATION: 86 MS
EKG QTC CALCULATION (BAZETT): 405 MS
EKG R AXIS: 49 DEGREES
EKG T AXIS: 31 DEGREES
EKG VENTRICULAR RATE: 59 BPM
EOSINOPHIL # BLD: 0.3 K/UL (ref 0–0.6)
EOSINOPHIL NFR BLD: 4.5 %
GFR SERPLBLD CREATININE-BSD FMLA CKD-EPI: >60 ML/MIN/{1.73_M2}
GLUCOSE SERPL-MCNC: 167 MG/DL (ref 70–99)
HCT VFR BLD AUTO: 36.7 % (ref 40.5–52.5)
HGB BLD-MCNC: 12.1 G/DL (ref 13.5–17.5)
INR PPP: 2.62 (ref 0.84–1.16)
LYMPHOCYTES # BLD: 1.3 K/UL (ref 1–5.1)
LYMPHOCYTES NFR BLD: 21.4 %
MCH RBC QN AUTO: 29.3 PG (ref 26–34)
MCHC RBC AUTO-ENTMCNC: 32.8 G/DL (ref 31–36)
MCV RBC AUTO: 89.3 FL (ref 80–100)
MONOCYTES # BLD: 0.4 K/UL (ref 0–1.3)
MONOCYTES NFR BLD: 7.1 %
NEUTROPHILS # BLD: 4 K/UL (ref 1.7–7.7)
NEUTROPHILS NFR BLD: 66.3 %
PLATELET # BLD AUTO: 150 K/UL (ref 135–450)
PMV BLD AUTO: 9.3 FL (ref 5–10.5)
POTASSIUM SERPL-SCNC: 4.8 MMOL/L (ref 3.5–5.1)
PROT SERPL-MCNC: 6.8 G/DL (ref 6.4–8.2)
PROTHROMBIN TIME: 27.8 SEC (ref 11.5–14.8)
RBC # BLD AUTO: 4.11 M/UL (ref 4.2–5.9)
SODIUM SERPL-SCNC: 142 MMOL/L (ref 136–145)
TROPONIN, HIGH SENSITIVITY: 13 NG/L (ref 0–22)
TROPONIN, HIGH SENSITIVITY: 13 NG/L (ref 0–22)
WBC # BLD AUTO: 6.1 K/UL (ref 4–11)

## 2023-09-05 PROCEDURE — 85610 PROTHROMBIN TIME: CPT

## 2023-09-05 PROCEDURE — 36415 COLL VENOUS BLD VENIPUNCTURE: CPT

## 2023-09-05 PROCEDURE — 71045 X-RAY EXAM CHEST 1 VIEW: CPT

## 2023-09-05 PROCEDURE — 84484 ASSAY OF TROPONIN QUANT: CPT

## 2023-09-05 PROCEDURE — 93010 ELECTROCARDIOGRAM REPORT: CPT | Performed by: INTERNAL MEDICINE

## 2023-09-05 PROCEDURE — 93005 ELECTROCARDIOGRAM TRACING: CPT | Performed by: STUDENT IN AN ORGANIZED HEALTH CARE EDUCATION/TRAINING PROGRAM

## 2023-09-05 PROCEDURE — 80053 COMPREHEN METABOLIC PANEL: CPT

## 2023-09-05 PROCEDURE — 99285 EMERGENCY DEPT VISIT HI MDM: CPT

## 2023-09-05 PROCEDURE — 85025 COMPLETE CBC W/AUTO DIFF WBC: CPT

## 2023-09-05 ASSESSMENT — PAIN DESCRIPTION - DESCRIPTORS: DESCRIPTORS: STABBING;HEAVINESS

## 2023-09-05 ASSESSMENT — PAIN DESCRIPTION - PAIN TYPE: TYPE: ACUTE PAIN

## 2023-09-05 ASSESSMENT — PAIN - FUNCTIONAL ASSESSMENT
PAIN_FUNCTIONAL_ASSESSMENT: 0-10
PAIN_FUNCTIONAL_ASSESSMENT: NONE - DENIES PAIN

## 2023-09-05 ASSESSMENT — PAIN SCALES - GENERAL: PAINLEVEL_OUTOF10: 5

## 2023-09-05 ASSESSMENT — PAIN DESCRIPTION - ORIENTATION: ORIENTATION: LEFT

## 2023-09-05 ASSESSMENT — HEART SCORE: ECG: 0

## 2023-09-05 ASSESSMENT — PAIN DESCRIPTION - LOCATION: LOCATION: CHEST

## 2023-09-05 NOTE — ED PROVIDER NOTES
Hyperlipidemia     Hypertension     Influenza A 02/05/2020    Pancreatitis 1996    Restless legs     Sleep apnea     uses CPAP    Tremor     of bilateral hands       SURGICAL HISTORY:      Past Surgical History:   Procedure Laterality Date    ABDOMEN SURGERY      CATARACT REMOVAL Bilateral 2013    CHOLECYSTECTOMY      COLONOSCOPY  10/26/2011    ENDOSCOPY, COLON, DIAGNOSTIC      EGD halo    HYDROCELE EXCISION  11/15/2016    PANCREAS SURGERY      cyst opened up and drining into small bowel    TONSILLECTOMY      UPPER GASTROINTESTINAL ENDOSCOPY  10/04/2016    with biopsy    UPPER GASTROINTESTINAL ENDOSCOPY  03/16/2017    Halo ablation    UPPER GASTROINTESTINAL ENDOSCOPY  06/26/2017    Halo ablation    UPPER GASTROINTESTINAL ENDOSCOPY  09/06/2017    bx distal sophagus    UPPER GASTROINTESTINAL ENDOSCOPY  12/22/2017    with HALO  procedure    UPPER GASTROINTESTINAL ENDOSCOPY N/A 12/4/2018    EGD WITH ABLATION AND ANESTHESIA - HALO---SLEEP APNEA---  performed by Vick Pickering MD at [address] [place] [address]  2/19/2019    EGD BIOPSY performed by Vick Pickering MD at [address] [place] [address] 6/11/2019    EGD WITH HALO AND ANESTHESIA performed by Vick Pickering MD at [address] [place] [address] 8/13/2019    ESOPHAGOGASTRODUODENOSCOPY WITH HALO AND ANESTHESIA -SLEEP APNEA- performed by Vick Pickering MD at [address] [place] [address] St:       Discharge Medication List as of 9/5/2023 11:35 AM        CONTINUE these medications which have NOT CHANGED    Details   atorvastatin (LIPITOR) 40 MG tablet TAKE 1 TABLET BY MOUTH AT BEDTIME, Disp-90 tablet, R-3Normal      pramipexole (MIRAPEX) 0.5 MG tablet Take 1 tablet by mouth nightly, Disp-90 tablet, R-0Normal      tiZANidine (ZANAFLEX) 4 MG tablet Take 1 tablet by mouth 3 times daily as needed (pain), Disp-30 tablet, R-0Normal

## 2023-09-05 NOTE — CARE COORDINATION
do shopping: Independent  Ability to manage finances: Independent  Is patient able to live independently?: Yes     Current Housing: Private Residence           Frequent urination at night?: Yes  Do you use rails/bars?: No  Do you have a non-slip tub mat?: No     Are you experiencing loss of meaning?: No  Are you experiencing loss of hope and peace?: No     Suggested Interventions and Community Resources                  Future Appointments   Date Time Provider [address]   9/6/2023  3:00 PM Jose Link MD AND MARY [facility]NOR   9/26/2023 11:30 AM [facility] ANTICOAGULATION CLINIC [address] [facility]   11/22/2023 10:30 AM JENNIFER Wallace - ANNIE     ,   Diabetes Assessment      Meal Planning: Avoidance of concentrated sweets, Calorie counting   How often do you test your blood sugar?: Daily, Meals, Bedtime   Do you have barriers with adherence to non-pharmacologic self-management interventions?  (Nutrition/Exercise/Self-Monitoring): Yes   Have you ever had to go to the ED for symptoms of low blood sugar?: No       No patient-reported symptoms        , and   General Assessment

## 2023-09-06 ENCOUNTER — OFFICE VISIT (OUTPATIENT)
Dept: PULMONOLOGY | Age: 73
End: 2023-09-06
Payer: MEDICARE

## 2023-09-06 VITALS
OXYGEN SATURATION: 95 % | HEIGHT: 69 IN | TEMPERATURE: 97.2 F | RESPIRATION RATE: 20 BRPM | HEART RATE: 62 BPM | BODY MASS INDEX: 39.99 KG/M2 | SYSTOLIC BLOOD PRESSURE: 122 MMHG | WEIGHT: 270 LBS | DIASTOLIC BLOOD PRESSURE: 66 MMHG

## 2023-09-06 DIAGNOSIS — G47.33 OSA (OBSTRUCTIVE SLEEP APNEA): Primary | ICD-10-CM

## 2023-09-06 DIAGNOSIS — K21.9 GASTROESOPHAGEAL REFLUX DISEASE WITHOUT ESOPHAGITIS: ICD-10-CM

## 2023-09-06 DIAGNOSIS — G47.10 HYPERSOMNOLENCE: ICD-10-CM

## 2023-09-06 DIAGNOSIS — E66.2 CLASS 2 OBESITY WITH ALVEOLAR HYPOVENTILATION, SERIOUS COMORBIDITY, AND BODY MASS INDEX (BMI) OF 38.0 TO 38.9 IN ADULT (HCC): ICD-10-CM

## 2023-09-06 DIAGNOSIS — R06.02 SOB (SHORTNESS OF BREATH): ICD-10-CM

## 2023-09-06 PROCEDURE — 3017F COLORECTAL CA SCREEN DOC REV: CPT | Performed by: INTERNAL MEDICINE

## 2023-09-06 PROCEDURE — 1123F ACP DISCUSS/DSCN MKR DOCD: CPT | Performed by: INTERNAL MEDICINE

## 2023-09-06 PROCEDURE — 99214 OFFICE O/P EST MOD 30 MIN: CPT | Performed by: INTERNAL MEDICINE

## 2023-09-06 PROCEDURE — G8417 CALC BMI ABV UP PARAM F/U: HCPCS | Performed by: INTERNAL MEDICINE

## 2023-09-06 PROCEDURE — 3074F SYST BP LT 130 MM HG: CPT | Performed by: INTERNAL MEDICINE

## 2023-09-06 PROCEDURE — 3078F DIAST BP <80 MM HG: CPT | Performed by: INTERNAL MEDICINE

## 2023-09-06 PROCEDURE — 1036F TOBACCO NON-USER: CPT | Performed by: INTERNAL MEDICINE

## 2023-09-06 PROCEDURE — G8427 DOCREV CUR MEDS BY ELIG CLIN: HCPCS | Performed by: INTERNAL MEDICINE

## 2023-09-06 RX ORDER — FAMOTIDINE 20 MG/1
20 TABLET, FILM COATED ORAL DAILY
Qty: 90 TABLET | Refills: 3 | Status: SHIPPED | OUTPATIENT
Start: 2023-09-06

## 2023-09-06 ASSESSMENT — ENCOUNTER SYMPTOMS
ALLERGIC/IMMUNOLOGIC NEGATIVE: 1
EYES NEGATIVE: 1
RESPIRATORY NEGATIVE: 1
GASTROINTESTINAL NEGATIVE: 1

## 2023-09-06 NOTE — PROGRESS NOTES
MA Communication:   The following orders are received by verbal communication from Tiffani Bender MD    Orders include:  ENT & WEIGHT LOSS REFERRALS, 4 MONTH F/U

## 2023-09-06 NOTE — PATIENT INSTRUCTIONS
ASSESSMENT/PLAN:   Diagnosis Orders   1. ESTHER (obstructive sleep apnea)        2. Class 2 obesity with alveolar hypoventilation, serious comorbidity, and body mass index (BMI) of 38.0 to 38.9 in adult ([address])        3. Hypersomnolence        4. SOB (shortness of breath)        5. Gastroesophageal reflux disease without esophagitis            Obstructive sleep apnea  I  RECOMMENDATIONS:         Advised to avoid driving when too sleepy to function safely and given a discussion of the risks of untreated apnea such as accidents, cognitive impairment, mood impairment, high blood pressure, various cardiac diseases and stroke. Weight loss was encouraged. Patient has tried masks and CPAP machines in the past and was not able to tolerate it, had issues with getting sufficient sleep and would end up having the mask ripped off of himself over the night. Patient does not want to do CPAP therapy anymore and was looking for an alternative      Because of the history of atrial fibrillation and hypertension the patient needs to have his sleep apnea treated    Was seen by Dr. Earma Bosworth however DISE to be done  Weight is still high but having issues using the cpap/bipap and not able to sleep well and having issues with shaking      Options at this time are limited  Inspire- however weight is the only issues but we dont have many options  Tracheostomy         Obesity  BMI of 39.87  We will refer to weight loss program for weight loss control  Weight has come down a bit        Shortness of breath secondary to possible pulmonary hypertension versus obesity versus deconditioning  Past echo done 3/2/2023   Conclusions      Summary   Technically difficult examination. Normal left ventriculcar size, wall thickness, and systolic function with an   estimated ejection fraction of 55-60%. No regional wall motion abnormalities are seen.    Definity contrast administered with no evidence of left ventricular mass or   thrombus

## 2023-09-06 NOTE — PROGRESS NOTES
Mc Murphy (: 1950 ) is a 68 y.o. male here for an evaluation of   Chief Complaint   Patient presents with    Follow-Up from Hospital    Sleep Apnea     Discuss inspire         ASSESSMENT/PLAN:   Diagnosis Orders   1. ESTHER (obstructive sleep apnea)        2. Class 2 obesity with alveolar hypoventilation, serious comorbidity, and body mass index (BMI) of 38.0 to 38.9 in adult ([address])        3. Hypersomnolence        4. SOB (shortness of breath)        5. Gastroesophageal reflux disease without esophagitis            Obstructive sleep apnea  I  RECOMMENDATIONS:         Advised to avoid driving when too sleepy to function safely and given a discussion of the risks of untreated apnea such as accidents, cognitive impairment, mood impairment, high blood pressure, various cardiac diseases and stroke. Weight loss was encouraged. Patient has tried masks and CPAP machines in the past and was not able to tolerate it, had issues with getting sufficient sleep and would end up having the mask ripped off of himself over the night. Patient does not want to do CPAP therapy anymore and was looking for an alternative      Because of the history of atrial fibrillation and hypertension the patient needs to have his sleep apnea treated    Was seen by Dr. Cheikh Quintana however DISE to be done  Weight is still high but having issues using the cpap/bipap and not able to sleep well and having issues with shaking      Options at this time are limited  Inspire- however weight is the only issues but we dont have many options  Tracheostomy         Obesity  BMI of 39.87  We will refer to weight loss program for weight loss control  Weight has come down a bit        Shortness of breath secondary to possible pulmonary hypertension versus obesity versus deconditioning  Past echo done 3/2/2023   Conclusions      Summary   Technically difficult examination.    Normal left ventriculcar size, wall thickness, and systolic function with

## 2023-09-14 ENCOUNTER — TELEPHONE (OUTPATIENT)
Dept: CARDIOLOGY CLINIC | Age: 73
End: 2023-09-14

## 2023-09-14 ENCOUNTER — OFFICE VISIT (OUTPATIENT)
Dept: FAMILY MEDICINE CLINIC | Age: 73
End: 2023-09-14
Payer: MEDICARE

## 2023-09-14 VITALS
WEIGHT: 274 LBS | HEART RATE: 55 BPM | TEMPERATURE: 97.1 F | DIASTOLIC BLOOD PRESSURE: 50 MMHG | OXYGEN SATURATION: 91 % | HEIGHT: 69 IN | BODY MASS INDEX: 40.58 KG/M2 | SYSTOLIC BLOOD PRESSURE: 134 MMHG

## 2023-09-14 DIAGNOSIS — R07.9 CHEST PAIN, UNSPECIFIED TYPE: Primary | ICD-10-CM

## 2023-09-14 DIAGNOSIS — M79.605 LEFT LEG PAIN: ICD-10-CM

## 2023-09-14 DIAGNOSIS — E11.65 TYPE 2 DIABETES MELLITUS WITH HYPERGLYCEMIA, WITH LONG-TERM CURRENT USE OF INSULIN (HCC): ICD-10-CM

## 2023-09-14 DIAGNOSIS — Z79.4 TYPE 2 DIABETES MELLITUS WITH HYPERGLYCEMIA, WITH LONG-TERM CURRENT USE OF INSULIN (HCC): ICD-10-CM

## 2023-09-14 DIAGNOSIS — Z23 NEED FOR INFLUENZA VACCINATION: ICD-10-CM

## 2023-09-14 LAB — HBA1C MFR BLD: 8.3 %

## 2023-09-14 PROCEDURE — 3052F HG A1C>EQUAL 8.0%<EQUAL 9.0%: CPT | Performed by: PHYSICIAN ASSISTANT

## 2023-09-14 PROCEDURE — 99214 OFFICE O/P EST MOD 30 MIN: CPT | Performed by: PHYSICIAN ASSISTANT

## 2023-09-14 PROCEDURE — G8417 CALC BMI ABV UP PARAM F/U: HCPCS | Performed by: PHYSICIAN ASSISTANT

## 2023-09-14 PROCEDURE — 2022F DILAT RTA XM EVC RTNOPTHY: CPT | Performed by: PHYSICIAN ASSISTANT

## 2023-09-14 PROCEDURE — 83036 HEMOGLOBIN GLYCOSYLATED A1C: CPT | Performed by: PHYSICIAN ASSISTANT

## 2023-09-14 PROCEDURE — 3074F SYST BP LT 130 MM HG: CPT | Performed by: PHYSICIAN ASSISTANT

## 2023-09-14 PROCEDURE — G8427 DOCREV CUR MEDS BY ELIG CLIN: HCPCS | Performed by: PHYSICIAN ASSISTANT

## 2023-09-14 PROCEDURE — 90694 VACC AIIV4 NO PRSRV 0.5ML IM: CPT | Performed by: PHYSICIAN ASSISTANT

## 2023-09-14 PROCEDURE — 1123F ACP DISCUSS/DSCN MKR DOCD: CPT | Performed by: PHYSICIAN ASSISTANT

## 2023-09-14 PROCEDURE — 3078F DIAST BP <80 MM HG: CPT | Performed by: PHYSICIAN ASSISTANT

## 2023-09-14 PROCEDURE — 3017F COLORECTAL CA SCREEN DOC REV: CPT | Performed by: PHYSICIAN ASSISTANT

## 2023-09-14 PROCEDURE — 1036F TOBACCO NON-USER: CPT | Performed by: PHYSICIAN ASSISTANT

## 2023-09-14 PROCEDURE — G0008 ADMIN INFLUENZA VIRUS VAC: HCPCS | Performed by: PHYSICIAN ASSISTANT

## 2023-09-14 RX ORDER — ROPINIROLE 0.5 MG/1
0.5 TABLET, FILM COATED ORAL NIGHTLY
Qty: 90 TABLET | Refills: 3 | Status: SHIPPED | OUTPATIENT
Start: 2023-09-14

## 2023-09-14 NOTE — TELEPHONE ENCOUNTER
Called pt to triage chest pain. per HIPAA form I was able to speak to pt wife Cheng Morejon. Cheng Morejon stated pt is not home pt will give us a call back.

## 2023-09-14 NOTE — PROGRESS NOTES
no abdominal tenderness. Musculoskeletal:      Cervical back: Neck supple. Lymphadenopathy:      Cervical: No cervical adenopathy. Skin:     General: Skin is warm and dry. Findings: No rash. Neurological:      Mental Status: He is alert and oriented to person, place, and time. Deep Tendon Reflexes: Reflexes are normal and symmetric.    Psychiatric:         Mood and Affect: Mood normal.         Behavior: Behavior normal.

## 2023-09-14 NOTE — PATIENT INSTRUCTIONS
Follow up with cardiology for recurrent chest pain  General Cardiology   Talia Mauricio, APRN-CNP  [address] [facility]  [phone]     2. Stop the mirapex/pramipexole, Start ropinirole  3. Call diabetes Dr. Yanique Dorado to get set up for an appointment.    [address] [facility] Endocr[facility]ology and Diabetes - [address]   Phone: [phone]

## 2023-09-14 NOTE — TELEPHONE ENCOUNTER
Pts wife called for an ov due to pt experiencing on an off chest pain again. The chest pain originally started 09/5 and then it went away. Pt is now experiencing the on an off chest pain again. Pts wife denied sob, dizziness, or fatigue. Please advise.

## 2023-09-14 NOTE — TELEPHONE ENCOUNTER
Spoke to pt, pt stated he has not taken any Nitro for CP. Pt did not check his vitals. Pt stated CP has since stopped. Pt is now scheduled with [facility] 10/17/23. Advised pt that if his CP returns, worsens, or has other concerning symptoms to go to ER. [facility] just FYI.

## 2023-09-18 RX ORDER — WARFARIN SODIUM 5 MG/1
TABLET ORAL
Qty: 60 TABLET | Refills: 3 | Status: SHIPPED | OUTPATIENT
Start: 2023-09-18

## 2023-09-18 NOTE — TELEPHONE ENCOUNTER
Refill Request     CONFIRM preferred pharmacy with the patient. If Mail Order Rx - Pend for 90 day refill. Last Seen: Last Seen Department: 9/14/2023  Last Seen by PCP: 9/14/2023    Last Written: 05/01/2023 60 tab 3 refills     If no future appointment scheduled:  Review the last OV with PCP and review information for follow-up visit,  Route STAFF MESSAGE with patient name to the [facility] for scheduling with the following information:            -  Timing of next visit           -  Visit type ie Physical, OV, etc           -  Diagnoses/Reason ie. COPD, HTN - Do not use MEDICATION, Follow-up or CHECK UP - Give reason for visit      Next Appointment:   Future Appointments   Date Time Provider [facility] [address]   9/26/2023 11:30 AM [place]   9/28/2023  1:00 PM Guevara Huff [facility] ENT [facility]   10/17/2023  9:15 AM SANDY Fernandez - CNP P CLER CAR [facility]   12/5/2023 12:40 PM Antonio Saha MD AND ENDO [facility]   12/14/2023 10:30 AM JENNIFER Miranda  [place] - DYD   1/3/2024  2:30 PM Francisco Crespo MD AND [facility]       Message sent to [address] [place] [address] to schedule appt with patient?   NO      Requested Prescriptions     Pending Prescriptions Disp Refills    warfarin (COUMADIN) 5 MG tablet 60 tablet 3     Sig: Take 1.5-2 tablets by mouth daily as directed by coumadin clinic

## 2023-09-19 ENCOUNTER — CARE COORDINATION (OUTPATIENT)
Dept: CARE COORDINATION | Age: 73
End: 2023-09-19

## 2023-09-19 NOTE — CARE COORDINATION
Ambulatory Care Coordination Note  2023    Patient Current Location: [place]     ACM contacted the patient by telephone. Verified name and  with patient as identifiers. Provided introduction to self, and explanation of the ACM role. Challenges to be reviewed by the provider   Additional needs identified to be addressed with provider: No  none               Method of communication with provider: chart routing. ACM: Stephanie Hernandez RN     ACM completed follow up call with patient wife and patient who said he is doing well at this time. ACM did reviewed ACP but patient declined wanting to have paperwork completed at this time. Patient has no other concerns at this time. Plan:    F/U call 1 month    Offered patient enrollment in the Remote Patient Monitoring (RPM) program for in-home monitoring: Patient declined. Lab Results       None                 Goals Addressed                   This Visit's Progress     Conditions and Symptoms   No change     I will schedule office visits, as directed by my provider. I will keep my appointment or reschedule if I have to cancel. I will notify my provider of any barriers to my plan of care. I will follow my Zone Management tool to seek urgent or emergent care. I will notify my provider of any symptoms that indicate a worsening of my condition.     Barriers: lack of support and overwhelmed by complexity of regimen  Plan for overcoming my barriers:  ACM will provide educational handouts with supportive care management phone calls   Confidence: 8/10  Anticipated Goal Completion Date: 23                Future Appointments   Date Time Provider [address]  [facility]   2023 11:30 AM [place]   2023  1:00 PM Camille Tariq [facility] ENT [facility]   10/17/2023  9:15 AM SANDY Hermosillo CNP P CLER CAR [facility]   2023 12:40 PM Jered Robert MD AND ENDO [facility]   2023 10:30 AM JENNIFER De [place]

## 2023-09-19 NOTE — ACP (ADVANCE CARE PLANNING)
Advance Care Planning   Healthcare Decision Maker:    Primary Decision Maker: Andrae Carmichael - [phone]    Click here to complete Healthcare Decision Makers including selection of the Healthcare Decision Maker Relationship (ie \"Primary\"). Today we documented Decision Maker(s) consistent with Legal Next of Kin hierarchy.        If the relationship to the patient does NOT follow our state's Next of Kin hierarchy, the patient MUST complete an ACP Document to allow him/her to act on the patient's behalf. :

## 2023-09-20 ASSESSMENT — ENCOUNTER SYMPTOMS
VOMITING: 0
DIARRHEA: 0
NAUSEA: 0
RHINORRHEA: 0
ABDOMINAL PAIN: 0
SHORTNESS OF BREATH: 0
SORE THROAT: 0
COUGH: 0
CONSTIPATION: 0

## 2023-09-23 DIAGNOSIS — G25.81 RESTLESS LEG: ICD-10-CM

## 2023-09-23 NOTE — TELEPHONE ENCOUNTER
Refill Request     CONFIRM preferred pharmacy with the patient. If Mail Order Rx - Pend for 90 day refill. Last Seen: Last Seen Department: 9/14/2023    Last Seen by PCP: 9/14/23     Last Written: 8/13/23 90 tablet 0 refills    If no future appointment scheduled:  Review the last OV with PCP and review information for follow-up visit,  Route STAFF MESSAGE with patient name to the [facility] for scheduling with the following information:            -  Timing of next visit           -  Visit type ie Physical, OV, etc           -  Diagnoses/Reason ie. COPD, HTN - Do not use MEDICATION, Follow-up or CHECK UP - Give reason for visit      Next Appointment: 12/14/23  Future Appointments   Date Time Provider [address]   9/26/2023 11:30 AM 1051 [facility]   9/28/2023  1:00 PM DO Amalia Ding ENT [facility]   10/17/2023  9:15 AM SANDY Cole - CNP [facility] CLER CAR [facility]   12/5/2023 12:40 PM Shane Syed MD AND ENDO [facility]   12/14/2023 10:30 [facility]  [place] [facility]   1/3/2024  2:30 PM Mis Rico MD AND [facility]       Message sent to [address] [place] [address] to schedule appt with patient?   NO      Requested Prescriptions     Pending Prescriptions Disp Refills    pramipexole (MIRAPEX) 0.5 MG tablet [Pharmacy Med Name: Pramipexole Dihydrochloride 0.5 MG Oral Tablet] 90 tablet 0     Sig: Take 1 tablet by mouth nightly

## 2023-09-25 RX ORDER — PRAMIPEXOLE DIHYDROCHLORIDE 0.5 MG/1
0.5 TABLET ORAL NIGHTLY
Qty: 90 TABLET | Refills: 0 | OUTPATIENT
Start: 2023-09-25 | End: 2023-12-24

## 2023-09-26 ENCOUNTER — ANTI-COAG VISIT (OUTPATIENT)
Dept: PHARMACY | Age: 73
End: 2023-09-26
Payer: MEDICARE

## 2023-09-26 DIAGNOSIS — I48.91 NEW ONSET ATRIAL FIBRILLATION (HCC): Primary | ICD-10-CM

## 2023-09-26 LAB — INTERNATIONAL NORMALIZATION RATIO, POC: 3

## 2023-09-26 PROCEDURE — 85610 PROTHROMBIN TIME: CPT | Performed by: PHARMACIST

## 2023-09-26 PROCEDURE — 99211 OFF/OP EST MAY X REQ PHY/QHP: CPT | Performed by: PHARMACIST

## 2023-09-26 NOTE — PROGRESS NOTES
Mr Paul Sanches is here for management of anticoagulation for AFib. PMH also significant for CAD, DM2, HLD, HTN   He presents today w/out complaint. Pt verifies dosing regimen as listed above. Pt denies sx/s bleeding/bruising/swelling/SOB. No missed doses  No changes in RX/OTCs/Herbal medications. No dietary concerns  No ETOH and tobacco use. No changes per patient. INR 3.0 is within therapeutic range of 2-3  Recommend to continue dose of 7.5 mg daily except 10 mg Q Tue/Sat. Patient has 5 mg tablets. Will continue to monitor and check INR in 4 weeks. Dosing reminder card given with phone number, appointment date and time.    Return to clinic: 10/24/23 @ [facility] Maria L Kumar, PharmD 11:15 AM EDT 23    For Pharmacy Admin Tracking Only    Intervention Detail: Adherence Monitorin and Lab(s) Ordered  Total # of Interventions Recommended: 0  Total # of Interventions Accepted: 0  Time Spent (min): 10

## 2023-09-27 ASSESSMENT — ENCOUNTER SYMPTOMS
COUGH: 0
RHINORRHEA: 0
SHORTNESS OF BREATH: 0
VOMITING: 0
NAUSEA: 0
EYE PAIN: 0

## 2023-09-28 ENCOUNTER — OFFICE VISIT (OUTPATIENT)
Dept: ENT CLINIC | Age: 73
End: 2023-09-28
Payer: MEDICARE

## 2023-09-28 VITALS
BODY MASS INDEX: 40.43 KG/M2 | TEMPERATURE: 97.2 F | WEIGHT: 273 LBS | HEIGHT: 69 IN | RESPIRATION RATE: 16 BRPM | HEART RATE: 60 BPM | DIASTOLIC BLOOD PRESSURE: 66 MMHG | SYSTOLIC BLOOD PRESSURE: 115 MMHG

## 2023-09-28 DIAGNOSIS — G47.33 OBSTRUCTIVE SLEEP APNEA: Primary | ICD-10-CM

## 2023-09-28 PROCEDURE — 1123F ACP DISCUSS/DSCN MKR DOCD: CPT | Performed by: STUDENT IN AN ORGANIZED HEALTH CARE EDUCATION/TRAINING PROGRAM

## 2023-09-28 PROCEDURE — G8427 DOCREV CUR MEDS BY ELIG CLIN: HCPCS | Performed by: STUDENT IN AN ORGANIZED HEALTH CARE EDUCATION/TRAINING PROGRAM

## 2023-09-28 PROCEDURE — 99214 OFFICE O/P EST MOD 30 MIN: CPT | Performed by: STUDENT IN AN ORGANIZED HEALTH CARE EDUCATION/TRAINING PROGRAM

## 2023-09-28 PROCEDURE — 3078F DIAST BP <80 MM HG: CPT | Performed by: STUDENT IN AN ORGANIZED HEALTH CARE EDUCATION/TRAINING PROGRAM

## 2023-09-28 PROCEDURE — G8417 CALC BMI ABV UP PARAM F/U: HCPCS | Performed by: STUDENT IN AN ORGANIZED HEALTH CARE EDUCATION/TRAINING PROGRAM

## 2023-09-28 PROCEDURE — 1036F TOBACCO NON-USER: CPT | Performed by: STUDENT IN AN ORGANIZED HEALTH CARE EDUCATION/TRAINING PROGRAM

## 2023-09-28 PROCEDURE — 3074F SYST BP LT 130 MM HG: CPT | Performed by: STUDENT IN AN ORGANIZED HEALTH CARE EDUCATION/TRAINING PROGRAM

## 2023-09-28 PROCEDURE — 3017F COLORECTAL CA SCREEN DOC REV: CPT | Performed by: STUDENT IN AN ORGANIZED HEALTH CARE EDUCATION/TRAINING PROGRAM

## 2023-09-28 RX ORDER — MELOXICAM 15 MG/1
15 TABLET ORAL DAILY
COMMUNITY

## 2023-09-28 RX ORDER — TRAMADOL HYDROCHLORIDE 50 MG/1
50 TABLET ORAL EVERY 6 HOURS PRN
COMMUNITY

## 2023-10-11 ENCOUNTER — OFFICE VISIT (OUTPATIENT)
Dept: FAMILY MEDICINE CLINIC | Age: 73
End: 2023-10-11
Payer: MEDICARE

## 2023-10-11 VITALS
WEIGHT: 270.4 LBS | BODY MASS INDEX: 40.05 KG/M2 | HEIGHT: 69 IN | HEART RATE: 60 BPM | DIASTOLIC BLOOD PRESSURE: 60 MMHG | OXYGEN SATURATION: 97 % | TEMPERATURE: 97.4 F | SYSTOLIC BLOOD PRESSURE: 104 MMHG

## 2023-10-11 DIAGNOSIS — R31.9 URINARY TRACT INFECTION WITH HEMATURIA, SITE UNSPECIFIED: Primary | ICD-10-CM

## 2023-10-11 DIAGNOSIS — N39.0 URINARY TRACT INFECTION WITH HEMATURIA, SITE UNSPECIFIED: Primary | ICD-10-CM

## 2023-10-11 DIAGNOSIS — R30.0 DYSURIA: ICD-10-CM

## 2023-10-11 DIAGNOSIS — R35.1 NOCTURIA: ICD-10-CM

## 2023-10-11 DIAGNOSIS — G25.81 RESTLESS LEG: ICD-10-CM

## 2023-10-11 LAB
BILIRUBIN, POC: NEGATIVE
BLOOD URINE, POC: ABNORMAL
CLARITY, POC: ABNORMAL
COLOR, POC: YELLOW
GLUCOSE URINE, POC: 100
KETONES, POC: NEGATIVE
LEUKOCYTE EST, POC: ABNORMAL
NITRITE, POC: POSITIVE
PH, POC: 5.5
PROTEIN, POC: 30
SPECIFIC GRAVITY, POC: 1.02
UROBILINOGEN, POC: 0.2

## 2023-10-11 PROCEDURE — 3017F COLORECTAL CA SCREEN DOC REV: CPT | Performed by: PHYSICIAN ASSISTANT

## 2023-10-11 PROCEDURE — 1036F TOBACCO NON-USER: CPT | Performed by: PHYSICIAN ASSISTANT

## 2023-10-11 PROCEDURE — G8417 CALC BMI ABV UP PARAM F/U: HCPCS | Performed by: PHYSICIAN ASSISTANT

## 2023-10-11 PROCEDURE — G8484 FLU IMMUNIZE NO ADMIN: HCPCS | Performed by: PHYSICIAN ASSISTANT

## 2023-10-11 PROCEDURE — 81002 URINALYSIS NONAUTO W/O SCOPE: CPT | Performed by: PHYSICIAN ASSISTANT

## 2023-10-11 PROCEDURE — G8427 DOCREV CUR MEDS BY ELIG CLIN: HCPCS | Performed by: PHYSICIAN ASSISTANT

## 2023-10-11 PROCEDURE — 3078F DIAST BP <80 MM HG: CPT | Performed by: PHYSICIAN ASSISTANT

## 2023-10-11 PROCEDURE — 1123F ACP DISCUSS/DSCN MKR DOCD: CPT | Performed by: PHYSICIAN ASSISTANT

## 2023-10-11 PROCEDURE — 3074F SYST BP LT 130 MM HG: CPT | Performed by: PHYSICIAN ASSISTANT

## 2023-10-11 PROCEDURE — 99214 OFFICE O/P EST MOD 30 MIN: CPT | Performed by: PHYSICIAN ASSISTANT

## 2023-10-11 RX ORDER — PRAMIPEXOLE DIHYDROCHLORIDE 0.5 MG/1
0.5 TABLET ORAL NIGHTLY
Qty: 90 TABLET | Refills: 1 | Status: SHIPPED | OUTPATIENT
Start: 2023-10-11

## 2023-10-11 RX ORDER — SULFAMETHOXAZOLE AND TRIMETHOPRIM 800; 160 MG/1; MG/1
1 TABLET ORAL 2 TIMES DAILY
Qty: 10 TABLET | Refills: 0 | Status: SHIPPED | OUTPATIENT
Start: 2023-10-11 | End: 2023-10-16

## 2023-10-11 NOTE — PROGRESS NOTES
10/12/2023  Harry Nicolas (: 1950)  68 y.o.    ASSESSMENT and PLAN:  Tomasa Keita was seen today for other. Diagnoses and all orders for this visit:    Urinary tract infection with hematuria, site unspecified  Dysuria  -     POCT Urinalysis no Micro  -     Culture, Urine  -     Urinalysis with Microscopic  -     sulfamethoxazole-trimethoprim (BACTRIM DS;SEPTRA DS) 800-160 MG per tablet; Take 1 tablet by mouth 2 times daily for 5 days  - UA concerning for UTI, will treat with bactrim. Repeat UA at next OV to ensure resolution of hematuria. Nocturia  -     PSA, TOTAL AND FREE; Future  - progressively worsening. Will check psa level. Restless leg  -     pramipexole (MIRAPEX) 0.5 MG tablet; Take 1 tablet by mouth nightly  - restart mirapex, stop requip  - If no improvement, may consider neurology referral.       Return for As scheduled . HPI    Patient presents for evaluation of acute complaint of dysuria. Has been occurring for the last week or so  Also with increased frequency   Endorses nocturia for the last few months. Denies fever, flank pain, urinary retention. Would like to restart his mirapex. Had stopped it for trial of requip for his RLS and tremors. Patient reports it is not helping and would like to go back to mirapex. Review of Systems   Constitutional:  Negative for activity change, chills and fever. HENT:  Negative for congestion, ear pain, rhinorrhea and sore throat. Eyes:  Negative for visual disturbance. Respiratory:  Negative for cough and shortness of breath. Cardiovascular:  Negative for chest pain and palpitations. Gastrointestinal:  Negative for abdominal pain, constipation, diarrhea, nausea and vomiting. Genitourinary:  Positive for dysuria and frequency. Negative for difficulty urinating. Musculoskeletal:  Negative for arthralgias and myalgias. Skin:  Negative for rash. Neurological:  Positive for tremors.  Negative for dizziness, weakness and

## 2023-10-12 LAB
BACTERIA URNS QL MICRO: ABNORMAL /HPF
BILIRUB UR QL STRIP.AUTO: NEGATIVE
CLARITY UR: ABNORMAL
COLOR UR: YELLOW
EPI CELLS #/AREA URNS AUTO: 0 /HPF (ref 0–5)
GLUCOSE UR STRIP.AUTO-MCNC: 100 MG/DL
HGB UR QL STRIP.AUTO: ABNORMAL
HYALINE CASTS #/AREA URNS AUTO: 3 /LPF (ref 0–8)
KETONES UR STRIP.AUTO-MCNC: NEGATIVE MG/DL
LEUKOCYTE ESTERASE UR QL STRIP.AUTO: ABNORMAL
NITRITE UR QL STRIP.AUTO: NEGATIVE
PH UR STRIP.AUTO: 5.5 [PH] (ref 5–8)
PROT UR STRIP.AUTO-MCNC: ABNORMAL MG/DL
RBC CLUMPS #/AREA URNS AUTO: 5 /HPF (ref 0–4)
SP GR UR STRIP.AUTO: 1.02 (ref 1–1.03)
UA DIPSTICK W REFLEX MICRO PNL UR: YES
URN SPEC COLLECT METH UR: ABNORMAL
UROBILINOGEN UR STRIP-ACNC: 0.2 E.U./DL
WBC #/AREA URNS AUTO: 477 /HPF (ref 0–5)

## 2023-10-12 ASSESSMENT — ENCOUNTER SYMPTOMS
NAUSEA: 0
SHORTNESS OF BREATH: 0
COUGH: 0
CONSTIPATION: 0
DIARRHEA: 0
VOMITING: 0
RHINORRHEA: 0
ABDOMINAL PAIN: 0
SORE THROAT: 0

## 2023-10-14 LAB
BACTERIA UR CULT: ABNORMAL
ORGANISM: ABNORMAL

## 2023-10-16 DIAGNOSIS — R31.9 URINARY TRACT INFECTION WITH HEMATURIA, SITE UNSPECIFIED: ICD-10-CM

## 2023-10-16 DIAGNOSIS — N39.0 URINARY TRACT INFECTION WITH HEMATURIA, SITE UNSPECIFIED: ICD-10-CM

## 2023-10-16 LAB
PSA FREE MFR SERPL: 25 %
PSA FREE SERPL-MCNC: 0.2 UG/L
PSA SERPL-MCNC: 0.8 UG/L (ref 0–4)

## 2023-10-16 RX ORDER — SULFAMETHOXAZOLE AND TRIMETHOPRIM 800; 160 MG/1; MG/1
1 TABLET ORAL 2 TIMES DAILY
Qty: 14 TABLET | Refills: 0 | Status: SHIPPED | OUTPATIENT
Start: 2023-10-16 | End: 2023-10-23

## 2023-10-17 NOTE — PROGRESS NOTES
Mc Hamming    Age 68 y.o.    male    1950    MRN [id number]    10/25/2023  Arrival Time_____________  OR Time____________30 Jennifer Dyke     Procedure(s):  DRUG INDUCED SLEEP ENDOSCOPY                      Monitor Anesthesia Care    Surgeon(s):  Haley Roselia, [place]       Phone [phone] ([place])     [facility]  [facility]         Work  _____________________________________________________________________  _____________________________________________________________________  _____________________________________________________________________  _____________________________________________________________________  _____________________________________________________________________    PCP _____________________________ Phone_________________     H&P__________________Bringing      Chart            Epic   DOS      Called________  EKG__________________Bringing      Chart            Epic   DOS      Called________  LAB__________________ Bringing      Chart            Epic   DOS      Called________  Cardiac Clearance_______Bringing      Chart            Epic      DOS      Called________    Cardiologist________________________ Phone___________________________    ? Congregational concerns / Waiver on Chart            PAT Communications________________  ? Pre-op Instructions Given [address]          _________________________________  ? Directions to Surgery Center                          _________________________________  ? Transportation Home_______________      __________________________________  ?  Crutches/Walker__________________        __________________________________    ________Pre-op Orders   _______Transcribed    _______Wt.  ________Pharmacy          _______SCD  ______VTE     ______TED Norvel Apa  _______  Surgery Consent    _______  Anesthesia Consent         COVID DATE______________LOCATION________________ RESULT__________

## 2023-10-18 NOTE — PROGRESS NOTES
Date and time of surgery :   10/25/23 at 36           Arrival Time:  600     Bring Picture ID and insurance card. Please wear simple, loose fitting clothing to the hospital.   Shazia Wall not bring valuables (money, credit cards, checkbooks, etc.)   DO NOT wear any jewelry or piercings on day of surgery. All body piercing jewelry must be removed. If you have dentures, they will be removed before going to the OR; we will provide you a container. If you wear contact lenses or glasses, they will be removed; please bring a case for them. Shower the evening before or morning of surgery with antibacterial soap. Nothing to eat or drink after midnight the day before surgery. You may brush your teeth and gargle the morning of surgery. DO NOT SWALLOW WATER. Do not take any morning meds the day of your surgery. Aspirin, Ibuprofen, Advil, Naproxen, Vitamin E and other Anti-inflammatory products and supplements should be stopped for 5 -7days before surgery or as directed by your physician. Do not smoke or drink any alcoholic beverages 24 hours prior to surgery. This includes NA Beer. Refrain from the usage of any recreational drugs, including non-prescribed prescription drugs. You MUST plan for a responsible adult to stay on site while you are here and take you home after your surgery. You will not be allowed to leave alone or drive yourself home. It is strongly suggested someone stay with you the first 24 hrs. Your surgery will be cancelled if you do not have a ride home. To help prevent infection, change your sheets the night before surgery. If you  have a Living Will and Durable Power of  for Healthcare, please bring in a copy. Notify your Surgeon if you develop any illness between now and time of surgery.  Cough, cold, fever, sore throat, nausea, vomiting, etc.  Please notify your surgeon if you experience dizziness, shortness of breath or blurred vision between now & the time of your surgery  To provide

## 2023-10-19 ENCOUNTER — CARE COORDINATION (OUTPATIENT)
Dept: CARE COORDINATION | Age: 73
End: 2023-10-19

## 2023-10-19 NOTE — CARE COORDINATION
ACM called but patient did not answer. ACM unable to leave voicemail d/t not being set up at this time. ACM will follow up at a later date.

## 2023-10-23 ENCOUNTER — TELEPHONE (OUTPATIENT)
Dept: ENT CLINIC | Age: 73
End: 2023-10-23

## 2023-10-24 ENCOUNTER — ANTI-COAG VISIT (OUTPATIENT)
Dept: PHARMACY | Age: 73
End: 2023-10-24
Payer: MEDICARE

## 2023-10-24 ENCOUNTER — ANESTHESIA EVENT (OUTPATIENT)
Dept: OPERATING ROOM | Age: 73
End: 2023-10-24
Payer: MEDICARE

## 2023-10-24 DIAGNOSIS — I48.91 NEW ONSET ATRIAL FIBRILLATION (HCC): Primary | ICD-10-CM

## 2023-10-24 LAB — INTERNATIONAL NORMALIZATION RATIO, POC: 2.4

## 2023-10-24 PROCEDURE — 85610 PROTHROMBIN TIME: CPT | Performed by: PHARMACIST

## 2023-10-24 PROCEDURE — 99211 OFF/OP EST MAY X REQ PHY/QHP: CPT | Performed by: PHARMACIST

## 2023-10-24 NOTE — PROGRESS NOTES
Mr Sarbina Singer is here for management of anticoagulation for AFib. PMH also significant for CAD, DM2, HLD, HTN   He presents today w/out complaint. Pt verifies dosing regimen as listed above. Pt denies sx/s bleeding/bruising/swelling/SOB. No missed doses  No changes in RX/OTCs/Herbal medications. No dietary concerns  No ETOH and tobacco use. No changes per patient. INR 2.4 is within therapeutic range of 2-3  Recommend to continue dose of 7.5 mg daily except 10 mg Q Tue/Sat. Patient has 5 mg tablets. Will continue to monitor and check INR in 4 weeks. Dosing reminder card given with phone number, appointment date and time.    Return to clinic: 23 @ [address] [place] [address] [address], PharmD 11:27 AM EDT 10/24/23    For Pharmacy Admin Tracking Only    Intervention Detail: Adherence Monitorin  Total # of Interventions Recommended: 1  Total # of Interventions Accepted: 1  Time Spent (min): 15

## 2023-10-25 ENCOUNTER — ANESTHESIA (OUTPATIENT)
Dept: OPERATING ROOM | Age: 73
End: 2023-10-25
Payer: MEDICARE

## 2023-10-25 ENCOUNTER — HOSPITAL ENCOUNTER (OUTPATIENT)
Age: 73
Setting detail: OUTPATIENT SURGERY
Discharge: HOME | End: 2023-10-25
Attending: STUDENT IN AN ORGANIZED HEALTH CARE EDUCATION/TRAINING PROGRAM | Admitting: STUDENT IN AN ORGANIZED HEALTH CARE EDUCATION/TRAINING PROGRAM
Payer: MEDICARE

## 2023-10-25 VITALS
DIASTOLIC BLOOD PRESSURE: 63 MMHG | WEIGHT: 270 LBS | BODY MASS INDEX: 39.99 KG/M2 | RESPIRATION RATE: 16 BRPM | HEART RATE: 60 BPM | SYSTOLIC BLOOD PRESSURE: 115 MMHG | TEMPERATURE: 97.6 F | HEIGHT: 69 IN | OXYGEN SATURATION: 95 %

## 2023-10-25 LAB
GLUCOSE BLD-MCNC: 160 MG/DL (ref 70–99)
PERFORMED ON: ABNORMAL

## 2023-10-25 PROCEDURE — 2709999900 HC NON-CHARGEABLE SUPPLY: Performed by: STUDENT IN AN ORGANIZED HEALTH CARE EDUCATION/TRAINING PROGRAM

## 2023-10-25 PROCEDURE — 7100000010 HC PHASE II RECOVERY - FIRST 15 MIN: Performed by: STUDENT IN AN ORGANIZED HEALTH CARE EDUCATION/TRAINING PROGRAM

## 2023-10-25 PROCEDURE — 2720000010 HC SURG SUPPLY STERILE: Performed by: STUDENT IN AN ORGANIZED HEALTH CARE EDUCATION/TRAINING PROGRAM

## 2023-10-25 PROCEDURE — 2500000003 HC RX 250 WO HCPCS: Performed by: NURSE ANESTHETIST, CERTIFIED REGISTERED

## 2023-10-25 PROCEDURE — 2580000003 HC RX 258: Performed by: NURSE ANESTHETIST, CERTIFIED REGISTERED

## 2023-10-25 PROCEDURE — 2500000003 HC RX 250 WO HCPCS

## 2023-10-25 PROCEDURE — 2580000003 HC RX 258: Performed by: ANESTHESIOLOGY

## 2023-10-25 PROCEDURE — 3700000000 HC ANESTHESIA ATTENDED CARE: Performed by: STUDENT IN AN ORGANIZED HEALTH CARE EDUCATION/TRAINING PROGRAM

## 2023-10-25 PROCEDURE — 42975 DISE EVAL SLP DO BRTH FLX DX: CPT | Performed by: STUDENT IN AN ORGANIZED HEALTH CARE EDUCATION/TRAINING PROGRAM

## 2023-10-25 PROCEDURE — 2500000003 HC RX 250 WO HCPCS: Performed by: STUDENT IN AN ORGANIZED HEALTH CARE EDUCATION/TRAINING PROGRAM

## 2023-10-25 PROCEDURE — 3600000003 HC SURGERY LEVEL 3 BASE: Performed by: STUDENT IN AN ORGANIZED HEALTH CARE EDUCATION/TRAINING PROGRAM

## 2023-10-25 PROCEDURE — 7100000011 HC PHASE II RECOVERY - ADDTL 15 MIN: Performed by: STUDENT IN AN ORGANIZED HEALTH CARE EDUCATION/TRAINING PROGRAM

## 2023-10-25 PROCEDURE — 6360000002 HC RX W HCPCS: Performed by: NURSE ANESTHETIST, CERTIFIED REGISTERED

## 2023-10-25 PROCEDURE — 6370000000 HC RX 637 (ALT 250 FOR IP): Performed by: STUDENT IN AN ORGANIZED HEALTH CARE EDUCATION/TRAINING PROGRAM

## 2023-10-25 RX ORDER — SODIUM CHLORIDE, SODIUM LACTATE, POTASSIUM CHLORIDE, CALCIUM CHLORIDE 600; 310; 30; 20 MG/100ML; MG/100ML; MG/100ML; MG/100ML
INJECTION, SOLUTION INTRAVENOUS CONTINUOUS
Status: DISCONTINUED | OUTPATIENT
Start: 2023-10-25 | End: 2023-10-25 | Stop reason: HOSPADM

## 2023-10-25 RX ORDER — FAMOTIDINE 10 MG/ML
INJECTION, SOLUTION INTRAVENOUS
Status: COMPLETED
Start: 2023-10-25 | End: 2023-10-25

## 2023-10-25 RX ORDER — LABETALOL HYDROCHLORIDE 5 MG/ML
10 INJECTION, SOLUTION INTRAVENOUS
Status: DISCONTINUED | OUTPATIENT
Start: 2023-10-25 | End: 2023-10-25 | Stop reason: HOSPADM

## 2023-10-25 RX ORDER — FAMOTIDINE 10 MG/ML
20 INJECTION, SOLUTION INTRAVENOUS ONCE
Status: COMPLETED | OUTPATIENT
Start: 2023-10-25 | End: 2023-10-25

## 2023-10-25 RX ORDER — PROPOFOL 10 MG/ML
INJECTION, EMULSION INTRAVENOUS PRN
Status: DISCONTINUED | OUTPATIENT
Start: 2023-10-25 | End: 2023-10-25 | Stop reason: SDUPTHER

## 2023-10-25 RX ORDER — SODIUM CHLORIDE 0.9 % (FLUSH) 0.9 %
5-40 SYRINGE (ML) INJECTION EVERY 12 HOURS SCHEDULED
Status: DISCONTINUED | OUTPATIENT
Start: 2023-10-25 | End: 2023-10-25 | Stop reason: HOSPADM

## 2023-10-25 RX ORDER — SODIUM CHLORIDE 0.9 % (FLUSH) 0.9 %
5-40 SYRINGE (ML) INJECTION PRN
Status: DISCONTINUED | OUTPATIENT
Start: 2023-10-25 | End: 2023-10-25 | Stop reason: HOSPADM

## 2023-10-25 RX ORDER — OXYMETAZOLINE HYDROCHLORIDE 0.05 G/100ML
SPRAY NASAL PRN
Status: DISCONTINUED | OUTPATIENT
Start: 2023-10-25 | End: 2023-10-25 | Stop reason: ALTCHOICE

## 2023-10-25 RX ORDER — LIDOCAINE HYDROCHLORIDE 20 MG/ML
INJECTION, SOLUTION INFILTRATION; PERINEURAL PRN
Status: DISCONTINUED | OUTPATIENT
Start: 2023-10-25 | End: 2023-10-25 | Stop reason: SDUPTHER

## 2023-10-25 RX ORDER — LIDOCAINE HYDROCHLORIDE 10 MG/ML
1 INJECTION, SOLUTION EPIDURAL; INFILTRATION; INTRACAUDAL; PERINEURAL
Status: DISCONTINUED | OUTPATIENT
Start: 2023-10-25 | End: 2023-10-25 | Stop reason: HOSPADM

## 2023-10-25 RX ORDER — ONDANSETRON 2 MG/ML
4 INJECTION INTRAMUSCULAR; INTRAVENOUS EVERY 30 MIN PRN
Status: DISCONTINUED | OUTPATIENT
Start: 2023-10-25 | End: 2023-10-25 | Stop reason: HOSPADM

## 2023-10-25 RX ORDER — SODIUM CHLORIDE 9 MG/ML
INJECTION, SOLUTION INTRAVENOUS PRN
Status: DISCONTINUED | OUTPATIENT
Start: 2023-10-25 | End: 2023-10-25 | Stop reason: HOSPADM

## 2023-10-25 RX ORDER — LIDOCAINE HYDROCHLORIDE 40 MG/ML
INJECTION, SOLUTION RETROBULBAR; TOPICAL PRN
Status: DISCONTINUED | OUTPATIENT
Start: 2023-10-25 | End: 2023-10-25 | Stop reason: ALTCHOICE

## 2023-10-25 RX ORDER — SODIUM CHLORIDE, SODIUM LACTATE, POTASSIUM CHLORIDE, CALCIUM CHLORIDE 600; 310; 30; 20 MG/100ML; MG/100ML; MG/100ML; MG/100ML
INJECTION, SOLUTION INTRAVENOUS CONTINUOUS PRN
Status: DISCONTINUED | OUTPATIENT
Start: 2023-10-25 | End: 2023-10-25 | Stop reason: SDUPTHER

## 2023-10-25 RX ADMIN — FAMOTIDINE 20 MG: 10 INJECTION, SOLUTION INTRAVENOUS at 07:50

## 2023-10-25 RX ADMIN — SODIUM CHLORIDE, SODIUM LACTATE, POTASSIUM CHLORIDE, AND CALCIUM CHLORIDE: .6; .31; .03; .02 INJECTION, SOLUTION INTRAVENOUS at 08:06

## 2023-10-25 RX ADMIN — LIDOCAINE HYDROCHLORIDE 60 MG: 20 INJECTION, SOLUTION INFILTRATION; PERINEURAL at 08:08

## 2023-10-25 RX ADMIN — PROPOFOL 125 MCG/KG/MIN: 10 INJECTION, EMULSION INTRAVENOUS at 08:10

## 2023-10-25 RX ADMIN — SODIUM CHLORIDE, POTASSIUM CHLORIDE, SODIUM LACTATE AND CALCIUM CHLORIDE: 600; 310; 30; 20 INJECTION, SOLUTION INTRAVENOUS at 07:28

## 2023-10-25 RX ADMIN — PROPOFOL 10 MG: 10 INJECTION, EMULSION INTRAVENOUS at 08:08

## 2023-10-25 ASSESSMENT — PAIN - FUNCTIONAL ASSESSMENT: PAIN_FUNCTIONAL_ASSESSMENT: 0-10

## 2023-10-25 NOTE — ANESTHESIA POSTPROCEDURE EVALUATION
Department of Anesthesiology  Postprocedure Note    Patient: Ashlee Tinoco  MRN: [id number]  YOB: 1950  Date of evaluation: 10/25/2023      Procedure Summary     Date: 10/25/23 Room / Location: [address] 01 / [address] [place] [address]    Anesthesia Start: 6030 Anesthesia Stop: 4103    Procedure: DRUG INDUCED SLEEP ENDOSCOPY (Throat) Diagnosis:       Obstructive sleep apnea      (Obstructive sleep apnea [G47.33])    Surgeons: Adrianna Manrique DO Responsible Provider: Oscar Hernandez MD    Anesthesia Type: MAC ASA Status: 3          Anesthesia Type: No value filed.     Filiberto Phase I: Filiberto Score: 10    Filiberto Phase II: Filiberto Score: 10      Anesthesia Post Evaluation    Patient location during evaluation: PACU  Level of consciousness: awake  Airway patency: patent  Nausea & Vomiting: no vomiting  Complications: no  Cardiovascular status: blood pressure returned to baseline  Respiratory status: acceptable  Hydration status: stable  Pain management: adequate

## 2023-10-25 NOTE — PROGRESS NOTES
Discharge instructions reviewed with patient/responsible adult and understanding verbalized. Discharge instructions signed and copies given. Discharge criteria met per hospital policy.

## 2023-10-25 NOTE — H&P
The patient's most recent H&P, office notes, imaging, and pathology were reviewed. Patient examined. There has been no changes. We will plan to proceed with a drug induced sleep endoscopy.     [employer], DO

## 2023-10-25 NOTE — OP NOTE
collapse was primarily an anterior posterior fashion. More distally, mild lateral oropharyngeal wall component was noted, but again no complete lateral oropharyngeal collapse. In the hypopharynx, a very large, tongue base is observed in complete anterior-posterior retrolingual/retroepiglottic obstruction. In summary, there is no evidence of complete concentric palatal obstruction and does appear to be a candidate anatomically for hypoglossal nerve stimulation therapy. I was present for and performed the entire procedure. Estimated Blood Loss: 0mL                 Specimens: None           Complications: There were no complications.     Fernando Calles DO        Electronically signed by Fernando Calles DO on 10/25/2023 at 8:16 AM

## 2023-11-02 ENCOUNTER — CARE COORDINATION (OUTPATIENT)
Dept: CARE COORDINATION | Age: 73
End: 2023-11-02

## 2023-11-02 NOTE — CARE COORDINATION
ACM called but patient did not answer. ACM unable to leave voicemail d/t not being set up yet. ACM will follow up at a later date.

## 2023-11-09 ENCOUNTER — CARE COORDINATION (OUTPATIENT)
Dept: CARE COORDINATION | Age: 73
End: 2023-11-09

## 2023-11-09 NOTE — CARE COORDINATION
ACM called but patient did not answer. ACM left voice message with call back number provided. ACM will not make any additional outreach and resolve episode at this time.

## 2023-11-14 DIAGNOSIS — I48.91 NEW ONSET ATRIAL FIBRILLATION (HCC): ICD-10-CM

## 2023-11-14 NOTE — TELEPHONE ENCOUNTER
11/14 called pt @ [phone] unable to  lvm due to no vm being set up. When pts returns call schedule 6 mo fu w/ win per agk last ov note.

## 2023-11-17 NOTE — TELEPHONE ENCOUNTER
11/17- called pt @[phone]. Pt is now scheduled 1/12/24 [facility] [place] w/NPAM for S[facility] f/u med refills.  Please review med refill request.

## 2023-11-20 RX ORDER — DILTIAZEM HYDROCHLORIDE 120 MG/1
120 CAPSULE, COATED, EXTENDED RELEASE ORAL NIGHTLY
Qty: 90 CAPSULE | Refills: 0 | Status: SHIPPED | OUTPATIENT
Start: 2023-11-20

## 2023-11-21 ENCOUNTER — ANTI-COAG VISIT (OUTPATIENT)
Dept: PHARMACY | Age: 73
End: 2023-11-21
Payer: MEDICARE

## 2023-11-21 DIAGNOSIS — I48.91 NEW ONSET ATRIAL FIBRILLATION (HCC): Primary | ICD-10-CM

## 2023-11-21 LAB — INTERNATIONAL NORMALIZATION RATIO, POC: 2

## 2023-11-21 PROCEDURE — 85610 PROTHROMBIN TIME: CPT | Performed by: PHARMACIST

## 2023-11-21 PROCEDURE — 99211 OFF/OP EST MAY X REQ PHY/QHP: CPT | Performed by: PHARMACIST

## 2023-11-21 NOTE — PROGRESS NOTES
Mr Hakeem Flood is here for management of anticoagulation for AFib. PMH also significant for CAD, DM2, HLD, HTN   He presents today w/out complaint. Pt verifies dosing regimen as listed above. Pt denies sx/s bleeding/bruising/swelling/SOB. No missed doses  No changes in RX/OTCs/Herbal medications. No dietary concerns  No ETOH and tobacco use. No changes per patient. INR 2.0 is within therapeutic range of 2-3  Recommend to continue dose of 7.5 mg daily except 10 mg Q Tue/Sat. Patient has 5 mg tablets. Will continue to monitor and check INR in 4 weeks. Dosing reminder card given with phone number, appointment date and time.    Return to clinic: 23 @ [address] [place] [address] [address], PharmD 10:32 AM EST 23    For Pharmacy Admin Tracking Only    Intervention Detail: Adherence Monitorin  Total # of Interventions Recommended: 1  Total # of Interventions Accepted: 1  Time Spent (min): 15

## 2023-11-24 ENCOUNTER — APPOINTMENT (OUTPATIENT)
Dept: CT IMAGING | Age: 73
DRG: 177 | End: 2023-11-24
Payer: MEDICARE

## 2023-11-24 ENCOUNTER — APPOINTMENT (OUTPATIENT)
Dept: GENERAL RADIOLOGY | Age: 73
DRG: 177 | End: 2023-11-24
Payer: MEDICARE

## 2023-11-24 ENCOUNTER — HOSPITAL ENCOUNTER (INPATIENT)
Age: 73
LOS: 2 days | Discharge: HOME | DRG: 177 | End: 2023-11-26
Attending: EMERGENCY MEDICINE | Admitting: INTERNAL MEDICINE
Payer: MEDICARE

## 2023-11-24 DIAGNOSIS — I10 ESSENTIAL HYPERTENSION: ICD-10-CM

## 2023-11-24 DIAGNOSIS — Z79.4 TYPE 2 DIABETES MELLITUS WITH MICROALBUMINURIA, WITH LONG-TERM CURRENT USE OF INSULIN (HCC): ICD-10-CM

## 2023-11-24 DIAGNOSIS — U07.1 COVID-19: ICD-10-CM

## 2023-11-24 DIAGNOSIS — E11.29 TYPE 2 DIABETES MELLITUS WITH MICROALBUMINURIA, WITH LONG-TERM CURRENT USE OF INSULIN (HCC): ICD-10-CM

## 2023-11-24 DIAGNOSIS — J96.01 ACUTE RESPIRATORY FAILURE WITH HYPOXIA (HCC): Primary | ICD-10-CM

## 2023-11-24 DIAGNOSIS — R80.9 TYPE 2 DIABETES MELLITUS WITH MICROALBUMINURIA, WITH LONG-TERM CURRENT USE OF INSULIN (HCC): ICD-10-CM

## 2023-11-24 PROBLEM — E83.42 HYPOMAGNESEMIA: Status: ACTIVE | Noted: 2023-11-24

## 2023-11-24 LAB
ALBUMIN SERPL-MCNC: 4.1 G/DL (ref 3.4–5)
ALBUMIN/GLOB SERPL: 1.7 {RATIO} (ref 1.1–2.2)
ALP SERPL-CCNC: 74 U/L (ref 40–129)
ALT SERPL-CCNC: 17 U/L (ref 10–40)
ANION GAP SERPL CALCULATED.3IONS-SCNC: 8 MMOL/L (ref 3–16)
AST SERPL-CCNC: 19 U/L (ref 15–37)
BACTERIA URNS QL MICRO: ABNORMAL /HPF
BASE EXCESS BLDV CALC-SCNC: 3.2 MMOL/L (ref -3–3)
BASOPHILS # BLD: 0 K/UL (ref 0–0.2)
BASOPHILS NFR BLD: 0.6 %
BILIRUB SERPL-MCNC: 0.4 MG/DL (ref 0–1)
BILIRUB UR QL STRIP.AUTO: NEGATIVE
BUN SERPL-MCNC: 20 MG/DL (ref 7–20)
CALCIUM SERPL-MCNC: 8.8 MG/DL (ref 8.3–10.6)
CHLORIDE SERPL-SCNC: 97 MMOL/L (ref 99–110)
CLARITY UR: CLEAR
CO2 BLDV-SCNC: 32 MMOL/L
CO2 SERPL-SCNC: 29 MMOL/L (ref 21–32)
COHGB MFR BLDV: 2.3 % (ref 0–1.5)
COLOR UR: YELLOW
CREAT SERPL-MCNC: 1.2 MG/DL (ref 0.8–1.3)
DEPRECATED RDW RBC AUTO: 14 % (ref 12.4–15.4)
EKG ATRIAL RATE: 91 BPM
EKG DIAGNOSIS: NORMAL
EKG P AXIS: 109 DEGREES
EKG P-R INTERVAL: 188 MS
EKG Q-T INTERVAL: 308 MS
EKG QRS DURATION: 68 MS
EKG QTC CALCULATION (BAZETT): 378 MS
EKG R AXIS: 73 DEGREES
EKG T AXIS: 62 DEGREES
EKG VENTRICULAR RATE: 91 BPM
EOSINOPHIL # BLD: 0.1 K/UL (ref 0–0.6)
EOSINOPHIL NFR BLD: 2 %
EPI CELLS #/AREA URNS HPF: ABNORMAL /HPF (ref 0–5)
FLUAV RNA RESP QL NAA+PROBE: NOT DETECTED
FLUBV RNA RESP QL NAA+PROBE: NOT DETECTED
GFR SERPLBLD CREATININE-BSD FMLA CKD-EPI: >60 ML/MIN/{1.73_M2}
GLUCOSE SERPL-MCNC: 197 MG/DL (ref 70–99)
GLUCOSE UR STRIP.AUTO-MCNC: 250 MG/DL
HCO3 BLDV-SCNC: 29.9 MMOL/L (ref 23–29)
HCT VFR BLD AUTO: 37 % (ref 40.5–52.5)
HGB BLD-MCNC: 12.5 G/DL (ref 13.5–17.5)
HGB UR QL STRIP.AUTO: ABNORMAL
INR PPP: 1.5 (ref 0.84–1.16)
KETONES UR STRIP.AUTO-MCNC: ABNORMAL MG/DL
LACTATE BLDV-SCNC: 1.5 MMOL/L (ref 0.4–2)
LEUKOCYTE ESTERASE UR QL STRIP.AUTO: NEGATIVE
LYMPHOCYTES # BLD: 0.4 K/UL (ref 1–5.1)
LYMPHOCYTES NFR BLD: 7.4 %
MAGNESIUM SERPL-MCNC: 1.6 MG/DL (ref 1.8–2.4)
MCH RBC QN AUTO: 29.9 PG (ref 26–34)
MCHC RBC AUTO-ENTMCNC: 33.8 G/DL (ref 31–36)
MCV RBC AUTO: 88.6 FL (ref 80–100)
METHGB MFR BLDV: 0.3 %
MONOCYTES # BLD: 0.5 K/UL (ref 0–1.3)
MONOCYTES NFR BLD: 10.3 %
NEUTROPHILS # BLD: 3.9 K/UL (ref 1.7–7.7)
NEUTROPHILS NFR BLD: 79.7 %
NITRITE UR QL STRIP.AUTO: NEGATIVE
NT-PROBNP SERPL-MCNC: 237 PG/ML (ref 0–124)
O2 CT VFR BLDV CALC: 16 VOL %
O2 THERAPY: ABNORMAL
PCO2 BLDV: 53.1 MMHG (ref 40–50)
PH BLDV: 7.37 [PH] (ref 7.35–7.45)
PH UR STRIP.AUTO: 6 [PH] (ref 5–8)
PLATELET # BLD AUTO: 120 K/UL (ref 135–450)
PMV BLD AUTO: 9.8 FL (ref 5–10.5)
PO2 BLDV: 43.5 MMHG (ref 25–40)
POTASSIUM SERPL-SCNC: 4.4 MMOL/L (ref 3.5–5.1)
PROT SERPL-MCNC: 6.5 G/DL (ref 6.4–8.2)
PROT UR STRIP.AUTO-MCNC: NEGATIVE MG/DL
PROTHROMBIN TIME: 18.1 SEC (ref 11.5–14.8)
RBC # BLD AUTO: 4.18 M/UL (ref 4.2–5.9)
RBC #/AREA URNS HPF: ABNORMAL /HPF (ref 0–4)
SAO2 % BLDV: 77 %
SARS-COV-2 RNA RESP QL NAA+PROBE: DETECTED
SODIUM SERPL-SCNC: 134 MMOL/L (ref 136–145)
SP GR UR STRIP.AUTO: 1.02 (ref 1–1.03)
TROPONIN, HIGH SENSITIVITY: 15 NG/L (ref 0–22)
UA COMPLETE W REFLEX CULTURE PNL UR: ABNORMAL
UA DIPSTICK W REFLEX MICRO PNL UR: YES
URN SPEC COLLECT METH UR: ABNORMAL
UROBILINOGEN UR STRIP-ACNC: 0.2 E.U./DL
WBC # BLD AUTO: 4.9 K/UL (ref 4–11)
WBC #/AREA URNS HPF: ABNORMAL /HPF (ref 0–5)

## 2023-11-24 PROCEDURE — 82803 BLOOD GASES ANY COMBINATION: CPT

## 2023-11-24 PROCEDURE — 87040 BLOOD CULTURE FOR BACTERIA: CPT

## 2023-11-24 PROCEDURE — 71275 CT ANGIOGRAPHY CHEST: CPT

## 2023-11-24 PROCEDURE — 96365 THER/PROPH/DIAG IV INF INIT: CPT

## 2023-11-24 PROCEDURE — 6360000004 HC RX CONTRAST MEDICATION: Performed by: EMERGENCY MEDICINE

## 2023-11-24 PROCEDURE — 6360000002 HC RX W HCPCS: Performed by: INTERNAL MEDICINE

## 2023-11-24 PROCEDURE — 84484 ASSAY OF TROPONIN QUANT: CPT

## 2023-11-24 PROCEDURE — 99285 EMERGENCY DEPT VISIT HI MDM: CPT

## 2023-11-24 PROCEDURE — 83880 ASSAY OF NATRIURETIC PEPTIDE: CPT

## 2023-11-24 PROCEDURE — 85025 COMPLETE CBC W/AUTO DIFF WBC: CPT

## 2023-11-24 PROCEDURE — 6370000000 HC RX 637 (ALT 250 FOR IP): Performed by: EMERGENCY MEDICINE

## 2023-11-24 PROCEDURE — 6360000002 HC RX W HCPCS: Performed by: EMERGENCY MEDICINE

## 2023-11-24 PROCEDURE — 85610 PROTHROMBIN TIME: CPT

## 2023-11-24 PROCEDURE — 71045 X-RAY EXAM CHEST 1 VIEW: CPT

## 2023-11-24 PROCEDURE — 1200000000 HC SEMI PRIVATE

## 2023-11-24 PROCEDURE — 36415 COLL VENOUS BLD VENIPUNCTURE: CPT

## 2023-11-24 PROCEDURE — 81001 URINALYSIS AUTO W/SCOPE: CPT

## 2023-11-24 PROCEDURE — 83605 ASSAY OF LACTIC ACID: CPT

## 2023-11-24 PROCEDURE — 3E0333Z INTRODUCTION OF ANTI-INFLAMMATORY INTO PERIPHERAL VEIN, PERCUTANEOUS APPROACH: ICD-10-PCS | Performed by: INTERNAL MEDICINE

## 2023-11-24 PROCEDURE — 87636 SARSCOV2 & INF A&B AMP PRB: CPT

## 2023-11-24 PROCEDURE — 80053 COMPREHEN METABOLIC PANEL: CPT

## 2023-11-24 PROCEDURE — 96375 TX/PRO/DX INJ NEW DRUG ADDON: CPT

## 2023-11-24 PROCEDURE — 83735 ASSAY OF MAGNESIUM: CPT

## 2023-11-24 PROCEDURE — 93010 ELECTROCARDIOGRAM REPORT: CPT | Performed by: INTERNAL MEDICINE

## 2023-11-24 PROCEDURE — 93005 ELECTROCARDIOGRAM TRACING: CPT | Performed by: EMERGENCY MEDICINE

## 2023-11-24 RX ORDER — ACETAMINOPHEN 325 MG/1
650 TABLET ORAL EVERY 6 HOURS PRN
Status: DISCONTINUED | OUTPATIENT
Start: 2023-11-24 | End: 2023-11-26 | Stop reason: HOSPADM

## 2023-11-24 RX ORDER — PRAMIPEXOLE DIHYDROCHLORIDE 0.25 MG/1
0.5 TABLET ORAL NIGHTLY
Status: DISCONTINUED | OUTPATIENT
Start: 2023-11-25 | End: 2023-11-26 | Stop reason: HOSPADM

## 2023-11-24 RX ORDER — ACETAMINOPHEN 325 MG/1
650 TABLET ORAL ONCE
Status: COMPLETED | OUTPATIENT
Start: 2023-11-24 | End: 2023-11-24

## 2023-11-24 RX ORDER — ASPIRIN 81 MG/1
81 TABLET, CHEWABLE ORAL DAILY
Status: DISCONTINUED | OUTPATIENT
Start: 2023-11-25 | End: 2023-11-26 | Stop reason: HOSPADM

## 2023-11-24 RX ORDER — INSULIN GLARGINE 100 [IU]/ML
14 INJECTION, SOLUTION SUBCUTANEOUS NIGHTLY
Status: DISCONTINUED | OUTPATIENT
Start: 2023-11-25 | End: 2023-11-26

## 2023-11-24 RX ORDER — BENZONATATE 100 MG/1
100 CAPSULE ORAL 3 TIMES DAILY PRN
Status: DISCONTINUED | OUTPATIENT
Start: 2023-11-24 | End: 2023-11-26 | Stop reason: HOSPADM

## 2023-11-24 RX ORDER — SODIUM CHLORIDE 0.9 % (FLUSH) 0.9 %
5-40 SYRINGE (ML) INJECTION EVERY 12 HOURS SCHEDULED
Status: DISCONTINUED | OUTPATIENT
Start: 2023-11-25 | End: 2023-11-26 | Stop reason: HOSPADM

## 2023-11-24 RX ORDER — ATORVASTATIN CALCIUM 40 MG/1
40 TABLET, FILM COATED ORAL NIGHTLY
Status: DISCONTINUED | OUTPATIENT
Start: 2023-11-25 | End: 2023-11-26 | Stop reason: HOSPADM

## 2023-11-24 RX ORDER — ONDANSETRON 2 MG/ML
4 INJECTION INTRAMUSCULAR; INTRAVENOUS EVERY 6 HOURS PRN
Status: DISCONTINUED | OUTPATIENT
Start: 2023-11-24 | End: 2023-11-26 | Stop reason: HOSPADM

## 2023-11-24 RX ORDER — SODIUM CHLORIDE 0.9 % (FLUSH) 0.9 %
5-40 SYRINGE (ML) INJECTION PRN
Status: DISCONTINUED | OUTPATIENT
Start: 2023-11-24 | End: 2023-11-26 | Stop reason: HOSPADM

## 2023-11-24 RX ORDER — DEXAMETHASONE SODIUM PHOSPHATE 10 MG/ML
8 INJECTION, SOLUTION INTRAMUSCULAR; INTRAVENOUS ONCE
Status: COMPLETED | OUTPATIENT
Start: 2023-11-24 | End: 2023-11-24

## 2023-11-24 RX ORDER — LISINOPRIL 10 MG/1
10 TABLET ORAL DAILY
Status: DISCONTINUED | OUTPATIENT
Start: 2023-11-25 | End: 2023-11-26 | Stop reason: HOSPADM

## 2023-11-24 RX ORDER — MAGNESIUM SULFATE 1 G/100ML
1000 INJECTION INTRAVENOUS ONCE
Status: COMPLETED | OUTPATIENT
Start: 2023-11-24 | End: 2023-11-24

## 2023-11-24 RX ORDER — INSULIN LISPRO 100 [IU]/ML
0-4 INJECTION, SOLUTION INTRAVENOUS; SUBCUTANEOUS NIGHTLY
Status: DISCONTINUED | OUTPATIENT
Start: 2023-11-25 | End: 2023-11-26 | Stop reason: HOSPADM

## 2023-11-24 RX ORDER — ONDANSETRON 4 MG/1
4 TABLET, ORALLY DISINTEGRATING ORAL EVERY 8 HOURS PRN
Status: DISCONTINUED | OUTPATIENT
Start: 2023-11-24 | End: 2023-11-26 | Stop reason: HOSPADM

## 2023-11-24 RX ORDER — ACETAMINOPHEN 650 MG/1
650 SUPPOSITORY RECTAL EVERY 6 HOURS PRN
Status: DISCONTINUED | OUTPATIENT
Start: 2023-11-24 | End: 2023-11-26 | Stop reason: HOSPADM

## 2023-11-24 RX ORDER — POLYETHYLENE GLYCOL 3350 17 G/17G
17 POWDER, FOR SOLUTION ORAL DAILY PRN
Status: DISCONTINUED | OUTPATIENT
Start: 2023-11-24 | End: 2023-11-26 | Stop reason: HOSPADM

## 2023-11-24 RX ORDER — SODIUM CHLORIDE 9 MG/ML
INJECTION, SOLUTION INTRAVENOUS PRN
Status: DISCONTINUED | OUTPATIENT
Start: 2023-11-24 | End: 2023-11-26 | Stop reason: HOSPADM

## 2023-11-24 RX ORDER — IPRATROPIUM BROMIDE AND ALBUTEROL SULFATE 2.5; .5 MG/3ML; MG/3ML
1 SOLUTION RESPIRATORY (INHALATION) ONCE
Status: COMPLETED | OUTPATIENT
Start: 2023-11-24 | End: 2023-11-24

## 2023-11-24 RX ORDER — MELOXICAM 15 MG/1
15 TABLET ORAL DAILY
Status: DISCONTINUED | OUTPATIENT
Start: 2023-11-25 | End: 2023-11-26 | Stop reason: HOSPADM

## 2023-11-24 RX ORDER — INSULIN LISPRO 100 [IU]/ML
0-8 INJECTION, SOLUTION INTRAVENOUS; SUBCUTANEOUS
Status: DISCONTINUED | OUTPATIENT
Start: 2023-11-25 | End: 2023-11-26 | Stop reason: HOSPADM

## 2023-11-24 RX ORDER — DILTIAZEM HYDROCHLORIDE 120 MG/1
120 CAPSULE, COATED, EXTENDED RELEASE ORAL NIGHTLY
Status: DISCONTINUED | OUTPATIENT
Start: 2023-11-25 | End: 2023-11-26 | Stop reason: HOSPADM

## 2023-11-24 RX ADMIN — MAGNESIUM SULFATE HEPTAHYDRATE 1000 MG: 1 INJECTION, SOLUTION INTRAVENOUS at 20:27

## 2023-11-24 RX ADMIN — ACETAMINOPHEN 650 MG: 325 TABLET ORAL at 18:57

## 2023-11-24 RX ADMIN — IPRATROPIUM BROMIDE AND ALBUTEROL SULFATE 1 DOSE: 2.5; .5 SOLUTION RESPIRATORY (INHALATION) at 18:57

## 2023-11-24 RX ADMIN — MAGNESIUM SULFATE HEPTAHYDRATE 1000 MG: 1 INJECTION, SOLUTION INTRAVENOUS at 21:52

## 2023-11-24 RX ADMIN — IOPAMIDOL 85 ML: 755 INJECTION, SOLUTION INTRAVENOUS at 20:43

## 2023-11-24 RX ADMIN — DEXAMETHASONE SODIUM PHOSPHATE 8 MG: 10 INJECTION, SOLUTION INTRAMUSCULAR; INTRAVENOUS at 20:21

## 2023-11-24 ASSESSMENT — LIFESTYLE VARIABLES
HOW OFTEN DO YOU HAVE A DRINK CONTAINING ALCOHOL: NEVER
HOW MANY STANDARD DRINKS CONTAINING ALCOHOL DO YOU HAVE ON A TYPICAL DAY: PATIENT DOES NOT DRINK
HOW MANY STANDARD DRINKS CONTAINING ALCOHOL DO YOU HAVE ON A TYPICAL DAY: PATIENT DOES NOT DRINK
HOW OFTEN DO YOU HAVE A DRINK CONTAINING ALCOHOL: NEVER

## 2023-11-24 ASSESSMENT — PAIN - FUNCTIONAL ASSESSMENT: PAIN_FUNCTIONAL_ASSESSMENT: NONE - DENIES PAIN

## 2023-11-24 NOTE — ED NOTES
4884-81 Lead ECG completed and given to Dr. Ross Scott for review.       Aldair Quesada  11/24/23 9744

## 2023-11-24 NOTE — ED PROVIDER NOTES
normally wear oxygen therefore he was started on nasal cannula. His COVID test did come back positive which would explain his symptoms. Chest x-ray did not show any obvious sign of bacterial pneumonia. I did order IV Decadron for the patient due to concern for COVID. He also received IV magnesium due to being low. He received oral Tylenol due to concern for the fever. Upon repeat evaluation he was resting comfortably in the room and agreed with admission. Benefit of admission outweighs the risk. He denies any pain with breathing and story not suggestive of pulmonary embolism at this time and he does report taking Coumadin as prescribed. He was stable for the floor with telemetry. Exclusion criteria - the patient is NOT to be included for SEP-1 Core Measure due to:  Viral etiology found or highly suspected (including COVID-19) without concomitant bacterial infection         I was the primary provider for the patient. The patient tolerated their visit well. The patient and / or the family were informed of the results of any tests, a time was given to answer questions. FINAL IMPRESSION      1. Acute respiratory failure with hypoxia ([address])    2. COVID-19    3. Type 2 diabetes mellitus with microalbuminuria, with long-term current use of insulin ([address])    4.  Essential hypertension          DISPOSITION/PLAN   DISPOSITION Admitted 11/24/2023 09:37:30 PM      PATIENT REFERRED TO:  EJNNIFER Dougherty  [address] [place] [id number] [phone]    Schedule an appointment as soon as possible for a visit        DISCHARGEMEDICATIONS:  Discharge Medication List as of 11/26/2023  2:46 PM        START taking these medications    Details   benzonatate (TESSALON) 100 MG capsule Take 1 capsule by mouth 3 times daily as needed for Cough, Disp-20 capsule, R-0Print      dexamethasone (DECADRON) 6 MG tablet Take 1 tablet by mouth daily for 2 doses, Disp-2 tablet, R-0Print             DISCONTINUED

## 2023-11-25 LAB
25(OH)D3 SERPL-MCNC: 22.6 NG/ML
ALBUMIN SERPL-MCNC: 3.9 G/DL (ref 3.4–5)
ALBUMIN/GLOB SERPL: 1.5 {RATIO} (ref 1.1–2.2)
ALP SERPL-CCNC: 76 U/L (ref 40–129)
ALT SERPL-CCNC: 13 U/L (ref 10–40)
ANION GAP SERPL CALCULATED.3IONS-SCNC: 7 MMOL/L (ref 3–16)
AST SERPL-CCNC: 14 U/L (ref 15–37)
BASOPHILS # BLD: 0 K/UL (ref 0–0.2)
BASOPHILS NFR BLD: 0.1 %
BILIRUB SERPL-MCNC: 0.3 MG/DL (ref 0–1)
BUN SERPL-MCNC: 23 MG/DL (ref 7–20)
CALCIUM SERPL-MCNC: 8.5 MG/DL (ref 8.3–10.6)
CHLORIDE SERPL-SCNC: 101 MMOL/L (ref 99–110)
CO2 SERPL-SCNC: 26 MMOL/L (ref 21–32)
CREAT SERPL-MCNC: 1 MG/DL (ref 0.8–1.3)
CRP SERPL-MCNC: 58.2 MG/L (ref 0–5.1)
DEPRECATED RDW RBC AUTO: 14 % (ref 12.4–15.4)
EOSINOPHIL # BLD: 0 K/UL (ref 0–0.6)
EOSINOPHIL NFR BLD: 0 %
GFR SERPLBLD CREATININE-BSD FMLA CKD-EPI: >60 ML/MIN/{1.73_M2}
GLUCOSE BLD-MCNC: 279 MG/DL (ref 70–99)
GLUCOSE BLD-MCNC: 308 MG/DL (ref 70–99)
GLUCOSE BLD-MCNC: 331 MG/DL (ref 70–99)
GLUCOSE BLD-MCNC: 358 MG/DL (ref 70–99)
GLUCOSE BLD-MCNC: 484 MG/DL (ref 70–99)
GLUCOSE SERPL-MCNC: 301 MG/DL (ref 70–99)
HCT VFR BLD AUTO: 36.3 % (ref 40.5–52.5)
HGB BLD-MCNC: 12.1 G/DL (ref 13.5–17.5)
INR PPP: 1.35 (ref 0.84–1.16)
LYMPHOCYTES # BLD: 0.6 K/UL (ref 1–5.1)
LYMPHOCYTES NFR BLD: 12.4 %
MCH RBC QN AUTO: 29.3 PG (ref 26–34)
MCHC RBC AUTO-ENTMCNC: 33.3 G/DL (ref 31–36)
MCV RBC AUTO: 87.9 FL (ref 80–100)
MONOCYTES # BLD: 0.2 K/UL (ref 0–1.3)
MONOCYTES NFR BLD: 3.5 %
NEUTROPHILS # BLD: 3.7 K/UL (ref 1.7–7.7)
NEUTROPHILS NFR BLD: 84 %
PERFORMED ON: ABNORMAL
PLATELET # BLD AUTO: 119 K/UL (ref 135–450)
PMV BLD AUTO: 9.8 FL (ref 5–10.5)
POTASSIUM SERPL-SCNC: 4.8 MMOL/L (ref 3.5–5.1)
PROCALCITONIN SERPL IA-MCNC: 0.16 NG/ML (ref 0–0.15)
PROT SERPL-MCNC: 6.5 G/DL (ref 6.4–8.2)
PROTHROMBIN TIME: 16.6 SEC (ref 11.5–14.8)
RBC # BLD AUTO: 4.13 M/UL (ref 4.2–5.9)
SODIUM SERPL-SCNC: 134 MMOL/L (ref 136–145)
WBC # BLD AUTO: 4.5 K/UL (ref 4–11)

## 2023-11-25 PROCEDURE — 86140 C-REACTIVE PROTEIN: CPT

## 2023-11-25 PROCEDURE — 94761 N-INVAS EAR/PLS OXIMETRY MLT: CPT

## 2023-11-25 PROCEDURE — 36415 COLL VENOUS BLD VENIPUNCTURE: CPT

## 2023-11-25 PROCEDURE — 6360000002 HC RX W HCPCS: Performed by: INTERNAL MEDICINE

## 2023-11-25 PROCEDURE — 6370000000 HC RX 637 (ALT 250 FOR IP): Performed by: NURSE PRACTITIONER

## 2023-11-25 PROCEDURE — 85610 PROTHROMBIN TIME: CPT

## 2023-11-25 PROCEDURE — XW0DXM6 INTRODUCTION OF BARICITINIB INTO MOUTH AND PHARYNX, EXTERNAL APPROACH, NEW TECHNOLOGY GROUP 6: ICD-10-PCS | Performed by: INTERNAL MEDICINE

## 2023-11-25 PROCEDURE — 6370000000 HC RX 637 (ALT 250 FOR IP): Performed by: INTERNAL MEDICINE

## 2023-11-25 PROCEDURE — 85025 COMPLETE CBC W/AUTO DIFF WBC: CPT

## 2023-11-25 PROCEDURE — 51798 US URINE CAPACITY MEASURE: CPT

## 2023-11-25 PROCEDURE — 2580000003 HC RX 258: Performed by: INTERNAL MEDICINE

## 2023-11-25 PROCEDURE — 84145 PROCALCITONIN (PCT): CPT

## 2023-11-25 PROCEDURE — 82306 VITAMIN D 25 HYDROXY: CPT

## 2023-11-25 PROCEDURE — 80053 COMPREHEN METABOLIC PANEL: CPT

## 2023-11-25 PROCEDURE — 2700000000 HC OXYGEN THERAPY PER DAY

## 2023-11-25 PROCEDURE — 99233 SBSQ HOSP IP/OBS HIGH 50: CPT | Performed by: INTERNAL MEDICINE

## 2023-11-25 PROCEDURE — 1200000000 HC SEMI PRIVATE

## 2023-11-25 RX ORDER — DEXTROSE MONOHYDRATE 100 MG/ML
INJECTION, SOLUTION INTRAVENOUS CONTINUOUS PRN
Status: DISCONTINUED | OUTPATIENT
Start: 2023-11-25 | End: 2023-11-26 | Stop reason: HOSPADM

## 2023-11-25 RX ORDER — INSULIN LISPRO 100 [IU]/ML
10 INJECTION, SOLUTION INTRAVENOUS; SUBCUTANEOUS
Status: DISCONTINUED | OUTPATIENT
Start: 2023-11-25 | End: 2023-11-26 | Stop reason: HOSPADM

## 2023-11-25 RX ORDER — WARFARIN SODIUM 5 MG/1
10 TABLET ORAL
Status: COMPLETED | OUTPATIENT
Start: 2023-11-25 | End: 2023-11-25

## 2023-11-25 RX ORDER — WARFARIN SODIUM 5 MG/1
10 TABLET ORAL
Status: DISCONTINUED | OUTPATIENT
Start: 2023-11-25 | End: 2023-11-25 | Stop reason: CLARIF

## 2023-11-25 RX ADMIN — Medication 10 ML: at 19:51

## 2023-11-25 RX ADMIN — BARICITINIB 4 MG: 2 TABLET, FILM COATED ORAL at 19:50

## 2023-11-25 RX ADMIN — DEXAMETHASONE 6 MG: 4 TABLET ORAL at 09:05

## 2023-11-25 RX ADMIN — INSULIN LISPRO 6 UNITS: 100 INJECTION, SOLUTION INTRAVENOUS; SUBCUTANEOUS at 17:34

## 2023-11-25 RX ADMIN — Medication 10 ML: at 09:06

## 2023-11-25 RX ADMIN — INSULIN LISPRO 8 UNITS: 100 INJECTION, SOLUTION INTRAVENOUS; SUBCUTANEOUS at 11:52

## 2023-11-25 RX ADMIN — PRAMIPEXOLE DIHYDROCHLORIDE 0.5 MG: 0.25 TABLET ORAL at 19:49

## 2023-11-25 RX ADMIN — WARFARIN SODIUM 10 MG: 5 TABLET ORAL at 17:33

## 2023-11-25 RX ADMIN — PRAMIPEXOLE DIHYDROCHLORIDE 0.5 MG: 0.25 TABLET ORAL at 01:09

## 2023-11-25 RX ADMIN — INSULIN LISPRO 4 UNITS: 100 INJECTION, SOLUTION INTRAVENOUS; SUBCUTANEOUS at 09:05

## 2023-11-25 RX ADMIN — ATORVASTATIN CALCIUM 40 MG: 40 TABLET, FILM COATED ORAL at 01:09

## 2023-11-25 RX ADMIN — BARICITINIB 4 MG: 2 TABLET, FILM COATED ORAL at 01:09

## 2023-11-25 RX ADMIN — MELOXICAM 15 MG: 15 TABLET ORAL at 09:05

## 2023-11-25 RX ADMIN — ATORVASTATIN CALCIUM 40 MG: 40 TABLET, FILM COATED ORAL at 19:50

## 2023-11-25 RX ADMIN — INSULIN GLARGINE 14 UNITS: 100 INJECTION, SOLUTION SUBCUTANEOUS at 19:50

## 2023-11-25 RX ADMIN — INSULIN LISPRO 4 UNITS: 100 INJECTION, SOLUTION INTRAVENOUS; SUBCUTANEOUS at 20:06

## 2023-11-25 RX ADMIN — INSULIN LISPRO 4 UNITS: 100 INJECTION, SOLUTION INTRAVENOUS; SUBCUTANEOUS at 01:10

## 2023-11-25 RX ADMIN — ASPIRIN 81 MG: 81 TABLET, CHEWABLE ORAL at 09:05

## 2023-11-25 RX ADMIN — INSULIN LISPRO 10 UNITS: 100 INJECTION, SOLUTION INTRAVENOUS; SUBCUTANEOUS at 11:52

## 2023-11-25 RX ADMIN — INSULIN GLARGINE 14 UNITS: 100 INJECTION, SOLUTION SUBCUTANEOUS at 01:10

## 2023-11-25 RX ADMIN — INSULIN LISPRO 10 UNITS: 100 INJECTION, SOLUTION INTRAVENOUS; SUBCUTANEOUS at 17:33

## 2023-11-25 RX ADMIN — DILTIAZEM HYDROCHLORIDE 120 MG: 120 CAPSULE, COATED, EXTENDED RELEASE ORAL at 20:54

## 2023-11-25 RX ADMIN — DILTIAZEM HYDROCHLORIDE 120 MG: 120 CAPSULE, COATED, EXTENDED RELEASE ORAL at 01:10

## 2023-11-25 RX ADMIN — WARFARIN SODIUM 10 MG: 5 TABLET ORAL at 01:09

## 2023-11-25 NOTE — PLAN OF CARE
Problem: ABCDS Injury Assessment  Goal: Absence of physical injury  11/25/2023 1220 by Jennifer Jamil RN  Outcome: Progressing  11/25/2023 0027 by Bakari Bond RN  Outcome: Progressing     Problem: Discharge Planning  Goal: Discharge to home or other facility with appropriate resources  11/25/2023 1220 by Jennifer Jamil RN  Outcome: Progressing  11/25/2023 0027 by Bakari Bond RN  Outcome: Progressing

## 2023-11-25 NOTE — CONSULTS
Patient came in with a subtherapeutic INR of 1.5 and was originally dosed on 3 mg warfarin daily with no consult. Patient's home dose of warfarin obtained from 11/21/23 Anti-coag clinic visit is warfarin 10 mg on Tues and Sat, and 7.5 mg all other days. I perfect served the hospitalist on record for the night, and asked if they wanted Pharmacy to dose warfarin since patient's INR is sub-therapeutic and the dose ordered is lower than ordered his last clinic visit. Order obtained for Pharmacy to dose warfarin. I ordered a 1x dose of warfarin 10 mg given last night 11/25 @ 0109. This AM his INR is 1.35. Will give another 10 mg of warfarin tonight. Daily pt/INR ordered. Pharmacy Note  Warfarin Consult  Dx: a-fib  Goal INR range 2-3 H  Home Warfarin dose: 10 mg every Tues & Fri, 7.5 mg all other days      Date  INR  Warfarin  1/24/23            1.5                   10 mg (give 1/25 @ 0109)  1/25                 1.35                 10 mg    Recommend Warfarin 10 mg tonight x1. Daily INR ordered. Rx will continue to manage therapy per consult order.

## 2023-11-25 NOTE — H&P
Recent Labs     11/24/23  1828   LACTA 1.5     BNP:   Recent Labs     11/24/23  1828   PROBNP 237*     UA:  Lab Results   Component Value Date/Time    NITRU Negative 11/24/2023 07:30 PM    COLORU Yellow 11/24/2023 07:30 PM    PHUR 6.0 11/24/2023 07:30 PM    WBCUA 0-2 11/24/2023 07:30 PM    RBCUA 3-4 11/24/2023 07:30 PM    MUCUS 2+ 04/02/2015 03:10 AM    BACTERIA Rare 11/24/2023 07:30 PM    CLARITYU Clear 11/24/2023 07:30 PM    SPECGRAV 1.020 11/24/2023 07:30 PM    LEUKOCYTESUR Negative 11/24/2023 07:30 PM    UROBILINOGEN 0.2 11/24/2023 07:30 PM    BILIRUBINUR Negative 11/24/2023 07:30 PM    BILIRUBINUR negative 10/11/2023 01:27 PM    BLOODU TRACE-INTACT 11/24/2023 07:30 PM    GLUCOSEU 250 11/24/2023 07:30 PM    KETUA TRACE 11/24/2023 07:30 PM     Urine Cultures:   Lab Results   Component Value Date/Time    LABURIN >100,000 CFU/ml 10/11/2023 01:31 PM    LABURIN 200 10/26/2022 10:56 AM     Blood Cultures:   Lab Results   Component Value Date/Time    BC No Growth after 4 days of incubation. 02/05/2020 08:54 AM     Lab Results   Component Value Date/Time    BLOODCULT2 No Growth after 4 days of incubation. 02/05/2020 11:27 AM     Organism:   Lab Results   Component Value Date/Time    ORG Klebsiella oxytoca 10/11/2023 01:31 PM       Imaging/Diagnostics Last 24 Hours   CTA PULMONARY W CONTRAST    Result Date: 11/24/2023  No evidence of pulmonary embolism or acute pulmonary abnormality. XR CHEST PORTABLE    Result Date: 11/24/2023  EXAMINATION: ONE XRAY VIEW OF THE CHEST 11/24/2023 6:38 pm COMPARISON: None. HISTORY: ORDERING SYSTEM PROVIDED HISTORY: SOB TECHNOLOGIST PROVIDED HISTORY: Reason for exam:->SOB Reason for Exam: sob FINDINGS: Mild cardiomegaly. No pneumothorax or pleural effusion. No acute infiltrates. Mild cardiomegaly.   No acute cardiopulmonary findings         Electronically signed by Ebony Metz MD on 11/24/2023 at 9:39 PM

## 2023-11-25 NOTE — CONSULTS
Pharmacy to dose baricitinib for COVID-19 infection per Dr. Leelee Michel. SeCr = 1.2    CrCl = 70 ml/min   Hb = 12.5   ANC = 3900  ALC = 400    Start baricitinib 4 mg daily x 14 days.

## 2023-11-26 VITALS
HEIGHT: 69 IN | WEIGHT: 266.3 LBS | BODY MASS INDEX: 39.44 KG/M2 | RESPIRATION RATE: 16 BRPM | TEMPERATURE: 98.2 F | DIASTOLIC BLOOD PRESSURE: 62 MMHG | HEART RATE: 55 BPM | OXYGEN SATURATION: 94 % | SYSTOLIC BLOOD PRESSURE: 159 MMHG

## 2023-11-26 LAB
GLUCOSE BLD-MCNC: 235 MG/DL (ref 70–99)
GLUCOSE BLD-MCNC: 360 MG/DL (ref 70–99)
INR PPP: 1.45 (ref 0.84–1.16)
PERFORMED ON: ABNORMAL
PERFORMED ON: ABNORMAL
PROTHROMBIN TIME: 17.6 SEC (ref 11.5–14.8)

## 2023-11-26 PROCEDURE — 6370000000 HC RX 637 (ALT 250 FOR IP): Performed by: INTERNAL MEDICINE

## 2023-11-26 PROCEDURE — 2700000000 HC OXYGEN THERAPY PER DAY

## 2023-11-26 PROCEDURE — 6360000002 HC RX W HCPCS: Performed by: INTERNAL MEDICINE

## 2023-11-26 PROCEDURE — 99238 HOSP IP/OBS DSCHRG MGMT 30/<: CPT | Performed by: INTERNAL MEDICINE

## 2023-11-26 PROCEDURE — 36415 COLL VENOUS BLD VENIPUNCTURE: CPT

## 2023-11-26 PROCEDURE — 2580000003 HC RX 258: Performed by: INTERNAL MEDICINE

## 2023-11-26 PROCEDURE — 85610 PROTHROMBIN TIME: CPT

## 2023-11-26 PROCEDURE — 94761 N-INVAS EAR/PLS OXIMETRY MLT: CPT

## 2023-11-26 RX ORDER — INSULIN GLARGINE 100 [IU]/ML
20 INJECTION, SOLUTION SUBCUTANEOUS NIGHTLY
Qty: 15 ML | Refills: 1 | Status: SHIPPED
Start: 2023-11-26 | End: 2024-02-24

## 2023-11-26 RX ORDER — INSULIN GLARGINE 100 [IU]/ML
20 INJECTION, SOLUTION SUBCUTANEOUS NIGHTLY
Status: DISCONTINUED | OUTPATIENT
Start: 2023-11-26 | End: 2023-11-26 | Stop reason: HOSPADM

## 2023-11-26 RX ORDER — LISINOPRIL 10 MG/1
10 TABLET ORAL DAILY
Qty: 90 TABLET | Refills: 1 | Status: SHIPPED
Start: 2023-11-26

## 2023-11-26 RX ORDER — BENZONATATE 100 MG/1
100 CAPSULE ORAL 3 TIMES DAILY PRN
Qty: 20 CAPSULE | Refills: 0 | Status: SHIPPED | OUTPATIENT
Start: 2023-11-26 | End: 2023-12-03

## 2023-11-26 RX ORDER — WARFARIN SODIUM 5 MG/1
10 TABLET ORAL
Status: DISCONTINUED | OUTPATIENT
Start: 2023-11-26 | End: 2023-11-26 | Stop reason: HOSPADM

## 2023-11-26 RX ORDER — DEXAMETHASONE 6 MG/1
6 TABLET ORAL DAILY
Qty: 2 TABLET | Refills: 0 | Status: SHIPPED | OUTPATIENT
Start: 2023-11-27 | End: 2023-11-29

## 2023-11-26 RX ADMIN — INSULIN LISPRO 10 UNITS: 100 INJECTION, SOLUTION INTRAVENOUS; SUBCUTANEOUS at 09:17

## 2023-11-26 RX ADMIN — INSULIN LISPRO 2 UNITS: 100 INJECTION, SOLUTION INTRAVENOUS; SUBCUTANEOUS at 09:17

## 2023-11-26 RX ADMIN — INSULIN LISPRO 10 UNITS: 100 INJECTION, SOLUTION INTRAVENOUS; SUBCUTANEOUS at 12:15

## 2023-11-26 RX ADMIN — DEXAMETHASONE 6 MG: 4 TABLET ORAL at 09:16

## 2023-11-26 RX ADMIN — MELOXICAM 15 MG: 15 TABLET ORAL at 09:17

## 2023-11-26 RX ADMIN — ASPIRIN 81 MG: 81 TABLET, CHEWABLE ORAL at 09:17

## 2023-11-26 RX ADMIN — INSULIN LISPRO 8 UNITS: 100 INJECTION, SOLUTION INTRAVENOUS; SUBCUTANEOUS at 12:14

## 2023-11-26 RX ADMIN — Medication 10 ML: at 09:17

## 2023-11-26 NOTE — DISCHARGE SUMMARY
indicated testing frequency plus additional to accommodate PRN testing needs. Qty: 300 strip, Refills: 0    Comments: Brand per patient preference. May round up to next available package size.   Associated Diagnoses: Type 2 diabetes mellitus with microalbuminuria, with long-term current use of insulin ([facility])      Lancets MISC 1 each by Does not apply route daily Check blood sugars 3x daily E11.9  Qty: 300 each, Refills: 5    Associated Diagnoses: Type 2 diabetes mellitus with microalbuminuria, with long-term current use of insulin ([facility])      Continuous Blood Gluc Sensor (FREESTYLE JUSTIN 14 DAY SENSOR) MISC 1 Units by Does not apply route every 14 days  Qty: 2 each, Refills: 11    Associated Diagnoses: Type 2 diabetes mellitus with microalbuminuria, with long-term current use of insulin ([facility])      Continuous Blood Gluc  (FREESTYLE JUSTIN 14 DAY READER) OLI 1 Units by Does not apply route as needed (as needed)  Qty: 1 each, Refills: 5    Associated Diagnoses: Controlled type 2 diabetes mellitus without complication, with long-term current use of insulin ([facility])      Insulin Pen Needle 31G X 8 MM MISC 1 each by Does not apply route 4 times daily  Qty: 200 each, Refills: 3    Associated Diagnoses: Type 2 diabetes mellitus with microalbuminuria, with long-term current use of insulin ([facility])      aspirin 81 MG chewable tablet Take 1 tablet by mouth daily  Qty: 30 tablet, Refills: 3      Insulin Syringe-Needle U-100 30G X 1/2\" 0.5 ML MISC Inject insulin 3 times daily           Current Discharge Medication List        STOP taking these medications       meloxicam (MOBIC) 15 MG tablet Comments:   Reason for Stopping:         traMADol (ULTRAM) 50 MG tablet Comments:   Reason for Stopping:         tiZANidine (ZANAFLEX) 4 MG tablet Comments:   Reason for Stopping:         pantoprazole (PROTONIX) 40 MG tablet Comments:   Reason for Stopping:               Discharge ROS:  A complete review of systems was asked and

## 2023-11-26 NOTE — PROGRESS NOTES
4 Eyes Skin Assessment     NAME:  Yony Hall  YOB: 1950  MEDICAL RECORD NUMBER:  [id number]    The patient is being assessed for  Admission    I agree that at least one RN has performed a thorough Head to Toe Skin Assessment on the patient. ALL assessment sites listed below have been assessed. Areas assessed by both nurses:    Head, Face, Ears, Shoulders, Back, Chest, Arms, Elbows, Hands, Sacrum. Buttock, Coccyx, Ischium, Legs. Feet and Heels, and Under Medical Devices         Does the Patient have a Wound?  No noted wound(s)       Dustin Prevention initiated by RN: No  Wound Care Orders initiated by RN: No    Pressure Injury (Stage 3,4, Unstageable, DTI, NWPT, and Complex wounds) if present, place Wound referral order by RN under : No    New Ostomies, if present place, Ostomy referral order under : No     Nurse 1 eSignature: Electronically signed by Toshia Escoto RN on 11/25/23 at 12:31 AM EST    **SHARE this note so that the co-signing nurse can place an eSignature**    Nurse 2 eSignature: Electronically signed by Renate Young RN on 11/25/23 at 4:25 AM EST
O2 Sat at rest on room air is 95 %. O2 Sat with activity on room air is 96 %. Patient ambulated without difficulty. No shortness of breath.
Patient having difficulty voiding. Bladder scan performed and showed 618 ml of urine. Patient voided 150 ml of urine. New order obtained to [address] [place] [address] cath patient x 1.
Reviewed discharge instructions reviewed with patient, states understanding. Prescriptions for Tessalon and Decadron given to patient.
Telemetry Assignment Communication Form    Patient has orders for continuous telemetry OR pulse oximeter only orders    To be filled out by Clinical    Patient has Admission or Transfer orders and is assigned to Room Number: 225      (Once this top section is completed:  Select \"Route\" and send note to Fax number: [phone] ))      ___________________________________________________________________________      To be filled out by [address] [facility]    Patient assigned to tele box number: __________________               (to be written in by [address] when telemetry box is assigned to patient)    ___________________________________________________________________________      Bedside RN confirming that the box listed above is in fact the telemetry box number being placed on the patient listed above.         X________________________________________ RN signature        __________________________________________RN assigned to Patient (please print)    _______________ Date    ____________ Time
pt HR noted to drop to 30's SB at times on tele. Currently HR 48 SB on tele, /61 map 79, spo2 97% on 2L NC, temp 97.7 oral, pt with no c/o any at this time. Summer Pak notified via perfect serve. Will cont to monitor.
Active Problems:    COVID-19  Plan:   Inpatient COVID protocol  Olumiant a as directed   Decadron 6 milligrams p.o. daily  Pt weaned to room air, continue to monitor closely for change in O2 requirement      Type 2 diabetes mellitus with hyperglycemia, with long-term current use of insulin (HCC)  Plan:   Continue basal insulin as at home (Lantus 14 units nightly)  Medium sliding scale insulin       Paroxysmal atrial fibrillation (HCC)  Plan:   Continue home medications for rate control and anticoagulation with warfarin       Hypomagnesemia  Plan:   Supplemental magnesium has already been administered  Recheck magnesium       Essential hypertension  Plan:   Continue home medications    DVT Prophylaxis: Warfarin    Management discussed with RN,     Electronically signed by: Erick Gee DO, 11/25/2023 9:24 AM

## 2023-11-26 NOTE — PLAN OF CARE
Problem: ABCDS Injury Assessment  Goal: Absence of physical injury  11/25/2023 2327 by Gogo Ashley RN  Outcome: Progressing  11/25/2023 1220 by Emperatriz Sorto RN  Outcome: Progressing     Problem: Discharge Planning  Goal: Discharge to home or other facility with appropriate resources  11/25/2023 2327 by Gogo Ashley RN  Outcome: Progressing  Flowsheets (Taken 11/25/2023 2000)  Discharge to home or other facility with appropriate resources: Identify barriers to discharge with patient and caregiver  11/25/2023 1220 by Emperatriz Sorto RN  Outcome: Progressing     Problem: Pain  Goal: Verbalizes/displays adequate comfort level or baseline comfort level  Outcome: Progressing  Flowsheets (Taken 11/25/2023 1945)  Verbalizes/displays adequate comfort level or baseline comfort level: Encourage patient to monitor pain and request assistance

## 2023-11-26 NOTE — DISCHARGE INSTRUCTIONS
Monitor your blood sugars very carefully as discussed. Increase Lantus to 20 units, if still having high blood sugars increase to 25 unit temporarily. Continue your meal time insulin as we discussed    Be very careful with your diet this next week. Hold your Losartan    If any new or concerning symptoms please call 911 or return to the ER    Warfarin:  Take 10 mg tonight, THEN, return to your home schedule  warfarin 10 mg on Tues and Sat, and 7.5 mg all other days.

## 2023-11-26 NOTE — CARE COORDINATION
Review of chart screened for potential discharge planning needs. CM met with patient at bedside  Role of discharge planner explained with verbalized understanding. No Needs identified by pt/support person for barriers to discharge. Readmission Risk Score: 14%  No discharge needs noted not following. MD and bedside RN please notify CM if needs arise for any discharge interventions.

## 2023-11-27 ENCOUNTER — TELEPHONE (OUTPATIENT)
Dept: FAMILY MEDICINE CLINIC | Age: 73
End: 2023-11-27

## 2023-11-27 ENCOUNTER — CARE COORDINATION (OUTPATIENT)
Dept: CASE MANAGEMENT | Age: 73
End: 2023-11-27

## 2023-11-27 ASSESSMENT — ENCOUNTER SYMPTOMS
VOMITING: 0
ABDOMINAL PAIN: 0
CHEST TIGHTNESS: 1
COUGH: 1
SHORTNESS OF BREATH: 1

## 2023-11-27 NOTE — TELEPHONE ENCOUNTER
Care Transitions Initial Follow Up Call    Outreach made within 2 business days of discharge: Yes    Patient: Richard Navarro Patient : 1950   MRN: [id number]  Reason for Admission: There are no discharge diagnoses documented for the most recent discharge. Discharge Date: 23       Spoke with: CTN to contact patient    Discharge department/facility: [facility]    TCM Interactive Patient Contact:  Was patient able to fill all prescriptions: Yes  Was patient instructed to bring all medications to the follow-up visit: Yes  Is patient taking all medications as directed in the discharge summary?  Yes  Does patient understand their discharge instructions: Yes  Does patient have questions or concerns that need addressed prior to 7-14 day follow up office visit: no    Scheduled appointment with PCP within 7-14 days    Follow Up  Future Appointments   Date Time Provider [facility] [address]   2023 12:40 PM Daniel Trejo MD AND INOCENCIA CONNOR   2023  2:00 PM JENNIFER Ferrell - [place]   2023 10:30 AM JENNIFER Ferrell [place] - [place]   2023 11:00 AM [place]   1/3/2024  2:30 PM Estrella Galeana MD AND PULRADHA CONNOR   2024  2:00 PM Chyna Willis, APRN - KHALIF Toribio LPN

## 2023-11-27 NOTE — CARE COORDINATION
Care Transitions Initial Follow Up Call    Call within 2 business days of discharge: Yes      Patient: Judy Solorio Patient : 1950   MRN: [id number]  Reason for Admission: 2 days -> acute hypoxic resp failure / COVID-19 + , DM2 with hyperglycemia, PAF on warfarin, hypomagnesemia, essential hypertension -> home no needs   Discharge Date: 23 RARS: Readmission Risk Score: 13.8      Last Discharge Facility       Date Complaint Diagnosis Description Type Department Provider    23 Shortness of Breath Acute respiratory failure with hypoxia ([address] ) . .. ED to Hosp-Admission (Discharged) (ADMITTED) [facility] 2 [place] Cami Sheridan MD; Mandy Lou. ..     1st attempt - Unable to reach or leave message. Voice mailbox not set up on either listed number.      Follow Up  Future Appointments   Date Time Provider [address]   2023 12:40 PM Bipin Storm MD AND INOCENCIA CONNOR   2023  2:00 PM JENNIFER Brar  [place] - [place]   2023 10:30 AM JENNIFER Brar  [place] - [place]   2023 11:00 AM [facility] [facility]   1/3/2024  2:30 PM My Patino MD AND PULRADHA CONNOR   2024  2:00 PM Chyna Willis, APRN - CNP [facility]     Luis Alvarenga, RN  Care Transition Nurse  [phone] mobile

## 2023-11-28 ENCOUNTER — CARE COORDINATION (OUTPATIENT)
Dept: CASE MANAGEMENT | Age: 73
End: 2023-11-28

## 2023-11-28 LAB
BACTERIA BLD CULT ORG #2: NORMAL
BACTERIA BLD CULT: NORMAL

## 2023-11-28 NOTE — CARE COORDINATION
Care Transitions Initial Follow Up Call    Call within 2 business days of discharge: Yes      Patient: [facility] [employer] Patient : 1950   MRN: [id number]  Reason for Admission: 2 days -> acute hypoxic resp failure / COVID-19 + , DM2 with hyperglycemia, PAF on warfarin, hypomagnesemia, essential hypertension -> home no needs   Discharge Date: 23 RARS: Readmission Risk Score: 13.8      Last Discharge Facility       Date Complaint Diagnosis Description Type Department Provider    23 Shortness of Breath Acute respiratory failure with hypoxia ([address] ) . .. ED to Hosp-Admission (Discharged) (ADMITTED) [facility] 2 [place] Chioma Ventura MD; Aileen Loyd. ..     2nd/final attempt - Unable to reach or leave message -  no answer. Care transition program closed. Patient has hospital follow up on  - day 11 after DC.      Follow Up  Future Appointments   Date Time Provider [address]   2023 12:40 PM Jose A Espinosa MD AND INOCENCIA CONNOR   2023  2:00 PM JENNIFER Newsome  [place] - [place]   2023 10:30 AM JENNIFER Newsome  [place] - [place]   2023 11:00 AM [facility]   1/3/2024  2:30 PM Karo Rogers MD AND PULM NIKOLAS   2024  2:00 PM Chyna Willis, APRN - KHALIF Guillen RN  Care Transition Nurse  [phone] mobile

## 2023-12-07 ENCOUNTER — TELEPHONE (OUTPATIENT)
Dept: FAMILY MEDICINE CLINIC | Age: 73
End: 2023-12-07

## 2023-12-07 DIAGNOSIS — R80.9 TYPE 2 DIABETES MELLITUS WITH MICROALBUMINURIA, WITH LONG-TERM CURRENT USE OF INSULIN (HCC): ICD-10-CM

## 2023-12-07 DIAGNOSIS — Z79.4 CONTROLLED TYPE 2 DIABETES MELLITUS WITH MILD NONPROLIFERATIVE RETINOPATHY OF BOTH EYES, WITH LONG-TERM CURRENT USE OF INSULIN, MACULAR EDEMA PRESENCE UNSPECIFIED (HCC): ICD-10-CM

## 2023-12-07 DIAGNOSIS — Z79.4 TYPE 2 DIABETES MELLITUS WITH MICROALBUMINURIA, WITH LONG-TERM CURRENT USE OF INSULIN (HCC): ICD-10-CM

## 2023-12-07 DIAGNOSIS — I10 ESSENTIAL HYPERTENSION: ICD-10-CM

## 2023-12-07 DIAGNOSIS — E11.3293 CONTROLLED TYPE 2 DIABETES MELLITUS WITH MILD NONPROLIFERATIVE RETINOPATHY OF BOTH EYES, WITH LONG-TERM CURRENT USE OF INSULIN, MACULAR EDEMA PRESENCE UNSPECIFIED (HCC): ICD-10-CM

## 2023-12-07 DIAGNOSIS — E11.29 TYPE 2 DIABETES MELLITUS WITH MICROALBUMINURIA, WITH LONG-TERM CURRENT USE OF INSULIN (HCC): ICD-10-CM

## 2023-12-07 RX ORDER — LISINOPRIL 10 MG/1
10 TABLET ORAL DAILY
Qty: 90 TABLET | Refills: 1 | Status: SHIPPED | OUTPATIENT
Start: 2023-12-07

## 2023-12-07 RX ORDER — GLIPIZIDE 2.5 MG/1
2.5 TABLET, EXTENDED RELEASE ORAL DAILY
Qty: 90 TABLET | Refills: 1 | Status: SHIPPED | OUTPATIENT
Start: 2023-12-07

## 2023-12-07 NOTE — TELEPHONE ENCOUNTER
.Refill Request     CONFIRM preferred pharmacy with the patient. If Mail Order Rx - Pend for 90 day refill. Last Seen: Last Seen Department: 10/11/2023  Last Seen by PCP: 10/11/2023    Last Written: 5-1-23 90 with 1     If no future appointment scheduled:  Review the last OV with PCP and review information for follow-up visit,  Route STAFF MESSAGE with patient name to the [facility] for scheduling with the following information:            -  Timing of next visit           -  Visit type ie Physical, OV, etc           -  Diagnoses/Reason ie. COPD, HTN - Do not use MEDICATION, Follow-up or CHECK UP - Give reason for visit      Next Appointment:   Future Appointments   Date Time Provider [address]   12/7/2023  2:00 PM Christie De Leon  [place] - [place]   12/14/2023 10:30 AM JENNIFER De Leon  [place] - [place]   12/19/2023 11:00 AM [facility] [facility]   1/3/2024  2:30 PM Alayna Vela MD AND PULM MMA   1/12/2024  2:00 PM Chyna Banks, APRN - CNP [facility]   3/13/2024 12:40 PM Jered Robert MD AND ENDO [facility]       Message sent to [address] [place] [address] to schedule appt with patient?   N/A      Requested Prescriptions     Pending Prescriptions Disp Refills    lisinopril (PRINIVIL;ZESTRIL) 10 MG tablet [Pharmacy Med Name: Lisinopril 10 MG Oral Tablet] 90 tablet 1     Sig: Take 1 tablet by mouth once daily    glipiZIDE (GLUCOTROL XL) 2.5 MG extended release tablet [Pharmacy Med Name: glipiZIDE ER 2.5 MG Oral Tablet Extended Release 24 Hour] 90 tablet 1     Sig: Take 1 tablet by mouth once daily

## 2023-12-07 NOTE — TELEPHONE ENCOUNTER
Called patient to reschedule him for a hospital follow up appointment that he did not show. Patient stated that his appointment is 12/14/23. Patient is scheduled for a diabetes follow up, changed appointment and noted for diabetes A1C.

## 2023-12-07 NOTE — TELEPHONE ENCOUNTER
----- Message from Denise Pelayo sent at 12/7/2023 10:57 AM EST -----  Subject: Refill Request    QUESTIONS  Name of Medication? lisinopril (PRINIVIL;ZESTRIL) 10 MG tablet  Patient-reported dosage and instructions? 10 mg 1 daily  How many days do you have left? 3  Preferred Pharmacy? [facility]  Pharmacy phone number (if available)? [phone]  ---------------------------------------------------------------------------  --------------,  Name of Medication? glipiZIDE (GLUCOTROL XL) 2.5 MG extended release   tablet  Patient-reported dosage and instructions? 2.5 mg 1 Daily  How many days do you have left? 3  Preferred Pharmacy? [facility]  Pharmacy phone number (if available)? [phone]  Additional Information for Provider? Pharmacy needs approval from   ---------------------------------------------------------------------------  --------------  [address]  What is the best way for the office to contact you? OK to leave message on   voicemail  Preferred Call Back Phone Number? [phone]  ---------------------------------------------------------------------------  --------------  SCRIPT ANSWERS  Relationship to Patient? Spouse/Partner  Representative Name? Yoshi Zelaya  Is the representative on the Communication Release of Information (YAMILE)   form in Epic?  Yes

## 2023-12-14 ENCOUNTER — OFFICE VISIT (OUTPATIENT)
Dept: FAMILY MEDICINE CLINIC | Age: 73
End: 2023-12-14
Payer: MEDICARE

## 2023-12-14 VITALS
HEIGHT: 69 IN | BODY MASS INDEX: 40.38 KG/M2 | TEMPERATURE: 97.8 F | HEART RATE: 100 BPM | DIASTOLIC BLOOD PRESSURE: 60 MMHG | SYSTOLIC BLOOD PRESSURE: 108 MMHG | WEIGHT: 272.6 LBS | OXYGEN SATURATION: 96 %

## 2023-12-14 DIAGNOSIS — E11.29 TYPE 2 DIABETES MELLITUS WITH MICROALBUMINURIA, WITH LONG-TERM CURRENT USE OF INSULIN (HCC): Primary | ICD-10-CM

## 2023-12-14 DIAGNOSIS — E83.42 HYPOMAGNESEMIA: ICD-10-CM

## 2023-12-14 DIAGNOSIS — Z79.4 TYPE 2 DIABETES MELLITUS WITH HYPERGLYCEMIA, WITH LONG-TERM CURRENT USE OF INSULIN (HCC): ICD-10-CM

## 2023-12-14 DIAGNOSIS — R80.9 TYPE 2 DIABETES MELLITUS WITH MICROALBUMINURIA, WITH LONG-TERM CURRENT USE OF INSULIN (HCC): Primary | ICD-10-CM

## 2023-12-14 DIAGNOSIS — E11.65 TYPE 2 DIABETES MELLITUS WITH HYPERGLYCEMIA, WITH LONG-TERM CURRENT USE OF INSULIN (HCC): ICD-10-CM

## 2023-12-14 DIAGNOSIS — J96.01 ACUTE HYPOXIC RESPIRATORY FAILURE (HCC): ICD-10-CM

## 2023-12-14 DIAGNOSIS — R42 DIZZINESS: ICD-10-CM

## 2023-12-14 DIAGNOSIS — U07.1 COVID-19: ICD-10-CM

## 2023-12-14 DIAGNOSIS — Z79.4 TYPE 2 DIABETES MELLITUS WITH MICROALBUMINURIA, WITH LONG-TERM CURRENT USE OF INSULIN (HCC): Primary | ICD-10-CM

## 2023-12-14 DIAGNOSIS — Z09 HOSPITAL DISCHARGE FOLLOW-UP: ICD-10-CM

## 2023-12-14 PROBLEM — R55 VASOVAGAL ATTACK: Status: ACTIVE | Noted: 2023-12-14

## 2023-12-14 LAB
CHP ED QC CHECK: NORMAL
GLUCOSE BLD-MCNC: 163 MG/DL

## 2023-12-14 PROCEDURE — G8417 CALC BMI ABV UP PARAM F/U: HCPCS | Performed by: PHYSICIAN ASSISTANT

## 2023-12-14 PROCEDURE — G8484 FLU IMMUNIZE NO ADMIN: HCPCS | Performed by: PHYSICIAN ASSISTANT

## 2023-12-14 PROCEDURE — 1123F ACP DISCUSS/DSCN MKR DOCD: CPT | Performed by: PHYSICIAN ASSISTANT

## 2023-12-14 PROCEDURE — 2022F DILAT RTA XM EVC RTNOPTHY: CPT | Performed by: PHYSICIAN ASSISTANT

## 2023-12-14 PROCEDURE — 99214 OFFICE O/P EST MOD 30 MIN: CPT | Performed by: PHYSICIAN ASSISTANT

## 2023-12-14 PROCEDURE — 3078F DIAST BP <80 MM HG: CPT | Performed by: PHYSICIAN ASSISTANT

## 2023-12-14 PROCEDURE — 82962 GLUCOSE BLOOD TEST: CPT | Performed by: PHYSICIAN ASSISTANT

## 2023-12-14 PROCEDURE — 1111F DSCHRG MED/CURRENT MED MERGE: CPT | Performed by: PHYSICIAN ASSISTANT

## 2023-12-14 PROCEDURE — 3052F HG A1C>EQUAL 8.0%<EQUAL 9.0%: CPT | Performed by: PHYSICIAN ASSISTANT

## 2023-12-14 PROCEDURE — G8427 DOCREV CUR MEDS BY ELIG CLIN: HCPCS | Performed by: PHYSICIAN ASSISTANT

## 2023-12-14 PROCEDURE — 3017F COLORECTAL CA SCREEN DOC REV: CPT | Performed by: PHYSICIAN ASSISTANT

## 2023-12-14 PROCEDURE — 1036F TOBACCO NON-USER: CPT | Performed by: PHYSICIAN ASSISTANT

## 2023-12-14 PROCEDURE — 3074F SYST BP LT 130 MM HG: CPT | Performed by: PHYSICIAN ASSISTANT

## 2023-12-14 PROCEDURE — 93000 ELECTROCARDIOGRAM COMPLETE: CPT | Performed by: PHYSICIAN ASSISTANT

## 2023-12-14 RX ORDER — ROPINIROLE 0.5 MG/1
0.5 TABLET, FILM COATED ORAL NIGHTLY
Status: ON HOLD | COMMUNITY
Start: 2023-12-07

## 2023-12-14 SDOH — ECONOMIC STABILITY: FOOD INSECURITY: WITHIN THE PAST 12 MONTHS, THE FOOD YOU BOUGHT JUST DIDN'T LAST AND YOU DIDN'T HAVE MONEY TO GET MORE.: NEVER TRUE

## 2023-12-14 SDOH — ECONOMIC STABILITY: FOOD INSECURITY: WITHIN THE PAST 12 MONTHS, YOU WORRIED THAT YOUR FOOD WOULD RUN OUT BEFORE YOU GOT MONEY TO BUY MORE.: NEVER TRUE

## 2023-12-14 SDOH — ECONOMIC STABILITY: INCOME INSECURITY: HOW HARD IS IT FOR YOU TO PAY FOR THE VERY BASICS LIKE FOOD, HOUSING, MEDICAL CARE, AND HEATING?: NOT HARD AT ALL

## 2023-12-14 NOTE — PROGRESS NOTES
bowel sounds, no masses or organomegaly  Extremities: no cyanosis, clubbing or edema  Musculoskeletal: normal range of motion, no joint swelling, deformity or tenderness  Neurologic: slurred speech, able to follow one step commands      An electronic signature was used to authenticate this note.   --JENNIFER Rooney

## 2023-12-15 ENCOUNTER — ANESTHESIA (OUTPATIENT)
Dept: CARDIAC CATH/INVASIVE PROCEDURES | Age: 73
End: 2023-12-15
Payer: MEDICARE

## 2023-12-15 ENCOUNTER — ANESTHESIA EVENT (OUTPATIENT)
Dept: CARDIAC CATH/INVASIVE PROCEDURES | Age: 73
End: 2023-12-15
Payer: MEDICARE

## 2023-12-15 PROCEDURE — 2580000003 HC RX 258: Performed by: NURSE ANESTHETIST, CERTIFIED REGISTERED

## 2023-12-15 PROCEDURE — 2500000003 HC RX 250 WO HCPCS: Performed by: NURSE ANESTHETIST, CERTIFIED REGISTERED

## 2023-12-15 PROCEDURE — 6360000002 HC RX W HCPCS: Performed by: NURSE ANESTHETIST, CERTIFIED REGISTERED

## 2023-12-15 RX ORDER — FUROSEMIDE 10 MG/ML
INJECTION INTRAMUSCULAR; INTRAVENOUS PRN
Status: DISCONTINUED | OUTPATIENT
Start: 2023-12-15 | End: 2023-12-15 | Stop reason: SDUPTHER

## 2023-12-15 RX ORDER — PROPOFOL 10 MG/ML
INJECTION, EMULSION INTRAVENOUS PRN
Status: DISCONTINUED | OUTPATIENT
Start: 2023-12-15 | End: 2023-12-15 | Stop reason: SDUPTHER

## 2023-12-15 RX ORDER — EPHEDRINE SULFATE 50 MG/ML
INJECTION INTRAVENOUS PRN
Status: DISCONTINUED | OUTPATIENT
Start: 2023-12-15 | End: 2023-12-15 | Stop reason: SDUPTHER

## 2023-12-15 RX ORDER — LIDOCAINE HYDROCHLORIDE 20 MG/ML
INJECTION, SOLUTION INFILTRATION; PERINEURAL PRN
Status: DISCONTINUED | OUTPATIENT
Start: 2023-12-15 | End: 2023-12-15 | Stop reason: SDUPTHER

## 2023-12-15 RX ORDER — ROCURONIUM BROMIDE 10 MG/ML
INJECTION, SOLUTION INTRAVENOUS PRN
Status: DISCONTINUED | OUTPATIENT
Start: 2023-12-15 | End: 2023-12-15 | Stop reason: SDUPTHER

## 2023-12-15 RX ORDER — SODIUM CHLORIDE 9 MG/ML
INJECTION, SOLUTION INTRAVENOUS CONTINUOUS PRN
Status: DISCONTINUED | OUTPATIENT
Start: 2023-12-15 | End: 2023-12-15 | Stop reason: SDUPTHER

## 2023-12-15 RX ORDER — ONDANSETRON 2 MG/ML
INJECTION INTRAMUSCULAR; INTRAVENOUS PRN
Status: DISCONTINUED | OUTPATIENT
Start: 2023-12-15 | End: 2023-12-15 | Stop reason: SDUPTHER

## 2023-12-15 RX ORDER — PHENYLEPHRINE HCL IN 0.9% NACL 1 MG/10 ML
SYRINGE (ML) INTRAVENOUS PRN
Status: DISCONTINUED | OUTPATIENT
Start: 2023-12-15 | End: 2023-12-15 | Stop reason: SDUPTHER

## 2023-12-15 RX ORDER — DEXAMETHASONE SODIUM PHOSPHATE 4 MG/ML
INJECTION, SOLUTION INTRA-ARTICULAR; INTRALESIONAL; INTRAMUSCULAR; INTRAVENOUS; SOFT TISSUE PRN
Status: DISCONTINUED | OUTPATIENT
Start: 2023-12-15 | End: 2023-12-15 | Stop reason: SDUPTHER

## 2023-12-15 RX ORDER — FENTANYL CITRATE 50 UG/ML
INJECTION, SOLUTION INTRAMUSCULAR; INTRAVENOUS PRN
Status: DISCONTINUED | OUTPATIENT
Start: 2023-12-15 | End: 2023-12-15 | Stop reason: SDUPTHER

## 2023-12-15 RX ORDER — PROTAMINE SULFATE 10 MG/ML
INJECTION, SOLUTION INTRAVENOUS PRN
Status: DISCONTINUED | OUTPATIENT
Start: 2023-12-15 | End: 2023-12-15 | Stop reason: SDUPTHER

## 2023-12-15 RX ADMIN — PROPOFOL 20 MG: 10 INJECTION, EMULSION INTRAVENOUS at 14:04

## 2023-12-15 RX ADMIN — PROPOFOL 150 MG: 10 INJECTION, EMULSION INTRAVENOUS at 10:05

## 2023-12-15 RX ADMIN — FENTANYL CITRATE 100 MCG: 50 INJECTION, SOLUTION INTRAMUSCULAR; INTRAVENOUS at 10:05

## 2023-12-15 RX ADMIN — Medication 200 MCG: at 10:16

## 2023-12-15 RX ADMIN — ROCURONIUM BROMIDE 20 MG: 50 INJECTION, SOLUTION INTRAVENOUS at 10:47

## 2023-12-15 RX ADMIN — PROPOFOL 20 MG: 10 INJECTION, EMULSION INTRAVENOUS at 13:59

## 2023-12-15 RX ADMIN — PHENYLEPHRINE HYDROCHLORIDE 4 MCG/MIN: 10 INJECTION INTRAVENOUS at 10:19

## 2023-12-15 RX ADMIN — LIDOCAINE HYDROCHLORIDE 60 MG: 20 INJECTION, SOLUTION INFILTRATION; PERINEURAL at 10:05

## 2023-12-15 RX ADMIN — Medication 100 MCG: at 12:18

## 2023-12-15 RX ADMIN — EPHEDRINE SULFATE 20 MG: 50 INJECTION INTRAVENOUS at 10:19

## 2023-12-15 RX ADMIN — ROCURONIUM BROMIDE 20 MG: 50 INJECTION, SOLUTION INTRAVENOUS at 12:32

## 2023-12-15 RX ADMIN — ONDANSETRON 4 MG: 2 INJECTION INTRAMUSCULAR; INTRAVENOUS at 13:52

## 2023-12-15 RX ADMIN — PROTAMINE SULFATE 50 MG: 10 INJECTION, SOLUTION INTRAVENOUS at 13:51

## 2023-12-15 RX ADMIN — EPHEDRINE SULFATE 10 MG: 50 INJECTION INTRAVENOUS at 10:16

## 2023-12-15 RX ADMIN — SODIUM CHLORIDE: 9 INJECTION, SOLUTION INTRAVENOUS at 12:22

## 2023-12-15 RX ADMIN — SUGAMMADEX 200 MG: 100 INJECTION, SOLUTION INTRAVENOUS at 13:56

## 2023-12-15 RX ADMIN — FUROSEMIDE 60 MG: 10 INJECTION, SOLUTION INTRAMUSCULAR; INTRAVENOUS at 13:50

## 2023-12-15 RX ADMIN — ROCURONIUM BROMIDE 30 MG: 50 INJECTION, SOLUTION INTRAVENOUS at 11:39

## 2023-12-15 RX ADMIN — EPHEDRINE SULFATE 10 MG: 50 INJECTION INTRAVENOUS at 13:54

## 2023-12-15 RX ADMIN — Medication 100 MCG: at 10:13

## 2023-12-15 RX ADMIN — ROCURONIUM BROMIDE 50 MG: 50 INJECTION, SOLUTION INTRAVENOUS at 10:05

## 2023-12-15 RX ADMIN — SODIUM CHLORIDE: 9 INJECTION, SOLUTION INTRAVENOUS at 09:58

## 2023-12-15 RX ADMIN — DEXAMETHASONE SODIUM PHOSPHATE 8 MG: 4 INJECTION, SOLUTION INTRAMUSCULAR; INTRAVENOUS at 10:54

## 2023-12-15 RX ADMIN — EPHEDRINE SULFATE 10 MG: 50 INJECTION INTRAVENOUS at 12:17

## 2023-12-15 NOTE — ANESTHESIA PRE PROCEDURE
Department of Anesthesiology  Preprocedure Note       Name:  Olegario Members   Age:  68 y.o.  :  1950                                          MRN:  [id number]         Date:  12/15/2023      Surgeon: * No surgeons listed *    Procedure: * No procedures listed *    Medications prior to admission:   Prior to Admission medications    Medication Sig Start Date End Date Taking? Authorizing Provider   rOPINIRole (REQUIP) 0.5 MG tablet Take 1 tablet by mouth nightly at bedtime.  23   Willa Lamas MD   lisinopril (PRINIVIL;ZESTRIL) 10 MG tablet Take 1 tablet by mouth once daily 23   Smith JENNIFER Romo   glipiZIDE (GLUCOTROL XL) 2.5 MG extended release tablet Take 1 tablet by mouth once daily 23   Smith JENNIFER Romo   insulin glargine (LANTUS SOLOSTAR) 100 UNIT/ML injection pen Inject 20 Units into the skin nightly 23  Siria Buenrostro DO   dilTIAZem (CARDIZEM CD) 120 MG extended release capsule TAKE 1 CAPSULE BY MOUTH NIGHTLY 23   SANDY Funez CNP   pramipexole (MIRAPEX) 0.5 MG tablet Take 1 tablet by mouth nightly 10/11/23   JENNIFER De Leon   warfarin (COUMADIN) 5 MG tablet Take 1.5-2 tablets by mouth daily as directed by coumadin clinic 23   JENNIFER De Leon   famotidine (PEPCID) 20 MG tablet Take 1 tablet by mouth daily 23   Alayna Vela MD   atorvastatin (LIPITOR) 40 MG tablet TAKE 1 TABLET BY MOUTH AT BEDTIME 23   Arabella Lizarraga MD   insulin aspart (NOVOLOG FLEXPEN) 100 UNIT/ML injection pen Inject 15 Units into the skin 3 times daily (before meals)  Patient taking differently: Inject 20 Units into the skin 3 times daily (before meals) 23   JENNIFER De Leon   SV IRON 325 MG tablet Take 1 tablet by mouth daily 23   Provider, Historical, MD   Handicap Placard MISC by Does not apply route Exp: 2026   JENNIFER De Leon   diclofenac sodium (VOLTAREN) 1 % GEL Apply 4 g topically 4 times daily Apply to left

## 2023-12-26 ENCOUNTER — TELEPHONE (OUTPATIENT)
Dept: CARDIOLOGY CLINIC | Age: 73
End: 2023-12-26

## 2023-12-26 ENCOUNTER — NURSE ONLY (OUTPATIENT)
Dept: CARDIOLOGY CLINIC | Age: 73
End: 2023-12-26
Payer: MEDICARE

## 2023-12-26 ENCOUNTER — HOSPITAL ENCOUNTER (OUTPATIENT)
Dept: CT IMAGING | Age: 73
Discharge: HOME | End: 2023-12-26
Attending: INTERNAL MEDICINE
Payer: MEDICARE

## 2023-12-26 DIAGNOSIS — R06.02 SHORTNESS OF BREATH: Primary | ICD-10-CM

## 2023-12-26 DIAGNOSIS — R06.02 SHORTNESS OF BREATH: ICD-10-CM

## 2023-12-26 DIAGNOSIS — I48.91 NEW ONSET ATRIAL FIBRILLATION (HCC): Primary | ICD-10-CM

## 2023-12-26 DIAGNOSIS — I48.0 PAROXYSMAL ATRIAL FIBRILLATION (HCC): ICD-10-CM

## 2023-12-26 PROCEDURE — 6360000004 HC RX CONTRAST MEDICATION: Performed by: INTERNAL MEDICINE

## 2023-12-26 PROCEDURE — 93000 ELECTROCARDIOGRAM COMPLETE: CPT

## 2023-12-26 PROCEDURE — 71275 CT ANGIOGRAPHY CHEST: CPT

## 2023-12-26 RX ADMIN — IOPAMIDOL 75 ML: 755 INJECTION, SOLUTION INTRAVENOUS at 16:17

## 2023-12-26 NOTE — TELEPHONE ENCOUNTER
EKG performed. Under cardiology tab. Sent patient over to [facility] for CT scan. CT department said to r/o pericardial effusion, needs to be chest CTA. Ordered as such.      ALEX HOOKER

## 2023-12-26 NOTE — TELEPHONE ENCOUNTER
Patient c/o shortness of breath.  ECG, Chest CT to assess for pericardial effusion and CBC, BNP, and BMP

## 2023-12-26 NOTE — TELEPHONE ENCOUNTER
Pt came into office today for monitor placement. Pt mentioned that since his procedure on 12/14/23, he has been experiencing worsening SOB. Pt denied all other symptoms.  While in office his oxygen was  92% and pulse was 61bpm. Please advise

## 2023-12-26 NOTE — TELEPHONE ENCOUNTER
Víctor Patton, APRN - CNP  [facility] Le[facility]is Fix; Monique Hogan, MA1 minute ago (1:39 PM)     KK  I changed echo to Chest CT .  Thanks         Will relay to patient when he comes in for EKG

## 2023-12-26 NOTE — TELEPHONE ENCOUNTER
Víctor Patton, APRN - CNP  Radha Morse MA; Vipul Martinez Ep1 minute ago (1:31 PM)     KK  Patient needs ECG to assess rhythm and stat echo r/o pericardial effusion. He can get BNP, CBC and BMP too. Can he come back for ECG today or first thing tomorrow? Spoke with patient. He will come at 3 pm today for EKG.

## 2023-12-27 ENCOUNTER — CARE COORDINATION (OUTPATIENT)
Dept: CASE MANAGEMENT | Age: 73
End: 2023-12-27

## 2023-12-27 NOTE — TELEPHONE ENCOUNTER
Michael Rizzo, APRN - CNP  You23 minutes ago (10:04 AM)     KK  Noted. Waiting on lab work.  Thank you

## 2023-12-27 NOTE — TELEPHONE ENCOUNTER
Pts wife called and stated he is still extremely SOB and is very lethargic. Pts wife wondering if pt can be seen sooner than the 25th.  Please advise

## 2023-12-27 NOTE — CARE COORDINATION
Care Transitions Follow Up Call    Patient Current Location: [address] [place] [address] Coordinator contacted the patient by telephone to follow up after admission on . Verified name and  with patient as identifiers. Patient: Yung Rutledge  Patient : 1950   MRN: [id number]  Reason for Admission: Vasovagal attack   Discharge Date: 23 RARS: Readmission Risk Score: 16.7      Needs to be reviewed by the provider   Additional needs identified to be addressed with provider: No  none             Method of communication with provider: none. Spoke with Yung Rutledge who reported that he is doing pretty good. Patient stated that he do have sob at rest and with activity. Patient stated that he will just sit down and rest until he recovers. Abient reported that he had a CT of his Chest yesterday to see if there is any fluid around his heart. Patient stated that he continues to wear the cardiac monitor and denied any issues with it. Patient reported that BS was 135 this am. Patient denied cp, sob, cough, dizziness, headache, n/v, diarrhea, abdominal pains, fever, or chills. Patient report that appetite and fluid intake is good and denied any problems with bowel or bladder. Patient reported that he is taking all medications as ordered. Patient denied any other needs at this time. Patient instructed to continue to monitor s/s, reporting any that may present to MD immediately for early intervention. Patient is agreeable to f/u calls. [facility] provided contact information for future needs. .     Addressed changes since last contact:   CT of chest to check for fluid around the heart    Discussed follow-up appointments. If no appointment was previously scheduled, appointment scheduling offered: Yes. Is follow up appointment scheduled within 7 days of discharge?  No.Wife stated that HFU is 1/3    Follow Up  Future Appointments   Date Time Provider [address]   2023 11:45 AM [facility]

## 2023-12-28 ENCOUNTER — HOSPITAL ENCOUNTER (INPATIENT)
Age: 73
LOS: 7 days | Discharge: HOME | DRG: 243 | End: 2024-01-04
Attending: EMERGENCY MEDICINE | Admitting: STUDENT IN AN ORGANIZED HEALTH CARE EDUCATION/TRAINING PROGRAM
Payer: MEDICARE

## 2023-12-28 ENCOUNTER — NURSE ONLY (OUTPATIENT)
Dept: CARDIOLOGY CLINIC | Age: 73
End: 2023-12-28

## 2023-12-28 ENCOUNTER — TELEPHONE (OUTPATIENT)
Dept: CARDIOLOGY CLINIC | Age: 73
End: 2023-12-28

## 2023-12-28 ENCOUNTER — APPOINTMENT (OUTPATIENT)
Dept: GENERAL RADIOLOGY | Age: 73
DRG: 243 | End: 2023-12-28
Payer: MEDICARE

## 2023-12-28 DIAGNOSIS — R07.9 CHEST PAIN, UNSPECIFIED TYPE: Primary | ICD-10-CM

## 2023-12-28 DIAGNOSIS — R79.1 SUPRATHERAPEUTIC INR: ICD-10-CM

## 2023-12-28 DIAGNOSIS — R06.02 SHORTNESS OF BREATH: ICD-10-CM

## 2023-12-28 DIAGNOSIS — E83.42 HYPOMAGNESEMIA: ICD-10-CM

## 2023-12-28 LAB
ALBUMIN SERPL-MCNC: 4.2 G/DL (ref 3.4–5)
ALBUMIN/GLOB SERPL: 1.5 {RATIO} (ref 1.1–2.2)
ALP SERPL-CCNC: 111 U/L (ref 40–129)
ALT SERPL-CCNC: 13 U/L (ref 10–40)
ANION GAP SERPL CALCULATED.3IONS-SCNC: 7 MMOL/L (ref 3–16)
AST SERPL-CCNC: 15 U/L (ref 15–37)
BASE EXCESS BLDV CALC-SCNC: -0.1 MMOL/L (ref -3–3)
BASOPHILS # BLD: 0.1 K/UL (ref 0–0.2)
BASOPHILS NFR BLD: 0.8 %
BILIRUB SERPL-MCNC: 0.3 MG/DL (ref 0–1)
BILIRUB UR QL STRIP.AUTO: NEGATIVE
BUN SERPL-MCNC: 21 MG/DL (ref 7–20)
CALCIUM SERPL-MCNC: 8.6 MG/DL (ref 8.3–10.6)
CHLORIDE SERPL-SCNC: 102 MMOL/L (ref 99–110)
CLARITY UR: CLEAR
CO2 BLDV-SCNC: 28 MMOL/L
CO2 SERPL-SCNC: 30 MMOL/L (ref 21–32)
COHGB MFR BLDV: 2.5 % (ref 0–1.5)
COLOR UR: YELLOW
CREAT SERPL-MCNC: 1 MG/DL (ref 0.8–1.3)
DEPRECATED RDW RBC AUTO: 14.4 % (ref 12.4–15.4)
EKG ATRIAL RATE: 63 BPM
EKG DIAGNOSIS: NORMAL
EKG P AXIS: 50 DEGREES
EKG P-R INTERVAL: 192 MS
EKG Q-T INTERVAL: 414 MS
EKG QRS DURATION: 82 MS
EKG QTC CALCULATION (BAZETT): 423 MS
EKG R AXIS: 44 DEGREES
EKG T AXIS: 30 DEGREES
EKG VENTRICULAR RATE: 63 BPM
EOSINOPHIL # BLD: 0.2 K/UL (ref 0–0.6)
EOSINOPHIL NFR BLD: 3.3 %
GFR SERPLBLD CREATININE-BSD FMLA CKD-EPI: >60 ML/MIN/{1.73_M2}
GLUCOSE BLD-MCNC: 195 MG/DL (ref 70–99)
GLUCOSE SERPL-MCNC: 225 MG/DL (ref 70–99)
GLUCOSE UR STRIP.AUTO-MCNC: 100 MG/DL
HCO3 BLDV-SCNC: 26.7 MMOL/L (ref 23–29)
HCT VFR BLD AUTO: 37.5 % (ref 40.5–52.5)
HGB BLD-MCNC: 12 G/DL (ref 13.5–17.5)
HGB UR QL STRIP.AUTO: NEGATIVE
INR PPP: 3.02 (ref 0.84–1.16)
KETONES UR STRIP.AUTO-MCNC: NEGATIVE MG/DL
LEUKOCYTE ESTERASE UR QL STRIP.AUTO: NEGATIVE
LYMPHOCYTES # BLD: 1.1 K/UL (ref 1–5.1)
LYMPHOCYTES NFR BLD: 16.6 %
MAGNESIUM SERPL-MCNC: 1.6 MG/DL (ref 1.8–2.4)
MCH RBC QN AUTO: 28.6 PG (ref 26–34)
MCHC RBC AUTO-ENTMCNC: 32 G/DL (ref 31–36)
MCV RBC AUTO: 89.3 FL (ref 80–100)
METHGB MFR BLDV: 0.3 %
MONOCYTES # BLD: 0.4 K/UL (ref 0–1.3)
MONOCYTES NFR BLD: 5.9 %
NEUTROPHILS # BLD: 5 K/UL (ref 1.7–7.7)
NEUTROPHILS NFR BLD: 73.4 %
NITRITE UR QL STRIP.AUTO: NEGATIVE
NT-PROBNP SERPL-MCNC: 138 PG/ML (ref 0–124)
O2 THERAPY: ABNORMAL
PCO2 BLDV: 52.8 MMHG (ref 40–50)
PERFORMED ON: ABNORMAL
PH BLDV: 7.32 [PH] (ref 7.35–7.45)
PH UR STRIP.AUTO: 6 [PH] (ref 5–8)
PLATELET # BLD AUTO: 158 K/UL (ref 135–450)
PMV BLD AUTO: 9.3 FL (ref 5–10.5)
PO2 BLDV: 37.5 MMHG (ref 25–40)
POTASSIUM SERPL-SCNC: 4.5 MMOL/L (ref 3.5–5.1)
PROT SERPL-MCNC: 7 G/DL (ref 6.4–8.2)
PROT UR STRIP.AUTO-MCNC: NEGATIVE MG/DL
PROTHROMBIN TIME: 31.1 SEC (ref 11.5–14.8)
RBC # BLD AUTO: 4.2 M/UL (ref 4.2–5.9)
SAO2 % BLDV: 68 %
SODIUM SERPL-SCNC: 139 MMOL/L (ref 136–145)
SP GR UR STRIP.AUTO: 1.02 (ref 1–1.03)
TROPONIN, HIGH SENSITIVITY: 21 NG/L (ref 0–22)
TROPONIN, HIGH SENSITIVITY: 24 NG/L (ref 0–22)
UA COMPLETE W REFLEX CULTURE PNL UR: ABNORMAL
UA DIPSTICK W REFLEX MICRO PNL UR: ABNORMAL
URN SPEC COLLECT METH UR: ABNORMAL
UROBILINOGEN UR STRIP-ACNC: 0.2 E.U./DL
WBC # BLD AUTO: 6.8 K/UL (ref 4–11)

## 2023-12-28 PROCEDURE — 6370000000 HC RX 637 (ALT 250 FOR IP): Performed by: STUDENT IN AN ORGANIZED HEALTH CARE EDUCATION/TRAINING PROGRAM

## 2023-12-28 PROCEDURE — 84484 ASSAY OF TROPONIN QUANT: CPT

## 2023-12-28 PROCEDURE — 2060000000 HC ICU INTERMEDIATE R&B

## 2023-12-28 PROCEDURE — 85610 PROTHROMBIN TIME: CPT

## 2023-12-28 PROCEDURE — 80053 COMPREHEN METABOLIC PANEL: CPT

## 2023-12-28 PROCEDURE — 99285 EMERGENCY DEPT VISIT HI MDM: CPT

## 2023-12-28 PROCEDURE — 85025 COMPLETE CBC W/AUTO DIFF WBC: CPT

## 2023-12-28 PROCEDURE — 83735 ASSAY OF MAGNESIUM: CPT

## 2023-12-28 PROCEDURE — 81003 URINALYSIS AUTO W/O SCOPE: CPT

## 2023-12-28 PROCEDURE — 93005 ELECTROCARDIOGRAM TRACING: CPT | Performed by: PHYSICIAN ASSISTANT

## 2023-12-28 PROCEDURE — 93010 ELECTROCARDIOGRAM REPORT: CPT | Performed by: INTERNAL MEDICINE

## 2023-12-28 PROCEDURE — 2580000003 HC RX 258: Performed by: STUDENT IN AN ORGANIZED HEALTH CARE EDUCATION/TRAINING PROGRAM

## 2023-12-28 PROCEDURE — 83880 ASSAY OF NATRIURETIC PEPTIDE: CPT

## 2023-12-28 PROCEDURE — 82803 BLOOD GASES ANY COMBINATION: CPT

## 2023-12-28 PROCEDURE — 71045 X-RAY EXAM CHEST 1 VIEW: CPT

## 2023-12-28 PROCEDURE — 6360000002 HC RX W HCPCS: Performed by: STUDENT IN AN ORGANIZED HEALTH CARE EDUCATION/TRAINING PROGRAM

## 2023-12-28 RX ORDER — SUCRALFATE 1 G/1
1 TABLET ORAL EVERY 8 HOURS SCHEDULED
Status: DISCONTINUED | OUTPATIENT
Start: 2023-12-28 | End: 2024-01-04 | Stop reason: HOSPADM

## 2023-12-28 RX ORDER — ROPINIROLE 0.5 MG/1
0.5 TABLET, FILM COATED ORAL NIGHTLY
Status: DISCONTINUED | OUTPATIENT
Start: 2023-12-28 | End: 2024-01-04 | Stop reason: HOSPADM

## 2023-12-28 RX ORDER — DOFETILIDE 0.25 MG/1
250 CAPSULE ORAL EVERY 12 HOURS SCHEDULED
Status: DISCONTINUED | OUTPATIENT
Start: 2023-12-28 | End: 2024-01-04 | Stop reason: HOSPADM

## 2023-12-28 RX ORDER — PANTOPRAZOLE SODIUM 40 MG/1
40 TABLET, DELAYED RELEASE ORAL
Status: DISCONTINUED | OUTPATIENT
Start: 2023-12-29 | End: 2024-01-04 | Stop reason: HOSPADM

## 2023-12-28 RX ORDER — ASPIRIN 81 MG/1
81 TABLET ORAL DAILY
Status: DISCONTINUED | OUTPATIENT
Start: 2023-12-29 | End: 2024-01-04 | Stop reason: HOSPADM

## 2023-12-28 RX ORDER — ACETAMINOPHEN 325 MG/1
650 TABLET ORAL EVERY 6 HOURS PRN
Status: DISCONTINUED | OUTPATIENT
Start: 2023-12-28 | End: 2024-01-04 | Stop reason: HOSPADM

## 2023-12-28 RX ORDER — DEXTROSE MONOHYDRATE 100 MG/ML
INJECTION, SOLUTION INTRAVENOUS CONTINUOUS PRN
Status: DISCONTINUED | OUTPATIENT
Start: 2023-12-28 | End: 2024-01-04 | Stop reason: HOSPADM

## 2023-12-28 RX ORDER — INSULIN LISPRO 100 [IU]/ML
0-4 INJECTION, SOLUTION INTRAVENOUS; SUBCUTANEOUS
Status: DISCONTINUED | OUTPATIENT
Start: 2023-12-28 | End: 2024-01-04 | Stop reason: HOSPADM

## 2023-12-28 RX ORDER — SODIUM CHLORIDE 0.9 % (FLUSH) 0.9 %
5-40 SYRINGE (ML) INJECTION EVERY 12 HOURS SCHEDULED
Status: DISCONTINUED | OUTPATIENT
Start: 2023-12-28 | End: 2024-01-04 | Stop reason: HOSPADM

## 2023-12-28 RX ORDER — SODIUM CHLORIDE 9 MG/ML
INJECTION, SOLUTION INTRAVENOUS PRN
Status: DISCONTINUED | OUTPATIENT
Start: 2023-12-28 | End: 2024-01-04 | Stop reason: HOSPADM

## 2023-12-28 RX ORDER — INSULIN GLARGINE 100 [IU]/ML
10 INJECTION, SOLUTION SUBCUTANEOUS NIGHTLY
Status: DISCONTINUED | OUTPATIENT
Start: 2023-12-28 | End: 2024-01-03

## 2023-12-28 RX ORDER — ACETAMINOPHEN 650 MG/1
650 SUPPOSITORY RECTAL EVERY 6 HOURS PRN
Status: DISCONTINUED | OUTPATIENT
Start: 2023-12-28 | End: 2024-01-04 | Stop reason: HOSPADM

## 2023-12-28 RX ORDER — ONDANSETRON 2 MG/ML
4 INJECTION INTRAMUSCULAR; INTRAVENOUS EVERY 6 HOURS PRN
Status: DISCONTINUED | OUTPATIENT
Start: 2023-12-28 | End: 2023-12-29 | Stop reason: SDUPTHER

## 2023-12-28 RX ORDER — MAGNESIUM SULFATE IN WATER 40 MG/ML
2000 INJECTION, SOLUTION INTRAVENOUS ONCE
Status: COMPLETED | OUTPATIENT
Start: 2023-12-28 | End: 2023-12-28

## 2023-12-28 RX ORDER — POLYETHYLENE GLYCOL 3350 17 G/17G
17 POWDER, FOR SOLUTION ORAL DAILY PRN
Status: DISCONTINUED | OUTPATIENT
Start: 2023-12-28 | End: 2023-12-29 | Stop reason: SDUPTHER

## 2023-12-28 RX ORDER — INSULIN LISPRO 100 [IU]/ML
0-4 INJECTION, SOLUTION INTRAVENOUS; SUBCUTANEOUS NIGHTLY
Status: DISCONTINUED | OUTPATIENT
Start: 2023-12-28 | End: 2024-01-04 | Stop reason: HOSPADM

## 2023-12-28 RX ORDER — PRAMIPEXOLE DIHYDROCHLORIDE 0.25 MG/1
0.5 TABLET ORAL NIGHTLY
Status: DISCONTINUED | OUTPATIENT
Start: 2023-12-28 | End: 2024-01-04 | Stop reason: HOSPADM

## 2023-12-28 RX ORDER — SODIUM CHLORIDE 0.9 % (FLUSH) 0.9 %
5-40 SYRINGE (ML) INJECTION PRN
Status: DISCONTINUED | OUTPATIENT
Start: 2023-12-28 | End: 2024-01-04 | Stop reason: HOSPADM

## 2023-12-28 RX ORDER — ONDANSETRON 4 MG/1
4 TABLET, ORALLY DISINTEGRATING ORAL EVERY 8 HOURS PRN
Status: DISCONTINUED | OUTPATIENT
Start: 2023-12-28 | End: 2024-01-04 | Stop reason: HOSPADM

## 2023-12-28 RX ORDER — ATORVASTATIN CALCIUM 40 MG/1
40 TABLET, FILM COATED ORAL NIGHTLY
Status: DISCONTINUED | OUTPATIENT
Start: 2023-12-28 | End: 2024-01-04 | Stop reason: HOSPADM

## 2023-12-28 RX ADMIN — INSULIN GLARGINE 10 UNITS: 100 INJECTION, SOLUTION SUBCUTANEOUS at 20:51

## 2023-12-28 RX ADMIN — SUCRALFATE 1 G: 1 TABLET ORAL at 20:50

## 2023-12-28 RX ADMIN — MAGNESIUM SULFATE IN WATER 2000 MG: 40 INJECTION, SOLUTION INTRAVENOUS at 17:58

## 2023-12-28 RX ADMIN — ROPINIROLE HYDROCHLORIDE 0.5 MG: 0.5 TABLET, FILM COATED ORAL at 20:50

## 2023-12-28 RX ADMIN — Medication 10 ML: at 20:54

## 2023-12-28 RX ADMIN — DOFETILIDE 250 MCG: 0.25 CAPSULE ORAL at 20:50

## 2023-12-28 RX ADMIN — ATORVASTATIN CALCIUM 40 MG: 40 TABLET, FILM COATED ORAL at 20:50

## 2023-12-28 RX ADMIN — PRAMIPEXOLE DIHYDROCHLORIDE 0.5 MG: 0.25 TABLET ORAL at 20:50

## 2023-12-28 ASSESSMENT — PAIN - FUNCTIONAL ASSESSMENT: PAIN_FUNCTIONAL_ASSESSMENT: NONE - DENIES PAIN

## 2023-12-28 NOTE — ED NOTES
Pt able to ambulate in hallway without Oxygen. O2 stayed 93% and above. HR stayed between 80-94 bpm. Pt had no concerns and appeared to be in no distress. Provider notified.. Will continue to monitor.

## 2023-12-28 NOTE — TELEPHONE ENCOUNTER
I spoke with patient's wife who states the patient has been SOB, no energy since he was discharged from the hospital recently. He had a ECG two days ago showing SR. His Chest CTA was negative for pulmonary edema or any acute cardiac/pulmonary process. She notes that he seemed lethargic yesterday and slept \"a lot\". She called the office yesterday and was advised to go to the ER but her  did not want to go. I spoke with Dr Elsie Quiroz who also advised the patient to go to the ER for evaluation. Wife is agreeable to plan.      Karl Gaona [facility]

## 2023-12-28 NOTE — TELEPHONE ENCOUNTER
Stephanie from [employer] ( formerly [employer]) states they have tried reaching out to pt about a discontinued pocket ECG device. Stephanie states pt needs to return the device, they have tried contacting  pt but are unable to reach pt. Phone number for customer service is [phone], opt 1.

## 2023-12-28 NOTE — H&P
V2.0  History and Physical      Name:  London Swenson /Age/Sex: 1950  (73 y.o. male)   MRN & CSN:  [id number] & [id number] Encounter Date/Time: 2023 5:48 PM EST   Location:  [id number] PCP: Constance Yancey PA       Hospital Day: 1      History of Present Illness:     Chief Complaint: SOB, chest pain    London Swenson is a 73 y.o. male with PMH of  obesity, HTN, HLD, DM2, CAD, Afib, and dementia  who presents with SOB and on/off chest pain since he had the ablation procedure 2 weeks ago. Pt had CTA 2023 which was unremarkable. He was at cardiology clinic and was advised to come to ER for admission and further evaluation. Describes pain as pressure like constant but worse on exertion associated with SOB. No diaphoresis.     At ED, pt was afebrile, hemodynamically stable but had drop HR 40's; on room air. Labs reassuring just Mg 12.6. CXR no acute changes. ECG no ischemic changes.     Assessment and Plan:   Chest pain, SOB. Since ablation procedure this month. No clear etiology.   Troponin negative, no ischemic changes on ECG, BNP low but pt is obese which may be false negative, CXR no acute changes.   - cardiology consulted  - ASA, statin  - monitor on tele  - NPO midnight    Atrial fibrillation, tachy-carlos syndrome.  S/p ablation 12/15.  EP started dofetilide   Came w INR ~3  - resume dofetilide   - pt on home prophylactic PPI x4 wks and sucralfate x2 weeks per EP; resume   - cardiology consulted as above  - consult pharmacy for warfarin dosing  - monitor on tele    HTN. Not any home BP meds as they were d/lilly recently.   - monitor     CAD.  pt not taking ASA anymore.  Continue statin.     DM2.   A1c 8.5.  on home Lantus and aspart w meals.  - will do Lantus 10U QHS (decreased home dose)  - Lispro 5U TID  - LDSSI  - monitor and increase as appropriate      ESTHER.  Home CPAP.    Personally reviewed lab studies and imaging.  EKG interpreted personally and results as above.  Imaging that was  interpreted personally and results as above.  Drugs that require monitoring for toxicity include warfarin and the method of monitoring was cbc, INR    Disposition:   Current Living situation: home  Expected Disposition: TBD  Estimated D/C: 1-2 days    Diet ADULT DIET; Regular; 4 carb choices (60 gm/meal); Low Fat/Low Chol/High Fiber/RUDOLPH  Diet NPO Exceptions are: Ice Chips, Sips of Water with Meds   DVT Prophylaxis [x] Warfarin, []  Heparin, [] SCDs, [] Ambulation,  [] Eliquis, [] Xarelto   Code Status Full Code   Surrogate Decision Maker/ POA      History from:     patient     Review of Systems: Need 10 Elements   See above           Objective:     Intake/Output Summary (Last 24 hours) at 12/28/2023 2150  Last data filed at 12/28/2023 2021  Gross per 24 hour   Intake 240 ml   Output 0 ml   Net 240 ml      Vitals:   Vitals:    12/28/23 1728 12/28/23 1757 12/28/23 1845 12/28/23 2048   BP: (!) 148/81 (!) 173/93 (!) 159/82 137/80   Pulse: 58 62 61 57   Resp: 12 15 18 20   Temp:   98.4 °F (36.9 °C) 98.5 °F (36.9 °C)   TempSrc:   Oral Oral   SpO2: 94% 94% 94% 94%   Weight:       Height:           Medications Prior to Admission     Prior to Admission medications    Medication Sig Start Date End Date Taking? Authorizing Provider   dofetilide (TIKOSYN) 250 MCG capsule Take 1 capsule by mouth every 12 hours 12/18/23   Ericka Valdez APRN - CNP   pantoprazole (PROTONIX) 40 MG tablet Take 1 tablet by mouth 2 times daily (before meals) 12/18/23   Ericka Valdez APRN - CNP   sucralfate (CARAFATE) 1 GM tablet Take 1 tablet by mouth every 8 hours for 12 days 12/18/23 12/30/23  Ericka Valdez APRN - CNP   metFORMIN (GLUCOPHAGE) 500 MG tablet Take 1 tablet by mouth 2 times daily (with meals) 12/18/23   Chico Ricketts MD   rOPINIRole (REQUIP) 0.5 MG tablet Take 1 tablet by mouth nightly at bedtime. 12/7/23   Provider, MD Willa   insulin glargine (LANTUS SOLOSTAR) 100 UNIT/ML injection pen Inject 20 Units into the skin

## 2023-12-28 NOTE — ED PROVIDER NOTES
[facility]  ED  EMERGENCY DEPARTMENT ENCOUNTER        Pt Name: London Swenson  MRN: [id number]  Birthdate 1950  Date of evaluation: 12/28/2023  Provider: Luisito Seth PA-C  PCP: Constance Yancey PA  Note Started: 12:25 PM EST 12/28/23       I have seen and evaluated this patient with my supervising physician Tremaine Reddy MD.      CHIEF COMPLAINT       Chief Complaint   Patient presents with    Shortness of Breath     Patient to the ED for shortness of breath that has been going on for a few weeks. Reports \"you'll have to ask my wife why I am here\". States his doctor told him to come here and then follow up with them.          HISTORY OF PRESENT ILLNESS: 1 or more Elements     History From: Patient and wife    London Swenson is a 73 y.o. male who presents to the emergency department at request of cardiology office.  The patient contacted the office and relating information of increased fatigue, sleeping more than usual, chest pain, shortness of breath. but worse with exertion and decreased energy.  Referred to ED for an evaluation.  Also noting some lower extremity edema.    Patient was admitted this facility with A-fib and other health issues from a cardiac standpoint on 12/14/2023 discharged 12/18/2023.  It was on 12/15/2023 that Dr. Solorio performed ablation for A-fib.  Patient with continued complaint of shortness of breath and some mild chest discomfort and underwent CTA PE study on 12/26/2023 which was negative for acute process.    At this time the patient reports no respiratory complaints with regard to congestion or cough.  No nasal congestion and postnasal drainage.  No fevers or chills.  He indicates no gastrointestinal or urinary complaints.    Nursing Notes were all reviewed and agreed with or any disagreements were addressed in the HPI.    REVIEW OF SYSTEMS :      Review of Systems    Positives and Pertinent negatives as per HPI.     SURGICAL HISTORY     Past  Primary Clinician of Record.  FINAL IMPRESSION      1. Chest pain, unspecified type    2. Shortness of breath    3. Supratherapeutic INR    4. Hypomagnesemia          DISPOSITION/PLAN     DISPOSITION Admitted 12/28/2023 05:48:31 PM      PATIENT REFERRED TO:  No follow-up provider specified.    DISCHARGE MEDICATIONS:  New Prescriptions    No medications on file       DISCONTINUED MEDICATIONS:  Discontinued Medications    No medications on file              (Please note that portions of this note were completed with a voice recognition program.  Efforts were made to edit the dictations but occasionally words are mis-transcribed.)    Luisito Seth PA-C (electronically signed)        Luisito Seth PA-C  12/28/23 7805

## 2023-12-28 NOTE — TELEPHONE ENCOUNTER
Patient is currently in the emergency room. I will route this message to the staff pool to advise and call the patient after he is discharged.

## 2023-12-28 NOTE — ED NOTES
Pt tried to provide urine sample, but states he cannot void yet. Refusing straight cath at this time. States he will try again soon.

## 2023-12-28 NOTE — ED PROVIDER NOTES
I independently performed a history and physical on London Swenson.   All diagnostic, treatment, and disposition decisions were made by myself in conjunction with the advanced practice provider. I personally saw the patient and performed a substantive portion of the visit including all aspects of the medical decision making.      For further details of London Swenson's emergency department encounter, please see JENNIFER Tony's documentation.    Patient is a 73-year-old male presenting today due to concern for feeling more short of breath with exertion over the past couple of weeks along with fatigue but also having chest pain today which was a new concern.  He called his cardiologist who recommended he go to the emergency department.  He recently had a cardiac ablation on December 15, 2023 due to concern for tachycardia/bradycardia syndrome.  He is taking Coumadin.  He denies any history of blood clots.  Currently his chest pain-free.  He denies any radiation of the pain when it is on.  He had a coronary catheterization a couple years ago showing concern for 60% lesion in the left circumflex artery along with mild to moderate disease elsewhere but at that point no stents were placed.  Due to concern for increasing shortness of breath with new chest pain, he came to the ED for further evaluation.    Physical:   Gen: No acute distress. AOx3.  Psych: Normal mood and affect  HEENT: NCAT, MMM  Neck: supple  Cardiac: RRR, pulses 2+ with upper extremities  Lungs: C2AB, no R/R/W  Abdomen: soft and nontender with no R/D/G  Neuro: GCS equals 15, moving all extremities appropriately    The Ekg interpreted by me shows  normal sinus rhythm with a rate of 63  Axis is   Normal  QTc is  normal  Intervals and Durations are unremarkable.      ST Segments: no acute change and nonspecific changes  No significant change from prior EKG dated - 12/26/23  No STEMI, low voltage QRS noted today similar to old EKG         I was the

## 2023-12-29 ENCOUNTER — ANESTHESIA (OUTPATIENT)
Dept: CARDIAC CATH/INVASIVE PROCEDURES | Age: 73
End: 2023-12-29
Payer: MEDICARE

## 2023-12-29 ENCOUNTER — APPOINTMENT (OUTPATIENT)
Dept: CARDIAC CATH/INVASIVE PROCEDURES | Age: 73
DRG: 243 | End: 2023-12-29
Payer: MEDICARE

## 2023-12-29 ENCOUNTER — APPOINTMENT (OUTPATIENT)
Dept: CT IMAGING | Age: 73
DRG: 243 | End: 2023-12-29
Payer: MEDICARE

## 2023-12-29 ENCOUNTER — ANESTHESIA EVENT (OUTPATIENT)
Dept: CARDIAC CATH/INVASIVE PROCEDURES | Age: 73
End: 2023-12-29
Payer: MEDICARE

## 2023-12-29 PROBLEM — Z95.0 PACEMAKER: Status: ACTIVE | Noted: 2023-12-29

## 2023-12-29 LAB
ANION GAP SERPL CALCULATED.3IONS-SCNC: 4 MMOL/L (ref 3–16)
BASOPHILS # BLD: 0 K/UL (ref 0–0.2)
BASOPHILS NFR BLD: 0.7 %
BUN SERPL-MCNC: 16 MG/DL (ref 7–20)
CALCIUM SERPL-MCNC: 8.6 MG/DL (ref 8.3–10.6)
CHLORIDE SERPL-SCNC: 104 MMOL/L (ref 99–110)
CO2 SERPL-SCNC: 32 MMOL/L (ref 21–32)
CREAT SERPL-MCNC: 0.7 MG/DL (ref 0.8–1.3)
DEPRECATED RDW RBC AUTO: 14.1 % (ref 12.4–15.4)
EOSINOPHIL # BLD: 0.2 K/UL (ref 0–0.6)
EOSINOPHIL NFR BLD: 3.8 %
GFR SERPLBLD CREATININE-BSD FMLA CKD-EPI: >60 ML/MIN/{1.73_M2}
GLUCOSE BLD-MCNC: 103 MG/DL (ref 70–99)
GLUCOSE BLD-MCNC: 125 MG/DL (ref 70–99)
GLUCOSE BLD-MCNC: 307 MG/DL (ref 70–99)
GLUCOSE BLD-MCNC: 361 MG/DL (ref 70–99)
GLUCOSE SERPL-MCNC: 124 MG/DL (ref 70–99)
HCT VFR BLD AUTO: 34 % (ref 40.5–52.5)
HGB BLD-MCNC: 11.2 G/DL (ref 13.5–17.5)
INR PPP: 2.69 (ref 0.84–1.16)
LYMPHOCYTES # BLD: 1.1 K/UL (ref 1–5.1)
LYMPHOCYTES NFR BLD: 18.9 %
MAGNESIUM SERPL-MCNC: 1.8 MG/DL (ref 1.8–2.4)
MCH RBC QN AUTO: 29.3 PG (ref 26–34)
MCHC RBC AUTO-ENTMCNC: 33.1 G/DL (ref 31–36)
MCV RBC AUTO: 88.7 FL (ref 80–100)
MONOCYTES # BLD: 0.4 K/UL (ref 0–1.3)
MONOCYTES NFR BLD: 7.8 %
NEUTROPHILS # BLD: 3.9 K/UL (ref 1.7–7.7)
NEUTROPHILS NFR BLD: 68.8 %
PERFORMED ON: ABNORMAL
PLATELET # BLD AUTO: 146 K/UL (ref 135–450)
PMV BLD AUTO: 9.4 FL (ref 5–10.5)
POTASSIUM SERPL-SCNC: 3.9 MMOL/L (ref 3.5–5.1)
PROTHROMBIN TIME: 28.5 SEC (ref 11.5–14.8)
RBC # BLD AUTO: 3.83 M/UL (ref 4.2–5.9)
SODIUM SERPL-SCNC: 140 MMOL/L (ref 136–145)
WBC # BLD AUTO: 5.6 K/UL (ref 4–11)

## 2023-12-29 PROCEDURE — 2580000003 HC RX 258: Performed by: INTERNAL MEDICINE

## 2023-12-29 PROCEDURE — 36415 COLL VENOUS BLD VENIPUNCTURE: CPT

## 2023-12-29 PROCEDURE — 3E0102A INTRODUCTION OF ANTI-INFECTIVE ENVELOPE INTO SUBCUTANEOUS TISSUE, OPEN APPROACH: ICD-10-PCS | Performed by: INTERNAL MEDICINE

## 2023-12-29 PROCEDURE — 2580000003 HC RX 258: Performed by: STUDENT IN AN ORGANIZED HEALTH CARE EDUCATION/TRAINING PROGRAM

## 2023-12-29 PROCEDURE — C1898 LEAD, PMKR, OTHER THAN TRANS: HCPCS

## 2023-12-29 PROCEDURE — 74174 CTA ABD&PLVS W/CONTRAST: CPT

## 2023-12-29 PROCEDURE — 7100000001 HC PACU RECOVERY - ADDTL 15 MIN: Performed by: ANESTHESIOLOGY

## 2023-12-29 PROCEDURE — C1769 GUIDE WIRE: HCPCS | Performed by: INTERNAL MEDICINE

## 2023-12-29 PROCEDURE — C1894 INTRO/SHEATH, NON-LASER: HCPCS | Performed by: INTERNAL MEDICINE

## 2023-12-29 PROCEDURE — 99223 1ST HOSP IP/OBS HIGH 75: CPT | Performed by: INTERNAL MEDICINE

## 2023-12-29 PROCEDURE — 85610 PROTHROMBIN TIME: CPT

## 2023-12-29 PROCEDURE — 6370000000 HC RX 637 (ALT 250 FOR IP): Performed by: INTERNAL MEDICINE

## 2023-12-29 PROCEDURE — C1785 PMKR, DUAL, RATE-RESP: HCPCS

## 2023-12-29 PROCEDURE — 2060000000 HC ICU INTERMEDIATE R&B

## 2023-12-29 PROCEDURE — 2700000000 HC OXYGEN THERAPY PER DAY

## 2023-12-29 PROCEDURE — 7100000000 HC PACU RECOVERY - FIRST 15 MIN: Performed by: ANESTHESIOLOGY

## 2023-12-29 PROCEDURE — 6370000000 HC RX 637 (ALT 250 FOR IP)

## 2023-12-29 PROCEDURE — 6370000000 HC RX 637 (ALT 250 FOR IP): Performed by: STUDENT IN AN ORGANIZED HEALTH CARE EDUCATION/TRAINING PROGRAM

## 2023-12-29 PROCEDURE — 02H63JZ INSERTION OF PACEMAKER LEAD INTO RIGHT ATRIUM, PERCUTANEOUS APPROACH: ICD-10-PCS | Performed by: INTERNAL MEDICINE

## 2023-12-29 PROCEDURE — 83735 ASSAY OF MAGNESIUM: CPT

## 2023-12-29 PROCEDURE — 2500000003 HC RX 250 WO HCPCS: Performed by: NURSE ANESTHETIST, CERTIFIED REGISTERED

## 2023-12-29 PROCEDURE — 2580000003 HC RX 258: Performed by: NURSE ANESTHETIST, CERTIFIED REGISTERED

## 2023-12-29 PROCEDURE — 33208 INSRT HEART PM ATRIAL & VENT: CPT

## 2023-12-29 PROCEDURE — 6360000002 HC RX W HCPCS: Performed by: NURSE ANESTHETIST, CERTIFIED REGISTERED

## 2023-12-29 PROCEDURE — 2500000003 HC RX 250 WO HCPCS

## 2023-12-29 PROCEDURE — 6360000004 HC RX CONTRAST MEDICATION: Performed by: INTERNAL MEDICINE

## 2023-12-29 PROCEDURE — 0JH606Z INSERTION OF PACEMAKER, DUAL CHAMBER INTO CHEST SUBCUTANEOUS TISSUE AND FASCIA, OPEN APPROACH: ICD-10-PCS | Performed by: INTERNAL MEDICINE

## 2023-12-29 PROCEDURE — 94761 N-INVAS EAR/PLS OXIMETRY MLT: CPT

## 2023-12-29 PROCEDURE — 3700000000 HC ANESTHESIA ATTENDED CARE: Performed by: ANESTHESIOLOGY

## 2023-12-29 PROCEDURE — 2709999900 HC NON-CHARGEABLE SUPPLY

## 2023-12-29 PROCEDURE — 33999 UNLISTED PX CARDIAC SURGERY: CPT

## 2023-12-29 PROCEDURE — 6360000002 HC RX W HCPCS: Performed by: INTERNAL MEDICINE

## 2023-12-29 PROCEDURE — 02HK3JZ INSERTION OF PACEMAKER LEAD INTO RIGHT VENTRICLE, PERCUTANEOUS APPROACH: ICD-10-PCS | Performed by: INTERNAL MEDICINE

## 2023-12-29 PROCEDURE — C1887 CATHETER, GUIDING: HCPCS

## 2023-12-29 PROCEDURE — 02WA3MZ REVISION OF CARDIAC LEAD IN HEART, PERCUTANEOUS APPROACH: ICD-10-PCS | Performed by: INTERNAL MEDICINE

## 2023-12-29 PROCEDURE — 6360000002 HC RX W HCPCS

## 2023-12-29 PROCEDURE — 2709999900 HC NON-CHARGEABLE SUPPLY: Performed by: INTERNAL MEDICINE

## 2023-12-29 PROCEDURE — 80048 BASIC METABOLIC PNL TOTAL CA: CPT

## 2023-12-29 PROCEDURE — 3700000001 HC ADD 15 MINUTES (ANESTHESIA): Performed by: ANESTHESIOLOGY

## 2023-12-29 PROCEDURE — 2580000003 HC RX 258

## 2023-12-29 PROCEDURE — 33208 INSRT HEART PM ATRIAL & VENT: CPT | Performed by: INTERNAL MEDICINE

## 2023-12-29 PROCEDURE — 71275 CT ANGIOGRAPHY CHEST: CPT

## 2023-12-29 PROCEDURE — 85025 COMPLETE CBC W/AUTO DIFF WBC: CPT

## 2023-12-29 PROCEDURE — 93228 REMOTE 30 DAY ECG REV/REPORT: CPT | Performed by: INTERNAL MEDICINE

## 2023-12-29 PROCEDURE — 33206 INSERT HEART PM ATRIAL: CPT

## 2023-12-29 RX ORDER — WARFARIN SODIUM 7.5 MG/1
7.5 TABLET ORAL
Status: ACTIVE | OUTPATIENT
Start: 2023-12-29 | End: 2023-12-30

## 2023-12-29 RX ORDER — HYDROMORPHONE HYDROCHLORIDE 1 MG/ML
0.5 INJECTION, SOLUTION INTRAMUSCULAR; INTRAVENOUS; SUBCUTANEOUS
Status: DISCONTINUED | OUTPATIENT
Start: 2023-12-29 | End: 2024-01-04 | Stop reason: HOSPADM

## 2023-12-29 RX ORDER — SODIUM CHLORIDE 0.9 % (FLUSH) 0.9 %
5-40 SYRINGE (ML) INJECTION PRN
Status: DISCONTINUED | OUTPATIENT
Start: 2023-12-29 | End: 2023-12-29 | Stop reason: SDUPTHER

## 2023-12-29 RX ORDER — HYDROMORPHONE HYDROCHLORIDE 1 MG/ML
0.5 INJECTION, SOLUTION INTRAMUSCULAR; INTRAVENOUS; SUBCUTANEOUS EVERY 5 MIN PRN
Status: DISCONTINUED | OUTPATIENT
Start: 2023-12-29 | End: 2023-12-29 | Stop reason: SDUPTHER

## 2023-12-29 RX ORDER — PHENYLEPHRINE HCL IN 0.9% NACL 1 MG/10 ML
SYRINGE (ML) INTRAVENOUS PRN
Status: DISCONTINUED | OUTPATIENT
Start: 2023-12-29 | End: 2023-12-29 | Stop reason: SDUPTHER

## 2023-12-29 RX ORDER — PROPOFOL 10 MG/ML
INJECTION, EMULSION INTRAVENOUS PRN
Status: DISCONTINUED | OUTPATIENT
Start: 2023-12-29 | End: 2023-12-29 | Stop reason: SDUPTHER

## 2023-12-29 RX ORDER — TRAMADOL HYDROCHLORIDE 50 MG/1
100 TABLET ORAL EVERY 6 HOURS PRN
Status: DISCONTINUED | OUTPATIENT
Start: 2023-12-29 | End: 2024-01-04 | Stop reason: HOSPADM

## 2023-12-29 RX ORDER — ONDANSETRON 2 MG/ML
INJECTION INTRAMUSCULAR; INTRAVENOUS PRN
Status: DISCONTINUED | OUTPATIENT
Start: 2023-12-29 | End: 2023-12-29 | Stop reason: SDUPTHER

## 2023-12-29 RX ORDER — ROCURONIUM BROMIDE 10 MG/ML
INJECTION, SOLUTION INTRAVENOUS PRN
Status: DISCONTINUED | OUTPATIENT
Start: 2023-12-29 | End: 2023-12-29 | Stop reason: SDUPTHER

## 2023-12-29 RX ORDER — POLYETHYLENE GLYCOL 3350 17 G/17G
17 POWDER, FOR SOLUTION ORAL DAILY
Status: DISCONTINUED | OUTPATIENT
Start: 2023-12-29 | End: 2024-01-04 | Stop reason: HOSPADM

## 2023-12-29 RX ORDER — PROPOFOL 10 MG/ML
INJECTION, EMULSION INTRAVENOUS CONTINUOUS PRN
Status: DISCONTINUED | OUTPATIENT
Start: 2023-12-29 | End: 2023-12-29 | Stop reason: SDUPTHER

## 2023-12-29 RX ORDER — IPRATROPIUM BROMIDE AND ALBUTEROL SULFATE 2.5; .5 MG/3ML; MG/3ML
1 SOLUTION RESPIRATORY (INHALATION) ONCE
Status: DISCONTINUED | OUTPATIENT
Start: 2023-12-29 | End: 2023-12-29 | Stop reason: SDUPTHER

## 2023-12-29 RX ORDER — SODIUM CHLORIDE 9 MG/ML
INJECTION, SOLUTION INTRAVENOUS PRN
Status: DISCONTINUED | OUTPATIENT
Start: 2023-12-29 | End: 2023-12-29 | Stop reason: SDUPTHER

## 2023-12-29 RX ORDER — SODIUM CHLORIDE 9 MG/ML
INJECTION, SOLUTION INTRAVENOUS CONTINUOUS
Status: DISCONTINUED | OUTPATIENT
Start: 2023-12-29 | End: 2024-01-04 | Stop reason: HOSPADM

## 2023-12-29 RX ORDER — SODIUM CHLORIDE 0.9 % (FLUSH) 0.9 %
5-40 SYRINGE (ML) INJECTION EVERY 12 HOURS SCHEDULED
Status: DISCONTINUED | OUTPATIENT
Start: 2023-12-29 | End: 2023-12-29 | Stop reason: SDUPTHER

## 2023-12-29 RX ORDER — DOXYCYCLINE HYCLATE 100 MG
100 TABLET ORAL EVERY 12 HOURS SCHEDULED
Status: COMPLETED | OUTPATIENT
Start: 2023-12-29 | End: 2024-01-03

## 2023-12-29 RX ORDER — DEXAMETHASONE SODIUM PHOSPHATE 4 MG/ML
INJECTION, SOLUTION INTRA-ARTICULAR; INTRALESIONAL; INTRAMUSCULAR; INTRAVENOUS; SOFT TISSUE PRN
Status: DISCONTINUED | OUTPATIENT
Start: 2023-12-29 | End: 2023-12-29 | Stop reason: SDUPTHER

## 2023-12-29 RX ORDER — TRAMADOL HYDROCHLORIDE 50 MG/1
50 TABLET ORAL EVERY 6 HOURS PRN
Status: DISCONTINUED | OUTPATIENT
Start: 2023-12-29 | End: 2024-01-04 | Stop reason: HOSPADM

## 2023-12-29 RX ORDER — SODIUM CHLORIDE 9 MG/ML
INJECTION, SOLUTION INTRAVENOUS CONTINUOUS PRN
Status: DISCONTINUED | OUTPATIENT
Start: 2023-12-29 | End: 2023-12-29 | Stop reason: SDUPTHER

## 2023-12-29 RX ORDER — IPRATROPIUM BROMIDE AND ALBUTEROL SULFATE 2.5; .5 MG/3ML; MG/3ML
1 SOLUTION RESPIRATORY (INHALATION) ONCE
Status: COMPLETED | OUTPATIENT
Start: 2023-12-29 | End: 2023-12-29

## 2023-12-29 RX ORDER — DOXYCYCLINE HYCLATE 100 MG
100 TABLET ORAL EVERY 12 HOURS SCHEDULED
Status: DISCONTINUED | OUTPATIENT
Start: 2023-12-29 | End: 2023-12-29

## 2023-12-29 RX ORDER — HYDROMORPHONE HYDROCHLORIDE 1 MG/ML
0.25 INJECTION, SOLUTION INTRAMUSCULAR; INTRAVENOUS; SUBCUTANEOUS EVERY 5 MIN PRN
Status: DISCONTINUED | OUTPATIENT
Start: 2023-12-29 | End: 2023-12-29 | Stop reason: SDUPTHER

## 2023-12-29 RX ORDER — ONDANSETRON 2 MG/ML
4 INJECTION INTRAMUSCULAR; INTRAVENOUS EVERY 30 MIN PRN
Status: DISCONTINUED | OUTPATIENT
Start: 2023-12-29 | End: 2023-12-29 | Stop reason: SDUPTHER

## 2023-12-29 RX ORDER — LIDOCAINE HYDROCHLORIDE 20 MG/ML
INJECTION, SOLUTION INFILTRATION; PERINEURAL PRN
Status: DISCONTINUED | OUTPATIENT
Start: 2023-12-29 | End: 2023-12-29 | Stop reason: SDUPTHER

## 2023-12-29 RX ORDER — HYDROMORPHONE HYDROCHLORIDE 1 MG/ML
0.25 INJECTION, SOLUTION INTRAMUSCULAR; INTRAVENOUS; SUBCUTANEOUS
Status: DISCONTINUED | OUTPATIENT
Start: 2023-12-29 | End: 2024-01-04 | Stop reason: HOSPADM

## 2023-12-29 RX ADMIN — POLYETHYLENE GLYCOL 3350 17 G: 17 POWDER, FOR SOLUTION ORAL at 14:13

## 2023-12-29 RX ADMIN — Medication 100 MCG: at 09:56

## 2023-12-29 RX ADMIN — SODIUM CHLORIDE: 9 INJECTION, SOLUTION INTRAVENOUS at 09:22

## 2023-12-29 RX ADMIN — Medication 50 MCG: at 09:52

## 2023-12-29 RX ADMIN — PROPOFOL 75 MCG/KG/MIN: 10 INJECTION, EMULSION INTRAVENOUS at 09:25

## 2023-12-29 RX ADMIN — DEXAMETHASONE SODIUM PHOSPHATE 4 MG: 4 INJECTION, SOLUTION INTRAMUSCULAR; INTRAVENOUS at 09:46

## 2023-12-29 RX ADMIN — ASPIRIN 81 MG: 81 TABLET, COATED ORAL at 08:11

## 2023-12-29 RX ADMIN — ATORVASTATIN CALCIUM 40 MG: 40 TABLET, FILM COATED ORAL at 21:35

## 2023-12-29 RX ADMIN — IPRATROPIUM BROMIDE AND ALBUTEROL SULFATE 1 DOSE: 2.5; .5 SOLUTION RESPIRATORY (INHALATION) at 08:57

## 2023-12-29 RX ADMIN — INSULIN GLARGINE 10 UNITS: 100 INJECTION, SOLUTION SUBCUTANEOUS at 21:42

## 2023-12-29 RX ADMIN — SUGAMMADEX 200 MG: 100 INJECTION, SOLUTION INTRAVENOUS at 11:03

## 2023-12-29 RX ADMIN — PANTOPRAZOLE SODIUM 40 MG: 40 TABLET, DELAYED RELEASE ORAL at 17:16

## 2023-12-29 RX ADMIN — Medication 10 ML: at 08:11

## 2023-12-29 RX ADMIN — DOXYCYCLINE HYCLATE 100 MG: 100 TABLET, COATED ORAL at 21:33

## 2023-12-29 RX ADMIN — PROPOFOL 50 MG: 10 INJECTION, EMULSION INTRAVENOUS at 09:25

## 2023-12-29 RX ADMIN — LIDOCAINE HYDROCHLORIDE 60 MG: 20 INJECTION, SOLUTION INFILTRATION; PERINEURAL at 09:25

## 2023-12-29 RX ADMIN — INSULIN LISPRO 3 UNITS: 100 INJECTION, SOLUTION INTRAVENOUS; SUBCUTANEOUS at 17:17

## 2023-12-29 RX ADMIN — SUCRALFATE 1 G: 1 TABLET ORAL at 21:33

## 2023-12-29 RX ADMIN — ROCURONIUM BROMIDE 10 MG: 50 INJECTION, SOLUTION INTRAVENOUS at 10:29

## 2023-12-29 RX ADMIN — Medication 10 ML: at 21:42

## 2023-12-29 RX ADMIN — PRAMIPEXOLE DIHYDROCHLORIDE 0.5 MG: 0.25 TABLET ORAL at 21:33

## 2023-12-29 RX ADMIN — PROPOFOL 50 MG: 10 INJECTION, EMULSION INTRAVENOUS at 09:37

## 2023-12-29 RX ADMIN — ROPINIROLE HYDROCHLORIDE 0.5 MG: 0.5 TABLET, FILM COATED ORAL at 21:40

## 2023-12-29 RX ADMIN — ONDANSETRON 4 MG: 2 INJECTION INTRAMUSCULAR; INTRAVENOUS at 09:46

## 2023-12-29 RX ADMIN — Medication 100 MCG: at 10:01

## 2023-12-29 RX ADMIN — SUCRALFATE 1 G: 1 TABLET ORAL at 06:15

## 2023-12-29 RX ADMIN — ACETAMINOPHEN 650 MG: 325 TABLET ORAL at 14:23

## 2023-12-29 RX ADMIN — SUCRALFATE 1 G: 1 TABLET ORAL at 14:12

## 2023-12-29 RX ADMIN — DOFETILIDE 250 MCG: 0.25 CAPSULE ORAL at 21:33

## 2023-12-29 RX ADMIN — IOPAMIDOL 100 ML: 755 INJECTION, SOLUTION INTRAVENOUS at 20:32

## 2023-12-29 RX ADMIN — ROCURONIUM BROMIDE 50 MG: 50 INJECTION, SOLUTION INTRAVENOUS at 09:43

## 2023-12-29 RX ADMIN — INSULIN LISPRO 4 UNITS: 100 INJECTION, SOLUTION INTRAVENOUS; SUBCUTANEOUS at 21:41

## 2023-12-29 RX ADMIN — PANTOPRAZOLE SODIUM 40 MG: 40 TABLET, DELAYED RELEASE ORAL at 06:16

## 2023-12-29 RX ADMIN — SODIUM CHLORIDE, PRESERVATIVE FREE 10 ML: 5 INJECTION INTRAVENOUS at 08:12

## 2023-12-29 RX ADMIN — VANCOMYCIN HYDROCHLORIDE 1750 MG: 1 INJECTION, POWDER, LYOPHILIZED, FOR SOLUTION INTRAVENOUS at 09:47

## 2023-12-29 RX ADMIN — PROPOFOL 50 MG: 10 INJECTION, EMULSION INTRAVENOUS at 09:44

## 2023-12-29 ASSESSMENT — PAIN DESCRIPTION - DESCRIPTORS: DESCRIPTORS: TENDER

## 2023-12-29 ASSESSMENT — PAIN DESCRIPTION - LOCATION: LOCATION: SHOULDER

## 2023-12-29 ASSESSMENT — PAIN SCALES - GENERAL: PAINLEVEL_OUTOF10: 7

## 2023-12-29 ASSESSMENT — PAIN DESCRIPTION - ORIENTATION: ORIENTATION: LEFT

## 2023-12-29 NOTE — TELEPHONE ENCOUNTER
Spoke with Rosy pt spouse. Rosy states pt is still in the hospital. She will have pt call once he is home.

## 2023-12-29 NOTE — PROCEDURES
[facility]  Electrophysiology Report      Attending MD Marquise Aggarwal MD    Procedures   Dual chamber pacemaker    Indications   Sick sinus syndrome    Complication None  EBL  <5cc    Sedation: Per anesthesia    Conclusion   Successful dual chamber pacemaker implantation.    Description of Procedure  The patient was brought to the electrophysiology laboratory in the fasting state after informed consent was obtained.  The patient was placed in the supine position and the left pectoral area was prepared and draped in a sterile fashion.  The electrocardiogram, blood pressure, and oxygenation were monitored intra- and post-procedure.  Intravenous antibiotic was given prior to the procedure for general antibiotic prophylaxis.      After using buffered lidocaine 1% with epinephrine (1/100,000) for local anesthesia to the left pectoral area, venous access was obtained in the axillary vein at the level of the first rib via the modified Seldinger technique under ultrasound guidance x 2 under ultrasound guidance.  A 4-5 cm incision was made inferior to the left clavicle.  The subcutaneous tissues were dissected to the level of the pre-pectoral fascia and a pocket was fashioned via blunt dissection and electrocautery.  A 7 F sheath was inserted into the left axillary vein over a J wire.  Glide wire with bundle branch sheath was inserted into the RVOT.  The right ventricular lead was inserted and the lead tip positioned at the RV basal to mid septum under fluoroscopic guidance for bundle branch pacing.  Threshold measurements were obtained to the 's specifications, and the lead was anchored to the pectoralis fascia using 2-0 Ethibond Excel.    A 7 F sheath was then inserted into the left subclavian vein over a J-wire.  The right atrial lead was inserted and the lead tip positioned in the RA appendage under fluoroscopic guidance.  Threshold measurements were obtained to the 's specifications, and the

## 2023-12-29 NOTE — PROGRESS NOTES
Nightly    rOPINIRole  0.5 mg Oral Nightly    sucralfate  1 g Oral 3 times per day    dofetilide  250 mcg Oral 2 times per day    atorvastatin  40 mg Oral Nightly    sodium chloride flush  5-40 mL IntraVENous 2 times per day    warfarin placeholder: dosing by pharmacy   Other RX Placeholder    aspirin  81 mg Oral Daily      Infusions:    sodium chloride      dextrose      sodium chloride       PRN Meds:   sodium chloride flush, 5-40 mL, PRN  glucose, 4 tablet, PRN  dextrose bolus, 125 mL, PRN   Or  dextrose bolus, 250 mL, PRN  glucagon (rDNA), 1 mg, PRN  dextrose, , Continuous PRN  sodium chloride flush, 5-40 mL, PRN  sodium chloride, , PRN  ondansetron, 4 mg, Q8H PRN   Or  ondansetron, 4 mg, Q6H PRN  polyethylene glycol, 17 g, Daily PRN  acetaminophen, 650 mg, Q6H PRN   Or  acetaminophen, 650 mg, Q6H PRN        Labs and Imaging   XR CHEST PORTABLE    Result Date: 12/28/2023  EXAMINATION: ONE XRAY VIEW OF THE CHEST 12/28/2023 12:25 pm COMPARISON: Chest x-ray dated 12/14/2023. HISTORY: ORDERING SYSTEM PROVIDED HISTORY: Shortness of breath TECHNOLOGIST PROVIDED HISTORY: Reason for exam:->Shortness of breath Reason for Exam: shortness of breath FINDINGS: HEART/MEDIASTINUM: The cardiac silhouette is enlarged, but stable. PLEURA/LUNGS: There are no focal consolidations or pleural effusions. There is no appreciable pneumothorax. BONES/SOFT TISSUE: No acute abnormality.     1. No radiographic evidence of acute cardiopulmonary disease. 2. Stable cardiomegaly.     CTA CHEST W CONTRAST    Result Date: 12/26/2023  EXAMINATION: CTA OF THE CHEST 12/26/2023 3:15 pm TECHNIQUE: CTA of the chest was performed after the administration of intravenous contrast.  Multiplanar reformatted images are provided for review.  MIP images are provided for review. Automated exposure control, iterative reconstruction, and/or weight based adjustment of the mA/kV was utilized to reduce the radiation dose to as low as reasonably achievable.  results found for: \"TSH\"    Troponin: No results found for: \"TROPONINT\"    Lactic Acid: No results for input(s): \"LACTA\" in the last 72 hours.    BNP:   Recent Labs     12/28/23  1239   PROBNP 138*     UA:  Lab Results   Component Value Date/Time    NITRU Negative 12/28/2023 02:39 PM    COLORU Yellow 12/28/2023 02:39 PM    PHUR 6.0 12/28/2023 02:39 PM    WBCUA 0-2 11/24/2023 07:30 PM    RBCUA 3-4 11/24/2023 07:30 PM    MUCUS 2+ 04/02/2015 03:10 AM    BACTERIA Rare 11/24/2023 07:30 PM    CLARITYU Clear 12/28/2023 02:39 PM    SPECGRAV 1.025 12/28/2023 02:39 PM    LEUKOCYTESUR Negative 12/28/2023 02:39 PM    UROBILINOGEN 0.2 12/28/2023 02:39 PM    BILIRUBINUR Negative 12/28/2023 02:39 PM    BILIRUBINUR negative 10/11/2023 01:27 PM    BLOODU Negative 12/28/2023 02:39 PM    GLUCOSEU 100 12/28/2023 02:39 PM    KETUA Negative 12/28/2023 02:39 PM     Urine Cultures:   Lab Results   Component Value Date/Time    LABURIN >100,000 CFU/ml 10/11/2023 01:31 PM    LABURIN 200 10/26/2022 10:56 AM     Blood Cultures:   Lab Results   Component Value Date/Time    BC No Growth after 4 days of incubation. 11/24/2023 07:00 PM     Lab Results   Component Value Date/Time    BLOODCULT2 No Growth after 4 days of incubation. 11/24/2023 06:58 PM     Organism:   Lab Results   Component Value Date/Time    ORG Klebsiella oxytoca 10/11/2023 01:31 PM         Electronically signed by Pio Pablo DO on 12/29/2023 at 9:54 AM     Pio Pablo DO  PGY-2, Family Medicine  [facility]

## 2023-12-29 NOTE — CONSULTS
Pharmacy Note  Warfarin Consult  Dx: afib  Goal INR range 2-3  Home Warfarin dose: 10 mg (5 mg x 2) every Tue, Sat; 7.5 mg (5 mg x 1.5) all other days     Date  INR  Warfarin  12/28               3.02                 hold  Hold Warfarin tonight x1.  Daily INR ordered.  Rx will continue to manage therapy per consult order.  Missy Underwood RPH, R.Ph. 12/28/2023 7:04 PM

## 2023-12-29 NOTE — ANESTHESIA POSTPROCEDURE EVALUATION
Department of Anesthesiology  Postprocedure Note    Patient: Rigoberto Dudley  MRN: [id number]  YOB: 1950  Date of evaluation: 12/29/2023    Procedure Summary       Date: 12/29/23 Room / Location: [facility] Cardiac Cath Lab    Anesthesia Start: 4069 Anesthesia Stop: 1128    Procedure: PACEMAKER Diagnosis:     Scheduled Providers: Richard Woods MD Responsible Provider: Richard Woods MD    Anesthesia Type: general ASA Status: 3            Anesthesia Type: No value filed. Filiberto Phase I: Filiberto Score: 9    Filiberto Phase II:      Anesthesia Post Evaluation    Patient location during evaluation: PACU  Patient participation: complete - patient participated  Level of consciousness: awake  Airway patency: patent  Nausea & Vomiting: no vomiting  Cardiovascular status: blood pressure returned to baseline  Respiratory status: acceptable  Hydration status: stable  Pain management: adequate    No notable events documented.

## 2023-12-29 NOTE — PROGRESS NOTES
Patient arrived back from cath lab. VS stable. Patient arrived with pressure dressing on pacer site, per cath lab pressure dressing to remain in place until 12/30/2023 in which cardiology will remove. Pacer site with no active bleeding. Patient educated on post op pace maker instructions, reenforcement needed. Family updated at bedside.     1710 Patient will need CTA done around 8pm due to eating dinner    1800 Per Ericka Valdez NP, hold coumadin dose tonight and can restart 12/30/2023

## 2023-12-29 NOTE — CONSULTS
Electrophysiology Consultation   Date: 12/29/2023  Admit Date:  12/28/2023  Reason for Consultation: Symptomatic bradycardia.  Consult Requesting Physician: Donny Young MD     Chief Complaint   Patient presents with    Shortness of Breath     Patient to the ED for shortness of breath that has been going on for a few weeks. Reports \"you'll have to ask my wife why I am here\". States his doctor told him to come here and then follow up with them.        HPI:   Mr. Swenson is a pleasant 73 year old male with a medical history significant for bradycardia/tachy-carlos syndrome, paroxysmal atrial fibrillation status post ablation (12/15/2023), hypertension, non-obstructive coronary artery disease, chronic chest pain, diabetes mellitus type II, obstructive sleep apnea, heart failure with preserved ejection fraction, and hyperlipidemia, who presented from home with recurrent shortness of breath post his ablation.  According to patient he was doing well post his ablation.  Post his ablation, despite having his metoprolol decreased however needed to be discontinued due to ongoing bradycardia.  He was discharged for follow up however, this week he began calling office with ongoing and worsening shortness of breath and dyspnea on exertion.  After some convincing, patient presented back to [facility] for further evaluation and care.      Patient denies typical chest pain.      Patient denies fevers, chest pain, orthopnea, PND, lower extremity edema, abdominal swelling, chills, visual changes, headaches, sore throat, cough, abdominal pain, nausea, vomiting, bleeding, bruising, dysuria, muscle/joint pain, confusion, depression, anxiety, skin lesions, etc.    Emergency Room/Hospital Course:  Patient was evaluated in the ER on 12/28/2023.  His CMP was reassuring.  His INR was 3.02.  His CBC was reassuring.  His chest radiograph was reassuring.  His troponin enzymes were negative x 2.  His EKG showed sinus rhythm.

## 2023-12-29 NOTE — CARE COORDINATION
Case Management Assessment  Initial Evaluation    Date/Time of Evaluation: 12/29/2023 1:14 PM  Assessment Completed by: Arianne Valles RN    If patient is discharged prior to next notation, then this note serves as note for discharge by case management.    Patient Name: London Swenson                   YOB: 1950  Diagnosis: Shortness of breath [R06.02]  Hypomagnesemia [E83.42]  Supratherapeutic INR [R79.1]  Chest pain, unspecified type [R07.9]                   Date / Time: 12/28/2023 11:11 AM    Patient Admission Status: Inpatient   Readmission Risk (Low < 19, Mod (19-27), High > 27): Readmission Risk Score: 22.6    Current PCP: Constance Yancey PA  PCP verified by CM? Yes    Chart Reviewed: Yes      History Provided by: Other (see comment) (Chart review and previous initial CM assessment from 11/26/2023 patient off floor)  Patient Orientation: Alert and Oriented    Patient Cognition: Alert    Hospitalization in the last 30 days (Readmission):  Yes    If yes, Readmission Assessment in CM Navigator will be completed.    Advance Directives:      Code Status: Full Code   Patient's Primary Decision Maker is: Legal Next of Kin    Primary Decision Maker: Rosy Swenson - Spouse - [phone]    Discharge Planning:    Patient lives with: (P) Spouse/Significant Other Type of Home: (P) House  Primary Care Giver:    Patient Support Systems include: Spouse/Significant Other   Current Financial resources: (P) Medicare  Current community resources:    Current services prior to admission: (P) None            Current DME:              Type of Home Care services:  (P) None    ADLS  Prior functional level: (P) Independent in ADLs/IADLs  Current functional level: (P) Independent in ADLs/IADLs    PT AM-PAC:   /24  OT AM-PAC:   /24    Family can provide assistance at DC: (P) Yes  Would you like Case Management to discuss the discharge plan with any other family members/significant others, and if so, who?    Plans to Return  to Present Housing: (P) Yes  Other Identified Issues/Barriers to RETURNING to current housing: no immediate barriers identified  Potential Assistance needed at discharge: N/A            Potential DME:    Patient expects to discharge to: (P) House  Plan for transportation at discharge:      Financial    Payor: MEDICARE / Plan: MEDICARE PART A AND B / Product Type: *No Product type* /     Does insurance require precert for SNF: Yes    Potential assistance Purchasing Medications:    Meds-to-Beds request:        [facility] - [place], [place] - [address] - P [phone] - F [phone]  [address] [address]  Phone: [phone] Fax: [phone]    [facility] - [place], [place] - [address] - P [phone] - F [phone]  [address]  [place] [address]  Phone: [phone] Fax: [phone]    [facility] - [place], [place] - [address] - P [phone] - F [phone]  [address] [address]  Phone: [phone] Fax: [phone]      Notes:    Factors facilitating achievement of predicted outcomes: Family support and Cooperative    Barriers to discharge: No immediate barriers to d/c identified     Additional Case Management Notes: Patient had pacemaker placement. From home with spouse and was IPTA. CM will follow post procedure for needs , recommendations and any barriers that may arise.     The Plan for Transition of Care is related to the following treatment goals of Shortness of breath [R06.02]  Hypomagnesemia [E83.42]  Supratherapeutic INR [R79.1]  Chest pain, unspecified type [R07.9]    IF APPLICABLE: The Patient and/or patient representative London and his family were provided with a choice of provider and agrees with the discharge plan. Freedom of choice list with basic dialogue that supports the patient's individualized plan of care/goals and shares the quality data associated with the

## 2023-12-29 NOTE — PROGRESS NOTES
Report called to C4 RN Siria. Procedure details explained and all questions answered to satisfaction of receiving RN.

## 2023-12-30 ENCOUNTER — APPOINTMENT (OUTPATIENT)
Dept: GENERAL RADIOLOGY | Age: 73
DRG: 243 | End: 2023-12-30
Payer: MEDICARE

## 2023-12-30 LAB
ANION GAP SERPL CALCULATED.3IONS-SCNC: 9 MMOL/L (ref 3–16)
BUN SERPL-MCNC: 18 MG/DL (ref 7–20)
CALCIUM SERPL-MCNC: 8.8 MG/DL (ref 8.3–10.6)
CHLORIDE SERPL-SCNC: 102 MMOL/L (ref 99–110)
CO2 SERPL-SCNC: 29 MMOL/L (ref 21–32)
CREAT SERPL-MCNC: 0.8 MG/DL (ref 0.8–1.3)
DEPRECATED RDW RBC AUTO: 14 % (ref 12.4–15.4)
GFR SERPLBLD CREATININE-BSD FMLA CKD-EPI: >60 ML/MIN/{1.73_M2}
GLUCOSE BLD-MCNC: 184 MG/DL (ref 70–99)
GLUCOSE BLD-MCNC: 223 MG/DL (ref 70–99)
GLUCOSE BLD-MCNC: 228 MG/DL (ref 70–99)
GLUCOSE BLD-MCNC: 375 MG/DL (ref 70–99)
GLUCOSE SERPL-MCNC: 190 MG/DL (ref 70–99)
HCT VFR BLD AUTO: 34.8 % (ref 40.5–52.5)
HGB BLD-MCNC: 11.5 G/DL (ref 13.5–17.5)
INR PPP: 1.95 (ref 0.84–1.16)
MAGNESIUM SERPL-MCNC: 1.9 MG/DL (ref 1.8–2.4)
MCH RBC QN AUTO: 29.3 PG (ref 26–34)
MCHC RBC AUTO-ENTMCNC: 33.1 G/DL (ref 31–36)
MCV RBC AUTO: 88.5 FL (ref 80–100)
PERFORMED ON: ABNORMAL
PLATELET # BLD AUTO: 159 K/UL (ref 135–450)
PMV BLD AUTO: 9.6 FL (ref 5–10.5)
POTASSIUM SERPL-SCNC: 4.2 MMOL/L (ref 3.5–5.1)
PROTHROMBIN TIME: 22.2 SEC (ref 11.5–14.8)
RBC # BLD AUTO: 3.93 M/UL (ref 4.2–5.9)
SODIUM SERPL-SCNC: 140 MMOL/L (ref 136–145)
WBC # BLD AUTO: 7.7 K/UL (ref 4–11)

## 2023-12-30 PROCEDURE — 99233 SBSQ HOSP IP/OBS HIGH 50: CPT | Performed by: INTERNAL MEDICINE

## 2023-12-30 PROCEDURE — 6370000000 HC RX 637 (ALT 250 FOR IP): Performed by: INTERNAL MEDICINE

## 2023-12-30 PROCEDURE — 2580000003 HC RX 258: Performed by: STUDENT IN AN ORGANIZED HEALTH CARE EDUCATION/TRAINING PROGRAM

## 2023-12-30 PROCEDURE — 6370000000 HC RX 637 (ALT 250 FOR IP): Performed by: STUDENT IN AN ORGANIZED HEALTH CARE EDUCATION/TRAINING PROGRAM

## 2023-12-30 PROCEDURE — 85027 COMPLETE CBC AUTOMATED: CPT

## 2023-12-30 PROCEDURE — 2060000000 HC ICU INTERMEDIATE R&B

## 2023-12-30 PROCEDURE — 71046 X-RAY EXAM CHEST 2 VIEWS: CPT

## 2023-12-30 PROCEDURE — 83735 ASSAY OF MAGNESIUM: CPT

## 2023-12-30 PROCEDURE — 85610 PROTHROMBIN TIME: CPT

## 2023-12-30 PROCEDURE — 80048 BASIC METABOLIC PNL TOTAL CA: CPT

## 2023-12-30 PROCEDURE — 36415 COLL VENOUS BLD VENIPUNCTURE: CPT

## 2023-12-30 RX ORDER — WARFARIN SODIUM 5 MG/1
10 TABLET ORAL
Status: COMPLETED | OUTPATIENT
Start: 2023-12-30 | End: 2023-12-30

## 2023-12-30 RX ADMIN — ASPIRIN 81 MG: 81 TABLET, COATED ORAL at 09:27

## 2023-12-30 RX ADMIN — POLYETHYLENE GLYCOL 3350 17 G: 17 POWDER, FOR SOLUTION ORAL at 09:27

## 2023-12-30 RX ADMIN — PANTOPRAZOLE SODIUM 40 MG: 40 TABLET, DELAYED RELEASE ORAL at 17:11

## 2023-12-30 RX ADMIN — INSULIN LISPRO 1 UNITS: 100 INJECTION, SOLUTION INTRAVENOUS; SUBCUTANEOUS at 17:12

## 2023-12-30 RX ADMIN — SUCRALFATE 1 G: 1 TABLET ORAL at 07:41

## 2023-12-30 RX ADMIN — INSULIN GLARGINE 10 UNITS: 100 INJECTION, SOLUTION SUBCUTANEOUS at 20:30

## 2023-12-30 RX ADMIN — DOFETILIDE 250 MCG: 0.25 CAPSULE ORAL at 09:27

## 2023-12-30 RX ADMIN — DOFETILIDE 250 MCG: 0.25 CAPSULE ORAL at 20:28

## 2023-12-30 RX ADMIN — WARFARIN SODIUM 10 MG: 5 TABLET ORAL at 17:11

## 2023-12-30 RX ADMIN — ROPINIROLE HYDROCHLORIDE 0.5 MG: 0.5 TABLET, FILM COATED ORAL at 20:28

## 2023-12-30 RX ADMIN — SUCRALFATE 1 G: 1 TABLET ORAL at 13:09

## 2023-12-30 RX ADMIN — PRAMIPEXOLE DIHYDROCHLORIDE 0.5 MG: 0.25 TABLET ORAL at 20:28

## 2023-12-30 RX ADMIN — INSULIN LISPRO 4 UNITS: 100 INJECTION, SOLUTION INTRAVENOUS; SUBCUTANEOUS at 13:09

## 2023-12-30 RX ADMIN — Medication 10 ML: at 20:28

## 2023-12-30 RX ADMIN — DOXYCYCLINE HYCLATE 100 MG: 100 TABLET, COATED ORAL at 20:27

## 2023-12-30 RX ADMIN — SUCRALFATE 1 G: 1 TABLET ORAL at 20:28

## 2023-12-30 RX ADMIN — Medication 10 ML: at 09:28

## 2023-12-30 RX ADMIN — DOXYCYCLINE HYCLATE 100 MG: 100 TABLET, COATED ORAL at 09:27

## 2023-12-30 RX ADMIN — ATORVASTATIN CALCIUM 40 MG: 40 TABLET, FILM COATED ORAL at 20:28

## 2023-12-30 RX ADMIN — PANTOPRAZOLE SODIUM 40 MG: 40 TABLET, DELAYED RELEASE ORAL at 07:41

## 2023-12-30 NOTE — PROGRESS NOTES
Cardiology Progress Note     Admit Date: 2023     Reason for follow up: Sick sinus syndrome.    Interval History:   - Patient seen and examined.   - Clinical notes reviewed.   - Telemetry reviewed.  No significant bradycardia.  - No new complaints today.   - RA lead dislodged.  - Denies having chest pain, shortness of breath, dyspnea on exertion, Orthopnea, PND at the time of this visit.    Physical Examination:  Vitals:    23 0822   BP: 122/66   Pulse: 74   Resp: 18   Temp: 98.2 °F (36.8 °C)   SpO2: 90%        Intake/Output Summary (Last 24 hours) at 2023 1223  Last data filed at 2023 0556  Gross per 24 hour   Intake 1070 ml   Output 750 ml   Net 320 ml     In: 2020 [P.O.:1070; I.V.:450]  Out: 760    Wt Readings from Last 3 Encounters:   23 124.8 kg (275 lb 1.6 oz)   23 122.4 kg (269 lb 14.4 oz)   23 123.7 kg (272 lb 9.6 oz)     Temp  Av °F (36.7 °C)  Min: 97.7 °F (36.5 °C)  Max: 98.7 °F (37.1 °C)  Pulse  Av.1  Min: 60  Max: 74  BP  Min: 122/66  Max: 138/86  SpO2  Av.6 %  Min: 90 %  Max: 98 %    Telemetry: Sinus rhythm   Constitutional: Alert. Oriented to person, place, and time. No distress.   Head: Normocephalic and atraumatic.   Mouth/Throat: Lips appear moist. Oropharynx is clear and moist.  Eyes: Conjunctivae normal. EOM are normal.   Neck: Neck supple. No lymphadenopathy. No rigidity. No JVD present.   Cardiovascular: Normal rate, regular rhythm. Normal S1&S2.   Pulmonary/Chest: Bilateral respiratory sounds present. No respiratory accessory muscle use.  No wheezes, No rhonchi.    Abdominal: Soft. Normal bowel sounds present. No distension, No tenderness. No splenomegaly. No hernia.  Musculoskeletal: No tenderness. No edema    Neurological: Alert and oriented. Cranial nerve II-XII grossly intact.  Skin: Skin is warm and dry. No rash, lesions, ulcerations noted.  Psychiatric: No anxiety nor agitation.    Labs, diagnostic and imaging results reviewed.

## 2023-12-30 NOTE — PROGRESS NOTES
Care assumed from previous RN. A&O, VSS, assessment complete as documented. Left chest wall pacer insertion site dressing clean dry and intact; sling in place, education reiterated re: need to keep on. CT chest/abd/pelvis completed. Pt aware of need to call for ambulation assistance. Denies pain or other needs at this time.

## 2023-12-30 NOTE — PROGRESS NOTES
V2.0    [facility] Progress Note      Name:  London Swenson /Age/Sex: 1950  (73 y.o. male)   MRN & CSN:  [id number] & [id number] Encounter Date/Time: 2023 9:54 AM EST   Location:  [facility] PCP: Constance Yancey PA     Attending:Donny Young MD       Hospital Day: 3    Assessment and Recommendations   London Swenson is a 73 y.o. male with pmh of hypertension, hyperlipidemia, type 2 diabetes, CAD, atrial fibrillation, and mild dementia who presents with Shortness of breath      23 - Patient seen sitting comfortably in chair this AM. States that he \"feels good.\" Feels relatively similar to yesterday but has improved work of breathing. He is currently on RA without additional complaints.       Plan:   Atypical Chest Pain with SOB/FREEMAN  - Unclear etiology at this time. Less likely ACS vs. Unstable Angina.   - VBG on arrival with pH of 7.321, pCO2 of 52.8  - Troponin on arrival 24 -> 21.   - BNP improved on arrival to 138, down from 1267 on 23.   - EKG on arrival with NSR, HR 63, Qtc 423  - CXR on arrival showing no acute cardiopulmonary processes   - On Room Air now.   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 Follow up CTA Chest unremarkable.   - CTA Chest/Abdomen ordered by EP. Results showed mild fluid overload/pulmonary edema bilaterally, no pericardial effusion, no flap seen in aorta or mass in the descending or thoracic aorta as noted above, normal abdominal aorta, left renal cyst 2.4 cm, and a right renal cyst measuring 22 cm by 17 cm.   - If no improvement in SOB/FREEMAN, may consider having IC evaluate patient as per EP.     Atrial Fibrillation with Sick Sinus Syndrome  - [id number] Score of 4  - Patient s/p Ablation on December 15, 2023  - RRR noted on exam today.   - Continue on Dofetilide as per EP.   - Patient on Coumadin outpatient (5-10 mg daily).   - Pharmacy consulted to manage Coumadin. Their recommendations appreciated.   -  II-XII intact, grossly non-focal.  Psychiatric: Alert and oriented, thought content appropriate, normal insight  Capillary Refill: Brisk, 3 seconds, normal       Medications:   Medications:    warfarin  10 mg Oral Once    polyethylene glycol  17 g Oral Daily    doxycycline hyclate  100 mg Oral 2 times per day    insulin lispro  0-4 Units SubCUTAneous TID WC    insulin lispro  0-4 Units SubCUTAneous Nightly    insulin glargine  10 Units SubCUTAneous Nightly    pantoprazole  40 mg Oral BID AC    pramipexole  0.5 mg Oral Nightly    rOPINIRole  0.5 mg Oral Nightly    sucralfate  1 g Oral 3 times per day    dofetilide  250 mcg Oral 2 times per day    atorvastatin  40 mg Oral Nightly    sodium chloride flush  5-40 mL IntraVENous 2 times per day    warfarin placeholder: dosing by pharmacy   Other RX Placeholder    aspirin  81 mg Oral Daily      Infusions:    sodium chloride      dextrose      sodium chloride       PRN Meds:   traMADol, 50 mg, Q6H PRN   Or  traMADol, 100 mg, Q6H PRN  HYDROmorphone, 0.25 mg, Q3H PRN   Or  HYDROmorphone, 0.5 mg, Q3H PRN  glucose, 4 tablet, PRN  dextrose bolus, 125 mL, PRN   Or  dextrose bolus, 250 mL, PRN  glucagon (rDNA), 1 mg, PRN  dextrose, , Continuous PRN  sodium chloride flush, 5-40 mL, PRN  sodium chloride, , PRN  ondansetron, 4 mg, Q8H PRN  acetaminophen, 650 mg, Q6H PRN   Or  acetaminophen, 650 mg, Q6H PRN        Labs and Imaging   XR CHEST PORTABLE    Result Date: 12/28/2023  EXAMINATION: ONE XRAY VIEW OF THE CHEST 12/28/2023 12:25 pm COMPARISON: Chest x-ray dated 12/14/2023. HISTORY: ORDERING SYSTEM PROVIDED HISTORY: Shortness of breath TECHNOLOGIST PROVIDED HISTORY: Reason for exam:->Shortness of breath Reason for Exam: shortness of breath FINDINGS: HEART/MEDIASTINUM: The cardiac silhouette is enlarged, but stable. PLEURA/LUNGS: There are no focal consolidations or pleural effusions. There is no appreciable pneumothorax. BONES/SOFT TISSUE: No acute abnormality.     1. No

## 2023-12-31 LAB
ANION GAP SERPL CALCULATED.3IONS-SCNC: 7 MMOL/L (ref 3–16)
BASOPHILS # BLD: 0 K/UL (ref 0–0.2)
BASOPHILS NFR BLD: 0.4 %
BUN SERPL-MCNC: 17 MG/DL (ref 7–20)
CALCIUM SERPL-MCNC: 8.6 MG/DL (ref 8.3–10.6)
CHLORIDE SERPL-SCNC: 100 MMOL/L (ref 99–110)
CO2 SERPL-SCNC: 30 MMOL/L (ref 21–32)
CREAT SERPL-MCNC: 0.8 MG/DL (ref 0.8–1.3)
DEPRECATED RDW RBC AUTO: 13.8 % (ref 12.4–15.4)
EOSINOPHIL # BLD: 0.2 K/UL (ref 0–0.6)
EOSINOPHIL NFR BLD: 2.4 %
GFR SERPLBLD CREATININE-BSD FMLA CKD-EPI: >60 ML/MIN/{1.73_M2}
GLUCOSE BLD-MCNC: 202 MG/DL (ref 70–99)
GLUCOSE BLD-MCNC: 270 MG/DL (ref 70–99)
GLUCOSE BLD-MCNC: 328 MG/DL (ref 70–99)
GLUCOSE BLD-MCNC: 357 MG/DL (ref 70–99)
GLUCOSE SERPL-MCNC: 215 MG/DL (ref 70–99)
HCT VFR BLD AUTO: 34.4 % (ref 40.5–52.5)
HGB BLD-MCNC: 11.2 G/DL (ref 13.5–17.5)
INR PPP: 1.4 (ref 0.84–1.16)
LYMPHOCYTES # BLD: 1.4 K/UL (ref 1–5.1)
LYMPHOCYTES NFR BLD: 22 %
MAGNESIUM SERPL-MCNC: 1.5 MG/DL (ref 1.8–2.4)
MCH RBC QN AUTO: 28.9 PG (ref 26–34)
MCHC RBC AUTO-ENTMCNC: 32.5 G/DL (ref 31–36)
MCV RBC AUTO: 89 FL (ref 80–100)
MONOCYTES # BLD: 0.5 K/UL (ref 0–1.3)
MONOCYTES NFR BLD: 7.5 %
NEUTROPHILS # BLD: 4.3 K/UL (ref 1.7–7.7)
NEUTROPHILS NFR BLD: 67.7 %
PERFORMED ON: ABNORMAL
PLATELET # BLD AUTO: 146 K/UL (ref 135–450)
PMV BLD AUTO: 9.3 FL (ref 5–10.5)
POTASSIUM SERPL-SCNC: 4 MMOL/L (ref 3.5–5.1)
PROTHROMBIN TIME: 17.1 SEC (ref 11.5–14.8)
RBC # BLD AUTO: 3.86 M/UL (ref 4.2–5.9)
SODIUM SERPL-SCNC: 137 MMOL/L (ref 136–145)
WBC # BLD AUTO: 6.3 K/UL (ref 4–11)

## 2023-12-31 PROCEDURE — 6370000000 HC RX 637 (ALT 250 FOR IP): Performed by: INTERNAL MEDICINE

## 2023-12-31 PROCEDURE — 85610 PROTHROMBIN TIME: CPT

## 2023-12-31 PROCEDURE — 2700000000 HC OXYGEN THERAPY PER DAY

## 2023-12-31 PROCEDURE — 85025 COMPLETE CBC W/AUTO DIFF WBC: CPT

## 2023-12-31 PROCEDURE — 6360000002 HC RX W HCPCS

## 2023-12-31 PROCEDURE — 36415 COLL VENOUS BLD VENIPUNCTURE: CPT

## 2023-12-31 PROCEDURE — 2580000003 HC RX 258: Performed by: STUDENT IN AN ORGANIZED HEALTH CARE EDUCATION/TRAINING PROGRAM

## 2023-12-31 PROCEDURE — 2060000000 HC ICU INTERMEDIATE R&B

## 2023-12-31 PROCEDURE — 99233 SBSQ HOSP IP/OBS HIGH 50: CPT | Performed by: INTERNAL MEDICINE

## 2023-12-31 PROCEDURE — 80048 BASIC METABOLIC PNL TOTAL CA: CPT

## 2023-12-31 PROCEDURE — 83735 ASSAY OF MAGNESIUM: CPT

## 2023-12-31 PROCEDURE — 6370000000 HC RX 637 (ALT 250 FOR IP): Performed by: STUDENT IN AN ORGANIZED HEALTH CARE EDUCATION/TRAINING PROGRAM

## 2023-12-31 PROCEDURE — 94761 N-INVAS EAR/PLS OXIMETRY MLT: CPT

## 2023-12-31 RX ORDER — MAGNESIUM SULFATE IN WATER 40 MG/ML
4000 INJECTION, SOLUTION INTRAVENOUS ONCE
Status: COMPLETED | OUTPATIENT
Start: 2023-12-31 | End: 2023-12-31

## 2023-12-31 RX ORDER — DIPHENHYDRAMINE HCL 25 MG
25 TABLET ORAL EVERY 6 HOURS PRN
Status: DISCONTINUED | OUTPATIENT
Start: 2023-12-31 | End: 2024-01-04 | Stop reason: HOSPADM

## 2023-12-31 RX ADMIN — PANTOPRAZOLE SODIUM 40 MG: 40 TABLET, DELAYED RELEASE ORAL at 17:54

## 2023-12-31 RX ADMIN — INSULIN LISPRO 1 UNITS: 100 INJECTION, SOLUTION INTRAVENOUS; SUBCUTANEOUS at 10:21

## 2023-12-31 RX ADMIN — Medication 10 ML: at 10:22

## 2023-12-31 RX ADMIN — INSULIN GLARGINE 10 UNITS: 100 INJECTION, SOLUTION SUBCUTANEOUS at 20:34

## 2023-12-31 RX ADMIN — INSULIN LISPRO 2 UNITS: 100 INJECTION, SOLUTION INTRAVENOUS; SUBCUTANEOUS at 16:48

## 2023-12-31 RX ADMIN — ATORVASTATIN CALCIUM 40 MG: 40 TABLET, FILM COATED ORAL at 20:33

## 2023-12-31 RX ADMIN — DOFETILIDE 250 MCG: 0.25 CAPSULE ORAL at 20:33

## 2023-12-31 RX ADMIN — INSULIN LISPRO 3 UNITS: 100 INJECTION, SOLUTION INTRAVENOUS; SUBCUTANEOUS at 12:38

## 2023-12-31 RX ADMIN — MAGNESIUM SULFATE IN WATER FOR 4000 MG: 40 INJECTION INTRAVENOUS at 10:27

## 2023-12-31 RX ADMIN — PANTOPRAZOLE SODIUM 40 MG: 40 TABLET, DELAYED RELEASE ORAL at 05:09

## 2023-12-31 RX ADMIN — ASPIRIN 81 MG: 81 TABLET, COATED ORAL at 10:21

## 2023-12-31 RX ADMIN — POLYETHYLENE GLYCOL 3350 17 G: 17 POWDER, FOR SOLUTION ORAL at 10:21

## 2023-12-31 RX ADMIN — WARFARIN SODIUM 12.5 MG: 7.5 TABLET ORAL at 17:54

## 2023-12-31 RX ADMIN — SUCRALFATE 1 G: 1 TABLET ORAL at 05:09

## 2023-12-31 RX ADMIN — DOXYCYCLINE HYCLATE 100 MG: 100 TABLET, COATED ORAL at 10:21

## 2023-12-31 RX ADMIN — SUCRALFATE 1 G: 1 TABLET ORAL at 16:48

## 2023-12-31 RX ADMIN — ROPINIROLE HYDROCHLORIDE 0.5 MG: 0.5 TABLET, FILM COATED ORAL at 20:33

## 2023-12-31 RX ADMIN — DOXYCYCLINE HYCLATE 100 MG: 100 TABLET, COATED ORAL at 20:33

## 2023-12-31 RX ADMIN — DOFETILIDE 250 MCG: 0.25 CAPSULE ORAL at 10:21

## 2023-12-31 RX ADMIN — Medication 10 ML: at 20:34

## 2023-12-31 RX ADMIN — SUCRALFATE 1 G: 1 TABLET ORAL at 20:33

## 2023-12-31 RX ADMIN — PRAMIPEXOLE DIHYDROCHLORIDE 0.5 MG: 0.25 TABLET ORAL at 20:33

## 2023-12-31 ASSESSMENT — PAIN SCALES - GENERAL: PAINLEVEL_OUTOF10: 1

## 2023-12-31 NOTE — PROGRESS NOTES
V2.0    [facility] Progress Note      Name:  London Swenson /Age/Sex: 1950  (73 y.o. male)   MRN & CSN:  [id number] & [id number] Encounter Date/Time: 2023 9:54 AM EST   Location:   PCP: Constance Yancey PA     Attending:Donny Young MD       Hospital Day: 4    Assessment and Recommendations   London Swenson is a 73 y.o. male with pmh of hypertension, hyperlipidemia, type 2 diabetes, CAD, atrial fibrillation, and mild dementia who presents with Shortness of breath    Plan:   Atypical Chest Pain with SOB/FREEMAN  - Unclear etiology at this time. Less likely ACS vs. Unstable Angina.   - VBG on arrival with pH of 7.321, pCO2 of 52.8  - Troponin on arrival 24 -> 21.   - BNP improved on arrival to 138, down from 1267 on 23.   - EKG on arrival with NSR, HR 63, Qtc 423  - CXR on arrival showing no acute cardiopulmonary processes   - On Room Air now.   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 Follow up CTA Chest unremarkable.   - CTA Chest/Abdomen ordered by EP. Results showed mild fluid overload/pulmonary edema bilaterally, no pericardial effusion, no flap seen in aorta or mass in the descending or thoracic aorta as noted above, normal abdominal aorta, left renal cyst 2.4 cm, and a right renal cyst measuring 22 cm by 17 cm.   - If no improvement in SOB/FREEMAN, may consider having IC evaluate patient as per EP.     Atrial Fibrillation with Sick Sinus Syndrome  - [id number] Score of 4  - Patient s/p Ablation on December 15, 2023  - RRR noted on exam today.   - Continue on Dofetilide as per EP.   - Patient on Coumadin outpatient (5-10 mg daily).   - Pharmacy consulted to manage Coumadin. Their recommendations appreciated.   - Continue PPI and Sucralfate given recent ablation.   - EP following. Their recommendations appreciated.    - Patient taken for Pacemaker placement 23.    - Repeat CXR showing dislodged RA lead.     - Pacemaker Lead          Electronically signed by Cindy Florian DO on 12/31/2023 at 8:26 AM

## 2023-12-31 NOTE — PROGRESS NOTES
Cardiology Progress Note     Admit Date: 2023     Reason for follow up: Sick sinus syndrome.    Interval History:   - Patient seen and examined.   - Clinical notes reviewed.   - Telemetry reviewed.  No significant bradycardia.  - No new complaints today.   - Denies having chest pain, shortness of breath, dyspnea on exertion, Orthopnea, PND at the time of this visit.    Physical Examination:  Vitals:    23 0856   BP: 116/63   Pulse: 69   Resp: 20   Temp: 98.1 °F (36.7 °C)   SpO2: 95%        Intake/Output Summary (Last 24 hours) at 2023 1446  Last data filed at 2023 1149  Gross per 24 hour   Intake 1320 ml   Output 3550 ml   Net -2230 ml     In: 2280 [P.O.:2280]  Out: 4850    Wt Readings from Last 3 Encounters:   23 124.8 kg (275 lb 1.6 oz)   23 122.4 kg (269 lb 14.4 oz)   23 123.7 kg (272 lb 9.6 oz)     Temp  Av °F (36.7 °C)  Min: 97.9 °F (36.6 °C)  Max: 98.1 °F (36.7 °C)  Pulse  Av.2  Min: 59  Max: 93  BP  Min: 116/63  Max: 158/64  SpO2  Av.4 %  Min: 90 %  Max: 95 %    Telemetry: Sinus rhythm   Constitutional: Alert. Oriented to person, place, and time. No distress.   Head: Normocephalic and atraumatic.   Mouth/Throat: Lips appear moist. Oropharynx is clear and moist.  Eyes: Conjunctivae normal. EOM are normal.   Neck: Neck supple. No lymphadenopathy. No rigidity. No JVD present.   Cardiovascular: Normal rate, regular rhythm. Normal S1&S2.   Pulmonary/Chest: Bilateral respiratory sounds present. No respiratory accessory muscle use.  No wheezes, No rhonchi.    Abdominal: Soft. Normal bowel sounds present. No distension, No tenderness. No splenomegaly. No hernia.  Musculoskeletal: No tenderness. No edema    Neurological: Alert and oriented. Cranial nerve II-XII grossly intact.  Skin: Wound C/D/I without erythema.  Psychiatric: No anxiety nor agitation.    Labs, diagnostic and imaging results reviewed.   Reviewed.   Recent Labs     23  0550 23  0513  [facility]  [phone] [facility]

## 2023-12-31 NOTE — PROGRESS NOTES
Pharmacy Note  Warfarin Consult  Dx: afib  Goal INR range 2-3  Home Warfarin dose: 10 mg every Tue, Sat; 7.5 mg  all other days      Date                 INR                  Warfarin  12/28               3.02                    hold  12/29               2.69                   7.5 mg   12/30               1.95                   10 mg  12/31               1.40                   12.5 mg   Recommend to give warfarin 12.5 mg x 1 dose tonight.  Daily INR ordered.  Rx will continue to manage therapy per consult order.  Juliann Andrews/Kavin. 12/31/23 9:26 AM EST

## 2024-01-01 LAB
ANION GAP SERPL CALCULATED.3IONS-SCNC: 8 MMOL/L (ref 3–16)
BASOPHILS # BLD: 0 K/UL (ref 0–0.2)
BASOPHILS NFR BLD: 0.6 %
BUN SERPL-MCNC: 15 MG/DL (ref 7–20)
CALCIUM SERPL-MCNC: 8.9 MG/DL (ref 8.3–10.6)
CHLORIDE SERPL-SCNC: 98 MMOL/L (ref 99–110)
CO2 SERPL-SCNC: 30 MMOL/L (ref 21–32)
CREAT SERPL-MCNC: 0.7 MG/DL (ref 0.8–1.3)
DEPRECATED RDW RBC AUTO: 13.9 % (ref 12.4–15.4)
EOSINOPHIL # BLD: 0.2 K/UL (ref 0–0.6)
EOSINOPHIL NFR BLD: 2.7 %
GFR SERPLBLD CREATININE-BSD FMLA CKD-EPI: >60 ML/MIN/{1.73_M2}
GLUCOSE BLD-MCNC: 254 MG/DL (ref 70–99)
GLUCOSE BLD-MCNC: 259 MG/DL (ref 70–99)
GLUCOSE BLD-MCNC: 343 MG/DL (ref 70–99)
GLUCOSE BLD-MCNC: 344 MG/DL (ref 70–99)
GLUCOSE SERPL-MCNC: 255 MG/DL (ref 70–99)
HCT VFR BLD AUTO: 35.1 % (ref 40.5–52.5)
HGB BLD-MCNC: 11.6 G/DL (ref 13.5–17.5)
INR PPP: 1.63 (ref 0.84–1.16)
LYMPHOCYTES # BLD: 1.2 K/UL (ref 1–5.1)
LYMPHOCYTES NFR BLD: 19.9 %
MAGNESIUM SERPL-MCNC: 1.8 MG/DL (ref 1.8–2.4)
MCH RBC QN AUTO: 28.9 PG (ref 26–34)
MCHC RBC AUTO-ENTMCNC: 33 G/DL (ref 31–36)
MCV RBC AUTO: 87.5 FL (ref 80–100)
MONOCYTES # BLD: 0.6 K/UL (ref 0–1.3)
MONOCYTES NFR BLD: 9.3 %
NEUTROPHILS # BLD: 4.2 K/UL (ref 1.7–7.7)
NEUTROPHILS NFR BLD: 67.5 %
PERFORMED ON: ABNORMAL
PLATELET # BLD AUTO: 156 K/UL (ref 135–450)
PMV BLD AUTO: 9.5 FL (ref 5–10.5)
POTASSIUM SERPL-SCNC: 4.3 MMOL/L (ref 3.5–5.1)
PROTHROMBIN TIME: 19.3 SEC (ref 11.5–14.8)
RBC # BLD AUTO: 4.01 M/UL (ref 4.2–5.9)
SODIUM SERPL-SCNC: 136 MMOL/L (ref 136–145)
WBC # BLD AUTO: 6.2 K/UL (ref 4–11)

## 2024-01-01 PROCEDURE — 80048 BASIC METABOLIC PNL TOTAL CA: CPT

## 2024-01-01 PROCEDURE — 99233 SBSQ HOSP IP/OBS HIGH 50: CPT | Performed by: INTERNAL MEDICINE

## 2024-01-01 PROCEDURE — 36415 COLL VENOUS BLD VENIPUNCTURE: CPT

## 2024-01-01 PROCEDURE — 6370000000 HC RX 637 (ALT 250 FOR IP): Performed by: STUDENT IN AN ORGANIZED HEALTH CARE EDUCATION/TRAINING PROGRAM

## 2024-01-01 PROCEDURE — 85610 PROTHROMBIN TIME: CPT

## 2024-01-01 PROCEDURE — 2580000003 HC RX 258: Performed by: STUDENT IN AN ORGANIZED HEALTH CARE EDUCATION/TRAINING PROGRAM

## 2024-01-01 PROCEDURE — 6370000000 HC RX 637 (ALT 250 FOR IP): Performed by: INTERNAL MEDICINE

## 2024-01-01 PROCEDURE — 6370000000 HC RX 637 (ALT 250 FOR IP)

## 2024-01-01 PROCEDURE — 2060000000 HC ICU INTERMEDIATE R&B

## 2024-01-01 PROCEDURE — 85025 COMPLETE CBC W/AUTO DIFF WBC: CPT

## 2024-01-01 PROCEDURE — 83735 ASSAY OF MAGNESIUM: CPT

## 2024-01-01 RX ADMIN — WARFARIN SODIUM 12.5 MG: 7.5 TABLET ORAL at 18:22

## 2024-01-01 RX ADMIN — PANTOPRAZOLE SODIUM 40 MG: 40 TABLET, DELAYED RELEASE ORAL at 18:44

## 2024-01-01 RX ADMIN — INSULIN LISPRO 2 UNITS: 100 INJECTION, SOLUTION INTRAVENOUS; SUBCUTANEOUS at 08:36

## 2024-01-01 RX ADMIN — SUCRALFATE 1 G: 1 TABLET ORAL at 08:06

## 2024-01-01 RX ADMIN — ASPIRIN 81 MG: 81 TABLET, COATED ORAL at 08:36

## 2024-01-01 RX ADMIN — SUCRALFATE 1 G: 1 TABLET ORAL at 18:22

## 2024-01-01 RX ADMIN — Medication 10 ML: at 08:36

## 2024-01-01 RX ADMIN — Medication 10 ML: at 22:34

## 2024-01-01 RX ADMIN — POLYETHYLENE GLYCOL 3350 17 G: 17 POWDER, FOR SOLUTION ORAL at 08:36

## 2024-01-01 RX ADMIN — DOFETILIDE 250 MCG: 0.25 CAPSULE ORAL at 08:36

## 2024-01-01 RX ADMIN — INSULIN LISPRO 2 UNITS: 100 INJECTION, SOLUTION INTRAVENOUS; SUBCUTANEOUS at 18:23

## 2024-01-01 RX ADMIN — PANTOPRAZOLE SODIUM 40 MG: 40 TABLET, DELAYED RELEASE ORAL at 08:06

## 2024-01-01 RX ADMIN — ROPINIROLE HYDROCHLORIDE 0.5 MG: 0.5 TABLET, FILM COATED ORAL at 22:34

## 2024-01-01 RX ADMIN — DOXYCYCLINE HYCLATE 100 MG: 100 TABLET, COATED ORAL at 08:36

## 2024-01-01 RX ADMIN — SUCRALFATE 1 G: 1 TABLET ORAL at 22:31

## 2024-01-01 RX ADMIN — INSULIN LISPRO 4 UNITS: 100 INJECTION, SOLUTION INTRAVENOUS; SUBCUTANEOUS at 22:34

## 2024-01-01 RX ADMIN — INSULIN LISPRO 3 UNITS: 100 INJECTION, SOLUTION INTRAVENOUS; SUBCUTANEOUS at 13:00

## 2024-01-01 RX ADMIN — PRAMIPEXOLE DIHYDROCHLORIDE 0.5 MG: 0.25 TABLET ORAL at 22:31

## 2024-01-01 RX ADMIN — DOFETILIDE 250 MCG: 0.25 CAPSULE ORAL at 22:31

## 2024-01-01 RX ADMIN — INSULIN GLARGINE 10 UNITS: 100 INJECTION, SOLUTION SUBCUTANEOUS at 22:33

## 2024-01-01 RX ADMIN — DOXYCYCLINE HYCLATE 100 MG: 100 TABLET, COATED ORAL at 22:31

## 2024-01-01 RX ADMIN — ATORVASTATIN CALCIUM 40 MG: 40 TABLET, FILM COATED ORAL at 22:31

## 2024-01-01 ASSESSMENT — PAIN SCALES - GENERAL: PAINLEVEL_OUTOF10: 0

## 2024-01-01 NOTE — PROGRESS NOTES
Diagnosis Date Noted    Hypomagnesemia 11/24/2023    Diabetic mononeuropathy associated with type 2 diabetes mellitus ([facility]) 03/20/2023    Type 2 diabetes mellitus with hyperglycemia, with long-term current use of insulin ([facility]) 03/20/2023    Dementia without behavioral disturbance ([facility]) 02/06/2023    Mild nonproliferative diabetic retinopathy of right eye associated with type 2 diabetes mellitus ([facility]) 02/06/2023    New onset atrial fibrillation ([facility]) 09/09/2022    Pacemaker 12/29/2023    Shortness of breath 12/28/2023    Vasovagal attack 12/14/2023    Acute respiratory failure with hypoxia ([facility]) 11/24/2023    COVID-19 11/24/2023    SOB (shortness of breath) 06/09/2023    Pulmonary venous congestion 06/09/2023    Uncontrolled type 2 diabetes mellitus with hyperglycemia ([facility]) 06/09/2023    Thrombocytopenia ([facility]) 06/09/2023    Nocturnal hypoxemia 06/09/2023    Paroxysmal atrial fibrillation ([facility]) 06/09/2023    Chest pain 06/08/2023    Atherosclerosis of aorta ([facility]) 05/17/2023    Secondary hypercoagulable state ([facility]) 05/04/2023    Grade II diastolic dysfunction 09/16/2021    Type 2 diabetes mellitus with microalbuminuria ([facility]) 08/16/2021    Atrial tachycardia 10/27/2020    Peripheral polyneuropathy 11/13/2019    Coronary artery disease involving native coronary artery of native heart with angina pectoris ([facility]) 05/30/2019    Class 2 obesity in adult 03/21/2019    ESTHER (obstructive sleep apnea) 05/23/2018    Lung granuloma ([facility]) 08/21/2017    Essential hypertension 08/21/2017    Hyperlipidemia 08/21/2017    Pulmonary nodules 04/09/2015      Active Hospital Problems    Diagnosis Date Noted    Shortness of breath [R06.02] 12/28/2023     Mr. Swenson is a pleasant 73 year old male with a medical history significant for bradycardia/tachy-carlos syndrome, paroxysmal atrial fibrillation status post ablation (12/15/2023), hypertension, non-obstructive coronary artery disease, chronic chest pain, diabetes mellitus type II,

## 2024-01-01 NOTE — PLAN OF CARE
Problem: Discharge Planning  Goal: Discharge to home or other facility with appropriate resources  Outcome: Progressing     Problem: Chronic Conditions and Co-morbidities  Goal: Patient's chronic conditions and co-morbidity symptoms are monitored and maintained or improved  Outcome: Progressing     Problem: Pain  Goal: Verbalizes/displays adequate comfort level or baseline comfort level  Outcome: Progressing

## 2024-01-01 NOTE — PROGRESS NOTES
Pharmacy Note  Warfarin Consult  Dx: afib  Goal INR range 2-3  Home Warfarin dose: 10 mg every Tue, Sat; 7.5 mg  all other days   ** Potential DDI w/ Doxycycline (started 12/29; last dose scheduled for 1/3 at 0900)     Date                 INR                  Warfarin  12/28               3.02                    hold  12/29               2.69                   7.5 mg   12/30               1.95                   10 mg  12/31               1.40                   12.5 mg   1/1                   1.63                   12.5 mg    Recommend Warfarin 12.5 mg x 1 dose tonight.  Daily INR ordered.  Rx will continue to manage therapy per consult order.    Hiral Knowles, PharmD 1/1/2024  8:33 AM

## 2024-01-02 ENCOUNTER — PROCEDURE VISIT (OUTPATIENT)
Dept: CARDIOLOGY CLINIC | Age: 74
End: 2024-01-02

## 2024-01-02 ENCOUNTER — ANESTHESIA (OUTPATIENT)
Dept: CARDIAC CATH/INVASIVE PROCEDURES | Age: 74
DRG: 243 | End: 2024-01-02
Payer: MEDICARE

## 2024-01-02 ENCOUNTER — APPOINTMENT (OUTPATIENT)
Dept: CARDIAC CATH/INVASIVE PROCEDURES | Age: 74
DRG: 243 | End: 2024-01-02
Payer: MEDICARE

## 2024-01-02 ENCOUNTER — ANESTHESIA EVENT (OUTPATIENT)
Dept: CARDIAC CATH/INVASIVE PROCEDURES | Age: 74
DRG: 243 | End: 2024-01-02
Payer: MEDICARE

## 2024-01-02 DIAGNOSIS — I49.5 SINUS NODE DYSFUNCTION (HCC): ICD-10-CM

## 2024-01-02 DIAGNOSIS — Z95.0 PACEMAKER: Primary | ICD-10-CM

## 2024-01-02 LAB
ANION GAP SERPL CALCULATED.3IONS-SCNC: 6 MMOL/L (ref 3–16)
BASOPHILS # BLD: 0 K/UL (ref 0–0.2)
BASOPHILS NFR BLD: 0.6 %
BUN SERPL-MCNC: 16 MG/DL (ref 7–20)
CALCIUM SERPL-MCNC: 8.9 MG/DL (ref 8.3–10.6)
CHLORIDE SERPL-SCNC: 97 MMOL/L (ref 99–110)
CO2 SERPL-SCNC: 32 MMOL/L (ref 21–32)
CREAT SERPL-MCNC: 0.9 MG/DL (ref 0.8–1.3)
DEPRECATED RDW RBC AUTO: 14 % (ref 12.4–15.4)
EOSINOPHIL # BLD: 0.2 K/UL (ref 0–0.6)
EOSINOPHIL NFR BLD: 2.2 %
GFR SERPLBLD CREATININE-BSD FMLA CKD-EPI: >60 ML/MIN/{1.73_M2}
GLUCOSE BLD-MCNC: 233 MG/DL (ref 70–99)
GLUCOSE BLD-MCNC: 291 MG/DL (ref 70–99)
GLUCOSE BLD-MCNC: 329 MG/DL (ref 70–99)
GLUCOSE BLD-MCNC: 477 MG/DL (ref 70–99)
GLUCOSE BLD-MCNC: 480 MG/DL (ref 70–99)
GLUCOSE SERPL-MCNC: 251 MG/DL (ref 70–99)
HCT VFR BLD AUTO: 36.5 % (ref 40.5–52.5)
HGB BLD-MCNC: 12 G/DL (ref 13.5–17.5)
INR PPP: 1.93 (ref 0.84–1.16)
LYMPHOCYTES # BLD: 1.1 K/UL (ref 1–5.1)
LYMPHOCYTES NFR BLD: 16.1 %
MAGNESIUM SERPL-MCNC: 1.6 MG/DL (ref 1.8–2.4)
MCH RBC QN AUTO: 28.8 PG (ref 26–34)
MCHC RBC AUTO-ENTMCNC: 33 G/DL (ref 31–36)
MCV RBC AUTO: 87.5 FL (ref 80–100)
MONOCYTES # BLD: 0.6 K/UL (ref 0–1.3)
MONOCYTES NFR BLD: 9.1 %
NEUTROPHILS # BLD: 5 K/UL (ref 1.7–7.7)
NEUTROPHILS NFR BLD: 72 %
PERFORMED ON: ABNORMAL
PLATELET # BLD AUTO: 153 K/UL (ref 135–450)
PMV BLD AUTO: 8.7 FL (ref 5–10.5)
POTASSIUM SERPL-SCNC: 4.3 MMOL/L (ref 3.5–5.1)
PROTHROMBIN TIME: 22 SEC (ref 11.5–14.8)
RBC # BLD AUTO: 4.17 M/UL (ref 4.2–5.9)
SODIUM SERPL-SCNC: 135 MMOL/L (ref 136–145)
WBC # BLD AUTO: 6.9 K/UL (ref 4–11)

## 2024-01-02 PROCEDURE — 3700000001 HC ADD 15 MINUTES (ANESTHESIA): Performed by: ANESTHESIOLOGY

## 2024-01-02 PROCEDURE — 2060000000 HC ICU INTERMEDIATE R&B

## 2024-01-02 PROCEDURE — 2500000003 HC RX 250 WO HCPCS: Performed by: NURSE ANESTHETIST, CERTIFIED REGISTERED

## 2024-01-02 PROCEDURE — 6360000002 HC RX W HCPCS

## 2024-01-02 PROCEDURE — 2580000003 HC RX 258

## 2024-01-02 PROCEDURE — 2580000003 HC RX 258: Performed by: ANESTHESIOLOGY

## 2024-01-02 PROCEDURE — 7100000001 HC PACU RECOVERY - ADDTL 15 MIN: Performed by: ANESTHESIOLOGY

## 2024-01-02 PROCEDURE — 6370000000 HC RX 637 (ALT 250 FOR IP): Performed by: NURSE PRACTITIONER

## 2024-01-02 PROCEDURE — 6370000000 HC RX 637 (ALT 250 FOR IP): Performed by: STUDENT IN AN ORGANIZED HEALTH CARE EDUCATION/TRAINING PROGRAM

## 2024-01-02 PROCEDURE — 2500000003 HC RX 250 WO HCPCS

## 2024-01-02 PROCEDURE — 02WA3MZ REVISION OF CARDIAC LEAD IN HEART, PERCUTANEOUS APPROACH: ICD-10-PCS | Performed by: INTERNAL MEDICINE

## 2024-01-02 PROCEDURE — 2580000003 HC RX 258: Performed by: STUDENT IN AN ORGANIZED HEALTH CARE EDUCATION/TRAINING PROGRAM

## 2024-01-02 PROCEDURE — 83735 ASSAY OF MAGNESIUM: CPT

## 2024-01-02 PROCEDURE — 80048 BASIC METABOLIC PNL TOTAL CA: CPT

## 2024-01-02 PROCEDURE — 2700000000 HC OXYGEN THERAPY PER DAY

## 2024-01-02 PROCEDURE — 33215 REPOSITION PACING-DEFIB LEAD: CPT

## 2024-01-02 PROCEDURE — C1713 ANCHOR/SCREW BN/BN,TIS/BN: HCPCS | Performed by: INTERNAL MEDICINE

## 2024-01-02 PROCEDURE — 6360000002 HC RX W HCPCS: Performed by: NURSE ANESTHETIST, CERTIFIED REGISTERED

## 2024-01-02 PROCEDURE — 85025 COMPLETE CBC W/AUTO DIFF WBC: CPT

## 2024-01-02 PROCEDURE — 2709999900 HC NON-CHARGEABLE SUPPLY: Performed by: INTERNAL MEDICINE

## 2024-01-02 PROCEDURE — 2580000003 HC RX 258: Performed by: INTERNAL MEDICINE

## 2024-01-02 PROCEDURE — 6360000002 HC RX W HCPCS: Performed by: INTERNAL MEDICINE

## 2024-01-02 PROCEDURE — 85610 PROTHROMBIN TIME: CPT

## 2024-01-02 PROCEDURE — 3700000000 HC ANESTHESIA ATTENDED CARE: Performed by: ANESTHESIOLOGY

## 2024-01-02 PROCEDURE — C1889 IMPLANT/INSERT DEVICE, NOC: HCPCS | Performed by: INTERNAL MEDICINE

## 2024-01-02 PROCEDURE — 6370000000 HC RX 637 (ALT 250 FOR IP): Performed by: INTERNAL MEDICINE

## 2024-01-02 PROCEDURE — 36415 COLL VENOUS BLD VENIPUNCTURE: CPT

## 2024-01-02 PROCEDURE — 94761 N-INVAS EAR/PLS OXIMETRY MLT: CPT

## 2024-01-02 PROCEDURE — 2720000010 HC SURG SUPPLY STERILE: Performed by: INTERNAL MEDICINE

## 2024-01-02 PROCEDURE — 7100000000 HC PACU RECOVERY - FIRST 15 MIN: Performed by: ANESTHESIOLOGY

## 2024-01-02 RX ORDER — DEXAMETHASONE SODIUM PHOSPHATE 10 MG/ML
INJECTION INTRAMUSCULAR; INTRAVENOUS PRN
Status: DISCONTINUED | OUTPATIENT
Start: 2024-01-02 | End: 2024-01-02 | Stop reason: SDUPTHER

## 2024-01-02 RX ORDER — DIPHENHYDRAMINE HYDROCHLORIDE 50 MG/ML
12.5 INJECTION INTRAMUSCULAR; INTRAVENOUS
Status: DISCONTINUED | OUTPATIENT
Start: 2024-01-02 | End: 2024-01-03 | Stop reason: HOSPADM

## 2024-01-02 RX ORDER — ROCURONIUM BROMIDE 10 MG/ML
INJECTION, SOLUTION INTRAVENOUS PRN
Status: DISCONTINUED | OUTPATIENT
Start: 2024-01-02 | End: 2024-01-02 | Stop reason: SDUPTHER

## 2024-01-02 RX ORDER — SODIUM CHLORIDE 9 MG/ML
INJECTION, SOLUTION INTRAVENOUS PRN
Status: DISCONTINUED | OUTPATIENT
Start: 2024-01-02 | End: 2024-01-04 | Stop reason: HOSPADM

## 2024-01-02 RX ORDER — PROPOFOL 10 MG/ML
INJECTION, EMULSION INTRAVENOUS PRN
Status: DISCONTINUED | OUTPATIENT
Start: 2024-01-02 | End: 2024-01-02

## 2024-01-02 RX ORDER — PROPOFOL 10 MG/ML
INJECTION, EMULSION INTRAVENOUS PRN
Status: DISCONTINUED | OUTPATIENT
Start: 2024-01-02 | End: 2024-01-02 | Stop reason: SDUPTHER

## 2024-01-02 RX ORDER — SODIUM CHLORIDE 9 MG/ML
INJECTION, SOLUTION INTRAVENOUS PRN
Status: DISCONTINUED | OUTPATIENT
Start: 2024-01-02 | End: 2024-01-03 | Stop reason: HOSPADM

## 2024-01-02 RX ORDER — HYDROMORPHONE HYDROCHLORIDE 1 MG/ML
0.5 INJECTION, SOLUTION INTRAMUSCULAR; INTRAVENOUS; SUBCUTANEOUS EVERY 10 MIN PRN
Status: DISCONTINUED | OUTPATIENT
Start: 2024-01-02 | End: 2024-01-03 | Stop reason: HOSPADM

## 2024-01-02 RX ORDER — SODIUM CHLORIDE 0.9 % (FLUSH) 0.9 %
5-40 SYRINGE (ML) INJECTION EVERY 12 HOURS SCHEDULED
Status: DISCONTINUED | OUTPATIENT
Start: 2024-01-02 | End: 2024-01-04 | Stop reason: HOSPADM

## 2024-01-02 RX ORDER — MAGNESIUM SULFATE IN WATER 40 MG/ML
2000 INJECTION, SOLUTION INTRAVENOUS ONCE
Status: COMPLETED | OUTPATIENT
Start: 2024-01-02 | End: 2024-01-02

## 2024-01-02 RX ORDER — SUCCINYLCHOLINE/SOD CL,ISO/PF 100 MG/5ML
SYRINGE (ML) INTRAVENOUS PRN
Status: DISCONTINUED | OUTPATIENT
Start: 2024-01-02 | End: 2024-01-02 | Stop reason: SDUPTHER

## 2024-01-02 RX ORDER — PHENYLEPHRINE HCL IN 0.9% NACL 1 MG/10 ML
SYRINGE (ML) INTRAVENOUS PRN
Status: DISCONTINUED | OUTPATIENT
Start: 2024-01-02 | End: 2024-01-02 | Stop reason: SDUPTHER

## 2024-01-02 RX ORDER — SODIUM CHLORIDE 0.9 % (FLUSH) 0.9 %
5-40 SYRINGE (ML) INJECTION EVERY 12 HOURS SCHEDULED
Status: DISCONTINUED | OUTPATIENT
Start: 2024-01-02 | End: 2024-01-03 | Stop reason: HOSPADM

## 2024-01-02 RX ORDER — LIDOCAINE HYDROCHLORIDE 20 MG/ML
INJECTION, SOLUTION INFILTRATION; PERINEURAL PRN
Status: DISCONTINUED | OUTPATIENT
Start: 2024-01-02 | End: 2024-01-02 | Stop reason: SDUPTHER

## 2024-01-02 RX ORDER — ONDANSETRON 2 MG/ML
4 INJECTION INTRAMUSCULAR; INTRAVENOUS
Status: DISCONTINUED | OUTPATIENT
Start: 2024-01-02 | End: 2024-01-03 | Stop reason: HOSPADM

## 2024-01-02 RX ORDER — LABETALOL HYDROCHLORIDE 5 MG/ML
5 INJECTION, SOLUTION INTRAVENOUS EVERY 10 MIN PRN
Status: DISCONTINUED | OUTPATIENT
Start: 2024-01-02 | End: 2024-01-03 | Stop reason: HOSPADM

## 2024-01-02 RX ORDER — ONDANSETRON 2 MG/ML
INJECTION INTRAMUSCULAR; INTRAVENOUS PRN
Status: DISCONTINUED | OUTPATIENT
Start: 2024-01-02 | End: 2024-01-02 | Stop reason: SDUPTHER

## 2024-01-02 RX ORDER — HYDROMORPHONE HYDROCHLORIDE 1 MG/ML
0.25 INJECTION, SOLUTION INTRAMUSCULAR; INTRAVENOUS; SUBCUTANEOUS EVERY 10 MIN PRN
Status: DISCONTINUED | OUTPATIENT
Start: 2024-01-02 | End: 2024-01-03 | Stop reason: HOSPADM

## 2024-01-02 RX ORDER — MEPERIDINE HYDROCHLORIDE 50 MG/ML
12.5 INJECTION INTRAMUSCULAR; INTRAVENOUS; SUBCUTANEOUS EVERY 5 MIN PRN
Status: DISCONTINUED | OUTPATIENT
Start: 2024-01-02 | End: 2024-01-03 | Stop reason: HOSPADM

## 2024-01-02 RX ORDER — SODIUM CHLORIDE 0.9 % (FLUSH) 0.9 %
5-40 SYRINGE (ML) INJECTION PRN
Status: DISCONTINUED | OUTPATIENT
Start: 2024-01-02 | End: 2024-01-04 | Stop reason: HOSPADM

## 2024-01-02 RX ORDER — SODIUM CHLORIDE 0.9 % (FLUSH) 0.9 %
5-40 SYRINGE (ML) INJECTION PRN
Status: DISCONTINUED | OUTPATIENT
Start: 2024-01-02 | End: 2024-01-03 | Stop reason: HOSPADM

## 2024-01-02 RX ADMIN — DOFETILIDE 250 MCG: 0.25 CAPSULE ORAL at 21:27

## 2024-01-02 RX ADMIN — Medication 10 ML: at 21:43

## 2024-01-02 RX ADMIN — Medication 100 MCG: at 12:48

## 2024-01-02 RX ADMIN — Medication 10 ML: at 09:26

## 2024-01-02 RX ADMIN — DOXYCYCLINE HYCLATE 100 MG: 100 TABLET, COATED ORAL at 21:28

## 2024-01-02 RX ADMIN — Medication 140 MG: at 12:30

## 2024-01-02 RX ADMIN — MAGNESIUM SULFATE HEPTAHYDRATE 2000 MG: 40 INJECTION, SOLUTION INTRAVENOUS at 09:24

## 2024-01-02 RX ADMIN — ASPIRIN 81 MG: 81 TABLET, COATED ORAL at 09:25

## 2024-01-02 RX ADMIN — ROCURONIUM BROMIDE 50 MG: 50 INJECTION, SOLUTION INTRAVENOUS at 12:45

## 2024-01-02 RX ADMIN — SUCRALFATE 1 G: 1 TABLET ORAL at 21:28

## 2024-01-02 RX ADMIN — PANTOPRAZOLE SODIUM 40 MG: 40 TABLET, DELAYED RELEASE ORAL at 14:54

## 2024-01-02 RX ADMIN — SUGAMMADEX 200 MG: 100 INJECTION, SOLUTION INTRAVENOUS at 13:28

## 2024-01-02 RX ADMIN — VANCOMYCIN HYDROCHLORIDE 1800 MG: 1 INJECTION, POWDER, LYOPHILIZED, FOR SOLUTION INTRAVENOUS at 12:29

## 2024-01-02 RX ADMIN — INSULIN HUMAN 10 UNITS: 100 INJECTION, SOLUTION PARENTERAL at 21:42

## 2024-01-02 RX ADMIN — PROPOFOL 150 MG: 10 INJECTION, EMULSION INTRAVENOUS at 12:30

## 2024-01-02 RX ADMIN — ONDANSETRON 4 MG: 2 INJECTION INTRAMUSCULAR; INTRAVENOUS at 12:45

## 2024-01-02 RX ADMIN — VANCOMYCIN HYDROCHLORIDE 1800 MG: 1 INJECTION, POWDER, LYOPHILIZED, FOR SOLUTION INTRAVENOUS at 11:56

## 2024-01-02 RX ADMIN — INSULIN LISPRO 3 UNITS: 100 INJECTION, SOLUTION INTRAVENOUS; SUBCUTANEOUS at 16:25

## 2024-01-02 RX ADMIN — DOFETILIDE 250 MCG: 0.25 CAPSULE ORAL at 09:25

## 2024-01-02 RX ADMIN — INSULIN LISPRO 2 UNITS: 100 INJECTION, SOLUTION INTRAVENOUS; SUBCUTANEOUS at 09:29

## 2024-01-02 RX ADMIN — WARFARIN SODIUM 12.5 MG: 7.5 TABLET ORAL at 17:30

## 2024-01-02 RX ADMIN — INSULIN LISPRO 1 UNITS: 100 INJECTION, SOLUTION INTRAVENOUS; SUBCUTANEOUS at 11:30

## 2024-01-02 RX ADMIN — DOXYCYCLINE HYCLATE 100 MG: 100 TABLET, COATED ORAL at 09:25

## 2024-01-02 RX ADMIN — SUCRALFATE 1 G: 1 TABLET ORAL at 05:27

## 2024-01-02 RX ADMIN — INSULIN GLARGINE 10 UNITS: 100 INJECTION, SOLUTION SUBCUTANEOUS at 21:30

## 2024-01-02 RX ADMIN — PRAMIPEXOLE DIHYDROCHLORIDE 0.5 MG: 0.25 TABLET ORAL at 21:28

## 2024-01-02 RX ADMIN — SODIUM CHLORIDE: 9 INJECTION, SOLUTION INTRAVENOUS at 12:24

## 2024-01-02 RX ADMIN — ROPINIROLE HYDROCHLORIDE 0.5 MG: 0.5 TABLET, FILM COATED ORAL at 21:27

## 2024-01-02 RX ADMIN — LIDOCAINE HYDROCHLORIDE 80 MG: 20 INJECTION, SOLUTION INFILTRATION; PERINEURAL at 12:30

## 2024-01-02 RX ADMIN — DEXAMETHASONE SODIUM PHOSPHATE 10 MG: 10 INJECTION INTRAMUSCULAR; INTRAVENOUS at 12:45

## 2024-01-02 RX ADMIN — ATORVASTATIN CALCIUM 40 MG: 40 TABLET, FILM COATED ORAL at 21:28

## 2024-01-02 RX ADMIN — INSULIN LISPRO 4 UNITS: 100 INJECTION, SOLUTION INTRAVENOUS; SUBCUTANEOUS at 21:30

## 2024-01-02 RX ADMIN — SUCRALFATE 1 G: 1 TABLET ORAL at 14:54

## 2024-01-02 RX ADMIN — PANTOPRAZOLE SODIUM 40 MG: 40 TABLET, DELAYED RELEASE ORAL at 05:27

## 2024-01-02 ASSESSMENT — PAIN SCALES - GENERAL
PAINLEVEL_OUTOF10: 0
PAINLEVEL_OUTOF10: 0

## 2024-01-02 NOTE — PLAN OF CARE
Problem: Discharge Planning  Goal: Discharge to home or other facility with appropriate resources  Outcome: Progressing     Problem: Chronic Conditions and Co-morbidities  Goal: Patient's chronic conditions and co-morbidity symptoms are monitored and maintained or improved  Outcome: Progressing     Problem: Pain  Goal: Verbalizes/displays adequate comfort level or baseline comfort level  Outcome: Progressing     Problem: Safety - Adult  Goal: Free from fall injury  Outcome: Progressing

## 2024-01-02 NOTE — PROGRESS NOTES
Hospital Medicine Progress Note      Date of Admission: 12/28/2023  Hospital Day: 5    Chief Admission Complaint: No new complaints     Subjective: Patient states he is doing okay.  No shortness of breath.  No chest pain    Presenting Admission History:       London Swenson is a 73 y.o. male with pmh of hypertension, hyperlipidemia, type 2 diabetes, CAD, atrial fibrillation, and mild dementia who presents with Shortness of breath.      Patient presents from home with persistent and worsening SOB/FREEMAN for the last several days. The patient noted that symptoms were present over the course of the last several weeks and felt that up until recently were relatively unchanged. He complains of FREEMAN. He is s/p ablation on December 15 given his hx of Atrial Fibrillation. He was also recently discontinued on BB given symptomatic bradycardia.        Assessment/Plan:      Current Principal Problem:  Shortness of breath    1.  Atypical chest pain with shortness of breath.  Patient status post pacemaker placement.  EP following.  Cardiology to evaluate.  Plan for lead revision tomorrow.    2.  Atrial fibrillation with sick sinus syndrome.  Status post pacemaker placement.  As above  3.  Hypertension.  Patient's beta-blocker was on hold due to bradycardia.  Will continue monitor closely.  4.  CAD.  Will continue aspirin and atorvastatin.  Ischemic workup if symptoms persist.  5.  Type 2 diabetes.  Will continue basal bolus correction.  6.  Sleep apnea.  Patient will continue home CPAP.  7.  Renal lesion.  Patient with simple cyst in the right kidney.  CTA ordered by EP.  Will have urology evaluate.  8.  History of aortic dissection in the descending and thoracic aorta.  CTA did not demonstrate intimal flap.  No mass noted in aorta.    Physical Exam Performed:      General appearance:  No apparent distress  Respiratory:  Normal respiratory effort.   Cardiovascular:  Regular rate and rhythm.  Abdomen:  Soft, non-tender,  WC    insulin lispro  0-4 Units SubCUTAneous Nightly    insulin glargine  10 Units SubCUTAneous Nightly    pantoprazole  40 mg Oral BID AC    pramipexole  0.5 mg Oral Nightly    rOPINIRole  0.5 mg Oral Nightly    sucralfate  1 g Oral 3 times per day    dofetilide  250 mcg Oral 2 times per day    atorvastatin  40 mg Oral Nightly    sodium chloride flush  5-40 mL IntraVENous 2 times per day    warfarin placeholder: dosing by pharmacy   Other RX Placeholder    aspirin  81 mg Oral Daily     PRN Meds: diphenhydrAMINE, traMADol **OR** traMADol, HYDROmorphone **OR** HYDROmorphone, glucose, dextrose bolus **OR** dextrose bolus, glucagon (rDNA), dextrose, sodium chloride flush, sodium chloride, ondansetron **OR** [DISCONTINUED] ondansetron, acetaminophen **OR** acetaminophen     Labs:  Personally reviewed and interpreted for clinical significance.     Recent Labs     12/30/23 0513 12/31/23 0543 01/01/24  0501   WBC 7.7 6.3 6.2   HGB 11.5* 11.2* 11.6*   HCT 34.8* 34.4* 35.1*    146 156     Recent Labs     12/30/23 0513 12/31/23 0543 01/01/24  0501    137 136   K 4.2 4.0 4.3    100 98*   CO2 29 30 30   BUN 18 17 15   CREATININE 0.8 0.8 0.7*   CALCIUM 8.8 8.6 8.9   MG 1.90 1.50* 1.80     No results for input(s): \"PROBNP\", \"TROPHS\" in the last 72 hours.  No results for input(s): \"LABA1C\" in the last 72 hours.  No results for input(s): \"AST\", \"ALT\", \"BILIDIR\", \"BILITOT\", \"ALKPHOS\" in the last 72 hours.  Recent Labs     12/30/23 0513 12/31/23 0543 01/01/24  0501   INR 1.95* 1.40* 1.63*       Urine Cultures:   Lab Results   Component Value Date/Time    LABURIN >100,000 CFU/ml 10/11/2023 01:31 PM    LABURIN 200 10/26/2022 10:56 AM     Blood Cultures:   Lab Results   Component Value Date/Time    BC No Growth after 4 days of incubation. 11/24/2023 07:00 PM     Lab Results   Component Value Date/Time    BLOODCULT2 No Growth after 4 days of incubation. 11/24/2023 06:58 PM     Organism:   Lab Results   Component

## 2024-01-02 NOTE — PROGRESS NOTES
Pt brought to PACU. Report obtained from OR RN and anesthesia. Pt placed on monitor and O2 at  3L

## 2024-01-02 NOTE — ANESTHESIA PRE PROCEDURE
GASTROINTESTINAL ENDOSCOPY N/A 06/11/2019    EGD WITH HALO AND ANESTHESIA performed by Rajesh Virk MD at [facility] ENDOSCOPY    UPPER GASTROINTESTINAL ENDOSCOPY N/A 08/13/2019    ESOPHAGOGASTRODUODENOSCOPY WITH HALO AND ANESTHESIA -SLEEP APNEA- performed by Rajesh Virk MD at [facility] ENDOSCOPY    WISDOM TOOTH EXTRACTION         Social History:    Social History     Tobacco Use    Smoking status: Never     Passive exposure: Past    Smokeless tobacco: Never   Substance Use Topics    Alcohol use: No                                Counseling given: Not Answered      Vital Signs (Current):   Vitals:    01/01/24 2228 01/02/24 0357 01/02/24 0745 01/02/24 1115   BP: 131/86 124/71 131/76 (!) 147/80   Pulse: 80 77 78 73   Resp: 16 16 18 16   Temp: 98.6 °F (37 °C) 98 °F (36.7 °C) 98.2 °F (36.8 °C) 98.5 °F (36.9 °C)   TempSrc: Oral Oral Oral Oral   SpO2: 95% 90% 92% 92%   Weight:  121.8 kg (268 lb 8 oz)     Height:                                                  BP Readings from Last 3 Encounters:   01/02/24 (!) 147/80   12/18/23 138/77   12/14/23 108/60       NPO Status:                                                                                 BMI:   Wt Readings from Last 3 Encounters:   01/02/24 121.8 kg (268 lb 8 oz)   12/14/23 122.4 kg (269 lb 14.4 oz)   12/14/23 123.7 kg (272 lb 9.6 oz)     Body mass index is 38.53 kg/m².    CBC:   Lab Results   Component Value Date/Time    WBC 6.9 01/02/2024 05:08 AM    RBC 4.17 01/02/2024 05:08 AM    HGB 12.0 01/02/2024 05:08 AM    HCT 36.5 01/02/2024 05:08 AM    MCV 87.5 01/02/2024 05:08 AM    RDW 14.0 01/02/2024 05:08 AM     01/02/2024 05:08 AM       CMP:   Lab Results   Component Value Date/Time     01/02/2024 05:08 AM    K 4.3 01/02/2024 05:08 AM    K 3.9 12/29/2023 05:50 AM    CL 97 01/02/2024 05:08 AM    CO2 32 01/02/2024 05:08 AM    BUN 16 01/02/2024 05:08 AM    CREATININE 0.9 01/02/2024 05:08 AM    GFRAA >60 09/23/2022 12:05 PM    GFRAA >60 12/25/2010

## 2024-01-02 NOTE — PROGRESS NOTES
[facility]     Electrophysiology                                     Progress Note    Admission date:  2023    Reason for follow up visit: AF, sick sinus syndrome     HPI/CC: London Swenson was admitted on 2023 with symptomatic bradycardia s/p recent ablation. On 2023, he had a dual chamber pacemaker implanted. Post procedure, RA lead dislodged. On 2024, he had a RA lead revision. Device check and CXR normal from a device standpoint. Rhythm has been sinus with intermittent brief atrial pacing..     Subjective: He has no complaints. Denies chest pain, palpitations, shortness of breath, and dizziness.     Vitals:  Blood pressure 131/76, pulse 78, temperature 98.2 °F (36.8 °C), temperature source Oral, resp. rate 18, height 1.778 m (5' 10\"), weight 121.8 kg (268 lb 8 oz), SpO2 92 %.  Temp  Av.5 °F (36.9 °C)  Min: 98 °F (36.7 °C)  Max: 99.1 °F (37.3 °C)  Pulse  Av.6  Min: 74  Max: 80  BP  Min: 124/71  Max: 138/78  SpO2  Av %  Min: 90 %  Max: 95 %    24 hour I/O    Intake/Output Summary (Last 24 hours) at 2024 1052  Last data filed at 2024 1028  Gross per 24 hour   Intake 1096.04 ml   Output 3900 ml   Net -2803.96 ml     Current Facility-Administered Medications   Medication Dose Route Frequency Provider Last Rate Last Admin    warfarin (COUMADIN) tablet 12.5 mg  12.5 mg Oral Once Donny Young MD        magnesium sulfate 2000 mg in 50 mL IVPB premix  2,000 mg IntraVENous Once Hudson Douglas MD 25 mL/hr at 24 1028 Rate Verify at 24 1028    diphenhydrAMINE (BENADRYL) tablet 25 mg  25 mg Oral Q6H PRN Donny Young MD        0.9 % sodium chloride infusion   IntraVENous Continuous Ericka Vladez, SANDY - CNP        traMADol (ULTRAM) tablet 50 mg  50 mg Oral Q6H PRN KEITH Aggarwal Jr., MD        Or    traMADol (ULTRAM) tablet 100 mg  100 mg Oral Q6H PRN KEITH Aggarwal Jr., MD        HYDROmorphone HCl PF (DILAUDID) injection 0.25 mg   0.25 mg IntraVENous Q3H PRN KEITH Aggarwal Jr., MD        Or    HYDROmorphone HCl PF (DILAUDID) injection 0.5 mg  0.5 mg IntraVENous Q3H PRN KEITH Aggarwal Jr., MD        polyethylene glycol (GLYCOLAX) packet 17 g  17 g Oral Daily KEITH Aggarwal Jr., MD   17 g at 01/01/24 0836    doxycycline hyclate (VIBRA-TABS) tablet 100 mg  100 mg Oral 2 times per day Pio Pablo DO   100 mg at 01/02/24 0925    glucose chewable tablet 16 g  4 tablet Oral PRN Jackson Trujillo MD        dextrose bolus 10% 125 mL  125 mL IntraVENous PRN Jackson Trujillo MD        Or    dextrose bolus 10% 250 mL  250 mL IntraVENous PRN Jackson Trujillo MD        glucagon (rDNA) injection 1 mg  1 mg SubCUTAneous PRN Jackson Trujillo MD        dextrose 10 % infusion   IntraVENous Continuous PRN Jackson Trujillo MD        insulin lispro (HUMALOG) injection vial 0-4 Units  0-4 Units SubCUTAneous TID WC Jackson Trujillo MD   2 Units at 01/02/24 0929    insulin lispro (HUMALOG) injection vial 0-4 Units  0-4 Units SubCUTAneous Nightly Jackson Trujillo MD   4 Units at 01/01/24 2234    insulin glargine (LANTUS) injection vial 10 Units  10 Units SubCUTAneous Nightly Jackson Trujillo MD   10 Units at 01/01/24 2233    pantoprazole (PROTONIX) tablet 40 mg  40 mg Oral BID AC Jackson Trujillo MD   40 mg at 01/02/24 0527    pramipexole (MIRAPEX) tablet 0.5 mg  0.5 mg Oral Nightly Jackson Trujillo MD   0.5 mg at 01/01/24 2231    rOPINIRole (REQUIP) tablet 0.5 mg  0.5 mg Oral Nightly Jackson Trujillo MD   0.5 mg at 01/01/24 2234    sucralfate (CARAFATE) tablet 1 g  1 g Oral 3 times per day Jackson Trujillo MD   1 g at 01/02/24 0527    dofetilide (TIKOSYN) capsule 250 mcg  250 mcg Oral 2 times per day Jackson Trujillo MD   250 mcg at 01/02/24 0925    atorvastatin (LIPITOR) tablet 40 mg  40 mg Oral Nightly Jackson Trujillo MD   40 mg at 01/01/24 2231    sodium chloride flush 0.9 % injection 5-40 mL  5-40 mL IntraVENous 2 times per day Jackson Trujillo MD   10 mL at 01/02/24 0926    sodium chloride flush 0.9 %

## 2024-01-02 NOTE — PROGRESS NOTES
Pharmacy Note  Warfarin Consult  Dx: afib  Goal INR range 2-3  Home Warfarin dose: 10 mg every Tue, Sat; 7.5 mg  all other days   ** Potential DDI w/ Doxycycline (started 12/29; last dose scheduled for 1/3 at 0900)     Date                 INR                  Warfarin  12/28               3.02                    hold  12/29               2.69                   7.5 mg   12/30               1.95                   10 mg  12/31               1.40                   12.5 mg   1/1                   1.63                   12.5 mg  1/2                   1.93                   12.5 mg     Recommend Warfarin 12.5 mg x 1 dose tonight.  Daily INR ordered.  Rx will continue to manage therapy per consult order.  Alvarez Ramirez PharmD 1/2/2024  5:38 AM

## 2024-01-02 NOTE — ANESTHESIA POSTPROCEDURE EVALUATION
from Stage I post anesthesia care.  Care transferred from Anesthesiology department on discharge from perioperative area     No notable events documented.

## 2024-01-02 NOTE — PROGRESS NOTES
DEVICE MODEL  W1DR01 [place]™ XT DR MRI (Pacemaker)  DEVICE SERIAL NUMBER  [id number]  DEVICE TYPE  Pacemaker  DATE OF IMPLANT  29-Dec-2023  Monitor Information  MONITOR STATUS  Ordered  DISTRIBUTION METHOD  Shipped from [employer]  ORDER DATE  02-Jan-2024  Shipping Information  MAILING ADDRESS  London Swenson  [address] [place], [place] [id number]

## 2024-01-02 NOTE — PROGRESS NOTES
12/30/23-Atrial lead found to be dislodged. Programmed to VVI 50 until revision is completed.      1/2/2024-Successful RA lead revision

## 2024-01-02 NOTE — CARE COORDINATION
Patient had interrogation of pacemaker in cath lab. IPTA. From home with spouse. Likely home with no needs. Will follow for recommendations and any barriers.

## 2024-01-02 NOTE — PLAN OF CARE
Pt a/o. Verbalized understanding of calling for assistance. Observed steady gait. Room well lit and free of clutter. Bed locked and in lowest position with bedside table and call light within reach.     Problem: Safety - Adult  Goal: Free from fall injury  Outcome: Progressing

## 2024-01-02 NOTE — PROGRESS NOTES
Pt back from cathlab in stable condition. DSG to L chest wall in place with small amount of sanguinous drainage. Pressure dsg in place over steri strips. No crepitus to L chest wall. Assisted pt to chair to eat lunch. Sling in place and educated pt on not using L arm and not removing sling. Pt verbalzied understanding. Tried to wean to RA but had to keep on 1L NC to keep SPO2 above 90%.

## 2024-01-02 NOTE — PROGRESS NOTES
Pt to cathlab in stable condition. PIV flushed and locked. Report given. Pt a/o. Sent consent with pt. 2 visitors went with pt. CMU notified. VSS.

## 2024-01-02 NOTE — PROGRESS NOTES
Hospital Medicine Progress Note      Date of Admission: 12/28/2023  Hospital Day: 6    Chief Admission Complaint:  SOB/Chest Pain    Subjective:  no new c/o    Presenting Admission History:         \"London Swenson is a 73 y.o. male with PMH of  obesity, HTN, HLD, DM2, CAD, Afib, and dementia  who presents with SOB and on/off chest pain since he had the ablation procedure 2 weeks ago. Pt had CTA 12/26/2023 which was unremarkable. He was at cardiology clinic and was advised to come to ER for admission and further evaluation. Describes pain as pressure like constant but worse on exertion associated with SOB. No diaphoresis.      At ED, pt was afebrile, hemodynamically stable but had drop HR 40's; on room air. Labs reassuring just Mg 12.6. CXR no acute changes. ECG no ischemic changes\"    Assessment/Plan:      Current Principal Problem:  Shortness of breath      Atypical chest pain with shortness of breath.  Patient status post pacemaker placement.  EP following.  Cardiology to evaluate.  Plan for lead revision 2 Jan.      Atrial fibrillation with sick sinus syndrome.  Status post pacemaker placement.  As above    HypoMagnesemia - etiology clinically unable to determine.  Followed serial labs and replaced PRN - ordered IV 2 Jan.  Documented and reviewed.    Hypertension.  Patient's beta-blocker was on hold due to bradycardia.  Will continue monitor closely.    CAD.  Will continue aspirin and atorvastatin.  Ischemic workup if symptoms persist.    Type 2 diabetes.  Will continue basal bolus correction.    Sleep apnea.  Patient will continue home CPAP.    Renal lesion.  Patient with simple cyst in the right kidney.  CTA ordered by EP.  Will have urology evaluate.    History of aortic dissection in the descending and thoracic aorta.  CTA did not demonstrate intimal flap.  No mass noted in aorta.     Obesity -  With Body mass index is 38.53 kg/m².       [] Class I - 30.0 to 34.9 kg/m2  [x] Class II - 35.0 to 39.9  incubation. 11/24/2023 06:58 PM     Organism:   Lab Results   Component Value Date/Time    ORG Klebsiella oxytoca 10/11/2023 01:31 PM         Hudson Douglas MD

## 2024-01-02 NOTE — PLAN OF CARE
Successful RA lead revision.  Full note to come.    Marquise Aggarwal MD  Cardiac Electrophysiology  [facility]  [phone] [facility]

## 2024-01-02 NOTE — PROGRESS NOTES
Assumed care of pt. Assisted pt to BR as x1 assist.Py a/o. VSS. Shift assessment complete and charted. BS active and pt reports BM yesterday. Lungs clear. Pt aware of surgery today at 12. Pacemaker incision to L chest wall has some old drainage on it, otherwise dry and intact. BLE +1 pitting edema. Foot of bed elevated. Pt stable and denied needs when writer left room.

## 2024-01-03 ENCOUNTER — CARE COORDINATION (OUTPATIENT)
Dept: CASE MANAGEMENT | Age: 74
End: 2024-01-03

## 2024-01-03 ENCOUNTER — APPOINTMENT (OUTPATIENT)
Dept: GENERAL RADIOLOGY | Age: 74
DRG: 243 | End: 2024-01-03
Payer: MEDICARE

## 2024-01-03 ENCOUNTER — PROCEDURE VISIT (OUTPATIENT)
Dept: CARDIOLOGY CLINIC | Age: 74
End: 2024-01-03

## 2024-01-03 DIAGNOSIS — Z95.0 PACEMAKER: Primary | ICD-10-CM

## 2024-01-03 DIAGNOSIS — I49.5 SINUS NODE DYSFUNCTION (HCC): ICD-10-CM

## 2024-01-03 LAB
ANION GAP SERPL CALCULATED.3IONS-SCNC: 9 MMOL/L (ref 3–16)
BASOPHILS # BLD: 0 K/UL (ref 0–0.2)
BASOPHILS NFR BLD: 0.1 %
BUN SERPL-MCNC: 30 MG/DL (ref 7–20)
CALCIUM SERPL-MCNC: 8.8 MG/DL (ref 8.3–10.6)
CHLORIDE SERPL-SCNC: 98 MMOL/L (ref 99–110)
CO2 SERPL-SCNC: 28 MMOL/L (ref 21–32)
CREAT SERPL-MCNC: 1.1 MG/DL (ref 0.8–1.3)
DEPRECATED RDW RBC AUTO: 13.9 % (ref 12.4–15.4)
EOSINOPHIL # BLD: 0 K/UL (ref 0–0.6)
EOSINOPHIL NFR BLD: 0 %
GFR SERPLBLD CREATININE-BSD FMLA CKD-EPI: >60 ML/MIN/{1.73_M2}
GLUCOSE BLD-MCNC: 143 MG/DL (ref 70–99)
GLUCOSE BLD-MCNC: 192 MG/DL (ref 70–99)
GLUCOSE BLD-MCNC: 321 MG/DL (ref 70–99)
GLUCOSE BLD-MCNC: 369 MG/DL (ref 70–99)
GLUCOSE BLD-MCNC: 392 MG/DL (ref 70–99)
GLUCOSE BLD-MCNC: 503 MG/DL (ref 70–99)
GLUCOSE BLD-MCNC: 569 MG/DL (ref 70–99)
GLUCOSE BLD-MCNC: >600 MG/DL (ref 70–99)
GLUCOSE SERPL-MCNC: 402 MG/DL (ref 70–99)
HCT VFR BLD AUTO: 35.3 % (ref 40.5–52.5)
HGB BLD-MCNC: 11.7 G/DL (ref 13.5–17.5)
INR PPP: 2.53 (ref 0.84–1.16)
LYMPHOCYTES # BLD: 0.6 K/UL (ref 1–5.1)
LYMPHOCYTES NFR BLD: 6.4 %
MAGNESIUM SERPL-MCNC: 1.9 MG/DL (ref 1.8–2.4)
MCH RBC QN AUTO: 29.2 PG (ref 26–34)
MCHC RBC AUTO-ENTMCNC: 33.3 G/DL (ref 31–36)
MCV RBC AUTO: 87.8 FL (ref 80–100)
MONOCYTES # BLD: 0.6 K/UL (ref 0–1.3)
MONOCYTES NFR BLD: 6 %
NEUTROPHILS # BLD: 8.1 K/UL (ref 1.7–7.7)
NEUTROPHILS NFR BLD: 87.5 %
PERFORMED ON: ABNORMAL
PLATELET # BLD AUTO: 140 K/UL (ref 135–450)
PMV BLD AUTO: 9.6 FL (ref 5–10.5)
POTASSIUM SERPL-SCNC: 5.1 MMOL/L (ref 3.5–5.1)
PROTHROMBIN TIME: 27.1 SEC (ref 11.5–14.8)
RBC # BLD AUTO: 4.02 M/UL (ref 4.2–5.9)
SODIUM SERPL-SCNC: 135 MMOL/L (ref 136–145)
WBC # BLD AUTO: 9.2 K/UL (ref 4–11)

## 2024-01-03 PROCEDURE — 6370000000 HC RX 637 (ALT 250 FOR IP): Performed by: NURSE PRACTITIONER

## 2024-01-03 PROCEDURE — 6370000000 HC RX 637 (ALT 250 FOR IP): Performed by: INTERNAL MEDICINE

## 2024-01-03 PROCEDURE — 85025 COMPLETE CBC W/AUTO DIFF WBC: CPT

## 2024-01-03 PROCEDURE — 85610 PROTHROMBIN TIME: CPT

## 2024-01-03 PROCEDURE — 83735 ASSAY OF MAGNESIUM: CPT

## 2024-01-03 PROCEDURE — 80048 BASIC METABOLIC PNL TOTAL CA: CPT

## 2024-01-03 PROCEDURE — 2700000000 HC OXYGEN THERAPY PER DAY

## 2024-01-03 PROCEDURE — 2580000003 HC RX 258: Performed by: INTERNAL MEDICINE

## 2024-01-03 PROCEDURE — 36415 COLL VENOUS BLD VENIPUNCTURE: CPT

## 2024-01-03 PROCEDURE — 94761 N-INVAS EAR/PLS OXIMETRY MLT: CPT

## 2024-01-03 PROCEDURE — 99232 SBSQ HOSP IP/OBS MODERATE 35: CPT | Performed by: NURSE PRACTITIONER

## 2024-01-03 PROCEDURE — 6370000000 HC RX 637 (ALT 250 FOR IP): Performed by: STUDENT IN AN ORGANIZED HEALTH CARE EDUCATION/TRAINING PROGRAM

## 2024-01-03 PROCEDURE — 2060000000 HC ICU INTERMEDIATE R&B

## 2024-01-03 PROCEDURE — 71046 X-RAY EXAM CHEST 2 VIEWS: CPT

## 2024-01-03 RX ORDER — WARFARIN SODIUM 7.5 MG/1
7.5 TABLET ORAL
Status: COMPLETED | OUTPATIENT
Start: 2024-01-03 | End: 2024-01-03

## 2024-01-03 RX ORDER — DOXYCYCLINE HYCLATE 100 MG
100 TABLET ORAL EVERY 12 HOURS SCHEDULED
Status: DISCONTINUED | OUTPATIENT
Start: 2024-01-03 | End: 2024-01-04 | Stop reason: HOSPADM

## 2024-01-03 RX ORDER — INSULIN LISPRO 100 [IU]/ML
16 INJECTION, SOLUTION INTRAVENOUS; SUBCUTANEOUS ONCE
Status: COMPLETED | OUTPATIENT
Start: 2024-01-03 | End: 2024-01-03

## 2024-01-03 RX ORDER — INSULIN GLARGINE 100 [IU]/ML
20 INJECTION, SOLUTION SUBCUTANEOUS NIGHTLY
Status: DISCONTINUED | OUTPATIENT
Start: 2024-01-03 | End: 2024-01-04 | Stop reason: HOSPADM

## 2024-01-03 RX ORDER — INSULIN LISPRO 100 [IU]/ML
14 INJECTION, SOLUTION INTRAVENOUS; SUBCUTANEOUS
Status: DISCONTINUED | OUTPATIENT
Start: 2024-01-03 | End: 2024-01-04 | Stop reason: HOSPADM

## 2024-01-03 RX ADMIN — ASPIRIN 81 MG: 81 TABLET, COATED ORAL at 08:37

## 2024-01-03 RX ADMIN — INSULIN HUMAN 10 UNITS: 100 INJECTION, SUSPENSION SUBCUTANEOUS at 12:47

## 2024-01-03 RX ADMIN — ATORVASTATIN CALCIUM 40 MG: 40 TABLET, FILM COATED ORAL at 20:27

## 2024-01-03 RX ADMIN — Medication 10 ML: at 20:29

## 2024-01-03 RX ADMIN — PANTOPRAZOLE SODIUM 40 MG: 40 TABLET, DELAYED RELEASE ORAL at 05:29

## 2024-01-03 RX ADMIN — SUCRALFATE 1 G: 1 TABLET ORAL at 16:21

## 2024-01-03 RX ADMIN — PANTOPRAZOLE SODIUM 40 MG: 40 TABLET, DELAYED RELEASE ORAL at 16:22

## 2024-01-03 RX ADMIN — POLYETHYLENE GLYCOL 3350 17 G: 17 POWDER, FOR SOLUTION ORAL at 08:37

## 2024-01-03 RX ADMIN — INSULIN GLARGINE 20 UNITS: 100 INJECTION, SOLUTION SUBCUTANEOUS at 20:28

## 2024-01-03 RX ADMIN — DOXYCYCLINE HYCLATE 100 MG: 100 TABLET, COATED ORAL at 08:37

## 2024-01-03 RX ADMIN — PRAMIPEXOLE DIHYDROCHLORIDE 0.5 MG: 0.25 TABLET ORAL at 20:27

## 2024-01-03 RX ADMIN — ROPINIROLE HYDROCHLORIDE 0.5 MG: 0.5 TABLET, FILM COATED ORAL at 20:27

## 2024-01-03 RX ADMIN — DOFETILIDE 250 MCG: 0.25 CAPSULE ORAL at 08:37

## 2024-01-03 RX ADMIN — DOXYCYCLINE HYCLATE 100 MG: 100 TABLET, COATED ORAL at 20:27

## 2024-01-03 RX ADMIN — METOPROLOL TARTRATE 25 MG: 25 TABLET, FILM COATED ORAL at 20:27

## 2024-01-03 RX ADMIN — INSULIN LISPRO 4 UNITS: 100 INJECTION, SOLUTION INTRAVENOUS; SUBCUTANEOUS at 08:40

## 2024-01-03 RX ADMIN — WARFARIN SODIUM 7.5 MG: 7.5 TABLET ORAL at 17:47

## 2024-01-03 RX ADMIN — DOFETILIDE 250 MCG: 0.25 CAPSULE ORAL at 20:27

## 2024-01-03 RX ADMIN — SUCRALFATE 1 G: 1 TABLET ORAL at 05:29

## 2024-01-03 RX ADMIN — METOPROLOL TARTRATE 25 MG: 25 TABLET, FILM COATED ORAL at 12:03

## 2024-01-03 RX ADMIN — INSULIN LISPRO 16 UNITS: 100 INJECTION, SOLUTION INTRAVENOUS; SUBCUTANEOUS at 15:25

## 2024-01-03 RX ADMIN — INSULIN LISPRO 4 UNITS: 100 INJECTION, SOLUTION INTRAVENOUS; SUBCUTANEOUS at 12:03

## 2024-01-03 RX ADMIN — SUCRALFATE 1 G: 1 TABLET ORAL at 20:30

## 2024-01-03 ASSESSMENT — PAIN SCALES - GENERAL: PAINLEVEL_OUTOF10: 0

## 2024-01-03 NOTE — FLOWSHEET NOTE
01/02/24 2108   Vital Signs   Temp 99 °F (37.2 °C)   Temp Source Oral   Pulse 87   Heart Rate Source Monitor   Respirations 18   /73   MAP (Calculated) 92   BP Location Right upper arm   BP Method Automatic   Patient Position Up in chair   Pain Assessment   Pain Assessment None - Denies Pain   Oxygen Therapy   SpO2 91 %   O2 Device Nasal cannula   O2 Flow Rate (L/min) 1 L/min     Pt up to chair, No C/o Pain/discomfort. Updated with POC, verbalized understanding, No concerns voiced. Left upper chest s/p Pacemaker site dressing With old blood stained noted and with pressure dressing and arm sling in placed. Pt appears comfortable. Safety/fall prevention maintained. Personal belongings and call light within reach. KAITLYNRN

## 2024-01-03 NOTE — CARE COORDINATION
Per chart review patient readmitted to [facility]. Will continue to monitor.     Shante GUERINN, RN, [facility]  Care Transition Nurse  [phone] mobile

## 2024-01-03 NOTE — PROGRESS NOTES
Pharmacy Note  Warfarin Consult  Dx: afib  Goal INR range 2-3  Home Warfarin dose: 10 mg every Tue, Sat; 7.5 mg  all other days   ** Potential DDI w/ Doxycycline (started 12/29; last dose scheduled for 1/3 at 0900)     Date                 INR                  Warfarin  12/28               3.02                    hold  12/29               2.69                   7.5 mg   12/30               1.95                   10 mg  12/31               1.40                   12.5 mg   1/1                   1.63                   12.5 mg  1/2                   1.93                   12.5 mg  1/3                   2.53                    7.5 mg      Recommend Warfarin 7.5 mg x 1 dose tonight.  Daily INR ordered.  Rx will continue to manage therapy per consult order.  Kyree Nguyen, PharmD 1/3/2024 7:52 AM

## 2024-01-03 NOTE — PROGRESS NOTES
Hospital Medicine Progress Note      Date of Admission: 12/28/2023  Hospital Day: 7    Chief Admission Complaint: No new complaints     Subjective: Blood sugars elevated.  Patient denies any chest pain.    Presenting Admission History:       London Swenson is a 73 y.o. male with pmh of hypertension, hyperlipidemia, type 2 diabetes, CAD, atrial fibrillation, and mild dementia who presents with Shortness of breath.      Patient presents from home with persistent and worsening SOB/FREEMAN for the last several days. The patient noted that symptoms were present over the course of the last several weeks and felt that up until recently were relatively unchanged. He complains of FREEMAN. He is s/p ablation on December 15 given his hx of Atrial Fibrillation. He was also recently discontinued on BB given symptomatic bradycardia.        Assessment/Plan:      Current Principal Problem:  Shortness of breath    1.  Atypical chest pain with shortness of breath.  Patient status post pacemaker placement.  EP following.  Cardiology to evaluate.  Status post lead revision yesterday.  Stat 2.  Atrial fibrillation with sick sinus syndrome.  Status post pacemaker placement.  Status post lead revision.  3.  Hypertension.  Beta-blocker restarted.  4.  CAD.  Will continue aspirin and atorvastatin.  Ischemic workup if symptoms persist.  5.  Type 2 diabetes.  Will continue basal bolus correction.  Blood sugar uncontrolled today due to patient not being on home regimen.  Will resume home regimen.  Patient given additional NPH and hemologic.  Lantus was increased to home dose.  Patient will also receive prandial dosing.  6.  Sleep apnea.  Patient will continue home CPAP.  7.  Renal lesion.  Patient with simple cyst in the right kidney.  CTA ordered by EP.   Patient has a large renal cyst.  Patient will need urology follow-up as outpatient.  8.  History of aortic dissection in the descending and thoracic aorta.  CTA did not demonstrate intimal flap.           Urine Cultures:   Lab Results   Component Value Date/Time    LABURIN >100,000 CFU/ml 10/11/2023 01:31 PM    LABURIN 200 10/26/2022 10:56 AM     Blood Cultures:   Lab Results   Component Value Date/Time    BC No Growth after 4 days of incubation. 11/24/2023 07:00 PM     Lab Results   Component Value Date/Time    BLOODCULT2 No Growth after 4 days of incubation. 11/24/2023 06:58 PM     Organism:   Lab Results   Component Value Date/Time    ORG Klebsiella oxytoca 10/11/2023 01:31 PM         LETA EMMANUEL MD

## 2024-01-03 NOTE — PLAN OF CARE
Problem: Chronic Conditions and Co-morbidities  Goal: Patient's chronic conditions and co-morbidity symptoms are monitored and maintained or improved  Outcome: Progressing  Flowsheets (Taken 1/2/2024 2108)  Care Plan - Patient's Chronic Conditions and Co-Morbidity Symptoms are Monitored and Maintained or Improved:   Monitor and assess patient's chronic conditions and comorbid symptoms for stability, deterioration, or improvement   Collaborate with multidisciplinary team to address chronic and comorbid conditions and prevent exacerbation or deterioration     Problem: Pain  Goal: Verbalizes/displays adequate comfort level or baseline comfort level  Outcome: Progressing     Problem: Safety - Adult  Goal: Free from fall injury  Outcome: Progressing     Problem: Discharge Planning  Goal: Discharge to home or other facility with appropriate resources  Outcome: Progressing  Flowsheets (Taken 1/2/2024 2108)  Discharge to home or other facility with appropriate resources:   Identify barriers to discharge with patient and caregiver   Arrange for needed discharge resources and transportation as appropriate   Identify discharge learning needs (meds, wound care, etc)   Refer to discharge planning if patient needs post-hospital services based on physician order or complex needs related to functional status, cognitive ability or social support system

## 2024-01-04 VITALS
TEMPERATURE: 98.9 F | DIASTOLIC BLOOD PRESSURE: 71 MMHG | HEIGHT: 70 IN | BODY MASS INDEX: 38.38 KG/M2 | OXYGEN SATURATION: 92 % | HEART RATE: 67 BPM | WEIGHT: 268.06 LBS | SYSTOLIC BLOOD PRESSURE: 108 MMHG | RESPIRATION RATE: 16 BRPM

## 2024-01-04 LAB
ANION GAP SERPL CALCULATED.3IONS-SCNC: 8 MMOL/L (ref 3–16)
BASOPHILS # BLD: 0.1 K/UL (ref 0–0.2)
BASOPHILS NFR BLD: 0.7 %
BUN SERPL-MCNC: 25 MG/DL (ref 7–20)
CALCIUM SERPL-MCNC: 9 MG/DL (ref 8.3–10.6)
CHLORIDE SERPL-SCNC: 102 MMOL/L (ref 99–110)
CO2 SERPL-SCNC: 30 MMOL/L (ref 21–32)
CREAT SERPL-MCNC: 0.9 MG/DL (ref 0.8–1.3)
DEPRECATED RDW RBC AUTO: 13.8 % (ref 12.4–15.4)
EOSINOPHIL # BLD: 0.1 K/UL (ref 0–0.6)
EOSINOPHIL NFR BLD: 1.9 %
GFR SERPLBLD CREATININE-BSD FMLA CKD-EPI: >60 ML/MIN/{1.73_M2}
GLUCOSE BLD-MCNC: 144 MG/DL (ref 70–99)
GLUCOSE BLD-MCNC: 181 MG/DL (ref 70–99)
GLUCOSE SERPL-MCNC: 157 MG/DL (ref 70–99)
HCT VFR BLD AUTO: 34.7 % (ref 40.5–52.5)
HGB BLD-MCNC: 11.6 G/DL (ref 13.5–17.5)
INR PPP: 3 (ref 0.84–1.16)
LYMPHOCYTES # BLD: 1.9 K/UL (ref 1–5.1)
LYMPHOCYTES NFR BLD: 24.4 %
MAGNESIUM SERPL-MCNC: 1.7 MG/DL (ref 1.8–2.4)
MCH RBC QN AUTO: 29.1 PG (ref 26–34)
MCHC RBC AUTO-ENTMCNC: 33.4 G/DL (ref 31–36)
MCV RBC AUTO: 87.2 FL (ref 80–100)
MONOCYTES # BLD: 0.5 K/UL (ref 0–1.3)
MONOCYTES NFR BLD: 6.6 %
NEUTROPHILS # BLD: 5.1 K/UL (ref 1.7–7.7)
NEUTROPHILS NFR BLD: 66.4 %
PERFORMED ON: ABNORMAL
PERFORMED ON: ABNORMAL
PLATELET # BLD AUTO: 141 K/UL (ref 135–450)
PMV BLD AUTO: 9.7 FL (ref 5–10.5)
POTASSIUM SERPL-SCNC: 4.2 MMOL/L (ref 3.5–5.1)
PROTHROMBIN TIME: 30.9 SEC (ref 11.5–14.8)
RBC # BLD AUTO: 3.98 M/UL (ref 4.2–5.9)
SODIUM SERPL-SCNC: 140 MMOL/L (ref 136–145)
WBC # BLD AUTO: 7.7 K/UL (ref 4–11)

## 2024-01-04 PROCEDURE — 6370000000 HC RX 637 (ALT 250 FOR IP): Performed by: NURSE PRACTITIONER

## 2024-01-04 PROCEDURE — 80048 BASIC METABOLIC PNL TOTAL CA: CPT

## 2024-01-04 PROCEDURE — 85025 COMPLETE CBC W/AUTO DIFF WBC: CPT

## 2024-01-04 PROCEDURE — 6370000000 HC RX 637 (ALT 250 FOR IP): Performed by: INTERNAL MEDICINE

## 2024-01-04 PROCEDURE — 36415 COLL VENOUS BLD VENIPUNCTURE: CPT

## 2024-01-04 PROCEDURE — 85610 PROTHROMBIN TIME: CPT

## 2024-01-04 PROCEDURE — 83735 ASSAY OF MAGNESIUM: CPT

## 2024-01-04 PROCEDURE — 6370000000 HC RX 637 (ALT 250 FOR IP): Performed by: STUDENT IN AN ORGANIZED HEALTH CARE EDUCATION/TRAINING PROGRAM

## 2024-01-04 PROCEDURE — 2580000003 HC RX 258: Performed by: INTERNAL MEDICINE

## 2024-01-04 RX ORDER — ASPIRIN 81 MG/1
81 TABLET ORAL DAILY
Qty: 30 TABLET | Refills: 3 | Status: SHIPPED | OUTPATIENT
Start: 2024-01-05

## 2024-01-04 RX ORDER — DOXYCYCLINE HYCLATE 100 MG
100 TABLET ORAL EVERY 12 HOURS SCHEDULED
Qty: 8 TABLET | Refills: 0 | Status: SHIPPED | OUTPATIENT
Start: 2024-01-04 | End: 2024-01-08

## 2024-01-04 RX ADMIN — Medication 10 ML: at 08:04

## 2024-01-04 RX ADMIN — DOXYCYCLINE HYCLATE 100 MG: 100 TABLET, COATED ORAL at 08:03

## 2024-01-04 RX ADMIN — ASPIRIN 81 MG: 81 TABLET, COATED ORAL at 08:03

## 2024-01-04 RX ADMIN — INSULIN LISPRO 14 UNITS: 100 INJECTION, SOLUTION INTRAVENOUS; SUBCUTANEOUS at 07:59

## 2024-01-04 RX ADMIN — INSULIN LISPRO 14 UNITS: 100 INJECTION, SOLUTION INTRAVENOUS; SUBCUTANEOUS at 11:54

## 2024-01-04 RX ADMIN — PANTOPRAZOLE SODIUM 40 MG: 40 TABLET, DELAYED RELEASE ORAL at 05:47

## 2024-01-04 RX ADMIN — METOPROLOL TARTRATE 25 MG: 25 TABLET, FILM COATED ORAL at 08:03

## 2024-01-04 RX ADMIN — DOFETILIDE 250 MCG: 0.25 CAPSULE ORAL at 08:03

## 2024-01-04 RX ADMIN — SUCRALFATE 1 G: 1 TABLET ORAL at 05:47

## 2024-01-04 NOTE — PROGRESS NOTES
Pharmacy Note  Warfarin Consult  Dx: afib  Goal INR range 2-3  Home Warfarin dose: 10 mg every Tue, Sat; 7.5 mg  all other days   ** Potential DDI w/ Doxycycline (started 12/29; last dose scheduled for 1/3 at 0900)     Date                 INR                  Warfarin  12/28               3.02                    hold  12/29               2.69                   7.5 mg   12/30               1.95                   10 mg  12/31               1.40                   12.5 mg   1/1                   1.63                   12.5 mg  1/2                   1.93                   12.5 mg  1/3                   2.53                    7.5 mg   1/4                   3.00                    0 mg     Recommend to hold Warfarin tonight.  Daily INR ordered.  Rx will continue to manage therapy per consult order.  Juliann Andrews/Kavin. 1/4/24 6:56 AM EST

## 2024-01-04 NOTE — CARE COORDINATION
Urology consult noted. CT abd pelvis revealed large renal cyst on 12/29. This apparently has been there for years and is benign and patient is asymptomatic. No further workup necessary per urology progress note. INR 3 today. EP cleared for discharge. Anticipate home today. IPTA. From home with spouse.

## 2024-01-04 NOTE — PLAN OF CARE
Problem: Discharge Planning  Goal: Discharge to home or other facility with appropriate resources  Outcome: Completed     Problem: Chronic Conditions and Co-morbidities  Goal: Patient's chronic conditions and co-morbidity symptoms are monitored and maintained or improved  Outcome: Completed     Problem: Pain  Goal: Verbalizes/displays adequate comfort level or baseline comfort level  Outcome: Completed     Problem: Safety - Adult  Goal: Free from fall injury  1/4/2024 1326 by Evelin Jane RN  Outcome: Completed  1/4/2024 0916 by Kassie Jiménez RN  Outcome: Progressing

## 2024-01-04 NOTE — PROGRESS NOTES
AVS reviewed with patient and family member. Instructed patient on new medications, including to hold his warfarin tonight. Verbalized understanding. IV removed. Tele removed and returned. Prescriptions picked up at [facility] pharmacy. Home with son.

## 2024-01-04 NOTE — CONSULTS
Reason for Consult: large right renal cyst    History of Present Illness: London Swenson is a 73 y.o. male with history of HTN, HLD, DMII, CAD, A-fib, mild dementia who came to the hospital for shortness of breath on exertion. He underwent a cardiac ablation 12/15/23 for his A-fib. CT chest/abdomen/pelvis revealed a 22cm x 17.1cm right renal cyst. Patient is asymptomatic from this and it has been present for several years, appears to be stable in size since 8/2020. In care everywhere, MRI of lumbar spine in 4/2015 revealed this as well, measuring 17cm at that time. Patient is voiding without issues, denies gross hematuria, having regular BM's.       ROS:  General: no fever or chills  CV: No chest pain  Pulm: No SOB  GI: No nausea, vomiting, diarrhea or constipation  Skin: No rash  Neuro: No headaches, dizziness or neurologic symptoms  : as above  Hematologic: no complaints  Endocrine: no complaints  Psych: no complaints    Past Medical History:   Past Medical History:   Diagnosis Date    A-fib (HCC)     Acid reflux     Yan's esophagus     Coronary artery disease of native heart with stable angina pectoris (HCC) 05/30/2019    Dementia (HCC)     Diabetes mellitus (HCC)     Diabetic autonomic neuropathy associated with type 2 diabetes mellitus (HCC) 03/03/2020    Hyperlipidemia     Hypertension     Pancreatitis 1996    Restless legs     Sleep apnea     uses CPAP    Tremor     of bilateral hands       Past Surgical History:  Past Surgical History:   Procedure Laterality Date    CATARACT REMOVAL Bilateral 2013    CHOLECYSTECTOMY      COLONOSCOPY  10/26/2011    ENDOSCOPY, COLON, DIAGNOSTIC      EGD halo    HYDROCELE EXCISION  11/15/2016    LARYNGOSCOPY N/A 10/25/2023    DRUG INDUCED SLEEP ENDOSCOPY performed by David Estes DO at [facility] OR    PANCREAS SURGERY      cyst opened up and drining into small bowel    TONSILLECTOMY      UPPER GASTROINTESTINAL ENDOSCOPY  10/04/2016    with biopsy    UPPER  seen.     No mass identified in the thoracic aorta to correspond to the finding seen on  cardiac echo.     Interval placement of pacer.  Small amount of soft tissue gas and hematoma is  seen in the left chest wall     Abdomen and pelvis     No acute findings noted.     Renal cysts with a large cystic again identified on the right    Impression/Plan:   - 73y.o. male admitted with shortness of breath. Consulted for incidental large right renal cyst found on CT.   - This large right renal cyst has been present for several years, is benign and patient is asymptomatic. He reports this was worked up by a urologist several years ago when first identified. I do not see anything in records but there is no further workup necessary at this time unless he were to become symptomatic.     Addis Maria, SANDY - CNP 1/4/20249:48 AM

## 2024-01-04 NOTE — DISCHARGE SUMMARY
Hospital Medicine Discharge Summary    Patient: London Swenson   : 1950     Admit Date: 2023   Discharge Date:   2024    Disposition:  [x]Home   []HHC  []SNF  []ECF  []Acute Rehab  []LTAC  []Hospice  Code status:  [x]Full  []DNR/CCA  []Limited (DNR/CCA with Do Not Intubate)  []DNRCC  Condition at Discharge: Stable  Primary Care Provider: Constance Yancey PA    Admitting Provider: Jackson Trujillo MD  Discharge Provider: LETA EMMANUEL MD     Discharge Diagnoses:      Active Hospital Problems    Diagnosis     Shortness of breath [R06.02]        Presenting Admission History:      London Swenson is a 73 y.o. male with pmh of hypertension, hyperlipidemia, type 2 diabetes, CAD, atrial fibrillation, and mild dementia who presents with Shortness of breath.      Patient presents from home with persistent and worsening SOB/FREEMAN for the last several days. The patient noted that symptoms were present over the course of the last several weeks and felt that up until recently were relatively unchanged. He complains of FREEMAN. He is s/p ablation on December 15 given his hx of Atrial Fibrillation. He was also recently discontinued on BB given symptomatic bradycardia.      Assessment/Plan:      1.  Atypical chest pain with shortness of breath.  Patient status post pacemaker placement.  EP following.  Cardiology to evaluate.  Status post lead revision on 2024.    2.  Atrial fibrillation with sick sinus syndrome.  Status post pacemaker placement.  Status post lead revision.  3.  Hypertension.  Beta-blocker restarted.  4.  CAD.  Will continue aspirin and atorvastatin.  Ischemic workup if symptoms persist.  5.  Type 2 diabetes.  Will continue basal bolus correction.  Blood sugar improved.  Patient will restart home regimen.  6.  Sleep apnea.  Patient will continue home CPAP.  7.  Renal lesion.  Patient with simple cyst in the right kidney.  CTA ordered by EP.   Patient has a large renal cyst.  Patient will need  around echo dense mass TECHNOLOGIST PROVIDED HISTORY: Reason for exam:->mass seen on TANIA in descending and thoracic aorta with flow around echo dense mass Additional Contrast?->None Reason for Exam: Evaluate mass seen on TANIA Relevant Medical/Surgical History: hx of prasanth, appy, and pancreatic resection FINDINGS: CTA CHEST: Mediastinum: Thyroid gland is unremarkable. Streak artifact is seen from pacer. Coronary artery calcification is seen. No pericardial effusion. No pericardial calcification noted. Small hiatal hernia seen. There is nonspecific thickening at the GE junction. Ascending aorta measures approximately 4.2 cm. No intimal flap seen. No obvious mass seen in the descending thoracic aorta to correspond to the findings reported on cardiac echo. Lungs/Pleura: Hazy ground-glass opacities are seen the left upper and lower lobe.  Superimposed bandlike opacities are seen in the left upper and left lower. Hazy ground-glass opacities are seen in the upper, right middle lobe and right lower.  Superimposed bandlike opacities are seen right.  A few areas of septal thickening seen. Soft Tissues/Bones: Spurring is seen in the spine. Spurring is seen in the shoulder joints. Trace fluid is seen in the subcutaneous fat of the chest wall on the left, with subtle hematoma and a few tiny bubbles of soft tissue gas, presumably due to recent pacer placement CTA ABDOMEN: Organs: No splenomegaly.  No perisplenic fluid. Adrenal glands are unremarkable. No hydronephrosis noted on the left.  Cysts are seen in the left kidney measuring 2.4 cm in size or less. No hydronephrosis on right.  Very large right-sided renal cyst is seen measuring 22 cm by 17.1 cm. There are clips from prior cholecystectomy.  There is trace intrahepatic ductal dilatation Calcifications are seen in the region of the pancreatic body, with questionable post surgical change from distal pancreatectomy GI/Bowel: No significant small bowel distention noted.

## 2024-01-04 NOTE — PROGRESS NOTES
Comprehensive Nutrition Assessment    Type and Reason for Visit:  Initial, RD Nutrition Re-Screen/LOS    Nutrition Recommendations/Plan:   Modify diet to CC5 diet and encourage PO intake  Monitor nutrition adequacy, pertinent labs, bowel habits, wt changes, and clinical progress     Malnutrition Assessment:  Malnutrition Status:  No malnutrition (01/04/24 1323)    Context:  Acute Illness     Findings of the 6 clinical characteristics of malnutrition:  Fluid Accumulation:  Mild      Nutrition Assessment:    LOS assessment: 73 y.o. m w/ PMH of HTN, HLD, DMII, CAD, A-fib, mild dementia, s/p cardiac ablation admitted w/ SOB. CT chest/abdomen/pelvis showed a right renal cyst. S/p pacemaker placement on 1/2. On CC3 diet, pt reports good appetite and intake. Will modify diet to CC5 to provide additional menu options. Reports stable weight, UBW= 260 lb. No wt loss note in EMR. Pt familiar w/ carb control diet education, provided handout for pt to read over. Continue to encourage PO intake, will continue to monitor.    Nutrition Related Findings:    BLE non-pitting edema. + BM today. No nausea. Mg 1.7. -569 x 24 hours. Wound Type: None       Current Nutrition Intake & Therapies:    Average Meal Intake: %, 51-75%  Average Supplements Intake: None Ordered  ADULT DIET; Regular; 3 carb choices (45 gm/meal)    Anthropometric Measures:  Height: 177.8 cm (5' 10\")  Ideal Body Weight (IBW): 166 lbs (75 kg)       Current Body Weight: 121.6 kg (268 lb), 161.4 % IBW. Weight Source: Standing Scale  Current BMI (kg/m2): 38.5        Weight Adjustment For: No Adjustment                 BMI Categories: Obese Class 2 (BMI 35.0 -39.9)      Nutrition Diagnosis:   No nutrition diagnosis at this time     Nutrition Interventions:   Food and/or Nutrient Delivery: Modify Current Diet  Nutrition Education/Counseling: Education not appropriate  Coordination of Nutrition Care: Continue to monitor while inpatient       Goals:     Goals: PO  intake 50% or greater, prior to discharge       Nutrition Monitoring and Evaluation:   Behavioral-Environmental Outcomes: None Identified  Food/Nutrient Intake Outcomes: Food and Nutrient Intake  Physical Signs/Symptoms Outcomes: Biochemical Data, Weight, Nutrition Focused Physical Findings    Discharge Planning:    Continue current diet     Eda Canchola MS, RD, LD  Contact: Office: [phone]; Cl: [phone]

## 2024-01-05 ENCOUNTER — CARE COORDINATION (OUTPATIENT)
Dept: CASE MANAGEMENT | Age: 74
End: 2024-01-05

## 2024-01-05 ENCOUNTER — TELEPHONE (OUTPATIENT)
Dept: FAMILY MEDICINE CLINIC | Age: 74
End: 2024-01-05

## 2024-01-05 ENCOUNTER — TELEPHONE (OUTPATIENT)
Dept: CARDIOLOGY CLINIC | Age: 74
End: 2024-01-05

## 2024-01-05 DIAGNOSIS — R07.9 CHEST PAIN, UNSPECIFIED TYPE: Primary | ICD-10-CM

## 2024-01-05 PROCEDURE — 1111F DSCHRG MED/CURRENT MED MERGE: CPT | Performed by: PHYSICIAN ASSISTANT

## 2024-01-05 NOTE — TELEPHONE ENCOUNTER
Care Transitions Initial Follow Up Call    Outreach made within 2 business days of discharge: Yes    Patient: London Swenson Patient : 1950   MRN: [id number]  Reason for Admission: There are no discharge diagnoses documented for the most recent discharge.  Discharge Date: 24       Spoke with: CTN to  contact patient    Discharge department/facility: [facility] Interactive Patient Contact:  Was patient able to fill all prescriptions: Yes  Was patient instructed to bring all medications to the follow-up visit: Yes  Is patient taking all medications as directed in the discharge summary? Yes  Does patient understand their discharge instructions: Yes  Does patient have questions or concerns that need addressed prior to 7-14 day follow up office visit: no    Scheduled appointment with PCP within 7-14 days    Follow Up  Future Appointments   Date Time Provider Department Center   2024  3:00 PM SCHEDULE, ALETHEA DEVICE CHECK Alethea Car [facility]   1/15/2024  2:00 PM Constance Yancey PA [facility]  [place] [facility]   3/13/2024 12:40 PM Kelsey Flores MD AND ENDO [facility]   3/25/2024  1:30 PM SCHEDULE, ALETHEA DEVICE CHECK Alethea Car [facility]   3/25/2024  1:30 PM Ericka Valdez, APRN - CNP [facility] Car [facility]       Afsaneh Short LPN

## 2024-01-05 NOTE — TELEPHONE ENCOUNTER
Pts wife called asking about driving restrictions and what's to  be expected after device placement. Placed on 1.3.2024.

## 2024-01-05 NOTE — CARE COORDINATION
Care Transitions Initial Follow Up Call    Call within 2 business days of discharge: Yes    Patient Current Location:  [place]    Care Transition Nurse contacted the patient by telephone to perform post hospital discharge assessment. Verified name and  with patient as identifiers. Provided introduction to self, and explanation of the Care Transition Nurse role.     Patient: London Swenson Patient : 1950   MRN: [id number]  Reason for Admission: SOB readmit   Discharge Date: 24 RARS: Readmission Risk Score: 20.9      Last Discharge Facility       Date Complaint Diagnosis Description Type Department Provider    23 Shortness of Breath Chest pain, unspecified type ... ED to Hosp-Admission (Discharged) (ADMITTED) Donny Odonnell MD; [place], ...            Was this an external facility discharge? No Discharge Facility: n.a    Challenges to be reviewed by the provider   Additional needs identified to be addressed with provider: No  none               Method of communication with provider: none.    CTN spoke with patient who reported he is doing alright. Patient reported his breathing is stable, patient reported he has a pulse ox but hasn't checked oxygen saturation yet today. Patient denied any swelling and reported he has a scale but wasn't told he needs to check weight daily. CTN discussed the importance of monitoring weight daily. CTN encouraged patient to weigh self daily first thing in the morning after using the restroom and before eating or drinking anything. Patient encouraged to report weight increase of 3 lbs in a day or 5 lbs in a week. Patient reported he will schedule apt with coumadin clinic for INR check. Patient reported his pacemaker site is CD&I with dressing in place. CTN discussed with patient to not life arm above head for 30 days as well as no heavy lifting. Patient encouraged to report any increase in pain, fever, warmth, or increase in drainage. CTN advised patient of

## 2024-01-08 ENCOUNTER — NURSE ONLY (OUTPATIENT)
Dept: CARDIOLOGY CLINIC | Age: 74
End: 2024-01-08

## 2024-01-08 NOTE — PATIENT INSTRUCTIONS
New Cardiac Device Implant (Pacemaker and/or Defibrillator) Post Op Instructions  Bathe with water indirectly hitting the incision site. Water and soap may run over the incision site. Do not scrub. Pat dry with a clean towel after bathing.   Leave incision open to the air; do not apply any dressings, ointments, or bandages to the area. Do not apply lotion, perfume/cologne, or powders to the area until it is completely healed.   Any scabbing or skin glue that is noted will fall off within 1-2 weeks after the post op appointment.   If any oozing, bleeding, or pus drainage occurs, please call the office immediately at [phone].       Patient has movement restrictions in place until 4 weeks post op (to the day of implant) unless otherwise instructed by physician.   Patient may not lift the device side arm above shoulder height.   Do not far reach or stretch across body or behind body with effected side.   Do not use this arm for pushing, pulling, or lifting body.   Do not use cane on the effected side.   Patient may not lift anything heavier than a gallon of milk with the associated arm.     Appointments to expect going forward:  Post operatively the patient will have had a 1-week post op check and a 3 month follow up with NP/MD and the device clinic.       Remote Monitoring Instructions    Within 2-3 weeks of your device being implanted, you will receive a call from the  of your device. Please answer this call as it is to set up remote monitoring for your device. Once you receive your in-home monitor, please follow the instructions provided to sync the home monitor to your implanted device. Once you have paired your home monitor to your implanted device, keep your monitor plugged in within 6 feet of where you sleep. Your monitor will routinely check in with your device during sleep hours and transmit any urgent events to the Device Clinic for review.     Please do not send additional routine

## 2024-01-08 NOTE — PROGRESS NOTES
Incision is closed, clean, and dry with all dressings/steri strips removed. Site left open to the air. Incision well approximated. No s/s of infection. Skin irritation/blistering noted at corners of steri strips. Dry dressing put over site to be removed once patient gets home. Patient v/u.     Patient education was provided about site care, device functionality, in home monitoring, and any other patient questions and/or concerns were addressed. Aftercare and remote monitoring literature was provided. Patient voices understanding.

## 2024-01-10 ENCOUNTER — CARE COORDINATION (OUTPATIENT)
Dept: PRIMARY CARE CLINIC | Age: 74
End: 2024-01-10

## 2024-01-10 ENCOUNTER — CARE COORDINATION (OUTPATIENT)
Dept: CASE MANAGEMENT | Age: 74
End: 2024-01-10

## 2024-01-10 DIAGNOSIS — I10 ESSENTIAL HYPERTENSION: Primary | ICD-10-CM

## 2024-01-10 NOTE — PROCEDURES
[facility]  Electrophysiology Procedure Note    Attending MD Marquise Aggarwal MD    Procedures Right atrial lead revision    Indications   Right atrial lead dislodgement  Complication None  EBL  <20cc  Conclusions Successful right atrial lead revision    Description of Procedure  The patient was brought to the electrophysiology laboratory in the fasting state after informed consent was obtained.  The patient was placed in the supine position and the left pectoral area was prepared and draped in a sterile fashion.  Sedation was provided by anesthesia services.  The electrocardiogram, blood pressure, and oxygenation were monitored intra- and post-procedure.  Intravenous antibiotic was given prior to the procedure for general antibiotic prophylaxis.  After using buffered lidocaine 1% with epinephrine (1/100,000) for local anesthesia to the left pectoral area, a 4-5 cm incision was made along the patient's existing scar line with care not to damage the underlying leads.  The subcutaneous and scar tissues were dissected and the chronically implanted device was freed from the existing scar tissue and removed from the pocket.  The dislodged chronic RA lead was freed from device header.  Stylette was placed in chronic RA lead.  RA lead was repositioned.  The sensing and pacing threshold was confirmed.  The device was placed into the pocket, ensuring the leads were positioned underneath the device, and the wound was flushed with antibacterial solution and hemostasis confirmed (TyRx pouch).  The fascia was closed using interrupted 3-0 Vicryl.  The subcutaneous tissue and skin were approximated using running 4-0 Vicryl sutures and Steri-Strips was applied to the site and a sterile dressing was applied.  The patient tolerated the procedure well and there were no complications.  At the conclusion of the procedure, all sponges and sharps were accounted for by two counts.    The attending physician, Marquise Aggarwal MD was

## 2024-01-10 NOTE — PROGRESS NOTES
Remote Patient Monitoring Treatment Plan    Received request from ACM/Shante Guy RN  to order remote patient monitoring for in home monitoring of HTN and order completed.     Patient will be monitoring blood pressure   pulse ox   weight. Please set alert for ONLY weight gain of 5# in 7 days. Pt has no documented hx of HF.       Patient will engage in Remote Patient Monitoring each day to develop the skills necessary for self management.       RPM Care Team Responsibilities:   Alerts will be reviewed daily and addressed within 2-4 hours during operational hours (Monday -Friday 9 am-4 pm)  Alert response and intervention documented in patient medical record  Alert response escalated to PCP per protocol and documented in patient medical record  Patient monitored over approximately  days  Discharge from program based on self-management readiness    See care coordination encounters for additional details.

## 2024-01-10 NOTE — CARE COORDINATION
RPM Kit Order    Remote Patient Kit Ordering Note      Date/Time:  1/10/2024 1:19 PM      [x] [facility] confirmed patient shipping address  [x] Patient will receive package over the next 1-3 business days. Someone 21 years or older must be present to sign for UPS delivery.  [x] HRS will contact patient within 24 hours, an HRS  will call the patient directly: If the patient does not answer, HRS will follow up with the clinical team notifying them about the unsuccessful attempt to contact the patient. HRS will make three call attempts to the patient.Provide patient with [facility] Virtual install number is: [phone].  [x] CTN will contact patient once equipment is active to welcome them to the program.                                                         [x] Hours of RPM monitoring - Monday-Friday [id number]; encourage patient to get vitals entered by Noon each day to have the alert addressed same day.  [x]CCSS mailed RPM Patient flyer to patient.                     All questions answered at this time. CTN made aware the RPM kit has been ordered.      [facility]SS notified patient of RPM equipment order.

## 2024-01-11 DIAGNOSIS — I48.0 PAROXYSMAL ATRIAL FIBRILLATION (HCC): ICD-10-CM

## 2024-01-15 ENCOUNTER — OFFICE VISIT (OUTPATIENT)
Dept: FAMILY MEDICINE CLINIC | Age: 74
End: 2024-01-15

## 2024-01-15 ENCOUNTER — CARE COORDINATION (OUTPATIENT)
Dept: CASE MANAGEMENT | Age: 74
End: 2024-01-15

## 2024-01-15 ENCOUNTER — CARE COORDINATION (OUTPATIENT)
Dept: CARE COORDINATION | Age: 74
End: 2024-01-15

## 2024-01-15 VITALS
HEART RATE: 71 BPM | DIASTOLIC BLOOD PRESSURE: 60 MMHG | BODY MASS INDEX: 40.17 KG/M2 | SYSTOLIC BLOOD PRESSURE: 118 MMHG | WEIGHT: 280.6 LBS | OXYGEN SATURATION: 92 % | HEIGHT: 70 IN

## 2024-01-15 DIAGNOSIS — E11.29 TYPE 2 DIABETES MELLITUS WITH MICROALBUMINURIA, WITH LONG-TERM CURRENT USE OF INSULIN (HCC): ICD-10-CM

## 2024-01-15 DIAGNOSIS — R09.02 HYPOXIA: ICD-10-CM

## 2024-01-15 DIAGNOSIS — I49.5 SICK SINUS SYNDROME (HCC): ICD-10-CM

## 2024-01-15 DIAGNOSIS — E66.01 OBESITY, CLASS III, BMI 40-49.9 (MORBID OBESITY) (HCC): ICD-10-CM

## 2024-01-15 DIAGNOSIS — I48.0 PAROXYSMAL ATRIAL FIBRILLATION (HCC): Primary | ICD-10-CM

## 2024-01-15 DIAGNOSIS — Z09 HOSPITAL DISCHARGE FOLLOW-UP: ICD-10-CM

## 2024-01-15 DIAGNOSIS — N28.1 ACQUIRED RENAL CYST OF RIGHT KIDNEY: ICD-10-CM

## 2024-01-15 DIAGNOSIS — R80.9 TYPE 2 DIABETES MELLITUS WITH MICROALBUMINURIA, WITH LONG-TERM CURRENT USE OF INSULIN (HCC): ICD-10-CM

## 2024-01-15 DIAGNOSIS — Z95.0 PACEMAKER: ICD-10-CM

## 2024-01-15 DIAGNOSIS — Z79.4 TYPE 2 DIABETES MELLITUS WITH MICROALBUMINURIA, WITH LONG-TERM CURRENT USE OF INSULIN (HCC): ICD-10-CM

## 2024-01-15 RX ORDER — INSULIN ASPART 100 [IU]/ML
20 INJECTION, SOLUTION INTRAVENOUS; SUBCUTANEOUS
Qty: 15 ML | Refills: 2 | Status: SHIPPED | OUTPATIENT
Start: 2024-01-15

## 2024-01-15 ASSESSMENT — ANXIETY QUESTIONNAIRES
IF YOU CHECKED OFF ANY PROBLEMS ON THIS QUESTIONNAIRE, HOW DIFFICULT HAVE THESE PROBLEMS MADE IT FOR YOU TO DO YOUR WORK, TAKE CARE OF THINGS AT HOME, OR GET ALONG WITH OTHER PEOPLE: NOT DIFFICULT AT ALL
2. NOT BEING ABLE TO STOP OR CONTROL WORRYING: 0
5. BEING SO RESTLESS THAT IT IS HARD TO SIT STILL: 0
3. WORRYING TOO MUCH ABOUT DIFFERENT THINGS: 0
GAD7 TOTAL SCORE: 0
4. TROUBLE RELAXING: 0
1. FEELING NERVOUS, ANXIOUS, OR ON EDGE: 0
7. FEELING AFRAID AS IF SOMETHING AWFUL MIGHT HAPPEN: 0
6. BECOMING EASILY ANNOYED OR IRRITABLE: 0

## 2024-01-15 ASSESSMENT — PATIENT HEALTH QUESTIONNAIRE - PHQ9
4. FEELING TIRED OR HAVING LITTLE ENERGY: 0
8. MOVING OR SPEAKING SO SLOWLY THAT OTHER PEOPLE COULD HAVE NOTICED. OR THE OPPOSITE, BEING SO FIGETY OR RESTLESS THAT YOU HAVE BEEN MOVING AROUND A LOT MORE THAN USUAL: 0
3. TROUBLE FALLING OR STAYING ASLEEP: 0
SUM OF ALL RESPONSES TO PHQ9 QUESTIONS 1 & 2: 0
7. TROUBLE CONCENTRATING ON THINGS, SUCH AS READING THE NEWSPAPER OR WATCHING TELEVISION: 0
6. FEELING BAD ABOUT YOURSELF - OR THAT YOU ARE A FAILURE OR HAVE LET YOURSELF OR YOUR FAMILY DOWN: 0
2. FEELING DOWN, DEPRESSED OR HOPELESS: 0
5. POOR APPETITE OR OVEREATING: 0
1. LITTLE INTEREST OR PLEASURE IN DOING THINGS: 0
SUM OF ALL RESPONSES TO PHQ QUESTIONS 1-9: 0
9. THOUGHTS THAT YOU WOULD BE BETTER OFF DEAD, OR OF HURTING YOURSELF: 0
SUM OF ALL RESPONSES TO PHQ QUESTIONS 1-9: 0
SUM OF ALL RESPONSES TO PHQ QUESTIONS 1-9: 0
10. IF YOU CHECKED OFF ANY PROBLEMS, HOW DIFFICULT HAVE THESE PROBLEMS MADE IT FOR YOU TO DO YOUR WORK, TAKE CARE OF THINGS AT HOME, OR GET ALONG WITH OTHER PEOPLE: 0
SUM OF ALL RESPONSES TO PHQ QUESTIONS 1-9: 0

## 2024-01-15 NOTE — CARE COORDINATION
Remote Patient Monitoring Welcome Note Date/Time: 1/15/2024 1:38 PM Patient Current Location: [place] Verified patients name and  as identifiers. Completed and confirmed the following: Emergency Contact: Rosy Swenson [phone] [x] Patient received all RPM equipment (tablet, scale, blood pressure device and cuff, and pulse oximeter)  Cuff Size: regular (9.05\"-15.74\")  Weight Scale: regular (<330lbs) [x] Instructed patient keep box for use when returning equipment [x] Reviewed Patient Welcome Letter with patient [x] Reviewed Consent Form  Copy of consent form in chart. [x] Reviewed expectations for patient and care team  Monitoring hours M-F 9-4pm  Completing monitoring by 12pm on  so that alerts can be responded to in the same day  Patient weighs self at same time every day (or after urinating and waking up)  Take blood pressure 1-2 hrs after medications  RPM team may have different phone area code (including [place], [place], [place] or [place])                        [x] Instructed patient to keep scale on flat surface [x] Instructed patient to keep tablet plugged in at all times [x] Instructed how to contact IT support ([phone]) [x] Provided Remote Patient Monitoring care  information All questions answered at this time. ---- Current Patient Metrics ---- Blood Pressure: 117/72, 75bpm Pulseox: 90%, 84bpm Weight: 275.5lbs Note Created at: 01/15/2024 01:39 PM ET ---- Time-Spent: 3 minutes 0 seconds     Shante ATKINS, RN, [facility]  Care Transition Nurse  [phone] mobile

## 2024-01-15 NOTE — CARE COORDINATION
Remote Patient Monitoring Note      Date/Time:  1/15/2024 9:21 AM  Patient Current Location: [place]  CTN contacted patient by telephone regarding red alert received for pulse ox reading (90). Verified patients name and  as identifiers.    Background: Enrolled in RPM for HTN  Clinical Interventions: Reviewed and followed up on alerts and treatments-Pt states he feels fine. Denies SOB, congestion, lightheadedness, CP, palpitations or other symptoms or concerns. He states he does have sleep apnea and is noncompliant with his CPAP because he \"can't breathe\" with it on. Pt rechecked O2 sat during this call with result 91%. Historical data since beginning RPM 3 days ago have ranged from 90-93%. He has an appt with his PCP this afternoon. Sent message to PCP informing of asymptomatic low sat and noncompliance with CPAP.       Plan/Follow Up: Will continue to review, monitor and address alerts with follow up based on severity of symptoms and risk factors.

## 2024-01-15 NOTE — PROGRESS NOTES
Post-Discharge Transitional Care  Follow Up      London Swenson   YOB: 1950    Date of Office Visit:  1/15/2024  Date of Hospital Admission: 12/28/23  Date of Hospital Discharge: 1/4/24  Risk of hospital readmission (high >=14%. Medium >=10%) :Readmission Risk Score: 20.9      Care management risk score Rising risk (score 2-5) and Complex Care (Scores >=6): No Risk Score On File     Non face to face  following discharge, date last encounter closed (first attempt may have been earlier): 01/05/2024    Call initiated 2 business days of discharge: Yes    ASSESSMENT/PLAN:   Paroxysmal atrial fibrillation (HCC)  Sick sinus syndrome (HCC)  Pacemaker  - incision site is healing well, no evidence of infection.   - restarted metoprolol, Hr 71 today, has been normal when checked at home as well.   - continue curren tmedication regimen and follow up with cardiology as scheduled.   Obesity, Class III, BMI 40-49.9 (morbid obesity) ([facility])  - discussed increasing exercise as able.   Type 2 diabetes mellitus with microalbuminuria, with long-term current use of insulin ([facility])  -     insulin aspart (NOVOLOG FLEXPEN) 100 UNIT/ML injection pen; Inject 20 Units into the skin 3 times daily (before meals), Disp-15 mL, R-2Normal  - last A1c 8.5 12/14, follow p 6 weeks for recheck.   Acquired renal cyst of right kidney  - saw Dr. Rojas for acquired cyst 3/2023 and requires no additional follow up as long as asymptomatic.     Hospital discharge follow-up  -     AR DISCHARGE MEDS RECONCILED W/ CURRENT OUTPATIENT MED LIST  Hypoxia  - 2/2 to HF and obesity with alveolar hypoventilation  - unable to tolerate cpap, needs A1c <7 before considered candidate for inspire.   - follow up with pulm as scheduled.   - no indication for home o2 today.     Medical Decision Making: high complexity  Return in about 6 weeks (around 2/26/2024) for Diabetes Mellitus.           Subjective:   HPI:  Follow up of Hospital problems/diagnosis(es):

## 2024-01-16 ENCOUNTER — CARE COORDINATION (OUTPATIENT)
Dept: CARE COORDINATION | Age: 74
End: 2024-01-16

## 2024-01-16 NOTE — CARE COORDINATION
Remote Alert Monitoring Note  Rpm alert to be reviewed by the provider   red alert   pulse ox reading (88%)   Additional needs to be addressed by PCP:  Pt has had low o2 readings for several days, he denies any SOB, no dizziness, no problems that he can note. He feels fine. He rechecked today and it went up to 91% which he and his wife state is what it was at his OV yesterday. He is monitoring for HTN. In the past, usually males with bigger/tougher hands/fingers have chronically low readings that may not be accurate. To reduce call fatigue, can we lower the parameter to alert for anything below 90%? It is currently set at 92%. Thank you                     Date/Time:  2024 11:01 AM  Patient Current Location: [facility] contacted patient by telephone. Verified patients name and  as identifiers.  Background: Pt enrolled for HTN  Refer to 911 immediately if:  Patient unresponsive or unable to provide history  Change in cognition or sudden confusion  Patient unable to respond in complete sentences  Intense chest pain/tightness  Any concern for any clinical emergency  Red Alert: Provider response time of 1 hr required for any red alert requiring intervention  Yellow Alert: Provider response time of 3hr required for any escalated yellow alert    O2 Triage  Are you having any Chest Pain? no   Are you having any Shortness of Breath? no   Swelling in your hands or feet? no     Are you having any other health concerns or issues? no      Clinical Interventions: Pt has had low o2 readings for several days, he denies any SOB, no dizziness, no problems that he can note. He feels fine. He rechecked today and it went up to 91% which he and his wife state is what it was at his OV yesterday. He is monitoring for HTN. In the past, usually males with bigger/tougher hands/fingers have chronically low readings that may not be accurate. To reduce call fatigue, can we lower the parameter to alert for anything below 90%? It is

## 2024-01-16 NOTE — TELEPHONE ENCOUNTER
Refill    pantoprazole (PROTONIX) 40 MG tablet   Prescribed when in hospital  [facility] [address] [place] [address] DRIVE - P [phone] - F [phone]   Lov 3/6/23 NPLR  Labs 1/4/24  Next 3/25/24 NPKK

## 2024-01-17 ENCOUNTER — CARE COORDINATION (OUTPATIENT)
Dept: CASE MANAGEMENT | Age: 74
End: 2024-01-17

## 2024-01-17 RX ORDER — PANTOPRAZOLE SODIUM 40 MG/1
40 TABLET, DELAYED RELEASE ORAL
Qty: 60 TABLET | Refills: 0 | OUTPATIENT
Start: 2024-01-17

## 2024-01-17 NOTE — CARE COORDINATION
Patient in restroom requested a call back.    Shante GUERINN, RN, [facility]  Care Transition Nurse  [phone] mobile

## 2024-01-17 NOTE — CARE COORDINATION
Care Transitions Follow Up Call    Patient Current Location:  [place]    Care Transition Nurse contacted the patient by telephone.   Verified name and  with patient as identifiers.    Patient: London Swenson  Patient : 1950   MRN: [id number]  Reason for Admission: SOB   Discharge Date: 24 RARS: Readmission Risk Score: 20.9      Needs to be reviewed by the provider   Additional needs identified to be addressed with provider: No  none             Method of communication with provider: none.    CTN spoke with patient who reported he is doing well and denied any concerns. Patient's vitals stable /76 HR 71 weight 275 lbs SpO2 97. CTN advised patient of use of urgent care or physician’s 24 hr access line if assistance is needed after hours.          Addressed changes since last contact:  none  Discussed follow-up appointments. If no appointment was previously scheduled, appointment scheduling offered: Yes.   Is follow up appointment scheduled within 7 days of discharge? Yes.    Follow Up  Future Appointments   Date Time Provider Department Center   2024  1:00 PM Constance Yancey PA [facility]  [place] [facility]   3/13/2024 12:40 PM Kelsey Flores MD [facility]   3/25/2024  1:30 PM SCHEDULE, ALETHEA DEVICE CHECK Alethe[facility] Car [facility]   3/25/2024  1:30 PM Ericka Valdez, SANDY - CNP [facility]     External follow up appointment(s): .    Care Transition Nurse reviewed medical action plan with patient and discussed any barriers to care and/or understanding of plan of care after discharge. Discussed appropriate site of care based on symptoms and resources available to patient including: PCP  Specialist  When to call 911. The patient agrees to contact the PCP office for questions related to their healthcare.     Advance Care Planning:   reviewed and current.     Patients top risk factors for readmission: medical condition-.  Interventions to address risk factors: Education of

## 2024-01-18 ENCOUNTER — CARE COORDINATION (OUTPATIENT)
Dept: CARE COORDINATION | Age: 74
End: 2024-01-18

## 2024-01-18 RX ORDER — PANTOPRAZOLE SODIUM 40 MG/1
40 TABLET, DELAYED RELEASE ORAL
Qty: 60 TABLET | Refills: 1 | Status: SHIPPED | OUTPATIENT
Start: 2024-01-18

## 2024-01-18 NOTE — TELEPHONE ENCOUNTER
Refill Request     CONFIRM preferred pharmacy with the patient.    If Mail Order Rx - Pend for 90 day refill.      Last Seen: Last Seen Department: 1/15/2024  Last Seen by PCP: 1/15/2024    Last Written: 12/18/23 #60, no refills    If no future appointment scheduled:  Review the last OV with PCP and review information for follow-up visit,  Route STAFF MESSAGE with patient name to the  Pool for scheduling with the following information:            -  Timing of next visit           -  Visit type ie Physical, OV, etc           -  Diagnoses/Reason ie. COPD, HTN - Do not use MEDICATION, Follow-up or CHECK UP - Give reason for visit      Next Appointment:   Future Appointments   Date Time Provider Department Center   2/26/2024  1:00 PM Constance Yancey PA [facility]  [place] [facility]   3/13/2024 12:40 PM Kelsey Flores [facility]   3/25/2024  1:30 PM SCHEDULE, ALETHEA DEVICE CHECK [facility]   3/25/2024  1:30 PM Ericka Valdez, SANDY - CNP [facility]       Message sent to  to schedule appt with patient?  NO      Requested Prescriptions      No prescriptions requested or ordered in this encounter

## 2024-01-18 NOTE — TELEPHONE ENCOUNTER
Medication Refill    Medication needing refilled:  Pantoprazole  Dosage of the medication:  40mg  How are you taking this medication (QD, BID, TID, QID, PRN):  BID  30 or 90 day supply called in:  60  When will you run out of your medication:  1 tab left  Which Pharmacy are we sending the medication to?:    [address] [facility] [phone] -  [phone] [[id number]]     Medication Refill    Medication needing refilled:  Dofetilide  Dosage of the medication:  250mg  How are you taking this medication (QD, BID, TID, QID, PRN):  BID  30 or 90 day supply called in:  60  When will you run out of your medication:  5 tabs left  Which Pharmacy are we sending the medication to?:    [address] [facility] [phone] - F [phone] [[id number]]     Medication Refill    Medication needing refilled:  Metoprolol tartrate  Dosage of the medication:  25mg  How are you taking this medication (QD, BID, TID, QID, PRN):  BID  30 or 90 day supply called in:  60  When will you run out of your medication:  12 tabs left  Which Pharmacy are we sending the medication to?:    [place] [id number] [facility] [phone] - F [phone] [[id number]]     Next ov: 3.25.2024 ALEX

## 2024-01-18 NOTE — TELEPHONE ENCOUNTER
Please see other encounter:     Ericka Valdez APRN - CNP   to Me  Vipul Martinez Ep   KK    1/17/24 10:27 AM  Patient does not need Protonix.  He was only taking it twice a day for a month after his ablation.  Thank you      Attempted to contact pt no answer unable to leave vm.

## 2024-01-18 NOTE — TELEPHONE ENCOUNTER
----- Message from Mery Saucedo sent at 1/18/2024  1:29 PM EST -----  Subject: Refill Request    QUESTIONS  Name of Medication? pantoprazole (PROTONIX) 40 MG tablet  Patient-reported dosage and instructions? 40mg  How many days do you have left? 0  Preferred Pharmacy? [place] [id number]  Pharmacy phone number (if available)? [phone]  ---------------------------------------------------------------------------  --------------,  Name of Medication? metoprolol tartrate (LOPRESSOR) 25 MG tablet  Patient-reported dosage and instructions? 25mg  How many days do you have left? 3  Preferred Pharmacy? [place] [id number]  Pharmacy phone number (if available)? [phone]  ---------------------------------------------------------------------------  --------------  CALL BACK INFO  What is the best way for the office to contact you? OK to leave message on   voicemail  Preferred Call Back Phone Number? [phone]  ---------------------------------------------------------------------------  --------------  SCRIPT ANSWERS  Relationship to Patient? Spouse/Partner  Representative Name? atul  Is the representative on the Communication Release of Information (YAMILE)   form in Epic? Yes

## 2024-01-19 RX ORDER — DOFETILIDE 0.25 MG/1
250 CAPSULE ORAL EVERY 12 HOURS SCHEDULED
Qty: 60 CAPSULE | Refills: 3 | Status: SHIPPED | OUTPATIENT
Start: 2024-01-19

## 2024-01-19 NOTE — TELEPHONE ENCOUNTER
Last OV:10/4/2022 AGK   Future OV:3/25/2024 NPKK  BMP 1/4/2024  CBC 1/4/2024  \EKG 12/28/2023      Spoke with pt, informed pt of protonix medication. Pt vu.   Rx pending for the other medication.

## 2024-01-24 ENCOUNTER — CARE COORDINATION (OUTPATIENT)
Dept: CASE MANAGEMENT | Age: 74
End: 2024-01-24

## 2024-01-24 NOTE — CARE COORDINATION
Planning:   reviewed and current.     Patients top risk factors for readmission: medical condition-.  Interventions to address risk factors: Education of patient/family/caregiver/guardian to support self-management-.    Offered patient enrollment in the Remote Patient Monitoring (RPM) program for in-home monitoring: Yes, patient already enrolled.     Care Transitions Subsequent and Final Call    Subsequent and Final Calls  Do you have any ongoing symptoms?: No  Have your medications changed?: No  Do you have any questions related to your medications?: No  Do you currently have any active services?: Yes  Are you currently active with any services?: Home Health  Do you have any needs or concerns that I can assist you with?: No  Identified Barriers: None  Care Transitions Interventions  Other Interventions:             Care Transition Nurse provided contact information for future needs. Plan for follow-up call in 5-7 days based on severity of symptoms and risk factors.  Plan for next call: self management-final call handoff to [facility].    Shante ATKINS, RN, [facility]  Care Transition Nurse  [phone] mobile

## 2024-01-25 ENCOUNTER — HOSPITAL ENCOUNTER (INPATIENT)
Age: 74
LOS: 2 days | Discharge: HOME | DRG: 189 | End: 2024-01-28
Attending: EMERGENCY MEDICINE | Admitting: INTERNAL MEDICINE
Payer: MEDICARE

## 2024-01-25 ENCOUNTER — APPOINTMENT (OUTPATIENT)
Dept: GENERAL RADIOLOGY | Age: 74
DRG: 189 | End: 2024-01-25
Payer: MEDICARE

## 2024-01-25 ENCOUNTER — APPOINTMENT (OUTPATIENT)
Dept: CT IMAGING | Age: 74
DRG: 189 | End: 2024-01-25
Payer: MEDICARE

## 2024-01-25 ENCOUNTER — CARE COORDINATION (OUTPATIENT)
Dept: CASE MANAGEMENT | Age: 74
End: 2024-01-25

## 2024-01-25 DIAGNOSIS — J96.01 ACUTE RESPIRATORY FAILURE WITH HYPOXIA (HCC): Primary | ICD-10-CM

## 2024-01-25 DIAGNOSIS — R07.9 CHEST PAIN, UNSPECIFIED TYPE: ICD-10-CM

## 2024-01-25 LAB
ALBUMIN SERPL-MCNC: 3.9 G/DL (ref 3.4–5)
ALBUMIN/GLOB SERPL: 1.7 {RATIO} (ref 1.1–2.2)
ALP SERPL-CCNC: 101 U/L (ref 40–129)
ALT SERPL-CCNC: 15 U/L (ref 10–40)
ANION GAP SERPL CALCULATED.3IONS-SCNC: 7 MMOL/L (ref 3–16)
AST SERPL-CCNC: 14 U/L (ref 15–37)
BASE EXCESS BLDV CALC-SCNC: 5.3 MMOL/L (ref -3–3)
BASOPHILS # BLD: 0 K/UL (ref 0–0.2)
BASOPHILS NFR BLD: 0.5 %
BILIRUB SERPL-MCNC: 0.3 MG/DL (ref 0–1)
BUN SERPL-MCNC: 18 MG/DL (ref 7–20)
CALCIUM SERPL-MCNC: 8.8 MG/DL (ref 8.3–10.6)
CHLORIDE SERPL-SCNC: 101 MMOL/L (ref 99–110)
CO2 BLDV-SCNC: 34 MMOL/L
CO2 SERPL-SCNC: 31 MMOL/L (ref 21–32)
COHGB MFR BLDV: 2.3 % (ref 0–1.5)
CREAT SERPL-MCNC: 0.8 MG/DL (ref 0.8–1.3)
DEPRECATED RDW RBC AUTO: 14.9 % (ref 12.4–15.4)
EKG ATRIAL RATE: 60 BPM
EKG DIAGNOSIS: NORMAL
EKG Q-T INTERVAL: 402 MS
EKG QRS DURATION: 76 MS
EKG QTC CALCULATION (BAZETT): 436 MS
EKG R AXIS: 69 DEGREES
EKG T AXIS: 45 DEGREES
EKG VENTRICULAR RATE: 71 BPM
EOSINOPHIL # BLD: 0.3 K/UL (ref 0–0.6)
EOSINOPHIL NFR BLD: 6.1 %
FLUAV RNA RESP QL NAA+PROBE: NOT DETECTED
FLUBV RNA RESP QL NAA+PROBE: NOT DETECTED
GFR SERPLBLD CREATININE-BSD FMLA CKD-EPI: >60 ML/MIN/{1.73_M2}
GLUCOSE SERPL-MCNC: 201 MG/DL (ref 70–99)
HCO3 BLDV-SCNC: 32.3 MMOL/L (ref 23–29)
HCT VFR BLD AUTO: 37 % (ref 40.5–52.5)
HGB BLD-MCNC: 11.9 G/DL (ref 13.5–17.5)
INR PPP: 2.58 (ref 0.84–1.16)
LEFT VENTRICULAR EJECTION FRACTION MODE: NORMAL
LV EF: 66 %
LYMPHOCYTES # BLD: 0.9 K/UL (ref 1–5.1)
LYMPHOCYTES NFR BLD: 17.6 %
MCH RBC QN AUTO: 28.4 PG (ref 26–34)
MCHC RBC AUTO-ENTMCNC: 32.3 G/DL (ref 31–36)
MCV RBC AUTO: 87.9 FL (ref 80–100)
METHGB MFR BLDV: 0.2 %
MONOCYTES # BLD: 0.4 K/UL (ref 0–1.3)
MONOCYTES NFR BLD: 8.1 %
NEUTROPHILS # BLD: 3.5 K/UL (ref 1.7–7.7)
NEUTROPHILS NFR BLD: 67.7 %
NT-PROBNP SERPL-MCNC: 137 PG/ML (ref 0–124)
O2 THERAPY: ABNORMAL
PCO2 BLDV: 59.8 MMHG (ref 40–50)
PH BLDV: 7.35 [PH] (ref 7.35–7.45)
PLATELET # BLD AUTO: 140 K/UL (ref 135–450)
PMV BLD AUTO: 9.8 FL (ref 5–10.5)
PO2 BLDV: 52.2 MMHG (ref 25–40)
POTASSIUM SERPL-SCNC: 4.8 MMOL/L (ref 3.5–5.1)
PROCALCITONIN SERPL IA-MCNC: 0.07 NG/ML (ref 0–0.15)
PROT SERPL-MCNC: 6.2 G/DL (ref 6.4–8.2)
PROTHROMBIN TIME: 27.5 SEC (ref 11.5–14.8)
RBC # BLD AUTO: 4.21 M/UL (ref 4.2–5.9)
RSV AG NOSE QL: NEGATIVE
SAO2 % BLDV: 84 %
SARS-COV-2 RNA RESP QL NAA+PROBE: NOT DETECTED
SODIUM SERPL-SCNC: 139 MMOL/L (ref 136–145)
TROPONIN, HIGH SENSITIVITY: 17 NG/L (ref 0–22)
TROPONIN, HIGH SENSITIVITY: 17 NG/L (ref 0–22)
WBC # BLD AUTO: 5.2 K/UL (ref 4–11)

## 2024-01-25 PROCEDURE — 93005 ELECTROCARDIOGRAM TRACING: CPT | Performed by: EMERGENCY MEDICINE

## 2024-01-25 PROCEDURE — 84145 PROCALCITONIN (PCT): CPT

## 2024-01-25 PROCEDURE — 94761 N-INVAS EAR/PLS OXIMETRY MLT: CPT

## 2024-01-25 PROCEDURE — 96376 TX/PRO/DX INJ SAME DRUG ADON: CPT

## 2024-01-25 PROCEDURE — 82803 BLOOD GASES ANY COMBINATION: CPT

## 2024-01-25 PROCEDURE — 84484 ASSAY OF TROPONIN QUANT: CPT

## 2024-01-25 PROCEDURE — 87807 RSV ASSAY W/OPTIC: CPT

## 2024-01-25 PROCEDURE — 6370000000 HC RX 637 (ALT 250 FOR IP): Performed by: EMERGENCY MEDICINE

## 2024-01-25 PROCEDURE — 87636 SARSCOV2 & INF A&B AMP PRB: CPT

## 2024-01-25 PROCEDURE — 99285 EMERGENCY DEPT VISIT HI MDM: CPT

## 2024-01-25 PROCEDURE — 36415 COLL VENOUS BLD VENIPUNCTURE: CPT

## 2024-01-25 PROCEDURE — 71260 CT THORAX DX C+: CPT

## 2024-01-25 PROCEDURE — 2700000000 HC OXYGEN THERAPY PER DAY

## 2024-01-25 PROCEDURE — 80053 COMPREHEN METABOLIC PANEL: CPT

## 2024-01-25 PROCEDURE — 6360000002 HC RX W HCPCS: Performed by: EMERGENCY MEDICINE

## 2024-01-25 PROCEDURE — 71045 X-RAY EXAM CHEST 1 VIEW: CPT

## 2024-01-25 PROCEDURE — 6360000002 HC RX W HCPCS: Performed by: PHYSICIAN ASSISTANT

## 2024-01-25 PROCEDURE — 96375 TX/PRO/DX INJ NEW DRUG ADDON: CPT

## 2024-01-25 PROCEDURE — 96374 THER/PROPH/DIAG INJ IV PUSH: CPT

## 2024-01-25 PROCEDURE — 6360000004 HC RX CONTRAST MEDICATION: Performed by: PHYSICIAN ASSISTANT

## 2024-01-25 PROCEDURE — 85610 PROTHROMBIN TIME: CPT

## 2024-01-25 PROCEDURE — 85025 COMPLETE CBC W/AUTO DIFF WBC: CPT

## 2024-01-25 PROCEDURE — 83880 ASSAY OF NATRIURETIC PEPTIDE: CPT

## 2024-01-25 PROCEDURE — 93010 ELECTROCARDIOGRAM REPORT: CPT | Performed by: INTERNAL MEDICINE

## 2024-01-25 RX ORDER — MORPHINE SULFATE 4 MG/ML
4 INJECTION, SOLUTION INTRAMUSCULAR; INTRAVENOUS ONCE
Status: COMPLETED | OUTPATIENT
Start: 2024-01-25 | End: 2024-01-25

## 2024-01-25 RX ORDER — ONDANSETRON 2 MG/ML
4 INJECTION INTRAMUSCULAR; INTRAVENOUS ONCE
Status: COMPLETED | OUTPATIENT
Start: 2024-01-25 | End: 2024-01-25

## 2024-01-25 RX ORDER — METHOCARBAMOL 750 MG/1
1500 TABLET, FILM COATED ORAL ONCE
Status: COMPLETED | OUTPATIENT
Start: 2024-01-25 | End: 2024-01-25

## 2024-01-25 RX ADMIN — MORPHINE SULFATE 4 MG: 4 INJECTION, SOLUTION INTRAMUSCULAR; INTRAVENOUS at 15:37

## 2024-01-25 RX ADMIN — MORPHINE SULFATE 4 MG: 4 INJECTION, SOLUTION INTRAMUSCULAR; INTRAVENOUS at 23:06

## 2024-01-25 RX ADMIN — ONDANSETRON 4 MG: 2 INJECTION INTRAMUSCULAR; INTRAVENOUS at 15:37

## 2024-01-25 RX ADMIN — METHOCARBAMOL 1500 MG: 750 TABLET ORAL at 23:05

## 2024-01-25 RX ADMIN — IOPAMIDOL 75 ML: 755 INJECTION, SOLUTION INTRAVENOUS at 16:45

## 2024-01-25 ASSESSMENT — PAIN DESCRIPTION - DESCRIPTORS
DESCRIPTORS: TIGHTNESS

## 2024-01-25 ASSESSMENT — PAIN DESCRIPTION - ORIENTATION
ORIENTATION: LEFT
ORIENTATION: LEFT

## 2024-01-25 ASSESSMENT — PAIN DESCRIPTION - PAIN TYPE
TYPE: ACUTE PAIN
TYPE: ACUTE PAIN

## 2024-01-25 ASSESSMENT — PAIN DESCRIPTION - FREQUENCY
FREQUENCY: CONTINUOUS
FREQUENCY: INTERMITTENT

## 2024-01-25 ASSESSMENT — PAIN SCALES - GENERAL
PAINLEVEL_OUTOF10: 6
PAINLEVEL_OUTOF10: 6
PAINLEVEL_OUTOF10: 7
PAINLEVEL_OUTOF10: 7

## 2024-01-25 ASSESSMENT — PAIN - FUNCTIONAL ASSESSMENT: PAIN_FUNCTIONAL_ASSESSMENT: 0-10

## 2024-01-25 ASSESSMENT — PAIN DESCRIPTION - LOCATION
LOCATION: CHEST

## 2024-01-25 NOTE — CARE COORDINATION
Remote Alert Monitoring Note  Rpm alert to be reviewed by the provider                   Date/Time:  2024 11:05 AM  Patient Current Location: [place]  LPN contacted patient by telephone. Verified patients name and  as identifiers.    Background: Pt enrolled for HTN  Refer to 911 immediately if:  Patient unresponsive or unable to provide history  Change in cognition or sudden confusion  Patient unable to respond in complete sentences  Intense chest pain/tightness  Any concern for any clinical emergency  Red Alert: Provider response time of 1 hr required for any red alert requiring intervention  Yellow Alert: Provider response time of 3hr required for any escalated yellow alert     O2 Triage  Are you having any Chest Pain? no   Are you having any Shortness of Breath? no   Swelling in your hands or feet? no      Are you having any other health concerns or issues? no      Clinical Interventions: LPN contacted pt in regards to RPM red alert for PO of 89%. Pt appeared annoyed with the call and denied any worrisome symptoms. Writer asked pt to recheck PO. Pt rechecked, and updated PO is at 89%. Writer routed to [facility] to advise per protocol.      Plan/Follow Up: Will continue to review, monitor and address alerts with follow up based on severity of symptoms and risk factors.       Juliet Patten LPN, [facility]  PH: [phone]     Current Patient Metrics ---- Blood Pressure: 130/75, 68bpm Pulseox: 89%, 67bpm Weight: 280.5lbs Note Created at: 2024 11:07 AM ET ---- Time-Spent: 10 minutes 0 seconds

## 2024-01-25 NOTE — ED PROVIDER NOTES
[facility] B3 - MED SURG  EMERGENCY DEPARTMENT ENCOUNTER        Pt Name: London Swenson  MRN: [id number]  Birthdate 1950  Date of evaluation: 1/25/2024  Provider: YUVAL CRUZ PA-C  PCP: Constance Yancey PA  ED Attending: DO Brody       I have seen and evaluated this patient with my supervising physician DO Brody.    CHIEF COMPLAINT:     Chief Complaint   Patient presents with    Chest Pain     Chest pain and SOB started appx 1430 this afternoon while pt was laying in bed, called  aspirin given, nitro given by EMS. Pt has pacemaker.     Shortness of Breath       HISTORY OF PRESENT ILLNESS:      History provided by the patient. No limitations.    London Swenson is a 73 y.o. male who arrives to the ED by EMS from home.  This patient reports that he was in bed around 2:30 PM when he began experiencing chest pain and shortness of breath.  EMS were called.  Patient was given oral aspirin and nitro in route without relief in symptoms.  He denies recent illness symptoms of fevers, chills, cough, congestion, GI upset.  He cannot identify triggers, exacerbating or alleviating factors to his symptoms today.    Nursing Notes were reviewed     REVIEW OF SYSTEMS:     Review of Systems  Positives and pertinent negatives as per HPI.      PAST MEDICAL HISTORY:     Past Medical History:   Diagnosis Date    A-fib (HCC)     Acid reflux     Yan's esophagus     Coronary artery disease of native heart with stable angina pectoris ([facility]) 05/30/2019    Dementia (HCC)     Diabetes mellitus ([facility])     Diabetic autonomic neuropathy associated with type 2 diabetes mellitus ([facility]) 03/03/2020    Hyperlipidemia     Hypertension     Pancreatitis 1996    Restless legs     Sleep apnea     uses CPAP    Tremor     of bilateral hands       SURGICAL HISTORY:      Past Surgical History:   Procedure Laterality Date    CATARACT REMOVAL Bilateral 2013    CHOLECYSTECTOMY      COLONOSCOPY  10/26/2011    ENDOSCOPY, COLON, DIAGNOSTIC

## 2024-01-26 ENCOUNTER — CARE COORDINATION (OUTPATIENT)
Dept: CASE MANAGEMENT | Age: 74
End: 2024-01-26

## 2024-01-26 ENCOUNTER — APPOINTMENT (OUTPATIENT)
Dept: NUCLEAR MEDICINE | Age: 74
DRG: 189 | End: 2024-01-26
Payer: MEDICARE

## 2024-01-26 PROBLEM — J96.01 ACUTE HYPOXIC RESPIRATORY FAILURE (HCC): Status: ACTIVE | Noted: 2024-01-26

## 2024-01-26 LAB
ANION GAP SERPL CALCULATED.3IONS-SCNC: 6 MMOL/L (ref 3–16)
BUN SERPL-MCNC: 18 MG/DL (ref 7–20)
CALCIUM SERPL-MCNC: 8.6 MG/DL (ref 8.3–10.6)
CHLORIDE SERPL-SCNC: 99 MMOL/L (ref 99–110)
CO2 SERPL-SCNC: 34 MMOL/L (ref 21–32)
CREAT SERPL-MCNC: 0.9 MG/DL (ref 0.8–1.3)
DEPRECATED RDW RBC AUTO: 14.7 % (ref 12.4–15.4)
GFR SERPLBLD CREATININE-BSD FMLA CKD-EPI: >60 ML/MIN/{1.73_M2}
GLUCOSE BLD-MCNC: 193 MG/DL (ref 70–99)
GLUCOSE BLD-MCNC: 201 MG/DL (ref 70–99)
GLUCOSE BLD-MCNC: 232 MG/DL (ref 70–99)
GLUCOSE BLD-MCNC: 270 MG/DL (ref 70–99)
GLUCOSE BLD-MCNC: 294 MG/DL (ref 70–99)
GLUCOSE SERPL-MCNC: 230 MG/DL (ref 70–99)
HCT VFR BLD AUTO: 35.9 % (ref 40.5–52.5)
HGB BLD-MCNC: 11.6 G/DL (ref 13.5–17.5)
INR PPP: 2.15 (ref 0.84–1.16)
MCH RBC QN AUTO: 28.6 PG (ref 26–34)
MCHC RBC AUTO-ENTMCNC: 32.3 G/DL (ref 31–36)
MCV RBC AUTO: 88.6 FL (ref 80–100)
PERFORMED ON: ABNORMAL
PLATELET # BLD AUTO: 136 K/UL (ref 135–450)
PMV BLD AUTO: 10.3 FL (ref 5–10.5)
POTASSIUM SERPL-SCNC: 4.6 MMOL/L (ref 3.5–5.1)
PROTHROMBIN TIME: 23.9 SEC (ref 11.5–14.8)
RBC # BLD AUTO: 4.05 M/UL (ref 4.2–5.9)
SODIUM SERPL-SCNC: 139 MMOL/L (ref 136–145)
WBC # BLD AUTO: 4.8 K/UL (ref 4–11)

## 2024-01-26 PROCEDURE — 36415 COLL VENOUS BLD VENIPUNCTURE: CPT

## 2024-01-26 PROCEDURE — 80048 BASIC METABOLIC PNL TOTAL CA: CPT

## 2024-01-26 PROCEDURE — 1200000000 HC SEMI PRIVATE

## 2024-01-26 PROCEDURE — 85027 COMPLETE CBC AUTOMATED: CPT

## 2024-01-26 PROCEDURE — A9502 TC99M TETROFOSMIN: HCPCS | Performed by: INTERNAL MEDICINE

## 2024-01-26 PROCEDURE — 85610 PROTHROMBIN TIME: CPT

## 2024-01-26 PROCEDURE — 99222 1ST HOSP IP/OBS MODERATE 55: CPT | Performed by: INTERNAL MEDICINE

## 2024-01-26 PROCEDURE — 78452 HT MUSCLE IMAGE SPECT MULT: CPT

## 2024-01-26 PROCEDURE — 94761 N-INVAS EAR/PLS OXIMETRY MLT: CPT

## 2024-01-26 PROCEDURE — 2700000000 HC OXYGEN THERAPY PER DAY

## 2024-01-26 PROCEDURE — 3430000000 HC RX DIAGNOSTIC RADIOPHARMACEUTICAL: Performed by: INTERNAL MEDICINE

## 2024-01-26 PROCEDURE — 2580000003 HC RX 258: Performed by: STUDENT IN AN ORGANIZED HEALTH CARE EDUCATION/TRAINING PROGRAM

## 2024-01-26 PROCEDURE — 6370000000 HC RX 637 (ALT 250 FOR IP): Performed by: INTERNAL MEDICINE

## 2024-01-26 PROCEDURE — 6370000000 HC RX 637 (ALT 250 FOR IP): Performed by: STUDENT IN AN ORGANIZED HEALTH CARE EDUCATION/TRAINING PROGRAM

## 2024-01-26 RX ORDER — WARFARIN SODIUM 7.5 MG/1
7.5 TABLET ORAL
Status: COMPLETED | OUTPATIENT
Start: 2024-01-26 | End: 2024-01-26

## 2024-01-26 RX ORDER — ACETAMINOPHEN 650 MG/1
650 SUPPOSITORY RECTAL EVERY 6 HOURS PRN
Status: DISCONTINUED | OUTPATIENT
Start: 2024-01-26 | End: 2024-01-28 | Stop reason: HOSPADM

## 2024-01-26 RX ORDER — INSULIN LISPRO 100 [IU]/ML
0-4 INJECTION, SOLUTION INTRAVENOUS; SUBCUTANEOUS NIGHTLY
Status: DISCONTINUED | OUTPATIENT
Start: 2024-01-26 | End: 2024-01-28 | Stop reason: HOSPADM

## 2024-01-26 RX ORDER — WARFARIN SODIUM 7.5 MG/1
7.5 TABLET ORAL
Status: DISCONTINUED | OUTPATIENT
Start: 2024-01-26 | End: 2024-01-26

## 2024-01-26 RX ORDER — WARFARIN SODIUM 5 MG/1
5 TABLET ORAL DAILY
Status: DISCONTINUED | OUTPATIENT
Start: 2024-01-26 | End: 2024-01-26 | Stop reason: DRUGHIGH

## 2024-01-26 RX ORDER — SODIUM CHLORIDE 0.9 % (FLUSH) 0.9 %
5-40 SYRINGE (ML) INJECTION EVERY 12 HOURS SCHEDULED
Status: DISCONTINUED | OUTPATIENT
Start: 2024-01-26 | End: 2024-01-28 | Stop reason: HOSPADM

## 2024-01-26 RX ORDER — ATORVASTATIN CALCIUM 40 MG/1
40 TABLET, FILM COATED ORAL NIGHTLY
Status: DISCONTINUED | OUTPATIENT
Start: 2024-01-26 | End: 2024-01-28 | Stop reason: HOSPADM

## 2024-01-26 RX ORDER — HYDROCODONE BITARTRATE AND ACETAMINOPHEN 5; 325 MG/1; MG/1
1 TABLET ORAL EVERY 6 HOURS PRN
Status: DISCONTINUED | OUTPATIENT
Start: 2024-01-26 | End: 2024-01-28 | Stop reason: HOSPADM

## 2024-01-26 RX ORDER — MAGNESIUM SULFATE IN WATER 40 MG/ML
2000 INJECTION, SOLUTION INTRAVENOUS PRN
Status: DISCONTINUED | OUTPATIENT
Start: 2024-01-26 | End: 2024-01-28 | Stop reason: HOSPADM

## 2024-01-26 RX ORDER — DOFETILIDE 0.25 MG/1
250 CAPSULE ORAL EVERY 12 HOURS SCHEDULED
Status: DISCONTINUED | OUTPATIENT
Start: 2024-01-26 | End: 2024-01-28 | Stop reason: HOSPADM

## 2024-01-26 RX ORDER — WARFARIN SODIUM 5 MG/1
5 TABLET ORAL
Status: DISCONTINUED | OUTPATIENT
Start: 2024-01-26 | End: 2024-01-26

## 2024-01-26 RX ORDER — POTASSIUM CHLORIDE 20 MEQ/1
40 TABLET, EXTENDED RELEASE ORAL PRN
Status: DISCONTINUED | OUTPATIENT
Start: 2024-01-26 | End: 2024-01-28 | Stop reason: HOSPADM

## 2024-01-26 RX ORDER — REGADENOSON 0.08 MG/ML
0.4 INJECTION, SOLUTION INTRAVENOUS
Status: COMPLETED | OUTPATIENT
Start: 2024-01-26 | End: 2024-01-27

## 2024-01-26 RX ORDER — SODIUM CHLORIDE 0.9 % (FLUSH) 0.9 %
5-40 SYRINGE (ML) INJECTION PRN
Status: DISCONTINUED | OUTPATIENT
Start: 2024-01-26 | End: 2024-01-28 | Stop reason: HOSPADM

## 2024-01-26 RX ORDER — PRAMIPEXOLE DIHYDROCHLORIDE 0.25 MG/1
0.5 TABLET ORAL NIGHTLY
Status: DISCONTINUED | OUTPATIENT
Start: 2024-01-26 | End: 2024-01-28 | Stop reason: HOSPADM

## 2024-01-26 RX ORDER — ONDANSETRON 4 MG/1
4 TABLET, ORALLY DISINTEGRATING ORAL EVERY 8 HOURS PRN
Status: DISCONTINUED | OUTPATIENT
Start: 2024-01-26 | End: 2024-01-28 | Stop reason: HOSPADM

## 2024-01-26 RX ORDER — INSULIN GLARGINE 100 [IU]/ML
17 INJECTION, SOLUTION SUBCUTANEOUS NIGHTLY
Status: DISCONTINUED | OUTPATIENT
Start: 2024-01-26 | End: 2024-01-28 | Stop reason: HOSPADM

## 2024-01-26 RX ORDER — POLYETHYLENE GLYCOL 3350 17 G/17G
17 POWDER, FOR SOLUTION ORAL DAILY PRN
Status: DISCONTINUED | OUTPATIENT
Start: 2024-01-26 | End: 2024-01-28 | Stop reason: HOSPADM

## 2024-01-26 RX ORDER — ACETAMINOPHEN 325 MG/1
650 TABLET ORAL EVERY 6 HOURS PRN
Status: DISCONTINUED | OUTPATIENT
Start: 2024-01-26 | End: 2024-01-28 | Stop reason: HOSPADM

## 2024-01-26 RX ORDER — ONDANSETRON 2 MG/ML
4 INJECTION INTRAMUSCULAR; INTRAVENOUS EVERY 6 HOURS PRN
Status: DISCONTINUED | OUTPATIENT
Start: 2024-01-26 | End: 2024-01-28 | Stop reason: HOSPADM

## 2024-01-26 RX ORDER — ASPIRIN 81 MG/1
81 TABLET ORAL DAILY
Status: DISCONTINUED | OUTPATIENT
Start: 2024-01-26 | End: 2024-01-28 | Stop reason: HOSPADM

## 2024-01-26 RX ORDER — DEXTROSE MONOHYDRATE 100 MG/ML
INJECTION, SOLUTION INTRAVENOUS CONTINUOUS PRN
Status: DISCONTINUED | OUTPATIENT
Start: 2024-01-26 | End: 2024-01-28 | Stop reason: HOSPADM

## 2024-01-26 RX ORDER — PANTOPRAZOLE SODIUM 40 MG/1
40 TABLET, DELAYED RELEASE ORAL
Status: DISCONTINUED | OUTPATIENT
Start: 2024-01-26 | End: 2024-01-28 | Stop reason: HOSPADM

## 2024-01-26 RX ORDER — ROPINIROLE 0.5 MG/1
0.5 TABLET, FILM COATED ORAL NIGHTLY
Status: DISCONTINUED | OUTPATIENT
Start: 2024-01-26 | End: 2024-01-28 | Stop reason: HOSPADM

## 2024-01-26 RX ORDER — HYDROCODONE BITARTRATE AND ACETAMINOPHEN 10; 325 MG/1; MG/1
1 TABLET ORAL EVERY 6 HOURS PRN
Status: DISCONTINUED | OUTPATIENT
Start: 2024-01-26 | End: 2024-01-28 | Stop reason: HOSPADM

## 2024-01-26 RX ORDER — IPRATROPIUM BROMIDE AND ALBUTEROL SULFATE 2.5; .5 MG/3ML; MG/3ML
1 SOLUTION RESPIRATORY (INHALATION) EVERY 4 HOURS PRN
Status: DISCONTINUED | OUTPATIENT
Start: 2024-01-26 | End: 2024-01-28 | Stop reason: HOSPADM

## 2024-01-26 RX ORDER — SODIUM CHLORIDE 9 MG/ML
INJECTION, SOLUTION INTRAVENOUS PRN
Status: DISCONTINUED | OUTPATIENT
Start: 2024-01-26 | End: 2024-01-28 | Stop reason: HOSPADM

## 2024-01-26 RX ORDER — POTASSIUM CHLORIDE 7.45 MG/ML
10 INJECTION INTRAVENOUS PRN
Status: DISCONTINUED | OUTPATIENT
Start: 2024-01-26 | End: 2024-01-28 | Stop reason: HOSPADM

## 2024-01-26 RX ORDER — GLUCAGON 1 MG/ML
1 KIT INJECTION PRN
Status: DISCONTINUED | OUTPATIENT
Start: 2024-01-26 | End: 2024-01-28 | Stop reason: HOSPADM

## 2024-01-26 RX ORDER — INSULIN LISPRO 100 [IU]/ML
0-8 INJECTION, SOLUTION INTRAVENOUS; SUBCUTANEOUS
Status: DISCONTINUED | OUTPATIENT
Start: 2024-01-26 | End: 2024-01-28 | Stop reason: HOSPADM

## 2024-01-26 RX ADMIN — INSULIN LISPRO 4 UNITS: 100 INJECTION, SOLUTION INTRAVENOUS; SUBCUTANEOUS at 12:43

## 2024-01-26 RX ADMIN — TETROFOSMIN 31 MILLICURIE: 1.38 INJECTION, POWDER, LYOPHILIZED, FOR SOLUTION INTRAVENOUS at 13:20

## 2024-01-26 RX ADMIN — INSULIN GLARGINE 17 UNITS: 100 INJECTION, SOLUTION SUBCUTANEOUS at 02:15

## 2024-01-26 RX ADMIN — INSULIN LISPRO 2 UNITS: 100 INJECTION, SOLUTION INTRAVENOUS; SUBCUTANEOUS at 17:23

## 2024-01-26 RX ADMIN — DOFETILIDE 250 MCG: 0.25 CAPSULE ORAL at 10:04

## 2024-01-26 RX ADMIN — PANTOPRAZOLE SODIUM 40 MG: 40 TABLET, DELAYED RELEASE ORAL at 17:23

## 2024-01-26 RX ADMIN — ASPIRIN 81 MG: 81 TABLET, COATED ORAL at 10:04

## 2024-01-26 RX ADMIN — ROPINIROLE HYDROCHLORIDE 0.5 MG: 0.5 TABLET, FILM COATED ORAL at 20:40

## 2024-01-26 RX ADMIN — PANTOPRAZOLE SODIUM 40 MG: 40 TABLET, DELAYED RELEASE ORAL at 10:04

## 2024-01-26 RX ADMIN — Medication 10 ML: at 10:05

## 2024-01-26 RX ADMIN — ATORVASTATIN CALCIUM 40 MG: 40 TABLET, FILM COATED ORAL at 20:40

## 2024-01-26 RX ADMIN — PRAMIPEXOLE DIHYDROCHLORIDE 0.5 MG: 0.25 TABLET ORAL at 20:40

## 2024-01-26 RX ADMIN — INSULIN GLARGINE 17 UNITS: 100 INJECTION, SOLUTION SUBCUTANEOUS at 20:41

## 2024-01-26 RX ADMIN — Medication 10 ML: at 20:41

## 2024-01-26 RX ADMIN — METOPROLOL TARTRATE 25 MG: 25 TABLET, FILM COATED ORAL at 10:04

## 2024-01-26 RX ADMIN — METOPROLOL TARTRATE 25 MG: 25 TABLET, FILM COATED ORAL at 20:40

## 2024-01-26 RX ADMIN — DOFETILIDE 250 MCG: 0.25 CAPSULE ORAL at 20:40

## 2024-01-26 RX ADMIN — ACETAMINOPHEN 650 MG: 325 TABLET ORAL at 20:40

## 2024-01-26 RX ADMIN — WARFARIN SODIUM 7.5 MG: 7.5 TABLET ORAL at 17:43

## 2024-01-26 ASSESSMENT — PAIN SCALES - GENERAL
PAINLEVEL_OUTOF10: 0
PAINLEVEL_OUTOF10: 3
PAINLEVEL_OUTOF10: 0
PAINLEVEL_OUTOF10: 0

## 2024-01-26 ASSESSMENT — PAIN DESCRIPTION - LOCATION: LOCATION: LEG

## 2024-01-26 ASSESSMENT — PAIN DESCRIPTION - ORIENTATION: ORIENTATION: LEFT;RIGHT

## 2024-01-26 NOTE — CONSULTS
Consult Placed     Who: [facility]   Date:01/26/24  Time:0725     Electronically signed by Lucretia Cody on 1/26/2024 at 7:25 AM

## 2024-01-26 NOTE — PROGRESS NOTES
Pharmacy Note  Warfarin Consult  Dx:  Afib  Goal INR range 2-3   Home Warfarin dose: 10mg on T,Sat; 7.5mg all other days per wife     Date  INR  Warfarin  1/25                2.58                    -  1/26                2.15                  7.5 mg  Recommend Warfarin 7.5mg tonight x1  pending home dosing.  Daily INR ordered.  Rx will continue to manage therapy per Dr. Dykes's consult order.

## 2024-01-26 NOTE — ACP (ADVANCE CARE PLANNING)
Advance Care Planning     General Advance Care Planning (ACP) Conversation    Date of Conversation: 1/25/2024  Conducted with: Patient with Decision Making Capacity   Healthcare Decision Maker: Next of Kin by law (only applies in absence of above) (name) spouse    Healthcare Decision Maker:    Primary Decision Maker: Rosy Swenson - Spouse - [phone]  Click here to complete Healthcare Decision Makers including selection of the Healthcare Decision Maker Relationship (ie \"Primary\").  Today we documented Decision Maker(s) consistent with Legal Next of Kin hierarchy.    Content/Action Overview:  Has NO ACP documents/care preferences - information provided, considering goals and options  Reviewed DNR/DNI and patient elects Full Code (Attempt Resuscitation)      Length of Voluntary ACP Conversation in minutes:  <16 minutes (Non-Billable)    Chelle Duron RN

## 2024-01-26 NOTE — CARE COORDINATION
Per chart review patient readmitted to [facility]. Will continue to monitor. Message sent to RPM team to pause RPM at this time.     Shante GUERINN, RN, [facility]  Care Transition Nurse  [phone] mobile

## 2024-01-26 NOTE — ED NOTES
No evidence of pulmonary embolism or acute pulmonary abnormality.     XR CHEST PORTABLE    Result Date: 1/25/2024  EXAMINATION: ONE XRAY VIEW OF THE CHEST 1/25/2024 3:18 pm COMPARISON: None. HISTORY: ORDERING SYSTEM PROVIDED HISTORY: CP, SOB TECHNOLOGIST PROVIDED HISTORY: Reason for exam:->CP, SOB Reason for Exam: cp FINDINGS: Normal cardiomediastinal silhouette.  Small left pleural effusion with hazy left basilar airspace disease.  Right lung is clear.  No pneumothorax. Left-sided pacemaker device with leads in the right atrium and right ventricle     Small left pleural effusion with hazy left basilar airspace disease which could represent atelectasis and/or pneumonia     XR CHEST (2 VW)    Result Date: 1/3/2024  EXAMINATION: TWO XRAY VIEWS OF THE CHEST 1/3/2024 9:48 am COMPARISON: Chest x-ray dated December 30, 2023 HISTORY: ORDERING SYSTEM PROVIDED HISTORY: pacemaker TECHNOLOGIST PROVIDED HISTORY: Reason for exam:->pacemaker Reason for Exam: pacemaker FINDINGS: Stable cardiomediastinal silhouette.  There is a left-sided pacemaker device with leads projecting in the right atrium and right ventricle.  No pneumothorax or pleural effusion.  No acute infiltrate     Left-sided pacemaker device with leads in proper position.  No pneumothorax or other complication     XR CHEST (2 VW)    Result Date: 12/30/2023  EXAMINATION: TWO XRAY VIEWS OF THE CHEST 12/30/2023 9:39 am COMPARISON: 12/28/2023 HISTORY: ORDERING SYSTEM PROVIDED HISTORY: pacemaker implantation TECHNOLOGIST PROVIDED HISTORY: Reason for exam:->pacemaker implantation Reason for Exam: pacemaker implantation FINDINGS: Left-sided bipolar cardiac pacemaker has been placed with intact leads in expected positions.  There is no pneumothorax, consolidation or effusion. The heart is enlarged with a stable appearance of pulmonary vasculature.     Device placement without complicating feature.     CTA CHEST ABDOMEN PELVIS W CONTRAST    Result Date:  12/29/2023  EXAMINATION: CTA OF THE CHEST, ABDOMEN AND PELVIS WITH CONTRAST 12/29/2023 8:33 pm: TECHNIQUE: CTA of the chest, abdomen and pelvis was performed after the administration of intravenous contrast.  Multiplanar reformatted images are provided for review.  MIP images are provided for review. Automated exposure control, iterative reconstruction, and/or weight based adjustment of the mA/kV was utilized to reduce the radiation dose to as low as reasonably achievable. COMPARISON: Chest CT 12/26/2023 HISTORY: ORDERING SYSTEM PROVIDED HISTORY: mass seen on TANIA in descending and thoracic aorta with flow around echo dense mass TECHNOLOGIST PROVIDED HISTORY: Reason for exam:->mass seen on TANIA in descending and thoracic aorta with flow around echo dense mass Additional Contrast?->None Reason for Exam: Evaluate mass seen on TANIA Relevant Medical/Surgical History: hx of prasanth, appy, and pancreatic resection FINDINGS: CTA CHEST: Mediastinum: Thyroid gland is unremarkable. Streak artifact is seen from pacer. Coronary artery calcification is seen. No pericardial effusion. No pericardial calcification noted. Small hiatal hernia seen. There is nonspecific thickening at the GE junction. Ascending aorta measures approximately 4.2 cm. No intimal flap seen. No obvious mass seen in the descending thoracic aorta to correspond to the findings reported on cardiac echo. Lungs/Pleura: Hazy ground-glass opacities are seen the left upper and lower lobe.  Superimposed bandlike opacities are seen in the left upper and left lower. Hazy ground-glass opacities are seen in the upper, right middle lobe and right lower.  Superimposed bandlike opacities are seen right.  A few areas of septal thickening seen. Soft Tissues/Bones: Spurring is seen in the spine. Spurring is seen in the shoulder joints. Trace fluid is seen in the subcutaneous fat of the chest wall on the left, with subtle hematoma and a few tiny bubbles of soft tissue gas, presumably  PROVIDED HISTORY: Shortness of breath TECHNOLOGIST PROVIDED HISTORY: Reason for exam:->Shortness of breath Reason for Exam: shortness of breath FINDINGS: HEART/MEDIASTINUM: The cardiac silhouette is enlarged, but stable. PLEURA/LUNGS: There are no focal consolidations or pleural effusions. There is no appreciable pneumothorax. BONES/SOFT TISSUE: No acute abnormality.     1. No radiographic evidence of acute cardiopulmonary disease. 2. Stable cardiomegaly.

## 2024-01-26 NOTE — H&P
History    Marital status:      Spouse name: None    Number of children: None    Years of education: None    Highest education level: None   Occupational History    Occupation:      Comment: maintenance   Tobacco Use    Smoking status: Never     Passive exposure: Past    Smokeless tobacco: Never   Vaping Use    Vaping Use: Never used   Substance and Sexual Activity    Alcohol use: No    Drug use: No    Sexual activity: Yes     Social Determinants of Health     Financial Resource Strain: Low Risk  (12/14/2023)    Overall Financial Resource Strain (CARDIA)     Difficulty of Paying Living Expenses: Not hard at all   Food Insecurity: No Food Insecurity (12/28/2023)    Hunger Vital Sign     Worried About Running Out of Food in the Last Year: Never true     Ran Out of Food in the Last Year: Never true   Transportation Needs: No Transportation Needs (12/28/2023)    PRAPARE - Transportation     Lack of Transportation (Medical): No     Lack of Transportation (Non-Medical): No   Physical Activity: Inactive (3/20/2023)    Exercise Vital Sign     Days of Exercise per Week: 0 days     Minutes of Exercise per Session: 0 min   Stress: No Stress Concern Present (12/17/2019)    [employer] [place] [employer] - Occupational Stress Questionnaire     Feeling of Stress : Not at all   Social Connections: Moderately Integrated (12/17/2019)    Social Connection and Isolation Panel [NHANES]     Frequency of Communication with Friends and Family: More than three times a week     Frequency of Social Gatherings with Friends and Family: Twice a week     Attends Zoroastrian Services: Never     Active Member of Clubs or Organizations: Yes     Attends Club or Organization Meetings: More than 4 times per year     Marital Status:    Intimate Partner Violence: Not At Risk (12/17/2019)    Humiliation, Afraid, Rape, and Kick questionnaire     Fear of Current or Ex-Partner: No     Emotionally Abused: No     Physically  Abused: No     Sexually Abused: No   Housing Stability: Low Risk  (12/28/2023)    Housing Stability Vital Sign     Unable to Pay for Housing in the Last Year: No     Number of Places Lived in the Last Year: 1     Unstable Housing in the Last Year: No       Medications:   Medications:    Infusions:   PRN Meds:     Labs      CBC:   Recent Labs     01/25/24  1530   WBC 5.2   HGB 11.9*        BMP:    Recent Labs     01/25/24  1530      K 4.8      CO2 31   BUN 18   CREATININE 0.8   GLUCOSE 201*     Hepatic:   Recent Labs     01/25/24  1530   AST 14*   ALT 15   BILITOT 0.3   ALKPHOS 101     Lipids:   Lab Results   Component Value Date/Time    CHOL 195 02/06/2023 11:57 AM    HDL 55 02/23/2023 11:06 AM    TRIG 249 02/06/2023 11:57 AM     Hemoglobin A1C:   Lab Results   Component Value Date/Time    LABA1C 8.5 12/14/2023 12:20 PM     TSH: No results found for: \"TSH\"  Troponin: No results found for: \"TROPONINT\"  Lactic Acid: No results for input(s): \"LACTA\" in the last 72 hours.  BNP:   Recent Labs     01/25/24  1530   PROBNP 137*     UA:  Lab Results   Component Value Date/Time    NITRU Negative 12/28/2023 02:39 PM    COLORU Yellow 12/28/2023 02:39 PM    PHUR 6.0 12/28/2023 02:39 PM    WBCUA 0-2 11/24/2023 07:30 PM    RBCUA 3-4 11/24/2023 07:30 PM    MUCUS 2+ 04/02/2015 03:10 AM    BACTERIA Rare 11/24/2023 07:30 PM    CLARITYU Clear 12/28/2023 02:39 PM    SPECGRAV 1.025 12/28/2023 02:39 PM    LEUKOCYTESUR Negative 12/28/2023 02:39 PM    UROBILINOGEN 0.2 12/28/2023 02:39 PM    BILIRUBINUR Negative 12/28/2023 02:39 PM    BILIRUBINUR negative 10/11/2023 01:27 PM    BLOODU Negative 12/28/2023 02:39 PM    GLUCOSEU 100 12/28/2023 02:39 PM    KETUA Negative 12/28/2023 02:39 PM     Urine Cultures:   Lab Results   Component Value Date/Time    LABURIN >100,000 CFU/ml 10/11/2023 01:31 PM    LABURIN 200 10/26/2022 10:56 AM     Blood Cultures:   Lab Results   Component Value Date/Time    BC No Growth after 4 days of  incubation. 11/24/2023 07:00 PM     Lab Results   Component Value Date/Time    BLOODCULT2 No Growth after 4 days of incubation. 11/24/2023 06:58 PM     Organism:   Lab Results   Component Value Date/Time    ORG Klebsiella oxytoca 10/11/2023 01:31 PM       Imaging/Diagnostics Last 24 Hours   CT CHEST PULMONARY EMBOLISM W CONTRAST    Result Date: 1/25/2024  EXAMINATION: CTA OF THE CHEST 1/25/2024 4:28 pm TECHNIQUE: CTA of the chest was performed after the administration of intravenous contrast.  Multiplanar reformatted images are provided for review.  MIP images are provided for review. Automated exposure control, iterative reconstruction, and/or weight based adjustment of the mA/kV was utilized to reduce the radiation dose to as low as reasonably achievable. COMPARISON: CT chest/abdomen/pelvis dated December 29, 2023 HISTORY: ORDERING SYSTEM PROVIDED HISTORY: CP, SOB, hypoxia TECHNOLOGIST PROVIDED HISTORY: Reason for exam:->CP, SOB, hypoxia Additional Contrast?->1 Reason for Exam: left side chest pain with SOB starting today Relevant Medical/Surgical History: no heart stents, non-smoker FINDINGS: Pulmonary Arteries: Pulmonary arteries are adequately opacified for evaluation.  No evidence of intraluminal filling defect to suggest pulmonary embolism.  Main pulmonary artery is normal in caliber. Mediastinum: No evidence of mediastinal lymphadenopathy.  The heart and pericardium demonstrate no acute abnormality.  There is no acute abnormality of the thoracic aorta. Lungs/pleura: The lungs are without acute process.  No focal consolidation or pulmonary edema.  No evidence of pleural effusion or pneumothorax. Upper Abdomen: Partially imaged large right renal cyst appears unchanged from the comparison study.  Stable smaller left renal cyst.. Soft Tissues/Bones: No acute bone or soft tissue abnormality.     No evidence of pulmonary embolism or acute pulmonary abnormality.     XR CHEST PORTABLE    Result Date:

## 2024-01-26 NOTE — CONSULTS
[facility] - INITIAL CONSULTATION        CHIEF COMPLAINT        HISTORY OF PRESENTING ILLNESS  London Swenson is a 73 y.o. patient with history of A-fib, dementia, diabetes, hypertension, hyperlipidemia, sleep apnea nonobstructive CAD, permanent pacemaker placement who presented to the hospital with complaints of new onset shortness of breath and chest pain.  He reports sudden onset sharp left-sided chest pain associated with shortness of breath that started yesterday afternoon.  Chest pain was nonexertional and nonpositional but worse with deep inspiration.  He denies any others episodes of similar symptoms in the past.  He reports chronic intermittent lower extremity edema which is unchanged.  He denies other cardiac symptoms including orthopnea, lightheadedness, palpitations, syncope.  In the emergency department he was noted to be hypoxic to low 80s and needed 4 L supplemental oxygen which is not his baseline.  Acute PE was ruled out with CT pulmonary angiogram.  Cardiac markers and EKG were unremarkable for ACS.  Currently his chest pain-free.      PAST MEDICAL HISTORY   has a past medical history of A-fib (HCC), Acid reflux, Yan's esophagus, Coronary artery disease of native heart with stable angina pectoris (HCC), Dementia (HCC), Diabetes mellitus (HCC), Diabetic autonomic neuropathy associated with type 2 diabetes mellitus (HCC), Hyperlipidemia, Hypertension, Pancreatitis, Restless legs, Sleep apnea, and Tremor.    SURGICAL HISTORY   has a past surgical history that includes Pancreas surgery; Cholecystectomy; Tonsillectomy; [place] tooth extraction; Colonoscopy (10/26/2011); Cataract removal (Bilateral, 2013); Upper gastrointestinal endoscopy (10/04/2016); Hydrocele surgery (11/15/2016); Endoscopy, colon, diagnostic; Upper gastrointestinal endoscopy (03/16/2017); Upper gastrointestinal endoscopy (06/26/2017); Upper gastrointestinal endoscopy (09/06/2017); Upper gastrointestinal endoscopy  gastrointestinal endoscopy (12/22/2017); Upper gastrointestinal endoscopy (N/A, 12/04/2018); Upper gastrointestinal endoscopy (02/19/2019); Upper gastrointestinal endoscopy (N/A, 06/11/2019); Upper gastrointestinal endoscopy (N/A, 08/13/2019); and laryngoscopy (N/A, 10/25/2023).     SOCIAL HISTORY   reports that he has never smoked. He has been exposed to tobacco smoke. He has never used smokeless tobacco. He reports that he does not drink alcohol and does not use drugs.     FAMILY HISTORY  No family history of premature coronary artery disease, aortic disease, or valve disease.    HOME MEDICATIONS  Prior to Admission medications    Medication Sig Start Date End Date Taking? Authorizing Provider   dofetilide (TIKOSYN) 250 MCG capsule Take 1 capsule by mouth every 12 hours 1/19/24   Ericka Valdez APRN - CNP   metoprolol tartrate (LOPRESSOR) 25 MG tablet Take 1 tablet by mouth 2 times daily 1/19/24   Ericka Valdez APRN - CNP   pantoprazole (PROTONIX) 40 MG tablet Take 1 tablet by mouth 2 times daily (before meals) 1/18/24   Constance Yancey PA   insulin aspart (NOVOLOG FLEXPEN) 100 UNIT/ML injection pen Inject 20 Units into the skin 3 times daily (before meals) 1/15/24   Constance Yancey PA   aspirin 81 MG EC tablet Take 1 tablet by mouth daily 1/5/24   SetsDonny cook MD   sucralfate (CARAFATE) 1 GM tablet Take 1 tablet by mouth every 8 hours for 12 days 12/18/23 12/30/23  Ericka Valdez APRN - CNP   rOPINIRole (REQUIP) 0.5 MG tablet Take 1 tablet by mouth nightly at bedtime. 12/7/23   Provider, MD Willa   insulin glargine (LANTUS SOLOSTAR) 100 UNIT/ML injection pen Inject 20 Units into the skin nightly 11/26/23 2/24/24  Siria Buenrostro DO   pramipexole (MIRAPEX) 0.5 MG tablet Take 1 tablet by mouth nightly 10/11/23   Constance Yancey PA   warfarin (COUMADIN) 5 MG tablet Take 1.5-2 tablets by mouth daily as directed by coumadin clinic 9/18/23   Constance Yancey PA   atorvastatin (LIPITOR) 40 MG  metoprolol  Continue dofetilide for A-fib as well as warfarin, currently INR is therapeutic  Given invasive cardiac testing needed, will need to hold warfarin  Further recommendations following above cardiac      All questions and concerns were addressed to the patient/family. Alternatives to my treatment as well as risks and benefits of proposed treatment were discussed.     Tobacco use, if applicable, was discussed with the patient and educated on the negative effects.    Thank you for allowing to us to participate in the care or London Swenson. Please call with questions.         Michael Lemus MD, FACC, [facility]  Interventional Cardiology  [facility]  1/26/2024  [phone]      Inadvertent computerized transcription errors may be present

## 2024-01-26 NOTE — PROGRESS NOTES
Arrival from ED to room 353 via wheelchair safely and in stable condition. VSS - afebrile. Pt is alert and oriented x 4 with no history of falls. Assessment completed as charted. Bed is in lowest position with 2/4 bed rails raised, wheels locked and call light within reach - patient wearing non-skid socks and verbalizes understanding to call out for assistance. No further requests at this time. Will continue to monitor.     Vitals:    01/26/24 0131   BP: 111/68   Pulse: 65   Resp: 16   Temp: 98.2 °F (36.8 °C)   SpO2: 95%

## 2024-01-26 NOTE — PROGRESS NOTES
Ehsan Shabbir, MD    Hospitalist Progress Note      Name:  London Swenson /Age/Sex: 1950  (73 y.o. male)   MRN & CSN:  [id number] & [id number] Admission Date/Time: 2024  3:07 PM   Location:  0353/0353-01 PCP: Constance Yancey PA       I saw and examined the patient on 2024 at 8:36 AM.    Hospital Day: 2  Barriers to discharge:   Assessment and Plan:     73 y.o. male with a pmh of Afib, Dementia, T2DM, HTN, HLD, ESTHER, CAD, Atrial Tachycardia, pacemaker implant who presents with Acute hypoxic respiratory failure         Acute hypoxic respiratory failure  - Acute onset SOB and L-sided chest pain  - Hypoxic 82% on RA on arrival. Placed on 4L NC.   - Ddx: Most likely d/t underlying Costochondritis given chest pain reproducible with palpation and pleuritic in nature. Acute CHF exacerbation less likely given recent EF 55% on Echo 23 and lack of pulm edema on CT. PE unlikely given CT-PE negative on arrival. ACS unlikely given Trop 17 x 2 and EKG nonacute. CAP less likely given lack of fever, Procal WNL, and nonacute CXR.   -Follow-up stress test, pulmonology evaluation if cardiac workup was negative and pt remains hypoxic. Wean down oxygen as tolerated     2. Atrial Fibrillation  - Home regimen: Dofetilide 250 mcg Q12, Lopressor 25mg BID, Coumadin 5mg managed per coumadin clinic.     - cont home Coumadin, pharm to dose, Cont home Lopressor, Dofetilide     3. Diabetes Mellitus, uncontrolled  On bolus insulin regimen.     4. Hyperlipidemia  - cont home statin     5. CAD  - cont home ASA, Statin     6. GERD  - cont home Protonix     7. Restless leg syndrome  - cont home Pramipexole and Ropinirole         Coumadin for DVT prophylaxis  Full code         Subjective:     Patient seen and examined at bedside.  Sitting comfortably not in acute distress.  On 3 L nasal cannula.  Denies any active chest pain.  Breathing is better    Objective:     Intake/Output Summary (Last 24 hours) at 2024 0836  Last

## 2024-01-26 NOTE — ED PROVIDER NOTES
Emergency Department Attending SUPERVISORY Provider Note  Location: [facility]  ED  1/25/2024     Chief Complaint   Patient presents with    Chest Pain     Chest pain and SOB started appx 1430 this afternoon while pt was laying in bed, called  aspirin given, nitro given by EMS. Pt has pacemaker.     Shortness of Breath        PATIENT ID:  London Swenson is a 73 y.o. male    Past Medical History:   Diagnosis Date    A-fib ([facility])     Acid reflux     Yan's esophagus     Coronary artery disease of native heart with stable angina pectoris ([facility]) 05/30/2019    Dementia ([facility])     Diabetes mellitus ([facility])     Diabetic autonomic neuropathy associated with type 2 diabetes mellitus ([facility]) 03/03/2020    Hyperlipidemia     Hypertension     Pancreatitis 1996    Restless legs     Sleep apnea     uses CPAP    Tremor     of bilateral hands       London Swenson was evaluated in the Emergency Department for concerns of shortness of breath and chest pain.  Says it was sudden in onset, has a history of A-fib, CAD, diabetes.  No history of DVT or PE.  No lightheadedness or dizziness, no syncope.  No leg swelling.  Has not really been feeling ill prior to this..          Vitals:    01/25/24 2054   BP: 117/67   Pulse: 60   Resp: 11   Temp:    SpO2: 98%        BASIC EXAM:  No acute distress.  Interactive, conversational, pleasant.  He is on 4 L nasal cannula, but lungs are actually quite clear to auscultation.  No obvious murmurs.  No significant leg swelling.     I independently interpreted the EKG:   EKG Interpretation:  EKG shows atrial paced rhythm, low voltage.  No ST segment elevation, same depression, hyperacute T waves.  Paced rhythm replaces normal sinus rhythm from December.  Ventricular rate 71.        CT CHEST PULMONARY EMBOLISM W CONTRAST   Final Result   No evidence of pulmonary embolism or acute pulmonary abnormality.         XR CHEST PORTABLE   Final Result   Small left pleural effusion with hazy left  hospitalist, who is going to place admission orders for this patient.  I did notice just now that patient did not get an RSV swab, will add this on.  At the time of admission, RSV pending.    As this patient requires further evaluation and management as above, this patient will be admitted to the hospital for subsequent care. I spoke with Dr. Whitten who accepts patient for admission. They are available to place admission orders.      10:59 PM EST  Was called back into the room as patient started having chest pain again.  It is tender with palpation.  Will give Robaxin and morphine.  Regardless, nurse says that he tried to ambulate him and check his pulse ox, but his vision saturation even dropped while ambulating on 4 L.  Again, admitted for hypoxia, quite of unknown etiology at this time.  RSV negative.    Medications   morphine sulfate (PF) injection 4 mg (4 mg IntraVENous Given 1/25/24 1537)   ondansetron (ZOFRAN) injection 4 mg (4 mg IntraVENous Given 1/25/24 1537)   iopamidol (ISOVUE-370) 76 % injection 75 mL (75 mLs IntraVENous Given 1/25/24 1645)         IMPRESSION:    ICD-10-CM    1. Acute respiratory failure with hypoxia (HCC)  J96.01       2. Chest pain, unspecified type  R07.9           Although initial history and physical exam information was obtained by midlevel provider (who also dictated a record of this visit), I personally saw the patient and performed a substantive portion of the visit including all aspects of the medical decision making. I made and approved the management plan and take responsibility for the patient management.     I, Mayra Skinner DO am the primary clinician of record.     I personally saw the patient and I independently provided 00 minutes of non-concurrent critical care out of the total shared critical care time provided.    This chart was generated in part by using Dragon Dictation system and may contain errors related to that system including errors in grammar,  punctuation, and spelling, as well as words and phrases that may be inappropriate. If there are any questions or concerns please feel free to contact the dictating provider for clarification.     MAYRA CHADWICK DO   Acute Care [facility]        Mayra Chadwick,   01/25/24 2207       Mayra Chadwick,   01/25/24 2307

## 2024-01-26 NOTE — PROGRESS NOTES
Stress Lab: Pt had caffeine for breakfast. Will do RESTING images today only and STRESS TEST tomorrow am. Pt is to be NPO / NO CAFFEINE after 5 pm this estee for stress portion of stress test in am, 1/27.

## 2024-01-27 ENCOUNTER — APPOINTMENT (OUTPATIENT)
Dept: NUCLEAR MEDICINE | Age: 74
DRG: 189 | End: 2024-01-27
Payer: MEDICARE

## 2024-01-27 LAB
ALBUMIN SERPL-MCNC: 3.8 G/DL (ref 3.4–5)
ALBUMIN/GLOB SERPL: 1.7 {RATIO} (ref 1.1–2.2)
ALP SERPL-CCNC: 85 U/L (ref 40–129)
ALT SERPL-CCNC: 16 U/L (ref 10–40)
ANION GAP SERPL CALCULATED.3IONS-SCNC: 6 MMOL/L (ref 3–16)
AST SERPL-CCNC: 13 U/L (ref 15–37)
BASOPHILS # BLD: 0 K/UL (ref 0–0.2)
BASOPHILS NFR BLD: 0.4 %
BILIRUB SERPL-MCNC: 0.5 MG/DL (ref 0–1)
BUN SERPL-MCNC: 16 MG/DL (ref 7–20)
CALCIUM SERPL-MCNC: 8.9 MG/DL (ref 8.3–10.6)
CHLORIDE SERPL-SCNC: 99 MMOL/L (ref 99–110)
CO2 SERPL-SCNC: 35 MMOL/L (ref 21–32)
CREAT SERPL-MCNC: 0.8 MG/DL (ref 0.8–1.3)
DEPRECATED RDW RBC AUTO: 14.4 % (ref 12.4–15.4)
EOSINOPHIL # BLD: 0.2 K/UL (ref 0–0.6)
EOSINOPHIL NFR BLD: 6.6 %
GFR SERPLBLD CREATININE-BSD FMLA CKD-EPI: >60 ML/MIN/{1.73_M2}
GLUCOSE BLD-MCNC: 168 MG/DL (ref 70–99)
GLUCOSE BLD-MCNC: 302 MG/DL (ref 70–99)
GLUCOSE SERPL-MCNC: 164 MG/DL (ref 70–99)
HCT VFR BLD AUTO: 36.7 % (ref 40.5–52.5)
HGB BLD-MCNC: 11.9 G/DL (ref 13.5–17.5)
INR PPP: 1.93 (ref 0.84–1.16)
LYMPHOCYTES # BLD: 0.9 K/UL (ref 1–5.1)
LYMPHOCYTES NFR BLD: 24.7 %
MCH RBC QN AUTO: 28.5 PG (ref 26–34)
MCHC RBC AUTO-ENTMCNC: 32.4 G/DL (ref 31–36)
MCV RBC AUTO: 88.2 FL (ref 80–100)
MONOCYTES # BLD: 0.3 K/UL (ref 0–1.3)
MONOCYTES NFR BLD: 8.8 %
NEUTROPHILS # BLD: 2.3 K/UL (ref 1.7–7.7)
NEUTROPHILS NFR BLD: 59.5 %
PERFORMED ON: ABNORMAL
PERFORMED ON: ABNORMAL
PLATELET # BLD AUTO: 124 K/UL (ref 135–450)
PMV BLD AUTO: 9.9 FL (ref 5–10.5)
POTASSIUM SERPL-SCNC: 4.5 MMOL/L (ref 3.5–5.1)
PROT SERPL-MCNC: 6 G/DL (ref 6.4–8.2)
PROTHROMBIN TIME: 22 SEC (ref 11.5–14.8)
RBC # BLD AUTO: 4.16 M/UL (ref 4.2–5.9)
SODIUM SERPL-SCNC: 140 MMOL/L (ref 136–145)
WBC # BLD AUTO: 3.8 K/UL (ref 4–11)

## 2024-01-27 PROCEDURE — 2700000000 HC OXYGEN THERAPY PER DAY

## 2024-01-27 PROCEDURE — 6360000002 HC RX W HCPCS: Performed by: INTERNAL MEDICINE

## 2024-01-27 PROCEDURE — 6370000000 HC RX 637 (ALT 250 FOR IP): Performed by: STUDENT IN AN ORGANIZED HEALTH CARE EDUCATION/TRAINING PROGRAM

## 2024-01-27 PROCEDURE — 1200000000 HC SEMI PRIVATE

## 2024-01-27 PROCEDURE — 94640 AIRWAY INHALATION TREATMENT: CPT

## 2024-01-27 PROCEDURE — 85610 PROTHROMBIN TIME: CPT

## 2024-01-27 PROCEDURE — 2580000003 HC RX 258: Performed by: STUDENT IN AN ORGANIZED HEALTH CARE EDUCATION/TRAINING PROGRAM

## 2024-01-27 PROCEDURE — 36415 COLL VENOUS BLD VENIPUNCTURE: CPT

## 2024-01-27 PROCEDURE — 6370000000 HC RX 637 (ALT 250 FOR IP): Performed by: INTERNAL MEDICINE

## 2024-01-27 PROCEDURE — 80053 COMPREHEN METABOLIC PANEL: CPT

## 2024-01-27 PROCEDURE — 99233 SBSQ HOSP IP/OBS HIGH 50: CPT | Performed by: INTERNAL MEDICINE

## 2024-01-27 PROCEDURE — A9502 TC99M TETROFOSMIN: HCPCS | Performed by: INTERNAL MEDICINE

## 2024-01-27 PROCEDURE — 93017 CV STRESS TEST TRACING ONLY: CPT

## 2024-01-27 PROCEDURE — 3430000000 HC RX DIAGNOSTIC RADIOPHARMACEUTICAL: Performed by: INTERNAL MEDICINE

## 2024-01-27 PROCEDURE — 94761 N-INVAS EAR/PLS OXIMETRY MLT: CPT

## 2024-01-27 PROCEDURE — 85025 COMPLETE CBC W/AUTO DIFF WBC: CPT

## 2024-01-27 RX ORDER — ALBUTEROL SULFATE 90 UG/1
2 AEROSOL, METERED RESPIRATORY (INHALATION) EVERY 4 HOURS PRN
Status: DISCONTINUED | OUTPATIENT
Start: 2024-01-27 | End: 2024-01-28 | Stop reason: HOSPADM

## 2024-01-27 RX ORDER — ALBUTEROL SULFATE 90 UG/1
2 AEROSOL, METERED RESPIRATORY (INHALATION)
Status: DISCONTINUED | OUTPATIENT
Start: 2024-01-27 | End: 2024-01-27

## 2024-01-27 RX ORDER — WARFARIN SODIUM 5 MG/1
10 TABLET ORAL
Status: COMPLETED | OUTPATIENT
Start: 2024-01-27 | End: 2024-01-27

## 2024-01-27 RX ADMIN — INSULIN GLARGINE 17 UNITS: 100 INJECTION, SOLUTION SUBCUTANEOUS at 19:58

## 2024-01-27 RX ADMIN — Medication 10 ML: at 20:00

## 2024-01-27 RX ADMIN — PANTOPRAZOLE SODIUM 40 MG: 40 TABLET, DELAYED RELEASE ORAL at 18:24

## 2024-01-27 RX ADMIN — REGADENOSON 0.4 MG: 0.08 INJECTION, SOLUTION INTRAVENOUS at 09:56

## 2024-01-27 RX ADMIN — ASPIRIN 81 MG: 81 TABLET, COATED ORAL at 11:46

## 2024-01-27 RX ADMIN — PANTOPRAZOLE SODIUM 40 MG: 40 TABLET, DELAYED RELEASE ORAL at 05:19

## 2024-01-27 RX ADMIN — Medication 10 ML: at 11:46

## 2024-01-27 RX ADMIN — DOFETILIDE 250 MCG: 0.25 CAPSULE ORAL at 19:57

## 2024-01-27 RX ADMIN — Medication 2 PUFF: at 19:42

## 2024-01-27 RX ADMIN — PRAMIPEXOLE DIHYDROCHLORIDE 0.5 MG: 0.25 TABLET ORAL at 19:57

## 2024-01-27 RX ADMIN — DOFETILIDE 250 MCG: 0.25 CAPSULE ORAL at 11:46

## 2024-01-27 RX ADMIN — WARFARIN SODIUM 10 MG: 5 TABLET ORAL at 18:24

## 2024-01-27 RX ADMIN — METOPROLOL TARTRATE 25 MG: 25 TABLET, FILM COATED ORAL at 11:46

## 2024-01-27 RX ADMIN — TETROFOSMIN 33 MILLICURIE: 1.38 INJECTION, POWDER, LYOPHILIZED, FOR SOLUTION INTRAVENOUS at 09:55

## 2024-01-27 RX ADMIN — INSULIN LISPRO 4 UNITS: 100 INJECTION, SOLUTION INTRAVENOUS; SUBCUTANEOUS at 20:03

## 2024-01-27 RX ADMIN — INSULIN LISPRO 6 UNITS: 100 INJECTION, SOLUTION INTRAVENOUS; SUBCUTANEOUS at 18:29

## 2024-01-27 RX ADMIN — ATORVASTATIN CALCIUM 40 MG: 40 TABLET, FILM COATED ORAL at 19:58

## 2024-01-27 RX ADMIN — METOPROLOL TARTRATE 25 MG: 25 TABLET, FILM COATED ORAL at 19:58

## 2024-01-27 RX ADMIN — ROPINIROLE HYDROCHLORIDE 0.5 MG: 0.5 TABLET, FILM COATED ORAL at 19:58

## 2024-01-27 NOTE — PROGRESS NOTES
Ehsan Shabbir, MD    Hospitalist Progress Note      Name:  London Swenson /Age/Sex: 1950  (73 y.o. male)   MRN & CSN:  [id number] & [id number] Admission Date/Time: 2024  3:07 PM   Location:  0353/0353-01 PCP: Constance Yancey PA       I saw and examined the patient on 2024 at 9:48 AM.    Hospital Day: 3     Assessment and Plan:     73 y.o. male with a pmh of Afib, Dementia, T2DM, HTN, HLD, ESTHER, CAD, Atrial Tachycardia, pacemaker implant who presents with Acute hypoxic respiratory failure            Acute hypoxic respiratory failure  - Acute onset SOB and L-sided chest pain  - Hypoxic 82% on RA on arrival. Placed on 4L NC.   - Ddx: Most likely d/t underlying Costochondritis given chest pain reproducible with palpation and pleuritic in nature. Acute CHF exacerbation less likely given recent EF 55% on Echo 23 and lack of pulm edema on CT. PE unlikely given CT-PE negative on arrival. ACS unlikely given Trop 17 x 2 and EKG nonacute. CAP less likely given lack of fever, Procal WNL, and nonacute CXR.   -Follow-up stress test-results pending at this time, pulmonology evaluation if cardiac workup remains negative and pt remains hypoxic.  Unable to wean off of oxygen this morning. Consulted pulmonology.      2. Atrial Fibrillation  - Home regimen: Dofetilide 250 mcg Q12, Lopressor 25mg BID, Coumadin 5mg managed per coumadin clinic.      - cont home Coumadin, pharm to dose, Cont home Lopressor, Dofetilide     3. Diabetes Mellitus, uncontrolled  On bolus insulin regimen.     4. Hyperlipidemia  - cont home statin     5. CAD  - cont home ASA, Statin     6. GERD  - cont home Protonix     7. Restless leg syndrome  - cont home Pramipexole and Ropinirole            Coumadin for DVT prophylaxis  Full code       Subjective:     Patient seen and examined at bedside.  No chest pain this morning.  Still requiring 2 to 3 L nasal cannula at rest    Objective:     Intake/Output Summary (Last 24 hours) at 2024

## 2024-01-27 NOTE — PROGRESS NOTES
A lexiscan myoview stress test was completed on this patient as ordered. The patient tolerated the procedure well.  Awaiting stress imaging at this time.

## 2024-01-27 NOTE — PLAN OF CARE
Problem: Pain  Goal: Verbalizes/displays adequate comfort level or baseline comfort level  Outcome: Progressing  Flowsheets (Taken 1/27/2024 1780)  Verbalizes/displays adequate comfort level or baseline comfort level:   Encourage patient to monitor pain and request assistance   Assess pain using appropriate pain scale   Administer analgesics based on type and severity of pain and evaluate response   Implement non-pharmacological measures as appropriate and evaluate response   Pt without complaints of pain this shift.

## 2024-01-27 NOTE — PROGRESS NOTES
Pharmacy Note  Warfarin Consult  Dx:  Afib  Goal INR range 2-3   Home Warfarin dose: 10mg on T,Sat; 7.5mg all other days per wife     Date  INR  Warfarin  1/25                2.58                    -  1/26                2.15                  7.5 mg  1/27                1.93                  10 mg    Recommend Warfarin 10 mg tonight x1  pending home dosing.  Daily INR ordered.  Rx will continue to manage therapy per Dr. Dykes's consult order.    Rafael Moon, PharmD  1/27/2024 at 9:05 AM

## 2024-01-27 NOTE — CONSULTS
Consult Placed     Who: JEB WAGGONER   Date:01/27/24  Time:314     Electronically signed by Lucretia Cody on 1/27/2024 at 3:13 PM

## 2024-01-27 NOTE — PROGRESS NOTES
[facility] - Progress Note        CHIEF COMPLAINT  Chest pain, shortness of breath      Interval HISTORY:  Feels OK today. Denies any chest pain, shortness of breath, dyspnea on exertion, marked dizziness or syncope.  No palpitations.       HOME MEDICATIONS  Prior to Admission medications    Medication Sig Start Date End Date Taking? Authorizing Provider   dofetilide (TIKOSYN) 250 MCG capsule Take 1 capsule by mouth every 12 hours 1/19/24   Ericka Valdez APRN - CNP   metoprolol tartrate (LOPRESSOR) 25 MG tablet Take 1 tablet by mouth 2 times daily 1/19/24   Ericka Valdez APRN - CNP   pantoprazole (PROTONIX) 40 MG tablet Take 1 tablet by mouth 2 times daily (before meals) 1/18/24   Constance Yancey PA   insulin aspart (NOVOLOG FLEXPEN) 100 UNIT/ML injection pen Inject 20 Units into the skin 3 times daily (before meals) 1/15/24   Constance Yancey PA   aspirin 81 MG EC tablet Take 1 tablet by mouth daily 1/5/24   Donny Young MD   sucralfate (CARAFATE) 1 GM tablet Take 1 tablet by mouth every 8 hours for 12 days 12/18/23 12/30/23  Ericka Valdez APRN - CNP   rOPINIRole (REQUIP) 0.5 MG tablet Take 1 tablet by mouth nightly at bedtime. 12/7/23   Willa Lamas MD   insulin glargine (LANTUS SOLOSTAR) 100 UNIT/ML injection pen Inject 20 Units into the skin nightly 11/26/23 2/24/24  Siria Buenrostro DO   pramipexole (MIRAPEX) 0.5 MG tablet Take 1 tablet by mouth nightly 10/11/23   Constance Yancey PA   warfarin (COUMADIN) 5 MG tablet Take 1.5-2 tablets by mouth daily as directed by coumadin clinic 9/18/23   Constanec Yancey PA   atorvastatin (LIPITOR) 40 MG tablet TAKE 1 TABLET BY MOUTH AT BEDTIME 8/14/23   Michael Lemus MD   SV IRON 325 MG tablet Take 1 tablet by mouth daily  Patient not taking: Reported on 1/15/2024 6/12/23   Provider, Historical, MD   Handicap Placard MISC by Does not apply route Exp: 5/1/2026 5/4/23   Constance Yancey, PA   diclofenac sodium (VOLTAREN) 1 % GEL Apply 4 g  topically 4 times daily Apply to left knee  Patient taking differently: Apply 4 g topically 4 times daily as needed Apply to left knee 4/13/23   Nahomi Valle MD   blood glucose monitor strips Test 3 times a day & as needed for symptoms of irregular blood glucose. E11.9 Dispense sufficient amount for indicated testing frequency plus additional to accommodate PRN testing needs. 2/7/23   Constance Yancey PA   Lancets MISC 1 each by Does not apply route daily Check blood sugars 3x daily E11.9 2/7/23   Constance Yancey PA   Continuous Blood Gluc Sensor (FREESTYLE JUSTIN 14 DAY SENSOR) MISC 1 Units by Does not apply route every 14 days 2/6/23   Constance Yancey PA   Continuous Blood Gluc  (FREESTYLE JUSTIN 14 DAY READER) OLI 1 Units by Does not apply route as needed (as needed) 1/23/23   Monique Henry PA   Insulin Pen Needle 31G X 8 MM MISC 1 each by Does not apply route 4 times daily 7/19/22   Monique Henry PA   Insulin Syringe-Needle U-100 30G X 1/2\" 0.5 ML MISC Inject insulin 3 times daily 9/12/17   Provider, MD Willa        ALLERGIES  Clonidine, Oxycodone, Trulicity [dulaglutide], and Codeine     REVIEW OF SYSTEMS  14 point ROS done and negative other than HPI    Review of Systems   Constitutional: Negative for malaise/fatigue.   Cardiovascular:  Negative for chest pain, dyspnea on exertion, leg swelling, palpitations and syncope.   Respiratory:  Negative for shortness of breath and wheezing.    Hematologic/Lymphatic: Negative for bleeding problem.   Neurological:  Negative for dizziness and light-headedness.   Psychiatric/Behavioral:  Negative for altered mental status.          PHYSICAL EXAMINATION    Vitals:    01/27/24 1153   BP:    Pulse:    Resp:    Temp:    SpO2: 95%    Weight - Scale: 129 kg (284 lb 6.3 oz)         Physical Exam  Constitutional:       Appearance: Normal appearance.   HENT:      Head: Normocephalic and atraumatic.      Nose: Nose normal. No rhinorrhea.   Eyes:       Value Date    LDLCALC 124 (H) 02/23/2023    LDLCALC 101 (H) 02/06/2023    LDLCALC 99 10/26/2022       CARDIAC STUDIES    Myocardial Perfusion Stress (2024)    Summary There is normal isotope uptake at stress and rest. There is no evidence of myocardial ischemia or scar. Normal apical thinning. Normal wall motion with post-stress LVEF of 66%. No visual TID. Overall findings represent a low risk scan.     ECG: Atrial paced rhythm    Echo: 3/2023   Technically difficult examination.   Normal left ventriculcar size, wall thickness, and systolic function with an   estimated ejection fraction of 55-60%.   No regional wall motion abnormalities are seen.   Definity contrast administered with no evidence of left ventricular mass or   thrombus noted.   Normal diastolic function.   Normal RV size and systolic function.   The right atrium is mildly dilated.   Trace pulmonic and tricuspid regurgitation.   The aortic root is at the upper limit of normal in size.   The ascending aorta is mildly dilated at 4.0 cm.    Stress: 9/2022   Mildly depressed to low normal LVEF at 48%   No regional wall motion abnormalities   No ischemia    Cath: 9/2021    LM <10% [id number] stenosis.           LAD Prox <10% stenosis.  Mid 40-50% stenosis.  Distal 30-40% stenosis.  LAD wraps around apex.  D2 has ostial 90% stenosis and mid 40 to 50% stenosis.         LCX 10% prox-mid stenosis. OM1 bifurcates in prox segment and there is prox-mid OM1 40-50% stenosis.          RI  Mid 60% stenosis.   (could also be described as a high D1)         RCA Dominant.  Slight anterior takeoff, Prox-mid 20-30% stenoses. Distal 20% stenosis. Tortuous vessel.          ASSESSMENT & PLAN:    1. Atypical chest pain: no further chest pain episodes. Nuclear myocardial perfusion stress with no noted abnormalities. No further testing or change in treatment plan from cardiology standpoint. Follow up with primary cardiologist as outpatient. OK for discharge from

## 2024-01-27 NOTE — CONSULTS
Pulmonary New Consultation       Patient Name:  London Swenson    REASONFOR ADMISSION/CC: About    PCP: Constance Yancey PA    HISTORY OF PRESENT ILLNESS:   73 y.o. year old male with significant past medical history of ESTHER, paroxysmal A-fib that presents to [facility] for chest pain and shortness of breath.  Patient reports having shortness of breath for the last couple months.  He denies any additional symptoms with the shortness of breath such as fever, chills, cough, nausea, vomiting, abdominal pain.  He does endorse chest pains.  Patient does not use any inhalers at home.  He says he can only walk around 50 to 100 feet before he needs to stop and rest.    Patient is a never smoker, no illicit drug use, no alcohol use.  Used to work in construction in the past, he denies any occupational exposures.  He has a dog at home, no other pets/birds.  He denies any household exposures.  Pulmonary was consulted for hypoxia.    Past Medical History:   Diagnosis Date    A-fib (HCC)     Acid reflux     Yan's esophagus     Coronary artery disease of native heart with stable angina pectoris (HCC) 05/30/2019    Dementia (HCC)     Diabetes mellitus (HCC)     Diabetic autonomic neuropathy associated with type 2 diabetes mellitus (HCC) 03/03/2020    Hyperlipidemia     Hypertension     Pancreatitis 1996    Restless legs     Sleep apnea     uses CPAP    Tremor     of bilateral hands       Past Surgical History:   Procedure Laterality Date    CATARACT REMOVAL Bilateral 2013    CHOLECYSTECTOMY      COLONOSCOPY  10/26/2011    ENDOSCOPY, COLON, DIAGNOSTIC      EGD halo    HYDROCELE EXCISION  11/15/2016    LARYNGOSCOPY N/A 10/25/2023    DRUG INDUCED SLEEP ENDOSCOPY performed by David Estes DO at [facility] OR    PANCREAS SURGERY      cyst opened up and drining into small bowel    TONSILLECTOMY      UPPER GASTROINTESTINAL ENDOSCOPY  10/04/2016    with biopsy    UPPER GASTROINTESTINAL ENDOSCOPY  03/16/2017    Halo ablation    UPPER  at 01/27/24 0519    pramipexole (MIRAPEX) tablet 0.5 mg, 0.5 mg, Oral, Nightly, Reece Dykes MD, 0.5 mg at 01/26/24 2040    rOPINIRole (REQUIP) tablet 0.5 mg, 0.5 mg, Oral, Nightly, Reece Dykes MD, 0.5 mg at 01/26/24 2040    ipratropium 0.5 mg-albuterol 2.5 mg (DUONEB) nebulizer solution 1 Dose, 1 Dose, Inhalation, Q4H PRN, Reece Dykes MD    warfarin placeholder: dosing by pharmacy, , Other, RX Placeholder, Inocente Whitten MD     Continuous Infusions:   sodium chloride      dextrose         PRN Meds:  sodium chloride flush, sodium chloride, potassium chloride **OR** potassium alternative oral replacement **OR** potassium chloride, magnesium sulfate, ondansetron **OR** ondansetron, polyethylene glycol, acetaminophen **OR** acetaminophen, glucose, dextrose bolus **OR** dextrose bolus, glucagon (rDNA), dextrose, HYDROcodone-acetaminophen, HYDROcodone 5 mg - acetaminophen, ipratropium 0.5 mg-albuterol 2.5 mg    Allergies:  Patient is allergic to clonidine, oxycodone, trulicity [dulaglutide], and codeine.    REVIEW OF SYSTEMS:  Review of Systems   Constitutional:  Negative for activity change, appetite change, chills, diaphoresis and fatigue.   HENT:  Negative for congestion, sore throat and trouble swallowing.    Eyes:  Negative for pain, discharge, redness and itching.   Respiratory:  Positive for shortness of breath. Negative for cough, wheezing and stridor.    Cardiovascular:  Negative for chest pain, palpitations and leg swelling.   Gastrointestinal:  Negative for abdominal distention, abdominal pain, constipation, diarrhea, nausea and vomiting.   Endocrine: Negative for polydipsia, polyphagia and polyuria.   Genitourinary:  Negative for difficulty urinating.   Musculoskeletal:  Negative for back pain, myalgias and neck pain.   Skin:  Negative for color change.   Neurological:  Negative for dizziness, weakness and light-headedness.   Psychiatric/Behavioral:  Negative for agitation and behavioral

## 2024-01-28 ENCOUNTER — APPOINTMENT (OUTPATIENT)
Dept: GENERAL RADIOLOGY | Age: 74
DRG: 189 | End: 2024-01-28
Payer: MEDICARE

## 2024-01-28 VITALS
BODY MASS INDEX: 40.71 KG/M2 | SYSTOLIC BLOOD PRESSURE: 125 MMHG | HEART RATE: 65 BPM | TEMPERATURE: 98.1 F | RESPIRATION RATE: 18 BRPM | OXYGEN SATURATION: 92 % | HEIGHT: 70 IN | WEIGHT: 284.39 LBS | DIASTOLIC BLOOD PRESSURE: 73 MMHG

## 2024-01-28 LAB
ALBUMIN SERPL-MCNC: 3.8 G/DL (ref 3.4–5)
ALBUMIN/GLOB SERPL: 1.5 {RATIO} (ref 1.1–2.2)
ALP SERPL-CCNC: 91 U/L (ref 40–129)
ALT SERPL-CCNC: 17 U/L (ref 10–40)
ANION GAP SERPL CALCULATED.3IONS-SCNC: 7 MMOL/L (ref 3–16)
AST SERPL-CCNC: 15 U/L (ref 15–37)
BASOPHILS # BLD: 0 K/UL (ref 0–0.2)
BASOPHILS NFR BLD: 0.6 %
BILIRUB SERPL-MCNC: 0.5 MG/DL (ref 0–1)
BUN SERPL-MCNC: 18 MG/DL (ref 7–20)
CALCIUM SERPL-MCNC: 9.1 MG/DL (ref 8.3–10.6)
CHLORIDE SERPL-SCNC: 98 MMOL/L (ref 99–110)
CO2 SERPL-SCNC: 33 MMOL/L (ref 21–32)
CREAT SERPL-MCNC: 0.9 MG/DL (ref 0.8–1.3)
DEPRECATED RDW RBC AUTO: 14.4 % (ref 12.4–15.4)
EOSINOPHIL # BLD: 0.3 K/UL (ref 0–0.6)
EOSINOPHIL NFR BLD: 6.4 %
GFR SERPLBLD CREATININE-BSD FMLA CKD-EPI: >60 ML/MIN/{1.73_M2}
GLUCOSE BLD-MCNC: 251 MG/DL (ref 70–99)
GLUCOSE SERPL-MCNC: 214 MG/DL (ref 70–99)
HCT VFR BLD AUTO: 37.1 % (ref 40.5–52.5)
HGB BLD-MCNC: 12.3 G/DL (ref 13.5–17.5)
INR PPP: 1.6 (ref 0.84–1.16)
LYMPHOCYTES # BLD: 1 K/UL (ref 1–5.1)
LYMPHOCYTES NFR BLD: 23.9 %
MCH RBC QN AUTO: 29 PG (ref 26–34)
MCHC RBC AUTO-ENTMCNC: 33.1 G/DL (ref 31–36)
MCV RBC AUTO: 87.6 FL (ref 80–100)
MONOCYTES # BLD: 0.4 K/UL (ref 0–1.3)
MONOCYTES NFR BLD: 8.6 %
NEUTROPHILS # BLD: 2.6 K/UL (ref 1.7–7.7)
NEUTROPHILS NFR BLD: 60.5 %
PERFORMED ON: ABNORMAL
PLATELET # BLD AUTO: 130 K/UL (ref 135–450)
PMV BLD AUTO: 9.6 FL (ref 5–10.5)
POTASSIUM SERPL-SCNC: 4.2 MMOL/L (ref 3.5–5.1)
PROT SERPL-MCNC: 6.3 G/DL (ref 6.4–8.2)
PROTHROMBIN TIME: 19 SEC (ref 11.5–14.8)
RBC # BLD AUTO: 4.24 M/UL (ref 4.2–5.9)
SODIUM SERPL-SCNC: 138 MMOL/L (ref 136–145)
WBC # BLD AUTO: 4.2 K/UL (ref 4–11)

## 2024-01-28 PROCEDURE — 36415 COLL VENOUS BLD VENIPUNCTURE: CPT

## 2024-01-28 PROCEDURE — 85025 COMPLETE CBC W/AUTO DIFF WBC: CPT

## 2024-01-28 PROCEDURE — 6370000000 HC RX 637 (ALT 250 FOR IP): Performed by: STUDENT IN AN ORGANIZED HEALTH CARE EDUCATION/TRAINING PROGRAM

## 2024-01-28 PROCEDURE — 71045 X-RAY EXAM CHEST 1 VIEW: CPT

## 2024-01-28 PROCEDURE — 85610 PROTHROMBIN TIME: CPT

## 2024-01-28 PROCEDURE — 2580000003 HC RX 258: Performed by: STUDENT IN AN ORGANIZED HEALTH CARE EDUCATION/TRAINING PROGRAM

## 2024-01-28 PROCEDURE — 80053 COMPREHEN METABOLIC PANEL: CPT

## 2024-01-28 RX ORDER — WARFARIN SODIUM 5 MG/1
10 TABLET ORAL
Status: DISCONTINUED | OUTPATIENT
Start: 2024-01-28 | End: 2024-01-28 | Stop reason: HOSPADM

## 2024-01-28 RX ORDER — ALBUTEROL SULFATE 90 UG/1
2 AEROSOL, METERED RESPIRATORY (INHALATION) EVERY 4 HOURS PRN
Qty: 18 G | Refills: 3 | Status: SHIPPED | OUTPATIENT
Start: 2024-01-28

## 2024-01-28 RX ADMIN — ASPIRIN 81 MG: 81 TABLET, COATED ORAL at 08:56

## 2024-01-28 RX ADMIN — METOPROLOL TARTRATE 25 MG: 25 TABLET, FILM COATED ORAL at 08:56

## 2024-01-28 RX ADMIN — Medication 10 ML: at 08:57

## 2024-01-28 RX ADMIN — PANTOPRAZOLE SODIUM 40 MG: 40 TABLET, DELAYED RELEASE ORAL at 08:56

## 2024-01-28 RX ADMIN — DOFETILIDE 250 MCG: 0.25 CAPSULE ORAL at 08:56

## 2024-01-28 RX ADMIN — INSULIN LISPRO 4 UNITS: 100 INJECTION, SOLUTION INTRAVENOUS; SUBCUTANEOUS at 11:38

## 2024-01-28 NOTE — DISCHARGE SUMMARY
Discharge Summary  Ehsan Shabbir, MD     Name:  London Swenson /Age/Sex: 1950  (73 y.o. male)   MRN & CSN:  [id number] & [id number] Admission Date/Time: 2024  3:07 PM   Attending:  Shabbir, Ehsan, MD Discharging Physician: Ehsan Shabbir, MD     Hospital Course:   73 y.o. male with a pmh of Afib, Dementia, T2DM, HTN, HLD, ESTHER, CAD, Atrial Tachycardia, pacemaker implant who presents with Acute hypoxic respiratory failure            Acute hypoxic respiratory failure.   - Acute onset SOB and L-sided chest pain  - Hypoxic 82% on RA on arrival. Placed on 4L NC.   - Ddx: Most likely d/t underlying Costochondritis given chest pain reproducible with palpation and pleuritic in nature. Acute CHF exacerbation less likely given recent EF 55% on Echo 23 and lack of pulm edema on CT. PE unlikely given CT-PE negative on arrival. ACS unlikely given Trop 17 x 2 and EKG nonacute. CAP less likely given lack of fever, Procal WNL, and nonacute CXR.   -.  Cardiac workup negative.  Stress test nonacute, cardiology cleared patient for discharge-seen by pulmonology, plan for outpatient PFT, Now resolved, underwent 6-minute walking test, does not need supplementary oxygen.  Pulmonology input  - Likely  etiology is sleep apnea versus obesity hypoventilation syndrome.   Patient to schedule outpatient PFT and sleep study with primary care physician     2. Atrial Fibrillation  - Home regimen: Dofetilide 250 mcg Q12, Lopressor 25mg BID, Coumadin 5mg managed per coumadin clinic.      - cont home Coumadin, pharm to dose, Cont home Lopressor, Dofetilide     3. Diabetes Mellitus, uncontrolled  On bolus insulin regimen.     4. Hyperlipidemia  - cont home statin     5. CAD  - cont home ASA, Statin     6. GERD  - cont home Protonix     7. Restless leg syndrome  - cont home Pramipexole and Ropinirole              The patient expressed appropriate understanding of and agreement with the discharge recommendations, medications, and plan.    Lancets Misc  1 each by Does not apply route daily Check blood sugars 3x daily E11.9     Lantus SoloStar 100 UNIT/ML injection pen  Generic drug: insulin glargine  Inject 20 Units into the skin nightly     metoprolol tartrate 25 MG tablet  Commonly known as: LOPRESSOR  Take 1 tablet by mouth 2 times daily     NovoLOG FlexPen 100 UNIT/ML injection pen  Generic drug: insulin aspart  Inject 20 Units into the skin 3 times daily (before meals)     pantoprazole 40 MG tablet  Commonly known as: PROTONIX  Take 1 tablet by mouth 2 times daily (before meals)     pramipexole 0.5 MG tablet  Commonly known as: MIRAPEX  Take 1 tablet by mouth nightly     rOPINIRole 0.5 MG tablet  Commonly known as: REQUIP     sucralfate 1 GM tablet  Commonly known as: CARAFATE  Take 1 tablet by mouth every 8 hours for 12 days     warfarin 5 MG tablet  Commonly known as: Coumadin  Take as directed. If you are unsure how to take this medication, talk to your nurse or doctor.  Original instructions: Take 1.5-2 tablets by mouth daily as directed by coumadin clinic            ASK your doctor about these medications      SV Iron 325 (65 Fe) MG tablet  Generic drug: ferrous sulfate               Where to Get Your Medications        These medications were sent to [facility] [address] [place] [address] - P [phone] - F [phone]  [address] [place] [id number]      Phone: [phone]   albuterol sulfate  (90 Base) MCG/ACT inhaler          Objective Findings at Discharge:     /73   Pulse 65   Temp 98.1 °F (36.7 °C) (Oral)   Resp 18   Ht 1.778 m (5' 10\")   Wt 129 kg (284 lb 6.3 oz)   SpO2 92%   BMI 40.81 kg/m²            PHYSICAL EXAM     GEN:  Awake male, sitting upright in bed in no apparent distress.  RESP:  CTAB, no  wheezes, rales or rhonchi.  CVS:   RRR. No  peripheral edema.  GI/:  S/NT/ND BS+   NEURO: No focal defecits.  PSYCH:  Awake, alert, oriented x 3.  Affect

## 2024-01-28 NOTE — CARE COORDINATION
Case Management Assessment  Initial Evaluation    Date/Time of Evaluation: 1/26/2024 9:41 AM  Assessment Completed by: Chelle Duron RN    If patient is discharged prior to next notation, then this note serves as note for discharge by case management.    Patient Name: London Swenson                   YOB: 1950  Diagnosis: Acute respiratory failure with hypoxia (HCC) [J96.01]  Chest pain, unspecified type [R07.9]  Acute hypoxic respiratory failure (HCC) [J96.01]                   Date / Time: 1/25/2024  3:07 PM    Patient Admission Status: Inpatient   Readmission Risk (Low < 19, Mod (19-27), High > 27): Readmission Risk Score: 26.3    Current PCP: Constance Yancey PA  PCP verified by CM?      Chart Reviewed: Yes      History Provided by: Patient  Patient Orientation: Alert and Oriented, Person, Place, Situation    Patient Cognition: Alert    Hospitalization in the last 30 days (Readmission):  Yes    If yes, Readmission Assessment in CM Navigator will be completed.    Advance Directives:      Code Status: Full Code   Patient's Primary Decision Maker is:      Primary Decision Maker: Rosy Swenson - Spouse - [phone]    Discharge Planning:    Patient lives with: Spouse/Significant Other Type of Home: House  Primary Care Giver: Self  Patient Support Systems include: Spouse/Significant Other, Family Members   Current Financial resources:    Current community resources:    Current services prior to admission: None            Current DME:              Type of Home Care services:  None    ADLS  Prior functional level:    Current functional level:      PT AM-PAC:   /24  OT AM-PAC:   /24    Family can provide assistance at DC:    Would you like Case Management to discuss the discharge plan with any other family members/significant others, and if so, who?    Plans to Return to Present Housing:    Other Identified Issues/Barriers to RETURNING to current housing:   Potential Assistance needed at 
Per MD and RN pt will not need o2 at dc.  
no

## 2024-01-28 NOTE — PROGRESS NOTES
Oxygen documentation:    O2 saturation at REST on ROOM AIR = __89____%    If saturation is 89% or above please proceed with steps 2 and 3……….    O2 saturation with AMBULATION of __250 feet/6 minutes on ROOM AIR = _88____%      DCP notified: _Shabbir_____

## 2024-01-28 NOTE — PROGRESS NOTES
Family and patient updated on post-discharge including follow-up appointments and albuterol at [facility]. Denies further needs at this time,

## 2024-01-28 NOTE — PLAN OF CARE
Problem: Discharge Planning  Goal: Discharge to home or other facility with appropriate resources  Outcome: Progressing  Flowsheets (Taken 1/27/2024 0800 by Ama Smith, RN)  Discharge to home or other facility with appropriate resources:   Identify barriers to discharge with patient and caregiver   Arrange for needed discharge resources and transportation as appropriate   Identify discharge learning needs (meds, wound care, etc)     Problem: Pain  Goal: Verbalizes/displays adequate comfort level or baseline comfort level  1/27/2024 2032 by Magalis Alonzo, RN  Outcome: Progressing  1/27/2024 0647 by Eugenie Mckinnon, RN  Outcome: Progressing  Flowsheets (Taken 1/27/2024 0647)  Verbalizes/displays adequate comfort level or baseline comfort level:   Encourage patient to monitor pain and request assistance   Assess pain using appropriate pain scale   Administer analgesics based on type and severity of pain and evaluate response   Implement non-pharmacological measures as appropriate and evaluate response     Problem: Respiratory - Adult  Goal: Achieves optimal ventilation and oxygenation  Outcome: Progressing     Problem: Cardiovascular - Adult  Goal: Maintains optimal cardiac output and hemodynamic stability  Outcome: Progressing  Goal: Absence of cardiac dysrhythmias or at baseline  Outcome: Progressing     Problem: Hematologic - Adult  Goal: Maintains hematologic stability  Outcome: Progressing

## 2024-01-28 NOTE — PROGRESS NOTES
Discharged with documented belongings. IV removed without complication. Tele removed and returned. CMU aware. Aware of follow-up and updated medication list. Verbalized understanding of breathing exercises, including in through nose and out through mouth, as well as positioning to facilitate maximum lung expansion.

## 2024-01-29 ENCOUNTER — TELEPHONE (OUTPATIENT)
Dept: CARDIOLOGY | Age: 74
End: 2024-01-29

## 2024-01-29 ENCOUNTER — TELEPHONE (OUTPATIENT)
Dept: FAMILY MEDICINE CLINIC | Age: 74
End: 2024-01-29

## 2024-01-29 ENCOUNTER — TELEPHONE (OUTPATIENT)
Dept: PULMONOLOGY | Age: 74
End: 2024-01-29

## 2024-01-29 ENCOUNTER — CARE COORDINATION (OUTPATIENT)
Dept: CASE MANAGEMENT | Age: 74
End: 2024-01-29

## 2024-01-29 DIAGNOSIS — J96.01 ACUTE RESPIRATORY FAILURE WITH HYPOXIA (HCC): Primary | ICD-10-CM

## 2024-01-29 PROCEDURE — 1111F DSCHRG MED/CURRENT MED MERGE: CPT | Performed by: PHYSICIAN ASSISTANT

## 2024-01-29 NOTE — CARE COORDINATION
PCP  Specialist  When to call 911. The patient agrees to contact the PCP office for questions related to their healthcare.     Advance Care Planning:   Does patient have an Advance Directive: reviewed and current.    Advance Care Planning   Healthcare Decision Maker:    Primary Decision Maker: Rosy Swenson - Spouse - [phone]    Click here to complete Healthcare Decision Makers including selection of the Healthcare Decision Maker Relationship (ie \"Primary\").  Today we discussed Healthcare Decision Makers. The patient is considering options.           Medication reconciliation was performed with patient, who verbalizes understanding of administration of home medications. Medications reviewed, 1111F entered: yes    Was patient discharged with a pulse oximeter?  Patient in RPM     Non-face-to-face services provided:  Obtained and reviewed discharge summary and/or continuity of care documents  Education of patient/family/caregiver/guardian to support self-management-.    Offered patient enrollment in the Remote Patient Monitoring (RPM) program for in-home monitoring: Yes, patient already enrolled.    Care Transitions 24 Hour Call    Schedule Follow Up Appointment with PCP: Completed  Do you have a copy of your discharge instructions?: Yes  Do you have all of your prescriptions and are they filled?: Yes  Have you been contacted by a [facility] Pharmacist?: No  Have you scheduled your follow up appointment?: Yes  How are you going to get to your appointment?: Car - family or friend to transport  Post Acute Services: Home Health (Comment: [facility])  Patient DME: Shania Mackenzie  Do you have support at home?: Partner/Spouse/SO  Do you feel like you have everything you need to keep you well at home?: Yes  Are you an active caregiver in your home?: No  Care Transitions Interventions         Discussed follow-up appointments. If no appointment was previously scheduled, appointment scheduling offered: Yes.   Is

## 2024-01-29 NOTE — TELEPHONE ENCOUNTER
----- Message from Dallas Mcintosh MD sent at 1/28/2024  1:42 PM EST -----  Can we get a f/u outpatient appt for this patient. He should be discharged. He follows with Dr. Bucio and he has a new oxygen requirement that needs outpatient work-up. He also follows for eval with Al.    XIMENA

## 2024-01-29 NOTE — TELEPHONE ENCOUNTER
Care Transitions Initial Follow Up Call    Outreach made within 2 business days of discharge: Yes    Patient: London Swenson Patient : 1950   MRN: [id number]  Reason for Admission: There are no discharge diagnoses documented for the most recent discharge.  Discharge Date: 24       Spoke with: Patient to be called by CTN. Scheduled HFU with PCP    Discharge department/facility: [facility] Interactive Patient Contact:  Was patient able to fill all prescriptions: Yes  Was patient instructed to bring all medications to the follow-up visit: Yes  Is patient taking all medications as directed in the discharge summary? Yes  Does patient understand their discharge instructions: Yes  Does patient have questions or concerns that need addressed prior to 7-14 day follow up office visit: no    Scheduled appointment with PCP within 7-14 days    Follow Up  Future Appointments   Date Time Provider Department [place]   2024  2:00 PM Constance Yancey PA [facility]  [place] - D[facility]   2024  1:00 PM Constance Yancey PA [facility]  [place] - D[facility]SHON   3/13/2024 12:40 PM Kelsey Flores MD AND ENDO [facility]   3/25/2024  1:30 PM SCHEDULE, ALETHEA DEVICE CHECK [facility]   3/25/2024  1:30 PM Ericka Valdez, APRN - CNP [facility] Jaramillo [facility]       Ellie Barclay RN

## 2024-01-29 NOTE — TELEPHONE ENCOUNTER
----- Message from Michael Lemus MD sent at 1/29/2024  8:49 AM EST -----  Can we please have him see Dr. Diez on a non-urgent basis in 4 to 8 weeks in addition to his routine EP follow-up that is already scheduled. Thank you

## 2024-01-29 NOTE — TELEPHONE ENCOUNTER
Spoke with pt and his wife on the phone and scheduled pt with srj for 3/12/24 at 1pm at [facility].

## 2024-01-31 ENCOUNTER — CARE COORDINATION (OUTPATIENT)
Dept: CARE COORDINATION | Age: 74
End: 2024-01-31

## 2024-01-31 ENCOUNTER — CARE COORDINATION (OUTPATIENT)
Dept: CASE MANAGEMENT | Age: 74
End: 2024-01-31

## 2024-01-31 NOTE — TELEPHONE ENCOUNTER
Noted, if asymptomatic ok to monitor. He is likely to drop with activity. Should recheck once at home and after resting- which is what it sounds like was recommended to the patient. Thanks

## 2024-01-31 NOTE — CARE COORDINATION
2nd outreach- no answer, the number to his emergency contact is the same number as called. Will notify ACM of o2 reading of 89%

## 2024-01-31 NOTE — CARE COORDINATION
PM Ericka Valdez, APRN - CNP [facility]     External follow up appointment(s):     Care Transition Nurse reviewed medical action plan with patient and discussed any barriers to care and/or understanding of plan of care after discharge. Discussed appropriate site of care based on symptoms and resources available to patient including: PCP  Specialist  When to call 911. The patient and spouse/partner agrees to contact the PCP office for questions related to their healthcare.     Advance Care Planning:   reviewed and current.     Patients top risk factors for readmission: medical condition-.  Interventions to address risk factors: Education of patient/family/caregiver/guardian to support self-management-.    Offered patient enrollment in the Remote Patient Monitoring (RPM) program for in-home monitoring: Yes, patient already enrolled.     Care Transitions Subsequent and Final Call    Subsequent and Final Calls  Do you have any ongoing symptoms?: No  Have your medications changed?: No  Do you have any questions related to your medications?: No  Do you currently have any active services?: Yes  Are you currently active with any services?: Home Health  Do you have any needs or concerns that I can assist you with?: No  Identified Barriers: None  Care Transitions Interventions  Other Interventions:             Care Transition Nurse provided contact information for future needs. Plan for follow-up call in 1-2 days based on severity of symptoms and risk factors.  Plan for next call: self management-SpO2     Shante ATKINS, RN, [facility]  Care Transition Nurse  [phone] mobile

## 2024-02-02 ENCOUNTER — OFFICE VISIT (OUTPATIENT)
Dept: FAMILY MEDICINE CLINIC | Age: 74
End: 2024-02-02

## 2024-02-02 VITALS
OXYGEN SATURATION: 94 % | BODY MASS INDEX: 40 KG/M2 | HEIGHT: 70 IN | SYSTOLIC BLOOD PRESSURE: 118 MMHG | WEIGHT: 279.4 LBS | TEMPERATURE: 97.6 F | HEART RATE: 86 BPM | DIASTOLIC BLOOD PRESSURE: 60 MMHG

## 2024-02-02 DIAGNOSIS — R80.9 TYPE 2 DIABETES MELLITUS WITH MICROALBUMINURIA, WITH LONG-TERM CURRENT USE OF INSULIN (HCC): ICD-10-CM

## 2024-02-02 DIAGNOSIS — Z79.4 TYPE 2 DIABETES MELLITUS WITH MICROALBUMINURIA, WITH LONG-TERM CURRENT USE OF INSULIN (HCC): ICD-10-CM

## 2024-02-02 DIAGNOSIS — G47.33 OSA (OBSTRUCTIVE SLEEP APNEA): ICD-10-CM

## 2024-02-02 DIAGNOSIS — R06.02 SHORTNESS OF BREATH: ICD-10-CM

## 2024-02-02 DIAGNOSIS — J96.01 ACUTE RESPIRATORY FAILURE WITH HYPOXIA (HCC): Primary | ICD-10-CM

## 2024-02-02 DIAGNOSIS — Z09 HOSPITAL DISCHARGE FOLLOW-UP: ICD-10-CM

## 2024-02-02 DIAGNOSIS — E11.29 TYPE 2 DIABETES MELLITUS WITH MICROALBUMINURIA, WITH LONG-TERM CURRENT USE OF INSULIN (HCC): ICD-10-CM

## 2024-02-02 DIAGNOSIS — E11.41 DIABETIC MONONEUROPATHY ASSOCIATED WITH TYPE 2 DIABETES MELLITUS (HCC): ICD-10-CM

## 2024-02-02 DIAGNOSIS — G25.81 RESTLESS LEG SYNDROME: ICD-10-CM

## 2024-02-02 RX ORDER — INSULIN ASPART 100 [IU]/ML
20 INJECTION, SOLUTION INTRAVENOUS; SUBCUTANEOUS
Qty: 15 ML | Refills: 2 | Status: SHIPPED | OUTPATIENT
Start: 2024-02-02

## 2024-02-02 RX ORDER — GABAPENTIN 100 MG/1
100 CAPSULE ORAL NIGHTLY
Qty: 30 CAPSULE | Refills: 0 | Status: SHIPPED | OUTPATIENT
Start: 2024-02-02 | End: 2024-02-02

## 2024-02-02 ASSESSMENT — PATIENT HEALTH QUESTIONNAIRE - PHQ9
1. LITTLE INTEREST OR PLEASURE IN DOING THINGS: 0
7. TROUBLE CONCENTRATING ON THINGS, SUCH AS READING THE NEWSPAPER OR WATCHING TELEVISION: 0
3. TROUBLE FALLING OR STAYING ASLEEP: 0
10. IF YOU CHECKED OFF ANY PROBLEMS, HOW DIFFICULT HAVE THESE PROBLEMS MADE IT FOR YOU TO DO YOUR WORK, TAKE CARE OF THINGS AT HOME, OR GET ALONG WITH OTHER PEOPLE: 0
6. FEELING BAD ABOUT YOURSELF - OR THAT YOU ARE A FAILURE OR HAVE LET YOURSELF OR YOUR FAMILY DOWN: 0
5. POOR APPETITE OR OVEREATING: 0
4. FEELING TIRED OR HAVING LITTLE ENERGY: 0
SUM OF ALL RESPONSES TO PHQ QUESTIONS 1-9: 0
2. FEELING DOWN, DEPRESSED OR HOPELESS: 0
SUM OF ALL RESPONSES TO PHQ9 QUESTIONS 1 & 2: 0
SUM OF ALL RESPONSES TO PHQ QUESTIONS 1-9: 0
SUM OF ALL RESPONSES TO PHQ QUESTIONS 1-9: 0
8. MOVING OR SPEAKING SO SLOWLY THAT OTHER PEOPLE COULD HAVE NOTICED. OR THE OPPOSITE, BEING SO FIGETY OR RESTLESS THAT YOU HAVE BEEN MOVING AROUND A LOT MORE THAN USUAL: 0
9. THOUGHTS THAT YOU WOULD BE BETTER OFF DEAD, OR OF HURTING YOURSELF: 0
SUM OF ALL RESPONSES TO PHQ QUESTIONS 1-9: 0

## 2024-02-02 ASSESSMENT — ANXIETY QUESTIONNAIRES
2. NOT BEING ABLE TO STOP OR CONTROL WORRYING: 0
5. BEING SO RESTLESS THAT IT IS HARD TO SIT STILL: 0
GAD7 TOTAL SCORE: 0
IF YOU CHECKED OFF ANY PROBLEMS ON THIS QUESTIONNAIRE, HOW DIFFICULT HAVE THESE PROBLEMS MADE IT FOR YOU TO DO YOUR WORK, TAKE CARE OF THINGS AT HOME, OR GET ALONG WITH OTHER PEOPLE: NOT DIFFICULT AT ALL
6. BECOMING EASILY ANNOYED OR IRRITABLE: 0
3. WORRYING TOO MUCH ABOUT DIFFERENT THINGS: 0
7. FEELING AFRAID AS IF SOMETHING AWFUL MIGHT HAPPEN: 0
1. FEELING NERVOUS, ANXIOUS, OR ON EDGE: 0
4. TROUBLE RELAXING: 0

## 2024-02-02 NOTE — PROGRESS NOTES
Post-Discharge Transitional Care  Follow Up      London Swenson   YOB: 1950    Date of Office Visit:  2/2/2024  Date of Hospital Admission: 1/25/24  Date of Hospital Discharge: 1/28/24  Risk of hospital readmission (high >=14%. Medium >=10%) :Readmission Risk Score: 26.4      Care management risk score Rising risk (score 2-5) and Complex Care (Scores >=6): No Risk Score On File     Non face to face  following discharge, date last encounter closed (first attempt may have been earlier): 01/29/2024    Call initiated 2 business days of discharge: Yes    ASSESSMENT/PLAN:   Acute respiratory failure with hypoxia (HCC)  Shortness of breath  ESTHER (obstructive sleep apnea)  Hospital discharge follow-up  -     Full PFT Study With Bronchodilator; Future  -     [place] DISCHARGE MEDS RECONCILED W/ CURRENT OUTPATIENT MED LIST  - follow up with pulm as scheduled next week. Proceed with PFTs.   - discussed weight loss- patient voiced understanding of contribution to condition but not ready to make any lifestyle modifications at this time.   Type 2 diabetes mellitus with microalbuminuria, with long-term current use of insulin (HCC)  -     insulin aspart (NOVOLOG FLEXPEN) 100 UNIT/ML injection pen; Inject 20 Units into the skin 3 times daily (before meals), Disp-15 mL, R-2Normal  - has not been taking TID, encouraged compliance with medications, patient to follow up as scheduled in a couple of weeks for repeat A1c.     Restless leg syndrome  Diabetic mononeuropathy associated with type 2 diabetes mellitus (HCC)  - discussed trial of gabapentin. Patient agreeable with Uds but declined med contract. Will continue with mirapex for now- failed therapy with requip.     Medical Decision Making: high complexity  Return in about 3 weeks (around 2/23/2024) for as scheduled, Diabetes Mellitus.           Subjective:   HPI:  Follow up of Hospital problems/diagnosis(es): acute hypoxic respiratory failure, atrial fibrillation, DM

## 2024-02-05 ENCOUNTER — OFFICE VISIT (OUTPATIENT)
Dept: PULMONOLOGY | Age: 74
End: 2024-02-05
Payer: MEDICARE

## 2024-02-05 ENCOUNTER — CARE COORDINATION (OUTPATIENT)
Dept: CASE MANAGEMENT | Age: 74
End: 2024-02-05

## 2024-02-05 VITALS
TEMPERATURE: 97.1 F | RESPIRATION RATE: 18 BRPM | HEART RATE: 86 BPM | WEIGHT: 281 LBS | BODY MASS INDEX: 40.23 KG/M2 | SYSTOLIC BLOOD PRESSURE: 121 MMHG | OXYGEN SATURATION: 98 % | DIASTOLIC BLOOD PRESSURE: 78 MMHG | HEIGHT: 70 IN

## 2024-02-05 DIAGNOSIS — G47.33 OSA (OBSTRUCTIVE SLEEP APNEA): Primary | ICD-10-CM

## 2024-02-05 DIAGNOSIS — E66.01 CLASS 3 SEVERE OBESITY DUE TO EXCESS CALORIES WITH SERIOUS COMORBIDITY AND BODY MASS INDEX (BMI) OF 40.0 TO 44.9 IN ADULT (HCC): ICD-10-CM

## 2024-02-05 DIAGNOSIS — I48.0 PAROXYSMAL ATRIAL FIBRILLATION (HCC): ICD-10-CM

## 2024-02-05 DIAGNOSIS — R06.02 SOB (SHORTNESS OF BREATH) ON EXERTION: ICD-10-CM

## 2024-02-05 DIAGNOSIS — I10 ESSENTIAL HYPERTENSION: ICD-10-CM

## 2024-02-05 PROBLEM — G25.81 RESTLESS LEGS SYNDROME (RLS): Status: ACTIVE | Noted: 2018-04-10

## 2024-02-05 LAB — FENO: 21 PPB

## 2024-02-05 PROCEDURE — 3074F SYST BP LT 130 MM HG: CPT | Performed by: NURSE PRACTITIONER

## 2024-02-05 PROCEDURE — 1123F ACP DISCUSS/DSCN MKR DOCD: CPT | Performed by: NURSE PRACTITIONER

## 2024-02-05 PROCEDURE — G8484 FLU IMMUNIZE NO ADMIN: HCPCS | Performed by: NURSE PRACTITIONER

## 2024-02-05 PROCEDURE — 3078F DIAST BP <80 MM HG: CPT | Performed by: NURSE PRACTITIONER

## 2024-02-05 PROCEDURE — 95012 NITRIC OXIDE EXP GAS DETER: CPT | Performed by: NURSE PRACTITIONER

## 2024-02-05 PROCEDURE — 1111F DSCHRG MED/CURRENT MED MERGE: CPT | Performed by: NURSE PRACTITIONER

## 2024-02-05 PROCEDURE — 3017F COLORECTAL CA SCREEN DOC REV: CPT | Performed by: NURSE PRACTITIONER

## 2024-02-05 PROCEDURE — 1036F TOBACCO NON-USER: CPT | Performed by: NURSE PRACTITIONER

## 2024-02-05 PROCEDURE — G8427 DOCREV CUR MEDS BY ELIG CLIN: HCPCS | Performed by: NURSE PRACTITIONER

## 2024-02-05 PROCEDURE — 99214 OFFICE O/P EST MOD 30 MIN: CPT | Performed by: NURSE PRACTITIONER

## 2024-02-05 PROCEDURE — G8417 CALC BMI ABV UP PARAM F/U: HCPCS | Performed by: NURSE PRACTITIONER

## 2024-02-05 ASSESSMENT — SLEEP AND FATIGUE QUESTIONNAIRES
HOW LIKELY ARE YOU TO NOD OFF OR FALL ASLEEP WHILE SITTING QUIETLY AFTER LUNCH WITHOUT ALCOHOL: 3
HOW LIKELY ARE YOU TO NOD OFF OR FALL ASLEEP WHILE SITTING AND TALKING TO SOMEONE: 1
HOW LIKELY ARE YOU TO NOD OFF OR FALL ASLEEP WHEN YOU ARE A PASSENGER IN A CAR FOR AN HOUR WITHOUT A BREAK: 0
HOW LIKELY ARE YOU TO NOD OFF OR FALL ASLEEP IN A CAR, WHILE STOPPED FOR A FEW MINUTES IN TRAFFIC: 0
HOW LIKELY ARE YOU TO NOD OFF OR FALL ASLEEP WHILE SITTING INACTIVE IN A PUBLIC PLACE: 1
HOW LIKELY ARE YOU TO NOD OFF OR FALL ASLEEP WHILE SITTING AND READING: 3
HOW LIKELY ARE YOU TO NOD OFF OR FALL ASLEEP WHILE LYING DOWN TO REST IN THE AFTERNOON WHEN CIRCUMSTANCES PERMIT: 3
ESS TOTAL SCORE: 14
HOW LIKELY ARE YOU TO NOD OFF OR FALL ASLEEP WHILE WATCHING TV: 3

## 2024-02-05 ASSESSMENT — ENCOUNTER SYMPTOMS
ABDOMINAL PAIN: 0
SHORTNESS OF BREATH: 1
RHINORRHEA: 0
CHEST TIGHTNESS: 0
WHEEZING: 0
EYE PAIN: 0
SORE THROAT: 0
COUGH: 0

## 2024-02-05 ASSESSMENT — PULMONARY FUNCTION TESTS: FENO: 21

## 2024-02-05 NOTE — PROGRESS NOTES
MA Communication:  The following orders are received by verbal communication from Marlon Bucio MD    Orders include:    Patient to follow up with referral to Bariatric  Patient will be call to schedule for Inspire consultation.    
severe obesity due to excess calories with serious comorbidity and body mass index (BMI) of 40.0 to 44.9 in adult (HCC)  -     Eligio Tompkins MD, Bariatric Surgery, [facility]  5. SOB (shortness of breath) on exertion  -     POCT Nitric Oxide  -     Eligio Tompkins MD, Bariatric Surgery, [facility] summary,stress test and radiology reports reviewed.  Stable today. Did not require oxygen at dc from hospital.  FENO today is 21, no active inflammation.  Unlikely asthma.  SOB could be from   obesity hypoventilation syndrome. Referral placed again to bariatrics.  Discussed important for weight loss for consideration of Inspire.  Treatment of ESTHER needed with cardiac history.        Advised to avoid driving when too sleepy to function safely. Discussed the risks of untreated apnea such as accidents, cognitive impairment, mood impairment, high blood pressure, various cardiac diseases and stroke.     Regarding obesity, recommend to try a formal program and increase physical activity by adding a 30 minute walk to  daily routine.Weight loss was encouraged as a long term approach to treatment of ESTHER. Explained the correlation between obesity and apnea and the causative role it can play.    Blood pressure and heart rate today are controlled, continue current medication regimen per cardiology .       JOSIE FARRAR, APRN - CNP

## 2024-02-05 NOTE — CARE COORDINATION
Care Transitions Outreach Attempt      Attempted to reach patient for transitions of care follow up. Unable to reach patient contacted both numbers and no  setup at this time.     Patient: London Swenson Patient : 1950 MRN: [id number]    Last Discharge Facility       Date Complaint Diagnosis Description Type Department Provider    24 Chest Pain; Shortness of Breath Acute respiratory failure with hypoxia (HCC) ... ED to Hosp-Admission (Discharged) (ADMITTED) [facility] Shabbir, Ehsan, MD; Alessia Skinner...              Was this an external facility discharge? No Discharge Facility Name: n.a    Noted following upcoming appointments from discharge chart review:   BS follow up appointment(s):   Future Appointments   Date Time Provider Department Center   2024  1:00 PM Constance Yancey PA [facility]  [place] - [facility]   3/12/2024  1:00 PM Ye Diez MD [place] [facility]   3/13/2024 12:40 PM Kelsey Flores MD AND ENDO [facility]   3/25/2024  1:30 PM SCHEDULE, ALETHEA DEVICE CHECK [place] [facility]   3/25/2024  1:30 PM Ericka Valdez, APRN - CNP [place] [facility]     Non-BS  follow up appointment(s): .    Shante GUERINN, RN, [facility]  Care Transition Nurse  [phone] mobile

## 2024-02-05 NOTE — PATIENT INSTRUCTIONS
Advised to avoid driving when too sleepy to function safely. Discussed the risks of untreated apnea such as accidents, cognitive impairment, mood impairment, high blood pressure, various cardiac diseases and stroke.     Regarding obesity, recommend to try a formal program and increase physical activity by adding a 30 minute walk to  daily routine.Weight loss was encouraged as a long term approach to treatment of ESTHER. Explained the correlation between obesity and apnea and the causative role it can play.    Blood pressure and heart rate today are controlled, continue current medication regimen per cardiology .     Referral to Dr. Chang- [place]     Remember to bring a list of pulmonary medications and any CPAP or BiPAP machines to your next appointment with the office.     Please keep all of your future appointments scheduled by [facility] office. Out of respect for other patients and providers, you may be asked to reschedule your appointment if you arrive later than your scheduled appointment time. Appointments cancelled less than 24hrs in advance will be considered a no show. Patients with three missed appointments within 1 year or four missed appointments within 2 years can be dismissed from the practice.     Please be aware that our physicians are required to work in the Intensive Care Unit at [facility].  Your appointment may need to be rescheduled if they are designated to work during your appointment time.      You may receive a survey regarding the care you received during your visit.  Your input is valuable to us.  We encourage you to complete and return your survey.  We hope you will choose us in the future for your healthcare needs.     Pt instructed of all future appointment dates & times, including radiology, labs, procedures & referrals. If procedures were scheduled preparation instructions provided. Instructions on future appointments with P

## 2024-02-06 ENCOUNTER — CARE COORDINATION (OUTPATIENT)
Dept: CARE COORDINATION | Age: 74
End: 2024-02-06

## 2024-02-06 NOTE — CARE COORDINATION
Remote Patient Monitoring Note      Date/Time:  2024 12:48 PM  [facility] red alert received for pulse ox reading (89%). Verified patients name and  as identifiers.    Background: Pt enrolled for HTN    Clinical Interventions:  Spoke with pt and also his spouse, he reports he is doing fine and his o2 went to 90% and he is unsure why it came to us so low. They report discrepancies between the RPM equipment and and his personal equipment. Other metrics are WNL      Plan/Follow Up: Will continue to review, monitor and address alerts with follow up based on severity of symptoms and risk factors.

## 2024-02-07 ENCOUNTER — CARE COORDINATION (OUTPATIENT)
Dept: CASE MANAGEMENT | Age: 74
End: 2024-02-07

## 2024-02-07 ENCOUNTER — CARE COORDINATION (OUTPATIENT)
Dept: PRIMARY CARE CLINIC | Age: 74
End: 2024-02-07

## 2024-02-07 VITALS
BODY MASS INDEX: 40.32 KG/M2 | HEART RATE: 67 BPM | SYSTOLIC BLOOD PRESSURE: 120 MMHG | DIASTOLIC BLOOD PRESSURE: 65 MMHG | WEIGHT: 281 LBS | OXYGEN SATURATION: 89 %

## 2024-02-07 DIAGNOSIS — I10 ESSENTIAL HYPERTENSION: Primary | ICD-10-CM

## 2024-02-07 NOTE — PROGRESS NOTES
Discharge in no distress: accompanied pt to passenger side of car with family/significant other driving. Resp WNL. Pt's significant other verbalized understanding of d/c instructions and verbalizes no additional questions.  Pain  in a tolerable level=0 Monitored

## 2024-02-07 NOTE — PROGRESS NOTES
Remote Patient Order Discontinued    Received request from Shante Calvo RN  to discontinue order for remote patient monitoring of HTN and order completed.

## 2024-02-07 NOTE — CARE COORDINATION
Remote Alert Monitoring Note  Rpm alert to be reviewed by the provider                   Date/Time:  2024 11:25 AM  Patient Current Location: [place]  LPN contacted patient by telephone. Verified patients name and  as identifiers.     Background: Pt enrolled for HTN  Refer to 911 immediately if:  Patient unresponsive or unable to provide history  Change in cognition or sudden confusion  Patient unable to respond in complete sentences  Intense chest pain/tightness  Any concern for any clinical emergency  Red Alert: Provider response time of 1 hr required for any red alert requiring intervention  Yellow Alert: Provider response time of 3hr required for any escalated yellow alert     O2 Triage  Are you having any Chest Pain? no   Are you having any Shortness of Breath? no   Swelling in your hands or feet? no      Are you having any other health concerns or issues? no      Clinical Interventions: LPN contacted wife in regards to RPM red alert for PO of 89%. Wife appeared annoyed with the call and denied that pt was presenting with any worrisome symptoms. Wife stated that \"this equipment isn't right and we don't want it!\" Wife would like the equipment to be picked up, does not want an exchange. Writer routed to [facility] to advise per protocol.        Plan/Follow Up: Will continue to review, monitor and address alerts with follow up based on severity of symptoms and risk factors.       Juliet Patten LPN, [facility]  PH: [phone]     - Current Patient Metrics ---- Blood Pressure: 120/65, 72bpm Pulseox: 89%, 67bpm Weight: 281.0lbs Note Created at: 2024 11:27 AM ET ---- Time-Spent: 10 minutes 0 seconds

## 2024-02-08 ENCOUNTER — CARE COORDINATION (OUTPATIENT)
Dept: CASE MANAGEMENT | Age: 74
End: 2024-02-08

## 2024-02-08 NOTE — CARE COORDINATION
Care Transitions Follow Up Call    Patient Current Location:  [place]    Care Transition Nurse contacted the patient by telephone. Verified name and  with patient as identifiers.    Patient: London Swenson  Patient : 1950   MRN: [id number]  Reason for Admission: SOB CP   Discharge Date: 24 RARS: Readmission Risk Score: 26.4      Needs to be reviewed by the provider   Additional needs identified to be addressed with provider: No  none             Method of communication with provider: none.    CTN spoke with patient who reported he is ok. Patient denied any sob or cp. Patient reported he did not check his oxygen saturation today but reported yesterday was 90%. Patient denied any other concerns or issues at this time.     Addressed changes since last contact:  none  Discussed follow-up appointments. If no appointment was previously scheduled, appointment scheduling offered: Yes.   Is follow up appointment scheduled within 7 days of discharge? Yes.    Follow Up  Future Appointments   Date Time Provider Department Center   2024  1:00 PM Constance Yancey PA [facility]  [place] [facility]   3/12/2024  1:00 PM Ye Diez MD [place] [facility]   3/13/2024 12:40 PM Kelsey Flores MD [facility]   3/25/2024  1:30 PM SCHEDULE, ALETHEA DEVICE CHECK [facility]   3/25/2024  1:30 PM Ericka Valdez APRN - CNP [facility]     External follow up appointment(s): .    Care Transition Nurse reviewed medical action plan with patient and discussed any barriers to care and/or understanding of plan of care after discharge. Discussed appropriate site of care based on symptoms and resources available to patient including: PCP  Specialist  When to call 911. The patient agrees to contact the PCP office for questions related to their healthcare.     Advance Care Planning:   reviewed and current.     Patients top risk factors for readmission: medical condition-.  Interventions to address risk factors: Education of

## 2024-02-13 ENCOUNTER — CARE COORDINATION (OUTPATIENT)
Dept: CASE MANAGEMENT | Age: 74
End: 2024-02-13

## 2024-02-13 DIAGNOSIS — Z79.4 TYPE 2 DIABETES MELLITUS WITH MICROALBUMINURIA, WITH LONG-TERM CURRENT USE OF INSULIN (HCC): ICD-10-CM

## 2024-02-13 DIAGNOSIS — E11.29 TYPE 2 DIABETES MELLITUS WITH MICROALBUMINURIA, WITH LONG-TERM CURRENT USE OF INSULIN (HCC): ICD-10-CM

## 2024-02-13 DIAGNOSIS — R80.9 TYPE 2 DIABETES MELLITUS WITH MICROALBUMINURIA, WITH LONG-TERM CURRENT USE OF INSULIN (HCC): ICD-10-CM

## 2024-02-13 NOTE — TELEPHONE ENCOUNTER
Refill Request     CONFIRM preferred pharmacy with the patient.    If Mail Order Rx - Pend for 90 day refill.      Last Seen: Last Seen Department: 2024  Last Seen by PCP: 2024    Last Written:     If no future appointment scheduled:  Review the last OV with PCP and review information for follow-up visit,  Route STAFF MESSAGE with patient name to the  Pool for scheduling with the following information:            -  Timing of next visit           -  Visit type ie Physical, OV, etc           -  Diagnoses/Reason ie. COPD, HTN - Do not use MEDICATION, Follow-up or CHECK UP - Give reason for visit      Next Appointment:   Future Appointments   Date Time Provider Department Center   2024  1:00 PM Constance Yancey PA [facility]  [place] [facility]   3/12/2024  1:00 PM Ye Diez MD [place] [facility]   3/13/2024 12:40 PM Kelsey Flores MD [facility]   3/25/2024  1:30 PM SCHEDULE, ALETHEA DEVICE CHECK [facility]   3/25/2024  1:30 PM Ericka Valdez APRN - CNP [facility]       Message sent to  to schedule appt with patient?  NO      Requested Prescriptions     Pending Prescriptions Disp Refills    Continuous Blood Gluc Sensor (FREESTYLE JUSTIN 14 DAY SENSOR) MISC 2 each 11     Si Units by Does not apply route every 14 days

## 2024-02-13 NOTE — CARE COORDINATION
Care Transitions Follow Up Call    Patient Current Location:  [place]    Care Transition Nurse contacted the patient by telephone. Verified name and  with patient as identifiers.    Patient: London Swenson  Patient : 1950   MRN: [id number]  Reason for Admission: SOB CP   Discharge Date: 24 RARS: Readmission Risk Score: 26.4      Needs to be reviewed by the provider   Additional needs identified to be addressed with provider: No  none             Method of communication with provider: none.    CTN spoke with patient who reported he is fine and breathing is stable. Patient reported his oxygen saturation today was 90%. Patient denied any other issues or concerns at this time.     Addressed changes since last contact:  none  Discussed follow-up appointments. If no appointment was previously scheduled, appointment scheduling offered: Yes.   Is follow up appointment scheduled within 7 days of discharge? Yes.    Follow Up  Future Appointments   Date Time Provider Department Center   2024  1:00 PM Constance Yancey PA [facility]  [place] [facility]   3/12/2024  1:00 PM Ye Diez MD [facility]   3/13/2024 12:40 PM Kelsey Flores MD AND ENDO [facility]   3/25/2024  1:30 PM SCHEDULE, ALETHEA DEVICE CHECK [facility]   3/25/2024  1:30 PM Ericka Valdez, APRN - CNP [facility]     External follow up appointment(s): .    Care Transition Nurse reviewed medical action plan with patient and discussed any barriers to care and/or understanding of plan of care after discharge. Discussed appropriate site of care based on symptoms and resources available to patient including: PCP  Specialist  When to call 911. The patient agrees to contact the PCP office for questions related to their healthcare.     Advance Care Planning:   reviewed and current.     Patients top risk factors for readmission: medical condition-.  Interventions to address risk factors: Education of patient/family/caregiver/guardian to support

## 2024-02-14 RX ORDER — FLASH GLUCOSE SENSOR
1 KIT MISCELLANEOUS
Qty: 2 EACH | Refills: 11 | Status: SHIPPED | OUTPATIENT
Start: 2024-02-14

## 2024-02-15 NOTE — PROGRESS NOTES
Physician Progress Note      PATIENT:               SERAFIN STONER  CSN #:                  [id number]  :                       1950  ADMIT DATE:       2024 3:07 PM  DISCH DATE:        2024 5:22 PM  RESPONDING  PROVIDER #:        Ehsan Shabbir MD          QUERY TEXT:    Patient admitted with  Chondrocostal junction syndrome. Noted documentation of   acute respiratory failure. In order to support the diagnosis of acute   respiratory failure, please include additional clinical indicators in your   documentation.  Or please document if the diagnosis of acute respiratory   failure has been ruled out after further study.      The medical record reflects the following:  Risk Factors: Chondrocostal junction syndrome  Clinical Indicators:  H and P -Hypoxic 82% on RA on arrival. Placed on 4L NC.    Ddx: Most likely d/t underlying Costochondritis given chest pain reproducible   with palpation and pleuritic in nature.RR 18pulmoniology notes- Respiratory:  Positive for shortness of breath. Negative for cough, wheezing and stridor.    Treatment: O2 support NC, cardiology and pulmonology consult    Thank You Deb Marmolejo RN, CDS [email or web address]    Acute Respiratory Failure Clinical Indicators per  MS-DRG Training Guide and   Quick Reference Guide:  pO2 < 60 mmHg or SpO2 (pulse oximetry) < 91% breathing room air  pCO2 > 50 and pH < 7.35  P/F ratio (pO2 / FIO2) < 300  pO2 decrease or pCO2 increase by 10 mmHg from baseline (if known)  Supplemental oxygen of 40% or more  Presence of respiratory distress, tachypnea, dyspnea, shortness of breath,   wheezing  Unable to speak in complete sentences  Use of accessory muscles to breathe  Extreme anxiety and feeling of impending doom  Tripod position  Confusion/altered mental status/obtunded  Options provided:  -- Acute Respiratory Failure as evidenced by, Please document evidence.  -- Acute Respiratory Failure ruled out after study  -- Other - I will add my own

## 2024-02-15 NOTE — PROGRESS NOTES
Patient arrived to PACU bay 9, phase one initiated. Placed on bedside monitor, VSS. Report obtained from OR RN and anesthesia. Patient on O2 via nasal cannula at 4lpm. See flowsheets for assessment. Side rails in place, will monitor patient closely.    Subjective     Sneha Patterson is a 87 y.o. female who presents for Medicare Annual Wellness Visit Subsequent.    HPI     Pt is here today for annual well exam.  She is new to practice. She lives alone in a single family [place] home in [place].  She ambulates well.  She denies any falls. She does not use a cane or walker.  She has a living will and MPOA.  She is here today with her daughter, Carla.      She is a nonsmoker, never smoker.  She has history of asthma, on advair 500-50 1 puff BID with good control of breathing.      Review of Systems   Respiratory:  Negative for shortness of breath.    Cardiovascular:  Negative for chest pain.   Neurological:  Negative for headaches.       Objective     Vitals:    02/15/24 1320   BP: 118/73   BP Location: Right arm   Patient Position: Sitting   Pulse: 71   Resp: 18   Temp: 35.9 °C (96.6 °F)   TempSrc: Temporal   SpO2: 97%   Weight: 54.4 kg (120 lb)        Current Outpatient Medications   Medication Instructions    albuterol (Proventil HFA) 90 mcg/actuation inhaler 2 puffs, inhalation, Every 4 hours PRN    fluticasone propion-salmeteroL (Advair Diskus) 500-50 mcg/dose diskus inhaler 1 puff, inhalation, 2 times daily        History reviewed. No pertinent surgical history.     Social History     Tobacco Use    Smoking status: Never    Smokeless tobacco: Never        No family history on file.     Immunization History   Administered Date(s) Administered    Flu vaccine, quadrivalent, high-dose, preservative free, age 65y+ (FLUZONE) 11/23/2023    Flu vaccine, quadrivalent, recombinant, preservative free, adult (FLUBLOK) 10/09/2020    Pfizer COVID-19 vaccine, Fall 2023, 12 years and older, (30mcg/0.3mL) 11/23/2023    Pfizer COVID-19 vaccine, bivalent, age 12 years and older (30 mcg/0.3 mL) 10/02/2022    Pfizer Gray Cap SARS-CoV-2 05/24/2022    Pfizer Purple Cap SARS-CoV-2 01/28/2021, 02/18/2021, 10/06/2021    Pneumococcal conjugate vaccine, 13-valent (PREVNAR 13) 12/08/2020     Pneumococcal conjugate vaccine, 20-valent (PREVNAR 20) 02/15/2024        Physical Exam  Vitals reviewed.   Constitutional:       General: She is not in acute distress.     Appearance: Normal appearance. She is not ill-appearing.   HENT:      Head: Normocephalic and atraumatic.      Right Ear: Tympanic membrane, ear canal and external ear normal.      Left Ear: Tympanic membrane, ear canal and external ear normal.      Nose: Nose normal.      Mouth/Throat:      Mouth: Mucous membranes are moist.      Pharynx: No oropharyngeal exudate or posterior oropharyngeal erythema.   Neck:      Thyroid: No thyroid mass or thyromegaly.   Cardiovascular:      Rate and Rhythm: Normal rate and regular rhythm.      Heart sounds: No murmur heard.  Pulmonary:      Effort: No respiratory distress.      Breath sounds: Normal breath sounds. No wheezing, rhonchi or rales.   Abdominal:      General: Abdomen is flat. There is no distension.      Palpations: Abdomen is soft. There is no mass.      Tenderness: There is no abdominal tenderness.   Skin:     General: Skin is warm and dry.      Findings: No rash.   Neurological:      Mental Status: She is alert and oriented to person, place, and time. Mental status is at baseline.   Psychiatric:         Mood and Affect: Mood normal.         Behavior: Behavior normal.         Problem List Items Addressed This Visit    None  Visit Diagnoses       Routine general medical examination at health care facility    -  Primary    Relevant Orders    Comprehensive Metabolic Panel    Lipid Panel    CBC and Auto Differential    Vitamin D deficiency        Relevant Orders    Vitamin D 25-Hydroxy,Total (for eval of Vitamin D levels)    History of asthma        Relevant Medications    fluticasone propion-salmeteroL (Advair Diskus) 500-50 mcg/dose diskus inhaler    albuterol (Proventil HFA) 90 mcg/actuation inhaler    Fatigue, unspecified type        Relevant Orders    CBC and Auto Differential    Menopause present         Relevant Orders    XR DEXA bone density            Assessment/Plan     Medicare well exam  new patient     dexa scan status - ordered     shingrix vaccine status - pt will complete at pharmacy    pneumonia vaccine status - prevnar 20 given today     I recommend yearly flu vaccine and routine COVID vaccinations as indicated     Healthy diet, routine exercise was discussed with patient      Living will/MPOA discussed - pt has both forms completed    Screening questions completed (Fall /depression/ADL/IADL/Home Safety/Functional ability)    Asthma - on advair - refilled    Follow up in  1 year or sooner if needed

## 2024-02-20 ENCOUNTER — CARE COORDINATION (OUTPATIENT)
Dept: CASE MANAGEMENT | Age: 74
End: 2024-02-20

## 2024-02-20 NOTE — CARE COORDINATION
Care Transitions Outreach Attempt    Attempted to reach patient for transitions of care follow up. Unable to reach patient both numbers attempted and both no vm setup at this time.     Patient: London Swenson Patient : 1950 MRN: [id number]    Last Discharge Facility       Date Complaint Diagnosis Description Type Department Provider    24 Chest Pain; Shortness of Breath Acute respiratory failure with hypoxia (HCC) ... ED to Hosp-Admission (Discharged) (ADMITTED) [facility] B3 Shabbir, Ehsan, MD; Alessia Skinner...              Noted following upcoming appointments from discharge chart review:   [facility] follow up appointment(s):   Future Appointments   Date Time Provider Department Center   2024  1:00 PM Constance Yancey PA [facility]  [place] [facility]   3/12/2024  1:00 PM Ye Diez MD [facility]   3/13/2024 12:40 PM Kelsey Flores MD AND ENDO [facility]   3/25/2024  1:30 PM SCHEDULE, ALETHEA DEVICE CHECK [facility]   3/25/2024  1:30 PM Ericka Valdez, APRN - CNP [facility]     Non-[facility]  follow up appointment(s): .    Shante GUERINN, RN, [facility]  Care Transition Nurse  [phone] mobile

## 2024-02-26 ENCOUNTER — OFFICE VISIT (OUTPATIENT)
Dept: FAMILY MEDICINE CLINIC | Age: 74
End: 2024-02-26
Payer: MEDICARE

## 2024-02-26 VITALS
TEMPERATURE: 96.7 F | WEIGHT: 286.8 LBS | HEART RATE: 84 BPM | SYSTOLIC BLOOD PRESSURE: 118 MMHG | BODY MASS INDEX: 41.06 KG/M2 | OXYGEN SATURATION: 96 % | DIASTOLIC BLOOD PRESSURE: 62 MMHG | HEIGHT: 70 IN

## 2024-02-26 DIAGNOSIS — R80.9 TYPE 2 DIABETES MELLITUS WITH MICROALBUMINURIA, WITH LONG-TERM CURRENT USE OF INSULIN (HCC): Primary | ICD-10-CM

## 2024-02-26 DIAGNOSIS — I25.119 CORONARY ARTERY DISEASE INVOLVING NATIVE CORONARY ARTERY OF NATIVE HEART WITH ANGINA PECTORIS (HCC): ICD-10-CM

## 2024-02-26 DIAGNOSIS — N28.1 ACQUIRED RENAL CYST OF RIGHT KIDNEY: ICD-10-CM

## 2024-02-26 DIAGNOSIS — Z79.4 TYPE 2 DIABETES MELLITUS WITH MICROALBUMINURIA, WITH LONG-TERM CURRENT USE OF INSULIN (HCC): Primary | ICD-10-CM

## 2024-02-26 DIAGNOSIS — D69.6 THROMBOCYTOPENIA, UNSPECIFIED (HCC): ICD-10-CM

## 2024-02-26 DIAGNOSIS — I50.812 CHRONIC RIGHT-SIDED CONGESTIVE HEART FAILURE (HCC): ICD-10-CM

## 2024-02-26 DIAGNOSIS — E11.29 TYPE 2 DIABETES MELLITUS WITH MICROALBUMINURIA, WITH LONG-TERM CURRENT USE OF INSULIN (HCC): Primary | ICD-10-CM

## 2024-02-26 DIAGNOSIS — J84.10 LUNG GRANULOMA (HCC): ICD-10-CM

## 2024-02-26 DIAGNOSIS — F03.90 DEMENTIA WITHOUT BEHAVIORAL DISTURBANCE (HCC): ICD-10-CM

## 2024-02-26 DIAGNOSIS — I70.0 ATHEROSCLEROSIS OF AORTA (HCC): ICD-10-CM

## 2024-02-26 DIAGNOSIS — G47.33 OSA (OBSTRUCTIVE SLEEP APNEA): ICD-10-CM

## 2024-02-26 DIAGNOSIS — I49.5 SICK SINUS SYNDROME (HCC): ICD-10-CM

## 2024-02-26 LAB — HBA1C MFR BLD: 8.8 %

## 2024-02-26 PROCEDURE — 1123F ACP DISCUSS/DSCN MKR DOCD: CPT | Performed by: PHYSICIAN ASSISTANT

## 2024-02-26 PROCEDURE — G8484 FLU IMMUNIZE NO ADMIN: HCPCS | Performed by: PHYSICIAN ASSISTANT

## 2024-02-26 PROCEDURE — 3017F COLORECTAL CA SCREEN DOC REV: CPT | Performed by: PHYSICIAN ASSISTANT

## 2024-02-26 PROCEDURE — 1036F TOBACCO NON-USER: CPT | Performed by: PHYSICIAN ASSISTANT

## 2024-02-26 PROCEDURE — G8417 CALC BMI ABV UP PARAM F/U: HCPCS | Performed by: PHYSICIAN ASSISTANT

## 2024-02-26 PROCEDURE — 3052F HG A1C>EQUAL 8.0%<EQUAL 9.0%: CPT | Performed by: PHYSICIAN ASSISTANT

## 2024-02-26 PROCEDURE — 99214 OFFICE O/P EST MOD 30 MIN: CPT | Performed by: PHYSICIAN ASSISTANT

## 2024-02-26 PROCEDURE — 1111F DSCHRG MED/CURRENT MED MERGE: CPT | Performed by: PHYSICIAN ASSISTANT

## 2024-02-26 PROCEDURE — 3078F DIAST BP <80 MM HG: CPT | Performed by: PHYSICIAN ASSISTANT

## 2024-02-26 PROCEDURE — 3074F SYST BP LT 130 MM HG: CPT | Performed by: PHYSICIAN ASSISTANT

## 2024-02-26 PROCEDURE — 2022F DILAT RTA XM EVC RTNOPTHY: CPT | Performed by: PHYSICIAN ASSISTANT

## 2024-02-26 PROCEDURE — G8427 DOCREV CUR MEDS BY ELIG CLIN: HCPCS | Performed by: PHYSICIAN ASSISTANT

## 2024-02-26 ASSESSMENT — ENCOUNTER SYMPTOMS
SORE THROAT: 0
VOMITING: 0
ABDOMINAL PAIN: 0
DIARRHEA: 0
RHINORRHEA: 0
NAUSEA: 0
SHORTNESS OF BREATH: 0
COUGH: 0
CONSTIPATION: 0

## 2024-02-26 NOTE — PROGRESS NOTES
Does not routinely monitor BG. Had CGM but pt reports they sent him the wrong one.  Non adherent to low carb diet. On ace and statin     Afib/SSS s/p pacemaker/CAD/DHF: Following with cardiology and EP. Current regimen includes tikosyn, coumadin, and metoprolol. Denies palpitations, dizziness, light headedness.     Uncontrolled sleep apnea: Following with pulmonology.      Per chart review, stable 22 cm right renal cyst. Saw urology with no recommended intervention.  Considered benign based on stability since 2019 however now causing mass shift in abdominal contents. Microalbuminuria 2/2 to diabetes. Last urinalysis 12/2023 with glucosuria, otherwise negative.     RLS/bilateral leg pain: failed therapy with ropinirole. Continues on mirapex. Considered trial of gabapentin, but unwilling to sign medication contract.     Intermittent hypoxia: thought to be 2/2 to sleep apnea vs obesity hypoventilation syndrome. Pfts ordered last month, not scheduled for sleep study- does follow with pulm    Review of Systems   Constitutional:  Negative for activity change, chills and fever.   HENT:  Negative for congestion, ear pain, rhinorrhea and sore throat.    Eyes:  Negative for visual disturbance.   Respiratory:  Negative for cough and shortness of breath.    Cardiovascular:  Negative for chest pain and palpitations.   Gastrointestinal:  Negative for abdominal pain, constipation, diarrhea, nausea and vomiting.   Genitourinary:  Negative for difficulty urinating and dysuria.   Musculoskeletal:  Positive for arthralgias and myalgias.   Skin:  Negative for rash.   Neurological:  Negative for dizziness, weakness and numbness.   Psychiatric/Behavioral:  Negative for sleep disturbance.        Allergies, past medical history, family history, and social history reviewed and unchanged from previous encounter.     Current Outpatient Medications   Medication Sig Dispense Refill    Continuous Blood Gluc Sensor (Job1001STYLE JUSTIN 14 DAY SENSOR)

## 2024-02-26 NOTE — PATIENT INSTRUCTIONS
Increase lantus to 20 units nightly  Increase novolog to 15 units three times daily with meals.   See Dr. Solis as scheduled in March. If that appointment is changed for some reason, please see me in 3 months.

## 2024-02-27 ENCOUNTER — CARE COORDINATION (OUTPATIENT)
Dept: CASE MANAGEMENT | Age: 74
End: 2024-02-27

## 2024-02-27 ENCOUNTER — TELEPHONE (OUTPATIENT)
Dept: FAMILY MEDICINE CLINIC | Age: 74
End: 2024-02-27

## 2024-02-27 DIAGNOSIS — M54.50 CHRONIC BILATERAL LOW BACK PAIN, UNSPECIFIED WHETHER SCIATICA PRESENT: ICD-10-CM

## 2024-02-27 DIAGNOSIS — G89.29 CHRONIC BILATERAL LOW BACK PAIN, UNSPECIFIED WHETHER SCIATICA PRESENT: ICD-10-CM

## 2024-02-27 DIAGNOSIS — G25.81 RESTLESS LEG SYNDROME: Primary | ICD-10-CM

## 2024-02-27 LAB
CREAT UR-MCNC: 145.3 MG/DL (ref 39–259)
MICROALBUMIN UR DL<=1MG/L-MCNC: 2.5 MG/DL
MICROALBUMIN/CREAT UR: 17.2 MG/G (ref 0–30)

## 2024-02-27 NOTE — CARE COORDINATION
Care Transitions Follow Up Call    Patient Current Location:  [place]    Care Transition Nurse contacted the patient by telephone. Verified name and  with patient as identifiers.    Patient: London Swenson  Patient : 1950   MRN: [id number]  Reason for Admission: SOB   Discharge Date: 24 RARS: Readmission Risk Score: 26.4      Needs to be reviewed by the provider   Additional needs identified to be addressed with provider: No  none             Method of communication with provider: none.    CTN spoke with patient who reported he is doing fine. Patient denied any concerns and follow up with PCP yesterday. Patient denied any changes to plan of care or medications. CTN advised patient of use of urgent care or physician’s 24 hr access line if assistance is needed after hours.      Addressed changes since last contact:  none  Discussed follow-up appointments. If no appointment was previously scheduled, appointment scheduling offered: Yes.   Is follow up appointment scheduled within 7 days of discharge? Yes.    Follow Up  Future Appointments   Date Time Provider Department Center   3/5/2024 11:00 AM Reece Carrington DO [facility]   3/12/2024  1:00 PM Ye Diez MD [place] [facility]   3/13/2024 12:40 PM Kelsey Flores MD [facility]   3/25/2024  1:30 PM SCHEDULE, ALETHEA DEVICE CHECK [place] [facility]   3/25/2024  1:30 PM Ericka Valdez, APRN - CNP [place] [facility]   2024  1:30 PM Constance Yancey PA [facility]     External follow up appointment(s): .    Care Transition Nurse reviewed medical action plan with patient and discussed any barriers to care and/or understanding of plan of care after discharge. Discussed appropriate site of care based on symptoms and resources available to patient including: PCP  Specialist  When to call 911. The patient agrees to contact the PCP office for questions related to their healthcare.     Advance Care Planning:   reviewed and current.

## 2024-02-27 NOTE — TELEPHONE ENCOUNTER
Received phone call from the pt spouse in regards to asking for pain medication as the pt is in a lot of pain he can not sit down, walk good and can not sleep due to the pain. Pt spouse stated that you know he is in pain on Monday please advise and send in medication to [facility].

## 2024-02-28 ENCOUNTER — CARE COORDINATION (OUTPATIENT)
Dept: CARE COORDINATION | Age: 74
End: 2024-02-28

## 2024-02-28 NOTE — CARE COORDINATION
ACM reviewed CTN referral. ACM will outreach to patient next week for ongoing care management phone calls.

## 2024-02-29 ENCOUNTER — TELEPHONE (OUTPATIENT)
Dept: FAMILY MEDICINE CLINIC | Age: 74
End: 2024-02-29

## 2024-02-29 RX ORDER — GABAPENTIN 100 MG/1
CAPSULE ORAL
Qty: 60 CAPSULE | Refills: 0 | Status: SHIPPED | OUTPATIENT
Start: 2024-02-29 | End: 2024-03-30

## 2024-02-29 RX ORDER — LIDOCAINE 50 MG/G
1 PATCH TOPICAL DAILY
Qty: 30 PATCH | Refills: 0 | Status: SHIPPED | OUTPATIENT
Start: 2024-02-29 | End: 2024-03-30

## 2024-02-29 NOTE — TELEPHONE ENCOUNTER
Spoke with patient's wife, on HIPAA  We had discussed gabapentin previously, patient unwilling to sign medication contract.   I believe this would help with neuropathy, chronic back pain as well as RLS.   If interested in narcotics, would need to see pain management.     Patient would like to try gabapentin. Medication contract printed and placed up front.   Will send in script for gabapentin, nightly to start.

## 2024-02-29 NOTE — TELEPHONE ENCOUNTER
Pt spouse calling in regards to please have pain medication sent in. Pt spouse stated that something needs to be done the pt can not sleep he can not walk he can hardly sit please advise. Thank you

## 2024-03-01 NOTE — TELEPHONE ENCOUNTER
Spoke with patients wife and informed her. She will call us back to let us know what she finds out.

## 2024-03-01 NOTE — TELEPHONE ENCOUNTER
Called and submited PA through [employer]. Waiting for response. Ref # [id number].    Good call back number [phone]

## 2024-03-01 NOTE — TELEPHONE ENCOUNTER
That is the plan I am using and It is coming up ineligible. Patient will need to call them.     Please notify patient. Thank you.

## 2024-03-01 NOTE — TELEPHONE ENCOUNTER
Tried submitting PA for Lidocaine 5% patches. Eligibility could not be verified for this patient - patient not found. Please reach out to patient for NEW PRESCRIPTION coverage.     Thank you

## 2024-03-01 NOTE — TELEPHONE ENCOUNTER
Spoke with patient, was unable to give his Rx coverage. Patient states that he will stop by the office to have it uploaded to his chart.

## 2024-03-05 ENCOUNTER — OFFICE VISIT (OUTPATIENT)
Dept: ORTHOPEDIC SURGERY | Age: 74
End: 2024-03-05
Payer: MEDICARE

## 2024-03-05 VITALS — BODY MASS INDEX: 40.94 KG/M2 | HEIGHT: 70 IN | WEIGHT: 286 LBS

## 2024-03-05 DIAGNOSIS — M17.12 OSTEOARTHRITIS OF LEFT KNEE, UNSPECIFIED OSTEOARTHRITIS TYPE: Primary | ICD-10-CM

## 2024-03-05 PROCEDURE — G8427 DOCREV CUR MEDS BY ELIG CLIN: HCPCS | Performed by: STUDENT IN AN ORGANIZED HEALTH CARE EDUCATION/TRAINING PROGRAM

## 2024-03-05 PROCEDURE — G8484 FLU IMMUNIZE NO ADMIN: HCPCS | Performed by: STUDENT IN AN ORGANIZED HEALTH CARE EDUCATION/TRAINING PROGRAM

## 2024-03-05 PROCEDURE — G8417 CALC BMI ABV UP PARAM F/U: HCPCS | Performed by: STUDENT IN AN ORGANIZED HEALTH CARE EDUCATION/TRAINING PROGRAM

## 2024-03-05 PROCEDURE — 99214 OFFICE O/P EST MOD 30 MIN: CPT | Performed by: STUDENT IN AN ORGANIZED HEALTH CARE EDUCATION/TRAINING PROGRAM

## 2024-03-05 PROCEDURE — 1036F TOBACCO NON-USER: CPT | Performed by: STUDENT IN AN ORGANIZED HEALTH CARE EDUCATION/TRAINING PROGRAM

## 2024-03-05 PROCEDURE — 1123F ACP DISCUSS/DSCN MKR DOCD: CPT | Performed by: STUDENT IN AN ORGANIZED HEALTH CARE EDUCATION/TRAINING PROGRAM

## 2024-03-05 PROCEDURE — 3017F COLORECTAL CA SCREEN DOC REV: CPT | Performed by: STUDENT IN AN ORGANIZED HEALTH CARE EDUCATION/TRAINING PROGRAM

## 2024-03-05 RX ORDER — TRAMADOL HYDROCHLORIDE 50 MG/1
50 TABLET ORAL EVERY 6 HOURS PRN
Qty: 28 TABLET | Refills: 0 | Status: SHIPPED | OUTPATIENT
Start: 2024-03-05 | End: 2024-03-12

## 2024-03-05 NOTE — PROGRESS NOTES
within this office note.  If so, please bring any errors to my attention for an addendum.  All efforts were made to ensure that this office note is accurate.

## 2024-03-06 ENCOUNTER — CARE COORDINATION (OUTPATIENT)
Dept: CARE COORDINATION | Age: 74
End: 2024-03-06

## 2024-03-06 NOTE — CARE COORDINATION
Ambulatory Care Coordination Note  3/6/2024    Patient Current Location:  Home: [address] [place] [address] Alex  [facility] [address]    ACM contacted the patient by telephone. Verified name and  with spouse/partner as identifiers. Provided introduction to self, and explanation of the ACM role.     ACM: Giovanna Reyes RN    Challenges to be reviewed by the provider   Additional needs identified to be addressed with provider: No  none               Method of communication with provider: chart routing.     ACM spoke to patient spouse Rosy who is verified on HIPAA form. Rosy said that patient is doing well with trying to keep his blood sugars less than 150. Rosy said that patient needs to have a knee replacement but needs A1C to be less than 7 before ortho will proceed with surgical procedure. ACM will call with weekly blood sugar reading checks and provide diabetes education to help support patient A1c goals.       Plan:    F/U call 1 week  DM assessment  CHF assessment   BS logs  Med/ Rec  SDOH    Offered patient enrollment in the Remote Patient Monitoring (RPM) program for in-home monitoring: Yes, but did not enroll at this time.    Ambulatory Care Coordination Assessment    Care Coordination Protocol  Referral from Primary Care Provider: No  Week 1 - Initial Assessment     Do you have all of your prescriptions and are they filled?: Yes (Comment: Reviewed all CJT 3/6/24)  Barriers to medication adherence: None  Are you able to afford your medications?: Yes  How often do you have trouble taking your medications the way you have been told to take them?: I always take them as prescribed.     Do you have Home O2 Therapy?: No      Ability to seek help/take action for Emergent Urgent situations i.e. fire, crime, inclement weather or health crisis.: Independent  Ability to ambulate to restroom: Independent  Ability handle personal hygeine needs (bathing/dressing/grooming): Independent  Ability to manage Medications:

## 2024-03-11 NOTE — PROGRESS NOTES
mcg, metoprolol 25 mg, warfarin 5 mg  Referral to Coumadin clinic: [facility]  No cardiac testing warranted today  No refills warranted today  Blood work: Lipids, LFT, INR, BMP, CBC  Follow up in 1 year       Scribe's attestation: This note was scribed in the presence of Dr. Ye Diez M.D. By Brianna Saucedo RN      I, Dr Ye Diez, personally performed the services described in this documentation, as scribed by the above signed scribe in my presence.  It is both accurate and complete to my knowledge.  I agree with the details independently gathered by the clinical support staff and the scribed note accurately describes my personal service to the patient.      Thank you for allowing us to participate in the care of London Swenson. Please call me with any questions [phone].    Ye Diez MD, [facility]   Interventional Cardiologist  [facility]  [phone] [place] Office  [phone] [place] Office  3/12/2024 1:28 PM

## 2024-03-12 ENCOUNTER — HOSPITAL ENCOUNTER (OUTPATIENT)
Age: 74
Discharge: HOME | End: 2024-03-12
Payer: MEDICARE

## 2024-03-12 ENCOUNTER — OFFICE VISIT (OUTPATIENT)
Dept: CARDIOLOGY CLINIC | Age: 74
End: 2024-03-12
Payer: MEDICARE

## 2024-03-12 VITALS
HEIGHT: 70 IN | OXYGEN SATURATION: 96 % | HEART RATE: 76 BPM | SYSTOLIC BLOOD PRESSURE: 122 MMHG | DIASTOLIC BLOOD PRESSURE: 68 MMHG | BODY MASS INDEX: 41.95 KG/M2 | WEIGHT: 293 LBS

## 2024-03-12 DIAGNOSIS — Z95.0 PACEMAKER: ICD-10-CM

## 2024-03-12 DIAGNOSIS — I70.0 ATHEROSCLEROSIS OF AORTA (HCC): ICD-10-CM

## 2024-03-12 DIAGNOSIS — R06.02 SOB (SHORTNESS OF BREATH): ICD-10-CM

## 2024-03-12 DIAGNOSIS — I25.119 CORONARY ARTERY DISEASE INVOLVING NATIVE CORONARY ARTERY OF NATIVE HEART WITH ANGINA PECTORIS (HCC): Primary | ICD-10-CM

## 2024-03-12 DIAGNOSIS — I48.91 NEW ONSET ATRIAL FIBRILLATION (HCC): ICD-10-CM

## 2024-03-12 DIAGNOSIS — I47.19 ATRIAL TACHYCARDIA: ICD-10-CM

## 2024-03-12 DIAGNOSIS — E78.2 MIXED HYPERLIPIDEMIA: ICD-10-CM

## 2024-03-12 DIAGNOSIS — I25.119 CORONARY ARTERY DISEASE INVOLVING NATIVE CORONARY ARTERY OF NATIVE HEART WITH ANGINA PECTORIS (HCC): ICD-10-CM

## 2024-03-12 DIAGNOSIS — I10 ESSENTIAL HYPERTENSION: ICD-10-CM

## 2024-03-12 DIAGNOSIS — I51.89 GRADE II DIASTOLIC DYSFUNCTION: ICD-10-CM

## 2024-03-12 DIAGNOSIS — I50.812 CHRONIC RIGHT-SIDED CONGESTIVE HEART FAILURE (HCC): ICD-10-CM

## 2024-03-12 LAB
ALBUMIN SERPL-MCNC: 4.2 G/DL (ref 3.4–5)
ALP SERPL-CCNC: 100 U/L (ref 40–129)
ALT SERPL-CCNC: 21 U/L (ref 10–40)
ANION GAP SERPL CALCULATED.3IONS-SCNC: 10 MMOL/L (ref 3–16)
AST SERPL-CCNC: 21 U/L (ref 15–37)
BASOPHILS # BLD: 0 K/UL (ref 0–0.2)
BASOPHILS NFR BLD: 0.7 %
BILIRUB DIRECT SERPL-MCNC: <0.2 MG/DL (ref 0–0.3)
BILIRUB INDIRECT SERPL-MCNC: NORMAL MG/DL (ref 0–1)
BILIRUB SERPL-MCNC: 0.4 MG/DL (ref 0–1)
BUN SERPL-MCNC: 20 MG/DL (ref 7–20)
CALCIUM SERPL-MCNC: 9.2 MG/DL (ref 8.3–10.6)
CHLORIDE SERPL-SCNC: 101 MMOL/L (ref 99–110)
CHOLEST SERPL-MCNC: 175 MG/DL (ref 0–199)
CO2 SERPL-SCNC: 28 MMOL/L (ref 21–32)
CREAT SERPL-MCNC: 0.9 MG/DL (ref 0.8–1.3)
DEPRECATED RDW RBC AUTO: 15.3 % (ref 12.4–15.4)
EOSINOPHIL # BLD: 0.3 K/UL (ref 0–0.6)
EOSINOPHIL NFR BLD: 5.1 %
GFR SERPLBLD CREATININE-BSD FMLA CKD-EPI: >60 ML/MIN/{1.73_M2}
GLUCOSE SERPL-MCNC: 274 MG/DL (ref 70–99)
HCT VFR BLD AUTO: 36.7 % (ref 40.5–52.5)
HDLC SERPL-MCNC: 45 MG/DL (ref 40–60)
HGB BLD-MCNC: 12.3 G/DL (ref 13.5–17.5)
INR PPP: 2.61 (ref 0.84–1.16)
LDLC SERPL CALC-MCNC: 81 MG/DL
LYMPHOCYTES # BLD: 1 K/UL (ref 1–5.1)
LYMPHOCYTES NFR BLD: 19.7 %
MCH RBC QN AUTO: 29.3 PG (ref 26–34)
MCHC RBC AUTO-ENTMCNC: 33.5 G/DL (ref 31–36)
MCV RBC AUTO: 87.5 FL (ref 80–100)
MONOCYTES # BLD: 0.3 K/UL (ref 0–1.3)
MONOCYTES NFR BLD: 6.8 %
NEUTROPHILS # BLD: 3.4 K/UL (ref 1.7–7.7)
NEUTROPHILS NFR BLD: 67.7 %
PLATELET # BLD AUTO: 150 K/UL (ref 135–450)
PMV BLD AUTO: 9.9 FL (ref 5–10.5)
POTASSIUM SERPL-SCNC: 4.9 MMOL/L (ref 3.5–5.1)
PROT SERPL-MCNC: 6.8 G/DL (ref 6.4–8.2)
PROTHROMBIN TIME: 27.8 SEC (ref 11.5–14.8)
RBC # BLD AUTO: 4.19 M/UL (ref 4.2–5.9)
SODIUM SERPL-SCNC: 139 MMOL/L (ref 136–145)
TRIGL SERPL-MCNC: 244 MG/DL (ref 0–150)
VLDLC SERPL CALC-MCNC: 49 MG/DL
WBC # BLD AUTO: 5 K/UL (ref 4–11)

## 2024-03-12 PROCEDURE — 85025 COMPLETE CBC W/AUTO DIFF WBC: CPT

## 2024-03-12 PROCEDURE — 80048 BASIC METABOLIC PNL TOTAL CA: CPT

## 2024-03-12 PROCEDURE — 3074F SYST BP LT 130 MM HG: CPT | Performed by: INTERNAL MEDICINE

## 2024-03-12 PROCEDURE — 99214 OFFICE O/P EST MOD 30 MIN: CPT | Performed by: INTERNAL MEDICINE

## 2024-03-12 PROCEDURE — 3078F DIAST BP <80 MM HG: CPT | Performed by: INTERNAL MEDICINE

## 2024-03-12 PROCEDURE — G8427 DOCREV CUR MEDS BY ELIG CLIN: HCPCS | Performed by: INTERNAL MEDICINE

## 2024-03-12 PROCEDURE — 36415 COLL VENOUS BLD VENIPUNCTURE: CPT

## 2024-03-12 PROCEDURE — 1123F ACP DISCUSS/DSCN MKR DOCD: CPT | Performed by: INTERNAL MEDICINE

## 2024-03-12 PROCEDURE — G8484 FLU IMMUNIZE NO ADMIN: HCPCS | Performed by: INTERNAL MEDICINE

## 2024-03-12 PROCEDURE — G8417 CALC BMI ABV UP PARAM F/U: HCPCS | Performed by: INTERNAL MEDICINE

## 2024-03-12 PROCEDURE — 80076 HEPATIC FUNCTION PANEL: CPT

## 2024-03-12 PROCEDURE — 3017F COLORECTAL CA SCREEN DOC REV: CPT | Performed by: INTERNAL MEDICINE

## 2024-03-12 PROCEDURE — 80061 LIPID PANEL: CPT

## 2024-03-12 PROCEDURE — 1036F TOBACCO NON-USER: CPT | Performed by: INTERNAL MEDICINE

## 2024-03-12 PROCEDURE — 85610 PROTHROMBIN TIME: CPT

## 2024-03-12 NOTE — PATIENT INSTRUCTIONS
Continue current cardiac medications: aspirin 81 mg, Lipitor 40 mg, Tikosyn 250 mcg, metoprolol 25 mg, warfarin 5 mg  Referral to Coumadin clinic: [facility]  No cardiac testing warranted today  No refills warranted today  Blood work: Lipids, LFT, INR, BMP, CBC  Follow up in 1 year

## 2024-03-13 ENCOUNTER — OFFICE VISIT (OUTPATIENT)
Dept: ENDOCRINOLOGY | Age: 74
End: 2024-03-13
Payer: MEDICARE

## 2024-03-13 VITALS
BODY MASS INDEX: 42.04 KG/M2 | DIASTOLIC BLOOD PRESSURE: 75 MMHG | HEIGHT: 70 IN | SYSTOLIC BLOOD PRESSURE: 139 MMHG | HEART RATE: 95 BPM

## 2024-03-13 DIAGNOSIS — E78.2 MIXED HYPERLIPIDEMIA: ICD-10-CM

## 2024-03-13 DIAGNOSIS — Z79.4 TYPE 2 DIABETES MELLITUS WITH MICROALBUMINURIA, WITH LONG-TERM CURRENT USE OF INSULIN (HCC): Primary | ICD-10-CM

## 2024-03-13 DIAGNOSIS — E11.3291 MILD NONPROLIFERATIVE DIABETIC RETINOPATHY OF RIGHT EYE WITHOUT MACULAR EDEMA ASSOCIATED WITH TYPE 2 DIABETES MELLITUS (HCC): ICD-10-CM

## 2024-03-13 DIAGNOSIS — E11.29 TYPE 2 DIABETES MELLITUS WITH MICROALBUMINURIA, WITH LONG-TERM CURRENT USE OF INSULIN (HCC): Primary | ICD-10-CM

## 2024-03-13 DIAGNOSIS — R80.9 TYPE 2 DIABETES MELLITUS WITH MICROALBUMINURIA, WITH LONG-TERM CURRENT USE OF INSULIN (HCC): Primary | ICD-10-CM

## 2024-03-13 PROCEDURE — 3078F DIAST BP <80 MM HG: CPT | Performed by: INTERNAL MEDICINE

## 2024-03-13 PROCEDURE — G8417 CALC BMI ABV UP PARAM F/U: HCPCS | Performed by: INTERNAL MEDICINE

## 2024-03-13 PROCEDURE — G8427 DOCREV CUR MEDS BY ELIG CLIN: HCPCS | Performed by: INTERNAL MEDICINE

## 2024-03-13 PROCEDURE — 3075F SYST BP GE 130 - 139MM HG: CPT | Performed by: INTERNAL MEDICINE

## 2024-03-13 PROCEDURE — 1036F TOBACCO NON-USER: CPT | Performed by: INTERNAL MEDICINE

## 2024-03-13 PROCEDURE — G8484 FLU IMMUNIZE NO ADMIN: HCPCS | Performed by: INTERNAL MEDICINE

## 2024-03-13 PROCEDURE — 99204 OFFICE O/P NEW MOD 45 MIN: CPT | Performed by: INTERNAL MEDICINE

## 2024-03-13 PROCEDURE — 3017F COLORECTAL CA SCREEN DOC REV: CPT | Performed by: INTERNAL MEDICINE

## 2024-03-13 PROCEDURE — 2022F DILAT RTA XM EVC RTNOPTHY: CPT | Performed by: INTERNAL MEDICINE

## 2024-03-13 PROCEDURE — 1123F ACP DISCUSS/DSCN MKR DOCD: CPT | Performed by: INTERNAL MEDICINE

## 2024-03-13 PROCEDURE — 3052F HG A1C>EQUAL 8.0%<EQUAL 9.0%: CPT | Performed by: INTERNAL MEDICINE

## 2024-03-13 RX ORDER — INSULIN ASPART 100 [IU]/ML
25 INJECTION, SOLUTION INTRAVENOUS; SUBCUTANEOUS
Qty: 15 ML | Refills: 2
Start: 2024-03-13

## 2024-03-13 RX ORDER — TIRZEPATIDE 2.5 MG/.5ML
2.5 INJECTION, SOLUTION SUBCUTANEOUS WEEKLY
Qty: 2 ML | Refills: 2 | Status: SHIPPED | OUTPATIENT
Start: 2024-03-13

## 2024-03-13 NOTE — PROGRESS NOTES
70 - 99 mg/dl Final    Performed on 01/27/2024 ACCU-CHEK   Final    Sodium 01/28/2024 138  136 - 145 mmol/L Final    Potassium reflex Magnesium 01/28/2024 4.2  3.5 - 5.1 mmol/L Final    Chloride 01/28/2024 98 (L)  99 - 110 mmol/L Final    CO2 01/28/2024 33 (H)  21 - 32 mmol/L Final    Anion Gap 01/28/2024 7  3 - 16 Final    Glucose 01/28/2024 214 (H)  70 - 99 mg/dL Final    BUN 01/28/2024 18  7 - 20 mg/dL Final    Creatinine 01/28/2024 0.9  0.8 - 1.3 mg/dL Final    Est, Glom Filt Rate 01/28/2024 >60  >60 Final    Calcium 01/28/2024 9.1  8.3 - 10.6 mg/dL Final    Total Protein 01/28/2024 6.3 (L)  6.4 - 8.2 g/dL Final    Albumin 01/28/2024 3.8  3.4 - 5.0 g/dL Final    Albumin/Globulin Ratio 01/28/2024 1.5  1.1 - 2.2 Final    Total Bilirubin 01/28/2024 0.5  0.0 - 1.0 mg/dL Final    Alkaline Phosphatase 01/28/2024 91  40 - 129 U/L Final    ALT 01/28/2024 17  10 - 40 U/L Final    AST 01/28/2024 15  15 - 37 U/L Final    WBC 01/28/2024 4.2  4.0 - 11.0 K/uL Final    RBC 01/28/2024 4.24  4.20 - 5.90 M/uL Final    Hemoglobin 01/28/2024 12.3 (L)  13.5 - 17.5 g/dL Final    Hematocrit 01/28/2024 37.1 (L)  40.5 - 52.5 % Final    MCV 01/28/2024 87.6  80.0 - 100.0 fL Final    MCH 01/28/2024 29.0  26.0 - 34.0 pg Final    MCHC 01/28/2024 33.1  31.0 - 36.0 g/dL Final    RDW 01/28/2024 14.4  12.4 - 15.4 % Final    Platelets 01/28/2024 130 (L)  135 - 450 K/uL Final    MPV 01/28/2024 9.6  5.0 - 10.5 fL Final    Neutrophils % 01/28/2024 60.5  % Final    Lymphocytes % 01/28/2024 23.9  % Final    Monocytes % 01/28/2024 8.6  % Final    Eosinophils % 01/28/2024 6.4  % Final    Basophils % 01/28/2024 0.6  % Final    Neutrophils Absolute 01/28/2024 2.6  1.7 - 7.7 K/uL Final    Lymphocytes Absolute 01/28/2024 1.0  1.0 - 5.1 K/uL Final    Monocytes Absolute 01/28/2024 0.4  0.0 - 1.3 K/uL Final    Eosinophils Absolute 01/28/2024 0.3  0.0 - 0.6 K/uL Final    Basophils Absolute 01/28/2024 0.0  0.0 - 0.2 K/uL Final    Protime 01/28/2024 19.0 (H)

## 2024-03-14 ENCOUNTER — TELEPHONE (OUTPATIENT)
Dept: CARDIOLOGY CLINIC | Age: 74
End: 2024-03-14

## 2024-03-14 ENCOUNTER — CARE COORDINATION (OUTPATIENT)
Dept: CARE COORDINATION | Age: 74
End: 2024-03-14

## 2024-03-14 ENCOUNTER — ANTI-COAG VISIT (OUTPATIENT)
Dept: PHARMACY | Age: 74
End: 2024-03-14
Payer: MEDICARE

## 2024-03-14 DIAGNOSIS — I48.91 NEW ONSET ATRIAL FIBRILLATION (HCC): Primary | ICD-10-CM

## 2024-03-14 LAB — INTERNATIONAL NORMALIZATION RATIO, POC: 4.2

## 2024-03-14 PROCEDURE — 85610 PROTHROMBIN TIME: CPT | Performed by: PHARMACIST

## 2024-03-14 PROCEDURE — 99211 OFF/OP EST MAY X REQ PHY/QHP: CPT | Performed by: PHARMACIST

## 2024-03-14 NOTE — TELEPHONE ENCOUNTER
----- Message from Ye Diez MD sent at 3/12/2024  2:48 PM EDT -----  Let patient know their INR test is in good range, continue current meds, no new orders or changes at this time and lets make sure he has f/u w/ coumadin clinic.  Thanks.

## 2024-03-14 NOTE — TELEPHONE ENCOUNTER
----- Message from Ye Diez MD sent at 3/13/2024  4:01 PM EDT -----  Let patient know their lab tests are stable, continue current meds, no new orders or changes at this time.  Thanks.

## 2024-03-14 NOTE — CARE COORDINATION
Ambulatory Care Coordination Note  3/14/2024    Patient Current Location:  Home: [facility] [address]  [facility] [address]     ACM contacted the patient by telephone. Verified name and  with spouse/partner as identifiers. Provided introduction to self, and explanation of the ACM role.     Challenges to be reviewed by the provider   Additional needs identified to be addressed with provider: No  none               Method of communication with provider: chart routing.    ACM: Giovanna Reyes, RN     ACM completed call with both patient and spouse. Rosy said she picked up new medications for patient who will begin taking those. Rosy reports patient being complaint with blood sugar monitoring. Patient denies any other needs at this time.    Plan:    CHF assessment  F.U call 2 weeks     Offered patient enrollment in the Remote Patient Monitoring (RPM) program for in-home monitoring: Patient declined.    Lab Results       None            Care Coordination Interventions    Referral from Primary Care Provider: No  Suggested Interventions and Community Resources          Goals Addressed                   This Visit's Progress     Self Monitoring   Improving     Self-Monitored Blood Glucose - I will check my blood sugar Fasting blood sugar    None Recently Recorded    Barriers: lack of motivation and overwhelmed by complexity of regimen  Plan for overcoming my barriers: ACM will call for weekly blood sugar logs.  Confidence: 7/10  Anticipated Goal Completion Date: 24                Future Appointments   Date Time Provider Department Center   3/14/2024  2:30 PM [facility]ICO[facility]UL[facility] CLINIC [facility] LENIN [facility]\""   3/25/2024  1:30 PM SCHEDULE, ALETHEA DEVICE CHECK [facility]   3/25/2024  1:30 PM Ericka Valdez, APRN - CNP [facility]   2024  1:30 PM Constance Yancey PA [facility]  [place] [facility]   2024  3:00 PM Kelsey Flores MD AND [facility]   ,   Diabetes Assessment      Meal Planning: Avoidance of

## 2024-03-14 NOTE — ACP (ADVANCE CARE PLANNING)
Advance Care Planning   Healthcare Decision Maker:    Primary Decision Maker: Rosy Swenson - [facility] - [phone]    Click here to complete Healthcare Decision Makers including selection of the Healthcare Decision Maker Relationship (ie \"Primary\").  Today we documented Decision Maker(s) consistent with Legal Next of Kin hierarchy.       If the relationship to the patient does NOT follow our state's Next of Kin hierarchy, the patient MUST complete an ACP Document to allow him/her to act on the patient's behalf. :

## 2024-03-14 NOTE — PROGRESS NOTES
Mr London Swenson is here for management of anticoagulation for AFib.   PMH also significant for CAD, DM2, HLD, HTN   He presents today w/out complaint.  Pt verifies dosing regimen as listed above.   Pt denies sx/s bleeding/bruising/swelling/SOB.  No missed doses  No changes in RX/OTCs/Herbal medications.  No dietary concerns  No ETOH and tobacco use.    Pt was last seen in clinic in Nov last year.  Continues on same dose.    INR was just drawn 2 days ago at MD visit and was in range at 2.61, but is now up to 4.2 today.    He did drink some cranberry juice this morning, which can interact with warfarin and increase INR.  He is not sure how often he has been drinking this. Could be cause for sudden jump in INR.  No other medication or diet changes.    Will hold dose today and make small dose reduction.    INR 4.2 is above therapeutic range of 2-3  Recommend to hold dose today, then reduce dose to 7.5 mg daily except 10 mg Q Tue  Patient has 5 mg tablets.  Will continue to monitor and check INR in 1.5 weeks.  Dosing reminder card given with phone number, appointment date and time.   Return to clinic: 3/26/24 @ 1045    Xavier Crowley, Rose 2:30 PM EDT 3/14/24    For Pharmacy Admin Tracking Only    Intervention Detail: Adherence Monitorin and Dose Adjustment: 1, reason: Interaction  Total # of Interventions Recommended: 2  Total # of Interventions Accepted: 2  Time Spent (min): 15

## 2024-03-14 NOTE — TELEPHONE ENCOUNTER
Called and spoke with patient spouse she VU. Patient has appt with coumadin clinic today per wife to establish care.

## 2024-03-25 ENCOUNTER — NURSE ONLY (OUTPATIENT)
Dept: CARDIOLOGY CLINIC | Age: 74
End: 2024-03-25
Payer: MEDICARE

## 2024-03-25 ENCOUNTER — TELEPHONE (OUTPATIENT)
Dept: CARDIOLOGY CLINIC | Age: 74
End: 2024-03-25

## 2024-03-25 ENCOUNTER — OFFICE VISIT (OUTPATIENT)
Dept: CARDIOLOGY CLINIC | Age: 74
End: 2024-03-25
Payer: MEDICARE

## 2024-03-25 VITALS
HEART RATE: 82 BPM | HEIGHT: 70 IN | WEIGHT: 292.6 LBS | BODY MASS INDEX: 41.89 KG/M2 | OXYGEN SATURATION: 90 % | DIASTOLIC BLOOD PRESSURE: 88 MMHG | SYSTOLIC BLOOD PRESSURE: 142 MMHG

## 2024-03-25 DIAGNOSIS — I49.5 SINUS NODE DYSFUNCTION (HCC): ICD-10-CM

## 2024-03-25 DIAGNOSIS — Z09 HOSPITAL DISCHARGE FOLLOW-UP: Primary | ICD-10-CM

## 2024-03-25 DIAGNOSIS — Z95.0 PACEMAKER: Primary | ICD-10-CM

## 2024-03-25 PROCEDURE — G8427 DOCREV CUR MEDS BY ELIG CLIN: HCPCS

## 2024-03-25 PROCEDURE — 3017F COLORECTAL CA SCREEN DOC REV: CPT

## 2024-03-25 PROCEDURE — 3079F DIAST BP 80-89 MM HG: CPT

## 2024-03-25 PROCEDURE — 1123F ACP DISCUSS/DSCN MKR DOCD: CPT

## 2024-03-25 PROCEDURE — G8484 FLU IMMUNIZE NO ADMIN: HCPCS

## 2024-03-25 PROCEDURE — 93280 PM DEVICE PROGR EVAL DUAL: CPT | Performed by: INTERNAL MEDICINE

## 2024-03-25 PROCEDURE — 1111F DSCHRG MED/CURRENT MED MERGE: CPT

## 2024-03-25 PROCEDURE — 3077F SYST BP >= 140 MM HG: CPT

## 2024-03-25 PROCEDURE — 99214 OFFICE O/P EST MOD 30 MIN: CPT

## 2024-03-25 PROCEDURE — 1036F TOBACCO NON-USER: CPT

## 2024-03-25 PROCEDURE — G8417 CALC BMI ABV UP PARAM F/U: HCPCS

## 2024-03-25 RX ORDER — ROPINIROLE 0.5 MG/1
0.5 TABLET, FILM COATED ORAL NIGHTLY
COMMUNITY
Start: 2024-03-21

## 2024-03-25 RX ORDER — FUROSEMIDE 40 MG/1
40 TABLET ORAL AS NEEDED
Qty: 30 TABLET | Refills: 0 | Status: SHIPPED | OUTPATIENT
Start: 2024-03-25 | End: 2024-04-24

## 2024-03-25 NOTE — TELEPHONE ENCOUNTER
Plan:   1. Continue Tikosyn 250 mcg twice daily  2.  Continue to monitor for drug toxicity  3.  Continue metoprolol tartrate 25 mg twice daily  4.  Continue Warfarin for stroke risk reduction; goal range INR 2-3  5.  Continue Lipitor 40 mg daily  6.  Remote device checks every 3 months  7. Take lasix 40 mg X 7 days  8. Daily weights; no extra sodium on food  9. Call office with medication list     Follow up in 8 weeks- May 2024    Noted. Sending to ALEX as NHUNG

## 2024-03-25 NOTE — PATIENT INSTRUCTIONS
1. Continue Tikosyn 250 mcg twice daily  2.  Continue to monitor for drug toxicity  3.  Continue metoprolol tartrate 25 mg twice daily  4.  Continue Warfarin for stroke risk reduction; goal range INR 2-3  5.  Continue Lipitor 40 mg daily  6.  Remote device checks every 3 months  7. Take lasix 40 mg X 7 days  8. Daily weights; no extra sodium on food  9. Call office with medication list    Follow up in 8 weeks- May 2024

## 2024-03-25 NOTE — PROGRESS NOTES
metabolism of  creatinine, excessive creatinine ingestion, or following  therapy that affects renal tubular secretion.       Glucose   Date Value Ref Range Status   03/12/2024 274 (H) 70 - 99 mg/dL Final   01/28/2024 214 (H) 70 - 99 mg/dL Final   01/27/2024 164 (H) 70 - 99 mg/dL Final     PT/INR:   Protime   Date Value Ref Range Status   03/12/2024 27.8 (H) 11.5 - 14.8 sec Final     Comment:     Effective 3-28-23 09:00am EST  Please note reference ranges have  changed for PT and INR Testing.     01/28/2024 19.0 (H) 11.5 - 14.8 sec Final     Comment:     Effective 3-28-23 09:00am EST  Please note reference ranges have  changed for PT and INR Testing.     01/27/2024 22.0 (H) 11.5 - 14.8 sec Final     Comment:     Effective 3-28-23 09:00am EST  Please note reference ranges have  changed for PT and INR Testing.       INR   Date Value Ref Range Status   03/12/2024 2.61 (H) 0.84 - 1.16 Final     Comment:     Effective 3/28/23 at 09:00am EST    Normal: 0.84 - 1.16  Therapeutic: 2.0 - 3.0  Pros. Valve: 2.5 - 3.5  AMI: 2.0 - 3.0     01/28/2024 1.60 (H) 0.84 - 1.16 Final     Comment:     Effective 3/28/23 at 09:00am EST    Normal: 0.84 - 1.16  Therapeutic: 2.0 - 3.0  Pros. Valve: 2.5 - 3.5  AMI: 2.0 - 3.0       INR,(POC)   Date Value Ref Range Status   03/14/2024 4.2  Final     APTT: No results found for: \"PT2T\"  FASTING LIPID PANEL:   Lab Results   Component Value Date/Time    HDL 45 03/12/2024 02:02 PM    LDLCALC 81 03/12/2024 02:02 PM    TRIG 244 03/12/2024 02:02 PM     LIVER PROFILE:No results for input(s): \"AST\", \"ALT\", \"ALB\" in the last 72 hours.    IMPRESSION:    Patient Active Problem List   Diagnosis    Pulmonary nodules    Lung granuloma (HCC)    Essential hypertension    Hyperlipidemia    ESTHER (obstructive sleep apnea)    Class 2 obesity in adult    Coronary artery disease involving native coronary artery of native heart with angina pectoris (HCC)    Peripheral polyneuropathy    Atrial tachycardia    Type 2 diabetes

## 2024-03-26 ENCOUNTER — ANTI-COAG VISIT (OUTPATIENT)
Dept: PHARMACY | Age: 74
End: 2024-03-26
Payer: MEDICARE

## 2024-03-26 DIAGNOSIS — I48.91 NEW ONSET ATRIAL FIBRILLATION (HCC): Primary | ICD-10-CM

## 2024-03-26 LAB — INR BLD: 2.5

## 2024-03-26 PROCEDURE — 85610 PROTHROMBIN TIME: CPT | Performed by: FAMILY MEDICINE

## 2024-03-26 PROCEDURE — 99211 OFF/OP EST MAY X REQ PHY/QHP: CPT | Performed by: FAMILY MEDICINE

## 2024-03-26 NOTE — PROGRESS NOTES
Mr London Swenson is here for management of anticoagulation for AFib.   PMH also significant for CAD, DM2, HLD, HTN   He presents today w/out complaint.  Pt verifies dosing regimen as listed above.   Pt denies sx/s bleeding/bruising/swelling/SOB.  No missed doses  No changes in RX/OTCs/Herbal medications.  No dietary concerns  No ETOH and tobacco use.    Pt was last seen in clinic in Nov last year.  Continues on same dose.    INR was just drawn 2 days ago at MD visit and was in range at 2.61, but is now up to 4.2 today.    He did drink some cranberry juice this morning, which can interact with warfarin and increase INR.  He is not sure how often he has been drinking this. Could be cause for sudden jump in INR.  No other medication or diet changes.    Will hold dose today and make small dose reduction.    INR 2.5 is within therapeutic range of 2-3  Recommend to continue 7.5 mg daily except 10 mg Q Tue  Patient has 5 mg tablets.  Will continue to monitor and check INR in 4 weeks.  Dosing reminder card given with phone number, appointment date and time.   Return to clinic:  @ 1015 am     Kyree Nguyen, PharmD 3/26/2024 10:31 AM    For Pharmacy Admin Tracking Only    Intervention Detail: Adherence Monitorin  Total # of Interventions Recommended: 1  Total # of Interventions Accepted: 1  Time Spent (min): 15

## 2024-03-28 ENCOUNTER — CARE COORDINATION (OUTPATIENT)
Dept: PRIMARY CARE CLINIC | Age: 74
End: 2024-03-28

## 2024-03-28 ENCOUNTER — CARE COORDINATION (OUTPATIENT)
Dept: CARE COORDINATION | Age: 74
End: 2024-03-28

## 2024-03-28 DIAGNOSIS — I10 ESSENTIAL HYPERTENSION: Primary | ICD-10-CM

## 2024-03-28 DIAGNOSIS — I50.812 CHRONIC RIGHT-SIDED CONGESTIVE HEART FAILURE (HCC): ICD-10-CM

## 2024-03-28 NOTE — CARE COORDINATION
RPM Kit Order    Remote Patient Kit Ordering Note      Date/Time:  3/28/2024 3:04 PM      CCSS placed phone call to patient/family today to notify of RPM kit order; patient/family was available; discussed the following topics below and all questions answered.    [x] CCSS confirmed patient shipping address  [x] Patient will receive package over the next 1-3 business days. Someone 21 years or older must be present to sign for UPS delivery.  [x] HRS will contact patient within 24 hours, an HRS  will call the patient directly: If the patient does not answer, HRS will follow up with the clinical team notifying them about the unsuccessful attempt to contact the patient. HRS will make three call attempts to the patient.Provide patient with [facility] Virtual install number is: [phone].  [x] ACM will contact patient once equipment is active to welcome them to the program.                                                         [x] Hours of RPM monitoring - Monday-Friday [phone]; encourage patient to get vitals entered by Noon each day to have the alert addressed same day.  [x][facility]                      ACM made aware the RPM kit has been ordered.

## 2024-03-28 NOTE — CARE COORDINATION
Ambulatory Care Coordination Note  3/28/2024    Patient Current Location:  Home: [facility] [address]  [place] [address]     ACM contacted the patient by telephone. Verified name and  with patient as identifiers. Provided introduction to self, and explanation of the ACM role.     Challenges to be reviewed by the provider   Additional needs identified to be addressed with provider: No  none               Method of communication with provider: chart routing.    ACM: Giovanna Reyes RN     ACM had brief discussion with patient and spouse who said patient is currently taking Lasix as directed by Cardiology. ACM reviewed Chf zone tools and will send this out to patient via [employer] attachment. Patient and wife verbalized understanding of this. Patient had no further questions or concerns for ACM.     Plan:    F/U call 3 weeks     Offered patient enrollment in the Remote Patient Monitoring (RPM) program for in-home monitoring: Yes, patient enrolled:     Remote Patient Monitoring Enrollment Note    Date/Time:  3/28/2024 2:38 PM    Offered patient enrollment in the [facility] Remote Patient Monitoring (RPM) program for in home monitoring for CHF; condition managed by Cardio. HTN; condition managed by Cardio.  Patient accepted.    Patient will be monitoring the following daily:  Blood Pressure and Weight    ACM reviewed the information below with the patient:    Emergency Contact (name and contact number): Rosy Swenson    [x]  A member from the care coordination team will reach out to notify the patient once the RPM kit is ordered.  [x]  Once the kit is delivered, the HRS team will contact the patient after UPS delivers to assist with set up.  [x]  Determined BP cuff size: large (13.8\"-19.68\")  [x]  Determined weight scale: regular (<330lbs)  [x]  Hours of ACM monitoring - Monday-Friday [phone]  [x]  It is important to take your vitals every day, even on the weekends, to keep your care team aware of how you

## 2024-03-28 NOTE — PROGRESS NOTES
Remote Patient Monitoring Treatment Plan    Received request from [facility]/Giovanna Manzano, RN   to order remote patient monitoring for in home monitoring of CHF; Condition managed by   Dr. Ye Diez/SANDY George CNP([facility]).  HTN; Condition managed by Dr. Diez/Ms. Courtney NP.  and order completed.     Patient will be monitoring blood pressure   weight.      Patient will engage in Remote Patient Monitoring each day to develop the skills necessary for self management.       RPM Care Team Responsibilities:   Alerts will be reviewed daily and addressed within 2-4 hours during operational hours (Monday -Friday 9 am-4 pm)  Alert response and intervention documented in patient medical record  Alert response escalated to PCP per protocol and documented in patient medical record  Patient monitored over approximately  days  Discharge from program based on self-management readiness    See care coordination encounters for additional details.

## 2024-04-04 DIAGNOSIS — G89.29 CHRONIC BILATERAL LOW BACK PAIN, UNSPECIFIED WHETHER SCIATICA PRESENT: ICD-10-CM

## 2024-04-04 DIAGNOSIS — M54.50 CHRONIC BILATERAL LOW BACK PAIN, UNSPECIFIED WHETHER SCIATICA PRESENT: ICD-10-CM

## 2024-04-04 DIAGNOSIS — G25.81 RESTLESS LEG SYNDROME: ICD-10-CM

## 2024-04-04 RX ORDER — PANTOPRAZOLE SODIUM 40 MG/1
TABLET, DELAYED RELEASE ORAL
Qty: 60 TABLET | Refills: 5 | Status: SHIPPED | OUTPATIENT
Start: 2024-04-04

## 2024-04-04 RX ORDER — FUROSEMIDE 40 MG/1
40 TABLET ORAL AS NEEDED
Qty: 30 TABLET | Refills: 2 | Status: SHIPPED | OUTPATIENT
Start: 2024-04-04 | End: 2024-05-04

## 2024-04-04 RX ORDER — GABAPENTIN 100 MG/1
CAPSULE ORAL
Qty: 60 CAPSULE | Refills: 0 | Status: SHIPPED | OUTPATIENT
Start: 2024-04-04 | End: 2024-05-04

## 2024-04-04 NOTE — TELEPHONE ENCOUNTER
Refill Request     CONFIRM preferred pharmacy with the patient.    If Mail Order Rx - Pend for 90 day refill.      Last Seen: Last Seen Department: 2/26/2024  Last Seen by PCP: 2/26/2024    Last Written: 2/29/2024    If no future appointment scheduled:  Review the last OV with PCP and review information for follow-up visit,  Route STAFF MESSAGE with patient name to the  Pool for scheduling with the following information:            -  Timing of next visit           -  Visit type ie Physical, OV, etc           -  Diagnoses/Reason ie. COPD, HTN - Do not use MEDICATION, Follow-up or CHECK UP - Give reason for visit      Next Appointment:   Future Appointments   Date Time Provider Department Center   4/23/2024 10:15 AM [facility]   5/22/2024  1:15 PM SCHEDULE, ALEHTEA DEVICE CHECK [facility]   5/22/2024  1:15 PM Ericka Valdez APRN - CNP [facility]   5/31/2024  1:30 PM Constance Yancey PA [facility]  [place] [facility]   7/16/2024  3:00 PM Kelsey Flores MD AND [facility]       Message sent to  to schedule appt with patient?  NO      Requested Prescriptions     Pending Prescriptions Disp Refills    pantoprazole (PROTONIX) 40 MG tablet [Pharmacy Med Name: Pantoprazole Sodium 40 MG Oral Tablet Delayed Release] 60 tablet 0     Sig: TAKE 1 TABLET BY MOUTH TWICE DAILY BEFORE MEAL(S)    gabapentin (NEURONTIN) 100 MG capsule [Pharmacy Med Name: Gabapentin 100 MG Oral Capsule] 60 capsule 0     Sig: TAKE 1 CAPSULE BY MOUTH NIGHTLY FOR 7 DAYS THEN INCREASE  TO  2  CAPSULES  NIGHTLY

## 2024-04-10 ENCOUNTER — CARE COORDINATION (OUTPATIENT)
Dept: CARE COORDINATION | Age: 74
End: 2024-04-10

## 2024-04-10 NOTE — CARE COORDINATION
Remote Patient Monitoring Welcome Note Date/Time: 4/10/2024 12:27 PM Patient Current Location: Home: [address] [place] [address] Verified patients name and  as identifiers. Completed and confirmed the following: Emergency Contact: Rosy Swenson [phone] [x] Patient received all RPM equipment (tablet, scale, blood pressure device and cuff, and pulse oximeter)  Cuff Size: large (13.8\"-19.68\")  Weight Scale: regular (<330lbs) [x] Instructed patient keep box for use when returning equipment [x] Reviewed Patient Welcome Letter with patient [x] Reviewed Consent Form  Copy of consent form in chart. [x] Reviewed expectations for patient and care team  Monitoring hours M-F 9-4pm  It is important to take your vitals every day, even on the weekends,to keep your care team aware of how you are doing every day of the week.  Completing monitoring by 12pm on  so that alerts can be responded to in the same day  Patient weighs self at same time every day (or after urinating and waking up)  Take blood pressure 1-2 hrs after medications  RPM team may have different phone area code (including [place], [place], [place] or [place])                        [x] Instructed patient to keep scale on flat surface [x] Instructed patient to keep tablet plugged in at all times [x] Instructed how to contact IT support ([phone]) [x] Provided Remote Patient Monitoring care  information All questions answered at this time. ---- Current Patient Metrics ---- Blood Pressure: 101/53, 83bpm Weight: 291.1lbs Note Created at: 04/10/2024 12:29 PM ET ---- Time-Spent: 2 minutes 0 seconds

## 2024-04-12 ENCOUNTER — CARE COORDINATION (OUTPATIENT)
Dept: CARE COORDINATION | Age: 74
End: 2024-04-12

## 2024-04-12 ENCOUNTER — TELEPHONE (OUTPATIENT)
Dept: CARDIOLOGY CLINIC | Age: 74
End: 2024-04-12

## 2024-04-12 VITALS
OXYGEN SATURATION: 89 % | HEART RATE: 74 BPM | DIASTOLIC BLOOD PRESSURE: 56 MMHG | BODY MASS INDEX: 42.51 KG/M2 | WEIGHT: 296.3 LBS | SYSTOLIC BLOOD PRESSURE: 110 MMHG

## 2024-04-12 NOTE — TELEPHONE ENCOUNTER
Signed         I would recommend taking the Lasix 60 mg daily and getting follow-up labs for BMP and BNP.  Lets get NP appt as well. Thanks.           Per SRJ-- please schedule

## 2024-04-12 NOTE — TELEPHONE ENCOUNTER
I would recommend taking the Lasix 60 mg daily and getting follow-up labs for BMP and BNP.  Lets get NP appt as well. Thanks.

## 2024-04-12 NOTE — TELEPHONE ENCOUNTER
Pt wife states pt is swollen in stomach and both legs. Pt gets sob upon exertion. Pt denies chest pain. Please advise.

## 2024-04-12 NOTE — CARE COORDINATION
Remote Alert Monitoring Note  Rpm alert to be reviewed by the provider   red alert   weight (increase of 3lbs in 1 day)   Additional needs to be addressed by Cardiologist:  Patient has had a weight increase of 3.3lbs in 1 day. He reports FREEMAN, edema in his bilat legs and also abdominal swelling. He has lasix 40mg tablets and directions are to take as needed for 3# weight gain in 1 day and/or 5lb increase in 7 days.he reports that more than 10 days ago he took lasix daily x5 days and did not feel it helped and is hesitant to take it. O2 is WNL, please advise                      Date/Time:  2024 12:32pm  Patient Current Location: [facility] contacted patient by telephone. Verified patients name and  as identifiers.  Background: Pt enrolled for HTN and CHF    Refer to 911 immediately if:  Patient unresponsive or unable to provide history  Change in cognition or sudden confusion  Patient unable to respond in complete sentences  Intense chest pain/tightness  Any concern for any clinical emergency  Red Alert: Provider response time of 1 hr required for any red alert requiring intervention  Yellow Alert: Provider response time of 3hr required for any escalated yellow alert    Weight Scale Triage  Was your weight obtained upon rising/waking today? yes  Was your weight obtained after voiding and/or use of the bathroom today? yes  Did you weigh yourself in the same amount of clothing today, compared to how you typically do? yes  Was the scale bumped or moved prior to today's weight? no  Is your scale on a flat/hard surface? yes  Did you obtain your weight with shoes on? no  If yes, is this something you normally do during your daily weights? no  Were you standing up straight on the scale today? yes  Were you leaning on anything while obtaining your weight today? no        Clinical Interventions: Escalated alert to Specialist-Patient and his wife report the pt is experiencing edema in bilat legs/ankles/feet and also

## 2024-04-12 NOTE — CARE COORDINATION
Ye Diez MD  [facility] Staff2 hours ago (1:52 PM)       I would recommend taking the Lasix 60 mg daily and getting follow-up labs for BMP and BNP.  Lets get NP appt as well. Thanks.           Spoke with patient and his wife, they were given the information from Dr. Diez above confirmed they have 40mg lasix tablets on hand, instructed to take 1 1/2 tablets daily, get labwork next week and they will call to schedule an appt with NP at cardio office as I do not have scheduling ability. They both VU

## 2024-04-17 RX ORDER — FUROSEMIDE 40 MG/1
60 TABLET ORAL AS NEEDED
Qty: 180 TABLET | Refills: 0 | Status: SHIPPED | OUTPATIENT
Start: 2024-04-17 | End: 2024-05-17

## 2024-04-17 NOTE — TELEPHONE ENCOUNTER
Due to increased dosage pt needs refill on furosemide (LASIX) 40 MG tablet .  Please send to [address] - P [phone] - f [phone]       LOV 3/25/24  LAB BMP 3/12/24  NEXT 4/23/24 SRJ, 5/22/24 NPKK

## 2024-04-18 ENCOUNTER — OFFICE VISIT (OUTPATIENT)
Dept: FAMILY MEDICINE CLINIC | Age: 74
End: 2024-04-18

## 2024-04-18 ENCOUNTER — CARE COORDINATION (OUTPATIENT)
Dept: CARE COORDINATION | Age: 74
End: 2024-04-18

## 2024-04-18 VITALS
SYSTOLIC BLOOD PRESSURE: 122 MMHG | HEART RATE: 77 BPM | OXYGEN SATURATION: 96 % | TEMPERATURE: 97.2 F | DIASTOLIC BLOOD PRESSURE: 60 MMHG | WEIGHT: 290.8 LBS | HEIGHT: 70 IN | BODY MASS INDEX: 41.63 KG/M2

## 2024-04-18 DIAGNOSIS — R06.09 DOE (DYSPNEA ON EXERTION): ICD-10-CM

## 2024-04-18 DIAGNOSIS — G62.9 PERIPHERAL POLYNEUROPATHY: ICD-10-CM

## 2024-04-18 DIAGNOSIS — I51.89 GRADE II DIASTOLIC DYSFUNCTION: Primary | ICD-10-CM

## 2024-04-18 RX ORDER — GABAPENTIN 300 MG/1
CAPSULE ORAL
Qty: 60 CAPSULE | Refills: 0 | Status: SHIPPED | OUTPATIENT
Start: 2024-04-18 | End: 2024-05-18

## 2024-04-18 SDOH — ECONOMIC STABILITY: FOOD INSECURITY: WITHIN THE PAST 12 MONTHS, THE FOOD YOU BOUGHT JUST DIDN'T LAST AND YOU DIDN'T HAVE MONEY TO GET MORE.: NEVER TRUE

## 2024-04-18 SDOH — ECONOMIC STABILITY: FOOD INSECURITY: WITHIN THE PAST 12 MONTHS, YOU WORRIED THAT YOUR FOOD WOULD RUN OUT BEFORE YOU GOT MONEY TO BUY MORE.: NEVER TRUE

## 2024-04-18 SDOH — ECONOMIC STABILITY: INCOME INSECURITY: HOW HARD IS IT FOR YOU TO PAY FOR THE VERY BASICS LIKE FOOD, HOUSING, MEDICAL CARE, AND HEATING?: NOT HARD AT ALL

## 2024-04-18 ASSESSMENT — PATIENT HEALTH QUESTIONNAIRE - PHQ9: DEPRESSION UNABLE TO ASSESS: PT REFUSES

## 2024-04-18 NOTE — PROGRESS NOTES
Skin:  Negative for rash.   Neurological:  Negative for dizziness, weakness and numbness.   Psychiatric/Behavioral:  Negative for sleep disturbance.        Allergies, past medical history, family history, and social history reviewed and unchanged from previous encounter.     Current Outpatient Medications   Medication Sig Dispense Refill    gabapentin (NEURONTIN) 300 MG capsule Take 1-2 capsules nightly. Intended supply: 30 days 60 capsule 0    furosemide (LASIX) 40 MG tablet Take 1.5 tablets by mouth as needed (Weight gain:3 pounds in one day or 5 pounds in 7 days) 180 tablet 0    pantoprazole (PROTONIX) 40 MG tablet TAKE 1 TABLET BY MOUTH TWICE DAILY BEFORE MEAL(S) 60 tablet 5    rOPINIRole (REQUIP) 0.5 MG tablet Take 1 tablet by mouth nightly at bedtime.      Tirzepatide (MOUNJARO) 2.5 MG/0.5ML SOPN SC injection Inject 0.5 mLs into the skin once a week 2 mL 2    insulin aspart (NOVOLOG FLEXPEN) 100 UNIT/ML injection pen Inject 25 Units into the skin 3 times daily (before meals) 15 mL 2    Continuous Blood Gluc Sensor (Q DesignYLE JUSTIN 14 DAY SENSOR) MISC 1 Units by Does not apply route every 14 days 2 each 11    albuterol sulfate HFA (PROVENTIL;VENTOLIN;PROAIR) 108 (90 Base) MCG/ACT inhaler Inhale 2 puffs into the lungs every 4 hours as needed for Wheezing 18 g 3    dofetilide (TIKOSYN) 250 MCG capsule Take 1 capsule by mouth every 12 hours 60 capsule 3    metoprolol tartrate (LOPRESSOR) 25 MG tablet Take 1 tablet by mouth 2 times daily 60 tablet 3    aspirin 81 MG EC tablet Take 1 tablet by mouth daily 30 tablet 3    warfarin (COUMADIN) 5 MG tablet Take 1.5-2 tablets by mouth daily as directed by coumadin clinic 60 tablet 3    atorvastatin (LIPITOR) 40 MG tablet TAKE 1 TABLET BY MOUTH AT BEDTIME 90 tablet 3    Handicap Placard MISC by Does not apply route Exp: 5/1/2026 1 each 0    diclofenac sodium (VOLTAREN) 1 % GEL Apply 4 g topically 4 times daily Apply to left knee (Patient taking differently: Apply 4 g

## 2024-04-18 NOTE — CARE COORDINATION
Ambulatory Care Coordination Note  2024    Patient Current Location:  Home: [facility] [address]  [facility] [address]     ACM contacted the patient by telephone. Verified name and  with patient as identifiers. Provided introduction to self, and explanation of the ACM role.     Challenges to be reviewed by the provider   Additional needs identified to be addressed with provider: No  none               Method of communication with provider: chart routing.    ACM: Giovanna Reyes, RN     ACM completed follow up call with patient wife Rosy who said patient is doing okay at this time. Rosy reports patient weight is decreasing but still having swelling. Rosy is waiting for Cardio to update Rx for Lasix so patient can get medication refill at pharmacy. Rosy will be bringing patient in for follow up with PCP today at 4 Pm. Rosy said patient has swelling still and redness now in BLE. Rosy declines any other needs.     Plan:    F/U call 2 weeks     Offered patient enrollment in the Remote Patient Monitoring (RPM) program for in-home monitoring: Yes, patient already enrolled.    Lab Results       None            Care Coordination Interventions    Referral from Primary Care Provider: No  Suggested Interventions and Community Resources          Goals Addressed    None         Future Appointments   Date Time Provider Department Center   2024  4:00 PM Constance Yancey PA [facility]  [place] [facility]   2024 10:15 AM [facility]   2024  1:30 PM Ye Diez MD [facility]   2024  1:15 PM SCHEDULE, A[facility]A DEVICE CHECK [facility]   2024  1:15 PM Ericka Valdez, APRN - CNP [facility]   2024  1:30 PM Constance Yancey PA [facility]  [place] - [facility]   2024  3:00 PM Kelsey Flores MD [facility]

## 2024-04-19 DIAGNOSIS — E78.2 MIXED HYPERLIPIDEMIA: ICD-10-CM

## 2024-04-19 DIAGNOSIS — I48.91 NEW ONSET ATRIAL FIBRILLATION (HCC): ICD-10-CM

## 2024-04-19 DIAGNOSIS — R06.02 SHORTNESS OF BREATH: ICD-10-CM

## 2024-04-19 DIAGNOSIS — R06.02 SOB (SHORTNESS OF BREATH): ICD-10-CM

## 2024-04-19 DIAGNOSIS — R06.09 DOE (DYSPNEA ON EXERTION): ICD-10-CM

## 2024-04-19 DIAGNOSIS — I25.119 CORONARY ARTERY DISEASE INVOLVING NATIVE CORONARY ARTERY OF NATIVE HEART WITH ANGINA PECTORIS (HCC): ICD-10-CM

## 2024-04-19 DIAGNOSIS — I50.812 CHRONIC RIGHT-SIDED CONGESTIVE HEART FAILURE (HCC): ICD-10-CM

## 2024-04-19 DIAGNOSIS — I51.89 GRADE II DIASTOLIC DYSFUNCTION: ICD-10-CM

## 2024-04-19 DIAGNOSIS — I47.19 ATRIAL TACHYCARDIA (HCC): ICD-10-CM

## 2024-04-19 DIAGNOSIS — I48.0 PAROXYSMAL ATRIAL FIBRILLATION (HCC): ICD-10-CM

## 2024-04-19 LAB
ANION GAP SERPL CALCULATED.3IONS-SCNC: 10 MMOL/L (ref 3–16)
BUN SERPL-MCNC: 33 MG/DL (ref 7–20)
CALCIUM SERPL-MCNC: 8.5 MG/DL (ref 8.3–10.6)
CHLORIDE SERPL-SCNC: 102 MMOL/L (ref 99–110)
CO2 SERPL-SCNC: 29 MMOL/L (ref 21–32)
CREAT SERPL-MCNC: 1 MG/DL (ref 0.8–1.3)
GFR SERPLBLD CREATININE-BSD FMLA CKD-EPI: 79 ML/MIN/{1.73_M2}
GLUCOSE SERPL-MCNC: 320 MG/DL (ref 70–99)
NT-PROBNP SERPL-MCNC: 60 PG/ML (ref 0–124)
POTASSIUM SERPL-SCNC: 4.4 MMOL/L (ref 3.5–5.1)
SODIUM SERPL-SCNC: 141 MMOL/L (ref 136–145)

## 2024-04-19 ASSESSMENT — ENCOUNTER SYMPTOMS
ABDOMINAL PAIN: 0
RHINORRHEA: 0
NAUSEA: 0
CHEST TIGHTNESS: 1
CONSTIPATION: 0
COUGH: 0
DIARRHEA: 0
VOMITING: 0
SHORTNESS OF BREATH: 1
SORE THROAT: 0

## 2024-04-22 ENCOUNTER — TELEPHONE (OUTPATIENT)
Dept: CARDIOLOGY CLINIC | Age: 74
End: 2024-04-22

## 2024-04-22 DIAGNOSIS — I10 ESSENTIAL HYPERTENSION: Primary | ICD-10-CM

## 2024-04-22 NOTE — PROGRESS NOTES
[facility]   CONSULTATION  [phone]      Attending Physician: Constance Yancey PA    Reason for Consultation/Chief Complaint: Follow up, CAD    Subjective   History of Present Illness:  London Swenson is a 73 y.o. patient who presents to office for a follow up at the request of Dr. Lemus for history of CAD, chest pain. Patient follows with Dr. Aggarwal for Electrophysiology. Patient has past medical history of atrial fibrillation, HTN, HLD, CAD, atrial tachycardia, atherosclerosis of aorta, pacemaker, CHF, SOB. 1/26/2024 patient presented to [facility] ED for complaints of chest pain, SOB. Cardiology consulted. EKG showed electronic atrial pacemaker low voltage QRS borderline ECG. Stress test 1/27/2024 showed LVEF 66%, No ischemia.                OV 10/4/2019 with Dr. Diez, Reported feeling well. Reported setting in a recliner and nodding off.                OV 3/6/2023 with NP Radhames De La Torre, presented for fluid retention. Reported he felt fine, gained weight over the past year. Reported Edema had gotten worse. He reporting eating foods with high salt, and drank a lot of tea and water. Reported stopping eliquis due to cost.                3/12/2024 he reported feeling well. He was referred to coumadin clinic to start warfarin.                Today he reports he feels okay. He has no concerns. Patients wife states he has been experiencing some shortness of breath. His arms appeared bruised with bandages. He reports taking medications as prescribed and tolerates well. Denies chest pain, palpitations, dizziness, syncope and edema.              Past Medical History:   has a past medical history of A-fib ([facility]), Acid reflux, Yan's esophagus, Coronary artery disease of native heart with stable angina pectoris ([facility]), Dementia ([facility]), Diabetes mellitus (HCC), Diabetic autonomic neuropathy associated with type 2 diabetes mellitus ([facility]), Hyperlipidemia, Hypertension, Pancreatitis, Restless legs, Sleep apnea, and

## 2024-04-22 NOTE — TELEPHONE ENCOUNTER
----- Message from Ye Diez MD sent at 4/21/2024  3:58 PM EDT -----  Let patient know their lab tests show possible dehydration, rec: f/u w/ pcp for this and referral to nephrology. Thanks.

## 2024-04-23 ENCOUNTER — OFFICE VISIT (OUTPATIENT)
Dept: CARDIOLOGY CLINIC | Age: 74
End: 2024-04-23

## 2024-04-23 ENCOUNTER — TELEPHONE (OUTPATIENT)
Dept: CARDIOLOGY CLINIC | Age: 74
End: 2024-04-23

## 2024-04-23 ENCOUNTER — HOSPITAL ENCOUNTER (OUTPATIENT)
Age: 74
Discharge: HOME | End: 2024-04-23
Payer: MEDICARE

## 2024-04-23 ENCOUNTER — ANTI-COAG VISIT (OUTPATIENT)
Dept: PHARMACY | Age: 74
End: 2024-04-23
Payer: MEDICARE

## 2024-04-23 VITALS
HEIGHT: 70 IN | HEART RATE: 70 BPM | BODY MASS INDEX: 41.52 KG/M2 | WEIGHT: 290 LBS | DIASTOLIC BLOOD PRESSURE: 66 MMHG | OXYGEN SATURATION: 89 % | SYSTOLIC BLOOD PRESSURE: 124 MMHG

## 2024-04-23 DIAGNOSIS — E78.2 MIXED HYPERLIPIDEMIA: ICD-10-CM

## 2024-04-23 DIAGNOSIS — I10 ESSENTIAL HYPERTENSION: ICD-10-CM

## 2024-04-23 DIAGNOSIS — Z95.0 PACEMAKER: ICD-10-CM

## 2024-04-23 DIAGNOSIS — I48.0 PAROXYSMAL ATRIAL FIBRILLATION (HCC): ICD-10-CM

## 2024-04-23 DIAGNOSIS — I70.0 ATHEROSCLEROSIS OF AORTA (HCC): ICD-10-CM

## 2024-04-23 DIAGNOSIS — I48.91 NEW ONSET ATRIAL FIBRILLATION (HCC): Primary | ICD-10-CM

## 2024-04-23 DIAGNOSIS — R06.02 SOB (SHORTNESS OF BREATH): ICD-10-CM

## 2024-04-23 DIAGNOSIS — R07.9 CHEST PAIN, UNSPECIFIED TYPE: ICD-10-CM

## 2024-04-23 DIAGNOSIS — I25.119 CORONARY ARTERY DISEASE INVOLVING NATIVE CORONARY ARTERY OF NATIVE HEART WITH ANGINA PECTORIS (HCC): ICD-10-CM

## 2024-04-23 DIAGNOSIS — I51.89 GRADE II DIASTOLIC DYSFUNCTION: ICD-10-CM

## 2024-04-23 LAB
ANION GAP SERPL CALCULATED.3IONS-SCNC: 14 MMOL/L (ref 3–16)
BASOPHILS # BLD: 0.1 K/UL (ref 0–0.2)
BASOPHILS NFR BLD: 0.9 %
BUN SERPL-MCNC: 25 MG/DL (ref 7–20)
CALCIUM SERPL-MCNC: 8.8 MG/DL (ref 8.3–10.6)
CHLORIDE SERPL-SCNC: 101 MMOL/L (ref 99–110)
CO2 SERPL-SCNC: 26 MMOL/L (ref 21–32)
CREAT SERPL-MCNC: 1 MG/DL (ref 0.8–1.3)
DEPRECATED RDW RBC AUTO: 15.2 % (ref 12.4–15.4)
EOSINOPHIL # BLD: 0.3 K/UL (ref 0–0.6)
EOSINOPHIL NFR BLD: 4.8 %
GFR SERPLBLD CREATININE-BSD FMLA CKD-EPI: 79 ML/MIN/{1.73_M2}
GLUCOSE SERPL-MCNC: 103 MG/DL (ref 70–99)
HCT VFR BLD AUTO: 38 % (ref 40.5–52.5)
HGB BLD-MCNC: 12.6 G/DL (ref 13.5–17.5)
INTERNATIONAL NORMALIZATION RATIO, POC: 3.9
LYMPHOCYTES # BLD: 1.1 K/UL (ref 1–5.1)
LYMPHOCYTES NFR BLD: 18.8 %
MCH RBC QN AUTO: 29.4 PG (ref 26–34)
MCHC RBC AUTO-ENTMCNC: 33.2 G/DL (ref 31–36)
MCV RBC AUTO: 88.6 FL (ref 80–100)
MONOCYTES # BLD: 0.4 K/UL (ref 0–1.3)
MONOCYTES NFR BLD: 6.5 %
NEUTROPHILS # BLD: 4.2 K/UL (ref 1.7–7.7)
NEUTROPHILS NFR BLD: 69 %
NT-PROBNP SERPL-MCNC: 99 PG/ML (ref 0–124)
PLATELET # BLD AUTO: 164 K/UL (ref 135–450)
PMV BLD AUTO: 10 FL (ref 5–10.5)
POTASSIUM SERPL-SCNC: 4.3 MMOL/L (ref 3.5–5.1)
RBC # BLD AUTO: 4.28 M/UL (ref 4.2–5.9)
SODIUM SERPL-SCNC: 141 MMOL/L (ref 136–145)
WBC # BLD AUTO: 6.1 K/UL (ref 4–11)

## 2024-04-23 PROCEDURE — 99211 OFF/OP EST MAY X REQ PHY/QHP: CPT | Performed by: PHARMACIST

## 2024-04-23 PROCEDURE — 83880 ASSAY OF NATRIURETIC PEPTIDE: CPT

## 2024-04-23 PROCEDURE — 36415 COLL VENOUS BLD VENIPUNCTURE: CPT

## 2024-04-23 PROCEDURE — 85025 COMPLETE CBC W/AUTO DIFF WBC: CPT

## 2024-04-23 PROCEDURE — 85610 PROTHROMBIN TIME: CPT | Performed by: PHARMACIST

## 2024-04-23 PROCEDURE — 80048 BASIC METABOLIC PNL TOTAL CA: CPT

## 2024-04-23 NOTE — PATIENT INSTRUCTIONS
Continue current cardiac medications: aspirin 81 mg, Lipitor 40 mg, Tikosyn 250 mcg, lasix 40 mg, Metoprolol 25 mg, warfarin 5 mg.   Recommend referral for watchman procedure; will provide with brochure and send the watchman team a referral.   Blood work: BMP, CBC, BNP  No refills warranted today  Follow up in 3 months with REY De La Torre.

## 2024-04-23 NOTE — TELEPHONE ENCOUNTER
Called pt at [phone], soke with pt and his wife and scheduled pt for 5/24/24 at 930a with dr abraham at [facility]. Pt was given watchman booklet at j appt today and per wife would prefer the appt information be mailed to their home address. Appt info letter placed in outgoing mail

## 2024-04-23 NOTE — PROGRESS NOTES
Mr London Swenson is here for management of anticoagulation for AFib.   PMH also significant for CAD, DM2, HLD, HTN   He presents today w/out complaint.  Pt verifies dosing regimen as listed above.   Pt denies sx/s bleeding/bruising/swelling/SOB.  No missed doses  No changes in RX/OTCs/Herbal medications.  No dietary concerns  No ETOH and tobacco use.    No recent changes per pt      INR 3.9 is above therapeutic range of 2-3  Recommend to hold dose today, then decrease dose to 7.5 mg daily  Patient has 5 mg tablets.  Will continue to monitor and check INR in 2 weeks.  Dosing reminder card given with phone number, appointment date and time.   Return to clinic:  @ 1015 am     Xavier Crowley PharmD 10:11 AM EDT 24    For Pharmacy Admin Tracking Only    Intervention Detail: Adherence Monitorin and Dose Adjustment: 1, reason: Therapy De-escalation  Total # of Interventions Recommended: 2  Total # of Interventions Accepted: 2  Time Spent (min): 15

## 2024-04-24 ENCOUNTER — TELEPHONE (OUTPATIENT)
Dept: CARDIOLOGY CLINIC | Age: 74
End: 2024-04-24

## 2024-04-24 RX ORDER — FUROSEMIDE 40 MG/1
60 TABLET ORAL AS NEEDED
Qty: 180 TABLET | Refills: 1 | Status: SHIPPED | OUTPATIENT
Start: 2024-04-24 | End: 2024-05-24

## 2024-04-24 NOTE — TELEPHONE ENCOUNTER
Medication Refill    Medication needing refilled: furosemide (LASIX) 40 MG tablet [[id number]]    Dosage of the medication: 40mg    How are you taking this medication (QD, BID, TID, QID, PRN):   Sig: Take 1.5 tablets by mouth as needed (Weight gain:3 pounds in one day or 5 pounds in 7 days)          30 or 90 day supply called in: 30    When will you run out of your medication: out    Which Pharmacy are we sending the medication to?:   [facility] [address] [place] [address] -  [phone] - F [phone]  [address] [place] [address]  Phone: [phone]  Fax: [phone]

## 2024-04-24 NOTE — TELEPHONE ENCOUNTER
Lets have him continue the dose as he currently takes it. If taking 40mg daily continue with that, however, if taking it 60mg daily, then take that daily and lets update meds/scripts to reflect current doses. Thank you.

## 2024-04-24 NOTE — TELEPHONE ENCOUNTER
Created telephone encounter. Attempted to contact pt to relay lab results per SRJ. Pt VM does not allow messages. Will try to contact pt at another time.

## 2024-04-24 NOTE — TELEPHONE ENCOUNTER
----- Message from Ye Diez MD sent at 4/23/2024  7:58 PM EDT -----  Let patient know their cbc test is stable, continue current meds, no new orders or changes at this time.  Thanks.

## 2024-04-24 NOTE — TELEPHONE ENCOUNTER
Patient's wife called in and stated that she picked up Lasix script and it said on paperwork it would make patient gain weight. I told her patient should lose weight and they should to daily weights.     Dr. Diez should patient remain on Lasix 60 mg daily per your telephone encounter on 4/12/2024? Your office note plan says 40 mg daily.      Latest Reference Range & Units 04/23/24 14:17   Sodium 136 - 145 mmol/L 141   Potassium 3.5 - 5.1 mmol/L 4.3   Chloride 99 - 110 mmol/L 101   CARBON DIOXIDE 21 - 32 mmol/L 26   BUN,BUNPL 7 - 20 mg/dL 25 (H)   Creatinine 0.8 - 1.3 mg/dL 1.0   Anion Gap 3 - 16  14   Est, Glom Filt Rate >60  79   Glucose, Random 70 - 99 mg/dL 103 (H)   Calcium, Total 8.3 - 10.6 mg/dL 8.8   (H): Data is abnormally high

## 2024-04-24 NOTE — TELEPHONE ENCOUNTER
----- Message from Ye Diez MD sent at 4/24/2024  9:40 AM EDT -----  Let patient know their lab tests are stable, continue current meds, no new orders or changes at this time.  Thanks.

## 2024-04-29 DIAGNOSIS — G25.81 RESTLESS LEG: ICD-10-CM

## 2024-04-29 NOTE — TELEPHONE ENCOUNTER
Refill Request     CONFIRM preferred pharmacy with the patient.    If Mail Order Rx - Pend for 90 day refill.      Last Seen: Last Seen Department: 4/18/2024  Last Seen by PCP: 4/18/2024    Last Written: pramipexole 0.5 10/11/23 #90 refills 1    If no future appointment scheduled:  Review the last OV with PCP and review information for follow-up visit,  Route STAFF MESSAGE with patient name to the  Pool for scheduling with the following information:            -  Timing of next visit           -  Visit type ie Physical, OV, etc           -  Diagnoses/Reason ie. COPD, HTN - Do not use MEDICATION, Follow-up or CHECK UP - Give reason for visit      Next Appointment:   Future Appointments   Date Time Provider Department Center   5/7/2024 10:15 AM [facility] ANTICOAGULATION CLINIC [facility]   5/22/2024  1:15 PM SCHEDULE, ALETHEA DEVICE CHECK [facility]   5/22/2024  1:15 PM Ericka Valdez, APRN - CNP [facility]   5/24/2024  9:30 AM Toni Castillo MD [facility]   5/31/2024  1:30 PM Constance Yancey PA [facility]  [place] - [facility]   7/16/2024  3:00 PM Kelsey Flores MD [facility]   7/23/2024 11:30 AM Radhames De La Torre, APRN - CNP [facility]       Message sent to  to schedule appt with patient?  NO      Requested Prescriptions     Pending Prescriptions Disp Refills    pramipexole (MIRAPEX) 0.5 MG tablet [Pharmacy Med Name: Pramipexole Dihydrochloride 0.5 MG Oral Tablet] 90 tablet 0     Sig: Take 1 tablet by mouth nightly

## 2024-04-30 ENCOUNTER — HOSPITAL ENCOUNTER (EMERGENCY)
Age: 74
Discharge: HOME | End: 2024-04-30
Payer: MEDICARE

## 2024-04-30 VITALS
WEIGHT: 290 LBS | BODY MASS INDEX: 41.52 KG/M2 | OXYGEN SATURATION: 93 % | SYSTOLIC BLOOD PRESSURE: 128 MMHG | RESPIRATION RATE: 16 BRPM | DIASTOLIC BLOOD PRESSURE: 72 MMHG | HEART RATE: 73 BPM | TEMPERATURE: 98 F | HEIGHT: 70 IN

## 2024-04-30 DIAGNOSIS — S40.021A CONTUSION OF RIGHT UPPER EXTREMITY, INITIAL ENCOUNTER: Primary | ICD-10-CM

## 2024-04-30 DIAGNOSIS — Z79.01 ANTICOAGULATED ON WARFARIN: ICD-10-CM

## 2024-04-30 PROCEDURE — 99282 EMERGENCY DEPT VISIT SF MDM: CPT

## 2024-04-30 ASSESSMENT — PAIN SCALES - GENERAL: PAINLEVEL_OUTOF10: 0

## 2024-04-30 ASSESSMENT — PAIN - FUNCTIONAL ASSESSMENT: PAIN_FUNCTIONAL_ASSESSMENT: 0-10

## 2024-04-30 NOTE — ED PROVIDER NOTES
encounter    2. Anticoagulated on warfarin        Blood pressure 128/72, pulse 73, temperature 98 °F (36.7 °C), temperature source Oral, resp. rate 16, height 1.778 m (5' 10\"), weight 131.5 kg (290 lb), SpO2 93 %.          PATIENT REFERRED TO:  Constance Yancey PA  [address] [place]  [address]  [facility] [address] [phone]              DISPOSITION  Patient was discharged to home in good condition.          Jose Martin Maddox PA  04/30/24 [id number]

## 2024-05-01 ENCOUNTER — TELEPHONE (OUTPATIENT)
Dept: FAMILY MEDICINE CLINIC | Age: 74
End: 2024-05-01

## 2024-05-01 RX ORDER — PRAMIPEXOLE DIHYDROCHLORIDE 0.5 MG/1
0.5 TABLET ORAL NIGHTLY
Qty: 90 TABLET | Refills: 0 | OUTPATIENT
Start: 2024-05-01

## 2024-05-01 NOTE — TELEPHONE ENCOUNTER
ED Follow Up Call/ Schedule appt   ED: Latosha Martinez  Reason: Contusion  Date: 4/30/24    Appt scheduled: 5/3/24      Comments: Patient still has a good amount of bruising.     Future Appointments   Date Time Provider Department Center   5/3/2024  1:30 PM Constance Yancey PA [facility]  [place] - [facility]   5/7/2024 10:15 AM [facility]TICO[facility]UL[facility] CLINIC [facility] [place] [facility]   5/10/2024  3:00 PM SCHEDULE, ALETHEA DEVICE CHECK [facility]   5/10/2024  3:00 PM Ericka Valdez APRN - CNP [place] [facility]   5/24/2024  9:30 AM Tnoi Castillo MD [facility]   5/31/2024  1:30 PM Constance Yancey PA [facility] [place] [facility]   7/16/2024  3:00 PM Kelsey Flores MD AND ENDO [facility]   7/23/2024 11:30 AM Radhames De La Torre APRN - CNP [facility] CLER CAR [facility]

## 2024-05-01 NOTE — TELEPHONE ENCOUNTER
Patient's wife would like to know why the script was refused, he has been taking this medication from our office since 9/17/2022.

## 2024-05-02 ENCOUNTER — CARE COORDINATION (OUTPATIENT)
Dept: CARE COORDINATION | Age: 74
End: 2024-05-02

## 2024-05-02 RX ORDER — PRAMIPEXOLE DIHYDROCHLORIDE 0.5 MG/1
0.5 TABLET ORAL NIGHTLY
Qty: 90 TABLET | Refills: 1 | Status: SHIPPED | OUTPATIENT
Start: 2024-05-02 | End: 2024-05-03 | Stop reason: ALTCHOICE

## 2024-05-02 NOTE — TELEPHONE ENCOUNTER
Spoke with the patient's wife, she states that he has been taking both medications. Appt schedule for tomorrow.

## 2024-05-02 NOTE — CARE COORDINATION
ACM called but patient did not answer. ACM unable to leave voicemail at this time d/t not being set up. ACM will follow up at a later date.

## 2024-05-02 NOTE — CARE COORDINATION
Ambulatory Care Coordination Note  2024    Patient Current Location:  Home: [address] [place] [id number]     ACM contacted the spouse/partner by telephone. Verified name and  with spouse/partner as identifiers. Provided introduction to self, and explanation of the ACM role.     Challenges to be reviewed by the provider   Additional needs identified to be addressed with provider: Yes  medications-Rosy said patient is taking  Gabapentin 2 capsules q hs and awaiting for Mirapex Rx to be refilled.               Method of communication with provider: chart routing.    ACM: Giovanna Reyes, RN     ACM spoke to Rosy patient spouse who reports patient has a quarter sized knot on his head. Rosy declines any worsening Neuro symptoms and says patient is acting himself. Rosy updated with appt time for tomorrow. ACM encouraged Rosy to bring all of patients medications to follow up appt tomorrow.     Plan:    Route to PCP    Offered patient enrollment in the Remote Patient Monitoring (RPM) program for in-home monitoring: Yes, patient enrolled; current status is activated and monitoring.    Lab Results       None            Care Coordination Interventions    Referral from Primary Care Provider: No  Suggested Interventions and Community Resources          Goals Addressed    None         Future Appointments   Date Time Provider Department Center   5/3/2024  1:30 PM Constance Yancey PA [facility]  [place] - [facility]   2024 10:15 AM [facility]   5/10/2024  3:00 PM SCHEDULE, ALETHEA DEVICE CHECK [facility]   5/10/2024  3:00 PM Ericka Valdez APRN - CNP [facility]   2024  9:30 AM Toni Castillo MD [facility]   2024  1:30 PM Constance Yancey PA [facility] [place] - [facility]   2024  3:00 PM Kelsey Flores MD [facility]   2024 11:30 AM Radhames De La Torre APRN - CNP [facility]    and   General Assessment

## 2024-05-03 ENCOUNTER — OFFICE VISIT (OUTPATIENT)
Dept: FAMILY MEDICINE CLINIC | Age: 74
End: 2024-05-03
Payer: MEDICARE

## 2024-05-03 VITALS
WEIGHT: 285 LBS | HEART RATE: 74 BPM | SYSTOLIC BLOOD PRESSURE: 118 MMHG | BODY MASS INDEX: 40.8 KG/M2 | TEMPERATURE: 97.4 F | HEIGHT: 70 IN | DIASTOLIC BLOOD PRESSURE: 62 MMHG | OXYGEN SATURATION: 99 %

## 2024-05-03 DIAGNOSIS — G25.81 RESTLESS LEG SYNDROME: Primary | ICD-10-CM

## 2024-05-03 DIAGNOSIS — E11.29 TYPE 2 DIABETES MELLITUS WITH MICROALBUMINURIA, WITH LONG-TERM CURRENT USE OF INSULIN (HCC): ICD-10-CM

## 2024-05-03 DIAGNOSIS — Z79.4 TYPE 2 DIABETES MELLITUS WITH MICROALBUMINURIA, WITH LONG-TERM CURRENT USE OF INSULIN (HCC): ICD-10-CM

## 2024-05-03 DIAGNOSIS — R80.9 TYPE 2 DIABETES MELLITUS WITH MICROALBUMINURIA, WITH LONG-TERM CURRENT USE OF INSULIN (HCC): ICD-10-CM

## 2024-05-03 DIAGNOSIS — D36.7 DERMOID CYST OF RIGHT UPPER EXTREMITY: ICD-10-CM

## 2024-05-03 DIAGNOSIS — I51.89 GRADE II DIASTOLIC DYSFUNCTION: ICD-10-CM

## 2024-05-03 DIAGNOSIS — E11.41 DIABETIC MONONEUROPATHY ASSOCIATED WITH TYPE 2 DIABETES MELLITUS (HCC): ICD-10-CM

## 2024-05-03 DIAGNOSIS — E11.3291 MILD NONPROLIFERATIVE DIABETIC RETINOPATHY OF RIGHT EYE ASSOCIATED WITH TYPE 2 DIABETES MELLITUS, MACULAR EDEMA PRESENCE UNSPECIFIED (HCC): ICD-10-CM

## 2024-05-03 DIAGNOSIS — I25.119 CORONARY ARTERY DISEASE INVOLVING NATIVE CORONARY ARTERY OF NATIVE HEART WITH ANGINA PECTORIS (HCC): ICD-10-CM

## 2024-05-03 DIAGNOSIS — I48.91 NEW ONSET ATRIAL FIBRILLATION (HCC): ICD-10-CM

## 2024-05-03 PROCEDURE — 2022F DILAT RTA XM EVC RTNOPTHY: CPT | Performed by: PHYSICIAN ASSISTANT

## 2024-05-03 PROCEDURE — 3052F HG A1C>EQUAL 8.0%<EQUAL 9.0%: CPT | Performed by: PHYSICIAN ASSISTANT

## 2024-05-03 PROCEDURE — 3074F SYST BP LT 130 MM HG: CPT | Performed by: PHYSICIAN ASSISTANT

## 2024-05-03 PROCEDURE — 3078F DIAST BP <80 MM HG: CPT | Performed by: PHYSICIAN ASSISTANT

## 2024-05-03 PROCEDURE — G8427 DOCREV CUR MEDS BY ELIG CLIN: HCPCS | Performed by: PHYSICIAN ASSISTANT

## 2024-05-03 PROCEDURE — 99214 OFFICE O/P EST MOD 30 MIN: CPT | Performed by: PHYSICIAN ASSISTANT

## 2024-05-03 PROCEDURE — 1123F ACP DISCUSS/DSCN MKR DOCD: CPT | Performed by: PHYSICIAN ASSISTANT

## 2024-05-03 PROCEDURE — 3017F COLORECTAL CA SCREEN DOC REV: CPT | Performed by: PHYSICIAN ASSISTANT

## 2024-05-03 PROCEDURE — G8417 CALC BMI ABV UP PARAM F/U: HCPCS | Performed by: PHYSICIAN ASSISTANT

## 2024-05-03 PROCEDURE — 1036F TOBACCO NON-USER: CPT | Performed by: PHYSICIAN ASSISTANT

## 2024-05-03 RX ORDER — INSULIN ASPART 100 [IU]/ML
25 INJECTION, SOLUTION INTRAVENOUS; SUBCUTANEOUS
Qty: 15 ML | Refills: 2 | Status: SHIPPED | OUTPATIENT
Start: 2024-05-03

## 2024-05-03 RX ORDER — PRAMIPEXOLE DIHYDROCHLORIDE 0.5 MG/1
0.75 TABLET ORAL NIGHTLY
Qty: 135 TABLET | Refills: 1 | Status: SHIPPED | OUTPATIENT
Start: 2024-05-03

## 2024-05-03 SDOH — ECONOMIC STABILITY: FOOD INSECURITY: WITHIN THE PAST 12 MONTHS, YOU WORRIED THAT YOUR FOOD WOULD RUN OUT BEFORE YOU GOT MONEY TO BUY MORE.: NEVER TRUE

## 2024-05-03 SDOH — ECONOMIC STABILITY: FOOD INSECURITY: WITHIN THE PAST 12 MONTHS, THE FOOD YOU BOUGHT JUST DIDN'T LAST AND YOU DIDN'T HAVE MONEY TO GET MORE.: NEVER TRUE

## 2024-05-03 SDOH — ECONOMIC STABILITY: INCOME INSECURITY: HOW HARD IS IT FOR YOU TO PAY FOR THE VERY BASICS LIKE FOOD, HOUSING, MEDICAL CARE, AND HEATING?: NOT HARD AT ALL

## 2024-05-03 ASSESSMENT — PATIENT HEALTH QUESTIONNAIRE - PHQ9
SUM OF ALL RESPONSES TO PHQ9 QUESTIONS 1 & 2: 0
1. LITTLE INTEREST OR PLEASURE IN DOING THINGS: NOT AT ALL
8. MOVING OR SPEAKING SO SLOWLY THAT OTHER PEOPLE COULD HAVE NOTICED. OR THE OPPOSITE, BEING SO FIGETY OR RESTLESS THAT YOU HAVE BEEN MOVING AROUND A LOT MORE THAN USUAL: NOT AT ALL
SUM OF ALL RESPONSES TO PHQ QUESTIONS 1-9: 0
3. TROUBLE FALLING OR STAYING ASLEEP: NOT AT ALL
2. FEELING DOWN, DEPRESSED OR HOPELESS: NOT AT ALL
4. FEELING TIRED OR HAVING LITTLE ENERGY: NOT AT ALL
SUM OF ALL RESPONSES TO PHQ QUESTIONS 1-9: 0
SUM OF ALL RESPONSES TO PHQ QUESTIONS 1-9: 0
9. THOUGHTS THAT YOU WOULD BE BETTER OFF DEAD, OR OF HURTING YOURSELF: NOT AT ALL
7. TROUBLE CONCENTRATING ON THINGS, SUCH AS READING THE NEWSPAPER OR WATCHING TELEVISION: NOT AT ALL
SUM OF ALL RESPONSES TO PHQ QUESTIONS 1-9: 0
6. FEELING BAD ABOUT YOURSELF - OR THAT YOU ARE A FAILURE OR HAVE LET YOURSELF OR YOUR FAMILY DOWN: NOT AT ALL
5. POOR APPETITE OR OVEREATING: NOT AT ALL

## 2024-05-03 ASSESSMENT — ANXIETY QUESTIONNAIRES
2. NOT BEING ABLE TO STOP OR CONTROL WORRYING: NOT AT ALL
1. FEELING NERVOUS, ANXIOUS, OR ON EDGE: NOT AT ALL
5. BEING SO RESTLESS THAT IT IS HARD TO SIT STILL: NOT AT ALL
7. FEELING AFRAID AS IF SOMETHING AWFUL MIGHT HAPPEN: NOT AT ALL
6. BECOMING EASILY ANNOYED OR IRRITABLE: NOT AT ALL
GAD7 TOTAL SCORE: 0
4. TROUBLE RELAXING: NOT AT ALL
3. WORRYING TOO MUCH ABOUT DIFFERENT THINGS: NOT AT ALL

## 2024-05-03 NOTE — PROGRESS NOTES
5/15/2024  London Swenson (: 1950)  73 y.o.    ASSESSMENT and PLAN:  London was seen today for follow-up.    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 Patient and wife endorse taking medications as prescribed. Denies side effects from mounjaro,   - follow up with endo as scheduled.   - oarrs reviewed, taking gabapentin as prescribed. Continue.     Dermoid cyst of right upper extremity  - benign in nature, asymptomatic  - recommend heat and compression. Patient to call the office if becomes painful or enlarges, will obtain imaging.     New onset atrial fibrillation (HCC)  Coronary artery disease involving native coronary artery of native heart with angina pectoris (HCC)  Grade II diastolic dysfunction  - labs are up to date.   - medications reviewed, compliant.   - appointment with Dr. Castillo for watchman eval       Return in about 3 months (around 8/3/2024) for Controlled Med Management.    HPI  Presents for ED follow up   Seen in ED on  to \"knot\" on right forearm.   Patient denies trauma/fall.   No labs/imaging.   Recommended ice and compression.   Patient reports stable in size, not painful.     RLS: currently on gabapentin. Had also been taking mirapex and

## 2024-05-06 ENCOUNTER — CARE COORDINATION (OUTPATIENT)
Dept: CARE COORDINATION | Age: 74
End: 2024-05-06

## 2024-05-06 NOTE — CARE COORDINATION
Remote Patient Monitoring Note      Date/Time:  5/6/2024 2:14 PM  Patient Current Location: [facility] contacted patient by telephone regarding yellow alert received for activity level (no metrics entered since 4/30).no answer by phone  Will notify ACM RPM will be paused soon- HRS has tried to reach pt x4 to triage equipment- it appears offline, they have tried to send updates and they are not going through.     Plan/Follow Up: Will continue to review, monitor and address alerts with follow up based on severity of symptoms and risk factors.

## 2024-05-07 ENCOUNTER — ANTI-COAG VISIT (OUTPATIENT)
Dept: PHARMACY | Age: 74
End: 2024-05-07
Payer: MEDICARE

## 2024-05-07 ENCOUNTER — CARE COORDINATION (OUTPATIENT)
Dept: CASE MANAGEMENT | Age: 74
End: 2024-05-07

## 2024-05-07 DIAGNOSIS — I48.91 NEW ONSET ATRIAL FIBRILLATION (HCC): Primary | ICD-10-CM

## 2024-05-07 LAB — INTERNATIONAL NORMALIZATION RATIO, POC: 3.8

## 2024-05-07 PROCEDURE — 85610 PROTHROMBIN TIME: CPT | Performed by: PHARMACIST

## 2024-05-07 PROCEDURE — 99211 OFF/OP EST MAY X REQ PHY/QHP: CPT | Performed by: PHARMACIST

## 2024-05-07 NOTE — CARE COORDINATION
Remote Patient Monitoring Note      Date/Time:  5/7/2024 11:35 AM  Patient Current Location: [place]  LPN noted RPM regarding missing metrics.    Background: RPM for HTN, CHF  Clinical Interventions: Reviewed and followed up on alerts and treatments-LPN noted RPM regarding yellow alert for missing metrics. Pt not answering calls for RPM Team. Pt not engaged in RPM. ACM made aware and noted that she will make an attempt to reach this pt today, per ACM note in Epic dated, 05/06/2024.      Plan/Follow Up: Will continue to review, monitor and address alerts with follow up based on severity of symptoms and risk factors.       Juliet Patten LPN, [facility]  PH: [phone]

## 2024-05-07 NOTE — PROGRESS NOTES
Mr London Swenson is here for management of anticoagulation for AFib.   PMH also significant for CAD, DM2, HLD, HTN   He presents today w/out complaint.  Pt verifies dosing regimen as listed above.   Pt denies sx/s bleeding/bruising/swelling/SOB.  No missed doses  No changes in RX/OTCs/Herbal medications.  No dietary concerns  No ETOH and tobacco use.    No recent changes per pt  INR slightly improved after small dose decrease last visit, will decrease dose slightly again.    INR 3.8 is above therapeutic range of 2-3  Recommend to hold dose today, then decrease dose to 7.5 mg daily except 5 mg Q Sun  Patient has 5 mg tablets.  Will continue to monitor and check INR in 3 weeks.  Dosing reminder card given with phone number, appointment date and time.   Return to clinic:  @ 1000 am     Xavier Crowley PharmD 9:54 AM EDT 24    For Pharmacy Admin Tracking Only    Intervention Detail: Adherence Monitorin and Dose Adjustment: 1, reason: Therapy De-escalation  Total # of Interventions Recommended: 2  Total # of Interventions Accepted: 2  Time Spent (min): 15

## 2024-05-08 NOTE — PROGRESS NOTES
[facility]  Cardiology Consult    London Swenson  1950    May 24, 2024    Primary Cardiologist: Ye Diez MD  Referring Physician: Ye Diez MD     Reason for Referral: Left atrial appendage closure    CC: \"I have a lot of bruising.\"      Subjective:     History of Present Illness:    London Swenson is a 73 y.o. patient with a PMH significant for atrial fibrillation, hypertension, hyperlipidemia, coronary artery disease, chronic diastolic heart failure and diabetes. He was started on Eliquis but stopped due to cost, therefore he was started on warfarin. During his office visit on 4/23/2024 his wife voiced concerned of his increased bruising. A consult was placed to discuss Watchman.       Patient is being referred for Left Atrial Appendage Closure with WATCHMAN device for management of stroke risk resulting from non-valvular atrial fibrillation. Based on their past history, it has been determined that they are poor candidates for long-term oral-anticoagulation, however may be tolerant of short term treatment with AC as necessary.      Today, he is her to discuss Watchman. He reports that he is bruising easily. His INR is not therapeutic at times. Patient denies exertional chest pain/pressure, dyspnea at rest, FREEMAN, PND, orthopnea, palpitations, lightheadedness, weight changes, changes in LE edema, and syncope.    Past Medical History:   has a past medical history of A-fib (HCC), Acid reflux, Yan's esophagus, Coronary artery disease of native heart with stable angina pectoris (HCC), Dementia (HCC), Diabetes mellitus (HCC), Diabetic autonomic neuropathy associated with type 2 diabetes mellitus (HCC), Hyperlipidemia, Hypertension, Pancreatitis, Restless legs, Sleep apnea, and Tremor.    Surgical History:   has a past surgical history that includes Pancreas surgery; Cholecystectomy; Tonsillectomy; [place] tooth extraction; Colonoscopy (10/26/2011); Cataract removal (Bilateral, 2013); Upper

## 2024-05-09 ENCOUNTER — CARE COORDINATION (OUTPATIENT)
Dept: CARE COORDINATION | Age: 74
End: 2024-05-09

## 2024-05-09 ENCOUNTER — CARE COORDINATION (OUTPATIENT)
Dept: PRIMARY CARE CLINIC | Age: 74
End: 2024-05-09

## 2024-05-09 DIAGNOSIS — I10 ESSENTIAL HYPERTENSION: Primary | ICD-10-CM

## 2024-05-09 DIAGNOSIS — I50.812 CHRONIC RIGHT-SIDED CONGESTIVE HEART FAILURE (HCC): ICD-10-CM

## 2024-05-09 NOTE — PROGRESS NOTES
Remote Patient Order Discontinued    Received request from Giovanna Reyes, RN   to discontinue order for remote patient monitoring of CHF and HTN and order completed.      None

## 2024-05-09 NOTE — CARE COORDINATION
RPM Kit Return    London Swenson  5/9/24    Care Coordination  placed call to patient to arrange RPM kit  through [employer].     CCSS spoke to Spouse reviewed with Spouse how to pack equipment in original packing. Spouse aware [employer] will  equipment in 2-4 days.   All questions and concerns answered.    *at first spouse, Rosy stated equipment needed to be picked up by tomorrow 5/10/24 due to them going OOT. When SS informed Rosy PITTS is unable to arrive tomorrow, Rosy states she asked for this last Thursday 5/1/24 and has been waiting for [employer]. SS apologized. Rosy then states she will be leaving on Wednesday 5/15/24 and if someone could please  equipment by Tuesday 5/14/24. Return has been submitted. SS name/number provided should there be any issues/delay. Rosy BAGLEY.

## 2024-05-09 NOTE — CARE COORDINATION
Ambulatory Care Coordination Note  2024    Patient Current Location:  [place] [place]  [place] [id number]     ACM contacted the patient by telephone. Verified name and  with patient as identifiers. Provided introduction to self, and explanation of the ACM role.     Challenges to be reviewed by the provider   Additional needs identified to be addressed with provider: No  none               Method of communication with provider: chart routing.    ACM: Giovanna Reyes RN     ACM completed follow up call with patient and wife. Patient no longer wants to participate in RPM and is asking for equipment to be sent back. Patient said he is will obtain VS with his home equipment. ACM reviewed and uploaded CHF tuck me in education.     Heart Failure Education outreach Date/Time: 2024 12:17 PM    Ambulatory Care Manager (ACM) contacted the patient by telephone to perform Ambulatory Care Coordination. Verified name and  with patient as identifiers. Provided introduction to self, and explanation of the Ambulatory Care Manager's role.     ACM reviewed that a Heart Healthy tips for the Summer packet has been sent to [employer]. ACM reviewed CHF zones, daily weights, fluid restriction, the importance of low sodium diet, and healthy tips packet with the patient. Instructed patient to call their Cardiologist if they have a weight gain of 3 lbs in 2 days or 5 lbs in a week.     Patient reminded that there is a physician on call 24 hours a day / 7 days a week should the patient have questions or concerns. The patient verbalized understanding.     Offered patient enrollment in the Remote Patient Monitoring (RPM) program for in-home monitoring: Patient is not eligible for RPM program because: Patient is returning equipment and no longer wants to participate.  .    Lab Results       None            Care Coordination Interventions    Referral from Primary Care Provider: No  Suggested Interventions and Community

## 2024-05-10 ENCOUNTER — OFFICE VISIT (OUTPATIENT)
Dept: CARDIOLOGY CLINIC | Age: 74
End: 2024-05-10

## 2024-05-10 ENCOUNTER — NURSE ONLY (OUTPATIENT)
Dept: CARDIOLOGY CLINIC | Age: 74
End: 2024-05-10

## 2024-05-10 VITALS
DIASTOLIC BLOOD PRESSURE: 62 MMHG | HEART RATE: 64 BPM | SYSTOLIC BLOOD PRESSURE: 128 MMHG | OXYGEN SATURATION: 92 % | WEIGHT: 291 LBS | HEIGHT: 70 IN | BODY MASS INDEX: 41.66 KG/M2

## 2024-05-10 DIAGNOSIS — I47.19 ATRIAL TACHYCARDIA (HCC): ICD-10-CM

## 2024-05-10 DIAGNOSIS — I48.91 NEW ONSET ATRIAL FIBRILLATION (HCC): Primary | ICD-10-CM

## 2024-05-10 DIAGNOSIS — I48.0 PAROXYSMAL ATRIAL FIBRILLATION (HCC): ICD-10-CM

## 2024-05-10 DIAGNOSIS — Z95.0 PACEMAKER: Primary | ICD-10-CM

## 2024-05-10 NOTE — PATIENT INSTRUCTIONS
1. Continue Tikosyn 250 mcg twice daily  2.  Continue to monitor for drug toxicity  3.  Continue metoprolol tartrate 25 mg twice daily  4.  Continue Warfarin for stroke risk reduction; goal range INR 2-3  5.  Continue Lipitor 40 mg daily  6.  Remote device checks every 3 months  7.  Continue Lasix 60 mg as needed for weight gain 3 pounds or more in 1 day or 5 pounds or more in 7 days; take Lasix 80 mg X 5 days  8. Daily weights; no extra sodium on food  9. Folllow up with Dr Castillo 5/24/2024 as scheduled for Watchman  10. Follow up with Alicia Duron CNP heart failure

## 2024-05-10 NOTE — PROGRESS NOTES
and contour without delay or bruit  Peripheral pulses are symmetrical and full   Abdomen:  No masses or tenderness; abdomen distended and tight  Bowel sounds present  Extremities:   No cyanosis or clubbing   Yes lower extremity edema +2   Skin: warm and dry  Neurological:  Alert and oriented  Moves all extremities well  No abnormalities of mood, affect, memory, mentation, or behavior are noted    DATA:    ECG 5/10/2024  Sinus rhythm rate 64 Qtc ~410    ECG 3/25/2024  Sinus rhythm rate 62, Qtc ~ 400    Limited Echo 12/15/2023  Summary   Limited only f/u for LVEF.   Normal left ventricular systolic function with ejection fraction of 55-60%.     Echo 3/2/2023   Summary   Technically difficult examination.   Normal left ventriculcar size, wall thickness, and systolic function with an   estimated ejection fraction of 55-60%.   No regional wall motion abnormalities are seen.   Definity contrast administered with no evidence of left ventricular mass or   thrombus noted.   Normal diastolic function.   Normal RV size and systolic function.   The right atrium is mildly dilated.   Trace pulmonic and tricuspid regurgitation.   The aortic root is at the upper limit of normal in size.   The ascending aorta is mildly dilated at 4.0 cm.    All labs and testing reviewed.  CARDIOLOGY LABS:   CBC:   WBC   Date Value Ref Range Status   04/23/2024 6.1 4.0 - 11.0 K/uL Final   03/12/2024 5.0 4.0 - 11.0 K/uL Final   01/28/2024 4.2 4.0 - 11.0 K/uL Final     Hemoglobin   Date Value Ref Range Status   04/23/2024 12.6 (L) 13.5 - 17.5 g/dL Final   03/12/2024 12.3 (L) 13.5 - 17.5 g/dL Final   01/28/2024 12.3 (L) 13.5 - 17.5 g/dL Final     Platelets   Date Value Ref Range Status   04/23/2024 164 135 - 450 K/uL Final   03/12/2024 150 135 - 450 K/uL Final   01/28/2024 130 (L) 135 - 450 K/uL Final     BMP:   Sodium   Date Value Ref Range Status   04/23/2024 141 136 - 145 mmol/L Final   04/19/2024 141 136 - 145 mmol/L Final   03/12/2024 139 136 - 145

## 2024-05-13 DIAGNOSIS — G62.9 PERIPHERAL POLYNEUROPATHY: ICD-10-CM

## 2024-05-13 RX ORDER — GABAPENTIN 300 MG/1
CAPSULE ORAL
Qty: 60 CAPSULE | Refills: 0 | Status: SHIPPED | OUTPATIENT
Start: 2024-05-14 | End: 2024-06-12

## 2024-05-13 NOTE — TELEPHONE ENCOUNTER
Pt spouse calling for this refill as they are leaving [facility] Wednesday morning please advise

## 2024-05-13 NOTE — TELEPHONE ENCOUNTER
Refill Request - Controlled Substance    CONFIRM preferred pharmacy with the patient.    If Mail Order Rx - Pend for 90 day refill.        Last Seen Department: 5/3/2024    Last Seen by PCP: 5/3/2024    Last Written: 4/18/24 60 capsule 0 refills    Last UDS: n/a for Gabapentin     Med Agreement Signed On: 3/1/24     If no future appointment scheduled:  Review the last OV with PCP and review information for follow-up visit,  Route STAFF MESSAGE with patient name to the  Pool for scheduling with the following information:            -  Timing of next visit           -  Visit type ie Physical, OV, etc           -  Diagnoses/Reason ie. COPD, HTN - Do not use MEDICATION, Follow-up or CHECK UP - Give reason for visit        Next Appointment: 5/31/24   Future Appointments   Date Time Provider Department Center   5/24/2024  9:30 AM Toni Castillo MD [facility] CAR [facility]   5/28/2024 10:00 AM [facility]   5/31/2024  1:30 PM Constance Yancey PA [facility]  [place] - [facility]   6/10/2024  8:30 AM Kamilla Duron, APRN - CNP [facility]   7/16/2024  3:00 PM Kelsey Flores MD AND ENDO [facility]   7/23/2024 11:30 AM Radhames De La Torre, APRN - CNP [facility] CLER CAR [facility]   11/18/2024  2:00 PM SCHEDULE, ALETHEA DEVICE CHECK [facility]   11/18/2024  2:00 PM Ericka Valdez, APRN - CNP [facility]       Message sent to  to schedule appt with patient?  NO      Requested Prescriptions     Pending Prescriptions Disp Refills    gabapentin (NEURONTIN) 300 MG capsule [Pharmacy Med Name: Gabapentin 300 MG Oral Capsule] 60 capsule 0     Sig: TAKE 1 TO 2 CAPSULES BY MOUTH NIGHTLY

## 2024-05-15 ASSESSMENT — ENCOUNTER SYMPTOMS
RHINORRHEA: 0
COUGH: 0
VOMITING: 0
NAUSEA: 0
DIARRHEA: 0
SHORTNESS OF BREATH: 0
ABDOMINAL PAIN: 0
CONSTIPATION: 0
SORE THROAT: 0

## 2024-05-22 NOTE — TELEPHONE ENCOUNTER
Spoke with patient and his wife and confirmed his appointment for Watchman consult on 5/24/2024. Patient was referred by Rg Bipin score 5 (AGE, DM, HTN, CAD, CHF) Falls, currently on Coumadin INR Liable. Patient was on Eliquis but was switched to Coumadin unsure why. Patients last fall was 1 year ago and bruises.

## 2024-05-24 ENCOUNTER — OFFICE VISIT (OUTPATIENT)
Age: 74
End: 2024-05-24
Payer: MEDICARE

## 2024-05-24 VITALS
SYSTOLIC BLOOD PRESSURE: 116 MMHG | DIASTOLIC BLOOD PRESSURE: 64 MMHG | WEIGHT: 286 LBS | HEART RATE: 87 BPM | OXYGEN SATURATION: 94 % | BODY MASS INDEX: 42.36 KG/M2 | HEIGHT: 69 IN

## 2024-05-24 DIAGNOSIS — I48.0 PAROXYSMAL ATRIAL FIBRILLATION (HCC): Primary | ICD-10-CM

## 2024-05-24 DIAGNOSIS — E78.2 MIXED HYPERLIPIDEMIA: ICD-10-CM

## 2024-05-24 DIAGNOSIS — I50.32 CHRONIC DIASTOLIC HEART FAILURE (HCC): ICD-10-CM

## 2024-05-24 DIAGNOSIS — I25.10 CORONARY ARTERY DISEASE INVOLVING NATIVE CORONARY ARTERY OF NATIVE HEART WITHOUT ANGINA PECTORIS: ICD-10-CM

## 2024-05-24 DIAGNOSIS — I10 ESSENTIAL HYPERTENSION: ICD-10-CM

## 2024-05-24 PROCEDURE — G8427 DOCREV CUR MEDS BY ELIG CLIN: HCPCS | Performed by: INTERNAL MEDICINE

## 2024-05-24 PROCEDURE — 3017F COLORECTAL CA SCREEN DOC REV: CPT | Performed by: INTERNAL MEDICINE

## 2024-05-24 PROCEDURE — 3074F SYST BP LT 130 MM HG: CPT | Performed by: INTERNAL MEDICINE

## 2024-05-24 PROCEDURE — 99215 OFFICE O/P EST HI 40 MIN: CPT | Performed by: INTERNAL MEDICINE

## 2024-05-24 PROCEDURE — 1036F TOBACCO NON-USER: CPT | Performed by: INTERNAL MEDICINE

## 2024-05-24 PROCEDURE — 3078F DIAST BP <80 MM HG: CPT | Performed by: INTERNAL MEDICINE

## 2024-05-24 PROCEDURE — 1123F ACP DISCUSS/DSCN MKR DOCD: CPT | Performed by: INTERNAL MEDICINE

## 2024-05-24 PROCEDURE — G8417 CALC BMI ABV UP PARAM F/U: HCPCS | Performed by: INTERNAL MEDICINE

## 2024-05-24 RX ORDER — WARFARIN SODIUM 5 MG/1
TABLET ORAL
Qty: 60 TABLET | Refills: 0 | Status: SHIPPED | OUTPATIENT
Start: 2024-05-24

## 2024-05-24 NOTE — TELEPHONE ENCOUNTER
Spoke with patient and his wife today regarding Watchman procedure.  Patient was shown video on Watchman and given educational material. Patient states interest and would like to proceed. Copy of the Cardio smart tool was given for shared decision. Patient will need a shared decision office visit before proceeding with Watchman. Leann please schedule a shared decision at [facility].

## 2024-05-24 NOTE — TELEPHONE ENCOUNTER
Refill Request     CONFIRM preferred pharmacy with the patient.    If Mail Order Rx - Pend for 90 day refill.      Last Seen: Last Seen Department: 5/3/24     Last Seen by PCP: 5/3/2024    Last Written: 9/18/23 60 tablet 3 refills    If no future appointment scheduled:  Review the last OV with PCP and review information for follow-up visit,  Route STAFF MESSAGE with patient name to the  Pool for scheduling with the following information:            -  Timing of next visit           -  Visit type ie Physical, OV, etc           -  Diagnoses/Reason ie. COPD, HTN - Do not use MEDICATION, Follow-up or CHECK UP - Give reason for visit      Next Appointment: 5/31/24   Future Appointments   Date Time Provider Department Center   5/28/2024 10:00 AM [facility]   5/31/2024  1:30 PM Constance Yancey, JENNIFER MCKEON  [place] - DYD   6/10/2024  8:30 AM Kamilla Duron APRN - CNP [facility]   7/16/2024  3:00 PM Kelsey Flores MD AND ENDO [facility]   7/23/2024 11:30 AM Radhames De La Torre, APRN - CNP [facility] CLER CAR [facility]   11/18/2024  2:00 PM SCHEDULE, ALETHEA DEVICE CHECK [facility]   11/18/2024  2:00 PM Ericka Valdez, APRN - CNP [facility]       Message sent to  to schedule appt with patient?  NO      Requested Prescriptions     Pending Prescriptions Disp Refills    warfarin (COUMADIN) 5 MG tablet [Pharmacy Med Name: Warfarin Sodium 5 MG Oral Tablet] 60 tablet 0     Sig: TAKE 1 & 1/2 TO 2 (ONE & ONE-HALF TO TWO) TABLETS BY MOUTH ONCE DAILY AS  DIRECTED  BY  COUMADIN  CLINIC

## 2024-05-28 ENCOUNTER — ANTI-COAG VISIT (OUTPATIENT)
Dept: PHARMACY | Age: 74
End: 2024-05-28
Payer: MEDICARE

## 2024-05-28 ENCOUNTER — TELEPHONE (OUTPATIENT)
Dept: FAMILY MEDICINE CLINIC | Age: 74
End: 2024-05-28

## 2024-05-28 DIAGNOSIS — I48.91 NEW ONSET ATRIAL FIBRILLATION (HCC): Primary | ICD-10-CM

## 2024-05-28 LAB — INTERNATIONAL NORMALIZATION RATIO, POC: 2.6

## 2024-05-28 PROCEDURE — 99211 OFF/OP EST MAY X REQ PHY/QHP: CPT | Performed by: PHARMACIST

## 2024-05-28 PROCEDURE — 85610 PROTHROMBIN TIME: CPT | Performed by: PHARMACIST

## 2024-05-28 RX ORDER — WARFARIN SODIUM 5 MG/1
TABLET ORAL
Qty: 60 TABLET | Refills: 11 | Status: SHIPPED | OUTPATIENT
Start: 2024-05-28

## 2024-05-28 NOTE — TELEPHONE ENCOUNTER
Attempted to reach pt, VM box is not set up could not LMOM   Health Maintenance Due   Topic Date Due    Shingles Vaccine (1 of 2) Never done    Influenza Vaccine (1) 09/01/2021     Updates were requested from care everywhere.  Chart was reviewed for overdue Proactive [facility] Encounters (JAMSHID) topics (CRS, Breast Cancer Screening, Eye exam)  Health Maintenance has been updated.  LINKS immunization registry triggered.  Immunizations were reconciled.

## 2024-05-28 NOTE — TELEPHONE ENCOUNTER
----- Message from [place] Cayden Lorenzocrystal sent at 5/28/2024  9:11 AM EDT -----  Regarding: ECC Appointment Request  ECC Appointment Request    Patient needs appointment for ECC Appointment Type: Annual Visit.    Reason for Appointment Request: Available appointments did not meet patient need/ The patient need to fast because of the lab work but the patient is diabetic  --------------------------------------------------------------------------------------------------------------------------    Relationship to Patient: Spouse/Partner      Call Back Information: OK to leave message on voicemail  Preferred Call Back Number: Phone [phone]

## 2024-05-28 NOTE — PROGRESS NOTES
Mr London Swenson is here for management of anticoagulation for AFib.   PMH also significant for CAD, DM2, HLD, HTN   He presents today w/out complaint.  Pt verifies dosing regimen as listed above.   Pt denies sx/s bleeding/bruising/swelling/SOB.  No missed doses  No changes in RX/OTCs/Herbal medications.  No dietary concerns  No ETOH and tobacco use.    No recent changes per pt  INR improved after recent dose decreases    INR 2.6 is within therapeutic range of 2-3  Recommend to continue dose of 7.5 mg daily except 5 mg Q Sun  Patient has 5 mg tablets.  Will continue to monitor and check INR in 4 weeks.  Dosing reminder card given with phone number, appointment date and time.   Return to clinic:  @ 1000 am     Juno BelloD 9:48 AM EDT 24    For Pharmacy Admin Tracking Only    Intervention Detail: Adherence Monitorin  Total # of Interventions Recommended: 1  Total # of Interventions Accepted: 1  Time Spent (min): 15

## 2024-05-29 NOTE — TELEPHONE ENCOUNTER
Spoke with both pt and wife - his Shared Decision is on:    7-1-24 / 10:00 am @ [facility] with Dr. Shanks

## 2024-05-30 ENCOUNTER — CARE COORDINATION (OUTPATIENT)
Dept: CARE COORDINATION | Age: 74
End: 2024-05-30

## 2024-05-30 NOTE — CARE COORDINATION
Ambulatory Care Coordination Note  2024    Patient Current Location:  Home: [address] [place] [id number]     ACM contacted the spouse/partner by telephone. Verified name and  with spouse/partner as identifiers. Provided introduction to self, and explanation of the ACM role.       Method of communication with provider: none.    ACM: Nadine Wright    ACM completed follow up call today with the patient's wife. Spouse states that the RPM equipment has been returned. Spouse denies any exacerbation of chronic illnesses. Spouse denies any concerns at this time and does not have any questions. Spouse verbalizes understanding of emergent symptoms to notify ACM or PCP regarding.     CHF education sent to patient's home address.     Plan  - F/U call 1 month  - Graduation ?    Offered patient enrollment in the Remote Patient Monitoring (RPM) program for in-home monitoring: Patient is not eligible for RPM program because: No .    Lab Results       None            Care Coordination Interventions    Referral from Primary Care Provider: No  Suggested Interventions and Community Resources  Zone Management Tools: In Process (Comment: CHf zone tool)          Goals Addressed    None         Future Appointments   Date Time Provider Department Center   2024  1:30 PM Constance Yancey PA [facility]  [place] - DYD   6/10/2024  8:30 AM Kamilla Duron APRN - CNP [facility]   2024 10:00 AM [facility]   2024 10:00 AM Dylan Shanks MD [place] [facility]   2024  3:00 PM Kelsey Flores MD AND ENDO [facility]   2024 11:30 AM Radhames De La Torre, APRN - CNP [facility] CLER [facility]   2024  2:00 PM SCHEDULE, ALETHEA DEVICE CHECK [facility]   2024  2:00 PM Ericka Valdez APRN - CNP [place] [facility]   ,   Diabetes Assessment      Meal Planning: Avoidance of concentrated sweets, Calorie counting   How often do you test your blood sugar?: Daily, Meals, Bedtime

## 2024-05-31 ENCOUNTER — OFFICE VISIT (OUTPATIENT)
Dept: FAMILY MEDICINE CLINIC | Age: 74
End: 2024-05-31

## 2024-05-31 VITALS
BODY MASS INDEX: 41.98 KG/M2 | WEIGHT: 283.4 LBS | HEART RATE: 62 BPM | HEIGHT: 69 IN | DIASTOLIC BLOOD PRESSURE: 60 MMHG | SYSTOLIC BLOOD PRESSURE: 118 MMHG | TEMPERATURE: 97.1 F | OXYGEN SATURATION: 98 %

## 2024-05-31 DIAGNOSIS — R80.9 TYPE 2 DIABETES MELLITUS WITH MICROALBUMINURIA, WITH LONG-TERM CURRENT USE OF INSULIN (HCC): ICD-10-CM

## 2024-05-31 DIAGNOSIS — E11.29 TYPE 2 DIABETES MELLITUS WITH MICROALBUMINURIA, WITH LONG-TERM CURRENT USE OF INSULIN (HCC): ICD-10-CM

## 2024-05-31 DIAGNOSIS — Z00.00 MEDICARE ANNUAL WELLNESS VISIT, SUBSEQUENT: Primary | ICD-10-CM

## 2024-05-31 DIAGNOSIS — Z79.4 TYPE 2 DIABETES MELLITUS WITH MICROALBUMINURIA, WITH LONG-TERM CURRENT USE OF INSULIN (HCC): ICD-10-CM

## 2024-05-31 LAB — HBA1C MFR BLD: 8.2 %

## 2024-05-31 SDOH — ECONOMIC STABILITY: FOOD INSECURITY: WITHIN THE PAST 12 MONTHS, YOU WORRIED THAT YOUR FOOD WOULD RUN OUT BEFORE YOU GOT MONEY TO BUY MORE.: NEVER TRUE

## 2024-05-31 SDOH — ECONOMIC STABILITY: FOOD INSECURITY: WITHIN THE PAST 12 MONTHS, THE FOOD YOU BOUGHT JUST DIDN'T LAST AND YOU DIDN'T HAVE MONEY TO GET MORE.: NEVER TRUE

## 2024-05-31 SDOH — ECONOMIC STABILITY: INCOME INSECURITY: HOW HARD IS IT FOR YOU TO PAY FOR THE VERY BASICS LIKE FOOD, HOUSING, MEDICAL CARE, AND HEATING?: NOT HARD AT ALL

## 2024-05-31 ASSESSMENT — PATIENT HEALTH QUESTIONNAIRE - PHQ9
9. THOUGHTS THAT YOU WOULD BE BETTER OFF DEAD, OR OF HURTING YOURSELF: NOT AT ALL
SUM OF ALL RESPONSES TO PHQ QUESTIONS 1-9: 0
2. FEELING DOWN, DEPRESSED OR HOPELESS: NOT AT ALL
SUM OF ALL RESPONSES TO PHQ QUESTIONS 1-9: 0
8. MOVING OR SPEAKING SO SLOWLY THAT OTHER PEOPLE COULD HAVE NOTICED. OR THE OPPOSITE, BEING SO FIGETY OR RESTLESS THAT YOU HAVE BEEN MOVING AROUND A LOT MORE THAN USUAL: NOT AT ALL
7. TROUBLE CONCENTRATING ON THINGS, SUCH AS READING THE NEWSPAPER OR WATCHING TELEVISION: NOT AT ALL
3. TROUBLE FALLING OR STAYING ASLEEP: NOT AT ALL
1. LITTLE INTEREST OR PLEASURE IN DOING THINGS: NOT AT ALL
5. POOR APPETITE OR OVEREATING: NOT AT ALL
SUM OF ALL RESPONSES TO PHQ9 QUESTIONS 1 & 2: 0
6. FEELING BAD ABOUT YOURSELF - OR THAT YOU ARE A FAILURE OR HAVE LET YOURSELF OR YOUR FAMILY DOWN: NOT AT ALL
4. FEELING TIRED OR HAVING LITTLE ENERGY: NOT AT ALL

## 2024-05-31 NOTE — PROGRESS NOTES
Medicare Annual Wellness Visit    London Swenson is here for Medicare AWV    Assessment & Plan   Medicare annual wellness visit, subsequent  - labs are up to date.   - declines vaccines today   - number given to schedule in with [facility] - needs diabetic eye exam.   - ACP documents given, will complete.   Type 2 diabetes mellitus with microalbuminuria, with long-term current use of insulin (HCC)  -     POCT glycosylated hemoglobin (Hb A1C)8.2, gradually improving  - pt requested A1c be checked. Has follow up scheduled with shane in July.     Recommendations for Preventive Services Due: see orders and patient instructions/AVS.  Recommended screening schedule for the next 5-10 years is provided to the patient in written form: see Patient Instructions/AVS.     Return in about 6 months (around 11/30/2024).     Subjective   The following acute and/or chronic problems were also addressed today:  No acute complaints today   Requesting A1c to be checked.     Patient's complete Health Risk Assessment and screening values have been reviewed and are found in Flowsheets. The following problems were reviewed today and where indicated follow up appointments were made and/or referrals ordered.    Positive Risk Factor Screenings with Interventions:                Activity, Diet, and Weight:  On average, how many days per week do you engage in moderate to strenuous exercise (like a brisk walk)?: 7 days  On average, how many minutes do you engage in exercise at this level?: 20 min    Do you eat balanced/healthy meals regularly?: Yes    Body mass index is 41.83 kg/m². (!) Abnormal    Obesity Interventions:  Patient declines any further evaluation or treatment            Dentist Screen:  Have you seen the dentist within the past year?: (!) No    Intervention:  Advised to schedule with their dentist     Vision Screen:  Do you have difficulty driving, watching TV, or doing any of your daily activities because of your eyesight?:

## 2024-05-31 NOTE — PATIENT INSTRUCTIONS
Recommendations:    A preventive eye exam performed by an eye specialist is recommended every 1-2 years to screen for glaucoma; cataracts, macular degeneration, and other eye disorders.  A preventive dental visit is recommended every 6 months.  Try to get at least 150 minutes of exercise per week or 10,000 steps per day on a pedometer .  Order or download the FREE \"Exercise & Physical Activity: Your Everyday Guide\" from [employer] [place] [employer]. Call 1-603.233.4452 or search [employer] [place] [employer] online.  You need 7178-1812 mg of calcium and 1040-7861 IU of vitamin D per day. It is possible to meet your calcium requirement with diet alone, but a vitamin D supplement is usually necessary to meet this goal.  When exposed to the sun, use a sunscreen that protects against both UVA and UVB radiation with an SPF of 30 or greater. Reapply every 2 to 3 hours or after sweating, drying off with a towel, or swimming.  Always wear a seat belt when traveling in a car. Always wear a helmet when riding a bicycle or motorcycle.

## 2024-06-07 DIAGNOSIS — Z79.4 TYPE 2 DIABETES MELLITUS WITH MICROALBUMINURIA, WITH LONG-TERM CURRENT USE OF INSULIN (HCC): ICD-10-CM

## 2024-06-07 DIAGNOSIS — E11.29 TYPE 2 DIABETES MELLITUS WITH MICROALBUMINURIA, WITH LONG-TERM CURRENT USE OF INSULIN (HCC): ICD-10-CM

## 2024-06-07 DIAGNOSIS — R80.9 TYPE 2 DIABETES MELLITUS WITH MICROALBUMINURIA, WITH LONG-TERM CURRENT USE OF INSULIN (HCC): ICD-10-CM

## 2024-06-07 RX ORDER — INSULIN ASPART 100 [IU]/ML
25 INJECTION, SOLUTION INTRAVENOUS; SUBCUTANEOUS
Qty: 15 ML | Refills: 5 | Status: SHIPPED | OUTPATIENT
Start: 2024-06-07

## 2024-06-07 NOTE — TELEPHONE ENCOUNTER
Refill Request     CONFIRM preferred pharmacy with the patient.    If Mail Order Rx - Pend for 90 day refill.      Last Seen: Last Seen Department: 5/31/2024  Last Seen by PCP: 5/31/2024    Last Written: Pharmacy told patient that the last refill was used and to have our office send a new one for next month. Thanks.     If no future appointment scheduled:  Review the last OV with PCP and review information for follow-up visit,  Route STAFF MESSAGE with patient name to the  Pool for scheduling with the following information:            -  Timing of next visit           -  Visit type ie Physical, OV, etc           -  Diagnoses/Reason ie. COPD, HTN - Do not use MEDICATION, Follow-up or CHECK UP - Give reason for visit      Next Appointment:   Future Appointments   Date Time Provider Department Center   6/10/2024  8:30 AM Kamilla Duron, APRN - CNP [facility]   6/25/2024 10:00 AM [facility]   7/1/2024 10:00 AM Dylan Shanks MD [facility]   7/16/2024  3:00 PM Kelsey Flores MD AND ENDO [facility]   7/23/2024 11:30 AM Radhames De La Torre APRN - CNP [facility] CLER CAR [facility]   9/4/2024 10:30 AM Constance Yancey PA [facility]  [place] - DYD   11/18/2024  2:00 PM SCHEDULE, ALETHEA DEVICE CHECK [facility]   11/18/2024  2:00 PM Ericka Valdez, APRN - CNP [facility]       Message sent to  to schedule appt with patient?  NO      Requested Prescriptions     Pending Prescriptions Disp Refills    insulin aspart (NOVOLOG FLEXPEN) 100 UNIT/ML injection pen 15 mL 2     Sig: Inject 25 Units into the skin 3 times daily (before meals)

## 2024-06-10 ENCOUNTER — OFFICE VISIT (OUTPATIENT)
Dept: CARDIOLOGY CLINIC | Age: 74
End: 2024-06-10
Payer: MEDICARE

## 2024-06-10 VITALS
HEART RATE: 70 BPM | DIASTOLIC BLOOD PRESSURE: 60 MMHG | HEIGHT: 69 IN | WEIGHT: 285.5 LBS | SYSTOLIC BLOOD PRESSURE: 92 MMHG | BODY MASS INDEX: 42.29 KG/M2 | OXYGEN SATURATION: 88 %

## 2024-06-10 DIAGNOSIS — I48.0 PAF (PAROXYSMAL ATRIAL FIBRILLATION) (HCC): ICD-10-CM

## 2024-06-10 DIAGNOSIS — I87.2 PERIPHERAL VENOUS INSUFFICIENCY: ICD-10-CM

## 2024-06-10 DIAGNOSIS — I50.32 CHRONIC HEART FAILURE WITH PRESERVED EJECTION FRACTION (HCC): Primary | ICD-10-CM

## 2024-06-10 DIAGNOSIS — R60.0 BILATERAL LEG EDEMA: ICD-10-CM

## 2024-06-10 PROCEDURE — 99214 OFFICE O/P EST MOD 30 MIN: CPT | Performed by: NURSE PRACTITIONER

## 2024-06-10 PROCEDURE — G8427 DOCREV CUR MEDS BY ELIG CLIN: HCPCS | Performed by: NURSE PRACTITIONER

## 2024-06-10 PROCEDURE — 3017F COLORECTAL CA SCREEN DOC REV: CPT | Performed by: NURSE PRACTITIONER

## 2024-06-10 PROCEDURE — 1036F TOBACCO NON-USER: CPT | Performed by: NURSE PRACTITIONER

## 2024-06-10 PROCEDURE — 1123F ACP DISCUSS/DSCN MKR DOCD: CPT | Performed by: NURSE PRACTITIONER

## 2024-06-10 PROCEDURE — G8417 CALC BMI ABV UP PARAM F/U: HCPCS | Performed by: NURSE PRACTITIONER

## 2024-06-10 PROCEDURE — 3078F DIAST BP <80 MM HG: CPT | Performed by: NURSE PRACTITIONER

## 2024-06-10 PROCEDURE — 3074F SYST BP LT 130 MM HG: CPT | Performed by: NURSE PRACTITIONER

## 2024-06-10 NOTE — PATIENT INSTRUCTIONS
Plan:   Continue weekly weight; if your weight goes up 5lbs in a week; then okay to take 80mg   Continue lasix 60mg daily for now and okay to increase to 80mg if the leg swelling worsens or more short of breath.   Follow up with Dr. MOONEY as planned for hafsa patel

## 2024-06-10 NOTE — PROGRESS NOTES
fL Final     RDW   Date Value Ref Range Status   04/23/2024 15.2 12.4 - 15.4 % Final   03/12/2024 15.3 12.4 - 15.4 % Final   01/28/2024 14.4 12.4 - 15.4 % Final     Platelets   Date Value Ref Range Status   04/23/2024 164 135 - 450 K/uL Final   03/12/2024 150 135 - 450 K/uL Final   01/28/2024 130 (L) 135 - 450 K/uL Final     Iron:    Iron   Date Value Ref Range Status   03/20/2023 74 59 - 158 ug/dL Final     TIBC   Date Value Ref Range Status   03/20/2023 260 260 - 445 ug/dL Final     Ferritin   Date Value Ref Range Status   03/20/2023 157.8 30.0 - 400.0 ng/mL Final     BMP:   Sodium   Date Value Ref Range Status   04/23/2024 141 136 - 145 mmol/L Final   04/19/2024 141 136 - 145 mmol/L Final   03/12/2024 139 136 - 145 mmol/L Final     Potassium   Date Value Ref Range Status   04/23/2024 4.3 3.5 - 5.1 mmol/L Final   04/19/2024 4.4 3.5 - 5.1 mmol/L Final   03/12/2024 4.9 3.5 - 5.1 mmol/L Final     Chloride   Date Value Ref Range Status   04/23/2024 101 99 - 110 mmol/L Final   04/19/2024 102 99 - 110 mmol/L Final   03/12/2024 101 99 - 110 mmol/L Final     CO2   Date Value Ref Range Status   04/23/2024 26 21 - 32 mmol/L Final   04/19/2024 29 21 - 32 mmol/L Final   03/12/2024 28 21 - 32 mmol/L Final     Phosphorus   Date Value Ref Range Status   09/23/2022 3.0 2.5 - 4.9 mg/dL Final     BUN   Date Value Ref Range Status   04/23/2024 25 (H) 7 - 20 mg/dL Final   04/19/2024 33 (H) 7 - 20 mg/dL Final   03/12/2024 20 7 - 20 mg/dL Final     Creatinine   Date Value Ref Range Status   04/23/2024 1.0 0.8 - 1.3 mg/dL Final   04/19/2024 1.0 0.8 - 1.3 mg/dL Final   03/12/2024 0.9 0.8 - 1.3 mg/dL Final     BNP:   Lab Results   Component Value Date    PROBNP 99 04/23/2024    PROBNP 60 04/19/2024    PROBNP 137 (H) 01/25/2024     Lipids:   Cholesterol, Total   Date Value Ref Range Status   03/12/2024 175 0 - 199 mg/dL Final                   Triglycerides   Date Value Ref Range Status   03/12/2024 244 (H) 0 - 150 mg/dL Final

## 2024-06-18 DIAGNOSIS — G62.9 PERIPHERAL POLYNEUROPATHY: ICD-10-CM

## 2024-06-19 RX ORDER — GABAPENTIN 300 MG/1
CAPSULE ORAL
Qty: 60 CAPSULE | Refills: 0 | Status: SHIPPED | OUTPATIENT
Start: 2024-06-19 | End: 2024-07-19

## 2024-06-19 NOTE — TELEPHONE ENCOUNTER
Refill Request - Controlled Substance    CONFIRM preferred pharmacy with the patient.    If Mail Order Rx - Pend for 90 day refill.        Last Seen Department: 5/31/2024  Last Seen by PCP: 5/31/2024    Last Written: Gabapentin 06/12/2024 60 with no refill    Last UDS: None    Med Agreement Signed On: 03/01/2024    If no future appointment scheduled:  Review the last OV with PCP and review information for follow-up visit,  Route STAFF MESSAGE with patient name to the  Pool for scheduling with the following information:            -  Timing of next visit           -  Visit type ie Physical, OV, etc           -  Diagnoses/Reason ie. COPD, HTN - Do not use MEDICATION, Follow-up or CHECK UP - Give reason for visit        Next Appointment:   Future Appointments   Date Time Provider Department Center   6/25/2024 10:00 AM [facility] ANTICOAGULATION CLINIC [facility]   7/1/2024 10:00 AM Dylan Shanks MD [facility]   7/16/2024  3:00 PM Kelsey Flores MD AND ENDO [facility]   7/23/2024 11:30 AM Radhames De La Torre, APRN - CNP [facility] CLER CAR [facility]   9/4/2024 10:30 AM Constance Yancey PA [facility] [place] [facility]   11/18/2024  2:00 PM SCHEDULE, ALETHEA DEVICE CHECK [facility]   11/18/2024  2:00 PM Ericka Valdez APRN - CNP [facility]       Message sent to  to schedule appt with patient?  NO      Requested Prescriptions     Pending Prescriptions Disp Refills    gabapentin (NEURONTIN) 300 MG capsule [Pharmacy Med Name: Gabapentin 300 MG Oral Capsule] 60 capsule 0     Sig: TAKE 1 TO 2 CAPSULES BY MOUTH NIGHTLY

## 2024-06-22 ENCOUNTER — HOSPITAL ENCOUNTER (INPATIENT)
Age: 74
LOS: 1 days | Discharge: HOME | DRG: 193 | End: 2024-06-23
Attending: EMERGENCY MEDICINE | Admitting: HOSPITALIST
Payer: MEDICARE

## 2024-06-22 ENCOUNTER — APPOINTMENT (OUTPATIENT)
Dept: GENERAL RADIOLOGY | Age: 74
DRG: 193 | End: 2024-06-22
Payer: MEDICARE

## 2024-06-22 ENCOUNTER — APPOINTMENT (OUTPATIENT)
Dept: CT IMAGING | Age: 74
DRG: 193 | End: 2024-06-22
Payer: MEDICARE

## 2024-06-22 DIAGNOSIS — R09.02 HYPOXIA: Primary | ICD-10-CM

## 2024-06-22 LAB
ALBUMIN SERPL-MCNC: 4.1 G/DL (ref 3.4–5)
ALBUMIN/GLOB SERPL: 1.7 {RATIO} (ref 1.1–2.2)
ALP SERPL-CCNC: 120 U/L (ref 40–129)
ALT SERPL-CCNC: 18 U/L (ref 10–40)
ANION GAP SERPL CALCULATED.3IONS-SCNC: 11 MMOL/L (ref 3–16)
AST SERPL-CCNC: 16 U/L (ref 15–37)
BASE EXCESS BLDV CALC-SCNC: 2.9 MMOL/L (ref -3–3)
BASOPHILS # BLD: 0 K/UL (ref 0–0.2)
BASOPHILS NFR BLD: 0.7 %
BILIRUB SERPL-MCNC: 0.4 MG/DL (ref 0–1)
BUN SERPL-MCNC: 27 MG/DL (ref 7–20)
CALCIUM SERPL-MCNC: 8.5 MG/DL (ref 8.3–10.6)
CHLORIDE SERPL-SCNC: 97 MMOL/L (ref 99–110)
CO2 BLDV-SCNC: 30 MMOL/L
CO2 SERPL-SCNC: 30 MMOL/L (ref 21–32)
COHGB MFR BLDV: 2.8 % (ref 0–1.5)
CREAT SERPL-MCNC: 1.2 MG/DL (ref 0.8–1.3)
DEPRECATED RDW RBC AUTO: 14.2 % (ref 12.4–15.4)
EOSINOPHIL # BLD: 0.3 K/UL (ref 0–0.6)
EOSINOPHIL NFR BLD: 6.5 %
FLUAV RNA RESP QL NAA+PROBE: NOT DETECTED
FLUBV RNA RESP QL NAA+PROBE: NOT DETECTED
GFR SERPLBLD CREATININE-BSD FMLA CKD-EPI: 63 ML/MIN/{1.73_M2}
GLUCOSE SERPL-MCNC: 405 MG/DL (ref 70–99)
HCO3 BLDV-SCNC: 28.9 MMOL/L (ref 23–29)
HCT VFR BLD AUTO: 37.9 % (ref 40.5–52.5)
HGB BLD-MCNC: 12.5 G/DL (ref 13.5–17.5)
INR PPP: 2.95 (ref 0.85–1.15)
LACTATE BLDV-SCNC: 3.1 MMOL/L (ref 0.4–1.9)
LYMPHOCYTES # BLD: 1.1 K/UL (ref 1–5.1)
LYMPHOCYTES NFR BLD: 24.1 %
MCH RBC QN AUTO: 29.1 PG (ref 26–34)
MCHC RBC AUTO-ENTMCNC: 32.9 G/DL (ref 31–36)
MCV RBC AUTO: 88.5 FL (ref 80–100)
METHGB MFR BLDV: 0.2 %
MONOCYTES # BLD: 0.4 K/UL (ref 0–1.3)
MONOCYTES NFR BLD: 8.6 %
NEUTROPHILS # BLD: 2.9 K/UL (ref 1.7–7.7)
NEUTROPHILS NFR BLD: 60.1 %
NT-PROBNP SERPL-MCNC: <36 PG/ML (ref 0–124)
O2 THERAPY: ABNORMAL
PCO2 BLDV: 50.3 MMHG (ref 40–50)
PH BLDV: 7.38 [PH] (ref 7.35–7.45)
PLATELET # BLD AUTO: 139 K/UL (ref 135–450)
PMV BLD AUTO: 9.9 FL (ref 5–10.5)
PO2 BLDV: 43.3 MMHG (ref 25–40)
POTASSIUM SERPL-SCNC: 3.8 MMOL/L (ref 3.5–5.1)
PROT SERPL-MCNC: 6.5 G/DL (ref 6.4–8.2)
PROTHROMBIN TIME: 30.6 SEC (ref 11.9–14.9)
RBC # BLD AUTO: 4.29 M/UL (ref 4.2–5.9)
SAO2 % BLDV: 76 %
SARS-COV-2 RNA RESP QL NAA+PROBE: NOT DETECTED
SODIUM SERPL-SCNC: 138 MMOL/L (ref 136–145)
TROPONIN, HIGH SENSITIVITY: 15 NG/L (ref 0–22)
TROPONIN, HIGH SENSITIVITY: 16 NG/L (ref 0–22)
WBC # BLD AUTO: 4.7 K/UL (ref 4–11)

## 2024-06-22 PROCEDURE — 80053 COMPREHEN METABOLIC PANEL: CPT

## 2024-06-22 PROCEDURE — 36415 COLL VENOUS BLD VENIPUNCTURE: CPT

## 2024-06-22 PROCEDURE — 84484 ASSAY OF TROPONIN QUANT: CPT

## 2024-06-22 PROCEDURE — 85610 PROTHROMBIN TIME: CPT

## 2024-06-22 PROCEDURE — 85025 COMPLETE CBC W/AUTO DIFF WBC: CPT

## 2024-06-22 PROCEDURE — 87636 SARSCOV2 & INF A&B AMP PRB: CPT

## 2024-06-22 PROCEDURE — 83605 ASSAY OF LACTIC ACID: CPT

## 2024-06-22 PROCEDURE — 6360000004 HC RX CONTRAST MEDICATION: Performed by: EMERGENCY MEDICINE

## 2024-06-22 PROCEDURE — 71045 X-RAY EXAM CHEST 1 VIEW: CPT

## 2024-06-22 PROCEDURE — 99285 EMERGENCY DEPT VISIT HI MDM: CPT

## 2024-06-22 PROCEDURE — 84145 PROCALCITONIN (PCT): CPT

## 2024-06-22 PROCEDURE — 93005 ELECTROCARDIOGRAM TRACING: CPT | Performed by: EMERGENCY MEDICINE

## 2024-06-22 PROCEDURE — 71260 CT THORAX DX C+: CPT

## 2024-06-22 PROCEDURE — 83880 ASSAY OF NATRIURETIC PEPTIDE: CPT

## 2024-06-22 PROCEDURE — 82803 BLOOD GASES ANY COMBINATION: CPT

## 2024-06-22 RX ADMIN — IOPAMIDOL 75 ML: 755 INJECTION, SOLUTION INTRAVENOUS at 23:03

## 2024-06-22 ASSESSMENT — PAIN - FUNCTIONAL ASSESSMENT: PAIN_FUNCTIONAL_ASSESSMENT: 0-10

## 2024-06-22 ASSESSMENT — PAIN SCALES - GENERAL: PAINLEVEL_OUTOF10: 8

## 2024-06-23 VITALS
HEART RATE: 66 BPM | DIASTOLIC BLOOD PRESSURE: 73 MMHG | SYSTOLIC BLOOD PRESSURE: 127 MMHG | OXYGEN SATURATION: 92 % | BODY MASS INDEX: 41.53 KG/M2 | TEMPERATURE: 97.7 F | WEIGHT: 280.4 LBS | HEIGHT: 69 IN | RESPIRATION RATE: 18 BRPM

## 2024-06-23 LAB
ALBUMIN SERPL-MCNC: 3.9 G/DL (ref 3.4–5)
ALBUMIN/GLOB SERPL: 1.9 {RATIO} (ref 1.1–2.2)
ALP SERPL-CCNC: 107 U/L (ref 40–129)
ALT SERPL-CCNC: 15 U/L (ref 10–40)
ANION GAP SERPL CALCULATED.3IONS-SCNC: 9 MMOL/L (ref 3–16)
AST SERPL-CCNC: 14 U/L (ref 15–37)
BASOPHILS # BLD: 0 K/UL (ref 0–0.2)
BASOPHILS NFR BLD: 0.6 %
BILIRUB SERPL-MCNC: 0.3 MG/DL (ref 0–1)
BUN SERPL-MCNC: 26 MG/DL (ref 7–20)
CALCIUM SERPL-MCNC: 8.5 MG/DL (ref 8.3–10.6)
CHLORIDE SERPL-SCNC: 98 MMOL/L (ref 99–110)
CO2 SERPL-SCNC: 30 MMOL/L (ref 21–32)
CREAT SERPL-MCNC: 1.1 MG/DL (ref 0.8–1.3)
D-DIMER QUANTITATIVE: <0.27 UG/ML FEU (ref 0–0.6)
DEPRECATED RDW RBC AUTO: 14.1 % (ref 12.4–15.4)
EOSINOPHIL # BLD: 0.3 K/UL (ref 0–0.6)
EOSINOPHIL NFR BLD: 5.4 %
GFR SERPLBLD CREATININE-BSD FMLA CKD-EPI: 70 ML/MIN/{1.73_M2}
GLUCOSE BLD-MCNC: 221 MG/DL (ref 70–99)
GLUCOSE BLD-MCNC: 245 MG/DL (ref 70–99)
GLUCOSE BLD-MCNC: 356 MG/DL (ref 70–99)
GLUCOSE SERPL-MCNC: 305 MG/DL (ref 70–99)
HCT VFR BLD AUTO: 36 % (ref 40.5–52.5)
HGB BLD-MCNC: 11.8 G/DL (ref 13.5–17.5)
LACTATE BLDV-SCNC: 2.2 MMOL/L (ref 0.4–1.9)
LYMPHOCYTES # BLD: 1 K/UL (ref 1–5.1)
LYMPHOCYTES NFR BLD: 19.5 %
MCH RBC QN AUTO: 29 PG (ref 26–34)
MCHC RBC AUTO-ENTMCNC: 32.7 G/DL (ref 31–36)
MCV RBC AUTO: 88.6 FL (ref 80–100)
MONOCYTES # BLD: 0.4 K/UL (ref 0–1.3)
MONOCYTES NFR BLD: 8 %
NEUTROPHILS # BLD: 3.4 K/UL (ref 1.7–7.7)
NEUTROPHILS NFR BLD: 66.5 %
PERFORMED ON: ABNORMAL
PHOSPHATE SERPL-MCNC: 2.8 MG/DL (ref 2.5–4.9)
PLATELET # BLD AUTO: 124 K/UL (ref 135–450)
PMV BLD AUTO: 10.2 FL (ref 5–10.5)
POTASSIUM SERPL-SCNC: 3.8 MMOL/L (ref 3.5–5.1)
PROCALCITONIN SERPL IA-MCNC: 0.09 NG/ML (ref 0–0.15)
PROT SERPL-MCNC: 6 G/DL (ref 6.4–8.2)
RBC # BLD AUTO: 4.06 M/UL (ref 4.2–5.9)
SODIUM SERPL-SCNC: 137 MMOL/L (ref 136–145)
WBC # BLD AUTO: 5.1 K/UL (ref 4–11)

## 2024-06-23 PROCEDURE — 2580000003 HC RX 258: Performed by: HOSPITALIST

## 2024-06-23 PROCEDURE — 94669 MECHANICAL CHEST WALL OSCILL: CPT

## 2024-06-23 PROCEDURE — 96367 TX/PROPH/DG ADDL SEQ IV INF: CPT

## 2024-06-23 PROCEDURE — 1200000000 HC SEMI PRIVATE

## 2024-06-23 PROCEDURE — 2580000003 HC RX 258: Performed by: EMERGENCY MEDICINE

## 2024-06-23 PROCEDURE — 84100 ASSAY OF PHOSPHORUS: CPT

## 2024-06-23 PROCEDURE — 6360000002 HC RX W HCPCS: Performed by: EMERGENCY MEDICINE

## 2024-06-23 PROCEDURE — 6360000002 HC RX W HCPCS: Performed by: HOSPITALIST

## 2024-06-23 PROCEDURE — 80053 COMPREHEN METABOLIC PANEL: CPT

## 2024-06-23 PROCEDURE — 83605 ASSAY OF LACTIC ACID: CPT

## 2024-06-23 PROCEDURE — 83036 HEMOGLOBIN GLYCOSYLATED A1C: CPT

## 2024-06-23 PROCEDURE — 6370000000 HC RX 637 (ALT 250 FOR IP): Performed by: HOSPITALIST

## 2024-06-23 PROCEDURE — 92610 EVALUATE SWALLOWING FUNCTION: CPT

## 2024-06-23 PROCEDURE — 85025 COMPLETE CBC W/AUTO DIFF WBC: CPT

## 2024-06-23 PROCEDURE — 36415 COLL VENOUS BLD VENIPUNCTURE: CPT

## 2024-06-23 PROCEDURE — 85379 FIBRIN DEGRADATION QUANT: CPT

## 2024-06-23 PROCEDURE — 94640 AIRWAY INHALATION TREATMENT: CPT

## 2024-06-23 PROCEDURE — 96365 THER/PROPH/DIAG IV INF INIT: CPT

## 2024-06-23 RX ORDER — MAGNESIUM SULFATE IN WATER 40 MG/ML
2000 INJECTION, SOLUTION INTRAVENOUS PRN
Status: DISCONTINUED | OUTPATIENT
Start: 2024-06-23 | End: 2024-06-23 | Stop reason: HOSPADM

## 2024-06-23 RX ORDER — SODIUM CHLORIDE 0.9 % (FLUSH) 0.9 %
10 SYRINGE (ML) INJECTION PRN
Status: DISCONTINUED | OUTPATIENT
Start: 2024-06-23 | End: 2024-06-23 | Stop reason: HOSPADM

## 2024-06-23 RX ORDER — INSULIN GLARGINE 100 [IU]/ML
15 INJECTION, SOLUTION SUBCUTANEOUS NIGHTLY
Status: DISCONTINUED | OUTPATIENT
Start: 2024-06-23 | End: 2024-06-23 | Stop reason: HOSPADM

## 2024-06-23 RX ORDER — ACETAMINOPHEN 650 MG/1
650 SUPPOSITORY RECTAL EVERY 6 HOURS PRN
Status: DISCONTINUED | OUTPATIENT
Start: 2024-06-23 | End: 2024-06-23 | Stop reason: HOSPADM

## 2024-06-23 RX ORDER — ACETAMINOPHEN 325 MG/1
650 TABLET ORAL EVERY 6 HOURS PRN
Status: DISCONTINUED | OUTPATIENT
Start: 2024-06-23 | End: 2024-06-23 | Stop reason: HOSPADM

## 2024-06-23 RX ORDER — INSULIN LISPRO 100 [IU]/ML
7 INJECTION, SOLUTION INTRAVENOUS; SUBCUTANEOUS
Status: DISCONTINUED | OUTPATIENT
Start: 2024-06-23 | End: 2024-06-23 | Stop reason: HOSPADM

## 2024-06-23 RX ORDER — PANTOPRAZOLE SODIUM 40 MG/1
40 TABLET, DELAYED RELEASE ORAL
Status: DISCONTINUED | OUTPATIENT
Start: 2024-06-23 | End: 2024-06-23 | Stop reason: HOSPADM

## 2024-06-23 RX ORDER — IPRATROPIUM BROMIDE AND ALBUTEROL SULFATE 2.5; .5 MG/3ML; MG/3ML
1 SOLUTION RESPIRATORY (INHALATION)
Status: DISCONTINUED | OUTPATIENT
Start: 2024-06-23 | End: 2024-06-23 | Stop reason: HOSPADM

## 2024-06-23 RX ORDER — ASPIRIN 81 MG/1
81 TABLET ORAL DAILY
Status: DISCONTINUED | OUTPATIENT
Start: 2024-06-23 | End: 2024-06-23 | Stop reason: HOSPADM

## 2024-06-23 RX ORDER — BUDESONIDE 0.5 MG/2ML
0.5 INHALANT ORAL
Status: DISCONTINUED | OUTPATIENT
Start: 2024-06-23 | End: 2024-06-23 | Stop reason: HOSPADM

## 2024-06-23 RX ORDER — POTASSIUM CHLORIDE 7.45 MG/ML
10 INJECTION INTRAVENOUS PRN
Status: DISCONTINUED | OUTPATIENT
Start: 2024-06-23 | End: 2024-06-23 | Stop reason: HOSPADM

## 2024-06-23 RX ORDER — IPRATROPIUM BROMIDE AND ALBUTEROL SULFATE 2.5; .5 MG/3ML; MG/3ML
1 SOLUTION RESPIRATORY (INHALATION)
Status: DISCONTINUED | OUTPATIENT
Start: 2024-06-23 | End: 2024-06-23

## 2024-06-23 RX ORDER — POLYETHYLENE GLYCOL 3350 17 G/17G
17 POWDER, FOR SOLUTION ORAL DAILY PRN
Status: DISCONTINUED | OUTPATIENT
Start: 2024-06-23 | End: 2024-06-23 | Stop reason: HOSPADM

## 2024-06-23 RX ORDER — GLUCAGON 1 MG/ML
1 KIT INJECTION PRN
Status: DISCONTINUED | OUTPATIENT
Start: 2024-06-23 | End: 2024-06-23 | Stop reason: HOSPADM

## 2024-06-23 RX ORDER — ACETAMINOPHEN 325 MG/1
650 TABLET ORAL EVERY 4 HOURS PRN
COMMUNITY

## 2024-06-23 RX ORDER — INSULIN LISPRO 100 [IU]/ML
0-8 INJECTION, SOLUTION INTRAVENOUS; SUBCUTANEOUS
Status: DISCONTINUED | OUTPATIENT
Start: 2024-06-23 | End: 2024-06-23 | Stop reason: HOSPADM

## 2024-06-23 RX ORDER — SODIUM CHLORIDE 0.9 % (FLUSH) 0.9 %
10 SYRINGE (ML) INJECTION EVERY 12 HOURS SCHEDULED
Status: DISCONTINUED | OUTPATIENT
Start: 2024-06-23 | End: 2024-06-23 | Stop reason: HOSPADM

## 2024-06-23 RX ORDER — INSULIN LISPRO 100 [IU]/ML
0-4 INJECTION, SOLUTION INTRAVENOUS; SUBCUTANEOUS NIGHTLY
Status: DISCONTINUED | OUTPATIENT
Start: 2024-06-23 | End: 2024-06-23 | Stop reason: HOSPADM

## 2024-06-23 RX ORDER — AMOXICILLIN AND CLAVULANATE POTASSIUM 875; 125 MG/1; MG/1
1 TABLET, FILM COATED ORAL 2 TIMES DAILY
Qty: 14 TABLET | Refills: 0 | Status: SHIPPED | OUTPATIENT
Start: 2024-06-23 | End: 2024-06-30

## 2024-06-23 RX ORDER — ATORVASTATIN CALCIUM 40 MG/1
40 TABLET, FILM COATED ORAL NIGHTLY
Status: DISCONTINUED | OUTPATIENT
Start: 2024-06-23 | End: 2024-06-23 | Stop reason: HOSPADM

## 2024-06-23 RX ORDER — DEXTROSE MONOHYDRATE 100 MG/ML
INJECTION, SOLUTION INTRAVENOUS CONTINUOUS PRN
Status: DISCONTINUED | OUTPATIENT
Start: 2024-06-23 | End: 2024-06-23 | Stop reason: HOSPADM

## 2024-06-23 RX ORDER — DOFETILIDE 0.25 MG/1
250 CAPSULE ORAL EVERY 12 HOURS SCHEDULED
Status: DISCONTINUED | OUTPATIENT
Start: 2024-06-23 | End: 2024-06-23 | Stop reason: HOSPADM

## 2024-06-23 RX ORDER — PRAMIPEXOLE DIHYDROCHLORIDE 0.25 MG/1
0.75 TABLET ORAL NIGHTLY
Status: DISCONTINUED | OUTPATIENT
Start: 2024-06-23 | End: 2024-06-23 | Stop reason: HOSPADM

## 2024-06-23 RX ORDER — SODIUM CHLORIDE 9 MG/ML
INJECTION, SOLUTION INTRAVENOUS PRN
Status: DISCONTINUED | OUTPATIENT
Start: 2024-06-23 | End: 2024-06-23 | Stop reason: HOSPADM

## 2024-06-23 RX ORDER — 0.9 % SODIUM CHLORIDE 0.9 %
500 INTRAVENOUS SOLUTION INTRAVENOUS ONCE
Status: COMPLETED | OUTPATIENT
Start: 2024-06-23 | End: 2024-06-23

## 2024-06-23 RX ORDER — GABAPENTIN 300 MG/1
300 CAPSULE ORAL 2 TIMES DAILY
Status: DISCONTINUED | OUTPATIENT
Start: 2024-06-23 | End: 2024-06-23 | Stop reason: HOSPADM

## 2024-06-23 RX ORDER — POTASSIUM CHLORIDE 20 MEQ/1
40 TABLET, EXTENDED RELEASE ORAL PRN
Status: DISCONTINUED | OUTPATIENT
Start: 2024-06-23 | End: 2024-06-23 | Stop reason: HOSPADM

## 2024-06-23 RX ADMIN — BUDESONIDE 500 MCG: 0.5 SUSPENSION RESPIRATORY (INHALATION) at 07:53

## 2024-06-23 RX ADMIN — SODIUM CHLORIDE 500 ML: 9 INJECTION, SOLUTION INTRAVENOUS at 00:42

## 2024-06-23 RX ADMIN — INSULIN LISPRO 2 UNITS: 100 INJECTION, SOLUTION INTRAVENOUS; SUBCUTANEOUS at 12:38

## 2024-06-23 RX ADMIN — PANTOPRAZOLE SODIUM 40 MG: 40 TABLET, DELAYED RELEASE ORAL at 05:34

## 2024-06-23 RX ADMIN — INSULIN LISPRO 7 UNITS: 100 INJECTION, SOLUTION INTRAVENOUS; SUBCUTANEOUS at 08:22

## 2024-06-23 RX ADMIN — IPRATROPIUM BROMIDE AND ALBUTEROL SULFATE 1 DOSE: 2.5; .5 SOLUTION RESPIRATORY (INHALATION) at 07:53

## 2024-06-23 RX ADMIN — Medication 10 ML: at 08:22

## 2024-06-23 RX ADMIN — CEFTRIAXONE SODIUM 1000 MG: 1 INJECTION, POWDER, FOR SOLUTION INTRAMUSCULAR; INTRAVENOUS at 00:41

## 2024-06-23 RX ADMIN — INSULIN LISPRO 7 UNITS: 100 INJECTION, SOLUTION INTRAVENOUS; SUBCUTANEOUS at 12:37

## 2024-06-23 RX ADMIN — DOFETILIDE 250 MCG: 0.25 CAPSULE ORAL at 08:21

## 2024-06-23 RX ADMIN — METOPROLOL TARTRATE 25 MG: 25 TABLET, FILM COATED ORAL at 08:21

## 2024-06-23 RX ADMIN — AZITHROMYCIN MONOHYDRATE 500 MG: 500 INJECTION, POWDER, LYOPHILIZED, FOR SOLUTION INTRAVENOUS at 01:19

## 2024-06-23 RX ADMIN — ASPIRIN 81 MG: 81 TABLET, COATED ORAL at 08:21

## 2024-06-23 RX ADMIN — INSULIN GLARGINE 15 UNITS: 100 INJECTION, SOLUTION SUBCUTANEOUS at 05:34

## 2024-06-23 RX ADMIN — GABAPENTIN 300 MG: 300 CAPSULE ORAL at 08:21

## 2024-06-23 RX ADMIN — INSULIN LISPRO 2 UNITS: 100 INJECTION, SOLUTION INTRAVENOUS; SUBCUTANEOUS at 08:21

## 2024-06-23 NOTE — CONSULTS
Pharmacy Note  Warfarin Consult  Dx: afib  Goal INR range 2-3   Home Warfarin dose: 7.5 mg daily except 5 mg Q Sun     Date  INR  Warfarin    Recommend Warfarin mg tonight x1.  Daily INR ordered.  Rx will continue to manage therapy per consult order.  Luisito Burton, Pharm D.6/23/2024 3:14 AM

## 2024-06-23 NOTE — ED PROVIDER NOTES
[facility]  ED    EMERGENCY DEPARTMENT ENCOUNTER        Patient Name: London Swenson  MRN: [id number]  Birthdate 1950  Date of evaluation: 6/22/2024  PCP: Constance Yancey PA  Note Started: 9:03 PM EDT 6/22/24    CHIEF COMPLAINT       Chest Pain (STATES PAIN STARTED THIS EVENING, PT WAS GIVEN A DOUBLE DOSE OF HOME MEDICATIONS THIS MORNING.  GIVEN ASA AND NITRO IN SQUAD )      HISTORY OF PRESENT ILLNESS: 1 or more Elements       London Swenson is a 73 y.o. male who presents to the emergency department for evaluation of chest pain.  Patient reports he woke up this morning with chest pain.  Points towards the mid chest where he is having pain. He is on 4 L nasal cannula O2 in the emergency department.  Reports he does not wear oxygen at home.  Denies having history of asthma or COPD. Never been a smoker. Says he has a history of atrial fibrillation and takes warfarin.  Wife gives him his medications every day.  Says she gave him the medication this morning as usual.  His sister who was visiting thought that patient had not gotten his medication yet and also gave him his morning medications.  So he got 2 doses of his morning medications today.  Given aspirin and nitroglycerin by squad and reports that was somewhat helpful with symptoms. Asa and nitro by squad and says that was helpful with symptoms.     No other complaints, modifying factors or associated symptoms.     History obtained by the patient unless stated otherwise as above on HPI.   No limitations unless specified as above on HPI.     Past medical history:   Past Medical History:   Diagnosis Date    A-fib (HCC)     Acid reflux     Yan's esophagus     Coronary artery disease of native heart with stable angina pectoris (HCC) 05/30/2019    Dementia (HCC)     Diabetes mellitus ([facility])     Diabetic autonomic neuropathy associated with type 2 diabetes mellitus (HCC) 03/03/2020    Hyperlipidemia     Hypertension     Pancreatitis 1996    Restless

## 2024-06-23 NOTE — H&P
HISTORY AND PHYSICAL             Date: 6/23/2024        Patient Name: London Swenson     YOB: 1950      Age:  73 y.o.    Chief Complaint     Chief Complaint   Patient presents with    Chest Pain     STATES PAIN STARTED THIS EVENING, PT WAS GIVEN A DOUBLE DOSE OF HOME MEDICATIONS THIS MORNING.  GIVEN ASA AND NITRO IN SQUAD           History Obtained From   patient    History of Present Illness   Patient is a pleasant 73-year-old morbidly obese male with history of paroxysmal atrial fibrillation on Coumadin, dementia, diabetic neuropathy on insulin, dyslipidemia, among others presenting with shortness of breath that began this morning. He denies any associated fever or chills. He is requiring 3L oxygen which is new. Endorses chest pain but troponin negative x 2; pain is pleuritic.     Patient does not remember any of his medications but says he is pending a Watchman procedure, revealing his Coumadin is for AFIB. Also mentioned history of resection of portion of his lower pancrease after a severe case of pancreatitis in 1993 further explaining why he is insulin dependent.   Past Medical History     Past Medical History:   Diagnosis Date    A-fib (HCC)     Acid reflux     Yan's esophagus     Coronary artery disease of native heart with stable angina pectoris (HCC) 05/30/2019    Dementia (HCC)     Diabetes mellitus (HCC)     Diabetic autonomic neuropathy associated with type 2 diabetes mellitus (HCC) 03/03/2020    Hyperlipidemia     Hypertension     Pancreatitis 1996    Restless legs     Sleep apnea     uses CPAP    Tremor     of bilateral hands        Past Surgical History     Past Surgical History:   Procedure Laterality Date    CARDIAC DEFIBRILLATOR PLACEMENT      CATARACT REMOVAL Bilateral 2013    CHOLECYSTECTOMY      COLONOSCOPY  10/26/2011    ENDOSCOPY, COLON, DIAGNOSTIC      EGD halo    HYDROCELE EXCISION  11/15/2016    LARYNGOSCOPY N/A 10/25/2023    DRUG INDUCED SLEEP ENDOSCOPY performed by  precaution.  Speech therapy to evaluate for dysphagia.  Ceftriaxone and azithromycin.  Broaden therapy based on cultures result.  Sputum culture.  MRSA PCR.  Incentive spirometer.  Acapella flutter valve.  DuoNebs, nebulized budesonide twice daily.  Due to bilateral pitting edema, I will order venous Doppler ultrasound of both extremities.  D-dimer is negative however.  PE and acute DVT less likely.    Obtain urinalysis to rule out UTI.    For diabetes, glucose was elevated at 405.  No evidence of DKA.  Resume long-acting insulin with with meals insulin plus sliding scale.  Hypoglycemic protocol.    Continue rate control regimen for atrial fibrillation rate control.  Coumadin for stroke and systemic thrombosis prophylaxis.  Pharmacy consulted for Coumadin management.  Goal INR 2-3.    DVT prophylaxis: Coumadin.  INR 2.95    70 minutes spent on history and physical.    Consultations Ordered:  IP CONSULT TO HOSPITALIST  IP CONSULT TO PHARMACY    Electronically signed by KAYLI JUAREZ MD on 6/23/24 at 2:01 AM EDT

## 2024-06-23 NOTE — PLAN OF CARE
Problem: Discharge Planning  Goal: Discharge to home or other facility with appropriate resources  6/23/2024 0723 by Colette Oliveira RN  Outcome: Progressing     Problem: Chronic Conditions and Co-morbidities  Goal: Patient's chronic conditions and co-morbidity symptoms are monitored and maintained or improved  Outcome: Progressing   Pt educated on the importance of a carb control diet. Monitoring pt's blood glucose AC/HS. Sliding scale insulin given as ordered according to pt's blood glucose. Will continue to monitor.

## 2024-06-23 NOTE — ED NOTES
Mr. Swenson is a 73 y.o. male who had concerns including Chest Pain (STATES PAIN STARTED THIS EVENING, PT WAS GIVEN A DOUBLE DOSE OF HOME MEDICATIONS THIS MORNING.  GIVEN ASA AND NITRO IN SQUAD ).    Chief Complaint   Patient presents with    Chest Pain     STATES PAIN STARTED THIS EVENING, PT WAS GIVEN A DOUBLE DOSE OF HOME MEDICATIONS THIS MORNING.  GIVEN ASA AND NITRO IN SQUAD        He is being admitted for:    Acute respiratory failure with hypoxia (HCC)    His ED problem list included:    1. Hypoxia        Past Medical History:   Diagnosis Date    A-fib (HCC)     Acid reflux     Yan's esophagus     Coronary artery disease of native heart with stable angina pectoris (HCC) 05/30/2019    Dementia (HCC)     Diabetes mellitus (HCC)     Diabetic autonomic neuropathy associated with type 2 diabetes mellitus (HCC) 03/03/2020    Hyperlipidemia     Hypertension     Pancreatitis 1996    Restless legs     Sleep apnea     uses CPAP    Tremor     of bilateral hands       Past Surgical History:   Procedure Laterality Date    CARDIAC DEFIBRILLATOR PLACEMENT      CATARACT REMOVAL Bilateral 2013    CHOLECYSTECTOMY      COLONOSCOPY  10/26/2011    ENDOSCOPY, COLON, DIAGNOSTIC      EGD halo    HYDROCELE EXCISION  11/15/2016    LARYNGOSCOPY N/A 10/25/2023    DRUG INDUCED SLEEP ENDOSCOPY performed by David Estes DO at [facility] OR    PANCREAS SURGERY      cyst opened up and drining into small bowel    TONSILLECTOMY      UPPER GASTROINTESTINAL ENDOSCOPY  10/04/2016    with biopsy    UPPER GASTROINTESTINAL ENDOSCOPY  03/16/2017    Halo ablation    UPPER GASTROINTESTINAL ENDOSCOPY  06/26/2017    Halo ablation    UPPER GASTROINTESTINAL ENDOSCOPY  09/06/2017    bx distal sophagus    UPPER GASTROINTESTINAL ENDOSCOPY  12/22/2017    with HALO  procedure    UPPER GASTROINTESTINAL ENDOSCOPY N/A 12/04/2018    EGD WITH ABLATION AND ANESTHESIA - HALO---SLEEP APNEA---  performed by Rajesh Virk MD at [facility] ENDOSCOPY    UPPER

## 2024-06-23 NOTE — RT PROTOCOL NOTE
RT Inhaler-Nebulizer Bronchodilator Protocol Note    There is a bronchodilator order in the chart from a provider indicating to follow the RT Bronchodilator Protocol and there is an “Initiate RT Inhaler-Nebulizer Bronchodilator Protocol” order as well (see protocol at bottom of note).    CXR Findings:  XR CHEST PORTABLE    Result Date: 6/22/2024  No acute process.       The findings from the last RT Protocol Assessment were as follows:   History Pulmonary Disease: None or smoker <15 pack years  Respiratory Pattern: Regular pattern and RR 12-20 bpm  Breath Sounds: Clear breath sounds  Cough: Strong, spontaneous, non-productive  Indication for Bronchodilator Therapy: None  Bronchodilator Assessment Score: 0    Aerosolized bronchodilator medication orders have been revised according to the RT Inhaler-Nebulizer Bronchodilator Protocol below.    Respiratory Therapist to perform RT Therapy Protocol Assessment initially then follow the protocol.  Repeat RT Therapy Protocol Assessment PRN for score 0-3 or on second treatment, BID, and PRN for scores above 3.    No Indications - adjust the frequency to every 6 hours PRN wheezing or bronchospasm, if no treatments needed after 48 hours then discontinue using Per Protocol order mode.     If indication present, adjust the RT bronchodilator orders based on the Bronchodilator Assessment Score as indicated below.  Use Inhaler orders unless patient has one or more of the following: on home nebulizer, not able to hold breath for 10 seconds, is not alert and oriented, cannot activate and use MDI correctly, or respiratory rate 25 breaths per minute or more, then use the equivalent nebulizer order(s) with same Frequency and PRN reasons based on the score.  If a patient is on this medication at home then do not decrease Frequency below that used at home.    0-3 - enter or revise RT bronchodilator order(s) to equivalent RT Bronchodilator order with Frequency of every 4 hours PRN for

## 2024-06-23 NOTE — RT PROTOCOL NOTE
RT Inhaler-Nebulizer Bronchodilator Protocol Note    There is a bronchodilator order in the chart from a provider indicating to follow the RT Bronchodilator Protocol and there is an “Initiate RT Inhaler-Nebulizer Bronchodilator Protocol” order as well (see protocol at bottom of note).    CXR Findings:  XR CHEST PORTABLE    Result Date: 6/22/2024  No acute process.       The findings from the last RT Protocol Assessment were as follows:   History Pulmonary Disease: None or smoker <15 pack years  Respiratory Pattern: Dyspnea on exertion or RR 21-25 bpm  Breath Sounds: Slightly diminished and/or crackles  Cough: Strong, spontaneous, non-productive  Indication for Bronchodilator Therapy: Decreased or absent breath sounds  Bronchodilator Assessment Score: 4    Aerosolized bronchodilator medication orders have been revised according to the RT Inhaler-Nebulizer Bronchodilator Protocol below.    Respiratory Therapist to perform RT Therapy Protocol Assessment initially then follow the protocol.  Repeat RT Therapy Protocol Assessment PRN for score 0-3 or on second treatment, BID, and PRN for scores above 3.    No Indications - adjust the frequency to every 6 hours PRN wheezing or bronchospasm, if no treatments needed after 48 hours then discontinue using Per Protocol order mode.     If indication present, adjust the RT bronchodilator orders based on the Bronchodilator Assessment Score as indicated below.  Use Inhaler orders unless patient has one or more of the following: on home nebulizer, not able to hold breath for 10 seconds, is not alert and oriented, cannot activate and use MDI correctly, or respiratory rate 25 breaths per minute or more, then use the equivalent nebulizer order(s) with same Frequency and PRN reasons based on the score.  If a patient is on this medication at home then do not decrease Frequency below that used at home.    0-3 - enter or revise RT bronchodilator order(s) to equivalent RT Bronchodilator  order with Frequency of every 4 hours PRN for wheezing or increased work of breathing using Per Protocol order mode.        4-6 - enter or revise RT Bronchodilator order(s) to two equivalent RT bronchodilator orders with one order with BID Frequency and one order with Frequency of every 4 hours PRN wheezing or increased work of breathing using Per Protocol order mode.        7-10 - enter or revise RT Bronchodilator order(s) to two equivalent RT bronchodilator orders with one order with TID Frequency and one order with Frequency of every 4 hours PRN wheezing or increased work of breathing using Per Protocol order mode.       11-13 - enter or revise RT Bronchodilator order(s) to one equivalent RT bronchodilator order with QID Frequency and an Albuterol order with Frequency of every 4 hours PRN wheezing or increased work of breathing using Per Protocol order mode.      Greater than 13 - enter or revise RT Bronchodilator order(s) to one equivalent RT bronchodilator order with every 4 hours Frequency and an Albuterol order with Frequency of every 2 hours PRN wheezing or increased work of breathing using Per Protocol order mode.     RT to enter RT Home Evaluation for COPD & MDI Assessment order using Per Protocol order mode.    Electronically signed by Barbara Mason RCP on 6/23/2024 at 3:50 AM

## 2024-06-23 NOTE — PROGRESS NOTES
Pt d/c'd home via private vehicle.  Removed peripheral IV and stopped bleeding.  Catheter intact. Pt tolerated well. No redness noted at site.  Notified [facility] and removed tele box. Reviewed d/c instructions, home meds, and  f/u information utilizing teach-back method.  Scripts for augmentin sent to patient's home pharmacy. Patient verbalized understanding.

## 2024-06-23 NOTE — PROGRESS NOTES
06/23/24 0801   RT Protocol   History Pulmonary Disease 0   Respiratory pattern 0   Breath sounds 0   Cough 0   Indications for Bronchodilator Therapy None   Bronchodilator Assessment Score 0

## 2024-06-23 NOTE — DISCHARGE SUMMARY
anteriorly which is unchanged.  No effusion is seen.  There is no pulmonary nodule or mass. Soft Tissues/Bones: The bones are intact.  No aggressive osseous lesion is seen. Abdomen/Pelvis: Organs: The liver and spleen are normal size and density.  The gallbladder is been removed with clips in the gallbladder fossa.  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  There is a smaller cyst anteriorly along the lower pole with no hydronephrosis or renal stones there are multiple small cysts scattered in the left kidney with the largest along the upper pole measuring 4 cm and several smaller cyst more inferiorly which are unchanged with no hydronephrosis or renal stones.  The ureters are normal caliber. GI/Bowel:   The appendix is not well visualized.  There is mild feces scattered in the colon.  The small bowel is normal caliber with no bowel wall thickening and the mesentery is unremarkable. Pelvis:   The bladder is moderately distended with no wall thickening.  The prostate gland is top normal and unchanged.  There is no adenopathy or ascites. Peritoneum/Retroperitoneum:   The aorta is normal caliber with no aneurysm or dissection and no retroperitoneal mass or adenopathy is seen. Bones/Soft Tissues:   There are moderate degenerative changes throughout spine with no aggressive osseous lesion.     Hazy subsegmental linear and ground-glass opacities along the right upper lobe and scattered along both lung bases which is less prominent and probably represents slowly resolving or less likely recurrent infiltrates vs residual fibrosis and scarring.  Recommend short-term follow-up with chest x-rays. Mildly dilated ascending aorta with no aneurysm or dissection. Prominent central pulmonary arteries suggestive  of pulmonary artery hypertension. Coronary artery calcifications, suggest cardiology follow-up Status post previous cholecystectomy which is unchanged with no acute abnormality seen. Bilateral renal cysts and a large right renal cyst which are unchanged with no hydronephrosis or renal stones. Mild constipation with no obstruction. Moderate distention of the urinary bladder which could be due to urinary retention or early bladder outlet obstruction.  Recommend follow-up Borderline enlargement of prostate gland which is unchanged with no pelvic mass or active inflammation.     XR CHEST PORTABLE    Result Date: 6/22/2024  EXAMINATION: ONE XRAY VIEW OF THE CHEST 6/22/2024 9:04 pm COMPARISON: 01/28/2024 HISTORY: ORDERING SYSTEM PROVIDED HISTORY: CP TECHNOLOGIST PROVIDED HISTORY: Reason for exam:->CP Reason for Exam: chest pain FINDINGS: The lungs are without acute focal process.  There is no effusion or pneumothorax. The cardiomediastinal silhouette is without acute process. The osseous structures are without acute process.     No acute process.       Consults:     IP CONSULT TO HOSPITALIST  IP CONSULT TO PHARMACY    Labs:     Recent Labs     06/22/24 2102 06/23/24 0459   WBC 4.7 5.1   HGB 12.5* 11.8*   HCT 37.9* 36.0*    124*     Recent Labs     06/22/24 2102 06/23/24 0459    137   K 3.8 3.8   CL 97* 98*   CO2 30 30   BUN 27* 26*   CREATININE 1.2 1.1   CALCIUM 8.5 8.5   PHOS  --  2.8     Recent Labs     06/22/24 2102 06/22/24  2202   PROBNP <36  --    TROPHS 16 15     No results for input(s): \"LABA1C\" in the last 72 hours.  Recent Labs     06/22/24 2102 06/23/24 0459   AST 16 14*   ALT 18 15   BILITOT 0.4 0.3   ALKPHOS 120 107     Recent Labs     06/22/24 2102   INR 2.95*       Urine Cultures:   Lab Results   Component Value Date/Time    LABURIN >100,000 CFU/ml 10/11/2023 01:31 PM    LABURIN 200 10/26/2022 10:56 AM     Blood Cultures:   Lab Results   Component Value Date/Time    BC No Growth after 4

## 2024-06-23 NOTE — PROGRESS NOTES
Speech Language Pathology  Clinical Bedside Swallow Assessment  Facility/Department: [facility] A2 CARD TELEMETRY        Recommendations:  Diet recommendation: IDDSI 7 Regular Solids; IDDSI 0 Thin Liquids; Meds PO as tolerated  Instrumentation: Not clinically indicated at this time  Risk management: general aspiration precautions incl frequent oral hygiene, and hold PO and contact SLP if s/s of aspiration or worsening respiratory status develop.  **Suspect patient is not aspirating in current condition; however, team may consider instrumental swallow study (can be completed outpatient) to r/o silent aspiration.**      NAME:London Swenson  : 1950 (73 y.o.)   MRN: [id number]  ROOM: [address] [place] [address]  ADMISSION DATE: 2024  PATIENT DIAGNOSIS(ES): Hypoxia [R09.02]  Acute respiratory failure with hypoxia (HCC) [J96.01]  Chief Complaint   Patient presents with    Chest Pain     STATES PAIN STARTED THIS EVENING, PT WAS GIVEN A DOUBLE DOSE OF HOME MEDICATIONS THIS MORNING.  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 associated with type 2 diabetes mellitus (HCC) 2023    New onset atrial fibrillation (HCC) 2022    Chronic right-sided congestive heart failure (HCC) 2024    Acute hypoxic respiratory failure (HCC) 2024    Pacemaker 2023    Shortness of breath 2023    Vasovagal attack 2023    Acute respiratory failure with hypoxia (HCC) 2023    COVID-19 2023    SOB (shortness of breath) 2023    Pulmonary venous congestion 2023    Uncontrolled type 2 diabetes mellitus with hyperglycemia  No  Reduced Appetite: [] Yes [x] No  Premature satiety: [] Yes [x] No  Recent and/or Recurrent PNA: [] Yes [x] No  Changes in voice/vocal quality: [] Yes [x] No  Trouble with Mastication: [] Yes [x] No  Avoidance of certain foods due to trouble swallowing/chewing: [] Yes [x] No      Baseline Method of Oral Meds: PO as bruce    Additional Reported Symptoms/Complaints/Hospital Course: recurrent hypoxia, team wanting to r/o aspiration. Chest XR 6/22 negative. CT Chest 6/23 showed \"hazy subsegmental linear and ground-glass opacities along the right upper lobe and scattered along bother lung bases which is less prominent and probably represents slowly resolving or less likely recurrent infiltrates vs residual fibrosis and scarring.\"    Vitals/labs:   Temp: 97.5  SpO2: 99  RR: 18  BP: 125/81  HR: 73  O2 device: RA    Lab Values:    CBC:   Recent Labs     06/23/24  0459   WBC 5.1   HGB 11.8*   *      BMP:  Recent Labs     06/23/24  0459      K 3.8   CL 98*   CO2 30   BUN 26*   CREATININE 1.1   GLUCOSE 305*          Cranial nerve exam:   CN V (trigeminal): ophthalmic, maxillary, and mandibular facial sensation- WNL  CN VII (facial): Impaired bilaterally (mild)  CN IX/X (glossopharyngeal/vagus): vocal quality: WNL; cough: Strong-perceptually  CN XII (hypoglossal): WNL    Laryngeal function exam:   Secretions: WNL  Vocal quality: See CN exam above  Cough: See CN exam above    Oral Care Status:    [x] Oral Care WFL  [] Poor oral care status  [] Edentulous  [] Upper Dentures  [] Lower Dentures  [] Missing/Broken Teeth  [] Evidence of dental cavities/carries    PO trials:   IDDSI 0 (thin): ice, cup sips water, straw sips water    IDDSI 4 (puree): applesauce    IDDSI 7 (regular): collin cracker (large bite)    3 oz water: PASS      Dysphagia Diagnosis: No clinical indications of dysphagia, but team may consider instrumental swallow assessment to r/o silent aspiration given concern noted from CT and overall

## 2024-06-23 NOTE — PLAN OF CARE
SLP completed evaluation. Please refer to notes in EMR.   Kassie Altamirano M.S. [facility]-SLP #[id number]  Speech Language Pathologist

## 2024-06-24 ENCOUNTER — TELEPHONE (OUTPATIENT)
Dept: FAMILY MEDICINE CLINIC | Age: 74
End: 2024-06-24

## 2024-06-24 ENCOUNTER — CARE COORDINATION (OUTPATIENT)
Dept: CASE MANAGEMENT | Age: 74
End: 2024-06-24

## 2024-06-24 LAB
EKG ATRIAL RATE: 76 BPM
EKG DIAGNOSIS: NORMAL
EKG P AXIS: 45 DEGREES
EKG P-R INTERVAL: 204 MS
EKG Q-T INTERVAL: 380 MS
EKG QRS DURATION: 74 MS
EKG QTC CALCULATION (BAZETT): 427 MS
EKG R AXIS: 64 DEGREES
EKG T AXIS: 55 DEGREES
EKG VENTRICULAR RATE: 76 BPM
EST. AVERAGE GLUCOSE BLD GHB EST-MCNC: 197.3 MG/DL
HBA1C MFR BLD: 8.5 %

## 2024-06-24 PROCEDURE — 93010 ELECTROCARDIOGRAM REPORT: CPT | Performed by: INTERNAL MEDICINE

## 2024-06-24 NOTE — CARE COORDINATION
Care Transitions Initial Follow Up Call    Call within 2 business days of discharge: Yes    Patient: London Swenson Patient : 1950   MRN: [id number]  Reason for Admission: ARF w/ hypoxia   Discharge Date: 24 RARS: Readmission Risk Score: 18.8      Last Discharge Facility       Date Complaint Diagnosis Description Type Department Provider    24 Chest Pain Hypoxia ED to Hosp-Admission (Discharged) (ADMITTED) Thierno Lazcano MD; Lurdes Jefferson...          Attempted to reach patient via phone for initial post hospital transition call.  Primary listed number \"disconnected\"  Attempted to reach patient via phone for initial post hospital transition call.  Alternate number, message received  \"voice mailbox has not been set up yet. please try your call again later. good-bye\"       Care Transitions 24 Hour Call    Do you have all of your prescriptions and are they filled?: Yes (Comment: Reviewed all CJT 3/6/24)  Patient DME: Walker, Quad cane  Do you have support at home?: Partner/Spouse/SO  Are you an active caregiver in your home?: No  Care Transitions Interventions       Follow Up  Future Appointments   Date Time Provider Department Center   2024 10:00 AM [facility]   2024 10:00 AM Dylan Shanks MD [place] Car [facility]   2024  3:00 PM Kelsey Flores MD AND ENDO [facility]   2024 11:30 AM Radhames De La Torre, APRN - CNP [facility] CL[facility] CAR [facility]   2024 10:30 AM Constance Yancey PA [facility] [place] [facility]   2024  2:00 PM SCHEDULE, JUAN DEVICE CHECK [place] [facility]   2024  2:00 PM Ericka Valdez, APRN - CNP [place] [facility]     Brittney Granger RN

## 2024-06-24 NOTE — TELEPHONE ENCOUNTER
Care Transitions Initial Follow Up Call    Outreach made within 2 business days of discharge: Yes    Patient: London Swenson Patient : 1950   MRN: [id number]  Reason for Admission: There are no discharge diagnoses documented for the most recent discharge.  Discharge Date: 24       Spoke with: called patient's phone, one line is disconnected, and the other line's mailbox is not set up.  No answer and unable to leave message.    Discharge department/facility: A      Scheduled appointment with PCP within 7-14 days    Follow Up  Future Appointments   Date Time Provider Department Center   2024 10:00 AM [facility]   2024 10:00 AM Dylan Shanks MD [place] [facility]   2024  3:00 PM Kelsey Flores MD AND ENDO [facility]   2024 11:30 AM Radhames De La Torre, APRN - CNP P CLER CAR [facility]   2024 10:30 AM Constance Yancey PA [facility]  [place] - [facility]   2024  2:00 PM SCHEDULE, ALETHEA DEVICE CHECK [facility] [facility]   2024  2:00 PM Ericka Valdez, APRN - CNP [facility]       Afsaneh Short LPN

## 2024-06-25 ENCOUNTER — CARE COORDINATION (OUTPATIENT)
Dept: CASE MANAGEMENT | Age: 74
End: 2024-06-25

## 2024-06-25 ENCOUNTER — TELEPHONE (OUTPATIENT)
Dept: FAMILY MEDICINE CLINIC | Age: 74
End: 2024-06-25

## 2024-06-25 NOTE — TELEPHONE ENCOUNTER
Care Transitions Initial Follow Up Call    Outreach made within 2 business days of discharge: Yes    Patient: London Swenson Patient : 1950   MRN: [id number]  Reason for Admission: There are no discharge diagnoses documented for the most recent discharge.  Discharge Date: 24       Spoke with: 2nd attempt, tried to call patient, one number listed is disconnected, the other number does not have a mailbox set up, unable to leave message.    Discharge department/facility: A      Scheduled appointment with PCP within 7-14 days    Follow Up  Future Appointments   Date Time Provider Department Center   2024 10:00 AM [facility]   2024 10:00 AM Dylan Shanks MD [facility]   2024  3:00 PM Kelsey Flores MD AND ENDO [facility]   2024 11:30 AM Radhames De La Torre, APRN - CNP [facility] CLER CAR [facility]   2024 10:30 AM Constance Yancey PA [facility]  [place] [facility]   2024  2:00 PM SCHEDULE, ALETHEA DEVICE CHECK [facility]   2024  2:00 PM Ericka Valdez, APRN - CNP [facility]       Afsaneh Short LPN

## 2024-06-25 NOTE — CARE COORDINATION
Care Transitions Initial Follow Up Call    Call within 2 business days of discharge: Yes    Patient Current Location:  Home: [address] [place] [id number]    Care Transition Nurse contacted the patient by telephone to perform post hospital discharge assessment. Verified name and  with patient as identifiers. Provided introduction to self, and explanation of the Care Transition Nurse role.     Patient: London Swenson Patient : 1950   MRN: [id number]  Reason for Admission: ARF w/ hypoxia   Discharge Date: 24 RARS: Readmission Risk Score: 18.8      Last Discharge Facility       Date Complaint Diagnosis Description Type Department Provider    24 Chest Pain Hypoxia ED to Hosp-Admission (Discharged) (ADMITTED) [facility]2 Thierno Murillo MD; Lurdes Jefferson...            Was this an external facility discharge? No Discharge Facility:     Challenges to be reviewed by the provider   Additional needs identified to be addressed with provider: yes, spoke to patient and wife and updated system with correct phone numbers.  Patient's wife stated her information was \"hacked\" and had to change number. Wife's new number is listed in system as primary contact.         Method of communication with provider: none.    Patient answered call and verified . Patient pleasant and agreeable to transition call. Doing well and breathing easy since discharge from hospital. Confirmed that he has AVS and taking antibiotic as directed. Full medication reconciliation declined, but confirmed that he is taking as instructed. HFU appt scheduled in system for later this week and message routed to provider's office that wife's phone number was changed ad updated in system. Patient scheduled for INR check at coumadin clinic this morning at 10am, but wife stated patient over-slept. CTN encouraged to reschedule or go to coumadin clinic today for check. Stated understanding.   Wife was asking about patient's purpose of taking

## 2024-06-26 NOTE — TELEPHONE ENCOUNTER
Care Transitions Initial Follow Up Call    Outreach made within 2 business days of discharge: Yes    Patient: London Swenson Patient : 1950   MRN: [id number]  Reason for Admission: There are no discharge diagnoses documented for the most recent discharge.  Discharge Date: 24       Spoke with: London    Discharge department/facility: [facility] Interactive Patient Contact:  Was patient able to fill all prescriptions: Yes  Was patient instructed to bring all medications to the follow-up visit: Yes  Is patient taking all medications as directed in the discharge summary? Yes  Does patient understand their discharge instructions: Yes  Does patient have questions or concerns that need addressed prior to 7-14 day follow up office visit: no    Scheduled appointment with PCP within 7-14 days    Follow Up  Future Appointments   Date Time Provider Department Center   2024  9:30 AM [facility]   2024  8:30 AM Constance Yancey PA [facility] [place] [facility]   2024 10:00 AM Dylan Shanks MD [place] [facility]   2024  3:00 PM Kelsey Flores MD AND ENDO [facility]   2024 11:30 AM Radhames De La Torre APRN - CNP P CLER [facility]   2024 10:30 AM Constance Yancey PA [facility] [place] - [facility]   2024  2:00 PM SCHEDULE, ALETHEA DEVICE CHECK Ale[facility]a Car [facility]   2024  2:00 PM Ericka Valdez, APRN - CNP [facility]       Afsaneh Short LPN

## 2024-06-27 ENCOUNTER — ANTI-COAG VISIT (OUTPATIENT)
Dept: PHARMACY | Age: 74
End: 2024-06-27
Payer: MEDICARE

## 2024-06-27 DIAGNOSIS — I48.91 NEW ONSET ATRIAL FIBRILLATION (HCC): Primary | ICD-10-CM

## 2024-06-27 LAB
INTERNATIONAL NORMALIZATION RATIO, POC: 2.7
PROTHROMBIN TIME, POC: NORMAL

## 2024-06-27 PROCEDURE — 85610 PROTHROMBIN TIME: CPT | Performed by: PHARMACIST

## 2024-06-27 PROCEDURE — 99211 OFF/OP EST MAY X REQ PHY/QHP: CPT | Performed by: PHARMACIST

## 2024-06-27 NOTE — PROGRESS NOTES
Mr London Swenson is here for management of anticoagulation for AFib.   PMH also significant for CAD, DM2, HLD, HTN   He presents today w/out complaint.  Pt verifies dosing regimen as listed above.   Pt denies sx/s bleeding/bruising/swelling/SOB.  No missed doses  No changes in RX/OTCs/Herbal medications.  No dietary concerns  No ETOH and tobacco use.    He was in hospital last weekend, discharged with augmentin for one week for pneumonia. No other changes.    INR 2.7 is within therapeutic range of 2-3  Recommend to continue dose of 7.5 mg daily except 5 mg Q Sun  Patient has 5 mg tablets.  Will continue to monitor and check INR in 4 weeks.  Dosing reminder card given with phone number, appointment date and time.   Return to clinic: 24 @ 0930am     Xavier Crowley, PharmD 9:25 AM EDT 24    For Pharmacy Admin Tracking Only    Intervention Detail: Adherence Monitorin  Total # of Interventions Recommended: 1  Total # of Interventions Accepted: 1  Time Spent (min): 15

## 2024-06-28 NOTE — PROGRESS NOTES
Findings are suggestive of non-ischemic CM    Stress test 8/17/21  Summary   There is normal isotope uptake at stress and rest. There is no evidence of   myocardial ischemia or scar. LV function is normal with ejection fraction of   58%. Low risk study.    Stress test 8/18/20  Summary   There is no definite evidence of stress induced ischemia. LV function is   normal with uniform wall motion and ejection fraction of 64%. Low risk   study.    Stress test 3/22/19  Summary   There is normal isotope uptake at stress and rest. There is no evidence of   myocardial ischemia or scar. LV function is normal with uniform wall motion   and ejection fraction of 55%. Low risk study.    Cardiac catheterization 9/22/21  FINDINGS      HEMODYNAMICS     CHAMBER PRESSURE SATURATION   RA  1  59%   RV  23/1     PA  22/6  52%   PW  7     AORTA  96/45  85%      KARY CARDIAC OUTPUT  5.9 L/min   SVR  829    PVR  67       LVGRAM     LVEDP  8   GRADIENT ACROSS AORTIC VALVE  none   LV FUNCTION EF 60%   WALL MOTION  normal   MITRAL REGURGITATION  mild     CORONARY ARTERIES     CORONARY ARTERIES     LM <10% ybcrtpor-imm-zszqup stenosis.           LAD Prox <10% stenosis.  Mid 40-50% stenosis.  Distal 30-40% stenosis.  LAD wraps around apex.  D2 has ostial 90% stenosis and mid 40 to 50% stenosis.         LCX 10% prox-mid stenosis. OM1 bifurcates in prox segment and there is prox-mid OM1 40-50% stenosis.          RI  Mid 60% stenosis.   (could also be described as a high D1)         RCA Dominant.  Slight anterior takeoff, Prox-mid 20-30% stenoses. Distal 20% stenosis. Tortuous vessel.       CONCLUSIONS:      Moderate CAD/ASHD and major epicardial vessels  Overall findings are similar to prior angiography from 2019  Continue medical management  Discussed with patient family, would recommend follow-up with PCP and possibly pulmonary in the outpatient setting to investigate for noncardiac causes for his symptoms of chest pain shortness of breath.  No

## 2024-07-01 ENCOUNTER — TELEPHONE (OUTPATIENT)
Dept: CARDIOLOGY CLINIC | Age: 74
End: 2024-07-01

## 2024-07-01 ENCOUNTER — CARE COORDINATION (OUTPATIENT)
Dept: CASE MANAGEMENT | Age: 74
End: 2024-07-01

## 2024-07-01 ENCOUNTER — OFFICE VISIT (OUTPATIENT)
Dept: CARDIOLOGY CLINIC | Age: 74
End: 2024-07-01
Payer: MEDICARE

## 2024-07-01 VITALS
OXYGEN SATURATION: 93 % | BODY MASS INDEX: 42.43 KG/M2 | HEIGHT: 69 IN | SYSTOLIC BLOOD PRESSURE: 122 MMHG | DIASTOLIC BLOOD PRESSURE: 74 MMHG | WEIGHT: 286.5 LBS | HEART RATE: 65 BPM

## 2024-07-01 DIAGNOSIS — I10 PRIMARY HYPERTENSION: ICD-10-CM

## 2024-07-01 DIAGNOSIS — Z95.818 PRESENCE OF WATCHMAN LEFT ATRIAL APPENDAGE CLOSURE DEVICE: ICD-10-CM

## 2024-07-01 DIAGNOSIS — I48.0 PAROXYSMAL A-FIB (HCC): Primary | ICD-10-CM

## 2024-07-01 DIAGNOSIS — E66.01 MORBID OBESITY (HCC): ICD-10-CM

## 2024-07-01 DIAGNOSIS — E11.9 TYPE 2 DIABETES MELLITUS WITHOUT COMPLICATION, UNSPECIFIED WHETHER LONG TERM INSULIN USE (HCC): ICD-10-CM

## 2024-07-01 DIAGNOSIS — E78.2 MIXED HYPERLIPIDEMIA: ICD-10-CM

## 2024-07-01 DIAGNOSIS — I25.10 CORONARY ARTERY DISEASE INVOLVING NATIVE CORONARY ARTERY OF NATIVE HEART WITHOUT ANGINA PECTORIS: ICD-10-CM

## 2024-07-01 DIAGNOSIS — I48.0 PAF (PAROXYSMAL ATRIAL FIBRILLATION) (HCC): Primary | ICD-10-CM

## 2024-07-01 DIAGNOSIS — Z01.818 PRE-OP TESTING: ICD-10-CM

## 2024-07-01 PROBLEM — E11.22 TYPE 2 DIABETES MELLITUS WITH CHRONIC KIDNEY DISEASE (HCC): Status: ACTIVE | Noted: 2024-07-01

## 2024-07-01 PROBLEM — E11.40 TYPE 2 DIABETES MELLITUS WITH DIABETIC NEUROPATHY (HCC): Status: ACTIVE | Noted: 2024-07-01

## 2024-07-01 PROBLEM — E11.65 TYPE 2 DIABETES MELLITUS WITH HYPERGLYCEMIA (HCC): Status: ACTIVE | Noted: 2024-07-01

## 2024-07-01 PROCEDURE — 99204 OFFICE O/P NEW MOD 45 MIN: CPT | Performed by: INTERNAL MEDICINE

## 2024-07-01 PROCEDURE — 3052F HG A1C>EQUAL 8.0%<EQUAL 9.0%: CPT | Performed by: INTERNAL MEDICINE

## 2024-07-01 PROCEDURE — 1123F ACP DISCUSS/DSCN MKR DOCD: CPT | Performed by: INTERNAL MEDICINE

## 2024-07-01 PROCEDURE — 3078F DIAST BP <80 MM HG: CPT | Performed by: INTERNAL MEDICINE

## 2024-07-01 PROCEDURE — 3074F SYST BP LT 130 MM HG: CPT | Performed by: INTERNAL MEDICINE

## 2024-07-01 RX ORDER — FUROSEMIDE 40 MG/1
TABLET ORAL
Qty: 30 TABLET | Refills: 0 | Status: SHIPPED | OUTPATIENT
Start: 2024-07-01

## 2024-07-01 RX ORDER — WARFARIN SODIUM 5 MG/1
TABLET ORAL
Qty: 60 TABLET | Refills: 1 | Status: SHIPPED | OUTPATIENT
Start: 2024-07-01

## 2024-07-01 NOTE — CARE COORDINATION
Care Transitions Note    Follow Up Call     Patient Current Location:  [place] [facility] [place] [address]    Care Transition Nurse contacted the spouse/partner  by telephone. Verified name and  as identifiers.    Additional needs identified to be addressed with provider   No needs identified             Method of communication with provider: none.    Care Summary Note: Patient's wife, Rosy answered call and verified patient's . Patient pleasant and agreeable to transition call.  Patient seen by cardiology for upcoming Watchman procedure later this month or August. Patient and wife discussed questions and what to expect. Patient is wanting to have procedure at [facility] and waiting for office to reach out to him for scheduling. INR was checked last week and results discussed. Aware of Coumadin dosage and taking as directed. Denied any acute needs at present time.  Agreeable to f/u calls.  Educated on the use of urgent care or physician’s 24 hr access line if assistance is needed after hours.     Plan of care updates since last contact:  Education: Watchman procedure       Advance Care Planning:   Does patient have an Advance Directive: reviewed during previous call, see note. .    Medication Review:  Full medication reconciliation completed during previous call.    Remote Patient Monitoring:  Offered patient enrollment in the Remote Patient Monitoring (RPM) program for in-home monitoring: Yes, but did not enroll at this time: already monitoring with home equipment.    Assessments:  Care Transitions Subsequent and Final Call    Subsequent and Final Calls  Are you currently active with any services?: Home Health  Care Transitions Interventions  Other Interventions:              Follow Up Appointment:   [place] appointment attended as scheduled   Future Appointments         Provider Specialty Dept Phone    7/3/2024 12:00 PM Constance Yancey PA Family Medicine [phone]    2024 3:00 PM Kelsey Flores MD

## 2024-07-01 NOTE — PATIENT INSTRUCTIONS
Plan:  Agree with plan to proceed with Watchman device.   Start Lasix 40 mg three times a week as needed for swelling.   Follow up with me as needed.

## 2024-07-02 NOTE — PROGRESS NOTES
Physician Progress Note      PATIENT:               SERAFIN STONER  CSN #:                  [id number]  :                       1950  ADMIT DATE:       2024 8:48 PM  DISCH DATE:        2024 3:30 PM  RESPONDING  PROVIDER #:        Thierno Almazan MD          QUERY TEXT:    Patient admitted with chest pain and found with hypoxia. Noted documentation   of acute respiratory failure. O2 sat 89% on room air and placed on 4L without   respiratory distress.  Patient was treated with 2L NC after admission. VBG   shows pH 7.37/pCO2 50.3.  In order to support the diagnosis of acute   respiratory failure, please include additional clinical indicators in your   documentation.  Or please document if the diagnosis of acute respiratory   failure has been ruled out after further study.    The medical record reflects the following:  Risk Factors: age, ESTHER, pneumonia, DM, acid reflux, morbid obesity  Clinical Indicators: sob, sat 89%, RR-20, no acute distress per h/p assessment  Treatment: oxygen, abx, monitoring, chest xr, CT chest, VBG    Acute Respiratory Failure Clinical Indicators per 3M MS-DRG Training Guide and   Quick Reference Guide:  pO2 < 60 mmHg or SpO2 (pulse oximetry) < 91% breathing room air  pCO2 > 50 and pH < 7.35  P/F ratio (pO2 / FIO2) < 300  pO2 decrease or pCO2 increase by 10 mmHg from baseline (if known)  Supplemental oxygen of 40% or more  Presence of respiratory distress, tachypnea, dyspnea, shortness of breath,   wheezing  Unable to speak in complete sentences  Use of accessory muscles to breathe  Extreme anxiety and feeling of impending doom  Tripod position  Confusion/altered mental status/obtunded    Thank you,  Marybel Angelo RN,BSN,CCDS,CRCR  Options provided:  -- Acute Respiratory Failure as evidenced by, Please document evidence.  -- Acute Respiratory Failure ruled out after study  -- Other - I will add my own diagnosis  -- Disagree - Not applicable / Not valid  -- Disagree -

## 2024-07-03 ENCOUNTER — OFFICE VISIT (OUTPATIENT)
Dept: FAMILY MEDICINE CLINIC | Age: 74
End: 2024-07-03

## 2024-07-03 VITALS
WEIGHT: 285.8 LBS | DIASTOLIC BLOOD PRESSURE: 64 MMHG | OXYGEN SATURATION: 95 % | BODY MASS INDEX: 42.33 KG/M2 | HEART RATE: 76 BPM | HEIGHT: 69 IN | TEMPERATURE: 97 F | SYSTOLIC BLOOD PRESSURE: 128 MMHG

## 2024-07-03 DIAGNOSIS — J96.01 ACUTE RESPIRATORY FAILURE WITH HYPOXIA (HCC): ICD-10-CM

## 2024-07-03 DIAGNOSIS — Z09 HOSPITAL DISCHARGE FOLLOW-UP: ICD-10-CM

## 2024-07-03 DIAGNOSIS — J69.0 ASPIRATION PNEUMONIA, UNSPECIFIED ASPIRATION PNEUMONIA TYPE, UNSPECIFIED LATERALITY, UNSPECIFIED PART OF LUNG (HCC): Primary | ICD-10-CM

## 2024-07-03 RX ORDER — CHLORHEXIDINE GLUCONATE 40 MG/ML
SOLUTION TOPICAL ONCE
Qty: 1 EACH | Refills: 0 | Status: SHIPPED | OUTPATIENT
Start: 2024-07-03 | End: 2024-07-03

## 2024-07-03 SDOH — ECONOMIC STABILITY: FOOD INSECURITY: WITHIN THE PAST 12 MONTHS, YOU WORRIED THAT YOUR FOOD WOULD RUN OUT BEFORE YOU GOT MONEY TO BUY MORE.: NEVER TRUE

## 2024-07-03 SDOH — ECONOMIC STABILITY: FOOD INSECURITY: WITHIN THE PAST 12 MONTHS, THE FOOD YOU BOUGHT JUST DIDN'T LAST AND YOU DIDN'T HAVE MONEY TO GET MORE.: NEVER TRUE

## 2024-07-03 SDOH — ECONOMIC STABILITY: INCOME INSECURITY: HOW HARD IS IT FOR YOU TO PAY FOR THE VERY BASICS LIKE FOOD, HOUSING, MEDICAL CARE, AND HEATING?: NOT HARD AT ALL

## 2024-07-03 ASSESSMENT — PATIENT HEALTH QUESTIONNAIRE - PHQ9
SUM OF ALL RESPONSES TO PHQ QUESTIONS 1-9: 0
SUM OF ALL RESPONSES TO PHQ QUESTIONS 1-9: 0
3. TROUBLE FALLING OR STAYING ASLEEP: NOT AT ALL
2. FEELING DOWN, DEPRESSED OR HOPELESS: NOT AT ALL
9. THOUGHTS THAT YOU WOULD BE BETTER OFF DEAD, OR OF HURTING YOURSELF: NOT AT ALL
SUM OF ALL RESPONSES TO PHQ9 QUESTIONS 1 & 2: 0
5. POOR APPETITE OR OVEREATING: NOT AT ALL
SUM OF ALL RESPONSES TO PHQ QUESTIONS 1-9: 0
SUM OF ALL RESPONSES TO PHQ QUESTIONS 1-9: 0
4. FEELING TIRED OR HAVING LITTLE ENERGY: NOT AT ALL
6. FEELING BAD ABOUT YOURSELF - OR THAT YOU ARE A FAILURE OR HAVE LET YOURSELF OR YOUR FAMILY DOWN: NOT AT ALL
1. LITTLE INTEREST OR PLEASURE IN DOING THINGS: NOT AT ALL
7. TROUBLE CONCENTRATING ON THINGS, SUCH AS READING THE NEWSPAPER OR WATCHING TELEVISION: NOT AT ALL
8. MOVING OR SPEAKING SO SLOWLY THAT OTHER PEOPLE COULD HAVE NOTICED. OR THE OPPOSITE, BEING SO FIGETY OR RESTLESS THAT YOU HAVE BEEN MOVING AROUND A LOT MORE THAN USUAL: NOT AT ALL

## 2024-07-03 NOTE — PROGRESS NOTES
normal  ENT: tympanic membrane, external ear and ear canal normal bilaterally, nose without deformity, nasal mucosa and turbinates normal without polyps  Neck: supple and non-tender without mass, no thyromegaly or thyroid nodules, no cervical lymphadenopathy  Pulmonary/Chest: clear to auscultation bilaterally- no wheezes, rales or rhonchi, normal air movement, no respiratory distress  Cardiovascular: normal rate, regular rhythm, normal S1 and S2, no murmurs, rubs, clicks, or gallops, distal pulses intact, no carotid bruits  Abdomen: soft, non-tender, non-distended, normal bowel sounds, no masses or organomegaly  Extremities: no cyanosis, clubbing or edema  Musculoskeletal: normal range of motion, no joint swelling, deformity or tenderness  Neurologic: reflexes normal and symmetric, no cranial nerve deficit, gait, coordination and speech normal  Psych: flat affect       An electronic signature was used to authenticate this note.  --JENNIFER Cohen

## 2024-07-03 NOTE — TELEPHONE ENCOUNTER
Spoke with patient's wife Rosy and informed her of the procedure that is being scheduled for 7/15/2024 7:30 AM arrival.  Lab work has been ordered and instructed to have done at [facility] 7/08/2024.  Also informed to get Hibiclens bath at their Pharmacy.  All procedure instructions were e-mailed to patient.  Instructed to contact me with any questions prior to procedure.

## 2024-07-03 NOTE — TELEPHONE ENCOUNTER
Huy Diez 4/23/2024 referred for Watchman procedure.  Dr. Castillo consulted on 5/24/2024.  Dr. Dylan Shanks shared decision on 7/1/2024.  TANIA to be done the day of the procedure.  Insurance to be approved per Grace Martínez

## 2024-07-05 DIAGNOSIS — I48.0 PAROXYSMAL A-FIB (HCC): ICD-10-CM

## 2024-07-05 DIAGNOSIS — Z01.818 PRE-OP TESTING: ICD-10-CM

## 2024-07-05 LAB
BILIRUB UR QL STRIP.AUTO: NEGATIVE
CLARITY UR: CLEAR
COLOR UR: YELLOW
GLUCOSE UR STRIP.AUTO-MCNC: 100 MG/DL
HGB UR QL STRIP.AUTO: NEGATIVE
INR PPP: 2.1 (ref 0.85–1.15)
KETONES UR STRIP.AUTO-MCNC: NEGATIVE MG/DL
LEUKOCYTE ESTERASE UR QL STRIP.AUTO: NEGATIVE
NITRITE UR QL STRIP.AUTO: NEGATIVE
PH UR STRIP.AUTO: 6.5 [PH] (ref 5–8)
PROT UR STRIP.AUTO-MCNC: NEGATIVE MG/DL
PROTHROMBIN TIME: 23.6 SEC (ref 11.9–14.9)
SP GR UR STRIP.AUTO: 1.02 (ref 1–1.03)
UA DIPSTICK W REFLEX MICRO PNL UR: ABNORMAL
URN SPEC COLLECT METH UR: ABNORMAL
UROBILINOGEN UR STRIP-ACNC: 1 E.U./DL

## 2024-07-06 LAB
ABO + RH BLD: NORMAL
ANION GAP SERPL CALCULATED.3IONS-SCNC: 9 MMOL/L (ref 3–16)
BLD GP AB SCN SERPL QL: NORMAL
BUN SERPL-MCNC: 24 MG/DL (ref 7–20)
CALCIUM SERPL-MCNC: 9.1 MG/DL (ref 8.3–10.6)
CHLORIDE SERPL-SCNC: 104 MMOL/L (ref 99–110)
CO2 SERPL-SCNC: 28 MMOL/L (ref 21–32)
CREAT SERPL-MCNC: 0.8 MG/DL (ref 0.8–1.3)
DEPRECATED RDW RBC AUTO: 14.5 % (ref 12.4–15.4)
GFR SERPLBLD CREATININE-BSD FMLA CKD-EPI: >90 ML/MIN/{1.73_M2}
GLUCOSE SERPL-MCNC: 153 MG/DL (ref 70–99)
HCT VFR BLD AUTO: 36.8 % (ref 40.5–52.5)
HGB BLD-MCNC: 12.3 G/DL (ref 13.5–17.5)
MCH RBC QN AUTO: 29.2 PG (ref 26–34)
MCHC RBC AUTO-ENTMCNC: 33.3 G/DL (ref 31–36)
MCV RBC AUTO: 87.7 FL (ref 80–100)
PLATELET # BLD AUTO: 143 K/UL (ref 135–450)
PMV BLD AUTO: 10.7 FL (ref 5–10.5)
POTASSIUM SERPL-SCNC: 4.5 MMOL/L (ref 3.5–5.1)
RBC # BLD AUTO: 4.19 M/UL (ref 4.2–5.9)
SODIUM SERPL-SCNC: 141 MMOL/L (ref 136–145)
WBC # BLD AUTO: 4.6 K/UL (ref 4–11)

## 2024-07-08 ENCOUNTER — CARE COORDINATION (OUTPATIENT)
Dept: CASE MANAGEMENT | Age: 74
End: 2024-07-08

## 2024-07-08 NOTE — CARE COORDINATION
Care Transitions Note    Follow Up Call     Attempted to reach patient for transitions of care follow up.  Unable to reach patient.      Outreach Attempts:   Unable to leave message.  Message received that voice mailbox has not been set up yet    Follow Up Appointment:   Future Appointments         Provider Specialty Dept Phone    7/15/2024 8:30 AM (Arrive by 7:30 AM) F TANIA/PROCEDURES Cardiology [phone]    7/23/2024 11:30 AM Radhames De La Torre APRN - CNP Cardiology [phone]    7/25/2024 9:30 AM [facility] [phone]    8/6/2024 10:00 AM Kelsey Flores MD Endocrinology [phone]    9/4/2024 10:30 AM Constance Yancey PA Family Medicine [phone]    11/18/2024 2:00 PM SCHEDULE, ALETHEA DEVICE CHECK Cardiology [phone]    11/18/2024 2:00 PM Ericka Valdez APRN - CNP Cardiology [phone]            Brittney Granger RN

## 2024-07-10 ENCOUNTER — CARE COORDINATION (OUTPATIENT)
Dept: CASE MANAGEMENT | Age: 74
End: 2024-07-10

## 2024-07-10 DIAGNOSIS — Z79.4 TYPE 2 DIABETES MELLITUS WITH MICROALBUMINURIA, WITH LONG-TERM CURRENT USE OF INSULIN (HCC): ICD-10-CM

## 2024-07-10 DIAGNOSIS — E11.29 TYPE 2 DIABETES MELLITUS WITH MICROALBUMINURIA, WITH LONG-TERM CURRENT USE OF INSULIN (HCC): ICD-10-CM

## 2024-07-10 DIAGNOSIS — R80.9 TYPE 2 DIABETES MELLITUS WITH MICROALBUMINURIA, WITH LONG-TERM CURRENT USE OF INSULIN (HCC): ICD-10-CM

## 2024-07-10 RX ORDER — INSULIN GLARGINE 100 [IU]/ML
20 INJECTION, SOLUTION SUBCUTANEOUS NIGHTLY
Qty: 15 ML | Refills: 1 | Status: SHIPPED | OUTPATIENT
Start: 2024-07-10 | End: 2024-10-08

## 2024-07-10 NOTE — TELEPHONE ENCOUNTER
Refill Request     CONFIRM preferred pharmacy with the patient.    If Mail Order Rx - Pend for 90 day refill.      Last Seen: Last Seen Department: 7/3/2024  Last Seen by PCP: Visit date not found    Last Written: 11/26/23    If no future appointment scheduled:  Review the last OV with PCP and review information for follow-up visit,  Route STAFF MESSAGE with patient name to the  Pool for scheduling with the following information:            -  Timing of next visit           -  Visit type ie Physical, OV, etc           -  Diagnoses/Reason ie. COPD, HTN - Do not use MEDICATION, Follow-up or CHECK UP - Give reason for visit      Next Appointment:   Future Appointments   Date Time Provider Department Center   7/15/2024  8:30 AM [facility] TANIA/PROCEDURES [facility]   7/23/2024 11:30 AM Radhames De La Torre APRN - CNP P CLER CAR [facility]   7/25/2024  9:30 AM [facility]   8/6/2024 10:00 AM Kelsey Flores MD AND ENDO [facility]   9/4/2024 10:30 AM Constance Yancey PA [facility]  [place] [facility]   11/18/2024  2:00 PM SCHEDULE, ALETHEA DEVICE CHECK [facility] Car [facility]   11/18/2024  2:00 PM Ericka Valdez APRN - CNP [facility]       Message sent to  to schedule appt with patient?  NO      Requested Prescriptions     Pending Prescriptions Disp Refills    insulin glargine (LANTUS SOLOSTAR) 100 UNIT/ML injection pen 15 mL 1     Sig: Inject 20 Units into the skin nightly    Patients wife calling stating the the pharmacy can fill this medication but it needs a new rx sent to the pharmacy

## 2024-07-10 NOTE — CARE COORDINATION
Care Transitions Note    Follow Up Call     Patient Current Location:  Home: [facility] [address] [place]  [place] [address]    Care Transition Nurse contacted the spouse/partner  by telephone. Verified name and  as identifiers.    Additional needs identified to be addressed with provider   No needs identified           Method of communication with provider: none.    Care Summary Note: spoke to Rosy, patient's spouse- HIPAA verified in system. Patient is feeling okay, but recently had episodes of diarrhea. Denied any nausea or vomiting. Confirmed that he is replenishing fluids and discussed symptoms of dehydration. Stated understanding.   Notes reviewed and patient is scheduled to have Watchman procedure at [facility] next week and confirmed transportation. Pre-op bloodwork has been collected and reviewed results.  Continues to have INR checks (last result  was 2.1) and next check is scheduled for next week. Denied any questions about procedure and prepared for procedure.   Denied any acute needs at present time.  Agreeable to f/u calls.  Educated on the use of urgent care or physician’s 24 hr access line if assistance is needed after hours.    Plan of care updates since last contact:  Education: upcoming Watchman procedure       Advance Care Planning:   Does patient have an Advance Directive: reviewed during previous call, see note. .    Medication Review:  Full medication reconciliation completed during previous call.    Remote Patient Monitoring:  Offered patient enrollment in the Remote Patient Monitoring (RPM) program for in-home monitoring: discuss with follow up call.    Assessments:  Care Transitions Subsequent and Final Call    Subsequent and Final Calls  Care Transitions Interventions  Other Interventions:              Follow Up Appointment:   Reviewed upcoming appointment(s).  Future Appointments         Provider Specialty Dept Phone    7/15/2024 8:30 AM (Arrive by 7:30 AM) F TANIA/PROCEDURES Cardiology

## 2024-07-10 NOTE — TELEPHONE ENCOUNTER
Watchman 7/15/2024     7:30 AM London Swenson 1950 [place] NARANJO    Spoke with patient and went over all instructions.     Creatinine 0.8  Glucose 153  HgB 12.3  Plt 143  U/A Negative  PT/INR 23.6 / 2.1    Bipin scores 5 (AGE, DM, HTN, CAD, CHF)  HASbled is at least 3    Currently on Coumadin. He is a poor candidate for chronic anticoagulation with his history of Fall risk and Liable INR. Was on Eliquis but COST too much.    Huy Diez 4/23/2024 referred for Watchman procedure.  Dr. Castillo consulted on 5/24/2024.  Dr. Dylan Shanks shared decision on 7/1/2024.  TANIA to be done the day of the procedure.  Insurance to be approved per Grace Martínez     Call patient on Friday to review medications/problems. YES.  UTI (Antibiotic taken)? Negative  Any groin issues? look for any type of rash, open skin, etc NO  On any blood thinners & when to stop taking? Do not stop taking Coumadin and take 1 Baby Aspirin the day of the procedure.  Lab work addressed prior to admission. YES  Allergies addressed? YES  Pre-medication necessary? NO  Pt. staying overnight? NO  PT/INR, T&C & Glucose ordered for day of procedure on every patient. YES  \"Electrophysiology Testing\" order placed on every patient? YES

## 2024-07-15 ENCOUNTER — APPOINTMENT (OUTPATIENT)
Dept: GENERAL RADIOLOGY | Age: 74
DRG: 274 | End: 2024-07-15
Attending: INTERNAL MEDICINE
Payer: MEDICARE

## 2024-07-15 ENCOUNTER — HOSPITAL ENCOUNTER (INPATIENT)
Age: 74
LOS: 1 days | Discharge: HOME | DRG: 274 | End: 2024-07-16
Attending: INTERNAL MEDICINE | Admitting: INTERNAL MEDICINE
Payer: MEDICARE

## 2024-07-15 ENCOUNTER — APPOINTMENT (OUTPATIENT)
Age: 74
DRG: 274 | End: 2024-07-15
Attending: INTERNAL MEDICINE
Payer: MEDICARE

## 2024-07-15 ENCOUNTER — ANESTHESIA EVENT (OUTPATIENT)
Age: 74
DRG: 274 | End: 2024-07-15
Payer: MEDICARE

## 2024-07-15 ENCOUNTER — ANESTHESIA (OUTPATIENT)
Age: 74
DRG: 274 | End: 2024-07-15
Payer: MEDICARE

## 2024-07-15 DIAGNOSIS — Z01.818 PRE-OP TESTING: ICD-10-CM

## 2024-07-15 DIAGNOSIS — I48.0 PAROXYSMAL ATRIAL FIBRILLATION (HCC): ICD-10-CM

## 2024-07-15 DIAGNOSIS — I48.0 PAROXYSMAL A-FIB (HCC): ICD-10-CM

## 2024-07-15 DIAGNOSIS — R06.02 SHORTNESS OF BREATH: Primary | ICD-10-CM

## 2024-07-15 LAB
ABO + RH BLD: NORMAL
ANION GAP SERPL CALCULATED.3IONS-SCNC: 12 MMOL/L (ref 3–16)
BASOPHILS # BLD: 0 K/UL (ref 0–0.2)
BASOPHILS NFR BLD: 0.2 %
BLD GP AB SCN SERPL QL: NORMAL
BUN SERPL-MCNC: 28 MG/DL (ref 7–20)
CALCIUM SERPL-MCNC: 8.1 MG/DL (ref 8.3–10.6)
CHLORIDE SERPL-SCNC: 97 MMOL/L (ref 99–110)
CO2 SERPL-SCNC: 26 MMOL/L (ref 21–32)
CREAT SERPL-MCNC: 1 MG/DL (ref 0.8–1.3)
DEPRECATED RDW RBC AUTO: 14.6 % (ref 12.4–15.4)
ECHO AO ASC DIAM: 3.9 CM
ECHO AO ASCENDING AORTA INDEX: 1.63 CM/M2
ECHO AO ROOT DIAM: 3.3 CM
ECHO AO ROOT INDEX: 1.38 CM/M2
ECHO BSA: 2.51 M2
ECHO LA DIAMETER INDEX: 1.75 CM/M2
ECHO LA DIAMETER: 4.2 CM
ECHO LA TO AORTIC ROOT RATIO: 1.27
ECHO LV FRACTIONAL SHORTENING: 28 % (ref 28–44)
ECHO LV INTERNAL DIMENSION DIASTOLE INDEX: 1.63 CM/M2
ECHO LV INTERNAL DIMENSION DIASTOLIC: 3.9 CM (ref 4.2–5.9)
ECHO LV INTERNAL DIMENSION SYSTOLIC INDEX: 1.17 CM/M2
ECHO LV INTERNAL DIMENSION SYSTOLIC: 2.8 CM
ECHO LV IVSD: 1.4 CM (ref 0.6–1)
ECHO LV MASS 2D: 201.5 G (ref 88–224)
ECHO LV MASS INDEX 2D: 84 G/M2 (ref 49–115)
ECHO LV POSTERIOR WALL DIASTOLIC: 1.4 CM (ref 0.6–1)
ECHO LV RELATIVE WALL THICKNESS RATIO: 0.72
EKG ATRIAL RATE: 44 BPM
EKG DIAGNOSIS: NORMAL
EKG Q-T INTERVAL: 392 MS
EKG QRS DURATION: 80 MS
EKG QTC CALCULATION (BAZETT): 420 MS
EKG R AXIS: 56 DEGREES
EKG T AXIS: 40 DEGREES
EKG VENTRICULAR RATE: 69 BPM
EOSINOPHIL # BLD: 0 K/UL (ref 0–0.6)
EOSINOPHIL NFR BLD: 0.1 %
GFR SERPLBLD CREATININE-BSD FMLA CKD-EPI: 79 ML/MIN/{1.73_M2}
GLUCOSE BLD-MCNC: 193 MG/DL (ref 70–99)
GLUCOSE BLD-MCNC: 249 MG/DL (ref 70–99)
GLUCOSE BLD-MCNC: 307 MG/DL (ref 70–99)
GLUCOSE BLD-MCNC: 404 MG/DL (ref 70–99)
GLUCOSE SERPL-MCNC: 362 MG/DL (ref 70–99)
HCT VFR BLD AUTO: 36.1 % (ref 40.5–52.5)
HGB BLD-MCNC: 11.9 G/DL (ref 13.5–17.5)
INR BLD: 2.6 (ref 0.84–1.16)
INR PPP: 2.6 (ref 0.85–1.15)
LEFT VENTRICULAR EJECTION FRACTION MODE: NORMAL
LV EF: 60 % (ref 60–65)
LYMPHOCYTES # BLD: 0.4 K/UL (ref 1–5.1)
LYMPHOCYTES NFR BLD: 4.1 %
MCH RBC QN AUTO: 29.1 PG (ref 26–34)
MCHC RBC AUTO-ENTMCNC: 33 G/DL (ref 31–36)
MCV RBC AUTO: 88.3 FL (ref 80–100)
MONOCYTES # BLD: 0.2 K/UL (ref 0–1.3)
MONOCYTES NFR BLD: 2.4 %
NEUTROPHILS # BLD: 8 K/UL (ref 1.7–7.7)
NEUTROPHILS NFR BLD: 93.2 %
PERFORMED ON: ABNORMAL
PLATELET # BLD AUTO: 117 K/UL (ref 135–450)
PMV BLD AUTO: 10.7 FL (ref 5–10.5)
POTASSIUM SERPL-SCNC: 4.9 MMOL/L (ref 3.5–5.1)
PROTHROMBIN TIME: 27.8 SEC (ref 11.9–14.9)
RBC # BLD AUTO: 4.09 M/UL (ref 4.2–5.9)
SODIUM SERPL-SCNC: 135 MMOL/L (ref 136–145)
WBC # BLD AUTO: 8.6 K/UL (ref 4–11)

## 2024-07-15 PROCEDURE — 85025 COMPLETE CBC W/AUTO DIFF WBC: CPT

## 2024-07-15 PROCEDURE — 6370000000 HC RX 637 (ALT 250 FOR IP): Performed by: INTERNAL MEDICINE

## 2024-07-15 PROCEDURE — 86900 BLOOD TYPING SEROLOGIC ABO: CPT

## 2024-07-15 PROCEDURE — 93010 ELECTROCARDIOGRAM REPORT: CPT | Performed by: INTERNAL MEDICINE

## 2024-07-15 PROCEDURE — 6360000002 HC RX W HCPCS

## 2024-07-15 PROCEDURE — 7100000000 HC PACU RECOVERY - FIRST 15 MIN: Performed by: INTERNAL MEDICINE

## 2024-07-15 PROCEDURE — 86901 BLOOD TYPING SEROLOGIC RH(D): CPT

## 2024-07-15 PROCEDURE — 93355 ECHO TRANSESOPHAGEAL (TEE): CPT

## 2024-07-15 PROCEDURE — 2500000003 HC RX 250 WO HCPCS: Performed by: INTERNAL MEDICINE

## 2024-07-15 PROCEDURE — 02L73DK OCCLUSION OF LEFT ATRIAL APPENDAGE WITH INTRALUMINAL DEVICE, PERCUTANEOUS APPROACH: ICD-10-PCS | Performed by: INTERNAL MEDICINE

## 2024-07-15 PROCEDURE — 3700000001 HC ADD 15 MINUTES (ANESTHESIA): Performed by: INTERNAL MEDICINE

## 2024-07-15 PROCEDURE — C1760 CLOSURE DEV, VASC: HCPCS | Performed by: INTERNAL MEDICINE

## 2024-07-15 PROCEDURE — 93005 ELECTROCARDIOGRAM TRACING: CPT | Performed by: INTERNAL MEDICINE

## 2024-07-15 PROCEDURE — 3700000000 HC ANESTHESIA ATTENDED CARE: Performed by: INTERNAL MEDICINE

## 2024-07-15 PROCEDURE — B2151ZZ FLUOROSCOPY OF LEFT HEART USING LOW OSMOLAR CONTRAST: ICD-10-PCS | Performed by: INTERNAL MEDICINE

## 2024-07-15 PROCEDURE — 1200000000 HC SEMI PRIVATE

## 2024-07-15 PROCEDURE — 36415 COLL VENOUS BLD VENIPUNCTURE: CPT

## 2024-07-15 PROCEDURE — 80048 BASIC METABOLIC PNL TOTAL CA: CPT

## 2024-07-15 PROCEDURE — 2580000003 HC RX 258

## 2024-07-15 PROCEDURE — 2580000003 HC RX 258: Performed by: INTERNAL MEDICINE

## 2024-07-15 PROCEDURE — 6370000000 HC RX 637 (ALT 250 FOR IP)

## 2024-07-15 PROCEDURE — 6360000002 HC RX W HCPCS: Performed by: HOSPITALIST

## 2024-07-15 PROCEDURE — 6360000004 HC RX CONTRAST MEDICATION: Performed by: INTERNAL MEDICINE

## 2024-07-15 PROCEDURE — 85610 PROTHROMBIN TIME: CPT

## 2024-07-15 PROCEDURE — 33340 PERQ CLSR TCAT L ATR APNDGE: CPT | Performed by: INTERNAL MEDICINE

## 2024-07-15 PROCEDURE — 86850 RBC ANTIBODY SCREEN: CPT

## 2024-07-15 PROCEDURE — C1894 INTRO/SHEATH, NON-LASER: HCPCS | Performed by: INTERNAL MEDICINE

## 2024-07-15 PROCEDURE — 71045 X-RAY EXAM CHEST 1 VIEW: CPT

## 2024-07-15 PROCEDURE — 7100000001 HC PACU RECOVERY - ADDTL 15 MIN: Performed by: INTERNAL MEDICINE

## 2024-07-15 PROCEDURE — C1769 GUIDE WIRE: HCPCS | Performed by: INTERNAL MEDICINE

## 2024-07-15 PROCEDURE — 6360000002 HC RX W HCPCS: Performed by: NURSE ANESTHETIST, CERTIFIED REGISTERED

## 2024-07-15 PROCEDURE — 2709999900 HC NON-CHARGEABLE SUPPLY: Performed by: INTERNAL MEDICINE

## 2024-07-15 PROCEDURE — 93308 TTE F-UP OR LMTD: CPT | Performed by: INTERNAL MEDICINE

## 2024-07-15 PROCEDURE — 2500000003 HC RX 250 WO HCPCS: Performed by: NURSE ANESTHETIST, CERTIFIED REGISTERED

## 2024-07-15 PROCEDURE — 85347 COAGULATION TIME ACTIVATED: CPT

## 2024-07-15 PROCEDURE — C8924 2D TTE W OR W/O FOL W/CON,FU: HCPCS

## 2024-07-15 PROCEDURE — 6370000000 HC RX 637 (ALT 250 FOR IP): Performed by: HOSPITALIST

## 2024-07-15 PROCEDURE — 93325 DOPPLER ECHO COLOR FLOW MAPG: CPT | Performed by: INTERNAL MEDICINE

## 2024-07-15 PROCEDURE — APPNB45 APP NON BILLABLE 31-45 MINUTES: Performed by: NURSE PRACTITIONER

## 2024-07-15 PROCEDURE — 2500000003 HC RX 250 WO HCPCS

## 2024-07-15 PROCEDURE — 6370000000 HC RX 637 (ALT 250 FOR IP): Performed by: NURSE PRACTITIONER

## 2024-07-15 PROCEDURE — 93355 ECHO TRANSESOPHAGEAL (TEE): CPT | Performed by: INTERNAL MEDICINE

## 2024-07-15 PROCEDURE — C1889 IMPLANT/INSERT DEVICE, NOC: HCPCS | Performed by: INTERNAL MEDICINE

## 2024-07-15 DEVICE — LEFT ATRIAL APPENDAGE CLOSURE DEVICE WITH DELIVERY SYSTEM
Type: IMPLANTABLE DEVICE | Site: HEART | Status: FUNCTIONAL
Brand: WATCHMAN FLX™ PRO

## 2024-07-15 RX ORDER — LABETALOL HYDROCHLORIDE 5 MG/ML
5 INJECTION, SOLUTION INTRAVENOUS
Status: DISCONTINUED | OUTPATIENT
Start: 2024-07-15 | End: 2024-07-15 | Stop reason: HOSPADM

## 2024-07-15 RX ORDER — ONDANSETRON 2 MG/ML
4 INJECTION INTRAMUSCULAR; INTRAVENOUS
Status: DISCONTINUED | OUTPATIENT
Start: 2024-07-15 | End: 2024-07-15 | Stop reason: HOSPADM

## 2024-07-15 RX ORDER — NALOXONE HYDROCHLORIDE 0.4 MG/ML
INJECTION, SOLUTION INTRAMUSCULAR; INTRAVENOUS; SUBCUTANEOUS PRN
Status: DISCONTINUED | OUTPATIENT
Start: 2024-07-15 | End: 2024-07-15 | Stop reason: HOSPADM

## 2024-07-15 RX ORDER — ATORVASTATIN CALCIUM 40 MG/1
40 TABLET, FILM COATED ORAL NIGHTLY
Status: DISCONTINUED | OUTPATIENT
Start: 2024-07-15 | End: 2024-07-16 | Stop reason: HOSPADM

## 2024-07-15 RX ORDER — PROPOFOL 10 MG/ML
INJECTION, EMULSION INTRAVENOUS PRN
Status: DISCONTINUED | OUTPATIENT
Start: 2024-07-15 | End: 2024-07-15 | Stop reason: SDUPTHER

## 2024-07-15 RX ORDER — HYDROMORPHONE HYDROCHLORIDE 2 MG/ML
0.25 INJECTION, SOLUTION INTRAMUSCULAR; INTRAVENOUS; SUBCUTANEOUS EVERY 5 MIN PRN
Status: DISCONTINUED | OUTPATIENT
Start: 2024-07-15 | End: 2024-07-15 | Stop reason: HOSPADM

## 2024-07-15 RX ORDER — GLUCAGON 1 MG/ML
1 KIT INJECTION PRN
Status: DISCONTINUED | OUTPATIENT
Start: 2024-07-15 | End: 2024-07-15 | Stop reason: SDUPTHER

## 2024-07-15 RX ORDER — ASPIRIN 81 MG/1
81 TABLET ORAL DAILY
Status: DISCONTINUED | OUTPATIENT
Start: 2024-07-16 | End: 2024-07-16 | Stop reason: HOSPADM

## 2024-07-15 RX ORDER — SODIUM CHLORIDE 9 MG/ML
INJECTION, SOLUTION INTRAVENOUS PRN
Status: DISCONTINUED | OUTPATIENT
Start: 2024-07-15 | End: 2024-07-16 | Stop reason: HOSPADM

## 2024-07-15 RX ORDER — ACETAMINOPHEN 325 MG/1
650 TABLET ORAL EVERY 4 HOURS PRN
Status: DISCONTINUED | OUTPATIENT
Start: 2024-07-15 | End: 2024-07-15

## 2024-07-15 RX ORDER — INSULIN LISPRO 100 [IU]/ML
0-16 INJECTION, SOLUTION INTRAVENOUS; SUBCUTANEOUS
Status: DISCONTINUED | OUTPATIENT
Start: 2024-07-15 | End: 2024-07-16 | Stop reason: HOSPADM

## 2024-07-15 RX ORDER — SODIUM CHLORIDE 0.9 % (FLUSH) 0.9 %
5-40 SYRINGE (ML) INJECTION PRN
Status: DISCONTINUED | OUTPATIENT
Start: 2024-07-15 | End: 2024-07-15 | Stop reason: HOSPADM

## 2024-07-15 RX ORDER — SODIUM CHLORIDE 0.9 % (FLUSH) 0.9 %
5-40 SYRINGE (ML) INJECTION EVERY 12 HOURS SCHEDULED
Status: DISCONTINUED | OUTPATIENT
Start: 2024-07-15 | End: 2024-07-15 | Stop reason: HOSPADM

## 2024-07-15 RX ORDER — LIDOCAINE HYDROCHLORIDE 20 MG/ML
INJECTION, SOLUTION INTRAVENOUS PRN
Status: DISCONTINUED | OUTPATIENT
Start: 2024-07-15 | End: 2024-07-15 | Stop reason: SDUPTHER

## 2024-07-15 RX ORDER — LIDOCAINE HYDROCHLORIDE 10 MG/ML
1 INJECTION, SOLUTION EPIDURAL; INFILTRATION; INTRACAUDAL; PERINEURAL
Status: DISCONTINUED | OUTPATIENT
Start: 2024-07-15 | End: 2024-07-15 | Stop reason: HOSPADM

## 2024-07-15 RX ORDER — GLYCOPYRROLATE 0.2 MG/ML
INJECTION INTRAMUSCULAR; INTRAVENOUS PRN
Status: DISCONTINUED | OUTPATIENT
Start: 2024-07-15 | End: 2024-07-15 | Stop reason: SDUPTHER

## 2024-07-15 RX ORDER — IPRATROPIUM BROMIDE AND ALBUTEROL SULFATE 2.5; .5 MG/3ML; MG/3ML
SOLUTION RESPIRATORY (INHALATION)
Status: COMPLETED
Start: 2024-07-15 | End: 2024-07-15

## 2024-07-15 RX ORDER — DEXTROSE MONOHYDRATE 100 MG/ML
INJECTION, SOLUTION INTRAVENOUS CONTINUOUS PRN
Status: DISCONTINUED | OUTPATIENT
Start: 2024-07-15 | End: 2024-07-16 | Stop reason: HOSPADM

## 2024-07-15 RX ORDER — IPRATROPIUM BROMIDE AND ALBUTEROL SULFATE 2.5; .5 MG/3ML; MG/3ML
1 SOLUTION RESPIRATORY (INHALATION) ONCE
Status: COMPLETED | OUTPATIENT
Start: 2024-07-15 | End: 2024-07-15

## 2024-07-15 RX ORDER — ACETAMINOPHEN 325 MG/1
650 TABLET ORAL EVERY 4 HOURS PRN
Status: DISCONTINUED | OUTPATIENT
Start: 2024-07-15 | End: 2024-07-16 | Stop reason: HOSPADM

## 2024-07-15 RX ORDER — PROTAMINE SULFATE 10 MG/ML
INJECTION, SOLUTION INTRAVENOUS PRN
Status: DISCONTINUED | OUTPATIENT
Start: 2024-07-15 | End: 2024-07-15 | Stop reason: SDUPTHER

## 2024-07-15 RX ORDER — FUROSEMIDE 10 MG/ML
20 INJECTION INTRAMUSCULAR; INTRAVENOUS ONCE
Status: COMPLETED | OUTPATIENT
Start: 2024-07-15 | End: 2024-07-15

## 2024-07-15 RX ORDER — CEFAZOLIN SODIUM 1 G/3ML
INJECTION, POWDER, FOR SOLUTION INTRAMUSCULAR; INTRAVENOUS PRN
Status: DISCONTINUED | OUTPATIENT
Start: 2024-07-15 | End: 2024-07-15 | Stop reason: SDUPTHER

## 2024-07-15 RX ORDER — SODIUM CHLORIDE 0.9 % (FLUSH) 0.9 %
5-40 SYRINGE (ML) INJECTION PRN
Status: DISCONTINUED | OUTPATIENT
Start: 2024-07-15 | End: 2024-07-16 | Stop reason: HOSPADM

## 2024-07-15 RX ORDER — ONDANSETRON 2 MG/ML
INJECTION INTRAMUSCULAR; INTRAVENOUS PRN
Status: DISCONTINUED | OUTPATIENT
Start: 2024-07-15 | End: 2024-07-15 | Stop reason: SDUPTHER

## 2024-07-15 RX ORDER — PANTOPRAZOLE SODIUM 40 MG/1
40 TABLET, DELAYED RELEASE ORAL
Status: DISCONTINUED | OUTPATIENT
Start: 2024-07-15 | End: 2024-07-16 | Stop reason: HOSPADM

## 2024-07-15 RX ORDER — PHENYLEPHRINE HCL IN 0.9% NACL 1 MG/10 ML
SYRINGE (ML) INTRAVENOUS PRN
Status: DISCONTINUED | OUTPATIENT
Start: 2024-07-15 | End: 2024-07-15 | Stop reason: SDUPTHER

## 2024-07-15 RX ORDER — SODIUM CHLORIDE 9 MG/ML
INJECTION, SOLUTION INTRAVENOUS PRN
Status: DISCONTINUED | OUTPATIENT
Start: 2024-07-15 | End: 2024-07-15 | Stop reason: HOSPADM

## 2024-07-15 RX ORDER — ALBUTEROL SULFATE 2.5 MG/3ML
2.5 SOLUTION RESPIRATORY (INHALATION) ONCE
Status: DISCONTINUED | OUTPATIENT
Start: 2024-07-15 | End: 2024-07-15

## 2024-07-15 RX ORDER — SODIUM CHLORIDE 9 MG/ML
INJECTION, SOLUTION INTRAVENOUS CONTINUOUS
Status: DISCONTINUED | OUTPATIENT
Start: 2024-07-15 | End: 2024-07-15 | Stop reason: HOSPADM

## 2024-07-15 RX ORDER — GABAPENTIN 300 MG/1
300 CAPSULE ORAL NIGHTLY
Status: DISCONTINUED | OUTPATIENT
Start: 2024-07-15 | End: 2024-07-16 | Stop reason: HOSPADM

## 2024-07-15 RX ORDER — SODIUM CHLORIDE 0.9 % (FLUSH) 0.9 %
5-40 SYRINGE (ML) INJECTION EVERY 12 HOURS SCHEDULED
Status: DISCONTINUED | OUTPATIENT
Start: 2024-07-15 | End: 2024-07-16 | Stop reason: HOSPADM

## 2024-07-15 RX ORDER — DOFETILIDE 0.25 MG/1
250 CAPSULE ORAL EVERY 12 HOURS SCHEDULED
Status: DISCONTINUED | OUTPATIENT
Start: 2024-07-15 | End: 2024-07-16 | Stop reason: HOSPADM

## 2024-07-15 RX ORDER — WARFARIN SODIUM 7.5 MG/1
7.5 TABLET ORAL
Status: DISCONTINUED | OUTPATIENT
Start: 2024-07-15 | End: 2024-07-16

## 2024-07-15 RX ORDER — DEXTROSE MONOHYDRATE 100 MG/ML
INJECTION, SOLUTION INTRAVENOUS CONTINUOUS PRN
Status: DISCONTINUED | OUTPATIENT
Start: 2024-07-15 | End: 2024-07-15 | Stop reason: SDUPTHER

## 2024-07-15 RX ORDER — WARFARIN SODIUM 5 MG/1
5 TABLET ORAL WEEKLY
Status: DISCONTINUED | OUTPATIENT
Start: 2024-07-21 | End: 2024-07-16

## 2024-07-15 RX ORDER — FENTANYL CITRATE 50 UG/ML
INJECTION, SOLUTION INTRAMUSCULAR; INTRAVENOUS PRN
Status: DISCONTINUED | OUTPATIENT
Start: 2024-07-15 | End: 2024-07-15 | Stop reason: SDUPTHER

## 2024-07-15 RX ORDER — HYDROMORPHONE HYDROCHLORIDE 2 MG/ML
0.5 INJECTION, SOLUTION INTRAMUSCULAR; INTRAVENOUS; SUBCUTANEOUS EVERY 5 MIN PRN
Status: DISCONTINUED | OUTPATIENT
Start: 2024-07-15 | End: 2024-07-15 | Stop reason: HOSPADM

## 2024-07-15 RX ORDER — GLUCAGON 1 MG/ML
1 KIT INJECTION PRN
Status: DISCONTINUED | OUTPATIENT
Start: 2024-07-15 | End: 2024-07-16 | Stop reason: HOSPADM

## 2024-07-15 RX ORDER — INSULIN GLARGINE 100 [IU]/ML
20 INJECTION, SOLUTION SUBCUTANEOUS NIGHTLY
Status: DISCONTINUED | OUTPATIENT
Start: 2024-07-15 | End: 2024-07-16 | Stop reason: HOSPADM

## 2024-07-15 RX ORDER — INSULIN LISPRO 100 [IU]/ML
0-4 INJECTION, SOLUTION INTRAVENOUS; SUBCUTANEOUS NIGHTLY
Status: DISCONTINUED | OUTPATIENT
Start: 2024-07-15 | End: 2024-07-16 | Stop reason: HOSPADM

## 2024-07-15 RX ORDER — FAMOTIDINE 10 MG/ML
INJECTION, SOLUTION INTRAVENOUS PRN
Status: DISCONTINUED | OUTPATIENT
Start: 2024-07-15 | End: 2024-07-15 | Stop reason: SDUPTHER

## 2024-07-15 RX ORDER — DEXAMETHASONE SODIUM PHOSPHATE 4 MG/ML
INJECTION, SOLUTION INTRA-ARTICULAR; INTRALESIONAL; INTRAMUSCULAR; INTRAVENOUS; SOFT TISSUE PRN
Status: DISCONTINUED | OUTPATIENT
Start: 2024-07-15 | End: 2024-07-15 | Stop reason: SDUPTHER

## 2024-07-15 RX ORDER — ROCURONIUM BROMIDE 10 MG/ML
INJECTION, SOLUTION INTRAVENOUS PRN
Status: DISCONTINUED | OUTPATIENT
Start: 2024-07-15 | End: 2024-07-15 | Stop reason: SDUPTHER

## 2024-07-15 RX ORDER — PRAMIPEXOLE DIHYDROCHLORIDE 0.25 MG/1
0.75 TABLET ORAL NIGHTLY
Status: DISCONTINUED | OUTPATIENT
Start: 2024-07-15 | End: 2024-07-16 | Stop reason: HOSPADM

## 2024-07-15 RX ORDER — SUCCINYLCHOLINE/SOD CL,ISO/PF 200MG/10ML
SYRINGE (ML) INTRAVENOUS PRN
Status: DISCONTINUED | OUTPATIENT
Start: 2024-07-15 | End: 2024-07-15 | Stop reason: SDUPTHER

## 2024-07-15 RX ORDER — HEPARIN SODIUM 1000 [USP'U]/ML
INJECTION, SOLUTION INTRAVENOUS; SUBCUTANEOUS PRN
Status: DISCONTINUED | OUTPATIENT
Start: 2024-07-15 | End: 2024-07-15 | Stop reason: SDUPTHER

## 2024-07-15 RX ADMIN — GLYCOPYRROLATE 0.2 MG: 0.2 INJECTION INTRAMUSCULAR; INTRAVENOUS at 09:27

## 2024-07-15 RX ADMIN — DEXAMETHASONE SODIUM PHOSPHATE 8 MG: 4 INJECTION, SOLUTION INTRAMUSCULAR; INTRAVENOUS at 09:20

## 2024-07-15 RX ADMIN — Medication 200 MCG: at 09:57

## 2024-07-15 RX ADMIN — PANTOPRAZOLE SODIUM 40 MG: 40 TABLET, DELAYED RELEASE ORAL at 17:35

## 2024-07-15 RX ADMIN — FENTANYL CITRATE 25 MCG: 50 INJECTION, SOLUTION INTRAMUSCULAR; INTRAVENOUS at 09:10

## 2024-07-15 RX ADMIN — INSULIN GLARGINE 20 UNITS: 100 INJECTION, SOLUTION SUBCUTANEOUS at 21:25

## 2024-07-15 RX ADMIN — HEPARIN SODIUM 13000 UNITS: 1000 INJECTION INTRAVENOUS; SUBCUTANEOUS at 09:33

## 2024-07-15 RX ADMIN — INSULIN LISPRO 4 UNITS: 100 INJECTION, SOLUTION INTRAVENOUS; SUBCUTANEOUS at 21:25

## 2024-07-15 RX ADMIN — Medication 200 MCG: at 09:39

## 2024-07-15 RX ADMIN — FUROSEMIDE 20 MG: 10 INJECTION, SOLUTION INTRAMUSCULAR; INTRAVENOUS at 18:33

## 2024-07-15 RX ADMIN — SUGAMMADEX 200 MG: 100 INJECTION, SOLUTION INTRAVENOUS at 09:57

## 2024-07-15 RX ADMIN — FAMOTIDINE 20 MG: 10 INJECTION INTRAVENOUS at 09:09

## 2024-07-15 RX ADMIN — Medication 100 MCG: at 09:25

## 2024-07-15 RX ADMIN — LIDOCAINE HYDROCHLORIDE 100 MG: 20 INJECTION, SOLUTION INTRAVENOUS at 09:16

## 2024-07-15 RX ADMIN — ONDANSETRON 4 MG: 2 INJECTION INTRAMUSCULAR; INTRAVENOUS at 09:20

## 2024-07-15 RX ADMIN — ATORVASTATIN CALCIUM 40 MG: 40 TABLET, FILM COATED ORAL at 21:25

## 2024-07-15 RX ADMIN — SODIUM CHLORIDE: 9 INJECTION, SOLUTION INTRAVENOUS at 09:02

## 2024-07-15 RX ADMIN — PROPOFOL 150 MG: 10 INJECTION, EMULSION INTRAVENOUS at 09:16

## 2024-07-15 RX ADMIN — CEFAZOLIN 2 G: 1 INJECTION, POWDER, FOR SOLUTION INTRAVENOUS at 09:07

## 2024-07-15 RX ADMIN — Medication 200 MCG: at 09:31

## 2024-07-15 RX ADMIN — ROCURONIUM BROMIDE 5 MG: 10 INJECTION, SOLUTION INTRAVENOUS at 09:16

## 2024-07-15 RX ADMIN — IPRATROPIUM BROMIDE AND ALBUTEROL SULFATE 3 ML: .5; 3 SOLUTION RESPIRATORY (INHALATION) at 13:49

## 2024-07-15 RX ADMIN — METOPROLOL TARTRATE 25 MG: 25 TABLET, FILM COATED ORAL at 21:25

## 2024-07-15 RX ADMIN — SODIUM CHLORIDE, PRESERVATIVE FREE 10 ML: 5 INJECTION INTRAVENOUS at 17:35

## 2024-07-15 RX ADMIN — INSULIN LISPRO 12 UNITS: 100 INJECTION, SOLUTION INTRAVENOUS; SUBCUTANEOUS at 18:32

## 2024-07-15 RX ADMIN — GABAPENTIN 300 MG: 300 CAPSULE ORAL at 21:25

## 2024-07-15 RX ADMIN — IPRATROPIUM BROMIDE AND ALBUTEROL SULFATE 3 ML: 2.5; .5 SOLUTION RESPIRATORY (INHALATION) at 13:49

## 2024-07-15 RX ADMIN — Medication 200 MCG: at 09:48

## 2024-07-15 RX ADMIN — PROTAMINE SULFATE 40 MG: 10 INJECTION, SOLUTION INTRAVENOUS at 09:54

## 2024-07-15 RX ADMIN — PRAMIPEXOLE DIHYDROCHLORIDE 0.75 MG: 0.25 TABLET ORAL at 21:24

## 2024-07-15 RX ADMIN — WARFARIN SODIUM 7.5 MG: 7.5 TABLET ORAL at 17:35

## 2024-07-15 RX ADMIN — FENTANYL CITRATE 25 MCG: 50 INJECTION, SOLUTION INTRAMUSCULAR; INTRAVENOUS at 09:08

## 2024-07-15 RX ADMIN — ROCURONIUM BROMIDE 20 MG: 10 INJECTION, SOLUTION INTRAVENOUS at 09:20

## 2024-07-15 RX ADMIN — FENTANYL CITRATE 50 MCG: 50 INJECTION, SOLUTION INTRAMUSCULAR; INTRAVENOUS at 10:05

## 2024-07-15 RX ADMIN — PERFLUTREN 1.5 ML: 6.52 INJECTION, SUSPENSION INTRAVENOUS at 16:12

## 2024-07-15 RX ADMIN — Medication 160 MG: at 09:16

## 2024-07-15 NOTE — DISCHARGE INSTRUCTIONS
Watchman Discharge Instruction    Care of your puncture site:  Remove bandage 24 hours after the procedure.  May shower in 24 hours but do not sit in a bathtub/pool of water for 5 days or until the wound is healed.  Gently clean groin using soap and water.  Dry thoroughly and apply a Band-Aid that covers the entire site. Use Band-Aid until skin heals over in about 3-5 days.   Do not apply powder or lotion.    Limit walking and stair climbing today.    Normal Observations:  Soreness or tenderness which may last one week.  Mild oozing from the incision site.  Possible bruising that could last 2 weeks.    Activity:  You may resume normal activity in 5 days or after the wound heals.  Avoid lifting more than 10 pounds for 5 days or until the wound heals.  Avoid strenuous exercise or activity for 1 week.  You may return to work in 1 week  without restrictions       Call your doctor immediately if your condition worsens, for any other concerns, for a follow-up appointment or if you experience any of the following:  Increased swelling on the groin or leg.  Unusual pain, numbness, or tingling of the groin or down the leg.  Any signs of infection such as: redness, yellow drainage at the site, swelling or pain.    IF GROIN STARTS BLEEDING SIGNIFICANTLY:   LAY FLAT, HOLD FIRM DIRECT PRESSURE, AND CALL 911     antibiotic prophylaxis (amoxicillin 2 g once 60 minutes prior to procedure) for 6 months for:  a.     Dental procedures including cleanings  b.     Gastrointestinal procedures  c.     Genitourinary procedures  d.     Respiratory procedures involving incision or biopsy  e.     Procedures on infected skin or muscle.         ANESTHESIA DISCHARGE INSTRUCTIONS    Wear your seatbelt home.  You are under the influence of drugs-do not drink alcohol, drive, operate machinery, make any important decisions or sign any legal documents for 24 hours.  Children should not ride bikes, skate boards or play on gym sets for 24 hours after

## 2024-07-15 NOTE — CONSULTS
V2.0  [place] Consult Note      Name:  London Swenson /Age/Sex: 1950  (73 y.o. male)   MRN & CSN:  [id number] & [id number] Encounter Date/Time: 7/15/2024 5:58 PM EDT   Location:  [id number]- PCP: Constance Yancey PA     Attending:Bayron Montoya MD  Consulting Provider: Damaris Andrews MD      Hospital Day: 1    Assessment and Recommendations   London Swenson is a 73 y.o. male with pmh of  who presents with PAF (paroxysmal atrial fibrillation) ([facility])    Hospital Problems             Last Modified POA    * (Principal) PAF (paroxysmal atrial fibrillation) ([facility]) 7/15/2024 Yes       Recommendations:   Hypoxia  Post op   Likely multifactorial  CXR some pulm congestion  Currently on 2l doing well  Will give dose of lasix and reassess    IDDM  Continue Lantus ssi    S/p watchman procedure  Per cardiology    ESTHER  Cpap at night    Afib  RLS  Chronic CHF       Diet ADULT DIET; Regular   DVT Prophylaxis [] Lovenox, []  Heparin, [] SCDs, [] Ambulation,  [] Eliquis, [] Xarelto   Code Status Full Code   Surrogate Decision Maker/ POA             History From:    History obtained from the patient.     History of Present Illness:      Chief Complaint: hypoxia     London Swenson is a 73 y.o. male who presents for watchman procedure. Pt post procedure developed some hypoxia needing 2l nc. Pt denies any sob to be. No cp . Hx of DM, ESTHER, moribd obesity. PT currently resting denies any complains to me   Labs reviwed  Xray reviewed  Meds reviwed        Review of Systems:      Pertinent positives and negatives discussed in HPI    Objective:     Intake/Output Summary (Last 24 hours) at 7/15/2024 1758  Last data filed at 7/15/2024 1546  Gross per 24 hour   Intake 2313 ml   Output 1020 ml   Net 1293 ml      Vitals:   Vitals:    07/15/24 1536 07/15/24 1546 07/15/24 1645 07/15/24 1728   BP:  138/77 130/67 117/72   Pulse:   64 60   Resp:   16 16   Temp:   96.9 °F (36.1 °C)    TempSrc:   Temporal    SpO2: (!) 89%  100%    Weight:   last 72 hours.  UA:  Lab Results   Component Value Date/Time    NITRU Negative 07/05/2024 01:50 PM    COLORU Yellow 07/05/2024 01:50 PM    PHUR 6.5 07/05/2024 01:50 PM    PHUR 6.0 12/28/2023 02:39 PM    WBCUA 0-2 11/24/2023 07:30 PM    RBCUA 3-4 11/24/2023 07:30 PM    MUCUS 2+ 04/02/2015 03:10 AM    BACTERIA Rare 11/24/2023 07:30 PM    CLARITYU Clear 07/05/2024 01:50 PM    SPECGRAV 1.025 10/11/2023 01:27 PM    LEUKOCYTESUR Negative 07/05/2024 01:50 PM    UROBILINOGEN 1.0 07/05/2024 01:50 PM    BILIRUBINUR Negative 07/05/2024 01:50 PM    BLOODU Negative 07/05/2024 01:50 PM    GLUCOSEU 100 07/05/2024 01:50 PM    KETUA Negative 07/05/2024 01:50 PM     Urine Cultures:   Lab Results   Component Value Date/Time    LABURIN >100,000 CFU/ml 10/11/2023 01:31 PM    LABURIN 200 10/26/2022 10:56 AM     Blood Cultures:   Lab Results   Component Value Date/Time    BC No Growth after 4 days of incubation. 11/24/2023 07:00 PM     Lab Results   Component Value Date/Time    BLOODCULT2 No Growth after 4 days of incubation. 11/24/2023 06:58 PM     Organism:   Lab Results   Component Value Date/Time    ORG Klebsiella oxytoca 10/11/2023 01:31 PM         Electronically signed by Damaris Andrews MD on 7/15/2024 at 5:58 PM

## 2024-07-15 NOTE — ANESTHESIA PRE PROCEDURE
Department of Anesthesiology  Preprocedure Note       Name:  London Swenson   Age:  73 y.o.  :  1950                                          MRN:  [id number]         Date:  7/15/2024      Surgeon: Surgeon(s):  Bayron Montoya MD Attari, Mehran, MD    Procedure: Procedure(s):  Watchman keaton closure device    Medications prior to admission:   Prior to Admission medications    Medication Sig Start Date End Date Taking? Authorizing Provider   insulin glargine (LANTUS SOLOSTAR) 100 UNIT/ML injection pen Inject 20 Units into the skin nightly 7/10/24 10/8/24  Constance Yancey PA   furosemide (LASIX) 40 MG tablet Three times a week as needed for swelling.  Patient not taking: Reported on 7/3/2024 7/1/24   Dylan Shanks MD   warfarin (COUMADIN) 5 MG tablet TAKE 1 & 1/2 TO 2 (ONE & ONE-HALF TO TWO) TABLETS BY MOUTH ONCE DAILY AS  DIRECTED  BY  COUMADIN  CLINIC 24   Dylan Shanks MD   acetaminophen (TYLENOL) 325 MG tablet Take 2 tablets by mouth every 4 hours as needed for Pain    Provider, MD Willa   gabapentin (NEURONTIN) 300 MG capsule TAKE 1 TO 2 CAPSULES BY MOUTH NIGHTLY 24  Constance Yancey PA   insulin aspart (NOVOLOG FLEXPEN) 100 UNIT/ML injection pen Inject 25 Units into the skin 3 times daily (before meals) 24   Constance Yancey PA   warfarin (COUMADIN) 5 MG tablet Take 1-2 tablets by mouth daily as directed 24   Constance Yancey PA   pramipexole (MIRAPEX) 0.5 MG tablet Take 1.5 tablets by mouth nightly 5/3/24   Constance Yancey PA   pantoprazole (PROTONIX) 40 MG tablet TAKE 1 TABLET BY MOUTH TWICE DAILY BEFORE MEAL(S) 24   Constance Yancey PA   Tirzepatide (MOUNJARO) 2.5 MG/0.5ML SOPN SC injection Inject 0.5 mLs into the skin once a week 3/13/24   Kelsey Flores MD   Continuous Blood Gluc Sensor (FREESTYLE JUSTIN 14 DAY SENSOR) MISC 1 Units by Does not apply route every  days 24   Constance Yancey PA   dofetilide (TIKOSYN) 250 MCG capsule Take 1 capsule by

## 2024-07-15 NOTE — ANESTHESIA POSTPROCEDURE EVALUATION
Department of Anesthesiology  Postprocedure Note    Patient: London Swenson  MRN: [id number]  YOB: 1950  Date of evaluation: 7/15/2024    Procedure Summary       Date: 07/15/24 Room / Location: [facility] EP LAB 5 / [facility] CARDIAC CATH LAB    Anesthesia Start: 0902 Anesthesia Stop: 1019    Procedure: Watchman keaton closure device Diagnosis:       Paroxysmal atrial fibrillation (HCC)      (Paroxysmal atrial fibrillation (HCC) [I48.0])    Providers: Bayron Montoya MD Responsible Provider: Afsaneh Dinero MD    Anesthesia Type: general ASA Status: 3            Anesthesia Type: No value filed.    Filiberto Phase I: Filiberto Score: 7    Filiberto Phase II:      Anesthesia Post Evaluation    Patient location during evaluation: PACU  Patient participation: complete - patient participated  Level of consciousness: awake  Airway patency: patent  Nausea & Vomiting: no vomiting  Cardiovascular status: hemodynamically stable  Respiratory status: acceptable  Hydration status: euvolemic  Multimodal analgesia pain management approach    There were no known notable events for this encounter.

## 2024-07-15 NOTE — PROGRESS NOTES
BP 90s/60s, HR 60-70s in NSR. Right groin dressing site CDI, groin soft. Pt denies pain. Dr. Godinez notified and order received for 500 mL IVF bolus. Will implement and monitor.

## 2024-07-15 NOTE — PROGRESS NOTES
Bedside echo performed pt denies pain. Straight cath completed 1000 mL urine obtained. Pt tolerating PO intake well. O2 96% on 3L via nasal cannula. Pt denies pain at this time. Surgical site dressing with scant bleeding noted at figure 8 stitch site, marked, soft to palpation with bruising noted laterally under dressing, color and temp appropriate. Pt VSS. Pt transferring to 5T.

## 2024-07-15 NOTE — H&P
MD Kelsey   Continuous Blood Gluc Sensor (FREESTYLE JUSTIN 14 DAY SENSOR) MISC 1 Units by Does not apply route every 14 days 2/14/24   Constance Yancey PA   dofetilide (TIKOSYN) 250 MCG capsule Take 1 capsule by mouth every 12 hours 1/19/24   Ericka Valdez APRN - CNP   metoprolol tartrate (LOPRESSOR) 25 MG tablet Take 1 tablet by mouth 2 times daily 1/19/24   Ericka Valdez APRN - CNP   aspirin 81 MG EC tablet Take 1 tablet by mouth daily 1/5/24   Donny Young MD   atorvastatin (LIPITOR) 40 MG tablet TAKE 1 TABLET BY MOUTH AT BEDTIME 8/14/23   Michael Lemus MD   Handicap Placard MISC by Does not apply route Exp: 5/1/2026 5/4/23   Constance Yancey PA   blood glucose monitor strips Test 3 times a day & as needed for symptoms of irregular blood glucose. E11.9 Dispense sufficient amount for indicated testing frequency plus additional to accommodate PRN testing needs. 2/7/23   Constance Yancey PA   Lancets MISC 1 each by Does not apply route daily Check blood sugars 3x daily E11.9 2/7/23   Constance Yancey PA   Continuous Blood Gluc  (FREESTYLE JUSTIN 14 DAY READER) OLI 1 Units by Does not apply route as needed (as needed) 1/23/23   Monique Henry PA   Insulin Pen Needle 31G X 8 MM MISC 1 each by Does not apply route 4 times daily 7/19/22   Monique Henry PA   Insulin Syringe-Needle U-100 30G X 1/2\" 0.5 ML MISC Inject insulin 3 times daily 9/12/17   Provider, MD Willa     Past Medical History:   Diagnosis Date    A-fib (HCC)     Acid reflux     Yan's esophagus     Coronary artery disease of native heart with stable angina pectoris (HCC) 05/30/2019    Dementia (HCC)     Diabetes mellitus (HCC)     Diabetic autonomic neuropathy associated with type 2 diabetes mellitus (HCC) 03/03/2020    Hyperlipidemia     Hypertension     Pancreatitis 1996    Restless legs     Sleep apnea     uses CPAP    Tremor     of bilateral hands     Past Surgical History:   Procedure Laterality Date    CARDIAC  DEFIBRILLATOR PLACEMENT      CATARACT REMOVAL Bilateral 2013    CHOLECYSTECTOMY      COLONOSCOPY  10/26/2011    ENDOSCOPY, COLON, DIAGNOSTIC      EGD halo    HYDROCELE EXCISION  11/15/2016    LARYNGOSCOPY N/A 10/25/2023    DRUG INDUCED SLEEP ENDOSCOPY performed by David Estes DO at [facility] OR    PANCREAS SURGERY      cyst opened up and drining into small bowel    TONSILLECTOMY      UPPER GASTROINTESTINAL ENDOSCOPY  10/04/2016    with biopsy    UPPER GASTROINTESTINAL ENDOSCOPY  03/16/2017    Halo ablation    UPPER GASTROINTESTINAL ENDOSCOPY  06/26/2017    Halo ablation    UPPER GASTROINTESTINAL ENDOSCOPY  09/06/2017    bx distal sophagus    UPPER GASTROINTESTINAL ENDOSCOPY  12/22/2017    with HALO  procedure    UPPER GASTROINTESTINAL ENDOSCOPY N/A 12/04/2018    EGD WITH ABLATION AND ANESTHESIA - HALO---SLEEP APNEA---  performed by Rajesh Virk MD at [facility] ENDOSCOPY    UPPER GASTROINTESTINAL ENDOSCOPY  02/19/2019    EGD BIOPSY performed by Rajesh Virk MD at [facility] ENDOSCOPY    UPPER GASTROINTESTINAL ENDOSCOPY N/A 06/11/2019    EGD WITH HALO AND ANESTHESIA performed by Rajesh Virk MD at [facility] ENDOSCOPY    UPPER GASTROINTESTINAL ENDOSCOPY N/A 08/13/2019    ESOPHAGOGASTRODUODENOSCOPY WITH HALO AND ANESTHESIA -SLEEP APNEA- performed by Rajesh Virk MD at [facility] ENDOSCOPY    WISDOM TOOTH EXTRACTION       Allergies   Allergen Reactions    Clonidine     Oxycodone     Trulicity [Dulaglutide]      nausea    Codeine Nausea And Vomiting       Documentation and Exam:   I have personally completed a history, physical exam & review of systems for this patient (see notes).    Sedation will be provided by anesthesia team.     Electronically signed by Bayron Montoya MD on 7/15/2024 at 8:49 AM

## 2024-07-15 NOTE — PROGRESS NOTES
Pt O2 decreased to 75 on room air following ambulation to bathroom. Pt unable to rebound without supplemental O2 2L via nasal cannula returned to 95%. Duoneb ordered and administered, stat chest xray ordered and notified. Dr. Gregorio aware, will cont to monitor.

## 2024-07-15 NOTE — PROCEDURES
[facility]     Electrophysiology Procedure Note       Date of Procedure: 7/15/2024  Patient's Name: London Swenson  YOB: 1950   Medical Record Number: [id number]  Procedure Performed by: Bayron Montoya MD    Procedures performed:  Percutaneous transcatheter closure of the left atrial appendage with endocardial implant, Watchman Flx device     Indication of the procedure:   Atrial fibrillation, high [id number] score and high risk for stroke and thromboembolism.   High risk of bleeding, not a good candidate for long term anticoagulation     Details of procedure:   The patient was brought to the electrophysiology laboratory in stable condition. The patient was in a fasting and non-sedated state. The risks, benefits and alternatives of the procedure were discussed with the patient. The risks including, but not limited to, the risks of vascular injury, bleeding, infection, device malfunction, dislodgement, radiation exposure, injury to cardiac and surrounding structures, perforation, stroke, myocardial infarction and death were discussed in detail. The patient opted to proceed with the device implantation. Written informed consent was signed and placed in the chart.     Prophylactic antibiotic was given. The patient was prepped and draped in a sterile fashion. A timeout protocol was completed to identify the patient and the procedure being performed. Patient underwent general anesthesia by anesthesiology team.       Transesophageal echocardiography was performed and measurements of left atrial appendage, including ostium size and depth were obtained for selection of appropriate watchman device. Both groins were prepped in a sterile fashion.     Femoral venous access was obtained under live ultrasound guidance.  The femoral vein was identified with real time visualization of needle passage to the venous lumen. The vein was pre-closed using PerClose device. A 16 F sheath was placed

## 2024-07-15 NOTE — CARE COORDINATION
07/15/24 0954   IMM Letter   IMM Letter given to Patient/Family/Significant other/Guardian/POA/by: CASE MANAGEMENT   IMM Letter date given: 07/15/24   IMM Letter time given: 0710

## 2024-07-15 NOTE — PROGRESS NOTES
500 mL IVF bolus administered, BP now 116/79, pt remains on 2 L NC, O2 sats 94-96%. Pt resting quietly in bed, denies shortness of breath or pain. Dr. Godinez aware. Will monitor.

## 2024-07-15 NOTE — DISCHARGE SUMMARY
[facility]    DISCHARGE SUMMARY      Patient ID:  London Swenson  [id number] 73 y.o. 1950    Discharge date:  07/15/24    Admitting Physician: Bayron Montoya MD     Discharge NP: SANDY Dawn - CNP    Admission Diagnoses: Paroxysmal atrial fibrillation (HCC) [I48.0]  PAF (paroxysmal atrial fibrillation) (HCC) [I48.0]    Discharge Diagnoses:   Patient Active Problem List   Diagnosis    Pulmonary nodules    Lung granuloma (HCC)    Primary hypertension    Hyperlipidemia    ESTHER (obstructive sleep apnea)    Morbid obesity (HCC)    Coronary artery disease involving native coronary artery of native heart without angina pectoris    Peripheral polyneuropathy    Atrial tachycardia (HCC)    Type 2 diabetes mellitus with microalbuminuria (HCC)    Grade II diastolic dysfunction    New onset atrial fibrillation (HCC)    Dementia without behavioral disturbance (HCC)    Mild nonproliferative diabetic retinopathy of right eye associated with type 2 diabetes mellitus (HCC)    Diabetic mononeuropathy associated with type 2 diabetes mellitus (HCC)    Type 2 diabetes mellitus with hyperglycemia, with long-term current use of insulin (HCC)    Secondary hypercoagulable state (HCC)    Atherosclerosis of aorta (HCC)    Chest pain    SOB (shortness of breath)    Pulmonary venous congestion    Uncontrolled type 2 diabetes mellitus with hyperglycemia (HCC)    Thrombocytopenia (HCC)    Nocturnal hypoxemia    PAF (paroxysmal atrial fibrillation) (HCC)    Acute respiratory failure with hypoxia (HCC)    COVID-19    Hypomagnesemia    Vasovagal attack    Shortness of breath    Pacemaker    Acute hypoxic respiratory failure (HCC)    Restless legs syndrome (RLS)    Chronic right-sided congestive heart failure (HCC)    Type 2 diabetes mellitus with hyperglycemia    Type 2 diabetes mellitus without complication (HCC)    Type 2 diabetes mellitus with diabetic neuropathy         Discharged Condition: good    Hospital Course: [place]  BEDTIME     blood glucose test strips  Test 3 times a day & as needed for symptoms of irregular blood glucose. E11.9 Dispense sufficient amount for indicated testing frequency plus additional to accommodate PRN testing needs.     dofetilide 250 MCG capsule  Commonly known as: TIKOSYN  Take 1 capsule by mouth every 12 hours     FreeStyle Avelino 14 Day [place] Melva  1 Units by Does not apply route as needed (as needed)     FreeStyle Avelino 14 Day Sensor Misc  1 Units by Does not apply route every 14 days     gabapentin 300 MG capsule  Commonly known as: NEURONTIN  TAKE 1 TO 2 CAPSULES BY MOUTH NIGHTLY     Handicap Placard Misc  by Does not apply route Exp: 5/1/2026     Insulin Pen Needle 31G X 8 MM Misc  1 each by Does not apply route 4 times daily     Insulin Syringe-Needle U-100 30G X 1/2\" 0.5 ML Misc     Lancets Misc  1 each by Does not apply route daily Check blood sugars 3x daily E11.9     Lantus SoloStar 100 UNIT/ML injection pen  Generic drug: insulin glargine  Inject 20 Units into the skin nightly     metoprolol tartrate 25 MG tablet  Commonly known as: LOPRESSOR  Take 1 tablet by mouth 2 times daily     Mounjaro 2.5 MG/0.5ML Sopn SC injection  Generic drug: Tirzepatide  Inject 0.5 mLs into the skin once a week     NovoLOG FlexPen 100 UNIT/ML injection pen  Generic drug: insulin aspart  Inject 25 Units into the skin 3 times daily (before meals)     pantoprazole 40 MG tablet  Commonly known as: PROTONIX  TAKE 1 TABLET BY MOUTH TWICE DAILY BEFORE MEAL(S)     pramipexole 0.5 MG tablet  Commonly known as: Mirapex  Take 1.5 tablets by mouth nightly     * warfarin 5 MG tablet  Commonly known as: Coumadin  Take as directed. If you are unsure how to take this medication, talk to your nurse or doctor.  Original instructions: Take 1-2 tablets by mouth daily as directed     * warfarin 5 MG tablet  Commonly known as: COUMADIN  Take as directed. If you are unsure how to take this medication, talk to your nurse or

## 2024-07-15 NOTE — PROGRESS NOTES
Pt arrived to PACU from cath lab, VSS, O2 95% on 6L via simple mask. Pt awakens to voice, denies pain at this time. Surgical site to right groin, perclosed, dressing CDI, soft to palpation, color and temp appropriate. Right pedal pulse palpable 2+, posterior tibial pulse 1+, generalized edema noted B LE. Will cont to monitor.

## 2024-07-15 NOTE — PROGRESS NOTES
REY Chatman updated on pt status in PACU and inability to get pt off O2. Decision made to admit pt and consult hospitalist for hypoxia. Stat ECHO ordered and notified. Bladder scan with 637 mL. Order from REY Chatman to straight cath at bedside. Will monitor.

## 2024-07-15 NOTE — CONSULTS
Clinical Pharmacy Note: Pharmacy to Dose Warfarin    Pharmacy consulted by SANYD Tam CNP to dose warfarin.    London Swenson is a 73 y.o. male  is receiving warfarin for indication: afib.    INR Goal Range: 2.0 - 3.0  Prior to admission warfarin dosing regimen: 7.5mg daily except 5mg on Sundays.    INR today:   Lab Results   Component Value Date/Time    INR 2.60 07/15/2024 07:44 AM       Assessment/Plan:  INR is therapeutic on prior to admission dosing regimen.   Possible concomitant drug-drug interactions include: acetaminophen, ASA  Based on today's assessment, continue home dose of 7.5mg daily except 5mg on Sundays.  Daily INR is ordered. Pharmacy will continue to monitor and make adjustments to regimen as necessary.     Thank you for the consult,     Loraine Presley, PharmD, BCPS

## 2024-07-16 VITALS
WEIGHT: 292.4 LBS | SYSTOLIC BLOOD PRESSURE: 122 MMHG | HEART RATE: 60 BPM | TEMPERATURE: 97.7 F | BODY MASS INDEX: 43.31 KG/M2 | RESPIRATION RATE: 16 BRPM | DIASTOLIC BLOOD PRESSURE: 57 MMHG | OXYGEN SATURATION: 94 % | HEIGHT: 69 IN

## 2024-07-16 LAB
GLUCOSE BLD-MCNC: 254 MG/DL (ref 70–99)
GLUCOSE BLD-MCNC: 370 MG/DL (ref 70–99)
INR PPP: 3.34 (ref 0.85–1.15)
PERFORMED ON: ABNORMAL
PERFORMED ON: ABNORMAL
POC ACT LR: >400 SEC
PROTHROMBIN TIME: 33.6 SEC (ref 11.9–14.9)

## 2024-07-16 PROCEDURE — 6370000000 HC RX 637 (ALT 250 FOR IP): Performed by: NURSE PRACTITIONER

## 2024-07-16 PROCEDURE — 99239 HOSP IP/OBS DSCHRG MGMT >30: CPT

## 2024-07-16 PROCEDURE — 2580000003 HC RX 258: Performed by: INTERNAL MEDICINE

## 2024-07-16 PROCEDURE — 6360000002 HC RX W HCPCS: Performed by: HOSPITALIST

## 2024-07-16 PROCEDURE — 6370000000 HC RX 637 (ALT 250 FOR IP): Performed by: INTERNAL MEDICINE

## 2024-07-16 PROCEDURE — 36415 COLL VENOUS BLD VENIPUNCTURE: CPT

## 2024-07-16 PROCEDURE — 85610 PROTHROMBIN TIME: CPT

## 2024-07-16 RX ORDER — WARFARIN SODIUM 7.5 MG/1
7.5 TABLET ORAL
Status: DISCONTINUED | OUTPATIENT
Start: 2024-07-17 | End: 2024-07-16 | Stop reason: HOSPADM

## 2024-07-16 RX ORDER — FUROSEMIDE 10 MG/ML
40 INJECTION INTRAMUSCULAR; INTRAVENOUS 2 TIMES DAILY
Status: DISCONTINUED | OUTPATIENT
Start: 2024-07-16 | End: 2024-07-16 | Stop reason: HOSPADM

## 2024-07-16 RX ORDER — WARFARIN SODIUM 5 MG/1
5 TABLET ORAL WEEKLY
Status: DISCONTINUED | OUTPATIENT
Start: 2024-07-21 | End: 2024-07-16 | Stop reason: HOSPADM

## 2024-07-16 RX ADMIN — METOPROLOL TARTRATE 25 MG: 25 TABLET, FILM COATED ORAL at 07:43

## 2024-07-16 RX ADMIN — SODIUM CHLORIDE, PRESERVATIVE FREE 10 ML: 5 INJECTION INTRAVENOUS at 07:46

## 2024-07-16 RX ADMIN — FUROSEMIDE 40 MG: 10 INJECTION, SOLUTION INTRAMUSCULAR; INTRAVENOUS at 09:04

## 2024-07-16 RX ADMIN — INSULIN LISPRO 16 UNITS: 100 INJECTION, SOLUTION INTRAVENOUS; SUBCUTANEOUS at 07:43

## 2024-07-16 RX ADMIN — ASPIRIN 81 MG: 81 TABLET, COATED ORAL at 07:43

## 2024-07-16 RX ADMIN — PANTOPRAZOLE SODIUM 40 MG: 40 TABLET, DELAYED RELEASE ORAL at 05:04

## 2024-07-16 RX ADMIN — INSULIN LISPRO 8 UNITS: 100 INJECTION, SOLUTION INTRAVENOUS; SUBCUTANEOUS at 12:25

## 2024-07-16 NOTE — PLAN OF CARE
Problem: Chronic Conditions and Co-morbidities  Goal: Patient's chronic conditions and co-morbidity symptoms are monitored and maintained or improved  7/16/2024 1002 by Keri Mccarthy RN  Outcome: Progressing     Problem: Safety - Adult  Goal: Free from fall injury  7/16/2024 1002 by Keri Mccarthy RN  Outcome: Progressing     Problem: Respiratory - Adult  Goal: Achieves optimal ventilation and oxygenation  7/16/2024 1002 by Keri Mccarthy RN  Outcome: Progressing     Problem: Metabolic/Fluid and Electrolytes - Adult  Goal: Electrolytes maintained within normal limits  7/16/2024 1002 by Keri Mccarthy RN  Outcome: Progressing

## 2024-07-16 NOTE — PROGRESS NOTES
V2.0    [facility] Progress Note      Name:  London Swenson /Age/Sex: 1950  (73 y.o. male)   MRN & CSN:  [id number] & [id number] Encounter Date/Time: 2024 11:48 AM EDT   Location:  [id number] PCP: Constance Yancey PA     Attending:Bayron Montoya MD       Hospital Day: 2    Assessment and Recommendations   London Swenson is a 73 y.o. male who presents with PAF (paroxysmal atrial fibrillation) (HCC)      Plan:   Hypoxia  Improved.  Likely secondary pulmonary congestion.  He still has bilateral lower extremity edema.  Will continue IV Lasix today.    Controlled diabetes.  Blood sugars are higher.  Likely due to missing Lantus the night before.  Continue aggressive sliding scale Lantus.    Status post Watchman procedure for      Diet ADULT DIET; Regular; 5 carb choices (75 gm/meal)   DVT Prophylaxis    Code Status Full Code   Disposition          Personally reviewed Lab Studies and Imaging         Subjective:     Chief Complaint:     London Swenson is a 73 y.o. male who presents with still with bilateral extremity swelling.  Blood sugars were      Review of Systems:      Pertinent positives and negatives discussed in HPI    Objective:     Intake/Output Summary (Last 24 hours) at 2024 1148  Last data filed at 2024 1115  Gross per 24 hour   Intake 2380 ml   Output 3775 ml   Net -1395 ml      Vitals:   Vitals:    24 0739 24 0902 24 1005 24 1114   BP: 115/68 112/65  (!) 122/57   Pulse: 78 73  60   Resp:    Temp: 97.5 °F (36.4 °C)   97.7 °F (36.5 °C)   TempSrc: Temporal   Temporal   SpO2: 93%  92% 94%   Weight:       Height:             Physical Exam:      General: NAD  Eyes: EOMI  ENT: neck supple  Cardiovascular: Regular rate.  Respiratory: Clear to auscultation  Gastrointestinal: Soft, non tender  Genitourinary: no suprapubic tenderness  Musculoskeletal: No edema  Skin: warm, dry  Neuro: Alert.  Psych: Mood appropriate.         Medications:   Medications:

## 2024-07-16 NOTE — CARE COORDINATION
Case Management Assessment  Initial Evaluation    Date/Time of Evaluation: 7/16/2024 3:17 PM  Assessment Completed by: PETER PAGE RN    If patient is discharged prior to next notation, then this note serves as note for discharge by case management.    Patient Name: London Swenson                   YOB: 1950  Diagnosis: Paroxysmal atrial fibrillation (HCC) [I48.0]  PAF (paroxysmal atrial fibrillation) (HCC) [I48.0]                   Date / Time: 7/15/2024  7:14 AM    Patient Admission Status: Inpatient   Readmission Risk (Low < 19, Mod (19-27), High > 27): Readmission Risk Score: 20.3    Current PCP: Constance Yancey PA  PCP verified by CM? No    Chart Reviewed: Yes      History Provided by: Patient, Medical Record  Patient Orientation: Alert and Oriented, Person, Place, Situation    Patient Cognition: Alert    Hospitalization in the last 30 days (Readmission):  Yes    If yes, Readmission Assessment in CM Navigator will be completed.    Advance Directives:      Code Status: Full Code   Patient's Primary Decision Maker is: Legal Next of Kin    Primary Decision Maker (Active): Rosy Swenson - Spouse - [phone]    Discharge Planning:    Patient lives with: Spouse/Significant Other Type of Home: House  Primary Care Giver: Spouse  Patient Support Systems include: Spouse/Significant Other, Family Members   Current Financial resources: Medicare  Current community resources: None  Current services prior to admission: None            Current DME:              Type of Home Care services:  None    ADLS  Prior functional level: Independent in ADLs/IADLs  Current functional level: Independent in ADLs/IADLs    PT AM-PAC:   /24  OT AM-PAC:   /24    Family can provide assistance at DC: Yes  Would you like Case Management to discuss the discharge plan with any other family members/significant others, and if so, who? No  Plans to Return to Present Housing: Yes  Other Identified Issues/Barriers to RETURNING  to current housing: No  Potential Assistance needed at discharge: N/A            Potential DME:    Patient expects to discharge to: House  Plan for transportation at discharge:      Financial    Payor: MEDICARE / Plan: MEDICARE PART A AND B / Product Type: *No Product type* /     Does insurance require precert for SNF: Yes    Potential assistance Purchasing Medications: No  Meds-to-Beds request:        [facility] [address] [facility] - P [phone] - F [phone]  [address] [facility] [address]  Phone: [phone] Fax: [phone]    [facility] - [place], [place] - [address] - P [phone] - F [phone]  [address] [facility] [address]  Phone: [phone] Fax: [phone]    [facility] - [place], [place] - [address] [facility] - P [phone] - F [phone]  [address] [facility] [address]  Phone: [phone] Fax: [phone]      Notes:    Factors facilitating achievement of predicted outcomes: Family support and Cooperative    Barriers to discharge: Decreased endurance    Additional Case Management Notes:   CM met with patient at bedside who stated he is independent with ADLs and came in for a planned outpatient cardiac procedure.     The Plan for Transition of Care is related to the following treatment goals of Paroxysmal atrial fibrillation (HCC) [I48.0]  PAF (paroxysmal atrial fibrillation) (HCC) [I48.0]    IF APPLICABLE: The Patient and/or patient representative London and his family were provided with a choice of provider and agrees with the discharge plan. Freedom of choice list with basic dialogue that supports the patient's individualized plan of care/goals and shares the quality data associated with the providers was provided to: Patient   Patient Representative Name:       The Patient and/or Patient Representative Agree with the Discharge Plan? Yes    PETER PAGE,

## 2024-07-16 NOTE — PROGRESS NOTES
Pharmacy to Dose Warfarin    Pharmacy consulted to dose warfarin for Afib.    INR Goal: 2-3    INR today: 3.34    Assessment/Plan:  - INR is supratherapeutic  - Hold dose today  - Possible concomitant drug-drug interactions include: APAP, ASA     Pharmacy will continue to follow.    Maggie Echevarria [facility]  [id number]

## 2024-07-16 NOTE — PROGRESS NOTES
Data- discharge order received, pt verbalized agreement to discharge, disposition to previous residence, no needs for HHC/DME.     Action- discharge instructions prepared and given to pt, pt verbalized understanding. Medication information packet given r/t NEW and/or CHANGED prescriptions emphasizing name/purpose/side effects, pt verbalized understanding. Discharge instruction summary: Diet- regular, Activity- up as tolerated, Primary Care Physician as follows: Constance Yancey -[phone] f/u appointment in a week, immunizations reviewed and updated, prescription medications filled at pharmacy of choice. Inpatient surgical procedure precautions reviewed: updated CHF Education reviewed. Pt/ Family has had a total of 60 minutes CHF education this admission encounter.     1. WEIGHT: Admit Weight - Scale: 129.3 kg (285 lb) (07/15/24 0729)        Today  Weight - Scale: 132.6 kg (292 lb 6.4 oz) (07/16/24 0504)       2. O2 SAT.: SpO2: 94 % (07/16/24 1114)    Response- Pt belongings gathered, IV removed. Disposition is home (no HHC/DME needs), transported with belongings and discharge instructions, taken to lobby via w/c w/ RN, no complications.

## 2024-07-16 NOTE — CARE COORDINATION
07/16/24 1511   Readmission Assessment   Number of Days since last admission? 8-30 days   Previous Disposition Home with Family   Who is being Interviewed Patient   What was the patient's/caregiver's perception as to why they think they needed to return back to the hospital? Other (Comment)  (Came in for a scheduled outpatient cardiac/Watchman procedure-)   Did you visit your Primary Care Physician after you left the hospital, before you returned this time? No   Why weren't you able to visit your PCP? Did not have an appointment   Did you see a specialist, such as Cardiac, Pulmonary, Orthopedic Physician, etc. after you left the hospital? No   Who advised the patient to return to the hospital? Other (Comment)  (Came in for a scheduled outpatient cardiac/Watchman procedure)   Does the patient report anything that got in the way of taking their medications? No   In our efforts to provide the best possible care to you and others like you, can you think of anything that we could have done to help you after you left the hospital the first time, so that you might not have needed to return so soon? Other (Comment)  (Came in for a scheduled outpatient cardiac/Watchman procedure)

## 2024-07-16 NOTE — DISCHARGE SUMMARY
Cardiology Discharge Summary  Electrophysiology      Patient :London Swenson  Room/Bed: Y3U-3213/5916-01  Medical Record: [id number]  YOB: 1950    Admit date: 7/15/2024  Discharge date: 07/16/24     Admitting Physician: Bayron Montoya MD   Discharge NP: Kamilla Sexton, APRN - CNP , CNP    Admission Diagnoses: Paroxysmal atrial fibrillation (HCC) [I48.0]  PAF (paroxysmal atrial fibrillation) (HCC) [I48.0]  Discharge Diagnoses: Paroxysmal atrial fibrillation     Discharged Condition: good    HPI: The patient is a 73 y.o. male with a past medical history of  paroxysmal atrial fibrillation, hypertension, hyperlipidemia, coronary artery disease, chronic diastolic heart failure, sinus node dysfunction s/p PPM and diabetes.     He was started on Eliquis for stroke prevention d/t atrial fibrillation but had stopped due to cost, therefore he was started on warfarin. During his office visit on 4/23/2024 his wife voiced concerned of his increased bruising and that his INR is not therapeutic at times. A consult was placed to discuss Watchman.       Interval HX: Patient presented  for Left Atrial Appendage Closure with WATCHMAN device for management of stroke risk resulting from non-valvular atrial fibrillation. Based on their past history, it has been determined that they are poor candidates for long-term oral-anticoagulation, however may be tolerant of short term treatment with AC as necessary.      Patient seen this morning at bedside. Right groin site is soft, bruising is noted. No concern for hematoma. No oozing or bleeding. He has ambulated in room without difficultly. Educated patient on site care, medication instructions, activity restrictions and follow up directions. He verbalizes understanding, states he feels good and is ready to go home.      Denies having chest pain, palpitations, shortness of breath, edema or dizziness at the time of this visit.    Consults: cardiology    [id number] Score for

## 2024-07-16 NOTE — PLAN OF CARE
Problem: Chronic Conditions and Co-morbidities  Goal: Patient's chronic conditions and co-morbidity symptoms are monitored and maintained or improved  Outcome: Progressing     Problem: Discharge Planning  Goal: Discharge to home or other facility with appropriate resources  Outcome: Progressing     Problem: Safety - Adult  Goal: Free from fall injury  Outcome: Progressing     Problem: Respiratory - Adult  Goal: Achieves optimal ventilation and oxygenation  Outcome: Progressing     Problem: Cardiovascular - Adult  Goal: Maintains optimal cardiac output and hemodynamic stability  Outcome: Progressing  Goal: Absence of cardiac dysrhythmias or at baseline  Outcome: Progressing     Problem: Skin/Tissue Integrity - Adult  Goal: Skin integrity remains intact  Outcome: Progressing     Problem: Musculoskeletal - Adult  Goal: Return mobility to safest level of function  Outcome: Progressing     Problem: Gastrointestinal - Adult  Goal: Minimal or absence of nausea and vomiting  Outcome: Progressing     Problem: Genitourinary - Adult  Goal: Absence of urinary retention  Outcome: Progressing     Problem: Infection - Adult  Goal: Absence of infection at discharge  Outcome: Progressing     Problem: Metabolic/Fluid and Electrolytes - Adult  Goal: Electrolytes maintained within normal limits  Outcome: Progressing  Goal: Hemodynamic stability and optimal renal function maintained  Outcome: Progressing  Goal: Glucose maintained within prescribed range  Outcome: Progressing     Problem: Hematologic - Adult  Goal: Maintains hematologic stability  Outcome: Progressing

## 2024-07-17 ENCOUNTER — TELEPHONE (OUTPATIENT)
Dept: FAMILY MEDICINE CLINIC | Age: 74
End: 2024-07-17

## 2024-07-17 ENCOUNTER — CARE COORDINATION (OUTPATIENT)
Dept: CASE MANAGEMENT | Age: 74
End: 2024-07-17

## 2024-07-17 DIAGNOSIS — I48.91 NEW ONSET ATRIAL FIBRILLATION (HCC): Primary | ICD-10-CM

## 2024-07-17 LAB — ECHO BSA: 2.51 M2

## 2024-07-17 PROCEDURE — 1111F DSCHRG MED/CURRENT MED MERGE: CPT | Performed by: PHYSICIAN ASSISTANT

## 2024-07-17 NOTE — CARE COORDINATION
Plan for follow-up call in 6-10 days based on severity of symptoms and risk factors.  Plan for next call: symptom management-.  self management-.  follow-up appointment-.      MARK CONNELLY RN

## 2024-07-17 NOTE — TELEPHONE ENCOUNTER
7/15/2024 s/p SUCCESSFUL WATCHMAN LAAC PROCEDURE PER DR. Toni Castillo of left atrial appendage occlusion device with no complication, WATCHMAN device used 27 mm size.     --6-month f/u Post Watchman.  --1-year f/u Post Watchman.  --2-year f/u Post Watchman.    Addis, Schedule at [facility]  Post procedure TANIA to be completed 45-59 days post procedure (8/29/2024-9/12/2024) Order placed.     Thanks.   Darius Barry RN, BSN   Watchman Coordinator

## 2024-07-17 NOTE — TELEPHONE ENCOUNTER
Care Transitions Initial Follow Up Call    Outreach made within 2 business days of discharge: Yes    Patient: London Swenson Patient : 1950   MRN: [id number]  Reason for Admission: There are no discharge diagnoses documented for the most recent discharge.  Discharge Date: 24       Spoke with: Richard, and wife Rosy     Discharge department/facility: [facility]    TCM Interactive Patient Contact:  Was patient able to fill all prescriptions: Yes  Was patient instructed to bring all medications to the follow-up visit: Yes  Is patient taking all medications as directed in the discharge summary? Yes  Does patient understand their discharge instructions: Yes  Does patient have questions or concerns that need addressed prior to 7-14 day follow up office visit: no    Scheduled appointment with PCP within 7-14 days    Follow Up  Future Appointments   Date Time Provider Department Center   2024 11:30 AM Radhames De La Torre APRN - CNP [facility] CLER CAR [facility]   2024  9:30 AM [facility] AN[facility]UL[facility] CLINIC [facility] [place] [facility]   2024 10:00 AM Kelsey Flores MD AND ENDO [facility]   2024 10:30 AM Constance Yancey PA [facility]   2024  2:00 PM SCHEDULE, ALETHEA DEVICE CHECK Alethea Jaramillo [facility]   2024  2:00 PM Ericka Valdez APRN - CNP [facility]       Afsaneh Short LPN

## 2024-07-18 LAB — ECHO BSA: 2.51 M2

## 2024-07-18 NOTE — TELEPHONE ENCOUNTER
Spoke with wife. Please have patient arrive to the main entrance of [facility] ([address] [place] [address]) and check in with the registration desk. They will be directed to the Cath Lab. Please call patient regarding medication instructions. Remind patient to be NPO after midnight (8 hours prior). Do not apply lotions/creams on skin the day of procedure.       TANIA - 9-4-24 AND w/Ekwenibe @ 9:00 am    6 mo - postpone set to schedule when 2025 schedule released     Postpone set for 1 yr fu - Recall set for 2 yr fu

## 2024-07-22 RX ORDER — TIRZEPATIDE 2.5 MG/.5ML
INJECTION, SOLUTION SUBCUTANEOUS
Qty: 4 ML | Refills: 0 | Status: SHIPPED | OUTPATIENT
Start: 2024-07-22 | End: 2024-07-25

## 2024-07-23 ENCOUNTER — OFFICE VISIT (OUTPATIENT)
Dept: CARDIOLOGY CLINIC | Age: 74
End: 2024-07-23
Payer: MEDICARE

## 2024-07-23 VITALS
DIASTOLIC BLOOD PRESSURE: 60 MMHG | OXYGEN SATURATION: 94 % | HEIGHT: 69 IN | BODY MASS INDEX: 43.18 KG/M2 | HEART RATE: 68 BPM | SYSTOLIC BLOOD PRESSURE: 104 MMHG

## 2024-07-23 DIAGNOSIS — I48.0 PAF (PAROXYSMAL ATRIAL FIBRILLATION) (HCC): Primary | ICD-10-CM

## 2024-07-23 DIAGNOSIS — I25.10 CORONARY ARTERY DISEASE INVOLVING NATIVE CORONARY ARTERY OF NATIVE HEART WITHOUT ANGINA PECTORIS: ICD-10-CM

## 2024-07-23 DIAGNOSIS — I50.32 CHRONIC HEART FAILURE WITH PRESERVED EJECTION FRACTION (HCC): ICD-10-CM

## 2024-07-23 PROCEDURE — 3074F SYST BP LT 130 MM HG: CPT | Performed by: NURSE PRACTITIONER

## 2024-07-23 PROCEDURE — 3078F DIAST BP <80 MM HG: CPT | Performed by: NURSE PRACTITIONER

## 2024-07-23 PROCEDURE — 1036F TOBACCO NON-USER: CPT | Performed by: NURSE PRACTITIONER

## 2024-07-23 PROCEDURE — 1123F ACP DISCUSS/DSCN MKR DOCD: CPT | Performed by: NURSE PRACTITIONER

## 2024-07-23 PROCEDURE — 1111F DSCHRG MED/CURRENT MED MERGE: CPT | Performed by: NURSE PRACTITIONER

## 2024-07-23 PROCEDURE — G8417 CALC BMI ABV UP PARAM F/U: HCPCS | Performed by: NURSE PRACTITIONER

## 2024-07-23 PROCEDURE — G8427 DOCREV CUR MEDS BY ELIG CLIN: HCPCS | Performed by: NURSE PRACTITIONER

## 2024-07-23 PROCEDURE — 99214 OFFICE O/P EST MOD 30 MIN: CPT | Performed by: NURSE PRACTITIONER

## 2024-07-23 PROCEDURE — 3017F COLORECTAL CA SCREEN DOC REV: CPT | Performed by: NURSE PRACTITIONER

## 2024-07-23 ASSESSMENT — ENCOUNTER SYMPTOMS
GASTROINTESTINAL NEGATIVE: 1
RESPIRATORY NEGATIVE: 1

## 2024-07-23 NOTE — PATIENT INSTRUCTIONS
Everything looks great today, good job!  Compression stockings, elevate legs, low salt diet <2000 mg, Keep fluids <64 ounces  Check BP at home and call the office if consistently out of goal range  Follow up as planned with TANIA and with Ericka

## 2024-07-23 NOTE — PROGRESS NOTES
[place] tooth extraction; Colonoscopy (10/26/2011); Cataract removal (Bilateral, 2013); Upper gastrointestinal endoscopy (10/04/2016); Hydrocele surgery (11/15/2016); Endoscopy, colon, diagnostic; Upper gastrointestinal endoscopy (03/16/2017); Upper gastrointestinal endoscopy (06/26/2017); Upper gastrointestinal endoscopy (09/06/2017); Upper gastrointestinal endoscopy (12/22/2017); Upper gastrointestinal endoscopy (N/A, 12/04/2018); Upper gastrointestinal endoscopy (02/19/2019); Upper gastrointestinal endoscopy (N/A, 06/11/2019); Upper gastrointestinal endoscopy (N/A, 08/13/2019); laryngoscopy (N/A, 10/25/2023); Cardiac defibrillator placement; and ep device procedure (N/A, 7/15/2024).     Home Medications:    Prior to Admission medications    Medication Sig Start Date End Date Taking? Authorizing Provider   MOUNJARO 2.5 MG/0.5ML SOPN SC injection INJECT 1/2 (ONE-HALF) ML SUBCUTANEOUSLY  ONCE A WEEK 7/22/24   Kelsey Flores MD   insulin glargine (LANTUS SOLOSTAR) 100 UNIT/ML injection pen Inject 20 Units into the skin nightly 7/10/24 10/8/24  Constance Yancey PA   furosemide (LASIX) 40 MG tablet Three times a week as needed for swelling. 7/1/24   Dylan Shanks MD   warfarin (COUMADIN) 5 MG tablet TAKE 1 & 1/2 TO 2 (ONE & ONE-HALF TO TWO) TABLETS BY MOUTH ONCE DAILY AS  DIRECTED  BY  COUMADIN  CLINIC 7/1/24   Dylan Shanks MD   acetaminophen (TYLENOL) 325 MG tablet Take 2 tablets by mouth every 4 hours as needed for Pain    Provider, MD Willa   gabapentin (NEURONTIN) 300 MG capsule TAKE 1 TO 2 CAPSULES BY MOUTH NIGHTLY 6/19/24 7/19/24  Constance Yancey PA   insulin aspart (NOVOLOG FLEXPEN) 100 UNIT/ML injection pen Inject 25 Units into the skin 3 times daily (before meals) 6/7/24   Constance Yancey PA   warfarin (COUMADIN) 5 MG tablet Take 1-2 tablets by mouth daily as directed 5/28/24   Constance Yancey PA   pramipexole (MIRAPEX) 0.5 MG tablet Take 1.5 tablets by mouth nightly 5/3/24   Constance Yancey

## 2024-07-24 ENCOUNTER — TELEPHONE (OUTPATIENT)
Dept: ENDOCRINOLOGY | Age: 74
End: 2024-07-24

## 2024-07-24 ENCOUNTER — CARE COORDINATION (OUTPATIENT)
Dept: CASE MANAGEMENT | Age: 74
End: 2024-07-24

## 2024-07-24 DIAGNOSIS — R80.9 TYPE 2 DIABETES MELLITUS WITH MICROALBUMINURIA, WITH LONG-TERM CURRENT USE OF INSULIN (HCC): Primary | ICD-10-CM

## 2024-07-24 DIAGNOSIS — Z79.4 TYPE 2 DIABETES MELLITUS WITH MICROALBUMINURIA, WITH LONG-TERM CURRENT USE OF INSULIN (HCC): Primary | ICD-10-CM

## 2024-07-24 DIAGNOSIS — E11.29 TYPE 2 DIABETES MELLITUS WITH MICROALBUMINURIA, WITH LONG-TERM CURRENT USE OF INSULIN (HCC): Primary | ICD-10-CM

## 2024-07-24 RX ORDER — SEMAGLUTIDE 0.68 MG/ML
INJECTION, SOLUTION SUBCUTANEOUS
Qty: 9 ML | Refills: 1 | Status: SHIPPED | OUTPATIENT
Start: 2024-07-24 | End: 2024-07-25

## 2024-07-24 NOTE — CARE COORDINATION
Care Transitions Note    Follow Up Call     Patient Current Location:  [address]  [place] [address]    Care Transition Nurse contacted the patient by telephone. Verified name and  as identifiers.    Additional needs identified to be addressed with provider   No needs identified                 Method of communication with provider: none.    Care Summary Note: Patient reports that he is doing well, denies any SOB, palpitations, chest pain.  He did follow up with cardiology yesterday.  Cardiology note reports leg swelling, he is to continue with diuretic QOD and cardiology will continue to monitor. He is taking all of his medications and there have been no changes.  Patient and wife deny any questions or concerns at this time.  CTN will continue with outreach follow up calls.    Plan of care updates since last contact:  Review of patient management of conditions/medications: .       Remote Patient Monitoring:  Offered patient enrollment in the Remote Patient Monitoring (RPM) program for in-home monitoring: Deferred at this time because .; will discuss at next outreach.  DM, HTN    Assessments:  Care Transitions Subsequent and Final Call    Subsequent and Final Calls  Do you have any ongoing symptoms?: No  Have your medications changed?: No  Do you have any questions related to your medications?: No  Do you currently have any active services?: No  Are you currently active with any services?: Home Health  Do you have any needs or concerns that I can assist you with?: No  Identified Barriers: None  Care Transitions Interventions  Other Interventions:              Follow Up Appointment:     Future Appointments         Provider Specialty Dept Phone    2024 9:15 AM [facility]TICO[facility]ULATION CLINIC Pharmacy [phone]    2024 10:00 AM Kelsey Flores MD Endocrinology [phone]    2024 9:00 AM (Arrive by 8:00 AM) A CATH LAB PRE/POST Cardiology [phone]    2024 10:30 AM Constance Yancey PA

## 2024-07-24 NOTE — TELEPHONE ENCOUNTER
Patient's wife calling stating the Mounjaro is too expensive at $108 a month and they are asking for a more cost effective alternative.

## 2024-07-25 ENCOUNTER — TELEPHONE (OUTPATIENT)
Dept: ENDOCRINOLOGY | Age: 74
End: 2024-07-25

## 2024-07-25 DIAGNOSIS — R80.9 TYPE 2 DIABETES MELLITUS WITH MICROALBUMINURIA, WITH LONG-TERM CURRENT USE OF INSULIN (HCC): Primary | ICD-10-CM

## 2024-07-25 DIAGNOSIS — Z79.4 TYPE 2 DIABETES MELLITUS WITH MICROALBUMINURIA, WITH LONG-TERM CURRENT USE OF INSULIN (HCC): Primary | ICD-10-CM

## 2024-07-25 DIAGNOSIS — E11.29 TYPE 2 DIABETES MELLITUS WITH MICROALBUMINURIA, WITH LONG-TERM CURRENT USE OF INSULIN (HCC): Primary | ICD-10-CM

## 2024-07-25 NOTE — TELEPHONE ENCOUNTER
Pt wife called stating she went to  pt's rx and it is 600.00 wife states they can not afford it and pt just wont do any shots is there something else pt can get instead       Semaglutide,0.25 or 0.5MG/DOS, (OZEMPIC, 0.25 OR 0.5 MG/DOSE,) 2 MG/3ML SOPN       [facility] [address] [place] [address] - [address] [facility] - P [phone] - F [phone]  [address] [place] [address]  Phone: [phone]  Fax: [phone]

## 2024-07-25 NOTE — TELEPHONE ENCOUNTER
Submitted a prescription for Jardiance 25 mg once daily instead.  She can check on the cost of that.  If cost prohibitive then may continue with insulin only for now.

## 2024-07-29 ENCOUNTER — CARE COORDINATION (OUTPATIENT)
Dept: CASE MANAGEMENT | Age: 74
End: 2024-07-29

## 2024-07-29 DIAGNOSIS — G62.9 PERIPHERAL POLYNEUROPATHY: ICD-10-CM

## 2024-07-29 RX ORDER — GABAPENTIN 300 MG/1
CAPSULE ORAL
Qty: 60 CAPSULE | Refills: 0 | Status: SHIPPED | OUTPATIENT
Start: 2024-07-29 | End: 2024-08-29

## 2024-07-29 NOTE — TELEPHONE ENCOUNTER
Refill Request     CONFIRM preferred pharmacy with the patient.    If Mail Order Rx - Pend for 90 day refill.      Last Seen: Last Seen Department: 7/3/2024  Last Seen by PCP: 7/3/2024    Last Written: 06/19/2024 60 cap 0 refills     If no future appointment scheduled:  Review the last OV with PCP and review information for follow-up visit,  Route STAFF MESSAGE with patient name to the  Pool for scheduling with the following information:            -  Timing of next visit           -  Visit type ie Physical, OV, etc           -  Diagnoses/Reason ie. COPD, HTN - Do not use MEDICATION, Follow-up or CHECK UP - Give reason for visit      Next Appointment:   Future Appointments   Date Time Provider Department Center   7/30/2024  9:15 AM [facility] ANTICOAGULATION CLINIC [facility] LENIN [facility]   8/6/2024 10:00 AM Kelsey Flores MD AND INOCENCIA [facility]   9/4/2024  9:00 AM [facility] CATH LAB PRE/POST [facility] Alethea Rad   9/4/2024 10:30 AM Constance Yancey PA [facility]   11/18/2024  2:00 PM SCHEDULE, ALETHEA DEVICE CHECK [facility]   11/18/2024  2:00 PM Ericka Valdez, SANDY - CNP [facility]       Message sent to  to schedule appt with patient?  NO      Requested Prescriptions     Pending Prescriptions Disp Refills    gabapentin (NEURONTIN) 300 MG capsule [Pharmacy Med Name: Gabapentin 300 MG Oral Capsule] 60 capsule 0     Sig: TAKE 1 TO 2 CAPSULES BY MOUTH NIGHTLY

## 2024-07-30 ENCOUNTER — ANTI-COAG VISIT (OUTPATIENT)
Dept: PHARMACY | Age: 74
End: 2024-07-30
Payer: MEDICARE

## 2024-07-30 DIAGNOSIS — I48.91 NEW ONSET ATRIAL FIBRILLATION (HCC): Primary | ICD-10-CM

## 2024-07-30 LAB
INTERNATIONAL NORMALIZATION RATIO, POC: 2.2
PROTHROMBIN TIME, POC: NORMAL

## 2024-07-30 PROCEDURE — 85610 PROTHROMBIN TIME: CPT | Performed by: PHARMACIST

## 2024-07-30 PROCEDURE — 99211 OFF/OP EST MAY X REQ PHY/QHP: CPT | Performed by: PHARMACIST

## 2024-07-30 NOTE — PROGRESS NOTES
Mr London Swenson is here for management of anticoagulation for AFib.   PMH also significant for CAD, DM2, HLD, HTN   He presents today w/out complaint.  Pt verifies dosing regimen as listed above.   Pt denies sx/s bleeding/bruising/swelling/SOB.  No missed doses  No changes in RX/OTCs/Herbal medications.  No dietary concerns  No ETOH and tobacco use.    Had Watchman placed 7/15.    Follow up TANIA scheduled for .    No other changes.   Will recheck in ~3 weeks which will likely be our last visit as long as TANIA shows no issues in Sept.    INR 2.2 is within therapeutic range of 2-3  Recommend to continue dose of 7.5 mg daily except 5 mg Q Sun  Patient has 5 mg tablets.  Will continue to monitor and check INR in 3 weeks.  Dosing reminder card given with phone number, appointment date and time.   Return to clinic: 24 @ 0915am     Xavier Crowley, PharmD 9:17 AM EDT 24      For Pharmacy Admin Tracking Only    Intervention Detail: Adherence Monitorin  Total # of Interventions Recommended: 1  Total # of Interventions Accepted: 1  Time Spent (min): 15

## 2024-08-05 ENCOUNTER — HOSPITAL ENCOUNTER (EMERGENCY)
Age: 74
Discharge: HOME | End: 2024-08-05
Payer: MEDICARE

## 2024-08-05 ENCOUNTER — APPOINTMENT (OUTPATIENT)
Dept: GENERAL RADIOLOGY | Age: 74
End: 2024-08-05
Payer: MEDICARE

## 2024-08-05 VITALS
DIASTOLIC BLOOD PRESSURE: 75 MMHG | RESPIRATION RATE: 18 BRPM | WEIGHT: 280 LBS | HEIGHT: 70 IN | OXYGEN SATURATION: 90 % | TEMPERATURE: 98.2 F | HEART RATE: 85 BPM | BODY MASS INDEX: 40.09 KG/M2 | SYSTOLIC BLOOD PRESSURE: 130 MMHG

## 2024-08-05 DIAGNOSIS — S61.211A LACERATION OF LEFT INDEX FINGER WITHOUT DAMAGE TO NAIL, FOREIGN BODY PRESENCE UNSPECIFIED, INITIAL ENCOUNTER: Primary | ICD-10-CM

## 2024-08-05 PROCEDURE — 6360000002 HC RX W HCPCS

## 2024-08-05 PROCEDURE — 90715 TDAP VACCINE 7 YRS/> IM: CPT

## 2024-08-05 PROCEDURE — 99284 EMERGENCY DEPT VISIT MOD MDM: CPT

## 2024-08-05 PROCEDURE — 12001 RPR S/N/AX/GEN/TRNK 2.5CM/<: CPT

## 2024-08-05 PROCEDURE — 73140 X-RAY EXAM OF FINGER(S): CPT

## 2024-08-05 PROCEDURE — 90471 IMMUNIZATION ADMIN: CPT

## 2024-08-05 PROCEDURE — 2500000003 HC RX 250 WO HCPCS

## 2024-08-05 RX ORDER — LIDOCAINE HYDROCHLORIDE AND EPINEPHRINE 10; 10 MG/ML; UG/ML
20 INJECTION, SOLUTION INFILTRATION; PERINEURAL ONCE
Status: COMPLETED | OUTPATIENT
Start: 2024-08-05 | End: 2024-08-05

## 2024-08-05 RX ADMIN — LIDOCAINE HYDROCHLORIDE,EPINEPHRINE BITARTRATE 20 ML: 10; .01 INJECTION, SOLUTION INFILTRATION; PERINEURAL at 11:02

## 2024-08-05 RX ADMIN — TETANUS TOXOID, REDUCED DIPHTHERIA TOXOID AND ACELLULAR PERTUSSIS VACCINE, ADSORBED 0.5 ML: 5; 2.5; 8; 8; 2.5 SUSPENSION INTRAMUSCULAR at 11:02

## 2024-08-05 ASSESSMENT — PAIN DESCRIPTION - ORIENTATION: ORIENTATION: LEFT

## 2024-08-05 ASSESSMENT — PAIN SCALES - GENERAL: PAINLEVEL_OUTOF10: 3

## 2024-08-05 ASSESSMENT — PAIN DESCRIPTION - DESCRIPTORS: DESCRIPTORS: ACHING

## 2024-08-05 ASSESSMENT — PAIN DESCRIPTION - LOCATION: LOCATION: FINGER (COMMENT WHICH ONE)

## 2024-08-05 ASSESSMENT — PAIN - FUNCTIONAL ASSESSMENT: PAIN_FUNCTIONAL_ASSESSMENT: 0-10

## 2024-08-05 NOTE — ED NOTES
Applied dressing on patient sutures and placed patient in finger splint. Patient understood instructions.

## 2024-08-05 NOTE — DISCHARGE INSTRUCTIONS
Wash twice daily with antibacterial soap and water.  Apply nonadherent dressing such as Vaseline, Aquaphor, Neosporin and cover with a bandage.  Finger splint for protection.  Suture removal in 7 to 10 days with your primary care doctor.  You can take Tylenol 500 mg or 1 g every 6 hours.  With your chronic anticoagulation therapy, do not take nonsteroidal anti-inflammatory drugs.  He can apply ice if needed for additional comfort.    Wound check with any concern for infection with PCP or by returning to the emergency department    Tetanus is updated today.  Will begin for the next 10 years.

## 2024-08-06 ASSESSMENT — ENCOUNTER SYMPTOMS
CHEST TIGHTNESS: 0
SHORTNESS OF BREATH: 0
NAUSEA: 0
WHEEZING: 0
COUGH: 0
VOMITING: 0
ABDOMINAL PAIN: 0

## 2024-08-06 NOTE — ED PROVIDER NOTES
(HCC), Diabetes mellitus (HCC), Diabetic autonomic neuropathy associated with type 2 diabetes mellitus (HCC) (03/03/2020), Hyperlipidemia, Hypertension, Pancreatitis (1996), Restless legs, Sleep apnea, and Tremor.    CONSULTS: (Who and What was discussed)  None      Records Reviewed (External and Source) EMR reviewed.  Last tetanus administered in 2010.  Updated today.    CC/HPI Summary, DDx, ED Course, and Reassessment: Briefly, this is a 74-year-old male who presents to the emergency department for evaluation of an unintentional laceration to the index finger of the left hand as detailed under HPI.  Reassuring examination as noted above.  Hemostatic with direct pressure with a faint small ooze.  No arterial bleed.  Tetanus administered.  Wound care provided.  X-ray was obtained to rule out potential foreign object.  On radiology read there is concern for a faintly radiopaque linear foreign body along the ventral soft tissues.  Wound bed is was explored.  There is no observed foreign body appreciated.  An abundance of precaution I did extensively irrigate the wound to prevent retained foreign body.  See procedure note for details.  Patient tolerated without any difficulty.  Finger splint was provided for protection of sutures.  Home wound care was reviewed.  I did recommend close follow-up with primary care for wound check and suture removal in 7 to 10 days.  The patient is comfortable and agreeable with this plan.    Disposition Considerations (tests considered but not done, Admit vs D/C, Shared Decision Making, Pt Expectation of Test or Tx.): Appropriate for discharge with PCP follow-up    The patient tolerated their visit well.  The patient and / or the family were informed of the results of any tests, a time was given to answer questions, a plan was proposed and they agreed with plan.    I am the Primary Clinician of Record.  FINAL IMPRESSION      1. Laceration of left index finger without damage to nail,

## 2024-08-07 ENCOUNTER — CARE COORDINATION (OUTPATIENT)
Dept: CASE MANAGEMENT | Age: 74
End: 2024-08-07

## 2024-08-07 NOTE — CARE COORDINATION
Care Transitions Note    Follow Up Call     Patient Current Location:  Home: [facility] [address]  [place] [address]    Care Transition Nurse contacted the patient by telephone. Verified name and  as identifiers.    Additional needs identified to be addressed with provider   No needs identified                 Method of communication with provider: none.    Care Summary Note: Patient reports that he is doing well, denies any chest pain, SOB, dizziness, fever, cough.  He denies LE swelling.       He did not do daily weight today, CTN encouraged daily weights after using the restroom in the am and monitoring for 2-3# weight gain overnight, and to report to cardiology.  Patient verbalized understanding.      He reports that he is wearing the CAESAR hose \"the white ones\".      Plan of care updates since last contact:  Education: .  Review of patient management of conditions/medications: .       Advance Care Planning:   Does patient have an Advance Directive: reviewed during previous call, see note. .    Medication Review:  No changes since last call.     Remote Patient Monitoring:  Offered patient enrollment in the Remote Patient Monitoring (RPM) program for in-home monitoring: Will offer on follow up call, DM, HTN, HF.     Assessments:  Care Transitions 24 Hour Call    Do you have any ongoing symptoms?: No  Do you have all of your prescriptions and are they filled?: Yes (Comment: Reviewed all CJT 3/6/24)  Were you discharged with any Home Care or Post Acute Services: No  Post Acute Services: Home Health (Comment: [facility])  Patient DME: Walker, Quad cane  Do you have support at home?: Partner/Spouse/SO  Are you an active caregiver in your home?: No  Care Transitions Interventions          Follow Up Appointment:   Reviewed upcoming appointment(s).  Future Appointments         Provider Specialty Dept Phone    2024 2:00 PM Constance Yancey PA Family Medicine [phone]    2024 9:15 AM [facility] [place]

## 2024-08-09 ENCOUNTER — HOSPITAL ENCOUNTER (EMERGENCY)
Age: 74
Discharge: HOME | End: 2024-08-09
Payer: MEDICARE

## 2024-08-09 ENCOUNTER — APPOINTMENT (OUTPATIENT)
Dept: GENERAL RADIOLOGY | Age: 74
End: 2024-08-09
Payer: MEDICARE

## 2024-08-09 VITALS
HEIGHT: 70 IN | HEART RATE: 59 BPM | SYSTOLIC BLOOD PRESSURE: 117 MMHG | RESPIRATION RATE: 15 BRPM | DIASTOLIC BLOOD PRESSURE: 70 MMHG | OXYGEN SATURATION: 95 % | TEMPERATURE: 98.6 F | WEIGHT: 285 LBS | BODY MASS INDEX: 40.8 KG/M2

## 2024-08-09 DIAGNOSIS — M25.561 ACUTE PAIN OF RIGHT KNEE: Primary | ICD-10-CM

## 2024-08-09 PROCEDURE — 99283 EMERGENCY DEPT VISIT LOW MDM: CPT

## 2024-08-09 PROCEDURE — 6370000000 HC RX 637 (ALT 250 FOR IP): Performed by: NURSE PRACTITIONER

## 2024-08-09 PROCEDURE — 73562 X-RAY EXAM OF KNEE 3: CPT

## 2024-08-09 RX ORDER — HYDROCODONE BITARTRATE AND ACETAMINOPHEN 5; 325 MG/1; MG/1
1 TABLET ORAL ONCE
Status: COMPLETED | OUTPATIENT
Start: 2024-08-09 | End: 2024-08-09

## 2024-08-09 RX ORDER — HYDROCODONE BITARTRATE AND ACETAMINOPHEN 5; 325 MG/1; MG/1
1 TABLET ORAL EVERY 6 HOURS PRN
Qty: 12 TABLET | Refills: 0 | Status: ON HOLD | OUTPATIENT
Start: 2024-08-09 | End: 2024-08-13 | Stop reason: HOSPADM

## 2024-08-09 RX ADMIN — HYDROCODONE BITARTRATE AND ACETAMINOPHEN 1 TABLET: 5; 325 TABLET ORAL at 21:30

## 2024-08-09 ASSESSMENT — PAIN - FUNCTIONAL ASSESSMENT: PAIN_FUNCTIONAL_ASSESSMENT: 0-10

## 2024-08-09 ASSESSMENT — PAIN SCALES - GENERAL
PAINLEVEL_OUTOF10: 10
PAINLEVEL_OUTOF10: 10

## 2024-08-09 ASSESSMENT — PAIN DESCRIPTION - DESCRIPTORS
DESCRIPTORS: ACHING
DESCRIPTORS: ACHING

## 2024-08-09 ASSESSMENT — PAIN DESCRIPTION - LOCATION
LOCATION: KNEE
LOCATION: KNEE

## 2024-08-09 ASSESSMENT — PAIN DESCRIPTION - ORIENTATION
ORIENTATION: RIGHT
ORIENTATION: RIGHT

## 2024-08-10 NOTE — ED PROVIDER NOTES
ENDOSCOPY    UPPER GASTROINTESTINAL ENDOSCOPY  02/19/2019    EGD BIOPSY performed by Rajesh Virk MD at [facility] ENDOSCOPY    UPPER GASTROINTESTINAL ENDOSCOPY N/A 06/11/2019    EGD WITH HALO AND ANESTHESIA performed by Rajesh Virk MD at [facility] ENDOSCOPY    UPPER GASTROINTESTINAL ENDOSCOPY N/A 08/13/2019    ESOPHAGOGASTRODUODENOSCOPY WITH HALO AND ANESTHESIA -SLEEP APNEA- performed by Rajesh Virk MD at [facility] ENDOSCOPY    WISDOM TOOTH EXTRACTION         CURRENTMEDICATIONS       Discharge Medication List as of 8/9/2024  9:32 PM        CONTINUE these medications which have NOT CHANGED    Details   metoprolol tartrate (LOPRESSOR) 25 MG tablet Take 1 tablet by mouth twice daily, Disp-180 tablet, R-0Normal      gabapentin (NEURONTIN) 300 MG capsule TAKE 1 TO 2 CAPSULES BY MOUTH NIGHTLY, Disp-60 capsule, R-0Normal      empagliflozin (JARDIANCE) 25 MG tablet Take 1 tablet by mouth daily, Disp-90 tablet, R-1Normal      Elastic Bandages & Supports (MEDICAL COMPRESSION STOCKINGS) MISC DAILY Starting Tue 7/23/2024, Disp-1 each, R-0, Print20 mmHg      insulin glargine (LANTUS SOLOSTAR) 100 UNIT/ML injection pen Inject 20 Units into the skin nightly, Disp-15 mL, R-1Normal      furosemide (LASIX) 40 MG tablet Three times a week as needed for swelling., Disp-30 tablet, R-0Normal      !! warfarin (COUMADIN) 5 MG tablet TAKE 1 & 1/2 TO 2 (ONE & ONE-HALF TO TWO) TABLETS BY MOUTH ONCE DAILY AS  DIRECTED  BY  COUMADIN  CLINIC, Disp-60 tablet, R-1Normal      acetaminophen (TYLENOL) 325 MG tablet Take 2 tablets by mouth every 4 hours as needed for PainHistorical Med      insulin aspart (NOVOLOG FLEXPEN) 100 UNIT/ML injection pen Inject 25 Units into the skin 3 times daily (before meals), Disp-15 mL, R-5Normal      !! warfarin (COUMADIN) 5 MG tablet Take 1-2 tablets by mouth daily as directed, Disp-60 tablet, R-11Normal      pramipexole (MIRAPEX) 0.5 MG tablet Take 1.5 tablets by mouth nightly, Disp-135 tablet, R-1Normal  Smoking status: Never     Passive exposure: Past    Smokeless tobacco: Never   Vaping Use    Vaping Use: Never used   Substance Use Topics    Alcohol use: No    Drug use: No       SCREENINGS        Demetrius Coma Scale  Eye Opening: Spontaneous  Best Verbal Response: Oriented  Best Motor Response: Obeys commands  [place] Coma Scale Score: 15                CIWA Assessment  BP: 117/70  Pulse: 59           PHYSICAL EXAM  1 or more Elements     ED Triage Vitals [08/09/24 1921]   BP Temp Temp Source Pulse Respirations SpO2 Height Weight - Scale   128/69 98.7 °F (37.1 °C) Oral 77 17 94 % 1.778 m (5' 10\") 129.3 kg (285 lb)       Physical Exam    Patient with limited range of motion of right knee secondary to pain.  Some mild global tenderness without deformity.  Good passive range of motion.  No joint laxity.  Other extremities are atraumatic, strong distal pulses.    DIAGNOSTIC RESULTS   LABS:    Labs Reviewed - No data to display    When ordered only abnormal lab results are displayed. All other labs were within normal range or not returned as of this dictation.    EKG: When ordered, EKG's are interpreted by the Emergency Department Physician in the absence of a cardiologist.  Please see their note for interpretation of EKG.    RADIOLOGY:   Non-plain film images such as CT, Ultrasound and MRI are read by the radiologist. Plain radiographic images are visualized and preliminarily interpreted by the ED Provider with the below findings:        Interpretation per the Radiologist below, if available at the time of this note:    XR KNEE RIGHT (3 VIEWS)   Final Result   Mild CPPD arthropathy.           XR KNEE RIGHT (3 VIEWS)    Result Date: 8/9/2024  EXAMINATION: THREE XRAY VIEWS OF THE RIGHT KNEE 8/9/2024 7:38 pm COMPARISON: None. HISTORY: ORDERING SYSTEM PROVIDED HISTORY: atraumatic pain TECHNOLOGIST PROVIDED HISTORY: Reason for exam:->atraumatic pain Reason for Exam: atraumatic pain FINDINGS: Patellar tracking is within  normal limits.  Osseous structures are unremarkable.  Meniscal calcifications.  Small joint effusion.  Tiny osteophytes posterior patella.     Mild CPPD arthropathy.     XR FINGER LEFT (MIN 2 VIEWS)    Result Date: 8/7/2024  EXAMINATION: 3 XRAY VIEWS OF THE LEFT FINGER 8/5/2024 10:31 am COMPARISON: None HISTORY: ORDERING SYSTEM PROVIDED HISTORY: injury, assess for foreign body TECHNOLOGIST PROVIDED HISTORY: Reason for exam:->injury, assess for foreign body Reason for Exam: injury FINDINGS: Best visualized on the lateral view, there is a very faint, tiny linear radiopaque density overlying ventral soft tissues of the index finger. Finding could represent a faint radiopaque foreign body which is visualized at the level of the middle phalanx of the index finger.  There are minimal degenerative changes of several interphalangeal joints.  Tiny calcific/ossific densities identified about several metacarpophalangeal joints may be degenerative in nature versus changes of prior trauma.  Linear radiopaque foreign body identified within the soft tissues of the thumb.  No evidence of acute fracture or dislocation.     1. Findings suspicious for presence of small linear faintly radiopaque foreign body along ventral soft tissues of the left index finger at the level of the middle phalanx as described above.  Clinical correlation with regard to location of patient's symptomatology would be helpful. 2. No evidence of acute fracture.       No results found.    PROCEDURES   Unless otherwise noted below, none     Procedures    CRITICAL CARE TIME (.cctime)       PAST MEDICAL HISTORY      has a past medical history of A-fib ([facility]), Acid reflux, Yan's esophagus, Coronary artery disease of native heart with stable angina pectoris ([facility]) (05/30/2019), Dementia (HCC), Diabetes mellitus (HCC), Diabetic autonomic neuropathy associated with type 2 diabetes mellitus ([facility]) (03/03/2020), Hyperlipidemia, Hypertension, Pancreatitis (1996),

## 2024-08-11 ENCOUNTER — APPOINTMENT (OUTPATIENT)
Dept: GENERAL RADIOLOGY | Age: 74
End: 2024-08-11
Payer: MEDICARE

## 2024-08-11 ENCOUNTER — HOSPITAL ENCOUNTER (INPATIENT)
Age: 74
LOS: 3 days | Discharge: HOME HEALTH | End: 2024-08-14
Attending: EMERGENCY MEDICINE | Admitting: INTERNAL MEDICINE
Payer: MEDICARE

## 2024-08-11 DIAGNOSIS — M25.561 ACUTE PAIN OF RIGHT KNEE: Primary | ICD-10-CM

## 2024-08-11 DIAGNOSIS — M25.461 KNEE EFFUSION, RIGHT: ICD-10-CM

## 2024-08-11 LAB
ALBUMIN SERPL-MCNC: 3.8 G/DL (ref 3.4–5)
ALBUMIN/GLOB SERPL: 1.3 {RATIO} (ref 1.1–2.2)
ALP SERPL-CCNC: 112 U/L (ref 40–129)
ALT SERPL-CCNC: 14 U/L (ref 10–40)
ANION GAP SERPL CALCULATED.3IONS-SCNC: 8 MMOL/L (ref 3–16)
AST SERPL-CCNC: 21 U/L (ref 15–37)
BASOPHILS # BLD: 0 K/UL (ref 0–0.2)
BASOPHILS NFR BLD: 0.6 %
BILIRUB SERPL-MCNC: 0.3 MG/DL (ref 0–1)
BUN SERPL-MCNC: 25 MG/DL (ref 7–20)
CALCIUM SERPL-MCNC: 9.1 MG/DL (ref 8.3–10.6)
CHLORIDE SERPL-SCNC: 100 MMOL/L (ref 99–110)
CO2 SERPL-SCNC: 29 MMOL/L (ref 21–32)
CREAT SERPL-MCNC: 0.9 MG/DL (ref 0.8–1.3)
DEPRECATED RDW RBC AUTO: 14.9 % (ref 12.4–15.4)
EOSINOPHIL # BLD: 0.4 K/UL (ref 0–0.6)
EOSINOPHIL NFR BLD: 6.1 %
GFR SERPLBLD CREATININE-BSD FMLA CKD-EPI: 90 ML/MIN/{1.73_M2}
GLUCOSE BLD-MCNC: 210 MG/DL (ref 70–99)
GLUCOSE SERPL-MCNC: 84 MG/DL (ref 70–99)
HCT VFR BLD AUTO: 36.6 % (ref 40.5–52.5)
HGB BLD-MCNC: 12 G/DL (ref 13.5–17.5)
INR PPP: 2.73 (ref 0.85–1.15)
LYMPHOCYTES # BLD: 1.3 K/UL (ref 1–5.1)
LYMPHOCYTES NFR BLD: 19.6 %
MCH RBC QN AUTO: 29.1 PG (ref 26–34)
MCHC RBC AUTO-ENTMCNC: 32.8 G/DL (ref 31–36)
MCV RBC AUTO: 88.6 FL (ref 80–100)
MONOCYTES # BLD: 0.6 K/UL (ref 0–1.3)
MONOCYTES NFR BLD: 9 %
NEUTROPHILS # BLD: 4.4 K/UL (ref 1.7–7.7)
NEUTROPHILS NFR BLD: 64.7 %
PERFORMED ON: ABNORMAL
PLATELET # BLD AUTO: 168 K/UL (ref 135–450)
PMV BLD AUTO: 9.3 FL (ref 5–10.5)
POTASSIUM SERPL-SCNC: 4.1 MMOL/L (ref 3.5–5.1)
PROT SERPL-MCNC: 6.7 G/DL (ref 6.4–8.2)
PROTHROMBIN TIME: 28.9 SEC (ref 11.9–14.9)
RBC # BLD AUTO: 4.13 M/UL (ref 4.2–5.9)
SODIUM SERPL-SCNC: 137 MMOL/L (ref 136–145)
URATE SERPL-MCNC: 5.6 MG/DL (ref 3.5–7.2)
WBC # BLD AUTO: 6.8 K/UL (ref 4–11)

## 2024-08-11 PROCEDURE — 84550 ASSAY OF BLOOD/URIC ACID: CPT

## 2024-08-11 PROCEDURE — 2580000003 HC RX 258: Performed by: INTERNAL MEDICINE

## 2024-08-11 PROCEDURE — 85610 PROTHROMBIN TIME: CPT

## 2024-08-11 PROCEDURE — 80053 COMPREHEN METABOLIC PANEL: CPT

## 2024-08-11 PROCEDURE — 85025 COMPLETE CBC W/AUTO DIFF WBC: CPT

## 2024-08-11 PROCEDURE — 99285 EMERGENCY DEPT VISIT HI MDM: CPT

## 2024-08-11 PROCEDURE — 36415 COLL VENOUS BLD VENIPUNCTURE: CPT

## 2024-08-11 PROCEDURE — 6370000000 HC RX 637 (ALT 250 FOR IP): Performed by: INTERNAL MEDICINE

## 2024-08-11 PROCEDURE — 73560 X-RAY EXAM OF KNEE 1 OR 2: CPT

## 2024-08-11 PROCEDURE — 1200000000 HC SEMI PRIVATE

## 2024-08-11 RX ORDER — OXYCODONE HYDROCHLORIDE 5 MG/1
5 TABLET ORAL EVERY 6 HOURS PRN
Status: DISCONTINUED | OUTPATIENT
Start: 2024-08-11 | End: 2024-08-14 | Stop reason: HOSPADM

## 2024-08-11 RX ORDER — ASPIRIN 81 MG/1
81 TABLET ORAL DAILY
Status: DISCONTINUED | OUTPATIENT
Start: 2024-08-12 | End: 2024-08-14 | Stop reason: HOSPADM

## 2024-08-11 RX ORDER — SODIUM CHLORIDE 9 MG/ML
INJECTION, SOLUTION INTRAVENOUS PRN
Status: DISCONTINUED | OUTPATIENT
Start: 2024-08-11 | End: 2024-08-14 | Stop reason: HOSPADM

## 2024-08-11 RX ORDER — GABAPENTIN 300 MG/1
300 CAPSULE ORAL NIGHTLY
Status: DISCONTINUED | OUTPATIENT
Start: 2024-08-11 | End: 2024-08-11

## 2024-08-11 RX ORDER — SODIUM CHLORIDE 0.9 % (FLUSH) 0.9 %
5-40 SYRINGE (ML) INJECTION EVERY 12 HOURS SCHEDULED
Status: DISCONTINUED | OUTPATIENT
Start: 2024-08-11 | End: 2024-08-14 | Stop reason: HOSPADM

## 2024-08-11 RX ORDER — PRAMIPEXOLE DIHYDROCHLORIDE 0.25 MG/1
0.75 TABLET ORAL NIGHTLY
Status: DISCONTINUED | OUTPATIENT
Start: 2024-08-11 | End: 2024-08-13

## 2024-08-11 RX ORDER — INSULIN GLARGINE 100 [IU]/ML
20 INJECTION, SOLUTION SUBCUTANEOUS NIGHTLY
Status: DISCONTINUED | OUTPATIENT
Start: 2024-08-11 | End: 2024-08-12

## 2024-08-11 RX ORDER — HYDROCODONE BITARTRATE AND ACETAMINOPHEN 5; 325 MG/1; MG/1
1 TABLET ORAL EVERY 6 HOURS PRN
Status: DISCONTINUED | OUTPATIENT
Start: 2024-08-11 | End: 2024-08-11

## 2024-08-11 RX ORDER — ATORVASTATIN CALCIUM 40 MG/1
40 TABLET, FILM COATED ORAL NIGHTLY
Status: DISCONTINUED | OUTPATIENT
Start: 2024-08-11 | End: 2024-08-14 | Stop reason: HOSPADM

## 2024-08-11 RX ORDER — DOFETILIDE 0.25 MG/1
250 CAPSULE ORAL EVERY 12 HOURS SCHEDULED
Status: DISCONTINUED | OUTPATIENT
Start: 2024-08-11 | End: 2024-08-14 | Stop reason: HOSPADM

## 2024-08-11 RX ORDER — GABAPENTIN 400 MG/1
400 CAPSULE ORAL NIGHTLY
Status: DISCONTINUED | OUTPATIENT
Start: 2024-08-11 | End: 2024-08-13

## 2024-08-11 RX ORDER — ACETAMINOPHEN 325 MG/1
650 TABLET ORAL EVERY 6 HOURS PRN
Status: DISCONTINUED | OUTPATIENT
Start: 2024-08-11 | End: 2024-08-14 | Stop reason: HOSPADM

## 2024-08-11 RX ORDER — ACETAMINOPHEN 650 MG/1
650 SUPPOSITORY RECTAL EVERY 6 HOURS PRN
Status: DISCONTINUED | OUTPATIENT
Start: 2024-08-11 | End: 2024-08-14 | Stop reason: HOSPADM

## 2024-08-11 RX ORDER — OXYCODONE HYDROCHLORIDE 5 MG/1
5 TABLET ORAL EVERY 4 HOURS PRN
Status: DISCONTINUED | OUTPATIENT
Start: 2024-08-11 | End: 2024-08-11

## 2024-08-11 RX ORDER — WARFARIN SODIUM 5 MG/1
5 TABLET ORAL
Status: COMPLETED | OUTPATIENT
Start: 2024-08-11 | End: 2024-08-11

## 2024-08-11 RX ORDER — SODIUM CHLORIDE 9 MG/ML
INJECTION, SOLUTION INTRAVENOUS CONTINUOUS
Status: DISCONTINUED | OUTPATIENT
Start: 2024-08-11 | End: 2024-08-12

## 2024-08-11 RX ORDER — SODIUM CHLORIDE 0.9 % (FLUSH) 0.9 %
5-40 SYRINGE (ML) INJECTION PRN
Status: DISCONTINUED | OUTPATIENT
Start: 2024-08-11 | End: 2024-08-14 | Stop reason: HOSPADM

## 2024-08-11 RX ORDER — POLYETHYLENE GLYCOL 3350 17 G/17G
17 POWDER, FOR SOLUTION ORAL DAILY PRN
Status: DISCONTINUED | OUTPATIENT
Start: 2024-08-11 | End: 2024-08-14 | Stop reason: HOSPADM

## 2024-08-11 RX ORDER — PANTOPRAZOLE SODIUM 40 MG/1
40 TABLET, DELAYED RELEASE ORAL
Status: DISCONTINUED | OUTPATIENT
Start: 2024-08-11 | End: 2024-08-14 | Stop reason: HOSPADM

## 2024-08-11 RX ORDER — FUROSEMIDE 40 MG/1
40 TABLET ORAL DAILY
Status: DISCONTINUED | OUTPATIENT
Start: 2024-08-12 | End: 2024-08-14 | Stop reason: HOSPADM

## 2024-08-11 RX ADMIN — METOPROLOL TARTRATE 25 MG: 25 TABLET, FILM COATED ORAL at 21:09

## 2024-08-11 RX ADMIN — SODIUM CHLORIDE, PRESERVATIVE FREE 10 ML: 5 INJECTION INTRAVENOUS at 21:56

## 2024-08-11 RX ADMIN — DOFETILIDE 250 MCG: 0.25 CAPSULE ORAL at 21:56

## 2024-08-11 RX ADMIN — ATORVASTATIN CALCIUM 40 MG: 40 TABLET, FILM COATED ORAL at 21:09

## 2024-08-11 RX ADMIN — HYDROCODONE BITARTRATE AND ACETAMINOPHEN 1 TABLET: 5; 325 TABLET ORAL at 17:36

## 2024-08-11 RX ADMIN — PANTOPRAZOLE SODIUM 40 MG: 40 TABLET, DELAYED RELEASE ORAL at 18:55

## 2024-08-11 RX ADMIN — OXYCODONE HYDROCHLORIDE 5 MG: 5 TABLET ORAL at 21:13

## 2024-08-11 RX ADMIN — INSULIN GLARGINE 20 UNITS: 100 INJECTION, SOLUTION SUBCUTANEOUS at 21:09

## 2024-08-11 RX ADMIN — GABAPENTIN 400 MG: 400 CAPSULE ORAL at 21:09

## 2024-08-11 RX ADMIN — WARFARIN SODIUM 5 MG: 5 TABLET ORAL at 21:09

## 2024-08-11 RX ADMIN — SODIUM CHLORIDE: 9 INJECTION, SOLUTION INTRAVENOUS at 18:52

## 2024-08-11 RX ADMIN — PRAMIPEXOLE DIHYDROCHLORIDE 0.75 MG: 0.25 TABLET ORAL at 21:09

## 2024-08-11 ASSESSMENT — PAIN DESCRIPTION - LOCATION
LOCATION: KNEE
LOCATION: LEG

## 2024-08-11 ASSESSMENT — PAIN - FUNCTIONAL ASSESSMENT: PAIN_FUNCTIONAL_ASSESSMENT: 0-10

## 2024-08-11 ASSESSMENT — PAIN DESCRIPTION - ORIENTATION
ORIENTATION: RIGHT

## 2024-08-11 ASSESSMENT — PAIN DESCRIPTION - DESCRIPTORS: DESCRIPTORS: ACHING;DISCOMFORT;DULL

## 2024-08-11 ASSESSMENT — PAIN SCALES - GENERAL
PAINLEVEL_OUTOF10: 4
PAINLEVEL_OUTOF10: 5
PAINLEVEL_OUTOF10: 6
PAINLEVEL_OUTOF10: 10
PAINLEVEL_OUTOF10: 5

## 2024-08-11 ASSESSMENT — PAIN DESCRIPTION - PAIN TYPE: TYPE: ACUTE PAIN

## 2024-08-11 NOTE — H&P
Hospital Medicine History & Physical      Date of Admission: 8/11/2024    Date of Service:  Pt seen/examined on 8/11/24     [x]Admitted to Inpatient with expected LOS greater than two midnights due to medical therapy.  []Placed in Observation status.    Chief Admission Complaint:  right knee severe pain     Presenting Admission History:      74 y.o. male with PMHx significant for hyperlipidemia, diabetes mellitus type 2 insulin requiring, diabetic neuropathy, restless leg syndrome, congestive heart failure diastolic, CAD, status post cardiac cath on 9/2021 this did reveal coronary artery disease and he is on medical management, he is status post permanent pacemaker placement for sinus node dysfunction,  Morbid obesity and paroxysmal atrial fibrillation.  He is long-term anticoagulation with warfarin for this.    Of pertinence is that he recently on 7/15/2024 was admitted with cardiology for left atrial appendage closure with Watchman device for nonvalvular atrial fibrillation.  He does follow with [facility] cardiology    He presented to the ED on the day of admission with complaint of right knee severe pain.  He states this pain began about 2 to 3 days ago.  He denies sustaining any trauma.  States pain is very severe 8-9 out of 10 and he is unable to get up and walk.  This is his second ER visit for this issue.  His wife is having a difficult time caring for him at home and he is at risk for falling and sustaining further injuries therefore he is now being admitted for further evaluation and management.  He denies any chest pain or shortness of breath.  No fever or chills.        Assessment/Plan:      Current Principal Problem:  Acute pain of right knee    Severe right knee pain : This has been ongoing for the past few days.  States the pain is very severe he is having difficulty ambulating.  X-ray of the right knee was completed.  There is chondrocalcinosis, and a small effusion.  He will receive analgesics, PT/OT

## 2024-08-11 NOTE — ED NOTES
Patient Name: London Swenson  :  1950  74 y.o.  MRN:  [id number]  Preferred Name  London  ED Room #:    Family/Caregiver Present yes  Restraints no  Sitter no  Sepsis Risk Score      Situation  Code Status: Full Code No additional code details.    Allergies: Clonidine, Oxycodone, Trulicity [dulaglutide], and Codeine  Weight: Patient Vitals for the past 96 hrs (Last 3 readings):   Weight   24 1352 127 kg (280 lb)     Arrived from: home  Chief Complaint:   Chief Complaint   Patient presents with    Leg Pain     Right leg pain and weakness for 4 days. Seen here  for same thing     Hospital Problem/Diagnosis:  Principal Problem:    Acute pain of right knee  Resolved Problems:    * No resolved hospital problems. *    Imaging:   XR KNEE RIGHT (1-2 VIEWS)   Final Result   1. Small effusion.   2. Chondrocalcinosis.   3. Consider MRI to further evaluate.           Abnormal labs:   Abnormal Labs Reviewed   COMPREHENSIVE METABOLIC PANEL - Abnormal; Notable for the following components:       Result Value    BUN 25 (*)     All other components within normal limits   CBC WITH AUTO DIFFERENTIAL - Abnormal; Notable for the following components:    RBC 4.13 (*)     Hemoglobin 12.0 (*)     Hematocrit 36.6 (*)     All other components within normal limits     Critical values: no  Intervention for critical value(s): N/A      Abnormal Assessment Findings: Inability to walk 2/2 right knee pain    Background  History:   Past Medical History:   Diagnosis Date    A-fib ([facility])     Acid reflux     Yan's esophagus     Coronary artery disease of native heart with stable angina pectoris ([facility]) 2019    Dementia ([facility])     Diabetes mellitus ([facility])     Diabetic autonomic neuropathy associated with type 2 diabetes mellitus ([facility]) 2020    Hyperlipidemia     Hypertension     Pancreatitis     Restless legs     Sleep apnea     uses CPAP    Tremor     of bilateral hands       Assessment    Vitals/MEWS: MEWS Score: 1

## 2024-08-12 ENCOUNTER — CARE COORDINATION (OUTPATIENT)
Dept: CASE MANAGEMENT | Age: 74
End: 2024-08-12

## 2024-08-12 ENCOUNTER — APPOINTMENT (OUTPATIENT)
Dept: GENERAL RADIOLOGY | Age: 74
End: 2024-08-12
Payer: MEDICARE

## 2024-08-12 LAB
ANION GAP SERPL CALCULATED.3IONS-SCNC: 8 MMOL/L (ref 3–16)
BASOPHILS # BLD: 0 K/UL (ref 0–0.2)
BASOPHILS NFR BLD: 0.8 %
BUN SERPL-MCNC: 20 MG/DL (ref 7–20)
CALCIUM SERPL-MCNC: 8.9 MG/DL (ref 8.3–10.6)
CHLORIDE SERPL-SCNC: 101 MMOL/L (ref 99–110)
CO2 SERPL-SCNC: 31 MMOL/L (ref 21–32)
CREAT SERPL-MCNC: 0.9 MG/DL (ref 0.8–1.3)
DEPRECATED RDW RBC AUTO: 14.5 % (ref 12.4–15.4)
EOSINOPHIL # BLD: 0.3 K/UL (ref 0–0.6)
EOSINOPHIL NFR BLD: 6.3 %
GFR SERPLBLD CREATININE-BSD FMLA CKD-EPI: 90 ML/MIN/{1.73_M2}
GLUCOSE BLD-MCNC: 285 MG/DL (ref 70–99)
GLUCOSE BLD-MCNC: 316 MG/DL (ref 70–99)
GLUCOSE BLD-MCNC: 362 MG/DL (ref 70–99)
GLUCOSE SERPL-MCNC: 254 MG/DL (ref 70–99)
HCT VFR BLD AUTO: 35.5 % (ref 40.5–52.5)
HGB BLD-MCNC: 11.5 G/DL (ref 13.5–17.5)
INR PPP: 2.43 (ref 0.85–1.15)
LYMPHOCYTES # BLD: 1.2 K/UL (ref 1–5.1)
LYMPHOCYTES NFR BLD: 22.3 %
MCH RBC QN AUTO: 29 PG (ref 26–34)
MCHC RBC AUTO-ENTMCNC: 32.5 G/DL (ref 31–36)
MCV RBC AUTO: 89.3 FL (ref 80–100)
MONOCYTES # BLD: 0.5 K/UL (ref 0–1.3)
MONOCYTES NFR BLD: 10.1 %
NEUTROPHILS # BLD: 3.2 K/UL (ref 1.7–7.7)
NEUTROPHILS NFR BLD: 60.5 %
PERFORMED ON: ABNORMAL
PLATELET # BLD AUTO: 154 K/UL (ref 135–450)
PMV BLD AUTO: 9.4 FL (ref 5–10.5)
POTASSIUM SERPL-SCNC: 4.5 MMOL/L (ref 3.5–5.1)
PROTHROMBIN TIME: 26.4 SEC (ref 11.9–14.9)
RBC # BLD AUTO: 3.97 M/UL (ref 4.2–5.9)
SODIUM SERPL-SCNC: 140 MMOL/L (ref 136–145)
WBC # BLD AUTO: 5.3 K/UL (ref 4–11)

## 2024-08-12 PROCEDURE — 85610 PROTHROMBIN TIME: CPT

## 2024-08-12 PROCEDURE — 36415 COLL VENOUS BLD VENIPUNCTURE: CPT

## 2024-08-12 PROCEDURE — 97530 THERAPEUTIC ACTIVITIES: CPT

## 2024-08-12 PROCEDURE — 97116 GAIT TRAINING THERAPY: CPT

## 2024-08-12 PROCEDURE — 2580000003 HC RX 258: Performed by: INTERNAL MEDICINE

## 2024-08-12 PROCEDURE — 97162 PT EVAL MOD COMPLEX 30 MIN: CPT

## 2024-08-12 PROCEDURE — 6370000000 HC RX 637 (ALT 250 FOR IP): Performed by: INTERNAL MEDICINE

## 2024-08-12 PROCEDURE — 93005 ELECTROCARDIOGRAM TRACING: CPT | Performed by: INTERNAL MEDICINE

## 2024-08-12 PROCEDURE — 97166 OT EVAL MOD COMPLEX 45 MIN: CPT

## 2024-08-12 PROCEDURE — 6370000000 HC RX 637 (ALT 250 FOR IP): Performed by: SPECIALIST/TECHNOLOGIST

## 2024-08-12 PROCEDURE — 71045 X-RAY EXAM CHEST 1 VIEW: CPT

## 2024-08-12 PROCEDURE — 99221 1ST HOSP IP/OBS SF/LOW 40: CPT | Performed by: SPECIALIST/TECHNOLOGIST

## 2024-08-12 PROCEDURE — 6360000002 HC RX W HCPCS: Performed by: INTERNAL MEDICINE

## 2024-08-12 PROCEDURE — 80048 BASIC METABOLIC PNL TOTAL CA: CPT

## 2024-08-12 PROCEDURE — 85025 COMPLETE CBC W/AUTO DIFF WBC: CPT

## 2024-08-12 PROCEDURE — 1200000000 HC SEMI PRIVATE

## 2024-08-12 PROCEDURE — 97110 THERAPEUTIC EXERCISES: CPT

## 2024-08-12 PROCEDURE — 97535 SELF CARE MNGMENT TRAINING: CPT

## 2024-08-12 RX ORDER — INSULIN GLARGINE 100 [IU]/ML
28 INJECTION, SOLUTION SUBCUTANEOUS NIGHTLY
Status: DISCONTINUED | OUTPATIENT
Start: 2024-08-12 | End: 2024-08-13

## 2024-08-12 RX ORDER — LIDOCAINE 4 G/G
1 PATCH TOPICAL DAILY
Status: DISCONTINUED | OUTPATIENT
Start: 2024-08-12 | End: 2024-08-14 | Stop reason: HOSPADM

## 2024-08-12 RX ORDER — WARFARIN SODIUM 5 MG/1
5 TABLET ORAL
Status: DISCONTINUED | OUTPATIENT
Start: 2024-08-12 | End: 2024-08-12

## 2024-08-12 RX ORDER — INSULIN LISPRO 100 [IU]/ML
0-16 INJECTION, SOLUTION INTRAVENOUS; SUBCUTANEOUS
Status: DISCONTINUED | OUTPATIENT
Start: 2024-08-12 | End: 2024-08-14 | Stop reason: HOSPADM

## 2024-08-12 RX ORDER — GLUCAGON 1 MG/ML
1 KIT INJECTION PRN
Status: DISCONTINUED | OUTPATIENT
Start: 2024-08-12 | End: 2024-08-14 | Stop reason: HOSPADM

## 2024-08-12 RX ORDER — INSULIN LISPRO 100 [IU]/ML
0-4 INJECTION, SOLUTION INTRAVENOUS; SUBCUTANEOUS NIGHTLY
Status: DISCONTINUED | OUTPATIENT
Start: 2024-08-12 | End: 2024-08-14 | Stop reason: HOSPADM

## 2024-08-12 RX ORDER — INSULIN GLARGINE 100 [IU]/ML
24 INJECTION, SOLUTION SUBCUTANEOUS NIGHTLY
Status: DISCONTINUED | OUTPATIENT
Start: 2024-08-12 | End: 2024-08-12

## 2024-08-12 RX ORDER — DEXTROSE MONOHYDRATE 100 MG/ML
INJECTION, SOLUTION INTRAVENOUS CONTINUOUS PRN
Status: DISCONTINUED | OUTPATIENT
Start: 2024-08-12 | End: 2024-08-12 | Stop reason: SDUPTHER

## 2024-08-12 RX ORDER — DEXTROSE MONOHYDRATE 100 MG/ML
INJECTION, SOLUTION INTRAVENOUS CONTINUOUS PRN
Status: DISCONTINUED | OUTPATIENT
Start: 2024-08-12 | End: 2024-08-14 | Stop reason: HOSPADM

## 2024-08-12 RX ORDER — GLUCAGON 1 MG/ML
1 KIT INJECTION PRN
Status: DISCONTINUED | OUTPATIENT
Start: 2024-08-12 | End: 2024-08-12 | Stop reason: SDUPTHER

## 2024-08-12 RX ORDER — INSULIN LISPRO 100 [IU]/ML
0-4 INJECTION, SOLUTION INTRAVENOUS; SUBCUTANEOUS NIGHTLY
Status: DISCONTINUED | OUTPATIENT
Start: 2024-08-12 | End: 2024-08-12

## 2024-08-12 RX ORDER — INSULIN LISPRO 100 [IU]/ML
0-8 INJECTION, SOLUTION INTRAVENOUS; SUBCUTANEOUS
Status: DISCONTINUED | OUTPATIENT
Start: 2024-08-12 | End: 2024-08-12

## 2024-08-12 RX ADMIN — INSULIN LISPRO 8 UNITS: 100 INJECTION, SOLUTION INTRAVENOUS; SUBCUTANEOUS at 13:29

## 2024-08-12 RX ADMIN — PANTOPRAZOLE SODIUM 40 MG: 40 TABLET, DELAYED RELEASE ORAL at 17:02

## 2024-08-12 RX ADMIN — INSULIN LISPRO 12 UNITS: 100 INJECTION, SOLUTION INTRAVENOUS; SUBCUTANEOUS at 17:03

## 2024-08-12 RX ADMIN — METOPROLOL TARTRATE 25 MG: 25 TABLET, FILM COATED ORAL at 21:57

## 2024-08-12 RX ADMIN — ATORVASTATIN CALCIUM 40 MG: 40 TABLET, FILM COATED ORAL at 21:57

## 2024-08-12 RX ADMIN — WARFARIN SODIUM 7.5 MG: 5 TABLET ORAL at 17:02

## 2024-08-12 RX ADMIN — METHYLPREDNISOLONE SODIUM SUCCINATE 60 MG: 125 INJECTION INTRAMUSCULAR; INTRAVENOUS at 17:02

## 2024-08-12 RX ADMIN — PRAMIPEXOLE DIHYDROCHLORIDE 0.75 MG: 0.25 TABLET ORAL at 21:57

## 2024-08-12 RX ADMIN — GABAPENTIN 400 MG: 400 CAPSULE ORAL at 21:57

## 2024-08-12 RX ADMIN — INSULIN GLARGINE 28 UNITS: 100 INJECTION, SOLUTION SUBCUTANEOUS at 21:57

## 2024-08-12 RX ADMIN — SODIUM CHLORIDE, PRESERVATIVE FREE 10 ML: 5 INJECTION INTRAVENOUS at 22:01

## 2024-08-12 RX ADMIN — SODIUM CHLORIDE, PRESERVATIVE FREE 10 ML: 5 INJECTION INTRAVENOUS at 09:21

## 2024-08-12 RX ADMIN — PANTOPRAZOLE SODIUM 40 MG: 40 TABLET, DELAYED RELEASE ORAL at 06:44

## 2024-08-12 RX ADMIN — OXYCODONE HYDROCHLORIDE 5 MG: 5 TABLET ORAL at 02:53

## 2024-08-12 ASSESSMENT — PAIN SCALES - GENERAL
PAINLEVEL_OUTOF10: 6
PAINLEVEL_OUTOF10: 0
PAINLEVEL_OUTOF10: 5

## 2024-08-12 NOTE — ACP (ADVANCE CARE PLANNING)
Advance Care Planning     General Advance Care Planning (ACP) Conversation    Date of Conversation: 8/12/2024  Conducted with: Patient with Decision Making Capacity  Other persons present: None    Healthcare Decision Maker:   Primary Decision Maker (Active): MessiRosy - Spouse - [phone]     Today we documented Decision Maker(s) consistent with Legal Next of Kin hierarchy.  Content/Action Overview:  Has NO ACP documents-Information provided  Reviewed DNR/DNI and patient elects Full Code (Attempt Resuscitation)    Length of Voluntary ACP Conversation in minutes:  <16 minutes (Non-Billable)    Ericka Bean RN

## 2024-08-12 NOTE — PROGRESS NOTES
Hospital Medicine Progress Note      Date of Admission: 8/11/2024  Hospital Day: 2    Chief Admission Complaint:    severe  right knee pain      Subjective: He is sitting up, eating breakfast.  Has c/o right knee pain    Presenting Admission History:       74 y.o. male with PMHx significant for hyperlipidemia, diabetes mellitus type 2 insulin requiring, diabetic neuropathy, restless leg syndrome, congestive heart failure diastolic, CAD, status post cardiac cath on 9/2021 this did reveal coronary artery disease and he is on medical management, he is status post permanent pacemaker placement for sinus node dysfunction,  Morbid obesity and paroxysmal atrial fibrillation.  He is long-term anticoagulation with warfarin for this.     Of pertinence is that he recently on 7/15/2024 was admitted with cardiology for left atrial appendage closure with Watchman device for nonvalvular atrial fibrillation.  He does follow with [facility] cardiology     He presented to the ED on the day of admission with complaint of right knee severe pain.  He states this pain began about 2 to 3 days prior to this admission.  He denies sustaining any trauma.  States pain is very severe 8-9 out of 10 and he is unable to get up and walk.  This is his second ER visit for this issue.  His wife is having a difficult time caring for him at home and he is at risk for falling and sustaining further injuries therefore he is now being admitted for further evaluation and management.  He denies any chest pain or shortness of breath.  No fever or chills.       Assessment/Plan:      Current Principal Problem:  Acute pain of right knee    Severe right knee pain : This has been ongoing for the past few days.  States the pain is  severe is having difficulty ambulating.  X-ray of the right knee was completed.  There is chondrocalcinosis, and a small effusion.  Continue analgesics, PT/OT to evaluate.  Will also ask orthopedics to see for any further input  May need to      ------------------------------------------------------------------------------------------------------------------------------------------------------------------------    MDM      [x] High (any 2)    A. Problems (any 1)  [x] Acute/Chronic Illness/injury posing threat to life or bodily function:    [] Severe exacerbation of chronic illness:    ---------------------------------------------------------------------  B. Risk of Treatment (any 1)   [x] Drugs/treatments that require intensive monitoring for toxicity include:    [] IV ABX requiring serial renal monitoring for nephrotoxicity:     [x] IV Narcotic analgesia for adverse drug reaction  [] IV diuresis requiring serial monitoring for renal impairment and electrolyte derangements  [x] Critical electrolyte abnormalities requiring IV replacement and close serial monitoring  [x] Insulin - monitoring serial FSBS for Hypoglycemic adverse drug reaction  [x] Anticoagulation requiring serial monitoring of coagulation factors  [] Other -   [] Change in code status:    [] Decision to escalate care:    [] Major surgery/procedure with associated risk factors:    ----------------------------------------------------------------------  C. Data (any 2)  [x] Discussed current management and discharge planning options with Case Management.  [] Discussed management of the case with:    [] Telemetry personally reviewed and interpreted as documented above    [x] Imaging personally reviewed and interpreted, includes:  right knee  x ray  [x] Data Review (any 3)  [] All available Consultant notes from yesterday/today were reviewed  [x] All current labs were reviewed and interpreted for clinical significance   [x] Appropriate follow-up labs were ordered  [] Collateral history obtained from:        Medications:  Personally reviewed in detail in conjunction w/ labs as documented for evidence of drug toxicity.     Infusion Medications    sodium chloride      sodium chloride 75 mL/hr at

## 2024-08-12 NOTE — PROGRESS NOTES
Occupational Therapy  Facility/Department: [facility] C5 - MED SURG/ORTHO  Occupational Therapy Initial Assessment    Name: London Swenson  : 1950  MRN: [id number]  Date of Service: 2024    Discharge Recommendations:  24 hour supervision or assist, Home with Home health OT  OT Equipment Recommendations  Equipment Needed: Yes  Mobility Devices: ADL Assistive Devices  ADL Assistive Devices: Grab Bars - shower       Patient Diagnosis(es): The primary encounter diagnosis was Acute pain of right knee. A diagnosis of Knee effusion, right was also pertinent to this visit.  Past Medical History:  has a past medical history of A-fib (HCC), Acid reflux, Yan's esophagus, Coronary artery disease of native heart with stable angina pectoris (HCC), Dementia (HCC), Diabetes mellitus (HCC), Diabetic autonomic neuropathy associated with type 2 diabetes mellitus (HCC), Hyperlipidemia, Hypertension, Pancreatitis, Restless legs, Sleep apnea, and Tremor.  Past Surgical History:  has a past surgical history that includes Pancreas surgery; Cholecystectomy; Tonsillectomy; [place] tooth extraction; Colonoscopy (10/26/2011); Cataract removal (Bilateral, ); Upper gastrointestinal endoscopy (10/04/2016); Hydrocele surgery (11/15/2016); Endoscopy, colon, diagnostic; Upper gastrointestinal endoscopy (2017); Upper gastrointestinal endoscopy (2017); Upper gastrointestinal endoscopy (2017); Upper gastrointestinal endoscopy (2017); Upper gastrointestinal endoscopy (N/A, 2018); Upper gastrointestinal endoscopy (2019); Upper gastrointestinal endoscopy (N/A, 2019); Upper gastrointestinal endoscopy (N/A, 2019); laryngoscopy (N/A, 10/25/2023); Cardiac defibrillator placement; and ep device procedure (N/A, 7/15/2024).    Assessment    Pt admitted to [facility] with R knee pain. At baseline, pt reports being IND with all ADLs and fxl mobility without device. Today pt presents below baseline, requiring  heavy reliance on BUE's 2/2 R knee pain    Vision  Vision: Impaired  Vision Exceptions: Wears glasses for reading  Hearing  Hearing: Within functional limits  Cognition  Overall Cognitive Status: WFL  Orientation  Overall Orientation Status: Within Normal Limits  Orientation Level: Oriented X4    Education Given To: Patient  Education Provided: Role of Therapy;Plan of Care;Transfer Training;Precautions  Education Method: Verbal  Barriers to Learning: None  Education Outcome: Verbalized understanding    AM-PAC - ADL  AM-PAC Daily Activity - Inpatient   How much help is needed for putting on and taking off regular lower body clothing?: A Lot  How much help is needed for bathing (which includes washing, rinsing, drying)?: A Lot  How much help is needed for toileting (which includes using toilet, bedpan, or urinal)?: A Little  How much help is needed for putting on and taking off regular upper body clothing?: A Little  How much help is needed for taking care of personal grooming?: A Little  How much help for eating meals?: None  AM-[facility] Inpatient Daily Activity Raw Score: 17  AM-PAC Inpatient ADL T-Scale Score : 37.26  ADL Inpatient CMS 0-100% Score: 50.11  ADL Inpatient CMS G-Code Modifier : CK    Goals  Short Term Goals  Time Frame for Short Term Goals: 1 week (8/19)  Short Term Goal 1: Pt will complete fxl transfer with SPV  Short Term Goal 2: Pt will complete toileting + toilet transfer with SPV  Short Term Goal 3: Pt will complete room/bathroom mobility with SPV  Short Term Goal 4: Pt will complete LB dressing with SPV  Short Term Goal 5: Pt will tolerate standing for >5 mins with SPV while engaged in dynamic task for increased standing tolernace and balance required for I/ADLs and fxl mobility.  Patient Goals   Patient goals : to go home       Therapy Time   Individual Concurrent Group Co-treatment   Time In 1045         Time Out 1118         Minutes 33         Timed Code Treatment Minutes: 23 Minutes (10 min

## 2024-08-12 NOTE — CARE COORDINATION
Case Management Assessment  Initial Evaluation    Date/Time of Evaluation: 8/12/2024 9:15 AM  Assessment Completed by: Ericka Bean RN    If patient is discharged prior to next notation, then this note serves as note for discharge by case management.    Patient Name: London Swenson                   YOB: 1950  Diagnosis: Knee effusion, right [M25.461]  Acute pain of right knee [M25.561]                   Date / Time: 8/11/2024  1:51 PM    Patient Admission Status: Inpatient   Readmission Risk (Low < 19, Mod (19-27), High > 27): Readmission Risk Score: 22.7    Current PCP: Constance Yancey PA  PCP verified by CM? Yes    Chart Reviewed: Yes      History Provided by: Patient, Medical Record  Patient Orientation: Alert and Oriented    Patient Cognition: Alert    Hospitalization in the last 30 days (Readmission):  Yes    If yes, Readmission Assessment in  Navigator will be completed.    Advance Directives:      Code Status: Full Code   Patient's Primary Decision Maker is: Legal Next of Kin    Primary Decision Maker (Active): Rosy Swenson - Spouse - [phone]    Discharge Planning:    Patient lives with: Spouse/Significant Other Type of Home: House  Primary Care Giver: Self  Patient Support Systems include: Spouse/Significant Other   Current Financial resources: Medicare  Current community resources: None  Current services prior to admission: None            Current DME:              Type of Home Care services:  None    ADLS  Prior functional level: Independent in ADLs/IADLs  Current functional level: Independent in ADLs/IADLs    PT AM-PAC:   /24  OT AM-PAC:   /24    Family can provide assistance at DC: Yes  Would you like Case Management to discuss the discharge plan with any other family members/significant others, and if so, who? No  Plans to Return to Present Housing: Yes  Other Identified Issues/Barriers to RETURNING to current housing: Ortho consult  Potential Assistance needed at discharge:  [phone] Fax: [phone]

## 2024-08-12 NOTE — PROGRESS NOTES
No  Referring Practitioner: Kendy Moncada MD  Referral Date : 08/11/24  Diagnosis: Acute pain of R knee  Follows Commands: Within Functional Limits  General Comment  Comments: Pt in bed upon arrival, RN cleared for therapy  Subjective  Subjective: pt pleasant and agreeable         Social/Functional History  Social/Functional History  Lives With: Spouse  Type of Home: House  Home Layout: One level (1 step down to family room)  Home Access: Level entry  Bathroom Shower/Tub: Walk-in shower  Bathroom Toilet: Standard  Bathroom Equipment: Shower chair  Home Equipment: Walker - Rolling, Cane, Wheelchair - Manual, Crutches  Has the patient had two or more falls in the past year or any fall with injury in the past year?: No  ADL Assistance: Independent  Homemaking Assistance: Independent  Homemaking Responsibilities: Yes  Ambulation Assistance: Independent (No AD typically, has been using RW since knee pain started within the last few days)  Transfer Assistance: Independent  Active : Yes  Occupation: Retired  Type of Occupation: Road work  Leisure & Hobbies: \"anything\"    Vision/Hearing  Vision  Vision: Impaired  Vision Exceptions: Wears glasses for reading  Hearing  Hearing: Within functional limits      Cognition   Orientation  Overall Orientation Status: Within Normal Limits  Orientation Level: Oriented X4  Cognition  Overall Cognitive Status: WFL     Objective   Temp: 98.5 °F (36.9 °C)  Pulse: 68  Heart Rate Source: Monitor  Respirations: 18  SpO2: 92 %  O2 Device: None (Room air)  BP: 132/79  MAP (Calculated): 97  BP Location: Right upper arm  BP Method: Automatic  Patient Position: Sitting     Observation/Palpation  Posture: Good  Gross Assessment  AROM: Generally decreased, functional  Strength: Generally decreased, functional  Coordination: Within functional limits  Tone: Normal      Bed Mobility Training  Bed Mobility Training: Yes  Supine to Sit: Stand-by assistance  Sit to Supine: Other (comment) (In chair  G-Code Modifier : CJ    Goals  Short Term Goals  Time Frame for Short Term Goals: 8/19/24 (7 days) unless otherwise stated  Short Term Goal 1: Pt will perform supine <> sit with mod I  Short Term Goal 2: Pt will perform all transfers with LRAD and supervision  Short Term Goal 3: Pt will ambulate 75ft with LRAD and supervision  Short Term Goal 4: Pt will perform 12-15 reps of BLE exercises by 8/15  Short Term Goal 5: pt will navigate up<>down 1 step with LRAD and SBA  Additional Goals?: No  Patient Goals   Patient Goals : \"to have less pain\"       Education  Patient Education  Education Given To: Patient  Education Provided: Role of Therapy;Plan of Care;Equipment;Transfer Training;Precautions;Home Exercise Program  Education Provided Comments: Disease Specific Education: Pt educated on weight bearing status, appropriate DME, and safe mobility with AD.  Education Method: Verbal;Demonstration  Barriers to Learning: None  Education Outcome: Verbalized understanding;Demonstrated understanding;Continued education needed      Therapy Time   Individual Concurrent Group Co-treatment   Time In 1045         Time Out 1118         Minutes 33         Timed Code Treatment Minutes: 23 Minutes (10 min eval)       Loraine Yost, PT, DPT    If pt is unable to be seen after this session, please let this note serve as discharge summary.  Please see case management note for discharge disposition.  Thank you.

## 2024-08-12 NOTE — CONSULTS
Pharmacy Note  Warfarin Consult  Dx: Afib  Goal INR range 2-3   Home Warfarin dose: 5 mg on Sunday. 7.5 mg all other days. Pt sees [facility]. Last seen 7/30/24      Date  INR  Warfarin  8/11                2.73                    5 mg    Recommend Warfarin 5 mg tonight x1.  Daily INR ordered.  Rx will continue to manage therapy per consult order.    Joanna Melendez [facility]

## 2024-08-12 NOTE — ED PROVIDER NOTES
EMERGENCY DEPARTMENT ENCOUNTER     [facility] C5 - MED SURG/ORTHO     Pt Name: London Swenson   MRN: [id number]   Birthdate 1950   Date of evaluation: 8/11/2024   Provider: Jesus Ricci II, DO   PCP: Constance Yancey PA   Note Started: 7:51 AM EDT 8/12/24     CHIEF COMPLAINT     Chief Complaint   Patient presents with    Leg Pain     Right leg pain and weakness for 4 days. Seen here 8/8 for same thing        HISTORY OF PRESENT ILLNESS:  History from : Patient   Limitations to history : None     London Swenson is a 74 y.o. male who presents to the emergency department complaining of right knee pain.  Patient states that he was seen 3 or 4 days ago for similar.  He denies trauma or injury.  He states that pain has been progressive and uncontrolled with pain medications at home.  He reports the pain is more severe with attempts at standing to the point where his leg harpreet underneath him.  Wife is present at bedside and states that she is unable to even get him off the couch or out of a chair to care for himself or go to the bathroom.    Nursing Notes were all reviewed and agreed with or any disagreements were addressed in the HPI.     ROS: Positives and Pertinent negatives as per HPI.    PAST MEDICAL HISTORY     Past medical history:  has a past medical history of A-fib ([facility]), Acid reflux, Yan's esophagus, Coronary artery disease of native heart with stable angina pectoris ([facility]) (05/30/2019), Dementia (HCC), Diabetes mellitus (HCC), Diabetic autonomic neuropathy associated with type 2 diabetes mellitus ([facility]) (03/03/2020), Hyperlipidemia, Hypertension, Pancreatitis (1996), Restless legs, Sleep apnea, and Tremor.    Past surgical history:  has a past surgical history that includes Pancreas surgery; Cholecystectomy; Tonsillectomy; [place] tooth extraction; Colonoscopy (10/26/2011); Cataract removal (Bilateral, 2013); Upper gastrointestinal endoscopy (10/04/2016); Hydrocele surgery (11/15/2016); Endoscopy,  Oral Given 8/11/24 2109)   pantoprazole (PROTONIX) tablet 40 mg (40 mg Oral Given 8/12/24 0644)   pramipexole (MIRAPEX) tablet 0.75 mg (0.75 mg Oral Given 8/11/24 2109)   sodium chloride flush 0.9 % injection 5-40 mL (10 mLs IntraVENous Given 8/11/24 2156)   sodium chloride flush 0.9 % injection 5-40 mL (has no administration in time range)   0.9 % sodium chloride infusion (has no administration in time range)   polyethylene glycol (GLYCOLAX) packet 17 g (has no administration in time range)   acetaminophen (TYLENOL) tablet 650 mg (has no administration in time range)     Or   acetaminophen (TYLENOL) suppository 650 mg (has no administration in time range)   0.9 % sodium chloride infusion ( IntraVENous New Bag 8/11/24 1852)   HYDROmorphone (DILAUDID) injection 0.5 mg (has no administration in time range)   gabapentin (NEURONTIN) capsule 400 mg (400 mg Oral Given 8/11/24 2109)   oxyCODONE (ROXICODONE) immediate release tablet 5 mg (5 mg Oral Given 8/12/24 0253)   warfarin placeholder: dosing by pharmacy (has no administration in time range)   warfarin (COUMADIN) tablet 5 mg (5 mg Oral Given 8/11/24 2109)             CC/HPI Summary, DDx, ED Course, and Reassessment:     Patient presents to the emergency department complaining of right knee pain to the point where he is unable to get up out of bed to use the restroom or care for himself.  Vitals stable on arrival.  Heart and lung exam stable.  Knee is swollen and tender throughout.  X-ray without significant abnormality, fracture, or deformity.  Basic labs are stable with noted elevated INR.  Uric acid within normal limits.  Clinically, I have low suspicion for joint infection.  Meniscal injury with effusion versus hemarthrosis is more likely given elevated INR.  Patient will be admitted for further evaluation and treatment.    CONSULTS: None   Social Determinants: None   Chronic Conditions: Atrial fibrillation.  Diabetes.  Disposition Considerations: None    Critical
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
## 2024-08-12 NOTE — CONSULTS
Department of Orthopedic Surgery  Physician Assistant   Consult Note        Reason for Consult:  severe right knee pain  Requesting Physician: Kendy Moncada MD  Date of Service: 8/12/2024 10:11 AM    CHIEF COMPLAINT:  As Above    History Obtained From:  patient, electronic medical record    HISTORY OF PRESENT ILLNESS:                The patient is a 74 y.o. male with uncontrolled diabetes, congestive heart failure, neuropathy, obstructive sleep apnea, dementia, A-fib anticoagulated with warfarin who presents with above chief complaint.  Patient states approximately 4 days ago he used did up from a seated position on the couch, felt pain in his right knee with associated buckling.  Pain has gotten progressively worse over the last few days, he presented to [facility] emergency department twice and the second time he was admitted for knee pain evaluation and possible placement.  Patient denies any known history of arthritis, gout, previous knee injury.  Patient denies any fever, fatigue, chills, recent illness, current illness, nausea/vomiting/diarrhea.  Patient is independent at baseline, does not use an assistive device.    Past Medical History:        Diagnosis Date    A-fib ([facility])     Acid reflux     Yan's esophagus     Coronary artery disease of native heart with stable angina pectoris ([facility]) 05/30/2019    Dementia ([facility])     Diabetes mellitus ([facility])     Diabetic autonomic neuropathy associated with type 2 diabetes mellitus ([facility]) 03/03/2020    Hyperlipidemia     Hypertension     Pancreatitis 1996    Restless legs     Sleep apnea     uses CPAP    Tremor     of bilateral hands     Past Surgical History:        Procedure Laterality Date    CARDIAC DEFIBRILLATOR PLACEMENT      CATARACT REMOVAL Bilateral 2013    CHOLECYSTECTOMY      COLONOSCOPY  10/26/2011    ENDOSCOPY, COLON, DIAGNOSTIC      EGD halo    EP DEVICE PROCEDURE N/A 7/15/2024    Watchman keaton closure device performed by Bayron Montoya MD at

## 2024-08-13 LAB
ANION GAP SERPL CALCULATED.3IONS-SCNC: 7 MMOL/L (ref 3–16)
BUN SERPL-MCNC: 21 MG/DL (ref 7–20)
CALCIUM SERPL-MCNC: 8.9 MG/DL (ref 8.3–10.6)
CHLORIDE SERPL-SCNC: 102 MMOL/L (ref 99–110)
CO2 SERPL-SCNC: 28 MMOL/L (ref 21–32)
CREAT SERPL-MCNC: 0.9 MG/DL (ref 0.8–1.3)
DEPRECATED RDW RBC AUTO: 14.1 % (ref 12.4–15.4)
EKG ATRIAL RATE: 66 BPM
EKG DIAGNOSIS: NORMAL
EKG P AXIS: 30 DEGREES
EKG P-R INTERVAL: 230 MS
EKG Q-T INTERVAL: 400 MS
EKG QRS DURATION: 80 MS
EKG QTC CALCULATION (BAZETT): 419 MS
EKG R AXIS: 57 DEGREES
EKG T AXIS: 33 DEGREES
EKG VENTRICULAR RATE: 66 BPM
EST. AVERAGE GLUCOSE BLD GHB EST-MCNC: 194.4 MG/DL
GFR SERPLBLD CREATININE-BSD FMLA CKD-EPI: 90 ML/MIN/{1.73_M2}
GLUCOSE BLD-MCNC: 289 MG/DL (ref 70–99)
GLUCOSE BLD-MCNC: 294 MG/DL (ref 70–99)
GLUCOSE BLD-MCNC: 310 MG/DL (ref 70–99)
GLUCOSE BLD-MCNC: 426 MG/DL (ref 70–99)
GLUCOSE SERPL-MCNC: 290 MG/DL (ref 70–99)
HBA1C MFR BLD: 8.4 %
HCT VFR BLD AUTO: 35.2 % (ref 40.5–52.5)
HGB BLD-MCNC: 11.3 G/DL (ref 13.5–17.5)
INR PPP: 2.93 (ref 0.85–1.15)
MCH RBC QN AUTO: 28.1 PG (ref 26–34)
MCHC RBC AUTO-ENTMCNC: 32 G/DL (ref 31–36)
MCV RBC AUTO: 87.9 FL (ref 80–100)
PERFORMED ON: ABNORMAL
PLATELET # BLD AUTO: 157 K/UL (ref 135–450)
PMV BLD AUTO: 9.4 FL (ref 5–10.5)
POTASSIUM SERPL-SCNC: 4.8 MMOL/L (ref 3.5–5.1)
PROTHROMBIN TIME: 30.4 SEC (ref 11.9–14.9)
RBC # BLD AUTO: 4.01 M/UL (ref 4.2–5.9)
SODIUM SERPL-SCNC: 137 MMOL/L (ref 136–145)
WBC # BLD AUTO: 5.3 K/UL (ref 4–11)

## 2024-08-13 PROCEDURE — 83036 HEMOGLOBIN GLYCOSYLATED A1C: CPT

## 2024-08-13 PROCEDURE — 6370000000 HC RX 637 (ALT 250 FOR IP): Performed by: SPECIALIST/TECHNOLOGIST

## 2024-08-13 PROCEDURE — 97530 THERAPEUTIC ACTIVITIES: CPT

## 2024-08-13 PROCEDURE — 80048 BASIC METABOLIC PNL TOTAL CA: CPT

## 2024-08-13 PROCEDURE — 2580000003 HC RX 258: Performed by: INTERNAL MEDICINE

## 2024-08-13 PROCEDURE — 85027 COMPLETE CBC AUTOMATED: CPT

## 2024-08-13 PROCEDURE — 93010 ELECTROCARDIOGRAM REPORT: CPT | Performed by: INTERNAL MEDICINE

## 2024-08-13 PROCEDURE — 97535 SELF CARE MNGMENT TRAINING: CPT

## 2024-08-13 PROCEDURE — 85610 PROTHROMBIN TIME: CPT

## 2024-08-13 PROCEDURE — 1200000000 HC SEMI PRIVATE

## 2024-08-13 PROCEDURE — 99231 SBSQ HOSP IP/OBS SF/LOW 25: CPT | Performed by: SPECIALIST/TECHNOLOGIST

## 2024-08-13 PROCEDURE — 36415 COLL VENOUS BLD VENIPUNCTURE: CPT

## 2024-08-13 PROCEDURE — 6370000000 HC RX 637 (ALT 250 FOR IP): Performed by: INTERNAL MEDICINE

## 2024-08-13 RX ORDER — INSULIN GLARGINE 100 [IU]/ML
35 INJECTION, SOLUTION SUBCUTANEOUS NIGHTLY
Status: DISCONTINUED | OUTPATIENT
Start: 2024-08-13 | End: 2024-08-14 | Stop reason: HOSPADM

## 2024-08-13 RX ORDER — LIDOCAINE 4 G/G
1 PATCH TOPICAL DAILY
Qty: 10 EACH | Refills: 0 | Status: SHIPPED | OUTPATIENT
Start: 2024-08-14 | End: 2024-08-24

## 2024-08-13 RX ORDER — GABAPENTIN 300 MG/1
600 CAPSULE ORAL NIGHTLY
Status: DISCONTINUED | OUTPATIENT
Start: 2024-08-13 | End: 2024-08-14 | Stop reason: HOSPADM

## 2024-08-13 RX ORDER — PREDNISONE 20 MG/1
40 TABLET ORAL DAILY
Status: DISCONTINUED | OUTPATIENT
Start: 2024-08-13 | End: 2024-08-14 | Stop reason: HOSPADM

## 2024-08-13 RX ORDER — OXYCODONE HYDROCHLORIDE 5 MG/1
2.5-5 TABLET ORAL EVERY 6 HOURS PRN
Qty: 12 TABLET | Refills: 0 | Status: SHIPPED | OUTPATIENT
Start: 2024-08-13 | End: 2024-08-16

## 2024-08-13 RX ORDER — PRAMIPEXOLE DIHYDROCHLORIDE 1 MG/1
1 TABLET ORAL NIGHTLY
Status: DISCONTINUED | OUTPATIENT
Start: 2024-08-13 | End: 2024-08-14 | Stop reason: HOSPADM

## 2024-08-13 RX ADMIN — METOPROLOL TARTRATE 25 MG: 25 TABLET, FILM COATED ORAL at 20:12

## 2024-08-13 RX ADMIN — INSULIN GLARGINE 35 UNITS: 100 INJECTION, SOLUTION SUBCUTANEOUS at 20:12

## 2024-08-13 RX ADMIN — PREDNISONE 40 MG: 20 TABLET ORAL at 16:59

## 2024-08-13 RX ADMIN — OXYCODONE HYDROCHLORIDE 5 MG: 5 TABLET ORAL at 17:03

## 2024-08-13 RX ADMIN — INSULIN LISPRO 16 UNITS: 100 INJECTION, SOLUTION INTRAVENOUS; SUBCUTANEOUS at 13:25

## 2024-08-13 RX ADMIN — SODIUM CHLORIDE, PRESERVATIVE FREE 10 ML: 5 INJECTION INTRAVENOUS at 08:33

## 2024-08-13 RX ADMIN — INSULIN LISPRO 12 UNITS: 100 INJECTION, SOLUTION INTRAVENOUS; SUBCUTANEOUS at 08:32

## 2024-08-13 RX ADMIN — FUROSEMIDE 40 MG: 40 TABLET ORAL at 08:33

## 2024-08-13 RX ADMIN — PRAMIPEXOLE DIHYDROCHLORIDE 1 MG: 1 TABLET ORAL at 20:12

## 2024-08-13 RX ADMIN — PANTOPRAZOLE SODIUM 40 MG: 40 TABLET, DELAYED RELEASE ORAL at 16:59

## 2024-08-13 RX ADMIN — ATORVASTATIN CALCIUM 40 MG: 40 TABLET, FILM COATED ORAL at 20:12

## 2024-08-13 RX ADMIN — METOPROLOL TARTRATE 25 MG: 25 TABLET, FILM COATED ORAL at 08:33

## 2024-08-13 RX ADMIN — DOFETILIDE 250 MCG: 0.25 CAPSULE ORAL at 20:12

## 2024-08-13 RX ADMIN — GABAPENTIN 600 MG: 300 CAPSULE ORAL at 20:12

## 2024-08-13 RX ADMIN — PANTOPRAZOLE SODIUM 40 MG: 40 TABLET, DELAYED RELEASE ORAL at 08:33

## 2024-08-13 RX ADMIN — ACETAMINOPHEN 650 MG: 325 TABLET ORAL at 08:38

## 2024-08-13 RX ADMIN — INSULIN LISPRO 8 UNITS: 100 INJECTION, SOLUTION INTRAVENOUS; SUBCUTANEOUS at 16:59

## 2024-08-13 RX ADMIN — ASPIRIN 81 MG: 81 TABLET, COATED ORAL at 08:33

## 2024-08-13 RX ADMIN — DOFETILIDE 250 MCG: 0.25 CAPSULE ORAL at 08:38

## 2024-08-13 ASSESSMENT — PAIN SCALES - GENERAL
PAINLEVEL_OUTOF10: 3
PAINLEVEL_OUTOF10: 0
PAINLEVEL_OUTOF10: 5
PAINLEVEL_OUTOF10: 0
PAINLEVEL_OUTOF10: 7

## 2024-08-13 ASSESSMENT — PAIN SCALES - WONG BAKER: WONGBAKER_NUMERICALRESPONSE: NO HURT

## 2024-08-13 ASSESSMENT — PAIN - FUNCTIONAL ASSESSMENT
PAIN_FUNCTIONAL_ASSESSMENT: PREVENTS OR INTERFERES SOME ACTIVE ACTIVITIES AND ADLS
PAIN_FUNCTIONAL_ASSESSMENT: ACTIVITIES ARE NOT PREVENTED
PAIN_FUNCTIONAL_ASSESSMENT: ACTIVITIES ARE NOT PREVENTED

## 2024-08-13 ASSESSMENT — PAIN DESCRIPTION - DESCRIPTORS
DESCRIPTORS: ACHING
DESCRIPTORS: DISCOMFORT
DESCRIPTORS: ACHING

## 2024-08-13 ASSESSMENT — PAIN DESCRIPTION - LOCATION
LOCATION: KNEE
LOCATION: KNEE
LOCATION: KNEE;LEG

## 2024-08-13 ASSESSMENT — PAIN DESCRIPTION - ORIENTATION
ORIENTATION: RIGHT

## 2024-08-13 ASSESSMENT — PAIN DESCRIPTION - PAIN TYPE: TYPE: ACUTE PAIN

## 2024-08-13 NOTE — DISCHARGE INSTRUCTIONS
Weightbearing as tolerated to the right knee  Continue with lidocaine patches to the right knee for pain reduction  Ace wrap to the right knee for pain and swelling reduction  Elevate the leg above the level of the heart  Ice or heat to the right knee as needed  Follow up with primary care doctor regarding probable gout flare up  Follow up with Dr Alonso, orthopedics, as needed to discuss knee pain and treatment of osteoarthritis. Call [facility] at [phone] to schedule an appointment or with any questions

## 2024-08-13 NOTE — CARE COORDINATION
Writer reviewed chart, lives with his spouse. Plan is home with [facility] following.     Ericka Bean RN

## 2024-08-13 NOTE — PROGRESS NOTES
Hospital Medicine Progress Note      Date of Admission: 8/11/2024  Hospital Day: 3    Chief Admission Complaint:  severe  right knee pain      Subjective: He is sitting up in a chair.  He still has complaint of right knee pain but is improved today    Presenting Admission History:       74 y.o. male with PMHx significant for hyperlipidemia, diabetes mellitus type 2 insulin requiring, diabetic neuropathy, restless leg syndrome, congestive heart failure diastolic, CAD, status post cardiac cath on 9/2021 this did reveal coronary artery disease and he is on medical management, he is status post permanent pacemaker placement for sinus node dysfunction,  Morbid obesity and paroxysmal atrial fibrillation.  He is long-term anticoagulation with warfarin for this.     Of pertinence is that he recently on 7/15/2024 was admitted with cardiology for left atrial appendage closure with Watchman device for nonvalvular atrial fibrillation.  He does follow with [facility] cardiology     He presented to the ED on the day of admission with complaint of right knee severe pain.  He states this pain began about 2 to 3 days prior to this admission.  He denies sustaining any trauma.  States pain is very severe 8-9 out of 10 and he is unable to get up and walk.  This is his second ER visit for this issue.  His wife is having a difficult time caring for him at home and he is at risk for falling and sustaining further injuries therefore he is now being admitted for further evaluation and management.  He denies any chest pain or shortness of breath.  No fever or chills.       Assessment/Plan:      Current Principal Problem:  Acute pain of right knee    Severe right knee pain : This has been ongoing for the past few days.  States the pain is  severe is having difficulty ambulating.  X-ray of the right knee was completed.  There is chondrocalcinosis, and a small effusion.  Continue analgesics, PT/OT to evaluate.  orthopedics has seen and input is  PHARMACY  IP CONSULT TO ORTHOPEDIC SURGERY      ------------------------------------------------------------------------------------------------------------------------------------------------------------------------    MDM      [x] High (any 2)    A. Problems (any 1)  [x] Acute/Chronic Illness/injury posing threat to life or bodily function:    [] Severe exacerbation of chronic illness:    ---------------------------------------------------------------------  B. Risk of Treatment (any 1)   [x] Drugs/treatments that require intensive monitoring for toxicity include:    [] IV ABX requiring serial renal monitoring for nephrotoxicity:     [x] IV Narcotic analgesia for adverse drug reaction  [] IV diuresis requiring serial monitoring for renal impairment and electrolyte derangements  [] Critical electrolyte abnormalities requiring IV replacement and close serial monitoring  [] Insulin - monitoring serial FSBS for Hypoglycemic adverse drug reaction  [x] Anticoagulation requiring serial monitoring of coagulation factors  [] Other -   [] Change in code status:    [] Decision to escalate care:    [] Major surgery/procedure with associated risk factors:    ----------------------------------------------------------------------  C. Data (any 2)  [x] Discussed current management and discharge planning options with Case Management.  [] Discussed management of the case with:    [] Telemetry personally reviewed and interpreted as documented above    [] Imaging personally reviewed and interpreted, includes:    [x] Data Review (any 3)  [x] All available Consultant notes from yesterday/today were reviewed  [x] All current labs were reviewed and interpreted for clinical significance   [x] Appropriate follow-up labs were ordered  [] Collateral history obtained from:        Medications:  Personally reviewed in detail in conjunction w/ labs as documented for evidence of drug toxicity.     Infusion Medications    dextrose      sodium

## 2024-08-13 NOTE — PROGRESS NOTES
Occupational Therapy  Facility/Department: [facility] C5 - MED SURG/ORTHO  Daily Treatment Note  NAME: London Swenson  : 1950  MRN: [id number]    Date of Service: 2024    Discharge Recommendations:  24 hour supervision or assist, Home with Home health OT         Patient Diagnosis(es): The primary encounter diagnosis was Acute pain of right knee. A diagnosis of Knee effusion, right was also pertinent to this visit.     Assessment    Assessment: Pt demos good tolerance of OT treatment, pleasant and cooperative throughout session. Pt initially CGA, progressing to SBA as session progressed, requiring VCs for safety initially. Pt demos improved tolerance for standing to complete ADL tasks and functional mobility, reports improved pain with standing. Pt functioning below his baseline, would benefit from continued skilled OT in home setting at d/c.   Activity Tolerance: Patient tolerated treatment well  Discharge Recommendations: 24 hour supervision or assist;Home with Home health OT      Plan   Occupational Therapy Plan  Times Per Week: 3-5x per week     Restrictions  Restrictions/Precautions  Restrictions/Precautions: Fall Risk;Up as Tolerated;Weight Bearing  Lower Extremity Weight Bearing Restrictions  Right Lower Extremity Weight Bearing: Weight Bearing As Tolerated  Position Activity Restriction  Other position/activity restrictions: Verbal order on  from Bridget Smith PA for WBAT to R LE    Subjective   Subjective  Subjective: Pt in chair at approach, agreeable to OT treatment.    Orientation  Overall Orientation Status: Within Functional Limits    Pain: Pt reports 4-5/10 pain in R knee.    Cognition  Overall Cognitive Status: Exceptions  Memory: Decreased recall of recent events  Safety Judgement: Decreased awareness of need for safety;Decreased awareness of need for assistance  Insights: Decreased awareness of deficits        Objective    Vitals  Vitals  Pulse: 65  Heart Rate Source:

## 2024-08-13 NOTE — PROGRESS NOTES
Department of Orthopedic Surgery  Physician Assistant - Student   Progress Note    Subjective:       Systemic or Specific Complaints:No Complaints, feeling better today. Feels like pain is better controlled and he is able to move his knee more than yesterday. Tolerating PO. No family at bedside.     Objective:     Patient Vitals for the past 24 hrs:   BP Temp Temp src Pulse Resp SpO2   08/13/24 1348 128/69 -- -- 65 -- 95 %   08/13/24 0830 129/73 98 °F (36.7 °C) Oral 69 18 94 %   08/12/24 2328 117/67 98.2 °F (36.8 °C) Oral 77 16 91 %       General: alert, appears stated age, cooperative, and no distress   Wound: No wound, no Erythema, no warmth, Positive for Edema   Motion: Painful range of Motion in affected extremity   DVT Exam: No evidence of DVT seen on physical exam.  No cords or calf tenderness.  No significant calf/ankle edema.     Additional exam: Patient seen sitting in chair at time of interview, ace wrap and lidocaine patch applied to right knee  Leg lengths equal, rotational alignment neutral  Mild knee swelling, compartments compressible  EHL, FHL, gastroc, and anterior tibialis motor intact  Sensation intact to light touch  DP and PT pulses 2+      Data Review  CBC:   Lab Results   Component Value Date/Time    WBC 5.3 08/13/2024 05:29 AM    RBC 4.01 08/13/2024 05:29 AM    HGB 11.3 08/13/2024 05:29 AM    HCT 35.2 08/13/2024 05:29 AM     08/13/2024 05:29 AM       Renal:   Lab Results   Component Value Date/Time     08/13/2024 05:29 AM    K 4.8 08/13/2024 05:29 AM    K 4.5 08/12/2024 07:10 AM     08/13/2024 05:29 AM    CO2 28 08/13/2024 05:29 AM    BUN 21 08/13/2024 05:29 AM    CREATININE 0.9 08/13/2024 05:29 AM    GLUCOSE 290 08/13/2024 05:29 AM    CALCIUM 8.9 08/13/2024 05:29 AM          Assessment:      right acute knee pain, likely gout    Plan:      1:  Continue activity as tolerated. Encouraged flexion and extension of the knee. Compression, elevation and ice as needed for

## 2024-08-13 NOTE — PROGRESS NOTES
Pharmacy Note  Warfarin Consult  Dx: Afib  Goal INR range 2-3   Home Warfarin dose: 5 mg on Sunday. 7.5 mg all other days. Pt sees [facility]. Last seen 7/30/24.      Date                 INR                  Warfarin   8/11                2.73                     5 mg  8/12                2.43                    7.5 mg  8/13                2.93                     hold     Recommend Warfarin hold tonight x1.  Daily INR ordered.  Rx will continue to manage therapy per consult order.  Alvarez Ramirez PharmD 8/13/2024  6:10 AM

## 2024-08-13 NOTE — DISCHARGE INSTR - COC
Readings from Last 1 Encounters:   08/12/24 128 kg (282 lb 3 oz)     Mental Status:  oriented and alert    IV Access:  - None    Nursing Mobility/ADLs:  Walking   Assisted  Transfer  Assisted  Bathing  Assisted  Dressing  Independent  Toileting  Independent  Feeding  Independent  Med Admin  Independent  Med Delivery   whole    Wound Care Documentation and Therapy:  Wound 12/14/23 Toe (Comment  which one) Right (Active)   Number of days: 242        Elimination:  Continence:   Bowel: Yes  Bladder: Yes  Urinary Catheter: None   Colostomy/Ileostomy/Ileal Conduit: No       Date of Last BM: ***    Intake/Output Summary (Last 24 hours) at 8/13/2024 1207  Last data filed at 8/13/2024 1151  Gross per 24 hour   Intake 720 ml   Output 1400 ml   Net -680 ml     I/O last 3 completed shifts:  In: 880 [P.O.:880]  Out: 1550 [Urine:1550]    Safety Concerns:     At Risk for Falls    Impairments/Disabilities:      Hearing    Nutrition Therapy:  Current Nutrition Therapy:   - Oral Diet:  General    Routes of Feeding: Oral  Liquids: No Restrictions  Daily Fluid Restriction: no  Last Modified Barium Swallow with Video (Video Swallowing Test): not done    Treatments at the Time of Hospital Discharge:   Respiratory Treatments: ***  Oxygen Therapy:  is not on home oxygen therapy.  Ventilator:    - No ventilator support    Rehab Therapies: Physical Therapy and Occupational Therapy  Weight Bearing Status/Restrictions: No weight bearing restrictions  Other Medical Equipment (for information only, NOT a DME order):  walker  Other Treatments: ***    Patient's personal belongings (please select all that are sent with patient):  None    RN SIGNATURE:  Electronically signed by Nimo Andrews RN on 8/14/24 at 2:43 PM EDT    CASE MANAGEMENT/SOCIAL WORK SECTION    Inpatient Status Date: 8/11/24    Readmission Risk Assessment Score:  Readmission Risk              Risk of Unplanned Readmission:  35           Discharging to Facility/ Agency   Name:  [facility]  Services: Home Health Services  [address]  [facility] [address]  [phone]     / signature: Electronically signed by Ericka Bean RN on 8/14/24 at 9:30 AM EDT    PHYSICIAN SECTION    Prognosis: {Prognosis:0250487685}    Condition at Discharge: { Patient Condition:780870762}    Rehab Potential (if transferring to Rehab): {Prognosis:2515403998}    Recommended Follow-up, Labs or Other Treatments After Discharge:    Weightbearing as tolerated to the right knee  Continue with lidocaine patches to the right knee for pain reduction  Ace wrap to the right knee for pain and swelling reduction  Elevate the leg above the level of the heart  Ice or heat to the right knee as needed  Follow up with primary care doctor regarding probable gout flare up  Follow up with Dr Alonso, orthopedics, as needed to discuss knee pain and treatment of osteoarthritis. Call [facility] at [phone] to schedule an appointment or with any questions               Physician Certification: I certify the above information and transfer of London Swenson  is necessary for the continuing treatment of the diagnosis listed and that he requires {Admit to Appropriate Level of Care:94111} for {GREATER/LESS:238049737} 30 days.     Update Admission H&P: {CHP DME Changes in HandP:872065065}    PHYSICIAN SIGNATURE:  {Esignature:086423010}

## 2024-08-13 NOTE — PLAN OF CARE
Bed in lowest position, wheels locked, 2/4 side rails up, nonskid footwear on. Bed/ chair check alarm in place, call light within reach. Pt instructed to call out when needing assistance. Pt stated understanding.    Problem: Safety - Adult  Goal: Free from fall injury  Outcome: Progressing

## 2024-08-14 VITALS
SYSTOLIC BLOOD PRESSURE: 127 MMHG | OXYGEN SATURATION: 95 % | TEMPERATURE: 97.7 F | BODY MASS INDEX: 40.4 KG/M2 | HEIGHT: 70 IN | HEART RATE: 60 BPM | WEIGHT: 282.19 LBS | RESPIRATION RATE: 17 BRPM | DIASTOLIC BLOOD PRESSURE: 72 MMHG

## 2024-08-14 LAB
GLUCOSE BLD-MCNC: 249 MG/DL (ref 70–99)
GLUCOSE BLD-MCNC: 288 MG/DL (ref 70–99)
INR PPP: 2.96 (ref 0.85–1.15)
PERFORMED ON: ABNORMAL
PERFORMED ON: ABNORMAL
PROTHROMBIN TIME: 30.6 SEC (ref 11.9–14.9)

## 2024-08-14 PROCEDURE — 85610 PROTHROMBIN TIME: CPT

## 2024-08-14 PROCEDURE — 6370000000 HC RX 637 (ALT 250 FOR IP): Performed by: INTERNAL MEDICINE

## 2024-08-14 PROCEDURE — 6370000000 HC RX 637 (ALT 250 FOR IP): Performed by: SPECIALIST/TECHNOLOGIST

## 2024-08-14 PROCEDURE — 2580000003 HC RX 258: Performed by: INTERNAL MEDICINE

## 2024-08-14 PROCEDURE — 36415 COLL VENOUS BLD VENIPUNCTURE: CPT

## 2024-08-14 RX ORDER — PREDNISONE 10 MG/1
TABLET ORAL
Qty: 18 TABLET | Refills: 0 | Status: SHIPPED | OUTPATIENT
Start: 2024-08-14

## 2024-08-14 RX ADMIN — METOPROLOL TARTRATE 25 MG: 25 TABLET, FILM COATED ORAL at 08:51

## 2024-08-14 RX ADMIN — FUROSEMIDE 40 MG: 40 TABLET ORAL at 08:51

## 2024-08-14 RX ADMIN — PANTOPRAZOLE SODIUM 40 MG: 40 TABLET, DELAYED RELEASE ORAL at 06:23

## 2024-08-14 RX ADMIN — SODIUM CHLORIDE, PRESERVATIVE FREE 10 ML: 5 INJECTION INTRAVENOUS at 09:45

## 2024-08-14 RX ADMIN — INSULIN LISPRO 8 UNITS: 100 INJECTION, SOLUTION INTRAVENOUS; SUBCUTANEOUS at 11:59

## 2024-08-14 RX ADMIN — DOFETILIDE 250 MCG: 0.25 CAPSULE ORAL at 09:39

## 2024-08-14 RX ADMIN — INSULIN LISPRO 4 UNITS: 100 INJECTION, SOLUTION INTRAVENOUS; SUBCUTANEOUS at 08:50

## 2024-08-14 RX ADMIN — ASPIRIN 81 MG: 81 TABLET, COATED ORAL at 08:51

## 2024-08-14 RX ADMIN — PREDNISONE 40 MG: 20 TABLET ORAL at 08:51

## 2024-08-14 ASSESSMENT — PAIN SCALES - GENERAL
PAINLEVEL_OUTOF10: 3
PAINLEVEL_OUTOF10: 3
PAINLEVEL_OUTOF10: 5

## 2024-08-14 ASSESSMENT — PAIN DESCRIPTION - DESCRIPTORS: DESCRIPTORS: SHARP;ACHING

## 2024-08-14 ASSESSMENT — PAIN DESCRIPTION - LOCATION: LOCATION: KNEE

## 2024-08-14 ASSESSMENT — PAIN DESCRIPTION - ORIENTATION: ORIENTATION: RIGHT

## 2024-08-14 ASSESSMENT — PAIN SCALES - WONG BAKER
WONGBAKER_NUMERICALRESPONSE: NO HURT
WONGBAKER_NUMERICALRESPONSE: NO HURT

## 2024-08-14 NOTE — PROGRESS NOTES
Pharmacy Note  Warfarin Consult  Dx: Afib  Goal INR range 2-3   Home Warfarin dose: 5 mg on Sunday. 7.5 mg all other days. Pt sees [facility]. Last seen 7/30/24.      Date                 INR                  Warfarin   8/11                2.73                     5 mg  8/12                2.43                    7.5 mg  8/13                2.93                     hold  8/14               2.96                      hold     Recommend Warfarin hold tonight x1.  Daily INR ordered.  Rx will continue to manage therapy per consult order.  Alvarez Ramirez PharmD 8/14/2024  6:32 AM   Statement Selected

## 2024-08-14 NOTE — PLAN OF CARE
Problem: Discharge Planning  Goal: Discharge to home or other facility with appropriate resources  8/14/2024 1435 by Nimo Andrews, RN  Outcome: Completed  8/14/2024 1127 by Nimo Andrews, RN  Outcome: Progressing

## 2024-08-14 NOTE — PLAN OF CARE
Problem: Safety - Adult  Goal: Free from fall injury  8/13/2024 2114 by EPIFANIO DRISCOLL  Outcome: Progressing  8/13/2024 1021 by Molly Brown RN  Outcome: Progressing

## 2024-08-14 NOTE — CARE COORDINATION
CASE MANAGEMENT DISCHARGE SUMMARY      Discharge to: Home w/ [facility]    Precertification completed: N/A  Hospital Exemption Notification (HENS) completed: N/A    IMM given: (date) 8/13/24    New Durable Medical Equipment ordered/agency: N/A    Transportation:    Family/car: Personal      Confirmed discharge plan with:     Patient: yes     Family:  yes        Facility/Agency, name: [facility]        RN, name: Nimo    Note: Discharging nurse to complete PORSCHE, reconcile AVS, and place final copy with patient's discharge packet. RN to ensure that written prescriptions for  Level II medications are sent with patient to the facility as per protocol.      Ericka Bean RN

## 2024-08-14 NOTE — DISCHARGE SUMMARY
Hospital Medicine Discharge Summary    Patient: London Swenson   : 1950     Admit Date: 2024   Discharge Date: 2024    Disposition:  [x]Home   []HHC  []SNF  []ECF  []Acute Rehab  []LTAC  []Hospice  Code status:  [x]Full  []DNR/CCA  []Limited (DNR/CCA with Do Not Intubate)  []DNRCC  Condition at Discharge: Stable  Primary Care Provider: Constance Yancey PA    Admitting Provider: Kendy Moncada MD  Discharge Provider: KENDY MONCADA MD     Discharge Diagnoses:      Active Hospital Problems    Diagnosis     Acute pain of right knee [M25.561]        Presenting Admission History:      74 y.o. male with PMHx significant for hyperlipidemia, diabetes mellitus type 2 insulin requiring, diabetic neuropathy, restless leg syndrome, congestive heart failure diastolic, CAD, status post cardiac cath on 2021 this did reveal coronary artery disease and he is on medical management, he is status post permanent pacemaker placement for sinus node dysfunction,  Morbid obesity and paroxysmal atrial fibrillation.  He is long-term anticoagulation with warfarin for this.     Of pertinence is that he recently on 7/15/2024 was admitted with cardiology for left atrial appendage closure with Watchman device for nonvalvular atrial fibrillation.  He does follow with [facility] cardiology     He presented to the ED on the day of admission with complaint of right knee severe pain.  He states this pain began about 2 to 3 days prior to this admission.  He denies sustaining any trauma.  States pain is very severe 8-9 out of 10 and he is unable to get up and walk.  This is his second ER visit for this issue.  His wife is having a difficult time caring for him at home and he is at risk for falling and sustaining further injuries therefore he is now being admitted for further evaluation and management.  He denies any chest pain or shortness of breath.  No fever or chills.           Assessment/Plan:      Severe right knee pain : This has  VIEWS)    Result Date: 8/7/2024  EXAMINATION: 3 XRAY VIEWS OF THE LEFT FINGER 8/5/2024 10:31 am COMPARISON: None HISTORY: ORDERING SYSTEM PROVIDED HISTORY: injury, assess for foreign body TECHNOLOGIST PROVIDED HISTORY: Reason for exam:->injury, assess for foreign body Reason for Exam: injury FINDINGS: Best visualized on the lateral view, there is a very faint, tiny linear radiopaque density overlying ventral soft tissues of the index finger. Finding could represent a faint radiopaque foreign body which is visualized at the level of the middle phalanx of the index finger.  There are minimal degenerative changes of several interphalangeal joints.  Tiny calcific/ossific densities identified about several metacarpophalangeal joints may be degenerative in nature versus changes of prior trauma.  Linear radiopaque foreign body identified within the soft tissues of the thumb.  No evidence of acute fracture or dislocation.     1. Findings suspicious for presence of small linear faintly radiopaque foreign body along ventral soft tissues of the left index finger at the level of the middle phalanx as described above.  Clinical correlation with regard to location of patient's symptomatology would be helpful. 2. No evidence of acute fracture.     Echo (TTE) limited (PRN contrast/bubble/strain/3D)    Result Date: 7/15/2024    Left Ventricle: Normal left ventricular systolic function with a visually estimated EF of 60 - 65%. Left ventricle size is normal. Mildly increased wall thickness. Normal wall motion.   Mitral Valve: Mild annular calcification.   Aorta: Normal sized aortic root. Dilated ascending aorta. Ao ascending diameter is 3.9 cm.   Image quality is poor. Contrast used: Definity.       Consults:     IP CONSULT TO PHARMACY  IP CONSULT TO ORTHOPEDIC SURGERY  IP CONSULT TO HOME CARE NEEDS    Labs:     Recent Labs     08/12/24  0710 08/13/24  0529   WBC 5.3 5.3   HGB 11.5* 11.3*   HCT 35.5* 35.2*    157     Recent

## 2024-08-15 ENCOUNTER — CARE COORDINATION (OUTPATIENT)
Dept: CASE MANAGEMENT | Age: 74
End: 2024-08-15

## 2024-08-15 ENCOUNTER — TELEPHONE (OUTPATIENT)
Dept: FAMILY MEDICINE CLINIC | Age: 74
End: 2024-08-15

## 2024-08-15 DIAGNOSIS — J96.01 ACUTE HYPOXIC RESPIRATORY FAILURE (HCC): Primary | ICD-10-CM

## 2024-08-15 PROCEDURE — 1111F DSCHRG MED/CURRENT MED MERGE: CPT | Performed by: PHYSICIAN ASSISTANT

## 2024-08-15 NOTE — TELEPHONE ENCOUNTER
Care Transitions Initial Follow Up Call    Outreach made within 2 business days of discharge: Yes    Patient: London Swenson Patient : 1950   MRN: [id number]  Reason for Admission: right knee pain  Discharge Date: 24       Spoke with: Rosy patient's wife    Discharge department/facility: [facility] Interactive Patient Contact:  Was patient able to fill all prescriptions: Yes  Was patient instructed to bring all medications to the follow-up visit: Yes  Is patient taking all medications as directed in the discharge summary? Yes  Does patient understand their discharge instructions: Yes  Does patient have questions or concerns that need addressed prior to 7-14 day follow up office visit: no        Scheduled appointment with PCP within 7-14 days    Follow Up  Future Appointments   Date Time Provider Department Center   2024  2:00 PM Constance Ynacey PA [facility] DEP   2024  9:15 AM [facility] [place]   2024  9:00 AM A CATH LAB PRE/POST [facility]   2024 10:30 AM Constance Yancey PA [facility] DEP   2024  3:00 PM SCHEDULE, ALETHEA DEVICE CHECK [facility]   2024  3:00 PM Ericka Valdez, APRN - CNP [facility]   10/29/2024  2:20 PM Kelsey Flores MD AND ENDO [facility]       [facility] Short LPN

## 2024-08-15 NOTE — CARE COORDINATION
Care Transitions Note    Initial Call - Call within 2 business days of discharge: Yes    Patient Current Location:  [place]    Care Transition Nurse contacted the patient by telephone to perform post hospital discharge assessment, verified name and  as identifiers. Provided introduction to self, and explanation of the Care Transition Nurse role.     Patient: London Swenson    Patient : 1950   MRN: [id number]    Reason for Admission: Right knee pain   Discharge Date: 24  RURS: Readmission Risk Score: 25.7      Last Discharge Facility       Date Complaint Diagnosis Description Type Department Provider    24 Leg Pain Acute pain of right knee ... ED to Hosp-Admission (Discharged) (ADMITTED) Kendy Balderas MD; Jesus Ricci.            Was this an external facility discharge? No    Additional needs identified to be addressed with provider   No needs identified             Method of communication with provider: none.    Patients top risk factors for readmission: functional physical ability and medical condition-.    Interventions to address risk factors:   Education: knee care   Review of patient management of conditions/medications: .  Home Health: [facility]      Care Summary Note: CTN spoke with patient and spouse via speaker phone with permission from patient. Patient reported his knee pain has improved and still has swelling. Patient is wrapping his knee and keeping elevated. Patient encouraged to do ice and heat as well. CTN reviewed all medications with patient who reported he is taking all as prescribed except he is not taking jardiance or lasix. Patient reported his mobility is stable and waiting to hear back from [facility] to setup soc. CTN advised patient of use of urgent care or physician’s 24 hr access line if assistance is needed after hours.Also advised that they can always contact their home care provider to request a nurse visit even if it isn't their regularly scheduled day for their

## 2024-08-19 ENCOUNTER — OFFICE VISIT (OUTPATIENT)
Dept: FAMILY MEDICINE CLINIC | Age: 74
End: 2024-08-19

## 2024-08-19 VITALS
WEIGHT: 286.2 LBS | OXYGEN SATURATION: 92 % | HEIGHT: 70 IN | BODY MASS INDEX: 40.97 KG/M2 | TEMPERATURE: 97 F | SYSTOLIC BLOOD PRESSURE: 112 MMHG | HEART RATE: 78 BPM | DIASTOLIC BLOOD PRESSURE: 60 MMHG

## 2024-08-19 DIAGNOSIS — Z09 HOSPITAL DISCHARGE FOLLOW-UP: Primary | ICD-10-CM

## 2024-08-19 DIAGNOSIS — M25.561 ACUTE PAIN OF RIGHT KNEE: ICD-10-CM

## 2024-08-19 RX ORDER — OXYCODONE HYDROCHLORIDE 5 MG/1
5 TABLET ORAL EVERY 4 HOURS PRN
COMMUNITY

## 2024-08-19 SDOH — ECONOMIC STABILITY: FOOD INSECURITY: WITHIN THE PAST 12 MONTHS, THE FOOD YOU BOUGHT JUST DIDN'T LAST AND YOU DIDN'T HAVE MONEY TO GET MORE.: NEVER TRUE

## 2024-08-19 SDOH — ECONOMIC STABILITY: FOOD INSECURITY: WITHIN THE PAST 12 MONTHS, YOU WORRIED THAT YOUR FOOD WOULD RUN OUT BEFORE YOU GOT MONEY TO BUY MORE.: NEVER TRUE

## 2024-08-19 SDOH — ECONOMIC STABILITY: INCOME INSECURITY: HOW HARD IS IT FOR YOU TO PAY FOR THE VERY BASICS LIKE FOOD, HOUSING, MEDICAL CARE, AND HEATING?: NOT HARD AT ALL

## 2024-08-19 ASSESSMENT — PATIENT HEALTH QUESTIONNAIRE - PHQ9
4. FEELING TIRED OR HAVING LITTLE ENERGY: NOT AT ALL
SUM OF ALL RESPONSES TO PHQ QUESTIONS 1-9: 0
1. LITTLE INTEREST OR PLEASURE IN DOING THINGS: NOT AT ALL
SUM OF ALL RESPONSES TO PHQ QUESTIONS 1-9: 0
2. FEELING DOWN, DEPRESSED OR HOPELESS: NOT AT ALL
SUM OF ALL RESPONSES TO PHQ QUESTIONS 1-9: 0
5. POOR APPETITE OR OVEREATING: NOT AT ALL
8. MOVING OR SPEAKING SO SLOWLY THAT OTHER PEOPLE COULD HAVE NOTICED. OR THE OPPOSITE, BEING SO FIGETY OR RESTLESS THAT YOU HAVE BEEN MOVING AROUND A LOT MORE THAN USUAL: NOT AT ALL
3. TROUBLE FALLING OR STAYING ASLEEP: NOT AT ALL
9. THOUGHTS THAT YOU WOULD BE BETTER OFF DEAD, OR OF HURTING YOURSELF: NOT AT ALL
6. FEELING BAD ABOUT YOURSELF - OR THAT YOU ARE A FAILURE OR HAVE LET YOURSELF OR YOUR FAMILY DOWN: NOT AT ALL
SUM OF ALL RESPONSES TO PHQ QUESTIONS 1-9: 0
SUM OF ALL RESPONSES TO PHQ9 QUESTIONS 1 & 2: 0
7. TROUBLE CONCENTRATING ON THINGS, SUCH AS READING THE NEWSPAPER OR WATCHING TELEVISION: NOT AT ALL

## 2024-08-19 NOTE — PROGRESS NOTES
without deformity, nasal mucosa and turbinates normal without polyps  Neck: supple and non-tender without mass, no thyromegaly or thyroid nodules, no cervical lymphadenopathy  Pulmonary/Chest: clear to auscultation bilaterally- no wheezes, rales or rhonchi, normal air movement, no respiratory distress  Cardiovascular: normal rate, regular rhythm, normal S1 and S2, no murmurs, rubs, clicks, or gallops, distal pulses intact, no carotid bruits  Abdomen: soft, non-tender, non-distended, normal bowel sounds, no masses or organomegaly  Extremities: no cyanosis, clubbing or edema  Musculoskeletal: normal range of motion, no joint swelling, deformity or tenderness  Neurologic: reflexes normal and symmetric, no cranial nerve deficit, gait, coordination and speech normal      An electronic signature was used to authenticate this note.  --JENNIFER Cohen

## 2024-08-20 ENCOUNTER — ANTI-COAG VISIT (OUTPATIENT)
Dept: PHARMACY | Age: 74
End: 2024-08-20
Payer: MEDICARE

## 2024-08-20 DIAGNOSIS — I48.91 NEW ONSET ATRIAL FIBRILLATION (HCC): Primary | ICD-10-CM

## 2024-08-20 LAB
INTERNATIONAL NORMALIZATION RATIO, POC: 2.7
PROTHROMBIN TIME, POC: 0

## 2024-08-20 PROCEDURE — 85610 PROTHROMBIN TIME: CPT

## 2024-08-20 PROCEDURE — 99211 OFF/OP EST MAY X REQ PHY/QHP: CPT

## 2024-08-20 RX ORDER — WARFARIN SODIUM 5 MG/1
TABLET ORAL
Qty: 40 TABLET | Refills: 0 | Status: SHIPPED | OUTPATIENT
Start: 2024-08-20

## 2024-08-20 NOTE — PROGRESS NOTES
Mr London Swenson is here for management of anticoagulation for AFib.   PMH also significant for CAD, DM2, HLD, HTN   He presents today w/out complaint.  Pt verifies dosing regimen as listed above.   Pt denies sx/s bleeding/bruising/swelling/SOB.  No missed doses  No changes in RX/OTCs/Herbal medications.  No dietary concerns  No ETOH and tobacco use.    Had Watchman placed 7/15.    Follow up TANIA scheduled for 9/4.    Pt's last visit as long as TANIA shows no issues in Sept.  Pt needs 30 day supply called in to [facility].     INR 2.7 is within therapeutic range of 2-3  Recommend to continue dose of 7.5 mg daily except 5 mg Q Sun.  Then follow MD's instructions after TANIA.  Patient has 5 mg tablets.  Pt will call if they need an appt.   Dosing reminder card given with phone number, appointment date and time.   Return to clinic: Pt will call if MD continues Warfarin     Germania Lal Pharm. D.  For Pharmacy Admin Tracking Only    Intervention Detail:   Total # of Interventions Recommended: 0  Total # of Interventions Accepted: 0  Time Spent (min): 15

## 2024-08-21 ENCOUNTER — CARE COORDINATION (OUTPATIENT)
Dept: CASE MANAGEMENT | Age: 74
End: 2024-08-21

## 2024-08-21 NOTE — CARE COORDINATION
Care Transitions Note    Follow Up Call     Patient Current Location:  [place]    Care Transition Nurse contacted the patient by telephone. Verified name and  as identifiers.    Additional needs identified to be addressed with provider   No needs identified                 Method of communication with provider: none.    Care Summary Note: Ctn spoke with patient's spouse Rosy who reported patient is doing well. Patient had follow up with PCP on  and denied any changes to medications or plan of care. Patient has completed prednisone and feels things are stable. Denies any acute needs at present time.  Agreeable to f/u calls.  Educated on the use of urgent care or physician’s 24 hr access line if assistance is needed after hours & that they can always contact their home care provider to request a nurse visit even if it isn't their regularly scheduled day for their nurse to visit.         Plan of care updates since last contact:  Review of patient management of conditions/medications: .       Advance Care Planning:   Does patient have an Advance Directive: reviewed during previous call, see note. .    Medication Review:  No changes since last call.     Remote Patient Monitoring:  Offered patient enrollment in the Remote Patient Monitoring (RPM) program for in-home monitoring: discuss on follow up     Assessments:  Care Transitions Subsequent and Final Call    Subsequent and Final Calls  Do you have any ongoing symptoms?: No  Have your medications changed?: No  Do you have any questions related to your medications?: No  Do you currently have any active services?: Yes  Are you currently active with any services?: Home Health  Do you have any needs or concerns that I can assist you with?: No  Identified Barriers: None  Care Transitions Interventions  Other Interventions:              Follow Up Appointment:   [place] appointment attended as scheduled   Future Appointments         Provider Specialty Dept Phone    2024

## 2024-08-25 ENCOUNTER — APPOINTMENT (OUTPATIENT)
Dept: CT IMAGING | Age: 74
End: 2024-08-25
Payer: MEDICARE

## 2024-08-25 ENCOUNTER — APPOINTMENT (OUTPATIENT)
Dept: GENERAL RADIOLOGY | Age: 74
End: 2024-08-25
Payer: MEDICARE

## 2024-08-25 ENCOUNTER — HOSPITAL ENCOUNTER (EMERGENCY)
Age: 74
Discharge: HOME | End: 2024-08-25
Payer: MEDICARE

## 2024-08-25 VITALS
WEIGHT: 280 LBS | BODY MASS INDEX: 40.09 KG/M2 | OXYGEN SATURATION: 92 % | TEMPERATURE: 98.5 F | RESPIRATION RATE: 16 BRPM | HEART RATE: 62 BPM | SYSTOLIC BLOOD PRESSURE: 130 MMHG | HEIGHT: 70 IN | DIASTOLIC BLOOD PRESSURE: 74 MMHG

## 2024-08-25 DIAGNOSIS — R07.9 CHEST PAIN, UNSPECIFIED TYPE: Primary | ICD-10-CM

## 2024-08-25 LAB
ALBUMIN SERPL-MCNC: 3.6 G/DL (ref 3.4–5)
ALBUMIN/GLOB SERPL: 1.6 {RATIO} (ref 1.1–2.2)
ALP SERPL-CCNC: 124 U/L (ref 40–129)
ALT SERPL-CCNC: <5 U/L (ref 10–40)
ANION GAP SERPL CALCULATED.3IONS-SCNC: 11 MMOL/L (ref 3–16)
AST SERPL-CCNC: <5 U/L (ref 15–37)
BASOPHILS # BLD: 0.1 K/UL (ref 0–0.2)
BASOPHILS NFR BLD: 0.9 %
BILIRUB SERPL-MCNC: <0.2 MG/DL (ref 0–1)
BUN SERPL-MCNC: 15 MG/DL (ref 7–20)
CALCIUM SERPL-MCNC: 8.5 MG/DL (ref 8.3–10.6)
CHLORIDE SERPL-SCNC: 104 MMOL/L (ref 99–110)
CO2 SERPL-SCNC: 27 MMOL/L (ref 21–32)
CREAT SERPL-MCNC: 0.9 MG/DL (ref 0.8–1.3)
DEPRECATED RDW RBC AUTO: 14.7 % (ref 12.4–15.4)
EOSINOPHIL # BLD: 0.3 K/UL (ref 0–0.6)
EOSINOPHIL NFR BLD: 4.8 %
GFR SERPLBLD CREATININE-BSD FMLA CKD-EPI: 90 ML/MIN/{1.73_M2}
GLUCOSE SERPL-MCNC: 279 MG/DL (ref 70–99)
HCT VFR BLD AUTO: 33.1 % (ref 40.5–52.5)
HGB BLD-MCNC: 10.8 G/DL (ref 13.5–17.5)
LYMPHOCYTES # BLD: 1 K/UL (ref 1–5.1)
LYMPHOCYTES NFR BLD: 17 %
MCH RBC QN AUTO: 29.1 PG (ref 26–34)
MCHC RBC AUTO-ENTMCNC: 32.6 G/DL (ref 31–36)
MCV RBC AUTO: 89.3 FL (ref 80–100)
MONOCYTES # BLD: 0.4 K/UL (ref 0–1.3)
MONOCYTES NFR BLD: 7 %
NEUTROPHILS # BLD: 4 K/UL (ref 1.7–7.7)
NEUTROPHILS NFR BLD: 70.3 %
NT-PROBNP SERPL-MCNC: 194 PG/ML (ref 0–449)
PLATELET # BLD AUTO: 152 K/UL (ref 135–450)
PMV BLD AUTO: 9.3 FL (ref 5–10.5)
POTASSIUM SERPL-SCNC: 4.7 MMOL/L (ref 3.5–5.1)
PROT SERPL-MCNC: 5.9 G/DL (ref 6.4–8.2)
RBC # BLD AUTO: 3.71 M/UL (ref 4.2–5.9)
SODIUM SERPL-SCNC: 142 MMOL/L (ref 136–145)
TROPONIN, HIGH SENSITIVITY: 12 NG/L (ref 0–22)
TROPONIN, HIGH SENSITIVITY: 15 NG/L (ref 0–22)
WBC # BLD AUTO: 5.7 K/UL (ref 4–11)

## 2024-08-25 PROCEDURE — 71045 X-RAY EXAM CHEST 1 VIEW: CPT

## 2024-08-25 PROCEDURE — 70450 CT HEAD/BRAIN W/O DYE: CPT

## 2024-08-25 PROCEDURE — 84484 ASSAY OF TROPONIN QUANT: CPT

## 2024-08-25 PROCEDURE — 93005 ELECTROCARDIOGRAM TRACING: CPT | Performed by: EMERGENCY MEDICINE

## 2024-08-25 PROCEDURE — 96374 THER/PROPH/DIAG INJ IV PUSH: CPT

## 2024-08-25 PROCEDURE — 99285 EMERGENCY DEPT VISIT HI MDM: CPT

## 2024-08-25 PROCEDURE — 80053 COMPREHEN METABOLIC PANEL: CPT

## 2024-08-25 PROCEDURE — 6360000002 HC RX W HCPCS: Performed by: NURSE PRACTITIONER

## 2024-08-25 PROCEDURE — 85025 COMPLETE CBC W/AUTO DIFF WBC: CPT

## 2024-08-25 PROCEDURE — 83880 ASSAY OF NATRIURETIC PEPTIDE: CPT

## 2024-08-25 RX ORDER — KETOROLAC TROMETHAMINE 30 MG/ML
30 INJECTION, SOLUTION INTRAMUSCULAR; INTRAVENOUS ONCE
Status: COMPLETED | OUTPATIENT
Start: 2024-08-25 | End: 2024-08-25

## 2024-08-25 RX ADMIN — KETOROLAC TROMETHAMINE 30 MG: 30 INJECTION, SOLUTION INTRAMUSCULAR at 19:19

## 2024-08-25 ASSESSMENT — PAIN SCALES - GENERAL
PAINLEVEL_OUTOF10: 0
PAINLEVEL_OUTOF10: 4
PAINLEVEL_OUTOF10: 6

## 2024-08-25 ASSESSMENT — PAIN - FUNCTIONAL ASSESSMENT: PAIN_FUNCTIONAL_ASSESSMENT: 0-10

## 2024-08-25 NOTE — ED PROVIDER NOTES
Patient seen and evaluated by KARISHMA:        Paced rhythm with a rate of 81.  Normal axis.  Normal intervals and durations.  No significant ST or T wave changes appreciated.           Jesus Ricci II, DO  08/25/24 2436

## 2024-08-26 ENCOUNTER — TELEPHONE (OUTPATIENT)
Dept: FAMILY MEDICINE CLINIC | Age: 74
End: 2024-08-26

## 2024-08-26 ENCOUNTER — CARE COORDINATION (OUTPATIENT)
Dept: CASE MANAGEMENT | Age: 74
End: 2024-08-26

## 2024-08-26 ENCOUNTER — TELEPHONE (OUTPATIENT)
Dept: CARDIOLOGY CLINIC | Age: 74
End: 2024-08-26

## 2024-08-26 NOTE — TELEPHONE ENCOUNTER
Patient's wife called into the office stating that her  was just in the ED for chest pains, and that they had done a CT without contrast and sent him home.  Patient's wife states that her  becomes very short of breath with exertion, patient is having a hard time walking because he is becoming so short of breath.  Nurse asked the patient's wife if she was able to  check her husbands oxygen level and she stated yes but he is asleep.  Patient woken up by his wife O2 sats 93% on room air.  Patient denies chest pain, nausea, headache, dizziness.  Patient states that he is just becoming so very short of breath with exertion.  Patient and patient's wife advised to contact the patient's cardiologist.  Nurse advised the patient and his wife if they were not able to get a hold of the cardiologist to call our office back.  Patient and patient's wife verbalized understanding.

## 2024-08-26 NOTE — TELEPHONE ENCOUNTER
This is EP issue to deal with and figure out getting transmission sent for review for any possible rhythm issues.

## 2024-08-26 NOTE — TELEPHONE ENCOUNTER
I would get NP visit in office and have device interrogated if patient's wife having hard time with transmission.

## 2024-08-26 NOTE — TELEPHONE ENCOUNTER
From EP standpoint, patient would need to send transmission for us to be able to tell if was having a rhythm related issue. If they are unsure how to send, they need to speak with [employer] at [phone].    ALEX is OOT today. Patient was seen by IZABEL 7/1/2024 and HEIDI 7/23/2024. Sending to both IZABEL and HEIDI for advisement.

## 2024-08-26 NOTE — TELEPHONE ENCOUNTER
ED Follow Up Call/ Schedule appt   ED: Nilsonsusan Martinez  Reason: chest pain  Date:08/25/2024    Appt scheduled:       Comments:   Spoke with spouse, Rosy, he is SOB with ambulating. She stated that he can not do it she stated that he is scheduled to go into cath lab on 09/04/2024    Phone call transferred to CATHY ferro

## 2024-08-26 NOTE — TELEPHONE ENCOUNTER
Patient is scheduled to see NPKK already on 9/9/2024 and will have device checked at that office visit. The ask for a transmission to be sent is standard protocol when a patient calls in with symptoms and they have a device.     According to the original message-- I was sending message to HEIDI/IZABEL as patient had complaints of chest pain and shortness of breath and patient has history of CHF and CAD.     Per review of patient's chart-they are scheduled with PCP tomorrow, 8/27 for a f/u.

## 2024-08-26 NOTE — CARE COORDINATION
Care Transitions Note    Follow Up Call  ED follow up discharged from ED on      Patient Current Location:  [place]    Care Transition Nurse contacted the patient by telephone. Verified name and  as identifiers.    Additional needs identified to be addressed with provider   Patient was discharged from [facility] ED on 24 post CP and sob. Patient continues to have sob and CTN spoke with spouse who reported patient would be open to doing the testing to see if he would qualify for home oxygen. Patient no longer active with home care. Would patient be able to come to the office for oxygen testing. Please advise. Thanks                  Method of communication with provider: chart routing.    Care Summary Note: Patient was discharged from [facility] ED on 24 post CP and sob. Patient continues to have sob and CTN spoke with spouse who reported patient would be open to doing the testing to see if he would qualify for home oxygen, patient had declined in the past according to his spouse. Patient no longer active with home care. CTN will send message to pcp office to discuss. Patient encouraged to notify provider if symptoms worsen.     Plan of care updates since last contact:  Education: .       Advance Care Planning:   Does patient have an Advance Directive: reviewed during previous call, see note. .    Medication Review:  No changes since last call.     Remote Patient Monitoring:  Offered patient enrollment in the Remote Patient Monitoring (RPM) program for in-home monitoring: discuss on follow up     Assessments:  Care Transitions ED Follow Up    Care Transitions Interventions  Do you have any ongoing symptoms?: No   Did you call your PCP prior to going to the ED?: No - Did not call PCP   Are there any other complaints/concerns that you wish to tell your provider?: Patient having sob, would be open to testing to see if qualifies for oxygen at home.    Do you have a copy of your discharge instructions?: Yes   Do you  understand what to report and when to return?: Yes   Are you following your discharge instructions?: Yes   Do you have all of your prescriptions and are they filled?: Yes   Have you scheduled your follow up appointment?: No   Were you discharged with any Home Care or Post Acute Services or do you currently have any active services?: No   Post Acute Services: Home Health (Comment: [facility])         Patient DME: [employer]   Do you have any needs or concerns that I can assist you with?: No   Identified Barriers: Other             Follow Up Appointment:   Declined to schedule [facility] appointment.  Future Appointments         Provider Specialty Dept Phone    9/4/2024 9:00 AM (Arrive by 8:00 AM) Dylan Shanks MD; [facility] CATH [facility] PRE/POST Cardiology [phone]    9/9/2024 3:00 PM SCHEDULE, [facility] DEVICE CHECK Cardiology [phone]    9/9/2024 3:00 PM Ericka Valdez APRN - CNP Cardiology [phone]    10/29/2024 2:20 PM Kelsey Flores MD Endocrinology [phone]    12/2/2024 10:00 AM Constance Yancey PA Family Medicine [phone]            Care Transition Nurse provided contact information.  Plan for follow-up call in 2-5 days based on severity of symptoms and risk factors.  Plan for next call: self management-oxygen follow up apt       Shante BURTON, RN, [facility]  Care Transition Nurse  [phone] mobile

## 2024-08-26 NOTE — TELEPHONE ENCOUNTER
Patient's wife Rosy called the office back and stated that she could not get a hold of the cardiologist office.  Patient scheduled for appointment on 08/27/24.  Nurse advised the patient's wife that if her  developed any new or worsening symptoms to call the office back or go to the ED for evaluation.  Patient's wife verbalized understanding.

## 2024-08-26 NOTE — TELEPHONE ENCOUNTER
Pts wife called and stated pt was at ED yesterday for CP, stated she spoke w/ PCP office and they advised her to call and let [facility] know, stated pt was sleeping and was \"fighting to take a breath.\" advised her to send transmission for pt for SOB stated she doesn't know how and told me \"never mind forget it, ill just call PCP office back.\"

## 2024-08-27 ENCOUNTER — CARE COORDINATION (OUTPATIENT)
Dept: CASE MANAGEMENT | Age: 74
End: 2024-08-27

## 2024-08-27 ENCOUNTER — APPOINTMENT (OUTPATIENT)
Dept: GENERAL RADIOLOGY | Age: 74
End: 2024-08-27
Payer: MEDICARE

## 2024-08-27 ENCOUNTER — HOSPITAL ENCOUNTER (INPATIENT)
Age: 74
LOS: 2 days | Discharge: HOME | End: 2024-08-29
Attending: EMERGENCY MEDICINE | Admitting: INTERNAL MEDICINE
Payer: MEDICARE

## 2024-08-27 DIAGNOSIS — R60.0 PERIPHERAL EDEMA: ICD-10-CM

## 2024-08-27 DIAGNOSIS — I48.91 ATRIAL FIBRILLATION, UNSPECIFIED TYPE (HCC): ICD-10-CM

## 2024-08-27 DIAGNOSIS — J81.0 ACUTE PULMONARY EDEMA (HCC): Primary | ICD-10-CM

## 2024-08-27 DIAGNOSIS — E77.8 HYPOPROTEINEMIA (HCC): ICD-10-CM

## 2024-08-27 DIAGNOSIS — J96.01 ACUTE RESPIRATORY FAILURE WITH HYPOXIA (HCC): ICD-10-CM

## 2024-08-27 DIAGNOSIS — J96.01 ACUTE HYPOXIC RESPIRATORY FAILURE (HCC): ICD-10-CM

## 2024-08-27 LAB
ALBUMIN SERPL-MCNC: 3.9 G/DL (ref 3.4–5)
ALBUMIN/GLOB SERPL: 1.7 {RATIO} (ref 1.1–2.2)
ALP SERPL-CCNC: 116 U/L (ref 40–129)
ALT SERPL-CCNC: 14 U/L (ref 10–40)
ANION GAP SERPL CALCULATED.3IONS-SCNC: 3 MMOL/L (ref 3–16)
AST SERPL-CCNC: 12 U/L (ref 15–37)
BASOPHILS # BLD: 0 K/UL (ref 0–0.2)
BASOPHILS NFR BLD: 0.5 %
BILIRUB SERPL-MCNC: <0.2 MG/DL (ref 0–1)
BUN SERPL-MCNC: 19 MG/DL (ref 7–20)
CALCIUM SERPL-MCNC: 8.9 MG/DL (ref 8.3–10.6)
CHLORIDE SERPL-SCNC: 104 MMOL/L (ref 99–110)
CO2 SERPL-SCNC: 35 MMOL/L (ref 21–32)
CREAT SERPL-MCNC: 0.9 MG/DL (ref 0.8–1.3)
DEPRECATED RDW RBC AUTO: 15 % (ref 12.4–15.4)
EKG ATRIAL RATE: 69 BPM
EKG ATRIAL RATE: 81 BPM
EKG DIAGNOSIS: NORMAL
EKG DIAGNOSIS: NORMAL
EKG P AXIS: 90 DEGREES
EKG P-R INTERVAL: 190 MS
EKG P-R INTERVAL: 236 MS
EKG Q-T INTERVAL: 356 MS
EKG Q-T INTERVAL: 386 MS
EKG QRS DURATION: 76 MS
EKG QRS DURATION: 78 MS
EKG QTC CALCULATION (BAZETT): 413 MS
EKG QTC CALCULATION (BAZETT): 413 MS
EKG R AXIS: 47 DEGREES
EKG R AXIS: 59 DEGREES
EKG T AXIS: 27 DEGREES
EKG T AXIS: 29 DEGREES
EKG VENTRICULAR RATE: 69 BPM
EKG VENTRICULAR RATE: 81 BPM
EOSINOPHIL # BLD: 0.3 K/UL (ref 0–0.6)
EOSINOPHIL NFR BLD: 5.2 %
FLUAV RNA RESP QL NAA+PROBE: NOT DETECTED
FLUBV RNA RESP QL NAA+PROBE: NOT DETECTED
GFR SERPLBLD CREATININE-BSD FMLA CKD-EPI: 90 ML/MIN/{1.73_M2}
GLUCOSE BLD-MCNC: 132 MG/DL (ref 70–99)
GLUCOSE BLD-MCNC: 167 MG/DL (ref 70–99)
GLUCOSE BLD-MCNC: 218 MG/DL (ref 70–99)
GLUCOSE BLD-MCNC: 317 MG/DL (ref 70–99)
GLUCOSE SERPL-MCNC: 151 MG/DL (ref 70–99)
HCT VFR BLD AUTO: 33.1 % (ref 40.5–52.5)
HGB BLD-MCNC: 10.5 G/DL (ref 13.5–17.5)
INR PPP: 3.51 (ref 0.85–1.15)
LYMPHOCYTES # BLD: 1.1 K/UL (ref 1–5.1)
LYMPHOCYTES NFR BLD: 18 %
MCH RBC QN AUTO: 28.2 PG (ref 26–34)
MCHC RBC AUTO-ENTMCNC: 31.7 G/DL (ref 31–36)
MCV RBC AUTO: 88.9 FL (ref 80–100)
MONOCYTES # BLD: 0.5 K/UL (ref 0–1.3)
MONOCYTES NFR BLD: 8.4 %
NEUTROPHILS # BLD: 4 K/UL (ref 1.7–7.7)
NEUTROPHILS NFR BLD: 67.9 %
NT-PROBNP SERPL-MCNC: 194 PG/ML (ref 0–449)
PERFORMED ON: ABNORMAL
PLATELET # BLD AUTO: 149 K/UL (ref 135–450)
PMV BLD AUTO: 8.8 FL (ref 5–10.5)
POTASSIUM SERPL-SCNC: 4.3 MMOL/L (ref 3.5–5.1)
PROT SERPL-MCNC: 6.2 G/DL (ref 6.4–8.2)
PROTHROMBIN TIME: 34.9 SEC (ref 11.9–14.9)
RBC # BLD AUTO: 3.72 M/UL (ref 4.2–5.9)
SARS-COV-2 RNA RESP QL NAA+PROBE: NOT DETECTED
SODIUM SERPL-SCNC: 142 MMOL/L (ref 136–145)
TROPONIN, HIGH SENSITIVITY: 13 NG/L (ref 0–22)
TROPONIN, HIGH SENSITIVITY: 13 NG/L (ref 0–22)
WBC # BLD AUTO: 5.9 K/UL (ref 4–11)

## 2024-08-27 PROCEDURE — 93005 ELECTROCARDIOGRAM TRACING: CPT | Performed by: EMERGENCY MEDICINE

## 2024-08-27 PROCEDURE — 83880 ASSAY OF NATRIURETIC PEPTIDE: CPT

## 2024-08-27 PROCEDURE — 1200000000 HC SEMI PRIVATE

## 2024-08-27 PROCEDURE — 80053 COMPREHEN METABOLIC PANEL: CPT

## 2024-08-27 PROCEDURE — 36415 COLL VENOUS BLD VENIPUNCTURE: CPT

## 2024-08-27 PROCEDURE — 93010 ELECTROCARDIOGRAM REPORT: CPT | Performed by: INTERNAL MEDICINE

## 2024-08-27 PROCEDURE — 85610 PROTHROMBIN TIME: CPT

## 2024-08-27 PROCEDURE — 2580000003 HC RX 258: Performed by: INTERNAL MEDICINE

## 2024-08-27 PROCEDURE — 84484 ASSAY OF TROPONIN QUANT: CPT

## 2024-08-27 PROCEDURE — 87636 SARSCOV2 & INF A&B AMP PRB: CPT

## 2024-08-27 PROCEDURE — 6360000002 HC RX W HCPCS: Performed by: INTERNAL MEDICINE

## 2024-08-27 PROCEDURE — 2700000000 HC OXYGEN THERAPY PER DAY

## 2024-08-27 PROCEDURE — 85025 COMPLETE CBC W/AUTO DIFF WBC: CPT

## 2024-08-27 PROCEDURE — 99285 EMERGENCY DEPT VISIT HI MDM: CPT

## 2024-08-27 PROCEDURE — 71045 X-RAY EXAM CHEST 1 VIEW: CPT

## 2024-08-27 PROCEDURE — 6360000002 HC RX W HCPCS: Performed by: EMERGENCY MEDICINE

## 2024-08-27 PROCEDURE — 94761 N-INVAS EAR/PLS OXIMETRY MLT: CPT

## 2024-08-27 PROCEDURE — 6370000000 HC RX 637 (ALT 250 FOR IP): Performed by: INTERNAL MEDICINE

## 2024-08-27 PROCEDURE — 96374 THER/PROPH/DIAG INJ IV PUSH: CPT

## 2024-08-27 PROCEDURE — 99222 1ST HOSP IP/OBS MODERATE 55: CPT | Performed by: INTERNAL MEDICINE

## 2024-08-27 RX ORDER — MAGNESIUM SULFATE 1 G/100ML
1000 INJECTION INTRAVENOUS PRN
Status: DISCONTINUED | OUTPATIENT
Start: 2024-08-27 | End: 2024-08-28

## 2024-08-27 RX ORDER — ATORVASTATIN CALCIUM 40 MG/1
40 TABLET, FILM COATED ORAL NIGHTLY
Status: DISCONTINUED | OUTPATIENT
Start: 2024-08-27 | End: 2024-08-29 | Stop reason: HOSPADM

## 2024-08-27 RX ORDER — POTASSIUM CHLORIDE 7.45 MG/ML
10 INJECTION INTRAVENOUS PRN
Status: DISCONTINUED | OUTPATIENT
Start: 2024-08-27 | End: 2024-08-28

## 2024-08-27 RX ORDER — FUROSEMIDE 10 MG/ML
40 INJECTION INTRAMUSCULAR; INTRAVENOUS ONCE
Status: COMPLETED | OUTPATIENT
Start: 2024-08-27 | End: 2024-08-27

## 2024-08-27 RX ORDER — INSULIN LISPRO 100 [IU]/ML
8 INJECTION, SOLUTION INTRAVENOUS; SUBCUTANEOUS
Status: DISCONTINUED | OUTPATIENT
Start: 2024-08-27 | End: 2024-08-29 | Stop reason: HOSPADM

## 2024-08-27 RX ORDER — ASPIRIN 81 MG/1
81 TABLET ORAL DAILY
Status: DISCONTINUED | OUTPATIENT
Start: 2024-08-27 | End: 2024-08-29 | Stop reason: HOSPADM

## 2024-08-27 RX ORDER — GABAPENTIN 300 MG/1
300 CAPSULE ORAL NIGHTLY
Status: DISCONTINUED | OUTPATIENT
Start: 2024-08-27 | End: 2024-08-29 | Stop reason: HOSPADM

## 2024-08-27 RX ORDER — FUROSEMIDE 10 MG/ML
40 INJECTION INTRAMUSCULAR; INTRAVENOUS 2 TIMES DAILY
Status: COMPLETED | OUTPATIENT
Start: 2024-08-27 | End: 2024-08-28

## 2024-08-27 RX ORDER — DEXTROSE MONOHYDRATE 100 MG/ML
INJECTION, SOLUTION INTRAVENOUS CONTINUOUS PRN
Status: DISCONTINUED | OUTPATIENT
Start: 2024-08-27 | End: 2024-08-29 | Stop reason: HOSPADM

## 2024-08-27 RX ORDER — OXYCODONE HYDROCHLORIDE 5 MG/1
5 TABLET ORAL EVERY 4 HOURS PRN
Status: DISCONTINUED | OUTPATIENT
Start: 2024-08-27 | End: 2024-08-29 | Stop reason: HOSPADM

## 2024-08-27 RX ORDER — SODIUM CHLORIDE 9 MG/ML
INJECTION, SOLUTION INTRAVENOUS PRN
Status: DISCONTINUED | OUTPATIENT
Start: 2024-08-27 | End: 2024-08-29 | Stop reason: HOSPADM

## 2024-08-27 RX ORDER — ONDANSETRON 2 MG/ML
4 INJECTION INTRAMUSCULAR; INTRAVENOUS EVERY 6 HOURS PRN
Status: DISCONTINUED | OUTPATIENT
Start: 2024-08-27 | End: 2024-08-29 | Stop reason: HOSPADM

## 2024-08-27 RX ORDER — INSULIN GLARGINE 100 [IU]/ML
25 INJECTION, SOLUTION SUBCUTANEOUS NIGHTLY
Status: DISCONTINUED | OUTPATIENT
Start: 2024-08-27 | End: 2024-08-29 | Stop reason: HOSPADM

## 2024-08-27 RX ORDER — INSULIN LISPRO 100 [IU]/ML
0-8 INJECTION, SOLUTION INTRAVENOUS; SUBCUTANEOUS
Status: DISCONTINUED | OUTPATIENT
Start: 2024-08-27 | End: 2024-08-29 | Stop reason: HOSPADM

## 2024-08-27 RX ORDER — METOPROLOL TARTRATE 25 MG/1
25 TABLET, FILM COATED ORAL 2 TIMES DAILY
Status: DISCONTINUED | OUTPATIENT
Start: 2024-08-27 | End: 2024-08-29 | Stop reason: HOSPADM

## 2024-08-27 RX ORDER — GLUCAGON 1 MG/ML
1 KIT INJECTION PRN
Status: DISCONTINUED | OUTPATIENT
Start: 2024-08-27 | End: 2024-08-29 | Stop reason: HOSPADM

## 2024-08-27 RX ORDER — INSULIN LISPRO 100 [IU]/ML
0-4 INJECTION, SOLUTION INTRAVENOUS; SUBCUTANEOUS NIGHTLY
Status: DISCONTINUED | OUTPATIENT
Start: 2024-08-27 | End: 2024-08-29 | Stop reason: HOSPADM

## 2024-08-27 RX ORDER — PANTOPRAZOLE SODIUM 40 MG/1
40 TABLET, DELAYED RELEASE ORAL
Status: DISCONTINUED | OUTPATIENT
Start: 2024-08-27 | End: 2024-08-29 | Stop reason: HOSPADM

## 2024-08-27 RX ORDER — ONDANSETRON 4 MG/1
4 TABLET, ORALLY DISINTEGRATING ORAL EVERY 8 HOURS PRN
Status: DISCONTINUED | OUTPATIENT
Start: 2024-08-27 | End: 2024-08-29 | Stop reason: HOSPADM

## 2024-08-27 RX ORDER — DOFETILIDE 0.25 MG/1
250 CAPSULE ORAL EVERY 12 HOURS SCHEDULED
Status: DISCONTINUED | OUTPATIENT
Start: 2024-08-27 | End: 2024-08-29 | Stop reason: HOSPADM

## 2024-08-27 RX ORDER — POTASSIUM CHLORIDE 1500 MG/1
40 TABLET, EXTENDED RELEASE ORAL PRN
Status: DISCONTINUED | OUTPATIENT
Start: 2024-08-27 | End: 2024-08-28

## 2024-08-27 RX ORDER — POLYETHYLENE GLYCOL 3350 17 G/17G
17 POWDER, FOR SOLUTION ORAL DAILY PRN
Status: DISCONTINUED | OUTPATIENT
Start: 2024-08-27 | End: 2024-08-29 | Stop reason: HOSPADM

## 2024-08-27 RX ORDER — SODIUM CHLORIDE 0.9 % (FLUSH) 0.9 %
10 SYRINGE (ML) INJECTION PRN
Status: DISCONTINUED | OUTPATIENT
Start: 2024-08-27 | End: 2024-08-29 | Stop reason: HOSPADM

## 2024-08-27 RX ORDER — PRAMIPEXOLE DIHYDROCHLORIDE 0.25 MG/1
0.75 TABLET ORAL NIGHTLY
Status: DISCONTINUED | OUTPATIENT
Start: 2024-08-27 | End: 2024-08-29 | Stop reason: HOSPADM

## 2024-08-27 RX ORDER — ACETAMINOPHEN 325 MG/1
650 TABLET ORAL EVERY 6 HOURS PRN
Status: DISCONTINUED | OUTPATIENT
Start: 2024-08-27 | End: 2024-08-29 | Stop reason: HOSPADM

## 2024-08-27 RX ORDER — ACETAMINOPHEN 650 MG/1
650 SUPPOSITORY RECTAL EVERY 6 HOURS PRN
Status: DISCONTINUED | OUTPATIENT
Start: 2024-08-27 | End: 2024-08-29 | Stop reason: HOSPADM

## 2024-08-27 RX ADMIN — ASPIRIN 81 MG: 81 TABLET, COATED ORAL at 09:48

## 2024-08-27 RX ADMIN — INSULIN GLARGINE 25 UNITS: 100 INJECTION, SOLUTION SUBCUTANEOUS at 20:38

## 2024-08-27 RX ADMIN — INSULIN LISPRO 6 UNITS: 100 INJECTION, SOLUTION INTRAVENOUS; SUBCUTANEOUS at 12:38

## 2024-08-27 RX ADMIN — PRAMIPEXOLE DIHYDROCHLORIDE 0.75 MG: 0.25 TABLET ORAL at 20:38

## 2024-08-27 RX ADMIN — METOPROLOL TARTRATE 25 MG: 25 TABLET, FILM COATED ORAL at 20:38

## 2024-08-27 RX ADMIN — INSULIN LISPRO 2 UNITS: 100 INJECTION, SOLUTION INTRAVENOUS; SUBCUTANEOUS at 09:49

## 2024-08-27 RX ADMIN — INSULIN LISPRO 8 UNITS: 100 INJECTION, SOLUTION INTRAVENOUS; SUBCUTANEOUS at 09:48

## 2024-08-27 RX ADMIN — INSULIN LISPRO 8 UNITS: 100 INJECTION, SOLUTION INTRAVENOUS; SUBCUTANEOUS at 16:50

## 2024-08-27 RX ADMIN — PANTOPRAZOLE SODIUM 40 MG: 40 TABLET, DELAYED RELEASE ORAL at 06:40

## 2024-08-27 RX ADMIN — PANTOPRAZOLE SODIUM 40 MG: 40 TABLET, DELAYED RELEASE ORAL at 16:50

## 2024-08-27 RX ADMIN — DOFETILIDE 250 MCG: 0.25 CAPSULE ORAL at 20:38

## 2024-08-27 RX ADMIN — ATORVASTATIN CALCIUM 40 MG: 40 TABLET, FILM COATED ORAL at 20:38

## 2024-08-27 RX ADMIN — DOFETILIDE 250 MCG: 0.25 CAPSULE ORAL at 09:48

## 2024-08-27 RX ADMIN — FUROSEMIDE 40 MG: 10 INJECTION, SOLUTION INTRAMUSCULAR; INTRAVENOUS at 09:48

## 2024-08-27 RX ADMIN — INSULIN LISPRO 8 UNITS: 100 INJECTION, SOLUTION INTRAVENOUS; SUBCUTANEOUS at 12:38

## 2024-08-27 RX ADMIN — SODIUM CHLORIDE, PRESERVATIVE FREE 10 ML: 5 INJECTION INTRAVENOUS at 20:38

## 2024-08-27 RX ADMIN — METOPROLOL TARTRATE 25 MG: 25 TABLET, FILM COATED ORAL at 09:48

## 2024-08-27 RX ADMIN — FUROSEMIDE 40 MG: 10 INJECTION, SOLUTION INTRAMUSCULAR; INTRAVENOUS at 03:36

## 2024-08-27 RX ADMIN — FUROSEMIDE 40 MG: 10 INJECTION, SOLUTION INTRAMUSCULAR; INTRAVENOUS at 16:50

## 2024-08-27 RX ADMIN — GABAPENTIN 300 MG: 300 CAPSULE ORAL at 20:38

## 2024-08-27 ASSESSMENT — PAIN SCALES - GENERAL
PAINLEVEL_OUTOF10: 0

## 2024-08-27 ASSESSMENT — PAIN - FUNCTIONAL ASSESSMENT
PAIN_FUNCTIONAL_ASSESSMENT: NONE - DENIES PAIN
PAIN_FUNCTIONAL_ASSESSMENT: NONE - DENIES PAIN

## 2024-08-27 NOTE — PROGRESS NOTES
Patient admitted to room 214 from ED.  Patient oriented to room, call light, bed rails, phone, lights and bathroom.  Bed alarm in place, patient aware of placement and reason.   Telemetry box  in place, patient aware of placement and reason.  Bed locked, in lowest position, side rails up 2/4, call light within reach.  Will continue to monitor.

## 2024-08-27 NOTE — CONSULTS
insulin glargine (LANTUS SOLOSTAR) 100 UNIT/ML injection pen Inject 20 Units into the skin nightly 15 mL 1    furosemide (LASIX) 40 MG tablet Three times a week as needed for swelling. 30 tablet 0    acetaminophen (TYLENOL) 325 MG tablet Take 2 tablets by mouth every 4 hours as needed for Pain      insulin aspart (NOVOLOG FLEXPEN) 100 UNIT/ML injection pen Inject 25 Units into the skin 3 times daily (before meals) 15 mL 5    pramipexole (MIRAPEX) 0.5 MG tablet Take 1.5 tablets by mouth nightly 135 tablet 1    pantoprazole (PROTONIX) 40 MG tablet TAKE 1 TABLET BY MOUTH TWICE DAILY BEFORE MEAL(S) 60 tablet 5    Continuous Blood Gluc Sensor (FREESTYLE JUSTIN 14 DAY SENSOR) MISC 1 Units by Does not apply route every 14 days 2 each 11    dofetilide (TIKOSYN) 250 MCG capsule Take 1 capsule by mouth every 12 hours 60 capsule 3    aspirin 81 MG EC tablet Take 1 tablet by mouth daily 30 tablet 3    atorvastatin (LIPITOR) 40 MG tablet TAKE 1 TABLET BY MOUTH AT BEDTIME 90 tablet 3    Handicap Placard MISC by Does not apply route Exp: 5/1/2026 1 each 0    blood glucose monitor strips Test 3 times a day & as needed for symptoms of irregular blood glucose. E11.9 Dispense sufficient amount for indicated testing frequency plus additional to accommodate PRN testing needs. 300 strip 0    Lancets MISC 1 each by Does not apply route daily Check blood sugars 3x daily E11.9 300 each 5    Continuous Blood Gluc  (FREESTYLE JUSTIN 14 DAY READER) OLI 1 Units by Does not apply route as needed (as needed) 1 each 5    Insulin Pen Needle 31G X 8 MM MISC 1 each by Does not apply route 4 times daily 200 each 3    Insulin Syringe-Needle U-100 30G X 1/2\" 0.5 ML MISC Inject insulin 3 times daily         Allergies:  Clonidine, Oxycodone, Trulicity [dulaglutide], and Codeine    Social History:    Social History     Socioeconomic History    Marital status:      Spouse name: Not on file    Number of children: Not on file    Years of

## 2024-08-27 NOTE — ED NOTES
London Swenson is a 74 y.o. male admitted for  Principal Problem:    Acute pulmonary edema (HCC)  Active Problems:    PAF (paroxysmal atrial fibrillation) (HCC)    Acute hypoxic respiratory failure (HCC)    Type 2 diabetes mellitus with hyperglycemia  Resolved Problems:    * No resolved hospital problems. *  .   Patient Home via EMS transportation with   Chief Complaint   Patient presents with    Shortness of Breath     Dc'd yesterday, woke up this evening with shortness of breath   .  Patient is alert and Person, Place, Time, and Situation  Patient's baseline mobility: Baseline Mobility: Independent   Code Status: Prior   Cardiac Rhythm:    O2 Flow Rate (L/min): 2 L/min  Is patient on baseline Oxygen: no how many Liters   :   Abnormal Assessment Findings: pt short of breath upon exertion. 88% on room air. Placed on 2L nasal cannula.    Isolation: None      NIH Score:    C-SSRS: Risk of Suicide: No Risk  Bedside swallow:        Active LDA's:   Peripheral IV 08/27/24 Right Antecubital (Active)     Patient admitted with a aranda: no If the aranda is chronic was it exchanged:NA  Reason for aranda:   Patient admitted with Central Line:  NA. PICC line placement confirmed: YES OR NO:[id number]}   Reason for Central line:   Was central line Inserted from an outside facility: no       Family/Caregiver Present no Any Concerns: no   Restraints no  Sitter no         Vitals: MEWS Score: 2    Vitals:    08/27/24 0303 08/27/24 0422 08/27/24 0425   BP: 132/76 (!) 142/85    Pulse: 66 64    Resp: 24 16    Temp: 98.2 °F (36.8 °C)     TempSrc: Oral     SpO2: 92% 99%    Weight:   127 kg (280 lb)   Height:   1.778 m (5' 10\")       Last documented pain score (0-10 scale)    Pain medication administered No.    Pertinent or High Risk Medications/Drips: No.    Pending Blood Product Administration: no    Abnormal labs:   Abnormal Labs Reviewed   CBC WITH AUTO DIFFERENTIAL - Abnormal; Notable for the following components:       Result Value    RBC

## 2024-08-27 NOTE — ACP (ADVANCE CARE PLANNING)
Advance Care Planning     General Advance Care Planning (ACP) Conversation    Date of Conversation: 8/27/2024  Conducted with: Patient with Decision Making Capacity  Other persons present: Spouse      Healthcare Decision Maker:   Primary Decision Maker (Active): Rosy Swenson - Spouse - [phone]    Secondary Decision Maker: Valerie Penn - Grandchild - [phone]     Today we documented Decision Maker(s) consistent with Legal Next of Kin hierarchy.  Content/Action Overview:  Has ACP document(s) on file - reflects the patient's care preferences  Reviewed DNR/DNI and patient elects Full Code (Attempt Resuscitation)      Length of Voluntary ACP Conversation in minutes:  <16 minutes (Non-Billable)    Ninfa Dixon RN

## 2024-08-27 NOTE — TELEPHONE ENCOUNTER
Update on weights, swelling? Reviewed ED labs/imaging, chest xray showed possible mild fluid buildup. Would take an additional lasix the next 3 days. Keep appointment with PCP.

## 2024-08-27 NOTE — CARE COORDINATION
[facility]. CTN discussed patient eligibility for RPM. CTN also discussed with CM oxygen testing to see if patient would qualify for O2. Additionally, CTN requested palliative care consult to discuss goals of care with patient. Sandy to follow up on the above. CTN will continue to monitor.     Shante BURTON, RN, [facility]  Care Transition Nurse  [phone] mobile

## 2024-08-27 NOTE — PROGRESS NOTES
4 Eyes Skin Assessment     NAME:  London Swenson  YOB: 1950  MEDICAL RECORD NUMBER:  [id number]    The patient is being assessed for  Admission    I agree that at least one RN has performed a thorough Head to Toe Skin Assessment on the patient. ALL assessment sites listed below have been assessed.      Areas assessed by both nurses:    Head, Face, Ears, Shoulders, Back, Chest, Arms, Elbows, Hands, Sacrum. Buttock, Coccyx, Ischium, and Legs. Feet and Heels        Does the Patient have a Wound? No noted wound(s)       Dustin Prevention initiated by RN: No  Wound Care Orders initiated by RN: No    Pressure Injury (Stage 3,4, Unstageable, DTI, NWPT, and Complex wounds) if present, place Wound referral order by RN under : No    New Ostomies, if present place, Ostomy referral order under : No     Nurse 1 eSignature: Electronically signed by Ramila Hare RN on 8/27/24 at 6:23 AM EDT    **SHARE this note so that the co-signing nurse can place an eSignature**    Nurse 2 eSignature: Electronically signed by Olena Godinez RN on 8/27/24 at 6:31 AM EDT

## 2024-08-27 NOTE — FLOWSHEET NOTE
08/27/24 0623   Assessment   Charting Type Admission   Psychosocial   Psychosocial (WDL) WDL   Neurological   Neuro (WDL) WDL   Level of Consciousness 0   Orientation Level Oriented X4   Cognition Appropriate judgement;Appropriate attention/concentration;Appropriate for developmental age;Follows commands   Speech Clear   Demetrius Coma Scale   Eye Opening 4   Best Verbal Response 5   Best Motor Response 6   Norborne Coma Scale Score 15   HEENT (Head, Ears, Eyes, Nose, & Throat)   HEENT (WDL) X   Teeth Edentulous   Respiratory   Respiratory (WDL) WDL   Respiratory Interventions H.O.B. elevated   Respiratory Pattern Regular   Respiratory Depth Normal   Respiratory Quality/Effort Unlabored;Dyspnea with exertion;Dyspnea at rest   Chest Assessment Chest expansion symmetrical;Trachea midline   L Breath Sounds Clear   R Breath Sounds Clear   Level of Activity/Mobility 1   Breath Sounds   Right Upper Lobe Clear   Right Middle Lobe Clear   Right Lower Lobe Clear   Left Upper Lobe Clear   Left Lower Lobe Clear   Cardiac   Cardiac (WDL) X   Cardiac Regularity Regular   Cardiac Rhythm Atrial paced   Cardiac Monitor   Cardiac/Telemetry Monitor On Portable telemetry pack applied   Alarm Audible Centralized cardiac monitoring   Alarms Set Centralized cardiac monitoring   Pacemaker   Pacemaker Yes   Pacemaker Type Permanent   Gastrointestinal   Abdominal (WDL) WDL   RUQ Bowel Sounds Active   LUQ Bowel Sounds Active   RLQ Bowel Sounds Active   LLQ Bowel Sounds Active   Abdomen Inspection Rounded;Obese   Genitourinary   Genitourinary (WDL) WDL   Urine Assessment   Urinary Status Voiding   Peripheral Vascular   Peripheral Vascular (WDL) X   Edema Left lower extremity;Right lower extremity   RLE Edema Trace   LLE Edema Trace   Skin Integumentary    Skin Integumentary (WDL) WDL   Musculoskeletal   Musculoskeletal (WDL) X  (generalized weakness)

## 2024-08-27 NOTE — CONSULTS
Pharmacy Note  Warfarin Consult  Dx: Afib  Goal INR range 2-3   Home Warfarin dose: 5 mg (5 mg x 1) every Sun; 7.5 mg (5 mg x 1.5) all other days   New start Warfarin with Heparin/Enoxaparin bridge.  Would recommend continue bridge until INR therapeutic.     Date  INR  Warfarin    Recommend Warfarin mg tonight x1.  Daily INR ordered.  Rx will continue to manage therapy per consult order.    Pending INR, ordered  Alvarez Ramirez PharmD 8/27/2024  6:47 AM      Follow up INR:  Recent Labs     08/27/24  0909   INR 3.51*   PROTIME 34.9*   Estimated Creatinine Clearance: 99 mL/min (based on SCr of 0.9 mg/dL).  - Hold warfarin today.     Aaron Martinez RPH 8/27/2024 9:36 AM

## 2024-08-27 NOTE — ED PROVIDER NOTES
EMERGENCY DEPARTMENT ENCOUNTER     [facility] CARD TELEMETRY     Pt Name: London Swenson   MRN: [id number]   Birthdate 1950   Date of evaluation: 8/27/2024   Provider: Toney Swenson MD   PCP: Constance Yancey PA   Note Started: 7:17 AM EDT 8/27/24     CHIEF COMPLAINT     Chief Complaint   Patient presents with    Shortness of Breath     Dc'd yesterday, woke up this evening with shortness of breath        HISTORY OF PRESENT ILLNESS:  History from : Patient   Limitations to history : None     London Swenson is a 74 y.o. male who presents for shortness of breath.  Patient reports he went to bed feeling well and woke up in melan[place] very short of breath.  He has significant lower extremity edema takes his diuretic daily.  Denies any chest pain or fevers.  No productive cough.  No URI symptoms.    Nursing Notes were all reviewed and agreed with or any disagreements were addressed in the HPI.     ROS: Positives and Pertinent negatives as per HPI.    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    Past surgical history:  has a past surgical history that includes Pancreas surgery; Cholecystectomy; Tonsillectomy; [place] tooth extraction; Colonoscopy (10/26/2011); Cataract removal (Bilateral, 2013); Upper gastrointestinal endoscopy (10/04/2016); Hydrocele surgery (11/15/2016); Endoscopy, colon, diagnostic; Upper gastrointestinal endoscopy (03/16/2017); Upper gastrointestinal endoscopy (06/26/2017); Upper gastrointestinal endoscopy (09/06/2017); Upper gastrointestinal endoscopy (12/22/2017); Upper gastrointestinal endoscopy (N/A,  METABOLIC PANEL W/ REFLEX TO MG FOR LOW K - Abnormal; Notable for the following components:    CO2 35 (*)     Glucose 151 (*)     Total Protein 6.2 (*)     AST 12 (*)     All other components within normal limits   COVID-19 & INFLUENZA COMBO   TROPONIN   TROPONIN   BRAIN NATRIURETIC PEPTIDE   PROTIME-INR   POCT GLUCOSE   POCT GLUCOSE   POCT GLUCOSE   POCT GLUCOSE   POCT GLUCOSE   POCT GLUCOSE   POCT GLUCOSE      When ordered only abnormal lab results are displayed. All other labs were within normal range or not returned as of this dictation.     RADIOLOGY:      Interpretation per the Radiologist below, if available at the time of this note:    XR CHEST PORTABLE   Preliminary Result   Increasing pulmonary edema.                EKG interpreted by me: Normal sinus rhythm with rate of 69, no ischemic findings, normal intervals, no significant change from EKG dated 25 August 2024.    EMERGENCY DEPARTMENT COURSE and DIFFERENTIAL DIAGNOSIS/MDM:          Vitals:    Vitals:    08/27/24 0422 08/27/24 0425 08/27/24 0600 08/27/24 0601   BP: (!) 142/85   (!) 162/77   Pulse: 64   75   Resp: 16   17   Temp:    97.9 °F (36.6 °C)   TempSrc:    Oral   SpO2: 99%   94%   Weight:  127 kg (280 lb) 133 kg (293 lb 4.8 oz)    Height:  1.778 m (5' 10\")          Patient was given the following medications:   Medications   aspirin EC tablet 81 mg (has no administration in time range)   atorvastatin (LIPITOR) tablet 40 mg (has no administration in time range)   dofetilide (TIKOSYN) capsule 250 mcg (has no administration in time range)   gabapentin (NEURONTIN) capsule 300 mg (has no administration in time range)   metoprolol tartrate (LOPRESSOR) tablet 25 mg (has no administration in time range)   oxyCODONE (ROXICODONE) immediate release tablet 5 mg (has no administration in time range)   pramipexole (MIRAPEX) tablet 0.75 mg (has no administration in time range)   pantoprazole (PROTONIX) tablet 40 mg (40 mg Oral Given 8/27/24 0640)   glucose

## 2024-08-27 NOTE — CARE COORDINATION
Case Management Assessment  Initial Evaluation    Date/Time of Evaluation: 8/27/2024 10:06 AM  Assessment Completed by: Ninfa Dixon RN    If patient is discharged prior to next notation, then this note serves as note for discharge by case management.    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 Yes (Constance RODRIGUEZ)    Chart Reviewed: Yes      History Provided by: Patient, Spouse, Medical Record  Patient Orientation: Alert and Oriented    Patient Cognition: Alert    Hospitalization in the last 30 days (Readmission):  Yes    If yes, Readmission Assessment in CM Navigator will be completed.    Advance Directives:      Code Status: Full Code   Patient's Primary Decision Maker is: Patient Declined (Legal Next of Kin Remains as Decision Maker)    Primary Decision Maker (Active): Rosy Swenson - Spouse - [phone]    Secondary Decision Maker: Valerie Penn - Grandchild - [phone]    Discharge Planning:    Patient lives with: Spouse/Significant Other Type of Home: House  Primary Care Giver: Self  Patient Support Systems include: Spouse/Significant Other, Family Members   Current Financial resources: Medicare, Other (Comment) ([employer])  Current community resources: None  Current services prior to admission: None            Current DME:              Type of Home Care services:  None    ADLS  Prior functional level: Independent in ADLs/IADLs  Current functional level: Independent in ADLs/IADLs    PT AM-PAC:   /24  OT AM-PAC:   /24    Family can provide assistance at DC: Yes  Would you like Case  Management to discuss the discharge plan with any other family members/significant others, and if so, who? No  Plans to Return to Present Housing: Yes  Other Identified Issues/Barriers to RETURNING to current housing: None  Potential Assistance needed at discharge: N/A            Potential DME:    Patient expects to discharge to: House  Plan for transportation at discharge: Family    Financial    Payor: MEDICARE / Plan: MEDICARE PART A AND B / Product Type: *No Product type* /     Does insurance require precert for SNF: No    Potential assistance Purchasing Medications: No  Meds-to-Beds request: Yes      [facility] - [place], [place] - [address] [facility] [address] [facility] - P [phone] - F [phone]  [address] [facility] [address]  Phone: [phone] Fax: [phone]    [facility] - [place], [place] - [address] - P [phone] - F [phone]  [address] [facility] [address]  Phone: [phone] Fax: [phone]    [facility] - [place], [place] - [address] [facility] - P [phone] - F [phone]  [address] [facility] [address]  Phone: [phone] Fax: [phone]      Notes:    Factors facilitating achievement of predicted outcomes: Family support, Motivated, Cooperative, and Pleasant    Barriers to discharge: Decreased endurance and Medical complications    Additional Case Management Notes: IPTA; Lives in ranch style home with spouse. No needs at this time. 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 Freedom of choice list with basic dialogue that supports the patient's

## 2024-08-27 NOTE — H&P
identified about several metacarpophalangeal joints may be degenerative in nature versus changes of prior trauma.  Linear radiopaque foreign body identified within the soft tissues of the thumb.  No evidence of acute fracture or dislocation.     1. Findings suspicious for presence of small linear faintly radiopaque foreign body along ventral soft tissues of the left index finger at the level of the middle phalanx as described above.  Clinical correlation with regard to location of patient's symptomatology would be helpful. 2. No evidence of acute fracture.       PCP: Constance Yancey PA    Past Medical History:        Diagnosis Date    A-fib ([facility])     Acid reflux     Yan's esophagus     Coronary artery disease of native heart with stable angina pectoris ([facility]) 05/30/2019    Dementia ([facility])     Diabetes mellitus ([facility])     Diabetic autonomic neuropathy associated with type 2 diabetes mellitus ([facility]) 03/03/2020    Hyperlipidemia     Hypertension     Pancreatitis 1996    Restless legs     Sleep apnea     uses CPAP    Tremor     of bilateral hands       Past Surgical History:        Procedure Laterality Date    CARDIAC DEFIBRILLATOR PLACEMENT      CATARACT REMOVAL Bilateral 2013    CHOLECYSTECTOMY      COLONOSCOPY  10/26/2011    ENDOSCOPY, COLON, DIAGNOSTIC      EGD halo    EP DEVICE PROCEDURE N/A 7/15/2024    Watchman keaton closure device performed by Bayron Montoya MD at [facility] CARDIAC CATH LAB    HYDROCELE EXCISION  11/15/2016    LARYNGOSCOPY N/A 10/25/2023    DRUG INDUCED SLEEP ENDOSCOPY performed by David Estes DO at [facility] ASC OR    PANCREAS SURGERY      cyst opened up and drining into small bowel    TONSILLECTOMY      UPPER GASTROINTESTINAL ENDOSCOPY  10/04/2016    with biopsy    UPPER GASTROINTESTINAL ENDOSCOPY  03/16/2017    Halo ablation    UPPER GASTROINTESTINAL ENDOSCOPY  06/26/2017    Halo ablation    UPPER GASTROINTESTINAL ENDOSCOPY  09/06/2017    bx distal sophagus    UPPER GASTROINTESTINAL ENDOSCOPY   injection pen Inject 20 Units into the skin nightly 7/10/24 10/8/24  Constance Yancey PA   furosemide (LASIX) 40 MG tablet Three times a week as needed for swelling. 7/1/24   Dylan Shanks MD   acetaminophen (TYLENOL) 325 MG tablet Take 2 tablets by mouth every 4 hours as needed for Pain    Provider, MD Willa   insulin aspart (NOVOLOG FLEXPEN) 100 UNIT/ML injection pen Inject 25 Units into the skin 3 times daily (before meals) 6/7/24   Constance Yancey PA   pramipexole (MIRAPEX) 0.5 MG tablet Take 1.5 tablets by mouth nightly 5/3/24   Constance Yancey PA   pantoprazole (PROTONIX) 40 MG tablet TAKE 1 TABLET BY MOUTH TWICE DAILY BEFORE MEAL(S) 4/4/24   Constance Yancey PA   Continuous Blood Gluc Sensor (FREESTYLE JUSTIN 14 DAY SENSOR) MISC 1 Units by Does not apply route every 14 days 2/14/24   Constance Yancey PA   dofetilide (TIKOSYN) 250 MCG capsule Take 1 capsule by mouth every 12 hours 1/19/24   Ericka Valdez, APRN - CNP   aspirin 81 MG EC tablet Take 1 tablet by mouth daily 1/5/24   Donny Young MD   atorvastatin (LIPITOR) 40 MG tablet TAKE 1 TABLET BY MOUTH AT BEDTIME 8/14/23   Michael Leums MD   Handicap Placard MISC by Does not apply route Exp: 5/1/2026 5/4/23   Constance Yancey PA   blood glucose monitor strips Test 3 times a day & as needed for symptoms of irregular blood glucose. E11.9 Dispense sufficient amount for indicated testing frequency plus additional to accommodate PRN testing needs. 2/7/23   Constance Yancey PA   Lancets MISC 1 each by Does not apply route daily Check blood sugars 3x daily E11.9 2/7/23   Constance Yancey PA   Continuous Blood Gluc  (FREESTYLE JUSTIN 14 DAY READER) OLI 1 Units by Does not apply route as needed (as needed) 1/23/23   Monique Henry PA   Insulin Pen Needle 31G X 8 MM MISC 1 each by Does not apply route 4 times daily 7/19/22   Monique Henry PA   Insulin Syringe-Needle U-100 30G X 1/2\" 0.5 ML MISC Inject insulin 3 times daily 9/12/17

## 2024-08-28 ENCOUNTER — APPOINTMENT (OUTPATIENT)
Age: 74
End: 2024-08-28
Attending: INTERNAL MEDICINE
Payer: MEDICARE

## 2024-08-28 ENCOUNTER — APPOINTMENT (OUTPATIENT)
Dept: VASCULAR LAB | Age: 74
End: 2024-08-28
Attending: INTERNAL MEDICINE
Payer: MEDICARE

## 2024-08-28 LAB
ALBUMIN SERPL-MCNC: 3.8 G/DL (ref 3.4–5)
ANION GAP SERPL CALCULATED.3IONS-SCNC: 4 MMOL/L (ref 3–16)
BASOPHILS # BLD: 0 K/UL (ref 0–0.2)
BASOPHILS NFR BLD: 0.4 %
BILIRUB UR QL STRIP.AUTO: NEGATIVE
BUN SERPL-MCNC: 20 MG/DL (ref 7–20)
CALCIUM SERPL-MCNC: 8.8 MG/DL (ref 8.3–10.6)
CHLORIDE SERPL-SCNC: 97 MMOL/L (ref 99–110)
CLARITY UR: CLEAR
CO2 SERPL-SCNC: 38 MMOL/L (ref 21–32)
COLOR UR: ABNORMAL
CREAT SERPL-MCNC: 0.9 MG/DL (ref 0.8–1.3)
CREAT UR-MCNC: 17.9 MG/DL (ref 39–259)
DEPRECATED RDW RBC AUTO: 14.9 % (ref 12.4–15.4)
ECHO AO ASC DIAM: 4.1 CM
ECHO AO ASCENDING AORTA INDEX: 1.69 CM/M2
ECHO AO ROOT DIAM: 3.4 CM
ECHO AO ROOT INDEX: 1.4 CM/M2
ECHO AV AREA PEAK VELOCITY: 2.4 CM2
ECHO AV AREA VTI: 2.4 CM2
ECHO AV AREA/BSA PEAK VELOCITY: 1 CM2/M2
ECHO AV AREA/BSA VTI: 1 CM2/M2
ECHO AV MEAN GRADIENT: 3 MMHG
ECHO AV MEAN VELOCITY: 0.8 M/S
ECHO AV PEAK GRADIENT: 6 MMHG
ECHO AV PEAK VELOCITY: 1.2 M/S
ECHO AV VELOCITY RATIO: 0.83
ECHO AV VTI: 24.9 CM
ECHO BSA: 2.5 M2
ECHO BSA: 2.52 M2
ECHO EST RA PRESSURE: 8 MMHG
ECHO LA AREA 2C: 25.4 CM2
ECHO LA AREA 4C: 15.4 CM2
ECHO LA DIAMETER INDEX: 1.78 CM/M2
ECHO LA DIAMETER: 4.3 CM
ECHO LA MAJOR AXIS: 5.5 CM
ECHO LA MINOR AXIS: 5.8 CM
ECHO LA TO AORTIC ROOT RATIO: 1.26
ECHO LA VOL BP: 58 ML (ref 18–58)
ECHO LA VOL MOD A2C: 92 ML (ref 18–58)
ECHO LA VOL MOD A4C: 36 ML (ref 18–58)
ECHO LA VOL/BSA BIPLANE: 24 ML/M2 (ref 16–34)
ECHO LA VOLUME INDEX MOD A2C: 38 ML/M2 (ref 16–34)
ECHO LA VOLUME INDEX MOD A4C: 15 ML/M2 (ref 16–34)
ECHO LV E' LATERAL VELOCITY: 12 CM/S
ECHO LV E' SEPTAL VELOCITY: 11 CM/S
ECHO LV EF PHYSICIAN: 55 %
ECHO LV FRACTIONAL SHORTENING: 31 % (ref 28–44)
ECHO LV INTERNAL DIMENSION DIASTOLE INDEX: 2.11 CM/M2
ECHO LV INTERNAL DIMENSION DIASTOLIC: 5.1 CM (ref 4.2–5.9)
ECHO LV INTERNAL DIMENSION SYSTOLIC INDEX: 1.45 CM/M2
ECHO LV INTERNAL DIMENSION SYSTOLIC: 3.5 CM
ECHO LV IVSD: 1.1 CM (ref 0.6–1)
ECHO LV MASS 2D: 227.4 G (ref 88–224)
ECHO LV MASS INDEX 2D: 94 G/M2 (ref 49–115)
ECHO LV POSTERIOR WALL DIASTOLIC: 1.2 CM (ref 0.6–1)
ECHO LV RELATIVE WALL THICKNESS RATIO: 0.47
ECHO LVOT AREA: 3.1 CM2
ECHO LVOT AV VTI INDEX: 0.75
ECHO LVOT DIAM: 2 CM
ECHO LVOT MEAN GRADIENT: 2 MMHG
ECHO LVOT PEAK GRADIENT: 4 MMHG
ECHO LVOT PEAK VELOCITY: 1 M/S
ECHO LVOT STROKE VOLUME INDEX: 24.3 ML/M2
ECHO LVOT SV: 58.7 ML
ECHO LVOT VTI: 18.7 CM
ECHO MV A VELOCITY: 0.64 M/S
ECHO MV E DECELERATION TIME (DT): 232 MS
ECHO MV E VELOCITY: 0.84 M/S
ECHO MV E/A RATIO: 1.31
ECHO MV E/E' LATERAL: 7
ECHO MV E/E' RATIO (AVERAGED): 7.32
ECHO MV E/E' SEPTAL: 7.64
ECHO RA AREA 4C: 12.3 CM2
ECHO RA END SYSTOLIC VOLUME APICAL 4 CHAMBER INDEX BSA: 11 ML/M2
ECHO RA VOLUME: 26 ML
ECHO RIGHT VENTRICULAR SYSTOLIC PRESSURE (RVSP): 28 MMHG
ECHO RV FREE WALL PEAK S': 16 CM/S
ECHO RV TAPSE: 2.3 CM (ref 1.7–?)
ECHO TV REGURGITANT MAX VELOCITY: 2.24 M/S
ECHO TV REGURGITANT PEAK GRADIENT: 20 MMHG
EOSINOPHIL # BLD: 0.3 K/UL (ref 0–0.6)
EOSINOPHIL NFR BLD: 4.7 %
GFR SERPLBLD CREATININE-BSD FMLA CKD-EPI: 90 ML/MIN/{1.73_M2}
GLUCOSE BLD-MCNC: 133 MG/DL (ref 70–99)
GLUCOSE BLD-MCNC: 209 MG/DL (ref 70–99)
GLUCOSE BLD-MCNC: 236 MG/DL (ref 70–99)
GLUCOSE BLD-MCNC: 352 MG/DL (ref 70–99)
GLUCOSE SERPL-MCNC: 107 MG/DL (ref 70–99)
GLUCOSE UR STRIP.AUTO-MCNC: 100 MG/DL
HCT VFR BLD AUTO: 32.8 % (ref 40.5–52.5)
HGB BLD-MCNC: 10.4 G/DL (ref 13.5–17.5)
HGB UR QL STRIP.AUTO: NEGATIVE
INR PPP: 2.8 (ref 0.85–1.15)
IRON SATN MFR SERPL: 15 % (ref 20–50)
IRON SERPL-MCNC: 37 UG/DL (ref 59–158)
KETONES UR STRIP.AUTO-MCNC: NEGATIVE MG/DL
LEUKOCYTE ESTERASE UR QL STRIP.AUTO: NEGATIVE
LYMPHOCYTES # BLD: 1 K/UL (ref 1–5.1)
LYMPHOCYTES NFR BLD: 16.4 %
MAGNESIUM SERPL-MCNC: 1.5 MG/DL (ref 1.8–2.4)
MCH RBC QN AUTO: 28.1 PG (ref 26–34)
MCHC RBC AUTO-ENTMCNC: 31.6 G/DL (ref 31–36)
MCV RBC AUTO: 88.9 FL (ref 80–100)
MONOCYTES # BLD: 0.5 K/UL (ref 0–1.3)
MONOCYTES NFR BLD: 8.9 %
NEUTROPHILS # BLD: 4.2 K/UL (ref 1.7–7.7)
NEUTROPHILS NFR BLD: 69.6 %
NITRITE UR QL STRIP.AUTO: NEGATIVE
PERFORMED ON: ABNORMAL
PH UR STRIP.AUTO: 6 [PH] (ref 5–8)
PHOSPHATE SERPL-MCNC: 3.2 MG/DL (ref 2.5–4.9)
PLATELET # BLD AUTO: 148 K/UL (ref 135–450)
PMV BLD AUTO: 9.1 FL (ref 5–10.5)
POTASSIUM SERPL-SCNC: 3.9 MMOL/L (ref 3.5–5.1)
PROT UR STRIP.AUTO-MCNC: NEGATIVE MG/DL
PROT UR-MCNC: <4 MG/DL
PROT/CREAT UR-RTO: ABNORMAL MG/DL
PROTHROMBIN TIME: 29.4 SEC (ref 11.9–14.9)
RBC # BLD AUTO: 3.69 M/UL (ref 4.2–5.9)
RBC #/AREA URNS HPF: ABNORMAL /HPF (ref 0–4)
SODIUM SERPL-SCNC: 139 MMOL/L (ref 136–145)
SP GR UR STRIP.AUTO: 1.01 (ref 1–1.03)
TIBC SERPL-MCNC: 248 UG/DL (ref 260–445)
UA DIPSTICK W REFLEX MICRO PNL UR: ABNORMAL
URN SPEC COLLECT METH UR: ABNORMAL
UROBILINOGEN UR STRIP-ACNC: 0.2 E.U./DL
VAS AORTA DIST AP: 1.4 CM
VAS AORTA MID AP: 1.4 CM
VAS AORTA MID PSV: 77.4 CM/S
VAS L RENAL ORIG RI: 0.79
VAS LEFT INTERLOBAR EDV: 10.8 CM/S
VAS LEFT INTERLOBAR PSV: 45.6 CM/S
VAS LEFT INTERLOBAR RI: 0.76
VAS LEFT KIDNEY LENGTH: 10.7 CM
VAS LEFT RENAL DIST EDV: 19.8 CM/S
VAS LEFT RENAL DIST PSV: 87.1 CM/S
VAS LEFT RENAL DIST RAR: 1.13
VAS LEFT RENAL DIST RI: 0.77
VAS LEFT RENAL MID EDV: 21.1 CM/S
VAS LEFT RENAL MID PSV: 97.1 CM/S
VAS LEFT RENAL MID RAR: 1.25
VAS LEFT RENAL MID RI: 0.78
VAS LEFT RENAL ORIGIN EDV: 18.4 CM/S
VAS LEFT RENAL ORIGIN PSV: 89.2 CM/S
VAS LEFT RENAL ORIGIN RAR: 1.15
VAS LEFT RENAL PROX EDV: 16.2 CM/S
VAS LEFT RENAL PROX PSV: 97 CM/S
VAS LEFT RENAL PROX RAR: 1.25
VAS LEFT RENAL PROX RI: 0.83
VAS LEFT RENAL RAR: 1.25
VAS RIGHT INTERLOBAR EDV: 13.7 CM/S
VAS RIGHT INTERLOBAR PSV: 45 CM/S
VAS RIGHT INTERLOBAR RI: 0.7
VAS RIGHT KIDNEY LENGTH: 7.99 CM
VAS RIGHT RENAL DIST EDV: 16.3 CM/S
VAS RIGHT RENAL DIST PSV: 62.3 CM/S
VAS RIGHT RENAL DIST RAR: 0.8
VAS RIGHT RENAL DIST RI: 0.74
VAS RIGHT RENAL MID EDV: 15.2 CM/S
VAS RIGHT RENAL MID PSV: 85.9 CM/S
VAS RIGHT RENAL MID RAR: 1.11
VAS RIGHT RENAL MID RI: 0.82
VAS RIGHT RENAL PROX EDV: 25.8 CM/S
VAS RIGHT RENAL PROX PSV: 114 CM/S
VAS RIGHT RENAL PROX RAR: 1.47
VAS RIGHT RENAL PROX RI: 0.77
VAS RIGHT RENAL RAR: 1.47
WBC # BLD AUTO: 6.1 K/UL (ref 4–11)
WBC #/AREA URNS HPF: ABNORMAL /HPF (ref 0–5)

## 2024-08-28 PROCEDURE — 6370000000 HC RX 637 (ALT 250 FOR IP): Performed by: STUDENT IN AN ORGANIZED HEALTH CARE EDUCATION/TRAINING PROGRAM

## 2024-08-28 PROCEDURE — 82570 ASSAY OF URINE CREATININE: CPT

## 2024-08-28 PROCEDURE — 93975 VASCULAR STUDY: CPT

## 2024-08-28 PROCEDURE — 83550 IRON BINDING TEST: CPT

## 2024-08-28 PROCEDURE — 6360000002 HC RX W HCPCS: Performed by: NURSE PRACTITIONER

## 2024-08-28 PROCEDURE — 36415 COLL VENOUS BLD VENIPUNCTURE: CPT

## 2024-08-28 PROCEDURE — 85610 PROTHROMBIN TIME: CPT

## 2024-08-28 PROCEDURE — 85025 COMPLETE CBC W/AUTO DIFF WBC: CPT

## 2024-08-28 PROCEDURE — 84156 ASSAY OF PROTEIN URINE: CPT

## 2024-08-28 PROCEDURE — 6370000000 HC RX 637 (ALT 250 FOR IP): Performed by: NURSE PRACTITIONER

## 2024-08-28 PROCEDURE — 81001 URINALYSIS AUTO W/SCOPE: CPT

## 2024-08-28 PROCEDURE — 1200000000 HC SEMI PRIVATE

## 2024-08-28 PROCEDURE — 6370000000 HC RX 637 (ALT 250 FOR IP): Performed by: INTERNAL MEDICINE

## 2024-08-28 PROCEDURE — 80069 RENAL FUNCTION PANEL: CPT

## 2024-08-28 PROCEDURE — 83540 ASSAY OF IRON: CPT

## 2024-08-28 PROCEDURE — 6360000004 HC RX CONTRAST MEDICATION: Performed by: INTERNAL MEDICINE

## 2024-08-28 PROCEDURE — 6360000002 HC RX W HCPCS: Performed by: INTERNAL MEDICINE

## 2024-08-28 PROCEDURE — 93975 VASCULAR STUDY: CPT | Performed by: SURGERY

## 2024-08-28 PROCEDURE — 93306 TTE W/DOPPLER COMPLETE: CPT | Performed by: INTERNAL MEDICINE

## 2024-08-28 PROCEDURE — C8929 TTE W OR WO FOL WCON,DOPPLER: HCPCS

## 2024-08-28 PROCEDURE — 2580000003 HC RX 258: Performed by: INTERNAL MEDICINE

## 2024-08-28 PROCEDURE — 83735 ASSAY OF MAGNESIUM: CPT

## 2024-08-28 RX ORDER — TORSEMIDE 20 MG/1
40 TABLET ORAL DAILY
Status: DISCONTINUED | OUTPATIENT
Start: 2024-08-29 | End: 2024-08-29 | Stop reason: HOSPADM

## 2024-08-28 RX ORDER — WARFARIN SODIUM 2.5 MG/1
2.5 TABLET ORAL
Status: COMPLETED | OUTPATIENT
Start: 2024-08-28 | End: 2024-08-28

## 2024-08-28 RX ORDER — MAGNESIUM SULFATE IN WATER 40 MG/ML
2000 INJECTION, SOLUTION INTRAVENOUS ONCE
Status: COMPLETED | OUTPATIENT
Start: 2024-08-28 | End: 2024-08-28

## 2024-08-28 RX ORDER — POTASSIUM CHLORIDE 1500 MG/1
20 TABLET, EXTENDED RELEASE ORAL DAILY
Status: DISCONTINUED | OUTPATIENT
Start: 2024-08-28 | End: 2024-08-29 | Stop reason: HOSPADM

## 2024-08-28 RX ADMIN — METOPROLOL TARTRATE 25 MG: 25 TABLET, FILM COATED ORAL at 20:47

## 2024-08-28 RX ADMIN — POTASSIUM CHLORIDE 20 MEQ: 1500 TABLET, EXTENDED RELEASE ORAL at 09:29

## 2024-08-28 RX ADMIN — INSULIN LISPRO 6 UNITS: 100 INJECTION, SOLUTION INTRAVENOUS; SUBCUTANEOUS at 12:05

## 2024-08-28 RX ADMIN — METOPROLOL TARTRATE 25 MG: 25 TABLET, FILM COATED ORAL at 09:29

## 2024-08-28 RX ADMIN — INSULIN LISPRO 8 UNITS: 100 INJECTION, SOLUTION INTRAVENOUS; SUBCUTANEOUS at 17:50

## 2024-08-28 RX ADMIN — INSULIN LISPRO 8 UNITS: 100 INJECTION, SOLUTION INTRAVENOUS; SUBCUTANEOUS at 12:05

## 2024-08-28 RX ADMIN — PRAMIPEXOLE DIHYDROCHLORIDE 0.75 MG: 0.25 TABLET ORAL at 20:47

## 2024-08-28 RX ADMIN — GABAPENTIN 300 MG: 300 CAPSULE ORAL at 20:48

## 2024-08-28 RX ADMIN — DOFETILIDE 250 MCG: 0.25 CAPSULE ORAL at 09:29

## 2024-08-28 RX ADMIN — SODIUM CHLORIDE, PRESERVATIVE FREE 10 ML: 5 INJECTION INTRAVENOUS at 09:29

## 2024-08-28 RX ADMIN — ASPIRIN 81 MG: 81 TABLET, COATED ORAL at 09:29

## 2024-08-28 RX ADMIN — MAGNESIUM SULFATE HEPTAHYDRATE 2000 MG: 40 INJECTION, SOLUTION INTRAVENOUS at 09:38

## 2024-08-28 RX ADMIN — PANTOPRAZOLE SODIUM 40 MG: 40 TABLET, DELAYED RELEASE ORAL at 17:50

## 2024-08-28 RX ADMIN — WARFARIN SODIUM 2.5 MG: 2.5 TABLET ORAL at 17:50

## 2024-08-28 RX ADMIN — FUROSEMIDE 40 MG: 10 INJECTION, SOLUTION INTRAMUSCULAR; INTRAVENOUS at 09:29

## 2024-08-28 RX ADMIN — DOFETILIDE 250 MCG: 0.25 CAPSULE ORAL at 20:47

## 2024-08-28 RX ADMIN — PANTOPRAZOLE SODIUM 40 MG: 40 TABLET, DELAYED RELEASE ORAL at 05:59

## 2024-08-28 RX ADMIN — INSULIN GLARGINE 25 UNITS: 100 INJECTION, SOLUTION SUBCUTANEOUS at 20:47

## 2024-08-28 RX ADMIN — INSULIN LISPRO 2 UNITS: 100 INJECTION, SOLUTION INTRAVENOUS; SUBCUTANEOUS at 17:50

## 2024-08-28 RX ADMIN — ATORVASTATIN CALCIUM 40 MG: 40 TABLET, FILM COATED ORAL at 20:48

## 2024-08-28 RX ADMIN — INSULIN LISPRO 8 UNITS: 100 INJECTION, SOLUTION INTRAVENOUS; SUBCUTANEOUS at 09:33

## 2024-08-28 RX ADMIN — PERFLUTREN 1.5 ML: 6.52 INJECTION, SUSPENSION INTRAVENOUS at 13:17

## 2024-08-28 ASSESSMENT — PAIN SCALES - GENERAL: PAINLEVEL_OUTOF10: 0

## 2024-08-28 NOTE — PLAN OF CARE
CHF Care Plan      Patient's EF (Ejection Fraction) is greater than 40%    Heart Failure Medications:  Diuretics:: Furosemide    (One of the following REQUIRED for EF </= 40%/SYSTOLIC FAILURE but MAY be used in EF% >40%/DIASTOLIC FAILURE)        ACE:: None        ARB:: None         ARNI:: None    (Beta Blockers)  NON- Evidenced Based Beta Blocker (for EF% >40%/DIASTOLIC FAILURE): Metoprolol TARTrate- Lopressor    Evidenced Based Beta Blocker::(REQUIRED for EF% <40%/SYSTOLIC FAILURE) None  ...................................................................................................................................................    Failed to redirect to the Timeline version of the [employer] SmartLink.      Patient's weights and intake/output reviewed    Daily Weight log at bedside, patient/family participation in use of log: \"yes    Patient's current weight today shows a difference of 10 lbs less than last documented weight.      Intake/Output Summary (Last 24 hours) at 8/28/2024 0601  Last data filed at 8/28/2024 0559  Gross per 24 hour   Intake 942 ml   Output 4675 ml   Net -3733 ml       Education Booklet Provided: yes    Comorbidities Reviewed Yes    Patient has a past medical history of A-fib (HCC), Acid reflux, Yan's esophagus, Coronary artery disease of native heart with stable angina pectoris (HCC), Dementia (HCC), Diabetes mellitus (HCC), Diabetic autonomic neuropathy associated with type 2 diabetes mellitus (HCC), Hyperlipidemia, Hypertension, Pancreatitis, Restless legs, Sleep apnea, and Tremor.     >>For CHF and Comorbidity documentation on Education Time and Topics, please see Education Tab      Pt resting in bed at this time on  2 L O2. Pt with complaints of shortness of breath. Pt with pitting lower extremity edema.     Patient and/or Family's stated Goal of Care this Admission: reduce shortness of breath, increase activity tolerance, be more comfortable, and reduce lower extremity edema prior to

## 2024-08-28 NOTE — PLAN OF CARE
CHF Care Plan      Patient's EF (Ejection Fraction) is greater than 40%    Heart Failure Medications:  Diuretics:: Furosemide    (One of the following REQUIRED for EF </= 40%/SYSTOLIC FAILURE but MAY be used in EF% >40%/DIASTOLIC FAILURE)        ACE:: None        ARB:: None         ARNI:: None    (Beta Blockers)  NON- Evidenced Based Beta Blocker (for EF% >40%/DIASTOLIC FAILURE): Metoprolol TARTrate- Lopressor    Evidenced Based Beta Blocker::(REQUIRED for EF% <40%/SYSTOLIC FAILURE) None  ...................................................................................................................................................    Failed to redirect to the Timeline version of the [employer] SmartLink.      Patient's weights and intake/output reviewed    Daily Weight log at bedside, patient/family participation in use of log: \"yes    Patient's current weight today shows a difference of 10 lbs less than last documented weight.      Intake/Output Summary (Last 24 hours) at 8/28/2024 1111  Last data filed at 8/28/2024 0940  Gross per 24 hour   Intake 924 ml   Output 5075 ml   Net -4151 ml       Education Booklet Provided: yes    Comorbidities Reviewed Yes    Patient has a past medical history of A-fib (HCC), Acid reflux, Yan's esophagus, Coronary artery disease of native heart with stable angina pectoris (HCC), Dementia (HCC), Diabetes mellitus (HCC), Diabetic autonomic neuropathy associated with type 2 diabetes mellitus (HCC), Hyperlipidemia, Hypertension, Pancreatitis, Restless legs, Sleep apnea, and Tremor.     >>For CHF and Comorbidity documentation on Education Time and Topics, please see Education Tab      Pt up in chair at this time on room air. Pt denies shortness of breath. Pt with nonpitting lower extremity edema.     Patient and/or Family's stated Goal of Care this Admission: reduce shortness of breath, increase activity tolerance, better understand heart failure and disease management, be more  comfortable, and reduce lower extremity edema prior to discharge        :

## 2024-08-28 NOTE — PROGRESS NOTES
Hospital Medicine Progress Note      Date of Admission: 8/27/2024  Hospital Day: 2    Chief Admission Complaint:    Chief Complaint   Patient presents with    Shortness of Breath     Dc'd yesterday, woke up this evening with shortness of breath     Subjective:    IV diuresis continues today per nephro, plan for change to PO tomorrow, likely will dc tomorrow if pt remains stable. No longer on oxygen    Presenting Admission History:       74 y.o. male who presented to [facility] with dyspnea.  PMHx significant for HTN, HLD, DM2, dCHF, CAD, AFIB, and obesity.  History was obtained from the patient and review of the EMR.  The patient states that he went to bed yesterday evening feeling well and woke up in the middle of the night feeling very short of breath and having slight pain on the left side of his chest.  Denies any URI symptoms or productive cough.  No chest pain.  He decided to come to the hospital for further evaluation.  He was noted to be hypoxic and requiring 2 L oxygen per nasal cannula.  X-ray noted pulmonary edema.  He will be admitted for further evaluation.  Assessment/Plan:       Current Principal Problem:  Acute pulmonary edema (HCC)     CHF - Acute on chronic diastolic failure w/ preserved EF 60-65 by limited Echo on 07/15/24.  Suspect likely due to hypertensive heart disease.    - Continue diuresis as currently ordered w/ close monitoring of Renal function and electrolytes for ADR.    - Continue BB  - Daily weights, wt from previous admission at discharge 128kg  - Strict I's and O's, Low Na, 1500ml fluid restriction  - Cardiology consulted, appreciate their input  - CHF order set ordered  - Nephrology consulted, appreciate their input  - Suspect will dc on oral torsemide at discharge     Hypoxia in setting of above  - Oxygen therapy protocol, wean as able     Hx of HLD, controlled with statin.  - Continue home statin  - Follow-up with PCP for med adjustments     DM2, uncontrolled, with  and placement decision   [] Imaging personally reviewed and interpreted, includes:   [x] Telemetry monitoring as noted above  [x] Data Review (any 3)  [x] Collateral history obtained from:    [x] All available Consultant notes from yesterday/today were reviewed  [x] All current labs were reviewed and interpreted for clinical significance   [x] Appropriate follow-up labs were ordered    Medications:  Personally reviewed in detail in conjunction w/ labs as documented for evidence of drug toxicity.     Infusion Medications    dextrose      sodium chloride       Scheduled Medications    warfarin  2.5 mg Oral Once    magnesium sulfate  2,000 mg IntraVENous Once    aspirin  81 mg Oral Daily    atorvastatin  40 mg Oral Nightly    dofetilide  250 mcg Oral 2 times per day    gabapentin  300 mg Oral Nightly    metoprolol tartrate  25 mg Oral BID    pramipexole  0.75 mg Oral Nightly    pantoprazole  40 mg Oral BID AC    insulin glargine  25 Units SubCUTAneous Nightly    insulin lispro  8 Units SubCUTAneous TID WC    insulin lispro  0-8 Units SubCUTAneous TID WC    insulin lispro  0-4 Units SubCUTAneous Nightly    furosemide  40 mg IntraVENous BID    warfarin placeholder: dosing by pharmacy   Other RX Placeholder     PRN Meds: oxyCODONE, glucose, dextrose bolus **OR** dextrose bolus, glucagon (rDNA), dextrose, sodium chloride flush, sodium chloride, ondansetron **OR** ondansetron, acetaminophen **OR** acetaminophen, polyethylene glycol, perflutren lipid microspheres     Labs:  Personally reviewed and interpreted for clinical significance.     Recent Labs     08/25/24  1701 08/27/24  0312 08/28/24  0429   WBC 5.7 5.9 6.1   HGB 10.8* 10.5* 10.4*   HCT 33.1* 33.1* 32.8*    149 148     Recent Labs     08/25/24  1701 08/27/24  0312 08/28/24  0429    142 139   K 4.7 4.3 3.9    104 97*   CO2 27 35* 38*   BUN 15 19 20   CREATININE 0.9 0.9 0.9   CALCIUM 8.5 8.9 8.8   MG  --   --  1.50*   PHOS  --   --  3.2     Recent

## 2024-08-28 NOTE — DISCHARGE INSTRUCTIONS
Heart Failure Resources:  Heart Failure Interactive Workbook:  Go to [email or web address] for a Free Heart Failure Interactive Workbook provided by The American [employer] Association. This interactive workbook will provide information on Healthier Living with Heart Failure. Please copy and paste link into search bar. Use your mouse to scroll through the pages.    HF [place] marilou:   Heart Failure Free smart phone marilou available for iPhone and Android download. Use your phone to track sodium intake, fluid intake, symptoms, and weight.     Low Sodium Diet / Recipes:  Go to www.[employer].TELiBrahma website for “renal” diet which is Low Sodium! [employer] is a dialysis company, but this website offers free seasonal cookbooks. Each quarter, they will release 25-30 new recipes with a breakdown of calories, sodium, and glucose. You can also go to www.FastFig/recipes website for free recipes.     Home Exercise Program:   Identification of Green/Yellow/Red zones:  You should be able to identify when you feel good (green zone), if you have 1-2 symptoms of HF (yellow zone), or if you are in need of medical attention (red zone).  In your CHF education folder you were provided a “stop light tool” to outline this information.     We want to you to rate your exertion levels:    Our therapy team has discussed means of identification with you such as the \"Kenan scale.\"  The Kenan rating scale ranges from 6 to 20, where 6 means \"no exertion at all\" and 20 means \"maximal exertion.\" The goal is to use this to gauge how much effort it is taking for you to do your normal daily tasks.   You should be able to recognize when too much exertion is being expended.    Elements of Energy Conservation:   Prioritize/Plan: Decide what needs to be done today, and what can wait for a later date, write to do lists, plan ahead to avoid extra trips, and gather supplies and equipment needed before starting an activity.   Position:

## 2024-08-28 NOTE — CONSULTS
Nutrition Education  Consult received for CHF and DM diet education. Provided pt with written and verbal instruction on HF and DM nutrition therapy. Discussed low sodium diet, daily weights, and fluid restriction. Discussed sources of carbohydrate, carb counting, label reading, meal planning, and importance of BG control. Pt voiced understanding. Reports pt does not use table salt at all and uses salt free seasonings. Encouraged compliance w/ fluid restriction and daily weights.     Time spent: 5 minutes    Educated on CHF and DM diet education   Learners: Patient and Family  Readiness: Acceptance  Method: Explanation and Handout  Response: Verbalizes Understanding  Contact name and number provided.    Eda Canchola MS, RD, LD  Contact Number: Office: [phone]; Cl: [phone]

## 2024-08-28 NOTE — PLAN OF CARE
Problem: Discharge Planning  Goal: Discharge to home or other facility with appropriate resources  8/27/2024 2057 by Dennis Moon RN  Outcome: Progressing  8/27/2024 1107 by Kaylyn Del Rosario RN  Outcome: Progressing     Problem: Safety - Adult  Goal: Free from fall injury  8/27/2024 2057 by Dennis Moon RN  Outcome: Progressing  8/27/2024 1107 by Kaylyn Del Rosario RN  Outcome: Progressing     Problem: Chronic Conditions and Co-morbidities  Goal: Patient's chronic conditions and co-morbidity symptoms are monitored and maintained or improved  8/27/2024 2057 by Dennis Moon RN  Outcome: Progressing  8/27/2024 1107 by Kaylyn Del Rosario RN  Outcome: Progressing

## 2024-08-28 NOTE — ED PROVIDER NOTES
[facility]  ED  EMERGENCY DEPARTMENT ENCOUNTER        Pt Name: London Swenson  MRN: [id number]  Birthdate 1950  Date of evaluation: 8/25/2024  Provider: SANDY Alcantara - CNP  PCP: Constance Yancey PA        KARISHMA. I have evaluated this patient.        CHIEF COMPLAINT       Chief Complaint   Patient presents with    Chest Pain     Pt reports chest pain since 10am this morning. States it does not radiates anywhere. Pt lives at home with wife, Pt unable to tell me the Year and President.         HISTORY OF PRESENT ILLNESS: 1 or more Elements     History From: the patient  Limitations to history : None    London Swenson is a 74 y.o. male who presents to the ER today with chest pain, patient states that he chest pain is at about 10:00 this morning, states it does not radiate.  Denies any trauma, is resting comfortably bed, denies any exacerbating factors.  He is here for further evaluation    Nursing Notes were all reviewed and agreed with or any disagreements were addressed in the HPI.    REVIEW OF SYSTEMS :      Review of Systems    Positives and Pertinent negatives as per HPI.     SURGICAL HISTORY     Past Surgical History:   Procedure Laterality Date    CARDIAC DEFIBRILLATOR PLACEMENT      CATARACT REMOVAL Bilateral 2013    CHOLECYSTECTOMY      COLONOSCOPY  10/26/2011    ENDOSCOPY, COLON, DIAGNOSTIC      EGD halo    EP DEVICE PROCEDURE N/A 7/15/2024    Watchman keaton closure device performed by Bayron Montoya MD at [facility] CARDIAC CATH LAB    HYDROCELE EXCISION  11/15/2016    LARYNGOSCOPY N/A 10/25/2023    DRUG INDUCED SLEEP ENDOSCOPY performed by David Estes DO at [facility] ASC OR    PANCREAS SURGERY      cyst opened up and drining into small bowel    TONSILLECTOMY      UPPER GASTROINTESTINAL ENDOSCOPY  10/04/2016    with biopsy    UPPER GASTROINTESTINAL ENDOSCOPY  03/16/2017    Halo ablation    UPPER GASTROINTESTINAL ENDOSCOPY  06/26/2017    Halo ablation    UPPER GASTROINTESTINAL ENDOSCOPY   glargine (LANTUS SOLOSTAR) 100 UNIT/ML injection pen Inject 20 Units into the skin nightly, Disp-15 mL, R-1Normal      furosemide (LASIX) 40 MG tablet Three times a week as needed for swelling., Disp-30 tablet, R-0Normal      acetaminophen (TYLENOL) 325 MG tablet Take 2 tablets by mouth every 4 hours as needed for PainHistorical Med      insulin aspart (NOVOLOG FLEXPEN) 100 UNIT/ML injection pen Inject 25 Units into the skin 3 times daily (before meals), Disp-15 mL, R-5Normal      pramipexole (MIRAPEX) 0.5 MG tablet Take 1.5 tablets by mouth nightly, Disp-135 tablet, R-1Normal      pantoprazole (PROTONIX) 40 MG tablet TAKE 1 TABLET BY MOUTH TWICE DAILY BEFORE MEAL(S), Disp-60 tablet, R-5Normal      Continuous Blood Gluc Sensor (FREESTYLE JUSTIN 14 DAY SENSOR) MISC 1 Units by Does not apply route every 14 days, Disp-2 each, R-11Normal      dofetilide (TIKOSYN) 250 MCG capsule Take 1 capsule by mouth every 12 hours, Disp-60 capsule, R-3Normal      aspirin 81 MG EC tablet Take 1 tablet by mouth daily, Disp-30 tablet, R-3Normal      atorvastatin (LIPITOR) 40 MG tablet TAKE 1 TABLET BY MOUTH AT BEDTIME, Disp-90 tablet, R-3Normal      Handicap Placard MISC Starting Thu 5/4/2023, Disp-1 each, R-0, PrintExp: 5/1/2026      blood glucose monitor strips Test 3 times a day & as needed for symptoms of irregular blood glucose. E11.9 Dispense sufficient amount for indicated testing frequency plus additional to accommodate PRN testing needs., Disp-300 strip, R-0, Normal      Lancets MISC DAILY Starting Tue 2/7/2023, Disp-300 each, R-5, NormalCheck blood sugars 3x daily E11.9      Continuous Blood Gluc  (FREESTYLE JUSTIN 14 DAY READER) OLI 1 Units by Does not apply route as needed (as needed), Disp-1 each, R-5Normal      Insulin Pen Needle 31G X 8 MM MISC 4 TIMES DAILY Starting Tue 7/19/2022, Disp-200 each, R-3, Normal      Insulin Syringe-Needle U-100 30G X 1/2\" 0.5 ML MISC Historical Med             ALLERGIES     Clonidine,

## 2024-08-28 NOTE — PROGRESS NOTES
Pharmacy Note  Warfarin Consult  Dx: Afib  Goal INR range 2-3   Home Warfarin dose: 5 mg (5 mg x 1) every Sun; 7.5 mg (5 mg x 1.5) all other days      Date                 INR                  Warfarin   8/27                3.51                    Hold  8/28                2.80                    2.5 mg     Recommend Warfarin 2.5mg tonight x1.  Daily INR ordered.  Rx will continue to manage therapy per consult order.  Alvarez Ramirez PharmD 8/28/2024  5:16 AM

## 2024-08-28 NOTE — PROGRESS NOTES
[facility]  Nephrology follow-up note           Reason for Consult: Volume management  Requesting Physician:  Dr. Diez    Sub/interval history  Patient feels slightly better than yesterday  Has lost 10 pounds of weight    Last 24 h uop 4.6 L  wt 283 pounds (admission wt 293 pounds)    ROS: No chest pain/shortness of breath/fever/nausea/vomiting  PSFH: + visitor    Scheduled Meds:   warfarin  2.5 mg Oral Once    magnesium sulfate  2,000 mg IntraVENous Once    potassium chloride  20 mEq Oral Daily    aspirin  81 mg Oral Daily    atorvastatin  40 mg Oral Nightly    dofetilide  250 mcg Oral 2 times per day    gabapentin  300 mg Oral Nightly    metoprolol tartrate  25 mg Oral BID    pramipexole  0.75 mg Oral Nightly    pantoprazole  40 mg Oral BID AC    insulin glargine  25 Units SubCUTAneous Nightly    insulin lispro  8 Units SubCUTAneous TID WC    insulin lispro  0-8 Units SubCUTAneous TID WC    insulin lispro  0-4 Units SubCUTAneous Nightly    furosemide  40 mg IntraVENous BID    warfarin placeholder: dosing by pharmacy   Other RX Placeholder     Continuous Infusions:   dextrose      sodium chloride       PRN Meds:.oxyCODONE, glucose, dextrose bolus **OR** dextrose bolus, glucagon (rDNA), dextrose, sodium chloride flush, sodium chloride, ondansetron **OR** ondansetron, acetaminophen **OR** acetaminophen, polyethylene glycol, perflutren lipid microspheres      History of Present Illness on 8/27/2024:    74 y.o. yo male with PMH of hypertension, diabetes, hyperlipidemia, A-fib status post Watchman procedure on Coumadin status post pacemaker who is admitted for increasing shortness of breath  Patient was recently discharged on 8/14 at which time he was noted to be 282 pounds  He noticed increased leg swelling and arm swelling along with bloating of his abdomen.  He was noted to be 293 pounds on admission.    Physical exam:   Constitutional:  VITALS:  /72   Pulse 58   Temp 97.9 °F (36.6 °C) (Oral)   Resp 18

## 2024-08-29 VITALS
HEART RATE: 62 BPM | OXYGEN SATURATION: 94 % | SYSTOLIC BLOOD PRESSURE: 123 MMHG | BODY MASS INDEX: 40.4 KG/M2 | HEIGHT: 70 IN | TEMPERATURE: 97.9 F | RESPIRATION RATE: 18 BRPM | DIASTOLIC BLOOD PRESSURE: 78 MMHG | WEIGHT: 282.2 LBS

## 2024-08-29 LAB
ALBUMIN SERPL-MCNC: 3.9 G/DL (ref 3.4–5)
ANION GAP SERPL CALCULATED.3IONS-SCNC: 8 MMOL/L (ref 3–16)
BASOPHILS # BLD: 0 K/UL (ref 0–0.2)
BASOPHILS NFR BLD: 0.7 %
BUN SERPL-MCNC: 20 MG/DL (ref 7–20)
CALCIUM SERPL-MCNC: 9 MG/DL (ref 8.3–10.6)
CHLORIDE SERPL-SCNC: 98 MMOL/L (ref 99–110)
CO2 SERPL-SCNC: 34 MMOL/L (ref 21–32)
CREAT SERPL-MCNC: 0.9 MG/DL (ref 0.8–1.3)
DEPRECATED RDW RBC AUTO: 14.7 % (ref 12.4–15.4)
EOSINOPHIL # BLD: 0.3 K/UL (ref 0–0.6)
EOSINOPHIL NFR BLD: 6.1 %
GFR SERPLBLD CREATININE-BSD FMLA CKD-EPI: 90 ML/MIN/{1.73_M2}
GLUCOSE BLD-MCNC: 131 MG/DL (ref 70–99)
GLUCOSE BLD-MCNC: 231 MG/DL (ref 70–99)
GLUCOSE SERPL-MCNC: 147 MG/DL (ref 70–99)
HCT VFR BLD AUTO: 33.3 % (ref 40.5–52.5)
HGB BLD-MCNC: 10.9 G/DL (ref 13.5–17.5)
INR PPP: 1.83 (ref 0.85–1.15)
LYMPHOCYTES # BLD: 1 K/UL (ref 1–5.1)
LYMPHOCYTES NFR BLD: 19.1 %
MAGNESIUM SERPL-MCNC: 1.8 MG/DL (ref 1.8–2.4)
MCH RBC QN AUTO: 28.9 PG (ref 26–34)
MCHC RBC AUTO-ENTMCNC: 32.9 G/DL (ref 31–36)
MCV RBC AUTO: 87.8 FL (ref 80–100)
MONOCYTES # BLD: 0.4 K/UL (ref 0–1.3)
MONOCYTES NFR BLD: 8.5 %
NEUTROPHILS # BLD: 3.4 K/UL (ref 1.7–7.7)
NEUTROPHILS NFR BLD: 65.6 %
PERFORMED ON: ABNORMAL
PERFORMED ON: ABNORMAL
PHOSPHATE SERPL-MCNC: 2.9 MG/DL (ref 2.5–4.9)
PLATELET # BLD AUTO: 164 K/UL (ref 135–450)
PMV BLD AUTO: 9.6 FL (ref 5–10.5)
POTASSIUM SERPL-SCNC: 4.1 MMOL/L (ref 3.5–5.1)
PROTHROMBIN TIME: 21.3 SEC (ref 11.9–14.9)
RBC # BLD AUTO: 3.79 M/UL (ref 4.2–5.9)
SODIUM SERPL-SCNC: 140 MMOL/L (ref 136–145)
WBC # BLD AUTO: 5.2 K/UL (ref 4–11)

## 2024-08-29 PROCEDURE — 6370000000 HC RX 637 (ALT 250 FOR IP): Performed by: NURSE PRACTITIONER

## 2024-08-29 PROCEDURE — 85025 COMPLETE CBC W/AUTO DIFF WBC: CPT

## 2024-08-29 PROCEDURE — 80069 RENAL FUNCTION PANEL: CPT

## 2024-08-29 PROCEDURE — 83735 ASSAY OF MAGNESIUM: CPT

## 2024-08-29 PROCEDURE — 85610 PROTHROMBIN TIME: CPT

## 2024-08-29 PROCEDURE — 6370000000 HC RX 637 (ALT 250 FOR IP): Performed by: INTERNAL MEDICINE

## 2024-08-29 PROCEDURE — 36415 COLL VENOUS BLD VENIPUNCTURE: CPT

## 2024-08-29 RX ORDER — WARFARIN SODIUM 5 MG/1
5 TABLET ORAL
Status: DISCONTINUED | OUTPATIENT
Start: 2024-08-29 | End: 2024-08-29 | Stop reason: HOSPADM

## 2024-08-29 RX ADMIN — INSULIN LISPRO 8 UNITS: 100 INJECTION, SOLUTION INTRAVENOUS; SUBCUTANEOUS at 10:12

## 2024-08-29 RX ADMIN — TORSEMIDE 40 MG: 20 TABLET ORAL at 10:12

## 2024-08-29 RX ADMIN — INSULIN LISPRO 8 UNITS: 100 INJECTION, SOLUTION INTRAVENOUS; SUBCUTANEOUS at 13:03

## 2024-08-29 RX ADMIN — POTASSIUM CHLORIDE 20 MEQ: 1500 TABLET, EXTENDED RELEASE ORAL at 10:12

## 2024-08-29 RX ADMIN — PANTOPRAZOLE SODIUM 40 MG: 40 TABLET, DELAYED RELEASE ORAL at 06:44

## 2024-08-29 RX ADMIN — METOPROLOL TARTRATE 25 MG: 25 TABLET, FILM COATED ORAL at 10:12

## 2024-08-29 RX ADMIN — ASPIRIN 81 MG: 81 TABLET, COATED ORAL at 10:12

## 2024-08-29 RX ADMIN — INSULIN LISPRO 2 UNITS: 100 INJECTION, SOLUTION INTRAVENOUS; SUBCUTANEOUS at 13:04

## 2024-08-29 NOTE — PROGRESS NOTES
Pharmacy Note  Warfarin Consult  Dx: Afib  Goal INR range 2-3   Home Warfarin dose: 5 mg (5 mg x 1) every Sun; 7.5 mg (5 mg x 1.5) all other days      Date                 INR                  Warfarin   8/27                3.51                    Hold  8/28                2.80                    2.5 mg  8/29                1.83                    5 mg    Recommend Warfarin 5 mg tonight x1.  Daily INR ordered.  Rx will continue to manage therapy per consult order.    Lazarus Olivera, PharmD 8/29/2024 7:36 AM

## 2024-08-29 NOTE — DISCHARGE SUMMARY
Hospital Medicine Discharge Summary    Patient: London Swenson   : 1950     Admit Date: 2024   Discharge Date:   24  Disposition:  [x]Home   []HHC  []SNF  []ECF  []Acute Rehab  []LTAC  []Hospice  Code status:  [x]Full  []DNR/CCA  []Limited (DNR/CCA with Do Not Intubate)  []DNRCC  Condition at Discharge: Stable  Primary Care Provider: Constance Yancey PA    Admitting Provider: Cam Sharma MD  Discharge Provider: SANDY Barba     Discharge Diagnoses:      Active Hospital Problems    Diagnosis     Acute pulmonary edema (HCC) [J81.0]     Type 2 diabetes mellitus with hyperglycemia [E11.65]     Acute hypoxic respiratory failure (HCC) [J96.01]     PAF (paroxysmal atrial fibrillation) (HCC) [I48.0]        Presenting Admission History:       74 y.o. male who presented to [facility] with dyspnea.  PMHx significant for HTN, HLD, DM2, dCHF, CAD, AFIB, and obesity.  History was obtained from the patient and review of the EMR.  The patient states that he went to bed yesterday evening feeling well and woke up in the middle of the night feeling very short of breath and having slight pain on the left side of his chest.  Denies any URI symptoms or productive cough.  No chest pain.  He decided to come to the hospital for further evaluation.  He was noted to be hypoxic and requiring 2 L oxygen per nasal cannula.  X-ray noted pulmonary edema.  He will be admitted for further evaluation.    Assessment/Plan:       Current Principal Problem:  Acute pulmonary edema (HCC)     CHF - Acute on chronic diastolic failure w/ preserved EF 60-65 by limited Echo on 07/15/24.  Suspect likely due to hypertensive heart disease.    - Continue diuresis as currently ordered w/ close monitoring of Renal function and electrolytes for ADR.    - Continue BB  - Daily weights, wt from previous admission at discharge 128kg  - Strict I's and O's, Low Na, 1500ml fluid restriction  - Cardiology consulted, appreciate their  pain TECHNOLOGIST PROVIDED HISTORY: Reason for exam:->knee pain Reason for Exam: knee pain, no known injury FINDINGS: Bones/joints: Normal alignment. The joint spaces are congruent.Small effusion.  Chondrocalcinosis. Soft tissues: Unremarkable Mineralization: Normal     1. Small effusion. 2. Chondrocalcinosis. 3. Consider MRI to further evaluate.     XR KNEE RIGHT (3 VIEWS)    Result Date: 8/9/2024  EXAMINATION: THREE XRAY VIEWS OF THE RIGHT KNEE 8/9/2024 7:38 pm COMPARISON: None. HISTORY: ORDERING SYSTEM PROVIDED HISTORY: atraumatic pain TECHNOLOGIST PROVIDED HISTORY: Reason for exam:->atraumatic pain Reason for Exam: atraumatic pain FINDINGS: Patellar tracking is within normal limits.  Osseous structures are unremarkable.  Meniscal calcifications.  Small joint effusion.  Tiny osteophytes posterior patella.     Mild CPPD arthropathy.     XR FINGER LEFT (MIN 2 VIEWS)    Result Date: 8/7/2024  EXAMINATION: 3 XRAY VIEWS OF THE LEFT FINGER 8/5/2024 10:31 am COMPARISON: None HISTORY: ORDERING SYSTEM PROVIDED HISTORY: injury, assess for foreign body TECHNOLOGIST PROVIDED HISTORY: Reason for exam:->injury, assess for foreign body Reason for Exam: injury FINDINGS: Best visualized on the lateral view, there is a very faint, tiny linear radiopaque density overlying ventral soft tissues of the index finger. Finding could represent a faint radiopaque foreign body which is visualized at the level of the middle phalanx of the index finger.  There are minimal degenerative changes of several interphalangeal joints.  Tiny calcific/ossific densities identified about several metacarpophalangeal joints may be degenerative in nature versus changes of prior trauma.  Linear radiopaque foreign body identified within the soft tissues of the thumb.  No evidence of acute fracture or dislocation.     1. Findings suspicious for presence of small linear faintly radiopaque foreign body along ventral soft tissues of the left index finger at the

## 2024-08-29 NOTE — PROGRESS NOTES
[facility]  Nephrology follow-up note           Reason for Consult: Volume management  Requesting Physician:  Dr. Diez    Sub/interval history  Denies any new complaints    Last 24 h uop 2.1 L  wt 282 pounds (admission wt 293 pounds)    ROS: No chest pain/shortness of breath/fever/nausea/vomiting  PSFH: + visitor    Scheduled Meds:   warfarin  5 mg Oral Once    potassium chloride  20 mEq Oral Daily    torsemide  40 mg Oral Daily    aspirin  81 mg Oral Daily    atorvastatin  40 mg Oral Nightly    dofetilide  250 mcg Oral 2 times per day    gabapentin  300 mg Oral Nightly    metoprolol tartrate  25 mg Oral BID    pramipexole  0.75 mg Oral Nightly    pantoprazole  40 mg Oral BID AC    insulin glargine  25 Units SubCUTAneous Nightly    insulin lispro  8 Units SubCUTAneous TID WC    insulin lispro  0-8 Units SubCUTAneous TID WC    insulin lispro  0-4 Units SubCUTAneous Nightly    warfarin placeholder: dosing by pharmacy   Other RX Placeholder     Continuous Infusions:   dextrose      sodium chloride       PRN Meds:.oxyCODONE, glucose, dextrose bolus **OR** dextrose bolus, glucagon (rDNA), dextrose, sodium chloride flush, sodium chloride, ondansetron **OR** ondansetron, acetaminophen **OR** acetaminophen, polyethylene glycol      History of Present Illness on 8/27/2024:    74 y.o. yo male with PMH of hypertension, diabetes, hyperlipidemia, A-fib status post Watchman procedure on Coumadin status post pacemaker who is admitted for increasing shortness of breath  Patient was recently discharged on 8/14 at which time he was noted to be 282 pounds  He noticed increased leg swelling and arm swelling along with bloating of his abdomen.  He was noted to be 293 pounds on admission.    Physical exam:   Constitutional:  VITALS:  /74   Pulse 64   Temp 98.1 °F (36.7 °C) (Oral)   Resp 18   Ht 1.778 m (5' 10\")   Wt 128 kg (282 lb 3.2 oz)   SpO2 92%   BMI 40.49 kg/m²   Gen: alert, awake  Neck: No JVD  Skin:

## 2024-08-29 NOTE — PROGRESS NOTES
Pt d/c'd home with family in personal car with all belongings.  Removed peripheral IV and stopped bleeding.  Catheter intact. Pt tolerated well. No redness noted at site.  Notified CMU and removed tele box. Reviewed d/c instructions, home meds, and  f/u information utilizing teach-back method.  Meds to beds picked up by patient. Patient verbalized understanding.

## 2024-08-30 ENCOUNTER — TELEPHONE (OUTPATIENT)
Dept: FAMILY MEDICINE CLINIC | Age: 74
End: 2024-08-30

## 2024-08-30 ENCOUNTER — CARE COORDINATION (OUTPATIENT)
Dept: CASE MANAGEMENT | Age: 74
End: 2024-08-30

## 2024-08-30 DIAGNOSIS — I10 PRIMARY HYPERTENSION: Primary | ICD-10-CM

## 2024-08-30 DIAGNOSIS — I50.812 CHRONIC RIGHT-SIDED CONGESTIVE HEART FAILURE (HCC): ICD-10-CM

## 2024-08-30 DIAGNOSIS — J96.01 ACUTE HYPOXIC RESPIRATORY FAILURE (HCC): Primary | ICD-10-CM

## 2024-08-30 PROCEDURE — 1111F DSCHRG MED/CURRENT MED MERGE: CPT | Performed by: PHYSICIAN ASSISTANT

## 2024-08-30 NOTE — CARE COORDINATION
Care Transitions Note    Initial Call - Call within 2 business days of discharge: Yes    Patient Current Location:  [place]    Care Transition Nurse contacted the patient by telephone to perform post hospital discharge assessment, verified name and  as identifiers. Provided introduction to self, and explanation of the Care Transition Nurse role.     Patient: London Swenson    Patient : 1950   MRN: [id number]    Reason for Admission: acute pulmonary edema   Discharge Date: 24  RURS: Readmission Risk Score: 31.8      Last Discharge Facility       Date Complaint Diagnosis Description Type Department Provider    24 Shortness of Breath Acute pulmonary edema (HCC) ... ED to Hosp-Admission (Discharged) (ADMITTED) Mason Fletcher, DO; Javier Swenson...            Was this an external facility discharge? No    Additional needs identified to be addressed with provider   No needs identified             Method of communication with provider: none.    Patients top risk factors for readmission: medical condition-.    Interventions to address risk factors:   Education: .  Review of patient management of conditions/medications: .    Care Summary Note: CTN spoke with patient's spouse Rosy, verified HIPAA form. Rosy reported patient appears to be doing well. Patient's breathing has been stable and denied any sob thus far. Rosy reported she has pulse ox but hasn't checked patient's oxygen level today. CTN discussed importance of monitoring weight and vitals and Rosy verbalized understanding. CTN discussed RPM as patient has participated in the past and per Rosy agreeable again. CTN reviewed all medications with Rosy who reported patient is taking as prescribed. CTN advised patient of use of urgent care or physician’s 24 hr access line if assistance is needed after hours.           Care Transition Nurse reviewed discharge instructions, medical action plan, and red flags with patient. The patient was given  time.    Assessments:  Care Transitions 24 Hour Call    Schedule Follow Up Appointment with PCP: Completed  Do you have a copy of your discharge instructions?: Yes  Do you have all of your prescriptions and are they filled?: Yes  Have you been contacted by a [facility] Pharmacist?: No  Have you scheduled your follow up appointment?: Yes  How are you going to get to your appointment?: Car - family or friend to transport  Post Acute Services: Home Health (Comment: [facility])  Patient DME: Walker, Quad cane  Do you have support at home?: Partner/Spouse/SO  Do you feel like you have everything you need to keep you well at home?: Yes  Are you an active caregiver in your home?: No  Care Transitions Interventions          Follow Up Appointment:   Discussed follow up appointments. Patient has hospital follow up appointment scheduled within 7 days of discharge.   Future Appointments         Provider Specialty Dept Phone    9/4/2024 9:00 AM (Arrive by 8:00 AM) Dylan Shanks MD; [facility] CATH LAB PRE/POST Cardiology [phone]    9/9/2024 3:00 PM SCHEDULE, ALETHEA DEVICE CHECK Cardiology [phone]    9/9/2024 3:00 PM Ericka Valdez APRN - CNP Cardiology [phone]    9/12/2024 2:30 PM Constance Yancey PA Family Medicine [phone]    10/29/2024 2:20 PM Kelsey Flores MD Endocrinology [phone]    12/2/2024 10:00 AM Constance Yancey PA Family Medicine [phone]            Care Transition Nurse provided contact information.  Plan for follow-up call in 6-10 days based on severity of symptoms and risk factors.  Plan for next call: self management-RPM montana Calvo MSN, RN, [facility]  Care Transition Nurse  [phone] mobile

## 2024-08-30 NOTE — PROGRESS NOTES
Remote Patient Monitoring Treatment Plan    Received request from ACM/CTN Shante Calvo, FABIÁN   to order remote patient monitoring for in home monitoring of CHF; Condition managed by Dylan Shanks MD ([facility])/PCP .  HTN; Condition managed by Dr. Shanks/PCP.  and order completed.     Patient will be monitoring blood pressure   pulse ox   weight.      Patient will engage in Remote Patient Monitoring each day to develop the skills necessary for self management.       RPM Care Team Responsibilities:   Alerts will be reviewed daily and addressed within 2-4 hours during operational hours (Monday -Friday 9 am-4 pm)  Alert response and intervention documented in patient medical record  Alert response escalated to PCP per protocol and documented in patient medical record  Patient monitored over approximately  days  Discharge from program based on self-management readiness    See care coordination encounters for additional details.

## 2024-08-30 NOTE — TELEPHONE ENCOUNTER
Care Transitions Initial Follow Up Call    Outreach made within 2 business days of discharge: Yes    Patient: London Swenson Patient : 1950   MRN: [id number]  Reason for Admission: Pulmonary Edema   Discharge Date: 24       Spoke with: Rosy Farah department/facility: [facility] Interactive Patient Contact:  Was patient able to fill all prescriptions: Yes  Was patient instructed to bring all medications to the follow-up visit: Yes  Is patient taking all medications as directed in the discharge summary? Yes  Does patient understand their discharge instructions: Yes  Does patient have questions or concerns that need addressed prior to 7-14 day follow up office visit: no      Scheduled appointment with PCP within 7-14 days    Follow Up  Future Appointments   Date Time Provider Department Center   2024  9:00 AM [facility] CATH LAB PRE/POST [facility] Alethea Rad   2024  3:00 PM SCHEDULE, ALETHEA DEVICE CHECK Alethea Car [facility]   2024  3:00 PM Ericka Valdez, APRN - CNP [facility]   2024  2:30 PM Constance Yancey PA [facility] ECC DEP   10/29/2024  2:20 PM Kelsey Flores MD AND ENDO [facility]   2024 10:00 AM Constance Yancey PA [facility] ECC DEP       Afsaneh Short LPN

## 2024-08-31 DIAGNOSIS — G62.9 PERIPHERAL POLYNEUROPATHY: ICD-10-CM

## 2024-09-01 NOTE — TELEPHONE ENCOUNTER
Refill Request     CONFIRM preferred pharmacy with the patient.    If Mail Order Rx - Pend for 90 day refill.      Last Seen: Last Seen Department: 8/19/2024  Last Seen by PCP: 8/19/2024    Last Written: Gabapentin 7/29/24 60 caps 0 refills    If no future appointment scheduled:  Review the last OV with PCP and review information for follow-up visit,  Route STAFF MESSAGE with patient name to the  Pool for scheduling with the following information:            -  Timing of next visit           -  Visit type ie Physical, OV, etc           -  Diagnoses/Reason ie. COPD, HTN - Do not use MEDICATION, Follow-up or CHECK UP - Give reason for visit      Next Appointment:   Future Appointments   Date Time Provider Department Center   9/4/2024  9:00 AM [facility] CATH LAB PRE/POST [facility] Alethea Rad   9/9/2024  3:00 PM SCHEDULE, ALETHEA DEVICE CHECK [facility] Car [facility]   9/9/2024  3:00 PM Ericka Valdez, SANDY - CNP [facility]   9/12/2024  2:30 PM Constance Yancey PA [facility] ECC DEP   10/29/2024  2:20 PM Kelsey Flores MD AND ENDO MMA   12/2/2024 10:00 AM Constance Yancey PA [facility] ECC DEP       Message sent to  to schedule appt with patient?  NO      Requested Prescriptions     Pending Prescriptions Disp Refills    gabapentin (NEURONTIN) 300 MG capsule [Pharmacy Med Name: Gabapentin 300 MG Oral Capsule] 60 capsule 0     Sig: TAKE 1 TO 2 CAPSULES BY MOUTH NIGHTLY

## 2024-09-03 ENCOUNTER — CARE COORDINATION (OUTPATIENT)
Dept: PRIMARY CARE CLINIC | Age: 74
End: 2024-09-03

## 2024-09-03 ENCOUNTER — CARE COORDINATION (OUTPATIENT)
Dept: CASE MANAGEMENT | Age: 74
End: 2024-09-03

## 2024-09-03 RX ORDER — GABAPENTIN 300 MG/1
CAPSULE ORAL
Qty: 60 CAPSULE | Refills: 0 | Status: SHIPPED | OUTPATIENT
Start: 2024-09-03 | End: 2024-10-03

## 2024-09-03 NOTE — CARE COORDINATION
Remote Patient Kit Ordering Note      Date/Time:  9/3/2024 8:52 AM      CCSS placed phone call to patient/family today to notify of RPM kit order; patient/family was available; discussed the following topics below and all questions answered.    [x[facility]  [x] Patient will receive package over the next 1-3 business days. Someone 21 years or older must be present to sign for UPS delivery.  [x] HRS will contact patient within 24 hours, an HRS  will call the patient directly: If the patient does not answer, HRS will follow up with the clinical team notifying them about the unsuccessful attempt to contact the patient. HRS will make three call attempts to the patient.Provide patient with [facility] Virtual install number is: [phone].  [x] ACM will contact patient once equipment is active to welcome them to the program.                                                         [x] Hours of RPM monitoring - Monday-Friday [phone]; encourage patient to get vitals entered by Noon each day to have the alert addressed same day.  [x][facility] mailed RPM Patient flyer to patient.                      ACM made aware the RPM kit has been ordered.

## 2024-09-03 NOTE — CARE COORDINATION
Care Transitions Note    Follow Up Call     Patient Current Location:  [place]    Care Transition Nurse contacted the spouse/partner  by telephone. Verified name and  as identifiers.    Additional needs identified to be addressed with provider   No needs identified                 Method of communication with provider: none.    Care Summary Note: CTN spoke with patient's spouse Rosy who reported patient is doing pretty good today. Patient's breathing is stable and he denied any swelling, patient reported his weight at 282 lbs. Denies any acute needs at present time.  Agreeable to f/u calls.  Educated on the use of urgent care or physician’s 24 hr access line if assistance is needed after hours & that they can always contact their home care provider to request a nurse visit even if it isn't their regularly scheduled day for their nurse to visit.         Plan of care updates since last contact:  Education: .       Advance Care Planning:   Does patient have an Advance Directive: reviewed during previous call, see note. .    Medication Review:  No changes since last call.     Remote Patient Monitoring:  Offered patient enrollment in the Remote Patient Monitoring (RPM) program for in-home monitoring: Yes, patient enrolled; current status is awaiting kit.    Assessments:  Care Transitions Subsequent and Final Call    Subsequent and Final Calls  Do you have any ongoing symptoms?: No  Have your medications changed?: No  Do you have any questions related to your medications?: No  Do you currently have any active services?: Yes  Are you currently active with any services?: Home Health  Do you have any needs or concerns that I can assist you with?: No  Identified Barriers: None  Care Transitions Interventions  Other Interventions:              Follow Up Appointment:   Reviewed upcoming appointment(s).  Future Appointments         Provider Specialty Dept Phone    2024  9:00 AM (Arrive by 8:00 AM) Dylan Shanks MD; [facility]

## 2024-09-04 ENCOUNTER — ANESTHESIA EVENT (OUTPATIENT)
Dept: CARDIAC CATH/INVASIVE PROCEDURES | Age: 74
End: 2024-09-04
Payer: MEDICARE

## 2024-09-04 ENCOUNTER — ANESTHESIA (OUTPATIENT)
Dept: CARDIAC CATH/INVASIVE PROCEDURES | Age: 74
End: 2024-09-04
Payer: MEDICARE

## 2024-09-04 ENCOUNTER — TELEPHONE (OUTPATIENT)
Dept: CARDIOLOGY CLINIC | Age: 74
End: 2024-09-04

## 2024-09-04 ENCOUNTER — HOSPITAL ENCOUNTER (OUTPATIENT)
Dept: CARDIAC CATH/INVASIVE PROCEDURES | Age: 74
Discharge: HOME | End: 2024-09-04
Attending: INTERNAL MEDICINE | Admitting: INTERNAL MEDICINE
Payer: MEDICARE

## 2024-09-04 VITALS
HEIGHT: 70 IN | RESPIRATION RATE: 13 BRPM | WEIGHT: 279 LBS | HEART RATE: 61 BPM | SYSTOLIC BLOOD PRESSURE: 120 MMHG | BODY MASS INDEX: 39.94 KG/M2 | DIASTOLIC BLOOD PRESSURE: 69 MMHG | OXYGEN SATURATION: 95 %

## 2024-09-04 DIAGNOSIS — I48.0 PAROXYSMAL A-FIB (HCC): ICD-10-CM

## 2024-09-04 DIAGNOSIS — Z95.818 PRESENCE OF WATCHMAN LEFT ATRIAL APPENDAGE CLOSURE DEVICE: ICD-10-CM

## 2024-09-04 LAB
ANION GAP SERPL CALCULATED.3IONS-SCNC: 7 MMOL/L (ref 3–16)
BUN SERPL-MCNC: 31 MG/DL (ref 7–20)
CALCIUM SERPL-MCNC: 9 MG/DL (ref 8.3–10.6)
CHLORIDE SERPL-SCNC: 98 MMOL/L (ref 99–110)
CO2 SERPL-SCNC: 38 MMOL/L (ref 21–32)
CREAT SERPL-MCNC: 1.1 MG/DL (ref 0.8–1.3)
DEPRECATED RDW RBC AUTO: 15.1 % (ref 12.4–15.4)
ECHO BSA: 2.5 M2
GFR SERPLBLD CREATININE-BSD FMLA CKD-EPI: 70 ML/MIN/{1.73_M2}
GLUCOSE SERPL-MCNC: 167 MG/DL (ref 70–99)
HCT VFR BLD AUTO: 34.3 % (ref 40.5–52.5)
HGB BLD-MCNC: 11.3 G/DL (ref 13.5–17.5)
INR PPP: 2.13 (ref 0.85–1.15)
MCH RBC QN AUTO: 29.1 PG (ref 26–34)
MCHC RBC AUTO-ENTMCNC: 32.8 G/DL (ref 31–36)
MCV RBC AUTO: 88.5 FL (ref 80–100)
PLATELET # BLD AUTO: 165 K/UL (ref 135–450)
PMV BLD AUTO: 9 FL (ref 5–10.5)
POTASSIUM SERPL-SCNC: 4.3 MMOL/L (ref 3.5–5.1)
PROTHROMBIN TIME: 23.8 SEC (ref 11.9–14.9)
RBC # BLD AUTO: 3.88 M/UL (ref 4.2–5.9)
SODIUM SERPL-SCNC: 143 MMOL/L (ref 136–145)
WBC # BLD AUTO: 4.7 K/UL (ref 4–11)

## 2024-09-04 PROCEDURE — 3700000001 HC ADD 15 MINUTES (ANESTHESIA): Performed by: INTERNAL MEDICINE

## 2024-09-04 PROCEDURE — 93325 DOPPLER ECHO COLOR FLOW MAPG: CPT

## 2024-09-04 PROCEDURE — 7100000010 HC PHASE II RECOVERY - FIRST 15 MIN: Performed by: INTERNAL MEDICINE

## 2024-09-04 PROCEDURE — 2500000003 HC RX 250 WO HCPCS: Performed by: NURSE ANESTHETIST, CERTIFIED REGISTERED

## 2024-09-04 PROCEDURE — 85610 PROTHROMBIN TIME: CPT

## 2024-09-04 PROCEDURE — 7100000011 HC PHASE II RECOVERY - ADDTL 15 MIN: Performed by: INTERNAL MEDICINE

## 2024-09-04 PROCEDURE — 6360000002 HC RX W HCPCS: Performed by: NURSE ANESTHETIST, CERTIFIED REGISTERED

## 2024-09-04 PROCEDURE — 85027 COMPLETE CBC AUTOMATED: CPT

## 2024-09-04 PROCEDURE — 2580000003 HC RX 258: Performed by: INTERNAL MEDICINE

## 2024-09-04 PROCEDURE — 3700000000 HC ANESTHESIA ATTENDED CARE: Performed by: INTERNAL MEDICINE

## 2024-09-04 PROCEDURE — 80048 BASIC METABOLIC PNL TOTAL CA: CPT

## 2024-09-04 RX ORDER — PROPOFOL 10 MG/ML
INJECTION, EMULSION INTRAVENOUS PRN
Status: DISCONTINUED | OUTPATIENT
Start: 2024-09-04 | End: 2024-09-04 | Stop reason: SDUPTHER

## 2024-09-04 RX ORDER — SODIUM CHLORIDE 9 MG/ML
INJECTION, SOLUTION INTRAVENOUS PRN
Status: DISCONTINUED | OUTPATIENT
Start: 2024-09-04 | End: 2024-09-04 | Stop reason: HOSPADM

## 2024-09-04 RX ORDER — LIDOCAINE HYDROCHLORIDE 20 MG/ML
INJECTION, SOLUTION INFILTRATION; PERINEURAL PRN
Status: DISCONTINUED | OUTPATIENT
Start: 2024-09-04 | End: 2024-09-04 | Stop reason: SDUPTHER

## 2024-09-04 RX ORDER — SODIUM CHLORIDE 0.9 % (FLUSH) 0.9 %
5-40 SYRINGE (ML) INJECTION EVERY 12 HOURS SCHEDULED
Status: DISCONTINUED | OUTPATIENT
Start: 2024-09-04 | End: 2024-09-04 | Stop reason: HOSPADM

## 2024-09-04 RX ORDER — SODIUM CHLORIDE 0.9 % (FLUSH) 0.9 %
5-40 SYRINGE (ML) INJECTION PRN
Status: DISCONTINUED | OUTPATIENT
Start: 2024-09-04 | End: 2024-09-04 | Stop reason: HOSPADM

## 2024-09-04 RX ORDER — CLOPIDOGREL BISULFATE 75 MG/1
75 TABLET ORAL DAILY
Qty: 30 TABLET | Refills: 3 | Status: SHIPPED | OUTPATIENT
Start: 2024-09-04

## 2024-09-04 RX ADMIN — SODIUM CHLORIDE: 9 INJECTION, SOLUTION INTRAVENOUS at 09:06

## 2024-09-04 RX ADMIN — PROPOFOL 50 MG: 10 INJECTION, EMULSION INTRAVENOUS at 09:14

## 2024-09-04 RX ADMIN — LIDOCAINE HYDROCHLORIDE 60 MG: 20 INJECTION, SOLUTION INFILTRATION; PERINEURAL at 09:14

## 2024-09-04 RX ADMIN — PROPOFOL 20 MG: 10 INJECTION, EMULSION INTRAVENOUS at 09:16

## 2024-09-04 RX ADMIN — PROPOFOL 30 MG: 10 INJECTION, EMULSION INTRAVENOUS at 09:18

## 2024-09-04 ASSESSMENT — ENCOUNTER SYMPTOMS: SHORTNESS OF BREATH: 1

## 2024-09-04 NOTE — SEDATION DOCUMENTATION
Brief Pre-Op Note/Sedation Assessment      London Swenson  1950  [id number]  9:08 AM    Planned Procedure: TANIA Procedure  Post Procedure Plan: Return to same level of care  Consent: I have discussed with the patient and/or the patient representative the indication, alternatives, and the possible risks and/or complications of the planned procedure and the anesthesia methods. The patient and/or patient representative appear to understand and agree to proceed.        Pre-Procedure Medical History:  Vital Signs:  Ht 1.778 m (5' 10\")   Wt 126.6 kg (279 lb)   BMI 40.03 kg/m²     Allergies:    Allergies   Allergen Reactions    Clonidine     Oxycodone     Trulicity [Dulaglutide]      nausea    Codeine Nausea And Vomiting     Medications:    Current Facility-Administered Medications   Medication Dose Route Frequency Provider Last Rate Last Admin    sodium chloride flush 0.9 % injection 5-40 mL  5-40 mL IntraVENous 2 times per day Dylan Shanks MD        sodium chloride flush 0.9 % injection 5-40 mL  5-40 mL IntraVENous PRN Dylan Shanks MD        0.9 % sodium chloride infusion   IntraVENous PRN Dylan Shanks MD           Past Medical History:    Past Medical History:   Diagnosis Date    A-fib ([facility])     Acid reflux     Yan's esophagus     Coronary artery disease of native heart with stable angina pectoris ([facility]) 05/30/2019    Dementia ([facility])     Diabetes mellitus ([facility])     Diabetic autonomic neuropathy associated with type 2 diabetes mellitus ([facility]) 03/03/2020    Hyperlipidemia     Hypertension     Pancreatitis 1996    Restless legs     Sleep apnea     uses CPAP    Tremor     of bilateral hands       Surgical History:    Past Surgical History:   Procedure Laterality Date    CARDIAC DEFIBRILLATOR PLACEMENT      CATARACT REMOVAL Bilateral 2013    CHOLECYSTECTOMY      COLONOSCOPY  10/26/2011    ENDOSCOPY, COLON, DIAGNOSTIC      EGD halo    EP DEVICE PROCEDURE N/A 7/15/2024    Watchman keaton closure device

## 2024-09-04 NOTE — PROCEDURES
PROCEDURE NOTE  Date: 9/4/2024   Name: London Swenson  YOB: 1950    Procedures        9/4/2024    PROCEDURE PERFORMED:     1. A Complete Transesophageal echocardiogram with color and spectral doppler and 3-D imaging was performed.     INDICATIONS: Post Watchman TANIA    PROCEDURE: The patient was brought to the lab in fasting state. The patient received adequate sedation with the assistance of anesthesia for the case. After ensuring adequate sedation, the esophagus was intubated and a complete transesophageal echocardiogram was completed. There were no complications related to the study.       CONCLUSION:   Well seated SUSAN occluder (Watchman) device with no residual regurgitation.      See formal report for full details.     PLAN:   1. DC Coumadin and begin Plavix 75mg daily.      Dylan Shanks MD [facility] [place]   [phone] [facility]   [phone] [facility]

## 2024-09-04 NOTE — DISCHARGE INSTRUCTIONS
Cath Labs at  [facility]                      9/4/2024  London Swenson   Date of Birth 1950     TRANSESOPHAGEAL ECHOCARDIOGRAM DISCHARGE INSTRUCTIONS      You have been given sedation for your procedure so you may not drive, operate any machinery, or sign any legal documentation for 24 hours.    Do not drink or eat for at least 2 hours AND until your gag reflex returns.  Check by touching the back of the tongue to be sure you can gag.  Do not drink any alcohol today.  Eat soft foods at first then gradually return to your normal diet.      CALL YOUR DOCTOR If you should develop a fever of 101, cough up teaspoon amounts of blood, develop severe shortness of breath or chest pain.  If symptoms are severe, go to the emergency room.    Contact your doctor for a follow-up visit and/or results of your procedure.      SEDATION DISCHARGE INSTRUCTION:  For the next 24 hours do not drive a car, operate machinery, power tools or kitchen appliances.  Do not drink alcohol; including beer or wine.  Do not make any important decisions or sign any important papers.  For the next 24 hours you can expect drowsiness, light-headed or dizziness, nausea/ vomiting, inability to concentrate, fatigue and desire to sleep.  We strongly suggest that a responsible adult be with for the next 24 hours, for your protection and safety.  You are not allowed to drive yourself home, or take any type of public transportation.  If any questions, please call your doctor.  If you cannot reach your Doctor then call the Emergency Department at  [phone].  Explain to the Emergency Department the procedure you had performed and they will be able to assist you.  If you seek Emergency Care, bring this form with you.    If your condition worsens or if you have any concerns, call your doctor or seek emergency medical services as needed. If you have any of the following symptoms/conditions, call your doctor.

## 2024-09-04 NOTE — ANESTHESIA PRE PROCEDURE
Department of Anesthesiology  Preprocedure Note       Name:  London Swenson   Age:  74 y.o.  :  1950                                          MRN:  [id number]         Date:  2024      Surgeon: * Surgery not found * Cath lab    Procedure: TANIA    Medications prior to admission:   Prior to Admission medications    Medication Sig Start Date End Date Taking? Authorizing Provider   gabapentin (NEURONTIN) 300 MG capsule TAKE 1 TO 2 CAPSULES BY MOUTH NIGHTLY 9/3/24 10/3/24  Lulu Akers APRN - CNP   torsemide 40 MG TABS Take 40 mg by mouth daily 24   Sheeba Silveira APRN   warfarin (COUMADIN) 5 MG tablet TAKE 1 & 1/2 TO 2 (ONE & ONE-HALF TO TWO) TABLETS BY MOUTH ONCE DAILY AS  DIRECTED  BY  COUMADIN  CLINIC 24   Ye Diez MD   oxyCODONE (ROXICODONE) 5 MG immediate release tablet Take 1 tablet by mouth every 4 hours as needed for Pain.    Provider, MD Willa   metoprolol tartrate (LOPRESSOR) 25 MG tablet Take 1 tablet by mouth twice daily 24   Ericka Valdez APRN - CNP   empagliflozin (JARDIANCE) 25 MG tablet Take 1 tablet by mouth daily 24   Kelsey Flores MD   Elastic Bandages & Supports (MEDICAL COMPRESSION STOCKINGS) MISC 1 each by Does not apply route daily 20 mmHg 24   Radhames De La Torre APRN - CNP   insulin glargine (LANTUS SOLOSTAR) 100 UNIT/ML injection pen Inject 20 Units into the skin nightly 7/10/24 10/8/24  Constance Yancey PA   acetaminophen (TYLENOL) 325 MG tablet Take 2 tablets by mouth every 4 hours as needed for Pain    Provider, MD Willa   insulin aspart (NOVOLOG FLEXPEN) 100 UNIT/ML injection pen Inject 25 Units into the skin 3 times daily (before meals) 24   Constance Yancey PA   pramipexole (MIRAPEX) 0.5 MG tablet Take 1.5 tablets by mouth nightly 5/3/24   Constance Yancey PA   pantoprazole (PROTONIX) 40 MG tablet TAKE 1 TABLET BY MOUTH TWICE DAILY BEFORE MEAL(S) 24   Bc, Constance H, PA   Continuous Blood Gluc Sensor (FREESTYLE JUSTIN 14  DAY SENSOR) MISC 1 Units by Does not apply route every 14 days 2/14/24   Constance Yancey PA   dofetilide (TIKOSYN) 250 MCG capsule Take 1 capsule by mouth every 12 hours 1/19/24   Ericka Valdez APRN - CNP   aspirin 81 MG EC tablet Take 1 tablet by mouth daily 1/5/24   Donny Young MD   atorvastatin (LIPITOR) 40 MG tablet TAKE 1 TABLET BY MOUTH AT BEDTIME 8/14/23   Michael Lemus MD   Handicap Placard MISC by Does not apply route Exp: 5/1/2026 5/4/23   Constance Yancey PA   blood glucose monitor strips Test 3 times a day & as needed for symptoms of irregular blood glucose. E11.9 Dispense sufficient amount for indicated testing frequency plus additional to accommodate PRN testing needs. 2/7/23   Constance Yancey PA   Lancets MISC 1 each by Does not apply route daily Check blood sugars 3x daily E11.9 2/7/23   Constance Yancey PA   Continuous Blood Gluc  (FREESTYLE JUSTIN 14 DAY READER) OLI 1 Units by Does not apply route as needed (as needed) 1/23/23   Monique Henry PA   Insulin Pen Needle 31G X 8 MM MISC 1 each by Does not apply route 4 times daily 7/19/22   Monique Henry PA   Insulin Syringe-Needle U-100 30G X 1/2\" 0.5 ML MISC Inject insulin 3 times daily 9/12/17   Provider, MD Willa       Current medications:    Current Outpatient Medications   Medication Sig Dispense Refill    gabapentin (NEURONTIN) 300 MG capsule TAKE 1 TO 2 CAPSULES BY MOUTH NIGHTLY 60 capsule 0    torsemide 40 MG TABS Take 40 mg by mouth daily 30 tablet 1    warfarin (COUMADIN) 5 MG tablet TAKE 1 & 1/2 TO 2 (ONE & ONE-HALF TO TWO) TABLETS BY MOUTH ONCE DAILY AS  DIRECTED  BY  COUMADIN  CLINIC 40 tablet 0    oxyCODONE (ROXICODONE) 5 MG immediate release tablet Take 1 tablet by mouth every 4 hours as needed for Pain.      metoprolol tartrate (LOPRESSOR) 25 MG tablet Take 1 tablet by mouth twice daily 180 tablet 0    empagliflozin (JARDIANCE) 25 MG tablet Take 1 tablet by mouth daily 90 tablet 1    Elastic Bandages

## 2024-09-05 ENCOUNTER — CARE COORDINATION (OUTPATIENT)
Dept: CASE MANAGEMENT | Age: 74
End: 2024-09-05

## 2024-09-05 NOTE — CARE COORDINATION
-Remote Alert Monitoring Note      Date/Time:  2024 11:32 AM  Patient Current Location: [place]  Verified patients name and  as identifiers.    Rpm alert to be reviewed by the provider                  LPN contacted family by telephone regarding red alert received   Background: RPM for HTN, CHF  Refer to 911 immediately if:  Patient unresponsive or unable to provide history  Change in cognition or sudden confusion  Patient unable to respond in complete sentences  Intense chest pain/tightness  Any concern for any clinical emergency  Red Alert: Provider response time of 1 hr required for any red alert requiring intervention  Yellow Alert: Provider response time of 3hr required for any escalated yellow alert  Patient Chief Complaint:  BP Triage  Are you having any Chest Pain? no   Are you having any Shortness of Breath? no   Do you have a headache or have any vision changes? no   Are you having any numbness or tingling? no   Are you having any other health concerns or issues? no     Clinical Interventions: Reviewed and followed up on alerts and treatments-   LPN contacted pt in regard to RPM red alert for BP of, 88/71, HR 67. Wife advised that the pt denied any new, worrisome and or worsening symptoms at this time. Pt taking all meds as prescribed, and has taken BP meds. Pt rechecked BP, and updated BP is, 84/61 HR 71 bpm. Pt is asymptomatic and writer did not escalate to Cardio, per protocol.     Plan/Follow Up: Will continue to review, monitor and address alerts with follow up based on severity of symptoms and risk factors.  **For any new or worsening symptoms or you are concerned in anyway, please contact your Provider or report to the nearest Emergency Room.**            French Patten LPN, [facility], Remote Patient Monitoring    PH: [phone]  Email: [email or web address]

## 2024-09-06 ENCOUNTER — CARE COORDINATION (OUTPATIENT)
Dept: CASE MANAGEMENT | Age: 74
End: 2024-09-06

## 2024-09-06 NOTE — CARE COORDINATION
Remote Patient Monitoring Welcome NoteDate/Time: 2024 1:17 PMPatient [place] Location[place] name and  as identifiers. Completed and confirmed the following: Emergency Contact: Rosy Swenson [phone][x] Patient received all RPM equipment (tablet, scale, blood pressure device and cuff, and pulse oximeter) Cuff Size: regular (9.05\"-15.74\")  Weight Scale: regular (<330lbs) [x] Instructed patient keep box for use when returning equipment [x] Reviewed Patient Welcome Letter with patient [x] Reviewed Consent Form Copy of consent form in chart. [x] Reviewed expectations for patient and care team Monitoring hours M-F 9-4pm It is important to take your vitals every day, even on the weekends,to keep your care team aware of how you are doing every day of the week. Completing monitoring by 12pm on  so that alerts can be responded to in the same day Patient weighs self at same time every day (or after urinating and waking up) Take blood pressure 1-2 hrs after medications  RPM team may have different phone area code (including [place], [place], [place] or [place])                        [x] Instructed patient to keep scale on flat surface [x] Instructed patient to keep tablet plugged in at all times [x] Instructed how to contact IT support ([phone])[x] Provided Remote Patient Monitoring care  information All questions answered at this time.  ---- Current Patient Metrics ---- Blood Pressure: 103/73, 91bpm Weight: 278.5lbs Note Created at: 2024 01:18 PM ET ---- Time-Spent: 3 minutes 0 seconds     Shante BURTON, RN, [facility]  Care Transition Nurse  [phone] mobile

## 2024-09-06 NOTE — CARE COORDINATION
Care Transitions Note    Follow Up Call     Patient Current Location:  [place]    Care Transition Nurse contacted the patient by telephone. Verified name and  as identifiers.    Additional needs identified to be addressed with provider   No needs identified                 Method of communication with provider: none.    Care Summary Note: CTN spoke with patient's spouse Rosy who reported patient is doing well. [facility] are [facility] the vet right now but patient setup his RPM kit and vitals are stable. Rosy denied any other issues or concerns at this time. CTN advised patient of use of urgent care or physician’s 24 hr access line if assistance is needed after hours.Also advised that they can always contact their home care provider to request a nurse visit even if it isn't their regularly scheduled day for their nurse to visit.           Plan of care updates since last contact:  Education: .       Advance Care Planning:   Does patient have an Advance Directive: reviewed during previous call, see note. .    Medication Review:  No changes since last call.     Remote Patient Monitoring:  Offered patient enrollment in the Remote Patient Monitoring (RPM) program for in-home monitoring: Yes, patient enrolled; current status is activated and monitoring.    Assessments:  Care Transitions Subsequent and Final Call    Subsequent and Final Calls  Do you have any ongoing symptoms?: No  Have your medications changed?: No  Do you have any questions related to your medications?: No  Do you currently have any active services?: Yes  Are you currently active with any services?: Home Health  Do you have any needs or concerns that I can assist you with?: No  Identified Barriers: None  Care Transitions Interventions  Other Interventions:              Follow Up Appointment:   Reviewed upcoming appointment(s).  Future Appointments         Provider Specialty Dept Phone    2024 3:00 PM SCHEDULE, ALETHEA DEVICE CHECK Cardiology [phone]

## 2024-09-09 ENCOUNTER — NURSE ONLY (OUTPATIENT)
Dept: CARDIOLOGY CLINIC | Age: 74
End: 2024-09-09
Payer: MEDICARE

## 2024-09-09 ENCOUNTER — TELEPHONE (OUTPATIENT)
Dept: CARDIOLOGY CLINIC | Age: 74
End: 2024-09-09

## 2024-09-09 ENCOUNTER — OFFICE VISIT (OUTPATIENT)
Dept: CARDIOLOGY CLINIC | Age: 74
End: 2024-09-09
Payer: MEDICARE

## 2024-09-09 VITALS
SYSTOLIC BLOOD PRESSURE: 108 MMHG | WEIGHT: 283 LBS | HEIGHT: 70 IN | HEART RATE: 63 BPM | DIASTOLIC BLOOD PRESSURE: 60 MMHG | BODY MASS INDEX: 40.52 KG/M2 | OXYGEN SATURATION: 93 %

## 2024-09-09 DIAGNOSIS — I48.91 NEW ONSET ATRIAL FIBRILLATION (HCC): Primary | ICD-10-CM

## 2024-09-09 DIAGNOSIS — I49.5 SINUS NODE DYSFUNCTION (HCC): ICD-10-CM

## 2024-09-09 DIAGNOSIS — I47.19 ATRIAL TACHYCARDIA (HCC): ICD-10-CM

## 2024-09-09 DIAGNOSIS — Z95.0 PACEMAKER: Primary | ICD-10-CM

## 2024-09-09 DIAGNOSIS — I48.0 PAF (PAROXYSMAL ATRIAL FIBRILLATION) (HCC): ICD-10-CM

## 2024-09-09 PROCEDURE — 1124F ACP DISCUSS-NO DSCNMKR DOCD: CPT

## 2024-09-09 PROCEDURE — 99214 OFFICE O/P EST MOD 30 MIN: CPT

## 2024-09-09 PROCEDURE — 1036F TOBACCO NON-USER: CPT

## 2024-09-09 PROCEDURE — G8427 DOCREV CUR MEDS BY ELIG CLIN: HCPCS

## 2024-09-09 PROCEDURE — 93000 ELECTROCARDIOGRAM COMPLETE: CPT

## 2024-09-09 PROCEDURE — 1111F DSCHRG MED/CURRENT MED MERGE: CPT

## 2024-09-09 PROCEDURE — G8417 CALC BMI ABV UP PARAM F/U: HCPCS

## 2024-09-09 PROCEDURE — 3017F COLORECTAL CA SCREEN DOC REV: CPT

## 2024-09-09 PROCEDURE — 3074F SYST BP LT 130 MM HG: CPT

## 2024-09-09 PROCEDURE — 3078F DIAST BP <80 MM HG: CPT

## 2024-09-09 RX ORDER — CLOPIDOGREL BISULFATE 75 MG/1
75 TABLET ORAL DAILY
Qty: 30 TABLET | Refills: 3
Start: 2024-09-09

## 2024-09-09 NOTE — ANESTHESIA POSTPROCEDURE EVALUATION
Department of Anesthesiology  Postprocedure Note    Patient: London Swenson  MRN: [id number]  YOB: 1950  Date of evaluation: 9/9/2024    Procedure Summary       Date: 09/04/24 Room / Location: [facility]    Anesthesia Start: 0906 Anesthesia Stop: 0925    Procedure: TANIA (PRN CONTRAST/BUBBLE/3D) Diagnosis:       Paroxysmal A-fib (HCC)      Presence of Watchman left atrial appendage closure device      Paroxysmal A-fib (HCC)      Presence of Watchman left atrial appendage closure device    Scheduled Providers: Dylan Shanks MD; London Vazquez MD Responsible Provider: London Vazquez MD    Anesthesia Type: general ASA Status: 3            Anesthesia Type: No value filed.    Filiberto Phase I: Filiberto Score: 10    Filiberto Phase II:      Anesthesia Post Evaluation    Comments: Postoperative Anesthesia Note    Name:    London Swenson  MRN:      [id number]    Patient Vitals in the past 12 hrs:     LABS:    CBC  Lab Results       Component                Value               Date/Time                  WBC                      4.7                 09/04/2024 08:25 AM        HGB                      11.3 (L)            09/04/2024 08:25 AM        HCT                      34.3 (L)            09/04/2024 08:25 AM        PLT                      165                 09/04/2024 08:25 AM   RENAL  Lab Results       Component                Value               Date/Time                  NA                       143                 09/04/2024 08:25 AM        K                        4.3                 09/04/2024 08:25 AM        CL                       98 (L)              09/04/2024 08:25 AM        CO2                      38 (H)              09/04/2024 08:25 AM        BUN                      31 (H)              09/04/2024 08:25 AM        CREATININE               1.1                 09/04/2024 08:25 AM        GLUCOSE                  167 (H)             09/04/2024 08:25 AM   COAGS  Lab Results

## 2024-09-10 ENCOUNTER — TELEPHONE (OUTPATIENT)
Dept: CARDIOLOGY CLINIC | Age: 74
End: 2024-09-10

## 2024-09-10 ENCOUNTER — OFFICE VISIT (OUTPATIENT)
Dept: CARDIOLOGY CLINIC | Age: 74
End: 2024-09-10
Payer: MEDICARE

## 2024-09-10 VITALS
OXYGEN SATURATION: 94 % | HEART RATE: 74 BPM | WEIGHT: 282 LBS | BODY MASS INDEX: 40.37 KG/M2 | HEIGHT: 70 IN | SYSTOLIC BLOOD PRESSURE: 142 MMHG | DIASTOLIC BLOOD PRESSURE: 68 MMHG

## 2024-09-10 DIAGNOSIS — I50.32 CHRONIC HEART FAILURE WITH PRESERVED EJECTION FRACTION (HCC): ICD-10-CM

## 2024-09-10 DIAGNOSIS — E11.9 TYPE 2 DIABETES MELLITUS WITHOUT COMPLICATION, UNSPECIFIED WHETHER LONG TERM INSULIN USE (HCC): ICD-10-CM

## 2024-09-10 DIAGNOSIS — E66.01 MORBID OBESITY (HCC): ICD-10-CM

## 2024-09-10 DIAGNOSIS — I50.32 HYPERTENSIVE HEART DISEASE WITH CHRONIC DIASTOLIC CONGESTIVE HEART FAILURE (HCC): Primary | ICD-10-CM

## 2024-09-10 DIAGNOSIS — I11.0 HYPERTENSIVE HEART DISEASE WITH CHRONIC DIASTOLIC CONGESTIVE HEART FAILURE (HCC): Primary | ICD-10-CM

## 2024-09-10 PROCEDURE — G8427 DOCREV CUR MEDS BY ELIG CLIN: HCPCS | Performed by: NURSE PRACTITIONER

## 2024-09-10 PROCEDURE — 3017F COLORECTAL CA SCREEN DOC REV: CPT | Performed by: NURSE PRACTITIONER

## 2024-09-10 PROCEDURE — G8417 CALC BMI ABV UP PARAM F/U: HCPCS | Performed by: NURSE PRACTITIONER

## 2024-09-10 PROCEDURE — 1036F TOBACCO NON-USER: CPT | Performed by: NURSE PRACTITIONER

## 2024-09-10 PROCEDURE — 1124F ACP DISCUSS-NO DSCNMKR DOCD: CPT | Performed by: NURSE PRACTITIONER

## 2024-09-10 PROCEDURE — 2022F DILAT RTA XM EVC RTNOPTHY: CPT | Performed by: NURSE PRACTITIONER

## 2024-09-10 PROCEDURE — 3077F SYST BP >= 140 MM HG: CPT | Performed by: NURSE PRACTITIONER

## 2024-09-10 PROCEDURE — 3078F DIAST BP <80 MM HG: CPT | Performed by: NURSE PRACTITIONER

## 2024-09-10 PROCEDURE — 1111F DSCHRG MED/CURRENT MED MERGE: CPT | Performed by: NURSE PRACTITIONER

## 2024-09-10 PROCEDURE — 99214 OFFICE O/P EST MOD 30 MIN: CPT | Performed by: NURSE PRACTITIONER

## 2024-09-10 PROCEDURE — 3052F HG A1C>EQUAL 8.0%<EQUAL 9.0%: CPT | Performed by: NURSE PRACTITIONER

## 2024-09-10 RX ORDER — SPIRONOLACTONE 25 MG/1
25 TABLET ORAL DAILY
Qty: 30 TABLET | Refills: 3 | Status: SHIPPED | OUTPATIENT
Start: 2024-09-10

## 2024-09-11 ENCOUNTER — CARE COORDINATION (OUTPATIENT)
Dept: CASE MANAGEMENT | Age: 74
End: 2024-09-11

## 2024-09-11 PROCEDURE — 93280 PM DEVICE PROGR EVAL DUAL: CPT | Performed by: INTERNAL MEDICINE

## 2024-09-13 VITALS
DIASTOLIC BLOOD PRESSURE: 75 MMHG | HEART RATE: 73 BPM | BODY MASS INDEX: 40.43 KG/M2 | SYSTOLIC BLOOD PRESSURE: 112 MMHG | WEIGHT: 281.8 LBS

## 2024-09-16 ENCOUNTER — CARE COORDINATION (OUTPATIENT)
Dept: CASE MANAGEMENT | Age: 74
End: 2024-09-16

## 2024-09-18 DIAGNOSIS — E78.2 MIXED HYPERLIPIDEMIA: ICD-10-CM

## 2024-09-18 RX ORDER — ATORVASTATIN CALCIUM 40 MG/1
40 TABLET, FILM COATED ORAL NIGHTLY
Qty: 90 TABLET | Refills: 3 | Status: SHIPPED | OUTPATIENT
Start: 2024-09-18

## 2024-09-19 ENCOUNTER — CARE COORDINATION (OUTPATIENT)
Dept: CASE MANAGEMENT | Age: 74
End: 2024-09-19

## 2024-09-25 ENCOUNTER — APPOINTMENT (OUTPATIENT)
Dept: CT IMAGING | Age: 74
End: 2024-09-25
Payer: MEDICARE

## 2024-09-25 ENCOUNTER — HOSPITAL ENCOUNTER (EMERGENCY)
Age: 74
Discharge: HOME | End: 2024-09-25
Attending: STUDENT IN AN ORGANIZED HEALTH CARE EDUCATION/TRAINING PROGRAM
Payer: MEDICARE

## 2024-09-25 ENCOUNTER — APPOINTMENT (OUTPATIENT)
Dept: GENERAL RADIOLOGY | Age: 74
End: 2024-09-25
Payer: MEDICARE

## 2024-09-25 VITALS
HEART RATE: 73 BPM | TEMPERATURE: 98.5 F | WEIGHT: 282 LBS | BODY MASS INDEX: 40.37 KG/M2 | SYSTOLIC BLOOD PRESSURE: 109 MMHG | DIASTOLIC BLOOD PRESSURE: 62 MMHG | RESPIRATION RATE: 12 BRPM | OXYGEN SATURATION: 94 % | HEIGHT: 70 IN

## 2024-09-25 DIAGNOSIS — L03.211 FACIAL CELLULITIS: ICD-10-CM

## 2024-09-25 DIAGNOSIS — R06.02 SHORTNESS OF BREATH: ICD-10-CM

## 2024-09-25 DIAGNOSIS — R22.0 RIGHT FACIAL SWELLING: ICD-10-CM

## 2024-09-25 DIAGNOSIS — K04.7 DENTAL ABSCESS: Primary | ICD-10-CM

## 2024-09-25 LAB
ALBUMIN SERPL-MCNC: 4.1 G/DL (ref 3.4–5)
ALBUMIN/GLOB SERPL: 1.4 {RATIO} (ref 1.1–2.2)
ALP SERPL-CCNC: 151 U/L (ref 40–129)
ALT SERPL-CCNC: 13 U/L (ref 10–40)
ANION GAP SERPL CALCULATED.3IONS-SCNC: 9 MMOL/L (ref 3–16)
AST SERPL-CCNC: 16 U/L (ref 15–37)
BASOPHILS # BLD: 0 K/UL (ref 0–0.2)
BASOPHILS NFR BLD: 0.4 %
BILIRUB SERPL-MCNC: 0.5 MG/DL (ref 0–1)
BUN SERPL-MCNC: 22 MG/DL (ref 7–20)
CALCIUM SERPL-MCNC: 9.5 MG/DL (ref 8.3–10.6)
CHLORIDE SERPL-SCNC: 100 MMOL/L (ref 99–110)
CO2 SERPL-SCNC: 30 MMOL/L (ref 21–32)
CREAT SERPL-MCNC: 1 MG/DL (ref 0.8–1.3)
DEPRECATED RDW RBC AUTO: 15 % (ref 12.4–15.4)
EOSINOPHIL # BLD: 0.3 K/UL (ref 0–0.6)
EOSINOPHIL NFR BLD: 3.7 %
GFR SERPLBLD CREATININE-BSD FMLA CKD-EPI: 79 ML/MIN/{1.73_M2}
GLUCOSE SERPL-MCNC: 203 MG/DL (ref 70–99)
HCT VFR BLD AUTO: 38.5 % (ref 40.5–52.5)
HGB BLD-MCNC: 12.3 G/DL (ref 13.5–17.5)
LYMPHOCYTES # BLD: 0.9 K/UL (ref 1–5.1)
LYMPHOCYTES NFR BLD: 12.7 %
MCH RBC QN AUTO: 28.1 PG (ref 26–34)
MCHC RBC AUTO-ENTMCNC: 31.9 G/DL (ref 31–36)
MCV RBC AUTO: 88 FL (ref 80–100)
MONOCYTES # BLD: 0.5 K/UL (ref 0–1.3)
MONOCYTES NFR BLD: 6.5 %
NEUTROPHILS # BLD: 5.5 K/UL (ref 1.7–7.7)
NEUTROPHILS NFR BLD: 76.7 %
NT-PROBNP SERPL-MCNC: 108 PG/ML (ref 0–449)
PLATELET # BLD AUTO: 160 K/UL (ref 135–450)
PMV BLD AUTO: 9.6 FL (ref 5–10.5)
POTASSIUM SERPL-SCNC: 4.6 MMOL/L (ref 3.5–5.1)
PROT SERPL-MCNC: 7 G/DL (ref 6.4–8.2)
RBC # BLD AUTO: 4.37 M/UL (ref 4.2–5.9)
SODIUM SERPL-SCNC: 139 MMOL/L (ref 136–145)
TROPONIN, HIGH SENSITIVITY: 13 NG/L (ref 0–22)
TROPONIN, HIGH SENSITIVITY: 16 NG/L (ref 0–22)
WBC # BLD AUTO: 7.2 K/UL (ref 4–11)

## 2024-09-25 PROCEDURE — 6370000000 HC RX 637 (ALT 250 FOR IP): Performed by: PHYSICIAN ASSISTANT

## 2024-09-25 PROCEDURE — 96365 THER/PROPH/DIAG IV INF INIT: CPT

## 2024-09-25 PROCEDURE — 71045 X-RAY EXAM CHEST 1 VIEW: CPT

## 2024-09-25 PROCEDURE — 6360000002 HC RX W HCPCS: Performed by: PHYSICIAN ASSISTANT

## 2024-09-25 PROCEDURE — 2580000003 HC RX 258: Performed by: PHYSICIAN ASSISTANT

## 2024-09-25 PROCEDURE — 6370000000 HC RX 637 (ALT 250 FOR IP): Performed by: STUDENT IN AN ORGANIZED HEALTH CARE EDUCATION/TRAINING PROGRAM

## 2024-09-25 PROCEDURE — 70487 CT MAXILLOFACIAL W/DYE: CPT

## 2024-09-25 PROCEDURE — 85025 COMPLETE CBC W/AUTO DIFF WBC: CPT

## 2024-09-25 PROCEDURE — 6360000004 HC RX CONTRAST MEDICATION: Performed by: PHYSICIAN ASSISTANT

## 2024-09-25 PROCEDURE — 99285 EMERGENCY DEPT VISIT HI MDM: CPT

## 2024-09-25 PROCEDURE — 80053 COMPREHEN METABOLIC PANEL: CPT

## 2024-09-25 PROCEDURE — 84484 ASSAY OF TROPONIN QUANT: CPT

## 2024-09-25 PROCEDURE — 83880 ASSAY OF NATRIURETIC PEPTIDE: CPT

## 2024-09-25 PROCEDURE — 36415 COLL VENOUS BLD VENIPUNCTURE: CPT

## 2024-09-25 PROCEDURE — 93005 ELECTROCARDIOGRAM TRACING: CPT | Performed by: PHYSICIAN ASSISTANT

## 2024-09-25 RX ORDER — HYDROXYZINE HYDROCHLORIDE 10 MG/1
25 TABLET, FILM COATED ORAL ONCE
Status: COMPLETED | OUTPATIENT
Start: 2024-09-25 | End: 2024-09-25

## 2024-09-25 RX ORDER — LIDOCAINE HYDROCHLORIDE 20 MG/ML
15 SOLUTION OROPHARYNGEAL ONCE
Status: COMPLETED | OUTPATIENT
Start: 2024-09-25 | End: 2024-09-25

## 2024-09-25 RX ORDER — CHLORHEXIDINE GLUCONATE ORAL RINSE 1.2 MG/ML
15 SOLUTION DENTAL 2 TIMES DAILY
Qty: 420 ML | Refills: 0 | Status: SHIPPED | OUTPATIENT
Start: 2024-09-25 | End: 2024-10-09

## 2024-09-25 RX ORDER — IOPAMIDOL 755 MG/ML
75 INJECTION, SOLUTION INTRAVASCULAR
Status: COMPLETED | OUTPATIENT
Start: 2024-09-25 | End: 2024-09-25

## 2024-09-25 RX ADMIN — IOPAMIDOL 75 ML: 755 INJECTION, SOLUTION INTRAVENOUS at 15:57

## 2024-09-25 RX ADMIN — LIDOCAINE HYDROCHLORIDE 15 ML: 20 SOLUTION ORAL at 17:39

## 2024-09-25 RX ADMIN — AMPICILLIN SODIUM AND SULBACTAM SODIUM 3000 MG: 2; 1 INJECTION, POWDER, FOR SOLUTION INTRAMUSCULAR; INTRAVENOUS at 15:37

## 2024-09-25 RX ADMIN — HYDROXYZINE HYDROCHLORIDE 25 MG: 10 TABLET ORAL at 19:05

## 2024-09-25 ASSESSMENT — PAIN - FUNCTIONAL ASSESSMENT: PAIN_FUNCTIONAL_ASSESSMENT: 0-10

## 2024-09-25 ASSESSMENT — PAIN SCALES - GENERAL: PAINLEVEL_OUTOF10: 7

## 2024-09-25 ASSESSMENT — PAIN DESCRIPTION - LOCATION: LOCATION: EYE

## 2024-09-25 ASSESSMENT — PAIN DESCRIPTION - DESCRIPTORS: DESCRIPTORS: ACHING

## 2024-09-25 ASSESSMENT — PAIN DESCRIPTION - ORIENTATION: ORIENTATION: RIGHT

## 2024-09-26 ENCOUNTER — CARE COORDINATION (OUTPATIENT)
Dept: CARE COORDINATION | Age: 74
End: 2024-09-26

## 2024-09-26 ENCOUNTER — CARE COORDINATION (OUTPATIENT)
Dept: CASE MANAGEMENT | Age: 74
End: 2024-09-26

## 2024-09-26 ENCOUNTER — TELEPHONE (OUTPATIENT)
Dept: FAMILY MEDICINE CLINIC | Age: 74
End: 2024-09-26

## 2024-09-26 LAB
EKG DIAGNOSIS: NORMAL
EKG Q-T INTERVAL: 370 MS
EKG QRS DURATION: 70 MS
EKG QTC CALCULATION (BAZETT): 424 MS
EKG R AXIS: 91 DEGREES
EKG T AXIS: 66 DEGREES
EKG VENTRICULAR RATE: 79 BPM

## 2024-09-26 PROCEDURE — 93010 ELECTROCARDIOGRAM REPORT: CPT | Performed by: INTERNAL MEDICINE

## 2024-09-30 ENCOUNTER — OFFICE VISIT (OUTPATIENT)
Dept: FAMILY MEDICINE CLINIC | Age: 74
End: 2024-09-30
Payer: MEDICARE

## 2024-09-30 VITALS
HEIGHT: 70 IN | SYSTOLIC BLOOD PRESSURE: 124 MMHG | BODY MASS INDEX: 39.88 KG/M2 | TEMPERATURE: 97 F | HEART RATE: 79 BPM | DIASTOLIC BLOOD PRESSURE: 82 MMHG | OXYGEN SATURATION: 94 % | WEIGHT: 278.6 LBS

## 2024-09-30 DIAGNOSIS — K04.7 DENTAL ABSCESS: Primary | ICD-10-CM

## 2024-09-30 DIAGNOSIS — K04.7 DENTAL ABSCESS: ICD-10-CM

## 2024-09-30 DIAGNOSIS — G62.9 PERIPHERAL POLYNEUROPATHY: ICD-10-CM

## 2024-09-30 LAB
BASOPHILS # BLD: 0.1 K/UL (ref 0–0.2)
BASOPHILS NFR BLD: 1.2 %
DEPRECATED RDW RBC AUTO: 14.9 % (ref 12.4–15.4)
EOSINOPHIL # BLD: 0.3 K/UL (ref 0–0.6)
EOSINOPHIL NFR BLD: 7.9 %
HCT VFR BLD AUTO: 38.3 % (ref 40.5–52.5)
HGB BLD-MCNC: 12.9 G/DL (ref 13.5–17.5)
LYMPHOCYTES # BLD: 1.1 K/UL (ref 1–5.1)
LYMPHOCYTES NFR BLD: 26.2 %
MCH RBC QN AUTO: 29.2 PG (ref 26–34)
MCHC RBC AUTO-ENTMCNC: 33.6 G/DL (ref 31–36)
MCV RBC AUTO: 86.9 FL (ref 80–100)
MONOCYTES # BLD: 0.3 K/UL (ref 0–1.3)
MONOCYTES NFR BLD: 7 %
NEUTROPHILS # BLD: 2.5 K/UL (ref 1.7–7.7)
NEUTROPHILS NFR BLD: 57.7 %
PLATELET # BLD AUTO: 175 K/UL (ref 135–450)
PMV BLD AUTO: 9.9 FL (ref 5–10.5)
RBC # BLD AUTO: 4.41 M/UL (ref 4.2–5.9)
WBC # BLD AUTO: 4.3 K/UL (ref 4–11)

## 2024-09-30 PROCEDURE — G8427 DOCREV CUR MEDS BY ELIG CLIN: HCPCS | Performed by: PHYSICIAN ASSISTANT

## 2024-09-30 PROCEDURE — 3079F DIAST BP 80-89 MM HG: CPT | Performed by: PHYSICIAN ASSISTANT

## 2024-09-30 PROCEDURE — 99213 OFFICE O/P EST LOW 20 MIN: CPT | Performed by: PHYSICIAN ASSISTANT

## 2024-09-30 PROCEDURE — 3074F SYST BP LT 130 MM HG: CPT | Performed by: PHYSICIAN ASSISTANT

## 2024-09-30 PROCEDURE — 3017F COLORECTAL CA SCREEN DOC REV: CPT | Performed by: PHYSICIAN ASSISTANT

## 2024-09-30 PROCEDURE — G8417 CALC BMI ABV UP PARAM F/U: HCPCS | Performed by: PHYSICIAN ASSISTANT

## 2024-09-30 PROCEDURE — 1036F TOBACCO NON-USER: CPT | Performed by: PHYSICIAN ASSISTANT

## 2024-09-30 PROCEDURE — 1124F ACP DISCUSS-NO DSCNMKR DOCD: CPT | Performed by: PHYSICIAN ASSISTANT

## 2024-09-30 SDOH — ECONOMIC STABILITY: FOOD INSECURITY: WITHIN THE PAST 12 MONTHS, YOU WORRIED THAT YOUR FOOD WOULD RUN OUT BEFORE YOU GOT MONEY TO BUY MORE.: NEVER TRUE

## 2024-09-30 SDOH — ECONOMIC STABILITY: INCOME INSECURITY: HOW HARD IS IT FOR YOU TO PAY FOR THE VERY BASICS LIKE FOOD, HOUSING, MEDICAL CARE, AND HEATING?: NOT HARD AT ALL

## 2024-09-30 SDOH — ECONOMIC STABILITY: FOOD INSECURITY: WITHIN THE PAST 12 MONTHS, THE FOOD YOU BOUGHT JUST DIDN'T LAST AND YOU DIDN'T HAVE MONEY TO GET MORE.: NEVER TRUE

## 2024-09-30 ASSESSMENT — PATIENT HEALTH QUESTIONNAIRE - PHQ9: DEPRESSION UNABLE TO ASSESS: PT REFUSES

## 2024-09-30 ASSESSMENT — ENCOUNTER SYMPTOMS
RHINORRHEA: 0
VOMITING: 0
SORE THROAT: 0
ABDOMINAL PAIN: 0
SHORTNESS OF BREATH: 0
CONSTIPATION: 0
NAUSEA: 0
COUGH: 0
DIARRHEA: 0

## 2024-09-30 NOTE — PROGRESS NOTES
\"Have you been to the ER, urgent care clinic since your last visit?  Hospitalized since your last visit?\"    YES - When: approximately 1  weeks ago.  Where and Why: Infected tooth with eye swelling.    “Have you seen or consulted any other health care providers outside our system since your last visit?”    NO      “Have you had a diabetic eye exam?”    NO     Date of last diabetic eye exam: 10/6/2022            
Gluc  (StartupDigestSTYLE JUSTIN 14 DAY READER) OLI 1 Units by Does not apply route as needed (as needed) 1 each 5    Insulin Pen Needle 31G X 8 MM MISC 1 each by Does not apply route 4 times daily 200 each 3    Insulin Syringe-Needle U-100 30G X 1/2\" 0.5 ML MISC Inject insulin 3 times daily       No current facility-administered medications for this visit.       Vitals:    09/30/24 0800   BP: 124/82   Site: Right Upper Arm   Position: Sitting   Cuff Size: Large Adult   Pulse: 79   Temp: 97 °F (36.1 °C)   SpO2: 94%   Weight: 126.4 kg (278 lb 9.6 oz)   Height: 1.778 m (5' 10\")     Estimated body mass index is 39.97 kg/m² as calculated from the following:    Height as of this encounter: 1.778 m (5' 10\").    Weight as of this encounter: 126.4 kg (278 lb 9.6 oz).    Physical Exam  Constitutional:       General: He is not in acute distress.     Appearance: He is well-developed.   HENT:      Head: Normocephalic and atraumatic.      Mouth/Throat:      Dentition: Abnormal dentition. Dental abscesses present.   Eyes:      Extraocular Movements: Extraocular movements intact.      Conjunctiva/sclera: Conjunctivae normal.      Pupils: Pupils are equal, round, and reactive to light.   Cardiovascular:      Rate and Rhythm: Normal rate and regular rhythm.      Heart sounds: Normal heart sounds. No murmur heard.  Pulmonary:      Effort: Pulmonary effort is normal.      Breath sounds: Normal breath sounds. No wheezing.   Abdominal:      General: Bowel sounds are normal.      Palpations: Abdomen is soft.      Tenderness: There is no abdominal tenderness.   Musculoskeletal:      Cervical back: Neck supple.   Lymphadenopathy:      Cervical: No cervical adenopathy.   Skin:     General: Skin is warm and dry.      Findings: No rash.   Neurological:      Mental Status: He is alert and oriented to person, place, and time.   Psychiatric:         Mood and Affect: Mood normal.         Behavior: Behavior normal.

## 2024-09-30 NOTE — PATIENT INSTRUCTIONS
[facility]  [phone] [address] [place] [address]    [address] [address]

## 2024-09-30 NOTE — TELEPHONE ENCOUNTER
Refill Request - Controlled Substance    CONFIRM preferred pharmacy with the patient.    If Mail Order Rx - Pend for 90 day refill.        Last Seen Department: 9/30/2024  Last Seen by PCP: Visit date not found    Last Written: 9/3/24 60 with no refills     Last UDS: none     Med Agreement Signed On: 3/1/24    If no future appointment scheduled:  Review the last OV with PCP and review information for follow-up visit,  Route STAFF MESSAGE with patient name to the  Pool for scheduling with the following information:            -  Timing of next visit           -  Visit type ie Physical, OV, etc           -  Diagnoses/Reason ie. COPD, HTN - Do not use MEDICATION, Follow-up or CHECK UP - Give reason for visit        Next Appointment:   Future Appointments   Date Time Provider Department [place]   10/29/2024  2:20 PM Kelsey Flores MD AND [facility]   12/2/2024 10:00 AM Constance Yancey PA [facility]   12/17/2024  1:15 PM Kamilla Duron APRN - CNP [facility]   1/15/2025  1:00 PM SCHEDULE, ALETHEA DEVICE CHECK [facility]   1/15/2025  1:00 PM Ericka Valdez APRN - CNP [facility]   8/13/2025 10:30 AM SCHEDULE, ALETHEA DEVICE CHECK [facility]   8/13/2025 10:30 AM Ericka Valdez, APRRAYSHAWN - CNP [facility]       Message sent to  to schedule appt with patient?  NO      Requested Prescriptions     Pending Prescriptions Disp Refills    gabapentin (NEURONTIN) 300 MG capsule [Pharmacy Med Name: Gabapentin 300 MG Oral Capsule] 60 capsule 0     Sig: TAKE 1 TO 2 CAPSULES BY MOUTH NIGHTLY

## 2024-10-02 RX ORDER — GABAPENTIN 300 MG/1
CAPSULE ORAL
Qty: 60 CAPSULE | Refills: 0 | Status: SHIPPED | OUTPATIENT
Start: 2024-10-02 | End: 2024-11-01

## 2024-10-03 ENCOUNTER — CARE COORDINATION (OUTPATIENT)
Dept: CASE MANAGEMENT | Age: 74
End: 2024-10-03

## 2024-10-03 NOTE — CARE COORDINATION
Date/Time:  10/3/2024 9:47 AM  LPN attempted to reach patient by telephone regarding red alert in remote patient monitoring program for BP of, 82/52. Left HIPPA compliant message requesting a return call. Will attempt to reach patient again.     French Patten LPN, [facility], Remote Patient Monitoring    PH: [phone]  Email: [email or web address]

## 2024-10-03 NOTE — CARE COORDINATION
-Remote Alert Monitoring Note      Date/Time:  10/3/2024 12:51 PM  Patient Current Location: [place]  Verified patients name and  as identifiers.             Rpm alert to be reviewed by the provider                        LPN contacted family by telephone regarding red alert received   Background: RPM for HTN, CHF  Refer to 911 immediately if:  Patient unresponsive or unable to provide history  Change in cognition or sudden confusion  Patient unable to respond in complete sentences  Intense chest pain/tightness  Any concern for any clinical emergency  Red Alert: Provider response time of 1 hr required for any red alert requiring intervention  Yellow Alert: Provider response time of 3hr required for any escalated yellow alert  Patient Chief Complaint:  BP Triage  Are you having any Chest Pain? no   Are you having any Shortness of Breath? no   Do you have a headache or have any vision changes? no   Are you having any numbness or tingling? no   Are you having any other health concerns or issues? no      Clinical Interventions: Reviewed and followed up on alerts and treatments-   LPN contacted wife in regard to RPM red alert for BP of, 88/49, HR 96. Wife advised that the pt denied any new, worrisome and or worsening symptoms at this time. Writer confirmed with patient that he has no chest pain, SOB, headache, vision changes, numbness, tingling, N/V, dizziness/lightheadedness or any other concerns at this time. Pt rechecked BP, and updated BP is, 112/72, HR 64, WNL for this pt per HRS parameters. Writer advised ACM per AC request.     Plan/Follow Up: Will continue to review, monitor and address alerts with follow up based on severity of symptoms and risk factors.  **For any new or worsening symptoms or you are concerned in anyway, please contact your Provider or report to the nearest Emergency Room.**            French Patten LPN, [facility], Remote Patient Monitoring     PH: [phone]  Email: [email or web address]

## 2024-10-04 ENCOUNTER — CARE COORDINATION (OUTPATIENT)
Dept: CARE COORDINATION | Age: 74
End: 2024-10-04

## 2024-10-04 NOTE — CARE COORDINATION
Ambulatory Care Coordination Note     10/4/2024 11:24 AM     Patient Current Location:  Home: [facility] [address]     Patient and Spouse/Partner contacted the patient by telephone. Verified name and  with patient as identifiers.         ACM: Giovanna Reyes RN     Challenges to be reviewed by the provider   Additional needs identified to be addressed with provider No  none               Method of communication with provider: chart routing.    Has the patient been seen in the ED since your last call? no    Care Summary Note: ACM completed follow up call related to RPM BP of 88/66. Rosy patient wife said patient was feeling normal denies any SOB, light headedness, dizziness, or any falls at this time. Rosy said they have been having issues obtaining BP with machine pumping up and then not obtaining a reading. Rosy used home cuff for BP and was able to get 128/65 HR of 72 while on phone with ACM. ACM will route to [facility] for IT to assess patient equipment.     Offered patient enrollment in the Remote Patient Monitoring (RPM) program for in-home monitoring: Yes, patient enrolled; current status is activated and monitoring.     Assessments Completed:   No changes since last call    Medications Reviewed:   Patient denies any changes with medications and reports taking all medications as prescribed.    Advance Care Planning:   Not reviewed during this call     Care Planning:   Not completed during this call    PCP/Specialist follow up:   Future Appointments         Provider Specialty Dept Phone    10/29/2024 2:20 PM Kelsey Flores MD Endocrinology [phone]    2024 10:00 AM Constance Yancey PA Family Medicine [phone]    2024 1:15 PM Kamilla Duron APRN - CNP Cardiology [phone]    1/15/2025 1:00 PM SCHEDULE, ALETHEA DEVICE CHECK Cardiology [phone]    1/15/2025 1:00 PM Ericka Valdez APRN - CNP Cardiology [phone]    2025 10:30 AM SCHEDULE, ALETHEA DEVICE

## 2024-10-04 NOTE — CARE COORDINATION
Red alert noted for BP reading of 88/66. ACM reached out to writer prior to writer calling pt, she contacted pt and he rechecked on his own machine and BP is WNL (see ACM note from today) Pt reported to ACM his BP cuff is at times inflating but not giving readings, writer sent IT a request to contact pt to troubleshoot BP cuff.

## 2024-10-07 ENCOUNTER — CARE COORDINATION (OUTPATIENT)
Dept: CARE COORDINATION | Age: 74
End: 2024-10-07

## 2024-10-07 NOTE — CARE COORDINATION
Message sent to patient via Patient Connect Portal for Remote Patient Monitoring     RPM Nurse message No readings in two days Hello, Please see an important message from your RPM Team:  This is a reminder that we have not received your readings for two days please enter them as soon as possible.     Please do not respond to this message; If you have a question or concern please call your Ambulatory Care Manager or your Primary Care Physician.

## 2024-10-08 ENCOUNTER — CARE COORDINATION (OUTPATIENT)
Dept: CASE MANAGEMENT | Age: 74
End: 2024-10-08

## 2024-10-08 NOTE — CARE COORDINATION
-Remote Alert Monitoring Note      Date/Time:  10/8/2024 9:23 AM  Patient Current Location: [place]  Verified patients name and  as identifiers.             Rpm alert to be reviewed by the provider                        LPN contacted family by telephone regarding red alert received   Background: RPM for HTN, CHF  Refer to 911 immediately if:  Patient unresponsive or unable to provide history  Change in cognition or sudden confusion  Patient unable to respond in complete sentences  Intense chest pain/tightness  Any concern for any clinical emergency  Red Alert: Provider response time of 1 hr required for any red alert requiring intervention  Yellow Alert: Provider response time of 3hr required for any escalated yellow alert  Patient Chief Complaint:Weight Triage  Are you weighing any different than you did yesterday? (time of day, clothes and shoes on or off, etc)? yes   Do you have any shortness of breath? no   Do you have any swelling in your hands of feet? no   Are you having any other health concerns or issues? no           Clinical Interventions: Reviewed and followed up on alerts and treatments-   LPN contacted wife in regard to RPM red alert for wt increase of 5 lbs in 7 days. Wife advised that the pt is not experiencing any new or worsening edema, SOB and chest pain. Pt did NOT follow proper protocol to obtain wt metrics today. Pt weighed fully dressed today. Pt is compliant with all meds. Pt weighed on a hard, flat surface. Writer did not escalate.       Plan/Follow Up: Will continue to review, monitor and address alerts with follow up based on severity of symptoms and risk factors.  **For any new or worsening symptoms or you are concerned in anyway, please contact your Provider or report to the nearest Emergency Room.**            French Patten LPN, [facility], Remote Patient Monitoring     PH: [phone]  Email: [email or web address]

## 2024-10-09 ENCOUNTER — CARE COORDINATION (OUTPATIENT)
Dept: CASE MANAGEMENT | Age: 74
End: 2024-10-09

## 2024-10-09 NOTE — CARE COORDINATION
Date/Time:  10/9/2024 4:31 PM  LPN attempted to reach patient by telephone regarding yellow alert in remote patient monitoring program for missing BP metrics x 5 days Left HIPPA compliant message requesting a return call. Will attempt to reach patient again.     Writer advised NICHOL.

## 2024-10-11 ENCOUNTER — CARE COORDINATION (OUTPATIENT)
Dept: CARE COORDINATION | Age: 74
End: 2024-10-11

## 2024-10-11 NOTE — CARE COORDINATION
Ambulatory Care Coordination Note     10/11/2024 9:21 AM     Patient Current Location:  Home: [facility] [place] [id number]     ACM contacted the spouse/partner by telephone. Verified name and  with spouse/partner as identifiers.         ACM: Giovanna Reyes RN     Challenges to be reviewed by the provider   Additional needs identified to be addressed with provider No  none               Method of communication with provider: chart routing.    Has the patient been seen in the ED since your last call? no    Care Summary Note:  ACM placed call to Rosy who said metrics have not flowed over to tablet for BP. Rosy said she has been taking is everyday but the readings are not being uploaded. Rosy said everything else at this time is going well for patient and has no other concerns. ACM will route to RPM team for further advisement.     Offered patient enrollment in the Remote Patient Monitoring (RPM) program for in-home monitoring: Yes, patient enrolled; current status is activated and monitoring.     Assessments Completed:   No changes since last call    Medications Reviewed:   Patient denies any changes with medications and reports taking all medications as prescribed.    Advance Care Planning:   Reviewed during previous call      Care Planning:   Not completed during this call    PCP/Specialist follow up:   Future Appointments         Provider Specialty Dept Phone    10/29/2024 2:20 PM Kelsey Flores MD Endocrinology [phone]    2024 10:00 AM Constance Yancey PA Family Medicine [phone]    2024 1:15 PM Kamilla Duron APRN - CNP Cardiology [phone]    1/15/2025 1:00 PM ALETHEA ORTEGA DEVICE CHECK Cardiology [phone]    1/15/2025 1:00 PM Ericka Valdez APRN - CNP Cardiology [phone]    2025 10:30 AM ALETHEA ORTEGA DEVICE CHECK Cardiology [phone]    2025 10:30 AM Ericka Valdez APRN - CNP Cardiology [phone]            Follow Up:   Plan for

## 2024-10-14 ENCOUNTER — OFFICE VISIT (OUTPATIENT)
Dept: FAMILY MEDICINE CLINIC | Age: 74
End: 2024-10-14

## 2024-10-14 VITALS
HEART RATE: 86 BPM | TEMPERATURE: 97.3 F | WEIGHT: 282 LBS | DIASTOLIC BLOOD PRESSURE: 62 MMHG | OXYGEN SATURATION: 79 % | SYSTOLIC BLOOD PRESSURE: 116 MMHG | BODY MASS INDEX: 40.37 KG/M2 | HEIGHT: 70 IN

## 2024-10-14 DIAGNOSIS — H92.22 OTORRHAGIA OF LEFT EAR: Primary | ICD-10-CM

## 2024-10-14 DIAGNOSIS — Z23 NEED FOR INFLUENZA VACCINATION: ICD-10-CM

## 2024-10-14 SDOH — ECONOMIC STABILITY: FOOD INSECURITY: WITHIN THE PAST 12 MONTHS, THE FOOD YOU BOUGHT JUST DIDN'T LAST AND YOU DIDN'T HAVE MONEY TO GET MORE.: NEVER TRUE

## 2024-10-14 SDOH — ECONOMIC STABILITY: INCOME INSECURITY: HOW HARD IS IT FOR YOU TO PAY FOR THE VERY BASICS LIKE FOOD, HOUSING, MEDICAL CARE, AND HEATING?: NOT HARD AT ALL

## 2024-10-14 SDOH — ECONOMIC STABILITY: FOOD INSECURITY: WITHIN THE PAST 12 MONTHS, YOU WORRIED THAT YOUR FOOD WOULD RUN OUT BEFORE YOU GOT MONEY TO BUY MORE.: NEVER TRUE

## 2024-10-14 ASSESSMENT — ENCOUNTER SYMPTOMS
SORE THROAT: 0
SHORTNESS OF BREATH: 0
SINUS PRESSURE: 0
RHINORRHEA: 0
SINUS PAIN: 0
WHEEZING: 0
COUGH: 0

## 2024-10-14 ASSESSMENT — PATIENT HEALTH QUESTIONNAIRE - PHQ9: DEPRESSION UNABLE TO ASSESS: PT REFUSES

## 2024-10-14 NOTE — PROGRESS NOTES
\"Have you been to the ER, urgent care clinic since your last visit?  Hospitalized since your last visit?\"    NO    “Have you seen or consulted any other health care providers outside our system since your last visit?”    NO      “Have you had a diabetic eye exam?”    NO     Date of last diabetic eye exam: 10/6/2022

## 2024-10-14 NOTE — PROGRESS NOTES
London Swenson (:  1950) is a 74 y.o. male,Established patient, here for evaluation of the following chief complaint(s):  Other (Left ear bleeding x 2 days ago)      Assessment & Plan   ASSESSMENT/PLAN:  1. Otorrhagia of left ear  -     David Hunter MD, Otolaryngology, [facility]  2. Need for influenza vaccination  -     Influenza, FLUAD Trivalent, (age 65 y+), IM, Preservative Free, 0.5mL    -Clot to left ear canal. Unable to see past clot. No tenderness or mastoid tenderness. No obvious injury noted . Referral given. Advised not to put anything in his ears including q-tips.   -Reviewed allergies, discussed medication risks, benefits, side effects. Take medication with food.   -Reviewed symptom management   -Reviewed alarm symptoms         Return if symptoms worsen or fail to improve.         Subjective   SUBJECTIVE/OBJECTIVE:  Reports left ear bleeding for \"couple of days\"  reports he has not noticed any bleeding out of the ear but noticed blood on his hands.   Reports he does not put anything in his ears including q-tips  Has not noticed any other drainage  Denies any ear pain or itching , URI symptoms, fever/chills, headaches, dizziness   Takes a baby aspirin daily as well as plavix             Review of Systems   Constitutional:  Negative for chills and fever.   HENT:  Positive for ear discharge. Negative for congestion, dental problem, ear pain, postnasal drip, rhinorrhea, sinus pressure, sinus pain and sore throat.    Respiratory:  Negative for cough, shortness of breath and wheezing.    Neurological:  Negative for dizziness and headaches.          Objective   Physical Exam  Vitals reviewed.   HENT:      Head: Normocephalic.      Right Ear: Tympanic membrane, ear canal and external ear normal.      Left Ear: External ear normal. Drainage present. No mastoid tenderness.      Ears:      Comments: Dried blood noted to canal along with large clot. Unable to see TM past clot.   No obvious

## 2024-10-21 ENCOUNTER — CARE COORDINATION (OUTPATIENT)
Dept: CASE MANAGEMENT | Age: 74
End: 2024-10-21

## 2024-10-21 NOTE — CARE COORDINATION
Remote Patient Monitoring Note      Date/Time:  10/21/2024 3:58 PM  London Swenson , I am a nurse with the remote patient monitoring team;  reaching out to you today to remind you to please take your vitals. Important: Please try to take your vitals each day before 12pm. This ensures the monitoring team will have time to connect with your providers and get back to you with any new orders or instructions.       Please plug your tablet in and turn your tablet on so that [employer] can reboot the tablet, thank you.    French Patten LPN, [facility], Remote Patient Monitoring    PH: [phone]  Email: [email or web address]

## 2024-10-23 ENCOUNTER — CARE COORDINATION (OUTPATIENT)
Dept: CASE MANAGEMENT | Age: 74
End: 2024-10-23

## 2024-10-24 ENCOUNTER — CARE COORDINATION (OUTPATIENT)
Dept: CASE MANAGEMENT | Age: 74
End: 2024-10-24

## 2024-10-24 ENCOUNTER — CARE COORDINATION (OUTPATIENT)
Dept: PRIMARY CARE CLINIC | Age: 74
End: 2024-10-24

## 2024-10-24 DIAGNOSIS — I10 PRIMARY HYPERTENSION: ICD-10-CM

## 2024-10-24 DIAGNOSIS — I50.812 CHRONIC RIGHT-SIDED CONGESTIVE HEART FAILURE (HCC): Primary | ICD-10-CM

## 2024-10-24 NOTE — PROGRESS NOTES
Remote Patient Order Discontinued    Received request from Giovanna Reyes, RN  to discontinue order for remote patient monitoring of CHF and HTN and order completed.

## 2024-10-24 NOTE — CARE COORDINATION
London Swenson  10/24/24    Care Coordination  placed call to Spouse  to arrange RPM kit  through [employer].     CCSS spoke to Spouse reviewed with Spouse how to pack equipment in original packing. [employer] will  equipment in 2-4 days.   All questions and concerns answered.

## 2024-10-24 NOTE — CARE COORDINATION
Pt and wife called and advised that they want the \"stuff picked up now.\" Wife advises that she has had \"our stuff\" packed up for a week and wants it picked up \"now.\" Writer advised ACM and RPM Clinical Pool.    French Patten LPN, [facility], Remote Patient Monitoring    PH: [phone]  Email: [email or web address]

## 2024-10-24 NOTE — TELEPHONE ENCOUNTER
ACM spoke to Rosy who wants equipment picked up. Please place a return request for RPM as patient no longer wants to participate. Thanks.

## 2024-10-29 ENCOUNTER — OFFICE VISIT (OUTPATIENT)
Dept: ENDOCRINOLOGY | Age: 74
End: 2024-10-29
Payer: MEDICARE

## 2024-10-29 VITALS
BODY MASS INDEX: 39.94 KG/M2 | DIASTOLIC BLOOD PRESSURE: 68 MMHG | HEART RATE: 86 BPM | RESPIRATION RATE: 16 BRPM | SYSTOLIC BLOOD PRESSURE: 123 MMHG | HEIGHT: 70 IN | WEIGHT: 279 LBS

## 2024-10-29 DIAGNOSIS — E78.2 MIXED HYPERLIPIDEMIA: ICD-10-CM

## 2024-10-29 DIAGNOSIS — R80.9 TYPE 2 DIABETES MELLITUS WITH MICROALBUMINURIA, WITH LONG-TERM CURRENT USE OF INSULIN (HCC): Primary | ICD-10-CM

## 2024-10-29 DIAGNOSIS — Z79.4 TYPE 2 DIABETES MELLITUS WITH MICROALBUMINURIA, WITH LONG-TERM CURRENT USE OF INSULIN (HCC): Primary | ICD-10-CM

## 2024-10-29 DIAGNOSIS — E11.29 TYPE 2 DIABETES MELLITUS WITH MICROALBUMINURIA, WITH LONG-TERM CURRENT USE OF INSULIN (HCC): Primary | ICD-10-CM

## 2024-10-29 PROCEDURE — 99214 OFFICE O/P EST MOD 30 MIN: CPT | Performed by: INTERNAL MEDICINE

## 2024-10-29 PROCEDURE — G8417 CALC BMI ABV UP PARAM F/U: HCPCS | Performed by: INTERNAL MEDICINE

## 2024-10-29 PROCEDURE — 3074F SYST BP LT 130 MM HG: CPT | Performed by: INTERNAL MEDICINE

## 2024-10-29 PROCEDURE — 1159F MED LIST DOCD IN RCRD: CPT | Performed by: INTERNAL MEDICINE

## 2024-10-29 PROCEDURE — 3052F HG A1C>EQUAL 8.0%<EQUAL 9.0%: CPT | Performed by: INTERNAL MEDICINE

## 2024-10-29 PROCEDURE — 2022F DILAT RTA XM EVC RTNOPTHY: CPT | Performed by: INTERNAL MEDICINE

## 2024-10-29 PROCEDURE — 3078F DIAST BP <80 MM HG: CPT | Performed by: INTERNAL MEDICINE

## 2024-10-29 PROCEDURE — G8482 FLU IMMUNIZE ORDER/ADMIN: HCPCS | Performed by: INTERNAL MEDICINE

## 2024-10-29 PROCEDURE — 1124F ACP DISCUSS-NO DSCNMKR DOCD: CPT | Performed by: INTERNAL MEDICINE

## 2024-10-29 PROCEDURE — 1036F TOBACCO NON-USER: CPT | Performed by: INTERNAL MEDICINE

## 2024-10-29 PROCEDURE — 3017F COLORECTAL CA SCREEN DOC REV: CPT | Performed by: INTERNAL MEDICINE

## 2024-10-29 PROCEDURE — G8427 DOCREV CUR MEDS BY ELIG CLIN: HCPCS | Performed by: INTERNAL MEDICINE

## 2024-10-29 RX ORDER — GLUCOSAMINE HCL/CHONDROITIN SU 500-400 MG
CAPSULE ORAL
Qty: 100 STRIP | Refills: 3 | Status: SHIPPED | OUTPATIENT
Start: 2024-10-29

## 2024-10-29 RX ORDER — LANCETS 30 GAUGE
1 EACH MISCELLANEOUS DAILY
Qty: 100 EACH | Refills: 5 | Status: SHIPPED | OUTPATIENT
Start: 2024-10-29

## 2024-10-29 RX ORDER — BLOOD-GLUCOSE METER
1 KIT MISCELLANEOUS ONCE
Qty: 1 KIT | Refills: 0 | Status: SHIPPED | OUTPATIENT
Start: 2024-10-29 | End: 2024-10-29

## 2024-10-29 RX ORDER — SEMAGLUTIDE 0.68 MG/ML
INJECTION, SOLUTION SUBCUTANEOUS
Qty: 9 ML | Refills: 1 | Status: SHIPPED | OUTPATIENT
Start: 2024-10-29

## 2024-10-29 NOTE — PROGRESS NOTES
[facility] Endocrinology    Chief Complaint:     Chief Complaint   Patient presents with    Follow-up    Diabetes      Subjective:   London Swenson is a pleasant 74 y.o. male who presents for follow up of Diabetes Mellitus type 2. Patient also has A-fib, mild nonproliferative diabetic retinopathy, peripheral neuropathy, hypertension, hyperlipidemia, ESTHER, coronary artery disease, congestive heart failure with pacemaker and has a BMI of 39.  He is accompanied by his wife and sister.     Diagnosed: 1996, sister has DM.   Initial medications: metformin   On insulin since: since early diagnosis.   Other diabetes meds tried in the past: Metformin, glipizide, Trulicity (nausea), took Mounjaro for some time but then stopped due to expense.  Ozempic, Mounjaro as well as Jardiance were expensive.    Hx of severe hypoglycemia or DKA: none   Hx of MI/stroke/CKD: CHF, no CKD or stroke.   Hx of pancreatitis: Once in 1996.  He was subsequently diagnosed with a pseudocyst and required open cystectomy.  No recurrences of pancreatitis since then.  Family hx of thyroid cancer:    Most recent HbA1c:   Hemoglobin A1C   Date Value Ref Range Status   08/13/2024 8.4 See comment % Final     Comment:     Comment:  Diagnosis of Diabetes: > or = 6.5%  Increased risk of diabetes (Prediabetes): 5.7-6.4%  Glycemic Control: Nonpregnant Adults: <7.0%                    Pregnant: <6.0%          Current regimen:  Lantus 20 units HS  NovoLog 25 units TID. Not in relation to meals.  He very commonly will take his third dose of NovoLog at bedtime along with Lantus.    Blood sugar ranges: He is currently using max.  He did not bring his  to download.  Reports variable blood sugars with rare hypoglycemia overnight.  No symptoms at that time.     Meals: good appetite  B-eggs, sausage, hash browns.   L-sandwich   D- cooked meals at home.   He drinks diet pop and water     Exercise: no regular exercises.   Last eye exam: last year, has NPRD.  Reports

## 2024-10-31 ENCOUNTER — CARE COORDINATION (OUTPATIENT)
Dept: CARE COORDINATION | Age: 74
End: 2024-10-31

## 2024-10-31 NOTE — CARE COORDINATION
Ambulatory Care Coordination Note     10/31/2024 10:07 AM     Patient Current Location:  Home: [facility] [place] [id number]     ACM contacted the spouse/partner by telephone. Verified name and  with spouse/partner as identifiers.     Patient closed (patient disengaged) from the High Risk Care Management program on 10/31/2024.  Spouse/Partner unable to progress towards self management.  reinforced resources provided during this care transition period..  Care management goals have been completed. No further Ambulatory Care Manager follow up scheduled.      ACM: Giovanna Reyes RN     Challenges to be reviewed by the provider   Additional needs identified to be addressed with provider No  none               Method of communication with provider: chart routing.    Has the patient been seen in the ED since your last call? no    Care Summary Note:  Acm spoke with wife who said they did not need anything additional patient is stable at this time. Wife said RPM equipment has been picked up. ACM will close episode at this time.     Offered patient enrollment in the Remote Patient Monitoring (RPM) program for in-home monitoring: Patient is not eligible for RPM program because: closing CC .     Assessments Completed:   No changes since last call    Medications Reviewed:   Patient denies any changes with medications and reports taking all medications as prescribed.    Advance Care Planning:   Not reviewed during this call     Care Planning:   Not completed during this call    PCP/Specialist follow up:   Future Appointments         Provider Specialty Dept Phone    2024 10:00 AM Constance Yancey PA Family Medicine [phone]    2024 1:15 PM Kamilla Duron APRN - CNP Cardiology [phone]    1/15/2025 1:00 PM ALETHEA ORTEGA DEVICE CHECK Cardiology [phone]    1/15/2025 1:00 PM Ericka Valdez APRN - CNP Cardiology [phone]    2025 2:20 PM Kelsey Flores MD Endocrinology

## 2024-11-03 DIAGNOSIS — G62.9 PERIPHERAL POLYNEUROPATHY: ICD-10-CM

## 2024-11-04 RX ORDER — GABAPENTIN 300 MG/1
CAPSULE ORAL
Qty: 60 CAPSULE | Refills: 0 | Status: SHIPPED | OUTPATIENT
Start: 2024-11-04 | End: 2024-12-03

## 2024-11-04 RX ORDER — FUROSEMIDE 40 MG/1
TABLET ORAL
Qty: 30 TABLET | Refills: 0 | OUTPATIENT
Start: 2024-11-04

## 2024-11-04 NOTE — TELEPHONE ENCOUNTER
Refill Request     CONFIRM preferred pharmacy with the patient.    If Mail Order Rx - Pend for 90 day refill.      Last Seen: Last Seen Department: 10/14/2024  Last Seen by PCP: 9/30/2024    Last Written: 10/2/24 60 cap 0 refills    If no future appointment scheduled:  Review the last OV with PCP and review information for follow-up visit,  Route STAFF MESSAGE with patient name to the  Pool for scheduling with the following information:            -  Timing of next visit           -  Visit type ie Physical, OV, etc           -  Diagnoses/Reason ie. COPD, HTN - Do not use MEDICATION, Follow-up or CHECK UP - Give reason for visit      Next Appointment:   Future Appointments   Date Time Provider Department Center   12/2/2024 10:00 AM Constance Yancey PA [facility]   12/17/2024  1:15 PM Kamilla Duron APRN - CNP [facility]   1/15/2025  1:00 PM SCHEDULE, A[facility]THEA DEVICE CHECK [facility]   1/15/2025  1:00 PM Ericka Valdez APRN - CNP [facility]   2/5/2025  2:20 PM Kelsey Flores MD [facility]   8/13/2025 10:30 AM SCHEDULE, [facility]THEA DEVICE CHECK [facility]   8/13/2025 10:30 AM Ericka Valdez APRN - CNP [facility]       Message sent to  to schedule appt with patient?  NO      Requested Prescriptions     Pending Prescriptions Disp Refills    gabapentin (NEURONTIN) 300 MG capsule [Pharmacy Med Name: Gabapentin 300 MG Oral Capsule] 60 capsule 0     Sig: TAKE 1 TO 2 CAPSULES BY MOUTH NIGHTLY

## 2024-11-05 ENCOUNTER — ANTI-COAG VISIT (OUTPATIENT)
Dept: PHARMACY | Age: 74
End: 2024-11-05

## 2024-11-05 DIAGNOSIS — I48.91 NEW ONSET ATRIAL FIBRILLATION (HCC): Primary | ICD-10-CM

## 2024-11-05 DIAGNOSIS — E11.9 CONTROLLED TYPE 2 DIABETES MELLITUS WITHOUT COMPLICATION, WITH LONG-TERM CURRENT USE OF INSULIN (HCC): ICD-10-CM

## 2024-11-05 DIAGNOSIS — Z79.4 CONTROLLED TYPE 2 DIABETES MELLITUS WITHOUT COMPLICATION, WITH LONG-TERM CURRENT USE OF INSULIN (HCC): ICD-10-CM

## 2024-11-05 NOTE — TELEPHONE ENCOUNTER
Refill Request     CONFIRM preferred pharmacy with the patient.    If Mail Order Rx - Pend for 90 day refill.      Last Seen: Last Seen Department: 10/14/2024  Last Seen by PCP: 2024    Last Written: 23 1 device     If no future appointment scheduled:  Review the last OV with PCP and review information for follow-up visit,  Route STAFF MESSAGE with patient name to the  Pool for scheduling with the following information:            -  Timing of next visit           -  Visit type ie Physical, OV, etc           -  Diagnoses/Reason ie. COPD, HTN - Do not use MEDICATION, Follow-up or CHECK UP - Give reason for visit      Next Appointment:   Future Appointments   Date Time Provider Department Center   2024 10:00 AM Constance Yancey PA [facility]   2024  1:15 PM Kamilla Duron APRN - CNP [facility]   1/15/2025  1:00 PM SCHEDULE, ALETHEA DEVICE CHECK [facility]   1/15/2025  1:00 PM Ericka Valdez APRN - CNP [facility]   2025  2:20 PM Kelsey Flores MD [facility]   2025 10:30 AM SCHEDULE, ALETHEA DEVICE CHECK [facility]   2025 10:30 AM Ericka Valdez APRN - CNP [facility]       Message sent to  to schedule appt with patient?  NO      Requested Prescriptions     Pending Prescriptions Disp Refills    Continuous Glucose  (FREESTYLE JUSITN 14 DAY READER) OLI 1 each 5     Si Units by Does not apply route as needed (as needed)

## 2024-11-05 NOTE — PROGRESS NOTES
Watchman placed in July, confirmed in Sept, stopped warfarin. Will close coumadin clinic episode.  Xavier Crowley, PharmD 2:09 PM EST 11/5/24

## 2024-11-06 RX ORDER — FLASH GLUCOSE SCANNING READER
1 EACH MISCELLANEOUS PRN
Qty: 1 EACH | Refills: 5 | Status: SHIPPED | OUTPATIENT
Start: 2024-11-06

## 2024-11-22 DIAGNOSIS — Z79.4 TYPE 2 DIABETES MELLITUS WITH MICROALBUMINURIA, WITH LONG-TERM CURRENT USE OF INSULIN (HCC): ICD-10-CM

## 2024-11-22 DIAGNOSIS — E11.9 CONTROLLED TYPE 2 DIABETES MELLITUS WITHOUT COMPLICATION, WITH LONG-TERM CURRENT USE OF INSULIN (HCC): ICD-10-CM

## 2024-11-22 DIAGNOSIS — R80.9 TYPE 2 DIABETES MELLITUS WITH MICROALBUMINURIA, WITH LONG-TERM CURRENT USE OF INSULIN (HCC): ICD-10-CM

## 2024-11-22 DIAGNOSIS — E11.29 TYPE 2 DIABETES MELLITUS WITH MICROALBUMINURIA, WITH LONG-TERM CURRENT USE OF INSULIN (HCC): ICD-10-CM

## 2024-11-22 DIAGNOSIS — Z79.4 CONTROLLED TYPE 2 DIABETES MELLITUS WITHOUT COMPLICATION, WITH LONG-TERM CURRENT USE OF INSULIN (HCC): ICD-10-CM

## 2024-11-22 RX ORDER — HYDROCHLOROTHIAZIDE 12.5 MG/1
CAPSULE ORAL
Qty: 2 EACH | Refills: 11 | Status: SHIPPED | OUTPATIENT
Start: 2024-11-22

## 2024-11-22 RX ORDER — INSULIN GLARGINE 100 [IU]/ML
20 INJECTION, SOLUTION SUBCUTANEOUS NIGHTLY
Qty: 15 ML | Refills: 0 | Status: SHIPPED | OUTPATIENT
Start: 2024-11-22

## 2024-11-22 RX ORDER — KETOROLAC TROMETHAMINE 30 MG/ML
1 INJECTION, SOLUTION INTRAMUSCULAR; INTRAVENOUS
Qty: 1 EACH | Refills: 0 | Status: SHIPPED | OUTPATIENT
Start: 2024-11-22

## 2024-11-22 NOTE — TELEPHONE ENCOUNTER
Refill Request     CONFIRM preferred pharmacy with the patient.    If Mail Order Rx - Pend for 90 day refill.      Last Seen: Last Seen Department: 10/14/2024  Last Seen by PCP: 9/30/2024    Last Written: 7/10/24 15 ml with 1 refill     If no future appointment scheduled:  Review the last OV with PCP and review information for follow-up visit,  Route STAFF MESSAGE with patient name to the  Pool for scheduling with the following information:            -  Timing of next visit           -  Visit type ie Physical, OV, etc           -  Diagnoses/Reason ie. COPD, HTN - Do not use MEDICATION, Follow-up or CHECK UP - Give reason for visit      Next Appointment:   Future Appointments   Date Time Provider Department Center   12/2/2024 10:00 AM Constance Yancey PA [facility]   12/17/2024  1:15 PM Kamilla Duron APRN - CNP [facility]   1/15/2025  1:00 PM SCHEDULE, ALETHEA DEVICE CHECK [facility]   1/15/2025  1:00 PM Ericka Valdez APRN - CNP [facility]   2/5/2025  2:20 PM Kelsey Flores MD [facility]   8/13/2025 10:30 AM SCHEDULE, ALETHEA DEVICE CHECK [facility]   8/13/2025 10:30 AM Ericka Valdez APRN - CNP [facility]       Message sent to  to schedule appt with patient?  NO      Requested Prescriptions     Pending Prescriptions Disp Refills    LANTUS SOLOSTAR 100 UNIT/ML injection pen [Pharmacy Med Name: Lantus SoloStar 100 UNIT/ML Subcutaneous Solution Pen-injector] 15 mL 0     Sig: INJECT 20 UNITS SUBCUTANEOUSLY NIGHTLY

## 2024-11-22 NOTE — TELEPHONE ENCOUNTER
Please send these updated blood glucose device and reader to the pharmacy. The previous order is no longer available     Thank you.     Patient would like a call when this is sent to the pharmacy.

## 2024-11-29 DIAGNOSIS — G62.9 PERIPHERAL POLYNEUROPATHY: ICD-10-CM

## 2024-11-29 NOTE — TELEPHONE ENCOUNTER
Last Office Visit: 2024 Provider: ALEX  Next Office Visit: 01/15/2024 Provider: ALEX      LAST LABS:   BMP:   Lab Results   Component Value Date/Time     2024 02:48 PM    K 4.6 2024 02:48 PM     2024 02:48 PM    CO2 30 2024 02:48 PM    BUN 22 2024 02:48 PM    CREATININE 1.0 2024 02:48 PM    GLUCOSE 203 2024 02:48 PM    CALCIUM 9.5 2024 02:48 PM    LABGLOM 79 2024 02:48 PM    LABGLOM 79 2024 02:17 PM       [id number]      Requested Prescriptions     Pending Prescriptions Disp Refills    dofetilide (TIKOSYN) 250 MCG capsule [Pharmacy Med Name: Dofetilide 250 MCG Oral Capsule] 60 capsule 0     Sig: TAKE 1 CAPSULE BY MOUTH EVERY 12 HOURS

## 2024-11-30 NOTE — TELEPHONE ENCOUNTER
Refill Request     CONFIRM preferred pharmacy with the patient.    If Mail Order Rx - Pend for 90 day refill.      Last Seen: Last Seen Department: 10/14/2024  Last Seen by PCP: 9/30/2024    Last Written: 11/4/2024 60 cap 0 refills    If no future appointment scheduled:  Review the last OV with PCP and review information for follow-up visit,  Route STAFF MESSAGE with patient name to the  Pool for scheduling with the following information:            -  Timing of next visit           -  Visit type ie Physical, OV, etc           -  Diagnoses/Reason ie. COPD, HTN - Do not use MEDICATION, Follow-up or CHECK UP - Give reason for visit      Next Appointment:   Future Appointments   Date Time Provider Department Center   12/2/2024 10:00 AM Constance Yancey PA [facility]   12/17/2024  1:15 PM Kamilla Duron APRN - CNP [facility]   1/15/2025  1:00 PM SCHEDULE, ALETHEA DEVICE CHECK [facility]   1/15/2025  1:00 PM Ericka Valdez APRN - CNP [facility]   2/5/2025  2:20 PM Kelsey Flores MD [facility]   8/13/2025 10:30 AM SCHEDULE, ALETHEA DEVICE CHECK [facility]   8/13/2025 10:30 AM Ericka Valdez APRN - CNP [facility]       Message sent to  to schedule appt with patient?  NO      Requested Prescriptions     Pending Prescriptions Disp Refills    gabapentin (NEURONTIN) 300 MG capsule [Pharmacy Med Name: Gabapentin 300 MG Oral Capsule] 60 capsule 0     Sig: TAKE 1 TO 2 CAPSULES BY MOUTH NIGHTLY

## 2024-12-02 ENCOUNTER — OFFICE VISIT (OUTPATIENT)
Dept: FAMILY MEDICINE CLINIC | Age: 74
End: 2024-12-02
Payer: MEDICARE

## 2024-12-02 VITALS
HEART RATE: 93 BPM | OXYGEN SATURATION: 95 % | SYSTOLIC BLOOD PRESSURE: 126 MMHG | HEIGHT: 70 IN | TEMPERATURE: 97.1 F | DIASTOLIC BLOOD PRESSURE: 64 MMHG | BODY MASS INDEX: 40.03 KG/M2

## 2024-12-02 DIAGNOSIS — E11.29 TYPE 2 DIABETES MELLITUS WITH MICROALBUMINURIA, WITH LONG-TERM CURRENT USE OF INSULIN (HCC): Primary | ICD-10-CM

## 2024-12-02 DIAGNOSIS — I48.0 PAF (PAROXYSMAL ATRIAL FIBRILLATION) (HCC): ICD-10-CM

## 2024-12-02 DIAGNOSIS — I50.812 CHRONIC RIGHT-SIDED CONGESTIVE HEART FAILURE (HCC): ICD-10-CM

## 2024-12-02 DIAGNOSIS — R06.09 DOE (DYSPNEA ON EXERTION): ICD-10-CM

## 2024-12-02 DIAGNOSIS — G89.29 CHRONIC PAIN OF LEFT KNEE: ICD-10-CM

## 2024-12-02 DIAGNOSIS — R80.9 TYPE 2 DIABETES MELLITUS WITH MICROALBUMINURIA, WITH LONG-TERM CURRENT USE OF INSULIN (HCC): Primary | ICD-10-CM

## 2024-12-02 DIAGNOSIS — I50.32 CHRONIC DIASTOLIC (CONGESTIVE) HEART FAILURE (HCC): ICD-10-CM

## 2024-12-02 DIAGNOSIS — E11.41 DIABETIC MONONEUROPATHY ASSOCIATED WITH TYPE 2 DIABETES MELLITUS (HCC): ICD-10-CM

## 2024-12-02 DIAGNOSIS — M25.562 CHRONIC PAIN OF LEFT KNEE: ICD-10-CM

## 2024-12-02 DIAGNOSIS — I25.10 CORONARY ARTERY DISEASE INVOLVING NATIVE CORONARY ARTERY OF NATIVE HEART WITHOUT ANGINA PECTORIS: ICD-10-CM

## 2024-12-02 DIAGNOSIS — Z95.818 PRESENCE OF WATCHMAN LEFT ATRIAL APPENDAGE CLOSURE DEVICE: ICD-10-CM

## 2024-12-02 DIAGNOSIS — Z79.4 TYPE 2 DIABETES MELLITUS WITH MICROALBUMINURIA, WITH LONG-TERM CURRENT USE OF INSULIN (HCC): Primary | ICD-10-CM

## 2024-12-02 LAB — HBA1C MFR BLD: 11.3 %

## 2024-12-02 PROCEDURE — 2022F DILAT RTA XM EVC RTNOPTHY: CPT | Performed by: PHYSICIAN ASSISTANT

## 2024-12-02 PROCEDURE — 99214 OFFICE O/P EST MOD 30 MIN: CPT | Performed by: PHYSICIAN ASSISTANT

## 2024-12-02 PROCEDURE — G8427 DOCREV CUR MEDS BY ELIG CLIN: HCPCS | Performed by: PHYSICIAN ASSISTANT

## 2024-12-02 PROCEDURE — 3017F COLORECTAL CA SCREEN DOC REV: CPT | Performed by: PHYSICIAN ASSISTANT

## 2024-12-02 PROCEDURE — 3078F DIAST BP <80 MM HG: CPT | Performed by: PHYSICIAN ASSISTANT

## 2024-12-02 PROCEDURE — 3046F HEMOGLOBIN A1C LEVEL >9.0%: CPT | Performed by: PHYSICIAN ASSISTANT

## 2024-12-02 PROCEDURE — 1159F MED LIST DOCD IN RCRD: CPT | Performed by: PHYSICIAN ASSISTANT

## 2024-12-02 PROCEDURE — 3074F SYST BP LT 130 MM HG: CPT | Performed by: PHYSICIAN ASSISTANT

## 2024-12-02 PROCEDURE — 83036 HEMOGLOBIN GLYCOSYLATED A1C: CPT | Performed by: PHYSICIAN ASSISTANT

## 2024-12-02 PROCEDURE — G8417 CALC BMI ABV UP PARAM F/U: HCPCS | Performed by: PHYSICIAN ASSISTANT

## 2024-12-02 PROCEDURE — G8482 FLU IMMUNIZE ORDER/ADMIN: HCPCS | Performed by: PHYSICIAN ASSISTANT

## 2024-12-02 PROCEDURE — 1124F ACP DISCUSS-NO DSCNMKR DOCD: CPT | Performed by: PHYSICIAN ASSISTANT

## 2024-12-02 PROCEDURE — 1036F TOBACCO NON-USER: CPT | Performed by: PHYSICIAN ASSISTANT

## 2024-12-02 RX ORDER — DOFETILIDE 0.25 MG/1
250 CAPSULE ORAL EVERY 12 HOURS SCHEDULED
Qty: 180 CAPSULE | Refills: 2 | Status: SHIPPED | OUTPATIENT
Start: 2024-12-02

## 2024-12-02 RX ORDER — GABAPENTIN 300 MG/1
CAPSULE ORAL
Qty: 60 CAPSULE | Refills: 0 | Status: SHIPPED | OUTPATIENT
Start: 2024-12-02 | End: 2024-12-02

## 2024-12-02 RX ORDER — GABAPENTIN 600 MG/1
600 TABLET ORAL 2 TIMES DAILY
Qty: 60 TABLET | Refills: 0 | Status: SHIPPED | OUTPATIENT
Start: 2024-12-02 | End: 2025-01-01

## 2024-12-02 SDOH — ECONOMIC STABILITY: FOOD INSECURITY: WITHIN THE PAST 12 MONTHS, THE FOOD YOU BOUGHT JUST DIDN'T LAST AND YOU DIDN'T HAVE MONEY TO GET MORE.: NEVER TRUE

## 2024-12-02 SDOH — ECONOMIC STABILITY: INCOME INSECURITY: HOW HARD IS IT FOR YOU TO PAY FOR THE VERY BASICS LIKE FOOD, HOUSING, MEDICAL CARE, AND HEATING?: NOT HARD AT ALL

## 2024-12-02 SDOH — ECONOMIC STABILITY: FOOD INSECURITY: WITHIN THE PAST 12 MONTHS, YOU WORRIED THAT YOUR FOOD WOULD RUN OUT BEFORE YOU GOT MONEY TO BUY MORE.: NEVER TRUE

## 2024-12-02 ASSESSMENT — PATIENT HEALTH QUESTIONNAIRE - PHQ9
4. FEELING TIRED OR HAVING LITTLE ENERGY: NOT AT ALL
7. TROUBLE CONCENTRATING ON THINGS, SUCH AS READING THE NEWSPAPER OR WATCHING TELEVISION: NOT AT ALL
1. LITTLE INTEREST OR PLEASURE IN DOING THINGS: NOT AT ALL
SUM OF ALL RESPONSES TO PHQ QUESTIONS 1-9: 0
SUM OF ALL RESPONSES TO PHQ QUESTIONS 1-9: 0
9. THOUGHTS THAT YOU WOULD BE BETTER OFF DEAD, OR OF HURTING YOURSELF: NOT AT ALL
SUM OF ALL RESPONSES TO PHQ QUESTIONS 1-9: 0
3. TROUBLE FALLING OR STAYING ASLEEP: NOT AT ALL
6. FEELING BAD ABOUT YOURSELF - OR THAT YOU ARE A FAILURE OR HAVE LET YOURSELF OR YOUR FAMILY DOWN: NOT AT ALL
SUM OF ALL RESPONSES TO PHQ9 QUESTIONS 1 & 2: 0
5. POOR APPETITE OR OVEREATING: NOT AT ALL
8. MOVING OR SPEAKING SO SLOWLY THAT OTHER PEOPLE COULD HAVE NOTICED. OR THE OPPOSITE, BEING SO FIGETY OR RESTLESS THAT YOU HAVE BEEN MOVING AROUND A LOT MORE THAN USUAL: NOT AT ALL
SUM OF ALL RESPONSES TO PHQ QUESTIONS 1-9: 0
2. FEELING DOWN, DEPRESSED OR HOPELESS: NOT AT ALL

## 2024-12-02 NOTE — PROGRESS NOTES
2024  London Swenson (: 1950)  74 y.o.    ASSESSMENT and PLAN:  London was seen today for diabetes.    Diagnoses and all orders for this visit:    Type 2 diabetes mellitus with microalbuminuria, with long-term current use of insulin (HCC)  -     POCT glycosylated hemoglobin (Hb A1C)11.3  - chronic, uncontrolled. Noncompliant with low carb diet- does not have  to review BG monitors in office.   - May benefit from patient assistance for glp1 and sglt2- sees endo in Februrary will defer to specialty team.     Diabetic mononeuropathy associated with type 2 diabetes mellitus (HCC)  -     gabapentin (NEURONTIN) 600 MG tablet; Take 1 tablet by mouth 2 times daily for 30 days.  - discussed likely progression with uncontrolled BG. Increase gabapentin dose today   - oarrs reviewed, taking appropriately.     Coronary artery disease involving native coronary artery of native heart without angina pectoris  Chronic right-sided congestive heart failure (HCC)  FREEMAN (dyspnea on exertion)  -     Brain Natriuretic Peptide; Future  -     Basic Metabolic Panel; Future  - discussed the importance of discussing side effects of diuretics with myself or cardiology prior to stopping medications. Discussed pathophysiology of heart failure and the role diuretics play in management as well as the risks associated with stopping them. Recommend restarting torsemide, can continue to hold spironolactone pending lab results. Continue with low sodium diet and daily weights.     PAF (paroxysmal atrial fibrillation) (HCC)  Presence of Watchman left atrial appendage closure device  - mgmt per ep, regular in office today.     Chronic pain of left knee  -     gabapentin (NEURONTIN) 600 MG tablet; Take 1 tablet by mouth 2 times daily for 30 days.  - in need of knee replacement but patient is poor surgical candidate 2/2 to comorbid conditions- hf, uncontrolled dm, etc. He is aware that for now, medication is the only option. Will

## 2024-12-02 NOTE — PATIENT INSTRUCTIONS
Re-start torsemide at 20 mg daily  Please schedule in with cardiology to discuss water pills and side effects.   Also need to see if Dr. Solis wants to see you sooner than 2/2025 appointment

## 2024-12-03 LAB
ANION GAP SERPL CALCULATED.3IONS-SCNC: 9 MMOL/L (ref 3–16)
BUN SERPL-MCNC: 15 MG/DL (ref 7–20)
CALCIUM SERPL-MCNC: 9.2 MG/DL (ref 8.3–10.6)
CHLORIDE SERPL-SCNC: 103 MMOL/L (ref 99–110)
CO2 SERPL-SCNC: 30 MMOL/L (ref 21–32)
CREAT SERPL-MCNC: 0.8 MG/DL (ref 0.8–1.3)
GFR SERPLBLD CREATININE-BSD FMLA CKD-EPI: >90 ML/MIN/{1.73_M2}
GLUCOSE SERPL-MCNC: 162 MG/DL (ref 70–99)
NT-PROBNP SERPL-MCNC: 135 PG/ML (ref 0–449)
POTASSIUM SERPL-SCNC: 4.2 MMOL/L (ref 3.5–5.1)
SODIUM SERPL-SCNC: 142 MMOL/L (ref 136–145)

## 2024-12-26 DIAGNOSIS — M25.562 CHRONIC PAIN OF LEFT KNEE: ICD-10-CM

## 2024-12-26 DIAGNOSIS — E11.41 DIABETIC MONONEUROPATHY ASSOCIATED WITH TYPE 2 DIABETES MELLITUS (HCC): ICD-10-CM

## 2024-12-26 DIAGNOSIS — G89.29 CHRONIC PAIN OF LEFT KNEE: ICD-10-CM

## 2024-12-26 NOTE — TELEPHONE ENCOUNTER
Refill Request - Controlled Substance    CONFIRM preferred pharmacy with the patient.    If Mail Order Rx - Pend for 90 day refill.        Last Seen Department: 12/2/2024  Last Seen by PCP: 12/2/2024    Last Written: 12/2/24 60 with no refills     Last UDS: none    Med Agreement Signed On: 3/1/24    If no future appointment scheduled:  Review the last OV with PCP and review information for follow-up visit,  Route STAFF MESSAGE with patient name to the  Pool for scheduling with the following information:            -  Timing of next visit           -  Visit type ie Physical, OV, etc           -  Diagnoses/Reason ie. COPD, HTN - Do not use MEDICATION, Follow-up or CHECK UP - Give reason for visit        Next Appointment:   Future Appointments   Date Time Provider Department Center   1/15/2025  1:00 PM SCHEDULE, ALETHEA DEVICE CHECK [facility]   1/15/2025  1:00 PM Ericka Valdez APRN - CNP [facility]   2/3/2025 10:45 AM Kamilla Duron APRN - CNP [facility]   2/5/2025  2:20 PM Kelsey Flores MD [facility]   3/3/2025  1:00 PM Constance Yancey PA [facility]   8/13/2025 10:30 AM SCHEDULE, ALETHEA DEVICE CHECK [facility]   8/13/2025 10:30 AM Ericka Valdez APRN - CNP [facility]       Message sent to  to schedule appt with patient?  NO      Requested Prescriptions     Pending Prescriptions Disp Refills    gabapentin (NEURONTIN) 600 MG tablet [Pharmacy Med Name: Gabapentin 600 MG Oral Tablet] 60 tablet 0     Sig: Take 1 tablet by mouth 2 times daily.

## 2024-12-30 RX ORDER — GABAPENTIN 600 MG/1
600 TABLET ORAL 2 TIMES DAILY
Qty: 60 TABLET | Refills: 0 | Status: SHIPPED | OUTPATIENT
Start: 2024-12-30 | End: 2025-01-29

## 2025-01-07 PROCEDURE — 93296 REM INTERROG EVL PM/IDS: CPT | Performed by: INTERNAL MEDICINE

## 2025-01-07 PROCEDURE — 93294 REM INTERROG EVL PM/LDLS PM: CPT | Performed by: INTERNAL MEDICINE

## 2025-01-13 NOTE — PROGRESS NOTES
[facility]   Electrophysiology Outpatient Note              Date:  January 13, 2025  Patient name: London Swenson  YOB: 1950    Primary Care physician: Constance Yancey PA    HISTORY OF PRESENT ILLNESS: The patient is a 74 y.o.  male with a history of paroxysmal atrial fibrillation, atrial fibrillation ablation , Watchman device, tachybradycardia syndrome, PPM, nonobstructive CAD, HFpEF, diabetes, hypertension, hyperlipidemia, and sleep apnea    Patient follows with  in EP clinic.  He was admitted to [facility] 12/14/2023 with persistent atrial fibrillation and tachybradycardia syndrome.  He has had issues with recurrent syncope.  12/15/2023 Patient underwent atrial fibrillation ablation and Tikosyn initiation.  On 12/29/2023 patient had dual-chamber PPM implantation for tachybradycardia syndrome.  Postprocedure patient was found to have RA lead dislodgment.  On 1/10/2024 patient had RA lead revision    3/25/2024 he was seen for history of paroxysmal atrial fibrillation and management of device.  Device check reveals AP 51.1%,  0.5%.  One brief NSVT event lasting 7 beats and one PSVT episode lasting 6 sec rate 176.  Increased outputs to keep 2:1.  EKG shows SR with a HR of 62; Qtc ~400.  He is accompanied with family member today.  Patient complains of shortness of breath with activity.  Does have lower extremity edema.  Will start Lasix for 7 days.  Patient will start to monitor his weight at home every day.  Also discussed not using salt on his food.  5/10/2024 he presents for follow-up of history of paroxysmal atrial fibrillation and Tikosyn management.  His recent remote device check reveals AP 36.9%,  0.2% ; 10 very brief NSVT episodes. EKG reveals sinus rhythm rate 64.  QTc approximately 410.  Patient has lower extremity pitting edema and abdominal distention.  Patient states he gets short of breath with activity.  Will increase Lasix to 80 mg daily for 5 days.

## 2025-01-14 RX ORDER — SPIRONOLACTONE 25 MG/1
25 TABLET ORAL DAILY
Qty: 90 TABLET | Refills: 0 | Status: ON HOLD | OUTPATIENT
Start: 2025-01-14

## 2025-01-15 ENCOUNTER — OFFICE VISIT (OUTPATIENT)
Dept: CARDIOLOGY CLINIC | Age: 75
End: 2025-01-15

## 2025-01-15 ENCOUNTER — APPOINTMENT (OUTPATIENT)
Dept: GENERAL RADIOLOGY | Age: 75
DRG: 291 | End: 2025-01-15
Payer: MEDICARE

## 2025-01-15 ENCOUNTER — APPOINTMENT (OUTPATIENT)
Dept: CT IMAGING | Age: 75
DRG: 291 | End: 2025-01-15
Payer: MEDICARE

## 2025-01-15 ENCOUNTER — NURSE ONLY (OUTPATIENT)
Dept: CARDIOLOGY CLINIC | Age: 75
End: 2025-01-15

## 2025-01-15 ENCOUNTER — HOSPITAL ENCOUNTER (INPATIENT)
Age: 75
LOS: 5 days | Discharge: HOME | DRG: 291 | End: 2025-01-20
Attending: STUDENT IN AN ORGANIZED HEALTH CARE EDUCATION/TRAINING PROGRAM | Admitting: STUDENT IN AN ORGANIZED HEALTH CARE EDUCATION/TRAINING PROGRAM
Payer: MEDICARE

## 2025-01-15 VITALS
HEIGHT: 70 IN | OXYGEN SATURATION: 92 % | WEIGHT: 290.8 LBS | DIASTOLIC BLOOD PRESSURE: 62 MMHG | SYSTOLIC BLOOD PRESSURE: 134 MMHG | HEART RATE: 80 BPM | BODY MASS INDEX: 41.63 KG/M2

## 2025-01-15 DIAGNOSIS — I50.9 ACUTE ON CHRONIC CONGESTIVE HEART FAILURE, UNSPECIFIED HEART FAILURE TYPE (HCC): Primary | ICD-10-CM

## 2025-01-15 DIAGNOSIS — Z95.0 PACEMAKER: Primary | ICD-10-CM

## 2025-01-15 DIAGNOSIS — I47.19 ATRIAL TACHYCARDIA (HCC): Primary | ICD-10-CM

## 2025-01-15 DIAGNOSIS — Z95.0 PACEMAKER: ICD-10-CM

## 2025-01-15 DIAGNOSIS — R09.02 HYPOXIA: ICD-10-CM

## 2025-01-15 DIAGNOSIS — I48.0 PAF (PAROXYSMAL ATRIAL FIBRILLATION) (HCC): ICD-10-CM

## 2025-01-15 DIAGNOSIS — I49.5 SINUS NODE DYSFUNCTION (HCC): ICD-10-CM

## 2025-01-15 DIAGNOSIS — R06.02 SHORTNESS OF BREATH: ICD-10-CM

## 2025-01-15 LAB
ALBUMIN SERPL-MCNC: 4 G/DL (ref 3.4–5)
ALBUMIN/GLOB SERPL: 1.7 {RATIO} (ref 1.1–2.2)
ALP SERPL-CCNC: 102 U/L (ref 40–129)
ALT SERPL-CCNC: 16 U/L (ref 10–40)
ANION GAP SERPL CALCULATED.3IONS-SCNC: 9 MMOL/L (ref 3–16)
AST SERPL-CCNC: 18 U/L (ref 15–37)
BASOPHILS # BLD: 0 K/UL (ref 0–0.2)
BASOPHILS NFR BLD: 0.6 %
BILIRUB SERPL-MCNC: 0.7 MG/DL (ref 0–1)
BUN SERPL-MCNC: 17 MG/DL (ref 7–20)
CALCIUM SERPL-MCNC: 8.6 MG/DL (ref 8.3–10.6)
CHLORIDE SERPL-SCNC: 100 MMOL/L (ref 99–110)
CO2 SERPL-SCNC: 32 MMOL/L (ref 21–32)
CREAT SERPL-MCNC: 1 MG/DL (ref 0.8–1.3)
DEPRECATED RDW RBC AUTO: 15.1 % (ref 12.4–15.4)
EKG ATRIAL RATE: 64 BPM
EKG DIAGNOSIS: NORMAL
EKG P-R INTERVAL: 234 MS
EKG Q-T INTERVAL: 446 MS
EKG QRS DURATION: 78 MS
EKG QTC CALCULATION (BAZETT): 460 MS
EKG R AXIS: 68 DEGREES
EKG T AXIS: 51 DEGREES
EKG VENTRICULAR RATE: 64 BPM
EOSINOPHIL # BLD: 0.3 K/UL (ref 0–0.6)
EOSINOPHIL NFR BLD: 5.7 %
FLUAV RNA RESP QL NAA+PROBE: NOT DETECTED
FLUBV RNA RESP QL NAA+PROBE: NOT DETECTED
GFR SERPLBLD CREATININE-BSD FMLA CKD-EPI: 79 ML/MIN/{1.73_M2}
GLUCOSE BLD-MCNC: 270 MG/DL (ref 70–99)
GLUCOSE BLD-MCNC: 284 MG/DL (ref 70–99)
GLUCOSE SERPL-MCNC: 319 MG/DL (ref 70–99)
HCT VFR BLD AUTO: 37.3 % (ref 40.5–52.5)
HGB BLD-MCNC: 12 G/DL (ref 13.5–17.5)
LYMPHOCYTES # BLD: 0.8 K/UL (ref 1–5.1)
LYMPHOCYTES NFR BLD: 14.9 %
MCH RBC QN AUTO: 28.9 PG (ref 26–34)
MCHC RBC AUTO-ENTMCNC: 32.3 G/DL (ref 31–36)
MCV RBC AUTO: 89.4 FL (ref 80–100)
MONOCYTES # BLD: 0.4 K/UL (ref 0–1.3)
MONOCYTES NFR BLD: 8 %
NEUTROPHILS # BLD: 3.7 K/UL (ref 1.7–7.7)
NEUTROPHILS NFR BLD: 70.8 %
NT-PROBNP SERPL-MCNC: 68 PG/ML (ref 0–449)
PERFORMED ON: ABNORMAL
PERFORMED ON: ABNORMAL
PLATELET # BLD AUTO: 143 K/UL (ref 135–450)
PMV BLD AUTO: 9.3 FL (ref 5–10.5)
POTASSIUM SERPL-SCNC: 4.8 MMOL/L (ref 3.5–5.1)
PROT SERPL-MCNC: 6.3 G/DL (ref 6.4–8.2)
RBC # BLD AUTO: 4.17 M/UL (ref 4.2–5.9)
SARS-COV-2 RNA RESP QL NAA+PROBE: NOT DETECTED
SODIUM SERPL-SCNC: 141 MMOL/L (ref 136–145)
TROPONIN, HIGH SENSITIVITY: 13 NG/L (ref 0–22)
TROPONIN, HIGH SENSITIVITY: 14 NG/L (ref 0–22)
WBC # BLD AUTO: 5.2 K/UL (ref 4–11)

## 2025-01-15 PROCEDURE — 93010 ELECTROCARDIOGRAM REPORT: CPT | Performed by: INTERNAL MEDICINE

## 2025-01-15 PROCEDURE — 6360000002 HC RX W HCPCS: Performed by: STUDENT IN AN ORGANIZED HEALTH CARE EDUCATION/TRAINING PROGRAM

## 2025-01-15 PROCEDURE — 6360000004 HC RX CONTRAST MEDICATION: Performed by: STUDENT IN AN ORGANIZED HEALTH CARE EDUCATION/TRAINING PROGRAM

## 2025-01-15 PROCEDURE — 83880 ASSAY OF NATRIURETIC PEPTIDE: CPT

## 2025-01-15 PROCEDURE — 80053 COMPREHEN METABOLIC PANEL: CPT

## 2025-01-15 PROCEDURE — 2500000003 HC RX 250 WO HCPCS: Performed by: STUDENT IN AN ORGANIZED HEALTH CARE EDUCATION/TRAINING PROGRAM

## 2025-01-15 PROCEDURE — 71045 X-RAY EXAM CHEST 1 VIEW: CPT

## 2025-01-15 PROCEDURE — 87636 SARSCOV2 & INF A&B AMP PRB: CPT

## 2025-01-15 PROCEDURE — 99285 EMERGENCY DEPT VISIT HI MDM: CPT

## 2025-01-15 PROCEDURE — 1200000000 HC SEMI PRIVATE

## 2025-01-15 PROCEDURE — 85025 COMPLETE CBC W/AUTO DIFF WBC: CPT

## 2025-01-15 PROCEDURE — 36415 COLL VENOUS BLD VENIPUNCTURE: CPT

## 2025-01-15 PROCEDURE — 96374 THER/PROPH/DIAG INJ IV PUSH: CPT

## 2025-01-15 PROCEDURE — 6370000000 HC RX 637 (ALT 250 FOR IP): Performed by: STUDENT IN AN ORGANIZED HEALTH CARE EDUCATION/TRAINING PROGRAM

## 2025-01-15 PROCEDURE — 84484 ASSAY OF TROPONIN QUANT: CPT

## 2025-01-15 PROCEDURE — 71260 CT THORAX DX C+: CPT

## 2025-01-15 PROCEDURE — 93005 ELECTROCARDIOGRAM TRACING: CPT | Performed by: STUDENT IN AN ORGANIZED HEALTH CARE EDUCATION/TRAINING PROGRAM

## 2025-01-15 RX ORDER — SPIRONOLACTONE 25 MG/1
25 TABLET ORAL DAILY
Status: DISCONTINUED | OUTPATIENT
Start: 2025-01-15 | End: 2025-01-20 | Stop reason: HOSPADM

## 2025-01-15 RX ORDER — IOPAMIDOL 755 MG/ML
75 INJECTION, SOLUTION INTRAVASCULAR
Status: COMPLETED | OUTPATIENT
Start: 2025-01-15 | End: 2025-01-15

## 2025-01-15 RX ORDER — INSULIN GLARGINE 100 [IU]/ML
16 INJECTION, SOLUTION SUBCUTANEOUS DAILY
Status: DISCONTINUED | OUTPATIENT
Start: 2025-01-15 | End: 2025-01-20 | Stop reason: HOSPADM

## 2025-01-15 RX ORDER — DOFETILIDE 0.25 MG/1
250 CAPSULE ORAL EVERY 12 HOURS SCHEDULED
Status: DISCONTINUED | OUTPATIENT
Start: 2025-01-15 | End: 2025-01-20 | Stop reason: HOSPADM

## 2025-01-15 RX ORDER — SODIUM CHLORIDE 0.9 % (FLUSH) 0.9 %
5-40 SYRINGE (ML) INJECTION EVERY 12 HOURS SCHEDULED
Status: DISCONTINUED | OUTPATIENT
Start: 2025-01-15 | End: 2025-01-20 | Stop reason: HOSPADM

## 2025-01-15 RX ORDER — INSULIN LISPRO 100 [IU]/ML
20 INJECTION, SOLUTION INTRAVENOUS; SUBCUTANEOUS
Status: DISCONTINUED | OUTPATIENT
Start: 2025-01-15 | End: 2025-01-20 | Stop reason: HOSPADM

## 2025-01-15 RX ORDER — ENOXAPARIN SODIUM 100 MG/ML
30 INJECTION SUBCUTANEOUS 2 TIMES DAILY
Status: DISCONTINUED | OUTPATIENT
Start: 2025-01-15 | End: 2025-01-20 | Stop reason: HOSPADM

## 2025-01-15 RX ORDER — ONDANSETRON 4 MG/1
4 TABLET, ORALLY DISINTEGRATING ORAL EVERY 8 HOURS PRN
Status: DISCONTINUED | OUTPATIENT
Start: 2025-01-15 | End: 2025-01-20 | Stop reason: HOSPADM

## 2025-01-15 RX ORDER — ATORVASTATIN CALCIUM 40 MG/1
40 TABLET, FILM COATED ORAL NIGHTLY
Status: DISCONTINUED | OUTPATIENT
Start: 2025-01-15 | End: 2025-01-20 | Stop reason: HOSPADM

## 2025-01-15 RX ORDER — ACETAMINOPHEN 650 MG/1
650 SUPPOSITORY RECTAL EVERY 6 HOURS PRN
Status: DISCONTINUED | OUTPATIENT
Start: 2025-01-15 | End: 2025-01-20 | Stop reason: HOSPADM

## 2025-01-15 RX ORDER — FUROSEMIDE 10 MG/ML
40 INJECTION INTRAMUSCULAR; INTRAVENOUS ONCE
Status: COMPLETED | OUTPATIENT
Start: 2025-01-15 | End: 2025-01-15

## 2025-01-15 RX ORDER — POLYETHYLENE GLYCOL 3350 17 G/17G
17 POWDER, FOR SOLUTION ORAL DAILY PRN
Status: DISCONTINUED | OUTPATIENT
Start: 2025-01-15 | End: 2025-01-20 | Stop reason: HOSPADM

## 2025-01-15 RX ORDER — ASPIRIN 325 MG
325 TABLET ORAL DAILY
Status: ON HOLD | COMMUNITY

## 2025-01-15 RX ORDER — CLOPIDOGREL BISULFATE 75 MG/1
75 TABLET ORAL DAILY
Status: DISCONTINUED | OUTPATIENT
Start: 2025-01-15 | End: 2025-01-15

## 2025-01-15 RX ORDER — SODIUM CHLORIDE 9 MG/ML
INJECTION, SOLUTION INTRAVENOUS PRN
Status: DISCONTINUED | OUTPATIENT
Start: 2025-01-15 | End: 2025-01-20 | Stop reason: HOSPADM

## 2025-01-15 RX ORDER — ASPIRIN 81 MG/1
324 TABLET, CHEWABLE ORAL DAILY
Status: DISCONTINUED | OUTPATIENT
Start: 2025-01-16 | End: 2025-01-20 | Stop reason: HOSPADM

## 2025-01-15 RX ORDER — SODIUM CHLORIDE 0.9 % (FLUSH) 0.9 %
5-40 SYRINGE (ML) INJECTION PRN
Status: DISCONTINUED | OUTPATIENT
Start: 2025-01-15 | End: 2025-01-20 | Stop reason: HOSPADM

## 2025-01-15 RX ORDER — FUROSEMIDE 10 MG/ML
40 INJECTION INTRAMUSCULAR; INTRAVENOUS 2 TIMES DAILY
Status: DISCONTINUED | OUTPATIENT
Start: 2025-01-16 | End: 2025-01-20

## 2025-01-15 RX ORDER — INSULIN LISPRO 100 [IU]/ML
0-8 INJECTION, SOLUTION INTRAVENOUS; SUBCUTANEOUS
Status: DISCONTINUED | OUTPATIENT
Start: 2025-01-15 | End: 2025-01-20 | Stop reason: HOSPADM

## 2025-01-15 RX ORDER — ONDANSETRON 2 MG/ML
4 INJECTION INTRAMUSCULAR; INTRAVENOUS EVERY 6 HOURS PRN
Status: DISCONTINUED | OUTPATIENT
Start: 2025-01-15 | End: 2025-01-20 | Stop reason: HOSPADM

## 2025-01-15 RX ORDER — GLUCAGON 1 MG/ML
1 KIT INJECTION PRN
Status: DISCONTINUED | OUTPATIENT
Start: 2025-01-15 | End: 2025-01-20 | Stop reason: HOSPADM

## 2025-01-15 RX ORDER — PRAMIPEXOLE DIHYDROCHLORIDE 0.25 MG/1
0.75 TABLET ORAL NIGHTLY
Status: DISCONTINUED | OUTPATIENT
Start: 2025-01-15 | End: 2025-01-20 | Stop reason: HOSPADM

## 2025-01-15 RX ORDER — GABAPENTIN 300 MG/1
600 CAPSULE ORAL 2 TIMES DAILY
Status: DISCONTINUED | OUTPATIENT
Start: 2025-01-15 | End: 2025-01-20 | Stop reason: HOSPADM

## 2025-01-15 RX ORDER — ASPIRIN 81 MG/1
81 TABLET ORAL DAILY
Status: DISCONTINUED | OUTPATIENT
Start: 2025-01-15 | End: 2025-01-15

## 2025-01-15 RX ORDER — DEXTROSE MONOHYDRATE 100 MG/ML
INJECTION, SOLUTION INTRAVENOUS CONTINUOUS PRN
Status: DISCONTINUED | OUTPATIENT
Start: 2025-01-15 | End: 2025-01-20 | Stop reason: HOSPADM

## 2025-01-15 RX ORDER — METOPROLOL TARTRATE 25 MG/1
25 TABLET, FILM COATED ORAL 2 TIMES DAILY
Status: DISCONTINUED | OUTPATIENT
Start: 2025-01-15 | End: 2025-01-20 | Stop reason: HOSPADM

## 2025-01-15 RX ORDER — ACETAMINOPHEN 325 MG/1
650 TABLET ORAL EVERY 6 HOURS PRN
Status: DISCONTINUED | OUTPATIENT
Start: 2025-01-15 | End: 2025-01-20 | Stop reason: HOSPADM

## 2025-01-15 RX ADMIN — GABAPENTIN 600 MG: 300 CAPSULE ORAL at 21:17

## 2025-01-15 RX ADMIN — ASPIRIN 81 MG: 81 TABLET, COATED ORAL at 19:04

## 2025-01-15 RX ADMIN — INSULIN LISPRO 4 UNITS: 100 INJECTION, SOLUTION INTRAVENOUS; SUBCUTANEOUS at 21:17

## 2025-01-15 RX ADMIN — INSULIN LISPRO 20 UNITS: 100 INJECTION, SOLUTION INTRAVENOUS; SUBCUTANEOUS at 19:49

## 2025-01-15 RX ADMIN — FUROSEMIDE 40 MG: 10 INJECTION, SOLUTION INTRAMUSCULAR; INTRAVENOUS at 18:41

## 2025-01-15 RX ADMIN — PRAMIPEXOLE DIHYDROCHLORIDE 0.75 MG: 0.25 TABLET ORAL at 21:17

## 2025-01-15 RX ADMIN — INSULIN GLARGINE 16 UNITS: 100 INJECTION, SOLUTION SUBCUTANEOUS at 19:47

## 2025-01-15 RX ADMIN — SPIRONOLACTONE 25 MG: 25 TABLET, FILM COATED ORAL at 19:05

## 2025-01-15 RX ADMIN — ATORVASTATIN CALCIUM 40 MG: 40 TABLET, FILM COATED ORAL at 21:17

## 2025-01-15 RX ADMIN — DOFETILIDE 250 MCG: 0.25 CAPSULE ORAL at 21:17

## 2025-01-15 RX ADMIN — METOPROLOL TARTRATE 25 MG: 25 TABLET, FILM COATED ORAL at 21:18

## 2025-01-15 RX ADMIN — SODIUM CHLORIDE, PRESERVATIVE FREE 10 ML: 5 INJECTION INTRAVENOUS at 21:18

## 2025-01-15 RX ADMIN — CLOPIDOGREL BISULFATE 75 MG: 75 TABLET ORAL at 19:05

## 2025-01-15 RX ADMIN — IOPAMIDOL 75 ML: 755 INJECTION, SOLUTION INTRAVENOUS at 18:09

## 2025-01-15 RX ADMIN — ENOXAPARIN SODIUM 30 MG: 100 INJECTION SUBCUTANEOUS at 21:17

## 2025-01-15 ASSESSMENT — PAIN SCALES - GENERAL: PAINLEVEL_OUTOF10: 0

## 2025-01-15 ASSESSMENT — PAIN - FUNCTIONAL ASSESSMENT: PAIN_FUNCTIONAL_ASSESSMENT: 0-10

## 2025-01-15 NOTE — ED PROVIDER NOTES
Emergency Department Provider Note  Location: [facility] EMERGENCY DEPARTMENT  1/15/2025     Patient Identification  London Swenson is a 74 y.o. male    Chief Complaint  Shortness of Breath (Pt sent to ED for c/o SOB for the past couple weeks, pt states he has CHF. /)      Mode of Arrival  private car    HPI  (History provided by patient)  This is a 74 y.o. male with a PMH significant for CHF, ESTHER, obesity, type 2 diabetes, paroxysmal A-fib  presented today for shortness of breath.  Symptoms have been worsening over the last 2 weeks.  He was seen by his cardiologist who referred him here.  He reports shortness of breath with exertion.  He denies any chest pain.  He is endorsing some weight gain and bloating but is unsure of any worsening lower extremity swelling.  He states that he has been compliant on his Lasix.  Per cardiology note he is up 21 pounds over the last several months.  They also noted significant lower extremity swelling.  He is 6 months out from Watchman device implantation.  His pacemaker was interrogated earlier today with no abnormalities    ROS  Review of Systems   Respiratory:  Positive for shortness of breath.    All other systems reviewed and are negative.        I have reviewed the following nursing documentation:  Allergies:   Allergies   Allergen Reactions    Clonidine     Oxycodone     Trulicity [Dulaglutide]      nausea    Codeine Nausea And Vomiting       Past medical history:  has a past medical history of A-fib ([facility]), Acid reflux, Yan's esophagus, Coronary artery disease of native heart with stable angina pectoris ([facility]) (05/30/2019), Dementia (HCC), Diabetes mellitus (HCC), Diabetic autonomic neuropathy associated with type 2 diabetes mellitus ([facility]) (03/03/2020), Hyperlipidemia, Hypertension, Pancreatitis (1996), Restless legs, Sleep apnea, and Tremor.    Past surgical history:  has a past surgical history that includes Pancreas surgery; Cholecystectomy; Tonsillectomy;

## 2025-01-16 ENCOUNTER — CARE COORDINATION (OUTPATIENT)
Dept: CASE MANAGEMENT | Age: 75
End: 2025-01-16

## 2025-01-16 LAB
ANION GAP SERPL CALCULATED.3IONS-SCNC: 9 MMOL/L (ref 3–16)
BUN SERPL-MCNC: 16 MG/DL (ref 7–20)
CALCIUM SERPL-MCNC: 8.5 MG/DL (ref 8.3–10.6)
CHLORIDE SERPL-SCNC: 102 MMOL/L (ref 99–110)
CO2 SERPL-SCNC: 33 MMOL/L (ref 21–32)
CREAT SERPL-MCNC: 1 MG/DL (ref 0.8–1.3)
EST. AVERAGE GLUCOSE BLD GHB EST-MCNC: 251.8 MG/DL
FERRITIN SERPL IA-MCNC: 26.5 NG/ML (ref 30–400)
GFR SERPLBLD CREATININE-BSD FMLA CKD-EPI: 79 ML/MIN/{1.73_M2}
GLUCOSE BLD-MCNC: 116 MG/DL (ref 70–99)
GLUCOSE BLD-MCNC: 179 MG/DL (ref 70–99)
GLUCOSE BLD-MCNC: 190 MG/DL (ref 70–99)
GLUCOSE BLD-MCNC: 251 MG/DL (ref 70–99)
GLUCOSE BLD-MCNC: 92 MG/DL (ref 70–99)
GLUCOSE SERPL-MCNC: 169 MG/DL (ref 70–99)
HBA1C MFR BLD: 10.4 %
IRON SATN MFR SERPL: 19 % (ref 20–50)
IRON SERPL-MCNC: 61 UG/DL (ref 59–158)
PERFORMED ON: ABNORMAL
PERFORMED ON: NORMAL
POTASSIUM SERPL-SCNC: 4.1 MMOL/L (ref 3.5–5.1)
SODIUM SERPL-SCNC: 144 MMOL/L (ref 136–145)
TIBC SERPL-MCNC: 315 UG/DL (ref 260–445)

## 2025-01-16 PROCEDURE — 2500000003 HC RX 250 WO HCPCS: Performed by: STUDENT IN AN ORGANIZED HEALTH CARE EDUCATION/TRAINING PROGRAM

## 2025-01-16 PROCEDURE — 83550 IRON BINDING TEST: CPT

## 2025-01-16 PROCEDURE — 36415 COLL VENOUS BLD VENIPUNCTURE: CPT

## 2025-01-16 PROCEDURE — 6370000000 HC RX 637 (ALT 250 FOR IP): Performed by: STUDENT IN AN ORGANIZED HEALTH CARE EDUCATION/TRAINING PROGRAM

## 2025-01-16 PROCEDURE — 99222 1ST HOSP IP/OBS MODERATE 55: CPT | Performed by: INTERNAL MEDICINE

## 2025-01-16 PROCEDURE — 6360000002 HC RX W HCPCS: Performed by: STUDENT IN AN ORGANIZED HEALTH CARE EDUCATION/TRAINING PROGRAM

## 2025-01-16 PROCEDURE — 80048 BASIC METABOLIC PNL TOTAL CA: CPT

## 2025-01-16 PROCEDURE — 1200000000 HC SEMI PRIVATE

## 2025-01-16 PROCEDURE — 82728 ASSAY OF FERRITIN: CPT

## 2025-01-16 PROCEDURE — 83540 ASSAY OF IRON: CPT

## 2025-01-16 PROCEDURE — 83036 HEMOGLOBIN GLYCOSYLATED A1C: CPT

## 2025-01-16 RX ADMIN — ATORVASTATIN CALCIUM 40 MG: 40 TABLET, FILM COATED ORAL at 20:53

## 2025-01-16 RX ADMIN — METOPROLOL TARTRATE 25 MG: 25 TABLET, FILM COATED ORAL at 09:31

## 2025-01-16 RX ADMIN — FUROSEMIDE 40 MG: 10 INJECTION, SOLUTION INTRAMUSCULAR; INTRAVENOUS at 18:18

## 2025-01-16 RX ADMIN — SODIUM CHLORIDE, PRESERVATIVE FREE 10 ML: 5 INJECTION INTRAVENOUS at 09:31

## 2025-01-16 RX ADMIN — GABAPENTIN 600 MG: 300 CAPSULE ORAL at 09:30

## 2025-01-16 RX ADMIN — INSULIN GLARGINE 16 UNITS: 100 INJECTION, SOLUTION SUBCUTANEOUS at 09:31

## 2025-01-16 RX ADMIN — INSULIN LISPRO 20 UNITS: 100 INJECTION, SOLUTION INTRAVENOUS; SUBCUTANEOUS at 18:21

## 2025-01-16 RX ADMIN — INSULIN LISPRO 20 UNITS: 100 INJECTION, SOLUTION INTRAVENOUS; SUBCUTANEOUS at 09:37

## 2025-01-16 RX ADMIN — FUROSEMIDE 40 MG: 10 INJECTION, SOLUTION INTRAMUSCULAR; INTRAVENOUS at 09:28

## 2025-01-16 RX ADMIN — ASPIRIN 324 MG: 81 TABLET, CHEWABLE ORAL at 09:30

## 2025-01-16 RX ADMIN — METOPROLOL TARTRATE 25 MG: 25 TABLET, FILM COATED ORAL at 20:53

## 2025-01-16 RX ADMIN — GABAPENTIN 600 MG: 300 CAPSULE ORAL at 20:53

## 2025-01-16 RX ADMIN — DOFETILIDE 250 MCG: 0.25 CAPSULE ORAL at 20:53

## 2025-01-16 RX ADMIN — DOFETILIDE 250 MCG: 0.25 CAPSULE ORAL at 09:31

## 2025-01-16 RX ADMIN — ENOXAPARIN SODIUM 30 MG: 100 INJECTION SUBCUTANEOUS at 09:30

## 2025-01-16 RX ADMIN — INSULIN LISPRO 4 UNITS: 100 INJECTION, SOLUTION INTRAVENOUS; SUBCUTANEOUS at 13:27

## 2025-01-16 RX ADMIN — ENOXAPARIN SODIUM 30 MG: 100 INJECTION SUBCUTANEOUS at 20:53

## 2025-01-16 RX ADMIN — PRAMIPEXOLE DIHYDROCHLORIDE 0.75 MG: 0.25 TABLET ORAL at 20:53

## 2025-01-16 RX ADMIN — INSULIN LISPRO 2 UNITS: 100 INJECTION, SOLUTION INTRAVENOUS; SUBCUTANEOUS at 09:37

## 2025-01-16 RX ADMIN — SPIRONOLACTONE 25 MG: 25 TABLET, FILM COATED ORAL at 09:31

## 2025-01-16 RX ADMIN — INSULIN LISPRO 20 UNITS: 100 INJECTION, SOLUTION INTRAVENOUS; SUBCUTANEOUS at 13:28

## 2025-01-16 RX ADMIN — SODIUM CHLORIDE, PRESERVATIVE FREE 10 ML: 5 INJECTION INTRAVENOUS at 20:54

## 2025-01-16 NOTE — PLAN OF CARE
Problem: Chronic Conditions and Co-morbidities  Goal: Patient's chronic conditions and co-morbidity symptoms are monitored and maintained or improved  Outcome: Progressing     Problem: Discharge Planning  Goal: Discharge to home or other facility with appropriate resources  Outcome: Progressing     Problem: ABCDS Injury Assessment  Goal: Absence of physical injury  Outcome: Progressing     Problem: Safety - Adult  Goal: Free from fall injury  Outcome: Progressing     Problem: Cardiovascular - Adult  Goal: Maintains optimal cardiac output and hemodynamic stability  1/16/2025 1206 by Codi Hall, RN  Outcome: Progressing  1/15/2025 2214 by Eugenie Mckinnon, RN  Outcome: Progressing

## 2025-01-16 NOTE — CARE COORDINATION
Left message on IP  number [phone] that patient is RPM eligible  Brittney Granger, RN   [phone]

## 2025-01-16 NOTE — ED NOTES
London Swenson is a 74 y.o. male admitted for  Principal Problem:    Acute on chronic heart failure, unspecified heart failure type (HCC)  Resolved Problems:    * No resolved hospital problems. *  .   Patient Home via EMS transportation with   Chief Complaint   Patient presents with    Shortness of Breath     Pt sent to ED for c/o SOB for the past couple weeks, pt states he has CHF.      .  Patient is alert and Person, Place, Time, and Situation  Patient's baseline mobility: Baseline Mobility: Independent   Code Status: Full Code   Cardiac Rhythm:       Is patient on baseline Oxygen: no how many Liters:   Abnormal Assessment Findings: patient placed on 2L for shortness of breath.     Isolation:       NIH Score:    C-SSRS: Risk of Suicide: No Risk  Bedside swallow:        Active LDA's:   Peripheral IV 01/15/25 Right Antecubital (Active)   Site Assessment Clean, dry & intact 01/15/25 1550   Line Status Blood return noted 01/15/25 1550           Family/Caregiver Present no Any Concerns: no   Restraints no  Sitter no         Vitals: MEWS Score: 1    Vitals:    01/15/25 1820 01/15/25 1833 01/15/25 1849 01/15/25 1905   BP: 115/72 (!) 150/86 (!) 146/79 (!) 129/96   Pulse: 68 63 60 64   Resp: 17 15 14 19   Temp:       TempSrc:       SpO2: 92% 96% 91% 92%   Weight:       Height:           Last documented pain score (0-10 scale) Pain Level: 0  Pain medication administered See MAR .    Pertinent or High Risk Medications/Drips: NO .    Pending Blood Product Administration: NO     Abnormal labs:   Abnormal Labs Reviewed   CBC WITH AUTO DIFFERENTIAL - Abnormal; Notable for the following components:       Result Value    RBC 4.17 (*)     Hemoglobin 12.0 (*)     Hematocrit 37.3 (*)     Lymphocytes Absolute 0.8 (*)     All other components within normal limits   COMPREHENSIVE METABOLIC PANEL W/ REFLEX TO MG FOR LOW K - Abnormal; Notable for the following components:    Glucose 319 (*)     Total Protein 6.3 (*)     All other

## 2025-01-16 NOTE — H&P
[facility] Medicine History & Physical    V 1.6    Date of Admission: 1/15/2025    Date of Service:  Pt seen/examined on 1/15/2025    [x]Admitted to Inpatient with expected LOS greater than two midnights due to medical therapy.  []Placed in Observation status.    Chief Admission Complaint: Weakness and shortness of breath    Presenting Admission History:      74 y.o. male who presented to [facility] with weakness and shortness of breath.  PMHx significant for paroxysmal A-fib, status post ablation and watchman placement, sick sinus syndrome status post PPM, nonobstructive CAD, HFpEF, diabetes, essential hypertension, hyperlipidemia, ESTHER, and obesity.      Patient was seen by EP today and was reporting significant weakness and shortness of breath and being unable to sleep due to symptoms therefore he was referred to come to emergency room for evaluation.  Patient has been having worsening orthopnea and bilateral lower extremity edema.  Patient denies any chest pain or palpitations.  Denies any other symptoms.  In ED other than hyperglycemia laboratory evaluation is nonacute CT PE negative for PE but finding more consistent clinically with pulmonary edema.  Patient was hemodynamically stable and is on room air.  Patient was given a dose of Lasix in ED.    Assessment/Plan:      Current Principal Problem:  Acute on chronic heart failure, unspecified heart failure type (HCC)    CHF - acute on chronic /diastolic failure w/ /preserved EF 55% by Echo dated 8/28/2024.  Likely due to hypertensive and/or ischemic heart disease. Continue diuresis as currently ordered w/ close monitoring of BUN/ Creatinine and electrolytes for ADR.  Continue current medical management and follow input and output as well as daily weights as recorded and clinical response. Consider GDMT at discharge unless contraindicated.  (ACEi/ARB/ARNI, SGLT2i, Aldactone, BBlocker).      -Continue IV Lasix 40 mg twice daily  -Strict I's and  Quality 110: Preventive Care And Screening: Influenza Immunization: Influenza immunization was not ordered or administered, reason not given Quality 226: Preventive Care And Screening: Tobacco Use: Screening And Cessation Intervention: Patient screened for tobacco use and is an ex/non-smoker Quality 431: Preventive Care And Screening: Unhealthy Alcohol Use - Screening: Patient not identified as an unhealthy alcohol user when screened for unhealthy alcohol use using a systematic screening method Detail Level: Detailed

## 2025-01-16 NOTE — CARE COORDINATION
Case Management Assessment  Initial Evaluation    Date/Time of Evaluation: 1/16/2025 1:16 PM  Assessment Completed by: Evie Calderón RN    If patient is discharged prior to next notation, then this note serves as note for discharge by case management.    Patient Name: London Swenson                   YOB: 1950  Diagnosis: Shortness of breath [R06.02]  Hypoxia [R09.02]  Acute on chronic heart failure, unspecified heart failure type (HCC) [I50.9]  Acute on chronic congestive heart failure, unspecified heart failure type (HCC) [I50.9]                   Date / Time: 1/15/2025  3:28 PM    Patient Admission Status: Inpatient   Readmission Risk (Low < 19, Mod (19-27), High > 27): Readmission Risk Score: 19.6    Current PCP: Constance Yancey PA  PCP verified by CM? Yes    Chart Reviewed: Yes      History Provided by: Patient  Patient Orientation: Alert and Oriented, Person, Place, Situation, Self    Patient Cognition: Alert    Hospitalization in the last 30 days (Readmission):  Yes    If yes, Readmission Assessment in  Navigator will be completed.    Advance Directives:      Code Status: Full Code   Patient's Primary Decision Maker is: Legal Next of Kin    Primary Decision Maker (Active): Rosy Swenson - Spouse - [phone]    Secondary Decision Maker: Valerie Penn - Grandchild - [phone]    Discharge Planning:    Patient lives with: Spouse/Significant Other Type of Home: House  Primary Care Giver: Self  Patient Support Systems include: Spouse/Significant Other   Current Financial resources: Medicare  Current community resources: None  Current services prior to admission: None            Current DME:              Type of Home Care services:  None    ADLS  Prior functional level: Independent in ADLs/IADLs  Current functional level: Independent in ADLs/IADLs    PT AM-PAC:   /24  OT AM-PAC:   /24    Family can provide assistance at DC: Yes  Would you like Case Management to discuss the discharge plan

## 2025-01-16 NOTE — PROGRESS NOTES
4 Eyes Skin Assessment and Patient belongings     The patient is being assess for  Admission    I agree that 2 Nurses have performed a thorough Head to Toe Skin Assessment on the patient. ALL assessment sites listed below have been assessed.       Areas assessed by both nurses: yes  [x]   Head, Face, and Ears   [x]   Shoulders, Back, and Chest  [x]   Arms, Elbows, and Hands   [x]   Coccyx, Sacrum, and IschIum  [x]   Legs, Feet, and Heels        Does the Patient have Skin Breakdown?  No         Dustin Prevention initiated:  NA   Wound Care Orders initiated:  NA      [facility] nurse consulted for Pressure Injury (Stage 3,4, Unstageable, DTI, NWPT, and Complex wounds), New and Established Ostomies:  NA      I agree that 2 Nurses have reviewed patient belongings with the patient/family and documented in the flowsheet upon admission or transfer to the unit.     Belongings  Dental Appliances: None  Vision - Corrective Lenses: None  Hearing Aid: None  Clothing: Footwear, Undergarments, Shirt, Socks, Pants, Sweater  Jewelry: None  Body Piercings Removed: N/A  Electronic Devices: Cell Phone  Weapons (Notify Protective Services/Security): None  Other Valuables: At home  Home Medications: None  Valuables Given To: Patient  Provide Name(s) of Who Valuable(s) Were Given To: Lonodn (pt)       Nurse 1 eSignature: Electronically signed by Eugenie Mckinnon RN on 1/15/25 at 10:21 PM EST    **SHARE this note so that the co-signing nurse is able to place an eSignature**    Nurse 2 eSignature: Electronically signed by Ana Priest RN on 1/15/25 at 10:22 PM EST

## 2025-01-16 NOTE — PROGRESS NOTES
Hospital Medicine Progress Note  V 1.6      Date of Admission: 1/15/2025    Hospital Day: 2      Chief Admission Complaint:  Shortness of Breath (Pt sent to ED for c/o SOB for the past couple weeks, pt states he has CHF. /)       Subjective:  leg edema, sob    Presenting Admission History:       \"74 y.o. male who presented to [facility] with weakness and shortness of breath.  PMHx significant for paroxysmal A-fib, status post ablation and watchman placement, sick sinus syndrome status post PPM, nonobstructive CAD, HFpEF, diabetes, essential hypertension, hyperlipidemia, ESTHER, and obesity.       Patient was seen by EP today and was reporting significant weakness and shortness of breath and being unable to sleep due to symptoms therefore he was referred to come to emergency room for evaluation.  Patient has been having worsening orthopnea and bilateral lower extremity edema.  Patient denies any chest pain or palpitations.  Denies any other symptoms.  In ED other than hyperglycemia laboratory evaluation is nonacute CT PE negative for PE but finding more consistent clinically with pulmonary edema.  Patient was hemodynamically stable and is on room air.  Patient was given a dose of Lasix in ED.\"    Assessment/Plan:      Current Principal Problem:  Acute on chronic heart failure, unspecified heart failure type (HCC)    CHF - acute on chronic /diastolic failure w/ /preserved EF 55% by Echo dated 8/28/2024.  Likely due to hypertensive and/or ischemic heart disease. Continue diuresis as currently ordered w/ close monitoring of BUN/ Creatinine and electrolytes for ADR.  Continue current medical management and follow input and output as well as daily weights as recorded and clinical response. Consider GDMT at discharge unless contraindicated.  (ACEi/ARB/ARNI, SGLT2i, Aldactone, BBlocker).    -Cardiology consulted  -Continue IV Lasix 40 mg twice daily  -Strict I's and O's  -Continue home

## 2025-01-16 NOTE — DISCHARGE INSTRUCTIONS
Heart Failure Resources:  Heart Failure Interactive Workbook:  Go to [email or web address]sl[employer].Detectent/publication/?v=729369 for a Free Heart Failure Interactive Workbook provided by The American [employer] Association. This interactive workbook will provide information on Healthier Living with Heart Failure. Please copy and paste link into search bar. Use your mouse to scroll through the pages.    HF [place] marilou:   Heart Failure Free smart phone marilou available for iPhone and Android download. Use your phone to track sodium intake, fluid intake, symptoms, and weight.     Low Sodium Diet / Recipes:  Go to [email or web address]Invert[employer]Online.com website for “renal” diet which is Low Sodium! [employer] is a dialysis company, but this website offers free seasonal cookbooks. Each quarter, they will release 25-30 new recipes with a breakdown of calories, sodium, and glucose. You can also go to [email or web address].[employer]/recipes website for free recipes.     Home Exercise Program:   Identification of Green/Yellow/Red zones:  You should be able to identify when you feel good (green zone), if you have 1-2 symptoms of HF (yellow zone), or if you are in need of medical attention (red zone).  In your CHF education folder you were provided a “stop light tool” to outline this information.     We want to you to rate your exertion levels:    Our therapy team has discussed means of identification with you such as the \"Kenan scale.\"  The Kenan rating scale ranges from 6 to 20, where 6 means \"no exertion at all\" and 20 means \"maximal exertion.\" The goal is to use this to gauge how much effort it is taking for you to do your normal daily tasks.   You should be able to recognize when too much exertion is being expended.    Elements of Energy Conservation:   Prioritize/Plan: Decide what needs to be done today, and what can wait for a later date, write to do lists, plan ahead to avoid extra trips, and gather supplies and equipment needed before starting an activity.   Position:

## 2025-01-16 NOTE — CONSULTS
Consult Placed     Who: CARDIOLOGY, [facility]  Date:1/16/2025  Time:1027     Electronically signed by Garcia Zuleta on 1/16/2025 at 10:26 AM    
patient's cardiac risk factors and adjusted pharmacologic treatment as needed. In addition, I have reinforced the need for patient directed risk factor modification.  All questions and concerns were addressed to the patient/family. Alternatives to my treatment were discussed.     Thank you for allowing us to participate in the care of London GABINO Swenson. Please call me with any questions [phone].    Dylan Shanks MD [facility]  Cardiovascular Disease  [facility]  [phone] [place] Office  [phone] [place] Office  1/16/2025 12:04 PM

## 2025-01-16 NOTE — PLAN OF CARE
Problem: Cardiovascular - Adult  Goal: Maintains optimal cardiac output and hemodynamic stability  Outcome: Progressing   CHF Care Plan      Patient's EF (Ejection Fraction) is greater than 40%    Heart Failure Medications:  Diuretics:: Furosemide and Spironolactone    (One of the following REQUIRED for EF </= 40%/SYSTOLIC FAILURE but MAY be used in EF% >40%/DIASTOLIC FAILURE)        ACE:: None        ARB:: None         ARNI:: None    (Beta Blockers)  NON- Evidenced Based Beta Blocker (for EF% >40%/DIASTOLIC FAILURE): Metoprolol TARTrate- Lopressor    Evidenced Based Beta Blocker::(REQUIRED for EF% <40%/SYSTOLIC FAILURE) None  ...................................................................................................................................................    Failed to redirect to the Timeline version of the [employer] SmartLink.      Patient's weights and intake/output reviewed    Daily Weight log at bedside, patient/family participation in use of log: \"yes    Patient's current weight today Admission weight 286 lbs  No intake or output data in the 24 hours ending 01/15/25 7406    Education Booklet Provided: yes    Comorbidities Reviewed Yes    Patient has a past medical history of A-fib (HCC), Acid reflux, Acute on chronic heart failure, unspecified heart failure type (HCC), Yan's esophagus, Coronary artery disease of native heart with stable angina pectoris (HCC), Dementia (HCC), Diabetes mellitus (HCC), Diabetic autonomic neuropathy associated with type 2 diabetes mellitus (HCC), Hyperlipidemia, Hypertension, Pancreatitis, Restless legs, Sleep apnea, and Tremor.     >>For CHF and Comorbidity documentation on Education Time and Topics, please see Education Tab      CHF Education    Learners:  Patient  Readineess:   Acceptance  Method:   Explanation  Response:    Verbalizes Understanding    Comments: Educated pt on importance of monitoring intake, output & daily weights    Time Spent: 10

## 2025-01-17 ENCOUNTER — APPOINTMENT (OUTPATIENT)
Age: 75
DRG: 291 | End: 2025-01-17
Payer: MEDICARE

## 2025-01-17 LAB
ALBUMIN SERPL-MCNC: 3.9 G/DL (ref 3.4–5)
ALP SERPL-CCNC: 103 U/L (ref 40–129)
ALT SERPL-CCNC: 15 U/L (ref 10–40)
ANION GAP SERPL CALCULATED.3IONS-SCNC: 7 MMOL/L (ref 3–16)
AST SERPL-CCNC: 20 U/L (ref 15–37)
BILIRUB DIRECT SERPL-MCNC: 0.2 MG/DL (ref 0–0.3)
BILIRUB INDIRECT SERPL-MCNC: 0.3 MG/DL (ref 0–1)
BILIRUB SERPL-MCNC: 0.5 MG/DL (ref 0–1)
BUN SERPL-MCNC: 21 MG/DL (ref 7–20)
CALCIUM SERPL-MCNC: 8.6 MG/DL (ref 8.3–10.6)
CHLORIDE SERPL-SCNC: 99 MMOL/L (ref 99–110)
CO2 SERPL-SCNC: 35 MMOL/L (ref 21–32)
CREAT SERPL-MCNC: 1 MG/DL (ref 0.8–1.3)
DEPRECATED RDW RBC AUTO: 15.4 % (ref 12.4–15.4)
ECHO BSA: 2.5 M2
ECHO EST RA PRESSURE: 8 MMHG
ECHO LV EDV A2C: 102 ML
ECHO LV EDV A4C: 116 ML
ECHO LV EDV INDEX A4C: 48 ML/M2
ECHO LV EDV NDEX A2C: 42 ML/M2
ECHO LV EF PHYSICIAN: 60 %
ECHO LV EJECTION FRACTION A2C: 63 %
ECHO LV EJECTION FRACTION A4C: 59 %
ECHO LV EJECTION FRACTION BIPLANE: 60 % (ref 55–100)
ECHO LV ESV A2C: 38 ML
ECHO LV ESV A4C: 48 ML
ECHO LV ESV INDEX A2C: 16 ML/M2
ECHO LV ESV INDEX A4C: 20 ML/M2
ECHO LV FRACTIONAL SHORTENING: 29 % (ref 28–44)
ECHO LV INTERNAL DIMENSION DIASTOLE INDEX: 1.99 CM/M2
ECHO LV INTERNAL DIMENSION DIASTOLIC: 4.8 CM (ref 4.2–5.9)
ECHO LV INTERNAL DIMENSION SYSTOLIC INDEX: 1.41 CM/M2
ECHO LV INTERNAL DIMENSION SYSTOLIC: 3.4 CM
ECHO LV IVSD: 1.2 CM (ref 0.6–1)
ECHO LV MASS 2D: 232.2 G (ref 88–224)
ECHO LV MASS INDEX 2D: 96.4 G/M2 (ref 49–115)
ECHO LV POSTERIOR WALL DIASTOLIC: 1.3 CM (ref 0.6–1)
ECHO LV RELATIVE WALL THICKNESS RATIO: 0.54
ECHO RIGHT VENTRICULAR SYSTOLIC PRESSURE (RVSP): 26 MMHG
ECHO TV REGURGITANT MAX VELOCITY: 2.13 M/S
ECHO TV REGURGITANT PEAK GRADIENT: 18 MMHG
GFR SERPLBLD CREATININE-BSD FMLA CKD-EPI: 79 ML/MIN/{1.73_M2}
GLUCOSE BLD-MCNC: 153 MG/DL (ref 70–99)
GLUCOSE BLD-MCNC: 159 MG/DL (ref 70–99)
GLUCOSE BLD-MCNC: 172 MG/DL (ref 70–99)
GLUCOSE BLD-MCNC: 215 MG/DL (ref 70–99)
GLUCOSE SERPL-MCNC: 157 MG/DL (ref 70–99)
HCT VFR BLD AUTO: 37.8 % (ref 40.5–52.5)
HGB BLD-MCNC: 12.3 G/DL (ref 13.5–17.5)
MAGNESIUM SERPL-MCNC: 1.73 MG/DL (ref 1.8–2.4)
MCH RBC QN AUTO: 28.7 PG (ref 26–34)
MCHC RBC AUTO-ENTMCNC: 32.5 G/DL (ref 31–36)
MCV RBC AUTO: 88.4 FL (ref 80–100)
PERFORMED ON: ABNORMAL
PLATELET # BLD AUTO: 142 K/UL (ref 135–450)
PMV BLD AUTO: 9.4 FL (ref 5–10.5)
POTASSIUM SERPL-SCNC: 4.5 MMOL/L (ref 3.5–5.1)
PROT SERPL-MCNC: 6.3 G/DL (ref 6.4–8.2)
RBC # BLD AUTO: 4.28 M/UL (ref 4.2–5.9)
SODIUM SERPL-SCNC: 141 MMOL/L (ref 136–145)
WBC # BLD AUTO: 6.1 K/UL (ref 4–11)

## 2025-01-17 PROCEDURE — 2500000003 HC RX 250 WO HCPCS: Performed by: STUDENT IN AN ORGANIZED HEALTH CARE EDUCATION/TRAINING PROGRAM

## 2025-01-17 PROCEDURE — 99232 SBSQ HOSP IP/OBS MODERATE 35: CPT | Performed by: NURSE PRACTITIONER

## 2025-01-17 PROCEDURE — 85027 COMPLETE CBC AUTOMATED: CPT

## 2025-01-17 PROCEDURE — 1200000000 HC SEMI PRIVATE

## 2025-01-17 PROCEDURE — 36415 COLL VENOUS BLD VENIPUNCTURE: CPT

## 2025-01-17 PROCEDURE — 93325 DOPPLER ECHO COLOR FLOW MAPG: CPT | Performed by: INTERNAL MEDICINE

## 2025-01-17 PROCEDURE — 6370000000 HC RX 637 (ALT 250 FOR IP): Performed by: STUDENT IN AN ORGANIZED HEALTH CARE EDUCATION/TRAINING PROGRAM

## 2025-01-17 PROCEDURE — 80076 HEPATIC FUNCTION PANEL: CPT

## 2025-01-17 PROCEDURE — 93321 DOPPLER ECHO F-UP/LMTD STD: CPT | Performed by: INTERNAL MEDICINE

## 2025-01-17 PROCEDURE — 83735 ASSAY OF MAGNESIUM: CPT

## 2025-01-17 PROCEDURE — 2700000000 HC OXYGEN THERAPY PER DAY

## 2025-01-17 PROCEDURE — 93308 TTE F-UP OR LMTD: CPT | Performed by: INTERNAL MEDICINE

## 2025-01-17 PROCEDURE — 2580000003 HC RX 258: Performed by: STUDENT IN AN ORGANIZED HEALTH CARE EDUCATION/TRAINING PROGRAM

## 2025-01-17 PROCEDURE — 80048 BASIC METABOLIC PNL TOTAL CA: CPT

## 2025-01-17 PROCEDURE — 93321 DOPPLER ECHO F-UP/LMTD STD: CPT

## 2025-01-17 PROCEDURE — 94761 N-INVAS EAR/PLS OXIMETRY MLT: CPT

## 2025-01-17 PROCEDURE — 6360000002 HC RX W HCPCS: Performed by: NURSE PRACTITIONER

## 2025-01-17 PROCEDURE — 6360000004 HC RX CONTRAST MEDICATION: Performed by: NURSE PRACTITIONER

## 2025-01-17 PROCEDURE — 6360000002 HC RX W HCPCS: Performed by: STUDENT IN AN ORGANIZED HEALTH CARE EDUCATION/TRAINING PROGRAM

## 2025-01-17 RX ORDER — MAGNESIUM SULFATE IN WATER 40 MG/ML
2000 INJECTION, SOLUTION INTRAVENOUS ONCE
Status: COMPLETED | OUTPATIENT
Start: 2025-01-17 | End: 2025-01-17

## 2025-01-17 RX ADMIN — INSULIN LISPRO 20 UNITS: 100 INJECTION, SOLUTION INTRAVENOUS; SUBCUTANEOUS at 17:42

## 2025-01-17 RX ADMIN — FUROSEMIDE 40 MG: 10 INJECTION, SOLUTION INTRAMUSCULAR; INTRAVENOUS at 17:42

## 2025-01-17 RX ADMIN — SPIRONOLACTONE 25 MG: 25 TABLET, FILM COATED ORAL at 09:15

## 2025-01-17 RX ADMIN — SODIUM CHLORIDE, PRESERVATIVE FREE 10 ML: 5 INJECTION INTRAVENOUS at 09:16

## 2025-01-17 RX ADMIN — INSULIN LISPRO 20 UNITS: 100 INJECTION, SOLUTION INTRAVENOUS; SUBCUTANEOUS at 09:16

## 2025-01-17 RX ADMIN — INSULIN LISPRO 2 UNITS: 100 INJECTION, SOLUTION INTRAVENOUS; SUBCUTANEOUS at 12:51

## 2025-01-17 RX ADMIN — FUROSEMIDE 40 MG: 10 INJECTION, SOLUTION INTRAMUSCULAR; INTRAVENOUS at 09:16

## 2025-01-17 RX ADMIN — ATORVASTATIN CALCIUM 40 MG: 40 TABLET, FILM COATED ORAL at 19:46

## 2025-01-17 RX ADMIN — DOFETILIDE 250 MCG: 0.25 CAPSULE ORAL at 19:46

## 2025-01-17 RX ADMIN — METOPROLOL TARTRATE 25 MG: 25 TABLET, FILM COATED ORAL at 09:15

## 2025-01-17 RX ADMIN — METOPROLOL TARTRATE 25 MG: 25 TABLET, FILM COATED ORAL at 19:46

## 2025-01-17 RX ADMIN — ASPIRIN 324 MG: 81 TABLET, CHEWABLE ORAL at 09:15

## 2025-01-17 RX ADMIN — SULFUR HEXAFLUORIDE 2 ML: 60.7; .19; .19 INJECTION, POWDER, LYOPHILIZED, FOR SUSPENSION INTRAVENOUS; INTRAVESICAL at 14:27

## 2025-01-17 RX ADMIN — SODIUM CHLORIDE 25 ML: 9 INJECTION, SOLUTION INTRAVENOUS at 09:25

## 2025-01-17 RX ADMIN — DOFETILIDE 250 MCG: 0.25 CAPSULE ORAL at 09:15

## 2025-01-17 RX ADMIN — INSULIN GLARGINE 16 UNITS: 100 INJECTION, SOLUTION SUBCUTANEOUS at 09:16

## 2025-01-17 RX ADMIN — ENOXAPARIN SODIUM 30 MG: 100 INJECTION SUBCUTANEOUS at 09:16

## 2025-01-17 RX ADMIN — GABAPENTIN 600 MG: 300 CAPSULE ORAL at 19:46

## 2025-01-17 RX ADMIN — GABAPENTIN 600 MG: 300 CAPSULE ORAL at 09:15

## 2025-01-17 RX ADMIN — MAGNESIUM SULFATE HEPTAHYDRATE 2000 MG: 40 INJECTION, SOLUTION INTRAVENOUS at 09:26

## 2025-01-17 RX ADMIN — PRAMIPEXOLE DIHYDROCHLORIDE 0.75 MG: 0.25 TABLET ORAL at 19:46

## 2025-01-17 RX ADMIN — ENOXAPARIN SODIUM 30 MG: 100 INJECTION SUBCUTANEOUS at 19:47

## 2025-01-17 RX ADMIN — INSULIN LISPRO 20 UNITS: 100 INJECTION, SOLUTION INTRAVENOUS; SUBCUTANEOUS at 12:51

## 2025-01-17 ASSESSMENT — PAIN SCALES - GENERAL: PAINLEVEL_OUTOF10: 0

## 2025-01-17 NOTE — PLAN OF CARE
Problem: Cardiovascular - Adult  Goal: Maintains optimal cardiac output and hemodynamic stability  1/16/2025 2208 by Eugenie Mckinnon RN  Outcome: Progressing   CHF Care Plan      Patient's EF (Ejection Fraction) is greater than 40%    Heart Failure Medications:  Diuretics:: Furosemide and Spironolactone    (One of the following REQUIRED for EF </= 40%/SYSTOLIC FAILURE but MAY be used in EF% >40%/DIASTOLIC FAILURE)        ACE:: None        ARB:: None         ARNI:: None    (Beta Blockers)  NON- Evidenced Based Beta Blocker (for EF% >40%/DIASTOLIC FAILURE): Metoprolol TARTrate- Lopressor    Evidenced Based Beta Blocker::(REQUIRED for EF% <40%/SYSTOLIC FAILURE) None  ...................................................................................................................................................    Failed to redirect to the Timeline version of the [employer] SmartLink.      Patient's weights and intake/output reviewed    Daily Weight log at bedside, patient/family participation in use of log: \"yes    Patient's current weight today shows a difference of 2 lbs less than last documented weight.      Intake/Output Summary (Last 24 hours) at 1/16/2025 2209  Last data filed at 1/16/2025 2200  Gross per 24 hour   Intake 1180 ml   Output 4025 ml   Net -2845 ml       Education Booklet Provided: yes    Comorbidities Reviewed Yes    Patient has a past medical history of A-fib ([facility]), Acid reflux, Acute on chronic heart failure, unspecified heart failure type ([facility]), Yan's esophagus, Coronary artery disease of native heart with stable angina pectoris ([facility]), Dementia ([facility]), Diabetes mellitus ([facility]), Diabetic autonomic neuropathy associated with type 2 diabetes mellitus ([facility]), Hyperlipidemia, Hypertension, Pancreatitis, Restless legs, Sleep apnea, and Tremor.     >>For CHF and Comorbidity documentation on Education Time and Topics, please see Education Tab      CHF Education    Learners:  Patient  Readineess:

## 2025-01-17 NOTE — PLAN OF CARE
Problem: Chronic Conditions and Co-morbidities  Goal: Patient's chronic conditions and co-morbidity symptoms are monitored and maintained or improved  Outcome: Progressing  Flowsheets (Taken 1/17/2025 0926)  Care Plan - Patient's Chronic Conditions and Co-Morbidity Symptoms are Monitored and Maintained or Improved: Monitor and assess patient's chronic conditions and comorbid symptoms for stability, deterioration, or improvement     Problem: Discharge Planning  Goal: Discharge to home or other facility with appropriate resources  Outcome: Progressing  Flowsheets (Taken 1/17/2025 0926)  Discharge to home or other facility with appropriate resources: Identify barriers to discharge with patient and caregiver     Problem: ABCDS Injury Assessment  Goal: Absence of physical injury  Outcome: Progressing     Problem: Safety - Adult  Goal: Free from fall injury  Outcome: Progressing     Problem: Cardiovascular - Adult  Goal: Maintains optimal cardiac output and hemodynamic stability  1/17/2025 1059 by Holly Santiago, RN  Outcome: Progressing  Flowsheets (Taken 1/17/2025 0926)  Maintains optimal cardiac output and hemodynamic stability: Monitor blood pressure and heart rate  1/16/2025 2208 by Eugenie Mckinnon, RN  Outcome: Progressing     Problem: Respiratory - Adult  Goal: Achieves optimal ventilation and oxygenation  Outcome: Progressing     Problem: Skin/Tissue Integrity - Adult  Goal: Skin integrity remains intact  Outcome: Progressing  Flowsheets (Taken 1/17/2025 1059)  Skin Integrity Remains Intact: Monitor for areas of redness and/or skin breakdown  Goal: Incisions, wounds, or drain sites healing without S/S of infection  Outcome: Progressing  Goal: Oral mucous membranes remain intact  Outcome: Progressing     Problem: Musculoskeletal - Adult  Goal: Return mobility to safest level of function  Outcome: Progressing  Goal: Maintain proper alignment of affected body part  Outcome: Progressing  Goal: Return ADL status to

## 2025-01-17 NOTE — PROGRESS NOTES
CHF Care Plan      Patient's EF (Ejection Fraction) is greater than 40%    Heart Failure Medications:  Diuretics:: Furosemide and Spironolactone    (One of the following REQUIRED for EF </= 40%/SYSTOLIC FAILURE but MAY be used in EF% >40%/DIASTOLIC FAILURE)        ACE:: None        ARB:: None         ARNI:: None    (Beta Blockers)  NON- Evidenced Based Beta Blocker (for EF% >40%/DIASTOLIC FAILURE): Metoprolol TARTrate- Lopressor    Evidenced Based Beta Blocker::(REQUIRED for EF% <40%/SYSTOLIC FAILURE) None  ...................................................................................................................................................    Failed to redirect to the Timeline version of the [employer] SmartLink.      Patient's weights and intake/output reviewed    Daily Weight log at bedside, patient/family participation in use of log: \"yes    Patient's current weight today shows a difference of 4 lbs less than last documented weight.      Intake/Output Summary (Last 24 hours) at 1/17/2025 1855  Last data filed at 1/17/2025 1744  Gross per 24 hour   Intake 822 ml   Output 2625 ml   Net -1803 ml       Education Booklet Provided: yes    Comorbidities Reviewed Yes    Patient has a past medical history of A-fib (HCC), Acid reflux, Acute on chronic heart failure, unspecified heart failure type (HCC), Yan's esophagus, Coronary artery disease of native heart with stable angina pectoris (HCC), Dementia (HCC), Diabetes mellitus (HCC), Diabetic autonomic neuropathy associated with type 2 diabetes mellitus (HCC), Hyperlipidemia, Hypertension, Pancreatitis, Restless legs, Sleep apnea, and Tremor.     >>For CHF and Comorbidity documentation on Education Time and Topics, please see Education Tab      CHF Education    Learners:  Patient  Readineess:   Acceptance  Method:   Explanation  Response:    Verbalizes Understanding    Comments: none    Time Spent: 5 min      Pt sitting in bed at this time on  1 L O2. Pt denies

## 2025-01-17 NOTE — PROGRESS NOTES
[facility]   Daily Progress Note      Admit Date:  1/15/2025    Reason for follow up visit: SOB    CC: \"I feel a little better. I still get a little SOB and I feel I'm swollen.\"    73 y/o male with PMH noted for  moderate nonobstructive CAD on cath in 2021, chronic HFpEF, PAF and S/P ablation and Watchman (July 2024), S/P PPM in 12/2023 and with lead revision in Jan 2024, H/O PE, HTN, HLD, type 2 DM, ESTHER but cannot tolerate CPAP and obesity admitted to [facility] after presenting with with worsening SOB and volume overload. He reported increased weight gain, increased dyspnea, fatigue and LE edema over last several months. Admitted and cardiology asked to assist with management.    Interval History:  Pt. seen and examined; records reviewed  BP stable, On O2@ 2L  Net diuresis -2.8 L since admit; -1.9L last 24 hrs (total 3L diuresed)  Wt 280# today (290# on admit)  Remains on IV lasix  + SOB but with some improvement; + persistent LE edema  Denies chest pain, cough, palpitations or dizziness    Subjective:  Pt with no acute overnight cardiac events.     Objective:   /69   Pulse 59   Temp 98.1 °F (36.7 °C) (Oral)   Resp 18   Ht 1.778 m (5' 10\")   Wt 127 kg (280 lb)   SpO2 95%   BMI 40.18 kg/m²     Intake/Output Summary (Last 24 hours) at 1/17/2025 1444  Last data filed at 1/17/2025 1157  Gross per 24 hour   Intake 960 ml   Output 3075 ml   Net -2115 ml     Wt Readings from Last 3 Encounters:   01/17/25 127 kg (280 lb)   01/15/25 131.9 kg (290 lb 12.8 oz)   10/29/24 126.6 kg (279 lb)       Physical Exam:  General: In no acute distress. Awake, alert, and oriented X4. Sitting on side of bed  HEENT: Sclerae anicteric. Normocephalic  Skin:  Warm and dry.    Neck:  Supple. JVD difficult to assess d/t body habitus  Chest:Lungs with decreased breath sounds. No wheezes/rhonchi/rales  Cardiovascular:  RRR. Normal S1 and S2; I/VI systolic murmur   Abdomen: obese, soft, nontender, +bowel sounds.   Extremities:  2+

## 2025-01-17 NOTE — PROGRESS NOTES
[facility] Medicine Progress Note  V 1.6      Date of Admission: 1/15/2025    Hospital Day: 3      Chief Admission Complaint:  Shortness of Breath (Pt sent to ED for c/o SOB for the past couple weeks, pt states he has CHF. /)       Subjective:  leg edema, no voiced complaints    Presenting Admission History:       \"74 y.o. male who presented to [facility] with weakness and shortness of breath.  PMHx significant for paroxysmal A-fib, status post ablation and watchman placement, sick sinus syndrome status post PPM, nonobstructive CAD, HFpEF, diabetes, essential hypertension, hyperlipidemia, ESTHER, and obesity.       Patient was seen by EP today and was reporting significant weakness and shortness of breath and being unable to sleep due to symptoms therefore he was referred to come to emergency room for evaluation.  Patient has been having worsening orthopnea and bilateral lower extremity edema.  Patient denies any chest pain or palpitations.  Denies any other symptoms.  In ED other than hyperglycemia laboratory evaluation is nonacute CT PE negative for PE but finding more consistent clinically with pulmonary edema.  Patient was hemodynamically stable and is on room air.  Patient was given a dose of Lasix in ED.\"    Assessment/Plan:      Current Principal Problem:  Acute on chronic heart failure, unspecified heart failure type (HCC)    CHF - acute on chronic /diastolic failure w/ /preserved EF 55% by Echo dated 8/28/2024.  Likely due to hypertensive and/or ischemic heart disease. Continue diuresis as currently ordered w/ close monitoring of BUN/ Creatinine and electrolytes for ADR.  Continue current medical management and follow input and output as well as daily weights as recorded and clinical response. Consider GDMT at discharge unless contraindicated.  (ACEi/ARB/ARNI, SGLT2i, Aldactone, BBlocker).    -Cardiology consulted  -Echo ordered  -Continue IV Lasix 40 mg twice daily  -Strict I's and O's, net

## 2025-01-17 NOTE — CARE COORDINATION
Chart review day 2- from home with wife, IPTA, IV lasix, possible Echo. Pt denies needs for dc to home. Cm will continue to follow.

## 2025-01-17 NOTE — ACP (ADVANCE CARE PLANNING)
Advance Care Planning   General Advance Care Planning (ACP) Conversation    Date of Conversation: 1/15/2025  Conducted with: Patient with Decision Making Capacity  Other persons present: None    Healthcare Decision Maker:    Primary Decision Maker (Active): Rosy Swenson - Spouse - [phone]    Secondary Decision Maker: Valerie Penn - Grandchild - [phone]      Content/Action Overview:  Has NO ACP documents-Information provided  Reviewed DNR/DNI and patient elects Full Code (Attempt Resuscitation)    Length of Voluntary ACP Conversation in minutes:  <16 minutes (Non-Billable)    Evie Calderón RN

## 2025-01-18 LAB
ANION GAP SERPL CALCULATED.3IONS-SCNC: 11 MMOL/L (ref 3–16)
BUN SERPL-MCNC: 25 MG/DL (ref 7–20)
CALCIUM SERPL-MCNC: 8.7 MG/DL (ref 8.3–10.6)
CHLORIDE SERPL-SCNC: 97 MMOL/L (ref 99–110)
CO2 SERPL-SCNC: 33 MMOL/L (ref 21–32)
CREAT SERPL-MCNC: 1.1 MG/DL (ref 0.8–1.3)
GFR SERPLBLD CREATININE-BSD FMLA CKD-EPI: 70 ML/MIN/{1.73_M2}
GLUCOSE BLD-MCNC: 107 MG/DL (ref 70–99)
GLUCOSE BLD-MCNC: 133 MG/DL (ref 70–99)
GLUCOSE BLD-MCNC: 164 MG/DL (ref 70–99)
GLUCOSE BLD-MCNC: 208 MG/DL (ref 70–99)
GLUCOSE BLD-MCNC: 78 MG/DL (ref 70–99)
GLUCOSE SERPL-MCNC: 162 MG/DL (ref 70–99)
MAGNESIUM SERPL-MCNC: 1.92 MG/DL (ref 1.8–2.4)
NT-PROBNP SERPL-MCNC: <36 PG/ML (ref 0–449)
PERFORMED ON: ABNORMAL
PERFORMED ON: NORMAL
POTASSIUM SERPL-SCNC: 4.3 MMOL/L (ref 3.5–5.1)
SODIUM SERPL-SCNC: 141 MMOL/L (ref 136–145)

## 2025-01-18 PROCEDURE — 94761 N-INVAS EAR/PLS OXIMETRY MLT: CPT

## 2025-01-18 PROCEDURE — 1200000000 HC SEMI PRIVATE

## 2025-01-18 PROCEDURE — 99222 1ST HOSP IP/OBS MODERATE 55: CPT | Performed by: STUDENT IN AN ORGANIZED HEALTH CARE EDUCATION/TRAINING PROGRAM

## 2025-01-18 PROCEDURE — 6360000002 HC RX W HCPCS: Performed by: STUDENT IN AN ORGANIZED HEALTH CARE EDUCATION/TRAINING PROGRAM

## 2025-01-18 PROCEDURE — 80048 BASIC METABOLIC PNL TOTAL CA: CPT

## 2025-01-18 PROCEDURE — 2700000000 HC OXYGEN THERAPY PER DAY

## 2025-01-18 PROCEDURE — 6370000000 HC RX 637 (ALT 250 FOR IP): Performed by: STUDENT IN AN ORGANIZED HEALTH CARE EDUCATION/TRAINING PROGRAM

## 2025-01-18 PROCEDURE — 83880 ASSAY OF NATRIURETIC PEPTIDE: CPT

## 2025-01-18 PROCEDURE — 83735 ASSAY OF MAGNESIUM: CPT

## 2025-01-18 PROCEDURE — 36415 COLL VENOUS BLD VENIPUNCTURE: CPT

## 2025-01-18 PROCEDURE — 2500000003 HC RX 250 WO HCPCS: Performed by: STUDENT IN AN ORGANIZED HEALTH CARE EDUCATION/TRAINING PROGRAM

## 2025-01-18 RX ADMIN — FUROSEMIDE 40 MG: 10 INJECTION, SOLUTION INTRAMUSCULAR; INTRAVENOUS at 08:34

## 2025-01-18 RX ADMIN — ASPIRIN 324 MG: 81 TABLET, CHEWABLE ORAL at 08:33

## 2025-01-18 RX ADMIN — GABAPENTIN 600 MG: 300 CAPSULE ORAL at 20:21

## 2025-01-18 RX ADMIN — FUROSEMIDE 40 MG: 10 INJECTION, SOLUTION INTRAMUSCULAR; INTRAVENOUS at 18:45

## 2025-01-18 RX ADMIN — DOFETILIDE 250 MCG: 0.25 CAPSULE ORAL at 08:33

## 2025-01-18 RX ADMIN — METOPROLOL TARTRATE 25 MG: 25 TABLET, FILM COATED ORAL at 20:21

## 2025-01-18 RX ADMIN — INSULIN LISPRO 2 UNITS: 100 INJECTION, SOLUTION INTRAVENOUS; SUBCUTANEOUS at 13:20

## 2025-01-18 RX ADMIN — INSULIN LISPRO 20 UNITS: 100 INJECTION, SOLUTION INTRAVENOUS; SUBCUTANEOUS at 18:45

## 2025-01-18 RX ADMIN — SODIUM CHLORIDE, PRESERVATIVE FREE 10 ML: 5 INJECTION INTRAVENOUS at 20:21

## 2025-01-18 RX ADMIN — INSULIN LISPRO 20 UNITS: 100 INJECTION, SOLUTION INTRAVENOUS; SUBCUTANEOUS at 13:20

## 2025-01-18 RX ADMIN — DOFETILIDE 250 MCG: 0.25 CAPSULE ORAL at 20:21

## 2025-01-18 RX ADMIN — INSULIN GLARGINE 16 UNITS: 100 INJECTION, SOLUTION SUBCUTANEOUS at 08:34

## 2025-01-18 RX ADMIN — SODIUM CHLORIDE, PRESERVATIVE FREE 10 ML: 5 INJECTION INTRAVENOUS at 08:34

## 2025-01-18 RX ADMIN — ENOXAPARIN SODIUM 30 MG: 100 INJECTION SUBCUTANEOUS at 20:20

## 2025-01-18 RX ADMIN — ENOXAPARIN SODIUM 30 MG: 100 INJECTION SUBCUTANEOUS at 08:32

## 2025-01-18 RX ADMIN — INSULIN LISPRO 20 UNITS: 100 INJECTION, SOLUTION INTRAVENOUS; SUBCUTANEOUS at 08:53

## 2025-01-18 RX ADMIN — METOPROLOL TARTRATE 25 MG: 25 TABLET, FILM COATED ORAL at 08:38

## 2025-01-18 RX ADMIN — ATORVASTATIN CALCIUM 40 MG: 40 TABLET, FILM COATED ORAL at 20:21

## 2025-01-18 RX ADMIN — SPIRONOLACTONE 25 MG: 25 TABLET, FILM COATED ORAL at 08:33

## 2025-01-18 RX ADMIN — GABAPENTIN 600 MG: 300 CAPSULE ORAL at 11:21

## 2025-01-18 RX ADMIN — PRAMIPEXOLE DIHYDROCHLORIDE 0.75 MG: 0.25 TABLET ORAL at 20:21

## 2025-01-18 ASSESSMENT — PAIN SCALES - GENERAL
PAINLEVEL_OUTOF10: 0
PAINLEVEL_OUTOF10: 0

## 2025-01-18 NOTE — PROGRESS NOTES
[facility]   Daily Progress Note      Admit Date:  1/15/2025    Reason for follow up visit: SOB    CC: \"I feel a little better. I still get a little SOB and I feel I'm swollen.\"    73 y/o male with PMH noted for  moderate nonobstructive CAD on cath in 2021, chronic HFpEF, PAF and S/P ablation and Watchman (July 2024), S/P PPM in 12/2023 and with lead revision in Jan 2024, H/O PE, HTN, HLD, type 2 DM, ESTHER but cannot tolerate CPAP and obesity admitted to [facility] after presenting with with worsening SOB and volume overload. He reported increased weight gain, increased dyspnea, fatigue and LE edema over last several months. Admitted and cardiology asked to assist with management.    Interval History:  Pt. seen and examined; records reviewed  BP stable, On O2@ 2L  Net diuresis -2.5 L, net -1.7 L last 24 hours.  Net -4.5 L since admission  Wt 278 pounds  Remains on IV lasix  Reports breathing is back to baseline; still with + persistent 1+ LE edema  Denies chest pain, cough, palpitations or dizziness    Subjective:  Pt with no acute overnight cardiac events.  Telemetry, sinus rhythm.      Objective:   /75   Pulse 62   Temp 97.9 °F (36.6 °C) (Oral)   Resp 16   Ht 1.778 m (5' 10\")   Wt 126.1 kg (278 lb 1.6 oz)   SpO2 (!) 89%   BMI 39.90 kg/m²     Intake/Output Summary (Last 24 hours) at 1/18/2025 1356  Last data filed at 1/18/2025 0920  Gross per 24 hour   Intake 1062 ml   Output 1475 ml   Net -413 ml     Wt Readings from Last 3 Encounters:   01/18/25 126.1 kg (278 lb 1.6 oz)   01/15/25 131.9 kg (290 lb 12.8 oz)   10/29/24 126.6 kg (279 lb)       Physical Exam:  General: In no acute distress. Awake, alert, and oriented X4. Sitting on side of bed  HEENT: Sclerae anicteric. Normocephalic  Skin:  Warm and dry.    Neck:  Supple. JVD difficult to assess d/t body habitus  Chest:Lungs with decreased breath sounds. No wheezes/rhonchi/rales  Cardiovascular:  RRR. Normal S1 and S2; I/VI systolic murmur   Abdomen:

## 2025-01-18 NOTE — PROGRESS NOTES
[facility] Medicine Progress Note  V 1.6      Date of Admission: 1/15/2025    Hospital Day: 4      Chief Admission Complaint:  Shortness of Breath (Pt sent to ED for c/o SOB for the past couple weeks, pt states he has CHF. /)       Subjective:  leg edema improved, no voiced complaints-updated family at bedside    Presenting Admission History:       \"74 y.o. male who presented to [facility] with weakness and shortness of breath.  PMHx significant for paroxysmal A-fib, status post ablation and watchman placement, sick sinus syndrome status post PPM, nonobstructive CAD, HFpEF, diabetes, essential hypertension, hyperlipidemia, ESTHER, and obesity.       Patient was seen by EP today and was reporting significant weakness and shortness of breath and being unable to sleep due to symptoms therefore he was referred to come to emergency room for evaluation.  Patient has been having worsening orthopnea and bilateral lower extremity edema.  Patient denies any chest pain or palpitations.  Denies any other symptoms.  In ED other than hyperglycemia laboratory evaluation is nonacute CT PE negative for PE but finding more consistent clinically with pulmonary edema.  Patient was hemodynamically stable and is on room air.  Patient was given a dose of Lasix in ED.\"    Assessment/Plan:      Current Principal Problem:  Acute on chronic heart failure, unspecified heart failure type (HCC)    CHF - acute on chronic /diastolic failure w/ /preserved EF 55% by Echo dated 8/28/2024.  Likely due to hypertensive and/or ischemic heart disease. Continue diuresis as currently ordered w/ close monitoring of BUN/ Creatinine and electrolytes for ADR.  Continue current medical management and follow input and output as well as daily weights as recorded and clinical response. Consider GDMT at discharge unless contraindicated.  (ACEi/ARB/ARNI, SGLT2i, Aldactone, BBlocker).    -Cardiology consulted  -Echo EF 55-60%  -Continue IV Lasix 40 mg

## 2025-01-18 NOTE — PLAN OF CARE
Problem: Chronic Conditions and Co-morbidities  Goal: Patient's chronic conditions and co-morbidity symptoms are monitored and maintained or improved  1/17/2025 2130 by Magalis Alonzo RN  Outcome: Progressing  1/17/2025 1059 by Holly Santiago RN  Outcome: Progressing  Flowsheets (Taken 1/17/2025 0926)  Care Plan - Patient's Chronic Conditions and Co-Morbidity Symptoms are Monitored and Maintained or Improved: Monitor and assess patient's chronic conditions and comorbid symptoms for stability, deterioration, or improvement     Problem: Discharge Planning  Goal: Discharge to home or other facility with appropriate resources  1/17/2025 2130 by Magalis Alonzo RN  Outcome: Progressing  1/17/2025 1059 by Holly Santiago RN  Outcome: Progressing  Flowsheets (Taken 1/17/2025 0926)  Discharge to home or other facility with appropriate resources: Identify barriers to discharge with patient and caregiver     Problem: ABCDS Injury Assessment  Goal: Absence of physical injury  1/17/2025 2130 by Magalis Alonzo RN  Outcome: Progressing  1/17/2025 1059 by Holly Santiago RN  Outcome: Progressing     Problem: Safety - Adult  Goal: Free from fall injury  1/17/2025 2130 by Magalis Alonzo RN  Outcome: Progressing  1/17/2025 1059 by Holly Santiago RN  Outcome: Progressing     Problem: Cardiovascular - Adult  Goal: Maintains optimal cardiac output and hemodynamic stability  1/17/2025 2130 by Magalis Alonzo RN  Outcome: Progressing  1/17/2025 1059 by Holly Santiago RN  Outcome: Progressing  Flowsheets (Taken 1/17/2025 0926)  Maintains optimal cardiac output and hemodynamic stability: Monitor blood pressure and heart rate     Problem: Respiratory - Adult  Goal: Achieves optimal ventilation and oxygenation  1/17/2025 2130 by Magalis Alonzo RN  Outcome: Progressing  1/17/2025 1059 by Holly Santiago RN  Outcome: Progressing     Problem: Skin/Tissue Integrity - Adult  Goal: Skin integrity remains intact  1/17/2025 2130 by Maaglis Alonzo

## 2025-01-18 NOTE — PLAN OF CARE
CHF Care Plan      Patient's EF (Ejection Fraction) is greater than 40%    Heart Failure Medications:  Diuretics:: Furosemide and Spironolactone    (One of the following REQUIRED for EF </= 40%/SYSTOLIC FAILURE but MAY be used in EF% >40%/DIASTOLIC FAILURE)        ACE:: None        ARB:: None         ARNI:: None    (Beta Blockers)  NON- Evidenced Based Beta Blocker (for EF% >40%/DIASTOLIC FAILURE): Metoprolol TARTrate- Lopressor    Evidenced Based Beta Blocker::(REQUIRED for EF% <40%/SYSTOLIC FAILURE) None  ...................................................................................................................................................    Failed to redirect to the Timeline version of the [employer] SmartLink.      Patient's weights and intake/output reviewed    Daily Weight log at bedside, patient/family participation in use of log: \"yes-Updated and Encouraged    Patient's current weight today shows a difference of 2 lbs less than last documented weight.      Intake/Output Summary (Last 24 hours) at 1/18/2025 1351  Last data filed at 1/18/2025 0920  Gross per 24 hour   Intake 1062 ml   Output 1475 ml   Net -413 ml       Education Booklet Provided: yes    Comorbidities Reviewed Yes    Patient has a past medical history of A-fib (HCC), Acid reflux, Acute on chronic heart failure, unspecified heart failure type (HCC), Yan's esophagus, Coronary artery disease of native heart with stable angina pectoris (HCC), Dementia (HCC), Diabetes mellitus (HCC), Diabetic autonomic neuropathy associated with type 2 diabetes mellitus (HCC), Hyperlipidemia, Hypertension, Pancreatitis, Restless legs, Sleep apnea, and Tremor.     >>For CHF and Comorbidity documentation on Education Time and Topics, please see Education Tab      CHF Education    Learners:  Patient and Family  Readineess:   Acceptance  Method:   Explanation and Handout  Response:    Verbalizes Understanding and Needs Reinforcement    Comments: Daily weight,

## 2025-01-18 NOTE — PLAN OF CARE
CHF Care Plan      Patient's EF (Ejection Fraction) is greater than 40%    Heart Failure Medications:  Diuretics:: Furosemide and Spironolactone    (One of the following REQUIRED for EF </= 40%/SYSTOLIC FAILURE but MAY be used in EF% >40%/DIASTOLIC FAILURE)        ACE:: None        ARB:: None         ARNI:: None    (Beta Blockers)  NON- Evidenced Based Beta Blocker (for EF% >40%/DIASTOLIC FAILURE): Metoprolol TARTrate- Lopressor    Evidenced Based Beta Blocker::(REQUIRED for EF% <40%/SYSTOLIC FAILURE) None  ...................................................................................................................................................    Failed to redirect to the Timeline version of the [employer] SmartLink.      Patient's weights and intake/output reviewed    Daily Weight log at bedside, patient/family participation in use of log: \"yes    Patient's current weight today shows a difference of 2 lbs less than last documented weight.      Intake/Output Summary (Last 24 hours) at 1/18/2025 0624  Last data filed at 1/18/2025 0200  Gross per 24 hour   Intake 1062 ml   Output 2500 ml   Net -1438 ml       Education Booklet Provided: yes    Comorbidities Reviewed Yes    Patient has a past medical history of A-fib (HCC), Acid reflux, Acute on chronic heart failure, unspecified heart failure type (HCC), Yan's esophagus, Coronary artery disease of native heart with stable angina pectoris (HCC), Dementia (HCC), Diabetes mellitus (HCC), Diabetic autonomic neuropathy associated with type 2 diabetes mellitus (HCC), Hyperlipidemia, Hypertension, Pancreatitis, Restless legs, Sleep apnea, and Tremor.     >>For CHF and Comorbidity documentation on Education Time and Topics, please see Education Tab      CHF Education    Learners:  Patient  Readineess:   Acceptance  Method:   Explanation  Response:    Verbalizes Understanding    Comments:     Time Spent: 5      Pt resting in bed at this time on  2 L O2. Pt denies

## 2025-01-19 LAB
ANION GAP SERPL CALCULATED.3IONS-SCNC: 8 MMOL/L (ref 3–16)
BUN SERPL-MCNC: 29 MG/DL (ref 7–20)
CALCIUM SERPL-MCNC: 8.9 MG/DL (ref 8.3–10.6)
CHLORIDE SERPL-SCNC: 96 MMOL/L (ref 99–110)
CO2 SERPL-SCNC: 35 MMOL/L (ref 21–32)
CREAT SERPL-MCNC: 1 MG/DL (ref 0.8–1.3)
GFR SERPLBLD CREATININE-BSD FMLA CKD-EPI: 79 ML/MIN/{1.73_M2}
GLUCOSE BLD-MCNC: 108 MG/DL (ref 70–99)
GLUCOSE BLD-MCNC: 191 MG/DL (ref 70–99)
GLUCOSE BLD-MCNC: 273 MG/DL (ref 70–99)
GLUCOSE BLD-MCNC: 94 MG/DL (ref 70–99)
GLUCOSE SERPL-MCNC: 194 MG/DL (ref 70–99)
MAGNESIUM SERPL-MCNC: 1.93 MG/DL (ref 1.8–2.4)
PERFORMED ON: ABNORMAL
PERFORMED ON: NORMAL
POTASSIUM SERPL-SCNC: 4.1 MMOL/L (ref 3.5–5.1)
SODIUM SERPL-SCNC: 139 MMOL/L (ref 136–145)

## 2025-01-19 PROCEDURE — 2500000003 HC RX 250 WO HCPCS: Performed by: STUDENT IN AN ORGANIZED HEALTH CARE EDUCATION/TRAINING PROGRAM

## 2025-01-19 PROCEDURE — 83735 ASSAY OF MAGNESIUM: CPT

## 2025-01-19 PROCEDURE — 1200000000 HC SEMI PRIVATE

## 2025-01-19 PROCEDURE — 36415 COLL VENOUS BLD VENIPUNCTURE: CPT

## 2025-01-19 PROCEDURE — 80048 BASIC METABOLIC PNL TOTAL CA: CPT

## 2025-01-19 PROCEDURE — 6360000002 HC RX W HCPCS: Performed by: STUDENT IN AN ORGANIZED HEALTH CARE EDUCATION/TRAINING PROGRAM

## 2025-01-19 PROCEDURE — 6370000000 HC RX 637 (ALT 250 FOR IP): Performed by: STUDENT IN AN ORGANIZED HEALTH CARE EDUCATION/TRAINING PROGRAM

## 2025-01-19 RX ADMIN — INSULIN LISPRO 20 UNITS: 100 INJECTION, SOLUTION INTRAVENOUS; SUBCUTANEOUS at 18:20

## 2025-01-19 RX ADMIN — ENOXAPARIN SODIUM 30 MG: 100 INJECTION SUBCUTANEOUS at 09:17

## 2025-01-19 RX ADMIN — ASPIRIN 324 MG: 81 TABLET, CHEWABLE ORAL at 09:17

## 2025-01-19 RX ADMIN — INSULIN GLARGINE 16 UNITS: 100 INJECTION, SOLUTION SUBCUTANEOUS at 09:17

## 2025-01-19 RX ADMIN — INSULIN LISPRO 20 UNITS: 100 INJECTION, SOLUTION INTRAVENOUS; SUBCUTANEOUS at 09:24

## 2025-01-19 RX ADMIN — GABAPENTIN 600 MG: 300 CAPSULE ORAL at 20:30

## 2025-01-19 RX ADMIN — METOPROLOL TARTRATE 25 MG: 25 TABLET, FILM COATED ORAL at 20:30

## 2025-01-19 RX ADMIN — SODIUM CHLORIDE, PRESERVATIVE FREE 10 ML: 5 INJECTION INTRAVENOUS at 09:24

## 2025-01-19 RX ADMIN — GABAPENTIN 600 MG: 300 CAPSULE ORAL at 09:17

## 2025-01-19 RX ADMIN — INSULIN LISPRO 4 UNITS: 100 INJECTION, SOLUTION INTRAVENOUS; SUBCUTANEOUS at 12:18

## 2025-01-19 RX ADMIN — DOFETILIDE 250 MCG: 0.25 CAPSULE ORAL at 20:30

## 2025-01-19 RX ADMIN — METOPROLOL TARTRATE 25 MG: 25 TABLET, FILM COATED ORAL at 11:23

## 2025-01-19 RX ADMIN — DOFETILIDE 250 MCG: 0.25 CAPSULE ORAL at 09:17

## 2025-01-19 RX ADMIN — SODIUM CHLORIDE, PRESERVATIVE FREE 10 ML: 5 INJECTION INTRAVENOUS at 20:30

## 2025-01-19 RX ADMIN — INSULIN LISPRO 20 UNITS: 100 INJECTION, SOLUTION INTRAVENOUS; SUBCUTANEOUS at 12:18

## 2025-01-19 RX ADMIN — ENOXAPARIN SODIUM 30 MG: 100 INJECTION SUBCUTANEOUS at 20:30

## 2025-01-19 RX ADMIN — PRAMIPEXOLE DIHYDROCHLORIDE 0.75 MG: 0.25 TABLET ORAL at 20:30

## 2025-01-19 RX ADMIN — FUROSEMIDE 40 MG: 10 INJECTION, SOLUTION INTRAMUSCULAR; INTRAVENOUS at 16:15

## 2025-01-19 RX ADMIN — FUROSEMIDE 40 MG: 10 INJECTION, SOLUTION INTRAMUSCULAR; INTRAVENOUS at 09:17

## 2025-01-19 RX ADMIN — ATORVASTATIN CALCIUM 40 MG: 40 TABLET, FILM COATED ORAL at 20:30

## 2025-01-19 RX ADMIN — SPIRONOLACTONE 25 MG: 25 TABLET, FILM COATED ORAL at 09:17

## 2025-01-19 RX ADMIN — INSULIN LISPRO 2 UNITS: 100 INJECTION, SOLUTION INTRAVENOUS; SUBCUTANEOUS at 09:24

## 2025-01-19 NOTE — PLAN OF CARE
Problem: Chronic Conditions and Co-morbidities  Goal: Patient's chronic conditions and co-morbidity symptoms are monitored and maintained or improved  Outcome: Progressing     Problem: Discharge Planning  Goal: Discharge to home or other facility with appropriate resources  Outcome: Progressing     Problem: ABCDS Injury Assessment  Goal: Absence of physical injury  Outcome: Progressing     Problem: Safety - Adult  Goal: Free from fall injury  Outcome: Progressing     Problem: Cardiovascular - Adult  Goal: Maintains optimal cardiac output and hemodynamic stability  Outcome: Progressing     Problem: Respiratory - Adult  Goal: Achieves optimal ventilation and oxygenation  Outcome: Progressing  Flowsheets (Taken 1/18/2025 1126 by Ama Smith, RN)  Achieves optimal ventilation and oxygenation:   Assess for changes in respiratory status   Assess for changes in mentation and behavior   Position to facilitate oxygenation and minimize respiratory effort   Oxygen supplementation based on oxygen saturation or arterial blood gases   Assess and instruct to report shortness of breath or any respiratory difficulty     Problem: Skin/Tissue Integrity - Adult  Goal: Skin integrity remains intact  Outcome: Progressing  Goal: Incisions, wounds, or drain sites healing without S/S of infection  Outcome: Progressing  Goal: Oral mucous membranes remain intact  Outcome: Progressing     Problem: Musculoskeletal - Adult  Goal: Return mobility to safest level of function  Outcome: Progressing  Flowsheets (Taken 1/18/2025 1126 by Ama Smith, RN)  Return Mobility to Safest Level of Function:   Assess patient stability and activity tolerance for standing, transferring and ambulating with or without assistive devices   Obtain physical therapy/occupational therapy consults as needed  Goal: Maintain proper alignment of affected body part  Outcome: Progressing  Goal: Return ADL status to a safe level of function  Outcome: Progressing

## 2025-01-19 NOTE — PROGRESS NOTES
[facility] Medicine Progress Note  V 1.6      Date of Admission: 1/15/2025    Hospital Day: 5      Chief Admission Complaint:  Shortness of Breath (Pt sent to ED for c/o SOB for the past couple weeks, pt states he has CHF. /)       Subjective:  leg edema improved, no voiced complaints    Presenting Admission History:       \"74 y.o. male who presented to [facility] with weakness and shortness of breath.  PMHx significant for paroxysmal A-fib, status post ablation and watchman placement, sick sinus syndrome status post PPM, nonobstructive CAD, HFpEF, diabetes, essential hypertension, hyperlipidemia, ESTHER, and obesity.       Patient was seen by EP today and was reporting significant weakness and shortness of breath and being unable to sleep due to symptoms therefore he was referred to come to emergency room for evaluation.  Patient has been having worsening orthopnea and bilateral lower extremity edema.  Patient denies any chest pain or palpitations.  Denies any other symptoms.  In ED other than hyperglycemia laboratory evaluation is nonacute CT PE negative for PE but finding more consistent clinically with pulmonary edema.  Patient was hemodynamically stable and is on room air.  Patient was given a dose of Lasix in ED.\"    Assessment/Plan:      Current Principal Problem:  Acute on chronic heart failure, unspecified heart failure type (HCC)    CHF - acute on chronic /diastolic failure w/ /preserved EF 55% by Echo dated 8/28/2024.  Likely due to hypertensive and/or ischemic heart disease. Continue diuresis as currently ordered w/ close monitoring of BUN/ Creatinine and electrolytes for ADR.  Continue current medical management and follow input and output as well as daily weights as recorded and clinical response. Consider GDMT at discharge unless contraindicated.  (ACEi/ARB/ARNI, SGLT2i, Aldactone, BBlocker).    -Cardiology consulted  -Echo EF 55-60%  -Continue IV Lasix 40 mg twice daily  -Strict I's

## 2025-01-19 NOTE — PLAN OF CARE
CHF Care Plan      Patient's EF (Ejection Fraction) is greater than 40%    Heart Failure Medications:  Diuretics:: Furosemide and Spironolactone    (One of the following REQUIRED for EF </= 40%/SYSTOLIC FAILURE but MAY be used in EF% >40%/DIASTOLIC FAILURE)        ACE:: None        ARB:: None         ARNI:: None    (Beta Blockers)  NON- Evidenced Based Beta Blocker (for EF% >40%/DIASTOLIC FAILURE): Metoprolol TARTrate- Lopressor    Evidenced Based Beta Blocker::(REQUIRED for EF% <40%/SYSTOLIC FAILURE) None  ...................................................................................................................................................    Failed to redirect to the Timeline version of the [employer] SmartLink.      Patient's weights and intake/output reviewed    Daily Weight log at bedside, patient/family participation in use of log: \"yes    Patient's current weight today shows a difference of 0 lbs  equal  than last documented weight.      Intake/Output Summary (Last 24 hours) at 1/19/2025 1859  Last data filed at 1/19/2025 1845  Gross per 24 hour   Intake 1320 ml   Output 3600 ml   Net -2280 ml       Education Booklet Provided: yes    Comorbidities Reviewed Yes    Patient has a past medical history of A-fib (HCC), Acid reflux, Acute on chronic heart failure, unspecified heart failure type (HCC), Yan's esophagus, Coronary artery disease of native heart with stable angina pectoris (HCC), Dementia (HCC), Diabetes mellitus (HCC), Diabetic autonomic neuropathy associated with type 2 diabetes mellitus (HCC), Hyperlipidemia, Hypertension, Pancreatitis, Restless legs, Sleep apnea, and Tremor.     >>For CHF and Comorbidity documentation on Education Time and Topics, please see Education Tab      CHF Education    Learners:  Patient  Readineess:   Acceptance  Method:   Explanation and Handout  Response:    Verbalizes Understanding    Comments: Weight, I/O    Time Spent: 5 mins      Pt sitting in bed at this

## 2025-01-19 NOTE — PLAN OF CARE
CHF Care Plan      Patient's EF (Ejection Fraction) is greater than 40%    Heart Failure Medications:  Diuretics:: Furosemide and Spironolactone    (One of the following REQUIRED for EF </= 40%/SYSTOLIC FAILURE but MAY be used in EF% >40%/DIASTOLIC FAILURE)        ACE:: None        ARB:: None         ARNI:: None    (Beta Blockers)  NON- Evidenced Based Beta Blocker (for EF% >40%/DIASTOLIC FAILURE): Metoprolol TARTrate- Lopressor    Evidenced Based Beta Blocker::(REQUIRED for EF% <40%/SYSTOLIC FAILURE) None  ...................................................................................................................................................    Failed to redirect to the Timeline version of the [employer] SmartLink.      Patient's weights and intake/output reviewed    Daily Weight log at bedside, patient/family participation in use of log: \"yes    Patient's current weight today shows no difference in weight    Intake/Output Summary (Last 24 hours) at 1/19/2025 0614  Last data filed at 1/19/2025 0019  Gross per 24 hour   Intake 840 ml   Output 1875 ml   Net -1035 ml       Education Booklet Provided: yes    Comorbidities Reviewed Yes    Patient has a past medical history of A-fib (HCC), Acid reflux, Acute on chronic heart failure, unspecified heart failure type (HCC), Yan's esophagus, Coronary artery disease of native heart with stable angina pectoris (HCC), Dementia (HCC), Diabetes mellitus (HCC), Diabetic autonomic neuropathy associated with type 2 diabetes mellitus (HCC), Hyperlipidemia, Hypertension, Pancreatitis, Restless legs, Sleep apnea, and Tremor.     >>For CHF and Comorbidity documentation on Education Time and Topics, please see Education Tab      CHF Education    Learners:  Patient  Readineess:   Acceptance  Method:   Explanation  Response:    Verbalizes Understanding    Comments:     Time Spent: 5      Pt resting in bed at this time on room air. Pt denies shortness of breath. Pt with pitting

## 2025-01-20 ENCOUNTER — TELEPHONE (OUTPATIENT)
Dept: FAMILY MEDICINE CLINIC | Age: 75
End: 2025-01-20

## 2025-01-20 VITALS
HEIGHT: 70 IN | RESPIRATION RATE: 18 BRPM | DIASTOLIC BLOOD PRESSURE: 73 MMHG | TEMPERATURE: 97.7 F | OXYGEN SATURATION: 90 % | SYSTOLIC BLOOD PRESSURE: 123 MMHG | HEART RATE: 93 BPM | BODY MASS INDEX: 39.3 KG/M2 | WEIGHT: 274.5 LBS

## 2025-01-20 LAB
ANION GAP SERPL CALCULATED.3IONS-SCNC: 10 MMOL/L (ref 3–16)
BUN SERPL-MCNC: 26 MG/DL (ref 7–20)
CALCIUM SERPL-MCNC: 8.6 MG/DL (ref 8.3–10.6)
CHLORIDE SERPL-SCNC: 96 MMOL/L (ref 99–110)
CO2 SERPL-SCNC: 35 MMOL/L (ref 21–32)
CREAT SERPL-MCNC: 1 MG/DL (ref 0.8–1.3)
GFR SERPLBLD CREATININE-BSD FMLA CKD-EPI: 79 ML/MIN/{1.73_M2}
GLUCOSE BLD-MCNC: 173 MG/DL (ref 70–99)
GLUCOSE BLD-MCNC: 208 MG/DL (ref 70–99)
GLUCOSE SERPL-MCNC: 175 MG/DL (ref 70–99)
MAGNESIUM SERPL-MCNC: 2.01 MG/DL (ref 1.8–2.4)
NT-PROBNP SERPL-MCNC: <36 PG/ML (ref 0–449)
PERFORMED ON: ABNORMAL
PERFORMED ON: ABNORMAL
POTASSIUM SERPL-SCNC: 4 MMOL/L (ref 3.5–5.1)
SODIUM SERPL-SCNC: 141 MMOL/L (ref 136–145)

## 2025-01-20 PROCEDURE — 83735 ASSAY OF MAGNESIUM: CPT

## 2025-01-20 PROCEDURE — 6370000000 HC RX 637 (ALT 250 FOR IP): Performed by: STUDENT IN AN ORGANIZED HEALTH CARE EDUCATION/TRAINING PROGRAM

## 2025-01-20 PROCEDURE — 36415 COLL VENOUS BLD VENIPUNCTURE: CPT

## 2025-01-20 PROCEDURE — 2500000003 HC RX 250 WO HCPCS: Performed by: STUDENT IN AN ORGANIZED HEALTH CARE EDUCATION/TRAINING PROGRAM

## 2025-01-20 PROCEDURE — 80048 BASIC METABOLIC PNL TOTAL CA: CPT

## 2025-01-20 PROCEDURE — 6360000002 HC RX W HCPCS: Performed by: STUDENT IN AN ORGANIZED HEALTH CARE EDUCATION/TRAINING PROGRAM

## 2025-01-20 PROCEDURE — 99222 1ST HOSP IP/OBS MODERATE 55: CPT | Performed by: STUDENT IN AN ORGANIZED HEALTH CARE EDUCATION/TRAINING PROGRAM

## 2025-01-20 PROCEDURE — 83880 ASSAY OF NATRIURETIC PEPTIDE: CPT

## 2025-01-20 RX ORDER — TORSEMIDE 20 MG/1
40 TABLET ORAL DAILY
Status: DISCONTINUED | OUTPATIENT
Start: 2025-01-20 | End: 2025-01-20 | Stop reason: HOSPADM

## 2025-01-20 RX ADMIN — INSULIN LISPRO 2 UNITS: 100 INJECTION, SOLUTION INTRAVENOUS; SUBCUTANEOUS at 12:50

## 2025-01-20 RX ADMIN — GABAPENTIN 600 MG: 300 CAPSULE ORAL at 09:09

## 2025-01-20 RX ADMIN — DOFETILIDE 250 MCG: 0.25 CAPSULE ORAL at 09:09

## 2025-01-20 RX ADMIN — SODIUM CHLORIDE, PRESERVATIVE FREE 10 ML: 5 INJECTION INTRAVENOUS at 09:10

## 2025-01-20 RX ADMIN — SPIRONOLACTONE 25 MG: 25 TABLET, FILM COATED ORAL at 09:09

## 2025-01-20 RX ADMIN — INSULIN LISPRO 20 UNITS: 100 INJECTION, SOLUTION INTRAVENOUS; SUBCUTANEOUS at 09:09

## 2025-01-20 RX ADMIN — INSULIN LISPRO 20 UNITS: 100 INJECTION, SOLUTION INTRAVENOUS; SUBCUTANEOUS at 12:50

## 2025-01-20 RX ADMIN — INSULIN GLARGINE 16 UNITS: 100 INJECTION, SOLUTION SUBCUTANEOUS at 09:09

## 2025-01-20 RX ADMIN — METOPROLOL TARTRATE 25 MG: 25 TABLET, FILM COATED ORAL at 09:09

## 2025-01-20 RX ADMIN — ASPIRIN 324 MG: 81 TABLET, CHEWABLE ORAL at 09:08

## 2025-01-20 RX ADMIN — ENOXAPARIN SODIUM 30 MG: 100 INJECTION SUBCUTANEOUS at 09:09

## 2025-01-20 ASSESSMENT — PAIN SCALES - GENERAL: PAINLEVEL_OUTOF10: 0

## 2025-01-20 NOTE — PLAN OF CARE
Problem: Chronic Conditions and Co-morbidities  Goal: Patient's chronic conditions and co-morbidity symptoms are monitored and maintained or improved  1/20/2025 1153 by Holly Santiago RN  Outcome: Adequate for Discharge  1/20/2025 1153 by Holly Santiago RN  Outcome: Progressing  Flowsheets (Taken 1/20/2025 0914)  Care Plan - Patient's Chronic Conditions and Co-Morbidity Symptoms are Monitored and Maintained or Improved: Monitor and assess patient's chronic conditions and comorbid symptoms for stability, deterioration, or improvement     Problem: Discharge Planning  Goal: Discharge to home or other facility with appropriate resources  1/20/2025 1153 by Holly Santiago RN  Outcome: Adequate for Discharge  1/20/2025 1153 by Holly Santiago RN  Outcome: Progressing  Flowsheets (Taken 1/20/2025 0914)  Discharge to home or other facility with appropriate resources: Identify barriers to discharge with patient and caregiver     Problem: ABCDS Injury Assessment  Goal: Absence of physical injury  1/20/2025 1153 by Holly Santiago RN  Outcome: Adequate for Discharge  1/20/2025 1153 by Holly Santiago RN  Outcome: Progressing     Problem: Safety - Adult  Goal: Free from fall injury  1/20/2025 1153 by Holly Santiago RN  Outcome: Adequate for Discharge  1/20/2025 1153 by Holly Santiago RN  Outcome: Progressing     Problem: Cardiovascular - Adult  Goal: Maintains optimal cardiac output and hemodynamic stability  1/20/2025 1153 by Holly Santiago RN  Outcome: Adequate for Discharge  1/20/2025 1153 by Holly Santiago RN  Outcome: Progressing  Flowsheets (Taken 1/20/2025 0914)  Maintains optimal cardiac output and hemodynamic stability: Monitor blood pressure and heart rate     Problem: Respiratory - Adult  Goal: Achieves optimal ventilation and oxygenation  1/20/2025 1153 by Holly Santiago RN  Outcome: Adequate for Discharge  1/20/2025 1153 by Holly Santiago RN  Outcome: Progressing  Flowsheets (Taken 1/20/2025 0914)  Achieves optimal ventilation and

## 2025-01-20 NOTE — PROGRESS NOTES
[facility]   Daily Progress Note      Admit Date:  1/15/2025    Reason for follow up visit: SOB    CC: \"I feel a little better. I still get a little SOB and I feel I'm swollen.\"    73 y/o male with PMH noted for  moderate nonobstructive CAD on cath in 2021, chronic HFpEF, PAF and S/P ablation and Watchman (July 2024), S/P PPM in 12/2023 and with lead revision in Jan 2024, H/O PE, HTN, HLD, type 2 DM, ESTHER but cannot tolerate CPAP and obesity admitted to [facility] after presenting with with worsening SOB and volume overload. He reported increased weight gain, increased dyspnea, fatigue and LE edema over last several months. Admitted and cardiology asked to assist with management.    Interval History:  Pt. seen and examined; records reviewed  BP softer.  Off 2 L, on room air.  Net diuresis: -3.1 L last 24 hours.  Net -8.4 L since admission.  Weight 274 pounds, down from 290.  His dry weight was thought to be around 285.  Remains on IV lasix  Reports breathing is back to baseline; trace lower extremity edema which is his baseline.  Evidence of venous insufficiency/venous hypertension.  Denies chest pain, cough, palpitations or dizziness    Subjective:  Pt with no acute overnight cardiac events.  No longer on telemetry.  Patient feels back to baseline.  Denies any chest pain, shortness of breath, orthopnea, dyspnea on exertion.    Objective:   /73   Pulse 93   Temp 97.7 °F (36.5 °C) (Oral)   Resp 18   Ht 1.778 m (5' 10\")   Wt 124.5 kg (274 lb 8 oz)   SpO2 90%   BMI 39.39 kg/m²     Intake/Output Summary (Last 24 hours) at 1/20/2025 1008  Last data filed at 1/20/2025 0914  Gross per 24 hour   Intake 1440 ml   Output 3850 ml   Net -2410 ml     Wt Readings from Last 3 Encounters:   01/20/25 124.5 kg (274 lb 8 oz)   01/15/25 131.9 kg (290 lb 12.8 oz)   10/29/24 126.6 kg (279 lb)       Physical Exam:  General: In no acute distress. Awake, alert, and oriented X4. Sitting on side of bed  HEENT: Sclerae

## 2025-01-20 NOTE — TELEPHONE ENCOUNTER
Care Transitions Initial Follow Up Call    Outreach made within 2 business days of discharge: Yes    Patient: London Swenson Patient : 1950   MRN: [id number]  Reason for Admission: CHF  Discharge Date: 24       Spoke with: discharge RN    Discharge department/facility: [facility] Interactive Patient Contact:  Call will be placed tomorrow.    Additional needs identified to be addressed with provider  No needs identified             Scheduled appointment with PCP within 7-14 days    Follow Up  Future Appointments   Date Time Provider Department Center   2025 10:00 AM Constance Yancey PA [facility] DEP   2/3/2025 10:45 AM Kamilla Duron APRN - CNP [place] [facility]   2025  2:20 PM Kelsey Flores MD AND ENDO [facility]   3/3/2025  1:00 PM Constance Yancey PA [facility] DEP   2025 10:30 AM ALETHEA ORTEGA DEVICE CHECK [facility]   2025 10:30 AM Ericka Valdez, APRN - CNP [facility]       PHUONG RICHARDSON LPN

## 2025-01-20 NOTE — PLAN OF CARE
CHF Care Plan      Patient's EF (Ejection Fraction) is greater than 40%    Heart Failure Medications:  Diuretics:: Furosemide and Spironolactone    (One of the following REQUIRED for EF </= 40%/SYSTOLIC FAILURE but MAY be used in EF% >40%/DIASTOLIC FAILURE)        ACE:: None        ARB:: None         ARNI:: None    (Beta Blockers)  NON- Evidenced Based Beta Blocker (for EF% >40%/DIASTOLIC FAILURE): Metoprolol TARTrate- Lopressor    Evidenced Based Beta Blocker::(REQUIRED for EF% <40%/SYSTOLIC FAILURE) None  ...................................................................................................................................................    Failed to redirect to the Timeline version of the [employer] SmartLink.      Patient's weights and intake/output reviewed    Daily Weight log at bedside, patient/family participation in use of log: \"yes    Patient's current weight today shows a difference of 4 lbs less than last documented weight.      Intake/Output Summary (Last 24 hours) at 1/20/2025 0630  Last data filed at 1/19/2025 2033  Gross per 24 hour   Intake 1080 ml   Output 3450 ml   Net -2370 ml       Education Booklet Provided: yes    Comorbidities Reviewed Yes    Patient has a past medical history of A-fib (HCC), Acid reflux, Acute on chronic heart failure, unspecified heart failure type (HCC), Yan's esophagus, Coronary artery disease of native heart with stable angina pectoris (HCC), Dementia (HCC), Diabetes mellitus (HCC), Diabetic autonomic neuropathy associated with type 2 diabetes mellitus (HCC), Hyperlipidemia, Hypertension, Pancreatitis, Restless legs, Sleep apnea, and Tremor.     >>For CHF and Comorbidity documentation on Education Time and Topics, please see Education Tab      CHF Education    Learners:  Patient  Readineess:   Acceptance  Method:   Explanation  Response:    Verbalizes Understanding    Comments: n/a    Time Spent: 5 minutes      Pt sitting in bed at this time on room air. Pt

## 2025-01-20 NOTE — DISCHARGE SUMMARY
Comments:   Reason for Stopping:               Significant Test Results    Echo (TTE) limited (PRN contrast/bubble/strain/3D)    Result Date: 1/17/2025    Image quality is suboptimal. Contrast used: Lumason.   Left Ventricle: Normal left ventricular systolic function with a visually estimated EF of 55 - 60%. Left ventricle size is normal. Mildly increased wall thickness. Findings consistent with concentric remodeling. Normal wall motion.No intracardiac mass or thrombus.   Tricuspid Valve: Mild regurgitation. No stenosis noted. The estimated RVSP is 26 mmHg.   Right Atrium: There appears to  be a catheter like structure in right atrium.     CT CHEST PULMONARY EMBOLISM W CONTRAST    Result Date: 1/15/2025  CT ANGIOGRAPHY CHEST WITH CONTRAST HISTORY: Short of breath Comparison: 1/25/2024 TECHNICAL FACTORS: Following administration of 80 cc of Omnipaque 350,CT angiography protocol was performed with postcontrast axial images performed through the pulmonary arteries with multiplanar reformatted images obtained. Maximum intensity projection  (MIP) reconstruction of the image data set were performed. CT scan was performed using dose optimization techniques as appropriate to a performed exam including the following: *Automated exposure control  *Adjustment of the mA and/or kV according to patient size (this includes techniques or standardized protocols for targeted exams where dose is matched indication/reason for exam; i.e. extremities or head) *Use of iterative reconstruction technique FINDINGS: There are no gross filling defects seen within the main branches of the pulmonary arteries to suggest pulmonary embolism. Patchy groundglass airspace disease is noted. This may indicate pneumonia. No mediastinal or hilar adenopathy is seen. No definite abnormal mass lesions are identified. Limited views of the upper abdomen show no acute abnormality. Massive right renal cystic lesion is seen which is incompletely visualized. No

## 2025-01-20 NOTE — CARE COORDINATION
CASE MANAGEMENT DISCHARGE SUMMARY      Discharge to: home, no needs       IMM given: (date) 1/20/25         Transportation:    Family/car: wife        Confirmed discharge plan with:     Patient: yes      Family:   no - pt contacted wife           RN, name: Holly

## 2025-01-20 NOTE — PLAN OF CARE
Problem: Chronic Conditions and Co-morbidities  Goal: Patient's chronic conditions and co-morbidity symptoms are monitored and maintained or improved  Outcome: Progressing  Flowsheets (Taken 1/19/2025 1025 by Ama Smith, RN)  Care Plan - Patient's Chronic Conditions and Co-Morbidity Symptoms are Monitored and Maintained or Improved:   Monitor and assess patient's chronic conditions and comorbid symptoms for stability, deterioration, or improvement   Collaborate with multidisciplinary team to address chronic and comorbid conditions and prevent exacerbation or deterioration   Update acute care plan with appropriate goals if chronic or comorbid symptoms are exacerbated and prevent overall improvement and discharge     Problem: Discharge Planning  Goal: Discharge to home or other facility with appropriate resources  Outcome: Progressing  Flowsheets (Taken 1/19/2025 1025 by Ama Smith, RN)  Discharge to home or other facility with appropriate resources:   Identify barriers to discharge with patient and caregiver   Arrange for needed discharge resources and transportation as appropriate   Identify discharge learning needs (meds, wound care, etc)     Problem: ABCDS Injury Assessment  Goal: Absence of physical injury  Outcome: Progressing     Problem: Safety - Adult  Goal: Free from fall injury  Outcome: Progressing     Problem: Cardiovascular - Adult  Goal: Maintains optimal cardiac output and hemodynamic stability  Outcome: Progressing     Problem: Respiratory - Adult  Goal: Achieves optimal ventilation and oxygenation  Outcome: Progressing     Problem: Skin/Tissue Integrity - Adult  Goal: Skin integrity remains intact  Outcome: Progressing  Goal: Incisions, wounds, or drain sites healing without S/S of infection  Outcome: Progressing  Goal: Oral mucous membranes remain intact  Outcome: Progressing     Problem: Musculoskeletal - Adult  Goal: Return mobility to safest level of function  Outcome:

## 2025-01-20 NOTE — PROGRESS NOTES
Patient discharged to home. IV removed with no complications. Discharge education complete with verbal understanding. All belongings sent home with patient. Patient transported via wheelchair to vehicle.

## 2025-01-20 NOTE — PLAN OF CARE
CHF Care Plan      Patient's EF (Ejection Fraction) is greater than 40%    Heart Failure Medications:  Diuretics:: Torsemide and Spironolactone  IV lasix dc- going home.     (One of the following REQUIRED for EF </= 40%/SYSTOLIC FAILURE but MAY be used in EF% >40%/DIASTOLIC FAILURE)        ACE:: None        ARB:: None         ARNI:: None    (Beta Blockers)  NON- Evidenced Based Beta Blocker (for EF% >40%/DIASTOLIC FAILURE): Metoprolol TARTrate- Lopressor    Evidenced Based Beta Blocker::(REQUIRED for EF% <40%/SYSTOLIC FAILURE) None  ...................................................................................................................................................    Failed to redirect to the Timeline version of the [employer] SmartLink.      Patient's weights and intake/output reviewed    Daily Weight log at bedside, patient/family participation in use of log: \"yes    Patient's current weight today shows a difference of 4 lbs less than last documented weight.      Intake/Output Summary (Last 24 hours) at 1/20/2025 1217  Last data filed at 1/20/2025 0914  Gross per 24 hour   Intake 960 ml   Output 2900 ml   Net -1940 ml       Education Booklet Provided: yes    Comorbidities Reviewed Yes    Patient has a past medical history of A-fib (HCC), Acid reflux, Acute on chronic heart failure, unspecified heart failure type (HCC), Yan's esophagus, Coronary artery disease of native heart with stable angina pectoris (HCC), Dementia (HCC), Diabetes mellitus (HCC), Diabetic autonomic neuropathy associated with type 2 diabetes mellitus (HCC), Hyperlipidemia, Hypertension, Pancreatitis, Restless legs, Sleep apnea, and Tremor.     >>For CHF and Comorbidity documentation on Education Time and Topics, please see Education Tab      CHF Education    Learners:  Patient  Readineess:   Acceptance  Method:   Explanation  Response:    Verbalizes Understanding    Comments: none    Time Spent: 5 min      Pt up in chair at this

## 2025-01-21 ENCOUNTER — CARE COORDINATION (OUTPATIENT)
Dept: CASE MANAGEMENT | Age: 75
End: 2025-01-21

## 2025-01-21 NOTE — CARE COORDINATION
Care Transitions Note    Initial Call - Call within 2 business days of discharge: Yes    Patient Current Location:  Home: [address] [place] [id number]    Care Transition Nurse contacted the patient, spouse/partner  by telephone to perform post hospital discharge assessment, verified name and  as identifiers. Provided introduction to self, and explanation of the Care Transition Nurse role.     Patient: London Swenson    Patient : 1950   MRN: [id number]    Reason for Admission: CHF  Discharge Date: 25  RURS: Readmission Risk Score: 19.3      Last Discharge Facility       Date Complaint Diagnosis Description Type Department Provider    1/15/25 Shortness of Breath Acute on chronic congestive heart failure, unspecified heart failure type (HCC) ... ED to Hosp-Admission (Discharged) (ADMITTED) [facility] [place] Ricketts MD; MIGUEL A Law[place].            Was this an external facility discharge? No    Additional needs identified to be addressed with provider   No needs identified         Method of communication with provider: none.    Patients top risk factors for readmission: functional physical ability    Interventions to address risk factors:   Review of patient management of conditions/medications: CHF    Care Summary Note: Patient's wife Rosy answered call and verified patient's . HIPAA verified. Pleasant and agreeable to transition call. Known to CTN from previous episode. Patient is doing \"alright\" since discharge. Confirmed that she has AVS and new and stopped prescriptions discussed. Confirmed patient has new medication and taking as directed. Patient continues to weight self and this morning was 275 lbs on personal scale. Wife stated swelling had gone down since discharge. Follow up visit with PCP already scheduled and noted in system. Denied any acute needs at present time.  Agreeable to f/u calls.  Educated on the use of urgent care or physician’s 24 hr access line if assistance is needed

## 2025-01-21 NOTE — TELEPHONE ENCOUNTER
Care Transitions Initial Follow Up Call    Outreach made within 2 business days of discharge: Yes    Patient: London Swenson Patient : 1950   MRN: [id number]  Reason for Admission: CHF  Discharge Date: 25       Spoke with: Patient and spouse     Discharge department/facility: [facility] Interactive Patient Contact:  Was patient able to fill all prescriptions: Yes  Was patient instructed to bring all medications to the follow-up visit: Yes  Is patient taking all medications as directed in the discharge summary? Yes  Does patient understand their discharge instructions: Yes  Does patient have questions or concerns that need addressed prior to 7-14 day follow up office visit: no        Scheduled appointment with PCP within 7-14 days    Follow Up  Future Appointments   Date Time Provider Department Center   2025 10:00 AM Constance Yancey PA [facility] DEP   2/3/2025 10:45 AM Kamilla Duron APRN - CNP [facility]   2025  2:20 PM Kelsey Flores MD AND ENDO [facility]   3/3/2025  1:00 PM Constance Yancey PA [facility] DEP   2025 10:30 AM SCHEDULE, [facility] DEVICE CHECK [facility]   2025 10:30 AM Ericka Valdez APRN - CNP [facility]       Afsaneh Short LPN

## 2025-01-23 ENCOUNTER — APPOINTMENT (OUTPATIENT)
Dept: GENERAL RADIOLOGY | Age: 75
DRG: 291 | End: 2025-01-23
Payer: MEDICARE

## 2025-01-23 ENCOUNTER — HOSPITAL ENCOUNTER (INPATIENT)
Age: 75
LOS: 7 days | Discharge: HOME HEALTH | DRG: 291 | End: 2025-01-30
Attending: EMERGENCY MEDICINE | Admitting: INTERNAL MEDICINE
Payer: MEDICARE

## 2025-01-23 DIAGNOSIS — J96.21 ACUTE ON CHRONIC RESPIRATORY FAILURE WITH HYPOXIA: Primary | ICD-10-CM

## 2025-01-23 DIAGNOSIS — R68.89 RIGORS: ICD-10-CM

## 2025-01-23 DIAGNOSIS — N17.9 AKI (ACUTE KIDNEY INJURY) (HCC): ICD-10-CM

## 2025-01-23 LAB
ALBUMIN SERPL-MCNC: 4.5 G/DL (ref 3.4–5)
ALBUMIN/GLOB SERPL: 1.6 {RATIO} (ref 1.1–2.2)
ALP SERPL-CCNC: 129 U/L (ref 40–129)
ALT SERPL-CCNC: 32 U/L (ref 10–40)
ANION GAP SERPL CALCULATED.3IONS-SCNC: 14 MMOL/L (ref 3–16)
AST SERPL-CCNC: 32 U/L (ref 15–37)
BASOPHILS # BLD: 0.1 K/UL (ref 0–0.2)
BASOPHILS NFR BLD: 1.1 %
BILIRUB SERPL-MCNC: 0.7 MG/DL (ref 0–1)
BUN SERPL-MCNC: 37 MG/DL (ref 7–20)
CALCIUM SERPL-MCNC: 10.1 MG/DL (ref 8.3–10.6)
CHLORIDE SERPL-SCNC: 96 MMOL/L (ref 99–110)
CO2 SERPL-SCNC: 32 MMOL/L (ref 21–32)
CREAT SERPL-MCNC: 1.7 MG/DL (ref 0.8–1.3)
DEPRECATED RDW RBC AUTO: 15.1 % (ref 12.4–15.4)
EKG ATRIAL RATE: 72 BPM
EKG DIAGNOSIS: NORMAL
EKG P AXIS: 64 DEGREES
EKG P-R INTERVAL: 216 MS
EKG Q-T INTERVAL: 410 MS
EKG QRS DURATION: 74 MS
EKG QTC CALCULATION (BAZETT): 448 MS
EKG R AXIS: 85 DEGREES
EKG T AXIS: 50 DEGREES
EKG VENTRICULAR RATE: 72 BPM
EOSINOPHIL # BLD: 0.2 K/UL (ref 0–0.6)
EOSINOPHIL NFR BLD: 5.1 %
GFR SERPLBLD CREATININE-BSD FMLA CKD-EPI: 42 ML/MIN/{1.73_M2}
GLUCOSE BLD-MCNC: 238 MG/DL (ref 70–99)
GLUCOSE BLD-MCNC: 53 MG/DL (ref 70–99)
GLUCOSE SERPL-MCNC: 154 MG/DL (ref 70–99)
HCT VFR BLD AUTO: 42 % (ref 40.5–52.5)
HGB BLD-MCNC: 13.8 G/DL (ref 13.5–17.5)
LYMPHOCYTES # BLD: 1 K/UL (ref 1–5.1)
LYMPHOCYTES NFR BLD: 21.8 %
MAGNESIUM SERPL-MCNC: 2.19 MG/DL (ref 1.8–2.4)
MCH RBC QN AUTO: 29 PG (ref 26–34)
MCHC RBC AUTO-ENTMCNC: 33 G/DL (ref 31–36)
MCV RBC AUTO: 88 FL (ref 80–100)
MONOCYTES # BLD: 0.4 K/UL (ref 0–1.3)
MONOCYTES NFR BLD: 8.3 %
NEUTROPHILS # BLD: 3 K/UL (ref 1.7–7.7)
NEUTROPHILS NFR BLD: 63.7 %
NT-PROBNP SERPL-MCNC: <36 PG/ML (ref 0–449)
PERFORMED ON: ABNORMAL
PERFORMED ON: ABNORMAL
PLATELET # BLD AUTO: 159 K/UL (ref 135–450)
PMV BLD AUTO: 9.7 FL (ref 5–10.5)
POTASSIUM SERPL-SCNC: 3.8 MMOL/L (ref 3.5–5.1)
PROT SERPL-MCNC: 7.4 G/DL (ref 6.4–8.2)
RBC # BLD AUTO: 4.77 M/UL (ref 4.2–5.9)
SODIUM SERPL-SCNC: 142 MMOL/L (ref 136–145)
TROPONIN, HIGH SENSITIVITY: 21 NG/L (ref 0–22)
TROPONIN, HIGH SENSITIVITY: 23 NG/L (ref 0–22)
WBC # BLD AUTO: 4.8 K/UL (ref 4–11)

## 2025-01-23 PROCEDURE — 83880 ASSAY OF NATRIURETIC PEPTIDE: CPT

## 2025-01-23 PROCEDURE — 71045 X-RAY EXAM CHEST 1 VIEW: CPT

## 2025-01-23 PROCEDURE — 99285 EMERGENCY DEPT VISIT HI MDM: CPT

## 2025-01-23 PROCEDURE — 83735 ASSAY OF MAGNESIUM: CPT

## 2025-01-23 PROCEDURE — 1200000000 HC SEMI PRIVATE

## 2025-01-23 PROCEDURE — 84484 ASSAY OF TROPONIN QUANT: CPT

## 2025-01-23 PROCEDURE — 93010 ELECTROCARDIOGRAM REPORT: CPT | Performed by: INTERNAL MEDICINE

## 2025-01-23 PROCEDURE — 85025 COMPLETE CBC W/AUTO DIFF WBC: CPT

## 2025-01-23 PROCEDURE — 93005 ELECTROCARDIOGRAM TRACING: CPT | Performed by: EMERGENCY MEDICINE

## 2025-01-23 PROCEDURE — 96374 THER/PROPH/DIAG INJ IV PUSH: CPT

## 2025-01-23 PROCEDURE — 6370000000 HC RX 637 (ALT 250 FOR IP): Performed by: INTERNAL MEDICINE

## 2025-01-23 PROCEDURE — 36415 COLL VENOUS BLD VENIPUNCTURE: CPT

## 2025-01-23 PROCEDURE — 83540 ASSAY OF IRON: CPT

## 2025-01-23 PROCEDURE — 2700000000 HC OXYGEN THERAPY PER DAY

## 2025-01-23 PROCEDURE — 94761 N-INVAS EAR/PLS OXIMETRY MLT: CPT

## 2025-01-23 PROCEDURE — 82728 ASSAY OF FERRITIN: CPT

## 2025-01-23 PROCEDURE — 83550 IRON BINDING TEST: CPT

## 2025-01-23 PROCEDURE — 6360000002 HC RX W HCPCS: Performed by: INTERNAL MEDICINE

## 2025-01-23 PROCEDURE — 6360000002 HC RX W HCPCS: Performed by: PHYSICIAN ASSISTANT

## 2025-01-23 PROCEDURE — 80053 COMPREHEN METABOLIC PANEL: CPT

## 2025-01-23 PROCEDURE — 2500000003 HC RX 250 WO HCPCS: Performed by: INTERNAL MEDICINE

## 2025-01-23 RX ORDER — ONDANSETRON 2 MG/ML
4 INJECTION INTRAMUSCULAR; INTRAVENOUS EVERY 6 HOURS PRN
Status: DISCONTINUED | OUTPATIENT
Start: 2025-01-23 | End: 2025-01-30 | Stop reason: HOSPADM

## 2025-01-23 RX ORDER — CYCLOBENZAPRINE HCL 10 MG
10 TABLET ORAL 3 TIMES DAILY PRN
Status: DISCONTINUED | OUTPATIENT
Start: 2025-01-23 | End: 2025-01-30 | Stop reason: HOSPADM

## 2025-01-23 RX ORDER — DOFETILIDE 0.25 MG/1
250 CAPSULE ORAL EVERY 12 HOURS SCHEDULED
Status: DISCONTINUED | OUTPATIENT
Start: 2025-01-23 | End: 2025-01-30 | Stop reason: HOSPADM

## 2025-01-23 RX ORDER — ACETAMINOPHEN 650 MG/1
650 SUPPOSITORY RECTAL EVERY 6 HOURS PRN
Status: DISCONTINUED | OUTPATIENT
Start: 2025-01-23 | End: 2025-01-30 | Stop reason: HOSPADM

## 2025-01-23 RX ORDER — ASPIRIN 325 MG
325 TABLET ORAL DAILY
Status: DISCONTINUED | OUTPATIENT
Start: 2025-01-24 | End: 2025-01-30 | Stop reason: HOSPADM

## 2025-01-23 RX ORDER — SPIRONOLACTONE 25 MG/1
25 TABLET ORAL DAILY
Status: DISCONTINUED | OUTPATIENT
Start: 2025-01-24 | End: 2025-01-24

## 2025-01-23 RX ORDER — GABAPENTIN 300 MG/1
600 CAPSULE ORAL 2 TIMES DAILY
Status: DISCONTINUED | OUTPATIENT
Start: 2025-01-23 | End: 2025-01-30 | Stop reason: HOSPADM

## 2025-01-23 RX ORDER — ATORVASTATIN CALCIUM 40 MG/1
40 TABLET, FILM COATED ORAL NIGHTLY
Status: DISCONTINUED | OUTPATIENT
Start: 2025-01-23 | End: 2025-01-30 | Stop reason: HOSPADM

## 2025-01-23 RX ORDER — PANTOPRAZOLE SODIUM 40 MG/1
40 TABLET, DELAYED RELEASE ORAL
Status: DISCONTINUED | OUTPATIENT
Start: 2025-01-23 | End: 2025-01-30 | Stop reason: HOSPADM

## 2025-01-23 RX ORDER — INSULIN LISPRO 100 [IU]/ML
20 INJECTION, SOLUTION INTRAVENOUS; SUBCUTANEOUS
Status: DISCONTINUED | OUTPATIENT
Start: 2025-01-23 | End: 2025-01-30 | Stop reason: HOSPADM

## 2025-01-23 RX ORDER — ENOXAPARIN SODIUM 100 MG/ML
30 INJECTION SUBCUTANEOUS 2 TIMES DAILY
Status: DISCONTINUED | OUTPATIENT
Start: 2025-01-23 | End: 2025-01-30 | Stop reason: HOSPADM

## 2025-01-23 RX ORDER — INSULIN LISPRO 100 [IU]/ML
0-8 INJECTION, SOLUTION INTRAVENOUS; SUBCUTANEOUS
Status: DISCONTINUED | OUTPATIENT
Start: 2025-01-23 | End: 2025-01-30 | Stop reason: HOSPADM

## 2025-01-23 RX ORDER — GLUCAGON 1 MG/ML
1 KIT INJECTION PRN
Status: DISCONTINUED | OUTPATIENT
Start: 2025-01-23 | End: 2025-01-30 | Stop reason: HOSPADM

## 2025-01-23 RX ORDER — DEXTROSE MONOHYDRATE 100 MG/ML
INJECTION, SOLUTION INTRAVENOUS CONTINUOUS PRN
Status: DISCONTINUED | OUTPATIENT
Start: 2025-01-23 | End: 2025-01-30 | Stop reason: HOSPADM

## 2025-01-23 RX ORDER — TORSEMIDE 20 MG/1
40 TABLET ORAL DAILY
Status: DISCONTINUED | OUTPATIENT
Start: 2025-01-24 | End: 2025-01-23

## 2025-01-23 RX ORDER — ACETAMINOPHEN 325 MG/1
650 TABLET ORAL EVERY 6 HOURS PRN
Status: DISCONTINUED | OUTPATIENT
Start: 2025-01-23 | End: 2025-01-30 | Stop reason: HOSPADM

## 2025-01-23 RX ORDER — POLYETHYLENE GLYCOL 3350 17 G/17G
17 POWDER, FOR SOLUTION ORAL DAILY PRN
Status: DISCONTINUED | OUTPATIENT
Start: 2025-01-23 | End: 2025-01-30 | Stop reason: HOSPADM

## 2025-01-23 RX ORDER — METOPROLOL TARTRATE 25 MG/1
25 TABLET, FILM COATED ORAL 2 TIMES DAILY
Status: DISCONTINUED | OUTPATIENT
Start: 2025-01-23 | End: 2025-01-30 | Stop reason: HOSPADM

## 2025-01-23 RX ORDER — TORSEMIDE 20 MG/1
20 TABLET ORAL DAILY
Status: DISCONTINUED | OUTPATIENT
Start: 2025-01-24 | End: 2025-01-30 | Stop reason: HOSPADM

## 2025-01-23 RX ORDER — LORAZEPAM 2 MG/ML
1 INJECTION INTRAMUSCULAR ONCE
Status: COMPLETED | OUTPATIENT
Start: 2025-01-23 | End: 2025-01-23

## 2025-01-23 RX ORDER — INSULIN GLARGINE 100 [IU]/ML
20 INJECTION, SOLUTION SUBCUTANEOUS NIGHTLY
Status: DISCONTINUED | OUTPATIENT
Start: 2025-01-23 | End: 2025-01-26

## 2025-01-23 RX ORDER — SODIUM CHLORIDE 0.9 % (FLUSH) 0.9 %
5-40 SYRINGE (ML) INJECTION EVERY 12 HOURS SCHEDULED
Status: DISCONTINUED | OUTPATIENT
Start: 2025-01-23 | End: 2025-01-30 | Stop reason: HOSPADM

## 2025-01-23 RX ORDER — SODIUM CHLORIDE 9 MG/ML
INJECTION, SOLUTION INTRAVENOUS PRN
Status: DISCONTINUED | OUTPATIENT
Start: 2025-01-23 | End: 2025-01-30 | Stop reason: HOSPADM

## 2025-01-23 RX ORDER — PRAMIPEXOLE DIHYDROCHLORIDE 0.25 MG/1
0.75 TABLET ORAL NIGHTLY
Status: DISCONTINUED | OUTPATIENT
Start: 2025-01-23 | End: 2025-01-25

## 2025-01-23 RX ORDER — ONDANSETRON 4 MG/1
4 TABLET, ORALLY DISINTEGRATING ORAL EVERY 8 HOURS PRN
Status: DISCONTINUED | OUTPATIENT
Start: 2025-01-23 | End: 2025-01-30 | Stop reason: HOSPADM

## 2025-01-23 RX ORDER — SODIUM CHLORIDE 0.9 % (FLUSH) 0.9 %
5-40 SYRINGE (ML) INJECTION PRN
Status: DISCONTINUED | OUTPATIENT
Start: 2025-01-23 | End: 2025-01-30 | Stop reason: HOSPADM

## 2025-01-23 RX ADMIN — PRAMIPEXOLE DIHYDROCHLORIDE 0.75 MG: 0.25 TABLET ORAL at 19:51

## 2025-01-23 RX ADMIN — SODIUM CHLORIDE, PRESERVATIVE FREE 10 ML: 5 INJECTION INTRAVENOUS at 19:54

## 2025-01-23 RX ADMIN — LORAZEPAM 1 MG: 2 INJECTION INTRAMUSCULAR; INTRAVENOUS at 16:44

## 2025-01-23 RX ADMIN — ENOXAPARIN SODIUM 30 MG: 100 INJECTION SUBCUTANEOUS at 19:52

## 2025-01-23 RX ADMIN — ATORVASTATIN CALCIUM 40 MG: 40 TABLET, FILM COATED ORAL at 19:52

## 2025-01-23 RX ADMIN — PANTOPRAZOLE SODIUM 40 MG: 40 TABLET, DELAYED RELEASE ORAL at 19:52

## 2025-01-23 RX ADMIN — DOFETILIDE 250 MCG: 0.25 CAPSULE ORAL at 19:52

## 2025-01-23 RX ADMIN — INSULIN GLARGINE 20 UNITS: 100 INJECTION, SOLUTION SUBCUTANEOUS at 19:52

## 2025-01-23 RX ADMIN — METOPROLOL TARTRATE 25 MG: 25 TABLET, FILM COATED ORAL at 19:51

## 2025-01-23 RX ADMIN — INSULIN LISPRO 2 UNITS: 100 INJECTION, SOLUTION INTRAVENOUS; SUBCUTANEOUS at 19:52

## 2025-01-23 RX ADMIN — GABAPENTIN 600 MG: 300 CAPSULE ORAL at 19:51

## 2025-01-23 ASSESSMENT — PAIN - FUNCTIONAL ASSESSMENT: PAIN_FUNCTIONAL_ASSESSMENT: 0-10

## 2025-01-23 ASSESSMENT — PAIN SCALES - GENERAL: PAINLEVEL_OUTOF10: 0

## 2025-01-23 NOTE — ED NOTES
London Swenson is a 74 y.o. male admitted for  Principal Problem:    Acute hypoxic respiratory failure  Resolved Problems:    * No resolved hospital problems. *  .   Patient Home via EMS transportation with   Chief Complaint   Patient presents with    Shortness of Breath     Was discharged from hospital on Monday for SOB, CHF. States he felt like he hasn't gotten better and having worsening SOB with exertion. Denies any pain   .  Patient is alert and Person, Place, Time, and Situation  Patient's baseline mobility: Baseline Mobility: Independent   Code Status: Full Code   Cardiac Rhythm:    O2 Flow Rate (L/min): 2 L/min  Is patient on baseline Oxygen: no, pt placed on 2L here in ER for low O2  Abnormal Assessment Findings: hypoxia, shortness of breath      NIH Score:    C-SSRS: Risk of Suicide: No Risk  Bedside swallow:        Active LDA's:   Peripheral IV 01/23/25 Right Forearm (Active)     Patient admitted with a aranda: no      Family/Caregiver Present yes Any Concerns: no   Restraints no  Sitter no         Vitals: MEWS Score: 1    Vitals:    01/23/25 1618 01/23/25 1655 01/23/25 1737 01/23/25 1743   BP: 110/62 123/71 102/65    Pulse: 71 72 73    Resp: 13 14 15    Temp:       TempSrc:       SpO2: 95% 92%  92%   Weight:       Height:           Last documented pain score (0-10 scale) Pain Level: 0  Pain medication administered No.    Pertinent or High Risk Medications/Drips: No.    Pending Blood Product Administration: no    Abnormal labs:   Abnormal Labs Reviewed   COMPREHENSIVE METABOLIC PANEL - Abnormal; Notable for the following components:       Result Value    Chloride 96 (*)     Glucose 154 (*)     BUN 37 (*)     Creatinine 1.7 (*)     Est, Glom Filt Rate 42 (*)     All other components within normal limits   TROPONIN - Abnormal; Notable for the following components:    Troponin, High Sensitivity 23 (*)     All other components within normal limits     Critical values: no  Intervention for critical value(s):

## 2025-01-23 NOTE — ED PROVIDER NOTES
Emergency Department Attending SUPERVISORY Provider Note  Location: [facility] EMERGENCY DEPARTMENT  1/23/2025     Chief Complaint   Patient presents with    Shortness of Breath     Was discharged from hospital on Monday for SOB, CHF. States he felt like he hasn't gotten better and having worsening SOB with exertion. Denies any pain        PATIENT ID:  London Swenson is a 74 y.o. 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  Feels like he is having CHF, especially with walking around.  He is hypoxic here in the emergency department, requiring oxygen which is new..          Vitals:    01/23/25 1546   BP: 104/71   Pulse: 70   Resp: 13   Temp:    SpO2: 93%        BASIC EXAM:    Focused exam revealed   General: Alert, looking around the room, new oxygen requirement.  Somewhat tremulous.  Bilateral upper extremities.  Skin: Warm, intact, no pallor noted   Head: Normocephalic  Eye: Normal conjunctiva   Cardiac: Normal peripheral perfusion  Respiratory: No acute distress   Musculoskeletal: No deformity, full ROM.   Neurological: Alert and oriented  Psychiatric: Cooperative      EKG Interpretation:    Sinus rhythm with 1st degree A-V blockLow voltage QRSCannot rule out Anterior infarct (cited on or before 15-EVARISTO-2025)Abnormal ECGWhen compared with ECG of 15-EVARISTO-2025 15:35,Sinus rhythm has replaced

## 2025-01-23 NOTE — ED PROVIDER NOTES
[facility] EMERGENCY DEPARTMENT  EMERGENCY DEPARTMENT ENCOUNTER        Pt Name: London Swenson  MRN: [id number]  Birthdate 1950  Date of evaluation: 1/23/2025  Provider: JENNIFER Knox  PCP: Constance Yancey PA  Note Started: 4:07 PM EST 1/23/25       I have seen and evaluated this patient with my supervising physician Mayra Skinner DO.      CHIEF COMPLAINT       Chief Complaint   Patient presents with    Shortness of Breath     Was discharged from hospital on Monday for SOB, CHF. States he felt like he hasn't gotten better and having worsening SOB with exertion. Denies any pain       HISTORY OF PRESENT ILLNESS: 1 or more Elements     History from : Patient    Limitations to history : None    London Swenson is a 74 y.o. male who presents to the emergency department via EMS for shortness of breath.  The patient was discharged from the hospital on Monday after being treated for CHF.  He states initially he was feeling better however beginning yesterday he started feeling worse again.  He does have shortness of breath with exertion.  He is requiring oxygen and was not sent home on any oxygen.  He is also complaining of uncontrollable shaking throughout his body.  The shaking began today.  He has not had any fevers or chills.  No significant increase in leg swelling    Nursing Notes were all reviewed and agreed with or any disagreements were addressed in the HPI.    REVIEW OF SYSTEMS :      Review of Systems    Positives and Pertinent negatives as per HPI.     SURGICAL HISTORY     Past Surgical History:   Procedure Laterality Date    CARDIAC DEFIBRILLATOR PLACEMENT      CATARACT REMOVAL Bilateral 2013    CHOLECYSTECTOMY      COLONOSCOPY  10/26/2011    ENDOSCOPY, COLON, DIAGNOSTIC      EGD halo    EP DEVICE PROCEDURE N/A 7/15/2024    Watchman keaton closure device performed by Bayron Montoya MD at [facility] CARDIAC CATH LAB    HYDROCELE EXCISION  11/15/2016    LARYNGOSCOPY N/A 10/25/2023    DRUG

## 2025-01-24 ENCOUNTER — TELEPHONE (OUTPATIENT)
Dept: FAMILY MEDICINE CLINIC | Age: 75
End: 2025-01-24

## 2025-01-24 ENCOUNTER — CARE COORDINATION (OUTPATIENT)
Dept: CASE MANAGEMENT | Age: 75
End: 2025-01-24

## 2025-01-24 LAB
ALBUMIN SERPL-MCNC: 4.3 G/DL (ref 3.4–5)
ALP SERPL-CCNC: 118 U/L (ref 40–129)
ALT SERPL-CCNC: 26 U/L (ref 10–40)
ANION GAP SERPL CALCULATED.3IONS-SCNC: 9 MMOL/L (ref 3–16)
AST SERPL-CCNC: 25 U/L (ref 15–37)
BILIRUB DIRECT SERPL-MCNC: 0.3 MG/DL (ref 0–0.3)
BILIRUB INDIRECT SERPL-MCNC: 0.2 MG/DL (ref 0–1)
BILIRUB SERPL-MCNC: 0.5 MG/DL (ref 0–1)
BUN SERPL-MCNC: 36 MG/DL (ref 7–20)
CALCIUM SERPL-MCNC: 9.7 MG/DL (ref 8.3–10.6)
CHLORIDE SERPL-SCNC: 98 MMOL/L (ref 99–110)
CHOLEST SERPL-MCNC: 142 MG/DL (ref 0–199)
CO2 SERPL-SCNC: 36 MMOL/L (ref 21–32)
CREAT SERPL-MCNC: 1.5 MG/DL (ref 0.8–1.3)
EST. AVERAGE GLUCOSE BLD GHB EST-MCNC: 226 MG/DL
FERRITIN SERPL IA-MCNC: 45.7 NG/ML (ref 30–400)
GFR SERPLBLD CREATININE-BSD FMLA CKD-EPI: 48 ML/MIN/{1.73_M2}
GLUCOSE BLD-MCNC: 185 MG/DL (ref 70–99)
GLUCOSE BLD-MCNC: 191 MG/DL (ref 70–99)
GLUCOSE BLD-MCNC: 248 MG/DL (ref 70–99)
GLUCOSE BLD-MCNC: 293 MG/DL (ref 70–99)
GLUCOSE BLD-MCNC: 66 MG/DL (ref 70–99)
GLUCOSE BLD-MCNC: 69 MG/DL (ref 70–99)
GLUCOSE SERPL-MCNC: 156 MG/DL (ref 70–99)
HBA1C MFR BLD: 9.5 %
HDLC SERPL-MCNC: 40 MG/DL (ref 40–60)
IRON SATN MFR SERPL: 15 % (ref 20–50)
IRON SERPL-MCNC: 59 UG/DL (ref 59–158)
LDLC SERPL CALC-MCNC: 70 MG/DL
MAGNESIUM SERPL-MCNC: 2.5 MG/DL (ref 1.8–2.4)
PERFORMED ON: ABNORMAL
POTASSIUM SERPL-SCNC: 3.7 MMOL/L (ref 3.5–5.1)
PROT SERPL-MCNC: 6.8 G/DL (ref 6.4–8.2)
SODIUM SERPL-SCNC: 143 MMOL/L (ref 136–145)
TIBC SERPL-MCNC: 385 UG/DL (ref 260–445)
TRIGL SERPL-MCNC: 160 MG/DL (ref 0–150)
VLDLC SERPL CALC-MCNC: 32 MG/DL

## 2025-01-24 PROCEDURE — 97162 PT EVAL MOD COMPLEX 30 MIN: CPT

## 2025-01-24 PROCEDURE — 80048 BASIC METABOLIC PNL TOTAL CA: CPT

## 2025-01-24 PROCEDURE — 6370000000 HC RX 637 (ALT 250 FOR IP): Performed by: NURSE PRACTITIONER

## 2025-01-24 PROCEDURE — 97166 OT EVAL MOD COMPLEX 45 MIN: CPT

## 2025-01-24 PROCEDURE — 97530 THERAPEUTIC ACTIVITIES: CPT

## 2025-01-24 PROCEDURE — 36415 COLL VENOUS BLD VENIPUNCTURE: CPT

## 2025-01-24 PROCEDURE — 80061 LIPID PANEL: CPT

## 2025-01-24 PROCEDURE — 6360000002 HC RX W HCPCS: Performed by: STUDENT IN AN ORGANIZED HEALTH CARE EDUCATION/TRAINING PROGRAM

## 2025-01-24 PROCEDURE — 6360000002 HC RX W HCPCS: Performed by: INTERNAL MEDICINE

## 2025-01-24 PROCEDURE — 6370000000 HC RX 637 (ALT 250 FOR IP): Performed by: INTERNAL MEDICINE

## 2025-01-24 PROCEDURE — 1200000000 HC SEMI PRIVATE

## 2025-01-24 PROCEDURE — 2500000003 HC RX 250 WO HCPCS: Performed by: INTERNAL MEDICINE

## 2025-01-24 PROCEDURE — 83036 HEMOGLOBIN GLYCOSYLATED A1C: CPT

## 2025-01-24 PROCEDURE — 83735 ASSAY OF MAGNESIUM: CPT

## 2025-01-24 PROCEDURE — 80076 HEPATIC FUNCTION PANEL: CPT

## 2025-01-24 RX ORDER — SPIRONOLACTONE 25 MG/1
25 TABLET ORAL DAILY
Status: DISCONTINUED | OUTPATIENT
Start: 2025-01-25 | End: 2025-01-27

## 2025-01-24 RX ORDER — FUROSEMIDE 10 MG/ML
40 INJECTION INTRAMUSCULAR; INTRAVENOUS 2 TIMES DAILY
Status: COMPLETED | OUTPATIENT
Start: 2025-01-24 | End: 2025-01-25

## 2025-01-24 RX ADMIN — PANTOPRAZOLE SODIUM 40 MG: 40 TABLET, DELAYED RELEASE ORAL at 15:45

## 2025-01-24 RX ADMIN — SALINE NASAL SPRAY 1 SPRAY: 1.5 SOLUTION NASAL at 23:16

## 2025-01-24 RX ADMIN — GABAPENTIN 600 MG: 300 CAPSULE ORAL at 20:18

## 2025-01-24 RX ADMIN — SODIUM CHLORIDE, PRESERVATIVE FREE 10 ML: 5 INJECTION INTRAVENOUS at 20:19

## 2025-01-24 RX ADMIN — INSULIN LISPRO 2 UNITS: 100 INJECTION, SOLUTION INTRAVENOUS; SUBCUTANEOUS at 08:49

## 2025-01-24 RX ADMIN — ENOXAPARIN SODIUM 30 MG: 100 INJECTION SUBCUTANEOUS at 08:48

## 2025-01-24 RX ADMIN — INSULIN LISPRO 4 UNITS: 100 INJECTION, SOLUTION INTRAVENOUS; SUBCUTANEOUS at 18:27

## 2025-01-24 RX ADMIN — ATORVASTATIN CALCIUM 40 MG: 40 TABLET, FILM COATED ORAL at 20:18

## 2025-01-24 RX ADMIN — INSULIN LISPRO 20 UNITS: 100 INJECTION, SOLUTION INTRAVENOUS; SUBCUTANEOUS at 12:36

## 2025-01-24 RX ADMIN — INSULIN LISPRO 2 UNITS: 100 INJECTION, SOLUTION INTRAVENOUS; SUBCUTANEOUS at 12:36

## 2025-01-24 RX ADMIN — GABAPENTIN 600 MG: 300 CAPSULE ORAL at 08:47

## 2025-01-24 RX ADMIN — TORSEMIDE 20 MG: 20 TABLET ORAL at 08:47

## 2025-01-24 RX ADMIN — ENOXAPARIN SODIUM 30 MG: 100 INJECTION SUBCUTANEOUS at 20:19

## 2025-01-24 RX ADMIN — FUROSEMIDE 40 MG: 10 INJECTION, SOLUTION INTRAMUSCULAR; INTRAVENOUS at 15:45

## 2025-01-24 RX ADMIN — PRAMIPEXOLE DIHYDROCHLORIDE 0.75 MG: 0.25 TABLET ORAL at 20:18

## 2025-01-24 RX ADMIN — METOPROLOL TARTRATE 25 MG: 25 TABLET, FILM COATED ORAL at 20:18

## 2025-01-24 RX ADMIN — SODIUM CHLORIDE, PRESERVATIVE FREE 10 ML: 5 INJECTION INTRAVENOUS at 08:48

## 2025-01-24 RX ADMIN — INSULIN LISPRO 2 UNITS: 100 INJECTION, SOLUTION INTRAVENOUS; SUBCUTANEOUS at 20:19

## 2025-01-24 RX ADMIN — DOFETILIDE 250 MCG: 0.25 CAPSULE ORAL at 20:18

## 2025-01-24 RX ADMIN — INSULIN GLARGINE 20 UNITS: 100 INJECTION, SOLUTION SUBCUTANEOUS at 20:19

## 2025-01-24 RX ADMIN — METOPROLOL TARTRATE 25 MG: 25 TABLET, FILM COATED ORAL at 08:47

## 2025-01-24 RX ADMIN — INSULIN LISPRO 20 UNITS: 100 INJECTION, SOLUTION INTRAVENOUS; SUBCUTANEOUS at 18:28

## 2025-01-24 RX ADMIN — PANTOPRAZOLE SODIUM 40 MG: 40 TABLET, DELAYED RELEASE ORAL at 06:15

## 2025-01-24 RX ADMIN — DOFETILIDE 250 MCG: 0.25 CAPSULE ORAL at 08:48

## 2025-01-24 RX ADMIN — ASPIRIN 325 MG: 325 TABLET ORAL at 08:47

## 2025-01-24 RX ADMIN — INSULIN LISPRO 20 UNITS: 100 INJECTION, SOLUTION INTRAVENOUS; SUBCUTANEOUS at 08:49

## 2025-01-24 ASSESSMENT — PAIN SCALES - GENERAL: PAINLEVEL_OUTOF10: 0

## 2025-01-24 NOTE — TELEPHONE ENCOUNTER
Care Transitions Initial Follow Up Call    Outreach made within 2 business days of discharge: Yes    Patient: London Swenson Patient : 1950   MRN: [id number]  Reason for Admission: Respiratory failure   Discharge Date: 25       Spoke with: Patient is currently admitted at [facility].  Once the patient is discharged I will schedule for a Hospital follow up appointment.     Discharge department/facility: [facility]        Scheduled appointment with PCP within 7-14 days    Follow Up  Future Appointments   Date Time Provider Department Center   2025 10:00 AM Constance Yancey PA [facility] DEP   2/3/2025 10:45 AM Kamilla Duron, APRN - CNP [facility]   2025  2:20 PM Kelsey Flores [facility]   3/3/2025  1:00 PM Constance Yancey PA [facility] DEP   2025 10:30 AM JORDAN, [facility]THEA DEVICE CHECK [facility]   2025 10:30 AM Ericka Valdez, APRN - CNP [facility]       Afsaneh Short LPN

## 2025-01-24 NOTE — H&P
[facility] Medicine History & Physical    V 1.6    Date of Admission: 1/23/2025    Date of Service:  Pt seen/examined on 1/23/25     [x]Admitted to Inpatient with expected LOS greater than two midnights due to medical therapy.  []Placed in Observation status.    Chief Admission Complaint:  sob and feels his legs twitching/spasm.  He is very weak and having difficulty walking    Presenting Admission History:      74 y.o. male with PMH significant for   paroxysmal A-fib, status post ablation and watchman procedure, sick sinus syndrome status post PPM, nonobstructive CAD, HFpEF, diabetes, essential hypertension, hyperlipidemia, ESTHER, and obesity.       Of pertinence is that he had a recent admission to [facility] was discharged on 1/20/2025.  On that admission he did present with shortness of breath and weakness.  He was seen by cardiology in consultation.  He was diuresed and his volume status did improve and he was stable for discharge home.    He presented to [facility] ED with complaint of shortness of breath, weakness, difficulty walking.  He also stated he was having uncontrolled shaking throughout his body especially in both legs making it very difficult to walk.  He stated he was getting quite short of breath with minimal exertion.  In the ED he was noted to be hypoxic on pulse oximetry, he was requiring some accessory muscles to breathe and having difficulty speaking.  He is not on home oxygen and he was started on supplemental oxygen.  When I examined him he was on supplemental oxygen pulse ox= 94% and stated shortness of breath was improving.    He stated he was compliant with all medications, he denied any lower extremity edema denied orthopnea or PND.  His renal functions were also noted to be worse than from discharge and creatinine=1.7 on day of admission    He is now being admitted for further evaluation and management.    Assessment/Plan:      Current Principal Problem:

## 2025-01-24 NOTE — CARE COORDINATION
Case Management Assessment  Initial Evaluation    Date/Time of Evaluation: 1/24/2025 9:44 AM  Assessment Completed by: Chelle Duron RN    If patient is discharged prior to next notation, then this note serves as note for discharge by case management.    Patient Name: London Swenson                   YOB: 1950  Diagnosis: Rigors [R68.89]  WESLEY (acute kidney injury) (HCC) [N17.9]  Acute on chronic respiratory failure with hypoxia [J96.21]  Acute hypoxic respiratory failure [J96.01]                   Date / Time: 1/23/2025  2:27 PM    Patient Admission Status: Inpatient   Readmission Risk (Low < 19, Mod (19-27), High > 27): Readmission Risk Score: 26.1    Current PCP: Constance Yancey PA  PCP verified by CM? Yes    Chart Reviewed: Yes      History Provided by: Patient  Patient Orientation: Alert and Oriented    Patient Cognition: Alert    Hospitalization in the last 30 days (Readmission):  Yes    If yes, Readmission Assessment in CM Navigator will be completed.    Advance Directives:      Code Status: Full Code   Patient's Primary Decision Maker is: Legal Next of Kin    Primary Decision Maker (Active): Rosy Swenson - Spouse - [phone]    Secondary Decision Maker: FiliValerie - Grandchild - [phone]    Discharge Planning:    Patient lives with: Spouse/Significant Other Type of Home: House  Primary Care Giver: Self  Patient Support Systems include: Spouse/Significant Other   Current Financial resources: Medicare  Current community resources: None  Current services prior to admission: None            Current DME:              Type of Home Care services:  None    ADLS  Prior functional level: Independent in ADLs/IADLs  Current functional level: Independent in ADLs/IADLs    PT AM-PAC:   /24  OT AM-PAC:   /24    Family can provide assistance at DC: Yes  Would you like Case Management to discuss the discharge plan with any other family members/significant others, and if so, who? Yes

## 2025-01-24 NOTE — DISCHARGE INSTRUCTIONS
Heart Failure Resources:  Heart Failure Interactive Workbook:  Go to https://MyBuysitalSunModular.BlooBox/publication/?g=589438 for a Free Heart Failure Interactive Workbook provided by The American Heart Association. This interactive workbook will provide information on Healthier Living with Heart Failure. Please copy and paste link into search bar. Use your mouse to scroll through the pages.    HF [place] marilou:   Heart Failure Free smart phone marilou available for iPhone and Android download. Use your phone to track sodium intake, fluid intake, symptoms, and weight.     Low Sodium Diet / Recipes:  Go to www.[employer].ObserveIT website for “renal” diet which is Low Sodium! [employer] is a dialysis company, but this website offers free seasonal cookbooks. Each quarter, they will release 25-30 new recipes with a breakdown of calories, sodium, and glucose. You can also go to wwwMogoTix/recipes website for free recipes.     Discharge Instruction Video:  Scan the QR code below with your camera and click the canva.com link to open the video and watch educational information on Heart Failure and Medications from one of our nurses.   [email or web address]/DAFZnsH_JRk/7HjfiwcYYAAstLLgoT7ybx/edit    Home Exercise Program:   Identification of Green/Yellow/Red zones:  You should be able to identify when you feel good (green zone), if you have 1-2 symptoms of HF (yellow zone), or if you are in need of medical attention (red zone).  In your CHF education folder you were provided a “stop light tool” to outline this information.     We want to you to rate your exertion levels:    Our therapy team has discussed means of identification with you such as the \"Kenan scale.\"  The Kenan rating scale ranges from 6 to 20, where 6 means \"no exertion at all\" and 20 means \"maximal exertion.\" The goal is to use this to gauge how much effort it is taking for you to do your normal daily tasks.   You should be able to recognize when too much exertion is

## 2025-01-24 NOTE — CARE COORDINATION
Patient admitted to [facility]. Transition episode ended.  Brittney Granger RN   [phone]

## 2025-01-24 NOTE — ACP (ADVANCE CARE PLANNING)
Advance Care Planning   General Advance Care Planning (ACP) Conversation    Date of Conversation: 1/23/2025  Conducted with: Patient with Decision Making Capacity  Other persons present: None    Healthcare Decision Maker:    Primary Decision Maker (Active): Rosy Swenson - Spouse - [phone]    Secondary Decision Maker: Valerie Penn - Grandchild - [phone]    Today we documented Decision Maker(s) consistent with Legal Next of Kin hierarchy.  Content/Action Overview:  Has NO ACP documents-Information provided  Reviewed DNR/DNI and patient elects Full Code (Attempt Resuscitation)      Length of Voluntary ACP Conversation in minutes:  <16 minutes (Non-Billable)    Chelle Duron RN

## 2025-01-24 NOTE — ACP (ADVANCE CARE PLANNING)
Advance Care Planning     Advance Care Planning Inpatient Note  [facility] Department    Today's Date: 1/24/2025  Unit: [facility] B3 - MED SURG    Received request from IDT Member.  Upon review of chart and communication with care team, patient's decision making abilities are not in question.. Patient was/were present in the room during visit.    Goals of ACP Conversation:  Discuss advance care planning documents    Health Care Decision Makers:       Primary Decision Maker (Active): Rosy Swenson - Spouse - [phone]    Secondary Decision Maker: Arlene Hurt - Other Relative - [phone]  Summary:  Completed New Documents  Updated Healthcare Decision Maker    Advance Care Planning Documents (Patient Wishes):  Healthcare Power of /Advance Directive Appointment of Health Care Agent     Assessment:  Pt participated in ACP education and understood documents    Interventions:  Provided education on documents for clarity and greater understanding  Assisted in the completion of documents according to patient's wishes at this time    Care Preferences Communicated:   No    Outcomes/Plan:  ACP Discussion: Completed  New advance directive completed.  Returned original document(s) to patient, as well as copies for distribution to appointed agents  Copy of advance directive given to staff to scan into medical record.    Electronically signed by Chaplain Gloria on 1/24/2025 at 1:54 PM

## 2025-01-25 LAB
ANION GAP SERPL CALCULATED.3IONS-SCNC: 12 MMOL/L (ref 3–16)
BUN SERPL-MCNC: 34 MG/DL (ref 7–20)
CALCIUM SERPL-MCNC: 9.5 MG/DL (ref 8.3–10.6)
CHLORIDE SERPL-SCNC: 95 MMOL/L (ref 99–110)
CO2 SERPL-SCNC: 33 MMOL/L (ref 21–32)
CREAT SERPL-MCNC: 1.4 MG/DL (ref 0.8–1.3)
FOLATE SERPL-MCNC: 18.8 NG/ML (ref 4.78–24.2)
GFR SERPLBLD CREATININE-BSD FMLA CKD-EPI: 53 ML/MIN/{1.73_M2}
GLUCOSE BLD-MCNC: 115 MG/DL (ref 70–99)
GLUCOSE BLD-MCNC: 120 MG/DL (ref 70–99)
GLUCOSE BLD-MCNC: 239 MG/DL (ref 70–99)
GLUCOSE BLD-MCNC: 248 MG/DL (ref 70–99)
GLUCOSE BLD-MCNC: 310 MG/DL (ref 70–99)
GLUCOSE BLD-MCNC: 59 MG/DL (ref 70–99)
GLUCOSE BLD-MCNC: 72 MG/DL (ref 70–99)
GLUCOSE SERPL-MCNC: 193 MG/DL (ref 70–99)
MAGNESIUM SERPL-MCNC: 2.07 MG/DL (ref 1.8–2.4)
NT-PROBNP SERPL-MCNC: <36 PG/ML (ref 0–449)
PERFORMED ON: ABNORMAL
PERFORMED ON: NORMAL
POTASSIUM SERPL-SCNC: 3.7 MMOL/L (ref 3.5–5.1)
SODIUM SERPL-SCNC: 140 MMOL/L (ref 136–145)
TSH SERPL DL<=0.005 MIU/L-ACNC: 1.59 UIU/ML (ref 0.27–4.2)
VIT B12 SERPL-MCNC: 354 PG/ML (ref 211–911)

## 2025-01-25 PROCEDURE — 2700000000 HC OXYGEN THERAPY PER DAY

## 2025-01-25 PROCEDURE — 83735 ASSAY OF MAGNESIUM: CPT

## 2025-01-25 PROCEDURE — 6360000002 HC RX W HCPCS: Performed by: STUDENT IN AN ORGANIZED HEALTH CARE EDUCATION/TRAINING PROGRAM

## 2025-01-25 PROCEDURE — 94761 N-INVAS EAR/PLS OXIMETRY MLT: CPT

## 2025-01-25 PROCEDURE — 84443 ASSAY THYROID STIM HORMONE: CPT

## 2025-01-25 PROCEDURE — 83880 ASSAY OF NATRIURETIC PEPTIDE: CPT

## 2025-01-25 PROCEDURE — 82746 ASSAY OF FOLIC ACID SERUM: CPT

## 2025-01-25 PROCEDURE — 80048 BASIC METABOLIC PNL TOTAL CA: CPT

## 2025-01-25 PROCEDURE — 2500000003 HC RX 250 WO HCPCS: Performed by: INTERNAL MEDICINE

## 2025-01-25 PROCEDURE — 36415 COLL VENOUS BLD VENIPUNCTURE: CPT

## 2025-01-25 PROCEDURE — 6360000002 HC RX W HCPCS: Performed by: INTERNAL MEDICINE

## 2025-01-25 PROCEDURE — 82607 VITAMIN B-12: CPT

## 2025-01-25 PROCEDURE — 6370000000 HC RX 637 (ALT 250 FOR IP): Performed by: INTERNAL MEDICINE

## 2025-01-25 PROCEDURE — 1200000000 HC SEMI PRIVATE

## 2025-01-25 RX ORDER — PRAMIPEXOLE DIHYDROCHLORIDE 0.25 MG/1
0.75 TABLET ORAL 2 TIMES DAILY
Status: DISCONTINUED | OUTPATIENT
Start: 2025-01-25 | End: 2025-01-30 | Stop reason: HOSPADM

## 2025-01-25 RX ADMIN — GABAPENTIN 600 MG: 300 CAPSULE ORAL at 08:34

## 2025-01-25 RX ADMIN — INSULIN LISPRO 20 UNITS: 100 INJECTION, SOLUTION INTRAVENOUS; SUBCUTANEOUS at 13:07

## 2025-01-25 RX ADMIN — METOPROLOL TARTRATE 25 MG: 25 TABLET, FILM COATED ORAL at 13:12

## 2025-01-25 RX ADMIN — DOFETILIDE 250 MCG: 0.25 CAPSULE ORAL at 21:01

## 2025-01-25 RX ADMIN — METOPROLOL TARTRATE 25 MG: 25 TABLET, FILM COATED ORAL at 21:01

## 2025-01-25 RX ADMIN — INSULIN LISPRO 2 UNITS: 100 INJECTION, SOLUTION INTRAVENOUS; SUBCUTANEOUS at 18:49

## 2025-01-25 RX ADMIN — SODIUM CHLORIDE, PRESERVATIVE FREE 10 ML: 5 INJECTION INTRAVENOUS at 21:02

## 2025-01-25 RX ADMIN — FUROSEMIDE 40 MG: 10 INJECTION, SOLUTION INTRAMUSCULAR; INTRAVENOUS at 08:33

## 2025-01-25 RX ADMIN — ASPIRIN 325 MG: 325 TABLET ORAL at 08:34

## 2025-01-25 RX ADMIN — INSULIN LISPRO 6 UNITS: 100 INJECTION, SOLUTION INTRAVENOUS; SUBCUTANEOUS at 13:07

## 2025-01-25 RX ADMIN — SODIUM CHLORIDE, PRESERVATIVE FREE 10 ML: 5 INJECTION INTRAVENOUS at 08:35

## 2025-01-25 RX ADMIN — ATORVASTATIN CALCIUM 40 MG: 40 TABLET, FILM COATED ORAL at 21:01

## 2025-01-25 RX ADMIN — GABAPENTIN 600 MG: 300 CAPSULE ORAL at 21:01

## 2025-01-25 RX ADMIN — PANTOPRAZOLE SODIUM 40 MG: 40 TABLET, DELAYED RELEASE ORAL at 06:34

## 2025-01-25 RX ADMIN — INSULIN LISPRO 20 UNITS: 100 INJECTION, SOLUTION INTRAVENOUS; SUBCUTANEOUS at 18:48

## 2025-01-25 RX ADMIN — PRAMIPEXOLE DIHYDROCHLORIDE 0.75 MG: 0.25 TABLET ORAL at 13:12

## 2025-01-25 RX ADMIN — ENOXAPARIN SODIUM 30 MG: 100 INJECTION SUBCUTANEOUS at 08:34

## 2025-01-25 RX ADMIN — ENOXAPARIN SODIUM 30 MG: 100 INJECTION SUBCUTANEOUS at 21:01

## 2025-01-25 RX ADMIN — INSULIN GLARGINE 20 UNITS: 100 INJECTION, SOLUTION SUBCUTANEOUS at 21:01

## 2025-01-25 RX ADMIN — INSULIN LISPRO 2 UNITS: 100 INJECTION, SOLUTION INTRAVENOUS; SUBCUTANEOUS at 21:01

## 2025-01-25 RX ADMIN — PRAMIPEXOLE DIHYDROCHLORIDE 0.75 MG: 0.25 TABLET ORAL at 21:01

## 2025-01-25 RX ADMIN — DOFETILIDE 250 MCG: 0.25 CAPSULE ORAL at 08:34

## 2025-01-25 ASSESSMENT — PAIN SCALES - GENERAL: PAINLEVEL_OUTOF10: 0

## 2025-01-26 LAB
ANION GAP SERPL CALCULATED.3IONS-SCNC: 9 MMOL/L (ref 3–16)
BUN SERPL-MCNC: 30 MG/DL (ref 7–20)
CALCIUM SERPL-MCNC: 9.2 MG/DL (ref 8.3–10.6)
CHLORIDE SERPL-SCNC: 97 MMOL/L (ref 99–110)
CO2 SERPL-SCNC: 35 MMOL/L (ref 21–32)
CREAT SERPL-MCNC: 1.1 MG/DL (ref 0.8–1.3)
GFR SERPLBLD CREATININE-BSD FMLA CKD-EPI: 70 ML/MIN/{1.73_M2}
GLUCOSE BLD-MCNC: 112 MG/DL (ref 70–99)
GLUCOSE BLD-MCNC: 146 MG/DL (ref 70–99)
GLUCOSE BLD-MCNC: 196 MG/DL (ref 70–99)
GLUCOSE BLD-MCNC: 248 MG/DL (ref 70–99)
GLUCOSE BLD-MCNC: 289 MG/DL (ref 70–99)
GLUCOSE SERPL-MCNC: 136 MG/DL (ref 70–99)
MAGNESIUM SERPL-MCNC: 2.08 MG/DL (ref 1.8–2.4)
PERFORMED ON: ABNORMAL
POTASSIUM SERPL-SCNC: 3.4 MMOL/L (ref 3.5–5.1)
SODIUM SERPL-SCNC: 141 MMOL/L (ref 136–145)

## 2025-01-26 PROCEDURE — 83735 ASSAY OF MAGNESIUM: CPT

## 2025-01-26 PROCEDURE — 6370000000 HC RX 637 (ALT 250 FOR IP): Performed by: INTERNAL MEDICINE

## 2025-01-26 PROCEDURE — 1200000000 HC SEMI PRIVATE

## 2025-01-26 PROCEDURE — 80048 BASIC METABOLIC PNL TOTAL CA: CPT

## 2025-01-26 PROCEDURE — 6360000002 HC RX W HCPCS: Performed by: INTERNAL MEDICINE

## 2025-01-26 PROCEDURE — 2500000003 HC RX 250 WO HCPCS: Performed by: INTERNAL MEDICINE

## 2025-01-26 RX ORDER — FUROSEMIDE 10 MG/ML
20 INJECTION INTRAMUSCULAR; INTRAVENOUS DAILY
Status: DISCONTINUED | OUTPATIENT
Start: 2025-01-26 | End: 2025-01-27

## 2025-01-26 RX ORDER — INSULIN GLARGINE 100 [IU]/ML
15 INJECTION, SOLUTION SUBCUTANEOUS NIGHTLY
Status: DISCONTINUED | OUTPATIENT
Start: 2025-01-26 | End: 2025-01-30 | Stop reason: HOSPADM

## 2025-01-26 RX ORDER — POTASSIUM CHLORIDE 1500 MG/1
20 TABLET, EXTENDED RELEASE ORAL ONCE
Status: COMPLETED | OUTPATIENT
Start: 2025-01-26 | End: 2025-01-26

## 2025-01-26 RX ADMIN — INSULIN LISPRO 20 UNITS: 100 INJECTION, SOLUTION INTRAVENOUS; SUBCUTANEOUS at 18:40

## 2025-01-26 RX ADMIN — SODIUM CHLORIDE, PRESERVATIVE FREE 10 ML: 5 INJECTION INTRAVENOUS at 08:36

## 2025-01-26 RX ADMIN — DOFETILIDE 250 MCG: 0.25 CAPSULE ORAL at 08:36

## 2025-01-26 RX ADMIN — ENOXAPARIN SODIUM 30 MG: 100 INJECTION SUBCUTANEOUS at 08:35

## 2025-01-26 RX ADMIN — INSULIN LISPRO 20 UNITS: 100 INJECTION, SOLUTION INTRAVENOUS; SUBCUTANEOUS at 13:14

## 2025-01-26 RX ADMIN — INSULIN GLARGINE 15 UNITS: 100 INJECTION, SOLUTION SUBCUTANEOUS at 20:09

## 2025-01-26 RX ADMIN — DOFETILIDE 250 MCG: 0.25 CAPSULE ORAL at 20:08

## 2025-01-26 RX ADMIN — INSULIN LISPRO 2 UNITS: 100 INJECTION, SOLUTION INTRAVENOUS; SUBCUTANEOUS at 13:14

## 2025-01-26 RX ADMIN — ASPIRIN 325 MG: 325 TABLET ORAL at 08:36

## 2025-01-26 RX ADMIN — GABAPENTIN 600 MG: 300 CAPSULE ORAL at 08:36

## 2025-01-26 RX ADMIN — ATORVASTATIN CALCIUM 40 MG: 40 TABLET, FILM COATED ORAL at 20:08

## 2025-01-26 RX ADMIN — ENOXAPARIN SODIUM 30 MG: 100 INJECTION SUBCUTANEOUS at 20:08

## 2025-01-26 RX ADMIN — POTASSIUM CHLORIDE 20 MEQ: 1500 TABLET, EXTENDED RELEASE ORAL at 16:30

## 2025-01-26 RX ADMIN — INSULIN LISPRO 4 UNITS: 100 INJECTION, SOLUTION INTRAVENOUS; SUBCUTANEOUS at 20:09

## 2025-01-26 RX ADMIN — METOPROLOL TARTRATE 25 MG: 25 TABLET, FILM COATED ORAL at 20:08

## 2025-01-26 RX ADMIN — INSULIN LISPRO 2 UNITS: 100 INJECTION, SOLUTION INTRAVENOUS; SUBCUTANEOUS at 18:41

## 2025-01-26 RX ADMIN — PANTOPRAZOLE SODIUM 40 MG: 40 TABLET, DELAYED RELEASE ORAL at 05:18

## 2025-01-26 RX ADMIN — INSULIN LISPRO 20 UNITS: 100 INJECTION, SOLUTION INTRAVENOUS; SUBCUTANEOUS at 08:38

## 2025-01-26 RX ADMIN — METOPROLOL TARTRATE 25 MG: 25 TABLET, FILM COATED ORAL at 08:36

## 2025-01-26 RX ADMIN — PANTOPRAZOLE SODIUM 40 MG: 40 TABLET, DELAYED RELEASE ORAL at 16:30

## 2025-01-26 RX ADMIN — GABAPENTIN 600 MG: 300 CAPSULE ORAL at 20:08

## 2025-01-26 RX ADMIN — PRAMIPEXOLE DIHYDROCHLORIDE 0.75 MG: 0.25 TABLET ORAL at 08:36

## 2025-01-26 RX ADMIN — FUROSEMIDE 20 MG: 10 INJECTION, SOLUTION INTRAMUSCULAR; INTRAVENOUS at 16:30

## 2025-01-26 RX ADMIN — SODIUM CHLORIDE, PRESERVATIVE FREE 10 ML: 5 INJECTION INTRAVENOUS at 20:09

## 2025-01-26 RX ADMIN — PRAMIPEXOLE DIHYDROCHLORIDE 0.75 MG: 0.25 TABLET ORAL at 20:08

## 2025-01-27 ENCOUNTER — TELEPHONE (OUTPATIENT)
Dept: FAMILY MEDICINE CLINIC | Age: 75
End: 2025-01-27

## 2025-01-27 ENCOUNTER — CARE COORDINATION (OUTPATIENT)
Dept: CASE MANAGEMENT | Age: 75
End: 2025-01-27

## 2025-01-27 LAB
ANION GAP SERPL CALCULATED.3IONS-SCNC: 8 MMOL/L (ref 3–16)
BUN SERPL-MCNC: 24 MG/DL (ref 7–20)
CALCIUM SERPL-MCNC: 9.4 MG/DL (ref 8.3–10.6)
CHLORIDE SERPL-SCNC: 97 MMOL/L (ref 99–110)
CO2 SERPL-SCNC: 34 MMOL/L (ref 21–32)
CREAT SERPL-MCNC: 1.1 MG/DL (ref 0.8–1.3)
GFR SERPLBLD CREATININE-BSD FMLA CKD-EPI: 70 ML/MIN/{1.73_M2}
GLUCOSE BLD-MCNC: 120 MG/DL (ref 70–99)
GLUCOSE BLD-MCNC: 129 MG/DL (ref 70–99)
GLUCOSE BLD-MCNC: 264 MG/DL (ref 70–99)
GLUCOSE BLD-MCNC: 344 MG/DL (ref 70–99)
GLUCOSE BLD-MCNC: 75 MG/DL (ref 70–99)
GLUCOSE SERPL-MCNC: 126 MG/DL (ref 70–99)
MAGNESIUM SERPL-MCNC: 2.12 MG/DL (ref 1.8–2.4)
NT-PROBNP SERPL-MCNC: <36 PG/ML (ref 0–449)
PERFORMED ON: ABNORMAL
PERFORMED ON: NORMAL
POTASSIUM SERPL-SCNC: 3.4 MMOL/L (ref 3.5–5.1)
SODIUM SERPL-SCNC: 139 MMOL/L (ref 136–145)

## 2025-01-27 PROCEDURE — 83735 ASSAY OF MAGNESIUM: CPT

## 2025-01-27 PROCEDURE — 83880 ASSAY OF NATRIURETIC PEPTIDE: CPT

## 2025-01-27 PROCEDURE — 1200000000 HC SEMI PRIVATE

## 2025-01-27 PROCEDURE — 80048 BASIC METABOLIC PNL TOTAL CA: CPT

## 2025-01-27 PROCEDURE — 6360000002 HC RX W HCPCS: Performed by: INTERNAL MEDICINE

## 2025-01-27 PROCEDURE — 6370000000 HC RX 637 (ALT 250 FOR IP): Performed by: INTERNAL MEDICINE

## 2025-01-27 PROCEDURE — 36415 COLL VENOUS BLD VENIPUNCTURE: CPT

## 2025-01-27 PROCEDURE — 2500000003 HC RX 250 WO HCPCS: Performed by: INTERNAL MEDICINE

## 2025-01-27 RX ORDER — POTASSIUM CHLORIDE 1500 MG/1
20 TABLET, EXTENDED RELEASE ORAL ONCE
Status: COMPLETED | OUTPATIENT
Start: 2025-01-27 | End: 2025-01-27

## 2025-01-27 RX ORDER — FUROSEMIDE 10 MG/ML
20 INJECTION INTRAMUSCULAR; INTRAVENOUS ONCE
Status: COMPLETED | OUTPATIENT
Start: 2025-01-27 | End: 2025-01-27

## 2025-01-27 RX ORDER — SPIRONOLACTONE 25 MG/1
25 TABLET ORAL DAILY
Status: DISCONTINUED | OUTPATIENT
Start: 2025-01-27 | End: 2025-01-30 | Stop reason: HOSPADM

## 2025-01-27 RX ORDER — FUROSEMIDE 10 MG/ML
40 INJECTION INTRAMUSCULAR; INTRAVENOUS DAILY
Status: DISCONTINUED | OUTPATIENT
Start: 2025-01-28 | End: 2025-01-28

## 2025-01-27 RX ORDER — TORSEMIDE 20 MG/1
20 TABLET ORAL DAILY
Qty: 30 TABLET | Refills: 3 | Status: SHIPPED | OUTPATIENT
Start: 2025-01-27 | End: 2025-01-30

## 2025-01-27 RX ADMIN — INSULIN LISPRO 20 UNITS: 100 INJECTION, SOLUTION INTRAVENOUS; SUBCUTANEOUS at 13:11

## 2025-01-27 RX ADMIN — DOFETILIDE 250 MCG: 0.25 CAPSULE ORAL at 08:47

## 2025-01-27 RX ADMIN — ENOXAPARIN SODIUM 30 MG: 100 INJECTION SUBCUTANEOUS at 20:33

## 2025-01-27 RX ADMIN — SPIRONOLACTONE 25 MG: 25 TABLET ORAL at 13:11

## 2025-01-27 RX ADMIN — INSULIN LISPRO 20 UNITS: 100 INJECTION, SOLUTION INTRAVENOUS; SUBCUTANEOUS at 18:23

## 2025-01-27 RX ADMIN — ATORVASTATIN CALCIUM 40 MG: 40 TABLET, FILM COATED ORAL at 20:33

## 2025-01-27 RX ADMIN — DOFETILIDE 250 MCG: 0.25 CAPSULE ORAL at 20:33

## 2025-01-27 RX ADMIN — FUROSEMIDE 20 MG: 10 INJECTION, SOLUTION INTRAMUSCULAR; INTRAVENOUS at 13:59

## 2025-01-27 RX ADMIN — SODIUM CHLORIDE, PRESERVATIVE FREE 10 ML: 5 INJECTION INTRAVENOUS at 08:48

## 2025-01-27 RX ADMIN — ASPIRIN 325 MG: 325 TABLET ORAL at 08:47

## 2025-01-27 RX ADMIN — PANTOPRAZOLE SODIUM 40 MG: 40 TABLET, DELAYED RELEASE ORAL at 17:16

## 2025-01-27 RX ADMIN — METOPROLOL TARTRATE 25 MG: 25 TABLET, FILM COATED ORAL at 20:33

## 2025-01-27 RX ADMIN — GABAPENTIN 600 MG: 300 CAPSULE ORAL at 20:33

## 2025-01-27 RX ADMIN — INSULIN LISPRO 6 UNITS: 100 INJECTION, SOLUTION INTRAVENOUS; SUBCUTANEOUS at 20:34

## 2025-01-27 RX ADMIN — INSULIN LISPRO 20 UNITS: 100 INJECTION, SOLUTION INTRAVENOUS; SUBCUTANEOUS at 08:47

## 2025-01-27 RX ADMIN — PANTOPRAZOLE SODIUM 40 MG: 40 TABLET, DELAYED RELEASE ORAL at 05:17

## 2025-01-27 RX ADMIN — SODIUM CHLORIDE, PRESERVATIVE FREE 10 ML: 5 INJECTION INTRAVENOUS at 20:34

## 2025-01-27 RX ADMIN — PRAMIPEXOLE DIHYDROCHLORIDE 0.75 MG: 0.25 TABLET ORAL at 20:33

## 2025-01-27 RX ADMIN — INSULIN LISPRO 4 UNITS: 100 INJECTION, SOLUTION INTRAVENOUS; SUBCUTANEOUS at 13:11

## 2025-01-27 RX ADMIN — PRAMIPEXOLE DIHYDROCHLORIDE 0.75 MG: 0.25 TABLET ORAL at 08:47

## 2025-01-27 RX ADMIN — INSULIN GLARGINE 15 UNITS: 100 INJECTION, SOLUTION SUBCUTANEOUS at 20:34

## 2025-01-27 RX ADMIN — FUROSEMIDE 20 MG: 10 INJECTION, SOLUTION INTRAMUSCULAR; INTRAVENOUS at 08:48

## 2025-01-27 RX ADMIN — POTASSIUM CHLORIDE 20 MEQ: 1500 TABLET, EXTENDED RELEASE ORAL at 11:48

## 2025-01-27 RX ADMIN — METOPROLOL TARTRATE 25 MG: 25 TABLET, FILM COATED ORAL at 08:47

## 2025-01-27 RX ADMIN — GABAPENTIN 600 MG: 300 CAPSULE ORAL at 08:47

## 2025-01-27 RX ADMIN — ENOXAPARIN SODIUM 30 MG: 100 INJECTION SUBCUTANEOUS at 08:47

## 2025-01-27 ASSESSMENT — PAIN SCALES - GENERAL
PAINLEVEL_OUTOF10: 0
PAINLEVEL_OUTOF10: 0

## 2025-01-27 NOTE — CARE COORDINATION
Discharge canceled. Wife concerned about weight being up. Dr. Moncada aware. Will keep and continue to diurese. Chelle Duron RN           CASE MANAGEMENT DISCHARGE SUMMARY      [facility]charge [facility]: [facility] kit number [id number]    IMM given: 1/27/2025    Transportation:    Family/car: private    Confirmed discharge plan with:     Patient: yes     Family:  yes     Facility/Agency, name:  Bobby from [facility] aware and can pull orders from EPIC     RN, name: Euniec     Note: Discharging nurse to complete PORSCHE, reconcile AVS, and place final copy with patient's discharge packet. RN to ensure that written prescriptions for  Level II medications are sent with patient to the facility as per protocol.    Follow-Up copy of Important Message from Medicare (IMM2) has been explained to patient and/or designated healthcare decision maker (HCDM). Pt and/or HCDM aware that patient is permitted to stay an additional 4 hours prior to discharge to consider an appeal if they feel as though they are being discharged too soon. Patient may discharge as planned if chooses to do so.  Patient/HCDM voice no other concerns or questions regarding this process.   Chelle Duron RN

## 2025-01-27 NOTE — DISCHARGE INSTR - COC
Continuity of Care Form    Patient Name: London Swenson   :  1950  MRN:  [id number]    Admit date:  2025  Discharge date:      Code Status Order: Full Code   Advance Directives:   Advance Care Flowsheet Documentation             Admitting Physician:  Kendy Moncada MD  PCP: Constance Yancey PA    Discharging [facility]356[facility]0356-01  Discharging Unit Phone Number: [phone]    Emergency Contact:   Extended Emergency Contact Information  Primary Emergency Contact: Rosy Swenson  Address: [address] [place] [address]  Mobile Phone: [phone]  Relation: Spouse  Secondary Emergency Contact: Valerie Penn  Mobile Phone: [phone]  Relation: Grandchild    Past Surgical History:  Past Surgical History:   Procedure Laterality Date    CARDIAC DEFIBRILLATOR PLACEMENT      CATARACT REMOVAL Bilateral     CHOLECYSTECTOMY      COLONOSCOPY  10/26/2011    ENDOSCOPY, COLON, DIAGNOSTIC      EGD halo    EP DEVICE PROCEDURE N/A 7/15/2024    Watchman keaton closure device performed by Bayron Mont[facility] MD at [facility] CARDIAC CATH LAB    HYDROCELE EXCISION  11/15/2016    LARYNGOSCOPY N/A 10/25/2023    DRUG INDUCED SLEEP ENDOSCOPY performed by David Estes DO at [facility] OR    PANCREAS SURGERY      cyst opened up and drining into small bowel    TONSILLECTOMY      UPPER GASTROINTESTINAL ENDOSCOPY  10/04/2016    with biopsy    UPPER GASTROINTESTINAL ENDOSCOPY  2017    Halo ablation    UPPER GASTROINTESTINAL ENDOSCOPY  2017    Halo ablation    UPPER GASTROINTESTINAL ENDOSCOPY  2017    bx distal sophagus    UPPER GASTROINTESTINAL ENDOSCOPY  2017    with HALO  procedure    UPPER GASTROINTESTINAL ENDOSCOPY N/A 2018    EGD WITH ABLATION AND ANESTHESIA - HALO---SLEEP APNEA---  performed by Rajesh Virk MD at [facility] ENDOSCOPY    UPPER GASTROINTESTINAL ENDOSCOPY  2019    EGD BIOPSY performed by Rajesh Virk MD

## 2025-01-27 NOTE — CARE COORDINATION
CTN contacted [facility] CM B3 and LVM at [phone] regarding patient eligibility for RPM.    Shante Calvo, MSN, RN, [facility]  Care Transition Nurse  [phone] mobile

## 2025-01-27 NOTE — TELEPHONE ENCOUNTER
Received phone call from [facility] with [facility]    Patient is being D/C today for hospital     [facility] for SN, PT, OT    BOBBY- [phone]

## 2025-01-28 LAB
ANION GAP SERPL CALCULATED.3IONS-SCNC: 9 MMOL/L (ref 3–16)
BUN SERPL-MCNC: 21 MG/DL (ref 7–20)
CALCIUM SERPL-MCNC: 9.5 MG/DL (ref 8.3–10.6)
CHLORIDE SERPL-SCNC: 99 MMOL/L (ref 99–110)
CO2 SERPL-SCNC: 31 MMOL/L (ref 21–32)
CREAT SERPL-MCNC: 1.1 MG/DL (ref 0.8–1.3)
GFR SERPLBLD CREATININE-BSD FMLA CKD-EPI: 70 ML/MIN/{1.73_M2}
GLUCOSE BLD-MCNC: 207 MG/DL (ref 70–99)
GLUCOSE BLD-MCNC: 217 MG/DL (ref 70–99)
GLUCOSE BLD-MCNC: 252 MG/DL (ref 70–99)
GLUCOSE BLD-MCNC: 98 MG/DL (ref 70–99)
GLUCOSE SERPL-MCNC: 136 MG/DL (ref 70–99)
MAGNESIUM SERPL-MCNC: 2.27 MG/DL (ref 1.8–2.4)
PERFORMED ON: ABNORMAL
PERFORMED ON: NORMAL
POTASSIUM SERPL-SCNC: 3.6 MMOL/L (ref 3.5–5.1)
SODIUM SERPL-SCNC: 139 MMOL/L (ref 136–145)

## 2025-01-28 PROCEDURE — 6360000002 HC RX W HCPCS: Performed by: INTERNAL MEDICINE

## 2025-01-28 PROCEDURE — 2500000003 HC RX 250 WO HCPCS: Performed by: INTERNAL MEDICINE

## 2025-01-28 PROCEDURE — 6370000000 HC RX 637 (ALT 250 FOR IP): Performed by: INTERNAL MEDICINE

## 2025-01-28 PROCEDURE — 1200000000 HC SEMI PRIVATE

## 2025-01-28 PROCEDURE — 80048 BASIC METABOLIC PNL TOTAL CA: CPT

## 2025-01-28 PROCEDURE — 83735 ASSAY OF MAGNESIUM: CPT

## 2025-01-28 PROCEDURE — 36415 COLL VENOUS BLD VENIPUNCTURE: CPT

## 2025-01-28 RX ORDER — FUROSEMIDE 10 MG/ML
40 INJECTION INTRAMUSCULAR; INTRAVENOUS 2 TIMES DAILY
Status: DISCONTINUED | OUTPATIENT
Start: 2025-01-28 | End: 2025-01-30 | Stop reason: HOSPADM

## 2025-01-28 RX ORDER — POTASSIUM CHLORIDE 1500 MG/1
20 TABLET, EXTENDED RELEASE ORAL ONCE
Status: COMPLETED | OUTPATIENT
Start: 2025-01-28 | End: 2025-01-28

## 2025-01-28 RX ADMIN — INSULIN LISPRO 2 UNITS: 100 INJECTION, SOLUTION INTRAVENOUS; SUBCUTANEOUS at 09:02

## 2025-01-28 RX ADMIN — DOFETILIDE 250 MCG: 0.25 CAPSULE ORAL at 09:01

## 2025-01-28 RX ADMIN — PRAMIPEXOLE DIHYDROCHLORIDE 0.75 MG: 0.25 TABLET ORAL at 20:12

## 2025-01-28 RX ADMIN — SODIUM CHLORIDE, PRESERVATIVE FREE 10 ML: 5 INJECTION INTRAVENOUS at 09:03

## 2025-01-28 RX ADMIN — DOFETILIDE 250 MCG: 0.25 CAPSULE ORAL at 20:12

## 2025-01-28 RX ADMIN — PRAMIPEXOLE DIHYDROCHLORIDE 0.75 MG: 0.25 TABLET ORAL at 09:01

## 2025-01-28 RX ADMIN — GABAPENTIN 600 MG: 300 CAPSULE ORAL at 09:02

## 2025-01-28 RX ADMIN — ATORVASTATIN CALCIUM 40 MG: 40 TABLET, FILM COATED ORAL at 20:13

## 2025-01-28 RX ADMIN — METOPROLOL TARTRATE 25 MG: 25 TABLET, FILM COATED ORAL at 20:14

## 2025-01-28 RX ADMIN — FUROSEMIDE 40 MG: 10 INJECTION, SOLUTION INTRAMUSCULAR; INTRAVENOUS at 09:02

## 2025-01-28 RX ADMIN — INSULIN LISPRO 20 UNITS: 100 INJECTION, SOLUTION INTRAVENOUS; SUBCUTANEOUS at 12:31

## 2025-01-28 RX ADMIN — GABAPENTIN 600 MG: 300 CAPSULE ORAL at 20:12

## 2025-01-28 RX ADMIN — POTASSIUM CHLORIDE 20 MEQ: 1500 TABLET, EXTENDED RELEASE ORAL at 12:31

## 2025-01-28 RX ADMIN — INSULIN LISPRO 20 UNITS: 100 INJECTION, SOLUTION INTRAVENOUS; SUBCUTANEOUS at 09:02

## 2025-01-28 RX ADMIN — ENOXAPARIN SODIUM 30 MG: 100 INJECTION SUBCUTANEOUS at 09:02

## 2025-01-28 RX ADMIN — METOPROLOL TARTRATE 25 MG: 25 TABLET, FILM COATED ORAL at 09:01

## 2025-01-28 RX ADMIN — FUROSEMIDE 40 MG: 10 INJECTION, SOLUTION INTRAMUSCULAR; INTRAVENOUS at 18:24

## 2025-01-28 RX ADMIN — INSULIN GLARGINE 15 UNITS: 100 INJECTION, SOLUTION SUBCUTANEOUS at 20:13

## 2025-01-28 RX ADMIN — INSULIN LISPRO 2 UNITS: 100 INJECTION, SOLUTION INTRAVENOUS; SUBCUTANEOUS at 12:31

## 2025-01-28 RX ADMIN — PANTOPRAZOLE SODIUM 40 MG: 40 TABLET, DELAYED RELEASE ORAL at 05:35

## 2025-01-28 RX ADMIN — ENOXAPARIN SODIUM 30 MG: 100 INJECTION SUBCUTANEOUS at 20:12

## 2025-01-28 RX ADMIN — ASPIRIN 325 MG: 325 TABLET ORAL at 09:01

## 2025-01-28 RX ADMIN — SODIUM CHLORIDE, PRESERVATIVE FREE 10 ML: 5 INJECTION INTRAVENOUS at 20:14

## 2025-01-28 RX ADMIN — SPIRONOLACTONE 25 MG: 25 TABLET ORAL at 09:02

## 2025-01-28 RX ADMIN — PANTOPRAZOLE SODIUM 40 MG: 40 TABLET, DELAYED RELEASE ORAL at 18:24

## 2025-01-28 RX ADMIN — INSULIN LISPRO 4 UNITS: 100 INJECTION, SOLUTION INTRAVENOUS; SUBCUTANEOUS at 20:13

## 2025-01-28 NOTE — CARE COORDINATION
Chart reviewed day 5. Care provided by IM. Pt is from home with wife. Pt needs continued diuresis. He is getting IV lasix BID. His weight is 280. He came in at 272. Pt has been accepted by [facility]. Will continue to follow course for needs. Chelle Duron RN

## 2025-01-29 LAB
ANION GAP SERPL CALCULATED.3IONS-SCNC: 7 MMOL/L (ref 3–16)
BUN SERPL-MCNC: 20 MG/DL (ref 7–20)
CALCIUM SERPL-MCNC: 9 MG/DL (ref 8.3–10.6)
CHLORIDE SERPL-SCNC: 100 MMOL/L (ref 99–110)
CO2 SERPL-SCNC: 34 MMOL/L (ref 21–32)
CREAT SERPL-MCNC: 1 MG/DL (ref 0.8–1.3)
GFR SERPLBLD CREATININE-BSD FMLA CKD-EPI: 79 ML/MIN/{1.73_M2}
GLUCOSE BLD-MCNC: 156 MG/DL (ref 70–99)
GLUCOSE BLD-MCNC: 182 MG/DL (ref 70–99)
GLUCOSE BLD-MCNC: 228 MG/DL (ref 70–99)
GLUCOSE BLD-MCNC: 54 MG/DL (ref 70–99)
GLUCOSE BLD-MCNC: 60 MG/DL (ref 70–99)
GLUCOSE BLD-MCNC: 95 MG/DL (ref 70–99)
GLUCOSE SERPL-MCNC: 160 MG/DL (ref 70–99)
MAGNESIUM SERPL-MCNC: 1.95 MG/DL (ref 1.8–2.4)
NT-PROBNP SERPL-MCNC: <36 PG/ML (ref 0–449)
PERFORMED ON: ABNORMAL
PERFORMED ON: NORMAL
POTASSIUM SERPL-SCNC: 3.7 MMOL/L (ref 3.5–5.1)
SODIUM SERPL-SCNC: 141 MMOL/L (ref 136–145)

## 2025-01-29 PROCEDURE — 97116 GAIT TRAINING THERAPY: CPT

## 2025-01-29 PROCEDURE — 36415 COLL VENOUS BLD VENIPUNCTURE: CPT

## 2025-01-29 PROCEDURE — 2500000003 HC RX 250 WO HCPCS: Performed by: INTERNAL MEDICINE

## 2025-01-29 PROCEDURE — 83880 ASSAY OF NATRIURETIC PEPTIDE: CPT

## 2025-01-29 PROCEDURE — 6370000000 HC RX 637 (ALT 250 FOR IP): Performed by: INTERNAL MEDICINE

## 2025-01-29 PROCEDURE — 83735 ASSAY OF MAGNESIUM: CPT

## 2025-01-29 PROCEDURE — 97530 THERAPEUTIC ACTIVITIES: CPT

## 2025-01-29 PROCEDURE — 6360000002 HC RX W HCPCS: Performed by: INTERNAL MEDICINE

## 2025-01-29 PROCEDURE — 1200000000 HC SEMI PRIVATE

## 2025-01-29 PROCEDURE — 80048 BASIC METABOLIC PNL TOTAL CA: CPT

## 2025-01-29 RX ADMIN — INSULIN LISPRO 20 UNITS: 100 INJECTION, SOLUTION INTRAVENOUS; SUBCUTANEOUS at 12:39

## 2025-01-29 RX ADMIN — DOFETILIDE 250 MCG: 0.25 CAPSULE ORAL at 20:20

## 2025-01-29 RX ADMIN — FUROSEMIDE 40 MG: 10 INJECTION, SOLUTION INTRAMUSCULAR; INTRAVENOUS at 09:18

## 2025-01-29 RX ADMIN — PRAMIPEXOLE DIHYDROCHLORIDE 0.75 MG: 0.25 TABLET ORAL at 09:18

## 2025-01-29 RX ADMIN — PRAMIPEXOLE DIHYDROCHLORIDE 0.75 MG: 0.25 TABLET ORAL at 20:20

## 2025-01-29 RX ADMIN — FUROSEMIDE 40 MG: 10 INJECTION, SOLUTION INTRAMUSCULAR; INTRAVENOUS at 18:14

## 2025-01-29 RX ADMIN — GABAPENTIN 600 MG: 300 CAPSULE ORAL at 09:18

## 2025-01-29 RX ADMIN — INSULIN GLARGINE 15 UNITS: 100 INJECTION, SOLUTION SUBCUTANEOUS at 20:21

## 2025-01-29 RX ADMIN — SODIUM CHLORIDE, PRESERVATIVE FREE 10 ML: 5 INJECTION INTRAVENOUS at 20:24

## 2025-01-29 RX ADMIN — INSULIN LISPRO 20 UNITS: 100 INJECTION, SOLUTION INTRAVENOUS; SUBCUTANEOUS at 09:18

## 2025-01-29 RX ADMIN — DOFETILIDE 250 MCG: 0.25 CAPSULE ORAL at 09:18

## 2025-01-29 RX ADMIN — PANTOPRAZOLE SODIUM 40 MG: 40 TABLET, DELAYED RELEASE ORAL at 18:14

## 2025-01-29 RX ADMIN — ENOXAPARIN SODIUM 30 MG: 100 INJECTION SUBCUTANEOUS at 09:18

## 2025-01-29 RX ADMIN — INSULIN LISPRO 2 UNITS: 100 INJECTION, SOLUTION INTRAVENOUS; SUBCUTANEOUS at 20:21

## 2025-01-29 RX ADMIN — GABAPENTIN 600 MG: 300 CAPSULE ORAL at 20:20

## 2025-01-29 RX ADMIN — SODIUM CHLORIDE, PRESERVATIVE FREE 10 ML: 5 INJECTION INTRAVENOUS at 09:34

## 2025-01-29 RX ADMIN — METOPROLOL TARTRATE 25 MG: 25 TABLET, FILM COATED ORAL at 20:20

## 2025-01-29 RX ADMIN — SPIRONOLACTONE 25 MG: 25 TABLET ORAL at 09:18

## 2025-01-29 RX ADMIN — PANTOPRAZOLE SODIUM 40 MG: 40 TABLET, DELAYED RELEASE ORAL at 06:20

## 2025-01-29 RX ADMIN — ASPIRIN 325 MG: 325 TABLET ORAL at 09:18

## 2025-01-29 RX ADMIN — METOPROLOL TARTRATE 25 MG: 25 TABLET, FILM COATED ORAL at 09:18

## 2025-01-29 RX ADMIN — ENOXAPARIN SODIUM 30 MG: 100 INJECTION SUBCUTANEOUS at 20:20

## 2025-01-29 RX ADMIN — ATORVASTATIN CALCIUM 40 MG: 40 TABLET, FILM COATED ORAL at 20:20

## 2025-01-29 ASSESSMENT — PAIN SCALES - GENERAL: PAINLEVEL_OUTOF10: 0

## 2025-01-29 NOTE — CARE COORDINATION
Chart reviewed day 6. Care provided by IM. Pt is from home with his wife. He is getting IV lasix and will likely be ready for D/C elisha. He isw down 1 LB overnight. He has been accepted by [facility]. CM to follow. Chelle Duron RN

## 2025-01-30 ENCOUNTER — TELEPHONE (OUTPATIENT)
Dept: FAMILY MEDICINE CLINIC | Age: 75
End: 2025-01-30

## 2025-01-30 VITALS
BODY MASS INDEX: 39.94 KG/M2 | HEIGHT: 70 IN | WEIGHT: 279 LBS | DIASTOLIC BLOOD PRESSURE: 56 MMHG | SYSTOLIC BLOOD PRESSURE: 104 MMHG | HEART RATE: 77 BPM | OXYGEN SATURATION: 90 % | RESPIRATION RATE: 16 BRPM | TEMPERATURE: 98.2 F

## 2025-01-30 LAB
ANION GAP SERPL CALCULATED.3IONS-SCNC: 9 MMOL/L (ref 3–16)
BUN SERPL-MCNC: 24 MG/DL (ref 7–20)
CALCIUM SERPL-MCNC: 9.4 MG/DL (ref 8.3–10.6)
CHLORIDE SERPL-SCNC: 98 MMOL/L (ref 99–110)
CO2 SERPL-SCNC: 33 MMOL/L (ref 21–32)
CREAT SERPL-MCNC: 1.2 MG/DL (ref 0.8–1.3)
GFR SERPLBLD CREATININE-BSD FMLA CKD-EPI: 63 ML/MIN/{1.73_M2}
GLUCOSE BLD-MCNC: 133 MG/DL (ref 70–99)
GLUCOSE BLD-MCNC: 210 MG/DL (ref 70–99)
GLUCOSE SERPL-MCNC: 185 MG/DL (ref 70–99)
MAGNESIUM SERPL-MCNC: 1.91 MG/DL (ref 1.8–2.4)
PERFORMED ON: ABNORMAL
PERFORMED ON: ABNORMAL
POTASSIUM SERPL-SCNC: 3.9 MMOL/L (ref 3.5–5.1)
SODIUM SERPL-SCNC: 140 MMOL/L (ref 136–145)

## 2025-01-30 PROCEDURE — 2500000003 HC RX 250 WO HCPCS: Performed by: INTERNAL MEDICINE

## 2025-01-30 PROCEDURE — 83735 ASSAY OF MAGNESIUM: CPT

## 2025-01-30 PROCEDURE — 36415 COLL VENOUS BLD VENIPUNCTURE: CPT

## 2025-01-30 PROCEDURE — 80048 BASIC METABOLIC PNL TOTAL CA: CPT

## 2025-01-30 PROCEDURE — 6370000000 HC RX 637 (ALT 250 FOR IP): Performed by: INTERNAL MEDICINE

## 2025-01-30 PROCEDURE — 6360000002 HC RX W HCPCS: Performed by: INTERNAL MEDICINE

## 2025-01-30 RX ADMIN — PRAMIPEXOLE DIHYDROCHLORIDE 0.75 MG: 0.25 TABLET ORAL at 09:00

## 2025-01-30 RX ADMIN — PANTOPRAZOLE SODIUM 40 MG: 40 TABLET, DELAYED RELEASE ORAL at 05:50

## 2025-01-30 RX ADMIN — SPIRONOLACTONE 25 MG: 25 TABLET ORAL at 09:01

## 2025-01-30 RX ADMIN — SODIUM CHLORIDE, PRESERVATIVE FREE 10 ML: 5 INJECTION INTRAVENOUS at 09:07

## 2025-01-30 RX ADMIN — FUROSEMIDE 40 MG: 10 INJECTION, SOLUTION INTRAMUSCULAR; INTRAVENOUS at 09:00

## 2025-01-30 RX ADMIN — GABAPENTIN 600 MG: 300 CAPSULE ORAL at 09:01

## 2025-01-30 RX ADMIN — INSULIN LISPRO 20 UNITS: 100 INJECTION, SOLUTION INTRAVENOUS; SUBCUTANEOUS at 08:59

## 2025-01-30 RX ADMIN — INSULIN LISPRO 20 UNITS: 100 INJECTION, SOLUTION INTRAVENOUS; SUBCUTANEOUS at 13:13

## 2025-01-30 RX ADMIN — ENOXAPARIN SODIUM 30 MG: 100 INJECTION SUBCUTANEOUS at 09:00

## 2025-01-30 RX ADMIN — DOFETILIDE 250 MCG: 0.25 CAPSULE ORAL at 09:01

## 2025-01-30 RX ADMIN — METOPROLOL TARTRATE 25 MG: 25 TABLET, FILM COATED ORAL at 09:00

## 2025-01-30 RX ADMIN — ASPIRIN 325 MG: 325 TABLET ORAL at 09:01

## 2025-01-30 RX ADMIN — INSULIN LISPRO 2 UNITS: 100 INJECTION, SOLUTION INTRAVENOUS; SUBCUTANEOUS at 09:00

## 2025-01-30 ASSESSMENT — PAIN SCALES - GENERAL: PAINLEVEL_OUTOF10: 0

## 2025-01-30 NOTE — TELEPHONE ENCOUNTER
Care Transitions Initial Follow Up Call    Outreach made within 2 business days of discharge: Yes    Patient: London Swenson Patient : 1950   MRN: [id number]  Reason for Admission: acute hypoxic respiratory failure  Discharge Date: 25       Spoke with: atul, wife    Discharge department/facility: [facility] Interactive Patient Contact:      Scheduled appointment with PCP within 7-14 days    Follow Up  Future Appointments   Date Time Provider Department Center   2025 11:30 AM Constance Yancey PA [facility] DEP   2025  2:20 PM Kelsey Flores MD AND ENDO [facility]   3/3/2025  1:00 PM Constance Yancey PA [facility] DEP   2025 10:30 AM SCHEDULE, ALETHEA DEVICE CHECK [facility]   2025 10:30 AM Ericka Valdez APRN - CNP [facility]       PHUONG RICHARDSON LPN

## 2025-01-30 NOTE — CARE COORDINATION
CASE MANAGEMENT DISCHARGE SUMMARY      Discharge to[facility]    IMM given: 1/30/2025    Transportation:    Family/car: private    Confirmed discharge plan with:     Patient: yes     Family:  yes     Facility/Agency, name:  Bobby from [facility] connections aware and can pull orders from EPIC   RN, name: Claudia    Note: Discharging nurse to complete PORSCHE, reconcile AVS, and place final copy with patient's discharge packet. RN to ensure that written prescriptions for  Level II medications are sent with patient to the facility as per protocol.    Follow-Up copy of Important Message from Medicare (IMM2) has been explained to patient and/or designated healthcare decision maker (HCDM). Pt and/or HCDM aware that patient is permitted to stay an additional 4 hours prior to discharge to consider an appeal if they feel as though they are being discharged too soon. Patient may discharge as planned if chooses to do so.  Patient/HCDM voice no other concerns or questions regarding this process.   Chelle Duron RN

## 2025-01-30 NOTE — PROGRESS NOTES
[facility] Medicine Progress Note  V 1.6      Date of Admission: 1/23/2025    Hospital Day: 7      Chief Admission Complaint:  leg twitching and sob     Subjective: He is sitting up in his room, states feeling a little better today.  He has been up ambulating and shortness of breath on exertion seems to be improving.  Denies any chest pain    Presenting Admission History:       74 y.o. male with PMH significant for   paroxysmal A-fib, status post ablation and watchman procedure, sick sinus syndrome status post PPM, nonobstructive CAD, HFpEF, diabetes, essential hypertension, hyperlipidemia, ESTHER, and obesity.        Of pertinence is that he had a recent admission to [facility] was discharged on 1/20/2025.  On that admission he did present with shortness of breath and weakness.  He was seen by cardiology in consultation.  He was diuresed and his volume status did improve and he was stable for discharge home.     He presented to [facility] ED with complaint of shortness of breath, weakness, difficulty walking.  He also stated he was having uncontrolled shaking throughout his body especially in both legs making it very difficult to walk.  He stated he was getting quite short of breath with minimal exertion.  In the ED he was noted to be hypoxic on pulse oximetry, he was requiring some accessory muscles to breathe and having difficulty speaking.  He is not on home oxygen and he was started on supplemental oxygen.  When I examined him he was on supplemental oxygen pulse ox= 94% and stated shortness of breath was improving.     He stated he was compliant with all medications, he denied any lower extremity edema denied orthopnea or PND.  His renal functions were also noted to be worse than from discharge and creatinine=1.7 on day of admission     He was admitted for further evaluation and management.       Assessment/Plan:      Current Principal Problem:  Acute hypoxic respiratory 
  [facility] Medicine Progress Note  V 1.6      Date of Admission: 1/23/2025    Hospital Day: 5      Chief Admission Complaint:   leg twitching and sob    Subjective:   He is sitting up, eating and drinking better.  States he is feeling some shortness of breath on exertion.  Denies any chest pain    Presenting Admission History:       74 y.o. male with PMH significant for   paroxysmal A-fib, status post ablation and watchman procedure, sick sinus syndrome status post PPM, nonobstructive CAD, HFpEF, diabetes, essential hypertension, hyperlipidemia, ESTHER, and obesity.        Of pertinence is that he had a recent admission to [facility] was discharged on 1/20/2025.  On that admission he did present with shortness of breath and weakness.  He was seen by cardiology in consultation.  He was diuresed and his volume status did improve and he was stable for discharge home.     He presented to [facility] ED with complaint of shortness of breath, weakness, difficulty walking.  He also stated he was having uncontrolled shaking throughout his body especially in both legs making it very difficult to walk.  He stated he was getting quite short of breath with minimal exertion.  In the ED he was noted to be hypoxic on pulse oximetry, he was requiring some accessory muscles to breathe and having difficulty speaking.  He is not on home oxygen and he was started on supplemental oxygen.  When I examined him he was on supplemental oxygen pulse ox= 94% and stated shortness of breath was improving.     He stated he was compliant with all medications, he denied any lower extremity edema denied orthopnea or PND.  His renal functions were also noted to be worse than from discharge and creatinine=1.7 on day of admission     He was admitted for further evaluation and management.       Assessment/Plan:      Current Principal Problem:  Acute hypoxic respiratory failure    Lower extremity weakness     -I believe the 
  [facility] Medicine Progress Note  V 1.6      Date of Admission: 1/23/2025    Hospital Day: 3      Chief Admission Complaint:  Leg twitching and shortness of breath     Subjective: He is sitting up in bed, states he is feeling better today.  Denies any chest pain, denies shortness of breath.  Still feels weak, having some twitching of his legs still.    Presenting Admission History:       74 y.o. male with PMH significant for   paroxysmal A-fib, status post ablation and watchman procedure, sick sinus syndrome status post PPM, nonobstructive CAD, HFpEF, diabetes, essential hypertension, hyperlipidemia, ESTHER, and obesity.        Of pertinence is that he had a recent admission to [facility] was discharged on 1/20/2025.  On that admission he did present with shortness of breath and weakness.  He was seen by cardiology in consultation.  He was diuresed and his volume status did improve and he was stable for discharge home.     He presented to [facility] ED with complaint of shortness of breath, weakness, difficulty walking.  He also stated he was having uncontrolled shaking throughout his body especially in both legs making it very difficult to walk.  He stated he was getting quite short of breath with minimal exertion.  In the ED he was noted to be hypoxic on pulse oximetry, he was requiring some accessory muscles to breathe and having difficulty speaking.  He is not on home oxygen and he was started on supplemental oxygen.  When I examined him he was on supplemental oxygen pulse ox= 94% and stated shortness of breath was improving.     He stated he was compliant with all medications, he denied any lower extremity edema denied orthopnea or PND.  His renal functions were also noted to be worse than from discharge and creatinine=1.7 on day of admission     He is now being admitted for further evaluation and management.       Assessment/Plan:      Current Principal Problem:  Acute hypoxic 
  [facility] Medicine Progress Note  V 1.6      Date of Admission: 1/23/2025    Hospital Day: 6      Chief Admission Complaint:    leg twitching and sob     Subjective: He is sitting up in bed, states he  had some shortness of breath on exertion but seems to be a little better today.  He denies any chest    Presenting Admission History:       74 y.o. male with PMH significant for   paroxysmal A-fib, status post ablation and watchman procedure, sick sinus syndrome status post PPM, nonobstructive CAD, HFpEF, diabetes, essential hypertension, hyperlipidemia, ESTHER, and obesity.        Of pertinence is that he had a recent admission to [facility] was discharged on 1/20/2025.  On that admission he did present with shortness of breath and weakness.  He was seen by cardiology in consultation.  He was diuresed and his volume status did improve and he was stable for discharge home.     He presented to [facility] ED with complaint of shortness of breath, weakness, difficulty walking.  He also stated he was having uncontrolled shaking throughout his body especially in both legs making it very difficult to walk.  He stated he was getting quite short of breath with minimal exertion.  In the ED he was noted to be hypoxic on pulse oximetry, he was requiring some accessory muscles to breathe and having difficulty speaking.  He is not on home oxygen and he was started on supplemental oxygen.  When I examined him he was on supplemental oxygen pulse ox= 94% and stated shortness of breath was improving.     He stated he was compliant with all medications, he denied any lower extremity edema denied orthopnea or PND.  His renal functions were also noted to be worse than from discharge and creatinine=1.7 on day of admission     He was admitted for further evaluation and management.       Assessment/Plan:      Current Principal Problem:  Acute hypoxic respiratory failure    Lower extremity weakness     Suspect 
  [facility] Medicine Progress Note  V 1.6      Date of Admission: 1/23/2025    Hospital Day: 2      Chief Admission Complaint: Leg twitching and shortness of breath    Subjective: Patient to me denies having had any worsening of his shortness of breath or lower extremity edema since his discharge and reports that the main reason he came to the hospital is that when he stands on his feet he feels unsteady on his legs and his legs are shaking a lot.  Denies any vertigo or lightheadedness or any falls.  Denies any weakness in the upper extremities.  Denies any numbness or tingling or any loss of bowel bladder control or any pain in the lower extremity.    Presenting Admission History:       74 y.o. male with PMH significant for   paroxysmal A-fib, status post ablation and watchman procedure, sick sinus syndrome status post PPM, nonobstructive CAD, HFpEF, diabetes, essential hypertension, hyperlipidemia, ESTHER, and obesity.        Of pertinence is that he had a recent admission to [facility] was discharged on 1/20/2025.  On that admission he did present with shortness of breath and weakness.  He was seen by cardiology in consultation.  He was diuresed and his volume status did improve and he was stable for discharge home.     He presented to [facility] ED with complaint of shortness of breath, weakness, difficulty walking.  He also stated he was having uncontrolled shaking throughout his body especially in both legs making it very difficult to walk.  He stated he was getting quite short of breath with minimal exertion.  In the ED he was noted to be hypoxic on pulse oximetry, he was requiring some accessory muscles to breathe and having difficulty speaking.  He is not on home oxygen and he was started on supplemental oxygen.  When I examined him he was on supplemental oxygen pulse ox= 94% and stated shortness of breath was improving.     He stated he was compliant with all medications, he 
4 Eyes Skin Assessment     NAME:  London Swenson  YOB: 1950  MEDICAL RECORD NUMBER:  [id number]    The patient is being assessed for  Admission    I agree that at least one RN has performed a thorough Head to Toe Skin Assessment on the patient. ALL assessment sites listed below have been assessed.      Areas assessed by both nurses:    Head, Face, Ears, Shoulders, Back, Chest, Arms, Elbows, Hands, Sacrum. Buttock, Coccyx, Ischium, Legs. Feet and Heels, and Under Medical Devices         Does the Patient have a Wound? No noted wound(s)       Dustin Prevention initiated by RN: No  Wound Care Orders initiated by RN: No    Pressure Injury (Stage 3,4, Unstageable, DTI, NWPT, and Complex wounds) if present, place Wound referral order by RN under : No    New Ostomies, if present place, Ostomy referral order under : No     Nurse 1 eSignature: Electronically signed by Maxine Lemus RN on 1/23/25 at 7:34 PM EST    **SHARE this note so that the co-signing nurse can place an eSignature**    Nurse 2 eSignature: {Esignature:[id number]}   
Educated patient on importance of using urinal. It is necessary to track intake and output for use of CHF treatment.  Patient is up, independent in the room as he is refusing bed alarm. Urinal provided in bathroom.  
Occupational Therapy  Facility/Department: [address] SURG  Occupational Therapy Initial Assessment/ Treatment Note    Name: London Swenson  : 1950  MRN: [id number]  Date of Service: 2025    Discharge Recommendations:  24 hour supervision or assist, Home with Home health OT  OT Equipment Recommendations  Equipment Needed: No       Patient Diagnosis(es): The primary encounter diagnosis was Acute on chronic respiratory failure with hypoxia. Diagnoses of Rigors and WESLEY (acute kidney injury) (HCC) were also pertinent to this visit.  Past Medical History:  has a past medical history of A-fib (HCC), Acid reflux, Acute on chronic heart failure, unspecified heart failure type (HCC), Yan's esophagus, Coronary artery disease of native heart with stable angina pectoris (HCC), Dementia (HCC), Diabetes mellitus (HCC), Diabetic autonomic neuropathy associated with type 2 diabetes mellitus (HCC), Hyperlipidemia, Hypertension, Pancreatitis, Restless legs, Sleep apnea, and Tremor.  Past Surgical History:  has a past surgical history that includes Pancreas surgery; Cholecystectomy; Tonsillectomy; [place] tooth extraction; Colonoscopy (10/26/2011); Cataract removal (Bilateral, ); Upper gastrointestinal endoscopy (10/04/2016); Hydrocele surgery (11/15/2016); Endoscopy, colon, diagnostic; Upper gastrointestinal endoscopy (2017); Upper gastrointestinal endoscopy (2017); Upper gastrointestinal endoscopy (2017); Upper gastrointestinal endoscopy (2017); Upper gastrointestinal endoscopy (N/A, 2018); Upper gastrointestinal endoscopy (2019); Upper gastrointestinal endoscopy (N/A, 2019); Upper gastrointestinal endoscopy (N/A, 2019); laryngoscopy (N/A, 10/25/2023); Cardiac defibrillator placement; and ep device procedure (N/A, 7/15/2024).    Assessment  Performance deficits / Impairments: Decreased functional mobility ;Decreased ADL status;Decreased ROM;Decreased 
Patient continues to refuse bed alarm, educated on purpose of bed alarm, bed alarm remains off per pt request  
Patient discharged. IV removed, telemetry box and leads removed and returned. Lockbox emptied. All belongings gathered and returned to patient. Discharge instructions reviewed with patient, all questions answered by RN. Medications picked up from outpatient pharmacy / prescriptions sent with patient.  No further needs.    
Physical Therapy  Facility/Department: [address] SURG  Physical Therapy Initial Assessment/Treatment    Name: London Swenson  : 1950  MRN: [id number]  Date of Service: 2025    Discharge Recommendations:  24 hour supervision or assist, Home with Home health PT   PT Equipment Recommendations  Equipment Needed: No      Patient Diagnosis(es): The primary encounter diagnosis was Acute on chronic respiratory failure with hypoxia. Diagnoses of Rigors and WESLEY (acute kidney injury) (HCC) were also pertinent to this visit.  Past Medical History:  has a past medical history of A-fib (HCC), Acid reflux, Acute on chronic heart failure, unspecified heart failure type (HCC), Yan's esophagus, Coronary artery disease of native heart with stable angina pectoris (HCC), Dementia (HCC), Diabetes mellitus (HCC), Diabetic autonomic neuropathy associated with type 2 diabetes mellitus (HCC), Hyperlipidemia, Hypertension, Pancreatitis, Restless legs, Sleep apnea, and Tremor.  Past Surgical History:  has a past surgical history that includes Pancreas surgery; Cholecystectomy; Tonsillectomy; [place] tooth extraction; Colonoscopy (10/26/2011); Cataract removal (Bilateral, ); Upper gastrointestinal endoscopy (10/04/2016); Hydrocele surgery (11/15/2016); Endoscopy, colon, diagnostic; Upper gastrointestinal endoscopy (2017); Upper gastrointestinal endoscopy (2017); Upper gastrointestinal endoscopy (2017); Upper gastrointestinal endoscopy (2017); Upper gastrointestinal endoscopy (N/A, 2018); Upper gastrointestinal endoscopy (2019); Upper gastrointestinal endoscopy (N/A, 2019); Upper gastrointestinal endoscopy (N/A, 2019); laryngoscopy (N/A, 10/25/2023); Cardiac defibrillator placement; and ep device procedure (N/A, 7/15/2024).    Assessment  Body Structures, Functions, Activity Limitations Requiring Skilled Therapeutic Intervention: Decreased functional mobility ;Decreased 
Physical Therapy  Facility/Department: [facility] - MED SURG  Daily Treatment Note  NAME: London Swenson  : 1950  MRN: [id number]    Date of Service: 2025    Discharge Recommendations:  Home with assist PRN, Home with Home health PT   PT Equipment Recommendations  Equipment Needed: No    Patient Diagnosis(es): The primary encounter diagnosis was Acute on chronic respiratory failure with hypoxia. Diagnoses of Rigors and WESLEY (acute kidney injury) (HCC) were also pertinent to this visit.    Assessment  Assessment: Pt seen for PT session this morning focusing on functional transfers and ambulation. Pt able to complete functional transfers with supervision and ambulate community distances with SBA without AD. Completes 2 steps with SBA without rails. Pt reports current mobility has improved since admission, although continues to functional slightly below baseline as he was IND prior to admission.  Activity Tolerance: Patient tolerated treatment well  Equipment Needed: No    Plan  Physical Therapy Plan  General Plan: 2-3 times per week  Current Treatment Recommendations: Strengthening;Balance training;Functional mobility training;Transfer training;Endurance training;Gait training;Stair training;Neuromuscular re-education;Home exercise program;Safety education & training;Patient/Caregiver education & training;Equipment evaluation, education, & procurement;Positioning;Therapeutic activities;Co-Treatment    Restrictions  Restrictions/Precautions  Restrictions/Precautions: Fall Risk, General Precautions  Position Activity Restriction  Other Position/Activity Restrictions: IV, tele, O2     Subjective    Pt seated EOB upon arrival and agreeable to PT session. RN cleared pt for PT  Pain  Pt denies pain    Objective  Vitals  Pulse: 69  Heart Rate Source: Monitor  BP: 114/75  BP Location: Left upper arm  BP Method: Automatic  Patient Position: Sitting  MAP (Calculated): 88  SpO2: 97 %  O2 Device: None (Room air)    Bed 
Nightly    potassium chloride  20 mEq Oral Once    furosemide  20 mg IntraVENous Daily    pramipexole  0.75 mg Oral BID    [Held by provider] spironolactone  25 mg Oral Daily    dofetilide  250 mcg Oral 2 times per day    metoprolol tartrate  25 mg Oral BID    aspirin  325 mg Oral Daily    pantoprazole  40 mg Oral BID AC    gabapentin  600 mg Oral BID    insulin lispro  20 Units SubCUTAneous TID WC    atorvastatin  40 mg Oral Nightly    insulin lispro  0-8 Units SubCUTAneous 4x Daily AC & HS    sodium chloride flush  5-40 mL IntraVENous 2 times per day    enoxaparin  30 mg SubCUTAneous BID    [Held by provider] torsemide  20 mg Oral Daily     PRN Meds: sodium chloride, glucose, dextrose bolus **OR** dextrose bolus, glucagon (rDNA), dextrose, sodium chloride flush, sodium chloride, ondansetron **OR** ondansetron, polyethylene glycol, acetaminophen **OR** acetaminophen, cyclobenzaprine      Physical Exam Performed:      General appearance: He is lying in bed, he looks weak and tired, in no acute distress is pleasant and cooperative.  HEENT:  Pupils equal, round, and reactive to light. Conjunctivae/corneas clear.  Respiratory: Bilateral breath sounds, decreased both bases, no rales or rhonchi are noted on auscultation, normal respiratory effort.   Cardiovascular:  Regular rate and rhythm with normal S1/S2   Abdomen:  Soft, non-tender, non-distended with normal bowel sounds.  Musculoskeletal:  No clubbing, cyanosis,  +1 lower extremity edema bilaterally.  Full range of motion without deformity.  Neurologic:  Neurovascularly intact without any focal sensory/motor deficits. Cranial nerves: II-XII intact, grossly non-focal.  Psychiatric:  Alert and oriented, thought content appropriate, normal insight  Peripheral Pulses:  +2 palpable, equal bilaterally           /78   Pulse 63   Temp 97.3 °F (36.3 °C) (Oral)   Resp 20   Ht 1.778 m (5' 10\")   Wt 125.8 kg (277 lb 6.4 oz)   SpO2 91%   BMI 39.80 kg/m² 
while sleeping  (Check In!: You need to contact your doctor or provider as soon as possible)  [x] Red Zone/Medical Alert:  Unrelieved chest pain or shortness of breath, especially while resting  Increased Discomfort or swelling in the abdomen or lower body  Sudden weight gain of more than 5 pounds in a week  Increased cough with bubbly and/or pink sputum  (Warning: You need to be seen right away .  If you cannot reach your physician, call 911)    ------------------------------------------------------------------------------------------------------------------------------------         2)Kenan Rating of Perceived Exertion (RPE) Scale     The Kenan rating scale ranges from 6 to 20, where 6 means \"no exertion at all\" and 20 means \"maximal exertion.\" The patient chooses the number that best describes their level of exertion. This will objectify the intensity level of the patient's activity, and the therapist can use this information to accelerate or decelerate activity levels to reach the desired range.    The patient should appraise their feeling of exertion as honestly as possible, without thinking about what the actual physical load is. Their feeling of effort and exertion is important, and it should not be compared to the level of others.     Kenan RPE Scale  Activity Completed: mobility in room     [] 6  No exertion at all  [] 7  Extremely light  [] 8  [] 9  Very light (For a healthy person, it is like walking slowly at his or her own pace for some minutes)  []10  []11  Light  [x]12  []13  Somewhat hard (exercise, but it still feels OK to continue)  []14  []15  Hard (heavy)  []16  []17  Very hard (can still go on, but really has to push himself. It feels very heavy, and the person is very tired.)  []18  []19  Extremely hard (For most people this is the most strenuous exercise they have ever experienced.)  []20  Maximal

## 2025-01-30 NOTE — PLAN OF CARE
Problem: Cardiovascular - Adult  Goal: Maintains optimal cardiac output and hemodynamic stability  Outcome: Progressing  Flowsheets  Taken 1/26/2025 2137 by Franky Michelle RN  Maintains optimal cardiac output and hemodynamic stability:   Monitor blood pressure and heart rate   Monitor urine output and notify Licensed Independent Practitioner for values outside of normal range   Assess for signs of decreased cardiac output  Taken 1/26/2025 1131 by Ama Smith RN  Maintains optimal cardiac output and hemodynamic stability:   Monitor blood pressure and heart rate   Monitor urine output and notify Licensed Independent Practitioner for values outside of normal range   Assess for signs of decreased cardiac output     Problem: Cardiovascular - Adult  Goal: Absence of cardiac dysrhythmias or at baseline  Outcome: Progressing  Flowsheets (Taken 1/26/2025 2137)  Absence of cardiac dysrhythmias or at baseline:   Monitor cardiac rate and rhythm   Assess for signs of decreased cardiac output     
  Problem: Chronic Conditions and Co-morbidities  Goal: Patient's chronic conditions and co-morbidity symptoms are monitored and maintained or improved  1/23/2025 2048 by Erika Covington RN  Outcome: Progressing  1/23/2025 1835 by Maxine Lemus RN  Outcome: Progressing     Problem: Discharge Planning  Goal: Discharge to home or other facility with appropriate resources  1/23/2025 2048 by Erika Covington RN  Outcome: Progressing  1/23/2025 1835 by Maxine Lemus RN  Outcome: Progressing     Problem: Pain  Goal: Verbalizes/displays adequate comfort level or baseline comfort level  1/23/2025 2048 by Erika Covington RN  Outcome: Progressing  1/23/2025 1835 by Maxine Lemus RN  Outcome: Progressing     Problem: Safety - Adult  Goal: Free from fall injury  1/23/2025 2048 by Erika Covington RN  Outcome: Progressing  1/23/2025 1835 by Maxine Lemus RN  Outcome: Progressing     
  Problem: Chronic Conditions and Co-morbidities  Goal: Patient's chronic conditions and co-morbidity symptoms are monitored and maintained or improved  Outcome: Completed     Problem: Discharge Planning  Goal: Discharge to home or other facility with appropriate resources  Outcome: Completed     Problem: Pain  Goal: Verbalizes/displays adequate comfort level or baseline comfort level  Outcome: Completed     Problem: Safety - Adult  Goal: Free from fall injury  Outcome: Completed     Problem: Respiratory - Adult  Goal: Achieves optimal ventilation and oxygenation  Outcome: Completed     Problem: Cardiovascular - Adult  Goal: Maintains optimal cardiac output and hemodynamic stability  1/27/2025 1112 by Eunice Gar RN  Outcome: Completed  1/26/2025 2137 by Franky Michelle RN  Outcome: Progressing  Flowsheets  Taken 1/26/2025 2137 by Franky Michelle RN  Maintains optimal cardiac output and hemodynamic stability:   Monitor blood pressure and heart rate   Monitor urine output and notify Licensed Independent Practitioner for values outside of normal range   Assess for signs of decreased cardiac output  Taken 1/26/2025 1131 by Ama Smith RN  Maintains optimal cardiac output and hemodynamic stability:   Monitor blood pressure and heart rate   Monitor urine output and notify Licensed Independent Practitioner for values outside of normal range   Assess for signs of decreased cardiac output  Goal: Absence of cardiac dysrhythmias or at baseline  1/27/2025 1112 by Eunice Gar RN  Outcome: Completed  1/26/2025 2137 by Franky Michelle RN  Outcome: Progressing  Flowsheets (Taken 1/26/2025 2137)  Absence of cardiac dysrhythmias or at baseline:   Monitor cardiac rate and rhythm   Assess for signs of decreased cardiac output     Problem: Skin/Tissue Integrity - Adult  Goal: Skin integrity remains intact  Outcome: Completed     Problem: Musculoskeletal - Adult  Goal: Return mobility to safest level of 
  Problem: Chronic Conditions and Co-morbidities  Goal: Patient's chronic conditions and co-morbidity symptoms are monitored and maintained or improved  Outcome: Completed     Problem: Discharge Planning  Goal: Discharge to home or other facility with appropriate resources  Outcome: Completed     Problem: Pain  Goal: Verbalizes/displays adequate comfort level or baseline comfort level  Outcome: Completed     Problem: Safety - Adult  Goal: Free from fall injury  Outcome: Completed     Problem: Safety - Adult  Goal: Free from fall injury  Outcome: Progressing     Problem: Respiratory - Adult  Goal: Achieves optimal ventilation and oxygenation  Outcome: Completed     Problem: Cardiovascular - Adult  Goal: Maintains optimal cardiac output and hemodynamic stability  Outcome: Completed  Goal: Absence of cardiac dysrhythmias or at baseline  Outcome: Completed     Problem: Skin/Tissue Integrity - Adult  Goal: Skin integrity remains intact  Outcome: Completed     Problem: Musculoskeletal - Adult  Goal: Return mobility to safest level of function  Outcome: Completed     Problem: Gastrointestinal - Adult  Goal: Minimal or absence of nausea and vomiting  Outcome: Completed  Goal: Maintains or returns to baseline bowel function  Outcome: Completed     Problem: Metabolic/Fluid and Electrolytes - Adult  Goal: Electrolytes maintained within normal limits  Outcome: Completed  Goal: Glucose maintained within prescribed range  Outcome: Completed     Problem: Cardiovascular - Adult  Goal: Maintains optimal cardiac output and hemodynamic stability  Outcome: Progressing  Goal: Absence of cardiac dysrhythmias or at baseline  Outcome: Progressing     Problem: Musculoskeletal - Adult  Goal: Return mobility to safest level of function  Outcome: Progressing  Goal: Maintain proper alignment of affected body part  Outcome: Progressing  Goal: Return ADL status to a safe level of function  Outcome: Progressing     Problem: Genitourinary - 
  Problem: Chronic Conditions and Co-morbidities  Goal: Patient's chronic conditions and co-morbidity symptoms are monitored and maintained or improved  Outcome: Progressing     Problem: Discharge Planning  Goal: Discharge to home or other facility with appropriate resources  Outcome: Progressing     Problem: Pain  Goal: Verbalizes/displays adequate comfort level or baseline comfort level  Outcome: Progressing     Problem: Safety - Adult  Goal: Free from fall injury  Outcome: Progressing     
  Problem: Respiratory - Adult  Goal: Achieves optimal ventilation and oxygenation  Outcome: Progressing  Flowsheets (Taken 1/24/2025 2134)  Achieves optimal ventilation and oxygenation:   Assess for changes in respiratory status   Assess for changes in mentation and behavior   Position to facilitate oxygenation and minimize respiratory effort   Oxygen supplementation based on oxygen saturation or arterial blood gases   Encourage broncho-pulmonary hygiene including cough, deep breathe, incentive spirometry   Assess and instruct to report shortness of breath or any respiratory difficulty   Respiratory therapy support as indicated     
  Problem: Respiratory - Adult  Goal: Achieves optimal ventilation and oxygenation  Outcome: Progressing  Flowsheets (Taken 1/25/2025 2134)  Achieves optimal ventilation and oxygenation:   Assess for changes in respiratory status   Assess for changes in mentation and behavior   Position to facilitate oxygenation and minimize respiratory effort   Encourage broncho-pulmonary hygiene including cough, deep breathe, incentive spirometry   Assess and instruct to report shortness of breath or any respiratory difficulty   Respiratory therapy support as indicated     Problem: Cardiovascular - Adult  Goal: Maintains optimal cardiac output and hemodynamic stability  Outcome: Progressing  Flowsheets (Taken 1/25/2025 2134)  Maintains optimal cardiac output and hemodynamic stability:   Monitor blood pressure and heart rate   Assess for signs of decreased cardiac output   Monitor urine output and notify Licensed Independent Practitioner for values outside of normal range     
  Problem: Safety - Adult  Goal: Free from fall injury  1/27/2025 2252 by Elida Jauregui RN  Outcome: Progressing     Problem: Cardiovascular - Adult  Goal: Maintains optimal cardiac output and hemodynamic stability  1/27/2025 2252 by Elida Jauregui RN  Outcome: Progressing     Problem: Cardiovascular - Adult  Goal: Absence of cardiac dysrhythmias or at baseline  1/27/2025 2252 by Elida Jauregui RN  Outcome: Progressing     Problem: Genitourinary - Adult  Goal: Absence of urinary retention  1/27/2025 2252 by Elida Jauregui RN  Outcome: Progressing     Problem: Metabolic/Fluid and Electrolytes - Adult  Goal: Glucose maintained within prescribed range  1/27/2025 2252 by Elida Jauregui RN  Outcome: Progressing     
  Problem: Safety - Adult  Goal: Free from fall injury  1/28/2025 1044 by Reece Andrews RN  Outcome: Progressing     Problem: Cardiovascular - Adult  Goal: Maintains optimal cardiac output and hemodynamic stability  1/28/2025 1044 by Reece Andrews RN  Outcome: Progressing     Problem: Cardiovascular - Adult  Goal: Absence of cardiac dysrhythmias or at baseline  1/28/2025 1044 by Reece Andrews RN  Outcome: Progressing     Problem: Musculoskeletal - Adult  Goal: Return mobility to safest level of function  Outcome: Progressing     
  Problem: Safety - Adult  Goal: Free from fall injury  1/29/2025 0041 by Elida Jauregui RN  Outcome: Progressing     Problem: Cardiovascular - Adult  Goal: Absence of cardiac dysrhythmias or at baseline  1/29/2025 0041 by Elida Jauregui RN  Outcome: Progressing     Problem: Genitourinary - Adult  Goal: Absence of urinary retention  Outcome: Progressing     Problem: Metabolic/Fluid and Electrolytes - Adult  Goal: Glucose maintained within prescribed range  Outcome: Progressing     
  Problem: Safety - Adult  Goal: Free from fall injury  1/29/2025 1225 by Reece Andrews RN  Outcome: Progressing  1/29/2025 0041 by Elida Jauregui RN  Outcome: Progressing     Problem: Cardiovascular - Adult  Goal: Maintains optimal cardiac output and hemodynamic stability  Outcome: Progressing  Goal: Absence of cardiac dysrhythmias or at baseline  1/29/2025 1225 by Reece Andrews RN  Outcome: Progressing  1/29/2025 0041 by Elida Jauregui RN  Outcome: Progressing     Problem: Musculoskeletal - Adult  Goal: Return mobility to safest level of function  Outcome: Progressing     Problem: Genitourinary - Adult  Goal: Absence of urinary retention  1/29/2025 0041 by Elida Jauregui RN  Outcome: Progressing     Problem: Metabolic/Fluid and Electrolytes - Adult  Goal: Glucose maintained within prescribed range  1/29/2025 0041 by Elida Jauregui RN  Outcome: Progressing     
  Problem: Safety - Adult  Goal: Free from fall injury  1/30/2025 0111 by Anna Hoyos RN  Outcome: Progressing  1/29/2025 1225 by Reece Andrews RN  Outcome: Progressing     Problem: Cardiovascular - Adult  Goal: Maintains optimal cardiac output and hemodynamic stability  1/30/2025 0111 by Anna Hoyos RN  Outcome: Progressing  1/29/2025 1225 by Reece Andrews RN  Outcome: Progressing     Problem: Cardiovascular - Adult  Goal: Absence of cardiac dysrhythmias or at baseline  1/30/2025 0111 by Anna Hoyos RN  Outcome: Progressing  1/29/2025 1225 by Reece Andrews RN  Outcome: Progressing     Problem: Musculoskeletal - Adult  Goal: Return mobility to safest level of function  1/30/2025 0111 by Anna Hoyos RN  Outcome: Progressing  1/29/2025 1225 by Reece Andrews RN  Outcome: Progressing     Problem: Musculoskeletal - Adult  Goal: Maintain proper alignment of affected body part  Outcome: Progressing     Problem: Genitourinary - Adult  Goal: Absence of urinary retention  Outcome: Progressing     Problem: Metabolic/Fluid and Electrolytes - Adult  Goal: Electrolytes maintained within normal limits  Outcome: Progressing     
  Problem: Safety - Adult  Goal: Free from fall injury  1/30/2025 1146 by Maxine Lemus RN  Outcome: Progressing  1/30/2025 0111 by Anna Hoyos RN  Outcome: Progressing     Problem: Cardiovascular - Adult  Goal: Maintains optimal cardiac output and hemodynamic stability  1/30/2025 1146 by Maxine Lemus RN  Outcome: Progressing  1/30/2025 0111 by Anna Hoyos RN  Outcome: Progressing  Goal: Absence of cardiac dysrhythmias or at baseline  1/30/2025 1146 by Maxine Lemus RN  Outcome: Progressing  1/30/2025 0111 by Anna Hoyos RN  Outcome: Progressing     Problem: Musculoskeletal - Adult  Goal: Return mobility to safest level of function  1/30/2025 1146 by Maxine Lemus RN  Outcome: Progressing  1/30/2025 0111 by Anna Hoyos RN  Outcome: Progressing  Goal: Maintain proper alignment of affected body part  1/30/2025 1146 by Maxine Lemus RN  Outcome: Progressing  1/30/2025 0111 by Anna Hoyos RN  Outcome: Progressing  Goal: Return ADL status to a safe level of function  Outcome: Progressing     Problem: Genitourinary - Adult  Goal: Absence of urinary retention  1/30/2025 1146 by Maxine Lemus RN  Outcome: Progressing  1/30/2025 0111 by Anna Hoyos RN  Outcome: Progressing     Problem: Metabolic/Fluid and Electrolytes - Adult  Goal: Electrolytes maintained within normal limits  1/30/2025 1146 by Maxine Lemus RN  Outcome: Progressing  1/30/2025 0111 by Anna Hoyos RN  Outcome: Progressing  Goal: Hemodynamic stability and optimal renal function maintained  Outcome: Progressing  Goal: Glucose maintained within prescribed range  Outcome: Progressing     Problem: Respiratory - Adult  Goal: Achieves optimal ventilation and oxygenation  Outcome: Progressing     Problem: Skin/Tissue Integrity - Adult  Goal: Skin integrity remains intact  Outcome: Progressing     Problem: Infection - Adult  Goal: Absence of fever/infection during anticipated neutropenic period  Outcome: 
CHF Care Plan      Patient's EF (Ejection Fraction) is greater than 40%    Heart Failure Medications:  Diuretics:: Furosemide    (One of the following REQUIRED for EF </= 40%/SYSTOLIC FAILURE but MAY be used in EF% >40%/DIASTOLIC FAILURE)        ACE:: None        ARB:: None         ARNI:: None    (Beta Blockers)  NON- Evidenced Based Beta Blocker (for EF% >40%/DIASTOLIC FAILURE): Metoprolol TARTrate- Lopressor    Evidenced Based Beta Blocker::(REQUIRED for EF% <40%/SYSTOLIC FAILURE) None  ...................................................................................................................................................    Failed to redirect to the Timeline version of the [employer] SmartLink.      Patient's weights and intake/output reviewed    Daily Weight log at bedside, patient/family participation in use of log: \"yes    Patient's current weight today shows a difference of 2.9 lbs more than last documented weight.      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     >>For CHF and Comorbidity documentation on Education Time and Topics, please see Education Tab      CHF Education    Learners:  Patient  Readineess:   Acceptance  Method:   Explanation  Response:    Verbalizes Understanding    Comments:     Time Spent: 5      Pt resting in bed at this time on  2 L O2. Pt with complaints of shortness of 
CHF Care Plan      Patient's EF (Ejection Fraction) is greater than 40%    Heart Failure Medications:  Diuretics:: Furosemide and Spironolactone    (One of the following REQUIRED for EF </= 40%/SYSTOLIC FAILURE but MAY be used in EF% >40%/DIASTOLIC FAILURE)        ACE:: None        ARB:: None         ARNI:: None    (Beta Blockers)  NON- Evidenced Based Beta Blocker (for EF% >40%/DIASTOLIC FAILURE): Metoprolol TARTrate- Lopressor    Evidenced Based Beta Blocker::(REQUIRED for EF% <40%/SYSTOLIC FAILURE) None  ...................................................................................................................................................    Failed to redirect to the Timeline version of the [employer] SmartLink.      Patient's weights and intake/output reviewed    Daily Weight log at bedside, patient/family participation in use of log: \"yes    Patient's current weight today shows a difference of 1.1 lbs less than last documented weight.      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     >>For CHF and Comorbidity documentation on Education Time and Topics, please see Education Tab      CHF Education    Learners:  Patient  Readineess:   Acceptance  Method:   Explanation  Response:    Verbalizes Understanding    Comments:     Time Spent: 10 mins      Pt resting in bed at this time on room air. Pt 
CHF Care Plan      Patient's EF (Ejection Fraction) is greater than 40%    Heart Failure Medications:  Diuretics:: Furosemide and Spironolactone    (One of the following REQUIRED for EF </= 40%/SYSTOLIC FAILURE but MAY be used in EF% >40%/DIASTOLIC FAILURE)        ACE:: None        ARB:: None         ARNI:: None    (Beta Blockers)  NON- Evidenced Based Beta Blocker (for EF% >40%/DIASTOLIC FAILURE): Metoprolol TARTrate- Lopressor    Evidenced Based Beta Blocker::(REQUIRED for EF% <40%/SYSTOLIC FAILURE) None  ...................................................................................................................................................    Failed to redirect to the Timeline version of the [employer] SmartLink.      Patient's weights and intake/output reviewed    Daily Weight log at bedside, patient/family participation in use of log: \"yes    Patient's current weight today shows a difference of 0 lbs equal to last documented weight.      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     >>For CHF and Comorbidity documentation on Education Time and Topics, please see Education Tab      CHF Education    Learners:  Patient  Readineess:   Acceptance  Method:   Explanation  Response:    Verbalizes Understanding    Comments:     Time Spent: 10      Pt sitting in bed at this time on room air. Pt denies 
CHF Care Plan      Patient's EF (Ejection Fraction) is greater than 40%    Heart Failure Medications:  Diuretics:: Furosemide and Spironolactone    (One of the following REQUIRED for EF </= 40%/SYSTOLIC FAILURE but MAY be used in EF% >40%/DIASTOLIC FAILURE)        ACE:: None        ARB:: None         ARNI:: None    (Beta Blockers)  NON- Evidenced Based Beta Blocker (for EF% >40%/DIASTOLIC FAILURE): Metoprolol TARTrate- Lopressor    Evidenced Based Beta Blocker::(REQUIRED for EF% <40%/SYSTOLIC FAILURE) None  ...................................................................................................................................................    Failed to redirect to the Timeline version of the [employer] SmartLink.      Patient's weights and intake/output reviewed    Daily Weight log at bedside, patient/family participation in use of log: \"yes    Patient's current weight today shows a difference of 0   lbs less than last documented weight.      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     >>For CHF and Comorbidity documentation on Education Time and Topics, please see Education Tab      CHF Education    Learners:  Patient and Family  Readineess:   Acceptance  Method:   Explanation and Handout  Response:    Verbalizes Understanding    Comments: na    Time Spent: 10 minutes      Pt sitting in bed at this 
CHF Care Plan      Patient's EF (Ejection Fraction) is greater than 40%    Heart Failure Medications:  Diuretics:: Furosemide and Spironolactone    (One of the following REQUIRED for EF </= 40%/SYSTOLIC FAILURE but MAY be used in EF% >40%/DIASTOLIC FAILURE)        ACE:: None        ARB:: None         ARNI:: None    (Beta Blockers)  NON- Evidenced Based Beta Blocker (for EF% >40%/DIASTOLIC FAILURE): Metoprolol TARTrate- Lopressor    Evidenced Based Beta Blocker::(REQUIRED for EF% <40%/SYSTOLIC FAILURE) None  ...................................................................................................................................................    Failed to redirect to the Timeline version of the [employer] SmartLink.      Patient's weights and intake/output reviewed    Daily Weight log at bedside, patient/family participation in use of log: \"yes    Patient's current weight today shows a difference of 0.22 lbs less than last documented weight.      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     >>For CHF and Comorbidity documentation on Education Time and Topics, please see Education Tab      CHF Education    Learners:  Patient  Readineess:   Eager  Method:   Explanation  Response:    Verbalizes Understanding    Comments:     Time Spent: 15 min      Pt sitting in bed at this time on room air. Pt denies 
CHF Care Plan      Patient's EF (Ejection Fraction) is greater than 40%    Heart Failure Medications:  Diuretics:: Furosemide and Spironolactone  Torsemide-held    (One of the following REQUIRED for EF </= 40%/SYSTOLIC FAILURE but MAY be used in EF% >40%/DIASTOLIC FAILURE)        ACE:: None        ARB:: None         ARNI:: None    (Beta Blockers)  NON- Evidenced Based Beta Blocker (for EF% >40%/DIASTOLIC FAILURE): Metoprolol TARTrate- Lopressor    Evidenced Based Beta Blocker::(REQUIRED for EF% <40%/SYSTOLIC FAILURE) None  ...................................................................................................................................................    Failed to redirect to the Timeline version of the [employer] SmartLink.      Patient's weights and intake/output reviewed    Daily Weight log at bedside, patient/family participation in use of log: \"yes    Patient's current weight today shows a difference of 3 lbs more than last documented weight.      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     >>For CHF and Comorbidity documentation on Education Time and Topics, please see Education Tab      CHF Education    Learners:  Patient  Readineess:   Acceptance  Method:   Explanation  Response:    Needs Reinforcement    Time Spent: 5 minutes      Pt resting in bed at this time on room air. Pt with 
Increase function to baseline.  
Increase patients ADLs/functional status to baseline.    
Adjustment    Time Spent: 15 mins      Pt  sitting at edge of bed  at this time on room air. Pt denies shortness of breath. Pt with pitting lower extremity edema.     Patient and/or Family's stated Goal of Care this Admission: increase activity tolerance and better understand heart failure and disease management prior to discharge        :   
Updated, I/O, Fluid Restriction    Time Spent: 15 mins      Pt up in chair at this time on room air. Pt denies shortness of breath. Pt with pitting lower extremity edema.     Patient and/or Family's stated Goal of Care this Admission: better understand heart failure and disease management prior to discharge        :   
denies shortness of breath. Pt with pitting lower extremity edema.     Patient and/or Family's stated Goal of Care this Admission: reduce shortness of breath, increase activity tolerance, better understand heart failure and disease management, be more comfortable, and reduce lower extremity edema prior to discharge        :

## 2025-01-31 ENCOUNTER — CARE COORDINATION (OUTPATIENT)
Dept: PRIMARY CARE CLINIC | Age: 75
End: 2025-01-31

## 2025-01-31 ENCOUNTER — CARE COORDINATION (OUTPATIENT)
Dept: CASE MANAGEMENT | Age: 75
End: 2025-01-31

## 2025-01-31 DIAGNOSIS — E11.40 TYPE 2 DIABETES MELLITUS WITH DIABETIC NEUROPATHY, UNSPECIFIED WHETHER LONG TERM INSULIN USE (HCC): ICD-10-CM

## 2025-01-31 DIAGNOSIS — I50.812 CHRONIC RIGHT-SIDED CONGESTIVE HEART FAILURE (HCC): ICD-10-CM

## 2025-01-31 DIAGNOSIS — R06.02 SHORTNESS OF BREATH: Primary | ICD-10-CM

## 2025-01-31 DIAGNOSIS — I10 PRIMARY HYPERTENSION: Primary | ICD-10-CM

## 2025-01-31 PROCEDURE — 1111F DSCHRG MED/CURRENT MED MERGE: CPT | Performed by: PHYSICIAN ASSISTANT

## 2025-01-31 NOTE — CARE COORDINATION
Market: [place]  Tablet #: [id number]  MRN: [id number]  Care Plan preset(s): CHF; condition managed by [facility], [address], [address] [facility] [address] phone [phone].   Diabetes; condition managed by [address], [address], [place] [address] [phone].   HTN; condition managed by SANDY George-CNP [facility], [address], [address], [place] [address] phone [phone]. .  ACM/CTN: Marta Weems

## 2025-01-31 NOTE — CARE COORDINATION
Care Transitions Note    Initial Call - Call within 2 business days of discharge: Yes    Patient Current Location:  Home: [address] [place] [id number]    Care Transition Nurse contacted the patient by telephone to perform post hospital discharge assessment, verified name and  as identifiers. Provided introduction to self, and explanation of the Care Transition Nurse role.     Patient: London Swenson    Patient : 1950   MRN: [id number]    Reason for Admission: leg twitching, SOB, weakness, difficulty walking, CHF, afib, DM2  Discharge Date: 25  RURS: Readmission Risk Score: 26.4      Last Discharge Facility       Date Complaint Diagnosis Description Type Department Provider    25 Shortness of Breath Acute on chronic respiratory failure with hypoxia ... ED to Hosp-Admission (Discharged) (ADMITTED) Kendy Estevez MD; Mayra Skinner...            Was this an external facility discharge? No    Additional needs identified to be addressed with provider   No needs identified             Method of communication with provider: none.    Patients top risk factors for readmission: medical condition-.    Interventions to address risk factors:   Education: .  Review of patient management of conditions/medications: .    Care Summary Note: Patient reports that he is doing well, denies any SOB, weakness, cough, fever.  Discussed discharge instructions and reviewed medications, 1111F completed.  He has all of his medications including the new spironolactone & glucose tablets.  He denies any questions or concerns at this time.  [facility] will be there today and he is scheduled to follow up with PCP 25.  CTN will continue with outreach follow up calls.      Care Transition Nurse reviewed discharge instructions, medical action plan, and red flags with patient. The patient was given an opportunity to ask questions; all questions answered at this time.. The patient verbalized understanding.

## 2025-01-31 NOTE — PROGRESS NOTES
Remote Patient Monitoring Treatment Plan    Received request from ACM/CTMarta Sutton RN   to order remote patient monitoring for in home monitoring of CHF; Condition managed by [facility]).  Diabetes; Condition managed by PCP.  HTN; Condition managed by SANDY George CNP ([facility]).  and order completed.     Patient will be monitoring blood pressure   glucose  pulse ox   weight.      Patient will engage in Remote Patient Monitoring each day to develop the skills necessary for self management.       RPM Care Team Responsibilities:   Alerts will be reviewed daily and addressed within 2-4 hours during operational hours (Monday -Friday 9 am-4 pm)  Alert response and intervention documented in patient medical record  Alert response escalated to PCP per protocol and documented in patient medical record  Patient monitored over approximately  days  Discharge from program based on self-management readiness    See care coordination encounters for additional details.

## 2025-02-01 NOTE — DISCHARGE SUMMARY
in the last 72 hours.  No results for input(s): \"AST\", \"ALT\", \"BILIDIR\", \"BILITOT\", \"ALKPHOS\" in the last 72 hours.  No results for input(s): \"INR\", \"LACTA\", \"TSH\" in the last 72 hours.    Urine Cultures:   Lab Results   Component Value Date/Time    LABURIN >100,000 CFU/ml 10/11/2023 01:31 PM    LABURIN 200 10/26/2022 10:56 AM     Blood Cultures:   Lab Results   Component Value Date/Time    BC No Growth after 4 days of incubation. 11/24/2023 07:00 PM     Lab Results   Component Value Date/Time    BLOODCULT2 No Growth after 4 days of incubation. 11/24/2023 06:58 PM     Organism:   Lab Results   Component Value Date/Time    ORG Klebsiella oxytoca 10/11/2023 01:31 PM       Signed:    NEETU COX MD

## 2025-02-03 ENCOUNTER — CARE COORDINATION (OUTPATIENT)
Dept: CARE COORDINATION | Age: 75
End: 2025-02-03

## 2025-02-03 DIAGNOSIS — M25.562 CHRONIC PAIN OF LEFT KNEE: ICD-10-CM

## 2025-02-03 DIAGNOSIS — E11.41 DIABETIC MONONEUROPATHY ASSOCIATED WITH TYPE 2 DIABETES MELLITUS (HCC): ICD-10-CM

## 2025-02-03 DIAGNOSIS — G89.29 CHRONIC PAIN OF LEFT KNEE: ICD-10-CM

## 2025-02-03 NOTE — CARE COORDINATION
Remote Patient Monitoring Welcome Note  Date/Time:  2/3/2025 1:20 PM  Patient Current Location: Home: [address]  [facility] [address]  Verified patients name and  as identifiers.       Completed and confirmed the following:    [x] Patient received all RPM equipment (tablet, scale, blood pressure device and cuff, and pulse oximeter)  Cuff Size: large (13.8\"-19.68\")    Weight Scale:  regular (<330lbs)                    [x] Instructed patient keep box for use when returning equipment                                                          [x] Reviewed Patient Welcome Letter with patient    [x]  Reviewed Consent Form  Copy of consent form in chart.                 [x] Reviewed expectations for patient and care team  Monitoring hours M-F 9-4pm  It is important to take your vitals every day, even on the weekends,to keep your care team aware of how you are doing every day of the week.  Completing monitoring by 12pm on  so that alerts can be responded to in the same day  Patient weighs self at same time every day (or after urinating and waking up)  Take blood pressure 1-2 hrs after medications   RPM team may have different phone area code (including [place], [place], [place] or [place])                              [x] Instructed patient to keep scale on flat surface                                                         [x] Instructed patient to keep tablet plugged in at all times                         [x] Instructed how to contact IT support  ([phone])  [x] Provided Remote Patient Monitoring care  information     Emergency Contact Verified: Rosy Swenson (spouse) [phone]               All questions answered at this time.

## 2025-02-04 ENCOUNTER — CARE COORDINATION (OUTPATIENT)
Dept: CASE MANAGEMENT | Age: 75
End: 2025-02-04

## 2025-02-04 ENCOUNTER — APPOINTMENT (OUTPATIENT)
Dept: GENERAL RADIOLOGY | Age: 75
DRG: 291 | End: 2025-02-04
Payer: MEDICARE

## 2025-02-04 ENCOUNTER — HOSPITAL ENCOUNTER (INPATIENT)
Age: 75
LOS: 5 days | Discharge: HOME | DRG: 291 | End: 2025-02-09
Attending: STUDENT IN AN ORGANIZED HEALTH CARE EDUCATION/TRAINING PROGRAM
Payer: MEDICARE

## 2025-02-04 DIAGNOSIS — I25.118 CORONARY ARTERY DISEASE OF NATIVE ARTERY OF NATIVE HEART WITH STABLE ANGINA PECTORIS (HCC): ICD-10-CM

## 2025-02-04 DIAGNOSIS — R06.02 SHORTNESS OF BREATH: ICD-10-CM

## 2025-02-04 DIAGNOSIS — R07.9 CHEST PAIN, UNSPECIFIED TYPE: ICD-10-CM

## 2025-02-04 DIAGNOSIS — I42.9 CARDIOMYOPATHY, UNSPECIFIED TYPE (HCC): ICD-10-CM

## 2025-02-04 DIAGNOSIS — I50.9 ACUTE ON CHRONIC HEART FAILURE, UNSPECIFIED HEART FAILURE TYPE (HCC): ICD-10-CM

## 2025-02-04 DIAGNOSIS — J96.01 ACUTE RESPIRATORY FAILURE WITH HYPOXIA: Primary | ICD-10-CM

## 2025-02-04 DIAGNOSIS — I50.33 ACUTE ON CHRONIC HEART FAILURE WITH PRESERVED EJECTION FRACTION (HFPEF) (HCC): ICD-10-CM

## 2025-02-04 DIAGNOSIS — I48.0 PAF (PAROXYSMAL ATRIAL FIBRILLATION) (HCC): ICD-10-CM

## 2025-02-04 PROBLEM — R11.2 INTRACTABLE VOMITING WITH NAUSEA: Status: ACTIVE | Noted: 2025-02-04

## 2025-02-04 LAB
ALBUMIN SERPL-MCNC: 3.9 G/DL (ref 3.4–5)
ALBUMIN/GLOB SERPL: 1.8 {RATIO} (ref 1.1–2.2)
ALP SERPL-CCNC: 90 U/L (ref 40–129)
ALT SERPL-CCNC: 24 U/L (ref 10–40)
ANION GAP SERPL CALCULATED.3IONS-SCNC: 10 MMOL/L (ref 3–16)
AST SERPL-CCNC: 22 U/L (ref 15–37)
BASE EXCESS BLDV CALC-SCNC: -7.9 MMOL/L (ref -3–3)
BASOPHILS # BLD: 0 K/UL (ref 0–0.2)
BASOPHILS NFR BLD: 0.2 %
BILIRUB SERPL-MCNC: 0.5 MG/DL (ref 0–1)
BUN SERPL-MCNC: 29 MG/DL (ref 7–20)
CALCIUM SERPL-MCNC: 8.7 MG/DL (ref 8.3–10.6)
CHLORIDE SERPL-SCNC: 102 MMOL/L (ref 99–110)
CO2 BLDV-SCNC: 18 MMOL/L
CO2 SERPL-SCNC: 30 MMOL/L (ref 21–32)
COHGB MFR BLDV: 2.6 % (ref 0–1.5)
CREAT SERPL-MCNC: 1.1 MG/DL (ref 0.8–1.3)
DEPRECATED RDW RBC AUTO: 14.9 % (ref 12.4–15.4)
EKG ATRIAL RATE: 113 BPM
EKG DIAGNOSIS: NORMAL
EKG P AXIS: 56 DEGREES
EKG P-R INTERVAL: 184 MS
EKG Q-T INTERVAL: 310 MS
EKG QRS DURATION: 64 MS
EKG QTC CALCULATION (BAZETT): 425 MS
EKG R AXIS: 90 DEGREES
EKG T AXIS: 69 DEGREES
EKG VENTRICULAR RATE: 113 BPM
EOSINOPHIL # BLD: 0.2 K/UL (ref 0–0.6)
EOSINOPHIL NFR BLD: 1.8 %
FLUAV RNA RESP QL NAA+PROBE: NOT DETECTED
FLUBV RNA RESP QL NAA+PROBE: NOT DETECTED
GFR SERPLBLD CREATININE-BSD FMLA CKD-EPI: 70 ML/MIN/{1.73_M2}
GLUCOSE BLD-MCNC: 147 MG/DL (ref 70–99)
GLUCOSE BLD-MCNC: 213 MG/DL (ref 70–99)
GLUCOSE SERPL-MCNC: 164 MG/DL (ref 70–99)
HCO3 BLDV-SCNC: 17 MMOL/L (ref 23–29)
HCT VFR BLD AUTO: 38.8 % (ref 40.5–52.5)
HGB BLD-MCNC: 12.8 G/DL (ref 13.5–17.5)
LYMPHOCYTES # BLD: 0.1 K/UL (ref 1–5.1)
LYMPHOCYTES NFR BLD: 1.7 %
MCH RBC QN AUTO: 29.2 PG (ref 26–34)
MCHC RBC AUTO-ENTMCNC: 33 G/DL (ref 31–36)
MCV RBC AUTO: 88.5 FL (ref 80–100)
METHGB MFR BLDV: 0.2 %
MONOCYTES # BLD: 0.3 K/UL (ref 0–1.3)
MONOCYTES NFR BLD: 3.4 %
NEUTROPHILS # BLD: 8.3 K/UL (ref 1.7–7.7)
NEUTROPHILS NFR BLD: 92.9 %
NT-PROBNP SERPL-MCNC: 72 PG/ML (ref 0–449)
O2 THERAPY: ABNORMAL
PCO2 BLDV: 32.4 MMHG (ref 40–50)
PERFORMED ON: ABNORMAL
PERFORMED ON: ABNORMAL
PH BLDV: 7.34 [PH] (ref 7.35–7.45)
PLATELET # BLD AUTO: 131 K/UL (ref 135–450)
PMV BLD AUTO: 9.2 FL (ref 5–10.5)
PO2 BLDV: 70.5 MMHG (ref 25–40)
POTASSIUM SERPL-SCNC: 4.3 MMOL/L (ref 3.5–5.1)
PROT SERPL-MCNC: 6.1 G/DL (ref 6.4–8.2)
RBC # BLD AUTO: 4.38 M/UL (ref 4.2–5.9)
SAO2 % BLDV: 91 %
SARS-COV-2 RNA RESP QL NAA+PROBE: NOT DETECTED
SODIUM SERPL-SCNC: 142 MMOL/L (ref 136–145)
TROPONIN, HIGH SENSITIVITY: 17 NG/L (ref 0–22)
WBC # BLD AUTO: 8.9 K/UL (ref 4–11)

## 2025-02-04 PROCEDURE — 71045 X-RAY EXAM CHEST 1 VIEW: CPT

## 2025-02-04 PROCEDURE — 99285 EMERGENCY DEPT VISIT HI MDM: CPT

## 2025-02-04 PROCEDURE — 87636 SARSCOV2 & INF A&B AMP PRB: CPT

## 2025-02-04 PROCEDURE — 93005 ELECTROCARDIOGRAM TRACING: CPT | Performed by: STUDENT IN AN ORGANIZED HEALTH CARE EDUCATION/TRAINING PROGRAM

## 2025-02-04 PROCEDURE — 82803 BLOOD GASES ANY COMBINATION: CPT

## 2025-02-04 PROCEDURE — 85025 COMPLETE CBC W/AUTO DIFF WBC: CPT

## 2025-02-04 PROCEDURE — 83540 ASSAY OF IRON: CPT

## 2025-02-04 PROCEDURE — 2500000003 HC RX 250 WO HCPCS

## 2025-02-04 PROCEDURE — 83880 ASSAY OF NATRIURETIC PEPTIDE: CPT

## 2025-02-04 PROCEDURE — 36415 COLL VENOUS BLD VENIPUNCTURE: CPT

## 2025-02-04 PROCEDURE — 6360000002 HC RX W HCPCS

## 2025-02-04 PROCEDURE — 84484 ASSAY OF TROPONIN QUANT: CPT

## 2025-02-04 PROCEDURE — 6370000000 HC RX 637 (ALT 250 FOR IP)

## 2025-02-04 PROCEDURE — 93010 ELECTROCARDIOGRAM REPORT: CPT | Performed by: INTERNAL MEDICINE

## 2025-02-04 PROCEDURE — 83550 IRON BINDING TEST: CPT

## 2025-02-04 PROCEDURE — 82728 ASSAY OF FERRITIN: CPT

## 2025-02-04 PROCEDURE — 1200000000 HC SEMI PRIVATE

## 2025-02-04 PROCEDURE — 80053 COMPREHEN METABOLIC PANEL: CPT

## 2025-02-04 RX ORDER — METOPROLOL TARTRATE 25 MG/1
25 TABLET, FILM COATED ORAL 2 TIMES DAILY
Status: DISCONTINUED | OUTPATIENT
Start: 2025-02-04 | End: 2025-02-09 | Stop reason: HOSPADM

## 2025-02-04 RX ORDER — PANTOPRAZOLE SODIUM 40 MG/1
40 TABLET, DELAYED RELEASE ORAL
Status: DISCONTINUED | OUTPATIENT
Start: 2025-02-04 | End: 2025-02-09 | Stop reason: HOSPADM

## 2025-02-04 RX ORDER — PROCHLORPERAZINE EDISYLATE 5 MG/ML
10 INJECTION INTRAMUSCULAR; INTRAVENOUS EVERY 6 HOURS PRN
Status: DISCONTINUED | OUTPATIENT
Start: 2025-02-04 | End: 2025-02-05

## 2025-02-04 RX ORDER — PRAMIPEXOLE DIHYDROCHLORIDE 0.25 MG/1
0.75 TABLET ORAL NIGHTLY
Status: DISCONTINUED | OUTPATIENT
Start: 2025-02-04 | End: 2025-02-09 | Stop reason: HOSPADM

## 2025-02-04 RX ORDER — ACETAMINOPHEN 325 MG/1
650 TABLET ORAL EVERY 6 HOURS PRN
Status: DISCONTINUED | OUTPATIENT
Start: 2025-02-04 | End: 2025-02-09 | Stop reason: HOSPADM

## 2025-02-04 RX ORDER — ASPIRIN 325 MG
325 TABLET ORAL DAILY
Status: DISCONTINUED | OUTPATIENT
Start: 2025-02-04 | End: 2025-02-09 | Stop reason: HOSPADM

## 2025-02-04 RX ORDER — SODIUM CHLORIDE 0.9 % (FLUSH) 0.9 %
5-40 SYRINGE (ML) INJECTION EVERY 12 HOURS SCHEDULED
Status: DISCONTINUED | OUTPATIENT
Start: 2025-02-04 | End: 2025-02-09 | Stop reason: HOSPADM

## 2025-02-04 RX ORDER — INSULIN LISPRO 100 [IU]/ML
0-16 INJECTION, SOLUTION INTRAVENOUS; SUBCUTANEOUS
Status: DISCONTINUED | OUTPATIENT
Start: 2025-02-04 | End: 2025-02-09 | Stop reason: HOSPADM

## 2025-02-04 RX ORDER — GABAPENTIN 300 MG/1
600 CAPSULE ORAL 2 TIMES DAILY
Status: DISCONTINUED | OUTPATIENT
Start: 2025-02-04 | End: 2025-02-09 | Stop reason: HOSPADM

## 2025-02-04 RX ORDER — SODIUM CHLORIDE 0.9 % (FLUSH) 0.9 %
5-40 SYRINGE (ML) INJECTION PRN
Status: DISCONTINUED | OUTPATIENT
Start: 2025-02-04 | End: 2025-02-09 | Stop reason: HOSPADM

## 2025-02-04 RX ORDER — SODIUM CHLORIDE 9 MG/ML
INJECTION, SOLUTION INTRAVENOUS PRN
Status: DISCONTINUED | OUTPATIENT
Start: 2025-02-04 | End: 2025-02-09 | Stop reason: HOSPADM

## 2025-02-04 RX ORDER — ACETAMINOPHEN 650 MG/1
650 SUPPOSITORY RECTAL EVERY 6 HOURS PRN
Status: DISCONTINUED | OUTPATIENT
Start: 2025-02-04 | End: 2025-02-09 | Stop reason: HOSPADM

## 2025-02-04 RX ORDER — POLYETHYLENE GLYCOL 3350 17 G/17G
17 POWDER, FOR SOLUTION ORAL DAILY PRN
Status: DISCONTINUED | OUTPATIENT
Start: 2025-02-04 | End: 2025-02-09 | Stop reason: HOSPADM

## 2025-02-04 RX ORDER — FUROSEMIDE 10 MG/ML
40 INJECTION INTRAMUSCULAR; INTRAVENOUS 2 TIMES DAILY
Status: DISCONTINUED | OUTPATIENT
Start: 2025-02-04 | End: 2025-02-06

## 2025-02-04 RX ORDER — DEXTROSE MONOHYDRATE 100 MG/ML
INJECTION, SOLUTION INTRAVENOUS CONTINUOUS PRN
Status: DISCONTINUED | OUTPATIENT
Start: 2025-02-04 | End: 2025-02-09 | Stop reason: HOSPADM

## 2025-02-04 RX ORDER — SPIRONOLACTONE 25 MG/1
25 TABLET ORAL DAILY
Status: DISCONTINUED | OUTPATIENT
Start: 2025-02-04 | End: 2025-02-09 | Stop reason: HOSPADM

## 2025-02-04 RX ORDER — INSULIN GLARGINE 100 [IU]/ML
20 INJECTION, SOLUTION SUBCUTANEOUS NIGHTLY
Status: DISCONTINUED | OUTPATIENT
Start: 2025-02-04 | End: 2025-02-09 | Stop reason: HOSPADM

## 2025-02-04 RX ORDER — ENOXAPARIN SODIUM 100 MG/ML
30 INJECTION SUBCUTANEOUS 2 TIMES DAILY
Status: DISCONTINUED | OUTPATIENT
Start: 2025-02-04 | End: 2025-02-09 | Stop reason: HOSPADM

## 2025-02-04 RX ORDER — DOFETILIDE 0.25 MG/1
250 CAPSULE ORAL EVERY 12 HOURS SCHEDULED
Status: DISCONTINUED | OUTPATIENT
Start: 2025-02-04 | End: 2025-02-09 | Stop reason: HOSPADM

## 2025-02-04 RX ORDER — GLUCAGON 1 MG/ML
1 KIT INJECTION PRN
Status: DISCONTINUED | OUTPATIENT
Start: 2025-02-04 | End: 2025-02-09 | Stop reason: HOSPADM

## 2025-02-04 RX ORDER — ATORVASTATIN CALCIUM 40 MG/1
40 TABLET, FILM COATED ORAL NIGHTLY
Status: DISCONTINUED | OUTPATIENT
Start: 2025-02-04 | End: 2025-02-09 | Stop reason: HOSPADM

## 2025-02-04 RX ADMIN — INSULIN LISPRO 4 UNITS: 100 INJECTION, SOLUTION INTRAVENOUS; SUBCUTANEOUS at 20:16

## 2025-02-04 RX ADMIN — SPIRONOLACTONE 25 MG: 25 TABLET ORAL at 17:50

## 2025-02-04 RX ADMIN — ASPIRIN 325 MG: 325 TABLET ORAL at 17:50

## 2025-02-04 RX ADMIN — DOFETILIDE 250 MCG: 0.25 CAPSULE ORAL at 22:10

## 2025-02-04 RX ADMIN — INSULIN GLARGINE 20 UNITS: 100 INJECTION, SOLUTION SUBCUTANEOUS at 20:16

## 2025-02-04 RX ADMIN — ENOXAPARIN SODIUM 30 MG: 100 INJECTION SUBCUTANEOUS at 17:50

## 2025-02-04 RX ADMIN — PANTOPRAZOLE SODIUM 40 MG: 40 TABLET, DELAYED RELEASE ORAL at 17:50

## 2025-02-04 RX ADMIN — ATORVASTATIN CALCIUM 40 MG: 40 TABLET, FILM COATED ORAL at 20:17

## 2025-02-04 RX ADMIN — GABAPENTIN 600 MG: 300 CAPSULE ORAL at 17:50

## 2025-02-04 RX ADMIN — METOPROLOL TARTRATE 25 MG: 25 TABLET, FILM COATED ORAL at 17:50

## 2025-02-04 RX ADMIN — Medication 10 ML: at 20:18

## 2025-02-04 RX ADMIN — FUROSEMIDE 40 MG: 10 INJECTION, SOLUTION INTRAMUSCULAR; INTRAVENOUS at 17:50

## 2025-02-04 RX ADMIN — PRAMIPEXOLE DIHYDROCHLORIDE 0.75 MG: 0.25 TABLET ORAL at 20:17

## 2025-02-04 SDOH — ECONOMIC STABILITY: INCOME INSECURITY: IN THE LAST 12 MONTHS, WAS THERE A TIME WHEN YOU WERE NOT ABLE TO PAY THE MORTGAGE OR RENT ON TIME?: NO

## 2025-02-04 SDOH — ECONOMIC STABILITY: FOOD INSECURITY: WITHIN THE PAST 12 MONTHS, YOU WORRIED THAT YOUR FOOD WOULD RUN OUT BEFORE YOU GOT MONEY TO BUY MORE.: NEVER TRUE

## 2025-02-04 SDOH — ECONOMIC STABILITY: INCOME INSECURITY: HOW HARD IS IT FOR YOU TO PAY FOR THE VERY BASICS LIKE FOOD, HOUSING, MEDICAL CARE, AND HEATING?: NOT HARD AT ALL

## 2025-02-04 SDOH — ECONOMIC STABILITY: FOOD INSECURITY: WITHIN THE PAST 12 MONTHS, THE FOOD YOU BOUGHT JUST DIDN'T LAST AND YOU DIDN'T HAVE MONEY TO GET MORE.: NEVER TRUE

## 2025-02-04 ASSESSMENT — PAIN SCALES - GENERAL: PAINLEVEL_OUTOF10: 0

## 2025-02-04 NOTE — CARE COORDINATION
2/4/2025 11:36 AM  *  Patient Admitted                                                                                      RPM Note    London Swenson has been admitted on 02/04/2025 at [facility] due to chest pain . Patient is active in remote patient monitoring and has been paused at this time. LPN has contacted the care manager to advise.   Will continue to monitor and follow up as needed.                   French Patten LPN, PCC, Remote Patient Monitoring    PH: [phone]  Email: [email or web address]

## 2025-02-04 NOTE — ED PROVIDER NOTES
[facility] EMERGENCY DEPARTMENT  EMERGENCY DEPARTMENT ENCOUNTER        Pt Name: London Swenson  MRN: [id number]  Birthdate 1950  Date of evaluation: 2/4/2025  Provider: SANDY Alcantara - CNP  PCP: Constance Yancey PA  Note Started: 3:41 PM EST 2/4/25       I have seen and evaluated this patient with my supervising physician Dr. Owens      CHIEF COMPLAINT       Chief Complaint   Patient presents with    Chest Pain    Cold Symptoms    Nausea     Patient reports while sleeping overnight developed mid  CP hx of CHF. Pt also reports having flu-like symptoms and nausea since yesterday (cough, SOB, wheezes, malaise) recently admitted for CHF exacerbation.       HISTORY OF PRESENT ILLNESS: 1 or more Elements     History From: the patient  Limitations to history : None    London Swenson is a 74 y.o. male who presents to the ER today with complaints of chest pain, body aches, flulike symptoms as well as some nausea.  Patient with a history of CHF.  Patient was recently mid to the hospital for CHF exacerbation.  He is here for further evaluation.    Nursing Notes were all reviewed and agreed with or any disagreements were addressed in the HPI.    REVIEW OF SYSTEMS :      Review of Systems    Positives and Pertinent negatives as per HPI.     SURGICAL HISTORY     Past Surgical History:   Procedure Laterality Date    CARDIAC DEFIBRILLATOR PLACEMENT      CATARACT REMOVAL Bilateral 2013    CHOLECYSTECTOMY      COLONOSCOPY  10/26/2011    ENDOSCOPY, COLON, DIAGNOSTIC      EGD halo    EP DEVICE PROCEDURE N/A 7/15/2024    Watchman keaton closure device performed by Bayron Motnoya MD at [facility] CARDIAC CATH LAB    HYDROCELE EXCISION  11/15/2016    LARYNGOSCOPY N/A 10/25/2023    DRUG INDUCED SLEEP ENDOSCOPY performed by David Estes DO at [facility] OR    PANCREAS SURGERY      cyst opened up and drining into small bowel    TONSILLECTOMY      UPPER GASTROINTESTINAL ENDOSCOPY  10/04/2016    with biopsy    UPPER

## 2025-02-04 NOTE — H&P
Sensor (FREESTYLE JUSTIN 3 PLUS SENSOR) MISC CHANGE EVERY 14 DAYS 11/22/24   Constance Yancey PA   Continuous Glucose  (FREESTYLE JUSTIN 3 READER) OLI 1 Units by Does not apply route 4 times daily (after meals and at bedtime) 11/22/24   Constance Yancey PA   Continuous Glucose  (FREESTYLE JUSTIN 14 DAY READER) OLI 1 Units by Does not apply route as needed (as needed) 11/6/24   Constance Yancey PA   glucose monitoring kit 1 each by Does not apply route once for 1 dose 10/29/24 1/15/25  Kelsey Flores MD   Lancets MISC 1 each by Does not apply route daily 10/29/24   Kelsey Flores MD   Elastic Bandages & Supports (MEDICAL COMPRESSION STOCKINGS) MISC 1 each by Does not apply route daily 20 mmHg 7/23/24   Radhames De La Torre, APRN - CNP   acetaminophen (TYLENOL) 325 MG tablet Take 2 tablets by mouth every 4 hours as needed for Pain    Provider, MD Willa   Continuous Blood Gluc Sensor (FREESTYLE JUSTIN 14 DAY SENSOR) MISC 1 Units by Does not apply route every 14 days 2/14/24   Constance Yancey PA   Handicap Placard MISC by Does not apply route Exp: 5/1/2026 5/4/23   Constance Yancey PA   Lancets MISC 1 each by Does not apply route daily Check blood sugars 3x daily E11.9 2/7/23   Constance Yancey PA   Insulin Pen Needle 31G X 8 MM MISC 1 each by Does not apply route 4 times daily 7/19/22   Monique Henry PA   Insulin Syringe-Needle U-100 30G X 1/2\" 0.5 ML MISC Inject insulin 3 times daily 9/12/17   Provider, MD Willa       Labs: Personally reviewed and interpreted for clinical significance.   Recent Labs     02/04/25  1157   WBC 8.9   HGB 12.8*   HCT 38.8*   *     Recent Labs     02/04/25  1157      K 4.3      CO2 30   BUN 29*   CREATININE 1.1   CALCIUM 8.7     Recent Labs     02/04/25  1157   PROBNP 72   TROPHS 17     No results for input(s): \"LABA1C\" in the last 72 hours.  Recent Labs     02/04/25  1157   AST 22   ALT 24   BILITOT 0.5   ALKPHOS 90     No results for input(s):

## 2025-02-04 NOTE — ED NOTES
London Swenson is a 74 y.o. male admitted for  Principal Problem:    Intractable vomiting with nausea  Resolved Problems:    * No resolved hospital problems. *  .   Patient Home via EMS transportation with   Chief Complaint   Patient presents with    Chest Pain    Cold Symptoms    Nausea     Patient reports while sleeping overnight developed mid  CP hx of CHF. Pt also reports having flu-like symptoms and nausea since yesterday (cough, SOB, wheezes, malaise) recently admitted for CHF exacerbation.   .  Patient is alert and Person, Place, Time, and Situation  Patient's baseline mobility: Baseline Mobility: Independent   Code Status: Full Code   Cardiac Rhythm:    O2 Flow Rate (L/min): 4.5 L/min  Is patient on baseline Oxygen: no how many Liters  Abnormal Assessment Findings: on 4.5 L    Isolation: None      NIH Score:    C-SSRS: Risk of Suicide: No Risk  Bedside swallow:        Active LDA's:          Family/Caregiver Present yes Any Concerns: no   Restraints no  Sitter no         Vitals: MEWS Score: 2    Vitals:    02/04/25 1129 02/04/25 1157 02/04/25 1408 02/04/25 1551   BP: 101/69 111/62 115/66 114/65   Pulse: (!) 110 98 98 87   Resp: 18 16 16 18   Temp: 100.3 °F (37.9 °C)      TempSrc: Oral      SpO2: (!) 86% 95% 94% 96%   Weight: 127 kg (280 lb)      Height: 1.778 m (5' 10\")          Last documented pain score (0-10 scale)    Pain medication administered No.    Pertinent or High Risk Medications/Drips: No.    Pending Blood Product Administration: no    Abnormal labs:   Abnormal Labs Reviewed   CBC WITH AUTO DIFFERENTIAL - Abnormal; Notable for the following components:       Result Value    Hemoglobin 12.8 (*)     Hematocrit 38.8 (*)     Platelets 131 (*)     Neutrophils Absolute 8.3 (*)     Lymphocytes Absolute 0.1 (*)     All other components within normal limits   COMPREHENSIVE METABOLIC PANEL - Abnormal; Notable for the following components:    Glucose 164 (*)     BUN 29 (*)     Total Protein 6.1 (*)     All

## 2025-02-04 NOTE — ED PROVIDER NOTES
I independently performed a history and physical on London Swenson.     I have discussed the case with the KARISHMA/resident at 1400 and approve / take responsibility for the initial management plan and anticipated disposition as documented below.     In summary the patient presents with hypoxic respiratory failure with symptoms of possible viral URI complicating his baseline CHF.  On my assessment the patient is oxygen adequate now on oxygen.  He does not wear oxygen at home at baseline.  His breath sounds are otherwise clear abdomen is soft nontender nondistended extremities are warm and well-perfused.  He has 4+ edema in bilateral lower extremities.  While his viral screen is negative I do suspect he is suffering a viral URI likely influenza A contributing to his hypoxic respite failure.  Notably despite his peripheral edema his BNP is benign.  Remainder of workup has been generally noncontributory.  Given his hypoxic respite failure he will be admitted for further characterization and management in stable condition.      I interpreted the following studies:    ECG: Sinus tachycardia at a rate of 113 without ectopy.  Rightward axis normal intervals.  No evidence of acute ischemia.  No significant changes from January 23, 2025 at that time the patient had a first-degree AV block    I personally discussed the patient's management with the following:  moriah         For further details of London Swenson's emergency department encounter, please see the KARISHMA/resident's documentation. Please note the signature time recorded here indicates the limit of my supervision of this case and should the patient require further management prior to disposition I have signed the case out to my colleague Ramiro Delgado MD  02/04/25 7701

## 2025-02-04 NOTE — TELEPHONE ENCOUNTER
Refill Request     CONFIRM preferred pharmacy with the patient.    If Mail Order Rx - Pend for 90 day refill.      Last Seen: Last Seen Department: 12/2/2024  Last Seen by PCP: Visit date not found    Last Written: 12/30/2024 60 tab 0 refills     If no future appointment scheduled:  Review the last OV with PCP and review information for follow-up visit,  Route STAFF MESSAGE with patient name to the  Pool for scheduling with the following information:            -  Timing of next visit           -  Visit type ie Physical, OV, etc           -  Diagnoses/Reason ie. COPD, HTN - Do not use MEDICATION, Follow-up or CHECK UP - Give reason for visit      Next Appointment:   Future Appointments   Date Time Provider Department Center   2/5/2025 11:30 AM Constance Yancey PA [facility]   2/5/2025  2:20 PM Kelsey Flores MD AND [facility]   3/3/2025  1:00 PM [facility]   8/13/2025 10:30 AM SCHEDULE, ALETHEA DEVICE CHECK Alethea Car [facility]   8/13/2025 10:30 AM Ericka Valdez, SANDY - CNP [facility]       Message sent to  to schedule appt with patient?  NO      Requested Prescriptions     Pending Prescriptions Disp Refills    gabapentin (NEURONTIN) 600 MG tablet [Pharmacy Med Name: Gabapentin 600 MG Oral Tablet] 60 tablet 0     Sig: Take 1 tablet by mouth 2 times daily.

## 2025-02-04 NOTE — PLAN OF CARE
Problem: Chronic Conditions and Co-morbidities  Goal: Patient's chronic conditions and co-morbidity symptoms are monitored and maintained or improved  Outcome: Progressing  Flowsheets (Taken 2/4/2025 1711)  Care Plan - Patient's Chronic Conditions and Co-Morbidity Symptoms are Monitored and Maintained or Improved: Monitor and assess patient's chronic conditions and comorbid symptoms for stability, deterioration, or improvement     Problem: Discharge Planning  Goal: Discharge to home or other facility with appropriate resources  Outcome: Progressing  Flowsheets (Taken 2/4/2025 1711)  Discharge to home or other facility with appropriate resources: Refer to discharge planning if patient needs post-hospital services based on physician order or complex needs related to functional status, cognitive ability or social support system     Problem: Pain  Goal: Verbalizes/displays adequate comfort level or baseline comfort level  Outcome: Progressing     Problem: Safety - Adult  Goal: Free from fall injury  Outcome: Progressing

## 2025-02-05 ENCOUNTER — CARE COORDINATION (OUTPATIENT)
Dept: CASE MANAGEMENT | Age: 75
End: 2025-02-05

## 2025-02-05 LAB
ALBUMIN SERPL-MCNC: 3.7 G/DL (ref 3.4–5)
ALP SERPL-CCNC: 73 U/L (ref 40–129)
ALT SERPL-CCNC: 23 U/L (ref 10–40)
ANION GAP SERPL CALCULATED.3IONS-SCNC: 10 MMOL/L (ref 3–16)
AST SERPL-CCNC: 22 U/L (ref 15–37)
BASE EXCESS BLDA CALC-SCNC: 4.3 MMOL/L (ref -3–3)
BASOPHILS # BLD: 0 K/UL (ref 0–0.2)
BASOPHILS NFR BLD: 0.1 %
BILIRUB DIRECT SERPL-MCNC: 0.3 MG/DL (ref 0–0.3)
BILIRUB INDIRECT SERPL-MCNC: 0.3 MG/DL (ref 0–1)
BILIRUB SERPL-MCNC: 0.6 MG/DL (ref 0–1)
BUN SERPL-MCNC: 35 MG/DL (ref 7–20)
C DIFF TOX A+B STL QL IA: NORMAL
CALCIUM SERPL-MCNC: 7.9 MG/DL (ref 8.3–10.6)
CHLORIDE SERPL-SCNC: 99 MMOL/L (ref 99–110)
CO2 BLDA-SCNC: 31.9 MMOL/L
CO2 SERPL-SCNC: 30 MMOL/L (ref 21–32)
COHGB MFR BLDA: 0.4 % (ref 0–1.5)
CREAT SERPL-MCNC: 1.3 MG/DL (ref 0.8–1.3)
DEPRECATED RDW RBC AUTO: 14.9 % (ref 12.4–15.4)
EOSINOPHIL # BLD: 0.1 K/UL (ref 0–0.6)
EOSINOPHIL NFR BLD: 1.3 %
EST. AVERAGE GLUCOSE BLD GHB EST-MCNC: 220.2 MG/DL
FERRITIN SERPL IA-MCNC: 38.6 NG/ML (ref 30–400)
FERRITIN SERPL IA-MCNC: 49.2 NG/ML (ref 30–400)
GFR SERPLBLD CREATININE-BSD FMLA CKD-EPI: 57 ML/MIN/{1.73_M2}
GLUCOSE BLD-MCNC: 131 MG/DL (ref 70–99)
GLUCOSE BLD-MCNC: 200 MG/DL (ref 70–99)
GLUCOSE BLD-MCNC: 209 MG/DL (ref 70–99)
GLUCOSE BLD-MCNC: 260 MG/DL (ref 70–99)
GLUCOSE SERPL-MCNC: 272 MG/DL (ref 70–99)
HBA1C MFR BLD: 9.3 %
HCO3 BLDA-SCNC: 30.3 MMOL/L (ref 21–29)
HCT VFR BLD AUTO: 36.8 % (ref 40.5–52.5)
HGB BLD-MCNC: 11.9 G/DL (ref 13.5–17.5)
HGB BLDA-MCNC: 12.6 G/DL (ref 13.5–17.5)
IRON SATN MFR SERPL: 5 % (ref 20–50)
IRON SERPL-MCNC: 17 UG/DL (ref 59–158)
LYMPHOCYTES # BLD: 0.3 K/UL (ref 1–5.1)
LYMPHOCYTES NFR BLD: 5.5 %
MAGNESIUM SERPL-MCNC: 1.51 MG/DL (ref 1.8–2.4)
MCH RBC QN AUTO: 28.9 PG (ref 26–34)
MCHC RBC AUTO-ENTMCNC: 32.4 G/DL (ref 31–36)
MCV RBC AUTO: 89.2 FL (ref 80–100)
METHGB MFR BLDA: 0.3 %
MONOCYTES # BLD: 0.4 K/UL (ref 0–1.3)
MONOCYTES NFR BLD: 8.2 %
NEUTROPHILS # BLD: 4.4 K/UL (ref 1.7–7.7)
NEUTROPHILS NFR BLD: 84.9 %
O2 THERAPY: ABNORMAL
PCO2 BLDA: 51.4 MMHG (ref 35–45)
PERFORMED ON: ABNORMAL
PH BLDA: 7.39 [PH] (ref 7.35–7.45)
PLATELET # BLD AUTO: 127 K/UL (ref 135–450)
PMV BLD AUTO: 9.9 FL (ref 5–10.5)
PO2 BLDA: 90.8 MMHG (ref 75–108)
POTASSIUM SERPL-SCNC: 4.1 MMOL/L (ref 3.5–5.1)
PROT SERPL-MCNC: 5.9 G/DL (ref 6.4–8.2)
RBC # BLD AUTO: 4.13 M/UL (ref 4.2–5.9)
SAO2 % BLDA: 94.2 %
SODIUM SERPL-SCNC: 139 MMOL/L (ref 136–145)
TIBC SERPL-MCNC: 324 UG/DL (ref 260–445)
WBC # BLD AUTO: 5.2 K/UL (ref 4–11)

## 2025-02-05 PROCEDURE — 36415 COLL VENOUS BLD VENIPUNCTURE: CPT

## 2025-02-05 PROCEDURE — 87449 NOS EACH ORGANISM AG IA: CPT

## 2025-02-05 PROCEDURE — 80048 BASIC METABOLIC PNL TOTAL CA: CPT

## 2025-02-05 PROCEDURE — 83735 ASSAY OF MAGNESIUM: CPT

## 2025-02-05 PROCEDURE — 36600 WITHDRAWAL OF ARTERIAL BLOOD: CPT

## 2025-02-05 PROCEDURE — 80076 HEPATIC FUNCTION PANEL: CPT

## 2025-02-05 PROCEDURE — 6360000002 HC RX W HCPCS: Performed by: INTERNAL MEDICINE

## 2025-02-05 PROCEDURE — 6370000000 HC RX 637 (ALT 250 FOR IP)

## 2025-02-05 PROCEDURE — 6360000002 HC RX W HCPCS

## 2025-02-05 PROCEDURE — 83036 HEMOGLOBIN GLYCOSYLATED A1C: CPT

## 2025-02-05 PROCEDURE — 85025 COMPLETE CBC W/AUTO DIFF WBC: CPT

## 2025-02-05 PROCEDURE — 82803 BLOOD GASES ANY COMBINATION: CPT

## 2025-02-05 PROCEDURE — 2500000003 HC RX 250 WO HCPCS

## 2025-02-05 PROCEDURE — 99222 1ST HOSP IP/OBS MODERATE 55: CPT | Performed by: INTERNAL MEDICINE

## 2025-02-05 PROCEDURE — 87324 CLOSTRIDIUM AG IA: CPT

## 2025-02-05 PROCEDURE — 1200000000 HC SEMI PRIVATE

## 2025-02-05 RX ORDER — GABAPENTIN 600 MG/1
600 TABLET ORAL 2 TIMES DAILY
Qty: 60 TABLET | Refills: 0 | Status: SHIPPED | OUTPATIENT
Start: 2025-02-05 | End: 2025-03-07

## 2025-02-05 RX ORDER — MAGNESIUM SULFATE IN WATER 40 MG/ML
2000 INJECTION, SOLUTION INTRAVENOUS ONCE
Status: COMPLETED | OUTPATIENT
Start: 2025-02-05 | End: 2025-02-05

## 2025-02-05 RX ADMIN — INSULIN LISPRO 4 UNITS: 100 INJECTION, SOLUTION INTRAVENOUS; SUBCUTANEOUS at 17:32

## 2025-02-05 RX ADMIN — PANTOPRAZOLE SODIUM 40 MG: 40 TABLET, DELAYED RELEASE ORAL at 05:14

## 2025-02-05 RX ADMIN — MAGNESIUM SULFATE HEPTAHYDRATE 2000 MG: 40 INJECTION, SOLUTION INTRAVENOUS at 18:01

## 2025-02-05 RX ADMIN — SPIRONOLACTONE 25 MG: 25 TABLET ORAL at 08:30

## 2025-02-05 RX ADMIN — FUROSEMIDE 40 MG: 10 INJECTION, SOLUTION INTRAMUSCULAR; INTRAVENOUS at 17:32

## 2025-02-05 RX ADMIN — INSULIN LISPRO 8 UNITS: 100 INJECTION, SOLUTION INTRAVENOUS; SUBCUTANEOUS at 08:30

## 2025-02-05 RX ADMIN — PRAMIPEXOLE DIHYDROCHLORIDE 0.75 MG: 0.25 TABLET ORAL at 20:38

## 2025-02-05 RX ADMIN — PANTOPRAZOLE SODIUM 40 MG: 40 TABLET, DELAYED RELEASE ORAL at 14:23

## 2025-02-05 RX ADMIN — INSULIN GLARGINE 20 UNITS: 100 INJECTION, SOLUTION SUBCUTANEOUS at 20:37

## 2025-02-05 RX ADMIN — GABAPENTIN 600 MG: 300 CAPSULE ORAL at 08:30

## 2025-02-05 RX ADMIN — PROCHLORPERAZINE EDISYLATE 10 MG: 5 INJECTION INTRAMUSCULAR; INTRAVENOUS at 14:31

## 2025-02-05 RX ADMIN — DOFETILIDE 250 MCG: 0.25 CAPSULE ORAL at 08:39

## 2025-02-05 RX ADMIN — INSULIN LISPRO 4 UNITS: 100 INJECTION, SOLUTION INTRAVENOUS; SUBCUTANEOUS at 14:23

## 2025-02-05 RX ADMIN — DOFETILIDE 250 MCG: 0.25 CAPSULE ORAL at 20:43

## 2025-02-05 RX ADMIN — ASPIRIN 325 MG: 325 TABLET ORAL at 08:30

## 2025-02-05 RX ADMIN — METOPROLOL TARTRATE 25 MG: 25 TABLET, FILM COATED ORAL at 08:30

## 2025-02-05 RX ADMIN — METOPROLOL TARTRATE 25 MG: 25 TABLET, FILM COATED ORAL at 20:38

## 2025-02-05 RX ADMIN — FUROSEMIDE 40 MG: 10 INJECTION, SOLUTION INTRAMUSCULAR; INTRAVENOUS at 08:29

## 2025-02-05 RX ADMIN — ENOXAPARIN SODIUM 30 MG: 100 INJECTION SUBCUTANEOUS at 20:37

## 2025-02-05 RX ADMIN — ATORVASTATIN CALCIUM 40 MG: 40 TABLET, FILM COATED ORAL at 20:38

## 2025-02-05 RX ADMIN — ENOXAPARIN SODIUM 30 MG: 100 INJECTION SUBCUTANEOUS at 08:29

## 2025-02-05 RX ADMIN — Medication 10 ML: at 20:40

## 2025-02-05 RX ADMIN — GABAPENTIN 600 MG: 300 CAPSULE ORAL at 20:38

## 2025-02-05 ASSESSMENT — PAIN SCALES - GENERAL: PAINLEVEL_OUTOF10: 0

## 2025-02-05 NOTE — PLAN OF CARE
Problem: Chronic Conditions and Co-morbidities  Goal: Patient's chronic conditions and co-morbidity symptoms are monitored and maintained or improved  2/4/2025 2141 by Constance Simon RN  Outcome: Progressing  Flowsheets (Taken 2/4/2025 2016)  Care Plan - Patient's Chronic Conditions and Co-Morbidity Symptoms are Monitored and Maintained or Improved: Monitor and assess patient's chronic conditions and comorbid symptoms for stability, deterioration, or improvement  2/4/2025 1843 by Berta Yost RN  Outcome: Progressing  Flowsheets (Taken 2/4/2025 1711)  Care Plan - Patient's Chronic Conditions and Co-Morbidity Symptoms are Monitored and Maintained or Improved: Monitor and assess patient's chronic conditions and comorbid symptoms for stability, deterioration, or improvement     Problem: Discharge Planning  Goal: Discharge to home or other facility with appropriate resources  2/4/2025 2141 by Constance Simon RN  Outcome: Progressing  Flowsheets (Taken 2/4/2025 2016)  Discharge to home or other facility with appropriate resources: Identify barriers to discharge with patient and caregiver  2/4/2025 1843 by Berta Yost RN  Outcome: Progressing  Flowsheets (Taken 2/4/2025 1711)  Discharge to home or other facility with appropriate resources: Refer to discharge planning if patient needs post-hospital services based on physician order or complex needs related to functional status, cognitive ability or social support system

## 2025-02-05 NOTE — CARE COORDINATION
Case Management Assessment  Initial Evaluation    Date/Time of Evaluation: 2/5/2025 1:26 PM  Assessment Completed by: TOI Quispe    If patient is discharged prior to next notation, then this note serves as note for discharge by case management.    Patient Name: London Swenson                   YOB: 1950  Diagnosis: Acute respiratory failure with hypoxia [J96.01]  Intractable vomiting with nausea [R11.2]                   Date / Time: 2/4/2025 11:22 AM    Patient Admission Status: Inpatient   Readmission Risk (Low < 19, Mod (19-27), High > 27): Readmission Risk Score: 29.4    Current PCP: Constance Yancey PA  PCP verified by CM? (P) Yes    Chart Reviewed: Yes      History Provided by: (P) Patient  Patient Orientation: Alert and Oriented    Patient Cognition: Alert    Hospitalization in the last 30 days (Readmission):  Yes    If yes, Readmission Assessment in CM Navigator will be completed.    Advance Directives:      Code Status: Full Code   Patient's Primary Decision Maker is: (P) Named in Scanned ACP Document    Primary Decision Maker (Active): Rosy Swenson - Spouse - [phone]    Secondary Decision Maker: Arlene Hurt - Other Relative - [phone]    Discharge Planning:    Patient lives with: Spouse/Significant Other Type of Home: House  Primary Care Giver: (P) Self  Patient Support Systems include: Spouse/Significant Other   Current Financial resources: (P) None  Current community resources: (P) ECF/Home Care  Current services prior to admission: (P) Durable Medical Equipment, Home Care ([facility])            Current DME: (P) [employer]            Type of Home Care services:  (P) Nursing Services, OT, PT    ADLS  Prior functional level: (P) Independent in ADLs/IADLs  Current functional level: (P) Independent in ADLs/IADLs    PT AM-PAC:   /24  OT AM-PAC:   /24    Family can provide assistance at DC: (P) No  Would you like Case Management to discuss the discharge plan with

## 2025-02-05 NOTE — DISCHARGE INSTRUCTIONS
LAB WORK IN 4 days: bmp AND MAGNESIUM LEVELS (nONFASTING LABS)              Heart Failure Resources:  Heart Failure Interactive Workbook:  Go to [email or web address] for a Free Heart Failure Interactive Workbook provided by The American [employer] Association. This interactive workbook will provide information on Healthier Living with Heart Failure. Please copy and paste link into search bar. Use your mouse to scroll through the pages.    HF [place] marilou:   Heart Failure Free smart phone marilou available for iPhone and Android download. Use your phone to track sodium intake, fluid intake, symptoms, and weight.     Low Sodium Diet / Recipes:  Go to [email or web address].BrandYourself website for “renal” diet which is Low Sodium! [employer] is a dialysis company, but this website offers free seasonal cookbooks. Each quarter, they will release 25-30 new recipes with a breakdown of calories, sodium, and glucose. You can also go to [email or web address]/recipes website for free recipes.     Home Exercise Program:   Identification of Green/Yellow/Red zones:  You should be able to identify when you feel good (green zone), if you have 1-2 symptoms of HF (yellow zone), or if you are in need of medical attention (red zone).  In your CHF education folder you were provided a “stop light tool” to outline this information.     We want to you to rate your exertion levels:    Our therapy team has discussed means of identification with you such as the \"Kenan scale.\"  The Kenan rating scale ranges from 6 to 20, where 6 means \"no exertion at all\" and 20 means \"maximal exertion.\" The goal is to use this to gauge how much effort it is taking for you to do your normal daily tasks.   You should be able to recognize when too much exertion is being expended.    Elements of Energy Conservation:   Prioritize/Plan: Decide what needs to be done today, and what can wait for a later date, write to do lists, plan ahead to avoid extra trips, and gather

## 2025-02-05 NOTE — CONSULTS
investigate for noncardiac causes for his symptoms of chest pain shortness of breath.  No beta-blocker due to bradycardia.  No further inpatient cardiac work-up or treatment is planned, will sign off, please call with questions        Coronary angiogram 3/25/2019:  Unstable angina  PROCEDURES PERFORMED    Left heart catheterization  LVgram  Coronary angiogam  Coronary cath  PROCEDURE DESCRIPTION   Risks/benefits/alternatives/outcomes were discussed with patient and/or family and informed consent was obtained.  Using the yara test, the patient's right radial artery was found to be acceptable for cannulation.  Patient was prepped draped in the usual sterile fashion.  Local anaesthetic was applied over puncture site.  Using a back wall technique, a 6 Cambodian Terumo sheath was inserted into right radial artery.  Verapamil, nitroglycerin were administered through the sheath.  Heparin was administered.  Diagnostic 5fr pigtail, TIG catheters were used for diagnostic angiograms.  At the conclusion of the procedure, a TR band was placed over the puncture site and hemostasis was obtained.  There were no immediate complications. I supervised sedation with versed 1mg/fentanyl 50mcg during the procedure. 70cc contrast was utilized. <20cc EBL.  LVEDP 4   GRADIENT ACROSS AORTIC VALVE No gradient   LV FUNCTION EF 65%   WALL MOTION Normal   MITRAL REGURGITATION Mild      LM <10% stenosis.           LAD Prox <10% stenosis.  Mid 30-40% stenosis.  Distal 30-40% stenosis.  LAD wraps around apex.  D1 has ostial 80% stenosis.          LCX 10% prox-mid stenosis. OM1 bifurcates in prox segment and there is prox-mid OM1 40-50% stenosis.          RI Mid 60% stenosis.          RCA Dominant.  Slight anterior takeoff, Prox-mid 20-30% stenoses. Distal 20% stenosis. Tortuous vessel.    Moderate cad/ashd in major epicardial vessels  Would treat medically  Add imdur 30mg daily, and metoprolol tartrate 25mg bid     Additional Studies:   CXR

## 2025-02-05 NOTE — ACP (ADVANCE CARE PLANNING)
Advance Care Planning     General Advance Care Planning (ACP) Conversation    Date of Conversation: 2/5/2025  Conducted with: Patient with Decision Making Capacity  Other persons present: None    Healthcare Decision Maker:   Primary Decision Maker (Active): Rosy Swenson - Spouse - [phone]    Secondary Decision Maker: Arlene Hurt - Other Relative - [phone]     Today we documented Decision Maker(s) consistent with ACP documents on file.  Content/Action Overview:  Has ACP document(s) on file - reflects the patient's care preferences          Length of Voluntary ACP Conversation in minutes:  <16 minutes (Non-Billable)    TOI Quispe

## 2025-02-06 PROBLEM — I50.33 ACUTE ON CHRONIC HEART FAILURE WITH PRESERVED EJECTION FRACTION (HFPEF) (HCC): Status: ACTIVE | Noted: 2025-02-06

## 2025-02-06 PROBLEM — I49.5 SSS (SICK SINUS SYNDROME) (HCC): Status: ACTIVE | Noted: 2025-02-06

## 2025-02-06 PROBLEM — R07.9 CHEST PAIN: Status: ACTIVE | Noted: 2025-02-06

## 2025-02-06 LAB
ANION GAP SERPL CALCULATED.3IONS-SCNC: 10 MMOL/L (ref 3–16)
BUN SERPL-MCNC: 38 MG/DL (ref 7–20)
CALCIUM SERPL-MCNC: 8.4 MG/DL (ref 8.3–10.6)
CHLORIDE SERPL-SCNC: 98 MMOL/L (ref 99–110)
CO2 SERPL-SCNC: 29 MMOL/L (ref 21–32)
CREAT SERPL-MCNC: 1.3 MG/DL (ref 0.8–1.3)
GFR SERPLBLD CREATININE-BSD FMLA CKD-EPI: 57 ML/MIN/{1.73_M2}
GLUCOSE BLD-MCNC: 184 MG/DL (ref 70–99)
GLUCOSE BLD-MCNC: 198 MG/DL (ref 70–99)
GLUCOSE BLD-MCNC: 219 MG/DL (ref 70–99)
GLUCOSE BLD-MCNC: 228 MG/DL (ref 70–99)
GLUCOSE SERPL-MCNC: 219 MG/DL (ref 70–99)
MAGNESIUM SERPL-MCNC: 2.79 MG/DL (ref 1.8–2.4)
NT-PROBNP SERPL-MCNC: 129 PG/ML (ref 0–449)
PERFORMED ON: ABNORMAL
POTASSIUM SERPL-SCNC: 3.8 MMOL/L (ref 3.5–5.1)
SODIUM SERPL-SCNC: 137 MMOL/L (ref 136–145)

## 2025-02-06 PROCEDURE — 83735 ASSAY OF MAGNESIUM: CPT

## 2025-02-06 PROCEDURE — 6370000000 HC RX 637 (ALT 250 FOR IP)

## 2025-02-06 PROCEDURE — 80048 BASIC METABOLIC PNL TOTAL CA: CPT

## 2025-02-06 PROCEDURE — 6370000000 HC RX 637 (ALT 250 FOR IP): Performed by: INTERNAL MEDICINE

## 2025-02-06 PROCEDURE — 99232 SBSQ HOSP IP/OBS MODERATE 35: CPT | Performed by: NURSE PRACTITIONER

## 2025-02-06 PROCEDURE — 2500000003 HC RX 250 WO HCPCS

## 2025-02-06 PROCEDURE — 83880 ASSAY OF NATRIURETIC PEPTIDE: CPT

## 2025-02-06 PROCEDURE — 6360000002 HC RX W HCPCS

## 2025-02-06 PROCEDURE — 36415 COLL VENOUS BLD VENIPUNCTURE: CPT

## 2025-02-06 PROCEDURE — 1200000000 HC SEMI PRIVATE

## 2025-02-06 PROCEDURE — 6360000002 HC RX W HCPCS: Performed by: NURSE PRACTITIONER

## 2025-02-06 RX ORDER — FUROSEMIDE 10 MG/ML
60 INJECTION INTRAMUSCULAR; INTRAVENOUS 3 TIMES DAILY
Status: DISPENSED | OUTPATIENT
Start: 2025-02-06 | End: 2025-02-07

## 2025-02-06 RX ORDER — LOPERAMIDE HYDROCHLORIDE 2 MG/1
2 CAPSULE ORAL 4 TIMES DAILY PRN
Status: DISCONTINUED | OUTPATIENT
Start: 2025-02-06 | End: 2025-02-09 | Stop reason: HOSPADM

## 2025-02-06 RX ADMIN — FUROSEMIDE 60 MG: 10 INJECTION, SOLUTION INTRAMUSCULAR; INTRAVENOUS at 20:24

## 2025-02-06 RX ADMIN — PANTOPRAZOLE SODIUM 40 MG: 40 TABLET, DELAYED RELEASE ORAL at 06:45

## 2025-02-06 RX ADMIN — DOFETILIDE 250 MCG: 0.25 CAPSULE ORAL at 20:32

## 2025-02-06 RX ADMIN — PANTOPRAZOLE SODIUM 40 MG: 40 TABLET, DELAYED RELEASE ORAL at 14:58

## 2025-02-06 RX ADMIN — FUROSEMIDE 40 MG: 10 INJECTION, SOLUTION INTRAMUSCULAR; INTRAVENOUS at 08:51

## 2025-02-06 RX ADMIN — ACETAMINOPHEN 650 MG: 325 TABLET ORAL at 10:12

## 2025-02-06 RX ADMIN — ATORVASTATIN CALCIUM 40 MG: 40 TABLET, FILM COATED ORAL at 20:23

## 2025-02-06 RX ADMIN — Medication 10 ML: at 08:52

## 2025-02-06 RX ADMIN — LOPERAMIDE HYDROCHLORIDE 2 MG: 2 CAPSULE ORAL at 14:58

## 2025-02-06 RX ADMIN — GABAPENTIN 600 MG: 300 CAPSULE ORAL at 08:52

## 2025-02-06 RX ADMIN — METOPROLOL TARTRATE 25 MG: 25 TABLET, FILM COATED ORAL at 20:23

## 2025-02-06 RX ADMIN — INSULIN LISPRO 4 UNITS: 100 INJECTION, SOLUTION INTRAVENOUS; SUBCUTANEOUS at 08:52

## 2025-02-06 RX ADMIN — ENOXAPARIN SODIUM 30 MG: 100 INJECTION SUBCUTANEOUS at 20:23

## 2025-02-06 RX ADMIN — INSULIN GLARGINE 20 UNITS: 100 INJECTION, SOLUTION SUBCUTANEOUS at 20:24

## 2025-02-06 RX ADMIN — INSULIN LISPRO 4 UNITS: 100 INJECTION, SOLUTION INTRAVENOUS; SUBCUTANEOUS at 20:25

## 2025-02-06 RX ADMIN — METOPROLOL TARTRATE 25 MG: 25 TABLET, FILM COATED ORAL at 08:52

## 2025-02-06 RX ADMIN — INSULIN LISPRO 4 UNITS: 100 INJECTION, SOLUTION INTRAVENOUS; SUBCUTANEOUS at 12:13

## 2025-02-06 RX ADMIN — GABAPENTIN 600 MG: 300 CAPSULE ORAL at 20:23

## 2025-02-06 RX ADMIN — LOPERAMIDE HYDROCHLORIDE 2 MG: 2 CAPSULE ORAL at 20:23

## 2025-02-06 RX ADMIN — DOFETILIDE 250 MCG: 0.25 CAPSULE ORAL at 08:59

## 2025-02-06 RX ADMIN — ASPIRIN 325 MG: 325 TABLET ORAL at 08:52

## 2025-02-06 RX ADMIN — SPIRONOLACTONE 25 MG: 25 TABLET ORAL at 08:52

## 2025-02-06 RX ADMIN — ENOXAPARIN SODIUM 30 MG: 100 INJECTION SUBCUTANEOUS at 08:52

## 2025-02-06 RX ADMIN — PRAMIPEXOLE DIHYDROCHLORIDE 0.75 MG: 0.25 TABLET ORAL at 20:23

## 2025-02-06 ASSESSMENT — PAIN SCALES - GENERAL
PAINLEVEL_OUTOF10: 0
PAINLEVEL_OUTOF10: 0
PAINLEVEL_OUTOF10: 3
PAINLEVEL_OUTOF10: 0

## 2025-02-06 ASSESSMENT — PAIN - FUNCTIONAL ASSESSMENT: PAIN_FUNCTIONAL_ASSESSMENT: ACTIVITIES ARE NOT PREVENTED

## 2025-02-06 ASSESSMENT — PAIN DESCRIPTION - LOCATION: LOCATION: HEAD

## 2025-02-06 ASSESSMENT — PAIN DESCRIPTION - DESCRIPTORS: DESCRIPTORS: ACHING

## 2025-02-06 NOTE — PLAN OF CARE
Patient's EF (Ejection Fraction) is greater than 40%    Patient has a past medical history of A-fib ([facility]), Acid reflux, Acute on chronic heart failure, unspecified heart failure type ([facility]), Yan's esophagus, Coronary artery disease of native heart with stable angina pectoris ([facility]), Dementia ([facility]), Diabetes mellitus ([facility]), Diabetic autonomic neuropathy associated with type 2 diabetes mellitus ([facility]), Hyperlipidemia, Hypertension, Intractable vomiting with nausea, Pancreatitis, Restless legs, Sleep apnea, and Tremor. Comorbidities reviewed and education provided.    Patient and family's stated goal of care: reduce shortness of breath prior to discharge    Patient's current functional capacity:  Marked limitation of physical activity. Comfortable at rest. Less than ordinary activity causes fatigue, palpitation, or dyspnea.    Pt resting in bed at this time on 2L O2. Pt with complaints of shortness of breath. Pt with nonpitting lower extremity edema. Patient's last three weights and intake/output reviewed:    Patient Vitals for the past 96 hrs (Last 3 readings):   Weight   02/05/25 0258 126.4 kg (278 lb 9.6 oz)   02/04/25 1700 125.9 kg (277 lb 8 oz)   02/04/25 1129 127 kg (280 lb)       Intake/Output Summary (Last 24 hours) at 2/5/2025 1920  Last data filed at 2/5/2025 0304  Gross per 24 hour   Intake 250 ml   Output 775 ml   Net -525 ml     Patient provided with education on CHF signs/symptoms, causes, discharge medications, daily weights, low sodium diet, activity, and follow-up. Notified patient to call the doctor post discharge if patient experiences shortness of breath, chest pain, swelling, cough, or weight gain of three pounds in a day/five pounds in a week. Also notified patient to call the doctor with dizziness, increased fatigue, decreased or difficulty urinating.     Pt demonstrates understanding. No additional questions at this time.    Education Time: 30 Minutes

## 2025-02-06 NOTE — CARE COORDINATION
IPTA - plans dc home with wife. Will need resumption of service with [facility].  Scheduled for Stess test 2/7, then likely dc home.    Following.    Arlet Motta RN

## 2025-02-06 NOTE — PLAN OF CARE
Problem: Chronic Conditions and Co-morbidities  Goal: Patient's chronic conditions and co-morbidity symptoms are monitored and maintained or improved  2/6/2025 1632 by Nicole Garcia, RN  Outcome: Progressing  Flowsheets (Taken 2/6/2025 1629)  Care Plan - Patient's Chronic Conditions and Co-Morbidity Symptoms are Monitored and Maintained or Improved:   Monitor and assess patient's chronic conditions and comorbid symptoms for stability, deterioration, or improvement   Collaborate with multidisciplinary team to address chronic and comorbid conditions and prevent exacerbation or deterioration   Update acute care plan with appropriate goals if chronic or comorbid symptoms are exacerbated and prevent overall improvement and discharge  2/6/2025 0459 by Jayne Keene, RN  Outcome: Progressing     Problem: Discharge Planning  Goal: Discharge to home or other facility with appropriate resources  2/6/2025 1632 by Nicole Garcia, RN  Outcome: Progressing  Flowsheets (Taken 2/6/2025 1629)  Discharge to home or other facility with appropriate resources:   Arrange for needed discharge resources and transportation as appropriate   Identify barriers to discharge with patient and caregiver   Identify discharge learning needs (meds, wound care, etc)  2/6/2025 0459 by Jayne Keene, RN  Outcome: Progressing     Problem: Pain  Goal: Verbalizes/displays adequate comfort level or baseline comfort level  2/6/2025 1632 by Nicole Garcia, RN  Outcome: Progressing  Flowsheets (Taken 2/6/2025 1522)  Verbalizes/displays adequate comfort level or baseline comfort level:   Encourage patient to monitor pain and request assistance   Assess pain using appropriate pain scale   Administer analgesics based on type and severity of pain and evaluate response  2/6/2025 0459 by Jayne Keene, RN  Outcome: Progressing     Problem: Safety - Adult  Goal: Free from fall injury  Outcome: Progressing

## 2025-02-07 ENCOUNTER — APPOINTMENT (OUTPATIENT)
Dept: NUCLEAR MEDICINE | Age: 75
DRG: 291 | End: 2025-02-07
Payer: MEDICARE

## 2025-02-07 LAB
ANION GAP SERPL CALCULATED.3IONS-SCNC: 9 MMOL/L (ref 3–16)
BUN SERPL-MCNC: 35 MG/DL (ref 7–20)
CALCIUM SERPL-MCNC: 8.3 MG/DL (ref 8.3–10.6)
CHLORIDE SERPL-SCNC: 100 MMOL/L (ref 99–110)
CO2 SERPL-SCNC: 29 MMOL/L (ref 21–32)
CREAT SERPL-MCNC: 1.1 MG/DL (ref 0.8–1.3)
DEPRECATED RDW RBC AUTO: 14.7 % (ref 12.4–15.4)
GFR SERPLBLD CREATININE-BSD FMLA CKD-EPI: 70 ML/MIN/{1.73_M2}
GLUCOSE BLD-MCNC: 201 MG/DL (ref 70–99)
GLUCOSE BLD-MCNC: 218 MG/DL (ref 70–99)
GLUCOSE BLD-MCNC: 229 MG/DL (ref 70–99)
GLUCOSE BLD-MCNC: 267 MG/DL (ref 70–99)
GLUCOSE SERPL-MCNC: 189 MG/DL (ref 70–99)
HCT VFR BLD AUTO: 36 % (ref 40.5–52.5)
HGB BLD-MCNC: 11.9 G/DL (ref 13.5–17.5)
MAGNESIUM SERPL-MCNC: 1.95 MG/DL (ref 1.8–2.4)
MCH RBC QN AUTO: 29.3 PG (ref 26–34)
MCHC RBC AUTO-ENTMCNC: 33.2 G/DL (ref 31–36)
MCV RBC AUTO: 88.3 FL (ref 80–100)
PERFORMED ON: ABNORMAL
PLATELET # BLD AUTO: 124 K/UL (ref 135–450)
PMV BLD AUTO: 10.2 FL (ref 5–10.5)
POTASSIUM SERPL-SCNC: 4 MMOL/L (ref 3.5–5.1)
RBC # BLD AUTO: 4.08 M/UL (ref 4.2–5.9)
SODIUM SERPL-SCNC: 138 MMOL/L (ref 136–145)
WBC # BLD AUTO: 3.8 K/UL (ref 4–11)

## 2025-02-07 PROCEDURE — 3430000000 HC RX DIAGNOSTIC RADIOPHARMACEUTICAL: Performed by: NURSE PRACTITIONER

## 2025-02-07 PROCEDURE — 2500000003 HC RX 250 WO HCPCS

## 2025-02-07 PROCEDURE — 99232 SBSQ HOSP IP/OBS MODERATE 35: CPT | Performed by: NURSE PRACTITIONER

## 2025-02-07 PROCEDURE — 85027 COMPLETE CBC AUTOMATED: CPT

## 2025-02-07 PROCEDURE — 6360000002 HC RX W HCPCS

## 2025-02-07 PROCEDURE — A9502 TC99M TETROFOSMIN: HCPCS | Performed by: NURSE PRACTITIONER

## 2025-02-07 PROCEDURE — 94761 N-INVAS EAR/PLS OXIMETRY MLT: CPT

## 2025-02-07 PROCEDURE — 83735 ASSAY OF MAGNESIUM: CPT

## 2025-02-07 PROCEDURE — 6370000000 HC RX 637 (ALT 250 FOR IP)

## 2025-02-07 PROCEDURE — 51701 INSERT BLADDER CATHETER: CPT

## 2025-02-07 PROCEDURE — 78452 HT MUSCLE IMAGE SPECT MULT: CPT

## 2025-02-07 PROCEDURE — 1200000000 HC SEMI PRIVATE

## 2025-02-07 PROCEDURE — 80048 BASIC METABOLIC PNL TOTAL CA: CPT

## 2025-02-07 PROCEDURE — 93017 CV STRESS TEST TRACING ONLY: CPT

## 2025-02-07 PROCEDURE — 2700000000 HC OXYGEN THERAPY PER DAY

## 2025-02-07 PROCEDURE — 6370000000 HC RX 637 (ALT 250 FOR IP): Performed by: INTERNAL MEDICINE

## 2025-02-07 PROCEDURE — 51798 US URINE CAPACITY MEASURE: CPT

## 2025-02-07 PROCEDURE — 6370000000 HC RX 637 (ALT 250 FOR IP): Performed by: NURSE PRACTITIONER

## 2025-02-07 PROCEDURE — 6360000002 HC RX W HCPCS: Performed by: NURSE PRACTITIONER

## 2025-02-07 RX ORDER — ONDANSETRON 4 MG/1
4 TABLET, ORALLY DISINTEGRATING ORAL ONCE
Status: COMPLETED | OUTPATIENT
Start: 2025-02-07 | End: 2025-02-07

## 2025-02-07 RX ORDER — REGADENOSON 0.08 MG/ML
0.4 INJECTION, SOLUTION INTRAVENOUS
Status: CANCELLED | OUTPATIENT
Start: 2025-02-07

## 2025-02-07 RX ADMIN — FUROSEMIDE 60 MG: 10 INJECTION, SOLUTION INTRAMUSCULAR; INTRAVENOUS at 09:55

## 2025-02-07 RX ADMIN — ENOXAPARIN SODIUM 30 MG: 100 INJECTION SUBCUTANEOUS at 20:43

## 2025-02-07 RX ADMIN — DOFETILIDE 250 MCG: 0.25 CAPSULE ORAL at 09:54

## 2025-02-07 RX ADMIN — INSULIN LISPRO 8 UNITS: 100 INJECTION, SOLUTION INTRAVENOUS; SUBCUTANEOUS at 11:38

## 2025-02-07 RX ADMIN — Medication 10 ML: at 20:44

## 2025-02-07 RX ADMIN — ENOXAPARIN SODIUM 30 MG: 100 INJECTION SUBCUTANEOUS at 09:55

## 2025-02-07 RX ADMIN — INSULIN LISPRO 4 UNITS: 100 INJECTION, SOLUTION INTRAVENOUS; SUBCUTANEOUS at 20:44

## 2025-02-07 RX ADMIN — ONDANSETRON 4 MG: 4 TABLET, ORALLY DISINTEGRATING ORAL at 20:58

## 2025-02-07 RX ADMIN — PANTOPRAZOLE SODIUM 40 MG: 40 TABLET, DELAYED RELEASE ORAL at 05:00

## 2025-02-07 RX ADMIN — INSULIN LISPRO 4 UNITS: 100 INJECTION, SOLUTION INTRAVENOUS; SUBCUTANEOUS at 17:55

## 2025-02-07 RX ADMIN — METOPROLOL TARTRATE 25 MG: 25 TABLET, FILM COATED ORAL at 20:43

## 2025-02-07 RX ADMIN — PRAMIPEXOLE DIHYDROCHLORIDE 0.75 MG: 0.25 TABLET ORAL at 20:43

## 2025-02-07 RX ADMIN — SPIRONOLACTONE 25 MG: 25 TABLET ORAL at 09:54

## 2025-02-07 RX ADMIN — TETROFOSMIN 33 MILLICURIE: 1.38 INJECTION, POWDER, LYOPHILIZED, FOR SOLUTION INTRAVENOUS at 13:51

## 2025-02-07 RX ADMIN — GABAPENTIN 600 MG: 300 CAPSULE ORAL at 20:43

## 2025-02-07 RX ADMIN — LOPERAMIDE HYDROCHLORIDE 2 MG: 2 CAPSULE ORAL at 03:14

## 2025-02-07 RX ADMIN — ATORVASTATIN CALCIUM 40 MG: 40 TABLET, FILM COATED ORAL at 20:43

## 2025-02-07 RX ADMIN — GABAPENTIN 600 MG: 300 CAPSULE ORAL at 09:54

## 2025-02-07 RX ADMIN — DOFETILIDE 250 MCG: 0.25 CAPSULE ORAL at 20:43

## 2025-02-07 RX ADMIN — PANTOPRAZOLE SODIUM 40 MG: 40 TABLET, DELAYED RELEASE ORAL at 15:33

## 2025-02-07 RX ADMIN — FUROSEMIDE 60 MG: 10 INJECTION, SOLUTION INTRAMUSCULAR; INTRAVENOUS at 15:33

## 2025-02-07 RX ADMIN — Medication 10 ML: at 09:55

## 2025-02-07 RX ADMIN — METOPROLOL TARTRATE 25 MG: 25 TABLET, FILM COATED ORAL at 09:55

## 2025-02-07 RX ADMIN — INSULIN LISPRO 4 UNITS: 100 INJECTION, SOLUTION INTRAVENOUS; SUBCUTANEOUS at 09:55

## 2025-02-07 RX ADMIN — ASPIRIN 325 MG: 325 TABLET ORAL at 09:54

## 2025-02-07 RX ADMIN — INSULIN GLARGINE 20 UNITS: 100 INJECTION, SOLUTION SUBCUTANEOUS at 20:43

## 2025-02-07 ASSESSMENT — PAIN SCALES - GENERAL
PAINLEVEL_OUTOF10: 0
PAINLEVEL_OUTOF10: 0

## 2025-02-07 NOTE — PLAN OF CARE
CHF Care Plan      Patient's EF (Ejection Fraction) is greater than 40%    Heart Failure Medications:  Diuretics:: Furosemide and Spironolactone    (One of the following REQUIRED for EF </= 40%/SYSTOLIC FAILURE but MAY be used in EF% >40%/DIASTOLIC FAILURE)        ACE:: None        ARB:: None         ARNI:: None    (Beta Blockers)  NON- Evidenced Based Beta Blocker (for EF% >40%/DIASTOLIC FAILURE): Metoprolol TARTrate- Lopressor    Evidenced Based Beta Blocker::(REQUIRED for EF% <40%/SYSTOLIC FAILURE) None  ...................................................................................................................................................    Failed to redirect to the Timeline version of the [employer] SmartLink.      Patient's weights and intake/output reviewed    Daily Weight log at bedside, patient/family participation in use of log: \"yes    Patient's current weight today shows a difference of 4.1 lbs less than last documented weight.      Intake/Output Summary (Last 24 hours) at 2/7/2025 0608  Last data filed at 2/7/2025 0602  Gross per 24 hour   Intake 960 ml   Output 1375 ml   Net -415 ml       Education Booklet Provided: yes    Comorbidities Reviewed Yes    Patient has a past medical history of A-fib ([facility]), Acid reflux, Acute on chronic heart failure, unspecified heart failure type ([facility]), Yan's esophagus, Coronary artery disease of native heart with stable angina pectoris ([facility]), Dementia ([facility]), Diabetes mellitus ([facility]), Diabetic autonomic neuropathy associated with type 2 diabetes mellitus ([facility]), Hyperlipidemia, Hypertension, Intractable vomiting with nausea, Pancreatitis, Restless legs, Sleep apnea, and Tremor.     >>For CHF and Comorbidity documentation on Education Time and Topics, please see Education Tab      CHF Education    Learners:  Patient  Readineess:   Acceptance  Method:   Explanation  Response:   Verbalizes Understanding  Comments: n/a    Time Spent: 5      Pt resting in bed at this

## 2025-02-07 NOTE — DISCHARGE INSTR - COC
Continuity of Care Form    Patient Name: London Swenson   :  1950  MRN:  [id number]    Admit date:  2025  Discharge date:  ***    Code Status Order: Full Code   Advance Directives:   Advance Care Flowsheet Documentation             Admitting Physician:  Kamilla Sexton DO  PCP: Constance Yancey PA    Discharging Nurse: ***  Discharging Hospital Unit/Room#: 0110/0110-02  Discharging Unit Phone Number: ***    Emergency Contact:   Extended Emergency Contact Information  Primary Emergency Contact: Rosy Swenson  Address: [address] [place] [address]  Mobile Phone: [phone]  Relation: Spouse  Secondary Emergency Contact: Valerie Penn  Mobile Phone: [phone]  Relation: Grandchild    Past Surgical History:  Past Surgical History:   Procedure Laterality Date    CARDIAC DEFIBRILLATOR PLACEMENT      CATARACT REMOVAL Bilateral     CHOLECYSTECTOMY      COLONOSCOPY  10/26/2011    ENDOSCOPY, COLON, DIAGNOSTIC      EGD halo    EP DEVICE PROCEDURE N/A 7/15/2024    Watchman keaton closure device performed by Bayron Montoya MD at [facility] CARDIAC CATH LAB    HYDROCELE EXCISION  11/15/2016    LARYNGOSCOPY N/A 10/25/2023    DRUG INDUCED SLEEP ENDOSCOPY performed by David Estes DO at [facility] OR    PANCREAS SURGERY      cyst opened up and drining into small bowel    TONSILLECTOMY      UPPER GASTROINTESTINAL ENDOSCOPY  10/04/2016    with biopsy    UPPER GASTROINTESTINAL ENDOSCOPY  2017    Halo ablation    UPPER GASTROINTESTINAL ENDOSCOPY  2017    Halo ablation    UPPER GASTROINTESTINAL ENDOSCOPY  2017    bx distal sophagus    UPPER GASTROINTESTINAL ENDOSCOPY  2017    with HALO  procedure    UPPER GASTROINTESTINAL ENDOSCOPY N/A 2018    EGD WITH ABLATION AND ANESTHESIA - HALO---SLEEP APNEA---  performed by Rajesh Virk MD at [facility] ENDOSCOPY    UPPER GASTROINTESTINAL ENDOSCOPY  2019    EGD BIOPSY performed by Rajesh Virk MD at [facility]

## 2025-02-07 NOTE — CARE COORDINATION
Dr Douglas updated writer, discharge is ordered but he is not sure when pt will be ready, pending Cardio plan.  Pt active with [facility], will resume at discharge.  CM following for possible new home oxygen.  FLORIDALMA Elias

## 2025-02-07 NOTE — CARE COORDINATION
LOS 3.  Care managed by [facility]. Here w CP. Has DC order. Active w [facility]- notified of DC. Poss 02 needs? CM continues to follow. Amber Moise RN

## 2025-02-08 ENCOUNTER — APPOINTMENT (OUTPATIENT)
Dept: NUCLEAR MEDICINE | Age: 75
DRG: 291 | End: 2025-02-08
Payer: MEDICARE

## 2025-02-08 ENCOUNTER — APPOINTMENT (OUTPATIENT)
Age: 75
DRG: 291 | End: 2025-02-08
Payer: MEDICARE

## 2025-02-08 LAB
ALBUMIN SERPL-MCNC: 3.7 G/DL (ref 3.4–5)
ANION GAP SERPL CALCULATED.3IONS-SCNC: 10 MMOL/L (ref 3–16)
BUN SERPL-MCNC: 31 MG/DL (ref 7–20)
CALCIUM SERPL-MCNC: 8.8 MG/DL (ref 8.3–10.6)
CHLORIDE SERPL-SCNC: 101 MMOL/L (ref 99–110)
CO2 SERPL-SCNC: 32 MMOL/L (ref 21–32)
CREAT SERPL-MCNC: 1.2 MG/DL (ref 0.8–1.3)
DEPRECATED RDW RBC AUTO: 14.5 % (ref 12.4–15.4)
ECHO BSA: 2.49 M2
GFR SERPLBLD CREATININE-BSD FMLA CKD-EPI: 63 ML/MIN/{1.73_M2}
GLUCOSE BLD-MCNC: 100 MG/DL (ref 70–99)
GLUCOSE BLD-MCNC: 110 MG/DL (ref 70–99)
GLUCOSE BLD-MCNC: 124 MG/DL (ref 70–99)
GLUCOSE BLD-MCNC: 254 MG/DL (ref 70–99)
GLUCOSE BLD-MCNC: 297 MG/DL (ref 70–99)
GLUCOSE SERPL-MCNC: 117 MG/DL (ref 70–99)
HCT VFR BLD AUTO: 36.9 % (ref 40.5–52.5)
HGB BLD-MCNC: 12.2 G/DL (ref 13.5–17.5)
MAGNESIUM SERPL-MCNC: 1.69 MG/DL (ref 1.8–2.4)
MCH RBC QN AUTO: 28.9 PG (ref 26–34)
MCHC RBC AUTO-ENTMCNC: 33 G/DL (ref 31–36)
MCV RBC AUTO: 87.6 FL (ref 80–100)
NT-PROBNP SERPL-MCNC: 69 PG/ML (ref 0–449)
NUC STRESS EJECTION FRACTION: 67 %
NUC STRESS LV EDV: 121 ML (ref 67–155)
NUC STRESS LV ESV: 40 ML (ref 22–58)
NUC STRESS LV MASS: 150 G
PERFORMED ON: ABNORMAL
PHOSPHATE SERPL-MCNC: 3.3 MG/DL (ref 2.5–4.9)
PLATELET # BLD AUTO: 143 K/UL (ref 135–450)
PMV BLD AUTO: 9.7 FL (ref 5–10.5)
POTASSIUM SERPL-SCNC: 3.6 MMOL/L (ref 3.5–5.1)
RBC # BLD AUTO: 4.21 M/UL (ref 4.2–5.9)
SODIUM SERPL-SCNC: 143 MMOL/L (ref 136–145)
STRESS BASELINE DIAS BP: 90 MMHG
STRESS BASELINE HR: 69 BPM
STRESS BASELINE SYS BP: 127 MMHG
STRESS ESTIMATED WORKLOAD: 1 METS
STRESS PEAK DIAS BP: 90 MMHG
STRESS PEAK SYS BP: 161 MMHG
STRESS PERCENT HR ACHIEVED: 62 %
STRESS POST PEAK HR: 90 BPM
STRESS RATE PRESSURE PRODUCT: NORMAL BPM*MMHG
STRESS TARGET HR: 146 BPM
WBC # BLD AUTO: 4.2 K/UL (ref 4–11)

## 2025-02-08 PROCEDURE — 6360000002 HC RX W HCPCS

## 2025-02-08 PROCEDURE — 83880 ASSAY OF NATRIURETIC PEPTIDE: CPT

## 2025-02-08 PROCEDURE — 93018 CV STRESS TEST I&R ONLY: CPT | Performed by: STUDENT IN AN ORGANIZED HEALTH CARE EDUCATION/TRAINING PROGRAM

## 2025-02-08 PROCEDURE — 6360000002 HC RX W HCPCS: Performed by: INTERNAL MEDICINE

## 2025-02-08 PROCEDURE — 36415 COLL VENOUS BLD VENIPUNCTURE: CPT

## 2025-02-08 PROCEDURE — 80069 RENAL FUNCTION PANEL: CPT

## 2025-02-08 PROCEDURE — 1200000000 HC SEMI PRIVATE

## 2025-02-08 PROCEDURE — 78452 HT MUSCLE IMAGE SPECT MULT: CPT | Performed by: STUDENT IN AN ORGANIZED HEALTH CARE EDUCATION/TRAINING PROGRAM

## 2025-02-08 PROCEDURE — 83735 ASSAY OF MAGNESIUM: CPT

## 2025-02-08 PROCEDURE — 85027 COMPLETE CBC AUTOMATED: CPT

## 2025-02-08 PROCEDURE — 99232 SBSQ HOSP IP/OBS MODERATE 35: CPT

## 2025-02-08 PROCEDURE — 3430000000 HC RX DIAGNOSTIC RADIOPHARMACEUTICAL: Performed by: NURSE PRACTITIONER

## 2025-02-08 PROCEDURE — A9502 TC99M TETROFOSMIN: HCPCS | Performed by: NURSE PRACTITIONER

## 2025-02-08 PROCEDURE — 93016 CV STRESS TEST SUPVJ ONLY: CPT | Performed by: STUDENT IN AN ORGANIZED HEALTH CARE EDUCATION/TRAINING PROGRAM

## 2025-02-08 PROCEDURE — 6360000002 HC RX W HCPCS: Performed by: NURSE PRACTITIONER

## 2025-02-08 PROCEDURE — 6370000000 HC RX 637 (ALT 250 FOR IP)

## 2025-02-08 RX ORDER — TORSEMIDE 20 MG/1
40 TABLET ORAL DAILY
Status: DISCONTINUED | OUTPATIENT
Start: 2025-02-08 | End: 2025-02-09 | Stop reason: HOSPADM

## 2025-02-08 RX ORDER — SPIRONOLACTONE 25 MG/1
25 TABLET ORAL DAILY
Qty: 30 TABLET | Refills: 0 | Status: SHIPPED | OUTPATIENT
Start: 2025-02-08 | End: 2025-03-10

## 2025-02-08 RX ORDER — REGADENOSON 0.08 MG/ML
0.4 INJECTION, SOLUTION INTRAVENOUS
Status: COMPLETED | OUTPATIENT
Start: 2025-02-08 | End: 2025-02-08

## 2025-02-08 RX ORDER — MAGNESIUM SULFATE IN WATER 40 MG/ML
2000 INJECTION, SOLUTION INTRAVENOUS ONCE
Status: COMPLETED | OUTPATIENT
Start: 2025-02-08 | End: 2025-02-08

## 2025-02-08 RX ADMIN — TORSEMIDE 40 MG: 20 TABLET ORAL at 14:19

## 2025-02-08 RX ADMIN — ENOXAPARIN SODIUM 30 MG: 100 INJECTION SUBCUTANEOUS at 20:48

## 2025-02-08 RX ADMIN — INSULIN GLARGINE 20 UNITS: 100 INJECTION, SOLUTION SUBCUTANEOUS at 20:48

## 2025-02-08 RX ADMIN — GABAPENTIN 600 MG: 300 CAPSULE ORAL at 20:47

## 2025-02-08 RX ADMIN — INSULIN LISPRO 8 UNITS: 100 INJECTION, SOLUTION INTRAVENOUS; SUBCUTANEOUS at 17:18

## 2025-02-08 RX ADMIN — METOPROLOL TARTRATE 25 MG: 25 TABLET, FILM COATED ORAL at 20:48

## 2025-02-08 RX ADMIN — TETROFOSMIN 33 MILLICURIE: 1.38 INJECTION, POWDER, LYOPHILIZED, FOR SOLUTION INTRAVENOUS at 08:39

## 2025-02-08 RX ADMIN — ASPIRIN 325 MG: 325 TABLET ORAL at 11:01

## 2025-02-08 RX ADMIN — PRAMIPEXOLE DIHYDROCHLORIDE 0.75 MG: 0.25 TABLET ORAL at 20:48

## 2025-02-08 RX ADMIN — MAGNESIUM SULFATE HEPTAHYDRATE 2000 MG: 40 INJECTION, SOLUTION INTRAVENOUS at 11:04

## 2025-02-08 RX ADMIN — METOPROLOL TARTRATE 25 MG: 25 TABLET, FILM COATED ORAL at 11:02

## 2025-02-08 RX ADMIN — ATORVASTATIN CALCIUM 40 MG: 40 TABLET, FILM COATED ORAL at 20:48

## 2025-02-08 RX ADMIN — GABAPENTIN 600 MG: 300 CAPSULE ORAL at 11:02

## 2025-02-08 RX ADMIN — INSULIN LISPRO 8 UNITS: 100 INJECTION, SOLUTION INTRAVENOUS; SUBCUTANEOUS at 20:48

## 2025-02-08 RX ADMIN — PANTOPRAZOLE SODIUM 40 MG: 40 TABLET, DELAYED RELEASE ORAL at 14:19

## 2025-02-08 RX ADMIN — DOFETILIDE 250 MCG: 0.25 CAPSULE ORAL at 11:11

## 2025-02-08 RX ADMIN — ENOXAPARIN SODIUM 30 MG: 100 INJECTION SUBCUTANEOUS at 11:02

## 2025-02-08 RX ADMIN — SPIRONOLACTONE 25 MG: 25 TABLET ORAL at 11:01

## 2025-02-08 RX ADMIN — REGADENOSON 0.4 MG: 0.08 INJECTION, SOLUTION INTRAVENOUS at 08:39

## 2025-02-08 RX ADMIN — DOFETILIDE 250 MCG: 0.25 CAPSULE ORAL at 20:54

## 2025-02-08 RX ADMIN — PANTOPRAZOLE SODIUM 40 MG: 40 TABLET, DELAYED RELEASE ORAL at 06:11

## 2025-02-08 NOTE — CARE COORDINATION
CASE MANAGEMENT DISCHARGE SUMMARY      Discharge to: home with [facility]     Precertification completed:   Hospital Exemption Notification (HENS) completed:     IMM given: (date)     New Durable Medical Equipment ordered/agency: na    Transportation:    Family/car: private   Medical Transport explained to pt/family. Pt/family voice no agency preference.    Agency used:   time:   Ambulance form completed: Yes    Confirmed discharge plan with:     Patient: yes/no     Family:  yes/no    Name: Contact number:     Facility/Agency, name:  [facility] faxed [phone]   Phone number for report to facility:      RN, name: Bora     Note: Discharging nurse to complete [facility], reconcile [facility], and place final copy with patient's discharge packet. RN to ensure that written prescriptions for  Level II medications are sent with patient to the facility as per protocol.

## 2025-02-09 VITALS
HEIGHT: 70 IN | SYSTOLIC BLOOD PRESSURE: 133 MMHG | OXYGEN SATURATION: 90 % | TEMPERATURE: 97.7 F | DIASTOLIC BLOOD PRESSURE: 72 MMHG | HEART RATE: 57 BPM | RESPIRATION RATE: 18 BRPM | WEIGHT: 270.3 LBS | BODY MASS INDEX: 38.7 KG/M2

## 2025-02-09 DIAGNOSIS — G25.81 RESTLESS LEG SYNDROME: ICD-10-CM

## 2025-02-09 PROBLEM — I10 ESSENTIAL HYPERTENSION: Status: ACTIVE | Noted: 2025-02-09

## 2025-02-09 LAB
ANION GAP SERPL CALCULATED.3IONS-SCNC: 9 MMOL/L (ref 3–16)
BUN SERPL-MCNC: 27 MG/DL (ref 7–20)
CALCIUM SERPL-MCNC: 8.7 MG/DL (ref 8.3–10.6)
CHLORIDE SERPL-SCNC: 100 MMOL/L (ref 99–110)
CO2 SERPL-SCNC: 33 MMOL/L (ref 21–32)
CREAT SERPL-MCNC: 1.2 MG/DL (ref 0.8–1.3)
DEPRECATED RDW RBC AUTO: 14.4 % (ref 12.4–15.4)
GFR SERPLBLD CREATININE-BSD FMLA CKD-EPI: 63 ML/MIN/{1.73_M2}
GLUCOSE BLD-MCNC: 104 MG/DL (ref 70–99)
GLUCOSE BLD-MCNC: 315 MG/DL (ref 70–99)
GLUCOSE SERPL-MCNC: 118 MG/DL (ref 70–99)
HCT VFR BLD AUTO: 36.7 % (ref 40.5–52.5)
HGB BLD-MCNC: 12.2 G/DL (ref 13.5–17.5)
MAGNESIUM SERPL-MCNC: 1.68 MG/DL (ref 1.8–2.4)
MCH RBC QN AUTO: 29.1 PG (ref 26–34)
MCHC RBC AUTO-ENTMCNC: 33.2 G/DL (ref 31–36)
MCV RBC AUTO: 87.8 FL (ref 80–100)
PERFORMED ON: ABNORMAL
PERFORMED ON: ABNORMAL
PHOSPHATE SERPL-MCNC: 3.2 MG/DL (ref 2.5–4.9)
PLATELET # BLD AUTO: 143 K/UL (ref 135–450)
PMV BLD AUTO: 9.5 FL (ref 5–10.5)
POTASSIUM SERPL-SCNC: 3.5 MMOL/L (ref 3.5–5.1)
RBC # BLD AUTO: 4.18 M/UL (ref 4.2–5.9)
SODIUM SERPL-SCNC: 142 MMOL/L (ref 136–145)
WBC # BLD AUTO: 4.6 K/UL (ref 4–11)

## 2025-02-09 PROCEDURE — 99232 SBSQ HOSP IP/OBS MODERATE 35: CPT | Performed by: NURSE PRACTITIONER

## 2025-02-09 PROCEDURE — 6370000000 HC RX 637 (ALT 250 FOR IP)

## 2025-02-09 PROCEDURE — 80048 BASIC METABOLIC PNL TOTAL CA: CPT

## 2025-02-09 PROCEDURE — 6360000002 HC RX W HCPCS

## 2025-02-09 PROCEDURE — 83735 ASSAY OF MAGNESIUM: CPT

## 2025-02-09 PROCEDURE — 84100 ASSAY OF PHOSPHORUS: CPT

## 2025-02-09 PROCEDURE — 85027 COMPLETE CBC AUTOMATED: CPT

## 2025-02-09 PROCEDURE — 36415 COLL VENOUS BLD VENIPUNCTURE: CPT

## 2025-02-09 PROCEDURE — 2500000003 HC RX 250 WO HCPCS

## 2025-02-09 PROCEDURE — 6360000002 HC RX W HCPCS: Performed by: NURSE PRACTITIONER

## 2025-02-09 PROCEDURE — 6370000000 HC RX 637 (ALT 250 FOR IP): Performed by: NURSE PRACTITIONER

## 2025-02-09 PROCEDURE — 2580000003 HC RX 258

## 2025-02-09 RX ORDER — MAGNESIUM SULFATE IN WATER 40 MG/ML
2000 INJECTION, SOLUTION INTRAVENOUS ONCE
Status: COMPLETED | OUTPATIENT
Start: 2025-02-09 | End: 2025-02-09

## 2025-02-09 RX ORDER — LANOLIN ALCOHOL/MO/W.PET/CERES
400 CREAM (GRAM) TOPICAL DAILY
Status: DISCONTINUED | OUTPATIENT
Start: 2025-02-09 | End: 2025-02-09 | Stop reason: HOSPADM

## 2025-02-09 RX ORDER — MAGNESIUM SULFATE IN WATER 40 MG/ML
2000 INJECTION, SOLUTION INTRAVENOUS ONCE
Status: DISCONTINUED | OUTPATIENT
Start: 2025-02-09 | End: 2025-02-09

## 2025-02-09 RX ORDER — LANOLIN ALCOHOL/MO/W.PET/CERES
400 CREAM (GRAM) TOPICAL DAILY
Qty: 30 TABLET | Refills: 3 | Status: SHIPPED | OUTPATIENT
Start: 2025-02-09

## 2025-02-09 RX ORDER — POTASSIUM CHLORIDE 1500 MG/1
40 TABLET, EXTENDED RELEASE ORAL ONCE
Status: COMPLETED | OUTPATIENT
Start: 2025-02-09 | End: 2025-02-09

## 2025-02-09 RX ORDER — POTASSIUM CHLORIDE 1500 MG/1
20 TABLET, EXTENDED RELEASE ORAL DAILY
Status: DISCONTINUED | OUTPATIENT
Start: 2025-02-10 | End: 2025-02-09 | Stop reason: HOSPADM

## 2025-02-09 RX ORDER — POTASSIUM CHLORIDE 1500 MG/1
20 TABLET, EXTENDED RELEASE ORAL DAILY
Qty: 30 TABLET | Refills: 3 | Status: SHIPPED | OUTPATIENT
Start: 2025-02-10

## 2025-02-09 RX ADMIN — MAGNESIUM SULFATE HEPTAHYDRATE 2000 MG: 40 INJECTION, SOLUTION INTRAVENOUS at 09:12

## 2025-02-09 RX ADMIN — SODIUM CHLORIDE: 9 INJECTION, SOLUTION INTRAVENOUS at 09:11

## 2025-02-09 RX ADMIN — METOPROLOL TARTRATE 25 MG: 25 TABLET, FILM COATED ORAL at 09:03

## 2025-02-09 RX ADMIN — ENOXAPARIN SODIUM 30 MG: 100 INJECTION SUBCUTANEOUS at 09:03

## 2025-02-09 RX ADMIN — DOFETILIDE 250 MCG: 0.25 CAPSULE ORAL at 09:03

## 2025-02-09 RX ADMIN — TORSEMIDE 40 MG: 20 TABLET ORAL at 09:02

## 2025-02-09 RX ADMIN — PANTOPRAZOLE SODIUM 40 MG: 40 TABLET, DELAYED RELEASE ORAL at 06:25

## 2025-02-09 RX ADMIN — ASPIRIN 325 MG: 325 TABLET ORAL at 09:02

## 2025-02-09 RX ADMIN — INSULIN LISPRO 12 UNITS: 100 INJECTION, SOLUTION INTRAVENOUS; SUBCUTANEOUS at 11:39

## 2025-02-09 RX ADMIN — SPIRONOLACTONE 25 MG: 25 TABLET ORAL at 09:02

## 2025-02-09 RX ADMIN — POTASSIUM CHLORIDE 40 MEQ: 1500 TABLET, EXTENDED RELEASE ORAL at 09:03

## 2025-02-09 RX ADMIN — GABAPENTIN 600 MG: 300 CAPSULE ORAL at 09:03

## 2025-02-09 RX ADMIN — Medication 10 ML: at 09:02

## 2025-02-09 RX ADMIN — Medication 400 MG: at 09:03

## 2025-02-09 ASSESSMENT — PAIN SCALES - GENERAL: PAINLEVEL_OUTOF10: 0

## 2025-02-09 NOTE — PROGRESS NOTES
[facility] Medicine Progress Note  V 1.6      Date of Admission: 2/4/2025    Hospital Day: 2      Chief Admission Complaint:  Nausea, vomiting     Subjective:  no new c/o    Presenting Admission History:         \"Patient is a 74-year-old male with a past medical history of atrial fibrillation status post Watchman procedure, congestive heart failure, coronary artery disease, type 2 diabetes, hypertension who presents today for nausea and vomiting.  Patient states that it started overnight.  He then became short of breath.  He does not typically require oxygen.  Wife states that he was previously admitted at [facility] from 1/23 to 1/30.  Upon arrival to the ED he was found to be mildly tachycardic and hypoxic.  Lab work was unrevealing.  We were consulted for admission\".       Assessment/Plan:      Current Principal Problem:  Intractable vomiting with nausea      Nausea and vomiting  - Etiology unclear  - Continue antiemetics as needed     Acute on Chronic HFpEF  Acute hypoxic respiratory failure - POA  - Last Echo completed in 1/17/2025 revealed an EF of 55 to 60%   - Chest x-ray with pleural effusion  - Continue diuresis as currently ordered w/ close monitoring of BUN/ Creatinine and electrolytes for ADR.    - GDMT: Spironolactone, metoprolol - continued; Jardiance held  - Cardiology consulted and appreciated - discussed face to face to continue current tx..     Atrial Fibrillation   - Chronic paroxysmal, of unspecified and clinically unable to determine etiology.    - Rate and rhythm Controlled   - Status post Watchman procedure     Type 2 Diabetes   - Uncontrolled  - Last A1C of 9.5%  - Holding home oral anti-hyperglycemics   - Continue Lantus and SSI, adjust as needed based off blood glucoses      Hypertension   - Typically controlled on home regimen   - Continue metoprolol and spironolactone     CAD   -No active chest pain  -Continue aspirin and statin    Ongoing threat to life and/or bodily function without ongoing 
 Latest Reference Range & Units 02/05/25 15:00   Hemoglobin, Art, Extended 13.5 - 17.5 g/dL 12.6 (L)   pH, Arterial 7.350 - 7.450  7.389   pCO2, Arterial 35.0 - 45.0 mmHg 51.4 (H)   pO2, Arterial 75.0 - 108.0 mmHg 90.8   HCO3, Arterial 21.0 - 29.0 mmol/L 30.3 (H)   TCO2 (calc), Art Not Established mmol/L 31.9   Base Excess, Arterial -3.0 - 3.0 mmol/L 4.3 (H)   O2 Sat, Arterial >92 % 94.2   Methemoglobin, Arterial <1.5 % 0.3   Carboxyhgb, Arterial 0.0 - 1.5 % 0.4   (L): Data is abnormally low  (H): Data is abnormally high    ABG fully compensated  RR was about 14 at time of draw  Spo2 on 4lpm was 99%  O2 decreased to 2lpm  
1000- Morning assessments and vital signs complete. Morning labs reviewed. Morning medications given including oral magnesium and potassium and also IV magnesium per order. Patient denies pain at this time. Call received from cardiology informing of new orders. Call light is within reach.    1045- New order noted. Clarification sent to  to see if he wants another 2G of IV magnesium given, awaiting response.    1200-  at bedside. Discharge order noted.   
CHF Care Plan      Patient's EF (Ejection Fraction) is greater than 40%    Heart Failure Medications:  Diuretics:: Spironolactone. Furosemide      (One of the following REQUIRED for EF </= 40%/SYSTOLIC FAILURE but MAY be used in EF% >40%/DIASTOLIC FAILURE)        ACE:: None        ARB:: None         ARNI:: None    (Beta Blockers)  NON- Evidenced Based Beta Blocker (for EF% >40%/DIASTOLIC FAILURE): metoprolol      Evidenced Based Beta Blocker::(REQUIRED for EF% <40%/SYSTOLIC FAILURE) Metoprolol SUCCinate- Toprol XL  ...................................................................................................................................................    Failed to redirect to the Timeline version of the [employer] SmartLink.      Patient's weights and intake/output reviewed    Daily Weight log at bedside, patient/family participation in use of log: no    Patient's current weight today shows a difference of 1 lbs more than last documented weight.      Intake/Output Summary (Last 24 hours) at 2/5/2025 0644  Last data filed at 2/5/2025 0304  Gross per 24 hour   Intake 490 ml   Output 1175 ml   Net -685 ml       Education Booklet Provided: yes    Comorbidities Reviewed Yes    Patient has a past medical history of A-fib (HCC), Acid reflux, Acute on chronic heart failure, unspecified heart failure type (HCC), Yan's esophagus, Coronary artery disease of native heart with stable angina pectoris (HCC), Dementia (HCC), Diabetes mellitus (HCC), Diabetic autonomic neuropathy associated with type 2 diabetes mellitus (HCC), Hyperlipidemia, Hypertension, Intractable vomiting with nausea, Pancreatitis, Restless legs, Sleep apnea, and Tremor.     >>For CHF and Comorbidity documentation on Education Time and Topics, please see Education Tab      CHF Education    Learners:  Patient  Readineess:   Acceptance  Method:   Explanation  Response:    Verbalizes Understanding    Comments: n/a    Time Spent: 15m      Pt resting in bed 
[facility]   Daily Progress Note      Admit Date:  2/4/2025    Reason for follow up visit: SOB; Chest pain    CC: \"I feel good. I'm ready to go home.\"    73 y/o male with PMH notable for CAD (moderate, medical management), HFpEF, PAF with prior ablation, status post watchman, sick sinus syndrome status post dual PPM, history of PE, hypertension, hyperlipidemia, T2DM, ESTHER admitted for chest pain and SOB. Treated for CHF. Underwent stress testing which was negative for ischemia.     Interval History:  Pt. seen and examined; records reviewed  Net diuresis -1.9 L since admit  BP stable. On room air  Wt 270# (277# on admit)\  On po diuretic  Denies chest pain, SOB, cough, dizziness, palpitations  Anxious to go home    Subjective:  Pt with no acute overnight cardiac events.   A 14 point review of systems was completed. Pertinent positives were identified in the HPI/Interval history. All other review of symptoms negative unless otherwise noted below.      Objective:   BP 90/60   Pulse 60   Temp 97.9 °F (36.6 °C) (Oral)   Resp 18   Ht 1.778 m (5' 10\")   Wt 122.6 kg (270 lb 4.8 oz)   SpO2 91%   BMI 38.78 kg/m²   No intake or output data in the 24 hours ending 02/09/25 0816  Wt Readings from Last 3 Encounters:   02/09/25 122.6 kg (270 lb 4.8 oz)   02/03/25 127.2 kg (280 lb 8 oz)   01/30/25 126.6 kg (279 lb)       Physical Exam:  General: In no acute distress. Awake, alert, and oriented X4. Up in room without complaints  Skin:  Warm and dry. .   Neck:  Supple. No JVD   Chest: Lungs clear to auscultation. No wheezes/rhonchi/rales  Cardiovascular:  RRR. Normal S1 and S2. No murmur/gallop or rub  Abdomen: obese, soft, nontender, +bowel sounds.   Extremities:  No LE edema. 2+ bilateral radial/DP pulses. Cap refill brisk    Medications:    torsemide  40 mg Oral Daily    aspirin  325 mg Oral Daily    atorvastatin  40 mg Oral Nightly    dofetilide  250 mcg Oral 2 times per day    gabapentin  600 mg Oral BID    
Discharge order noted. Discharge instructions, medication changes and follow up appointments and instructions reviewed with patient and wife, verbal understanding stated. Peripheral IV removed, dry dressing placed. Patient's wife was able to gather his belongings. Patient's family will transport him home. Vital signs and patient condition are stable at time of discharge. Patient will be transported to front of hospital via wheelchair.  
[facility]   Daily Progress Note    Admit Date:  2/4/2025  HPI:    Chief Complaint   Patient presents with    Chest Pain    Cold Symptoms    Nausea     Patient reports while sleeping overnight developed mid  CP hx of CHF. Pt also reports having flu-like symptoms and nausea since yesterday (cough, SOB, wheezes, malaise) recently admitted for CHF exacerbation.      London Swenson presented with shortness of breath    Cardiology consulted for chest pain and shortness of breath    PMH: CAD (moderate, medical management), HFpEF, PAF with prior ablation, status post watchman, sick sinus syndrome status post dual PPM, history of PE, hypertension, hyperlipidemia, T2DM, ESTHER.    Subjective:  Mr. Swenson denies any further chest pain.  Continues with shortness of breath that is worse with walking to the bathroom.  He also complains of continued diarrhea x 48 hours    Objective:   Patient Vitals for the past 24 hrs:   BP Temp Temp src Pulse Resp SpO2 Weight   02/06/25 1522 (!) 100/59 98.1 °F (36.7 °C) Oral 59 17 94 % --   02/06/25 1201 (!) 90/55 97.7 °F (36.5 °C) Oral 60 17 94 % --   02/06/25 0851 114/60 -- -- -- -- -- --   02/06/25 0725 109/64 -- -- -- -- 95 % --   02/06/25 0724 (!) 90/39 98.4 °F (36.9 °C) Oral 62 16 (!) 83 % --   02/06/25 0525 -- -- -- -- -- -- 125.8 kg (277 lb 4.8 oz)   02/05/25 2356 120/62 97.7 °F (36.5 °C) -- 86 -- 91 % --   02/05/25 2030 117/77 98.2 °F (36.8 °C) Oral 94 16 94 % --   02/05/25 1850 130/76 -- -- 83 20 94 % --   02/05/25 1822 (!) 140/76 -- -- 64 20 95 % --   02/05/25 1620 124/80 -- -- -- -- -- --       Intake/Output Summary (Last 24 hours) at 2/6/2025 1617  Last data filed at 2/6/2025 1204  Gross per 24 hour   Intake 480 ml   Output 750 ml   Net -270 ml     Wt Readings from Last 3 Encounters:   02/06/25 125.8 kg (277 lb 4.8 oz)   02/03/25 127.2 kg (280 lb 8 oz)   01/30/25 126.6 kg (279 lb)         ASSESSMENT:   Chest pain: troponin and EKG negative for ACS  HFpEF, acute on 
[facility]   Daily Progress Note    Admit Date:  2/4/2025  HPI:    Chief Complaint   Patient presents with    Chest Pain    Cold Symptoms    Nausea     Patient reports while sleeping overnight developed mid  CP hx of CHF. Pt also reports having flu-like symptoms and nausea since yesterday (cough, SOB, wheezes, malaise) recently admitted for CHF exacerbation.      London Swenson presented with shortness of breath    Cardiology consulted for chest pain and shortness of breath    PMH: CAD (moderate, medical management), HFpEF, PAF with prior ablation, status post watchman, sick sinus syndrome status post dual PPM, history of PE, hypertension, hyperlipidemia, T2DM, ESTHER.    Subjective:  Mr. Swenson lying flat in bed, denies any chest pain.  His dyspnea on exertion but not with walking in room.  Diarrhea resolved.    Objective:   Patient Vitals for the past 24 hrs:   BP Temp Temp src Pulse Resp SpO2 Weight   02/07/25 1054 116/69 98.2 °F (36.8 °C) Oral 63 18 91 % --   02/07/25 0830 114/74 97.5 °F (36.4 °C) Oral 60 18 96 % --   02/07/25 0456 -- -- -- -- -- -- 123.9 kg (273 lb 3.2 oz)   02/07/25 0324 (!) 95/47 97.9 °F (36.6 °C) Oral 71 18 95 % --   02/06/25 2356 124/68 97.9 °F (36.6 °C) Oral 65 18 96 % --   02/06/25 2245 (!) 94/49 -- -- -- 20 95 % --   02/06/25 2021 102/66 98.2 °F (36.8 °C) Oral 61 18 96 % --   02/06/25 2015 94/67 98.1 °F (36.7 °C) Oral 60 -- 96 % --   02/06/25 1522 (!) 100/59 98.1 °F (36.7 °C) Oral 59 17 94 % --       Intake/Output Summary (Last 24 hours) at 2/7/2025 0807  Last data filed at 2/7/2025 0655  Gross per 24 hour   Intake 720 ml   Output 1775 ml   Net -1055 ml     Wt Readings from Last 3 Encounters:   02/07/25 123.9 kg (273 lb 3.2 oz)   02/03/25 127.2 kg (280 lb 8 oz)   01/30/25 126.6 kg (279 lb)         ASSESSMENT:   Chest pain: troponin and EKG negative for ACS  HFpEF, acute on chronic: Lasix increased to 60 mg IV twice daily with improved urine output, weight down 4 pounds, net 
Patient transporting to nuclear medicine. [facility] aware. Electronically signed by Taniya Zuniga RN on 2/7/25 at 1:36 PM EST   
Pt evening shift assessment complete. VSS and medication given as ordered. Pt assessed for comfort needs. Pt does not express any additional needs at this time, resting comfortably in bed.     
Pt is a 2-day stress test due to having caffeine today. The rest scan was completed today, and the stress portion will be done tomorrow, 2/8. Pt needs to be NPO after midnight and no caffeine after 1900.  
Pt was alert and oriented during the day. Called out for nausea medicine which was given at 1430.  20 minutes later the wife called for help. Pt unresponsive, apneic breathing. VSS BP remained 120s/80s throughout. Pt hardly flinched to painful stimuli by two nurses. Pt not on tele at that time but does have watchman and pacemaker. Hospitalist to bedside, ABG STAT slight c02 51  pH 7.3      Informed hospitalist of results and waiting for new orders. VS stable, but pt is not responding appropriately. Wife and daughter at bedside.   
Screened patient for SBIRT and SDoH. No MARCELINO or social needs concerns at this time.   
contraindicated.  (ACEi/ARB/ARNI, SGLT2i, Aldactone, BBlocker).  Cardiology consulted and appreciated - w/ recs for continued diuresis.    DM2 - normally controlled on home antiGlycemics - Insulin - continued.  Follow FSBS on SSI high regimen.  Last HbA1c 9.3% dated Feb 2025. Resume home regimen at discharge.       Obesity - With Body mass index is 39.2 kg/m².       [] Class I - 30.0 to 34.9 kg/m2  [x] Class II - 35.0 to 39.9 kg/m2  [] Class III - >=40 kg/m2 (AKA severe/morbid/massive)   [] Super Obesity - > 50 kg/m2  [] Super Super Obesity - > 60 kg/m2    Complicating assessment and treatment. Placing patient at risk for multiple co-morbidities as well as early death and contributing to the patient's presentation.        Ongoing threat to life and/or bodily function without ongoing treatment due to:  Acute CHF    Consults:      IP CONSULT TO HEART FAILURE NURSE/COORDINATOR  IP CONSULT TO CARDIOLOGY      Cardiology consulted and appreciated.  Available consultant notes from yesterday and today were reviewed on 2/7/2025, w/ plan for continued inpatient w/up and management - as above     --------------------------------------------------  Patient on Scheduled and/or SSI requiring close serial monitoring of glucose w/ POCT, as documented in this note and/or the medical record, personally reviewed by me on 2/7/2025, for dose adjustments and toxic effect including hypoglycemic ADR w/ hypoglycemic rescue as ordered.     Patient on IV Lasix as currently ordered.  Closely following serial Renal Panel as well as vitals as documented in this note and the medical record, personally reviewed by me on 2/7/2025 for evidence of ADR and Toxic Effects including ARF and electrolyte abnormalities.  Monitoring clinical response for possible overdiuresis.      Medications:        Infusion Medications    sodium chloride      dextrose       Scheduled Medications    furosemide  60 mg IntraVENous TID    aspirin  325 mg Oral Daily    
Daily    aspirin  325 mg Oral Daily    atorvastatin  40 mg Oral Nightly    dofetilide  250 mcg Oral 2 times per day    gabapentin  600 mg Oral BID    insulin glargine  20 Units SubCUTAneous Nightly    metoprolol tartrate  25 mg Oral BID    pantoprazole  40 mg Oral BID AC    pramipexole  0.75 mg Oral Nightly    spironolactone  25 mg Oral Daily    sodium chloride flush  5-40 mL IntraVENous 2 times per day    enoxaparin  30 mg SubCUTAneous BID    insulin lispro  0-16 Units SubCUTAneous 4x Daily AC & HS     PRN Meds: loperamide, sodium chloride flush, sodium chloride, polyethylene glycol, acetaminophen **OR** acetaminophen, glucose, dextrose bolus **OR** dextrose bolus, glucagon (rDNA), dextrose      Physical Exam Performed:      General appearance:  No apparent distress  Respiratory:  Normal respiratory effort.   Cardiovascular:  Regular rate and rhythm.  Abdomen:  Soft, non-tender, non-distended.  Musculoskeletal:  No edema  Neurologic:  Non-focal  Psychiatric:  Alert and oriented    /75   Pulse 67   Temp 97.7 °F (36.5 °C) (Oral)   Resp 18   Ht 1.778 m (5' 10\")   Wt 122.6 kg (270 lb 4.8 oz)   SpO2 95%   BMI 38.78 kg/m²     Telemetry:      Telemetry (Rhythm Strip) monitoring - Personally reviewed and interpreted telemetry (Rhythm Strip) on 2/9/2025 as ordered with the following findings      [x] NSR w/ controlled rate  [] Sinus Tachycardia w/ rate > 100  [] Sinus Bradycardia w/ rate < 60  [] Rate controlled Afib  [] Afib w/ RVR  [] Other - Paced    Diet: ADULT DIET; Regular    DVT Prophylaxis: [x]PPx LMWH  []SQ Heparin  []IPC/SCDs  []Eliquis  []Xarelto  []Coumadin  [] Heparin Drip  []Other -      Code status: Full Code    PT/OT Eval Status:   [x]NOT yet ordered  []Ordered and Pending   []Seen with Recommendations for:   []Home independently  []Home w/ assist  []HHC  []SNF  []Acute Rehab    Multi-Disciplinary Rounds with Case Management completed on 2/9/2025 with the following recommendations:  Anticipated 
possible overdiuresis.      Medications:        Infusion Medications    sodium chloride      dextrose       Scheduled Medications    aspirin  325 mg Oral Daily    atorvastatin  40 mg Oral Nightly    dofetilide  250 mcg Oral 2 times per day    gabapentin  600 mg Oral BID    insulin glargine  20 Units SubCUTAneous Nightly    metoprolol tartrate  25 mg Oral BID    pantoprazole  40 mg Oral BID AC    pramipexole  0.75 mg Oral Nightly    spironolactone  25 mg Oral Daily    sodium chloride flush  5-40 mL IntraVENous 2 times per day    enoxaparin  30 mg SubCUTAneous BID    insulin lispro  0-16 Units SubCUTAneous 4x Daily AC & HS     PRN Meds: loperamide, sodium chloride flush, sodium chloride, polyethylene glycol, acetaminophen **OR** acetaminophen, glucose, dextrose bolus **OR** dextrose bolus, glucagon (rDNA), dextrose      Physical Exam Performed:      General appearance:  No apparent distress  Respiratory:  Normal respiratory effort.   Cardiovascular:  Regular rate and rhythm.  Abdomen:  Soft, non-tender, non-distended.  Musculoskeletal:  No edema  Neurologic:  Non-focal  Psychiatric:  Alert and oriented    BP (!) 98/52   Pulse 63   Temp 97.7 °F (36.5 °C) (Oral)   Resp 18   Ht 1.778 m (5' 10\")   Wt 123 kg (271 lb 1.6 oz)   SpO2 91%   BMI 38.90 kg/m²     Telemetry:      Telemetry (Rhythm Strip) monitoring - Personally reviewed and interpreted telemetry (Rhythm Strip) on 2/8/2025 as ordered with the following findings      [] NSR w/ controlled rate  [] Sinus Tachycardia w/ rate > 100  [] Sinus Bradycardia w/ rate < 60  [] Rate controlled Afib  [] Afib w/ RVR  [x] Other - Paced        Diet: Diet NPO Exceptions are: Sips of Water with Meds    DVT Prophylaxis: []PPx LMWH  []SQ Heparin  []IPC/SCDs  []Eliquis  []Xarelto  []Coumadin  [] Heparin Drip  []Other -      Code status: Full Code    PT/OT Eval Status:   [x]NOT yet ordered  []Ordered and Pending   []Seen with Recommendations for:   []Home independently  []Home w/ 
without ongoing treatment    [] Severe exacerbation of chronic illness    --------------------------------------------------  B. Risk of Treatment (any 1)    [x] Drugs/treatments that require intensive monitoring for toxicity    [] IV ABX (Vancomycin, Aminoglycosides, etc)     [] Post-Cath/Contrast study requiring serial monitoring    [] IV Narcotic analgesia    [x] Aggressive IV diuresis    [] Hypertonic Saline    [] Critical electrolyte abnormalities requiring IV replacement    [x] Insulin - Scheduled/SSI or Insulin gtt    [] Anticoagulation (Heparin gtt or Coumadin - other anticoagulants in special circumstances)    [] Cardiac Medications (IV Amiodarone/Diltiazem, Tikosyn, etc)    [] Hemodialysis    [] Other -    [] Change in code status    [] Decision to escalate care    [] Major surgery/procedure with associated risk factors    --------------------------------------------------  C. Data (any 2)    [x] Data Review (any 3)    [x] Consultant notes from yesterday/today    [x] All available current labs reviewed interpreted for clinical significance    [x] Appropriate follow-up labs were ordered  [] Collateral history obtained     [] Independent Interpretation of tests (any 1)    [] Telemetry (Rhythm Strip) personally reviewed and interpreted        [] Imaging personally reviewed and interpreted     [x] Discussion (any 1)  [x] Multi-Disciplinary Rounds with Case Management  [] Discussed management of the case with           Labs:  Personally reviewed on 2/6/2025 and interpreted for clinical significance as documented above.     Recent Labs     02/04/25 1157 02/05/25  0617   WBC 8.9 5.2   HGB 12.8* 11.9*   HCT 38.8* 36.8*   * 127*     Recent Labs     02/04/25 1157 02/05/25  0617 02/06/25  0618    139 137   K 4.3 4.1 3.8    99 98*   CO2 30 30 29   BUN 29* 35* 38*   CREATININE 1.1 1.3 1.3   CALCIUM 8.7 7.9* 8.4   MG  --  1.51* 2.79*     Recent Labs     02/04/25  1157 02/06/25  0618   PROBNP 72 129 
Local anaesthetic was applied over puncture site.  Using a back wall technique, a 6 Romansh Terumo sheath was inserted into right radial artery.  Verapamil, nitroglycerin were administered through the sheath.  Heparin was administered.  Diagnostic 5fr pigtail, TIG catheters were used for diagnostic angiograms.  At the conclusion of the procedure, a TR band was placed over the puncture site and hemostasis was obtained.  There were no immediate complications. I supervised sedation with versed 1mg/fentanyl 50mcg during the procedure. 70cc contrast was utilized. <20cc EBL.  LVEDP 4   GRADIENT ACROSS AORTIC VALVE No gradient   LV FUNCTION EF 65%   WALL MOTION Normal   MITRAL REGURGITATION Mild      LM <10% stenosis.           LAD Prox <10% stenosis.  Mid 30-40% stenosis.  Distal 30-40% stenosis.  LAD wraps around apex.  D1 has ostial 80% stenosis.          LCX 10% prox-mid stenosis. OM1 bifurcates in prox segment and there is prox-mid OM1 40-50% stenosis.          RI Mid 60% stenosis.          RCA Dominant.  Slight anterior takeoff, Prox-mid 20-30% stenoses. Distal 20% stenosis. Tortuous vessel.    Moderate cad/ashd in major epicardial vessels  Would treat medically  Add imdur 30mg daily, and metoprolol tartrate 25mg bid       All labs and testing reviewed.  Lab Review     Renal Profile:   Lab Results   Component Value Date/Time    CREATININE 1.1 02/07/2025 06:43 AM    BUN 35 02/07/2025 06:43 AM     02/07/2025 06:43 AM    K 4.0 02/07/2025 06:43 AM    K 4.8 01/15/2025 03:51 PM     02/07/2025 06:43 AM    CO2 29 02/07/2025 06:43 AM     CBC:    Lab Results   Component Value Date/Time    WBC 3.8 02/07/2025 06:43 AM    RBC 4.08 02/07/2025 06:43 AM    HGB 11.9 02/07/2025 06:43 AM    HCT 36.0 02/07/2025 06:43 AM    MCV 88.3 02/07/2025 06:43 AM    RDW 14.7 02/07/2025 06:43 AM     02/07/2025 06:43 AM     BNP:  No results found for: \"BNP\"  Fasting Lipid Panel:    Lab Results   Component Value Date/Time    CHOL 142

## 2025-02-09 NOTE — PLAN OF CARE
Problem: Discharge Planning  Goal: Discharge to home or other facility with appropriate resources  2/9/2025 0550 by Lucila Lieberman LPN  Outcome: Progressing

## 2025-02-09 NOTE — DISCHARGE SUMMARY
Hospital Medicine Discharge Summary    Patient: London Swenson   : 1950     Hospital:  [facility]  Admit Date: 2025   Discharge Date: 2025    Disposition:  [x]Home   []HHC  []SNF  []Acute Rehab  []LTAC  []Hospice  Code status:  [x]Full  []DNR/CCA  []Limited (DNR/CCA with Do Not Intubate)  []DNRCC  Condition at Discharge: Stable  Primary Care Provider: Constance Yancey PA    Admitting Provider: Kamilla Sexton DO  Discharge Provider: Hudson Douglas MD     Discharge Diagnoses:      Active Hospital Problems    Diagnosis     Essential hypertension [I10]     Acute on chronic heart failure with preserved ejection fraction (HFpEF) ([facility]) [I50.33]     SSS (sick sinus syndrome) ([facility]) [I49.5]     Chest pain [R07.9]     Intractable vomiting with nausea [R11.2]     Acute respiratory failure with hypoxia [J96.01]     Coronary artery disease involving native coronary artery of native heart [I25.10]        Presenting Admission History:            \"Patient is a 74-year-old male with a past medical history of atrial fibrillation status post Watchman procedure, congestive heart failure, coronary artery disease, type 2 diabetes, hypertension who presents today for nausea and vomiting.  Patient states that it started overnight.  He then became short of breath.  He does not typically require oxygen.  Wife states that he was previously admitted at [facility] from  to .  Upon arrival to the ED he was found to be mildly tachycardic and hypoxic.  Lab work was unrevealing.  We were consulted for admission\".        Assessment/Plan:        Nausea and vomiting - of unclear etiology.  Continue antiemetics as needed. Resolved.     HTN w/ CAD - w/ known CAD but no evidence of active signs and/or symptoms of ischemia and/or failure. Currently controlled on home meds w/ vitals documented and reviewed.       HyperLipidemia - normally controlled on home Statin. Continued.  Follow up w/ PCP outpatient for

## 2025-02-10 ENCOUNTER — TELEPHONE (OUTPATIENT)
Dept: CARDIAC CATH/INVASIVE PROCEDURES | Age: 75
End: 2025-02-10

## 2025-02-10 ENCOUNTER — CARE COORDINATION (OUTPATIENT)
Dept: CASE MANAGEMENT | Age: 75
End: 2025-02-10

## 2025-02-10 ENCOUNTER — TELEPHONE (OUTPATIENT)
Dept: FAMILY MEDICINE CLINIC | Age: 75
End: 2025-02-10

## 2025-02-10 DIAGNOSIS — I50.9 ACUTE ON CHRONIC HEART FAILURE, UNSPECIFIED HEART FAILURE TYPE (HCC): Primary | ICD-10-CM

## 2025-02-10 PROCEDURE — 1111F DSCHRG MED/CURRENT MED MERGE: CPT | Performed by: PHYSICIAN ASSISTANT

## 2025-02-10 RX ORDER — PRAMIPEXOLE DIHYDROCHLORIDE 0.5 MG/1
TABLET ORAL
Qty: 135 TABLET | Refills: 1 | Status: SHIPPED | OUTPATIENT
Start: 2025-02-10

## 2025-02-10 NOTE — TELEPHONE ENCOUNTER
Care Transitions Initial Follow Up Call    Outreach made within 2 business days of discharge: Yes    Patient: London Swenson Patient : 1950   MRN: [id number]  Reason for Admission: acute respiratory failure with hypoxia, cardiomyopathy  Discharge Date: 25       Spoke with: wife    Discharge department/facility: [facility] Interactive Patient Contact:  Was patient able to fill all prescriptions: Yes  Was patient instructed to bring all medications to the follow-up visit: Yes  Is patient taking all medications as directed in the discharge summary? Yes  Does patient understand their discharge instructions: Yes  Does patient have questions or concerns that need addressed prior to 7-14 day follow up office visit: no    Additional needs identified to be addressed with provider  No needs identified             Scheduled appointment with PCP within 7-14 days    Follow Up  Future Appointments   Date Time Provider Department Center   2025  1:00 PM Constance Yancey PA [facility]   3/3/2025  1:00 PM Constance Yancey PA [facility]   2025 10:30 AM SCHEDULE, ALETHEA DEVICE CHECK [facility]   2025 10:30 AM Ericka Valdez, APRN - CNP [facility]       PHUONG RICHARDSON LPN

## 2025-02-10 NOTE — TELEPHONE ENCOUNTER
Refill Request     CONFIRM preferred pharmacy with the patient.    If Mail Order Rx - Pend for 90 day refill.      Last Seen: Last Seen Department: 12/2/2024  Last Seen by PCP: 12/2/2024    Last Written: 5/3/2024 135 tab 1 refills    If no future appointment scheduled:  Review the last OV with PCP and review information for follow-up visit,  Route STAFF MESSAGE with patient name to the  Pool for scheduling with the following information:            -  Timing of next visit           -  Visit type ie Physical, OV, etc           -  Diagnoses/Reason ie. COPD, HTN - Do not use MEDICATION, Follow-up or CHECK UP - Give reason for visit      Next Appointment:   Future Appointments   Date Time Provider Department Center   3/3/2025  1:00 PM Constance Yancey PA [facility]   8/13/2025 10:30 AM SCHEDULE, ALETHEA DEVICE CHECK [facility]   8/13/2025 10:30 AM Ericka Valdez, APRN - CNP [facility]       Message sent to  to schedule appt with patient?  NO      Requested Prescriptions     Pending Prescriptions Disp Refills    pramipexole (MIRAPEX) 0.5 MG tablet [Pharmacy Med Name: Pramipexole Dihydrochloride 0.5 MG Oral Tablet] 135 tablet 0     Sig: TAKE 1 & 1/2 (ONE & ONE-HALF) TABLETS BY MOUTH NIGHTLY

## 2025-02-10 NOTE — CARE COORDINATION
8/13/2025 10:30 AM ALETHEA ORTEGA DEVICE CHECK Cardiology [phone]    8/13/2025 10:30 AM Ericka Valdez APRN - CNP Cardiology [phone]            Care Transition Nurse provided contact information.  Plan for follow-up call in 2-5 days based on severity of symptoms and risk factors.  Plan for next call: symptom management-.  self management-.  follow-up appointment-.      MARK CONNELLY RN

## 2025-02-11 ENCOUNTER — CARE COORDINATION (OUTPATIENT)
Dept: CASE MANAGEMENT | Age: 75
End: 2025-02-11

## 2025-02-11 NOTE — CARE COORDINATION
2/11/2025 12:12 PM  *  My Chart Message London Swenson , I am a nurse with the remote patient monitoring team;  reaching out to you today to remind you to please take your vitals. Important: Please try to take your vitals each day before 12pm. This ensures the monitoring team will have time to connect with your providers and get back to you with any new orders or instructions.

## 2025-02-11 NOTE — PROGRESS NOTES
Called spouse with updates of plan of care.    Class III - visualization of the soft palate and the base of the uvula

## 2025-02-12 ENCOUNTER — CARE COORDINATION (OUTPATIENT)
Dept: CASE MANAGEMENT | Age: 75
End: 2025-02-12

## 2025-02-12 NOTE — CARE COORDINATION
2025 2:02 PM  *  RPM Alert   Remote Patient Monitoring Note      Date/Time:  2025 2:02 PM  Patient Current Location: [place]  LPN contacted patient by telephone regarding yellow alert received for missing vitals x 3 days. Verified patients name and  as identifiers.  Background: RPM for CHF, DM, HTN  Clinical Interventions: Reviewed and followed up on alerts and treatments-   LPN contacted wife for status as pt has not resumed RPM. Wife advised that the equipment is not connected. Status is showing connected in HRS. Writer did contact HRS IT and advise and requested a call to pt to assist.  Plan/Follow Up: Will continue to review, monitor and address alerts with follow up based on severity of symptoms and risk factors.                 French Patten LPN, PCC, Remote Patient Monitoring    PH: [phone]  Email: [email or web address]

## 2025-02-12 NOTE — CARE COORDINATION
CTN received a message from LPN that patient had an elevated blood glucose reading of 320, he administered insulin, and by 1630, LPN noted that the blood sugar was within the RPM parameters at 293.  No further outreach required at this time.  CTN will follow up with patient as indicated.

## 2025-02-12 NOTE — CARE COORDINATION
2025 3:58 PM  *  Alert and Triage   -Remote Alert Monitoring Note      Date/Time:  2025 3:58 PM  Patient Current Location: [place]  Verified patients name and  as identifiers.    Rpm alert to be reviewed by the provider   red alert  glucose reading (320)  Vitals Recheck glucose reading (314) 20 mins later  Additional needs to be addressed by provider: Pt asymptomatic and has taken insulin, deferred to CTN.      Pt will call 911/seek immediate medical attentions with any severe and or worsening symptoms.             LPN contacted patient by telephone regarding red alert received   Background: RPM for HTN, CHF, DM  Refer to 911 immediately if:  Patient unresponsive or unable to provide history  Change in cognition or sudden confusion  Patient unable to respond in complete sentences  Intense chest pain/tightness  Any concern for any clinical emergency  Red Alert: Provider response time of 1 hr required for any red alert requiring intervention  Yellow Alert: Provider response time of 3hr required for any escalated yellow alert  Patient Chief Complaint:  Hyperglycemia:none  Clinical Interventions: Reviewed and followed up on alerts and treatments-     LPN contacted pt in regard to RPM red alert for glucose reading of 320. Pt denied any new, worrisome and or worsening symptoms at this time.     Writer confirmed with pt that the pt is not experiencing polyuria, fatigue, blurred vision, dizziness, N/V. Pt also denied chest pain and SOB and any other concerning symptoms.    Pt advised that he took his insulin injection roughly 20 mins ago. Pt rechecked glucose level during call and updated reading is 314. Pt will update glucose reading again at 16:30. Writer deferred to CTN at this time due to RPM monitoring hours ending.     Pt will call 911/seek immediate medical attentions with any severe and or worsening symptoms.      Plan/Follow Up: Will continue to review, monitor and address alerts with follow up based on

## 2025-02-13 ENCOUNTER — CARE COORDINATION (OUTPATIENT)
Dept: CASE MANAGEMENT | Age: 75
End: 2025-02-13

## 2025-02-13 ENCOUNTER — OFFICE VISIT (OUTPATIENT)
Dept: FAMILY MEDICINE CLINIC | Age: 75
End: 2025-02-13

## 2025-02-13 VITALS
DIASTOLIC BLOOD PRESSURE: 52 MMHG | HEART RATE: 68 BPM | SYSTOLIC BLOOD PRESSURE: 98 MMHG | OXYGEN SATURATION: 93 % | HEIGHT: 70 IN | BODY MASS INDEX: 39.08 KG/M2 | WEIGHT: 273 LBS

## 2025-02-13 DIAGNOSIS — Z09 HOSPITAL DISCHARGE FOLLOW-UP: ICD-10-CM

## 2025-02-13 DIAGNOSIS — I50.33 ACUTE ON CHRONIC HEART FAILURE WITH PRESERVED EJECTION FRACTION (HFPEF) (HCC): ICD-10-CM

## 2025-02-13 DIAGNOSIS — E87.6 HYPOKALEMIA: Primary | ICD-10-CM

## 2025-02-13 DIAGNOSIS — E83.42 HYPOMAGNESEMIA: ICD-10-CM

## 2025-02-13 DIAGNOSIS — E87.6 HYPOKALEMIA: ICD-10-CM

## 2025-02-13 NOTE — PROGRESS NOTES
and ear canal normal bilaterally, nose without deformity, nasal mucosa and turbinates normal without polyps  Neck: supple and non-tender without mass, no thyromegaly or thyroid nodules, no cervical lymphadenopathy  Pulmonary/Chest: clear to auscultation bilaterally- no wheezes, rales or rhonchi, normal air movement, no respiratory distress  Cardiovascular: normal rate, regular rhythm, normal S1 and S2, no murmurs, rubs, clicks, or gallops, distal pulses intact, no carotid bruits  Abdomen: soft, non-tender, non-distended, normal bowel sounds, no masses or organomegaly  Extremities: no cyanosis, clubbing or edema  Musculoskeletal: normal range of motion, no joint swelling, deformity or tenderness  Neurologic: reflexes normal and symmetric, no cranial nerve deficit, gait, coordination and speech normal      An electronic signature was used to authenticate this note.  --JENNIFER Cohen

## 2025-02-13 NOTE — CARE COORDINATION
Care Transitions Note    Follow Up Call     Patient Current Location:  Home: [facility] [address]  [place] [address]    Care Transition Nurse contacted the patient, spouse/partner  by telephone. Verified name and  as identifiers.    Additional needs identified to be addressed with provider   No needs identified                 Method of communication with provider: none.    Care Summary Note: Wife reports that patient followed up with PCP today, there have been no changes with mediations or treatment plan.  CTN inquired about blood sugar as it has not been entered in HRS today.  Patient replied \"it was 270 something\".  CTN encouraged patient to check vitals for RPM.  Wife and patient deny any questions or concerns at this time.  CTN will continue with outreach follow up calls.    Plan of care updates since last contact:  Education: .  Review of patient management of conditions/medications: .       Advance Care Planning:   Does patient have an Advance Directive: reviewed during previous call, see note. .    Medication Review:  No changes since last call.     Remote Patient Monitoring:  Offered patient enrollment in the Remote Patient Monitoring (RPM) program for in-home monitoring: Yes, patient enrolled; current status is activated and monitoring.    Assessments:  Care Transitions Subsequent and Final Call    Subsequent and Final Calls  Do you have any ongoing symptoms?: No  Have your medications changed?: No  Do you have any questions related to your medications?: No  Do you currently have any active services?: No  Are you currently active with any services?: Home Health  Do you have any needs or concerns that I can assist you with?: No  Identified Barriers: None  Care Transitions Interventions  Other Interventions:              Follow Up Appointment:   Reviewed upcoming appointment(s).  Future Appointments         Provider Specialty Dept Phone    2025 1:00 PM Constance Yancey PA Family Medicine [phone]

## 2025-02-14 ENCOUNTER — TELEPHONE (OUTPATIENT)
Dept: CARDIOLOGY CLINIC | Age: 75
End: 2025-02-14

## 2025-02-14 VITALS
SYSTOLIC BLOOD PRESSURE: 122 MMHG | WEIGHT: 273 LBS | HEART RATE: 98 BPM | BODY MASS INDEX: 39.17 KG/M2 | DIASTOLIC BLOOD PRESSURE: 72 MMHG

## 2025-02-14 LAB
ANION GAP SERPL CALCULATED.3IONS-SCNC: 11 MMOL/L (ref 3–16)
BUN SERPL-MCNC: 37 MG/DL (ref 7–20)
CALCIUM SERPL-MCNC: 9.9 MG/DL (ref 8.3–10.6)
CHLORIDE SERPL-SCNC: 99 MMOL/L (ref 99–110)
CO2 SERPL-SCNC: 30 MMOL/L (ref 21–32)
CREAT SERPL-MCNC: 1.2 MG/DL (ref 0.8–1.3)
GFR SERPLBLD CREATININE-BSD FMLA CKD-EPI: 63 ML/MIN/{1.73_M2}
GLUCOSE SERPL-MCNC: 224 MG/DL (ref 70–99)
MAGNESIUM SERPL-MCNC: 2.02 MG/DL (ref 1.8–2.4)
POTASSIUM SERPL-SCNC: 4.7 MMOL/L (ref 3.5–5.1)
SODIUM SERPL-SCNC: 140 MMOL/L (ref 136–145)

## 2025-02-14 NOTE — PROGRESS NOTES
[facility]  Office Visit    London Swenson  1950 February 17, 2025    CC:   Chief Complaint   Patient presents with    Follow-Up from Hospital     HPI:  The patient is 74 y.o. male with a past medical history notable for CAD (moderate, medical management), HFpEF, PAF with prior ablation, status post watchman, sick sinus syndrome status post dual PPM, history of PE, hypertension, hyperlipidemia, RLS, T2DM, ESTHER untreated who is here for hospital follow up. He was hospitalized at [facility] from 2/4/25-2/9/25 after being admitted for chest pain and SOB. Treated for CHF. Stress testing performed was negative for ischemia.  Last echo in January 2025: LVEF 55-60%    Overall doing well.  Wife states \"His whole body shakes all the time.\" Apparently has been occurring for several months. Has chronic tremors of his hands when he tries to eat.  Has been discussing with his PCP. Denies chest pain/discomfort,  orthopnea/PND, cough, palpitations, dizziness, syncope, edema , weight change or claudication. + chronic SOBOE unchanged. BP has been stable. Weight stable at home. Monitors sodium intake closely.  No regular exercise.     Primary Cardiologist: ANA PAULA Deiz MD; DARY Duron CNP    Review of Systems:  Constitutional: Denies  fatigue, weakness, night sweats or fever.   HEENT: Denies new visual changes, ringing in ears, nosebleeds,nasal congestion  Respiratory: Denies new or change in SOB, PND, orthopnea or cough.   Cardiovascular: see HPI  GI: Denies N/V, diarrhea, constipation, abdominal pain, change in bowel habits, melena or hematochezia  : Denies urinary frequency, urgency, incontinence, hematuria or dysuria.  Skin: Denies rash, hives, or cyanosis  Musculoskeletal: Denies joint or muscle aches/pain  Neurological: Denies syncope or TIA-like symptoms.+ peripheral neuropathy; + chronic tremors (worsening per wife)  Psychiatric: Denies anxiety, insomnia or depression     Past Medical History:   Diagnosis

## 2025-02-14 NOTE — TELEPHONE ENCOUNTER
NPDE please advise.     Pts wife stated that pts pcp wants to increase Potassium Chloride 20 meq from 1 tablet daily to 2 tabs daily but they wanted to check on if npde agrees with that. Please advise.

## 2025-02-17 ENCOUNTER — OFFICE VISIT (OUTPATIENT)
Dept: CARDIOLOGY CLINIC | Age: 75
End: 2025-02-17
Payer: MEDICARE

## 2025-02-17 VITALS
BODY MASS INDEX: 39.22 KG/M2 | SYSTOLIC BLOOD PRESSURE: 106 MMHG | DIASTOLIC BLOOD PRESSURE: 62 MMHG | HEIGHT: 70 IN | WEIGHT: 274 LBS | OXYGEN SATURATION: 93 % | HEART RATE: 77 BPM

## 2025-02-17 DIAGNOSIS — I10 ESSENTIAL HYPERTENSION: ICD-10-CM

## 2025-02-17 DIAGNOSIS — I48.0 PAROXYSMAL A-FIB (HCC): ICD-10-CM

## 2025-02-17 DIAGNOSIS — I50.32 CHRONIC HEART FAILURE WITH PRESERVED EJECTION FRACTION (HCC): ICD-10-CM

## 2025-02-17 DIAGNOSIS — R07.9 CHEST PAIN, UNSPECIFIED TYPE: Primary | ICD-10-CM

## 2025-02-17 DIAGNOSIS — G47.33 OSA (OBSTRUCTIVE SLEEP APNEA): ICD-10-CM

## 2025-02-17 DIAGNOSIS — E78.2 MIXED HYPERLIPIDEMIA: ICD-10-CM

## 2025-02-17 DIAGNOSIS — G25.2 CONTINUOUS TREMOR: ICD-10-CM

## 2025-02-17 DIAGNOSIS — I25.10 CORONARY ARTERY DISEASE INVOLVING NATIVE CORONARY ARTERY OF NATIVE HEART WITHOUT ANGINA PECTORIS: ICD-10-CM

## 2025-02-17 DIAGNOSIS — G62.9 NEUROPATHY: ICD-10-CM

## 2025-02-17 PROCEDURE — G8417 CALC BMI ABV UP PARAM F/U: HCPCS | Performed by: NURSE PRACTITIONER

## 2025-02-17 PROCEDURE — 1036F TOBACCO NON-USER: CPT | Performed by: NURSE PRACTITIONER

## 2025-02-17 PROCEDURE — 3078F DIAST BP <80 MM HG: CPT | Performed by: NURSE PRACTITIONER

## 2025-02-17 PROCEDURE — 1123F ACP DISCUSS/DSCN MKR DOCD: CPT | Performed by: NURSE PRACTITIONER

## 2025-02-17 PROCEDURE — 3074F SYST BP LT 130 MM HG: CPT | Performed by: NURSE PRACTITIONER

## 2025-02-17 PROCEDURE — G8427 DOCREV CUR MEDS BY ELIG CLIN: HCPCS | Performed by: NURSE PRACTITIONER

## 2025-02-17 PROCEDURE — 1160F RVW MEDS BY RX/DR IN RCRD: CPT | Performed by: NURSE PRACTITIONER

## 2025-02-17 PROCEDURE — 1111F DSCHRG MED/CURRENT MED MERGE: CPT | Performed by: NURSE PRACTITIONER

## 2025-02-17 PROCEDURE — 3017F COLORECTAL CA SCREEN DOC REV: CPT | Performed by: NURSE PRACTITIONER

## 2025-02-17 PROCEDURE — 1159F MED LIST DOCD IN RCRD: CPT | Performed by: NURSE PRACTITIONER

## 2025-02-17 PROCEDURE — 99214 OFFICE O/P EST MOD 30 MIN: CPT | Performed by: NURSE PRACTITIONER

## 2025-02-18 RX ORDER — METOPROLOL TARTRATE 25 MG/1
25 TABLET, FILM COATED ORAL 2 TIMES DAILY
Qty: 180 TABLET | Refills: 3 | Status: SHIPPED | OUTPATIENT
Start: 2025-02-18

## 2025-02-18 NOTE — TELEPHONE ENCOUNTER
Pts wife is requesting refill of Metoprolol Tartrate 25mg. He takes 1 tab bid. Preferred pharmacy is     [facility] - [address] - P [phone] -     Last 01/15/2025 guillermo  Next 08/13/2025 guillermo

## 2025-02-19 ENCOUNTER — CARE COORDINATION (OUTPATIENT)
Dept: CASE MANAGEMENT | Age: 75
End: 2025-02-19

## 2025-02-19 RX ORDER — SPIRONOLACTONE 25 MG/1
25 TABLET ORAL DAILY
Qty: 90 TABLET | Refills: 0 | Status: SHIPPED | OUTPATIENT
Start: 2025-02-19

## 2025-02-19 NOTE — CARE COORDINATION
2/19/2025 12:17 PM  *  My Chart Message London Swenson , I am a nurse with the remote patient monitoring team;  reaching out to you today to remind you to please take your vitals. Important: Please try to take your vitals each day before 12pm. This ensures the monitoring team will have time to connect with your providers and get back to you with any new orders or instructions.

## 2025-02-19 NOTE — CARE COORDINATION
Care Transitions Note    Follow Up Call     Patient Current Location:  Home: [facility] [address]  [place] [address]    Care Transition Nurse contacted the patient by telephone. Verified name and  as identifiers.    Additional needs identified to be addressed with provider   No needs identified                 Method of communication with provider: none.    Care Summary Note: Patient reports that he is doing \"OK\".  CTN inquired about blood sugar and patient reports that it is 498.  CTN explained that is very high and he needs to call MD.  He is declining to call MD and reports that he will take his \"shot\".  Wife reports shot is 15 units of insulin, MAR reads 15ml/20units.  Patient is declining to let CTN contact MD.  CTN encouraged patient to call Dr. Flores as discharge instructions recommended that he follow up with her within one week of discharge.  Wife reports that BS \"always comes down\" after he takes the insulin.  CTN encouraged them to re-check in one hour and if it has not come down considerably, they should call the MD.  Patient has not been participating in RPM for two days.  LPN sent a My Chart message encouraging him to check his vitals every day before 12:00.  CTN will continue with outreach follow up calls and remain available.    Plan of care updates since last contact:  Education: .  Review of patient management of conditions/medications: .       Advance Care Planning:   Does patient have an Advance Directive: reviewed during previous call, see note. .    Medication Review:  No changes since last call.     Remote Patient Monitoring:  Offered patient enrollment in the Remote Patient Monitoring (RPM) program for in-home monitoring: Yes, patient enrolled; current status is activated and monitoring. Non-compliant    Assessments:  Care Transitions Subsequent and Final Call    Subsequent and Final Calls  Are you currently active with any services?: Home Health  Care Transitions Interventions  Other

## 2025-02-19 NOTE — CARE COORDINATION
Date/Time:  1/31/2024 11:21 AM  LPN attempted to reach patient by telephone regarding red alert in remote patient monitoring program. Unable to leave VM as it is not set up yet     Medication: semaglutide  passed protocol.   Last office visit date: 1/28/25  Next appointment scheduled?: No;  Patient was just seen    Number of refills given: 0    Okay for refill?

## 2025-02-20 ENCOUNTER — CARE COORDINATION (OUTPATIENT)
Dept: CASE MANAGEMENT | Age: 75
End: 2025-02-20

## 2025-02-20 NOTE — CARE COORDINATION
2/20/2025 11:50 AM  *  Unable to Reach Date/Time:  2/20/2025 11:50 AM  LPN attempted to reach patient by telephone regarding yellow alert in remote patient monitoring program for missing metrics x 3 days. Left HIPAA compliant message requesting a return call. Will attempt to reach patient again.         French Patten LPN, PCC, Remote Patient Monitoring    PH: [phone]  Email: [email or web address]

## 2025-02-21 ENCOUNTER — CARE COORDINATION (OUTPATIENT)
Dept: PRIMARY CARE CLINIC | Age: 75
End: 2025-02-21

## 2025-02-21 ENCOUNTER — CARE COORDINATION (OUTPATIENT)
Dept: CASE MANAGEMENT | Age: 75
End: 2025-02-21

## 2025-02-21 DIAGNOSIS — E11.40 TYPE 2 DIABETES MELLITUS WITH DIABETIC NEUROPATHY, UNSPECIFIED WHETHER LONG TERM INSULIN USE (HCC): ICD-10-CM

## 2025-02-21 DIAGNOSIS — I10 PRIMARY HYPERTENSION: Primary | ICD-10-CM

## 2025-02-21 DIAGNOSIS — I50.812 CHRONIC RIGHT-SIDED CONGESTIVE HEART FAILURE (HCC): ICD-10-CM

## 2025-02-21 NOTE — PROGRESS NOTES
Remote Patient Order Discontinued    Received request from Marta Weems RN   to discontinue order for remote patient monitoring of CHF, Diabetes, and HTN and order completed.

## 2025-02-21 NOTE — CARE COORDINATION
2/21/2025 11:29 AM  *  No Metrics 4 Days London Swenson  is enrolled in Remote Patient Monitoring (RPM) and has not entered vitals in 4 days. The RPM team has Sent [employer] Message your patient to discuss adherence in RPM.      Please reach out to your patient and discuss adherence with RPM. If the patient is no longer interested in participating, please send a dis-enrollment request to the RPM pool for processing.     Thank You,         French Patten LPN, PCC, Remote Patient Monitoring    PH: [phone]  Email: [email or web address]

## 2025-02-21 NOTE — CARE COORDINATION
Care Transitions Note    Follow Up Call     Patient Current Location:  Home: [address] [place] [address]    Care Transition Nurse contacted the spouse/partner  by telephone. Verified name and  as identifiers.    Additional needs identified to be addressed with provider   No needs identified                 Method of communication with provider: none.    Care Summary Note: Wife reports that patient is doing well, BS is 196 currently, definitely down from Wednesday.  Patient is scheduled to see endocrinologist 25.  Wife reports that patient would like to discontinue Remote Patient Monitoring.  CTN will submit request to end enrollment.  [facility] nurse will be out again next week. Patient's wife denies any questions or concerns at this time.  CTN will continue with outreach follow up calls.    Remote Patient Monitoring Disenrollment      Date/Time:  2025 12:12 PM  Patient Current Location: Home: [address] [place] [address]  Patient has been dis-enrolled from Remote Patient Monitoring program on 2025 due to no longer Interested. Patient has been provided instruction on process to return RPM equipment and RPM has been deactivated.     Patient has CTN's contact information for any further questions, concerns, or needs.      Plan of care updates since last contact:  Education: .  Review of patient management of conditions/medications: .       Advance Care Planning:   Does patient have an Advance Directive: reviewed during previous call, see note. .    Medication Review:  No changes since last call.     Remote Patient Monitoring:  Offered patient enrollment in the Remote Patient Monitoring (RPM) program for in-home monitoring: Patient being discharged from remote patient monitoring program today. Patient no longer wishes to be enrolled in RPM.  CTN will notify HRS support team.    Assessments:  Care Transitions Subsequent and Final Call    Subsequent and Final Calls  Do you have any ongoing

## 2025-02-21 NOTE — CARE COORDINATION
RPM Kit Return    London Swenson  2/21/25    Care Coordination  placed call to Spouse  to arrange RPM kit  through [employer].     CCSS spoke to Spouse reviewed with Spouse how to pack equipment in original packing. Spouse aware [employer] will  equipment in 2-4 days.   All questions and concerns answered.

## 2025-02-24 DIAGNOSIS — R80.9 TYPE 2 DIABETES MELLITUS WITH MICROALBUMINURIA, WITH LONG-TERM CURRENT USE OF INSULIN (HCC): ICD-10-CM

## 2025-02-24 DIAGNOSIS — E11.29 TYPE 2 DIABETES MELLITUS WITH MICROALBUMINURIA, WITH LONG-TERM CURRENT USE OF INSULIN (HCC): ICD-10-CM

## 2025-02-24 DIAGNOSIS — Z79.4 TYPE 2 DIABETES MELLITUS WITH MICROALBUMINURIA, WITH LONG-TERM CURRENT USE OF INSULIN (HCC): ICD-10-CM

## 2025-02-24 RX ORDER — INSULIN ASPART 100 [IU]/ML
INJECTION, SOLUTION INTRAVENOUS; SUBCUTANEOUS
Qty: 15 ML | Refills: 5 | Status: SHIPPED | OUTPATIENT
Start: 2025-02-24 | End: 2025-02-26 | Stop reason: SDUPTHER

## 2025-02-24 NOTE — TELEPHONE ENCOUNTER
Refill Request     CONFIRM preferred pharmacy with the patient.    If Mail Order Rx - Pend for 90 day refill.      Last Seen: Last Seen Department: 2/13/2025  Last Seen by PCP: 2/13/2025    Last Written: 6/7/2024 15 mL 5 refills    If no future appointment scheduled:  Review the last OV with PCP and review information for follow-up visit,  Route STAFF MESSAGE with patient name to the  Pool for scheduling with the following information:            -  Timing of next visit           -  Visit type ie Physical, OV, etc           -  Diagnoses/Reason ie. COPD, HTN - Do not use MEDICATION, Follow-up or CHECK UP - Give reason for visit      Next Appointment:   Future Appointments   Date Time Provider Department Center   2/26/2025 11:00 AM Kelsey Flores MD [facility]   5/1/2025 11:30 AM Darci Manuel MD AND NEURO Neurology -   5/14/2025  1:00 PM Constance Yancey PA [facility]   5/20/2025  3:15 PM Kamilla Duron APRN - CNP [facility]   8/13/2025 10:30 AM SCHEDULE, ALETHEA DEVICE CHECK [facility]   8/13/2025 10:30 AM Ericka Valdez APRN - CNP [facility]       Message sent to  to schedule appt with patient?  NO      Requested Prescriptions     Pending Prescriptions Disp Refills    NOVOLOG FLEXPEN 100 UNIT/ML injection pen [Pharmacy Med Name: NovoLOG FlexPen 100 UNIT/ML Subcutaneous Solution Pen-injector] 15 mL 0     Sig: INJECT 25 UNITS SUBCUTANEOUSLY THREE TIMES DAILY BEFORE MEAL(S)

## 2025-02-26 ENCOUNTER — OFFICE VISIT (OUTPATIENT)
Dept: ENDOCRINOLOGY | Age: 75
End: 2025-02-26
Payer: MEDICARE

## 2025-02-26 VITALS
SYSTOLIC BLOOD PRESSURE: 107 MMHG | BODY MASS INDEX: 39.8 KG/M2 | WEIGHT: 278 LBS | HEIGHT: 70 IN | DIASTOLIC BLOOD PRESSURE: 59 MMHG | HEART RATE: 79 BPM

## 2025-02-26 DIAGNOSIS — R80.9 TYPE 2 DIABETES MELLITUS WITH MICROALBUMINURIA, WITH LONG-TERM CURRENT USE OF INSULIN (HCC): Primary | Chronic | ICD-10-CM

## 2025-02-26 DIAGNOSIS — Z79.4 TYPE 2 DIABETES MELLITUS WITH MICROALBUMINURIA, WITH LONG-TERM CURRENT USE OF INSULIN (HCC): Primary | Chronic | ICD-10-CM

## 2025-02-26 DIAGNOSIS — E11.29 TYPE 2 DIABETES MELLITUS WITH MICROALBUMINURIA, WITH LONG-TERM CURRENT USE OF INSULIN (HCC): Primary | Chronic | ICD-10-CM

## 2025-02-26 DIAGNOSIS — E66.01 MORBID (SEVERE) OBESITY DUE TO EXCESS CALORIES: ICD-10-CM

## 2025-02-26 PROCEDURE — 2022F DILAT RTA XM EVC RTNOPTHY: CPT | Performed by: INTERNAL MEDICINE

## 2025-02-26 PROCEDURE — 3078F DIAST BP <80 MM HG: CPT | Performed by: INTERNAL MEDICINE

## 2025-02-26 PROCEDURE — 1123F ACP DISCUSS/DSCN MKR DOCD: CPT | Performed by: INTERNAL MEDICINE

## 2025-02-26 PROCEDURE — G8417 CALC BMI ABV UP PARAM F/U: HCPCS | Performed by: INTERNAL MEDICINE

## 2025-02-26 PROCEDURE — 1036F TOBACCO NON-USER: CPT | Performed by: INTERNAL MEDICINE

## 2025-02-26 PROCEDURE — 1111F DSCHRG MED/CURRENT MED MERGE: CPT | Performed by: INTERNAL MEDICINE

## 2025-02-26 PROCEDURE — 95251 CONT GLUC MNTR ANALYSIS I&R: CPT | Performed by: INTERNAL MEDICINE

## 2025-02-26 PROCEDURE — 3017F COLORECTAL CA SCREEN DOC REV: CPT | Performed by: INTERNAL MEDICINE

## 2025-02-26 PROCEDURE — G8427 DOCREV CUR MEDS BY ELIG CLIN: HCPCS | Performed by: INTERNAL MEDICINE

## 2025-02-26 PROCEDURE — 3074F SYST BP LT 130 MM HG: CPT | Performed by: INTERNAL MEDICINE

## 2025-02-26 PROCEDURE — 3046F HEMOGLOBIN A1C LEVEL >9.0%: CPT | Performed by: INTERNAL MEDICINE

## 2025-02-26 PROCEDURE — 99214 OFFICE O/P EST MOD 30 MIN: CPT | Performed by: INTERNAL MEDICINE

## 2025-02-26 PROCEDURE — 1159F MED LIST DOCD IN RCRD: CPT | Performed by: INTERNAL MEDICINE

## 2025-02-26 RX ORDER — INSULIN ASPART 100 [IU]/ML
20 INJECTION, SOLUTION INTRAVENOUS; SUBCUTANEOUS
Qty: 15 ML | Refills: 5 | Status: SHIPPED | OUTPATIENT
Start: 2025-02-26

## 2025-02-26 RX ORDER — SEMAGLUTIDE 0.68 MG/ML
INJECTION, SOLUTION SUBCUTANEOUS
Qty: 9 ML | Refills: 1 | Status: SHIPPED | OUTPATIENT
Start: 2025-02-26

## 2025-02-26 RX ORDER — INSULIN GLARGINE 100 [IU]/ML
17 INJECTION, SOLUTION SUBCUTANEOUS NIGHTLY
Qty: 15 ML | Refills: 0 | Status: SHIPPED | OUTPATIENT
Start: 2025-02-26

## 2025-02-26 NOTE — PROGRESS NOTES
[facility] Endocrinology    Chief Complaint:     Chief Complaint   Patient presents with    Diabetes     Hospital took off Jardiance 1 month ago     Follow-up      Subjective:   London Swenson is a pleasant 74 y.o. male who presents for follow up of Diabetes Mellitus type 2. Patient also has A-fib, mild nonproliferative diabetic retinopathy, peripheral neuropathy, hypertension, hyperlipidemia, ESTHER, coronary artery disease, congestive heart failure with pacemaker and has a BMI of 39.  He is accompanied by his wife and sister.     Diagnosed: 1996, sister has DM.   Initial medications: metformin   On insulin since: since early diagnosis.   Other diabetes meds tried in the past: Metformin, glipizide, Trulicity (nausea), took Mounjaro for some time but then stopped due to expense.  Ozempic, Mounjaro as well as Jardiance were expensive.    Hx of severe hypoglycemia or DKA: none   Hx of MI/stroke/CKD: CHF, no CKD or stroke.   Hx of pancreatitis: Once in 1996.  He was subsequently diagnosed with a pseudocyst and required open cystectomy.  No recurrences of pancreatitis since then.  Family hx of thyroid cancer:    Most recent HbA1c:   Hemoglobin A1C   Date Value Ref Range Status   02/05/2025 9.3 See comment % Final     Comment:     Comment:  Diagnosis of Diabetes: > or = 6.5%  Increased risk of diabetes (Prediabetes): 5.7-6.4%  Glycemic Control: Nonpregnant Adults: <7.0%                    Pregnant: <6.0%          Current regimen:  Lantus 20 units HS  NovoLog 15 units TID.     Blood sugar ranges: He is currently using max.    Patient is noted to have a pattern of hyperglycemia during the daytime and hypoglycemia overnight.  Name: London Swenson  YOB: 1950  Report Period: 02/13/2025 - 02/26/2025 (14 days)  Generated: 02/26/2025  Time CGM Active: 96%        Glucose Statistics and Targets  Average Glucose: 196 mg/dL  Glucose Management Indicator (GMI): 8.0%  Glucose Variability (%CV): 47.8%  Target Range: 70 - 180

## 2025-02-26 NOTE — PROGRESS NOTES
Name: London Swenson  YOB: 1950  Report Period: 02/13/2025 - 02/26/2025 (14 days)  Generated: 02/26/2025  Time CGM Active: 96%      Glucose Statistics and Targets  Average Glucose: 196 mg/dL  Glucose Management Indicator (GMI): 8.0%  Glucose Variability (%CV): 47.8%  Target Range: 70 - 180 mg/dL      Time in Ranges  Very High: >250 mg/dL --- 25%  High: 181 - 250 mg/dL --- 26%  Target Range: 70 - 180 mg/dL --- 46%  Low: 54 - 69 mg/dL --- 2%  Very Low: <54 mg/dL --- 1%

## 2025-02-28 ENCOUNTER — CARE COORDINATION (OUTPATIENT)
Dept: CASE MANAGEMENT | Age: 75
End: 2025-02-28

## 2025-02-28 NOTE — CARE COORDINATION
Care Transitions Note    Follow Up Call     Attempted to reach patient for transitions of care follow up.  Unable to reach patient.      Outreach Attempts:   Unable to leave message.     Care Summary Note: Follow up outreach call attempt, no answer.  CTN left VM with contact information and request for return call.  CTN will continue with outreach call attempts.      Follow Up Appointment:   Future Appointments         Provider Specialty Dept Phone    5/1/2025 11:30 AM Darci Manuel MD Neurology [phone]    5/14/2025 1:00 PM Constance Yancey PA Family Medicine [phone]    5/20/2025 3:15 PM Kamilla Duron APRN - CNP Cardiology [phone]    7/15/2025 11:00 AM Kelsey Flores MD Endocrinology [phone]    8/13/2025 10:30 AM ALETHEA ORTEGA DEVICE CHECK Cardiology [phone]    8/13/2025 10:30 AM Ericka Valdez, APRN - CNP Cardiology [phone]            Plan for follow-up call in 2-5 days based on severity of symptoms and risk factors. Plan for next call: symptom management-.  self management-.  follow-up appointment-.    MARK CONNELLY RN

## 2025-03-07 ENCOUNTER — CARE COORDINATION (OUTPATIENT)
Dept: CASE MANAGEMENT | Age: 75
End: 2025-03-07

## 2025-03-07 NOTE — CARE COORDINATION
Care Transitions Note    Final Call     Attempted to reach patient for transitions of care follow up.  Unable to reach patient.      Outreach Attempts:   Multiple attempts to contact patient at phone numbers on file.   Unable to leave message.     Patient closed (unable to reach patient) from the Care Transitions program on 3/7/25.  Patient/family  UTR .      Handoff:   Patient referred back to NICHOL Polo for continued management.     Care Summary Note: Second & final follow up outreach call attempt, no answer.  CTN left VM with contact information and request for return call.  CTN will continue with outreach call attempts.      Assessments:  Care Transitions Subsequent and Final Call    Subsequent and Final Calls  Care Transitions Interventions  Other Interventions:              Upcoming Appointments:    Future Appointments         Provider Specialty Dept Phone    5/1/2025 11:30 AM Darci Manuel MD Neurology [phone]    5/14/2025 1:00 PM Constance Yancey PA Family Medicine [phone]    5/20/2025 3:15 PM Kamilla Duron APRN - CNP Cardiology [phone]    7/15/2025 11:00 AM Kelsey Flores MD Endocrinology [phone]    8/13/2025 10:30 AM ALETHEA ORTEGA DEVICE CHECK Cardiology [phone]    8/13/2025 10:30 AM Ericka Valdez APRN - CNP Cardiology [phone]            MARK CONNELLY RN

## 2025-03-10 ENCOUNTER — CARE COORDINATION (OUTPATIENT)
Dept: CARE COORDINATION | Age: 75
End: 2025-03-10

## 2025-03-10 NOTE — CARE COORDINATION
Ambulatory Care Coordination Note     3/10/2025 2:47 PM     Patient Current Location:  Home: [facility] [address]  [place] [address]     This patient was received as a referral from Ambulatory Care Manager .    ACM contacted the spouse/partner by telephone. Verified name and  with spouse/partner as identifiers. Provided introduction to self, and explanation of the ACM role.   Spouse/Partner accepted care management services at this time.          ACM: Giovanna Reyes RN     Challenges to be reviewed by the provider   Additional needs identified to be addressed with provider No  none               Method of communication with provider: chart routing.    Utilization: Initial Call - N/A    Care Summary Note:  ACM received CTN referral for ongoing care management needs. Patient spouse Rosy- verified via HIPAA form. Rosy agreed it would be okay for ACM to complete ongoing follow up calls to check in. Rosy said that patient started Ozempic recommended by the Endocrinologist. Rosy said patient is doing well with Free style max at this time. Rosy is in the process to changing patient to [facility]. Rosy has no other questions or concerns at this time.     Offered patient enrollment in the Remote Patient Monitoring (RPM) program for in-home monitoring: Yes, but did not enroll at this time: limited patient ability to navigate RPM/equipment.     Assessments Completed:   General Assessment    Do you have any symptoms that are causing concern?: No          Medications Reviewed:   Completed during this call    Advance Care Planning:   Not reviewed during this call     Care Planning:   Education Documentation  No documentation found.  Education Comments  No comments found.     ,    Goals Addressed                   This Visit's Progress     Conditions and Symptoms        I will schedule office visits, as directed by my provider.  I will keep my appointment or reschedule if I have to cancel.  I will notify my

## 2025-03-17 ENCOUNTER — CARE COORDINATION (OUTPATIENT)
Dept: CARE COORDINATION | Age: 75
End: 2025-03-17

## 2025-03-17 NOTE — CARE COORDINATION
ACM placed call out to patient but they are in [place] at this time. ACM will reach out to patient next week.

## 2025-03-20 DIAGNOSIS — M25.562 CHRONIC PAIN OF LEFT KNEE: ICD-10-CM

## 2025-03-20 DIAGNOSIS — G89.29 CHRONIC PAIN OF LEFT KNEE: ICD-10-CM

## 2025-03-20 DIAGNOSIS — E11.41 DIABETIC MONONEUROPATHY ASSOCIATED WITH TYPE 2 DIABETES MELLITUS: ICD-10-CM

## 2025-03-20 RX ORDER — GABAPENTIN 600 MG/1
600 TABLET ORAL 2 TIMES DAILY
Qty: 60 TABLET | Refills: 0 | Status: SHIPPED | OUTPATIENT
Start: 2025-03-20 | End: 2025-04-19

## 2025-03-20 NOTE — TELEPHONE ENCOUNTER
Refill Request     CONFIRM preferred pharmacy with the patient.    If Mail Order Rx - Pend for 90 day refill.      Last Seen: Last Seen Department: 2/13/2025  Last Seen by PCP: 2/13/2025    Last Written: 2/5/2025    If no future appointment scheduled:  Review the last OV with PCP and review information for follow-up visit,  Route STAFF MESSAGE with patient name to the  Pool for scheduling with the following information:            -  Timing of next visit           -  Visit type ie Physical, OV, etc           -  Diagnoses/Reason ie. COPD, HTN - Do not use MEDICATION, Follow-up or CHECK UP - Give reason for visit      Next Appointment:   Future Appointments   Date Time Provider Department Center   5/1/2025 11:30 AM Darci Manuel MD AND NEURO Neurology -   5/14/2025  1:00 PM Constance Yancey PA [facility]   5/20/2025  3:15 PM Kamilla Duron APRN - CNP Alethea Car [facility]   7/15/2025 11:00 AM Kelsey Flores MD AND ENDO [facility]   8/13/2025 10:30 AM SCHEDULE, ALETHEA DEVICE CHECK [facility] Car [facility]   8/13/2025 10:30 AM Ericka Valdez, APRN - CNP [facility]       Message sent to  to schedule appt with patient?  NO      Requested Prescriptions     Pending Prescriptions Disp Refills    gabapentin (NEURONTIN) 600 MG tablet [Pharmacy Med Name: Gabapentin 600 MG Oral Tablet] 60 tablet 0     Sig: Take 1 tablet by mouth 2 times daily.    Torsemide 40 MG TABS 30 tablet 0     Sig: Take 40 mg by mouth daily

## 2025-03-25 ENCOUNTER — CARE COORDINATION (OUTPATIENT)
Dept: CARE COORDINATION | Age: 75
End: 2025-03-25

## 2025-03-25 ASSESSMENT — SOCIAL DETERMINANTS OF HEALTH (SDOH)
HOW OFTEN DO YOU ATTEND CHURCH OR RELIGIOUS SERVICES?: NEVER
HOW OFTEN DO YOU GET TOGETHER WITH FRIENDS OR RELATIVES?: TWICE A WEEK
HOW OFTEN DO YOU ATTENT MEETINGS OF THE CLUB OR ORGANIZATION YOU BELONG TO?: MORE THAN 4 TIMES PER YEAR
IN A TYPICAL WEEK, HOW MANY TIMES DO YOU TALK ON THE PHONE WITH FAMILY, FRIENDS, OR NEIGHBORS?: THREE TIMES A WEEK
DO YOU BELONG TO ANY CLUBS OR ORGANIZATIONS SUCH AS CHURCH GROUPS UNIONS, FRATERNAL OR ATHLETIC GROUPS, OR SCHOOL GROUPS?: YES

## 2025-03-25 NOTE — CARE COORDINATION
Ambulatory Care Coordination Note     3/25/2025 12:35 PM     Patient Current Location:  Home: [facility] [address]  [place] [address]     ACM contacted the patient by telephone. Verified name and  with patient as identifiers.         ACM: Giovanna Reyes RN     Challenges to be reviewed by the provider   Additional needs identified to be addressed with provider No  none               Method of communication with provider: chart routing.    Utilization: Patient has not had any utilization since our last call.     Care Summary Note:  ACM completed follow up call with patient and wife. Gema said he had a nice time in [place] with spouse and sister. Patient said he has a follow up appt coming up with PCP this week to discuss ongoing leg pain he has been having. Patient reports he just ate breakfast and blood sugar is 228. Patient is following with Endocrinology. Patient denies any other needs at this time.     Offered patient enrollment in the Remote Patient Monitoring (RPM) program for in-home monitoring: Yes, but did not enroll at this time: limited patient ability to navigate RPM/equipment.     Assessments Completed:   General Assessment    Do you have any symptoms that are causing concern?: Yes  Progression since Onset: Unchanged  Reported Symptoms: Other (Comment: leg pain)          Medications Reviewed:   Patient denies any changes with medications and reports taking all medications as prescribed.    Advance Care Planning:   Reviewed and current     Care Planning:    Goals Addressed                   This Visit's Progress     Conditions and Symptoms   No change     I will schedule office visits, as directed by my provider.  I will keep my appointment or reschedule if I have to cancel.  I will notify my provider of any barriers to my plan of care.  I will follow my Zone Management tool to seek urgent or emergent care.  I will notify my provider of any symptoms that indicate a worsening of my

## 2025-03-25 NOTE — ACP (ADVANCE CARE PLANNING)
Advance Care Planning   Healthcare Decision Maker:    Primary Decision Maker (Active): Rosy Swenson - Spouse - [phone]    Secondary Decision Maker: LichaArlene - Other Relative - [phone]    Click here to complete Healthcare Decision Makers including selection of the Healthcare Decision Maker Relationship (ie \"Primary\").  Today we documented Decision Maker(s) consistent with Legal Next of Kin hierarchy.       If the relationship to the patient does NOT follow our state's Next of Kin hierarchy, the patient MUST complete an ACP Document to allow him/her to act on the patient's behalf. :

## 2025-03-27 ENCOUNTER — TELEPHONE (OUTPATIENT)
Dept: CARDIOLOGY CLINIC | Age: 75
End: 2025-03-27

## 2025-03-27 ENCOUNTER — OFFICE VISIT (OUTPATIENT)
Dept: FAMILY MEDICINE CLINIC | Age: 75
End: 2025-03-27

## 2025-03-27 VITALS
OXYGEN SATURATION: 97 % | SYSTOLIC BLOOD PRESSURE: 128 MMHG | HEIGHT: 70 IN | DIASTOLIC BLOOD PRESSURE: 68 MMHG | HEART RATE: 81 BPM | WEIGHT: 283.4 LBS | BODY MASS INDEX: 40.57 KG/M2 | TEMPERATURE: 96.7 F

## 2025-03-27 DIAGNOSIS — R25.1 TREMOR: ICD-10-CM

## 2025-03-27 DIAGNOSIS — F03.90 DEMENTIA WITHOUT BEHAVIORAL DISTURBANCE (HCC): ICD-10-CM

## 2025-03-27 DIAGNOSIS — I50.812 CHRONIC RIGHT-SIDED CONGESTIVE HEART FAILURE (HCC): ICD-10-CM

## 2025-03-27 DIAGNOSIS — Z79.4 TYPE 2 DIABETES MELLITUS WITH MICROALBUMINURIA, WITH LONG-TERM CURRENT USE OF INSULIN (HCC): Primary | ICD-10-CM

## 2025-03-27 DIAGNOSIS — R80.9 TYPE 2 DIABETES MELLITUS WITH MICROALBUMINURIA, WITH LONG-TERM CURRENT USE OF INSULIN (HCC): Primary | ICD-10-CM

## 2025-03-27 DIAGNOSIS — G25.81 RESTLESS LEG SYNDROME: ICD-10-CM

## 2025-03-27 DIAGNOSIS — I25.10 CORONARY ARTERY DISEASE INVOLVING NATIVE CORONARY ARTERY OF NATIVE HEART WITHOUT ANGINA PECTORIS: ICD-10-CM

## 2025-03-27 DIAGNOSIS — E11.41 DIABETIC MONONEUROPATHY ASSOCIATED WITH TYPE 2 DIABETES MELLITUS: ICD-10-CM

## 2025-03-27 DIAGNOSIS — R26.89 SHUFFLING GAIT: ICD-10-CM

## 2025-03-27 DIAGNOSIS — E11.29 TYPE 2 DIABETES MELLITUS WITH MICROALBUMINURIA, WITH LONG-TERM CURRENT USE OF INSULIN (HCC): Primary | ICD-10-CM

## 2025-03-27 DIAGNOSIS — J84.10 LUNG GRANULOMA (HCC): ICD-10-CM

## 2025-03-27 RX ORDER — PREGABALIN 50 MG/1
50 CAPSULE ORAL 2 TIMES DAILY
Qty: 60 CAPSULE | Refills: 0 | Status: SHIPPED | OUTPATIENT
Start: 2025-03-27 | End: 2025-04-26

## 2025-03-27 SDOH — ECONOMIC STABILITY: FOOD INSECURITY: WITHIN THE PAST 12 MONTHS, YOU WORRIED THAT YOUR FOOD WOULD RUN OUT BEFORE YOU GOT MONEY TO BUY MORE.: NEVER TRUE

## 2025-03-27 SDOH — ECONOMIC STABILITY: FOOD INSECURITY: WITHIN THE PAST 12 MONTHS, THE FOOD YOU BOUGHT JUST DIDN'T LAST AND YOU DIDN'T HAVE MONEY TO GET MORE.: NEVER TRUE

## 2025-03-27 ASSESSMENT — ENCOUNTER SYMPTOMS
CONSTIPATION: 0
NAUSEA: 0
RHINORRHEA: 0
DIARRHEA: 0
SHORTNESS OF BREATH: 0
VOMITING: 0
ABDOMINAL PAIN: 0
SORE THROAT: 0
COUGH: 0

## 2025-03-27 ASSESSMENT — PATIENT HEALTH QUESTIONNAIRE - PHQ9
SUM OF ALL RESPONSES TO PHQ QUESTIONS 1-9: 0
SUM OF ALL RESPONSES TO PHQ QUESTIONS 1-9: 0
1. LITTLE INTEREST OR PLEASURE IN DOING THINGS: NOT AT ALL
SUM OF ALL RESPONSES TO PHQ QUESTIONS 1-9: 0
SUM OF ALL RESPONSES TO PHQ QUESTIONS 1-9: 0
2. FEELING DOWN, DEPRESSED OR HOPELESS: NOT AT ALL

## 2025-03-27 NOTE — PROGRESS NOTES
3/27/2025  London Swenson (: 1950)  74 y.o.    ASSESSMENT and PLAN:  London was seen today for other.    Diagnoses and all orders for this visit:    Type 2 diabetes mellitus with microalbuminuria, with long-term current use of insulin (HCC)  -    Mgmt per endocrinology, recommend decreasing lantus to 15 units nightly 2/2 to overnight lows.     Tremor  Shuffling gait  Dementia without behavioral disturbance (HCC)  -     MRI BRAIN WO CONTRAST; Future  - Sxs concerning for parkinson's recommend further evaluation with MRI brain.     Lung granuloma (HCC)  - stabel on repeat imaging.     Diabetic mononeuropathy associated with type 2 diabetes mellitus (HCC)  Restless leg syndrome  -     pregabalin (LYRICA) 50 MG capsule; Take 1 capsule by mouth 2 times daily for 30 days. Max Daily Amount: 100 mg  - failed therapy with gabapentin, trial lyrica.   - uds and med contract today.     CAD  HF  - management per cardiology, nothing on exam today that would warrant change to treatment plan.   - mild increase in BLE edema weight up about 6 lbs from  visit. Discussed low sodium diet and diuretic compliance.       Return in about 3 months (around 2025) for neuropathy; RLS.    HPI  Presents for evaluation of acute on chronic leg pain, R>L  Patient with known bilateral knee OA- needs replacement but waiting for A1c improvement and ideally weight loss.   States this pain is different, worsening burning and lightning sensation. Has difficulty keeping his legs still. Can't sleep because of it.   Gabapentin initially helped but no more.     CAD/HFpEF/HTN: following with cardiology. Continues on torsemide, spironolactone, atorvastatin. Wife endorses compliance with diuretics. Some increase in swelling in bilateral legs. Denies shortness of breath and weight gain.      PAF s/p ablation on tikosyn/ s/p watchman: follows with EP, continues on plavix, tikosyn, metoprolol.     Continues with tremor.   On pramipexole with some

## 2025-03-27 NOTE — TELEPHONE ENCOUNTER
Jaclyn at [facility] Scheduling called regarding pt. Pt was scheduled to have an MRI and failed to inform that pt has a pacemaker and Watchman. Jaclyn is needing the following information to proceed with scheduling. Pts wife directed Central Scheduling to our office to collect the following information    - Company that made it  - Serial/Model #  - And Vernalis  - Implant Date  - Watchman information    Jaclyn barrios [facility] Scheduling  Best number [phone], ext. [phone].

## 2025-03-27 NOTE — PROGRESS NOTES
\"Have you been to the ER, urgent care clinic since your last visit?  Hospitalized since your last visit?\"    NO    “Have you seen or consulted any other health care providers outside our system since your last visit?”    NO      “Have you had a colorectal cancer screening such as a colonoscopy/FIT/Cologuard?    NO    Date of last Colonoscopy: 1/29/2020  No cologuard on file  No FIT/FOBT on file   No flexible sigmoidoscopy on file     “Have you had a diabetic eye exam?”    NO     Date of last diabetic eye exam: 10/6/2022            Urine drug screen sent to the lab for testing.   Labeled and placed in bag with orders. 2 white tops   (ALL URINE CX TO BE PLACED IN THE FRIDGE)

## 2025-03-28 LAB — ANNOTATION COMMENT IMP: NORMAL

## 2025-03-28 NOTE — TELEPHONE ENCOUNTER
I attempted to call Jaclyn barrios [facility] Scheduling to relay Watchman and device information. [facility] for return call.     Best number [phone], ext. [phone].

## 2025-03-28 NOTE — TELEPHONE ENCOUNTER
D: Pt. With complaint of leg pain that he rates at a level 8 on the pain scale. He says that at home he takes pain medication and a muscle relaxer but can not remember the names. Pt. Received Gabapentin earlier  A: Marichuy Lora NP notified and no new orders received  R: Pt.  Has Tylenol order but was asleep so will continue to monitor Shanita Dia, Lilly Wolf; Jennifer Rios MA; Vipul Martinez Ep27 minutes ago (10:18 AM)       Watch[employer] implant info under the Cardiology implants tab.     Lilly Ruvalcaba Emma, MA; Shanita Dia RN; Vipul Martinez Ep4 hours ago (6:39 AM)     LR  Medtronic device lead info:    IMPLANT DATE:  12/29/2023  IPG:  Hanh RIVAS DR MRI [id number]  S/N: [id number]    LEAD MFR MODEL IMPLANT DATE  Atrial [employer]tr[employer] 5076 CapsureFix Novus MRI  RV LBBAP/Septum [employer] 3830 SelectSecure    I do not have the watchman info.

## 2025-03-31 LAB

## 2025-04-03 ENCOUNTER — TELEPHONE (OUTPATIENT)
Dept: FAMILY MEDICINE CLINIC | Age: 75
End: 2025-04-03

## 2025-04-03 NOTE — TELEPHONE ENCOUNTER
Patient's wife called the office stating that she will not attempt to schedule the MRI anymore as she is just getting the run around. Cardiology has not called her back and central scheduling can't schedule the MRI without information regarding the Pacer and Watchman.     Watchman-   [employer]  Occluder FLX Pro SZ 27MM Sys DIA12 FR  Model #: [id number]  Implanted: 7/15/2024    Pacer-   Medtronic   Lead: Atrial [employer] Inc 5076 CapsureFix Novus MRI   Lead: RV LBBAP/Septum [employer]tronic[employer] Inc 3830 SelectSecure    S/N: [id number]  Implanted: 12/29/2023     I was able to find the Watchman and Pacer information.   Called Latosha Martinez, the above information was given. She will follow up with MRI at [facility] then call the patient tomorrow.

## 2025-04-04 NOTE — TELEPHONE ENCOUNTER
Rosy, patient's wife, notified that scheduling will be reaching out to her today or early next week.

## 2025-04-09 ENCOUNTER — CARE COORDINATION (OUTPATIENT)
Dept: CARE COORDINATION | Age: 75
End: 2025-04-09

## 2025-04-09 NOTE — CARE COORDINATION
denies any changes with medications and reports taking all medications as prescribed.    Advance Care Planning:   Not reviewed during this call     Care Planning:   Not completed during this call    PCP/Specialist follow up:   Future Appointments         Provider Specialty Dept Phone    4/21/2025 3:30 PM (Arrive by 3:00 PM) [facility] MRI RM 1 Radiology [phone]    5/12/2025 9:45 AM Darci Manuel MD Neurology [phone]    5/14/2025 1:00 PM Constance Yancey PA Family Medicine [phone]    5/20/2025 3:15 PM Kamilla Duron APRN - CNP Cardiology [phone]    7/15/2025 11:00 AM Kelsey Flores MD Endocrinology [phone]    8/13/2025 10:30 AM ALETHEA ORTEGA DEVICE CHECK Cardiology [phone]    8/13/2025 10:30 AM Ericka Valdez APRN - CNP Cardiology [phone]            Follow Up:   Plan for next AC outreach in approximately 2 weeks to complete:  - disease specific assessments  - medication review   - goal progression  - education   - follow up MRI appointment on 4/21 for MRI  Caregiver is agreeable to this plan.

## 2025-04-21 DIAGNOSIS — E11.41 DIABETIC MONONEUROPATHY ASSOCIATED WITH TYPE 2 DIABETES MELLITUS (HCC): ICD-10-CM

## 2025-04-21 DIAGNOSIS — G89.29 CHRONIC PAIN OF LEFT KNEE: ICD-10-CM

## 2025-04-21 DIAGNOSIS — M25.562 CHRONIC PAIN OF LEFT KNEE: ICD-10-CM

## 2025-04-21 DIAGNOSIS — G25.81 RESTLESS LEG SYNDROME: ICD-10-CM

## 2025-04-21 RX ORDER — PREGABALIN 50 MG/1
CAPSULE ORAL
Qty: 60 CAPSULE | Refills: 0 | OUTPATIENT
Start: 2025-04-21

## 2025-04-21 NOTE — TELEPHONE ENCOUNTER
Refill Request - Controlled Substance    CONFIRM preferred pharmacy with the patient.    If Mail Order Rx - Pend for 90 day refill.        Last Seen Department: 3/27/2025  Last Seen by PCP: 3/27/2025    Last Written: 03/27/2025 60 cap 0 refills     Last UDS: 03/27/2025    Med Agreement Signed On: 03/27/2025    If no future appointment scheduled:  Review the last OV with PCP and review information for follow-up visit,  Route STAFF MESSAGE with patient name to the  Pool for scheduling with the following information:            -  Timing of next visit           -  Visit type ie Physical, OV, etc           -  Diagnoses/Reason ie. COPD, HTN - Do not use MEDICATION, Follow-up or CHECK UP - Give reason for visit        Next Appointment:   Future Appointments   Date Time Provider Department Center   5/12/2025  9:45 AM Darci Manuel MD [facility] Neurology -   5/14/2025  1:00 PM Constance Yancey PA [facility]   5/20/2025  3:15 PM Kamilla Duron APRN - CNP [facility]   7/15/2025 11:00 AM Kelsey Flores MD AND ENDO [facility]   8/13/2025 10:30 AM SCHEDULE, ALETHEA DEVICE CHECK [facility]   8/13/2025 10:30 AM Ericka Valdez APRN - CNP [facility]       Message sent to  to schedule appt with patient?  NO      Requested Prescriptions     Pending Prescriptions Disp Refills    pregabalin (LYRICA) 50 MG capsule [Pharmacy Med Name: Pregabalin 50 MG Oral Capsule] 60 capsule 0     Sig: TAKE 1 CAPSULE BY MOUTH TWICE DAILY FOR 30 DAYS

## 2025-04-22 DIAGNOSIS — G25.81 RESTLESS LEG SYNDROME: ICD-10-CM

## 2025-04-22 DIAGNOSIS — E11.41 DIABETIC MONONEUROPATHY ASSOCIATED WITH TYPE 2 DIABETES MELLITUS (HCC): ICD-10-CM

## 2025-04-22 NOTE — TELEPHONE ENCOUNTER
Refill Request - Controlled Substance    CONFIRM preferred pharmacy with the patient.    If Mail Order Rx - Pend for 90 day refill.        Last Seen Department: 3/27/2025  Last Seen by PCP: 3/27/2025    Last Written: 3/27/25 #60 - 0 REFILLS     Last UDS: 3/27/25    Med Agreement Signed On: 3/27/25    If no future appointment scheduled:  Review the last OV with PCP and review information for follow-up visit,  Route STAFF MESSAGE with patient name to the  Pool for scheduling with the following information:            -  Timing of next visit           -  Visit type ie Physical, OV, etc           -  Diagnoses/Reason ie. COPD, HTN - Do not use MEDICATION, Follow-up or CHECK UP - Give reason for visit        Next Appointment:   Future Appointments   Date Time Provider Department Center   5/12/2025  9:45 AM Darci Manuel MD AND [facility]O Neurology -   5/14/2025  1:00 PM Constance Yancey PA [facility]   5/20/2025  3:15 PM Kamilla Duron APRN - CNP [facility] Car [facility]   7/15/2025 11:00 AM Kelsey Flores MD AND ENDO [facility]   8/13/2025 10:30 AM SCHEDULE, ALETHEA DEVICE CHECK Ale[facility] Car [facility]   8/13/2025 10:30 AM Ericka Valdez APRN - CNP [facility]       Message sent to  to schedule appt with patient?  NO      Requested Prescriptions     Pending Prescriptions Disp Refills    pregabalin (LYRICA) 50 MG capsule 60 capsule 0     Sig: Take 1 capsule by mouth 2 times daily for 30 days. Max Daily Amount: 100 mg

## 2025-04-22 NOTE — TELEPHONE ENCOUNTER
Refill Request - Controlled Substance    CONFIRM preferred pharmacy with the patient.    If Mail Order Rx - Pend for 90 day refill.        Last Seen Department: 3/27/2025  Last Seen by PCP: 3/27/2025    Last Written: 3/20/25 60 with no refills     Last UDS: 3/27/25    Med Agreement Signed On: 3/27/25    If no future appointment scheduled:  Review the last OV with PCP and review information for follow-up visit,  Route STAFF MESSAGE with patient name to the  Pool for scheduling with the following information:            -  Timing of next visit           -  Visit type ie Physical, OV, etc           -  Diagnoses/Reason ie. COPD, HTN - Do not use MEDICATION, Follow-up or CHECK UP - Give reason for visit        Next Appointment:   Future Appointments   Date Time Provider Department Center   5/12/2025  9:45 AM Darci Manuel MD [facility] Neurology -   5/14/2025  1:00 PM Constance Yancey PA [facility]   5/20/2025  3:15 PM Kamilla Duron APRN - CNP Ale[facility]a Car [facility]   7/15/2025 11:00 AM Kelsey Flores MD [facility] ENDO [facility]   8/13/2025 10:30 AM SCHEDULE, ALETHEA DEVICE CHECK Ale[facility]a Car [facility]   8/13/2025 10:30 AM Ericka Valdez APRN - CNP [facility]       Message sent to  to schedule appt with patient?  NO      Requested Prescriptions     Pending Prescriptions Disp Refills    gabapentin (NEURONTIN) 600 MG tablet [Pharmacy Med Name: GABAPENTIN 600MG    TAB] 60 tablet 0     Sig: Take 1 tablet by mouth 2 times daily.

## 2025-04-23 ENCOUNTER — CARE COORDINATION (OUTPATIENT)
Dept: CARE COORDINATION | Age: 75
End: 2025-04-23

## 2025-04-23 RX ORDER — GABAPENTIN 600 MG/1
600 TABLET ORAL 2 TIMES DAILY
Qty: 60 TABLET | Refills: 0 | Status: SHIPPED | OUTPATIENT
Start: 2025-04-23 | End: 2025-05-23

## 2025-04-23 NOTE — CARE COORDINATION
Ambulatory Care Coordination Note     2025 2:26 PM     Patient Current Location:  [facility] [place]  [place] [address]     ACM contacted the patient by telephone. Verified name and  with patient as identifiers.         ACM: Yolie Messer     Challenges to be reviewed by the provider   Additional needs identified to be addressed with provider No  none               Method of communication with provider: none.    Utilization: Patient has not had any utilization since our last call.     Care Summary Note: States he's doing well. Reports BS 130s and denies hypoglycemia since reducing lantus to 15 units. Denies CP, palpitations, SOB, lightheadedness, dizziness, or swelling. He does not weigh himself daily. Discussed importance of daily weights and when to call the doctor. Reports taking meds as prescribed. States he's getting around well without assistive device. Denies questions or concerns at this time.     Offered patient enrollment in the Remote Patient Monitoring (RPM) program for in-home monitoring: deferred.    Medications Reviewed:   Discussed med changes.     Advance Care Planning:   Not reviewed during this call     Care Planning:   Not completed during this call    PCP/Specialist follow up:   Future Appointments         Provider Specialty Dept Phone    2025 9:45 AM Darci Manuel MD Neurology [phone]    2025 1:00 PM Constance Yancey PA Family Medicine [phone]    2025 3:15 PM Kamilla Duron APRN - CNP Cardiology [phone]    7/15/2025 11:00 AM Kelsey Flores MD Endocrinology [phone]    2025 10:30 AM ALETHEA ORTEGA DEVICE CHECK Cardiology [phone]    2025 10:30 AM Ericka Valdez APRN - CNP Cardiology [phone]            Follow Up:   Plan for next ACM outreach in approximately 1 week

## 2025-04-23 NOTE — TELEPHONE ENCOUNTER
Please call to cancel script patient is now on lyrica..   Please make sure patient knows not to take both.

## 2025-04-24 ENCOUNTER — TELEPHONE (OUTPATIENT)
Dept: CARDIOLOGY CLINIC | Age: 75
End: 2025-04-24

## 2025-04-24 RX ORDER — PREGABALIN 50 MG/1
50 CAPSULE ORAL 2 TIMES DAILY
Qty: 60 CAPSULE | Refills: 0 | Status: SHIPPED | OUTPATIENT
Start: 2025-04-24 | End: 2025-05-24

## 2025-04-24 NOTE — TELEPHONE ENCOUNTER
London Swenson, 1950    Cardiac Risk Assessment    What type of procedure are you having?  Colonoscopy     When is your procedure scheduled for?  5/13/25    Medications to be stopped.  Asa x2-4 days    What physician is performing your procedure?  Dr. Virk    Phone Number:   [phone]    Fax number to send the letter:   [phone]    Cardiologist:   SANDRINE    Last Appointment:   2/17/25    Next Appointment:   5/20/25 ERIN

## 2025-04-24 NOTE — TELEPHONE ENCOUNTER
Ok to proceed with colonoscopy from cardiac standpoint. Intermediate risk. Recommend holding ASA x 3 days and then resume post procedure if cleared by GI.

## 2025-04-28 ENCOUNTER — APPOINTMENT (OUTPATIENT)
Dept: GENERAL RADIOLOGY | Age: 75
DRG: 291 | End: 2025-04-28
Payer: MEDICARE

## 2025-04-28 ENCOUNTER — HOSPITAL ENCOUNTER (INPATIENT)
Age: 75
LOS: 3 days | Discharge: HOME | DRG: 291 | End: 2025-05-01
Attending: STUDENT IN AN ORGANIZED HEALTH CARE EDUCATION/TRAINING PROGRAM | Admitting: INTERNAL MEDICINE
Payer: MEDICARE

## 2025-04-28 DIAGNOSIS — R07.9 CHEST PAIN, UNSPECIFIED TYPE: Primary | ICD-10-CM

## 2025-04-28 DIAGNOSIS — I42.9 CARDIOMYOPATHY, UNSPECIFIED TYPE (HCC): ICD-10-CM

## 2025-04-28 DIAGNOSIS — R09.02 HYPOXEMIA: ICD-10-CM

## 2025-04-28 LAB
ALBUMIN SERPL-MCNC: 4.4 G/DL (ref 3.4–5)
ALBUMIN/GLOB SERPL: 2.1 {RATIO} (ref 1.1–2.2)
ALP SERPL-CCNC: 128 U/L (ref 40–129)
ALT SERPL-CCNC: 20 U/L (ref 10–40)
ANION GAP SERPL CALCULATED.3IONS-SCNC: 9 MMOL/L (ref 3–16)
AST SERPL-CCNC: 19 U/L (ref 15–37)
BASE EXCESS BLDV CALC-SCNC: 2.9 MMOL/L (ref -3–3)
BASOPHILS # BLD: 0 K/UL (ref 0–0.2)
BASOPHILS NFR BLD: 0.8 %
BILIRUB SERPL-MCNC: 0.6 MG/DL (ref 0–1)
BUN SERPL-MCNC: 26 MG/DL (ref 7–20)
CALCIUM SERPL-MCNC: 9 MG/DL (ref 8.3–10.6)
CHLORIDE SERPL-SCNC: 101 MMOL/L (ref 99–110)
CO2 BLDV-SCNC: 31 MMOL/L
CO2 SERPL-SCNC: 28 MMOL/L (ref 21–32)
COHGB MFR BLDV: 2 % (ref 0–1.5)
CREAT SERPL-MCNC: 1.2 MG/DL (ref 0.8–1.3)
D-DIMER QUANTITATIVE: 0.55 UG/ML FEU (ref 0–0.6)
DEPRECATED RDW RBC AUTO: 14.3 % (ref 12.4–15.4)
EKG ATRIAL RATE: 67 BPM
EKG DIAGNOSIS: NORMAL
EKG P AXIS: 47 DEGREES
EKG P-R INTERVAL: 200 MS
EKG Q-T INTERVAL: 412 MS
EKG QRS DURATION: 76 MS
EKG QTC CALCULATION (BAZETT): 435 MS
EKG R AXIS: 61 DEGREES
EKG T AXIS: 47 DEGREES
EKG VENTRICULAR RATE: 67 BPM
EOSINOPHIL # BLD: 0.3 K/UL (ref 0–0.6)
EOSINOPHIL NFR BLD: 6.5 %
FLUAV RNA RESP QL NAA+PROBE: NOT DETECTED
FLUBV RNA RESP QL NAA+PROBE: NOT DETECTED
GFR SERPLBLD CREATININE-BSD FMLA CKD-EPI: 63 ML/MIN/{1.73_M2}
GLUCOSE BLD-MCNC: 281 MG/DL (ref 70–99)
GLUCOSE BLD-MCNC: 320 MG/DL (ref 70–99)
GLUCOSE SERPL-MCNC: 403 MG/DL (ref 70–99)
HCO3 BLDV-SCNC: 29 MMOL/L (ref 23–29)
HCT VFR BLD AUTO: 37 % (ref 40.5–52.5)
HGB BLD-MCNC: 12.3 G/DL (ref 13.5–17.5)
INR PPP: 0.97 (ref 0.85–1.15)
LYMPHOCYTES # BLD: 0.9 K/UL (ref 1–5.1)
LYMPHOCYTES NFR BLD: 16.2 %
MCH RBC QN AUTO: 29.5 PG (ref 26–34)
MCHC RBC AUTO-ENTMCNC: 33.1 G/DL (ref 31–36)
MCV RBC AUTO: 89.1 FL (ref 80–100)
METHGB MFR BLDV: 0.2 %
MONOCYTES # BLD: 0.4 K/UL (ref 0–1.3)
MONOCYTES NFR BLD: 8 %
NEUTROPHILS # BLD: 3.7 K/UL (ref 1.7–7.7)
NEUTROPHILS NFR BLD: 68.5 %
NT-PROBNP SERPL-MCNC: 116 PG/ML (ref 0–449)
O2 THERAPY: ABNORMAL
PCO2 BLDV: 51.5 MMHG (ref 40–50)
PERFORMED ON: ABNORMAL
PERFORMED ON: ABNORMAL
PH BLDV: 7.37 [PH] (ref 7.35–7.45)
PLATELET # BLD AUTO: 156 K/UL (ref 135–450)
PMV BLD AUTO: 9.7 FL (ref 5–10.5)
PO2 BLDV: 54.4 MMHG (ref 25–40)
POTASSIUM SERPL-SCNC: 4.5 MMOL/L (ref 3.5–5.1)
PROT SERPL-MCNC: 6.5 G/DL (ref 6.4–8.2)
PROTHROMBIN TIME: 13.1 SEC (ref 11.9–14.9)
RBC # BLD AUTO: 4.16 M/UL (ref 4.2–5.9)
SAO2 % BLDV: 85 %
SARS-COV-2 RNA RESP QL NAA+PROBE: NOT DETECTED
SODIUM SERPL-SCNC: 138 MMOL/L (ref 136–145)
TROPONIN, HIGH SENSITIVITY: 17 NG/L (ref 0–22)
TROPONIN, HIGH SENSITIVITY: 18 NG/L (ref 0–22)
WBC # BLD AUTO: 5.4 K/UL (ref 4–11)

## 2025-04-28 PROCEDURE — 2700000000 HC OXYGEN THERAPY PER DAY

## 2025-04-28 PROCEDURE — 6360000002 HC RX W HCPCS: Performed by: INTERNAL MEDICINE

## 2025-04-28 PROCEDURE — 80053 COMPREHEN METABOLIC PANEL: CPT

## 2025-04-28 PROCEDURE — 85025 COMPLETE CBC W/AUTO DIFF WBC: CPT

## 2025-04-28 PROCEDURE — 6370000000 HC RX 637 (ALT 250 FOR IP): Performed by: INTERNAL MEDICINE

## 2025-04-28 PROCEDURE — 85379 FIBRIN DEGRADATION QUANT: CPT

## 2025-04-28 PROCEDURE — 83880 ASSAY OF NATRIURETIC PEPTIDE: CPT

## 2025-04-28 PROCEDURE — 93010 ELECTROCARDIOGRAM REPORT: CPT | Performed by: INTERNAL MEDICINE

## 2025-04-28 PROCEDURE — 2500000003 HC RX 250 WO HCPCS: Performed by: INTERNAL MEDICINE

## 2025-04-28 PROCEDURE — 85610 PROTHROMBIN TIME: CPT

## 2025-04-28 PROCEDURE — 87636 SARSCOV2 & INF A&B AMP PRB: CPT

## 2025-04-28 PROCEDURE — 82803 BLOOD GASES ANY COMBINATION: CPT

## 2025-04-28 PROCEDURE — 36415 COLL VENOUS BLD VENIPUNCTURE: CPT

## 2025-04-28 PROCEDURE — 99285 EMERGENCY DEPT VISIT HI MDM: CPT

## 2025-04-28 PROCEDURE — 71045 X-RAY EXAM CHEST 1 VIEW: CPT

## 2025-04-28 PROCEDURE — 93005 ELECTROCARDIOGRAM TRACING: CPT

## 2025-04-28 PROCEDURE — 94761 N-INVAS EAR/PLS OXIMETRY MLT: CPT

## 2025-04-28 PROCEDURE — 84484 ASSAY OF TROPONIN QUANT: CPT

## 2025-04-28 PROCEDURE — 1200000000 HC SEMI PRIVATE

## 2025-04-28 RX ORDER — GABAPENTIN 300 MG/1
600 CAPSULE ORAL 2 TIMES DAILY
Status: DISCONTINUED | OUTPATIENT
Start: 2025-04-28 | End: 2025-05-01 | Stop reason: HOSPADM

## 2025-04-28 RX ORDER — SODIUM CHLORIDE 9 MG/ML
INJECTION, SOLUTION INTRAVENOUS PRN
Status: DISCONTINUED | OUTPATIENT
Start: 2025-04-28 | End: 2025-05-01 | Stop reason: HOSPADM

## 2025-04-28 RX ORDER — GLUCAGON 1 MG/ML
1 KIT INJECTION PRN
Status: DISCONTINUED | OUTPATIENT
Start: 2025-04-28 | End: 2025-05-01 | Stop reason: HOSPADM

## 2025-04-28 RX ORDER — FUROSEMIDE 10 MG/ML
40 INJECTION INTRAMUSCULAR; INTRAVENOUS 2 TIMES DAILY
Status: DISCONTINUED | OUTPATIENT
Start: 2025-04-28 | End: 2025-05-01 | Stop reason: HOSPADM

## 2025-04-28 RX ORDER — ONDANSETRON 2 MG/ML
4 INJECTION INTRAMUSCULAR; INTRAVENOUS EVERY 6 HOURS PRN
Status: DISCONTINUED | OUTPATIENT
Start: 2025-04-28 | End: 2025-05-01 | Stop reason: HOSPADM

## 2025-04-28 RX ORDER — METOPROLOL TARTRATE 25 MG/1
25 TABLET, FILM COATED ORAL 2 TIMES DAILY
Status: DISCONTINUED | OUTPATIENT
Start: 2025-04-28 | End: 2025-05-01 | Stop reason: HOSPADM

## 2025-04-28 RX ORDER — PRAMIPEXOLE DIHYDROCHLORIDE 0.25 MG/1
0.75 TABLET ORAL NIGHTLY
Status: DISCONTINUED | OUTPATIENT
Start: 2025-04-28 | End: 2025-05-01 | Stop reason: HOSPADM

## 2025-04-28 RX ORDER — MAGNESIUM SULFATE IN WATER 40 MG/ML
2000 INJECTION, SOLUTION INTRAVENOUS PRN
Status: DISCONTINUED | OUTPATIENT
Start: 2025-04-28 | End: 2025-05-01 | Stop reason: HOSPADM

## 2025-04-28 RX ORDER — ATORVASTATIN CALCIUM 40 MG/1
40 TABLET, FILM COATED ORAL NIGHTLY
Status: DISCONTINUED | OUTPATIENT
Start: 2025-04-28 | End: 2025-05-01 | Stop reason: HOSPADM

## 2025-04-28 RX ORDER — PREGABALIN 25 MG/1
50 CAPSULE ORAL 2 TIMES DAILY
Status: DISCONTINUED | OUTPATIENT
Start: 2025-04-28 | End: 2025-05-01 | Stop reason: HOSPADM

## 2025-04-28 RX ORDER — PRAMIPEXOLE DIHYDROCHLORIDE 0.25 MG/1
0.5 TABLET ORAL NIGHTLY
Status: DISCONTINUED | OUTPATIENT
Start: 2025-04-28 | End: 2025-04-28 | Stop reason: SDUPTHER

## 2025-04-28 RX ORDER — POTASSIUM CHLORIDE 7.45 MG/ML
10 INJECTION INTRAVENOUS PRN
Status: DISCONTINUED | OUTPATIENT
Start: 2025-04-28 | End: 2025-05-01 | Stop reason: HOSPADM

## 2025-04-28 RX ORDER — SODIUM CHLORIDE 0.9 % (FLUSH) 0.9 %
5-40 SYRINGE (ML) INJECTION PRN
Status: DISCONTINUED | OUTPATIENT
Start: 2025-04-28 | End: 2025-05-01 | Stop reason: HOSPADM

## 2025-04-28 RX ORDER — PROMETHAZINE HYDROCHLORIDE 25 MG/1
12.5 TABLET ORAL EVERY 6 HOURS PRN
Status: DISCONTINUED | OUTPATIENT
Start: 2025-04-28 | End: 2025-05-01 | Stop reason: HOSPADM

## 2025-04-28 RX ORDER — TORSEMIDE 20 MG/1
40 TABLET ORAL DAILY
Status: DISCONTINUED | OUTPATIENT
Start: 2025-04-29 | End: 2025-04-28

## 2025-04-28 RX ORDER — ACETAMINOPHEN 650 MG/1
650 SUPPOSITORY RECTAL EVERY 6 HOURS PRN
Status: DISCONTINUED | OUTPATIENT
Start: 2025-04-28 | End: 2025-05-01 | Stop reason: HOSPADM

## 2025-04-28 RX ORDER — PANTOPRAZOLE SODIUM 40 MG/1
40 TABLET, DELAYED RELEASE ORAL
Status: DISCONTINUED | OUTPATIENT
Start: 2025-04-29 | End: 2025-05-01 | Stop reason: HOSPADM

## 2025-04-28 RX ORDER — POLYETHYLENE GLYCOL 3350 17 G/17G
17 POWDER, FOR SOLUTION ORAL DAILY PRN
Status: DISCONTINUED | OUTPATIENT
Start: 2025-04-28 | End: 2025-05-01 | Stop reason: HOSPADM

## 2025-04-28 RX ORDER — ASPIRIN 325 MG
325 TABLET ORAL DAILY
Status: DISCONTINUED | OUTPATIENT
Start: 2025-04-29 | End: 2025-05-01 | Stop reason: HOSPADM

## 2025-04-28 RX ORDER — SPIRONOLACTONE 25 MG/1
25 TABLET ORAL DAILY
Status: DISCONTINUED | OUTPATIENT
Start: 2025-04-29 | End: 2025-05-01 | Stop reason: HOSPADM

## 2025-04-28 RX ORDER — PANTOPRAZOLE SODIUM 40 MG/1
TABLET, DELAYED RELEASE ORAL
Qty: 60 TABLET | Refills: 0 | Status: SHIPPED | OUTPATIENT
Start: 2025-04-28

## 2025-04-28 RX ORDER — ENOXAPARIN SODIUM 100 MG/ML
30 INJECTION SUBCUTANEOUS 2 TIMES DAILY
Status: DISCONTINUED | OUTPATIENT
Start: 2025-04-28 | End: 2025-05-01 | Stop reason: HOSPADM

## 2025-04-28 RX ORDER — ACETAMINOPHEN 325 MG/1
650 TABLET ORAL EVERY 6 HOURS PRN
Status: DISCONTINUED | OUTPATIENT
Start: 2025-04-28 | End: 2025-05-01 | Stop reason: HOSPADM

## 2025-04-28 RX ORDER — DOFETILIDE 0.25 MG/1
250 CAPSULE ORAL EVERY 12 HOURS SCHEDULED
Status: DISCONTINUED | OUTPATIENT
Start: 2025-04-28 | End: 2025-05-01 | Stop reason: HOSPADM

## 2025-04-28 RX ORDER — SODIUM CHLORIDE 0.9 % (FLUSH) 0.9 %
5-40 SYRINGE (ML) INJECTION EVERY 12 HOURS SCHEDULED
Status: DISCONTINUED | OUTPATIENT
Start: 2025-04-28 | End: 2025-05-01 | Stop reason: HOSPADM

## 2025-04-28 RX ORDER — LANOLIN ALCOHOL/MO/W.PET/CERES
400 CREAM (GRAM) TOPICAL DAILY
Status: DISCONTINUED | OUTPATIENT
Start: 2025-04-29 | End: 2025-05-01 | Stop reason: HOSPADM

## 2025-04-28 RX ORDER — INSULIN GLARGINE 100 [IU]/ML
17 INJECTION, SOLUTION SUBCUTANEOUS NIGHTLY
Status: DISCONTINUED | OUTPATIENT
Start: 2025-04-28 | End: 2025-05-01 | Stop reason: HOSPADM

## 2025-04-28 RX ORDER — DEXTROSE MONOHYDRATE 100 MG/ML
INJECTION, SOLUTION INTRAVENOUS CONTINUOUS PRN
Status: DISCONTINUED | OUTPATIENT
Start: 2025-04-28 | End: 2025-05-01 | Stop reason: HOSPADM

## 2025-04-28 RX ORDER — INSULIN LISPRO 100 [IU]/ML
0-8 INJECTION, SOLUTION INTRAVENOUS; SUBCUTANEOUS
Status: DISCONTINUED | OUTPATIENT
Start: 2025-04-28 | End: 2025-04-30

## 2025-04-28 RX ORDER — POTASSIUM CHLORIDE 1500 MG/1
40 TABLET, EXTENDED RELEASE ORAL PRN
Status: DISCONTINUED | OUTPATIENT
Start: 2025-04-28 | End: 2025-05-01 | Stop reason: HOSPADM

## 2025-04-28 RX ADMIN — DOFETILIDE 250 MCG: 0.25 CAPSULE ORAL at 21:46

## 2025-04-28 RX ADMIN — METOPROLOL TARTRATE 25 MG: 25 TABLET, FILM COATED ORAL at 21:46

## 2025-04-28 RX ADMIN — INSULIN LISPRO 4 UNITS: 100 INJECTION, SOLUTION INTRAVENOUS; SUBCUTANEOUS at 21:46

## 2025-04-28 RX ADMIN — ATORVASTATIN CALCIUM 40 MG: 40 TABLET, FILM COATED ORAL at 21:45

## 2025-04-28 RX ADMIN — PREGABALIN 50 MG: 25 CAPSULE ORAL at 21:45

## 2025-04-28 RX ADMIN — PRAMIPEXOLE DIHYDROCHLORIDE 0.75 MG: 0.25 TABLET ORAL at 21:49

## 2025-04-28 RX ADMIN — Medication 10 ML: at 21:47

## 2025-04-28 RX ADMIN — GABAPENTIN 600 MG: 300 CAPSULE ORAL at 21:45

## 2025-04-28 RX ADMIN — ENOXAPARIN SODIUM 30 MG: 100 INJECTION SUBCUTANEOUS at 21:49

## 2025-04-28 RX ADMIN — FUROSEMIDE 40 MG: 10 INJECTION, SOLUTION INTRAMUSCULAR; INTRAVENOUS at 21:46

## 2025-04-28 RX ADMIN — INSULIN GLARGINE 17 UNITS: 100 INJECTION, SOLUTION SUBCUTANEOUS at 21:46

## 2025-04-28 ASSESSMENT — PAIN SCALES - GENERAL
PAINLEVEL_OUTOF10: 6
PAINLEVEL_OUTOF10: 0

## 2025-04-28 ASSESSMENT — PAIN DESCRIPTION - ORIENTATION: ORIENTATION: MID

## 2025-04-28 ASSESSMENT — PAIN DESCRIPTION - DESCRIPTORS: DESCRIPTORS: ACHING

## 2025-04-28 ASSESSMENT — PAIN DESCRIPTION - LOCATION: LOCATION: CHEST

## 2025-04-28 ASSESSMENT — PAIN - FUNCTIONAL ASSESSMENT: PAIN_FUNCTIONAL_ASSESSMENT: 0-10

## 2025-04-28 NOTE — ED NOTES
London Swenson is a 74 y.o. male admitted for  Principal Problem:    Chest pain  Resolved Problems:    * No resolved hospital problems. *  .   Patient Home via EMS transportation with   Chief Complaint   Patient presents with    Chest Pain     Chest pain ongoing for a week. Shortness of breath. 6/10 pain. Took full dose aspirin this morning.    .  Patient is alert and Person, Place, Time, and Situation  Patient's baseline mobility: Baseline Mobility: Independent   Code Status: Prior   Cardiac Rhythm: Cardiac Rhythm: Sinus rhythm  O2 Flow Rate (L/min): 2 L/min  Is patient on baseline Oxygen: no how many Liters   :   Abnormal Assessment Findings: hypoxia, chest pain, new O2 requirement on 2L    Isolation: None      NIH Score:    C-SSRS: Risk of Suicide: No Risk  Bedside swallow:        Active LDA's:   Peripheral IV 04/28/25 Right Antecubital (Active)           Family/Caregiver Present yes Any Concerns: no   Restraints no  Sitter no         Vitals: MEWS Score: 1    Vitals:    04/28/25 1635 04/28/25 1705 04/28/25 1737 04/28/25 1805   BP: 128/81 128/82 129/86 130/83   Pulse: 61 62 62 62   Resp: 14 14 13 21   Temp:       TempSrc:       SpO2: 95% 97% 97% 98%   Weight:       Height:           Last documented pain score (0-10 scale) Pain Level: 6  Pain medication administered No.    Pertinent or High Risk Medications/Drips: No.    Pending Blood Product Administration: no    Abnormal labs:   Abnormal Labs Reviewed   CBC WITH AUTO DIFFERENTIAL - Abnormal; Notable for the following components:       Result Value    RBC 4.16 (*)     Hemoglobin 12.3 (*)     Hematocrit 37.0 (*)     Lymphocytes Absolute 0.9 (*)     All other components within normal limits   COMPREHENSIVE METABOLIC PANEL W/ REFLEX TO MG FOR LOW K - Abnormal; Notable for the following components:    Glucose 403 (*)     BUN 26 (*)     All other components within normal limits   BLOOD GAS, VENOUS - Abnormal; Notable for the following components:    pCO2, Jerzy 51.5 (*)

## 2025-04-28 NOTE — ED PROVIDER NOTES
[facility] EMERGENCY DEPARTMENT      CHIEF COMPLAINT  Chest Pain (Chest pain ongoing for a week. Shortness of breath. 6/10 pain. Took full dose aspirin this morning. )       HISTORY OF PRESENT ILLNESS  London Swenson is a 74 y.o. male  who presents to the ED complaining of midsternal chest pain for the last week.  Indicates that he has been getting chest pain daily that is lasting for longer periods of time over the course of the last week.  States it is sometimes with exertion, sometimes at rest, does not necessarily seem worse with exertion.  Denies any nausea, cough.  Does feel more short of breath than usual.  Denies any fevers or chills, leg pain or swelling.    No other complaints, modifying factors or associated symptoms.     I have reviewed the following from the nursing documentation.    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 05/30/2019    Dementia ([facility])     Diabetes mellitus ([facility])     Diabetic autonomic neuropathy associated with type 2 diabetes mellitus ([facility]) 03/03/2020    Hyperlipidemia     Hypertension     Intractable vomiting with nausea 02/04/2025    Pancreatitis 1996    Restless legs     Sleep apnea     uses CPAP    Tremor     of bilateral hands     Past Surgical History:   Procedure Laterality Date    CARDIAC DEFIBRILLATOR PLACEMENT      CATARACT REMOVAL Bilateral 2013    CHOLECYSTECTOMY      COLONOSCOPY  10/26/2011    ENDOSCOPY, COLON, DIAGNOSTIC      EGD halo    EP DEVICE PROCEDURE N/A 7/15/2024    Watchman keaton closure device performed by Bayron Montoya MD at [facility] CARDIAC CATH LAB    HYDROCELE EXCISION  11/15/2016    LARYNGOSCOPY N/A 10/25/2023    DRUG INDUCED SLEEP ENDOSCOPY performed by David Estes DO at [facility] ASC OR    PANCREAS SURGERY       included in the SEP-1 Core Measure due to severe sepsis or septic shock?   No     Exclusion criteria - the patient is NOT to be included for SEP-1 Core Measure due to:  Infection is not suspected    During the patient's ED course, the patient was given:  Medications - No data to display     IReece DO,  am the primary clinician of record.    CLINICAL IMPRESSION  1. Chest pain, unspecified type    2. Hypoxemia        Blood pressure 122/67, pulse 69, temperature 98.1 °F (36.7 °C), temperature source Oral, resp. rate 18, height 1.778 m (5' 10\"), weight 127 kg (280 lb), SpO2 92%.    DISPOSITION  London Swenson was admitted in stable condition.      Patient was given scripts for the following medications. I counseled patient how to take these medications.   New Prescriptions    No medications on file       Follow-up with:  No follow-up provider specified.     Reece Bailey,   04/28/25 3751

## 2025-04-28 NOTE — TELEPHONE ENCOUNTER
Refill Request     CONFIRM preferred pharmacy with the patient.    If Mail Order Rx - Pend for 90 day refill.      Last Seen: Last Seen Department: 3/27/2025  Last Seen by PCP: 3/27/2025    Last Written: 4/4/24  60 with 5 refills     If no future appointment scheduled:  Review the last OV with PCP and review information for follow-up visit,  Route STAFF MESSAGE with patient name to the  Pool for scheduling with the following information:            -  Timing of next visit           -  Visit type ie Physical, OV, etc           -  Diagnoses/Reason ie. COPD, HTN - Do not use MEDICATION, Follow-up or CHECK UP - Give reason for visit      Next Appointment:   Future Appointments   Date Time Provider Department Center   5/12/2025  9:45 AM Darci Manuel MD AND NEURO Neurology -   5/14/2025  1:00 PM Constance Yancey PA [facility]   5/20/2025  3:15 PM Kamilla Duron APRN - CNP [facility]   7/15/2025 11:00 AM Kelsey Flores MD AND ENDO [facility]   8/13/2025 10:30 AM SCHEDULE, ALETHEA DEVICE CHECK [facility]   8/13/2025 10:30 AM Ericka Valdez, APRN - CNP [facility]       Message sent to  to schedule appt with patient?  NO      Requested Prescriptions     Pending Prescriptions Disp Refills    pantoprazole (PROTONIX) 40 MG tablet [Pharmacy Med Name: Pantoprazole Sodium 40 MG Oral Tablet Delayed Release] 60 tablet 0     Sig: TAKE 1 TABLET BY MOUTH TWICE DAILY BEFORE MEAL(S)

## 2025-04-28 NOTE — ED NOTES
RN attempted to ambulate pt. Pt O2 87-88% sitting on the side of the bed on room air. DO Leo made aware of oxygen levels. Did not attempt to ambulate

## 2025-04-29 ENCOUNTER — APPOINTMENT (OUTPATIENT)
Age: 75
DRG: 291 | End: 2025-04-29
Attending: INTERNAL MEDICINE
Payer: MEDICARE

## 2025-04-29 LAB
ANION GAP SERPL CALCULATED.3IONS-SCNC: 9 MMOL/L (ref 3–16)
BUN SERPL-MCNC: 22 MG/DL (ref 7–20)
CALCIUM SERPL-MCNC: 8.8 MG/DL (ref 8.3–10.6)
CHLORIDE SERPL-SCNC: 99 MMOL/L (ref 99–110)
CO2 SERPL-SCNC: 30 MMOL/L (ref 21–32)
CREAT SERPL-MCNC: 1.1 MG/DL (ref 0.8–1.3)
DEPRECATED RDW RBC AUTO: 14.2 % (ref 12.4–15.4)
ECHO BSA: 2.5 M2
ECHO IVC PROX: 1.5 CM
ECHO LV EDV A2C: 94 ML
ECHO LV EDV A4C: 103 ML
ECHO LV EDV INDEX A4C: 43 ML/M2
ECHO LV EDV NDEX A2C: 40 ML/M2
ECHO LV EF PHYSICIAN: 56 %
ECHO LV EJECTION FRACTION A2C: 57 %
ECHO LV EJECTION FRACTION A4C: 55 %
ECHO LV EJECTION FRACTION BIPLANE: 56 % (ref 55–100)
ECHO LV ESV A2C: 41 ML
ECHO LV ESV A4C: 46 ML
ECHO LV ESV INDEX A2C: 17 ML/M2
ECHO LV ESV INDEX A4C: 19 ML/M2
ECHO RV BASAL DIMENSION: 3.3 CM
ECHO RV LONGITUDINAL DIMENSION: 8.5 CM
ECHO RV MID DIMENSION: 2.7 CM
ECHO RV TAPSE: 2.4 CM (ref 1.7–?)
GFR SERPLBLD CREATININE-BSD FMLA CKD-EPI: 70 ML/MIN/{1.73_M2}
GLUCOSE BLD-MCNC: 266 MG/DL (ref 70–99)
GLUCOSE BLD-MCNC: 301 MG/DL (ref 70–99)
GLUCOSE BLD-MCNC: 304 MG/DL (ref 70–99)
GLUCOSE BLD-MCNC: 311 MG/DL (ref 70–99)
GLUCOSE SERPL-MCNC: 286 MG/DL (ref 70–99)
HCT VFR BLD AUTO: 37.1 % (ref 40.5–52.5)
HGB BLD-MCNC: 12.3 G/DL (ref 13.5–17.5)
MAGNESIUM SERPL-MCNC: 1.85 MG/DL (ref 1.8–2.4)
MCH RBC QN AUTO: 29.6 PG (ref 26–34)
MCHC RBC AUTO-ENTMCNC: 33.3 G/DL (ref 31–36)
MCV RBC AUTO: 89.1 FL (ref 80–100)
PERFORMED ON: ABNORMAL
PLATELET # BLD AUTO: 145 K/UL (ref 135–450)
PMV BLD AUTO: 9.7 FL (ref 5–10.5)
POTASSIUM SERPL-SCNC: 4.2 MMOL/L (ref 3.5–5.1)
RBC # BLD AUTO: 4.16 M/UL (ref 4.2–5.9)
SODIUM SERPL-SCNC: 138 MMOL/L (ref 136–145)
TROPONIN, HIGH SENSITIVITY: 14 NG/L (ref 0–22)
WBC # BLD AUTO: 5.9 K/UL (ref 4–11)

## 2025-04-29 PROCEDURE — 6370000000 HC RX 637 (ALT 250 FOR IP): Performed by: INTERNAL MEDICINE

## 2025-04-29 PROCEDURE — 85027 COMPLETE CBC AUTOMATED: CPT

## 2025-04-29 PROCEDURE — 80048 BASIC METABOLIC PNL TOTAL CA: CPT

## 2025-04-29 PROCEDURE — 93308 TTE F-UP OR LMTD: CPT

## 2025-04-29 PROCEDURE — 6360000002 HC RX W HCPCS: Performed by: INTERNAL MEDICINE

## 2025-04-29 PROCEDURE — 1200000000 HC SEMI PRIVATE

## 2025-04-29 PROCEDURE — 94761 N-INVAS EAR/PLS OXIMETRY MLT: CPT

## 2025-04-29 PROCEDURE — 2500000003 HC RX 250 WO HCPCS: Performed by: INTERNAL MEDICINE

## 2025-04-29 PROCEDURE — 84484 ASSAY OF TROPONIN QUANT: CPT

## 2025-04-29 PROCEDURE — 83735 ASSAY OF MAGNESIUM: CPT

## 2025-04-29 PROCEDURE — 2700000000 HC OXYGEN THERAPY PER DAY

## 2025-04-29 PROCEDURE — 93308 TTE F-UP OR LMTD: CPT | Performed by: INTERNAL MEDICINE

## 2025-04-29 PROCEDURE — 36415 COLL VENOUS BLD VENIPUNCTURE: CPT

## 2025-04-29 PROCEDURE — 99222 1ST HOSP IP/OBS MODERATE 55: CPT | Performed by: INTERNAL MEDICINE

## 2025-04-29 RX ADMIN — Medication 10 ML: at 09:45

## 2025-04-29 RX ADMIN — ENOXAPARIN SODIUM 30 MG: 100 INJECTION SUBCUTANEOUS at 09:44

## 2025-04-29 RX ADMIN — PANTOPRAZOLE SODIUM 40 MG: 40 TABLET, DELAYED RELEASE ORAL at 05:14

## 2025-04-29 RX ADMIN — INSULIN LISPRO 6 UNITS: 100 INJECTION, SOLUTION INTRAVENOUS; SUBCUTANEOUS at 13:24

## 2025-04-29 RX ADMIN — ENOXAPARIN SODIUM 30 MG: 100 INJECTION SUBCUTANEOUS at 21:17

## 2025-04-29 RX ADMIN — INSULIN LISPRO 4 UNITS: 100 INJECTION, SOLUTION INTRAVENOUS; SUBCUTANEOUS at 09:42

## 2025-04-29 RX ADMIN — PREGABALIN 50 MG: 25 CAPSULE ORAL at 21:17

## 2025-04-29 RX ADMIN — FUROSEMIDE 40 MG: 10 INJECTION, SOLUTION INTRAMUSCULAR; INTRAVENOUS at 09:44

## 2025-04-29 RX ADMIN — Medication 10 ML: at 18:32

## 2025-04-29 RX ADMIN — PRAMIPEXOLE DIHYDROCHLORIDE 0.75 MG: 0.25 TABLET ORAL at 21:17

## 2025-04-29 RX ADMIN — Medication 400 MG: at 09:45

## 2025-04-29 RX ADMIN — SPIRONOLACTONE 25 MG: 25 TABLET ORAL at 09:44

## 2025-04-29 RX ADMIN — ASPIRIN 325 MG: 325 TABLET ORAL at 09:44

## 2025-04-29 RX ADMIN — INSULIN GLARGINE 17 UNITS: 100 INJECTION, SOLUTION SUBCUTANEOUS at 21:23

## 2025-04-29 RX ADMIN — METOPROLOL TARTRATE 25 MG: 25 TABLET, FILM COATED ORAL at 21:19

## 2025-04-29 RX ADMIN — ATORVASTATIN CALCIUM 40 MG: 40 TABLET, FILM COATED ORAL at 21:17

## 2025-04-29 RX ADMIN — INSULIN LISPRO 6 UNITS: 100 INJECTION, SOLUTION INTRAVENOUS; SUBCUTANEOUS at 21:27

## 2025-04-29 RX ADMIN — DOFETILIDE 250 MCG: 0.25 CAPSULE ORAL at 09:45

## 2025-04-29 RX ADMIN — DOFETILIDE 250 MCG: 0.25 CAPSULE ORAL at 21:17

## 2025-04-29 RX ADMIN — Medication 10 ML: at 21:19

## 2025-04-29 RX ADMIN — FUROSEMIDE 40 MG: 10 INJECTION, SOLUTION INTRAMUSCULAR; INTRAVENOUS at 18:27

## 2025-04-29 RX ADMIN — GABAPENTIN 600 MG: 300 CAPSULE ORAL at 21:17

## 2025-04-29 RX ADMIN — METOPROLOL TARTRATE 25 MG: 25 TABLET, FILM COATED ORAL at 09:45

## 2025-04-29 RX ADMIN — PREGABALIN 50 MG: 25 CAPSULE ORAL at 09:45

## 2025-04-29 RX ADMIN — GABAPENTIN 600 MG: 300 CAPSULE ORAL at 09:44

## 2025-04-29 RX ADMIN — INSULIN LISPRO 6 UNITS: 100 INJECTION, SOLUTION INTRAVENOUS; SUBCUTANEOUS at 18:27

## 2025-04-29 ASSESSMENT — PAIN SCALES - GENERAL: PAINLEVEL_OUTOF10: 0

## 2025-04-29 NOTE — CONSULTS
[facility] - INITIAL CONSULTATION        CHIEF COMPLAINT  Chest pain, shortness of breath      HISTORY OF PRESENTING ILLNESS  London Swenson is a 74 y.o. patient with history of chronic A-fib, watchman placement, sick sinus syndrome status post permanent pacemaker, HFpEF, nonobstructive CAD, diabetes, hypertension, hyperlipidemia, sleep apnea who presented to the hospital with complaints of new onset chest pain and shortness of breath.  He reports some shortness of breath at baseline but about a week ago he started to notice progressive worsening of his baseline shortness of breath.  Symptoms continued to worsen until he decided to come to the hospital yesterday.  He denies any fevers, chills, cough or other symptoms.  In addition to shortness of breath, he has also noted new onset chest chest pain that started a day before his presentation.  He reports chest pain as sharp, nonradiating without exertional component.  Since admission, after receiving 2 doses of IV Lasix, he reports somewhat improved dyspnea on exertion and denies any chest pain at the moment.  Of note, his weight at presentation is lower than his weight in the last few months including at the time of discharge from the hospital earlier this year.    11 beat NSVT noted on telemetry earlier this afternoon.      PAST MEDICAL HISTORY   has a past medical history of A-fib ([facility]), Acid reflux, Acute on chronic heart failure, unspecified heart failure type ([facility]), Yan's esophagus, CHF (congestive heart failure) ([facility]), Coronary artery disease of native heart with stable angina pectoris, Dementia ([facility]), Diabetes mellitus ([facility]), Diabetic autonomic neuropathy associated with type 2 diabetes mellitus ([facility]), Hyperlipidemia, Hypertension, Intractable vomiting with nausea, Pancreatitis, Restless legs, Sleep apnea, and Tremor.    SURGICAL HISTORY   has a past surgical history that includes Pancreas surgery; Cholecystectomy; Tonsillectomy; Roggen tooth

## 2025-04-29 NOTE — ACP (ADVANCE CARE PLANNING)
Advance Care Planning   General Advance Care Planning (ACP) Conversation    Date of Conversation: 4/28/2025  Conducted with: Patient with Decision Making Capacity  Other persons present: None    Healthcare Decision Maker:    Primary Decision Maker (Active): Rosy Swenson - Spouse - [phone]    Secondary Decision Maker: Arlene Hurt - Other Relative - [phone]    Today we documented Decision Maker(s) consistent with ACP documents on file.  Content/Action Overview:  Has ACP document(s) on file - reflects the patient's care preferences  Reviewed DNR/DNI and patient elects Full Code (Attempt Resuscitation)      Length of Voluntary ACP Conversation in minutes:  <16 minutes (Non-Billable)    Chelle Duron RN

## 2025-04-29 NOTE — H&P
Hospital Medicine History and Physical      Chief Complaint:  chest pain      History and Present Illness:  The patient is a pleasant 74 Y M with a BMI of 40 and h/o HTN, HLD, DM2, CAD, CHF, Afib s/p ablation and Watchman, and SSS s/p pacer.  Dementia is also listed in his medical history.  He presents with sharp chest pain which started today in his L chest.  Unrelated to exertion.  No diaphoresis.  No palpitations.     His chest pain is associated with dyspnea.  He had to be started on oxygen in the ED.  He has 2+ BLE pitting edema.  Bibasilar rales.  CXR was clear, though, and BNP was normal and he denies orthopnea.  He denies any cough, fever, or chills.  Hospital medicine was called for admission.         General appearance: No apparent distress, appears stated age and cooperative.  HEENT: Pupils equal, round.  Conjunctivae/corneas clear.  Neck: No jugular venous distention.   Respiratory:  Normal respiratory effort.  Bilaterally without Wheezes/Rhonchi.  Bibasilar rales.   Cardiovascular: Normal rate and regular rhythm with normal S1/S2 without murmurs, rubs or gallops.  Abdomen: Soft, non-tender, non-distended with normal bowel sounds.  Musculoskeletal: No cyanosis bilaterally.  2+ BLE pitting edema.  Without deformity.  Skin: No jaundice.  No rashes or lesions.  Neurologic:  Neurovascularly intact without any focal sensory/motor deficits. Cranial nerves: II-XII intact, grossly non-focal.  Psychiatric: Alert and oriented, limited insight.        Assessment and Plan:        Suspected acute on chronic diastolic CHF.  F/u standing weight.  He normally takes torsemide 40 po qd, started with furosemide 40 IV BID here.  Continue his spironolactone.  Once LHC is ruled out during this admission start empagliflozin.      Acute hypoxic respiratory failure, due to above.  Wean to RA as tolerated.  The patient has no supplemental O2 requirement at baseline.  He was 97% on RA at his PCP visit on 3/27.  He has listed h/o

## 2025-04-29 NOTE — PROGRESS NOTES
4 Eyes Skin Assessment and Patient belongings     The patient is being assess for  Admission    I agree that 2 Nurses have performed a thorough Head to Toe Skin Assessment on the patient. ALL assessment sites listed below have been assessed.       Areas assessed by both nurses:   [x]   Head, Face, and Ears   [x]   Shoulders, Back, and Chest  [x]   Arms, Elbows, and Hands   [x]   Coccyx, Sacrum, and IschIum  [x]   Legs, Feet, and Heels        Does the Patient have Skin Breakdown?  No         Dustin Prevention initiated:  NA   Wound Care Orders initiated:  NA      [facility] nurse consulted for Pressure Injury (Stage 3,4, Unstageable, DTI, NWPT, and Complex wounds), New and Established Ostomies:  NA      I agree that 2 Nurses have reviewed patient belongings with the patient/family and documented in the flowsheet upon admission or transfer to the unit.     Belongings  Dental Appliances: None  Vision - Corrective Lenses: Eyeglasses  Hearing Aid: None  Clothing: Shirt, Socks, Footwear, Undergarments, Shorts  Jewelry: None  Electronic Devices: Cell Phone  Weapons (Notify Protective Services/Security): None  Home Medications: None  Valuables Given To: Patient  Provide Name(s) of Who Valuable(s) Were Given To: N/A       Nurse 1 eSignature: Electronically signed by HUMA JONES RN on 4/29/25 at 2:04 AM EDT    **SHARE this note so that the co-signing nurse is able to place an eSignature**    Nurse 2 eSignature: Electronically signed by Eugenie Mckinnon RN on 4/29/25 at 6:31 AM EDT

## 2025-04-29 NOTE — PROGRESS NOTES
Patient with 11 beat run of vtach at 1430 while sleeping. Patient reports he has sleep apnea but can not tolerate mask. O2 sat 88% on room air. 2L applied. Mag 1.8, K 4.2. Hospitalist and cardiologist aware.

## 2025-04-29 NOTE — PROGRESS NOTES
Hospital Medicine Progress Note  V 1.6      Date of Admission: 4/28/2025    Hospital Day: 2      Chief Admission Complaint:  chest pain     Subjective:  EMR and notes reviewed, pt is a poor historian.     Presenting Admission History:       The patient is a pleasant 74 Y M with a BMI of 40 and h/o HTN, HLD, DM2, CAD, CHF, Afib s/p ablation and Watchman, and SSS s/p pacer.  Dementia is also listed in his medical history.  He presents with sharp chest pain which started today in his L chest.  Unrelated to exertion.  No diaphoresis.  No palpitations.                His chest pain is associated with dyspnea.  He had to be started on oxygen in the ED.  He has 2+ BLE pitting edema.  Bibasilar rales.  CXR was clear, though, and BNP was normal and he denies orthopnea.  He denies any cough, fever, or chills.  Hospital medicine was called for admission.     Assessment/Plan:      Current Principal Problem:  Chest pain    Chest Pain: seems atypical, perhaps due to decompensated hear failure   - cont asa, statin and met. Recent neg stress test in 2/2025   - Repeat TTE  - no PE or acute aortic path suspected   - Cardiology consulted     Suspected acute on chronic diastolic heart failure He normally takes torsemide 40 po qd, started with furosemide 40 IV BID here. Continue his spironolactone.   - monitor elytes and renal function  - I/O daily weights     Acute hypoxic respiratory failure, due to above.  Wean to RA as tolerated.  The patient has no supplemental O2 requirement at baseline.  He was 97% on RA at his PCP visit on 3/27.  He has listed h/o ESTHER, encouraged him to use CPAP.     DM2.  Insulin regimen.  A1c 9.3 recently.  Consideration of empagliflozin as above.  Semaglutide is on his outpatient med list.      PAF.  Metoprolol, dofetilide.  Has Watchman device, is on aspirin.     Ongoing threat to life and/or bodily function without ongoing treatment due to:      Consults:      None   interpreted        [] Imaging personally reviewed and interpreted     [x] Discussion (any 1)  [x] Multi-Disciplinary Rounds with Case Management  [] Discussed management of the case with           Labs:  Personally reviewed on 4/29/2025 and interpreted for clinical significance as documented above.     Recent Labs     04/28/25  1422 04/29/25  0603   WBC 5.4 5.9   HGB 12.3* 12.3*   HCT 37.0* 37.1*    145     Recent Labs     04/28/25  1422      K 4.5      CO2 28   BUN 26*   CREATININE 1.2   CALCIUM 9.0     Recent Labs     04/28/25  1422 04/28/25  1529   PROBNP 116  --    TROPHS 18 17     No results for input(s): \"LABA1C\" in the last 72 hours.  Recent Labs     04/28/25  1422   AST 19   ALT 20   BILITOT 0.6   ALKPHOS 128     Recent Labs     04/28/25  1422   INR 0.97       Urine Cultures:   Lab Results   Component Value Date/Time    LABURIN >100,000 CFU/ml 10/11/2023 01:31 PM    LABURIN 200 10/26/2022 10:56 AM     Blood Cultures:   Lab Results   Component Value Date/Time    BC No Growth after 4 days of incubation. 11/24/2023 07:00 PM     Lab Results   Component Value Date/Time    BLOODCULT2 No Growth after 4 days of incubation. 11/24/2023 06:58 PM     Organism:   Lab Results   Component Value Date/Time    ORG Klebsiella oxytoca 10/11/2023 01:31 PM         Aurora Monte, SANDY - CNP

## 2025-04-29 NOTE — DISCHARGE INSTRUCTIONS
Heart Failure Resources:  Heart Failure Interactive Workbook:  Go to https://TradaitalEVRGR.Lectorati/publication/?c=548925 for a Free Heart Failure Interactive Workbook provided by The American [employer] Association. This interactive workbook will provide information on Healthier Living with Heart Failure. Please copy and paste link into search bar. Use your mouse to scroll through the pages.    HF Harvey marilou:   Heart Failure Free smart phone marilou available for iPhone and Android download. Use your phone to track sodium intake, fluid intake, symptoms, and weight.     Low Sodium Diet / Recipes:  Go to www.[employer].Socialbomb website for “renal” diet which is Low Sodium! [employer] is a dialysis company, but this website offers free seasonal cookbooks. Each quarter, they will release 25-30 new recipes with a breakdown of calories, sodium, and glucose. You can also go to www.Ludia/recipes website for free recipes.     Home Exercise Program:   Identification of Green/Yellow/Red zones:  You should be able to identify when you feel good (green zone), if you have 1-2 symptoms of HF (yellow zone), or if you are in need of medical attention (red zone).  In your CHF education folder you were provided a “stop light tool” to outline this information.     We want to you to rate your exertion levels:    Our therapy team has discussed means of identification with you such as the \"Kenan scale.\"  The Kenan rating scale ranges from 6 to 20, where 6 means \"no exertion at all\" and 20 means \"maximal exertion.\" The goal is to use this to gauge how much effort it is taking for you to do your normal daily tasks.   You should be able to recognize when too much exertion is being expended.    Elements of Energy Conservation:   Prioritize/Plan: Decide what needs to be done today, and what can wait for a later date, write to do lists, plan ahead to avoid extra trips, and gather supplies and equipment needed before starting an activity.   Position:

## 2025-04-29 NOTE — CONSULTS
Consult Placed   Consult called by nurse Cramer  Who: NIKOL[facility] Cardiology  Date:4/29/2025  Time:2:39 PM

## 2025-04-29 NOTE — PLAN OF CARE
Problem: Chronic Conditions and Co-morbidities  Goal: Patient's chronic conditions and co-morbidity symptoms are monitored and maintained or improved  Outcome: Progressing     Problem: Discharge Planning  Goal: Discharge to home or other facility with appropriate resources  Outcome: Progressing     Problem: Respiratory - Adult  Goal: Achieves optimal ventilation and oxygenation  Outcome: Progressing     Problem: Cardiovascular - Adult  Goal: Maintains optimal cardiac output and hemodynamic stability  Outcome: Progressing     Problem: Cardiovascular - Adult  Goal: Absence of cardiac dysrhythmias or at baseline  Outcome: Progressing     Problem: Skin/Tissue Integrity - Adult  Goal: Skin integrity remains intact  Outcome: Progressing     Problem: Genitourinary - Adult  Goal: Absence of urinary retention  Outcome: Progressing     Problem: Metabolic/Fluid and Electrolytes - Adult  Goal: Electrolytes maintained within normal limits  Outcome: Progressing     Problem: Infection - Adult  Goal: Absence of infection at discharge  Outcome: Progressing     Problem: Metabolic/Fluid and Electrolytes - Adult  Goal: Glucose maintained within prescribed range  Outcome: Progressing

## 2025-04-29 NOTE — PLAN OF CARE
CHF Care Plan      Patient's EF (Ejection Fraction) is greater than 40%    Heart Failure Medications:  Diuretics:: Furosemide (usually takes torsemide and aldactone )    (One of the following REQUIRED for EF </= 40%/SYSTOLIC FAILURE but MAY be used in EF% >40%/DIASTOLIC FAILURE)        ACE:: None        ARB:: None         ARNI:: None    (Beta Blockers)  NON- Evidenced Based Beta Blocker (for EF% >40%/DIASTOLIC FAILURE): Metoprolol TARTrate- Lopressor    Evidenced Based Beta Blocker::(REQUIRED for EF% <40%/SYSTOLIC FAILURE) None  ...................................................................................................................................................    Failed to redirect to the Timeline version of the [employer] SmartLink.      Patient's weights and intake/output reviewed    Daily Weight log at bedside, patient/family participation in use of log: \"yes    Patient's current weight today shows a difference of 15 lbs less than last documented weight.      Intake/Output Summary (Last 24 hours) at 4/29/2025 0532  Last data filed at 4/29/2025 0518  Gross per 24 hour   Intake 537 ml   Output 1125 ml   Net -588 ml       Education Booklet Provided: yes    Comorbidities Reviewed Yes    Patient has a past medical history of A-fib ([facility]), Acid reflux, Acute on chronic heart failure, unspecified heart failure type ([facility]), Yan's esophagus, CHF (congestive heart failure) ([facility]), Coronary artery disease of native heart with stable angina pectoris, Dementia ([facility]), Diabetes mellitus ([facility]), Diabetic autonomic neuropathy associated with type 2 diabetes mellitus ([facility]), Hyperlipidemia, Hypertension, Intractable vomiting with nausea, Pancreatitis, Restless legs, Sleep apnea, and Tremor.     >>For CHF and Comorbidity documentation on Education Time and Topics, please see Education Tab      CHF Education    Learners:  Patient  Readineess:   Acceptance  Method:   Explanation and Handout  Response:   Verbalizes  Understanding  Comments: deducted on signs and symptoms of CHF, CHF self check zone, fluid and sodium restriction     Time Spent: 15 minutes      Pt resting in bed at this time on  1 L O2. Pt with complaints of shortness of breath. Pt without lower extremity edema.     Patient and/or Family's stated Goal of Care this Admission: reduce shortness of breath, increase activity tolerance, better understand heart failure and disease management, and be more comfortable prior to discharge        :

## 2025-04-29 NOTE — CARE COORDINATION
Case Management Assessment  Initial Evaluation    Date/Time of Evaluation: 4/29/2025 9:56 AM  Assessment Completed by: Chelle Duron RN    If patient is discharged prior to next notation, then this note serves as note for discharge by case management.    Patient Name: London Swenson                   YOB: 1950  Diagnosis: Chest pain [R07.9]  Chest pain, unspecified type [R07.9]                   Date / Time: 4/28/2025  1:48 PM    Patient Admission Status: Inpatient   Readmission Risk (Low < 19, Mod (19-27), High > 27): Readmission Risk Score: 30.6    Current PCP: Constance Yancey PA  PCP verified by CM? Yes    Chart Reviewed: Yes      History Provided by: Patient  Patient Orientation: Alert and Oriented    Patient Cognition: Alert    Hospitalization in the last 30 days (Readmission):  No    If yes, Readmission Assessment in CM Navigator will be completed.    Advance Directives:      Code Status: Full Code   Patient's Primary Decision Maker is: Named in Scanned ACP Document    Primary Decision Maker (Active): Rosy Swenson - Spouse - [phone]    Secondary Decision Maker: Arlene Hurt - Other Relative - [phone]    Discharge Planning:    Patient lives with: Spouse/Significant Other Type of Home: House  Primary Care Giver: Self  Patient Support Systems include: Spouse/Significant Other   Current Financial resources: Medicare  Current community resources: None  Current services prior to admission: None            Current DME:              Type of Home Care services:  None    ADLS  Prior functional level: Independent in ADLs/IADLs  Current functional level: Independent in ADLs/IADLs    PT AM-PAC:   /24  OT AM-PAC:   /24    Family can provide assistance at DC: Yes  Would you like Case Management to discuss the discharge plan with any other family members/significant others, and if so, who? Yes (wife)  Plans to Return to Present Housing: Yes  Other Identified Issues/Barriers to RETURNING to

## 2025-04-30 ENCOUNTER — APPOINTMENT (OUTPATIENT)
Age: 75
DRG: 291 | End: 2025-04-30
Attending: INTERNAL MEDICINE
Payer: MEDICARE

## 2025-04-30 PROBLEM — I47.29 NSVT (NONSUSTAINED VENTRICULAR TACHYCARDIA) (HCC): Status: ACTIVE | Noted: 2025-04-30

## 2025-04-30 LAB
ANION GAP SERPL CALCULATED.3IONS-SCNC: 9 MMOL/L (ref 3–16)
BUN SERPL-MCNC: 26 MG/DL (ref 7–20)
CALCIUM SERPL-MCNC: 8.9 MG/DL (ref 8.3–10.6)
CHLORIDE SERPL-SCNC: 96 MMOL/L (ref 99–110)
CO2 SERPL-SCNC: 34 MMOL/L (ref 21–32)
CREAT SERPL-MCNC: 1.2 MG/DL (ref 0.8–1.3)
DEPRECATED RDW RBC AUTO: 14.3 % (ref 12.4–15.4)
ECHO BSA: 2.45 M2
ECHO EST RA PRESSURE: 3 MMHG
ECHO LV EDV A2C: 177 ML
ECHO LV EDV A4C: 136 ML
ECHO LV EDV INDEX A4C: 58 ML/M2
ECHO LV EDV NDEX A2C: 75 ML/M2
ECHO LV EF PHYSICIAN: 52 %
ECHO LV EJECTION FRACTION A2C: 58 %
ECHO LV EJECTION FRACTION A4C: 59 %
ECHO LV EJECTION FRACTION BIPLANE: 60 % (ref 55–100)
ECHO LV ESV A2C: 74 ML
ECHO LV ESV A4C: 56 ML
ECHO LV ESV INDEX A2C: 31 ML/M2
ECHO LV ESV INDEX A4C: 24 ML/M2
ECHO LV FRACTIONAL SHORTENING: 28 % (ref 28–44)
ECHO LV INTERNAL DIMENSION DIASTOLE INDEX: 1.95 CM/M2
ECHO LV INTERNAL DIMENSION DIASTOLIC: 4.6 CM (ref 4.2–5.9)
ECHO LV INTERNAL DIMENSION SYSTOLIC INDEX: 1.4 CM/M2
ECHO LV INTERNAL DIMENSION SYSTOLIC: 3.3 CM
ECHO LV IVSD: 1.1 CM (ref 0.6–1)
ECHO LV MASS 2D: 192.9 G (ref 88–224)
ECHO LV MASS INDEX 2D: 81.8 G/M2 (ref 49–115)
ECHO LV POSTERIOR WALL DIASTOLIC: 1.2 CM (ref 0.6–1)
ECHO LV RELATIVE WALL THICKNESS RATIO: 0.52
ECHO RIGHT VENTRICULAR SYSTOLIC PRESSURE (RVSP): 17 MMHG
ECHO RV BASAL DIMENSION: 4.8 CM
ECHO RV LONGITUDINAL DIMENSION: 9.3 CM
ECHO RV MID DIMENSION: 4.3 CM
ECHO TV REGURGITANT MAX VELOCITY: 1.85 M/S
ECHO TV REGURGITANT PEAK GRADIENT: 14 MMHG
GFR SERPLBLD CREATININE-BSD FMLA CKD-EPI: 63 ML/MIN/{1.73_M2}
GLUCOSE BLD-MCNC: 227 MG/DL (ref 70–99)
GLUCOSE BLD-MCNC: 329 MG/DL (ref 70–99)
GLUCOSE BLD-MCNC: 360 MG/DL (ref 70–99)
GLUCOSE BLD-MCNC: 368 MG/DL (ref 70–99)
GLUCOSE SERPL-MCNC: 363 MG/DL (ref 70–99)
HCT VFR BLD AUTO: 38.8 % (ref 40.5–52.5)
HGB BLD-MCNC: 13 G/DL (ref 13.5–17.5)
MAGNESIUM SERPL-MCNC: 1.85 MG/DL (ref 1.8–2.4)
MCH RBC QN AUTO: 29.9 PG (ref 26–34)
MCHC RBC AUTO-ENTMCNC: 33.6 G/DL (ref 31–36)
MCV RBC AUTO: 89 FL (ref 80–100)
PERFORMED ON: ABNORMAL
PLATELET # BLD AUTO: 144 K/UL (ref 135–450)
PMV BLD AUTO: 9.5 FL (ref 5–10.5)
POTASSIUM SERPL-SCNC: 4.2 MMOL/L (ref 3.5–5.1)
RBC # BLD AUTO: 4.36 M/UL (ref 4.2–5.9)
SODIUM SERPL-SCNC: 139 MMOL/L (ref 136–145)
WBC # BLD AUTO: 4.9 K/UL (ref 4–11)

## 2025-04-30 PROCEDURE — 93308 TTE F-UP OR LMTD: CPT | Performed by: INTERNAL MEDICINE

## 2025-04-30 PROCEDURE — 85027 COMPLETE CBC AUTOMATED: CPT

## 2025-04-30 PROCEDURE — 6370000000 HC RX 637 (ALT 250 FOR IP): Performed by: INTERNAL MEDICINE

## 2025-04-30 PROCEDURE — 2700000000 HC OXYGEN THERAPY PER DAY

## 2025-04-30 PROCEDURE — 93321 DOPPLER ECHO F-UP/LMTD STD: CPT | Performed by: INTERNAL MEDICINE

## 2025-04-30 PROCEDURE — 97116 GAIT TRAINING THERAPY: CPT

## 2025-04-30 PROCEDURE — 97166 OT EVAL MOD COMPLEX 45 MIN: CPT

## 2025-04-30 PROCEDURE — 97530 THERAPEUTIC ACTIVITIES: CPT

## 2025-04-30 PROCEDURE — 93325 DOPPLER ECHO COLOR FLOW MAPG: CPT | Performed by: INTERNAL MEDICINE

## 2025-04-30 PROCEDURE — 83735 ASSAY OF MAGNESIUM: CPT

## 2025-04-30 PROCEDURE — 6360000004 HC RX CONTRAST MEDICATION: Performed by: INTERNAL MEDICINE

## 2025-04-30 PROCEDURE — 93321 DOPPLER ECHO F-UP/LMTD STD: CPT

## 2025-04-30 PROCEDURE — 6360000002 HC RX W HCPCS: Performed by: INTERNAL MEDICINE

## 2025-04-30 PROCEDURE — 97162 PT EVAL MOD COMPLEX 30 MIN: CPT

## 2025-04-30 PROCEDURE — 80048 BASIC METABOLIC PNL TOTAL CA: CPT

## 2025-04-30 PROCEDURE — 1200000000 HC SEMI PRIVATE

## 2025-04-30 PROCEDURE — C8924 2D TTE W OR W/O FOL W/CON,FU: HCPCS

## 2025-04-30 PROCEDURE — 99232 SBSQ HOSP IP/OBS MODERATE 35: CPT | Performed by: NURSE PRACTITIONER

## 2025-04-30 PROCEDURE — 94761 N-INVAS EAR/PLS OXIMETRY MLT: CPT

## 2025-04-30 PROCEDURE — 36415 COLL VENOUS BLD VENIPUNCTURE: CPT

## 2025-04-30 PROCEDURE — 2500000003 HC RX 250 WO HCPCS: Performed by: INTERNAL MEDICINE

## 2025-04-30 RX ORDER — INSULIN LISPRO 100 [IU]/ML
0-16 INJECTION, SOLUTION INTRAVENOUS; SUBCUTANEOUS
Status: DISCONTINUED | OUTPATIENT
Start: 2025-04-30 | End: 2025-05-01 | Stop reason: HOSPADM

## 2025-04-30 RX ADMIN — FUROSEMIDE 40 MG: 10 INJECTION, SOLUTION INTRAMUSCULAR; INTRAVENOUS at 18:05

## 2025-04-30 RX ADMIN — ATORVASTATIN CALCIUM 40 MG: 40 TABLET, FILM COATED ORAL at 20:03

## 2025-04-30 RX ADMIN — INSULIN GLARGINE 17 UNITS: 100 INJECTION, SOLUTION SUBCUTANEOUS at 20:39

## 2025-04-30 RX ADMIN — SPIRONOLACTONE 25 MG: 25 TABLET ORAL at 09:01

## 2025-04-30 RX ADMIN — PRAMIPEXOLE DIHYDROCHLORIDE 0.75 MG: 0.25 TABLET ORAL at 20:40

## 2025-04-30 RX ADMIN — INSULIN LISPRO 8 UNITS: 100 INJECTION, SOLUTION INTRAVENOUS; SUBCUTANEOUS at 18:05

## 2025-04-30 RX ADMIN — FUROSEMIDE 40 MG: 10 INJECTION, SOLUTION INTRAMUSCULAR; INTRAVENOUS at 09:02

## 2025-04-30 RX ADMIN — PANTOPRAZOLE SODIUM 40 MG: 40 TABLET, DELAYED RELEASE ORAL at 05:14

## 2025-04-30 RX ADMIN — Medication 400 MG: at 09:01

## 2025-04-30 RX ADMIN — SULFUR HEXAFLUORIDE 2 ML: 60.7; .19; .19 INJECTION, POWDER, LYOPHILIZED, FOR SUSPENSION INTRAVENOUS; INTRAVESICAL at 11:23

## 2025-04-30 RX ADMIN — GABAPENTIN 600 MG: 300 CAPSULE ORAL at 09:01

## 2025-04-30 RX ADMIN — Medication 10 ML: at 09:02

## 2025-04-30 RX ADMIN — INSULIN LISPRO 12 UNITS: 100 INJECTION, SOLUTION INTRAVENOUS; SUBCUTANEOUS at 20:02

## 2025-04-30 RX ADMIN — ENOXAPARIN SODIUM 30 MG: 100 INJECTION SUBCUTANEOUS at 20:40

## 2025-04-30 RX ADMIN — Medication 10 ML: at 20:04

## 2025-04-30 RX ADMIN — ASPIRIN 325 MG: 325 TABLET ORAL at 09:01

## 2025-04-30 RX ADMIN — PREGABALIN 50 MG: 25 CAPSULE ORAL at 09:01

## 2025-04-30 RX ADMIN — PREGABALIN 50 MG: 25 CAPSULE ORAL at 20:40

## 2025-04-30 RX ADMIN — METOPROLOL TARTRATE 25 MG: 25 TABLET, FILM COATED ORAL at 20:03

## 2025-04-30 RX ADMIN — DOFETILIDE 250 MCG: 0.25 CAPSULE ORAL at 20:03

## 2025-04-30 RX ADMIN — INSULIN LISPRO 2 UNITS: 100 INJECTION, SOLUTION INTRAVENOUS; SUBCUTANEOUS at 09:02

## 2025-04-30 RX ADMIN — INSULIN LISPRO 8 UNITS: 100 INJECTION, SOLUTION INTRAVENOUS; SUBCUTANEOUS at 13:29

## 2025-04-30 RX ADMIN — ENOXAPARIN SODIUM 30 MG: 100 INJECTION SUBCUTANEOUS at 09:02

## 2025-04-30 RX ADMIN — GABAPENTIN 600 MG: 300 CAPSULE ORAL at 20:40

## 2025-04-30 RX ADMIN — METOPROLOL TARTRATE 25 MG: 25 TABLET, FILM COATED ORAL at 09:01

## 2025-04-30 RX ADMIN — DOFETILIDE 250 MCG: 0.25 CAPSULE ORAL at 09:01

## 2025-04-30 ASSESSMENT — PAIN SCALES - GENERAL: PAINLEVEL_OUTOF10: 0

## 2025-04-30 NOTE — CARE COORDINATION
Chart reviewed day 2. Care provided by cards and IM. Pt is from home with his wife and is IPTA. Pt is getting IV lasix. Pt is on O2 at 2L and his baseline is RA. Will continue to follow course for needs. Chelle Duron RN

## 2025-04-30 NOTE — PROGRESS NOTES
Hospital Medicine Progress Note  V 1.6      Date of Admission: 4/28/2025    Hospital Day: 3      Chief Admission Complaint:  chest pain     Subjective:  EMR and notes reviewed, no new complaints was able to speak with wife on phone.     Presenting Admission History:       The patient is a pleasant 74 Y M with a BMI of 40 and h/o HTN, HLD, DM2, CAD, CHF, Afib s/p ablation and Watchman, and SSS s/p pacer.  Dementia is also listed in his medical history.  He presents with sharp chest pain which started today in his L chest.  Unrelated to exertion.  No diaphoresis.  No palpitations.                His chest pain is associated with dyspnea.  He had to be started on oxygen in the ED.  He has 2+ BLE pitting edema.  Bibasilar rales.  CXR was clear, though, and BNP was normal and he denies orthopnea.  He denies any cough, fever, or chills.  Hospital medicine was called for admission.     Assessment/Plan:      Current Principal Problem:  Chest pain    Chest Pain: seems atypical, perhaps due to decompensated hear failure or ischemia   - cont asa, statin and met. Recent neg stress test in 2/2025   - Repeat TTE  - no PE or acute aortic path suspected   - Cardiology consulted   - on 4/290 had 12 beats VT, Cards aware, plan to cont tele, monitor elytes, keep K and Mg cardiac protective levels, repeating ECHO, device interrogation     Suspected acute on chronic diastolic heart failure He normally takes torsemide 40 po qd, started with furosemide 40 IV BID here. Continue his spironolactone.   - monitor elytes and renal function  - I/O daily weights   - 4/30 weight remains about the same, net O: 2358   - awaiting ECHO     Acute hypoxic respiratory failure, due to above.  Wean to RA as tolerated.  The patient has no supplemental O2 requirement at baseline.  He was 97% on RA at his PCP visit on 3/27.  He has listed h/o ESTHER, encouraged him to use CPAP.     DM2. Poorly controlled,  Insulin regimen.  A1c 9.3 recently.  Consideration of

## 2025-04-30 NOTE — PLAN OF CARE
CHF Care Plan      Patient's EF (Ejection Fraction) is greater than 40%    Heart Failure Medications:  Diuretics:: Furosemide, spironolactone    (One of the following REQUIRED for EF </= 40%/SYSTOLIC FAILURE but MAY be used in EF% >40%/DIASTOLIC FAILURE)        ACE:: None        ARB:: None         ARNI:: None    (Beta Blockers)  NON- Evidenced Based Beta Blocker (for EF% >40%/DIASTOLIC FAILURE): Metoprolol TARTrate- Lopressor    Evidenced Based Beta Blocker::(REQUIRED for EF% <40%/SYSTOLIC FAILURE) None  ...................................................................................................................................................    Failed to redirect to the Timeline version of the [employer] SmartLink.      Patient's weights and intake/output reviewed    Daily Weight log at bedside, patient/family participation in use of log: \"yes    Patient's current weight today shows a difference of 6 lbs less than last documented weight. FIRST WEIGHT WAS NOT DOCUMENTED AS STANDING SCALE          Education Booklet Provided: yes    Comorbidities Reviewed Yes    Patient has a past medical history of A-fib ([facility]), Acid reflux, Acute on chronic heart failure, unspecified heart failure type ([facility]), Yan's esophagus, CHF (congestive heart failure) ([facility]), Coronary artery disease of native heart with stable angina pectoris, Dementia ([facility]), Diabetes mellitus ([facility]), Diabetic autonomic neuropathy associated with type 2 diabetes mellitus ([facility]), Hyperlipidemia, Hypertension, Intractable vomiting with nausea, Pancreatitis, Restless legs, Sleep apnea, and Tremor.     >>For CHF and Comorbidity documentation on Education Time and Topics, please see Education Tab      CHF Education    Learners:  Patient  Readineess:   Eager  Method:   Explanation  Response:   Verbalizes Understanding  Comments:     Time Spent: 30 MINS      Pt resting in bed at this time on  2 L O2. Pt denies shortness of breath. Pt with nonpitting lower extremity

## 2025-04-30 NOTE — PROGRESS NOTES
Physical Therapy  Facility/Department: [place] [address] - [facility] SURG  Physical Therapy Initial Assessment    Name: London Swenson  : 1950  MRN: [id number]  Date of Service: 2025    Discharge Recommendations:  24 hour supervision or assist   PT Equipment Recommendations  Equipment Needed: No  Other: pt owns recommended RW      Patient Diagnosis(es): The primary encounter diagnosis was Chest pain, unspecified type. Diagnoses of Hypoxemia and Cardiomyopathy, unspecified type (HCC) were also pertinent to this visit.  Past Medical History:  has a past medical history of A-fib (HCC), Acid reflux, Acute on chronic heart failure, unspecified heart failure type (HCC), Yan's esophagus, CHF (congestive heart failure) (HCC), Coronary artery disease of native heart with stable angina pectoris, Dementia (HCC), Diabetes mellitus (HCC), Diabetic autonomic neuropathy associated with type 2 diabetes mellitus (HCC), Hyperlipidemia, Hypertension, Intractable vomiting with nausea, Pancreatitis, Restless legs, Sleep apnea, and Tremor.  Past Surgical History:  has a past surgical history that includes Pancreas surgery; Cholecystectomy; Tonsillectomy; [place] tooth extraction; Colonoscopy (10/26/2011); Cataract removal (Bilateral, ); Upper gastrointestinal endoscopy (10/04/2016); Hydrocele surgery (11/15/2016); Endoscopy, colon, diagnostic; Upper gastrointestinal endoscopy (2017); Upper gastrointestinal endoscopy (2017); Upper gastrointestinal endoscopy (2017); Upper gastrointestinal endoscopy (2017); Upper gastrointestinal endoscopy (N/A, 2018); Upper gastrointestinal endoscopy (2019); Upper gastrointestinal endoscopy (N/A, 2019); Upper gastrointestinal endoscopy (N/A, 2019); laryngoscopy (N/A, 10/25/2023); Cardiac defibrillator placement; and ep device procedure (N/A, 7/15/2024).    Assessment  Body Structures, Functions, Activity Limitations Requiring Skilled Therapeutic

## 2025-04-30 NOTE — PLAN OF CARE
CHF Care Plan      Patient's EF (Ejection Fraction) is greater than 40%    Heart Failure Medications:  Diuretics:: Furosemide and Spironolactone    (One of the following REQUIRED for EF </= 40%/SYSTOLIC FAILURE but MAY be used in EF% >40%/DIASTOLIC FAILURE)        ACE:: None        ARB:: None         ARNI:: None    (Beta Blockers)  NON- Evidenced Based Beta Blocker (for EF% >40%/DIASTOLIC FAILURE): Metoprolol TARTrate- Lopressor    Evidenced Based Beta Blocker::(REQUIRED for EF% <40%/SYSTOLIC FAILURE) None  ...................................................................................................................................................    Failed to redirect to the Timeline version of the [employer] SmartLink.      Patient's weights and intake/output reviewed    Daily Weight log at bedside, patient/family participation in use of log: \"yes    Patient's current weight today shows a difference of 0 lbs less than last documented weight.      Intake/Output Summary (Last 24 hours) at 4/30/2025 0994  Last data filed at 4/30/2025 0503  Gross per 24 hour   Intake 740 ml   Output 2225 ml   Net -1485 ml       Education Booklet Provided: yes    Comorbidities Reviewed YES    Patient has a past medical history of A-fib ([facility]), Acid reflux, Acute on chronic heart failure, unspecified heart failure type ([facility]), Yan's esophagus, CHF (congestive heart failure) ([facility]), Coronary artery disease of native heart with stable angina pectoris, Dementia ([facility]), Diabetes mellitus ([facility]), Diabetic autonomic neuropathy associated with type 2 diabetes mellitus (HCC), Hyperlipidemia, Hypertension, Intractable vomiting with nausea, Pancreatitis, Restless legs, Sleep apnea, and Tremor.     >>For CHF and Comorbidity documentation on Education Time and Topics, please see Education Tab      CHF Education    Learners:  Patient  Readineess:   Eager  Method:   Explanation  Response:   Verbalizes Understanding  Comments:     Time Spent: 20

## 2025-04-30 NOTE — PLAN OF CARE
CHF Care Plan      Patient's EF (Ejection Fraction) is greater than 40%    Heart Failure Medications:  Diuretics:: Furosemide and Spironolactone    (One of the following REQUIRED for EF </= 40%/SYSTOLIC FAILURE but MAY be used in EF% >40%/DIASTOLIC FAILURE)        ACE:: None        ARB:: None         ARNI:: None    (Beta Blockers)  NON- Evidenced Based Beta Blocker (for EF% >40%/DIASTOLIC FAILURE): Metoprolol TARTrate- Lopressor    Evidenced Based Beta Blocker::(REQUIRED for EF% <40%/SYSTOLIC FAILURE) None  ...................................................................................................................................................    Failed to redirect to the Timeline version of the [employer] SmartLink.      Patient's weights and intake/output reviewed    Daily Weight log at bedside, patient/family participation in use of log: \"yes    Patient's current weight today shows a difference of 0.6 lbs less than last documented weight.      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     >>For CHF and Comorbidity documentation on Education Time and Topics, please see Education Tab      CHF Education    Learners:  Patient  Readineess:   Acceptance  Method:   Explanation  Response:   Needs Reinforcement  Comments: Fluid restriction,

## 2025-04-30 NOTE — PROGRESS NOTES
Physician Progress Note      PATIENT:               SERAFIN STONER  CSN #:                  [id number]  :                       1950  ADMIT DATE:       2025 1:48 PM  DISCH DATE:  RESPONDING  PROVIDER #:        MAGI Orourke CNP          QUERY TEXT:    Acute respiratory failure is documented in the medical record with no   respiratory distress noted on ED notes. Please provide additional clinical   indicators supportive of your documentation. Or please document if the   diagnosis of acute respiratory failure has been ruled out after study.    The clinical indicators include:  Per ED notes \"RESPIRATORY: Normal breath sounds. No chest wall tenderness. No   respiratory distress\".  Per H&P \"Normal respiratory effort.  Bilaterally without Wheezes/Rhonchi.    Bibasilar rales\".  RR up to 21, Spo2 down to 88%, supplemental oxygen up to 2L  Acute CHF  diabetes    Treatment: IV lasix, supplemental oxygen, serial labs, supportive care  Options provided:  -- Acute Respiratory Failure as evidenced by, Please document evidence.  -- Acute Respiratory Failure ruled out after study  -- Other - I will add my own diagnosis  -- Disagree - Not applicable / Not valid  -- Disagree - Clinically unable to determine / Unknown  -- Refer to Clinical Documentation Reviewer    PROVIDER RESPONSE TEXT:    This patient is in acute respiratory failure as evidenced by hypoxia with sat   88% requiring O2 and dyspnea    Query created by: Makenna Bender on 2025 9:15 AM      Electronically signed by:  MAGI Orourke CNP 2025 4:17 PM

## 2025-04-30 NOTE — PROGRESS NOTES
Occupational Therapy  Facility/Department: [place] [address] - [facility] SURG  Occupational Therapy Initial Assessment    Name: London Swenson  : 1950  MRN: [id number]  Date of Service: 2025    Discharge Recommendations:  24 hour supervision or assist (Pt declines HH OT.)  OT Equipment Recommendations  Equipment Needed: No  Other: Needs met. Encouraged to utilize his walker with return home if needed.       Patient Diagnosis(es): The primary encounter diagnosis was Chest pain, unspecified type. Diagnoses of Hypoxemia and Cardiomyopathy, unspecified type (HCC) were also pertinent to this visit.  Past Medical History:  has a past medical history of A-fib (HCC), Acid reflux, Acute on chronic heart failure, unspecified heart failure type (HCC), Yan's esophagus, CHF (congestive heart failure) (HCC), Coronary artery disease of native heart with stable angina pectoris, Dementia (HCC), Diabetes mellitus (HCC), Diabetic autonomic neuropathy associated with type 2 diabetes mellitus (HCC), Hyperlipidemia, Hypertension, Intractable vomiting with nausea, Pancreatitis, Restless legs, Sleep apnea, and Tremor.  Past Surgical History:  has a past surgical history that includes Pancreas surgery; Cholecystectomy; Tonsillectomy; [place] tooth extraction; Colonoscopy (10/26/2011); Cataract removal (Bilateral, ); Upper gastrointestinal endoscopy (10/04/2016); Hydrocele surgery (11/15/2016); Endoscopy, colon, diagnostic; Upper gastrointestinal endoscopy (2017); Upper gastrointestinal endoscopy (2017); Upper gastrointestinal endoscopy (2017); Upper gastrointestinal endoscopy (2017); Upper gastrointestinal endoscopy (N/A, 2018); Upper gastrointestinal endoscopy (2019); Upper gastrointestinal endoscopy (N/A, 2019); Upper gastrointestinal endoscopy (N/A, 2019); laryngoscopy (N/A, 10/25/2023); Cardiac defibrillator placement; and ep device procedure (N/A, 7/15/2024).

## 2025-04-30 NOTE — PLAN OF CARE
Problem: Genitourinary - Adult  Goal: Absence of urinary retention  Outcome: Progressing     Problem: Cardiovascular - Adult  Goal: Maintains optimal cardiac output and hemodynamic stability  Outcome: Progressing     Problem: Respiratory - Adult  Goal: Achieves optimal ventilation and oxygenation  Outcome: Progressing     Problem: Chronic Conditions and Co-morbidities  Goal: Patient's chronic conditions and co-morbidity symptoms are monitored and maintained or improved  Outcome: Progressing

## 2025-04-30 NOTE — PROGRESS NOTES
[facility]   Daily Progress Note    Admit Date:  4/28/2025  HPI:    Chief Complaint   Patient presents with    Chest Pain     Chest pain ongoing for a week. Shortness of breath. 6/10 pain. Took full dose aspirin this morning.       London Swenson presented with shortness of breath and chest pain     Cardiology consulted for chest pain and shortness of breath       PMH: CAD (moderate, medical management), HFpEF, PAF with prior ablation, status post watchman, sick sinus syndrome status post dual PPM, history of PE, hypertension, hyperlipidemia, T2DM, ESTHER.    Subjective:  Mr. Swenson sitting up in chair, family members at bedside.  Still with leg edema.  Denies any chest pain reports breathing is improved.    Objective:   Patient Vitals for the past 24 hrs:   BP Temp Temp src Pulse Resp SpO2 Height Weight   04/30/25 1138 122/73 -- -- -- -- -- 1.778 m (5' 10\") 121.1 kg (267 lb)   04/30/25 0745 122/73 -- -- -- -- -- -- --   04/30/25 0730 90/81 97.7 °F (36.5 °C) Oral 74 18 92 % -- --   04/30/25 0521 -- -- -- -- -- -- -- 121.5 kg (267 lb 14.4 oz)   04/30/25 0515 -- -- -- -- -- 93 % -- --   04/30/25 0503 119/65 98.1 °F (36.7 °C) Oral 71 18 (!) 86 % -- --   04/30/25 0022 -- -- -- -- -- 93 % -- --   04/30/25 0016 100/61 97.7 °F (36.5 °C) Oral 62 16 (!) 83 % -- --   04/29/25 2117 119/72 -- -- 60 -- -- -- --   04/29/25 2000 -- -- -- -- -- 92 % -- --   04/29/25 1957 (!) 123/50 98.1 °F (36.7 °C) Oral 70 18 (!) 88 % -- --   04/29/25 1827 119/66 -- -- -- -- -- -- --   04/29/25 1438 -- -- -- -- -- 94 % -- --   04/29/25 1432 (!) 124/53 97.9 °F (36.6 °C) -- 60 16 (!) 88 % -- --       Intake/Output Summary (Last 24 hours) at 4/30/2025 1216  Last data filed at 4/30/2025 0503  Gross per 24 hour   Intake 200 ml   Output 1275 ml   Net -1075 ml     Wt Readings from Last 3 Encounters:   04/30/25 121.1 kg (267 lb)   03/27/25 128.5 kg (283 lb 6.4 oz)   02/26/25 126.1 kg (278 lb)         ASSESSMENT:   HFpEF, acute on chronic: Weight  normal. Post-stress ejection fraction is 67%. Stress end diastolic volume: 121 mL. Stress end systolic volume: 40 mL. LV mass: 150.0 g.    Perfusion Conclusion: There is no evidence of transient ischemic dilation (TID).    Image quality is good.    Stress Test: A pharmacological stress test was performed using regadenoson (Lexiscan). PO caffeine given as a reversal agent. The patient reported no symptoms during the stress test. The patient reached the end of the protocol. Blood pressure demonstrated a hypertensive response to stress.    Resting ECG: Sinus rhythm, possible old anteroseptal infarct.  Possible old lateral infarct.    Stress ECG: No significant ST changes noted. The stress ECG was not diagnostic due to pharmacologic protocol.        TANIA 09/04/2024: Summary    Left Ventricle: Normal left ventricular systolic function with a visually estimated EF of 55 - 60%. Left ventricle size is normal. Normal wall motion.    Tricuspid Valve: Mild to moderat eccentric regurgitation with jet direction toward the septum.    Left Atrium: Well-seated device closure (Watchman) in the appendage. No thrombus and no pastora-device leak seen on or around SUSAN occluder device.    Image quality is adequate.      Renal US 08/28/2024: Summary    Right renal vein is patent and demonstrates normal phasicity.    Left renal vein is patent and demonstrates normal phasicity.    Within the limits of this exam there is no evidence of renal artery stenosis.     Echo 08/28/2024: Summary    Image quality is technically difficult. Contrast used: Definity. Technically difficult study and technically difficult study due to patient's body habitus.    Left Ventricle: Normal left ventricular systolic function with a visually estimated EF of 55 - 60%. Left ventricle size is normal. Mildly increased wall thickness. Findings consistent with mild concentric hypertrophy. Normal wall motion. Normal diastolic function.    Tricuspid Valve: The estimated RVSP  management  Discussed with patient family, would recommend follow-up with PCP and possibly pulmonary in the outpatient setting to investigate for noncardiac causes for his symptoms of chest pain shortness of breath.  No beta-blocker due to bradycardia.  No further inpatient cardiac work-up or treatment is planned, will sign off, please call with questions        Coronary angiogram 3/25/2019:  Unstable angina  PROCEDURES PERFORMED    Left heart catheterization  LVgram  Coronary angiogam  Coronary cath  PROCEDURE DESCRIPTION   Risks/benefits/alternatives/outcomes were discussed with patient and/or family and informed consent was obtained.  Using the yara test, the patient's right radial artery was found to be acceptable for cannulation.  Patient was prepped draped in the usual sterile fashion.  Local anaesthetic was applied over puncture site.  Using a back wall technique, a 6 Yoruba Terumo sheath was inserted into right radial artery.  Verapamil, nitroglycerin were administered through the sheath.  Heparin was administered.  Diagnostic 5fr pigtail, TIG catheters were used for diagnostic angiograms.  At the conclusion of the procedure, a TR band was placed over the puncture site and hemostasis was obtained.  There were no immediate complications. I supervised sedation with versed 1mg/fentanyl 50mcg during the procedure. 70cc contrast was utilized. <20cc EBL.  LVEDP 4   GRADIENT ACROSS AORTIC VALVE No gradient   LV FUNCTION EF 65%   WALL MOTION Normal   MITRAL REGURGITATION Mild      LM <10% stenosis.           LAD Prox <10% stenosis.  Mid 30-40% stenosis.  Distal 30-40% stenosis.  LAD wraps around apex.  D1 has ostial 80% stenosis.          LCX 10% prox-mid stenosis. OM1 bifurcates in prox segment and there is prox-mid OM1 40-50% stenosis.          RI Mid 60% stenosis.          RCA Dominant.  Slight anterior takeoff, Prox-mid 20-30% stenoses. Distal 20% stenosis. Tortuous vessel.    Moderate cad/ashd in major

## 2025-05-01 VITALS
OXYGEN SATURATION: 91 % | DIASTOLIC BLOOD PRESSURE: 67 MMHG | HEART RATE: 60 BPM | TEMPERATURE: 97.7 F | BODY MASS INDEX: 38.5 KG/M2 | RESPIRATION RATE: 18 BRPM | WEIGHT: 268.9 LBS | SYSTOLIC BLOOD PRESSURE: 113 MMHG | HEIGHT: 70 IN

## 2025-05-01 LAB
ALBUMIN SERPL-MCNC: 3.8 G/DL (ref 3.4–5)
ANION GAP SERPL CALCULATED.3IONS-SCNC: 8 MMOL/L (ref 3–16)
BUN SERPL-MCNC: 30 MG/DL (ref 7–20)
CALCIUM SERPL-MCNC: 9 MG/DL (ref 8.3–10.6)
CHLORIDE SERPL-SCNC: 98 MMOL/L (ref 99–110)
CO2 SERPL-SCNC: 34 MMOL/L (ref 21–32)
CREAT SERPL-MCNC: 1.1 MG/DL (ref 0.8–1.3)
GFR SERPLBLD CREATININE-BSD FMLA CKD-EPI: 70 ML/MIN/{1.73_M2}
GLUCOSE BLD-MCNC: 185 MG/DL (ref 70–99)
GLUCOSE BLD-MCNC: 235 MG/DL (ref 70–99)
GLUCOSE SERPL-MCNC: 199 MG/DL (ref 70–99)
MAGNESIUM SERPL-MCNC: 1.9 MG/DL (ref 1.8–2.4)
NT-PROBNP SERPL-MCNC: <36 PG/ML (ref 0–449)
PERFORMED ON: ABNORMAL
PERFORMED ON: ABNORMAL
PHOSPHATE SERPL-MCNC: 2.8 MG/DL (ref 2.5–4.9)
POTASSIUM SERPL-SCNC: 4.2 MMOL/L (ref 3.5–5.1)
SODIUM SERPL-SCNC: 140 MMOL/L (ref 136–145)

## 2025-05-01 PROCEDURE — 6370000000 HC RX 637 (ALT 250 FOR IP): Performed by: INTERNAL MEDICINE

## 2025-05-01 PROCEDURE — 97116 GAIT TRAINING THERAPY: CPT

## 2025-05-01 PROCEDURE — 97530 THERAPEUTIC ACTIVITIES: CPT

## 2025-05-01 PROCEDURE — 83880 ASSAY OF NATRIURETIC PEPTIDE: CPT

## 2025-05-01 PROCEDURE — 6370000000 HC RX 637 (ALT 250 FOR IP): Performed by: NURSE PRACTITIONER

## 2025-05-01 PROCEDURE — 80069 RENAL FUNCTION PANEL: CPT

## 2025-05-01 PROCEDURE — 6360000002 HC RX W HCPCS: Performed by: INTERNAL MEDICINE

## 2025-05-01 PROCEDURE — 2500000003 HC RX 250 WO HCPCS: Performed by: INTERNAL MEDICINE

## 2025-05-01 PROCEDURE — 83735 ASSAY OF MAGNESIUM: CPT

## 2025-05-01 PROCEDURE — 36415 COLL VENOUS BLD VENIPUNCTURE: CPT

## 2025-05-01 PROCEDURE — 99232 SBSQ HOSP IP/OBS MODERATE 35: CPT | Performed by: NURSE PRACTITIONER

## 2025-05-01 RX ORDER — INSULIN LISPRO 100 [IU]/ML
5 INJECTION, SOLUTION INTRAVENOUS; SUBCUTANEOUS
Status: DISCONTINUED | OUTPATIENT
Start: 2025-05-01 | End: 2025-05-01 | Stop reason: HOSPADM

## 2025-05-01 RX ADMIN — Medication 400 MG: at 08:43

## 2025-05-01 RX ADMIN — PANTOPRAZOLE SODIUM 40 MG: 40 TABLET, DELAYED RELEASE ORAL at 05:55

## 2025-05-01 RX ADMIN — INSULIN LISPRO 4 UNITS: 100 INJECTION, SOLUTION INTRAVENOUS; SUBCUTANEOUS at 09:15

## 2025-05-01 RX ADMIN — DOFETILIDE 250 MCG: 0.25 CAPSULE ORAL at 08:43

## 2025-05-01 RX ADMIN — Medication 10 ML: at 08:44

## 2025-05-01 RX ADMIN — SPIRONOLACTONE 25 MG: 25 TABLET ORAL at 08:43

## 2025-05-01 RX ADMIN — INSULIN LISPRO 5 UNITS: 100 INJECTION, SOLUTION INTRAVENOUS; SUBCUTANEOUS at 13:00

## 2025-05-01 RX ADMIN — GABAPENTIN 600 MG: 300 CAPSULE ORAL at 08:43

## 2025-05-01 RX ADMIN — FUROSEMIDE 40 MG: 10 INJECTION, SOLUTION INTRAMUSCULAR; INTRAVENOUS at 08:44

## 2025-05-01 RX ADMIN — PREGABALIN 50 MG: 25 CAPSULE ORAL at 08:43

## 2025-05-01 RX ADMIN — INSULIN LISPRO 4 UNITS: 100 INJECTION, SOLUTION INTRAVENOUS; SUBCUTANEOUS at 12:59

## 2025-05-01 RX ADMIN — METOPROLOL TARTRATE 25 MG: 25 TABLET, FILM COATED ORAL at 08:43

## 2025-05-01 RX ADMIN — ENOXAPARIN SODIUM 30 MG: 100 INJECTION SUBCUTANEOUS at 08:44

## 2025-05-01 RX ADMIN — ASPIRIN 325 MG: 325 TABLET ORAL at 08:43

## 2025-05-01 ASSESSMENT — PAIN SCALES - GENERAL: PAINLEVEL_OUTOF10: 0

## 2025-05-01 NOTE — PROGRESS NOTES
Physical Therapy  Facility/Department: [facility] - MED SURG  Daily Treatment Note  NAME: London Swenson  : 1950  MRN: [id number]    Date of Service: 2025    Discharge Recommendations:  24 hour supervision or assist   PT Equipment Recommendations  Equipment Needed: No  Other: pt owns recommended RW    Patient Diagnosis(es): The primary encounter diagnosis was Chest pain, unspecified type. Diagnoses of Hypoxemia and Cardiomyopathy, unspecified type (HCC) were also pertinent to this visit.    Assessment  Assessment: pt on RA currently and none at baseline, pt maintained SpO2 at >91% throughout session, pt tolerated all assessments without complaint, pt presents below baseline function requiring strict CGA for all mobility due to imbalances and 1 LOB in amb corrected with cga/min A, pt will benefit from Acute Inpatient Skilled PT to address functional mobility deficits, PT recommends pt return home with 24h sup  Activity Tolerance: Patient tolerated treatment well  Equipment Needed: No  Other: pt owns recommended RW    Plan  Physical Therapy Plan  General Plan: 2-3 times per week  Current Treatment Recommendations: Strengthening;ROM;Balance training;Functional mobility training;Transfer training;Gait training;Stair training;Neuromuscular re-education;Manual;Home exercise program;Safety education & training;Patient/Caregiver education & training;Equipment evaluation, education, & procurement;Modalities;Positioning;Therapeutic activities    Restrictions  Restrictions/Precautions  Restrictions/Precautions: General Precautions  Activity Level: Up as Tolerated  Position Activity Restriction  Other Position/Activity Restrictions: tele, CAESAR hose     Subjective   Subjective  Subjective: Pt seated EOB upon arrival and agreeable to PT session this morning. RN cleared pt for PT  Pain: Pt denies pain    Objective  Vitals  Vitals:    25 1134   BP: 113/67   Pulse: 60   Resp: 18   Temp: 97.7 °F (36.5 °C)   SpO2: 91%  Provided: Role of Therapy;Plan of Care;Home Exercise Program;Transfer Training;Mobility Training;Equipment;Fall Prevention Strategies  Education Provided Comments: Disease Specific Education: Pt educated on importance of OOB mobility, prevention of complications of bedrest, and general safety during hospitalization.  Education Method: Verbal  Barriers to Learning: None  Education Outcome: Verbalized understanding;Demonstrated understanding    AM-PAC - Mobility    AM-PAC Basic Mobility - Inpatient   How much help is needed turning from your back to your side while in a flat bed without using bedrails?: A Little  How much help is needed moving from lying on your back to sitting on the side of a flat bed without using bedrails?: A Little  How much help is needed moving to and from a bed to a chair?: A Little  How much help is needed standing up from a chair using your arms?: A Little  How much help is needed walking in hospital room?: A Little  How much help is needed climbing 3-5 steps with a railing?: A Little  AM-PAC Inpatient Mobility Raw Score : 18  AM-PAC Inpatient T-Scale Score : 43.63  Mobility Inpatient CMS 0-100% Score: 46.58  Mobility Inpatient CMS G-Code Modifier : CK         Therapy Time   Individual Concurrent Group Co-treatment   Time In 1050         Time Out 1114         Minutes 24         Timed Code Treatment Minutes: 24 Minutes       Benjy Braswell

## 2025-05-01 NOTE — DISCHARGE SUMMARY
Hospital Medicine Discharge Summary    Patient: London Swenson   : 1950     Hospital:  [facility]  Admit Date: 2025   Discharge Date:   25  Disposition:  Home   Code status:  Full  Condition at Discharge: Stable  Primary Care Provider: Constance Yancey PA    Admitting Provider: Chico Ricketts MD  Discharge Provider: SANDY Barba     Discharge Diagnoses:      Active Hospital Problems    Diagnosis     NSVT (nonsustained ventricular tachycardia) ([facility]) [I47.29]     Chest pain [R07.9]     Acute on chronic heart failure with preserved ejection fraction (HCC) [I50.33]     Sick sinus syndrome (HCC) [I49.5]        Presenting Admission History:      The patient is a pleasant 74 Y M with a BMI of 40 and h/o HTN, HLD, DM2, CAD, CHF, Afib s/p ablation and Watchman, and SSS s/p pacer.  Dementia is also listed in his medical history.  He presents with sharp chest pain which started today in his L chest.  Unrelated to exertion.  No diaphoresis.  No palpitations.                His chest pain is associated with dyspnea.  He had to be started on oxygen in the ED.  He has 2+ BLE pitting edema.  Bibasilar rales.  CXR was clear, though, and BNP was normal and he denies orthopnea.  He denies any cough, fever, or chills.  Hospital medicine was called for admission.      Assessment/Plan:      Chest Pain: seems atypical, perhaps due to decompensated heart failure or ischemia   - cont asa, statin and met. Recent neg stress test in 2025   - Repeat TTE on 25 with EF 50-55%. Mildly increased wall thickness. Grossly normal wall motion. Moderately dilated RV. Low normal to mildly reduced systolic function.   - no PE or acute aortic path suspected   - Cardiology follow up as outpatient      CHF - acute on chronic diastolic w/ preserved EF 50-55% by Echo dated 25.  Likely due to Hypertensive and Ischemic heart disease.   - IV lasix transitioned to torsemide 40mg daily  - Continue  with angina pectoris; Dementia without behavioral disturbance (HCC)      !! Lancets MISC 1 each by Does not apply route daily Check blood sugars 3x daily E11.9  Qty: 300 each, Refills: 5    Associated Diagnoses: Type 2 diabetes mellitus with microalbuminuria, with long-term current use of insulin ([facility])      Insulin Pen Needle 31G X 8 MM MISC 1 each by Does not apply route 4 times daily  Qty: 200 each, Refills: 3    Associated Diagnoses: Type 2 diabetes mellitus with microalbuminuria, with long-term current use of insulin ([facility])      Insulin Syringe-Needle U-100 30G X 1/2\" 0.5 ML MISC Inject insulin 3 times daily       !! - Potential duplicate medications found. Please discuss with provider.        Current Discharge Medication List          Significant Test Results    Echo (TTE) limited (PRN contrast/bubble/strain/3D)  Result Date: 4/30/2025    Image quality is suboptimal. Contrast used: Lumason. Technically difficult study with poor endocardial visualization and technically difficult study due to patient's body habitus.   Left Ventricle: Low normal left ventricular systolic function with a visually estimated EF of 50 - 55%. EF by 2D Simpsons Biplane is 60%. Mildly increased wall thickness. Findings consistent with mild concentric hypertrophy. Grossly normal wall motion.  Difficult to evaluate fully, contrast was utilized.   Right Ventricle: Right ventricle is moderately dilated. Low normal to mildly reduced systolic function.     Echo (TTE) limited (PRN contrast/bubble/strain/3D)  Result Date: 4/29/2025    Image quality is adequate.   Left Ventricle: Normal left ventricular systolic function with a visually estimated EF of 50 - 55%. Normal wall thickness. Within the limits of the study-normal wall motion.   Right Ventricle: Right ventricle size is normal. Normal systolic function.     XR CHEST PORTABLE  Result Date: 4/28/2025  PORTABLE AP CHEST AT 1411 HOURS:   HISTORY: Chest pain. COMPARISON: 2/4/2025.     FINDINGS:

## 2025-05-01 NOTE — PROGRESS NOTES
[facility]   Daily Progress Note    Admit Date:  4/28/2025  HPI:    Chief Complaint   Patient presents with    Chest Pain     Chest pain ongoing for a week. Shortness of breath. 6/10 pain. Took full dose aspirin this morning.       London Swenson presented with shortness of breath and chest pain     Cardiology consulted for chest pain and shortness of breath       PMH: CAD (moderate, medical management), HFpEF, PAF with prior ablation, status post watchman, sick sinus syndrome status post dual PPM, history of PE, hypertension, hyperlipidemia, T2DM, ESTHER.    Subjective:  Mr. Swenson feeling better.  Anxious for discharge.  Denies any chest pain or shortness of breath.  Still with mild edema but improved.    Objective:   Patient Vitals for the past 24 hrs:   BP Temp Temp src Pulse Resp SpO2 Weight   05/01/25 1134 113/67 97.7 °F (36.5 °C) Oral 60 18 91 % --   05/01/25 0837 117/63 97.7 °F (36.5 °C) Oral 72 20 93 % --   05/01/25 0543 -- -- -- -- -- -- 122 kg (268 lb 14.4 oz)   05/01/25 0458 95/60 97.5 °F (36.4 °C) Oral 62 18 94 % --   05/01/25 0047 (!) 103/55 97.5 °F (36.4 °C) Oral 62 18 94 % --   04/30/25 1949 124/69 97.9 °F (36.6 °C) Oral 65 18 97 % --   04/30/25 1840 121/73 97.9 °F (36.6 °C) Oral 67 20 90 % --   04/30/25 1805 112/69 -- -- -- -- -- --   04/30/25 1645 113/67 98.3 °F (36.8 °C) Oral 63 19 97 % --   04/30/25 1607 124/62 -- -- 61 -- 95 % --   04/30/25 1500 111/66 97.8 °F (36.6 °C) Oral 65 18 95 % --   04/30/25 1248 102/65 98.2 °F (36.8 °C) Oral 61 16 94 % --       Intake/Output Summary (Last 24 hours) at 5/1/2025 1217  Last data filed at 5/1/2025 1050  Gross per 24 hour   Intake 662 ml   Output 2900 ml   Net -2238 ml     Wt Readings from Last 3 Encounters:   05/01/25 122 kg (268 lb 14.4 oz)   03/27/25 128.5 kg (283 lb 6.4 oz)   02/26/25 126.1 kg (278 lb)     Weights(Current Encounter) (last 50 days)         Date/Time Weight Weight Method     05/01/25 0543 122 kg (268 lb 14.4 oz) Actual;Standing  function.          Stress test 2/8/2025:    Stress Combined Conclusion: The study is negative for myocardial ischemia. Findings suggest a low risk of cardiac events.    Perfusion Comments: LV perfusion is probably normal.    Perfusion Defect: There is a small perfusion defect of mild intensity involving the apex that appears fixed.  There is adjacent extracardiac radiotracer uptake.  This is most consistent with attenuation artifact and likely diaphragmatic artifact.  There is no evidence of stress-induced myocardial ischemia.    Stress Function: Left ventricular function post-stress is normal. Post-stress ejection fraction is 67%. Stress end diastolic volume: 121 mL. Stress end systolic volume: 40 mL. LV mass: 150.0 g.    Perfusion Conclusion: There is no evidence of transient ischemic dilation (TID).    Image quality is good.    Stress Test: A pharmacological stress test was performed using regadenoson (Lexiscan). PO caffeine given as a reversal agent. The patient reported no symptoms during the stress test. The patient reached the end of the protocol. Blood pressure demonstrated a hypertensive response to stress.    Resting ECG: Sinus rhythm, possible old anteroseptal infarct.  Possible old lateral infarct.    Stress ECG: No significant ST changes noted. The stress ECG was not diagnostic due to pharmacologic protocol.        TANIA 09/04/2024: Summary    Left Ventricle: Normal left ventricular systolic function with a visually estimated EF of 55 - 60%. Left ventricle size is normal. Normal wall motion.    Tricuspid Valve: Mild to moderat eccentric regurgitation with jet direction toward the septum.    Left Atrium: Well-seated device closure (Watchman) in the appendage. No thrombus and no pastora-device leak seen on or around SUSAN occluder device.    Image quality is adequate.      Renal US 08/28/2024: Summary    Right renal vein is patent and demonstrates normal phasicity.    Left renal vein is patent and demonstrates

## 2025-05-01 NOTE — PLAN OF CARE
CHF Care Plan      Patient's EF (Ejection Fraction) is greater than 40%    Heart Failure Medications:  Diuretics:: Furosemide and Spironolactone    (One of the following REQUIRED for EF </= 40%/SYSTOLIC FAILURE but MAY be used in EF% >40%/DIASTOLIC FAILURE)        ACE:: None        ARB:: None         ARNI:: None    (Beta Blockers)  NON- Evidenced Based Beta Blocker (for EF% >40%/DIASTOLIC FAILURE): Metoprolol TARTrate- Lopressor    Evidenced Based Beta Blocker::(REQUIRED for EF% <40%/SYSTOLIC FAILURE) None  ...................................................................................................................................................    Failed to redirect to the Timeline version of the [employer] SmartLink.      Patient's weights and intake/output reviewed    Daily Weight log at bedside, patient/family participation in use of log: \"yes    Patient's current weight today shows a difference of 1 lbs more than last documented weight.      Intake/Output Summary (Last 24 hours) at 5/1/2025 0502  Last data filed at 5/1/2025 0458  Gross per 24 hour   Intake 1042 ml   Output 2425 ml   Net -1383 ml       Education Booklet Provided: yes    Comorbidities Reviewed Yes    Patient has a past medical history of A-fib ([facility]), Acid reflux, Acute on chronic heart failure, unspecified heart failure type ([facility]), Yan's esophagus, CHF (congestive heart failure) ([facility]), Coronary artery disease of native heart with stable angina pectoris, Dementia ([facility]), Diabetes mellitus ([facility]), Diabetic autonomic neuropathy associated with type 2 diabetes mellitus (HCC), Hyperlipidemia, Hypertension, Intractable vomiting with nausea, Pancreatitis, Restless legs, Sleep apnea, and Tremor.     >>For CHF and Comorbidity documentation on Education Time and Topics, please see Education Tab      CHF Education    Learners:  Patient  Readineess:   Acceptance  Method:   Explanation  Response:   Needs Reinforcement    Time Spent: 10 minutes      Pt  resting in bed at this time on room air. Pt denies shortness of breath. Pt without lower extremity edema.     Patient and/or Family's stated Goal of Care this Admission: reduce shortness of breath, increase activity tolerance, better understand heart failure and disease management, and be more comfortable prior to discharge        :

## 2025-05-01 NOTE — PLAN OF CARE
Problem: Chronic Conditions and Co-morbidities  Goal: Patient's chronic conditions and co-morbidity symptoms are monitored and maintained or improved  5/1/2025 1220 by Holly Santiago RN  Outcome: Progressing  Flowsheets (Taken 5/1/2025 0846)  Care Plan - Patient's Chronic Conditions and Co-Morbidity Symptoms are Monitored and Maintained or Improved: Monitor and assess patient's chronic conditions and comorbid symptoms for stability, deterioration, or improvement  4/30/2025 2353 by nAna Hoyos RN  Outcome: Progressing     Problem: Discharge Planning  Goal: Discharge to home or other facility with appropriate resources  5/1/2025 1220 by Holly Santiago RN  Outcome: Progressing  Flowsheets (Taken 5/1/2025 0846)  Discharge to home or other facility with appropriate resources: Identify barriers to discharge with patient and caregiver  4/30/2025 2353 by Anna Hoyos RN  Outcome: Progressing     Problem: Respiratory - Adult  Goal: Achieves optimal ventilation and oxygenation  5/1/2025 1220 by Holly Santiago RN  Outcome: Progressing  Flowsheets (Taken 5/1/2025 0846)  Achieves optimal ventilation and oxygenation: Assess for changes in respiratory status  4/30/2025 2353 by Anna Hoyos RN  Outcome: Progressing     Problem: Cardiovascular - Adult  Goal: Maintains optimal cardiac output and hemodynamic stability  5/1/2025 1220 by Holly Santiago RN  Outcome: Progressing  Flowsheets (Taken 5/1/2025 0846)  Maintains optimal cardiac output and hemodynamic stability: Monitor blood pressure and heart rate  4/30/2025 2353 by Anna Hoyos RN  Outcome: Progressing  Goal: Absence of cardiac dysrhythmias or at baseline  5/1/2025 1220 by Holly Santiago RN  Outcome: Progressing  Flowsheets (Taken 5/1/2025 0846)  Absence of cardiac dysrhythmias or at baseline: Monitor cardiac rate and rhythm  4/30/2025 2353 by Anna Hoyos RN  Outcome: Progressing     Problem: Skin/Tissue Integrity - Adult  Goal: Skin integrity remains intact  5/1/2025 1220 by Jack  FABIÁN Barrera  Outcome: Progressing  Flowsheets  Taken 5/1/2025 1220  Skin Integrity Remains Intact: Monitor for areas of redness and/or skin breakdown  Taken 5/1/2025 0846  Skin Integrity Remains Intact: Monitor for areas of redness and/or skin breakdown  4/30/2025 2353 by Anna Hoyos RN  Outcome: Progressing     Problem: Genitourinary - Adult  Goal: Absence of urinary retention  Outcome: Progressing  Flowsheets (Taken 5/1/2025 0846)  Absence of urinary retention: Assess patient’s ability to void and empty bladder     Problem: Infection - Adult  Goal: Absence of infection at discharge  Outcome: Progressing  Flowsheets (Taken 5/1/2025 0846)  Absence of infection at discharge: Assess and monitor for signs and symptoms of infection     Problem: Metabolic/Fluid and Electrolytes - Adult  Goal: Electrolytes maintained within normal limits  5/1/2025 1220 by Holly Santiago RN  Outcome: Progressing  Flowsheets (Taken 5/1/2025 0846)  Electrolytes maintained within normal limits: Monitor labs and assess patient for signs and symptoms of electrolyte imbalances  4/30/2025 2353 by Anna Hoyos RN  Outcome: Progressing  Goal: Glucose maintained within prescribed range  5/1/2025 1220 by Holly Santiago RN  Outcome: Progressing  Flowsheets (Taken 5/1/2025 0846)  Glucose maintained within prescribed range: Monitor blood glucose as ordered  4/30/2025 2353 by Anna Hoyos RN  Outcome: Progressing     Problem: Safety - Adult  Goal: Free from fall injury  5/1/2025 1220 by Holly Santiago RN  Outcome: Progressing  4/30/2025 2353 by Anna Hoyos RN  Outcome: Progressing

## 2025-05-01 NOTE — PROGRESS NOTES
Patient discharged to home. IV removed with no complications, telemetry removed [facility] notified. Discharge education complete with verbal understanding. All belongings sent home with patient. Patient transported via wheelchair to vehicle.

## 2025-05-01 NOTE — PLAN OF CARE
Problem: Discharge Planning  Goal: Discharge to home or other facility with appropriate resources  Outcome: Progressing     Problem: Chronic Conditions and Co-morbidities  Goal: Patient's chronic conditions and co-morbidity symptoms are monitored and maintained or improved  Outcome: Progressing     Problem: Metabolic/Fluid and Electrolytes - Adult  Goal: Electrolytes maintained within normal limits  Outcome: Progressing     Problem: Cardiovascular - Adult  Goal: Absence of cardiac dysrhythmias or at baseline  Outcome: Progressing     Problem: Skin/Tissue Integrity - Adult  Goal: Skin integrity remains intact  Outcome: Progressing     Problem: Respiratory - Adult  Goal: Achieves optimal ventilation and oxygenation  Outcome: Progressing     Problem: Discharge Planning  Goal: Discharge to home or other facility with appropriate resources  Outcome: Progressing

## 2025-05-01 NOTE — CARE COORDINATION
CASE MANAGEMENT DISCHARGE SUMMARY      Discharge to: Home    IMM given: 4/30/2025    Transportation:    Family/car    Confirmed discharge plan with:     Patient: yes     Family:  yes     RN, name: Holly Duron RN

## 2025-05-02 ENCOUNTER — TELEPHONE (OUTPATIENT)
Dept: FAMILY MEDICINE CLINIC | Age: 75
End: 2025-05-02

## 2025-05-02 NOTE — TELEPHONE ENCOUNTER
Care Transitions Initial Follow Up Call    Outreach made within 2 business days of discharge: Yes    Patient: London Swenson Patient : 1950   MRN: [id number]  Reason for Admission: Chest pain  Discharge Date: 25       Spoke with: Pain    Discharge department/facility: [facility] Interactive Patient Contact:  Was patient able to fill all prescriptions: Yes  Was patient instructed to bring all medications to the follow-up visit: Yes  Is patient taking all medications as directed in the discharge summary? Yes  Does patient understand their discharge instructions: Yes  Does patient have questions or concerns that need addressed prior to 7-14 day follow up office visit: no        Scheduled appointment with PCP within 7-14 days    Follow Up  Future Appointments   Date Time Provider Department Center   2025  2:00 PM Constance Yancey PA [facility] DEP   2025  9:45 AM Darci Manuel MD AND [facility] Neurology -   2025  1:00 [facility]   2025  3:15 PM Kamilla Duron APRN - CNP [facility]   7/15/2025 11:00 AM Kelsey Flores MD AND ENDO [facility]   2025 10:30 AM SCHEDULE, ALETHEA DEVICE CHECK [facility]   2025 10:30 AM Ericka Valdez, APRN - CNP [facility]       Afsaneh Short LPN

## 2025-05-05 ENCOUNTER — OFFICE VISIT (OUTPATIENT)
Dept: FAMILY MEDICINE CLINIC | Age: 75
End: 2025-05-05

## 2025-05-05 ENCOUNTER — CARE COORDINATION (OUTPATIENT)
Dept: CARE COORDINATION | Age: 75
End: 2025-05-05

## 2025-05-05 VITALS
SYSTOLIC BLOOD PRESSURE: 112 MMHG | OXYGEN SATURATION: 95 % | WEIGHT: 275.8 LBS | BODY MASS INDEX: 39.48 KG/M2 | HEIGHT: 70 IN | DIASTOLIC BLOOD PRESSURE: 48 MMHG | TEMPERATURE: 97.6 F | HEART RATE: 68 BPM

## 2025-05-05 DIAGNOSIS — I50.812 CHRONIC RIGHT-SIDED CONGESTIVE HEART FAILURE (HCC): Primary | ICD-10-CM

## 2025-05-05 DIAGNOSIS — Z09 HOSPITAL DISCHARGE FOLLOW-UP: ICD-10-CM

## 2025-05-05 DIAGNOSIS — J96.01 ACUTE RESPIRATORY FAILURE WITH HYPOXIA (HCC): ICD-10-CM

## 2025-05-05 DIAGNOSIS — I50.33 ACUTE ON CHRONIC HEART FAILURE WITH PRESERVED EJECTION FRACTION (HFPEF) (HCC): Primary | ICD-10-CM

## 2025-05-05 PROCEDURE — 1111F DSCHRG MED/CURRENT MED MERGE: CPT

## 2025-05-05 SDOH — ECONOMIC STABILITY: FOOD INSECURITY: WITHIN THE PAST 12 MONTHS, YOU WORRIED THAT YOUR FOOD WOULD RUN OUT BEFORE YOU GOT MONEY TO BUY MORE.: NEVER TRUE

## 2025-05-05 SDOH — ECONOMIC STABILITY: FOOD INSECURITY: WITHIN THE PAST 12 MONTHS, THE FOOD YOU BOUGHT JUST DIDN'T LAST AND YOU DIDN'T HAVE MONEY TO GET MORE.: NEVER TRUE

## 2025-05-05 ASSESSMENT — PATIENT HEALTH QUESTIONNAIRE - PHQ9
SUM OF ALL RESPONSES TO PHQ QUESTIONS 1-9: 0
1. LITTLE INTEREST OR PLEASURE IN DOING THINGS: NOT AT ALL
SUM OF ALL RESPONSES TO PHQ QUESTIONS 1-9: 0
2. FEELING DOWN, DEPRESSED OR HOPELESS: NOT AT ALL

## 2025-05-05 NOTE — CARE COORDINATION
Ambulatory Care Coordination Note     2025 9:30 AM     Patient Current Location:  Home: [address] [place], [place] [id number]     ACM contacted the patient and spouse/partner by telephone. Verified name and  with patient and spouse/partner as identifiers.         ACM: Giovanna Reyes RN     Challenges to be reviewed by the provider   Additional needs identified to be addressed with provider No  none               Method of communication with provider: chart routing.    Utilization: Has the patient been discharged from the hospital since your last call? yes -   Call within 2 business days of discharge: Yes    Patient: London Swenson    Patient : 1950   MRN: [id number]    Reason for Admission:    Discharge Date: 25  RURS: Readmission Risk Score: 28.6      Last Discharge Facility       Date Complaint Diagnosis Description Type Department Provider    25 Chest Pain Chest pain, unspecified type ... ED to Hosp-Admission (Discharged) (ADMITTED) Kamilla Schafer, DO; Leo, And...            Was this an external facility discharge? No    Ambulatory Care Manager reviewed discharge instructions and medical action plan with patient and spouse/partner. The patient and spouse/partner was given an opportunity to ask questions; all questions answered at this time.. The patient verbalized understanding.   Were discharge instructions available to patient? Yes.   Reviewed appropriate site of care based on symptoms and resources available to patient including: PCP  Specialist. The patient and spouse/partner agrees to contact the primary care provider and/or specialist office for questions related to their healthcare.     Patients top risk factors for readmission: level of motivation and medication management    Hospital follow up appointment: Discussed follow up appointments. Patient has hospital follow up appointment scheduled within 7 days of discharge.     Care Summary Note:  ACM completed [place] call

## 2025-05-05 NOTE — PATIENT INSTRUCTIONS
Increase torsemide to 1 tablet of torsemide (40 mg) in morning 1/2 tablet (20mg)at lunch for the next 3 days, then return to 1 tablet daily.   Repeat blood work on Thursday, orders are in. No need to fast.

## 2025-05-05 NOTE — PROGRESS NOTES
Have you been to the ER, urgent care clinic since your last visit?  Hospitalized since your last visit?   YES - When: approximately 7 days ago.  Where and Why: [facility]    Have you seen or consulted any other health care providers outside our system since your last visit?   NO      “Have you had a colorectal cancer screening such as a colonoscopy/FIT/Cologuard?    NO    Date of last Colonoscopy: 1/29/2020  No cologuard on file  No FIT/FOBT on file   No flexible sigmoidoscopy on file     “Have you had a diabetic eye exam?”    NO due in July      Date of last diabetic eye exam: 10/6/2022            
tablet TAKE 1 & 1/2 (ONE & ONE-HALF) TABLETS BY MOUTH NIGHTLY (Patient taking differently: 1 tablet) 135 tablet 1    magnesium oxide (MAG-OX) 400 (240 Mg) MG tablet Take 1 tablet by mouth daily 30 tablet 3    potassium chloride (KLOR-CON M) 20 MEQ extended release tablet Take 1 tablet by mouth daily 30 tablet 3    glucose 4 g chewable tablet Take 4 tablets by mouth as needed for Low blood sugar 60 tablet 3    aspirin 325 MG tablet Take 1 tablet by mouth daily      dofetilide (TIKOSYN) 250 MCG capsule TAKE 1 CAPSULE BY MOUTH EVERY 12 HOURS 180 capsule 2    Continuous Glucose Sensor (FREESTYLE JUSTIN 3 PLUS SENSOR) MISC CHANGE EVERY 14 DAYS 2 each 11    Continuous Glucose  (FREESTYLE JUSTIN 3 READER) OLI 1 Units by Does not apply route 4 times daily (after meals and at bedtime) 1 each 0    Lancets MISC 1 each by Does not apply route daily 100 each 5    atorvastatin (LIPITOR) 40 MG tablet TAKE 1 TABLET BY MOUTH AT BEDTIME 90 tablet 3    acetaminophen (TYLENOL) 325 MG tablet Take 2 tablets by mouth every 4 hours as needed for Pain      Handicap Placard MISC by Does not apply route Exp: 5/1/2026 1 each 0    Lancets MISC 1 each by Does not apply route daily Check blood sugars 3x daily E11.9 300 each 5    Insulin Pen Needle 31G X 8 MM MISC 1 each by Does not apply route 4 times daily 200 each 3    Insulin Syringe-Needle U-100 30G X 1/2\" 0.5 ML MISC Inject insulin 3 times daily          Medications patient taking as of now reconciled against medications ordered at time of hospital discharge: Yes    A comprehensive review of systems was negative except for what was noted in the HPI.    Objective:    BP (!) 112/48 (BP Site: Right Upper Arm, Patient Position: Sitting, BP Cuff Size: Large Adult)   Pulse 68   Temp 97.6 °F (36.4 °C) (Temporal)   Ht 1.778 m (5' 10\")   Wt 125.1 kg (275 lb 12.8 oz)   SpO2 95%   BMI 39.57 kg/m²   General Appearance: alert and oriented to person, place and time, well developed and well-

## 2025-05-09 DIAGNOSIS — I50.33 ACUTE ON CHRONIC HEART FAILURE WITH PRESERVED EJECTION FRACTION (HFPEF) (HCC): ICD-10-CM

## 2025-05-10 LAB
ANION GAP SERPL CALCULATED.3IONS-SCNC: 10 MMOL/L (ref 3–16)
BUN SERPL-MCNC: 23 MG/DL (ref 7–20)
CALCIUM SERPL-MCNC: 8.9 MG/DL (ref 8.3–10.6)
CHLORIDE SERPL-SCNC: 96 MMOL/L (ref 99–110)
CO2 SERPL-SCNC: 30 MMOL/L (ref 21–32)
CREAT SERPL-MCNC: 1.3 MG/DL (ref 0.8–1.3)
GFR SERPLBLD CREATININE-BSD FMLA CKD-EPI: 57 ML/MIN/{1.73_M2}
GLUCOSE SERPL-MCNC: 468 MG/DL (ref 70–99)
MAGNESIUM SERPL-MCNC: 1.73 MG/DL (ref 1.8–2.4)
NT-PROBNP SERPL-MCNC: 56 PG/ML (ref 0–449)
POTASSIUM SERPL-SCNC: 4.7 MMOL/L (ref 3.5–5.1)
SODIUM SERPL-SCNC: 136 MMOL/L (ref 136–145)

## 2025-05-12 ENCOUNTER — RESULTS FOLLOW-UP (OUTPATIENT)
Dept: FAMILY MEDICINE CLINIC | Age: 75
End: 2025-05-12

## 2025-05-14 ENCOUNTER — CARE COORDINATION (OUTPATIENT)
Dept: CARE COORDINATION | Age: 75
End: 2025-05-14

## 2025-05-14 NOTE — CARE COORDINATION
Ambulatory Care Coordination Note     2025 1:32 PM     Patient Current Location:  Home: [facility] [place] [id number]     ACM contacted the patient by telephone. Verified name and  with patient as identifiers.         ACM: Giovanna Reyes RN     Challenges to be reviewed by the provider   Additional needs identified to be addressed with provider No  none               Method of communication with provider: chart routing.    Utilization: Patient has not had any utilization since our last call.     Care Summary Note:  ACM completed follow up call with patient wife Rosy who said patient is doing good and is currently working on his truck. Rosy said that patient is doing well managing and will have some high blood sugar readings in the evening but fasting blood sugar will be less than 150. Rosy said that patient weight is stable and is not holding onto any fluid.     Heart Failure Education outreach Date/Time: 2025 1:46 PM    Ambulatory Care Manager (ACM) contacted the family by telephone to perform Ambulatory Care Coordination. Verified name and  with family as identifiers. Provided introduction to self, and explanation of the Ambulatory Care Manager's role.     ACM reviewed that a Heart Healthy tips for the Summer packet has been mailed to the them. ACM reviewed CHF zones, daily weights, fluid restriction, the importance of low sodium diet, and healthy tips packet with the family. Instructed family to call their PCP if they have a weight gain of 3 lbs in 2 days or 5 lbs in a week.     Patient reminded that there is a physician on call 24 hours a day / 7 days a week should the patient have questions or concerns. The patient verbalized understanding.      Offered patient enrollment in the Remote Patient Monitoring (RPM) program for in-home monitoring: Yes, but did not enroll at this time: already monitoring with home equipment.     Assessments Completed:   Diabetes Assessment      Meal

## 2025-05-20 RX ORDER — SPIRONOLACTONE 25 MG/1
25 TABLET ORAL DAILY
Qty: 90 TABLET | Refills: 3 | Status: SHIPPED | OUTPATIENT
Start: 2025-05-20

## 2025-05-20 NOTE — TELEPHONE ENCOUNTER
LOV: NPDE  2/17/25  NOV: 5/20/25 NPRB  Labs: BMP 5/9/25  Lab Results   Component Value Date/Time     05/09/2025 02:38 PM    K 4.7 05/09/2025 02:38 PM    K 4.2 04/29/2025 06:03 AM    CL 96 05/09/2025 02:38 PM    CO2 30 05/09/2025 02:38 PM    BUN 23 05/09/2025 02:38 PM    CREATININE 1.3 05/09/2025 02:38 PM    GLUCOSE 468 05/09/2025 02:38 PM    CALCIUM 8.9 05/09/2025 02:38 PM    LABGLOM 57 05/09/2025 02:38 PM    LABGLOM 79 04/23/2024 02:17 PM

## 2025-05-24 DIAGNOSIS — G25.81 RESTLESS LEG SYNDROME: ICD-10-CM

## 2025-05-24 DIAGNOSIS — E11.41 DIABETIC MONONEUROPATHY ASSOCIATED WITH TYPE 2 DIABETES MELLITUS (HCC): ICD-10-CM

## 2025-05-27 DIAGNOSIS — G25.81 RESTLESS LEG SYNDROME: ICD-10-CM

## 2025-05-27 DIAGNOSIS — E11.41 DIABETIC MONONEUROPATHY ASSOCIATED WITH TYPE 2 DIABETES MELLITUS (HCC): ICD-10-CM

## 2025-05-27 RX ORDER — PREGABALIN 50 MG/1
50 CAPSULE ORAL 2 TIMES DAILY
Qty: 60 CAPSULE | Refills: 0 | Status: SHIPPED | OUTPATIENT
Start: 2025-05-27 | End: 2025-06-26

## 2025-05-27 RX ORDER — PREGABALIN 50 MG/1
50 CAPSULE ORAL 2 TIMES DAILY
Qty: 60 CAPSULE | Refills: 0 | OUTPATIENT
Start: 2025-05-27 | End: 2025-06-25

## 2025-05-27 NOTE — TELEPHONE ENCOUNTER
Pt's wife calls re pt is out of meds.  PCP Abhi has filled .  No further action required.  Advised wife to call RX this afternoon before going to .  Patient's wife voices understanding.

## 2025-05-27 NOTE — TELEPHONE ENCOUNTER
.Refill Request     CONFIRM preferred pharmacy with the patient.    If Mail Order Rx - Pend for 90 day refill.      Last Seen: Last Seen Department: 5/5/2025  Last Seen by PCP: 5/5/2025    Last Written: 4/24/25 60 with 0     If no future appointment scheduled:  Review the last OV with PCP and review information for follow-up visit,  Route STAFF MESSAGE with patient name to the  Pool for scheduling with the following information:            -  Timing of next visit           -  Visit type ie Physical, OV, etc           -  Diagnoses/Reason ie. COPD, HTN - Do not use MEDICATION, Follow-up or CHECK UP - Give reason for visit      Next Appointment:   Future Appointments   Date Time Provider Department Center   7/10/2025  3:00 PM Darci Manuel MD AND NEURO Neurology -   7/15/2025 11:00 AM Kelsey Flores MD AND ENDO [facility]   8/6/2025 11:00 AM Constance Yancey PA [facility]   8/13/2025 10:30 AM SCHEDULE, ALETHEA DEVICE CHECK [facility]   8/13/2025 10:30 AM Ericka Valdez, APRN - CNP [facility]       Message sent to  to schedule appt with patient?  NO      Requested Prescriptions     Pending Prescriptions Disp Refills    pregabalin (LYRICA) 50 MG capsule [Pharmacy Med Name: Pregabalin 50 MG Oral Capsule] 60 capsule 0     Sig: Take 1 capsule by mouth 2 times daily.

## 2025-05-28 RX ORDER — LANOLIN ALCOHOL/MO/W.PET/CERES
400 CREAM (GRAM) TOPICAL DAILY
Qty: 30 TABLET | Refills: 3 | Status: SHIPPED | OUTPATIENT
Start: 2025-05-28

## 2025-05-28 RX ORDER — POTASSIUM CHLORIDE 1500 MG/1
20 TABLET, EXTENDED RELEASE ORAL DAILY
Qty: 30 TABLET | Refills: 3 | Status: SHIPPED | OUTPATIENT
Start: 2025-05-28

## 2025-05-28 NOTE — TELEPHONE ENCOUNTER
Patients wife called in wanting refills for pota.cl micro er 20 meq, and mag oxide. Please review and sign.     LOV: 2/17/25 NPDE  NOV: Not scheduled patient saw NPDD in hospital 5/1/25  Labs:   Lab Results   Component Value Date/Time     05/09/2025 02:38 PM    K 4.7 05/09/2025 02:38 PM    K 4.2 04/29/2025 06:03 AM    CL 96 05/09/2025 02:38 PM    CO2 30 05/09/2025 02:38 PM    BUN 23 05/09/2025 02:38 PM    CREATININE 1.3 05/09/2025 02:38 PM    GLUCOSE 468 05/09/2025 02:38 PM    CALCIUM 8.9 05/09/2025 02:38 PM    LABGLOM 57 05/09/2025 02:38 PM    LABGLOM 79 04/23/2024 02:17 PM      Lab Results   Component Value Date/Time    MG 1.73 05/09/2025 02:38 PM

## 2025-06-02 ENCOUNTER — OFFICE VISIT (OUTPATIENT)
Dept: FAMILY MEDICINE CLINIC | Age: 75
End: 2025-06-02
Payer: MEDICARE

## 2025-06-02 ENCOUNTER — HOSPITAL ENCOUNTER (OUTPATIENT)
Dept: CT IMAGING | Age: 75
Discharge: HOME | End: 2025-06-02
Payer: MEDICARE

## 2025-06-02 VITALS
HEART RATE: 87 BPM | DIASTOLIC BLOOD PRESSURE: 70 MMHG | WEIGHT: 273 LBS | OXYGEN SATURATION: 98 % | SYSTOLIC BLOOD PRESSURE: 128 MMHG | BODY MASS INDEX: 39.17 KG/M2

## 2025-06-02 DIAGNOSIS — R10.9 LEFT FLANK PAIN: ICD-10-CM

## 2025-06-02 DIAGNOSIS — R10.9 LEFT FLANK PAIN: Primary | ICD-10-CM

## 2025-06-02 LAB
BILIRUBIN, POC: ABNORMAL
BLOOD URINE, POC: ABNORMAL
CLARITY, POC: CLEAR
COLOR, POC: YELLOW
GLUCOSE URINE, POC: 500 MG/DL
KETONES, POC: ABNORMAL MG/DL
LEUKOCYTE EST, POC: ABNORMAL
NITRITE, POC: ABNORMAL
PH, POC: 5.5
PROTEIN, POC: ABNORMAL MG/DL
SPECIFIC GRAVITY, POC: 0.01
UROBILINOGEN, POC: 0.2 MG/DL

## 2025-06-02 PROCEDURE — 1160F RVW MEDS BY RX/DR IN RCRD: CPT | Performed by: NURSE PRACTITIONER

## 2025-06-02 PROCEDURE — 3078F DIAST BP <80 MM HG: CPT | Performed by: NURSE PRACTITIONER

## 2025-06-02 PROCEDURE — 1123F ACP DISCUSS/DSCN MKR DOCD: CPT | Performed by: NURSE PRACTITIONER

## 2025-06-02 PROCEDURE — 99214 OFFICE O/P EST MOD 30 MIN: CPT | Performed by: NURSE PRACTITIONER

## 2025-06-02 PROCEDURE — G8427 DOCREV CUR MEDS BY ELIG CLIN: HCPCS | Performed by: NURSE PRACTITIONER

## 2025-06-02 PROCEDURE — 74176 CT ABD & PELVIS W/O CONTRAST: CPT

## 2025-06-02 PROCEDURE — 1159F MED LIST DOCD IN RCRD: CPT | Performed by: NURSE PRACTITIONER

## 2025-06-02 PROCEDURE — 1036F TOBACCO NON-USER: CPT | Performed by: NURSE PRACTITIONER

## 2025-06-02 PROCEDURE — 81002 URINALYSIS NONAUTO W/O SCOPE: CPT | Performed by: NURSE PRACTITIONER

## 2025-06-02 PROCEDURE — 3017F COLORECTAL CA SCREEN DOC REV: CPT | Performed by: NURSE PRACTITIONER

## 2025-06-02 PROCEDURE — 3074F SYST BP LT 130 MM HG: CPT | Performed by: NURSE PRACTITIONER

## 2025-06-02 PROCEDURE — G8417 CALC BMI ABV UP PARAM F/U: HCPCS | Performed by: NURSE PRACTITIONER

## 2025-06-02 RX ORDER — TRAMADOL HYDROCHLORIDE 50 MG/1
50 TABLET ORAL EVERY 6 HOURS PRN
Qty: 12 TABLET | Refills: 0 | Status: SHIPPED | OUTPATIENT
Start: 2025-06-02 | End: 2025-06-05

## 2025-06-02 ASSESSMENT — ENCOUNTER SYMPTOMS
COUGH: 0
CONSTIPATION: 0
VOMITING: 0
NAUSEA: 0
ABDOMINAL DISTENTION: 0
CHEST TIGHTNESS: 0
ABDOMINAL PAIN: 0
SHORTNESS OF BREATH: 0
DIARRHEA: 0
WHEEZING: 0

## 2025-06-02 NOTE — PROGRESS NOTES
2025  London Swenson (: 1950)  74 y.o.    ASSESSMENT and PLAN:  Londno was seen today for flank pain and lower back pain.    Diagnoses and all orders for this visit:    Left flank pain  -     POCT Urinalysis no Micro  -     traMADol (ULTRAM) 50 MG tablet; Take 1 tablet by mouth every 6 hours as needed for Pain for up to 3 days. Intended supply: 3 days. Take lowest dose possible to manage pain Max Daily Amount: 200 mg  -     Cancel: MICROSCOPIC URINALYSIS; Future  -     Culture, Urine  -     CT ABDOMEN PELVIS WO CONTRAST Additional Contrast? None; Future  -     MICROSCOPIC URINALYSIS    Update urine studies.   Ct stat to rule out kidney stone given left cva tenderness.   Added tramadol prn for pain. Pt has tolerated in the past despite listed allergy.   Would consider adding flomax if stone <10.   Alarm symptoms reviewed and when to go to ER.     Return if symptoms worsen or fail to improve.    HPI  Presenting for left flank pain  Onset 2-3 days ago.   Associated symptoms-increased frequency.   Pain is 10/10. Worse with movement.   Denies dysuria, decreased urine.   No blood in urine  No diarrhea, n/v.   No injury/trauma.   Never had this before.   No fevers, chills, body aches, diaphoresis.   Tried tylenol, didn't help.     Review of Systems   Constitutional:  Negative for activity change, appetite change, chills, fatigue, fever and unexpected weight change.   HENT: Negative.     Respiratory:  Negative for cough, chest tightness, shortness of breath and wheezing.    Cardiovascular:  Negative for chest pain, palpitations and leg swelling.   Gastrointestinal:  Negative for abdominal distention, abdominal pain, constipation, diarrhea, nausea and vomiting.   Genitourinary:  Positive for flank pain.   Skin: Negative.    Neurological:  Negative for dizziness, weakness, light-headedness, numbness and headaches.   Hematological: Negative.    Psychiatric/Behavioral: Negative.         Allergies, past medical

## 2025-06-02 NOTE — PROGRESS NOTES
Urine culture & microscopic UA sent to the lab for testing.   Collected 1 grey culture, 1 white top no additive, and 1 speckled no additive, refrigerated.  Labeled and placed in bag with orders.   (ALL URINE CX TO BE PLACED IN THE FRIDGE)     STAT CT scheduled for [facility], patient sent downstairs after checkout.

## 2025-06-03 ENCOUNTER — RESULTS FOLLOW-UP (OUTPATIENT)
Dept: FAMILY MEDICINE CLINIC | Age: 75
End: 2025-06-03

## 2025-06-03 LAB
BACTERIA URNS QL MICRO: NORMAL /HPF
EPI CELLS #/AREA URNS AUTO: 0 /HPF (ref 0–5)
HYALINE CASTS #/AREA URNS AUTO: 0 /LPF (ref 0–8)
RBC CLUMPS #/AREA URNS AUTO: 0 /HPF (ref 0–4)
URN SPEC COLLECT METH UR: NORMAL
WBC #/AREA URNS AUTO: 0 /HPF (ref 0–5)

## 2025-06-04 LAB — BACTERIA UR CULT: NORMAL

## 2025-06-06 ENCOUNTER — CARE COORDINATION (OUTPATIENT)
Dept: CARE COORDINATION | Age: 75
End: 2025-06-06

## 2025-06-06 NOTE — CARE COORDINATION
Ambulatory Care Coordination Note     2025 9:14 AM     Patient Current Location:  Home: [address]  [place] [address]     ACM contacted the caregiver by telephone. Verified name and  with caregiver as identifiers.         ACM: Giovanna Reyes RN     Challenges to be reviewed by the provider   Additional needs identified to be addressed with provider No                 Method of communication with provider: chart routing.    Utilization: Patient has not had any utilization since our last call.     Care Summary Note:  ACM placed call to patient and spoke to Rosy patient wife who said patient is doing okay at this time. ACM asked if they had called Endocrinology and Rosy said they would wait until appt in July. ACM encouraged patient and wife to call Endocrinology with current blood sugar readings. Rosy said that patient has not taken BS reading so far today. Patient encouraged if he is still running high to notify endocrinology and not to wait to discuss high readings until July 15 appt. Rosy said patient does not monitor daily weights or BP regulary. ACM encouraged patient to do this daily as this is a good routine to have with chronic disease management.     Offered patient enrollment in the Remote Patient Monitoring (RPM) program for in-home monitoring: Yes, but did not enroll at this time: limited patient ability to navigate RPM/equipment.     Assessments Completed:       2025     8:55 AM   Amb Fall Risk Assessment and TUG Test   Do you feel unsteady or are you worried about falling?  no   2 or more falls in past year? no   Fall with injury in past year? no    ,   Diabetes Assessment      Meal Planning: Avoidance of concentrated sweets, Calorie counting   How often do you test your blood sugar?: Daily, Meals, Bedtime   Do you have barriers with adherence to non-pharmacologic self-management interventions? (Nutrition/Exercise/Self-Monitoring): Yes   Have you ever had to go to the ED for

## 2025-06-23 DIAGNOSIS — G25.81 RESTLESS LEG SYNDROME: ICD-10-CM

## 2025-06-23 DIAGNOSIS — E11.41 DIABETIC MONONEUROPATHY ASSOCIATED WITH TYPE 2 DIABETES MELLITUS (HCC): ICD-10-CM

## 2025-06-23 DIAGNOSIS — K21.9 GASTROESOPHAGEAL REFLUX DISEASE, UNSPECIFIED WHETHER ESOPHAGITIS PRESENT: Primary | ICD-10-CM

## 2025-06-23 NOTE — TELEPHONE ENCOUNTER
Refill Request     CONFIRM preferred pharmacy with the patient.    If Mail Order Rx - Pend for 90 day refill.      Last Seen: Last Seen Department: 6/2/2025  Last Seen by PCP: 5/5/2025    Last Written: 4/28/25 60 with 0     If no future appointment scheduled:  Review the last OV with PCP and review information for follow-up visit,  Route STAFF MESSAGE with patient name to the  Pool for scheduling with the following information:            -  Timing of next visit           -  Visit type ie Physical, OV, etc           -  Diagnoses/Reason ie. COPD, HTN - Do not use MEDICATION, Follow-up or CHECK UP - Give reason for visit      Next Appointment:   Future Appointments   Date Time Provider Department Center   7/15/2025 11:00 AM Kelsey Flores MD AND [facility]   8/6/2025 11:00 AM Constance Yancey PA [facility]   8/13/2025 10:30 AM SCHEDULE, ALETHEA DEVICE CHECK [facility]   8/13/2025 10:30 AM Ericka Valdez, APRN - CNP [facility]       Message sent to  to schedule appt with patient?  NO      Requested Prescriptions     Pending Prescriptions Disp Refills    pantoprazole (PROTONIX) 40 MG tablet [Pharmacy Med Name: Pantoprazole Sodium 40 MG Oral Tablet Delayed Release] 180 tablet 1     Sig: TAKE 1 TABLET BY MOUTH TWICE DAILY BEFORE MEAL(S)

## 2025-06-23 NOTE — TELEPHONE ENCOUNTER
.Refill Request     CONFIRM preferred pharmacy with the patient.    If Mail Order Rx - Pend for 90 day refill.      Last Seen: Last Seen Department: 6/2/2025  Last Seen by PCP: Visit date not found    Last Written: 5/27/25 60 with 0     If no future appointment scheduled:  Review the last OV with PCP and review information for follow-up visit,  Route STAFF MESSAGE with patient name to the  Pool for scheduling with the following information:            -  Timing of next visit           -  Visit type ie Physical, OV, etc           -  Diagnoses/Reason ie. COPD, HTN - Do not use MEDICATION, Follow-up or CHECK UP - Give reason for visit      Next Appointment:   Future Appointments   Date Time Provider Department Center   7/15/2025 11:00 AM Kelsey Flores MD [facility]   8/6/2025 11:00 AM Constance Yancey PA [facility]   8/13/2025 10:30 AM SCHEDULE, ALETHEA DEVICE CHECK [facility]   8/13/2025 10:30 AM Ericka Valdez, SANDY - CNP [facility]       Message sent to  to schedule appt with patient?  NO      Requested Prescriptions     Pending Prescriptions Disp Refills    pregabalin (LYRICA) 50 MG capsule [Pharmacy Med Name: Pregabalin 50 MG Oral Capsule] 60 capsule 0     Sig: TAKE 1 CAPSULE BY MOUTH TWICE DAILY MAX  2  CAPSULES  A  DAY

## 2025-06-25 RX ORDER — PANTOPRAZOLE SODIUM 40 MG/1
TABLET, DELAYED RELEASE ORAL
Qty: 180 TABLET | Refills: 1 | Status: SHIPPED | OUTPATIENT
Start: 2025-06-25

## 2025-06-25 RX ORDER — PREGABALIN 50 MG/1
CAPSULE ORAL
Qty: 60 CAPSULE | Refills: 0 | Status: SHIPPED | OUTPATIENT
Start: 2025-06-25 | End: 2025-07-25

## 2025-06-26 ENCOUNTER — CARE COORDINATION (OUTPATIENT)
Dept: CARE COORDINATION | Age: 75
End: 2025-06-26

## 2025-06-26 NOTE — CARE COORDINATION
or reschedule if I have to cancel.  I will notify my provider of any barriers to my plan of care.  I will follow my Zone Management tool to seek urgent or emergent care.  I will notify my provider of any symptoms that indicate a worsening of my condition.    Barriers: lack of motivation  Plan for overcoming my barriers: Patient/ spouse changing cardiology to [facility] for CHF management. ACM will continue care management calls to engage in CHF education and management.  Confidence: 8/10  Anticipated Goal Completion Date: 6/10/25                 PCP/Specialist follow up:   Future Appointments         Provider Specialty Dept Phone    7/15/2025 11:00 AM Kelsey Flores MD Endocrinology [phone]    8/6/2025 11:00 AM Constance Yancey PA Family Medicine [phone]    8/13/2025 10:30 AM ALETHEA ORTEGA DEVICE CHECK Cardiology [phone]    8/13/2025 10:30 AM Ericka Valdez APRN - CNP Cardiology [phone]            Follow Up:   No further Ambulatory Care Management follow-up scheduled at this time.  Patient  has Ambulatory Care Manager's contact information for any further questions, concerns or needs.

## 2025-06-28 DIAGNOSIS — E78.2 MIXED HYPERLIPIDEMIA: ICD-10-CM

## 2025-06-30 RX ORDER — ATORVASTATIN CALCIUM 40 MG/1
40 TABLET, FILM COATED ORAL NIGHTLY
Qty: 90 TABLET | Refills: 2 | Status: SHIPPED | OUTPATIENT
Start: 2025-06-30

## 2025-06-30 NOTE — ANESTHESIA POSTPROCEDURE EVALUATION
Department of Anesthesiology  Postprocedure Note    Patient: Glenn Nation  MRN: [id number]  YOB: 1950  Date of evaluation: 12/15/2023    Procedure Summary       Date: 12/15/23 Room / Location: [facility]    Anesthesia Start: [address] Anesthesia Stop: 1430    Procedure: ABLATION Diagnosis:     Scheduled Providers:  Responsible Provider: Oscar Anderson DO    Anesthesia Type: general ASA Status: 3            Anesthesia Type: No value filed. Filiberto Phase I: Filiberto Score: 9    Filiberto Phase II:      Anesthesia Post Evaluation    Patient location during evaluation: PACU  Patient participation: complete - patient participated  Level of consciousness: awake  Pain score: 2  Airway patency: patent  Nausea & Vomiting: no nausea and no vomiting  Cardiovascular status: blood pressure returned to baseline and hemodynamically stable  Respiratory status: acceptable and room air  Hydration status: stable  Comments: AWAKE, AWARE, ORIENTED  ANSWERED ALL QUESTIONS  PAIN AND VOLUME STATUS STABLE  VITAL SIGNS STABLE  Pain management: adequate    No notable events documented. Talked to patient, scheduled as requested, OK per BB

## 2025-06-30 NOTE — TELEPHONE ENCOUNTER
Last Office Visit: 2/17/2025 Provider: NPDE  **Is provider OOT? yes  Pt last saw NPRB 9/10/2024 in ov    **If no OV, when does pt need to be seen? Pt overdue- Return in about 3 months (around 5/17/2025) for with DARY Duron CNP.   **Has patient already had 30 day supply? No    Lab orders needed? no   Encounter provider correct? Yes If not, change provider  Script changes since last refill? no    LAST LABS:   CMP:  Lab Results   Component Value Date     05/09/2025    K 4.7 05/09/2025    CL 96 (L) 05/09/2025    CO2 30 05/09/2025    BUN 23 (H) 05/09/2025    CREATININE 1.3 05/09/2025    GLUCOSE 468 (H) 05/09/2025    CALCIUM 8.9 05/09/2025    BILITOT 0.6 04/28/2025    ALKPHOS 128 04/28/2025    AST 19 04/28/2025    ALT 20 04/28/2025    LABGLOM 57 (A) 05/09/2025    GFRAA >60 09/23/2022    AGRATIO 2.1 04/28/2025    GLOB 2.7 09/21/2021         Last 3 Lipids:  Lab Results   Component Value Date    CHOL 142 01/24/2025    CHOL 175 03/12/2024    CHOL 195 02/06/2023     Lab Results   Component Value Date    TRIG 160 (H) 01/24/2025    TRIG 244 (H) 03/12/2024    TRIG 249 (H) 02/06/2023     Lab Results   Component Value Date    HDL 40 01/24/2025    HDL 45 03/12/2024    HDL 55 02/23/2023     Lab Results   Component Value Date    LDL 70 01/24/2025    LDL 81 03/12/2024     (H) 02/23/2023     Lab Results   Component Value Date    VLDL 32 01/24/2025    VLDL 49 03/12/2024    VLDL 26 02/23/2023

## 2025-07-10 PROCEDURE — 93294 REM INTERROG EVL PM/LDLS PM: CPT | Performed by: INTERNAL MEDICINE

## 2025-07-10 PROCEDURE — 93296 REM INTERROG EVL PM/IDS: CPT | Performed by: INTERNAL MEDICINE

## 2025-07-14 DIAGNOSIS — Z79.4 TYPE 2 DIABETES MELLITUS WITH MICROALBUMINURIA, WITH LONG-TERM CURRENT USE OF INSULIN (HCC): Chronic | ICD-10-CM

## 2025-07-14 DIAGNOSIS — R80.9 TYPE 2 DIABETES MELLITUS WITH MICROALBUMINURIA, WITH LONG-TERM CURRENT USE OF INSULIN (HCC): Chronic | ICD-10-CM

## 2025-07-14 DIAGNOSIS — E11.29 TYPE 2 DIABETES MELLITUS WITH MICROALBUMINURIA, WITH LONG-TERM CURRENT USE OF INSULIN (HCC): Chronic | ICD-10-CM

## 2025-07-15 RX ORDER — INSULIN GLARGINE 100 [IU]/ML
INJECTION, SOLUTION SUBCUTANEOUS
Qty: 15 ML | Refills: 0 | Status: SHIPPED | OUTPATIENT
Start: 2025-07-15

## 2025-07-25 DIAGNOSIS — E11.41 DIABETIC MONONEUROPATHY ASSOCIATED WITH TYPE 2 DIABETES MELLITUS (HCC): ICD-10-CM

## 2025-07-25 DIAGNOSIS — G25.81 RESTLESS LEG SYNDROME: ICD-10-CM

## 2025-07-25 RX ORDER — PREGABALIN 50 MG/1
50 CAPSULE ORAL 2 TIMES DAILY
Qty: 60 CAPSULE | Refills: 0 | Status: SHIPPED | OUTPATIENT
Start: 2025-07-25 | End: 2025-08-24

## 2025-07-25 NOTE — TELEPHONE ENCOUNTER
Refill Request     CONFIRM preferred pharmacy with the patient.    If Mail Order Rx - Pend for 90 day refill.      Last Seen: Last Seen Department: 6/2/2025  Last Seen by PCP: 5/5/2025    Last Written: 6/25/25 60 caps 0 refills     If no future appointment scheduled:  Review the last OV with PCP and review information for follow-up visit,  Route STAFF MESSAGE with patient name to the  Pool for scheduling with the following information:            -  Timing of next visit           -  Visit type ie Physical, OV, etc           -  Diagnoses/Reason ie. COPD, HTN - Do not use MEDICATION, Follow-up or CHECK UP - Give reason for visit      Next Appointment:   Future Appointments   Date Time Provider Department Center   8/6/2025 11:00 AM Constance Yancey PA [facility]   8/13/2025 10:30 AM JORDAN, ALETHEA DEVICE CHECK [facility]   8/13/2025 10:30 AM Ericka Valdez, APRN - CNP [facility]   10/1/2025 10:30 AM Kamilla Duron, APRN - CNP [facility]       Message sent to  to schedule appt with patient?  NO      Requested Prescriptions     Pending Prescriptions Disp Refills    pregabalin (LYRICA) 50 MG capsule 60 capsule 0     Sig: Take 1 capsule by mouth 2 times daily for 30 days. Max Daily Amount: 100 mg

## 2025-08-14 ENCOUNTER — CLINICAL SUPPORT (OUTPATIENT)
Dept: CARDIOLOGY CLINIC | Age: 75
End: 2025-08-14

## 2025-08-14 ENCOUNTER — OFFICE VISIT (OUTPATIENT)
Dept: CARDIOLOGY CLINIC | Age: 75
End: 2025-08-14
Payer: MEDICARE

## 2025-08-14 VITALS
DIASTOLIC BLOOD PRESSURE: 42 MMHG | HEIGHT: 70 IN | WEIGHT: 276 LBS | SYSTOLIC BLOOD PRESSURE: 96 MMHG | OXYGEN SATURATION: 90 % | HEART RATE: 68 BPM | BODY MASS INDEX: 39.51 KG/M2

## 2025-08-14 DIAGNOSIS — I48.0 PAROXYSMAL A-FIB (HCC): Primary | ICD-10-CM

## 2025-08-14 DIAGNOSIS — E66.01 MORBID OBESITY (HCC): ICD-10-CM

## 2025-08-14 DIAGNOSIS — Z95.818 PRESENCE OF WATCHMAN LEFT ATRIAL APPENDAGE CLOSURE DEVICE: ICD-10-CM

## 2025-08-14 DIAGNOSIS — I49.5 SICK SINUS SYNDROME (HCC): ICD-10-CM

## 2025-08-14 DIAGNOSIS — I50.32 CHRONIC HEART FAILURE WITH PRESERVED EJECTION FRACTION (HCC): ICD-10-CM

## 2025-08-14 DIAGNOSIS — E78.2 MIXED HYPERLIPIDEMIA: ICD-10-CM

## 2025-08-14 DIAGNOSIS — Z79.899 VISIT FOR MONITORING TIKOSYN THERAPY: ICD-10-CM

## 2025-08-14 DIAGNOSIS — I49.5 SINUS NODE DYSFUNCTION (HCC): ICD-10-CM

## 2025-08-14 DIAGNOSIS — I10 ESSENTIAL HYPERTENSION: ICD-10-CM

## 2025-08-14 DIAGNOSIS — Z51.81 VISIT FOR MONITORING TIKOSYN THERAPY: ICD-10-CM

## 2025-08-14 DIAGNOSIS — Z95.0 PACEMAKER: Primary | ICD-10-CM

## 2025-08-14 PROCEDURE — 1036F TOBACCO NON-USER: CPT

## 2025-08-14 PROCEDURE — 99214 OFFICE O/P EST MOD 30 MIN: CPT

## 2025-08-14 PROCEDURE — 3074F SYST BP LT 130 MM HG: CPT

## 2025-08-14 PROCEDURE — 3017F COLORECTAL CA SCREEN DOC REV: CPT

## 2025-08-14 PROCEDURE — 93000 ELECTROCARDIOGRAM COMPLETE: CPT

## 2025-08-14 PROCEDURE — 3078F DIAST BP <80 MM HG: CPT

## 2025-08-14 PROCEDURE — G8417 CALC BMI ABV UP PARAM F/U: HCPCS

## 2025-08-14 PROCEDURE — G2211 COMPLEX E/M VISIT ADD ON: HCPCS

## 2025-08-14 PROCEDURE — 1159F MED LIST DOCD IN RCRD: CPT

## 2025-08-14 PROCEDURE — 1123F ACP DISCUSS/DSCN MKR DOCD: CPT

## 2025-08-14 PROCEDURE — G8427 DOCREV CUR MEDS BY ELIG CLIN: HCPCS

## 2025-08-14 PROCEDURE — 1160F RVW MEDS BY RX/DR IN RCRD: CPT

## 2025-08-18 LAB
ALBUMIN: 3.9 G/DL
ALP BLD-CCNC: 135 U/L
ALT SERPL-CCNC: 16 U/L
ANION GAP SERPL CALCULATED.3IONS-SCNC: 10.1 MMOL/L
AST SERPL-CCNC: 10 U/L
BASOPHILS ABSOLUTE: 0.02 /ΜL
BASOPHILS RELATIVE PERCENT: 0.4 %
BILIRUB SERPL-MCNC: 0.6 MG/DL (ref 0.1–1.4)
BUN BLDV-MCNC: 32 MG/DL
CALCIUM SERPL-MCNC: 9.5 MG/DL
CHLORIDE BLD-SCNC: 101 MMOL/L
CO2: 27.9 MMOL/L
CREAT SERPL-MCNC: 1.4 MG/DL
EOSINOPHILS ABSOLUTE: 0.25 /ΜL
EOSINOPHILS RELATIVE PERCENT: 5.4 %
GFR, ESTIMATED: 52.4
GLUCOSE BLD-MCNC: 347 MG/DL
HCT VFR BLD CALC: 41.3 % (ref 41–53)
HEMOGLOBIN: 13.2 G/DL (ref 13.5–17.5)
LYMPHOCYTES ABSOLUTE: 0.93 /ΜL
LYMPHOCYTES RELATIVE PERCENT: 20.3 %
MCH RBC QN AUTO: 29.1 PG
MCHC RBC AUTO-ENTMCNC: 32 G/DL
MCV RBC AUTO: 91.2 FL
MONOCYTES ABSOLUTE: 0.3 /ΜL
MONOCYTES RELATIVE PERCENT: 6.5 %
NEUTROPHILS ABSOLUTE: 3.09 /ΜL
NEUTROPHILS RELATIVE PERCENT: 67.4 %
PLATELET # BLD: 162 K/ΜL
PMV BLD AUTO: ABNORMAL FL
POTASSIUM SERPL-SCNC: 5.1 MMOL/L
RBC # BLD: 4.53 10^6/ΜL
SODIUM BLD-SCNC: 139 MMOL/L
TOTAL PROTEIN: 6.7 G/DL (ref 6.4–8.2)
WBC # BLD: 4.6 10^3/ML

## 2025-08-19 DIAGNOSIS — G25.81 RESTLESS LEG SYNDROME: ICD-10-CM

## 2025-08-20 ENCOUNTER — TELEPHONE (OUTPATIENT)
Dept: CARDIOLOGY CLINIC | Age: 75
End: 2025-08-20

## 2025-08-20 RX ORDER — PRAMIPEXOLE DIHYDROCHLORIDE 0.5 MG/1
TABLET ORAL
Qty: 135 TABLET | Refills: 0 | Status: SHIPPED | OUTPATIENT
Start: 2025-08-20

## 2025-08-24 DIAGNOSIS — Z79.4 TYPE 2 DIABETES MELLITUS WITH MICROALBUMINURIA, WITH LONG-TERM CURRENT USE OF INSULIN (HCC): Chronic | ICD-10-CM

## 2025-08-24 DIAGNOSIS — E11.29 TYPE 2 DIABETES MELLITUS WITH MICROALBUMINURIA, WITH LONG-TERM CURRENT USE OF INSULIN (HCC): Chronic | ICD-10-CM

## 2025-08-24 DIAGNOSIS — R80.9 TYPE 2 DIABETES MELLITUS WITH MICROALBUMINURIA, WITH LONG-TERM CURRENT USE OF INSULIN (HCC): Chronic | ICD-10-CM

## 2025-08-25 RX ORDER — INSULIN GLARGINE 100 [IU]/ML
INJECTION, SOLUTION SUBCUTANEOUS
Qty: 15 ML | Refills: 0 | Status: SHIPPED | OUTPATIENT
Start: 2025-08-25

## 2025-08-28 DIAGNOSIS — G25.81 RESTLESS LEG SYNDROME: ICD-10-CM

## 2025-08-28 DIAGNOSIS — E11.41 DIABETIC MONONEUROPATHY ASSOCIATED WITH TYPE 2 DIABETES MELLITUS (HCC): ICD-10-CM

## 2025-08-28 RX ORDER — PREGABALIN 50 MG/1
CAPSULE ORAL
Qty: 60 CAPSULE | Refills: 0 | Status: SHIPPED | OUTPATIENT
Start: 2025-08-28 | End: 2025-09-27

## (undated) DEVICE — NEEDLE FLTR 18GA L1.5IN MEM THK5UM BLNT DISP

## (undated) DEVICE — CONMED SCOPE SAVER BITE BLOCK, 20X27 MM: Brand: SCOPE SAVER

## (undated) DEVICE — CATHETER ENDOSCP L135CM W7.5XL15.7MM DIA2.8MM FLX RFA

## (undated) DEVICE — Device

## (undated) DEVICE — TUBING, SUCTION, 3/16" X 6', STRAIGHT: Brand: MEDLINE

## (undated) DEVICE — CAP CLN SM SFT DISP BARRX

## (undated) DEVICE — ENDO CARRY-ON PROCEDURE KIT INCLUDES SUCTION TUBING, LUBRICANT, GAUZE, BIOHAZARD STICKER, TRANSPORT PAD AND INTERCEPT BEDSIDE KIT.: Brand: ENDO CARRY-ON PROCEDURE KIT

## (undated) DEVICE — ELECTRODE ECG MONITR FOAM TEAR DROP ADLT RED

## (undated) DEVICE — CONTAINER,SPECIMEN,OR STERILE,4OZ: Brand: MEDLINE

## (undated) DEVICE — CATHETER THERMOABLAT DIA18-31MM BLLN L8CM ELECTRD L4CM 16

## (undated) DEVICE — 1 X VERSACROSS CONNECT TRANSSEPTAL DILATOR (INCLUDING 1 X J-TIP MECHANICAL GUIDEWIRE); 1 X VERSACROSS RF WIRE (INCLUDING 1 X CONNECTOR CABLE (SINGLE USE)); 1 X DISPERSIVE ELECTRODE: Brand: VERSACROSS CONNECT LAAC ACCESS SOLUTION

## (undated) DEVICE — ENDO CARRY-ON PROCEDURE KIT: Brand: ENDO CARRY-ON PROCEDURE KIT

## (undated) DEVICE — Device: Brand: NOMOLINE™ LH ADULT NASAL CO2 CANNULA WITH O2 4M

## (undated) DEVICE — TUBING, SUCTION, 3/16" X 12', STRAIGHT: Brand: MEDLINE

## (undated) DEVICE — SYRINGE MED 10ML LUERLOCK TIP W/O SFTY DISP

## (undated) DEVICE — PACEMAKER PACK: Brand: MEDLINE INDUSTRIES, INC.

## (undated) DEVICE — FORCEP BX STD CAP 240CM RAD JAW 4

## (undated) DEVICE — 6FR ANGLED PIGTAIL CATHETER 110CM

## (undated) DEVICE — FORCEPS ENDOSCP BX L230CM DIA28MM ALGTR CUP SPEC RETRV GI

## (undated) DEVICE — GUIDEWIRE ENDOSCP L260CM DIA0.038IN S STL STR COAT FLX DST

## (undated) DEVICE — Z DISCONTINUED USE 2276105 GOWN PROTCT UNIV CHST W28IN L49IN SL 24IN BLU SPUNBOND FLM

## (undated) DEVICE — 3M™ TEGADERM™ TRANSPARENT FILM DRESSING FRAME STYLE, 1624W, 2-3/8 IN X 2-3/4 IN (6 CM X 7 CM), 100/CT 4CT/CASE: Brand: 3M™ TEGADERM™

## (undated) DEVICE — SET GRAV VENT NVENT CK VLV 3 NDL FREE PRT 10 GTT

## (undated) DEVICE — PROBE COVER KIT: Brand: MEDLINE INDUSTRIES, INC.

## (undated) DEVICE — CANNULA,OXY,ADULT,SUPERSOFT,W/7'TUB,SC: Brand: MEDLINE INDUSTRIES, INC.

## (undated) DEVICE — CODMAN® SURGICAL PATTIES 1/2" X 3" (1.27CM X 7.62CM): Brand: CODMAN®

## (undated) DEVICE — PINNACLE INTRODUCER SHEATH: Brand: PINNACLE

## (undated) DEVICE — PAD, DEFIB, ADULT, RADIOTRANS, PHYSIO: Brand: MEDLINE

## (undated) DEVICE — AIRLIFE™ NASAL OXYGEN CANNULA CURVED, FLARED TIP, WITH 7 FEET (2.1 M) CRUSH RESISTANT TUBING, OVER-THE-EAR STYLE: Brand: AIRLIFE™

## (undated) DEVICE — PERCLOSE™ PROSTYLE™ SUTURE-MEDIATED CLOSURE AND REPAIR SYSTEM: Brand: PERCLOSE™ PROSTYLE™

## (undated) DEVICE — LIGHT HANDLE COVER: Brand: UNBRANDED

## (undated) DEVICE — ACCESS SHEATH WITH DILATOR: Brand: WATCHMAN FXD CURVE™ ACCESS SYSTEM

## (undated) DEVICE — CATHETER ABLAT L160CM DIA4MM BPLR ELECTRD W13XL20MM FOC

## (undated) DEVICE — YANKAUER,BULB TIP,W/O VENT,RIGID,STERILE: Brand: MEDLINE

## (undated) DEVICE — Device: Brand: JELCO

## (undated) DEVICE — RESTRAINT EXT ANK WRST LT BLU FOAM 2 STRP SIDE BCKL HK AND

## (undated) DEVICE — MASIMOSET LNCS ADTX SPO2 ADULT PULSE OXIMETER ADHESIVE SENSOR: Brand: MASIMOSET® LNCS® ADTX SPO2 ADULT PULSE OXIMETER ADHESIVE SENSOR

## (undated) DEVICE — SYRINGE MED 50ML LUERLOCK TIP

## (undated) DEVICE — ELECTRODE PT RET AD L9FT HI MOIST COND ADH HYDRGEL CORDED

## (undated) DEVICE — STRIP,CLOSURE,WOUND,MEDI-STRIP,1/2X4: Brand: MEDLINE

## (undated) DEVICE — Device: Brand: DEFENDO VALVE AND CONNECTOR KIT

## (undated) DEVICE — METER ACCU-CHEK INFORM BASE GLUCOSE

## (undated) DEVICE — TOWEL,OR,DSP,ST,BLUE,DLX,1/PK,80PK/CS: Brand: MEDLINE

## (undated) DEVICE — SOLUTION IV 1000ML LAC RINGERS PH 6.5 INJ USP VIAFLX PLAS

## (undated) DEVICE — CHECK-FLO PERFORMER INTRODUCER: Brand: PERFORMER